# Patient Record
Sex: FEMALE | Race: WHITE | NOT HISPANIC OR LATINO
[De-identification: names, ages, dates, MRNs, and addresses within clinical notes are randomized per-mention and may not be internally consistent; named-entity substitution may affect disease eponyms.]

---

## 2019-07-12 ENCOUNTER — APPOINTMENT (OUTPATIENT)
Dept: HEPATOLOGY | Facility: CLINIC | Age: 64
End: 2019-07-12

## 2019-11-25 ENCOUNTER — TRANSCRIPTION ENCOUNTER (OUTPATIENT)
Age: 64
End: 2019-11-25

## 2019-11-25 ENCOUNTER — INPATIENT (INPATIENT)
Facility: HOSPITAL | Age: 64
LOS: 71 days | DRG: 329 | End: 2020-02-05
Attending: INTERNAL MEDICINE | Admitting: SURGERY
Payer: MEDICARE

## 2019-11-25 VITALS
DIASTOLIC BLOOD PRESSURE: 70 MMHG | TEMPERATURE: 99 F | SYSTOLIC BLOOD PRESSURE: 110 MMHG | HEART RATE: 62 BPM | RESPIRATION RATE: 16 BRPM | OXYGEN SATURATION: 100 %

## 2019-11-25 LAB
ALBUMIN SERPL ELPH-MCNC: 2.9 G/DL — LOW (ref 3.3–5)
ALP SERPL-CCNC: 108 U/L — SIGNIFICANT CHANGE UP (ref 40–120)
ALT FLD-CCNC: 33 U/L — SIGNIFICANT CHANGE UP (ref 10–45)
ANION GAP SERPL CALC-SCNC: 17 MMOL/L — SIGNIFICANT CHANGE UP (ref 5–17)
APTT BLD: 27.2 SEC — LOW (ref 27.5–36.3)
AST SERPL-CCNC: 36 U/L — SIGNIFICANT CHANGE UP (ref 10–40)
BASOPHILS # BLD AUTO: 0 K/UL — SIGNIFICANT CHANGE UP (ref 0–0.2)
BASOPHILS NFR BLD AUTO: 0 % — SIGNIFICANT CHANGE UP (ref 0–2)
BILIRUB SERPL-MCNC: 1.3 MG/DL — HIGH (ref 0.2–1.2)
BUN SERPL-MCNC: 20 MG/DL — SIGNIFICANT CHANGE UP (ref 7–23)
CALCIUM SERPL-MCNC: 10.1 MG/DL — SIGNIFICANT CHANGE UP (ref 8.4–10.5)
CHLORIDE SERPL-SCNC: 97 MMOL/L — SIGNIFICANT CHANGE UP (ref 96–108)
CO2 SERPL-SCNC: 17 MMOL/L — LOW (ref 22–31)
CREAT SERPL-MCNC: 0.78 MG/DL — SIGNIFICANT CHANGE UP (ref 0.5–1.3)
EOSINOPHIL # BLD AUTO: 0 K/UL — SIGNIFICANT CHANGE UP (ref 0–0.5)
EOSINOPHIL NFR BLD AUTO: 0 % — SIGNIFICANT CHANGE UP (ref 0–6)
GAS PNL BLDV: SIGNIFICANT CHANGE UP
GLUCOSE SERPL-MCNC: 151 MG/DL — HIGH (ref 70–99)
HCT VFR BLD CALC: 50.6 % — HIGH (ref 34.5–45)
HGB BLD-MCNC: 16.9 G/DL — HIGH (ref 11.5–15.5)
INR BLD: 1.31 RATIO — HIGH (ref 0.88–1.16)
LYMPHOCYTES # BLD AUTO: 0.2 K/UL — LOW (ref 1–3.3)
LYMPHOCYTES # BLD AUTO: 1.8 % — LOW (ref 13–44)
MAGNESIUM SERPL-MCNC: 2.2 MG/DL — SIGNIFICANT CHANGE UP (ref 1.6–2.6)
MCHC RBC-ENTMCNC: 29.7 PG — SIGNIFICANT CHANGE UP (ref 27–34)
MCHC RBC-ENTMCNC: 33.4 GM/DL — SIGNIFICANT CHANGE UP (ref 32–36)
MCV RBC AUTO: 88.9 FL — SIGNIFICANT CHANGE UP (ref 80–100)
MONOCYTES # BLD AUTO: 0.4 K/UL — SIGNIFICANT CHANGE UP (ref 0–0.9)
MONOCYTES NFR BLD AUTO: 3.6 % — SIGNIFICANT CHANGE UP (ref 2–14)
NEUTROPHILS # BLD AUTO: 10.49 K/UL — HIGH (ref 1.8–7.4)
NEUTROPHILS NFR BLD AUTO: 88.4 % — HIGH (ref 43–77)
PHOSPHATE SERPL-MCNC: 3.1 MG/DL — SIGNIFICANT CHANGE UP (ref 2.5–4.5)
PLATELET # BLD AUTO: 333 K/UL — SIGNIFICANT CHANGE UP (ref 150–400)
POTASSIUM SERPL-MCNC: 6.1 MMOL/L — HIGH (ref 3.5–5.3)
POTASSIUM SERPL-SCNC: 6.1 MMOL/L — HIGH (ref 3.5–5.3)
PROT SERPL-MCNC: 8 G/DL — SIGNIFICANT CHANGE UP (ref 6–8.3)
PROTHROM AB SERPL-ACNC: 15.2 SEC — HIGH (ref 10–12.9)
RBC # BLD: 5.69 M/UL — HIGH (ref 3.8–5.2)
RBC # FLD: 12.9 % — SIGNIFICANT CHANGE UP (ref 10.3–14.5)
SODIUM SERPL-SCNC: 131 MMOL/L — LOW (ref 135–145)
WBC # BLD: 11.2 K/UL — HIGH (ref 3.8–10.5)
WBC # FLD AUTO: 11.2 K/UL — HIGH (ref 3.8–10.5)

## 2019-11-25 PROCEDURE — 74177 CT ABD & PELVIS W/CONTRAST: CPT | Mod: 26

## 2019-11-25 PROCEDURE — 99285 EMERGENCY DEPT VISIT HI MDM: CPT

## 2019-11-25 RX ORDER — SODIUM CHLORIDE 9 MG/ML
1000 INJECTION INTRAMUSCULAR; INTRAVENOUS; SUBCUTANEOUS ONCE
Refills: 0 | Status: COMPLETED | OUTPATIENT
Start: 2019-11-25 | End: 2019-11-25

## 2019-11-25 RX ORDER — PIPERACILLIN AND TAZOBACTAM 4; .5 G/20ML; G/20ML
3.38 INJECTION, POWDER, LYOPHILIZED, FOR SOLUTION INTRAVENOUS ONCE
Refills: 0 | Status: COMPLETED | OUTPATIENT
Start: 2019-11-25 | End: 2019-11-25

## 2019-11-25 RX ADMIN — SODIUM CHLORIDE 1000 MILLILITER(S): 9 INJECTION INTRAMUSCULAR; INTRAVENOUS; SUBCUTANEOUS at 22:55

## 2019-11-25 NOTE — ED PROVIDER NOTE - ATTENDING CONTRIBUTION TO CARE
Pt seen with Dr Garrett and agree with is documentation above. Also agree with rapid assessment done by PA and documented by scribe.     64y F hx morbid obesity, HTN, COPD, lymphedema BL LE here with 3 weeks of vague abd discomfort and constipation, pain worsening in past 2-3 days. Has tried miralax, senna, used enema yesterday and had one small BM described as "lily." Pain was much worse after enema and now reporting worsening distension. Has had "low grade" fever to tmax 99.7 F as well. On exam dry mm, uncomfortable, +Bs, obese, distended, L sided mid and lower abd ttp, non peritoneal on exam. Concern for colitis, diverticulitis, obstruction. Labs, UA, CTAP, pain control, re-eval.

## 2019-11-25 NOTE — ED PROVIDER NOTE - CARE PLAN
Principal Discharge DX:	Diverticulitis Principal Discharge DX:	Diverticulitis  Goal:	with perforation

## 2019-11-25 NOTE — ED PROVIDER NOTE - OBJECTIVE STATEMENT
63 yo female with morbid obesity, constipation, presenting with abd pain for one month. states she has had constipation for one month, worsening for one week, unrelieved by miralax or senna, came to ED for further evaluation. Also had fevers previously, but not currently. Has passed stool but small round bowel movements.    Denies vomiting, diarrhea, sick contacts, travel recently.

## 2019-11-25 NOTE — ED PROVIDER NOTE - RAPID ASSESSMENT
**Pt seen in waiting room by Tatyana Frances (TIMI), documentation completed by Lucas Bergeron. Pt to be sent to main ED for further evaluation - all orders placed to be followed by MD in the main ED**    64 year old female with pmhx morbid obesity, post menopausal bleeding, vaginal polyp, acid reflux, HTN, COPD and pshx D&C presents to the ED c/o constipation x4 weeks. At that time, she also developed a subjective fever, which she took Augmentin for. The pt has not been able to have a BM without using an enema. Since then, she has been using an enema for her BMs every x3 days. Went to a doctor who recommended that the pt try Miralax but she began to experience pain to the left side of her abdomen. Also tried Senna and Shaw' Laxatives. Pt was able to pass stool yesterday after the enema but developed dizziness and a HA soon after. Noes that she has not been able to urinate all day today. **Pt seen in waiting room by Tatyana Frances (TIMI), documentation completed by Lucas Bergeron. Pt to be sent to main ED for further evaluation - all orders placed to be followed by MD in the main ED**    64 year old female with pmhx morbid obesity, post menopausal bleeding, vaginal polyp, acid reflux, HTN, COPD and pshx D&C presents to the ED c/o constipation x4 weeks. Reports that she has been taking enemas, miralax and senna and 'passing some small hard balls but no real stool in weeks.' Reports that she has not had a bowel movement unless she used an enema. States that taking all this medication has caused her to have significant abdominal distention and pain worst in the LLQ. Notes that she has not been able to urinate all day today because she feels dehydrated. Reports that she had 'low grade fever of 99.7 which she took left over augmentin for.' Denies any recorded fevers. No urinary symptoms.

## 2019-11-26 ENCOUNTER — RESULT REVIEW (OUTPATIENT)
Age: 64
End: 2019-11-26

## 2019-11-26 DIAGNOSIS — K57.92 DIVERTICULITIS OF INTESTINE, PART UNSPECIFIED, WITHOUT PERFORATION OR ABSCESS WITHOUT BLEEDING: ICD-10-CM

## 2019-11-26 DIAGNOSIS — K57.20 DIVERTICULITIS OF LARGE INTESTINE WITH PERFORATION AND ABSCESS W/OUT BLEEDING: ICD-10-CM

## 2019-11-26 LAB
ANION GAP SERPL CALC-SCNC: 11 MMOL/L — SIGNIFICANT CHANGE UP (ref 5–17)
APTT BLD: 27.1 SEC — LOW (ref 27.5–36.3)
BLD GP AB SCN SERPL QL: NEGATIVE — SIGNIFICANT CHANGE UP
BUN SERPL-MCNC: 20 MG/DL — SIGNIFICANT CHANGE UP (ref 7–23)
CALCIUM SERPL-MCNC: 8.2 MG/DL — LOW (ref 8.4–10.5)
CHLORIDE SERPL-SCNC: 102 MMOL/L — SIGNIFICANT CHANGE UP (ref 96–108)
CO2 SERPL-SCNC: 23 MMOL/L — SIGNIFICANT CHANGE UP (ref 22–31)
CREAT SERPL-MCNC: 0.81 MG/DL — SIGNIFICANT CHANGE UP (ref 0.5–1.3)
GAS PNL BLDA: SIGNIFICANT CHANGE UP
GAS PNL BLDV: SIGNIFICANT CHANGE UP
GLUCOSE SERPL-MCNC: 141 MG/DL — HIGH (ref 70–99)
HCT VFR BLD CALC: 36.5 % — SIGNIFICANT CHANGE UP (ref 34.5–45)
HCT VFR BLD CALC: 40.1 % — SIGNIFICANT CHANGE UP (ref 34.5–45)
HCT VFR BLD CALC: 40.7 % — SIGNIFICANT CHANGE UP (ref 34.5–45)
HGB BLD-MCNC: 12.2 G/DL — SIGNIFICANT CHANGE UP (ref 11.5–15.5)
HGB BLD-MCNC: 13.1 G/DL — SIGNIFICANT CHANGE UP (ref 11.5–15.5)
HGB BLD-MCNC: 13.5 G/DL — SIGNIFICANT CHANGE UP (ref 11.5–15.5)
INR BLD: 1.37 RATIO — HIGH (ref 0.88–1.16)
MAGNESIUM SERPL-MCNC: 2.1 MG/DL — SIGNIFICANT CHANGE UP (ref 1.6–2.6)
MCHC RBC-ENTMCNC: 29.2 PG — SIGNIFICANT CHANGE UP (ref 27–34)
MCHC RBC-ENTMCNC: 29.7 PG — SIGNIFICANT CHANGE UP (ref 27–34)
MCHC RBC-ENTMCNC: 29.8 PG — SIGNIFICANT CHANGE UP (ref 27–34)
MCHC RBC-ENTMCNC: 32.7 GM/DL — SIGNIFICANT CHANGE UP (ref 32–36)
MCHC RBC-ENTMCNC: 33.2 GM/DL — SIGNIFICANT CHANGE UP (ref 32–36)
MCHC RBC-ENTMCNC: 33.4 GM/DL — SIGNIFICANT CHANGE UP (ref 32–36)
MCV RBC AUTO: 89.2 FL — SIGNIFICANT CHANGE UP (ref 80–100)
MCV RBC AUTO: 89.3 FL — SIGNIFICANT CHANGE UP (ref 80–100)
MCV RBC AUTO: 89.5 FL — SIGNIFICANT CHANGE UP (ref 80–100)
NRBC # BLD: 0 /100 WBCS — SIGNIFICANT CHANGE UP (ref 0–0)
PHOSPHATE SERPL-MCNC: 3.5 MG/DL — SIGNIFICANT CHANGE UP (ref 2.5–4.5)
PLATELET # BLD AUTO: 302 K/UL — SIGNIFICANT CHANGE UP (ref 150–400)
PLATELET # BLD AUTO: 321 K/UL — SIGNIFICANT CHANGE UP (ref 150–400)
PLATELET # BLD AUTO: 326 K/UL — SIGNIFICANT CHANGE UP (ref 150–400)
POTASSIUM SERPL-MCNC: 4.4 MMOL/L — SIGNIFICANT CHANGE UP (ref 3.5–5.3)
POTASSIUM SERPL-SCNC: 4.4 MMOL/L — SIGNIFICANT CHANGE UP (ref 3.5–5.3)
PROTHROM AB SERPL-ACNC: 15.9 SEC — HIGH (ref 10–12.9)
RBC # BLD: 4.09 M/UL — SIGNIFICANT CHANGE UP (ref 3.8–5.2)
RBC # BLD: 4.49 M/UL — SIGNIFICANT CHANGE UP (ref 3.8–5.2)
RBC # BLD: 4.55 M/UL — SIGNIFICANT CHANGE UP (ref 3.8–5.2)
RBC # FLD: 12.9 % — SIGNIFICANT CHANGE UP (ref 10.3–14.5)
RBC # FLD: 13 % — SIGNIFICANT CHANGE UP (ref 10.3–14.5)
RBC # FLD: 13 % — SIGNIFICANT CHANGE UP (ref 10.3–14.5)
RH IG SCN BLD-IMP: POSITIVE — SIGNIFICANT CHANGE UP
RH IG SCN BLD-IMP: POSITIVE — SIGNIFICANT CHANGE UP
SODIUM SERPL-SCNC: 136 MMOL/L — SIGNIFICANT CHANGE UP (ref 135–145)
WBC # BLD: 11.08 K/UL — HIGH (ref 3.8–10.5)
WBC # BLD: 12.73 K/UL — HIGH (ref 3.8–10.5)
WBC # BLD: 7.63 K/UL — SIGNIFICANT CHANGE UP (ref 3.8–10.5)
WBC # FLD AUTO: 11.08 K/UL — HIGH (ref 3.8–10.5)
WBC # FLD AUTO: 12.73 K/UL — HIGH (ref 3.8–10.5)
WBC # FLD AUTO: 7.63 K/UL — SIGNIFICANT CHANGE UP (ref 3.8–10.5)

## 2019-11-26 PROCEDURE — 99291 CRITICAL CARE FIRST HOUR: CPT

## 2019-11-26 PROCEDURE — 88307 TISSUE EXAM BY PATHOLOGIST: CPT | Mod: 26

## 2019-11-26 PROCEDURE — 71045 X-RAY EXAM CHEST 1 VIEW: CPT | Mod: 26

## 2019-11-26 PROCEDURE — 36556 INSERT NON-TUNNEL CV CATH: CPT

## 2019-11-26 RX ORDER — SODIUM CHLORIDE 9 MG/ML
1000 INJECTION, SOLUTION INTRAVENOUS
Refills: 0 | Status: DISCONTINUED | OUTPATIENT
Start: 2019-11-26 | End: 2019-11-29

## 2019-11-26 RX ORDER — SODIUM CHLORIDE 9 MG/ML
1000 INJECTION, SOLUTION INTRAVENOUS ONCE
Refills: 0 | Status: COMPLETED | OUTPATIENT
Start: 2019-11-26 | End: 2019-11-26

## 2019-11-26 RX ORDER — CALCIUM GLUCONATE 100 MG/ML
2 VIAL (ML) INTRAVENOUS ONCE
Refills: 0 | Status: COMPLETED | OUTPATIENT
Start: 2019-11-26 | End: 2019-11-26

## 2019-11-26 RX ORDER — FENTANYL CITRATE 50 UG/ML
1 INJECTION INTRAVENOUS
Qty: 5000 | Refills: 0 | Status: DISCONTINUED | OUTPATIENT
Start: 2019-11-26 | End: 2019-11-26

## 2019-11-26 RX ORDER — SODIUM CHLORIDE 9 MG/ML
500 INJECTION, SOLUTION INTRAVENOUS ONCE
Refills: 0 | Status: DISCONTINUED | OUTPATIENT
Start: 2019-11-26 | End: 2019-11-26

## 2019-11-26 RX ORDER — SODIUM CHLORIDE 9 MG/ML
10 INJECTION INTRAMUSCULAR; INTRAVENOUS; SUBCUTANEOUS
Refills: 0 | Status: DISCONTINUED | OUTPATIENT
Start: 2019-11-26 | End: 2019-11-26

## 2019-11-26 RX ORDER — DEXMEDETOMIDINE HYDROCHLORIDE IN 0.9% SODIUM CHLORIDE 4 UG/ML
0.7 INJECTION INTRAVENOUS
Qty: 200 | Refills: 0 | Status: DISCONTINUED | OUTPATIENT
Start: 2019-11-26 | End: 2019-11-27

## 2019-11-26 RX ORDER — SODIUM CHLORIDE 9 MG/ML
500 INJECTION, SOLUTION INTRAVENOUS ONCE
Refills: 0 | Status: COMPLETED | OUTPATIENT
Start: 2019-11-26 | End: 2019-11-26

## 2019-11-26 RX ORDER — IPRATROPIUM/ALBUTEROL SULFATE 18-103MCG
3 AEROSOL WITH ADAPTER (GRAM) INHALATION EVERY 6 HOURS
Refills: 0 | Status: DISCONTINUED | OUTPATIENT
Start: 2019-11-26 | End: 2019-11-26

## 2019-11-26 RX ORDER — HYDROMORPHONE HYDROCHLORIDE 2 MG/ML
0.5 INJECTION INTRAMUSCULAR; INTRAVENOUS; SUBCUTANEOUS ONCE
Refills: 0 | Status: DISCONTINUED | OUTPATIENT
Start: 2019-11-26 | End: 2019-11-26

## 2019-11-26 RX ORDER — MORPHINE SULFATE 50 MG/1
4 CAPSULE, EXTENDED RELEASE ORAL ONCE
Refills: 0 | Status: DISCONTINUED | OUTPATIENT
Start: 2019-11-26 | End: 2019-11-26

## 2019-11-26 RX ORDER — ONDANSETRON 8 MG/1
4 TABLET, FILM COATED ORAL ONCE
Refills: 0 | Status: COMPLETED | OUTPATIENT
Start: 2019-11-26 | End: 2019-11-26

## 2019-11-26 RX ORDER — PROPOFOL 10 MG/ML
15 INJECTION, EMULSION INTRAVENOUS
Qty: 1000 | Refills: 0 | Status: DISCONTINUED | OUTPATIENT
Start: 2019-11-26 | End: 2019-11-26

## 2019-11-26 RX ORDER — DOXAZOSIN MESYLATE 4 MG
1 TABLET ORAL
Qty: 0 | Refills: 0 | DISCHARGE

## 2019-11-26 RX ORDER — PIPERACILLIN AND TAZOBACTAM 4; .5 G/20ML; G/20ML
3.38 INJECTION, POWDER, LYOPHILIZED, FOR SOLUTION INTRAVENOUS EVERY 8 HOURS
Refills: 0 | Status: DISCONTINUED | OUTPATIENT
Start: 2019-11-26 | End: 2019-11-26

## 2019-11-26 RX ORDER — IPRATROPIUM/ALBUTEROL SULFATE 18-103MCG
3 AEROSOL WITH ADAPTER (GRAM) INHALATION EVERY 6 HOURS
Refills: 0 | Status: DISCONTINUED | OUTPATIENT
Start: 2019-11-26 | End: 2019-11-27

## 2019-11-26 RX ORDER — ENOXAPARIN SODIUM 100 MG/ML
80 INJECTION SUBCUTANEOUS
Refills: 0 | Status: DISCONTINUED | OUTPATIENT
Start: 2019-11-26 | End: 2019-11-28

## 2019-11-26 RX ORDER — HEPARIN SODIUM 5000 [USP'U]/ML
5000 INJECTION INTRAVENOUS; SUBCUTANEOUS EVERY 8 HOURS
Refills: 0 | Status: DISCONTINUED | OUTPATIENT
Start: 2019-11-26 | End: 2019-11-26

## 2019-11-26 RX ORDER — ACETAMINOPHEN 500 MG
1000 TABLET ORAL ONCE
Refills: 0 | Status: COMPLETED | OUTPATIENT
Start: 2019-11-26 | End: 2019-11-26

## 2019-11-26 RX ORDER — SODIUM CHLORIDE 9 MG/ML
1000 INJECTION, SOLUTION INTRAVENOUS
Refills: 0 | Status: DISCONTINUED | OUTPATIENT
Start: 2019-11-26 | End: 2019-11-26

## 2019-11-26 RX ORDER — AMLODIPINE BESYLATE 2.5 MG/1
1 TABLET ORAL
Qty: 0 | Refills: 0 | DISCHARGE

## 2019-11-26 RX ORDER — HYDROMORPHONE HYDROCHLORIDE 2 MG/ML
0.5 INJECTION INTRAMUSCULAR; INTRAVENOUS; SUBCUTANEOUS EVERY 4 HOURS
Refills: 0 | Status: DISCONTINUED | OUTPATIENT
Start: 2019-11-26 | End: 2019-11-26

## 2019-11-26 RX ORDER — CHLORHEXIDINE GLUCONATE 213 G/1000ML
15 SOLUTION TOPICAL
Refills: 0 | Status: DISCONTINUED | OUTPATIENT
Start: 2019-11-26 | End: 2019-11-27

## 2019-11-26 RX ORDER — PANTOPRAZOLE SODIUM 20 MG/1
40 TABLET, DELAYED RELEASE ORAL EVERY 24 HOURS
Refills: 0 | Status: DISCONTINUED | OUTPATIENT
Start: 2019-11-26 | End: 2019-11-29

## 2019-11-26 RX ORDER — CHLORHEXIDINE GLUCONATE 213 G/1000ML
1 SOLUTION TOPICAL
Refills: 0 | Status: DISCONTINUED | OUTPATIENT
Start: 2019-11-26 | End: 2019-11-26

## 2019-11-26 RX ORDER — ENOXAPARIN SODIUM 100 MG/ML
40 INJECTION SUBCUTANEOUS
Refills: 0 | Status: DISCONTINUED | OUTPATIENT
Start: 2019-11-26 | End: 2019-11-26

## 2019-11-26 RX ORDER — PIPERACILLIN AND TAZOBACTAM 4; .5 G/20ML; G/20ML
3.38 INJECTION, POWDER, LYOPHILIZED, FOR SOLUTION INTRAVENOUS EVERY 8 HOURS
Refills: 0 | Status: DISCONTINUED | OUTPATIENT
Start: 2019-11-26 | End: 2019-11-29

## 2019-11-26 RX ORDER — HYDROMORPHONE HYDROCHLORIDE 2 MG/ML
0.5 INJECTION INTRAMUSCULAR; INTRAVENOUS; SUBCUTANEOUS EVERY 4 HOURS
Refills: 0 | Status: DISCONTINUED | OUTPATIENT
Start: 2019-11-26 | End: 2019-11-27

## 2019-11-26 RX ORDER — CHLORHEXIDINE GLUCONATE 213 G/1000ML
1 SOLUTION TOPICAL
Refills: 0 | Status: DISCONTINUED | OUTPATIENT
Start: 2019-11-26 | End: 2019-11-29

## 2019-11-26 RX ADMIN — SODIUM CHLORIDE 2000 MILLILITER(S): 9 INJECTION, SOLUTION INTRAVENOUS at 00:40

## 2019-11-26 RX ADMIN — CHLORHEXIDINE GLUCONATE 15 MILLILITER(S): 213 SOLUTION TOPICAL at 16:07

## 2019-11-26 RX ADMIN — ENOXAPARIN SODIUM 80 MILLIGRAM(S): 100 INJECTION SUBCUTANEOUS at 12:57

## 2019-11-26 RX ADMIN — MORPHINE SULFATE 4 MILLIGRAM(S): 50 CAPSULE, EXTENDED RELEASE ORAL at 01:08

## 2019-11-26 RX ADMIN — CHLORHEXIDINE GLUCONATE 15 MILLILITER(S): 213 SOLUTION TOPICAL at 22:24

## 2019-11-26 RX ADMIN — HYDROMORPHONE HYDROCHLORIDE 0.5 MILLIGRAM(S): 2 INJECTION INTRAMUSCULAR; INTRAVENOUS; SUBCUTANEOUS at 15:12

## 2019-11-26 RX ADMIN — HYDROMORPHONE HYDROCHLORIDE 0.5 MILLIGRAM(S): 2 INJECTION INTRAMUSCULAR; INTRAVENOUS; SUBCUTANEOUS at 15:30

## 2019-11-26 RX ADMIN — SODIUM CHLORIDE 1000 MILLILITER(S): 9 INJECTION, SOLUTION INTRAVENOUS at 12:30

## 2019-11-26 RX ADMIN — ONDANSETRON 4 MILLIGRAM(S): 8 TABLET, FILM COATED ORAL at 01:13

## 2019-11-26 RX ADMIN — FENTANYL CITRATE 8.41 MICROGRAM(S)/KG/HR: 50 INJECTION INTRAVENOUS at 08:09

## 2019-11-26 RX ADMIN — Medication 200 GRAM(S): at 12:57

## 2019-11-26 RX ADMIN — PIPERACILLIN AND TAZOBACTAM 25 GRAM(S): 4; .5 INJECTION, POWDER, LYOPHILIZED, FOR SOLUTION INTRAVENOUS at 23:18

## 2019-11-26 RX ADMIN — PIPERACILLIN AND TAZOBACTAM 25 GRAM(S): 4; .5 INJECTION, POWDER, LYOPHILIZED, FOR SOLUTION INTRAVENOUS at 08:19

## 2019-11-26 RX ADMIN — PANTOPRAZOLE SODIUM 40 MILLIGRAM(S): 20 TABLET, DELAYED RELEASE ORAL at 12:57

## 2019-11-26 RX ADMIN — PROPOFOL 14.67 MICROGRAM(S)/KG/MIN: 10 INJECTION, EMULSION INTRAVENOUS at 08:08

## 2019-11-26 RX ADMIN — PIPERACILLIN AND TAZOBACTAM 200 GRAM(S): 4; .5 INJECTION, POWDER, LYOPHILIZED, FOR SOLUTION INTRAVENOUS at 00:00

## 2019-11-26 RX ADMIN — Medication 400 MILLIGRAM(S): at 01:13

## 2019-11-26 RX ADMIN — PIPERACILLIN AND TAZOBACTAM 25 GRAM(S): 4; .5 INJECTION, POWDER, LYOPHILIZED, FOR SOLUTION INTRAVENOUS at 16:12

## 2019-11-26 NOTE — PATIENT PROFILE ADULT - NSPROGENSOURCEINFO_GEN_A_NUR
unable to assess. patient intubated and sedated significant other/unable to assess. patient intubated and sedated unable to assess. patient intubated and sedated/patient/significant other

## 2019-11-26 NOTE — H&P ADULT - ASSESSMENT
64F with pmhx morbid obesity, acid reflux, HTN, COPD and pshx D&C presents to the ED c/o abdominal pain and bloating and CT demonstrating perforated sigmoid diverticulitis with intraperitoneal free air.    - Admit to surgery, Dr. Greer  - IV abx: Zosyn  - NPO/IVF  - Trend labs and lactate  - Pain control  - Home meds  - DVT ppx: Lovenox  - Discussed with general surgery fellow    JESSICA Cardona, PGY2  [facility]  [id number] with any questions 64F with pmhx morbid obesity, acid reflux, HTN, COPD and pshx D&C presents to the ED c/o abdominal pain and bloating and CT demonstrating perforated sigmoid diverticulitis with intraperitoneal free air.    - Admit to surgery, Dr. Greer  - JANNA abx: Zosyn  - NPO/IVF  - Trend labs and lactate  - Pain control  - Home meds  - DVT ppx: Lovenox  - Discussed with general surgery fellow    JESSICA Cardona, PGY2  [facility]  [id number] with any questions       Addendum  Patient seen/examined by MIS/bariatric fellow. Agree with resident note.  Patient with 2 weeks constipation, then sudden onset abdominal pain and distention last night that did not improve throughout the day.  Has not urinated all day. Subjective fever/chills.  Pain worse in LLQ but spans lower abdomen. Severe pain with coughing or jarring of stretcher.  CT shows thickened sigmoid colon with free intraperitoneal air and fluid. Small umbilical hernia.  Discussed risks/benefits/alternatives of surgery with the patient, as well as the high likelihood of a colostomy bag. She is agreeable to surgery.  NPO, IV zosyn, IV fluid resuscitation.  OR for ex lap.  Vita Donovan MD

## 2019-11-26 NOTE — PATIENT PROFILE ADULT - PRIMARY SOURCE OF SUPPORT/COMFORT
unable to assess. patient intubated and sedated significant other/unable to assess. patient intubated and sedated

## 2019-11-26 NOTE — H&P ADULT - ATTENDING COMMENTS
Patient seen/examined.  Agree w above note and plan and have discussed plan w house staff.  Free air, CT c/w perforated diverticulitis, to OR    Lucas Greer MD

## 2019-11-26 NOTE — PATIENT PROFILE ADULT - NSPROMEDSPUMP_GEN_A_NUR
unable to assess. patient intubated and sedated medication pump(s) used/unable to assess. patient intubated and sedated

## 2019-11-26 NOTE — PATIENT PROFILE ADULT - LIVES WITH
unable to assess. patient intubated and sedated unable to assess. patient intubated and sedated/significant other

## 2019-11-26 NOTE — PATIENT PROFILE ADULT - VISION (WITH CORRECTIVE LENSES IF THE PATIENT USUALLY WEARS THEM):
unable to assess. patient intubated and sedated unable to assess. patient intubated and sedated/Normal vision: sees adequately in most situations; can see medication labels, newsprint

## 2019-11-26 NOTE — OCCUPATIONAL THERAPY INITIAL EVALUATION ADULT - PRECAUTIONS/LIMITATIONS, REHAB EVAL
Reports pain worst in the LLQ that started a few days ago and has been worsening in the last day, also has noted significant abdominal distention in the last day. Notes that she has not been able to urinate all day today because she feels dehydrated. Reports that she had 'low grade temperature of 99.7 which she took left over augmentin for.' Also reports nausea, denies any emesis, recorded fevers, urinary symptoms. Reports she has difficulty walking short distances because she becomes SOB. In ED, CT demonstrated Perforated sigmoid diverticulitis with intraperitoneal free air. surgical precautions/fall precautions/Reports pain worst in the LLQ that started a few days ago and has been worsening in the last day, also has noted significant abdominal distention in the last day. Notes that she has not been able to urinate all day today because she feels dehydrated. Reports that she had 'low grade temperature of 99.7 which she took left over augmentin for.' Also reports nausea, denies any emesis, recorded fevers, urinary symptoms. Reports she has difficulty walking short distances because she becomes SOB. In ED, CT demonstrated Perforated sigmoid diverticulitis with intraperitoneal free air.

## 2019-11-26 NOTE — PHYSICAL THERAPY INITIAL EVALUATION ADULT - PERTINENT HX OF CURRENT PROBLEM, REHAB EVAL
64F Hx morbid obesity, GERD, HTN, COPD and pshx D&C adm with abd pain with 2wk constipation. Hosp course: CTabd +Perf sigmoid diverticulitis with intraperitoneal free air; Taken to OR emergently 11/25 overnight and underwent an ex-lap with extended left hemicolectomy and was left in discontinuity. An Abthera VAC was placed. Brought to the ICU for hemodynamic and respiratory management.

## 2019-11-26 NOTE — H&P ADULT - NSICDXPASTMEDICALHX_GEN_ALL_CORE_FT
PAST MEDICAL HISTORY:  Acid Reflux     HTN (Hypertension)     Morbid Obesity     Polyp, Vagina     Post Menopausal Bleeding

## 2019-11-26 NOTE — CONSULT NOTE ADULT - ASSESSMENT
ASSESSMENT:  GREGORY VELASQUEZ is a 64y Female with history of     PLAN:    NEURO:    RESPIRATORY:     CARDIOVASCULAR:    GI/NUTRITION:    GENITOURINARY/RENAL:    HEMATOLOGIC:    INFECTIOUS DISEASE:    ENDOCRINE:    Sanam Tavares, PGY2 ASSESSMENT:    64F with pmhx morbid obesity, acid reflux, HTN, COPD and pshx D&C presents to the ED c/o abdominal pain and bloating. Patient reports having constipation for 2 weeks and has been taking enemas, miralax and senna and passing small hard balls for the last weeks. Reports that she has not had a bowel movement unless she used an enema. Reports pain worst in the LLQ that started a few days ago and has been worsening in the last day, also has noted significant abdominal distention in the last day. Notes that she has not been able to urinate all day today because she feels dehydrated. Reports that she had 'low grade temperature of 99.7 which she took left over augmentin for.' Also reports nausea, denies any emesis, recorded fevers, urinary symptoms. Reports she has difficulty walking short distances because she becomes SOB. In ED, CT demonstrated Perforated sigmoid diverticulitis with intraperitoneal free air.    She was taken to the operating room on 11/25 overnight and underwent an exploratory laparotomy with extended left hemicolectomy and was left in discontinuity. An Abthera VAC was placed. She received 3400 of crystalloid, EBL was 500, she required pushes of phenylephrine during the case, but not continuous. She was kept intubated post op and brought to the ICU for hemodynamic and respiratory management.     PLAN:    NEURO: In need of sedation while intubated and postoperative pain control  - Will switch to precedex  - Continue pain control with PRN dilaudid and will add IV tylenol    RESPIRATORY: Hx of COPD, mechanical ventilation  - Cont current vent settings 500/15/8/40  - Wean mechanical ventilation as tolerated, though patient will require intubated for RTOR later this week  - ABGs with AM labs  - AM CXR  - Hong    CARDIOVASCULAR: Hx of HTN on amlodipine at home, No hx of CAD, required pushes of phenylephrine during the operation but nothing continuous and currently not requiring vasopressor support  - Bedside ultrasound performed and show adequate contractility, somewhat small IVF  - Will restart IVF and administer 500cc bolus  - Monitor vital signs and ins and outs    GI/NUTRITION: S/p extended left hemicolectomy and was left in discontinuity with Abthera in place, Hx of obesity, Hx of GERD  - Cont NPO with OGT and IVF  - Monitor ABthera output  - Plan to RTOR on thursday    GENITOURINARY/RENAL:   - Monitoring fluid status closely  - Restarting IVF - LR @ 75 and 500cc bolus of LR    HEMATOLOGIC: Operative EBL of 500, CBC stable  - Trend daily CBC and coags  - Cont DVT ppx with lovenox dosed by patient weight    INFECTIOUS DISEASE: Admitted with perforated sigmoid possibly from stercolitis vs diverticulitis  - Blood cultures from ED pending  - Surgical cultures from OR pending  - Cont Zosyn  - trend WBC and fever curve    ENDOCRINE: No active issues     Sanam Tavares, PGY2

## 2019-11-26 NOTE — CHART NOTE - NSCHARTNOTEFT_GEN_A_CORE
STATUS POST:  ex lap extended L hemicolectomy    POST OPERATIVE DAY #: 0    SUBJECTIVE: Pt seen in ICU, intubated  500/15/8/40%  precedex 1.5 mcg  fentanyl 1 mcg  understands that she is in the ICU and needs a reoperation later this week      Vital Signs Last 24 Hrs  T(C): 36.2 (26 Nov 2019 07:00), Max: 37.1 (25 Nov 2019 17:53)  T(F): 97.2 (26 Nov 2019 07:00), Max: 98.8 (25 Nov 2019 17:53)  HR: 100 (26 Nov 2019 11:00) (62 - 103)  BP: 145/64 (26 Nov 2019 11:00) (93/63 - 189/128)  BP(mean): 91 (26 Nov 2019 11:00) (74 - 145)  RR: 21 (26 Nov 2019 11:00) (16 - 44)  SpO2: 93% (26 Nov 2019 11:00) (93% - 100%)  I&O's Summary    25 Nov 2019 07:01  -  26 Nov 2019 07:00  --------------------------------------------------------  IN: 23.5 mL / OUT: 50 mL / NET: -26.5 mL    26 Nov 2019 07:01  -  26 Nov 2019 12:17  --------------------------------------------------------  IN: 417.5 mL / OUT: 105 mL / NET: 312.5 mL      I&O's Detail    25 Nov 2019 07:01  -  26 Nov 2019 07:00  --------------------------------------------------------  IN:    fentaNYL  Infusion: 8.4 mL    propofol Infusion: 15.1 mL  Total IN: 23.5 mL    OUT:    Indwelling Catheter - Urethral: 50 mL  Total OUT: 50 mL    Total NET: -26.5 mL      26 Nov 2019 07:01  -  26 Nov 2019 12:17  --------------------------------------------------------  IN:    dexmedetomidine Infusion: 63.1 mL    fentaNYL  Infusion: 33.6 mL    IV PiggyBack: 100 mL    lactated ringers.: 125 mL    propofol Infusion: 95.8 mL  Total IN: 417.5 mL    OUT:    Indwelling Catheter - Urethral: 105 mL  Total OUT: 105 mL    Total NET: 312.5 mL          MEDICATIONS  (STANDING):  calcium gluconate IVPB 2 Gram(s) IV Intermittent once  chlorhexidine 0.12% Liquid 15 milliLiter(s) Oral Mucosa <User Schedule>  chlorhexidine 2% Cloths 1 Application(s) Topical <User Schedule>  dexMEDEtomidine Infusion 0.7 MICROgram(s)/kG/Hr (29.453 mL/Hr) IV Continuous <Continuous>  enoxaparin Injectable 80 milliGRAM(s) SubCutaneous <User Schedule>  fentaNYL   Infusion 1 MICROgram(s)/kG/Hr (8.415 mL/Hr) IV Continuous <Continuous>  lactated ringers Bolus 500 milliLiter(s) IV Bolus once  lactated ringers. 1000 milliLiter(s) (125 mL/Hr) IV Continuous <Continuous>  pantoprazole  Injectable 40 milliGRAM(s) IV Push every 24 hours  piperacillin/tazobactam IVPB.. 3.375 Gram(s) IV Intermittent every 8 hours  propofol Infusion 15 MICROgram(s)/kG/Min (14.67 mL/Hr) IV Continuous <Continuous>    MEDICATIONS  (PRN):  albuterol/ipratropium for Nebulization 3 milliLiter(s) Nebulizer every 6 hours PRN Shortness of Breath and/or Wheezing      LABS:                        13.5   12.73 )-----------( 326      ( 26 Nov 2019 12:07 )             40.7     11-26    136  |  102  |  20  ----------------------------<  141<H>  4.4   |  23  |  0.81    Ca    8.2<L>      26 Nov 2019 06:47  Phos  3.5     11-26  Mg     2.1     11-26    TPro  8.0  /  Alb  2.9<L>  /  TBili  1.3<H>  /  DBili  x   /  AST  36  /  ALT  33  /  AlkPhos  108  11-25    PT/INR - ( 26 Nov 2019 06:47 )   PT: 15.9 sec;   INR: 1.37 ratio         PTT - ( 26 Nov 2019 06:47 )  PTT:27.1 sec      RADIOLOGY & ADDITIONAL STUDIES:    PHYSICAL EXAM:  General: intubated  CV: NSR RRR no murmur detected on exam  Resp: CTAB PRVC 500/15/8/40%  GI: soft, abthera in place, nontender, nondistended, no rebound  : marquez dark yellow  Ext: b/l severe lymphedema, skin in tact, DP 2+ bilaterally    A/P: 64y Female s/p   - Diet:   - Activity:  - Labs:   - Pain medication  - DVT ppx STATUS POST:  ex lap extended L hemicolectomy    POST OPERATIVE DAY #: 0    SUBJECTIVE: Pt seen in ICU, intubated  500/15/8/40%  precedex 1.5 mcg  fentanyl 1 mcg  understands that she is in the ICU and needs a reoperation later this week      Vital Signs Last 24 Hrs  T(C): 36.2 (26 Nov 2019 07:00), Max: 37.1 (25 Nov 2019 17:53)  T(F): 97.2 (26 Nov 2019 07:00), Max: 98.8 (25 Nov 2019 17:53)  HR: 100 (26 Nov 2019 11:00) (62 - 103)  BP: 145/64 (26 Nov 2019 11:00) (93/63 - 189/128)  BP(mean): 91 (26 Nov 2019 11:00) (74 - 145)  RR: 21 (26 Nov 2019 11:00) (16 - 44)  SpO2: 93% (26 Nov 2019 11:00) (93% - 100%)  I&O's Summary    25 Nov 2019 07:01  -  26 Nov 2019 07:00  --------------------------------------------------------  IN: 23.5 mL / OUT: 50 mL / NET: -26.5 mL    26 Nov 2019 07:01  -  26 Nov 2019 12:17  --------------------------------------------------------  IN: 417.5 mL / OUT: 105 mL / NET: 312.5 mL      I&O's Detail    25 Nov 2019 07:01  -  26 Nov 2019 07:00  --------------------------------------------------------  IN:    fentaNYL  Infusion: 8.4 mL    propofol Infusion: 15.1 mL  Total IN: 23.5 mL    OUT:    Indwelling Catheter - Urethral: 50 mL  Total OUT: 50 mL    Total NET: -26.5 mL      26 Nov 2019 07:01  -  26 Nov 2019 12:17  --------------------------------------------------------  IN:    dexmedetomidine Infusion: 63.1 mL    fentaNYL  Infusion: 33.6 mL    IV PiggyBack: 100 mL    lactated ringers.: 125 mL    propofol Infusion: 95.8 mL  Total IN: 417.5 mL    OUT:    Indwelling Catheter - Urethral: 105 mL  Total OUT: 105 mL    Total NET: 312.5 mL          MEDICATIONS  (STANDING):  calcium gluconate IVPB 2 Gram(s) IV Intermittent once  chlorhexidine 0.12% Liquid 15 milliLiter(s) Oral Mucosa <User Schedule>  chlorhexidine 2% Cloths 1 Application(s) Topical <User Schedule>  dexMEDEtomidine Infusion 0.7 MICROgram(s)/kG/Hr (29.453 mL/Hr) IV Continuous <Continuous>  enoxaparin Injectable 80 milliGRAM(s) SubCutaneous <User Schedule>  fentaNYL   Infusion 1 MICROgram(s)/kG/Hr (8.415 mL/Hr) IV Continuous <Continuous>  lactated ringers Bolus 500 milliLiter(s) IV Bolus once  lactated ringers. 1000 milliLiter(s) (125 mL/Hr) IV Continuous <Continuous>  pantoprazole  Injectable 40 milliGRAM(s) IV Push every 24 hours  piperacillin/tazobactam IVPB.. 3.375 Gram(s) IV Intermittent every 8 hours  propofol Infusion 15 MICROgram(s)/kG/Min (14.67 mL/Hr) IV Continuous <Continuous>    MEDICATIONS  (PRN):  albuterol/ipratropium for Nebulization 3 milliLiter(s) Nebulizer every 6 hours PRN Shortness of Breath and/or Wheezing      LABS:                        13.5   12.73 )-----------( 326      ( 26 Nov 2019 12:07 )             40.7     11-26    136  |  102  |  20  ----------------------------<  141<H>  4.4   |  23  |  0.81    Ca    8.2<L>      26 Nov 2019 06:47  Phos  3.5     11-26  Mg     2.1     11-26    TPro  8.0  /  Alb  2.9<L>  /  TBili  1.3<H>  /  DBili  x   /  AST  36  /  ALT  33  /  AlkPhos  108  11-25    PT/INR - ( 26 Nov 2019 06:47 )   PT: 15.9 sec;   INR: 1.37 ratio         PTT - ( 26 Nov 2019 06:47 )  PTT:27.1 sec      RADIOLOGY & ADDITIONAL STUDIES:    PHYSICAL EXAM:  General: intubated  CV: NSR RRR no murmur detected on exam  Resp: CTAB PRVC 500/15/8/40%  GI: soft, abthera in place, nontender, nondistended, no rebound  : marquez dark yellow  Ext: b/l severe lymphedema, skin in tact, DP 2+ bilaterally    A/P: 64y Female s/p ex lap extended L hemicolectomy  - Diet: NPO  - Activity: as tolerated  - Labs: f/u labs  - Pain medication  - DVT ppx  - intubated  - abthera  - appreciate SICU care  - alma    [facility]  [id number]

## 2019-11-26 NOTE — PATIENT PROFILE ADULT - ARRIVAL FROM
unable to assess. patient intubated and sedated unable to assess. patient intubated and sedated/Home

## 2019-11-26 NOTE — PHYSICAL THERAPY INITIAL EVALUATION ADULT - GENERAL OBSERVATIONS, REHAB EVAL
Patient received semi reclined in bed, NAD, VSS, +ICU monitors, +BiPAP, +Left IJ TLC infusing, +abdominal wound vac c/d/i, +marquez. DAREK Damian cleared patient for PT.

## 2019-11-26 NOTE — CONSULT NOTE ADULT - ATTENDING COMMENTS
Patient seen and examined and agree with above.   64 year old female with PMH significant for COPD, hypertension and Gerd who presented with a perforated sigmoid diverticultis s/p ex lap, extended left hemicolectomy, left in discontinuuty with Abthera VAC placement. Patient seen and examined and agree with above.   64 year old female with PMH significant for COPD, hypertension and Gerd who presented with a perforated sigmoid diverticultis s/p ex lap, extended left hemicolectomy, left in discontinuuty with Abthera VAC placement.  I have reviewed PMH, PSH, labs, meds imaging.   Current management includes:   1) Acute respiratory insufficiency- on mechanical ventilation at this time due to septic shock from perforated sigmoid diverticulitis  -will continue current settings including PEEP 8 due to morbid obesity   -will wean to extubation when meets criteria  -CXR reviewed  -goal SpO2 > 92%    2) COPD- added duonebs  -does not appear to have an acute exacerbation at this time    3) Perforated diverticulitis - improved leukocytosis   -continue antibiotics  -pending cultures   -Currently with open abdomen with temporary abdominal closure; plan for take back in 48 hours    4) hypocalcemia- repleted and will recheck    CC time: 55 minutes

## 2019-11-26 NOTE — ED ADULT NURSE NOTE - OBJECTIVE STATEMENT
64F aaox4 ambulatory with h/o obesity, HTN and chronic constipation p/w c/o abd distention and constipation for 4 weeks now. As per pt, she can only have BM if she does an enema, last BM was yesterday after an enema. Patient was seen in triage by QPA, was sent to CT scan and labs sent and resulted. Repeat VBG and type and screen sent secondary to abd perforation. Reports last PO intake was yesterday, kept NPO.   Admitted to surgery, report handoff to OR RN. Propeprty given to Family. Informed of plan of care and pt verbalized understanding.   FC inserted aseptically, UA specimen sent.

## 2019-11-26 NOTE — BRIEF OPERATIVE NOTE - OPERATION/FINDINGS
Perforated and necrotic sigmod colon with pus and stool in the abdominal cavity; Colon appeared ischemic to splenic flexure; entire colon was full of hard rocks of stool.

## 2019-11-26 NOTE — CHART NOTE - NSCHARTNOTEFT_GEN_A_CORE
STATUS POST:  ex lap with extended left hemicolectomy     POST OPERATIVE DAY #: 0    SUBJECTIVE: Pt seen in SICU at bedside for post-op check. Pt intubated and sedated but responsive. Understands need to return to OR for abdominal closure.     Vital Signs Last 24 Hrs  T(C): 36.2 (26 Nov 2019 07:00), Max: 37.1 (25 Nov 2019 17:53)  T(F): 97.2 (26 Nov 2019 07:00), Max: 98.8 (25 Nov 2019 17:53)  HR: 100 (26 Nov 2019 11:00) (62 - 103)  BP: 145/64 (26 Nov 2019 11:00) (93/63 - 189/128)  BP(mean): 91 (26 Nov 2019 11:00) (74 - 145)  RR: 21 (26 Nov 2019 11:00) (16 - 44)  SpO2: 93% (26 Nov 2019 11:00) (93% - 100%)  I&O's Summary    25 Nov 2019 07:01  -  26 Nov 2019 07:00  --------------------------------------------------------  IN: 23.5 mL / OUT: 50 mL / NET: -26.5 mL    26 Nov 2019 07:01  -  26 Nov 2019 12:12  --------------------------------------------------------  IN: 417.5 mL / OUT: 105 mL / NET: 312.5 mL      I&O's Detail    25 Nov 2019 07:01  -  26 Nov 2019 07:00  --------------------------------------------------------  IN:    fentaNYL  Infusion: 8.4 mL    propofol Infusion: 15.1 mL  Total IN: 23.5 mL    OUT:    Indwelling Catheter - Urethral: 50 mL  Total OUT: 50 mL    Total NET: -26.5 mL      26 Nov 2019 07:01  -  26 Nov 2019 12:12  --------------------------------------------------------  IN:    dexmedetomidine Infusion: 63.1 mL    fentaNYL  Infusion: 33.6 mL    IV PiggyBack: 100 mL    lactated ringers.: 125 mL    propofol Infusion: 95.8 mL  Total IN: 417.5 mL    OUT:    Indwelling Catheter - Urethral: 105 mL  Total OUT: 105 mL    Total NET: 312.5 mL          MEDICATIONS  (STANDING):  calcium gluconate IVPB 2 Gram(s) IV Intermittent once  chlorhexidine 0.12% Liquid 15 milliLiter(s) Oral Mucosa <User Schedule>  chlorhexidine 2% Cloths 1 Application(s) Topical <User Schedule>  dexMEDEtomidine Infusion 0.7 MICROgram(s)/kG/Hr (29.453 mL/Hr) IV Continuous <Continuous>  enoxaparin Injectable 80 milliGRAM(s) SubCutaneous <User Schedule>  fentaNYL   Infusion 1 MICROgram(s)/kG/Hr (8.415 mL/Hr) IV Continuous <Continuous>  lactated ringers Bolus 500 milliLiter(s) IV Bolus once  lactated ringers. 1000 milliLiter(s) (125 mL/Hr) IV Continuous <Continuous>  pantoprazole  Injectable 40 milliGRAM(s) IV Push every 24 hours  piperacillin/tazobactam IVPB.. 3.375 Gram(s) IV Intermittent every 8 hours  propofol Infusion 15 MICROgram(s)/kG/Min (14.67 mL/Hr) IV Continuous <Continuous>    MEDICATIONS  (PRN):  albuterol/ipratropium for Nebulization 3 milliLiter(s) Nebulizer every 6 hours PRN Shortness of Breath and/or Wheezing      LABS:                        13.5   12.73 )-----------( 326      ( 26 Nov 2019 12:07 )             40.7     11-26    136  |  102  |  20  ----------------------------<  141<H>  4.4   |  23  |  0.81    Ca    8.2<L>      26 Nov 2019 06:47  Phos  3.5     11-26  Mg     2.1     11-26    TPro  8.0  /  Alb  2.9<L>  /  TBili  1.3<H>  /  DBili  x   /  AST  36  /  ALT  33  /  AlkPhos  108  11-25    PT/INR - ( 26 Nov 2019 06:47 )   PT: 15.9 sec;   INR: 1.37 ratio         PTT - ( 26 Nov 2019 06:47 )  PTT:27.1 sec      RADIOLOGY & ADDITIONAL STUDIES:    PHYSICAL EXAM:    Respiratory: pt intubated on ventilator (500/15/8/40). lung sounds clear bilaterally.   Cardiovascular: +S1, S2. RRR no murmurs, rubs, gallops  Gastrointestinal: distended, obese abdomen. AbThera vac intact    Genitourinary: marquez in place  Extremities: severe lymphedema noted on b/l lower extremities.  Vascular: 2+ radial, dorsalis pedis pulses      A/P: 64y Female POD#0 s/p ex lap with extended left hemicolectomy left in discontinuity for perforated sigmoid diverticulitis with intraperitoneal free air after presenting to ED with worsening constipation and abdominal pain with distention. AbThera vac placed and pt kept intubated post op and transferred to SICU for post-op management.     - NPO  - monitor U/O   - monitor AbThera output  - c/w pain control PRN dilaudid with IV tylenol  - DVT ppx with lovenox, sequential compression   - tentative return to OR Friday for abdominal closure

## 2019-11-26 NOTE — PATIENT PROFILE ADULT - NSPRESCRALCFREQ_GEN_A_NUR
unable to assess. patient intubated and sedated unable to assess. patient intubated and sedated/Never

## 2019-11-26 NOTE — PATIENT PROFILE ADULT - NSPROIMPLANTSMEDDEV_GEN_A_NUR
unable to assess. patient intubated and sedated None/unable to assess. patient intubated and sedated

## 2019-11-26 NOTE — PHYSICAL THERAPY INITIAL EVALUATION ADULT - ADDITIONAL COMMENTS
Patient lives in apartment with domestic partner, 3 steps to enter, 15 steps to apartment with handrail. Patient ambulates with cane.

## 2019-11-26 NOTE — CONSULT NOTE ADULT - SUBJECTIVE AND OBJECTIVE BOX
SICU CONSULT NOTE    HPI  GREGORY VELASQUEZ is a 64y Female     PAST MEDICAL HISTORY: Morbid Obesity  Post Menopausal Bleeding  Polyp, Vagina  Acute Bronchitis  Acid Reflux  HTN (Hypertension)      PAST SURGICAL HISTORY: S/P D&C  no surgical hx      FAMILY HISTORY:     SOCIAL HISTORY:    CODE STATUS:     HOME MEDICATIONS:    ALLERGIES: sulfa drugs (Unknown)      VITAL SIGNS:  ICU Vital Signs Last 24 Hrs  T(C): 36.2 (26 Nov 2019 07:00), Max: 37.1 (25 Nov 2019 17:53)  T(F): 97.2 (26 Nov 2019 07:00), Max: 98.8 (25 Nov 2019 17:53)  HR: 93 (26 Nov 2019 09:50) (62 - 103)  BP: 101/72 (26 Nov 2019 09:00) (93/63 - 189/128)  BP(mean): 82 (26 Nov 2019 09:00) (74 - 145)  ABP: --  ABP(mean): --  RR: 24 (26 Nov 2019 09:00) (16 - 44)  SpO2: 95% (26 Nov 2019 09:50) (94% - 100%)      NEURO  Exam:  fentaNYL   Infusion 1 MICROgram(s)/kG/Hr IV Continuous <Continuous>  propofol Infusion 15 MICROgram(s)/kG/Min IV Continuous <Continuous>      RESPIRATORY  Mechanical Ventilation: Mode: AC/ CMV (Assist Control/ Continuous Mandatory Ventilation), RR (machine): 15, RR (patient): 26, TV (machine): 500, FiO2: 40, PEEP: 8, ITime: 1, MAP: 16, PIP: 27  ABG - ( 26 Nov 2019 06:37 )  pH: 7.35  /  pCO2: 45    /  pO2: 213   / HCO3: 24    / Base Excess: -.9   /  SaO2: 99      Lactate: x                Exam:      CARDIOVASCULAR  VBG - ( 26 Nov 2019 00:31 )  pH: 7.38  /  pCO2: 52    /  pO2: <20   / HCO3: 30    / Base Excess: 3.9   /  SaO2: 23     Lactate: 2.9              Exam:  Cardiac Rhythm:      GI/NUTRITION  Exam:  Diet:  pantoprazole  Injectable 40 milliGRAM(s) IV Push every 24 hours      GENITOURINARY/RENAL  calcium gluconate IVPB 2 Gram(s) IV Intermittent once      11-25 @ 07:01  -  11-26 @ 07:00  --------------------------------------------------------  IN:    fentaNYL  Infusion: 8.4 mL    propofol Infusion: 15.1 mL  Total IN: 23.5 mL    OUT:    Indwelling Catheter - Urethral: 50 mL  Total OUT: 50 mL    Total NET: -26.5 mL      11-26 @ 07:01  -  11-26 @ 10:19  --------------------------------------------------------  IN:    fentaNYL  Infusion: 16.8 mL    IV PiggyBack: 50 mL    propofol Infusion: 45.4 mL  Total IN: 112.2 mL    OUT:    Indwelling Catheter - Urethral: 40 mL  Total OUT: 40 mL    Total NET: 72.2 mL        Weight (kg): 168.3 (11-26 @ 06:15)  11-26    136  |  102  |  20  ----------------------------<  141<H>  4.4   |  23  |  0.81    Ca    8.2<L>      26 Nov 2019 06:47  Phos  3.5     11-26  Mg     2.1     11-26    TPro  8.0  /  Alb  2.9<L>  /  TBili  1.3<H>  /  DBili  x   /  AST  36  /  ALT  33  /  AlkPhos  108  11-25    [ ] Ross catheter, indication: urine output monitoring in critically ill patient    HEMATOLOGIC  [ ] VTE Prophylaxis:  enoxaparin Injectable 80 milliGRAM(s) SubCutaneous <User Schedule>                          13.1   7.63  )-----------( 321      ( 26 Nov 2019 06:47 )             40.1     PT/INR - ( 26 Nov 2019 06:47 )   PT: 15.9 sec;   INR: 1.37 ratio         PTT - ( 26 Nov 2019 06:47 )  PTT:27.1 sec  Transfusion: [ ] PRBC	[ ] Platelets	[ ] FFP	[ ] Cryoprecipitate      INFECTIOUS DISEASES  piperacillin/tazobactam IVPB.. 3.375 Gram(s) IV Intermittent every 8 hours    RECENT CULTURES:      ENDOCRINE    CAPILLARY BLOOD GLUCOSE          PATIENT CARE ACCESS DEVICES:  [ ] Peripheral IV  [ ] Central Venous Line	[ ] R	[ ] L	[ ] IJ	[ ] Fem	[ ] SC	Placed:   [ ] Arterial Line		[ ] R	[ ] L	[ ] Fem	[ ] Rad	[ ] Ax	Placed:   [ ] PICC:					[ ] Mediport  [ ] Urinary Catheter, Date Placed:   [x] Necessity of urinary, arterial, and venous catheters discussed    OTHER MEDICATIONS: chlorhexidine 0.12% Liquid 15 milliLiter(s) Oral Mucosa <User Schedule>  chlorhexidine 2% Cloths 1 Application(s) Topical <User Schedule>      IMAGING STUDIES: SICU CONSULT NOTE    HPI    64F with pmhx morbid obesity, acid reflux, HTN, COPD and pshx D&C presents to the ED c/o abdominal pain and bloating. Patient reports having constipation for 2 weeks and has been taking enemas, miralax and senna and passing small hard balls for the last weeks. Reports that she has not had a bowel movement unless she used an enema. Reports pain worst in the LLQ that started a few days ago and has been worsening in the last day, also has noted significant abdominal distention in the last day. Notes that she has not been able to urinate all day today because she feels dehydrated. Reports that she had 'low grade temperature of 99.7 which she took left over augmentin for.' Also reports nausea, denies any emesis, recorded fevers, urinary symptoms. Reports she has difficulty walking short distances because she becomes SOB. In ED, CT demonstrated Perforated sigmoid diverticulitis with intraperitoneal free air.    She was taken to the operating room on 11/25 overnight and underwent an exploratory laparotomy with extended left hemicolectomy and was left in discontinuity. An Abthera VAC was placed. She received     PAST MEDICAL HISTORY: Morbid Obesity  Post Menopausal Bleeding  Polyp, Vagina  Acute Bronchitis  Acid Reflux  HTN (Hypertension)    PAST SURGICAL HISTORY: S/P D&C  no surgical hx    FAMILY HISTORY:     SOCIAL HISTORY:  - Active smoker 1pack day for many years  - Lives in private residence with domestic partner    CODE STATUS: Full code    HOME MEDICATIONS:    ALLERGIES: sulfa drugs (Unknown)      VITAL SIGNS:  ICU Vital Signs Last 24 Hrs  T(C): 36.2 (26 Nov 2019 07:00), Max: 37.1 (25 Nov 2019 17:53)  T(F): 97.2 (26 Nov 2019 07:00), Max: 98.8 (25 Nov 2019 17:53)  HR: 93 (26 Nov 2019 09:50) (62 - 103)  BP: 101/72 (26 Nov 2019 09:00) (93/63 - 189/128)  BP(mean): 82 (26 Nov 2019 09:00) (74 - 145)  ABP: --  ABP(mean): --  RR: 24 (26 Nov 2019 09:00) (16 - 44)  SpO2: 95% (26 Nov 2019 09:50) (94% - 100%)      NEURO  Exam:  fentaNYL   Infusion 1 MICROgram(s)/kG/Hr IV Continuous <Continuous>  propofol Infusion 15 MICROgram(s)/kG/Min IV Continuous <Continuous>      RESPIRATORY  Mechanical Ventilation: Mode: AC/ CMV (Assist Control/ Continuous Mandatory Ventilation), RR (machine): 15, RR (patient): 26, TV (machine): 500, FiO2: 40, PEEP: 8, ITime: 1, MAP: 16, PIP: 27  ABG - ( 26 Nov 2019 06:37 )  pH: 7.35  /  pCO2: 45    /  pO2: 213   / HCO3: 24    / Base Excess: -.9   /  SaO2: 99      Lactate: x      Exam:    CARDIOVASCULAR  VBG - ( 26 Nov 2019 00:31 )  pH: 7.38  /  pCO2: 52    /  pO2: <20   / HCO3: 30    / Base Excess: 3.9   /  SaO2: 23     Lactate: 2.9      Exam: Regular rate and rhythm    GI/NUTRITION  Exam: Soft, obese, Abthera vac in place with good seal  Diet: NPO OGT with minimal bilious output  pantoprazole  Injectable 40 milliGRAM(s) IV Push every 24 hours    GENITOURINARY/RENAL  calcium gluconate IVPB 2 Gram(s) IV Intermittent once      11-25 @ 07:01  -  11-26 @ 07:00  --------------------------------------------------------  IN:    fentaNYL  Infusion: 8.4 mL    propofol Infusion: 15.1 mL  Total IN: 23.5 mL    OUT:    Indwelling Catheter - Urethral: 50 mL  Total OUT: 50 mL    Total NET: -26.5 mL    11-26 @ 07:01  -  11-26 @ 10:19  --------------------------------------------------------  IN:    fentaNYL  Infusion: 16.8 mL    IV PiggyBack: 50 mL    propofol Infusion: 45.4 mL  Total IN: 112.2 mL    OUT:    Indwelling Catheter - Urethral: 40 mL  Total OUT: 40 mL    Total NET: 72.2 mL    Weight (kg): 168.3 (11-26 @ 06:15)  11-26    136  |  102  |  20  ----------------------------<  141<H>  4.4   |  23  |  0.81    Ca    8.2<L>      26 Nov 2019 06:47  Phos  3.5     11-26  Mg     2.1     11-26    TPro  8.0  /  Alb  2.9<L>  /  TBili  1.3<H>  /  DBili  x   /  AST  36  /  ALT  33  /  AlkPhos  108  11-25    [X] Ross catheter, indication: urine output monitoring in critically ill patient    HEMATOLOGIC  [X] VTE Prophylaxis:  enoxaparin Injectable 80 milliGRAM(s) SubCutaneous <User Schedule>                        13.1   7.63  )-----------( 321      ( 26 Nov 2019 06:47 )             40.1     PT/INR - ( 26 Nov 2019 06:47 )   PT: 15.9 sec;   INR: 1.37 ratio         PTT - ( 26 Nov 2019 06:47 )  PTT:27.1 sec  Transfusion: [ ] PRBC	[ ] Platelets	[ ] FFP	[ ] Cryoprecipitate      INFECTIOUS DISEASES  piperacillin/tazobactam IVPB.. 3.375 Gram(s) IV Intermittent every 8 hours    RECENT CULTURES:      ENDOCRINE    CAPILLARY BLOOD GLUCOSE    PATIENT CARE ACCESS DEVICES:  [X] Peripheral IV  [X] Central Venous Line	[ ] R	[ ] L	[ ] IJ	[ ] Fem	[X] Ax	Placed:   [ ] Arterial Line		[ ] R	[ ] L	[ ] Fem	[ ] Rad	[ ] Ax	Placed:   [ ] PICC:					[ ] Mediport  [X] Urinary Catheter, Date Placed:   [x] Necessity of urinary, arterial, and venous catheters discussed    OTHER MEDICATIONS: chlorhexidine 0.12% Liquid 15 milliLiter(s) Oral Mucosa <User Schedule>  chlorhexidine 2% Cloths 1 Application(s) Topical <User Schedule>      IMAGING STUDIES:  < from: CT Abdomen and Pelvis w/ IV Cont (11.25.19 @ 22:25) >  FINDINGS:    LOWER CHEST: Aortic valve and coronary artery calcifications. Calcified   granuloma in the right lower lobe.     LIVER: Small calcifications in the right lobe the liver.  BILE DUCTS: Normal caliber.  GALLBLADDER: Distended, up to 6.3 cm. No gallbladder wall thickening or   pericholecystic fluid.  SPLEEN: Within normal limits.  PANCREAS: Within normal limits.  ADRENALS: Within normal limits.  KIDNEYS/URETERS: 1.2 cm indeterminate right lower pole exophytic   hypodensity (5, 7). Additional subcentimeter hypodensities too small to   characterize within the right kidney. No hydronephrosis.    BLADDER: Underdistended, limiting evaluation.  REPRODUCTIVE ORGANS: The uterus is within normal limits. 2.4 cm left   adnexal cystic lesion (3:106).    BOWEL: The esophagus is slightly thickened. Colonic diverticulosis. Wall   thickening and inflammatory stranding centered around the sigmoid colon.   Small ascites and free air with suspected perforation from the proximal   sigmoid colon (3, 85). Areas of peripheral enhancement within the   peritoneal fluid without organized drainable collection identified at   this time. No bowel obstruction. Appendix is normal.  VESSELS: Atherosclerotic changes.  RETROPERITONEUM/LYMPH NODES: No lymphadenopathy.    ABDOMINAL WALL: Small umbilical hernia containing fat and free air.   Nonspecific lower anterior abdominal wall edema.  BONES: Degenerative changes. Grade 1 anterolisthesis at L4-L5 and grade 1   listhesis at L5-S1    IMPRESSION:     Perforated sigmoid diverticulitis with intraperitoneal free air and   peripherally enhancing free fluid. No discrete abscess identified at this   time.    Thickening of the esophagus. Correlation with endoscopy is recommended on   a nonemergent basis.    Indeterminate 1.2 cm right lower pole exophytic hypodensity. Further   evaluation with nonurgent ultrasound or MR is recommended.      < end of copied text > SICU CONSULT NOTE    HPI    64F with pmhx morbid obesity, acid reflux, HTN, COPD and pshx D&C presents to the ED c/o abdominal pain and bloating. Patient reports having constipation for 2 weeks and has been taking enemas, miralax and senna and passing small hard balls for the last weeks. Reports that she has not had a bowel movement unless she used an enema. Reports pain worst in the LLQ that started a few days ago and has been worsening in the last day, also has noted significant abdominal distention in the last day. Notes that she has not been able to urinate all day today because she feels dehydrated. Reports that she had 'low grade temperature of 99.7 which she took left over augmentin for.' Also reports nausea, denies any emesis, recorded fevers, urinary symptoms. Reports she has difficulty walking short distances because she becomes SOB. In ED, CT demonstrated Perforated sigmoid diverticulitis with intraperitoneal free air.    She was taken to the operating room on 11/25 overnight and underwent an exploratory laparotomy with extended left hemicolectomy and was left in discontinuity. An Abthera VAC was placed. She received 3400 of crystalloid, EBL was 500, she required pushes of phenylephrine during the case, but not continuous. She was kept intubated post op and brought to the ICU for hemodynamic and respiratory management.     PAST MEDICAL HISTORY: Morbid Obesity  Post Menopausal Bleeding  Polyp, Vagina  Acute Bronchitis  Acid Reflux  HTN (Hypertension)    PAST SURGICAL HISTORY: S/P D&C  no surgical hx    FAMILY HISTORY:     SOCIAL HISTORY:  - Active smoker 1pack day for many years  - Lives in private residence with domestic partner    CODE STATUS: Full code    HOME MEDICATIONS:    ALLERGIES: sulfa drugs (Unknown)    VITAL SIGNS:  ICU Vital Signs Last 24 Hrs  T(C): 36.2 (26 Nov 2019 07:00), Max: 37.1 (25 Nov 2019 17:53)  T(F): 97.2 (26 Nov 2019 07:00), Max: 98.8 (25 Nov 2019 17:53)  HR: 93 (26 Nov 2019 09:50) (62 - 103)  BP: 101/72 (26 Nov 2019 09:00) (93/63 - 189/128)  BP(mean): 82 (26 Nov 2019 09:00) (74 - 145)  RR: 24 (26 Nov 2019 09:00) (16 - 44)  SpO2: 95% (26 Nov 2019 09:50) (94% - 100%)    NEURO  Exam: On propofol and fentanyl, arousable, no acute distress, following commands  fentaNYL   Infusion 1 MICROgram(s)/kG/Hr IV Continuous <Continuous>  propofol Infusion 15 MICROgram(s)/kG/Min IV Continuous <Continuous>    RESPIRATORY  Mechanical Ventilation: Mode: AC/ CMV (Assist Control/ Continuous Mandatory Ventilation), RR (machine): 15, RR (patient): 26, TV (machine): 500, FiO2: 40, PEEP: 8, ITime: 1, MAP: 16, PIP: 27  ABG - ( 26 Nov 2019 06:37 )  pH: 7.35  /  pCO2: 45    /  pO2: 213   / HCO3: 24    / Base Excess: -.9   /  SaO2: 99      Lactate: x      Exam: Ventilated, clear to auscultation bilaterally     CARDIOVASCULAR  VBG - ( 26 Nov 2019 00:31 )  pH: 7.38  /  pCO2: 52    /  pO2: <20   / HCO3: 30    / Base Excess: 3.9   /  SaO2: 23     Lactate: 2.9      Exam: Regular rate and rhythm    GI/NUTRITION  Exam: Soft, obese, Abthera vac in place with good seal  Diet: NPO OGT with minimal bilious output  pantoprazole  Injectable 40 milliGRAM(s) IV Push every 24 hours    GENITOURINARY/RENAL  calcium gluconate IVPB 2 Gram(s) IV Intermittent once      11-25 @ 07:01 - 11-26 @ 07:00  --------------------------------------------------------  IN:    fentaNYL  Infusion: 8.4 mL    propofol Infusion: 15.1 mL  Total IN: 23.5 mL    OUT:    Indwelling Catheter - Urethral: 50 mL  Total OUT: 50 mL    Total NET: -26.5 mL    11-26 @ 07:01 - 11-26 @ 10:19  --------------------------------------------------------  IN:    fentaNYL  Infusion: 16.8 mL    IV PiggyBack: 50 mL    propofol Infusion: 45.4 mL  Total IN: 112.2 mL    OUT:    Indwelling Catheter - Urethral: 40 mL  Total OUT: 40 mL    Total NET: 72.2 mL    Weight (kg): 168.3 (11-26 @ 06:15)  11-26    136  |  102  |  20  ----------------------------<  141<H>  4.4   |  23  |  0.81    Ca    8.2<L>      26 Nov 2019 06:47  Phos  3.5     11-26  Mg     2.1     11-26    TPro  8.0  /  Alb  2.9<L>  /  TBili  1.3<H>  /  DBili  x   /  AST  36  /  ALT  33  /  AlkPhos  108  11-25    [X] Ross catheter, indication: urine output monitoring in critically ill patient    HEMATOLOGIC  [X] VTE Prophylaxis:  enoxaparin Injectable 80 milliGRAM(s) SubCutaneous <User Schedule>                        13.1   7.63  )-----------( 321      ( 26 Nov 2019 06:47 )             40.1     PT/INR - ( 26 Nov 2019 06:47 )   PT: 15.9 sec;   INR: 1.37 ratio         PTT - ( 26 Nov 2019 06:47 )  PTT:27.1 sec  Transfusion: [ ] PRBC	[ ] Platelets	[ ] FFP	[ ] Cryoprecipitate      INFECTIOUS DISEASES  piperacillin/tazobactam IVPB.. 3.375 Gram(s) IV Intermittent every 8 hours    RECENT CULTURES:  Blood Cultures sent from ED  Surgical cultures sent from OR    ENDOCRINE    CAPILLARY BLOOD GLUCOSE    PATIENT CARE ACCESS DEVICES:  [X] Peripheral IV  [X] Central Venous Line	[ ] R	[ ] L	[ ] IJ	[ ] Fem	[X] Ax	Placed:   [ ] Arterial Line		[ ] R	[ ] L	[ ] Fem	[ ] Rad	[ ] Ax	Placed:   [ ] PICC:					[ ] Mediport  [X] Urinary Catheter, Date Placed:   [x] Necessity of urinary, arterial, and venous catheters discussed    OTHER MEDICATIONS: chlorhexidine 0.12% Liquid 15 milliLiter(s) Oral Mucosa <User Schedule>  chlorhexidine 2% Cloths 1 Application(s) Topical <User Schedule>      IMAGING STUDIES:  < from: CT Abdomen and Pelvis w/ IV Cont (11.25.19 @ 22:25) >  FINDINGS:    LOWER CHEST: Aortic valve and coronary artery calcifications. Calcified   granuloma in the right lower lobe.     LIVER: Small calcifications in the right lobe the liver.  BILE DUCTS: Normal caliber.  GALLBLADDER: Distended, up to 6.3 cm. No gallbladder wall thickening or   pericholecystic fluid.  SPLEEN: Within normal limits.  PANCREAS: Within normal limits.  ADRENALS: Within normal limits.  KIDNEYS/URETERS: 1.2 cm indeterminate right lower pole exophytic   hypodensity (5, 7). Additional subcentimeter hypodensities too small to   characterize within the right kidney. No hydronephrosis.    BLADDER: Underdistended, limiting evaluation.  REPRODUCTIVE ORGANS: The uterus is within normal limits. 2.4 cm left   adnexal cystic lesion (3:106).    BOWEL: The esophagus is slightly thickened. Colonic diverticulosis. Wall   thickening and inflammatory stranding centered around the sigmoid colon.   Small ascites and free air with suspected perforation from the proximal   sigmoid colon (3, 85). Areas of peripheral enhancement within the   peritoneal fluid without organized drainable collection identified at   this time. No bowel obstruction. Appendix is normal.  VESSELS: Atherosclerotic changes.  RETROPERITONEUM/LYMPH NODES: No lymphadenopathy.    ABDOMINAL WALL: Small umbilical hernia containing fat and free air.   Nonspecific lower anterior abdominal wall edema.  BONES: Degenerative changes. Grade 1 anterolisthesis at L4-L5 and grade 1   listhesis at L5-S1    IMPRESSION:     Perforated sigmoid diverticulitis with intraperitoneal free air and   peripherally enhancing free fluid. No discrete abscess identified at this   time.    Thickening of the esophagus. Correlation with endoscopy is recommended on   a nonemergent basis.    Indeterminate 1.2 cm right lower pole exophytic hypodensity. Further   evaluation with nonurgent ultrasound or MR is recommended.      < end of copied text >

## 2019-11-26 NOTE — PATIENT PROFILE ADULT - NSPROMEDSPATCH_GEN_A_NUR
unable to assess. patient intubated and sedated medication patch(es) used/unable to assess. patient intubated and sedated

## 2019-11-26 NOTE — ED ADULT NURSE NOTE - PAIN: PRESENCE, MLM
Phase II care completed at this time.  Patient waiting for daughter to return and will then be transported up to physical therapy appointment at 0930.  
complains of pain/discomfort

## 2019-11-26 NOTE — PATIENT PROFILE ADULT - NSPROMEDSADMININFO_GEN_A_NUR
unable to assess. patient intubated and sedated unable to assess. patient intubated and sedated/no concerns

## 2019-11-26 NOTE — PATIENT PROFILE ADULT - NSPROGENDIFFINTUB_GEN_A_NUR
unable to assess. patient intubated and sedated unable to assess. patient intubated and sedated/never intubated

## 2019-11-26 NOTE — PATIENT PROFILE ADULT - ABILITY TO HEAR (WITH HEARING AID OR HEARING APPLIANCE IF NORMALLY USED):
unable to assess. patient intubated and sedated unable to assess. patient intubated and sedated/Adequate: hears normal conversation without difficulty

## 2019-11-26 NOTE — PATIENT PROFILE ADULT - OVER THE PAST TWO WEEKS HAVE YOU FELT DOWN, DEPRESSED OR HOPELESS?
unable to assess. patient intubated and sedated no/pt states no, previously had ET tube and did not understand was going on when comment was made about suicidal ideation the day before

## 2019-11-26 NOTE — PATIENT PROFILE ADULT - NSPROGENANESREACTION_GEN_A_NUR
unable to assess. patient intubated and sedated unable to assess. patient intubated and sedated/no previous reaction

## 2019-11-26 NOTE — OCCUPATIONAL THERAPY INITIAL EVALUATION ADULT - ADDITIONAL COMMENTS
Pt s/p Extended left hemicolectomy, Exploratory laparotomy 11/26 Pt s/p Extended left hemicolectomy, Exploratory laparotomy +Abthera VAC placement 11/26  CT Abdomen/Pelvis: Perforated sigmoid diverticulitis with intraperitoneal free air and peripherally enhancing free fluid. No discrete abscess identified at this time. Thickening of the esophagus. Correlation with endoscopy is recommended on a nonemergent basis. Indeterminate 1.2 cm right lower pole exophytic hypodensity. Further evaluation with nonurgent ultrasound or MR is recommended.

## 2019-11-26 NOTE — PROCEDURE NOTE - NSICDXPROCEDURE_GEN_ALL_CORE_FT
PROCEDURES:  Insertion of central venous catheter with ultrasound guidance 26-Nov-2019 15:58:41  Sanam Tavares

## 2019-11-26 NOTE — H&P ADULT - HISTORY OF PRESENT ILLNESS
[facility] General Surgery H&P    Patient is a 64y old Female who presents with a chief complaint of abdominal pain    HPI:    64F with pmhx morbid obesity, acid reflux, HTN, COPD and pshx D&C presents to the ED c/o abdominal pain and bloating. Patient reports having constipation for 2 weeks and has been taking enemas, miralax and senna and passing small hard balls for the last weeks. Reports that she has not had a bowel movement unless she used an enema. Reports pain worst in the LLQ that started a few days ago and has been worsening in the last day, also has noted significant abdominal distention in the last day. Notes that she has not been able to urinate all day today because she feels dehydrated. Reports that she had 'low grade temperature of 99.7 which she took left over augmentin for.' Also reports nausea, denies any emesis, recorded fevers, urinary symptoms. Reports she has difficulty walking short distances because she becomes SOB. In ED, CT demonstrated Perforated sigmoid diverticulitis with intraperitoneal free air.        PAST MEDICAL & SURGICAL HISTORY:  Morbid Obesity  Post Menopausal Bleeding  Polyp, Vagina  Acid Reflux  HTN (Hypertension)  S/P D&C: 2009, w vaginal polypectomy      FAMILY HISTORY:      SOCIAL HISTORY:  - Active smoker 1pack day for many years  - Lives in private residence with domestic partner    MEDICATIONS  (STANDING):  lactated ringers Bolus 1000 milliLiter(s) IV Bolus once  morphine  - Injectable 4 milliGRAM(s) IV Push once  ondansetron Injectable 4 milliGRAM(s) IV Push once  piperacillin/tazobactam IVPB. 3.375 Gram(s) IV Intermittent Once    MEDICATIONS  (PRN):    Allergies    sulfa drugs (Unknown)    Intolerances        Vital Signs Last 24 Hrs  T(C): 37.1 (25 Nov 2019 22:50), Max: 37.1 (25 Nov 2019 17:53)  T(F): 98.7 (25 Nov 2019 22:50), Max: 98.8 (25 Nov 2019 17:53)  HR: 98 (25 Nov 2019 22:50) (62 - 98)  BP: 107/73 (25 Nov 2019 22:50) (107/73 - 110/70)  BP(mean): --  RR: 16 (25 Nov 2019 22:50) (16 - 16)  SpO2: 94% (25 Nov 2019 22:50) (94% - 100%)  Daily     Daily     General: NAD, well-nourished  HEENT: Atraumatic, EOMI  Resp: Breathing comfortably on RA  CV: Normal sinus rhythm  Abd: soft, distended, mildly tender in LLQ, no RT/guarding  Ext: ROMIx4, motor strength intact x 4                          16.9   11.20 )-----------( 333      ( 25 Nov 2019 20:43 )             50.6     11-25    131<L>  |  97  |  20  ----------------------------<  151<H>  6.1<H>   |  17<L>  |  0.78    Ca    10.1      25 Nov 2019 20:43  Phos  3.1     11-25  Mg     2.2     11-25    TPro  8.0  /  Alb  2.9<L>  /  TBili  1.3<H>  /  DBili  x   /  AST  36  /  ALT  33  /  AlkPhos  108  11-25    PT/INR - ( 25 Nov 2019 20:43 )   PT: 15.2 sec;   INR: 1.31 ratio         PTT - ( 25 Nov 2019 20:43 )  PTT:27.2 sec      Radiographic Findings:             EXAM:  CT ABDOMEN AND PELVIS IC                            PROCEDURE DATE:  11/25/2019            INTERPRETATION:  CLINICAL INFORMATION: Constipation for weeks.    COMPARISON: None.    PROCEDURE:   CT of the Abdomen and Pelvis was performed with intravenous contrast.   Intravenous contrast: 125 ml Omnipaque 350. 25 ml discarded.  Oral contrast: None.  Sagittal and coronal reformats were performed.    FINDINGS:    LOWER CHEST: Aortic valve and coronary artery calcifications. Calcified   granuloma in the right lower lobe.     LIVER: Small calcifications in the right lobe the liver.  BILE DUCTS: Normal caliber.  GALLBLADDER: Distended, up to 6.3 cm. No gallbladder wall thickening or   pericholecystic fluid.  SPLEEN: Within normal limits.  PANCREAS: Within normal limits.  ADRENALS: Within normal limits.  KIDNEYS/URETERS: 1.2 cm indeterminate right lower pole exophytic   hypodensity (5, 7). Additional subcentimeter hypodensities too small to   characterize within the right kidney. No hydronephrosis.    BLADDER: Underdistended, limiting evaluation.  REPRODUCTIVE ORGANS: The uterus is within normal limits. 2.4 cm left   adnexal cystic lesion (3:106).    BOWEL: The esophagus is slightly thickened. Colonic diverticulosis. Wall   thickening and inflammatory stranding centered around the sigmoid colon.   Small ascites and free air with suspected perforation from the proximal   sigmoid colon (3, 85). Areas of peripheral enhancement within the   peritoneal fluid without organized drainable collection identified at   this time. No bowel obstruction. Appendix is normal.  VESSELS: Atherosclerotic changes.  RETROPERITONEUM/LYMPH NODES: No lymphadenopathy.    ABDOMINAL WALL: Small umbilical hernia containing fat and free air.   Nonspecific lower anterior abdominal wall edema.  BONES: Degenerative changes. Grade 1 anterolisthesis at L4-L5 and grade 1   listhesis at L5-S1    IMPRESSION:     Perforated sigmoid diverticulitis with intraperitoneal free air and   peripherally enhancing free fluid. No discrete abscess identified at this   time.    Thickening of the esophagus. Correlation with endoscopy is recommended on   a nonemergent basis.    Indeterminate 1.2 cm right lower pole exophytic hypodensity. Further   evaluation with nonurgent ultrasound or MR is recommended.    Urgent findings were discussed with Dr. Garrett at 11/25/2019 10:59 PM by    with read back confirmation.                    SASCHA CAMACHO M.D., RADIOLOGY RESIDENT  This document has been electronically signed.  SHYAM GALLO M.D., ATTENDING RADIOLOGIST  This document has been electronically signed. Nov 25 2019 11:26PM

## 2019-11-26 NOTE — PHYSICAL THERAPY INITIAL EVALUATION ADULT - MODALITIES TREATMENT COMMENTS
PAtient with full thickness midline abd surgical wound. No granulation tissue noted. Adipose appears mostly viable. No purulence, no erythema, no malodor. Primary fascial closure not visible as is covered with adipose. SICU team including Dr Escobedo present to examine.

## 2019-11-26 NOTE — PATIENT PROFILE ADULT - PRIMARY ROLES/RESPONSIBILITIES
unable to assess. patient intubated and sedated unable to assess. patient intubated and sedated/none

## 2019-11-26 NOTE — PHYSICAL THERAPY INITIAL EVALUATION ADULT - PLANNED THERAPY INTERVENTIONS, PT EVAL
Stair negotiation GOAL: Patient will negotiate up / down 1 flight of stairs with supervision in 2 weeks/gait training/balance training/bed mobility training/transfer training gait training/balance training/bed mobility training/Stair negotiation GOAL: Patient will negotiate up / down 1 flight of stairs with supervision in 2 weeks; Negative Pressure Wound Therapy/transfer training

## 2019-11-27 ENCOUNTER — OTHER (OUTPATIENT)
Age: 64
End: 2019-11-27

## 2019-11-27 LAB
ANION GAP SERPL CALC-SCNC: 7 MMOL/L — SIGNIFICANT CHANGE UP (ref 5–17)
APTT BLD: 25.9 SEC — LOW (ref 27.5–36.3)
BUN SERPL-MCNC: 25 MG/DL — HIGH (ref 7–23)
CALCIUM SERPL-MCNC: 8.5 MG/DL — SIGNIFICANT CHANGE UP (ref 8.4–10.5)
CHLORIDE SERPL-SCNC: 100 MMOL/L — SIGNIFICANT CHANGE UP (ref 96–108)
CO2 SERPL-SCNC: 26 MMOL/L — SIGNIFICANT CHANGE UP (ref 22–31)
CREAT SERPL-MCNC: 0.79 MG/DL — SIGNIFICANT CHANGE UP (ref 0.5–1.3)
GAS PNL BLDA: SIGNIFICANT CHANGE UP
GAS PNL BLDV: SIGNIFICANT CHANGE UP
GLUCOSE SERPL-MCNC: 157 MG/DL — HIGH (ref 70–99)
HCT VFR BLD CALC: 36.5 % — SIGNIFICANT CHANGE UP (ref 34.5–45)
HGB BLD-MCNC: 12 G/DL — SIGNIFICANT CHANGE UP (ref 11.5–15.5)
INR BLD: 1.2 RATIO — HIGH (ref 0.88–1.16)
LMWH PPP CHRO-ACNC: 0.11 IU/ML — LOW (ref 0.5–1.1)
MAGNESIUM SERPL-MCNC: 2.3 MG/DL — SIGNIFICANT CHANGE UP (ref 1.6–2.6)
MCHC RBC-ENTMCNC: 29.9 PG — SIGNIFICANT CHANGE UP (ref 27–34)
MCHC RBC-ENTMCNC: 32.9 GM/DL — SIGNIFICANT CHANGE UP (ref 32–36)
MCV RBC AUTO: 90.8 FL — SIGNIFICANT CHANGE UP (ref 80–100)
NRBC # BLD: 0 /100 WBCS — SIGNIFICANT CHANGE UP (ref 0–0)
PHOSPHATE SERPL-MCNC: 3.4 MG/DL — SIGNIFICANT CHANGE UP (ref 2.5–4.5)
PLATELET # BLD AUTO: 295 K/UL — SIGNIFICANT CHANGE UP (ref 150–400)
POTASSIUM SERPL-MCNC: 4.7 MMOL/L — SIGNIFICANT CHANGE UP (ref 3.5–5.3)
POTASSIUM SERPL-SCNC: 4.7 MMOL/L — SIGNIFICANT CHANGE UP (ref 3.5–5.3)
PROTHROM AB SERPL-ACNC: 13.9 SEC — HIGH (ref 10–12.9)
RBC # BLD: 4.02 M/UL — SIGNIFICANT CHANGE UP (ref 3.8–5.2)
RBC # FLD: 13.2 % — SIGNIFICANT CHANGE UP (ref 10.3–14.5)
SODIUM SERPL-SCNC: 133 MMOL/L — LOW (ref 135–145)
WBC # BLD: 10.76 K/UL — HIGH (ref 3.8–10.5)
WBC # FLD AUTO: 10.76 K/UL — HIGH (ref 3.8–10.5)

## 2019-11-27 PROCEDURE — 71045 X-RAY EXAM CHEST 1 VIEW: CPT | Mod: 26,76

## 2019-11-27 PROCEDURE — 99291 CRITICAL CARE FIRST HOUR: CPT

## 2019-11-27 RX ORDER — IPRATROPIUM/ALBUTEROL SULFATE 18-103MCG
3 AEROSOL WITH ADAPTER (GRAM) INHALATION EVERY 6 HOURS
Refills: 0 | Status: DISCONTINUED | OUTPATIENT
Start: 2019-11-27 | End: 2019-11-28

## 2019-11-27 RX ORDER — METOPROLOL TARTRATE 50 MG
1 TABLET ORAL
Qty: 0 | Refills: 0 | DISCHARGE

## 2019-11-27 RX ORDER — ESOMEPRAZOLE MAGNESIUM 40 MG/1
1 CAPSULE, DELAYED RELEASE ORAL
Qty: 0 | Refills: 0 | DISCHARGE

## 2019-11-27 RX ORDER — HYDROMORPHONE HYDROCHLORIDE 2 MG/ML
0.5 INJECTION INTRAMUSCULAR; INTRAVENOUS; SUBCUTANEOUS
Refills: 0 | Status: DISCONTINUED | OUTPATIENT
Start: 2019-11-27 | End: 2019-11-28

## 2019-11-27 RX ORDER — FLUTICASONE FUROATE AND VILANTEROL TRIFENATATE 100; 25 UG/1; UG/1
1 POWDER RESPIRATORY (INHALATION)
Qty: 0 | Refills: 0 | DISCHARGE

## 2019-11-27 RX ORDER — INFLUENZA VIRUS VACCINE 15; 15; 15; 15 UG/.5ML; UG/.5ML; UG/.5ML; UG/.5ML
0.5 SUSPENSION INTRAMUSCULAR ONCE
Refills: 0 | Status: COMPLETED | OUTPATIENT
Start: 2019-11-27 | End: 2019-11-27

## 2019-11-27 RX ADMIN — CHLORHEXIDINE GLUCONATE 1 APPLICATION(S): 213 SOLUTION TOPICAL at 05:06

## 2019-11-27 RX ADMIN — Medication 3 MILLILITER(S): at 17:57

## 2019-11-27 RX ADMIN — ENOXAPARIN SODIUM 80 MILLIGRAM(S): 100 INJECTION SUBCUTANEOUS at 12:07

## 2019-11-27 RX ADMIN — PIPERACILLIN AND TAZOBACTAM 25 GRAM(S): 4; .5 INJECTION, POWDER, LYOPHILIZED, FOR SOLUTION INTRAVENOUS at 07:40

## 2019-11-27 RX ADMIN — CHLORHEXIDINE GLUCONATE 15 MILLILITER(S): 213 SOLUTION TOPICAL at 05:06

## 2019-11-27 RX ADMIN — HYDROMORPHONE HYDROCHLORIDE 0.5 MILLIGRAM(S): 2 INJECTION INTRAMUSCULAR; INTRAVENOUS; SUBCUTANEOUS at 20:12

## 2019-11-27 RX ADMIN — Medication 3 MILLILITER(S): at 23:20

## 2019-11-27 RX ADMIN — Medication 3 MILLILITER(S): at 12:50

## 2019-11-27 RX ADMIN — HYDROMORPHONE HYDROCHLORIDE 0.5 MILLIGRAM(S): 2 INJECTION INTRAMUSCULAR; INTRAVENOUS; SUBCUTANEOUS at 20:30

## 2019-11-27 RX ADMIN — HYDROMORPHONE HYDROCHLORIDE 0.5 MILLIGRAM(S): 2 INJECTION INTRAMUSCULAR; INTRAVENOUS; SUBCUTANEOUS at 17:07

## 2019-11-27 RX ADMIN — CHLORHEXIDINE GLUCONATE 15 MILLILITER(S): 213 SOLUTION TOPICAL at 13:34

## 2019-11-27 RX ADMIN — DEXMEDETOMIDINE HYDROCHLORIDE IN 0.9% SODIUM CHLORIDE 29.45 MICROGRAM(S)/KG/HR: 4 INJECTION INTRAVENOUS at 07:41

## 2019-11-27 RX ADMIN — PANTOPRAZOLE SODIUM 40 MILLIGRAM(S): 20 TABLET, DELAYED RELEASE ORAL at 12:07

## 2019-11-27 RX ADMIN — SODIUM CHLORIDE 125 MILLILITER(S): 9 INJECTION, SOLUTION INTRAVENOUS at 07:40

## 2019-11-27 RX ADMIN — HYDROMORPHONE HYDROCHLORIDE 0.5 MILLIGRAM(S): 2 INJECTION INTRAMUSCULAR; INTRAVENOUS; SUBCUTANEOUS at 17:22

## 2019-11-27 RX ADMIN — PIPERACILLIN AND TAZOBACTAM 25 GRAM(S): 4; .5 INJECTION, POWDER, LYOPHILIZED, FOR SOLUTION INTRAVENOUS at 15:26

## 2019-11-27 NOTE — CHART NOTE - NSCHARTNOTEFT_GEN_A_CORE
Upon Nutritional Assessment by the Registered Dietitian your patient was determined to meet criteria / has evidence of the following diagnosis/diagnoses:          [ ]  Mild Protein Calorie Malnutrition        [ ]  Moderate Protein Calorie Malnutrition        [ ] Severe Protein Calorie Malnutrition        [ ] Unspecified Protein Calorie Malnutrition        [ ] Underweight / BMI <19        [X ] Morbid Obesity / BMI > 40      Findings as based on:  [X ] Comprehensive nutrition assessment   [ ] Nutrition Focused Physical Exam  [X ] Other: BMI 59.9 Kg/m2      Nutrition Plan/Recommendations:    1) Current medical condition precludes nutrition intervention at this time.   2) Pending return of GI function and tolerance to clear liquids, advance to Low Fiber diet.   3) See EN recommendations in chart, if warranted post-op.  4) RD will follow up with therapeutic diet education as appropriate.        PROVIDER Section:     By signing this assessment you are acknowledging and agree with the diagnosis/diagnoses assigned by the Registered Dietitian    Comments:

## 2019-11-27 NOTE — DIETITIAN INITIAL EVALUATION ADULT. - PERTINENT MEDS FT
Home Medications:  amLODIPine 5 mg oral tablet: 1 tab(s) orally once a day (26 Nov 2019 00:35)  doxazosin 2 mg oral tablet: 1 tab(s) orally once a day (26 Nov 2019 00:35)  NexIUM 20 mg oral delayed release capsule: 1 cap(s) orally once a day (26 Nov 2019 00:36)

## 2019-11-27 NOTE — PROGRESS NOTE ADULT - ASSESSMENT
A/P: 64y Female POD#1 s/p ex lap with extended left hemicolectomy left in discontinuity for perforated sigmoid diverticulitis with intraperitoneal free air after presenting to ED with worsening constipation and abdominal pain with distention. AbThera vac placed and pt kept intubated post op and transferred to SICU for post-op management.     Plan:    - NPO  - monitor U/O   - monitor AbThera output  - c/w pain control PRN dilaudid with IV tylenol  - DVT ppx with lovenox, sequential compression   - tentative return to OR Friday for abdominal closure.    p 0254

## 2019-11-27 NOTE — PROGRESS NOTE ADULT - ATTENDING COMMENTS
Patient seen/examined.  Agree w above note and plan and have discussed plan w house staff.  Comfortable, stable.  Plan for re-op Thursday/ Friday    Lucas Greer MD

## 2019-11-27 NOTE — PROGRESS NOTE ADULT - ASSESSMENT
ASSESSMENT:    64F with pmhx morbid obesity, acid reflux, HTN, COPD and pshx D&C presents to the ED c/o abdominal pain and bloating. Patient reports having constipation for 2 weeks and has been taking enemas, miralax and senna and passing small hard balls for the last weeks. Reports that she has not had a bowel movement unless she used an enema. Reports pain worst in the LLQ that started a few days ago and has been worsening in the last day, also has noted significant abdominal distention in the last day. Notes that she has not been able to urinate all day today because she feels dehydrated. Reports that she had 'low grade temperature of 99.7 which she took left over augmentin for.' Also reports nausea, denies any emesis, recorded fevers, urinary symptoms. Reports she has difficulty walking short distances because she becomes SOB. In ED, CT demonstrated Perforated sigmoid diverticulitis with intraperitoneal free air.    She was taken to the operating room on 11/25 overnight and underwent an exploratory laparotomy with extended left hemicolectomy and was left in discontinuity. An Abthera VAC was placed. She received 3400 of crystalloid, EBL was 500, she required pushes of phenylephrine during the case, but not continuous. She was kept intubated post op and brought to the ICU for hemodynamic and respiratory management.     PLAN:    NEURO: In need of sedation while intubated and postoperative pain control  - Will hold precedex in anticipation of SBT  - Continue pain control with PRN dilaudid and will add IV tylenol    RESPIRATORY: Hx of COPD, mechanical ventilation  - PEEP increased to 10 and Fio2 decreased to 30% overnight  - Anticipating SBT in the morning  - ABGs with AM labs  - AM CXR  - Duonebs    CARDIOVASCULAR: Hx of HTN on amlodipine at home, No hx of CAD, required pushes of phenylephrine during the operation but nothing continuous and currently not requiring vasopressor support  - Remains off vasopressor support  - Will restart IVF and administer 500cc bolus  - Monitor vital signs and ins and outs    GI/NUTRITION: S/p extended left hemicolectomy and was left in discontinuity with Abthera in place, Hx of obesity, Hx of GERD  - Cont NPO with OGT and IVF  - Monitor ABthera output  - Plan to RTOR on thursday    GENITOURINARY/RENAL:   - Monitoring fluid status closely  - Continue LR @ 125  - Strict Is and Os, urine output 30-35cc hr so far    HEMATOLOGIC: Operative EBL of 500, CBC stable  - Trend daily CBC and coags  - Cont DVT ppx with lovenox dosed by patient weight  - AntiXa level due tomorrow     INFECTIOUS DISEASE: Admitted with perforated sigmoid possibly from stercolitis vs diverticulitis  - Blood cultures from ED pending  - Surgical cultures from OR pending  - Cont Zosyn  - trend WBC and fever curve    ENDOCRINE: No active issues

## 2019-11-27 NOTE — DIETITIAN INITIAL EVALUATION ADULT. - PHYSICAL APPEARANCE
ht: 5 feet 6 inches, admit wt: 371 pound (currently 365 pounds), BMI: 59.9 Kg/m2, IBW: 130 pounds (+/- 10%), 285% IBW/other (specify)/obese Edema: 3+ generalized  Skin: no pressure injuries noted per nursing flowsheet  Nutrition Focused Physical Exam: Pt appears well developed, morbidly obese, with no signs of muscle or fat depletion.

## 2019-11-27 NOTE — DIETITIAN INITIAL EVALUATION ADULT. - OTHER INFO
INFORMATION PTA  Diet PTA: Unable to assess  Nutrition status PTA: Unable to assess. Nutrition Alert for Morbid Obesity placed in medical record.   Nutrition Supplements PTA: none noted  Food Allergies: NKFA  Weight History PTA: Unavailable  Other Information: Current smoker with COPD. Per chart, h/o heroin abuse, methadone use.    INFORMATION THIS ADMISSION  OGT Output x 24-hours: 220ml  Abdominal wound VAC output: 850ml  Last BM: none  Other  Information: Pt left in discontinuity.  Therapeutic Diet Education Provided: weight management education not applicable at this time INFORMATION PTA  Diet PTA: Unable to assess  Nutrition status PTA: Unable to assess. Nutrition Alert for Morbid Obesity placed in medical record.   Nutrition Supplements PTA: none noted  Food Allergies: NKFA  Weight History PTA: Unavailable  Other Information: Current smoker with COPD. Per chart, h/o heroin abuse, methadone use in 2013.    INFORMATION THIS ADMISSION  OGT Output x 24-hours: 220ml  Abdominal wound VAC output: 850ml  Last BM: none  Other  Information: Pt left in discontinuity.  Therapeutic Diet Education Provided: weight management education not applicable at this time

## 2019-11-27 NOTE — PROGRESS NOTE ADULT - ATTENDING COMMENTS
Patient seen and examined and agree with above.    s/p ex lap, extended left hemicolectomy, left in discontinuity with Abthera VAC placement.  I have reviewed PMH, PSH, labs, meds imaging.   Current management includes:   1) Acute respiratory insufficiency- on mechanical ventilation at this time due to septic shock from perforated sigmoid diverticulitis  -will wean off current settings including PEEP 10 to PEEP 8 in preparation for extubation. Increased PEEP necessitated due to morbid obesity  -will wean to extubation when meets criteria  -CXR reviewed  -goal SpO2 > 92%    2) COPD- continue duonebs  -does not appear to have an acute exacerbation at this time    3) Perforated diverticulitis -leukocytosis continues to improve   -continue antibiotics  -pending cultures   -Currently with open abdomen with temporary abdominal closure; plan for take back in 48 hours    4) Will continue NPO at this time     CC time: 38 minutes .

## 2019-11-27 NOTE — PROGRESS NOTE ADULT - SUBJECTIVE AND OBJECTIVE BOX
[facility] Progress Note     Subjective/24hour Events:     Patient seen and examined on am rounds. Remains intubated on PEEP of 10  FiO2 30%. Sedated on precedex and dilaudid. Was having suicidal ideations. Central line access was removed, LIJ placed for access. Given one fco liter bolus of LR Pain well controlled.. -flatus, -BM. Marquez making urine. Denies chills/fevers, chest pain, SOB.        Physical Exam:  Gen: NAD. sedated  Lungs: Intubated  Non labored breathing.   Genitourinary: marquez in place  Ab: Soft, nontender, nondistended. AbThera vac intact  Ext: Moves all 4 spontaneously. severe lymphedema noted on b/l lower extremities.    Vital Signs:  Vital Signs Last 24 Hrs  T(C): 36.4 (27 Nov 2019 03:00), Max: 36.6 (26 Nov 2019 11:00)  T(F): 97.5 (27 Nov 2019 03:00), Max: 97.8 (26 Nov 2019 11:00)  HR: 60 (27 Nov 2019 03:00) (60 - 103)  BP: 100/70 (27 Nov 2019 03:00) (93/63 - 189/128)  BP(mean): 79 (27 Nov 2019 03:00) (71 - 145)  RR: 17 (26 Nov 2019 20:00) (16 - 44)  SpO2: 96% (27 Nov 2019 03:00) (93% - 100%)    CAPILLARY BLOOD GLUCOSE          I&O's Detail    25 Nov 2019 07:01  -  26 Nov 2019 07:00  --------------------------------------------------------  IN:    fentaNYL  Infusion: 8.4 mL    propofol Infusion: 15.1 mL  Total IN: 23.5 mL    OUT:    Indwelling Catheter - Urethral: 50 mL  Total OUT: 50 mL    Total NET: -26.5 mL      26 Nov 2019 07:01  -  27 Nov 2019 04:11  --------------------------------------------------------  IN:    dexmedetomidine Infusion: 702.3 mL    fentaNYL  Infusion: 58.8 mL    IV PiggyBack: 400 mL    Lactated Ringers IV Bolus: 500 mL    lactated ringers.: 2250 mL    propofol Infusion: 95.8 mL  Total IN: 4006.9 mL    OUT:    Indwelling Catheter - Urethral: 625 mL    Nasoenteral Tube: 150 mL    VAC (Vacuum Assisted Closure) System: 650 mL  Total OUT: 1425 mL    Total NET: 2581.9 mL          MEDICATIONS  (STANDING):  chlorhexidine 0.12% Liquid 15 milliLiter(s) Oral Mucosa <User Schedule>  chlorhexidine 2% Cloths 1 Application(s) Topical <User Schedule>  dexMEDEtomidine Infusion 0.7 MICROgram(s)/kG/Hr (29.453 mL/Hr) IV Continuous <Continuous>  enoxaparin Injectable 80 milliGRAM(s) SubCutaneous <User Schedule>  lactated ringers. 1000 milliLiter(s) (125 mL/Hr) IV Continuous <Continuous>  pantoprazole  Injectable 40 milliGRAM(s) IV Push every 24 hours  piperacillin/tazobactam IVPB.. 3.375 Gram(s) IV Intermittent every 8 hours    MEDICATIONS  (PRN):  albuterol/ipratropium for Nebulization 3 milliLiter(s) Nebulizer every 6 hours PRN Shortness of Breath and/or Wheezing  HYDROmorphone  Injectable 0.5 milliGRAM(s) IV Push every 4 hours PRN Pain          Labs:    11-27    133<L>  |  100  |  25<H>  ----------------------------<  157<H>  4.7   |  26  |  0.79    Ca    8.5      27 Nov 2019 01:22  Phos  3.4     11-27  Mg     2.3     11-27    TPro  8.0  /  Alb  2.9<L>  /  TBili  1.3<H>  /  DBili  x   /  AST  36  /  ALT  33  /  AlkPhos  108  11-25    LIVER FUNCTIONS - ( 25 Nov 2019 20:43 )  Alb: 2.9 g/dL / Pro: 8.0 g/dL / ALK PHOS: 108 U/L / ALT: 33 U/L / AST: 36 U/L / GGT: x                                 12.0   10.76 )-----------( 295      ( 27 Nov 2019 01:22 )             36.5     PT/INR - ( 27 Nov 2019 01:22 )   PT: 13.9 sec;   INR: 1.20 ratio         PTT - ( 27 Nov 2019 01:22 )  PTT:25.9 sec    Imaging:

## 2019-11-27 NOTE — DIETITIAN INITIAL EVALUATION ADULT. - FACTORS AFF FOOD INTAKE
Pt NPO day 2, intubated, with OGT to suction. Pt reported 2 weeks of constipation PTA. Per OR note "Perforated and necrotic sigmod colon with pus and stool in the abdominal cavity; Colon appeared ischemic to splenic flexure; entire colon was full of hard rocks of stool."/persistent constipation

## 2019-11-27 NOTE — DIETITIAN INITIAL EVALUATION ADULT. - REASON INDICATOR FOR ASSESSMENT
Nutrition Assessment warranted for length of stay on 8ICU and BMI>40.  Information obtained from: medical record. Pt intubated, sedated.  Per chart: 64F with pmhx morbid obesity, acid reflux, HTN, COPD and pshx D&C presents to the ED c/o abdominal pain and bloating. Pt now S/P exploratory laparotomy with extended left hemicolectomy on 11/26, and was left in discontinuity. An Abthera VAC was placed. Awaiting RTOR for abdominal closure.

## 2019-11-27 NOTE — PROGRESS NOTE ADULT - SUBJECTIVE AND OBJECTIVE BOX
SICU DAILY PROGRESS NOTE    64F with pmhx morbid obesity, acid reflux, HTN, COPD and pshx D&C presents to the ED c/o abdominal pain and bloating. Patient reports having constipation for 2 weeks and has been taking enemas, miralax and senna and passing small hard balls for the last weeks. Reports that she has not had a bowel movement unless she used an enema. Reports pain worst in the LLQ that started a few days ago and has been worsening in the last day, also has noted significant abdominal distention in the last day. Notes that she has not been able to urinate all day today because she feels dehydrated. Reports that she had 'low grade temperature of 99.7 which she took left over augmentin for.' Also reports nausea, denies any emesis, recorded fevers, urinary symptoms. Reports she has difficulty walking short distances because she becomes SOB. In ED, CT demonstrated Perforated sigmoid diverticulitis with intraperitoneal free air.    She was taken to the operating room on 11/25 overnight and underwent an exploratory laparotomy with extended left hemicolectomy and was left in discontinuity. An Abthera VAC was placed. She received 3400 of crystalloid, EBL was 500, she required pushes of phenylephrine during the case, but not continuous. She was kept intubated post op and brought to the ICU for hemodynamic and respiratory management.     24 HOUR EVENTS:  - Switched to precedex and dilaudid from propofol and fentanyl   - Removed double lumen Axillary venous catheter  - LIJ CVC placed for access  - Given one 500cc bolus of LR  - Increased PEEP to 10 and FiO2 30%    SUBJECTIVE/ROS:  [ ] A ten-point review of systems was otherwise negative except as noted.  [X] Due to altered mental status/intubation, subjective information were not able to be obtained from the patient. History was obtained, to the extent possible, from review of the chart and collateral sources of information.      NEURO  RASS: 0  Exam: awake, alert, following commands on precedex  Meds: dexMEDEtomidine Infusion 0.7 MICROgram(s)/kG/Hr IV Continuous <Continuous>  HYDROmorphone  Injectable 0.5 milliGRAM(s) IV Push every 4 hours PRN Pain    [x] Adequacy of sedation and pain control has been assessed and adjusted    RESPIRATORY  RR: 17 (11-26-19 @ 20:00) (16 - 44)  SpO2: 96% (11-27-19 @ 00:00) (93% - 100%)  Exam: unlabored, clear to auscultation bilaterally  Mechanical Ventilation: Mode: AC/ CMV (Assist Control/ Continuous Mandatory Ventilation), RR (machine): 15, RR (patient): 23, TV (machine): 500, FiO2: 40, PEEP: 8, ITime: 1, MAP: 12, PIP: 23  ABG - ( 26 Nov 2019 06:37 )  pH: 7.35  /  pCO2: 45    /  pO2: 213   / HCO3: 24    / Base Excess: -.9   /  SaO2: 99      Lactate: x        [N/A] Extubation Readiness Assessed  Meds: albuterol/ipratropium for Nebulization 3 milliLiter(s) Nebulizer every 6 hours PRN Shortness of Breath and/or Wheezing    CARDIOVASCULAR  HR: 61 (11-27-19 @ 00:00) (61 - 103)  BP: 104/67 (11-27-19 @ 00:00) (93/63 - 189/128)  BP(mean): 81 (11-27-19 @ 00:00) (71 - 145)  VBG - ( 26 Nov 2019 00:31 )  pH: 7.38  /  pCO2: 52    /  pO2: <20   / HCO3: 30    / Base Excess: 3.9   /  SaO2: 23     Lactate: 2.9      Exam: regular rate and rhythm  Cardiac Rhythm: sinus  Perfusion     [x]Adequate   [ ]Inadequate  Mentation   [x]Normal       [ ]Reduced  Extremities  [x]Warm         [ ]Cool  Volume Status [ ]Hypervolemic [x]Euvolemic [ ]Hypovolemic    GI/NUTRITION  Exam: Soft, obese, Abthera vac in place with good seal  Diet: NPO OGT with minimal bilious output  Meds: pantoprazole  Injectable 40 milliGRAM(s) IV Push every 24 hours    GENITOURINARY  I&O's Detail    11-25 @ 07:01  -  11-26 @ 07:00  --------------------------------------------------------  IN:    fentaNYL  Infusion: 8.4 mL    propofol Infusion: 15.1 mL  Total IN: 23.5 mL    OUT:    Indwelling Catheter - Urethral: 50 mL  Total OUT: 50 mL    Total NET: -26.5 mL    11-26 @ 07:01  -  11-27 @ 00:38  --------------------------------------------------------  IN:    dexmedetomidine Infusion: 618.3 mL    fentaNYL  Infusion: 58.8 mL    IV PiggyBack: 350 mL    Lactated Ringers IV Bolus: 500 mL    lactated ringers.: 1750 mL    propofol Infusion: 95.8 mL  Total IN: 3372.9 mL    OUT:    Indwelling Catheter - Urethral: 495 mL    Nasoenteral Tube: 150 mL    VAC (Vacuum Assisted Closure) System: 650 mL  Total OUT: 1295 mL    Total NET: 2077.9 mL    Weight (kg): 168.3 (11-26 @ 06:15)  11-26    136  |  102  |  20  ----------------------------<  141<H>  4.4   |  23  |  0.81    Ca    8.2<L>      26 Nov 2019 06:47  Phos  3.5     11-26  Mg     2.1     11-26    TPro  8.0  /  Alb  2.9<L>  /  TBili  1.3<H>  /  DBili  x   /  AST  36  /  ALT  33  /  AlkPhos  108  11-25    [X] Ross catheter, indication: N/A  Meds: lactated ringers. 1000 milliLiter(s) IV Continuous <Continuous>    HEMATOLOGIC  Meds: enoxaparin Injectable 80 milliGRAM(s) SubCutaneous <User Schedule>    [x] VTE Prophylaxis                        12.2   11.08 )-----------( 302      ( 26 Nov 2019 18:32 )             36.5     PT/INR - ( 26 Nov 2019 06:47 )   PT: 15.9 sec;   INR: 1.37 ratio       PTT - ( 26 Nov 2019 06:47 )  PTT:27.1 sec    INFECTIOUS DISEASES  WBC Count: 11.08 K/uL (11-26 @ 18:32)  WBC Count: 12.73 K/uL (11-26 @ 12:07)  WBC Count: 7.63 K/uL (11-26 @ 06:47)    RECENT CULTURES:    Meds: piperacillin/tazobactam IVPB.. 3.375 Gram(s) IV Intermittent every 8 hours    ACCESS DEVICES:  [ ] Peripheral IV  [X] Central Venous Line	[ ] R	[X] L	[X] IJ	[ ] Fem	[ ] SC	Placed:   [ ] Arterial Line		[ ] R	[ ] L	[ ] Fem	[ ] Rad	[ ] Ax	Placed:   [ ] PICC:					[ ] Mediport  [X] Urinary Catheter, Date Placed:   [x] Necessity of urinary, arterial, and venous catheters discussed    OTHER MEDICATIONS:  chlorhexidine 0.12% Liquid 15 milliLiter(s) Oral Mucosa <User Schedule>  chlorhexidine 2% Cloths 1 Application(s) Topical <User Schedule>

## 2019-11-27 NOTE — DIETITIAN INITIAL EVALUATION ADULT. - PERTINENT LABORATORY DATA
11-27 @ 01:22: Sodium 133<L>, Potassium 4.7, Chloride 100, Calcium 8.5, Magnesium 2.3, Phosphorus 3.4, BUN 25<H>, Creatinine 0.79, <H>, Hemoglobin 12.0, Hematocrit 36.5

## 2019-11-27 NOTE — DIETITIAN INITIAL EVALUATION ADULT. - ENTERAL
If EN is warranted post-op, recommend Vital 1.2, initiate at 10ml/hr, increase as tolerated to 60ml/hr x 24 hours to provide 1440 ml formula, 1728cal/day, 108Gm protein/day, 1168ml free water; meets 29cal/Kg and 1.8Gm protein/Kg per IBW 59.1Kg with consideration for BMI 59.9 and intubation.

## 2019-11-27 NOTE — DIETITIAN INITIAL EVALUATION ADULT. - ENERGY NEEDS
The modified [employer] equation (MARTINEZ) 2010 equation was used to calculator resting energy expenditure: RMR 2255 calories (14cal/Kg per current wt 165.4Kg)

## 2019-11-27 NOTE — PROVIDER CONTACT NOTE (OTHER) - ACTION/TREATMENT ORDERED:
Chaitanya QUIÑONES visited with pt at bedside; B/L wrist restraints and Precedex gtt maintained; SICU team collaborating on assurance of pt's current safety and safety upon future extubation

## 2019-11-27 NOTE — DIETITIAN INITIAL EVALUATION ADULT. - ADD RECOMMEND
1) Current medical condition precludes nutrition intervention at this time. 2) Pending return of GI function and tolerance to clear liquids, advance to Low Fiber diet. 3) If EN is warranted post-op, recommend Vital 1.2, initiate at 10ml/hr, increase as tolerated to 60ml/hr x 24 hours to provide 1440 ml formula, 1728cal/day, 108Gm protein/day, 1168ml free water; meets 29cal/Kg and 1.8Gm protein/Kg per IBW 59.1Kg with consideration for BMI 59.9 and intubation. 1) Current medical condition precludes nutrition intervention at this time. 2) Pending return of GI function and tolerance to clear liquids, advance to Low Fiber diet. 3) If EN is warranted post-op, see EN recommendations above.

## 2019-11-28 ENCOUNTER — TRANSCRIPTION ENCOUNTER (OUTPATIENT)
Age: 64
End: 2019-11-28

## 2019-11-28 LAB
-  AMIKACIN: SIGNIFICANT CHANGE UP
-  AMOXICILLIN/CLAVULANIC ACID: SIGNIFICANT CHANGE UP
-  AMPICILLIN/SULBACTAM: SIGNIFICANT CHANGE UP
-  AMPICILLIN: SIGNIFICANT CHANGE UP
-  AZTREONAM: SIGNIFICANT CHANGE UP
-  CEFAZOLIN: SIGNIFICANT CHANGE UP
-  CEFEPIME: SIGNIFICANT CHANGE UP
-  CEFOXITIN: SIGNIFICANT CHANGE UP
-  CEFTRIAXONE: SIGNIFICANT CHANGE UP
-  CIPROFLOXACIN: SIGNIFICANT CHANGE UP
-  ERTAPENEM: SIGNIFICANT CHANGE UP
-  GENTAMICIN: SIGNIFICANT CHANGE UP
-  IMIPENEM: SIGNIFICANT CHANGE UP
-  LEVOFLOXACIN: SIGNIFICANT CHANGE UP
-  MEROPENEM: SIGNIFICANT CHANGE UP
-  PIPERACILLIN/TAZOBACTAM: SIGNIFICANT CHANGE UP
-  TOBRAMYCIN: SIGNIFICANT CHANGE UP
-  TRIMETHOPRIM/SULFAMETHOXAZOLE: SIGNIFICANT CHANGE UP
ANION GAP SERPL CALC-SCNC: 10 MMOL/L — SIGNIFICANT CHANGE UP (ref 5–17)
APTT BLD: 26.8 SEC — LOW (ref 27.5–36.3)
BUN SERPL-MCNC: 22 MG/DL — SIGNIFICANT CHANGE UP (ref 7–23)
CALCIUM SERPL-MCNC: 8.4 MG/DL — SIGNIFICANT CHANGE UP (ref 8.4–10.5)
CHLORIDE SERPL-SCNC: 102 MMOL/L — SIGNIFICANT CHANGE UP (ref 96–108)
CO2 SERPL-SCNC: 25 MMOL/L — SIGNIFICANT CHANGE UP (ref 22–31)
CREAT SERPL-MCNC: 0.72 MG/DL — SIGNIFICANT CHANGE UP (ref 0.5–1.3)
CULTURE RESULTS: SIGNIFICANT CHANGE UP
GAS PNL BLDV: SIGNIFICANT CHANGE UP
GLUCOSE SERPL-MCNC: 95 MG/DL — SIGNIFICANT CHANGE UP (ref 70–99)
HCT VFR BLD CALC: 31 % — LOW (ref 34.5–45)
HGB BLD-MCNC: 10.4 G/DL — LOW (ref 11.5–15.5)
INR BLD: 1.1 RATIO — SIGNIFICANT CHANGE UP (ref 0.88–1.16)
LMWH PPP CHRO-ACNC: 0.32 IU/ML — LOW (ref 0.5–1.1)
MAGNESIUM SERPL-MCNC: 2.1 MG/DL — SIGNIFICANT CHANGE UP (ref 1.6–2.6)
MCHC RBC-ENTMCNC: 30 PG — SIGNIFICANT CHANGE UP (ref 27–34)
MCHC RBC-ENTMCNC: 33.5 GM/DL — SIGNIFICANT CHANGE UP (ref 32–36)
MCV RBC AUTO: 89.3 FL — SIGNIFICANT CHANGE UP (ref 80–100)
METHOD TYPE: SIGNIFICANT CHANGE UP
NRBC # BLD: 0 /100 WBCS — SIGNIFICANT CHANGE UP (ref 0–0)
ORGANISM # SPEC MICROSCOPIC CNT: SIGNIFICANT CHANGE UP
ORGANISM # SPEC MICROSCOPIC CNT: SIGNIFICANT CHANGE UP
PHOSPHATE SERPL-MCNC: 2.8 MG/DL — SIGNIFICANT CHANGE UP (ref 2.5–4.5)
PLATELET # BLD AUTO: 282 K/UL — SIGNIFICANT CHANGE UP (ref 150–400)
POTASSIUM SERPL-MCNC: 4.3 MMOL/L — SIGNIFICANT CHANGE UP (ref 3.5–5.3)
POTASSIUM SERPL-SCNC: 4.3 MMOL/L — SIGNIFICANT CHANGE UP (ref 3.5–5.3)
PROTHROM AB SERPL-ACNC: 12.6 SEC — SIGNIFICANT CHANGE UP (ref 10–12.9)
RBC # BLD: 3.47 M/UL — LOW (ref 3.8–5.2)
RBC # FLD: 13.2 % — SIGNIFICANT CHANGE UP (ref 10.3–14.5)
SODIUM SERPL-SCNC: 137 MMOL/L — SIGNIFICANT CHANGE UP (ref 135–145)
SPECIMEN SOURCE: SIGNIFICANT CHANGE UP
WBC # BLD: 12.01 K/UL — HIGH (ref 3.8–10.5)
WBC # FLD AUTO: 12.01 K/UL — HIGH (ref 3.8–10.5)

## 2019-11-28 PROCEDURE — 71045 X-RAY EXAM CHEST 1 VIEW: CPT | Mod: 26

## 2019-11-28 PROCEDURE — 99233 SBSQ HOSP IP/OBS HIGH 50: CPT

## 2019-11-28 PROCEDURE — 93010 ELECTROCARDIOGRAM REPORT: CPT

## 2019-11-28 RX ORDER — ENOXAPARIN SODIUM 100 MG/ML
100 INJECTION SUBCUTANEOUS
Refills: 0 | Status: DISCONTINUED | OUTPATIENT
Start: 2019-11-28 | End: 2019-11-29

## 2019-11-28 RX ORDER — METHADONE HYDROCHLORIDE 40 MG/1
17 TABLET ORAL DAILY
Refills: 0 | Status: DISCONTINUED | OUTPATIENT
Start: 2019-11-28 | End: 2019-11-28

## 2019-11-28 RX ORDER — HYDROMORPHONE HYDROCHLORIDE 2 MG/ML
1 INJECTION INTRAMUSCULAR; INTRAVENOUS; SUBCUTANEOUS
Refills: 0 | Status: DISCONTINUED | OUTPATIENT
Start: 2019-11-28 | End: 2019-11-29

## 2019-11-28 RX ORDER — BUDESONIDE AND FORMOTEROL FUMARATE DIHYDRATE 160; 4.5 UG/1; UG/1
2 AEROSOL RESPIRATORY (INHALATION)
Refills: 0 | Status: DISCONTINUED | OUTPATIENT
Start: 2019-11-28 | End: 2019-11-29

## 2019-11-28 RX ORDER — FUROSEMIDE 40 MG
20 TABLET ORAL ONCE
Refills: 0 | Status: COMPLETED | OUTPATIENT
Start: 2019-11-28 | End: 2019-11-28

## 2019-11-28 RX ORDER — ACETAMINOPHEN 500 MG
1000 TABLET ORAL EVERY 6 HOURS
Refills: 0 | Status: COMPLETED | OUTPATIENT
Start: 2019-11-28 | End: 2019-11-29

## 2019-11-28 RX ORDER — IPRATROPIUM/ALBUTEROL SULFATE 18-103MCG
3 AEROSOL WITH ADAPTER (GRAM) INHALATION EVERY 6 HOURS
Refills: 0 | Status: DISCONTINUED | OUTPATIENT
Start: 2019-11-28 | End: 2019-11-29

## 2019-11-28 RX ORDER — METHADONE HYDROCHLORIDE 40 MG/1
8.5 TABLET ORAL ONCE
Refills: 0 | Status: DISCONTINUED | OUTPATIENT
Start: 2019-11-28 | End: 2019-11-28

## 2019-11-28 RX ADMIN — Medication 400 MILLIGRAM(S): at 11:50

## 2019-11-28 RX ADMIN — Medication 400 MILLIGRAM(S): at 17:00

## 2019-11-28 RX ADMIN — METHADONE HYDROCHLORIDE 8.5 MILLIGRAM(S): 40 TABLET ORAL at 06:25

## 2019-11-28 RX ADMIN — PANTOPRAZOLE SODIUM 40 MILLIGRAM(S): 20 TABLET, DELAYED RELEASE ORAL at 11:58

## 2019-11-28 RX ADMIN — PIPERACILLIN AND TAZOBACTAM 25 GRAM(S): 4; .5 INJECTION, POWDER, LYOPHILIZED, FOR SOLUTION INTRAVENOUS at 00:02

## 2019-11-28 RX ADMIN — Medication 3 MILLILITER(S): at 12:45

## 2019-11-28 RX ADMIN — Medication 3 MILLILITER(S): at 18:31

## 2019-11-28 RX ADMIN — PIPERACILLIN AND TAZOBACTAM 25 GRAM(S): 4; .5 INJECTION, POWDER, LYOPHILIZED, FOR SOLUTION INTRAVENOUS at 15:24

## 2019-11-28 RX ADMIN — HYDROMORPHONE HYDROCHLORIDE 0.5 MILLIGRAM(S): 2 INJECTION INTRAMUSCULAR; INTRAVENOUS; SUBCUTANEOUS at 07:16

## 2019-11-28 RX ADMIN — Medication 1000 MILLIGRAM(S): at 17:16

## 2019-11-28 RX ADMIN — Medication 1000 MILLIGRAM(S): at 12:05

## 2019-11-28 RX ADMIN — Medication 1000 MILLIGRAM(S): at 23:30

## 2019-11-28 RX ADMIN — PIPERACILLIN AND TAZOBACTAM 25 GRAM(S): 4; .5 INJECTION, POWDER, LYOPHILIZED, FOR SOLUTION INTRAVENOUS at 23:14

## 2019-11-28 RX ADMIN — HYDROMORPHONE HYDROCHLORIDE 0.5 MILLIGRAM(S): 2 INJECTION INTRAMUSCULAR; INTRAVENOUS; SUBCUTANEOUS at 03:15

## 2019-11-28 RX ADMIN — HYDROMORPHONE HYDROCHLORIDE 0.5 MILLIGRAM(S): 2 INJECTION INTRAMUSCULAR; INTRAVENOUS; SUBCUTANEOUS at 03:07

## 2019-11-28 RX ADMIN — PIPERACILLIN AND TAZOBACTAM 25 GRAM(S): 4; .5 INJECTION, POWDER, LYOPHILIZED, FOR SOLUTION INTRAVENOUS at 07:19

## 2019-11-28 RX ADMIN — Medication 400 MILLIGRAM(S): at 23:14

## 2019-11-28 RX ADMIN — HYDROMORPHONE HYDROCHLORIDE 1 MILLIGRAM(S): 2 INJECTION INTRAMUSCULAR; INTRAVENOUS; SUBCUTANEOUS at 17:01

## 2019-11-28 RX ADMIN — HYDROMORPHONE HYDROCHLORIDE 1 MILLIGRAM(S): 2 INJECTION INTRAMUSCULAR; INTRAVENOUS; SUBCUTANEOUS at 12:05

## 2019-11-28 RX ADMIN — HYDROMORPHONE HYDROCHLORIDE 0.5 MILLIGRAM(S): 2 INJECTION INTRAMUSCULAR; INTRAVENOUS; SUBCUTANEOUS at 07:01

## 2019-11-28 RX ADMIN — HYDROMORPHONE HYDROCHLORIDE 0.5 MILLIGRAM(S): 2 INJECTION INTRAMUSCULAR; INTRAVENOUS; SUBCUTANEOUS at 10:21

## 2019-11-28 RX ADMIN — HYDROMORPHONE HYDROCHLORIDE 1 MILLIGRAM(S): 2 INJECTION INTRAMUSCULAR; INTRAVENOUS; SUBCUTANEOUS at 21:02

## 2019-11-28 RX ADMIN — HYDROMORPHONE HYDROCHLORIDE 0.5 MILLIGRAM(S): 2 INJECTION INTRAMUSCULAR; INTRAVENOUS; SUBCUTANEOUS at 00:18

## 2019-11-28 RX ADMIN — HYDROMORPHONE HYDROCHLORIDE 0.5 MILLIGRAM(S): 2 INJECTION INTRAMUSCULAR; INTRAVENOUS; SUBCUTANEOUS at 10:06

## 2019-11-28 RX ADMIN — ENOXAPARIN SODIUM 100 MILLIGRAM(S): 100 INJECTION SUBCUTANEOUS at 11:50

## 2019-11-28 RX ADMIN — HYDROMORPHONE HYDROCHLORIDE 1 MILLIGRAM(S): 2 INJECTION INTRAMUSCULAR; INTRAVENOUS; SUBCUTANEOUS at 11:50

## 2019-11-28 RX ADMIN — CHLORHEXIDINE GLUCONATE 1 APPLICATION(S): 213 SOLUTION TOPICAL at 06:25

## 2019-11-28 RX ADMIN — SODIUM CHLORIDE 50 MILLILITER(S): 9 INJECTION, SOLUTION INTRAVENOUS at 23:14

## 2019-11-28 RX ADMIN — HYDROMORPHONE HYDROCHLORIDE 1 MILLIGRAM(S): 2 INJECTION INTRAMUSCULAR; INTRAVENOUS; SUBCUTANEOUS at 21:17

## 2019-11-28 RX ADMIN — SODIUM CHLORIDE 125 MILLILITER(S): 9 INJECTION, SOLUTION INTRAVENOUS at 07:19

## 2019-11-28 RX ADMIN — HYDROMORPHONE HYDROCHLORIDE 0.5 MILLIGRAM(S): 2 INJECTION INTRAMUSCULAR; INTRAVENOUS; SUBCUTANEOUS at 00:02

## 2019-11-28 RX ADMIN — HYDROMORPHONE HYDROCHLORIDE 1 MILLIGRAM(S): 2 INJECTION INTRAMUSCULAR; INTRAVENOUS; SUBCUTANEOUS at 17:16

## 2019-11-28 RX ADMIN — SODIUM CHLORIDE 50 MILLILITER(S): 9 INJECTION, SOLUTION INTRAVENOUS at 11:57

## 2019-11-28 RX ADMIN — Medication 20 MILLIGRAM(S): at 11:50

## 2019-11-28 RX ADMIN — BUDESONIDE AND FORMOTEROL FUMARATE DIHYDRATE 2 PUFF(S): 160; 4.5 AEROSOL RESPIRATORY (INHALATION) at 18:31

## 2019-11-28 NOTE — PROGRESS NOTE ADULT - SUBJECTIVE AND OBJECTIVE BOX
[facility] Progress Note     Subjective/24hour Events:     Patient seen and examined on am rounds. Extubated. -flatus, -BM. Marquez making urine. Denies chills/fevers, chest pain, SOB.        Physical Exam:  Gen: NAD. sedated  Lungs: Intubated  Non labored breathing.   Genitourinary: marquez in place  Ab: Soft, nontender, nondistended. AbThera vac intact  Ext: Moves all 4 spontaneously. severe lymphedema noted on b/l lower extremities.            Vital Signs:  Vital Signs Last 24 Hrs  T(C): 36.6 (28 Nov 2019 03:00), Max: 37.2 (27 Nov 2019 19:00)  T(F): 97.9 (28 Nov 2019 03:00), Max: 99 (27 Nov 2019 19:00)  HR: 77 (28 Nov 2019 04:49) (55 - 77)  BP: 113/56 (28 Nov 2019 04:00) (81/50 - 123/58)  BP(mean): 81 (28 Nov 2019 04:00) (60 - 90)  RR: 16 (28 Nov 2019 04:00) (0 - 31)  SpO2: 93% (28 Nov 2019 04:49) (90% - 100%)    CAPILLARY BLOOD GLUCOSE          I&O's Detail    26 Nov 2019 07:01  -  27 Nov 2019 07:00  --------------------------------------------------------  IN:    dexmedetomidine Infusion: 765.3 mL    fentaNYL  Infusion: 58.8 mL    IV PiggyBack: 400 mL    Lactated Ringers IV Bolus: 500 mL    lactated ringers.: 2625 mL    propofol Infusion: 95.8 mL  Total IN: 4444.9 mL    OUT:    Indwelling Catheter - Urethral: 720 mL    Nasoenteral Tube: 220 mL    VAC (Vacuum Assisted Closure) System: 850 mL  Total OUT: 1790 mL    Total NET: 2654.9 mL      27 Nov 2019 07:01  -  28 Nov 2019 05:48  --------------------------------------------------------  IN:    dexmedetomidine Infusion: 109.4 mL    IV PiggyBack: 300 mL    lactated ringers.: 2625 mL  Total IN: 3034.4 mL    OUT:    Indwelling Catheter - Urethral: 1060 mL    Nasoenteral Tube: 150 mL    VAC (Vacuum Assisted Closure) System: 400 mL  Total OUT: 1610 mL    Total NET: 1424.4 mL          MEDICATIONS  (STANDING):  chlorhexidine 2% Cloths 1 Application(s) Topical <User Schedule>  enoxaparin Injectable 80 milliGRAM(s) SubCutaneous <User Schedule>  influenza   Vaccine 0.5 milliLiter(s) IntraMuscular once  lactated ringers. 1000 milliLiter(s) (125 mL/Hr) IV Continuous <Continuous>  methadone   Solution 17 milliGRAM(s) Oral daily  pantoprazole  Injectable 40 milliGRAM(s) IV Push every 24 hours  piperacillin/tazobactam IVPB.. 3.375 Gram(s) IV Intermittent every 8 hours    MEDICATIONS  (PRN):  HYDROmorphone  Injectable 0.5 milliGRAM(s) IV Push every 3 hours PRN Pain          Labs:    11-28    137  |  102  |  22  ----------------------------<  95  4.3   |  25  |  0.72    Ca    8.4      28 Nov 2019 00:20  Phos  2.8     11-28  Mg     2.1     11-28                              10.4   12.01 )-----------( 282      ( 28 Nov 2019 00:20 )             31.0     PT/INR - ( 28 Nov 2019 00:20 )   PT: 12.6 sec;   INR: 1.10 ratio         PTT - ( 28 Nov 2019 00:20 )  PTT:26.8 sec    Imaging:

## 2019-11-28 NOTE — CHART NOTE - NSCHARTNOTEFT_GEN_A_CORE
Preop Dx: Perforated diverticulitis  Surgeon: Dr. Ng/ [facility]  Procedure: Abdominal washout, colostomy, possible abdominal closure, possible abthera VAC replacement    Vital Signs Last 24 Hrs  T(C): 36.7 (2019 15:00), Max: 37.2 (2019 19:00)  T(F): 98.1 (2019 15:00), Max: 99 (2019 19:00)  HR: 78 (2019 15:) (55 - 86)  BP: 119/57 (2019 15:00) (81/50 - 140/63)  BP(mean): 82 (2019 15:) (60 - 90)  RR: 21 (2019 15:) (10 - 26)  SpO2: 92% (2019 15:00) (90% - 97%)                        10.4   12.01 )-----------( 282      ( 2019 00:20 )             31.0         137  |  102  |  22  ----------------------------<  95  4.3   |  25  |  0.72    Ca    8.4      2019 00:20  Phos  2.8       Mg     2.1           PT/INR - ( 2019 00:20 )   PT: 12.6 sec;   INR: 1.10 ratio         PTT - ( 2019 00:20 )  PTT:26.8 sec  Daily     Daily Weight in k.5 (2019 05:00)    EKG: < from: 12 Lead ECG (09 @ 16:19) >    Ventricular Rate 83 BPM    Atrial Rate 83 BPM    P-R Interval 150 ms    QRS Duration 90 ms     ms    QTc 441 ms    P Axis 48 degrees    R Axis 36 degrees    T Axis 37 degrees    Diagnosis Line Normal sinus rhythm  Normal ECG          CXR: < from: Xray Chest 1 View- PORTABLE-Routine (19 @ 06:46) >    The heart is slightly enlarged. The lungs appear to be clear. An NG tube   is present however its tip is not seen on the current study. A central   line seen on the left and the tip is in the brachiocephalic vein.      Type and Screen: Blood Typing (ABO + Rho D) (19 @ 02:56)    Rh Interpretation: Positive    ABO Interpretation: O          A/P: 64y Female     - OR 19 for Abdominal washout, colostomy, possible abdominal closure with Dr. Ng  - NPO past midnight, except medications  - IVF while NPO  - Consent signed and in chart  - Cards clearance for OR

## 2019-11-28 NOTE — PROGRESS NOTE ADULT - ASSESSMENT
64F with pmhx morbid obesity, acid reflux, HTN, COPD and pshx D&C presents to the ED c/o abdominal pain and bloating. Patient reports having constipation for 2 weeks and has been taking enemas, miralax and senna and passing small hard balls for the last weeks. Reports that she has not had a bowel movement unless she used an enema. Reports pain worst in the LLQ that started a few days ago and has been worsening in the last day, also has noted significant abdominal distention in the last day. Notes that she has not been able to urinate all day today because she feels dehydrated. Reports that she had 'low grade temperature of 99.7 which she took left over augmentin for.' Also reports nausea, denies any emesis, recorded fevers, urinary symptoms. Reports she has difficulty walking short distances because she becomes SOB. In ED, CT demonstrated Perforated sigmoid diverticulitis with intraperitoneal free air.    She was taken to the operating room on 11/25 overnight and underwent an exploratory laparotomy with extended left hemicolectomy and was left in discontinuity. An Abthera VAC was placed. She received 3400 of crystalloid, EBL was 500, she required pushes of phenylephrine during the case, but not continuous. She was kept intubated post op and brought to the ICU for hemodynamic and respiratory management.    NEURO: In need of sedation while intubated and postoperative pain control  - extubated today, AOx3  - Continue pain control with PRN dilaudid  - f/u methadone    RESPIRATORY: Hx of COPD  - extubated, BIPAP overnight for morbid obesity  - ABGs with AM labs  - AM CXR  - Duonebs    CARDIOVASCULAR: Hx of HTN on amlodipine at home, No hx of CAD, required pushes of phenylephrine during the operation but nothing continuous and currently not requiring vasopressor support  - Remains off vasopressor support  - Monitor vital signs and ins and outs    GI/NUTRITION: S/p extended left hemicolectomy and was left in discontinuity with Abthera in place, Hx of obesity, Hx of GERD  - NPO w/ NGT  - Monitor Abthera output  - Plan to RTOR on Friday     GENITOURINARY/RENAL:   - Monitoring fluid status closely  - Continue LR @ 125  - Strict Is and Os, UOP 40cc/hr    HEMATOLOGIC: Operative EBL of 500, CBC stable  - Trend daily CBC and coags  - Cont DVT ppx with lovenox dosed by patient weight  - AntiXa level 0.11?    INFECTIOUS DISEASE: Admitted with perforated sigmoid possibly from stercolitis vs diverticulitis  - OR cx w/ rare e.coli  - Cont Zosyn  - trend WBC and fever curve    ENDOCRINE: No active issues 64F with pmhx morbid obesity, acid reflux, HTN, COPD and pshx D&C presents to the ED c/o abdominal pain and bloating. Patient reports having constipation for 2 weeks and has been taking enemas, miralax and senna and passing small hard balls for the last weeks. Reports that she has not had a bowel movement unless she used an enema. Reports pain worst in the LLQ that started a few days ago and has been worsening in the last day, also has noted significant abdominal distention in the last day. Notes that she has not been able to urinate all day today because she feels dehydrated. Reports that she had 'low grade temperature of 99.7 which she took left over augmentin for.' Also reports nausea, denies any emesis, recorded fevers, urinary symptoms. Reports she has difficulty walking short distances because she becomes SOB. In ED, CT demonstrated Perforated sigmoid diverticulitis with intraperitoneal free air.    She was taken to the operating room on 11/25 overnight and underwent an exploratory laparotomy with extended left hemicolectomy and was left in discontinuity. An Abthera VAC was placed. She received 3400 of crystalloid, EBL was 500, she required pushes of phenylephrine during the case, but not continuous. She was kept intubated post op and brought to the ICU for hemodynamic and respiratory management.    NEURO: In need of sedation while intubated and postoperative pain control  - extubated today, AOx3  - Continue pain control with PRN dilaudid  - started on 17mg methadone, home dose    RESPIRATORY: Hx of COPD  - extubated, BIPAP overnight for morbid obesity  - ABGs with AM labs  - AM CXR  - Duonebs    CARDIOVASCULAR: Hx of HTN on amlodipine at home, No hx of CAD, required pushes of phenylephrine during the operation but nothing continuous and currently not requiring vasopressor support  - Remains off vasopressor support  - Monitor vital signs and ins and outs    GI/NUTRITION: S/p extended left hemicolectomy and was left in discontinuity with Abthera in place, Hx of obesity, Hx of GERD  - NPO w/ NGT  - Monitor Abthera output  - Plan to RTOR on Friday     GENITOURINARY/RENAL:   - Monitoring fluid status closely  - Continue LR @ 125  - Strict Is and Os, UOP 40cc/hr    HEMATOLOGIC: Operative EBL of 500, CBC stable  - Trend daily CBC and coags  - Cont DVT ppx with lovenox dosed by patient weight  - AntiXa level 0.11?    INFECTIOUS DISEASE: Admitted with perforated sigmoid possibly from stercolitis vs diverticulitis  - OR cx w/ rare e.coli  - Cont Zosyn  - trend WBC and fever curve    ENDOCRINE: No active issues

## 2019-11-28 NOTE — CONSULT NOTE ADULT - ASSESSMENT
64F with pmhx morbid obesity, acid reflux, HTN, COPD and pshx D&C presents to the ED c/o abdominal pain and bloating. Patient reports having constipation for 2 weeks and has been taking enemas, miralax and senna and passing small hard balls for the last weeks. Reports that she has not had a bowel movement unless she used an enema. Reports pain worst in the LLQ that started a few days ago and has been worsening in the last day, also has noted significant abdominal distention in the last day. Notes that she has not been able to urinate all day today because she feels dehydrated. Reports that she had 'low grade temperature of 99.7 which she took left over augmentin for.' Also reports nausea, denies any emesis, recorded fevers, urinary symptoms. Reports she has difficulty walking short distances because she becomes SOB. In ED, CT demonstrated Perforated sigmoid diverticulitis with intraperitoneal free air.  pt with known hx of htn ,with no known hx of cad.  no hx of chest pain/ sob, nor arrythmia.  on chest x ray pt with evidence of cardiomegaly. no cardiac work up  ever done  pt with sig cardiac risk factor for cad with no cardiac symptoms/ cardiomegaly ? sec to hx of htn  ecg  echo pre op  dvt prophylaxis  pt will be clear for surgery  dvt prophylaxis

## 2019-11-28 NOTE — PROGRESS NOTE ADULT - SUBJECTIVE AND OBJECTIVE BOX
SICU DAILY PROGRESS NOTE    64F with pmhx morbid obesity, acid reflux, HTN, COPD and pshx D&C presents to the ED c/o abdominal pain and bloating. Patient reports having constipation for 2 weeks and has been taking enemas, miralax and senna and passing small hard balls for the last weeks. Reports that she has not had a bowel movement unless she used an enema. Reports pain worst in the LLQ that started a few days ago and has been worsening in the last day, also has noted significant abdominal distention in the last day. Notes that she has not been able to urinate all day today because she feels dehydrated. Reports that she had 'low grade temperature of 99.7 which she took left over augmentin for.' Also reports nausea, denies any emesis, recorded fevers, urinary symptoms. Reports she has difficulty walking short distances because she becomes SOB. In ED, CT demonstrated Perforated sigmoid diverticulitis with intraperitoneal free air.    She was taken to the operating room on 11/25 overnight and underwent an exploratory laparotomy with extended left hemicolectomy and was left in discontinuity. An Abthera VAC was placed. She received 3400 of crystalloid, EBL was 500, she required pushes of phenylephrine during the case, but not continuous. She was kept intubated post op and brought to the ICU for hemodynamic and respiratory management.       24 HOUR EVENTS:  - extubated on BIPAP  - pt requesting to be back on methadone (on 17, at [facility] methadone clinic)  - planned to RTOR on Friday    SUBJECTIVE/ROS:  [x] A ten-point review of systems was otherwise negative except as noted.  [ ] Due to altered mental status/intubation, subjective information were not able to be obtained from the patient. History was obtained, to the extent possible, from review of the chart and collateral sources of information.      NEURO  RASS:  0   GCS: 15       Exam: awake, alert, oriented  Meds: HYDROmorphone  Injectable 0.5 milliGRAM(s) IV Push every 3 hours PRN Pain    [x] Adequacy of sedation and pain control has been assessed and adjusted      RESPIRATORY  RR: 16 (11-28-19 @ 03:00) (0 - 31)  SpO2: 93% (11-28-19 @ 03:00) (90% - 100%)  Wt(kg): --  Exam: unlabored, clear to auscultation bilaterally  Mechanical Ventilation: Mode: CPAP with PS, RR (patient): 22, FiO2: 30, PEEP: 8, PS: 10, MAP: 10, PIP: 19  ABG - ( 27 Nov 2019 11:21 )  pH: 7.45  /  pCO2: 38    /  pO2: 67    / HCO3: 26    / Base Excess: 2.8   /  SaO2: 95      Lactate: x          [N/A] Extubation Readiness Assessed  Meds: albuterol/ipratropium for Nebulization 3 milliLiter(s) Nebulizer every 6 hour    CARDIOVASCULAR  HR: 73 (11-28-19 @ 03:00) (55 - 77)  BP: 118/58 (11-28-19 @ 03:00) (81/50 - 123/58)  BP(mean): 83 (11-28-19 @ 03:00) (60 - 90)  ABP: --  ABP(mean): --  Wt(kg): --  CVP(cm H2O): --  VBG - ( 28 Nov 2019 00:14 )  pH: 7.41  /  pCO2: 45    /  pO2: 34    / HCO3: 28    / Base Excess: 3.7   /  SaO2: 63     Lactate: 1.2        Exam: regular rate and rhythm  Cardiac Rhythm: sinus  Perfusion     [x]Adequate   [ ]Inadequate  Mentation   [x]Normal       [ ]Reduced  Extremities  [x]Warm         [ ]Cool  Volume Status [ ]Hypervolemic [x]Euvolemic [ ]Hypovolemic  Meds:       GI/NUTRITION  Exam: soft, nontender, nondistended, incision C/D/I  Diet:  Meds: pantoprazole  Injectable 40 milliGRAM(s) IV Push every 24 hours      GENITOURINARY  I&O's Detail    11-26 @ 07:01  -  11-27 @ 07:00  --------------------------------------------------------  IN:    dexmedetomidine Infusion: 765.3 mL    fentaNYL  Infusion: 58.8 mL    IV PiggyBack: 400 mL    Lactated Ringers IV Bolus: 500 mL    lactated ringers.: 2625 mL    propofol Infusion: 95.8 mL  Total IN: 4444.9 mL    OUT:    Indwelling Catheter - Urethral: 720 mL    Nasoenteral Tube: 220 mL    VAC (Vacuum Assisted Closure) System: 850 mL  Total OUT: 1790 mL    Total NET: 2654.9 mL      11-27 @ 07:01  -  11-28 @ 03:33  --------------------------------------------------------  IN:    dexmedetomidine Infusion: 109.4 mL    IV PiggyBack: 300 mL    lactated ringers.: 2500 mL  Total IN: 2909.4 mL    OUT:    Indwelling Catheter - Urethral: 1060 mL    Nasoenteral Tube: 150 mL    VAC (Vacuum Assisted Closure) System: 400 mL  Total OUT: 1610 mL    Total NET: 1299.4 mL      11-28    137  |  102  |  22  ----------------------------<  95  4.3   |  25  |  0.72    Ca    8.4      28 Nov 2019 00:20  Phos  2.8     11-28  Mg     2.1     11-28      [x] Ross catheter, indication: N/A  Meds: lactated ringers. 1000 milliLiter(s) IV Continuous <Continuous>      HEMATOLOGIC  Meds: enoxaparin Injectable 80 milliGRAM(s) SubCutaneous <User Schedule>    [x] VTE Prophylaxis                        10.4   12.01 )-----------( 282      ( 28 Nov 2019 00:20 )             31.0     PT/INR - ( 28 Nov 2019 00:20 )   PT: 12.6 sec;   INR: 1.10 ratio         PTT - ( 28 Nov 2019 00:20 )  PTT:26.8 sec  Transfusion     [ ] PRBC   [ ] Platelets   [ ] FFP   [ ] Cryoprecipitate      INFECTIOUS DISEASES  WBC Count: 12.01 K/uL (11-28 @ 00:20)    RECENT CULTURES:  Specimen Source: .Surgical Swab peritoneal fluid  Date/Time: 11-26 @ 17:30  Culture Results:   Rare Escherichia coli  Gram Stain: --  Organism: --    Meds: influenza   Vaccine 0.5 milliLiter(s) IntraMuscular once  piperacillin/tazobactam IVPB.. 3.375 Gram(s) IV Intermittent every 8 hours    ENDOCRINE  CAPILLARY BLOOD GLUCOSE    Meds:       ACCESS DEVICES:  [x] Peripheral IV  [x] Central Venous Line	[ ] R	[x] L	[x] IJ	[ ] Fem	[ ] SC	Placed:   [ ] Arterial Line		[ ] R	[ ] L	[ ] Fem	[ ] Rad	[ ] Ax	Placed:   [ ] PICC:					[ ] Mediport  [ ] Urinary Catheter, Date Placed:   [x] Necessity of urinary, arterial, and venous catheters discussed    OTHER MEDICATIONS:  chlorhexidine 2% Cloths 1 Application(s) Topical <User Schedule>      CODE STATUS: full

## 2019-11-28 NOTE — CONSULT NOTE ADULT - SUBJECTIVE AND OBJECTIVE BOX
CHIEF COMPLAINT:Patient is a 64y old  Female who presents with a chief complaint of abdominal pain.    HPI:    64F with pmhx morbid obesity, acid reflux, HTN, COPD and pshx D&C presents to the ED c/o abdominal pain and bloating. Patient reports having constipation for 2 weeks and has been taking enemas, miralax and senna and passing small hard balls for the last weeks. Reports that she has not had a bowel movement unless she used an enema. Reports pain worst in the LLQ that started a few days ago and has been worsening in the last day, also has noted significant abdominal distention in the last day. Notes that she has not been able to urinate all day today because she feels dehydrated. Reports that she had 'low grade temperature of 99.7 which she took left over augmentin for.' Also reports nausea, denies any emesis, recorded fevers, urinary symptoms. Reports she has difficulty walking short distances because she becomes SOB. In ED, CT demonstrated Perforated sigmoid diverticulitis with intraperitoneal free air.  pt with known hx of htn ,with no known hx of cad.  no hx of chest pain/ sob, nor arrythmia.  on chest x ray pt with evidence of cardiomegaly. no cardiac work up  ever done      PAST MEDICAL & SURGICAL HISTORY:  Morbid Obesity  Post Menopausal Bleeding  Polyp, Vagina  Acid Reflux  HTN (Hypertension)  S/P D&C: 2009, w vaginal polypectomy      FAMILY HISTORY:      SOCIAL HISTORY:  - Active smoker 1pack day for many years  - Lives in private residence with domestic partner    MEDICATIONS  (STANDING):  lactated ringers Bolus 1000 milliLiter(s) IV Bolus once  morphine  - Injectable 4 milliGRAM(s) IV Push once  ondansetron Injectable 4 milliGRAM(s) IV Push once  piperacillin/tazobactam IVPB. 3.375 Gram(s) IV Intermittent Once    MEDICATIONS  (PRN):    Allergies    sulfa drugs (Unknown)    Intolerances        Vital Signs Last 24 Hrs  T(C): 37.1 (25 Nov 2019 22:50), Max: 37.1 (25 Nov 2019 17:53)  T(F): 98.7 (25 Nov 2019 22:50), Max: 98.8 (25 Nov 2019 17:53)  HR: 98 (25 Nov 2019 22:50) (62 - 98)  BP: 107/73 (25 Nov 2019 22:50) (107/73 - 110/70)  BP(mean): --  RR: 16 (25 Nov 2019 22:50) (16 - 16)  SpO2: 94% (25 Nov 2019 22:50) (94% - 100%)  Daily     Daily     General: NAD, well-nourished  HEENT: Atraumatic, EOMI  Resp: Breathing comfortably on RA  CV: Normal sinus rhythm  Abd: soft, distended, mildly tender in LLQ, no RT/guarding  Ext: ROMIx4, motor strength intact x 4                          16.9   11.20 )-----------( 333      ( 25 Nov 2019 20:43 )             50.6     11-25    131<L>  |  97  |  20  ----------------------------<  151<H>  6.1<H>   |  17<L>  |  0.78    Ca    10.1      25 Nov 2019 20:43  Phos  3.1     11-25  Mg     2.2     11-25    TPro  8.0  /  Alb  2.9<L>  /  TBili  1.3<H>  /  DBili  x   /  AST  36  /  ALT  33  /  AlkPhos  108  11-25    PT/INR - ( 25 Nov 2019 20:43 )   PT: 15.2 sec;   INR: 1.31 ratio         PTT - ( 25 Nov 2019 20:43 )  PTT:27.2 sec      Radiographic Findings:             EXAM:  CT ABDOMEN AND PELVIS IC                            PROCEDURE DATE:  11/25/2019            INTERPRETATION:  CLINICAL INFORMATION: Constipation for weeks.    COMPARISON: None.    PROCEDURE:   CT of the Abdomen and Pelvis was performed with intravenous contrast.   Intravenous contrast: 125 ml Omnipaque 350. 25 ml discarded.  Oral contrast: None.  Sagittal and coronal reformats were performed.    FINDINGS:    LOWER CHEST: Aortic valve and coronary artery calcifications. Calcified   granuloma in the right lower lobe.     LIVER: Small calcifications in the right lobe the liver.  BILE DUCTS: Normal caliber.  GALLBLADDER: Distended, up to 6.3 cm. No gallbladder wall thickening or   pericholecystic fluid.  SPLEEN: Within normal limits.  PANCREAS: Within normal limits.  ADRENALS: Within normal limits.  KIDNEYS/URETERS: 1.2 cm indeterminate right lower pole exophytic   hypodensity (5, 7). Additional subcentimeter hypodensities too small to   characterize within the right kidney. No hydronephrosis.      PAST MEDICAL & SURGICAL HISTORY:  Morbid Obesity  Post Menopausal Bleeding  Polyp, Vagina  Acid Reflux  HTN (Hypertension)  S/P D&C: 2009, w vaginal polypectomy      MEDICATIONS  (STANDING):  chlorhexidine 2% Cloths 1 Application(s) Topical <User Schedule>  enoxaparin Injectable 100 milliGRAM(s) SubCutaneous <User Schedule>  influenza   Vaccine 0.5 milliLiter(s) IntraMuscular once  lactated ringers. 1000 milliLiter(s) (125 mL/Hr) IV Continuous <Continuous>  pantoprazole  Injectable 40 milliGRAM(s) IV Push every 24 hours  piperacillin/tazobactam IVPB.. 3.375 Gram(s) IV Intermittent every 8 hours    MEDICATIONS  (PRN):  HYDROmorphone  Injectable 0.5 milliGRAM(s) IV Push every 3 hours PRN Pain      FAMILY HISTORY:      SOCIAL HISTORY:    [ ] Non-smoker  [x ] Smoker  [ ] Alcohol    Allergies    sulfa drugs (Unknown)    Intolerances    	    REVIEW OF SYSTEMS:  CONSTITUTIONAL: No fever, weight loss, or fatigue  EYES: No eye pain, visual disturbances, or discharge  ENT:  No difficulty hearing, tinnitus, vertigo; No sinus or throat pain  NECK: No pain or stiffness  RESPIRATORY: No cough, wheezing, chills or hemoptysis; No Shortness of Breath  CARDIOVASCULAR: No chest pain, palpitations, passing out, dizziness, or leg swelling  GASTROINTESTINAL: +abdominal or epigastric pain. No nausea, vomiting, or hematemesis; No diarrhea or constipation. No melena or hematochezia.  GENITOURINARY: No dysuria, frequency, hematuria, or incontinence  NEUROLOGICAL: No headaches, memory loss, loss of strength, numbness, or tremors  SKIN: No itching, burning, rashes, or lesions   LYMPH Nodes: No enlarged glands  ENDOCRINE: No heat or cold intolerance; No hair loss  MUSCULOSKELETAL: No joint pain or swelling; No muscle, back, or extremity pain  PSYCHIATRIC: No depression, anxiety, mood swings, or difficulty sleeping  HEME/LYMPH: No easy bruising, or bleeding gums  ALLERGY AND IMMUNOLOGIC: No hives or eczema	    [ ] All others negative	  [ ] Unable to obtain    PHYSICAL EXAM:  T(C): 36.3 (11-28-19 @ 07:00), Max: 37.2 (11-27-19 @ 19:00)  HR: 82 (11-28-19 @ 10:00) (55 - 86)  BP: 134/61 (11-28-19 @ 10:00) (81/50 - 136/62)  RR: 17 (11-28-19 @ 10:00) (10 - 31)  SpO2: 92% (11-28-19 @ 10:00) (90% - 100%)  Wt(kg): --  I&O's Summary    27 Nov 2019 07:01  -  28 Nov 2019 07:00  --------------------------------------------------------  IN: 3409.4 mL / OUT: 2235 mL / NET: 1174.4 mL    28 Nov 2019 07:01  -  28 Nov 2019 10:51  --------------------------------------------------------  IN: 450 mL / OUT: 325 mL / NET: 125 mL        Appearance: Normal	  HEENT:   Normal oral mucosa, PERRL, EOMI	  Lymphatic: No lymphadenopathy  Cardiovascular: Normal S1 S2, No JVD, +murmurs, No edema  Respiratory: Lungs clear to auscultation	  Psychiatry: A & O x 3, Mood & affect appropriate  Gastrointestinal:  Soft, + ngt  Skin: No rashes, No ecchymoses, No cyanosis	  Neurologic: Non-focal  Extremities: Normal range of motion, No clubbing, cyanosis or edema  Vascular: Peripheral pulses palpable 2+ bilaterally    TELEMETRY: 	    ECG:  	  RADIOLOGY:  OTHER: 	  	  LABS:	 	    CARDIAC MARKERS:                              10.4   12.01 )-----------( 282      ( 28 Nov 2019 00:20 )             31.0     11-28    137  |  102  |  22  ----------------------------<  95  4.3   |  25  |  0.72    Ca    8.4      28 Nov 2019 00:20  Phos  2.8     11-28  Mg     2.1     11-28      proBNP:   Lipid Profile:   HgA1c:   TSH:   PT/INR - ( 28 Nov 2019 00:20 )   PT: 12.6 sec;   INR: 1.10 ratio         PTT - ( 28 Nov 2019 00:20 )  PTT:26.8 sec    PREVIOUS DIAGNOSTIC TESTING:    < from: 12 Lead ECG (12.23.09 @ 16:19) >  Diagnosis Line Normal sinus rhythm  Normal ECG    < from: Xray Chest 1 View- PORTABLE-Routine (11.28.19 @ 06:46) >  The heart is slightly enlarged. The lungs appear to be clear. An NG tube   is present however its tip is not seen on the current study. A central   line seen on the left and the tip is in the brachiocephalic vein.    < from: CT Abdomen and Pelvis w/ IV Cont (11.25.19 @ 22:25) >  Perforated sigmoid diverticulitis with intraperitoneal free air and   peripherally enhancing free fluid. No discrete abscess identified at this   time.    Thickening of the esophagus. Correlation with endoscopy is recommended on   a nonemergent basis.    Indeterminate 1.2 cm right lower pole exophytic hypodensity. Further   evaluation with nonurgent ultrasound or MR is recommended.

## 2019-11-28 NOTE — PROGRESS NOTE ADULT - ATTENDING COMMENTS
I have seen and evaluated the patient and discussed the relevant clinical findings and plan with the surgical housestaff and fellow.  Agree with the above documentation with addenda as noted.     Plan for OR tomorrow for second look, colostomy creation, possible abdominal closure.  Cardiology evaluated patient today, to obtain echo.    Ej Ng MD

## 2019-11-28 NOTE — PROGRESS NOTE ADULT - ASSESSMENT
A/P: 64y Female POD#1 s/p ex lap with extended left hemicolectomy left in discontinuity for perforated sigmoid diverticulitis with intraperitoneal free air after presenting to ED with worsening constipation and abdominal pain with distention. AbThera vac placed and pt kept intubated post op and transferred to SICU for post-op management.     Plan:    - NPO  - monitor U/O   - monitor AbThera output  - c/w pain control PRN dilaudid with IV tylenol, methadone started  - DVT ppx with lovenox, sequential compression   - return to OR Friday for abdominal closure.    p [phone]

## 2019-11-28 NOTE — PROGRESS NOTE ADULT - ATTENDING COMMENTS
Patient seen and examined and agree with above.    s/p ex lap, extended left hemicolectomy, left in discontinuity with Abthera VAC placement.  Will confirm methadone dose with the clinic.   I have reviewed PMH, PSH, labs, meds imaging.     Current management includes:   1) Acute respiratory insufficiency- extubated yesterday   - on 3 liters nasal cannula; will wean as tolerated   -bipap overnight   -CXR reviewed with mild acute pulmonary edema- will give lasix 20 mg x 1 dose   -goal SpO2 >88- 92%    2) COPD- continue duonebs; will start on home meds   -does not appear to have an acute exacerbation at this time    3) Perforated diverticulitis -leukocytosis increased today   -continue antibiotics  -cultures- rare E. coli    -Currently with open abdomen with temporary abdominal closure; plan for take back in 48 hours    4) Will continue NPO at this time     Appreciate cardiology consult.   Will obtain EKG

## 2019-11-29 ENCOUNTER — APPOINTMENT (OUTPATIENT)
Dept: SURGERY | Facility: HOSPITAL | Age: 64
End: 2019-11-29
Payer: MEDICARE

## 2019-11-29 LAB
ALBUMIN SERPL ELPH-MCNC: 2.3 G/DL — LOW (ref 3.3–5)
ALP SERPL-CCNC: 90 U/L — SIGNIFICANT CHANGE UP (ref 40–120)
ALT FLD-CCNC: 23 U/L — SIGNIFICANT CHANGE UP (ref 10–45)
ANION GAP SERPL CALC-SCNC: 12 MMOL/L — SIGNIFICANT CHANGE UP (ref 5–17)
ANION GAP SERPL CALC-SCNC: 16 MMOL/L — SIGNIFICANT CHANGE UP (ref 5–17)
APTT BLD: 23.4 SEC — LOW (ref 27.5–36.3)
APTT BLD: 24.7 SEC — LOW (ref 27.5–36.3)
AST SERPL-CCNC: 31 U/L — SIGNIFICANT CHANGE UP (ref 10–40)
BILIRUB SERPL-MCNC: 0.8 MG/DL — SIGNIFICANT CHANGE UP (ref 0.2–1.2)
BLD GP AB SCN SERPL QL: NEGATIVE — SIGNIFICANT CHANGE UP
BUN SERPL-MCNC: 11 MG/DL — SIGNIFICANT CHANGE UP (ref 7–23)
BUN SERPL-MCNC: 14 MG/DL — SIGNIFICANT CHANGE UP (ref 7–23)
CALCIUM SERPL-MCNC: 8.4 MG/DL — SIGNIFICANT CHANGE UP (ref 8.4–10.5)
CALCIUM SERPL-MCNC: 8.4 MG/DL — SIGNIFICANT CHANGE UP (ref 8.4–10.5)
CHLORIDE SERPL-SCNC: 96 MMOL/L — SIGNIFICANT CHANGE UP (ref 96–108)
CHLORIDE SERPL-SCNC: 99 MMOL/L — SIGNIFICANT CHANGE UP (ref 96–108)
CO2 SERPL-SCNC: 23 MMOL/L — SIGNIFICANT CHANGE UP (ref 22–31)
CO2 SERPL-SCNC: 26 MMOL/L — SIGNIFICANT CHANGE UP (ref 22–31)
CREAT SERPL-MCNC: 0.56 MG/DL — SIGNIFICANT CHANGE UP (ref 0.5–1.3)
CREAT SERPL-MCNC: 0.62 MG/DL — SIGNIFICANT CHANGE UP (ref 0.5–1.3)
GAS PNL BLDA: SIGNIFICANT CHANGE UP
GAS PNL BLDA: SIGNIFICANT CHANGE UP
GAS PNL BLDV: SIGNIFICANT CHANGE UP
GLUCOSE SERPL-MCNC: 103 MG/DL — HIGH (ref 70–99)
GLUCOSE SERPL-MCNC: 94 MG/DL — SIGNIFICANT CHANGE UP (ref 70–99)
HCT VFR BLD CALC: 32.1 % — LOW (ref 34.5–45)
HCT VFR BLD CALC: 37 % — SIGNIFICANT CHANGE UP (ref 34.5–45)
HGB BLD-MCNC: 10.2 G/DL — LOW (ref 11.5–15.5)
HGB BLD-MCNC: 12.1 G/DL — SIGNIFICANT CHANGE UP (ref 11.5–15.5)
INR BLD: 1.1 RATIO — SIGNIFICANT CHANGE UP (ref 0.88–1.16)
INR BLD: 1.12 RATIO — SIGNIFICANT CHANGE UP (ref 0.88–1.16)
MAGNESIUM SERPL-MCNC: 2 MG/DL — SIGNIFICANT CHANGE UP (ref 1.6–2.6)
MAGNESIUM SERPL-MCNC: 2 MG/DL — SIGNIFICANT CHANGE UP (ref 1.6–2.6)
MCHC RBC-ENTMCNC: 28.7 PG — SIGNIFICANT CHANGE UP (ref 27–34)
MCHC RBC-ENTMCNC: 29.6 PG — SIGNIFICANT CHANGE UP (ref 27–34)
MCHC RBC-ENTMCNC: 31.8 GM/DL — LOW (ref 32–36)
MCHC RBC-ENTMCNC: 32.7 GM/DL — SIGNIFICANT CHANGE UP (ref 32–36)
MCV RBC AUTO: 90.4 FL — SIGNIFICANT CHANGE UP (ref 80–100)
MCV RBC AUTO: 90.5 FL — SIGNIFICANT CHANGE UP (ref 80–100)
NRBC # BLD: 0 /100 WBCS — SIGNIFICANT CHANGE UP (ref 0–0)
NRBC # BLD: 0 /100 WBCS — SIGNIFICANT CHANGE UP (ref 0–0)
PHOSPHATE SERPL-MCNC: 2.7 MG/DL — SIGNIFICANT CHANGE UP (ref 2.5–4.5)
PHOSPHATE SERPL-MCNC: 3.1 MG/DL — SIGNIFICANT CHANGE UP (ref 2.5–4.5)
PLATELET # BLD AUTO: 329 K/UL — SIGNIFICANT CHANGE UP (ref 150–400)
PLATELET # BLD AUTO: 397 K/UL — SIGNIFICANT CHANGE UP (ref 150–400)
POTASSIUM SERPL-MCNC: 3.8 MMOL/L — SIGNIFICANT CHANGE UP (ref 3.5–5.3)
POTASSIUM SERPL-MCNC: 4.3 MMOL/L — SIGNIFICANT CHANGE UP (ref 3.5–5.3)
POTASSIUM SERPL-SCNC: 3.8 MMOL/L — SIGNIFICANT CHANGE UP (ref 3.5–5.3)
POTASSIUM SERPL-SCNC: 4.3 MMOL/L — SIGNIFICANT CHANGE UP (ref 3.5–5.3)
PROT SERPL-MCNC: 5.7 G/DL — LOW (ref 6–8.3)
PROTHROM AB SERPL-ACNC: 12.7 SEC — SIGNIFICANT CHANGE UP (ref 10–12.9)
PROTHROM AB SERPL-ACNC: 12.8 SEC — SIGNIFICANT CHANGE UP (ref 10–12.9)
RBC # BLD: 3.55 M/UL — LOW (ref 3.8–5.2)
RBC # BLD: 4.09 M/UL — SIGNIFICANT CHANGE UP (ref 3.8–5.2)
RBC # FLD: 13 % — SIGNIFICANT CHANGE UP (ref 10.3–14.5)
RBC # FLD: 13.5 % — SIGNIFICANT CHANGE UP (ref 10.3–14.5)
RH IG SCN BLD-IMP: POSITIVE — SIGNIFICANT CHANGE UP
SODIUM SERPL-SCNC: 135 MMOL/L — SIGNIFICANT CHANGE UP (ref 135–145)
SODIUM SERPL-SCNC: 137 MMOL/L — SIGNIFICANT CHANGE UP (ref 135–145)
WBC # BLD: 12.62 K/UL — HIGH (ref 3.8–10.5)
WBC # BLD: 16.4 K/UL — HIGH (ref 3.8–10.5)
WBC # FLD AUTO: 12.62 K/UL — HIGH (ref 3.8–10.5)
WBC # FLD AUTO: 16.4 K/UL — HIGH (ref 3.8–10.5)

## 2019-11-29 PROCEDURE — 97605 NEG PRS WND THER DME<=50SQCM: CPT | Mod: 58,59

## 2019-11-29 PROCEDURE — 71045 X-RAY EXAM CHEST 1 VIEW: CPT | Mod: 26

## 2019-11-29 PROCEDURE — 44320 COLOSTOMY: CPT | Mod: 58

## 2019-11-29 PROCEDURE — 93306 TTE W/DOPPLER COMPLETE: CPT | Mod: 26

## 2019-11-29 PROCEDURE — 13160 SEC CLSR SURG WND/DEHSN XTN: CPT | Mod: 58

## 2019-11-29 PROCEDURE — 71045 X-RAY EXAM CHEST 1 VIEW: CPT | Mod: 26,77

## 2019-11-29 RX ORDER — CHLORHEXIDINE GLUCONATE 213 G/1000ML
15 SOLUTION TOPICAL EVERY 12 HOURS
Refills: 0 | Status: DISCONTINUED | OUTPATIENT
Start: 2019-11-29 | End: 2019-11-30

## 2019-11-29 RX ORDER — CALCIUM GLUCONATE 100 MG/ML
1 VIAL (ML) INTRAVENOUS ONCE
Refills: 0 | Status: COMPLETED | OUTPATIENT
Start: 2019-11-29 | End: 2019-11-29

## 2019-11-29 RX ORDER — PANTOPRAZOLE SODIUM 20 MG/1
40 TABLET, DELAYED RELEASE ORAL EVERY 24 HOURS
Refills: 0 | Status: DISCONTINUED | OUTPATIENT
Start: 2019-11-29 | End: 2019-12-05

## 2019-11-29 RX ORDER — ACETAMINOPHEN 500 MG
1000 TABLET ORAL ONCE
Refills: 0 | Status: COMPLETED | OUTPATIENT
Start: 2019-11-29 | End: 2019-11-29

## 2019-11-29 RX ORDER — DEXMEDETOMIDINE HYDROCHLORIDE IN 0.9% SODIUM CHLORIDE 4 UG/ML
0.7 INJECTION INTRAVENOUS
Qty: 200 | Refills: 0 | Status: DISCONTINUED | OUTPATIENT
Start: 2019-11-29 | End: 2019-11-30

## 2019-11-29 RX ORDER — METHADONE HYDROCHLORIDE 40 MG/1
8.5 TABLET ORAL DAILY
Refills: 0 | Status: DISCONTINUED | OUTPATIENT
Start: 2019-11-29 | End: 2019-12-03

## 2019-11-29 RX ORDER — POTASSIUM CHLORIDE 20 MEQ
20 PACKET (EA) ORAL ONCE
Refills: 0 | Status: COMPLETED | OUTPATIENT
Start: 2019-11-29 | End: 2019-11-29

## 2019-11-29 RX ORDER — HYDROMORPHONE HYDROCHLORIDE 2 MG/ML
0.5 INJECTION INTRAMUSCULAR; INTRAVENOUS; SUBCUTANEOUS
Refills: 0 | Status: DISCONTINUED | OUTPATIENT
Start: 2019-11-29 | End: 2019-12-03

## 2019-11-29 RX ORDER — SODIUM CHLORIDE 9 MG/ML
1000 INJECTION, SOLUTION INTRAVENOUS
Refills: 0 | Status: DISCONTINUED | OUTPATIENT
Start: 2019-11-29 | End: 2019-11-29

## 2019-11-29 RX ORDER — ENOXAPARIN SODIUM 100 MG/ML
100 INJECTION SUBCUTANEOUS
Refills: 0 | Status: DISCONTINUED | OUTPATIENT
Start: 2019-11-29 | End: 2019-12-05

## 2019-11-29 RX ORDER — CHLORHEXIDINE GLUCONATE 213 G/1000ML
1 SOLUTION TOPICAL DAILY
Refills: 0 | Status: DISCONTINUED | OUTPATIENT
Start: 2019-11-29 | End: 2019-12-07

## 2019-11-29 RX ORDER — SODIUM CHLORIDE 9 MG/ML
1000 INJECTION, SOLUTION INTRAVENOUS
Refills: 0 | Status: DISCONTINUED | OUTPATIENT
Start: 2019-11-29 | End: 2019-12-04

## 2019-11-29 RX ORDER — PIPERACILLIN AND TAZOBACTAM 4; .5 G/20ML; G/20ML
3.38 INJECTION, POWDER, LYOPHILIZED, FOR SOLUTION INTRAVENOUS EVERY 8 HOURS
Refills: 0 | Status: DISCONTINUED | OUTPATIENT
Start: 2019-11-29 | End: 2019-12-03

## 2019-11-29 RX ORDER — HYDROMORPHONE HYDROCHLORIDE 2 MG/ML
1 INJECTION INTRAMUSCULAR; INTRAVENOUS; SUBCUTANEOUS ONCE
Refills: 0 | Status: DISCONTINUED | OUTPATIENT
Start: 2019-11-29 | End: 2019-11-29

## 2019-11-29 RX ADMIN — HYDROMORPHONE HYDROCHLORIDE 1 MILLIGRAM(S): 2 INJECTION INTRAMUSCULAR; INTRAVENOUS; SUBCUTANEOUS at 18:55

## 2019-11-29 RX ADMIN — SODIUM CHLORIDE 50 MILLILITER(S): 9 INJECTION, SOLUTION INTRAVENOUS at 07:50

## 2019-11-29 RX ADMIN — Medication 200 GRAM(S): at 15:46

## 2019-11-29 RX ADMIN — ENOXAPARIN SODIUM 100 MILLIGRAM(S): 100 INJECTION SUBCUTANEOUS at 15:46

## 2019-11-29 RX ADMIN — SODIUM CHLORIDE 50 MILLILITER(S): 9 INJECTION, SOLUTION INTRAVENOUS at 19:30

## 2019-11-29 RX ADMIN — HYDROMORPHONE HYDROCHLORIDE 1 MILLIGRAM(S): 2 INJECTION INTRAMUSCULAR; INTRAVENOUS; SUBCUTANEOUS at 06:27

## 2019-11-29 RX ADMIN — HYDROMORPHONE HYDROCHLORIDE 1 MILLIGRAM(S): 2 INJECTION INTRAMUSCULAR; INTRAVENOUS; SUBCUTANEOUS at 04:05

## 2019-11-29 RX ADMIN — HYDROMORPHONE HYDROCHLORIDE 1 MILLIGRAM(S): 2 INJECTION INTRAMUSCULAR; INTRAVENOUS; SUBCUTANEOUS at 06:45

## 2019-11-29 RX ADMIN — Medication 3 MILLILITER(S): at 05:49

## 2019-11-29 RX ADMIN — HYDROMORPHONE HYDROCHLORIDE 0.5 MILLIGRAM(S): 2 INJECTION INTRAMUSCULAR; INTRAVENOUS; SUBCUTANEOUS at 19:44

## 2019-11-29 RX ADMIN — Medication 400 MILLIGRAM(S): at 15:45

## 2019-11-29 RX ADMIN — HYDROMORPHONE HYDROCHLORIDE 1 MILLIGRAM(S): 2 INJECTION INTRAMUSCULAR; INTRAVENOUS; SUBCUTANEOUS at 09:45

## 2019-11-29 RX ADMIN — HYDROMORPHONE HYDROCHLORIDE 1 MILLIGRAM(S): 2 INJECTION INTRAMUSCULAR; INTRAVENOUS; SUBCUTANEOUS at 03:50

## 2019-11-29 RX ADMIN — CHLORHEXIDINE GLUCONATE 1 APPLICATION(S): 213 SOLUTION TOPICAL at 15:46

## 2019-11-29 RX ADMIN — HYDROMORPHONE HYDROCHLORIDE 1 MILLIGRAM(S): 2 INJECTION INTRAMUSCULAR; INTRAVENOUS; SUBCUTANEOUS at 09:30

## 2019-11-29 RX ADMIN — Medication 50 MILLIEQUIVALENT(S): at 17:24

## 2019-11-29 RX ADMIN — CHLORHEXIDINE GLUCONATE 1 APPLICATION(S): 213 SOLUTION TOPICAL at 06:27

## 2019-11-29 RX ADMIN — HYDROMORPHONE HYDROCHLORIDE 1 MILLIGRAM(S): 2 INJECTION INTRAMUSCULAR; INTRAVENOUS; SUBCUTANEOUS at 00:30

## 2019-11-29 RX ADMIN — CHLORHEXIDINE GLUCONATE 15 MILLILITER(S): 213 SOLUTION TOPICAL at 17:24

## 2019-11-29 RX ADMIN — HYDROMORPHONE HYDROCHLORIDE 1 MILLIGRAM(S): 2 INJECTION INTRAMUSCULAR; INTRAVENOUS; SUBCUTANEOUS at 18:40

## 2019-11-29 RX ADMIN — HYDROMORPHONE HYDROCHLORIDE 1 MILLIGRAM(S): 2 INJECTION INTRAMUSCULAR; INTRAVENOUS; SUBCUTANEOUS at 00:17

## 2019-11-29 RX ADMIN — PIPERACILLIN AND TAZOBACTAM 25 GRAM(S): 4; .5 INJECTION, POWDER, LYOPHILIZED, FOR SOLUTION INTRAVENOUS at 07:50

## 2019-11-29 RX ADMIN — PANTOPRAZOLE SODIUM 40 MILLIGRAM(S): 20 TABLET, DELAYED RELEASE ORAL at 15:46

## 2019-11-29 RX ADMIN — Medication 3 MILLILITER(S): at 00:39

## 2019-11-29 RX ADMIN — DEXMEDETOMIDINE HYDROCHLORIDE IN 0.9% SODIUM CHLORIDE 29.45 MICROGRAM(S)/KG/HR: 4 INJECTION INTRAVENOUS at 15:46

## 2019-11-29 RX ADMIN — BUDESONIDE AND FORMOTEROL FUMARATE DIHYDRATE 2 PUFF(S): 160; 4.5 AEROSOL RESPIRATORY (INHALATION) at 05:49

## 2019-11-29 RX ADMIN — PIPERACILLIN AND TAZOBACTAM 25 GRAM(S): 4; .5 INJECTION, POWDER, LYOPHILIZED, FOR SOLUTION INTRAVENOUS at 21:34

## 2019-11-29 RX ADMIN — HYDROMORPHONE HYDROCHLORIDE 0.5 MILLIGRAM(S): 2 INJECTION INTRAMUSCULAR; INTRAVENOUS; SUBCUTANEOUS at 19:29

## 2019-11-29 RX ADMIN — Medication 1000 MILLIGRAM(S): at 16:15

## 2019-11-29 RX ADMIN — Medication 400 MILLIGRAM(S): at 06:28

## 2019-11-29 RX ADMIN — SODIUM CHLORIDE 125 MILLILITER(S): 9 INJECTION, SOLUTION INTRAVENOUS at 15:45

## 2019-11-29 RX ADMIN — Medication 1000 MILLIGRAM(S): at 06:45

## 2019-11-29 NOTE — BRIEF OPERATIVE NOTE - NSICDXBRIEFPROCEDURE_GEN_ALL_CORE_FT
PROCEDURES:  Creation, end colostomy 29-Nov-2019 14:41:28  Griffin Bunch
PROCEDURES:  Extended left hemicolectomy 26-Nov-2019 06:32:26  [facility]  Exploratory laparotomy 26-Nov-2019 06:32:15  [facility]

## 2019-11-29 NOTE — CHART NOTE - NSCHARTNOTEFT_GEN_A_CORE
STATUS POST:  creation of transverse end colostomy, abdominal closure, vac placement    POST OPERATIVE DAY #: 0    SUBJECTIVE: Pt seen in ICU, intubated. Patient c/o throat pain, wants ETT out, is upset. Notes some abdominal pain around the vac.   -/- ostomy  +marquez  no amb oob  NPO  tolerated BAL x 1    resp therapy at bedside said patient likely would not tolerate BAL again    spoke to patient and patient's partner about surgery and answered their questions regarding the vac      Vital Signs Last 24 Hrs  T(C): 37.2 (29 Nov 2019 23:00), Max: 37.2 (29 Nov 2019 23:00)  T(F): 99 (29 Nov 2019 23:00), Max: 99 (29 Nov 2019 23:00)  HR: 90 (30 Nov 2019 01:00) (67 - 94)  BP: 93/63 (30 Nov 2019 01:00) (84/55 - 141/65)  BP(mean): 71 (30 Nov 2019 01:00) (65 - 93)  RR: 21 (30 Nov 2019 01:00) (17 - 25)  SpO2: 94% (30 Nov 2019 01:00) (90% - 100%)  I&O's Summary    28 Nov 2019 07:01  -  29 Nov 2019 07:00  --------------------------------------------------------  IN: 1925 mL / OUT: 3140 mL / NET: -1215 mL    29 Nov 2019 07:01  -  30 Nov 2019 03:01  --------------------------------------------------------  IN: 1504 mL / OUT: 1050 mL / NET: 454 mL      I&O's Detail    28 Nov 2019 07:01  -  29 Nov 2019 07:00  --------------------------------------------------------  IN:    lactated ringers.: 1450 mL    Solution: 200 mL    Solution: 275 mL  Total IN: 1925 mL    OUT:    Indwelling Catheter - Urethral: 2440 mL    Nasoenteral Tube: 100 mL    VAC (Vacuum Assisted Closure) System: 600 mL  Total OUT: 3140 mL    Total NET: -1215 mL      29 Nov 2019 07:01  -  30 Nov 2019 03:01  --------------------------------------------------------  IN:    dexmedetomidine Infusion: 29 mL    lactated ringers.: 200 mL    lactated ringers.: 400 mL    lactated ringers.: 500 mL    Solution: 100 mL    Solution: 275 mL  Total IN: 1504 mL    OUT:    Bulb: 70 mL    Indwelling Catheter - Urethral: 830 mL    VAC (Vacuum Assisted Closure) System: 150 mL  Total OUT: 1050 mL    Total NET: 454 mL          MEDICATIONS  (STANDING):  chlorhexidine 0.12% Liquid 15 milliLiter(s) Oral Mucosa every 12 hours  chlorhexidine 2% Cloths 1 Application(s) Topical daily  dexMEDEtomidine Infusion 0.7 MICROgram(s)/kG/Hr (29.453 mL/Hr) IV Continuous <Continuous>  enoxaparin Injectable 100 milliGRAM(s) SubCutaneous <User Schedule>  influenza   Vaccine 0.5 milliLiter(s) IntraMuscular once  lactated ringers. 1000 milliLiter(s) (75 mL/Hr) IV Continuous <Continuous>  methadone Injectable 8.5 milliGRAM(s) IV Push daily  pantoprazole  Injectable 40 milliGRAM(s) IV Push every 24 hours  piperacillin/tazobactam IVPB.. 3.375 Gram(s) IV Intermittent every 8 hours    MEDICATIONS  (PRN):  HYDROmorphone  Injectable 0.5 milliGRAM(s) IV Push every 3 hours PRN Pain  LORazepam   Injectable 1 milliGRAM(s) IV Push every 3 hours PRN Agitation      LABS:                        11.5   18.73 )-----------( 378      ( 30 Nov 2019 00:53 )             35.4     11-30    135  |  98  |  12  ----------------------------<  110<H>  4.5   |  23  |  0.59    Ca    8.1<L>      30 Nov 2019 00:53  Phos  2.5     11-30  Mg     2.0     11-30    TPro  5.7<L>  /  Alb  2.3<L>  /  TBili  0.8  /  DBili  x   /  AST  31  /  ALT  23  /  AlkPhos  90  11-29    PT/INR - ( 30 Nov 2019 00:53 )   PT: 13.3 sec;   INR: 1.16 ratio         PTT - ( 30 Nov 2019 00:53 )  PTT:23.6 sec      RADIOLOGY & ADDITIONAL STUDIES:    PHYSICAL EXAM:  General: mild distress, ETT in place  Resp: breathing comfortably  CV: RRR  Ab: soft, NT ND, black sponge midline vac in place sealed, RUQ stoma -/- pink and viable, LLQ OVI x 1 SS      A/P: 64y Female s/p creation of transverse end colostomy  - Diet: NPO  - Activity: as tolerated  - Labs: f/u AM labs  - Pain medication  - DVT ppx  - appreciate wonderful SICU care    [facility]  [id number]

## 2019-11-29 NOTE — PROGRESS NOTE ADULT - SUBJECTIVE AND OBJECTIVE BOX
GENERAL SURGERY PROGRESS NOTE    SUBJECTIVE  Patient seen and examined. No acute events overnight. On vent. NPO without nausea, vomiting, no flatus, no bowel movements, voiding without issues, have been ambulating and out of bed. Denies fever, chills, SOB, chest pain.     per cardiology 11/28, needs preop echo  consented, signed in chart    OBJECTIVE    PHYSICAL EXAM  General: Appears well, NAD  CHEST: breathing comfortably  CV: appears well perfused  Abdomen: soft, nontender, nondistended, no rebound or guarding  Extremities: Grossly symmetric    T(C): 36.5 (11-28-19 @ 23:00), Max: 36.7 (11-28-19 @ 15:00)  HR: 83 (11-29-19 @ 04:55) (73 - 86)  BP: 124/58 (11-29-19 @ 03:00) (116/55 - 140/63)  RR: 17 (11-29-19 @ 03:00) (17 - 26)  SpO2: 93% (11-29-19 @ 04:55) (91% - 98%)    11-27-19 @ 07:01  -  11-28-19 @ 07:00  --------------------------------------------------------  IN: 3409.4 mL / OUT: 2235 mL / NET: 1174.4 mL    11-28-19 @ 07:01  -  11-29-19 @ 05:26  --------------------------------------------------------  IN: 1725 mL / OUT: 2735 mL / NET: -1010 mL        MEDICATIONS  acetaminophen  IVPB .. 1000 milliGRAM(s) IV Intermittent every 6 hours  albuterol/ipratropium for Nebulization 3 milliLiter(s) Nebulizer every 6 hours  budesonide 160 MICROgram(s)/formoterol 4.5 MICROgram(s) Inhaler 2 Puff(s) Inhalation two times a day  chlorhexidine 2% Cloths 1 Application(s) Topical <User Schedule>  enoxaparin Injectable 100 milliGRAM(s) SubCutaneous <User Schedule>  HYDROmorphone  Injectable 1 milliGRAM(s) IV Push every 3 hours PRN  influenza   Vaccine 0.5 milliLiter(s) IntraMuscular once  lactated ringers. 1000 milliLiter(s) IV Continuous <Continuous>  pantoprazole  Injectable 40 milliGRAM(s) IV Push every 24 hours  piperacillin/tazobactam IVPB.. 3.375 Gram(s) IV Intermittent every 8 hours      LABS                        10.2   12.62 )-----------( 329      ( 29 Nov 2019 00:48 )             32.1     11-29    137  |  99  |  14  ----------------------------<  94  4.3   |  26  |  0.62    Ca    8.4      29 Nov 2019 00:48  Phos  3.1     11-29  Mg     2.0     11-29      PT/INR - ( 29 Nov 2019 00:48 )   PT: 12.7 sec;   INR: 1.10 ratio         PTT - ( 29 Nov 2019 00:48 )  PTT:24.7 sec      RADIOLOGY & ADDITIONAL STUDIES GENERAL SURGERY PROGRESS NOTE    SUBJECTIVE  Patient seen and examined. No acute events overnight. receiving breathing treatment. on home methadone dose . NPO without nausea, vomiting, no flatus, no bowel movements, voiding without issues, have been ambulating and out of bed. Denies fever, chills, SOB, chest pain.     per cardiology 11/28, needs preop echo  consented, signed in chart    OBJECTIVE    PHYSICAL EXAM  General: Appears well, NAD  CHEST: breathing comfortably  CV: appears well perfused  Abdomen: soft, nontender, nondistended, no rebound or guarding  Extremities: Grossly symmetric    T(C): 36.5 (11-28-19 @ 23:00), Max: 36.7 (11-28-19 @ 15:00)  HR: 83 (11-29-19 @ 04:55) (73 - 86)  BP: 124/58 (11-29-19 @ 03:00) (116/55 - 140/63)  RR: 17 (11-29-19 @ 03:00) (17 - 26)  SpO2: 93% (11-29-19 @ 04:55) (91% - 98%)    11-27-19 @ 07:01  -  11-28-19 @ 07:00  --------------------------------------------------------  IN: 3409.4 mL / OUT: 2235 mL / NET: 1174.4 mL    11-28-19 @ 07:01  -  11-29-19 @ 05:26  --------------------------------------------------------  IN: 1725 mL / OUT: 2735 mL / NET: -1010 mL        MEDICATIONS  acetaminophen  IVPB .. 1000 milliGRAM(s) IV Intermittent every 6 hours  albuterol/ipratropium for Nebulization 3 milliLiter(s) Nebulizer every 6 hours  budesonide 160 MICROgram(s)/formoterol 4.5 MICROgram(s) Inhaler 2 Puff(s) Inhalation two times a day  chlorhexidine 2% Cloths 1 Application(s) Topical <User Schedule>  enoxaparin Injectable 100 milliGRAM(s) SubCutaneous <User Schedule>  HYDROmorphone  Injectable 1 milliGRAM(s) IV Push every 3 hours PRN  influenza   Vaccine 0.5 milliLiter(s) IntraMuscular once  lactated ringers. 1000 milliLiter(s) IV Continuous <Continuous>  pantoprazole  Injectable 40 milliGRAM(s) IV Push every 24 hours  piperacillin/tazobactam IVPB.. 3.375 Gram(s) IV Intermittent every 8 hours      LABS                        10.2   12.62 )-----------( 329      ( 29 Nov 2019 00:48 )             32.1     11-29    137  |  99  |  14  ----------------------------<  94  4.3   |  26  |  0.62    Ca    8.4      29 Nov 2019 00:48  Phos  3.1     11-29  Mg     2.0     11-29      PT/INR - ( 29 Nov 2019 00:48 )   PT: 12.7 sec;   INR: 1.10 ratio         PTT - ( 29 Nov 2019 00:48 )  PTT:24.7 sec      RADIOLOGY & ADDITIONAL STUDIES GENERAL SURGERY PROGRESS NOTE    SUBJECTIVE  Patient seen and examined. No acute events overnight. receiving breathing treatment. on home methadone dose . NPO without nausea, vomiting, no flatus, no bowel movements, voiding without issues. Denies fever, chills, SOB, chest pain.     per cardiology 11/28, needs preop echo  consented, signed in chart    OBJECTIVE    PHYSICAL EXAM  General: Appears well, NAD  CHEST: breathing comfortably  CV: appears well perfused  Abdomen: soft, nontender, nondistended, no rebound or guarding, ab thera  Extremities: Grossly symmetric    T(C): 36.5 (11-28-19 @ 23:00), Max: 36.7 (11-28-19 @ 15:00)  HR: 83 (11-29-19 @ 04:55) (73 - 86)  BP: 124/58 (11-29-19 @ 03:00) (116/55 - 140/63)  RR: 17 (11-29-19 @ 03:00) (17 - 26)  SpO2: 93% (11-29-19 @ 04:55) (91% - 98%)    11-27-19 @ 07:01  -  11-28-19 @ 07:00  --------------------------------------------------------  IN: 3409.4 mL / OUT: 2235 mL / NET: 1174.4 mL    11-28-19 @ 07:01  -  11-29-19 @ 05:26  --------------------------------------------------------  IN: 1725 mL / OUT: 2735 mL / NET: -1010 mL        MEDICATIONS  acetaminophen  IVPB .. 1000 milliGRAM(s) IV Intermittent every 6 hours  albuterol/ipratropium for Nebulization 3 milliLiter(s) Nebulizer every 6 hours  budesonide 160 MICROgram(s)/formoterol 4.5 MICROgram(s) Inhaler 2 Puff(s) Inhalation two times a day  chlorhexidine 2% Cloths 1 Application(s) Topical <User Schedule>  enoxaparin Injectable 100 milliGRAM(s) SubCutaneous <User Schedule>  HYDROmorphone  Injectable 1 milliGRAM(s) IV Push every 3 hours PRN  influenza   Vaccine 0.5 milliLiter(s) IntraMuscular once  lactated ringers. 1000 milliLiter(s) IV Continuous <Continuous>  pantoprazole  Injectable 40 milliGRAM(s) IV Push every 24 hours  piperacillin/tazobactam IVPB.. 3.375 Gram(s) IV Intermittent every 8 hours      LABS                        10.2   12.62 )-----------( 329      ( 29 Nov 2019 00:48 )             32.1     11-29    137  |  99  |  14  ----------------------------<  94  4.3   |  26  |  0.62    Ca    8.4      29 Nov 2019 00:48  Phos  3.1     11-29  Mg     2.0     11-29      PT/INR - ( 29 Nov 2019 00:48 )   PT: 12.7 sec;   INR: 1.10 ratio         PTT - ( 29 Nov 2019 00:48 )  PTT:24.7 sec      RADIOLOGY & ADDITIONAL STUDIES

## 2019-11-29 NOTE — BRIEF OPERATIVE NOTE - NSICDXBRIEFPREOP_GEN_ALL_CORE_FT
PRE-OP DIAGNOSIS:  Open wound of abdomen 29-Nov-2019 14:45:51  Griffin Bunch
PRE-OP DIAGNOSIS:  Perforated sigmoid colon 26-Nov-2019 06:33:08  Vita Donovan

## 2019-11-29 NOTE — PROGRESS NOTE ADULT - SUBJECTIVE AND OBJECTIVE BOX
64F with pmhx morbid obesity, acid reflux, HTN, COPD and pshx D&C presents to the ED c/o abdominal pain and bloating. Patient reports having constipation for 2 weeks and has been taking enemas, miralax and senna and passing small hard balls for the last weeks. Reports that she has not had a bowel movement unless she used an enema. Reports pain worst in the LLQ that started a few days ago and has been worsening in the last day, also has noted significant abdominal distention in the last day. Notes that she has not been able to urinate all day today because she feels dehydrated. Reports that she had 'low grade temperature of 99.7 which she took left over augmentin for.' Also reports nausea, denies any emesis, recorded fevers, urinary symptoms. Reports she has difficulty walking short distances because she becomes SOB. In ED, CT demonstrated Perforated sigmoid diverticulitis with intraperitoneal free air.    She was taken to the operating room on 11/25 overnight and underwent an exploratory laparotomy with extended left hemicolectomy and was left in discontinuity. An Abthera VAC was placed. She received 3400 of crystalloid, EBL was 500, she required pushes of phenylephrine during the case, but not continuous. She was kept intubated post op and brought to the ICU for hemodynamic and respiratory management.       24 HOUR EVENTS: No Acute events.  - Heprin Assay, LMW, Anti-Xa sent.   - s/p Lasix 80mg.   - Methadone, ECHO, and OR scheduled for tomorrow AM.    SUBJECTIVE/ROS:  [ ] A ten-point review of systems was otherwise negative except as noted.  [ ] Due to altered mental status/intubation, subjective information were not able to be obtained from the patient. History was obtained, to the extent possible, from review of the chart and collateral sources of information.      NEURO  RASS:     GCS:     CAM ICU:  Exam: awake, alert, oriented  Meds: acetaminophen  IVPB .. 1000 milliGRAM(s) IV Intermittent every 6 hours  HYDROmorphone  Injectable 1 milliGRAM(s) IV Push every 3 hours PRN Pain    [x] Adequacy of sedation and pain control has been assessed and adjusted      RESPIRATORY  RR: 21 (11-29-19 @ 00:00) (16 - 26)  SpO2: 95% (11-29-19 @ 00:00) (91% - 98%)  Wt(kg): --  Exam: unlabored, clear to auscultation bilaterally  Mechanical Ventilation:   ABG - ( 27 Nov 2019 11:21 )  pH: 7.45  /  pCO2: 38    /  pO2: 67    / HCO3: 26    / Base Excess: 2.8   /  SaO2: 95      Lactate: x                [N/A] Extubation Readiness Assessed  Meds: albuterol/ipratropium for Nebulization 3 milliLiter(s) Nebulizer every 6 hours  budesonide 160 MICROgram(s)/formoterol 4.5 MICROgram(s) Inhaler 2 Puff(s) Inhalation two times a day        CARDIOVASCULAR  HR: 82 (11-29-19 @ 00:00) (68 - 86)  BP: 139/61 (11-29-19 @ 00:00) (104/51 - 140/63)  BP(mean): 88 (11-29-19 @ 00:00) (74 - 92)  ABP: --  ABP(mean): --  Wt(kg): --  CVP(cm H2O): --  VBG - ( 28 Nov 2019 00:14 )  pH: 7.41  /  pCO2: 45    /  pO2: 34    / HCO3: 28    / Base Excess: 3.7   /  SaO2: 63     Lactate: 1.2                Exam: regular rate and rhythm  Cardiac Rhythm: sinus  Perfusion     [x]Adequate   [ ]Inadequate  Mentation   [x]Normal       [ ]Reduced  Extremities  [x]Warm         [ ]Cool  Volume Status [ ]Hypervolemic [x]Euvolemic [ ]Hypovolemic  Meds:       GI/NUTRITION  Exam: soft, nontender, nondistended, incision C/D/I  Diet:  Meds: pantoprazole  Injectable 40 milliGRAM(s) IV Push every 24 hours      GENITOURINARY  I&O's Detail    11-27 @ 07:01  -  11-28 @ 07:00  --------------------------------------------------------  IN:    dexmedetomidine Infusion: 109.4 mL    IV PiggyBack: 300 mL    lactated ringers.: 3000 mL  Total IN: 3409.4 mL    OUT:    Indwelling Catheter - Urethral: 1435 mL    Nasoenteral Tube: 200 mL    VAC (Vacuum Assisted Closure) System: 600 mL  Total OUT: 2235 mL    Total NET: 1174.4 mL      11-28 @ 07:01  -  11-29 @ 00:22  --------------------------------------------------------  IN:    IV PiggyBack: 225 mL    lactated ringers.: 1100 mL    Solution: 200 mL  Total IN: 1525 mL    OUT:    Indwelling Catheter - Urethral: 2165 mL    Nasoenteral Tube: 50 mL    VAC (Vacuum Assisted Closure) System: 400 mL  Total OUT: 2615 mL    Total NET: -1090 mL          11-28    137  |  102  |  22  ----------------------------<  95  4.3   |  25  |  0.72    Ca    8.4      28 Nov 2019 00:20  Phos  2.8     11-28  Mg     2.1     11-28      [ ] Ross catheter, indication: N/A  Meds: lactated ringers. 1000 milliLiter(s) IV Continuous <Continuous>        HEMATOLOGIC  Meds: enoxaparin Injectable 100 milliGRAM(s) SubCutaneous <User Schedule>    [x] VTE Prophylaxis                        10.4   12.01 )-----------( 282      ( 28 Nov 2019 00:20 )             31.0     PT/INR - ( 28 Nov 2019 00:20 )   PT: 12.6 sec;   INR: 1.10 ratio         PTT - ( 28 Nov 2019 00:20 )  PTT:26.8 sec  Transfusion     [ ] PRBC   [ ] Platelets   [ ] FFP   [ ] Cryoprecipitate      INFECTIOUS DISEASES    RECENT CULTURES:  Specimen Source: .Surgical Swab peritoneal fluid  Date/Time: 11-26 @ 17:30  Culture Results:   Rare Escherichia coli  Rare Peptostreptococcus anaerobius "Susceptibilities not performed"  Gram Stain: --  Organism: Escherichia coli    Meds: influenza   Vaccine 0.5 milliLiter(s) IntraMuscular once  piperacillin/tazobactam IVPB.. 3.375 Gram(s) IV Intermittent every 8 hours        ENDOCRINE  CAPILLARY BLOOD GLUCOSE        Meds:       ACCESS DEVICES:  [ ] Peripheral IV  [ ] Central Venous Line	[ ] R	[ ] L	[ ] IJ	[ ] Fem	[ ] SC	Placed:   [ ] Arterial Line		[ ] R	[ ] L	[ ] Fem	[ ] Rad	[ ] Ax	Placed:   [ ] PICC:					[ ] Mediport  [ ] Urinary Catheter, Date Placed:   [x] Necessity of urinary, arterial, and venous catheters discussed    OTHER MEDICATIONS:  chlorhexidine 2% Cloths 1 Application(s) Topical <User Schedule>      CODE STATUS:      IMAGING:

## 2019-11-29 NOTE — BRIEF OPERATIVE NOTE - OPERATION/FINDINGS
Abdominal cavity washed out with 4 L warm saline. Rectal stump noted to be intact but with some tearing of peritoneal covering and surrounding tissue, so staple line imbricated with PDS interrupted stitches. RUQ end transverse colostomy created. LLQ pelvic drain placed. Fascia closed with running looped PDS, black sponge VAC placed over fascia. Abdominal cavity washed out with 4 L warm saline. Rectal stump noted to be intact, staple line imbricated with PDS interrupted stitches. RUQ end transverse colostomy created. LLQ pelvic drain placed. Fascia closed, black sponge VAC placed over fascia.

## 2019-11-29 NOTE — PROVIDER CONTACT NOTE (OTHER) - REASON
Pt left IJ triple lumen central line has difficult blood from white port and no blood return for blue and brown port.

## 2019-11-29 NOTE — PRE-ANESTHESIA EVALUATION ADULT - NSANTHPMHFT_GEN_ALL_CORE
64F with pmhx morbid obesity, acid reflux, HTN, COPD and pshx D&C presented to the ED c/o abdominal pain and bloating.  In ED, CT demonstrated Perforated sigmoid diverticulitis with intraperitoneal free air.  pt with known hx of htn ,with no known hx of cad. S/P ex-lap, currently open abdomen, extubated 2 days ago.

## 2019-11-29 NOTE — PROGRESS NOTE ADULT - SUBJECTIVE AND OBJECTIVE BOX
CARDIOLOGY     PROGRESS  NOTE   ________________________________________________    CHIEF COMPLAINT:Patient is a 64y old  Female who presents with a chief complaint of abdominal pain (28 Nov 2019 10:50)  c/o fatigue  	  REVIEW OF SYSTEMS:  CONSTITUTIONAL: No fever, weight loss, or fatigue  EYES: No eye pain, visual disturbances, or discharge  ENT:  No difficulty hearing, tinnitus, vertigo; No sinus or throat pain  NECK: No pain or stiffness  RESPIRATORY: No cough, wheezing, chills or hemoptysis; No Shortness of Breath  CARDIOVASCULAR: No chest pain, palpitations, passing out, dizziness, or leg swelling  GASTROINTESTINAL: No abdominal or epigastric pain. No nausea, vomiting, or hematemesis; No diarrhea or constipation. No melena or hematochezia.  GENITOURINARY: No dysuria, frequency, hematuria, or incontinence  NEUROLOGICAL: No headaches, memory loss, loss of strength, numbness, or tremors  SKIN: No itching, burning, rashes, or lesions   LYMPH Nodes: No enlarged glands  ENDOCRINE: No heat or cold intolerance; No hair loss  MUSCULOSKELETAL: No joint pain or swelling; No muscle, back, or extremity pain  PSYCHIATRIC: No depression, anxiety, mood swings, or difficulty sleeping  HEME/LYMPH: No easy bruising, or bleeding gums  ALLERGY AND IMMUNOLOGIC: No hives or eczema	    [ ] All others negative	  [ ] Unable to obtain    PHYSICAL EXAM:  T(C): 36.9 (11-29-19 @ 07:00), Max: 36.9 (11-29-19 @ 07:00)  HR: 83 (11-29-19 @ 09:52) (73 - 89)  BP: 122/55 (11-29-19 @ 09:00) (116/55 - 140/63)  RR: 21 (11-29-19 @ 09:00) (17 - 26)  SpO2: 91% (11-29-19 @ 09:52) (91% - 98%)  Wt(kg): --  I&O's Summary    28 Nov 2019 07:01  -  29 Nov 2019 07:00  --------------------------------------------------------  IN: 1925 mL / OUT: 3140 mL / NET: -1215 mL    29 Nov 2019 07:01  -  29 Nov 2019 10:00  --------------------------------------------------------  IN: 150 mL / OUT: 105 mL / NET: 45 mL        Appearance: + ngt  HEENT:   Normal oral mucosa, PERRL, EOMI	  Lymphatic: No lymphadenopathy  Cardiovascular: Normal S1 S2, No JVD, No murmurs, No edema  Respiratory: Lungs clear to auscultation	  Psychiatry: A & O x 3, Mood & affect appropriate  Gastrointestinal:  Soft,   Skin: No rashes, No ecchymoses, No cyanosis	  Neurologic: Non-focal  Extremities: Normal range of motion, No clubbing, cyanosis or edema  Vascular: Peripheral pulses palpable 2+ bilaterally    MEDICATIONS  (STANDING):  albuterol/ipratropium for Nebulization 3 milliLiter(s) Nebulizer every 6 hours  budesonide 160 MICROgram(s)/formoterol 4.5 MICROgram(s) Inhaler 2 Puff(s) Inhalation two times a day  chlorhexidine 2% Cloths 1 Application(s) Topical <User Schedule>  enoxaparin Injectable 100 milliGRAM(s) SubCutaneous <User Schedule>  influenza   Vaccine 0.5 milliLiter(s) IntraMuscular once  lactated ringers. 1000 milliLiter(s) (50 mL/Hr) IV Continuous <Continuous>  pantoprazole  Injectable 40 milliGRAM(s) IV Push every 24 hours  piperacillin/tazobactam IVPB.. 3.375 Gram(s) IV Intermittent every 8 hours      TELEMETRY: 	    ECG:  	  RADIOLOGY:  OTHER: 	  	  LABS:	 	    CARDIAC MARKERS:                                10.2   12.62 )-----------( 329      ( 29 Nov 2019 00:48 )             32.1     11-29    137  |  99  |  14  ----------------------------<  94  4.3   |  26  |  0.62    Ca    8.4      29 Nov 2019 00:48  Phos  3.1     11-29  Mg     2.0     11-29      proBNP:   Lipid Profile:   HgA1c:   TSH:   PT/INR - ( 29 Nov 2019 00:48 )   PT: 12.7 sec;   INR: 1.10 ratio         PTT - ( 29 Nov 2019 00:48 )  PTT:24.7 sec    < from: 12 Lead ECG (12.23.09 @ 16:19) >  Diagnosis Line Normal sinus rhythm  Normal ECG        Assessment and plan  ---------------------------  64F with pmhx morbid obesity, acid reflux, HTN, COPD and pshx D&C presents to the ED c/o abdominal pain and bloating. Patient reports having constipation for 2 weeks and has been taking enemas, miralax and senna and passing small hard balls for the last weeks. Reports that she has not had a bowel movement unless she used an enema. Reports pain worst in the LLQ that started a few days ago and has been worsening in the last day, also has noted significant abdominal distention in the last day. Notes that she has not been able to urinate all day today because she feels dehydrated. Reports that she had 'low grade temperature of 99.7 which she took left over augmentin for.' Also reports nausea, denies any emesis, recorded fevers, urinary symptoms. Reports she has difficulty walking short distances because she becomes SOB. In ED, CT demonstrated Perforated sigmoid diverticulitis with intraperitoneal free air.  pt with known hx of htn ,with no known hx of cad.  no hx of chest pain/ sob, nor arrythmia.  on chest x ray pt with evidence of cardiomegaly. no cardiac work up  ever done  pt with sig cardiac risk factor for cad with no cardiac symptoms/ cardiomegaly ? sec to hx of htn  ecg  echo reviewed , hyperdynamic LV no sig valvular heart disease.  dvt prophylaxis  pt is clear cardiac wise for surgery

## 2019-11-29 NOTE — PROGRESS NOTE ADULT - ASSESSMENT
Assessment	  64F with pmhx morbid obesity, acid reflux, HTN, COPD and pshx D&C presents to the ED c/o abdominal pain and bloating. Patient reports having constipation for 2 weeks and has been taking enemas, miralax and senna and passing small hard balls for the last weeks. Reports that she has not had a bowel movement unless she used an enema. Reports pain worst in the LLQ that started a few days ago and has been worsening in the last day, also has noted significant abdominal distention in the last day. Notes that she has not been able to urinate all day today because she feels dehydrated. Reports that she had 'low grade temperature of 99.7 which she took left over augmentin for.' Also reports nausea, denies any emesis, recorded fevers, urinary symptoms. Reports she has difficulty walking short distances because she becomes SOB. In ED, CT demonstrated Perforated sigmoid diverticulitis with intraperitoneal free air.    She was taken to the operating room on 11/25 overnight and underwent an exploratory laparotomy with extended left hemicolectomy and was left in discontinuity. An Abthera VAC was placed. She received 3400 of crystalloid, EBL was 500, she required pushes of phenylephrine during the case, but not continuous. She was kept intubated post op and brought to the ICU for hemodynamic and respiratory management.    NEURO: In need of sedation while intubated and postoperative pain control  - Alert and oriented.   - Continue pain control with PRN dilaudid  - started on 17mg methadone, home dose    RESPIRATORY: Hx of COPD  - extubated, BIPAP overnight for morbid obesity  - ABGs with AM labs  - AM CXR  - Duonebs    CARDIOVASCULAR: Hx of HTN on amlodipine at home, No hx of CAD, required pushes of phenylephrine during the operation but nothing continuous and currently not requiring vasopressor support  - Remains off vasopressor support  - Monitor vital signs and ins and outs    GI/NUTRITION: S/p extended left hemicolectomy and was left in discontinuity with Abthera in place, Hx of obesity, Hx of GERD  - NPO w/ NGT  - Monitor Abthera output  - Plan to RTOR on Friday     GENITOURINARY/RENAL:   - Monitoring fluid status closely  - LR @ 50  - Strict Is and Os, UOP 40cc/hr    HEMATOLOGIC: Operative EBL of 500, CBC stable  - Trend daily CBC and coags  - Cont DVT ppx with lovenox dosed by patient weight  - AntiXa level 0.32    INFECTIOUS DISEASE: Admitted with perforated sigmoid possibly from stercolitis vs diverticulitis  - OR cx w/ rare e.coli  - Cont Zosyn  - trend WBC and fever curve    ENDOCRINE: No active issues Assessment	  64F with pmhx morbid obesity, acid reflux, HTN, COPD and pshx D&C presents to the ED c/o abdominal pain and bloating. Patient reports having constipation for 2 weeks and has been taking enemas, miralax and senna and passing small hard balls for the last weeks. Reports that she has not had a bowel movement unless she used an enema. Reports pain worst in the LLQ that started a few days ago and has been worsening in the last day, also has noted significant abdominal distention in the last day. Notes that she has not been able to urinate all day today because she feels dehydrated. Reports that she had 'low grade temperature of 99.7 which she took left over augmentin for.' Also reports nausea, denies any emesis, recorded fevers, urinary symptoms. Reports she has difficulty walking short distances because she becomes SOB. In ED, CT demonstrated Perforated sigmoid diverticulitis with intraperitoneal free air.    She was taken to the operating room on 11/25 overnight and underwent an exploratory laparotomy with extended left hemicolectomy and was left in discontinuity. An Abthera VAC was placed. She received 3400 of crystalloid, EBL was 500, she required pushes of phenylephrine during the case, but not continuous. She was kept intubated post op and brought to the ICU for hemodynamic and respiratory management.    NEURO: In need of sedation while intubated and postoperative pain control  - Alert and oriented.   - Continue pain control with PRN dilaudid  - started on 17mg methadone, home dose    RESPIRATORY: Hx of COPD  - extubated, BIPAP overnight for morbid obesity  - ABGs with AM labs  - AM CXR  - Duonebs    CARDIOVASCULAR: Hx of HTN on amlodipine at home, No hx of CAD, required pushes of phenylephrine during the operation but nothing continuous and currently not requiring vasopressor support  - Remains off vasopressor support  - Monitor vital signs and ins and outs    GI/NUTRITION: S/p extended left hemicolectomy and was left in discontinuity with Abthera in place, Hx of obesity, Hx of GERD  - NPO w/ NGT  - Monitor Abthera output  - Plan to RTOR on Friday     GENITOURINARY/RENAL:   - Monitoring fluid status closely  - LR @ 50  - Strict Is and Os, UOP 40cc/hr    HEMATOLOGIC: Operative EBL of 500, CBC stable  - Trend daily CBC and coags  - Cont DVT ppx with lovenox dosed by patient weight  - AntiXa level 0.32    INFECTIOUS DISEASE: Admitted with perforated sigmoid possibly from stercolitis vs diverticulitis  - Culture sensitivity's is resulted.   - OR cx w/ rare e.coli  - Cont Zosyn  - trend WBC and fever curve    ENDOCRINE: No active issues

## 2019-11-29 NOTE — CHART NOTE - NSCHARTNOTEFT_GEN_A_CORE
Was informed by SICU team that the Patient's domestic partner had asked to be updated regarding Patient's surgery today. Attempted to reach both of the contacts listed in her electronic medical record and was taken to voicemail. Did not leave voicemail as recorded answering message was generic and had no way to ascertain identity.  I informed the SICU team of attempt.    [employer] PGY5

## 2019-11-29 NOTE — PROGRESS NOTE ADULT - ASSESSMENT
A/P: 64y Female POD#1 s/p ex lap with extended left hemicolectomy left in discontinuity for perforated sigmoid diverticulitis with intraperitoneal free air after presenting to ED with worsening constipation and abdominal pain with distention. AbThera vac placed and pt kept intubated post op and transferred to SICU for post-op management.     Plan:  - NPO  - monitor U/O   - monitor AbThera output  - c/w pain control PRN dilaudid with IV tylenol, methadone started  - preop echo 11/29  - DVT ppx with lovenox, sequential compression   - return to OR today for abdominal closure.    p [phone]

## 2019-11-29 NOTE — CHART NOTE - NSCHARTNOTEFT_GEN_A_CORE
@7PM    Attempted to post op check patient. Resp therapy in room advanced ETT from 21cm to 23cm. Patient in moderate to severe distress, crying, poorly tolerating ETT adjustment. Desatted, improved when patient calmed down.  Patient's partner at bedside very distressed and upset that her partner is intubated.    Told the family member and patient that I will return to examine patient when respiratory therapy is done making adjustments.    [facility]  [phone]

## 2019-11-29 NOTE — PROGRESS NOTE ADULT - ATTENDING COMMENTS
I have seen and evaluated the patient and discussed the relevant clinical findings and plan with the surgical housestaff and fellow.  Agree with the above documentation with addenda as noted.     For OR today  All questions answered    Ej Ng MD

## 2019-11-29 NOTE — BRIEF OPERATIVE NOTE - NSICDXBRIEFPOSTOP_GEN_ALL_CORE_FT
POST-OP DIAGNOSIS:  Open wound of abdomen 29-Nov-2019 14:46:20  Griffin Bunch
POST-OP DIAGNOSIS:  Perforated sigmoid colon 26-Nov-2019 06:33:22  Vita Donovan

## 2019-11-30 LAB
ANION GAP SERPL CALC-SCNC: 14 MMOL/L — SIGNIFICANT CHANGE UP (ref 5–17)
APTT BLD: 23.6 SEC — LOW (ref 27.5–36.3)
BUN SERPL-MCNC: 12 MG/DL — SIGNIFICANT CHANGE UP (ref 7–23)
CALCIUM SERPL-MCNC: 8.1 MG/DL — LOW (ref 8.4–10.5)
CHLORIDE SERPL-SCNC: 98 MMOL/L — SIGNIFICANT CHANGE UP (ref 96–108)
CK SERPL-CCNC: 91 U/L — SIGNIFICANT CHANGE UP (ref 25–170)
CO2 SERPL-SCNC: 23 MMOL/L — SIGNIFICANT CHANGE UP (ref 22–31)
CREAT SERPL-MCNC: 0.59 MG/DL — SIGNIFICANT CHANGE UP (ref 0.5–1.3)
GAS PNL BLDA: SIGNIFICANT CHANGE UP
GAS PNL BLDA: SIGNIFICANT CHANGE UP
GLUCOSE SERPL-MCNC: 110 MG/DL — HIGH (ref 70–99)
GRAM STN FLD: SIGNIFICANT CHANGE UP
HCT VFR BLD CALC: 35.4 % — SIGNIFICANT CHANGE UP (ref 34.5–45)
HGB BLD-MCNC: 11.5 G/DL — SIGNIFICANT CHANGE UP (ref 11.5–15.5)
INR BLD: 1.16 RATIO — SIGNIFICANT CHANGE UP (ref 0.88–1.16)
MAGNESIUM SERPL-MCNC: 2 MG/DL — SIGNIFICANT CHANGE UP (ref 1.6–2.6)
MCHC RBC-ENTMCNC: 29.5 PG — SIGNIFICANT CHANGE UP (ref 27–34)
MCHC RBC-ENTMCNC: 32.5 GM/DL — SIGNIFICANT CHANGE UP (ref 32–36)
MCV RBC AUTO: 90.8 FL — SIGNIFICANT CHANGE UP (ref 80–100)
NRBC # BLD: 0 /100 WBCS — SIGNIFICANT CHANGE UP (ref 0–0)
PHOSPHATE SERPL-MCNC: 2.5 MG/DL — SIGNIFICANT CHANGE UP (ref 2.5–4.5)
PLATELET # BLD AUTO: 378 K/UL — SIGNIFICANT CHANGE UP (ref 150–400)
POTASSIUM SERPL-MCNC: 4.5 MMOL/L — SIGNIFICANT CHANGE UP (ref 3.5–5.3)
POTASSIUM SERPL-SCNC: 4.5 MMOL/L — SIGNIFICANT CHANGE UP (ref 3.5–5.3)
PROTHROM AB SERPL-ACNC: 13.3 SEC — HIGH (ref 10–12.9)
RBC # BLD: 3.9 M/UL — SIGNIFICANT CHANGE UP (ref 3.8–5.2)
RBC # FLD: 13.4 % — SIGNIFICANT CHANGE UP (ref 10.3–14.5)
SODIUM SERPL-SCNC: 135 MMOL/L — SIGNIFICANT CHANGE UP (ref 135–145)
SPECIMEN SOURCE: SIGNIFICANT CHANGE UP
WBC # BLD: 18.73 K/UL — HIGH (ref 3.8–10.5)
WBC # FLD AUTO: 18.73 K/UL — HIGH (ref 3.8–10.5)

## 2019-11-30 PROCEDURE — 99233 SBSQ HOSP IP/OBS HIGH 50: CPT

## 2019-11-30 PROCEDURE — 71045 X-RAY EXAM CHEST 1 VIEW: CPT | Mod: 26

## 2019-11-30 RX ORDER — CALCIUM GLUCONATE 100 MG/ML
1 VIAL (ML) INTRAVENOUS ONCE
Refills: 0 | Status: COMPLETED | OUTPATIENT
Start: 2019-11-30 | End: 2019-11-30

## 2019-11-30 RX ORDER — MIDAZOLAM HYDROCHLORIDE 1 MG/ML
2 INJECTION, SOLUTION INTRAMUSCULAR; INTRAVENOUS ONCE
Refills: 0 | Status: DISCONTINUED | OUTPATIENT
Start: 2019-11-30 | End: 2019-11-30

## 2019-11-30 RX ORDER — BUDESONIDE, MICRONIZED 100 %
0.5 POWDER (GRAM) MISCELLANEOUS EVERY 12 HOURS
Refills: 0 | Status: DISCONTINUED | OUTPATIENT
Start: 2019-11-30 | End: 2019-12-24

## 2019-11-30 RX ORDER — CALCIUM GLUCONATE 100 MG/ML
2 VIAL (ML) INTRAVENOUS ONCE
Refills: 0 | Status: COMPLETED | OUTPATIENT
Start: 2019-11-30 | End: 2019-11-30

## 2019-11-30 RX ORDER — IPRATROPIUM/ALBUTEROL SULFATE 18-103MCG
3 AEROSOL WITH ADAPTER (GRAM) INHALATION EVERY 6 HOURS
Refills: 0 | Status: DISCONTINUED | OUTPATIENT
Start: 2019-11-30 | End: 2019-12-24

## 2019-11-30 RX ADMIN — Medication 1 MILLIGRAM(S): at 09:05

## 2019-11-30 RX ADMIN — HYDROMORPHONE HYDROCHLORIDE 0.5 MILLIGRAM(S): 2 INJECTION INTRAMUSCULAR; INTRAVENOUS; SUBCUTANEOUS at 00:13

## 2019-11-30 RX ADMIN — PIPERACILLIN AND TAZOBACTAM 25 GRAM(S): 4; .5 INJECTION, POWDER, LYOPHILIZED, FOR SOLUTION INTRAVENOUS at 21:54

## 2019-11-30 RX ADMIN — HYDROMORPHONE HYDROCHLORIDE 0.5 MILLIGRAM(S): 2 INJECTION INTRAMUSCULAR; INTRAVENOUS; SUBCUTANEOUS at 08:30

## 2019-11-30 RX ADMIN — HYDROMORPHONE HYDROCHLORIDE 0.5 MILLIGRAM(S): 2 INJECTION INTRAMUSCULAR; INTRAVENOUS; SUBCUTANEOUS at 11:24

## 2019-11-30 RX ADMIN — METHADONE HYDROCHLORIDE 8.5 MILLIGRAM(S): 40 TABLET ORAL at 12:07

## 2019-11-30 RX ADMIN — CHLORHEXIDINE GLUCONATE 15 MILLILITER(S): 213 SOLUTION TOPICAL at 05:25

## 2019-11-30 RX ADMIN — PIPERACILLIN AND TAZOBACTAM 25 GRAM(S): 4; .5 INJECTION, POWDER, LYOPHILIZED, FOR SOLUTION INTRAVENOUS at 05:24

## 2019-11-30 RX ADMIN — Medication 3 MILLILITER(S): at 11:56

## 2019-11-30 RX ADMIN — Medication 3 MILLILITER(S): at 06:09

## 2019-11-30 RX ADMIN — CHLORHEXIDINE GLUCONATE 1 APPLICATION(S): 213 SOLUTION TOPICAL at 12:07

## 2019-11-30 RX ADMIN — Medication 3 MILLILITER(S): at 18:02

## 2019-11-30 RX ADMIN — HYDROMORPHONE HYDROCHLORIDE 0.5 MILLIGRAM(S): 2 INJECTION INTRAMUSCULAR; INTRAVENOUS; SUBCUTANEOUS at 21:45

## 2019-11-30 RX ADMIN — Medication 1 MILLIGRAM(S): at 00:22

## 2019-11-30 RX ADMIN — ENOXAPARIN SODIUM 100 MILLIGRAM(S): 100 INJECTION SUBCUTANEOUS at 12:07

## 2019-11-30 RX ADMIN — Medication 200 GRAM(S): at 16:56

## 2019-11-30 RX ADMIN — HYDROMORPHONE HYDROCHLORIDE 0.5 MILLIGRAM(S): 2 INJECTION INTRAMUSCULAR; INTRAVENOUS; SUBCUTANEOUS at 19:00

## 2019-11-30 RX ADMIN — HYDROMORPHONE HYDROCHLORIDE 0.5 MILLIGRAM(S): 2 INJECTION INTRAMUSCULAR; INTRAVENOUS; SUBCUTANEOUS at 18:45

## 2019-11-30 RX ADMIN — HYDROMORPHONE HYDROCHLORIDE 0.5 MILLIGRAM(S): 2 INJECTION INTRAMUSCULAR; INTRAVENOUS; SUBCUTANEOUS at 15:20

## 2019-11-30 RX ADMIN — PIPERACILLIN AND TAZOBACTAM 25 GRAM(S): 4; .5 INJECTION, POWDER, LYOPHILIZED, FOR SOLUTION INTRAVENOUS at 14:35

## 2019-11-30 RX ADMIN — HYDROMORPHONE HYDROCHLORIDE 0.5 MILLIGRAM(S): 2 INJECTION INTRAMUSCULAR; INTRAVENOUS; SUBCUTANEOUS at 00:30

## 2019-11-30 RX ADMIN — HYDROMORPHONE HYDROCHLORIDE 0.5 MILLIGRAM(S): 2 INJECTION INTRAMUSCULAR; INTRAVENOUS; SUBCUTANEOUS at 08:15

## 2019-11-30 RX ADMIN — PANTOPRAZOLE SODIUM 40 MILLIGRAM(S): 20 TABLET, DELAYED RELEASE ORAL at 15:08

## 2019-11-30 RX ADMIN — HYDROMORPHONE HYDROCHLORIDE 0.5 MILLIGRAM(S): 2 INJECTION INTRAMUSCULAR; INTRAVENOUS; SUBCUTANEOUS at 04:32

## 2019-11-30 RX ADMIN — HYDROMORPHONE HYDROCHLORIDE 0.5 MILLIGRAM(S): 2 INJECTION INTRAMUSCULAR; INTRAVENOUS; SUBCUTANEOUS at 22:15

## 2019-11-30 RX ADMIN — Medication 0.5 MILLIGRAM(S): at 06:10

## 2019-11-30 RX ADMIN — MIDAZOLAM HYDROCHLORIDE 2 MILLIGRAM(S): 1 INJECTION, SOLUTION INTRAMUSCULAR; INTRAVENOUS at 02:58

## 2019-11-30 RX ADMIN — HYDROMORPHONE HYDROCHLORIDE 0.5 MILLIGRAM(S): 2 INJECTION INTRAMUSCULAR; INTRAVENOUS; SUBCUTANEOUS at 11:35

## 2019-11-30 RX ADMIN — Medication 62.5 MILLIMOLE(S): at 02:58

## 2019-11-30 RX ADMIN — Medication 200 GRAM(S): at 21:53

## 2019-11-30 RX ADMIN — SODIUM CHLORIDE 75 MILLILITER(S): 9 INJECTION, SOLUTION INTRAVENOUS at 12:09

## 2019-11-30 RX ADMIN — HYDROMORPHONE HYDROCHLORIDE 0.5 MILLIGRAM(S): 2 INJECTION INTRAMUSCULAR; INTRAVENOUS; SUBCUTANEOUS at 15:05

## 2019-11-30 RX ADMIN — Medication 0.5 MILLIGRAM(S): at 18:02

## 2019-11-30 RX ADMIN — HYDROMORPHONE HYDROCHLORIDE 0.5 MILLIGRAM(S): 2 INJECTION INTRAMUSCULAR; INTRAVENOUS; SUBCUTANEOUS at 04:45

## 2019-11-30 NOTE — PROGRESS NOTE ADULT - SUBJECTIVE AND OBJECTIVE BOX
GENERAL SURGERY PROGRESS NOTE    SUBJECTIVE  Patient seen and examined. Patient is upset about ETT.  NPO without nausea, vomiting, -/- ostomy, +marquez. Denies fever, chills, SOB, chest pain.         OBJECTIVE    PHYSICAL EXAM  General: Appears well, NAD  CHEST: breathing comfortably  CV: appears well perfused  Abdomen: soft, nontender, nondistended, no rebound or guarding, midline WV in place, LLQ OVI x 1 SS, RUQ colostomy -/- bowel sweat pink and viable  Extremities: Grossly symmetric    T(C): 37 (11-30-19 @ 03:00), Max: 37.2 (11-29-19 @ 23:00)  HR: 91 (11-30-19 @ 03:00) (67 - 94)  BP: 96/57 (11-30-19 @ 03:00) (84/55 - 141/65)  RR: 18 (11-30-19 @ 03:00) (17 - 25)  SpO2: 99% (11-30-19 @ 03:00) (90% - 100%)    11-28-19 @ 07:01  -  11-29-19 @ 07:00  --------------------------------------------------------  IN: 1925 mL / OUT: 3140 mL / NET: -1215 mL    11-29-19 @ 07:01  -  11-30-19 @ 05:19  --------------------------------------------------------  IN: 1716.5 mL / OUT: 1155 mL / NET: 561.5 mL        MEDICATIONS  chlorhexidine 0.12% Liquid 15 milliLiter(s) Oral Mucosa every 12 hours  chlorhexidine 2% Cloths 1 Application(s) Topical daily  dexMEDEtomidine Infusion 0.7 MICROgram(s)/kG/Hr IV Continuous <Continuous>  enoxaparin Injectable 100 milliGRAM(s) SubCutaneous <User Schedule>  HYDROmorphone  Injectable 0.5 milliGRAM(s) IV Push every 3 hours PRN  influenza   Vaccine 0.5 milliLiter(s) IntraMuscular once  lactated ringers. 1000 milliLiter(s) IV Continuous <Continuous>  LORazepam   Injectable 1 milliGRAM(s) IV Push every 3 hours PRN  methadone Injectable 8.5 milliGRAM(s) IV Push daily  pantoprazole  Injectable 40 milliGRAM(s) IV Push every 24 hours  piperacillin/tazobactam IVPB.. 3.375 Gram(s) IV Intermittent every 8 hours      LABS                        11.5   18.73 )-----------( 378      ( 30 Nov 2019 00:53 )             35.4     11-30    135  |  98  |  12  ----------------------------<  110<H>  4.5   |  23  |  0.59    Ca    8.1<L>      30 Nov 2019 00:53  Phos  2.5     11-30  Mg     2.0     11-30    TPro  5.7<L>  /  Alb  2.3<L>  /  TBili  0.8  /  DBili  x   /  AST  31  /  ALT  23  /  AlkPhos  90  11-29    PT/INR - ( 30 Nov 2019 00:53 )   PT: 13.3 sec;   INR: 1.16 ratio         PTT - ( 30 Nov 2019 00:53 )  PTT:23.6 sec      RADIOLOGY & ADDITIONAL STUDIES

## 2019-11-30 NOTE — AIRWAY REMOVAL NOTE  ADULT & PEDS - ARTIFICAL AIRWAY REMOVAL COMMENTS
written order for extubation verified. The patient was identified by full name and birth date compared to the identification band.  Present during the procedure was DAREK Gonzalez
Reviewed order for NIPPV, Pt placed on Bipap and became agitated, Pt. refused bipap and was plced on NRB 15 lpm, SpO2 went from 89 to 96% PA aware.
written order for extubation verified. The patient was identified by full name and birth date compared to the identification band.  Present during the procedure was DAREK Gonzalez

## 2019-11-30 NOTE — PROGRESS NOTE ADULT - SUBJECTIVE AND OBJECTIVE BOX
HISTORY: 64F with pmhx morbid obesity, acid reflux, HTN, COPD and pshx D&C presents to the ED c/o abdominal pain and bloating. Patient reports having constipation for 2 weeks and has been taking enemas, miralax and senna and passing small hard balls for the last weeks. Reports that she has not had a bowel movement unless she used an enema. Reports pain worst in the LLQ that started a few days ago and has been worsening in the last day, also has noted significant abdominal distention in the last day. Notes that she has not been able to urinate all day today because she feels dehydrated. Reports that she had 'low grade temperature of 99.7 which she took left over augmentin for.' Also reports nausea, denies any emesis, recorded fevers, urinary symptoms. Reports she has difficulty walking short distances because she becomes SOB. In ED, CT demonstrated Perforated sigmoid diverticulitis with intraperitoneal free air.    She was taken to the operating room on 11/25 overnight and underwent an exploratory laparotomy with extended left hemicolectomy and was left in discontinuity. An Abthera VAC was placed. She received 3400 of crystalloid, EBL was 500, she required pushes of phenylephrine during the case, but not continuous. She was kept intubated post op and brought to the ICU for hemodynamic and respiratory management.     24 HOUR EVENTS:     -20 mg Lasix at noon  - s/p RTOR today for abdominal closure  - Pt. intubated by anesthesia in OR.  - IV methadone started      SUBJECTIVE/ROS:  [ ] A ten-point review of systems was otherwise negative except as noted.  [ ] Due to altered mental status/intubation, subjective information were not able to be obtained from the patient. History was obtained, to the extent possible, from review of the chart and collateral sources of information.      NEURO  RASS:     GCS:     CAM ICU:  Exam: awake, alert, oriented  Meds: dexMEDEtomidine Infusion 0.7 MICROgram(s)/kG/Hr IV Continuous <Continuous>  HYDROmorphone  Injectable 0.5 milliGRAM(s) IV Push every 3 hours PRN Pain  LORazepam   Injectable 1 milliGRAM(s) IV Push every 3 hours PRN Agitation  methadone Injectable 8.5 milliGRAM(s) IV Push daily    [x] Adequacy of sedation and pain control has been assessed and adjusted      RESPIRATORY  RR: 19 (11-30-19 @ 00:00) (17 - 25)  SpO2: 97% (11-30-19 @ 00:20) (90% - 100%)  Wt(kg): --  Exam: unlabored, clear to auscultation bilaterally  Mechanical Ventilation: Mode: AC/ CMV (Assist Control/ Continuous Mandatory Ventilation), RR (machine): 14, RR (patient): 28, TV (machine): 500, FiO2: 40, PEEP: 8, ITime: 1, MAP: 7, PIP: 14  ABG - ( 30 Nov 2019 00:32 )  pH: 7.43  /  pCO2: 40    /  pO2: 59    / HCO3: 26    / Base Excess: 2.0   /  SaO2: 91      Lactate: x          [N/A] Extubation Readiness Assessed  Meds:     CARDIOVASCULAR  HR: 93 (11-30-19 @ 00:20) (67 - 94)  BP: 94/56 (11-30-19 @ 00:00) (84/55 - 141/65)  BP(mean): 70 (11-30-19 @ 00:00) (65 - 93)  ABP: --  ABP(mean): --  Wt(kg): --  CVP(cm H2O): --  VBG - ( 29 Nov 2019 00:43 )  pH: 7.40  /  pCO2: 47    /  pO2: 40    / HCO3: 28    / Base Excess: 3.0   /  SaO2: 72     Lactate: 1.0        Exam: regular rate and rhythm  Cardiac Rhythm: sinus  Perfusion     [x]Adequate   [ ]Inadequate  Mentation   [x]Normal       [ ]Reduced  Extremities  [x]Warm         [ ]Cool  Volume Status [ ]Hypervolemic [x]Euvolemic [ ]Hypovolemic  Meds:       GI/NUTRITION  Exam: soft, nontender, nondistended, incision C/D/I  Diet:  Meds: pantoprazole  Injectable 40 milliGRAM(s) IV Push every 24 hours      GENITOURINARY  I&O's Detail    11-28 @ 07:01  -  11-29 @ 07:00  --------------------------------------------------------  IN:    lactated ringers.: 1450 mL    Solution: 200 mL    Solution: 275 mL  Total IN: 1925 mL    OUT:    Indwelling Catheter - Urethral: 2440 mL    Nasoenteral Tube: 100 mL    VAC (Vacuum Assisted Closure) System: 600 mL  Total OUT: 3140 mL    Total NET: -1215 mL      11-29 @ 07:01  -  11-30 @ 01:04  --------------------------------------------------------  IN:    dexmedetomidine Infusion: 29 mL    lactated ringers.: 200 mL    lactated ringers.: 325 mL    lactated ringers.: 500 mL    Solution: 100 mL    Solution: 275 mL  Total IN: 1429 mL    OUT:    Bulb: 70 mL    Indwelling Catheter - Urethral: 830 mL    VAC (Vacuum Assisted Closure) System: 150 mL  Total OUT: 1050 mL    Total NET: 379 mL        Weight (kg): 168.3 (11-29 @ 12:53)  11-29    135  |  96  |  11  ----------------------------<  103<H>  3.8   |  23  |  0.56    Ca    8.4      29 Nov 2019 15:26  Phos  2.7     11-29  Mg     2.0     11-29    TPro  5.7<L>  /  Alb  2.3<L>  /  TBili  0.8  /  DBili  x   /  AST  31  /  ALT  23  /  AlkPhos  90  11-29    [ ] Ross catheter, indication: N/A  Meds: lactated ringers. 1000 milliLiter(s) IV Continuous <Continuous>        HEMATOLOGIC  Meds: enoxaparin Injectable 100 milliGRAM(s) SubCutaneous <User Schedule>    [x] VTE Prophylaxis                        12.1   16.40 )-----------( 397      ( 29 Nov 2019 15:26 )             37.0     PT/INR - ( 29 Nov 2019 15:26 )   PT: 12.8 sec;   INR: 1.12 ratio         PTT - ( 29 Nov 2019 15:26 )  PTT:23.4 sec  Transfusion     [ ] PRBC   [ ] Platelets   [ ] FFP   [ ] Cryoprecipitate      INFECTIOUS DISEASES  WBC Count: 16.40 K/uL (11-29 @ 15:26)    RECENT CULTURES:  Specimen Source: .Surgical Swab peritoneal fluid  Date/Time: 11-26 @ 17:30  Culture Results:   Rare Escherichia coli  Rare Peptostreptococcus anaerobius "Susceptibilities not performed"  Gram Stain: --  Organism: Escherichia coli    Meds: influenza   Vaccine 0.5 milliLiter(s) IntraMuscular once  piperacillin/tazobactam IVPB.. 3.375 Gram(s) IV Intermittent every 8 hours        ENDOCRINE  CAPILLARY BLOOD GLUCOSE        Meds:       ACCESS DEVICES:  [ ] Peripheral IV  [ ] Central Venous Line	[ ] R	[ ] L	[ ] IJ	[ ] Fem	[ ] SC	Placed:   [ ] Arterial Line		[ ] R	[ ] L	[ ] Fem	[ ] Rad	[ ] Ax	Placed:   [ ] PICC:					[ ] Mediport  [ ] Urinary Catheter, Date Placed:   [x] Necessity of urinary, arterial, and venous catheters discussed    OTHER MEDICATIONS:  chlorhexidine 0.12% Liquid 15 milliLiter(s) Oral Mucosa every 12 hours  chlorhexidine 2% Cloths 1 Application(s) Topical daily      CODE STATUS:      IMAGING: HISTORY: 64F with pmhx morbid obesity, acid reflux, HTN, COPD and PSH D&C presented to the ED on 11/26 c/o abdominal pain and bloating. Patient reports having constipation for 2 weeks and has been taking enemas, miralax and senna and passing small hard balls for the last weeks. Reported pain worst in the LLQ that started a few days ago and has been worsening in the last day, also has noted significant abdominal distention in the last day. Reported she had difficulty walking short distances because she becomes SOB. In ED, CT demonstrated perforated sigmoid diverticulitis with intraperitoneal free air. She was taken to the operating room on 11/25 overnight and underwent an exploratory laparotomy with extended left hemicolectomy and was left in discontinuity. An Abthera VAC was placed. She was kept intubated post op and brought to the ICU for hemodynamic and respiratory management. She went back to the OR on 11/29 for transverse end colostomy and fascial closure with wound vac.    24 HOUR EVENTS:   - s/p RTOR for transverse end colostomy and fascial closure with wound vac  - Pt remained intubated post-operatively for hypoxemic respiratory failure with RUL and LLL infiltrates; combicath sent overnight and PEEP increased to 10 (for pO2 59 on 40% FIO2)  - Precedex D/Nirmal 2/2 hypotension (SBP 80s), pt on Ativan PRN  - IV methadone started      SUBJECTIVE/ROS:  [ ] A ten-point review of systems was otherwise negative except as noted.  [x ] Due to altered mental status/intubation, subjective information were not able to be obtained from the patient. History was obtained, to the extent possible, from review of the chart and collateral sources of information.      NEURO  RASS:  0     Exam: arousable, follows commands  Meds: dexMEDEtomidine Infusion 0.7 MICROgram(s)/kG/Hr IV Continuous <Continuous>  HYDROmorphone  Injectable 0.5 milliGRAM(s) IV Push every 3 hours PRN Pain  LORazepam   Injectable 1 milliGRAM(s) IV Push every 3 hours PRN Agitation  methadone Injectable 8.5 milliGRAM(s) IV Push daily    [x] Adequacy of sedation and pain control has been assessed and adjusted      RESPIRATORY  RR: 19 (11-30-19 @ 00:00) (17 - 25)  SpO2: 97% (11-30-19 @ 00:20) (90% - 100%)  Exam: unlabored, clear to auscultation bilaterally  Mechanical Ventilation: Mode: AC/ CMV (Assist Control/ Continuous Mandatory Ventilation), RR (machine): 14, RR (patient): 28, TV (machine): 500, FiO2: 40, PEEP: 8, ITime: 1, MAP: 7, PIP: 14  ABG - ( 30 Nov 2019 00:32 )  pH: 7.43  /  pCO2: 40    /  pO2: 59    / HCO3: 26    / Base Excess: 2.0   /  SaO2: 91      Blood Gas Arterial, Lactate: 1.5 mmol/L (11.30.19 @ 00:32)    [x] Extubation Readiness Assessed  Meds: x    CARDIOVASCULAR  HR: 93 (11-30-19 @ 00:20) (67 - 94)  BP: 94/56 (11-30-19 @ 00:00) (84/55 - 141/65)  BP(mean): 70 (11-30-19 @ 00:00) (65 - 93)  VBG - ( 29 Nov 2019 00:43 )  pH: 7.40  /  pCO2: 47    /  pO2: 40    / HCO3: 28    / Base Excess: 3.0   /  SaO2: 72     Lactate: 1.0        Exam: regular rate and rhythm  Cardiac Rhythm: sinus  Perfusion     [x]Adequate   [ ]Inadequate  Mentation   [x]Normal       [ ]Reduced  Extremities  [x]Warm         [ ]Cool  Volume Status [ ]Hypervolemic [x]Euvolemic [ ]Hypovolemic  Meds: x      GI/NUTRITION  Exam: softly distended, obese, nontender, ostomy red, wound vac in place  Diet: NPO  Meds: pantoprazole  Injectable 40 milliGRAM(s) IV Push every 24 hours      GENITOURINARY  I&O's Detail    11-28 @ 07:01  -  11-29 @ 07:00  --------------------------------------------------------  IN:    lactated ringers.: 1450 mL    Solution: 200 mL    Solution: 275 mL  Total IN: 1925 mL    OUT:    Indwelling Catheter - Urethral: 2440 mL    Nasoenteral Tube: 100 mL    VAC (Vacuum Assisted Closure) System: 600 mL  Total OUT: 3140 mL    Total NET: -1215 mL      11-29 @ 07:01  -  11-30 @ 01:04  --------------------------------------------------------  IN:    dexmedetomidine Infusion: 29 mL    lactated ringers.: 200 mL    lactated ringers.: 325 mL    lactated ringers.: 500 mL    Solution: 100 mL    Solution: 275 mL  Total IN: 1429 mL    OUT:    Bulb: 70 mL    Indwelling Catheter - Urethral: 830 mL    VAC (Vacuum Assisted Closure) System: 150 mL  Total OUT: 1050 mL    Total NET: 379 mL        Weight (kg): 168.3 (11-29 @ 12:53)  11-29    135  |  96  |  11  ----------------------------<  103<H>  3.8   |  23  |  0.56    Ca    8.4      29 Nov 2019 15:26  Phos  2.7     11-29  Mg     2.0     11-29    TPro  5.7<L>  /  Alb  2.3<L>  /  TBili  0.8  /  DBili  x   /  AST  31  /  ALT  23  /  AlkPhos  90  11-29    [ x] Ross catheter, indication: critically ill  Meds: lactated ringers. 1000 milliLiter(s) IV Continuous <Continuous>        HEMATOLOGIC  Meds: enoxaparin Injectable 100 milliGRAM(s) SubCutaneous <User Schedule>    [x] VTE Prophylaxis                        12.1   16.40 )-----------( 397      ( 29 Nov 2019 15:26 )             37.0     PT/INR - ( 29 Nov 2019 15:26 )   PT: 12.8 sec;   INR: 1.12 ratio         PTT - ( 29 Nov 2019 15:26 )  PTT:23.4 sec  Transfusion     [ ] PRBC   [ ] Platelets   [ ] FFP   [ ] Cryoprecipitate      INFECTIOUS DISEASES  WBC Count: 16.40 K/uL (11-29 @ 15:26)    RECENT CULTURES:  Specimen Source: .Surgical Swab peritoneal fluid  Date/Time: 11-26 @ 17:30  Culture Results:   Rare Escherichia coli  Rare Peptostreptococcus anaerobius "Susceptibilities not performed"  Gram Stain: --  Organism: Escherichia coli    Meds: influenza   Vaccine 0.5 milliLiter(s) IntraMuscular once  piperacillin/tazobactam IVPB.. 3.375 Gram(s) IV Intermittent every 8 hours        ENDOCRINE  Meds: x      ACCESS DEVICES:  [ x] Peripheral IV  [ x] Central Venous Line	[ ] R	[x ] L	[ x] IJ	[ ] Fem	[ ] SC	Placed: 11/26  [ ] Arterial Line		[ ] R	[ ] L	[ ] Fem	[ ] Rad	[ ] Ax	Placed:   [ ] PICC:					[ ] Mediport  [x ] Urinary Catheter, Date Placed: 11/26  [x] Necessity of urinary, arterial, and venous catheters discussed    OTHER MEDICATIONS:  chlorhexidine 0.12% Liquid 15 milliLiter(s) Oral Mucosa every 12 hours  chlorhexidine 2% Cloths 1 Application(s) Topical daily      CODE STATUS: full code      IMAGING: x

## 2019-11-30 NOTE — AIRWAY REMOVAL NOTE  ADULT & PEDS - REMOVAL OF AN ARTIFICAL AIRWAY DATE AND TIME
[facility], [address]  [address]  [facility] [address]  [phone]  [phone]      3/8/2018      To Whom It May Concern:      For your information, Aster Newton, had an appointment in our office today with:    Kenia Mcpherson MD. Please excuse her from work 3/7/18 through 3/10/18.         Sincerely,        Kenia Mcpherson MD  
27-Nov-2019 12:00
30-Nov-2019 16:50
30-Nov-2019 16:50

## 2019-11-30 NOTE — PROGRESS NOTE ADULT - ASSESSMENT
Assessment	  64F with pmhx morbid obesity, acid reflux, HTN, COPD and pshx D&C presents to the ED c/o abdominal pain and bloating. Patient reports having constipation for 2 weeks and has been taking enemas, miralax and senna and passing small hard balls for the last weeks. Reports that she has not had a bowel movement unless she used an enema. Reports pain worst in the LLQ that started a few days ago and has been worsening in the last day, also has noted significant abdominal distention in the last day. Notes that she has not been able to urinate all day today because she feels dehydrated. Reports that she had 'low grade temperature of 99.7 which she took left over augmentin for.' Also reports nausea, denies any emesis, recorded fevers, urinary symptoms. Reports she has difficulty walking short distances because she becomes SOB. In ED, CT demonstrated Perforated sigmoid diverticulitis with intraperitoneal free air.    She was taken to the operating room on 11/25 overnight and underwent an exploratory laparotomy with extended left hemicolectomy and was left in discontinuity. An Abthera VAC was placed. She received 3400 of crystalloid, EBL was 500, she required pushes of phenylephrine during the case, but not continuous. She was kept intubated post op and brought to the ICU for hemodynamic and respiratory management.    NEURO: In need of sedation while intubated and postoperative pain control  - Alert and oriented.   - Continue pain control with PRN dilaudid  - on 8.5 mg methadone ivp    RESPIRATORY: Hx of COPD  - intubated, on vent PRVC.   - ABGs with AM labs  - AM CXR  - Duonebs  -s/p BAL    CARDIOVASCULAR: Hx of HTN on amlodipine at home, No hx of CAD, required pushes of phenylephrine during the operation but nothing continuous and currently not requiring vasopressor support  - Remains off vasopressor support  - Monitor vital signs and ins and outs    GI/NUTRITION: S/p extended left hemicolectomy and was left in discontinuity with Abthera in place. s/p RTOR. Hx of obesity, Hx of GERD  - NPO w/ NGT  - s/p RTOR.  RUQ end transverse colostomy created. LLQ pelvic drain placed. Fascia closed, black sponge VAC placed over fascia.    GENITOURINARY/RENAL:   - Monitoring fluid status closely  - LR changed to 75ml/hr.  - Strict Is and Os, UOP 40cc/hr    HEMATOLOGIC: Operative EBL of 500, CBC stable  - Trend daily CBC and coags  - Cont DVT ppx with lovenox dosed by patient weight      INFECTIOUS DISEASE: Admitted with perforated sigmoid possibly from stercolitis vs diverticulitis  - Culture sensitivity's is resulted.   - OR cx w/ rare e.coli  - Cont Zosyn  - trend WBC and fever curve    ENDOCRINE: No active issues Assessment	  64F with perforated sigmoid diverticulitis s/p exploratory laparotomy with extended left hemicolectomy, left in discontinuity w/ Abthera VAC (11/26), return to OR on 11/29 for transverse end colostomy and fascial closure with wound vac.    PLAN:  NEURO: sedation while intubated and postoperative pain control, chronic opioid use  - Continue pain control with PRN Dilaudid  - Methadone 8.5 mg IV qd  - Ativan 1mg q3 PRN    RESPIRATORY: Hx of COPD, acute hypoxemic respiratory failure, RUL & LLL infiltrates  - Continue mechanical ventilation, wean PEEP and FIO2 as tolerated   - ABGs with AM labs, CXR  - Duonebs and Pulmicort  - Follow up Combicath results    CARDIOVASCULAR: Hx of HTN on amlodipine at home, No hx of CAD  - Remains off vasopressor support  - Monitor vital signs and I&Os  - Hold home antihypertensives    GI/NUTRITION: S/p extended left hemicolectomy and was left in discontinuity with Abthera in place. s/p RTOR for end colostomy, fascial closure. Hx of obesity, Hx of GERD  - NPO w/ NGT  - Protonix for GERD    GENITOURINARY/RENAL: no acute issues  - Monitoring fluid status closely, Ross, I&Os  - LR changed to 75ml/hr.    HEMATOLOGIC: no acute issues  - Trend daily CBC and coags  - Continue DVT ppx with Lovenox dosed by patient weight    INFECTIOUS DISEASE: abdominal sepsis, OR culture with E. coli & Peptostreptococcus anaerobius  - Continue Zosyn  - Trend WBC and fever curve    ENDOCRINE: No active issues    DISPO: SICU

## 2019-11-30 NOTE — PROGRESS NOTE ADULT - SUBJECTIVE AND OBJECTIVE BOX
CARDIOLOGY     PROGRESS  NOTE   ________________________________________________    CHIEF COMPLAINT:Patient is a 64y old  Female who presents with a chief complaint of abdominal pain (28 Nov 2019 10:50)  no complain.  	  REVIEW OF SYSTEMS:  CONSTITUTIONAL: No fever, weight loss, or fatigue  EYES: No eye pain, visual disturbances, or discharge  ENT:  No difficulty hearing, tinnitus, vertigo; No sinus or throat pain  NECK: No pain or stiffness  RESPIRATORY: No cough, wheezing, chills or hemoptysis; No Shortness of Breath  CARDIOVASCULAR: No chest pain, palpitations, passing out, dizziness, or leg swelling  GASTROINTESTINAL: No abdominal or epigastric pain. No nausea, vomiting, or hematemesis; No diarrhea or constipation. No melena or hematochezia.  GENITOURINARY: No dysuria, frequency, hematuria, or incontinence  NEUROLOGICAL: No headaches, memory loss, loss of strength, numbness, or tremors  SKIN: No itching, burning, rashes, or lesions   LYMPH Nodes: No enlarged glands  ENDOCRINE: No heat or cold intolerance; No hair loss  MUSCULOSKELETAL: No joint pain or swelling; No muscle, back, or extremity pain  PSYCHIATRIC: No depression, anxiety, mood swings, or difficulty sleeping  HEME/LYMPH: No easy bruising, or bleeding gums  ALLERGY AND IMMUNOLOGIC: No hives or eczema	    [ ] All others negative	  [x ] Unable to obtain    PHYSICAL EXAM:  T(C): 36.7 (11-30-19 @ 08:00), Max: 37.2 (11-29-19 @ 23:00)  HR: 93 (11-30-19 @ 09:18) (67 - 100)  BP: 115/77 (11-30-19 @ 09:00) (84/55 - 141/64)  RR: 25 (11-30-19 @ 09:00) (17 - 29)  SpO2: 97% (11-30-19 @ 09:18) (90% - 100%)  Wt(kg): --  I&O's Summary    29 Nov 2019 07:01  -  30 Nov 2019 07:00  --------------------------------------------------------  IN: 2279 mL / OUT: 1405 mL / NET: 874 mL    30 Nov 2019 07:01  -  30 Nov 2019 10:28  --------------------------------------------------------  IN: 175 mL / OUT: 35 mL / NET: 140 mL        Appearance: Normal	  HEENT:   Normal oral mucosa, PERRL, EOMI	  Lymphatic: No lymphadenopathy  Cardiovascular: Normal S1 S2, No JVD, + murmurs, No edema  Respiratory:rhonchi  Psychiatry: A & O x 3, Mood & affect appropriate  Gastrointestinal:  Soft, Non-tender, + BS	  Skin: No rashes, No ecchymoses, No cyanosis	  Neurologic: Non-focal  Extremities: Normal range of motion, No clubbing, cyanosis or edema  Vascular: Peripheral pulses palpable 2+ bilaterally    MEDICATIONS  (STANDING):  albuterol/ipratropium for Nebulization 3 milliLiter(s) Nebulizer every 6 hours  buDESOnide    Inhalation Suspension 0.5 milliGRAM(s) Inhalation every 12 hours  chlorhexidine 0.12% Liquid 15 milliLiter(s) Oral Mucosa every 12 hours  chlorhexidine 2% Cloths 1 Application(s) Topical daily  enoxaparin Injectable 100 milliGRAM(s) SubCutaneous <User Schedule>  influenza   Vaccine 0.5 milliLiter(s) IntraMuscular once  lactated ringers. 1000 milliLiter(s) (75 mL/Hr) IV Continuous <Continuous>  methadone Injectable 8.5 milliGRAM(s) IV Push daily  pantoprazole  Injectable 40 milliGRAM(s) IV Push every 24 hours  piperacillin/tazobactam IVPB.. 3.375 Gram(s) IV Intermittent every 8 hours      TELEMETRY: 	    ECG:  	  RADIOLOGY:  OTHER: 	  	  LABS:	 	    CARDIAC MARKERS:  CARDIAC MARKERS ( 30 Nov 2019 00:53 )  x     / x     / 91 U/L / x     / x                                    11.5   18.73 )-----------( 378      ( 30 Nov 2019 00:53 )             35.4     11-30    135  |  98  |  12  ----------------------------<  110<H>  4.5   |  23  |  0.59    Ca    8.1<L>      30 Nov 2019 00:53  Phos  2.5     11-30  Mg     2.0     11-30    TPro  5.7<L>  /  Alb  2.3<L>  /  TBili  0.8  /  DBili  x   /  AST  31  /  ALT  23  /  AlkPhos  90  11-29    proBNP:   Lipid Profile:   HgA1c:   TSH:   PT/INR - ( 30 Nov 2019 00:53 )   PT: 13.3 sec;   INR: 1.16 ratio         PTT - ( 30 Nov 2019 00:53 )  PTT:23.6 sec  POD1 s/p colostomy and abdominal closure  Hypoxic overnight therefore unable to be weaned from vent  Stable this morning  Abdomen is soft, wound vac in place with serosanguinous output  Colostomy edematous, no air or stool in bag  Drain serosanguinous    Expect ileus post-op, keep NGT to low continuous suction  Will reassess nutrition status in next 1-2 days  Wean vent as tolerated  SICU level of care  < from: Transthoracic Echocardiogram (11.29.19 @ 08:36) >  1. Mitral valve not well visualized, probably normal.  Minimal mitral regurgitation.  2. Aortic valve not well visualized; probably normal. No  aortic valve regurgitation seen.  3. Endocardium not well visualized; grossly hyperdynamic  left ventricular systolic function.  4. Normal diastolic function  5. The right ventricle is not well visualized; grossly  normal right ventricular systolic function.  6. Normal pericardium with trace pericardial effusion.    < end of copied text >    Assessment and plan  ---------------------------  64F with pmhx morbid obesity, acid reflux, HTN, COPD and pshx D&C presents to the ED c/o abdominal pain and bloating. Patient reports having constipation for 2 weeks and has been taking enemas, miralax and senna and passing small hard balls for the last weeks. Reports that she has not had a bowel movement unless she used an enema. Reports pain worst in the LLQ that started a few days ago and has been worsening in the last day, also has noted significant abdominal distention in the last day. Notes that she has not been able to urinate all day today because she feels dehydrated. Reports that she had 'low grade temperature of 99.7 which she took left over augmentin for.' Also reports nausea, denies any emesis, recorded fevers, urinary symptoms. Reports she has difficulty walking short distances because she becomes SOB. In ED, CT demonstrated Perforated sigmoid diverticulitis with intraperitoneal free air.  pt with known hx of htn ,with no known hx of cad.  no hx of chest pain/ sob, nor arrythmia.  on chest x ray pt with evidence of cardiomegaly. no cardiac work up  ever done  pt with sig cardiac risk factor for cad with no cardiac symptoms/ cardiomegaly ? sec to hx of htn  ecg  echo reviewed , hyperdynamic LV no sig valvular heart disease.  dvt prophylaxis  doing well post op  continue current meds  plan as per SICU

## 2019-11-30 NOTE — AIRWAY REMOVAL NOTE  ADULT & PEDS - RESPIRATORY RHYTHM/PATTERN
depth regular/pattern regular/unlabored/rate regular
pattern regular/depth regular
unlabored/no shortness of breath/depth regular/pattern regular/rate regular

## 2019-11-30 NOTE — PROGRESS NOTE ADULT - ATTENDING COMMENTS
I have seen and evaluated the patient and discussed the relevant clinical findings and plan with the surgical housestaff and fellow.  Agree with the above documentation with addenda as noted.     POD1 s/p colostomy and abdominal closure  Hypoxic overnight therefore unable to be weaned from vent  Stable this morning  Abdomen is soft, wound vac in place with serosanguinous output  Colostomy edematous, no air or stool in bag  Drain serosanguinous    Expect ileus post-op, keep NGT to low continuous suction  Will reassess nutrition status in next 1-2 days  Wean vent as tolerated  SICU level of care    Ej Ng MD

## 2019-11-30 NOTE — PROGRESS NOTE ADULT - ASSESSMENT
A/P: 64y Female POD#1 s/p ex lap with extended left hemicolectomy left in discontinuity for perforated sigmoid diverticulitis with intraperitoneal free air after presenting to ED with worsening constipation and abdominal pain with distention. AbThera vac placed and pt kept intubated post op and transferred to SICU for post-op management.     Plan:  - NPO  - monitor U/O   - monitor AbThera output  - c/w pain control PRN dilaudid with IV tylenol, methadone started  - DVT ppx with lovenox, sequential compression   - appreciate sicu care    p 1982

## 2019-11-30 NOTE — AIRWAY REMOVAL NOTE  ADULT & PEDS - RESPIRATORY EXPANSION/ACCESSORY MUSCLES/RETRACTIONS
expansion symmetric/no retractions/no use of accessory muscles
no retractions/no use of accessory muscles
no use of accessory muscles

## 2019-11-30 NOTE — PROGRESS NOTE ADULT - ATTENDING COMMENTS
Pt seen and examined, agree with above.    1. Hypoxic respiratory failure:  - Postoperatively, needed increased PEEP to maintain oxygenation, CXR with RUL and LLL infiltrate, which on my review of the films appears much better today, possibly from mucus plugging?  - Decrease PEEP to 8 (if does well, will try SBT and extubate to BiPAP given morbid obesity)    2. Sigmoid diverticulitis s/p extended L hemicolectomy and abdominal closure with ileostomy yesterday:  - No ostomy function yet  - NGT put out 50cc  - Awaiting ostomy function  - If pt has prolonged ileus, may need TPN (NPO day 7 is Tuesday), d/w Dr. Hernandez Block for four days for peritonitis    3. COPD:  - On Breo Ellipta at home, here on duonebs and budesonide

## 2019-12-01 LAB
ANION GAP SERPL CALC-SCNC: 10 MMOL/L — SIGNIFICANT CHANGE UP (ref 5–17)
ANION GAP SERPL CALC-SCNC: 11 MMOL/L — SIGNIFICANT CHANGE UP (ref 5–17)
APTT BLD: 25.2 SEC — LOW (ref 27.5–36.3)
BUN SERPL-MCNC: 12 MG/DL — SIGNIFICANT CHANGE UP (ref 7–23)
BUN SERPL-MCNC: 15 MG/DL — SIGNIFICANT CHANGE UP (ref 7–23)
CALCIUM SERPL-MCNC: 8.2 MG/DL — LOW (ref 8.4–10.5)
CALCIUM SERPL-MCNC: 8.3 MG/DL — LOW (ref 8.4–10.5)
CHLORIDE SERPL-SCNC: 100 MMOL/L — SIGNIFICANT CHANGE UP (ref 96–108)
CHLORIDE SERPL-SCNC: 100 MMOL/L — SIGNIFICANT CHANGE UP (ref 96–108)
CO2 SERPL-SCNC: 24 MMOL/L — SIGNIFICANT CHANGE UP (ref 22–31)
CO2 SERPL-SCNC: 26 MMOL/L — SIGNIFICANT CHANGE UP (ref 22–31)
CREAT SERPL-MCNC: 0.53 MG/DL — SIGNIFICANT CHANGE UP (ref 0.5–1.3)
CREAT SERPL-MCNC: 0.55 MG/DL — SIGNIFICANT CHANGE UP (ref 0.5–1.3)
GAS PNL BLDA: SIGNIFICANT CHANGE UP
GLUCOSE SERPL-MCNC: 118 MG/DL — HIGH (ref 70–99)
GLUCOSE SERPL-MCNC: 134 MG/DL — HIGH (ref 70–99)
HCT VFR BLD CALC: 31 % — LOW (ref 34.5–45)
HGB BLD-MCNC: 10.3 G/DL — LOW (ref 11.5–15.5)
INR BLD: 1.18 RATIO — HIGH (ref 0.88–1.16)
MAGNESIUM SERPL-MCNC: 2 MG/DL — SIGNIFICANT CHANGE UP (ref 1.6–2.6)
MAGNESIUM SERPL-MCNC: 2.1 MG/DL — SIGNIFICANT CHANGE UP (ref 1.6–2.6)
MCHC RBC-ENTMCNC: 30.2 PG — SIGNIFICANT CHANGE UP (ref 27–34)
MCHC RBC-ENTMCNC: 33.2 GM/DL — SIGNIFICANT CHANGE UP (ref 32–36)
MCV RBC AUTO: 90.9 FL — SIGNIFICANT CHANGE UP (ref 80–100)
NRBC # BLD: 0 /100 WBCS — SIGNIFICANT CHANGE UP (ref 0–0)
PHOSPHATE SERPL-MCNC: 1.7 MG/DL — LOW (ref 2.5–4.5)
PHOSPHATE SERPL-MCNC: 2.5 MG/DL — SIGNIFICANT CHANGE UP (ref 2.5–4.5)
PLATELET # BLD AUTO: 340 K/UL — SIGNIFICANT CHANGE UP (ref 150–400)
POTASSIUM SERPL-MCNC: 3.7 MMOL/L — SIGNIFICANT CHANGE UP (ref 3.5–5.3)
POTASSIUM SERPL-MCNC: 4.2 MMOL/L — SIGNIFICANT CHANGE UP (ref 3.5–5.3)
POTASSIUM SERPL-SCNC: 3.7 MMOL/L — SIGNIFICANT CHANGE UP (ref 3.5–5.3)
POTASSIUM SERPL-SCNC: 4.2 MMOL/L — SIGNIFICANT CHANGE UP (ref 3.5–5.3)
PROTHROM AB SERPL-ACNC: 13.6 SEC — HIGH (ref 10–12.9)
RBC # BLD: 3.41 M/UL — LOW (ref 3.8–5.2)
RBC # FLD: 13.8 % — SIGNIFICANT CHANGE UP (ref 10.3–14.5)
SODIUM SERPL-SCNC: 135 MMOL/L — SIGNIFICANT CHANGE UP (ref 135–145)
SODIUM SERPL-SCNC: 136 MMOL/L — SIGNIFICANT CHANGE UP (ref 135–145)
WBC # BLD: 15.68 K/UL — HIGH (ref 3.8–10.5)
WBC # FLD AUTO: 15.68 K/UL — HIGH (ref 3.8–10.5)

## 2019-12-01 PROCEDURE — 99233 SBSQ HOSP IP/OBS HIGH 50: CPT

## 2019-12-01 PROCEDURE — 71045 X-RAY EXAM CHEST 1 VIEW: CPT | Mod: 26

## 2019-12-01 RX ORDER — POTASSIUM PHOSPHATE, MONOBASIC POTASSIUM PHOSPHATE, DIBASIC 236; 224 MG/ML; MG/ML
15 INJECTION, SOLUTION INTRAVENOUS ONCE
Refills: 0 | Status: COMPLETED | OUTPATIENT
Start: 2019-12-01 | End: 2019-12-01

## 2019-12-01 RX ORDER — ACETAMINOPHEN 500 MG
1000 TABLET ORAL ONCE
Refills: 0 | Status: COMPLETED | OUTPATIENT
Start: 2019-12-02 | End: 2019-12-02

## 2019-12-01 RX ORDER — ACETAMINOPHEN 500 MG
1000 TABLET ORAL ONCE
Refills: 0 | Status: COMPLETED | OUTPATIENT
Start: 2019-12-01 | End: 2019-12-01

## 2019-12-01 RX ORDER — KETOROLAC TROMETHAMINE 30 MG/ML
30 SYRINGE (ML) INJECTION EVERY 6 HOURS
Refills: 0 | Status: DISCONTINUED | OUTPATIENT
Start: 2019-12-01 | End: 2019-12-03

## 2019-12-01 RX ORDER — ALBUMIN HUMAN 25 %
500 VIAL (ML) INTRAVENOUS ONCE
Refills: 0 | Status: COMPLETED | OUTPATIENT
Start: 2019-12-01 | End: 2019-12-01

## 2019-12-01 RX ADMIN — Medication 250 MILLILITER(S): at 13:42

## 2019-12-01 RX ADMIN — Medication 250 MILLIMOLE(S): at 06:48

## 2019-12-01 RX ADMIN — PIPERACILLIN AND TAZOBACTAM 25 GRAM(S): 4; .5 INJECTION, POWDER, LYOPHILIZED, FOR SOLUTION INTRAVENOUS at 21:03

## 2019-12-01 RX ADMIN — CHLORHEXIDINE GLUCONATE 1 APPLICATION(S): 213 SOLUTION TOPICAL at 12:29

## 2019-12-01 RX ADMIN — HYDROMORPHONE HYDROCHLORIDE 0.5 MILLIGRAM(S): 2 INJECTION INTRAMUSCULAR; INTRAVENOUS; SUBCUTANEOUS at 10:07

## 2019-12-01 RX ADMIN — HYDROMORPHONE HYDROCHLORIDE 0.5 MILLIGRAM(S): 2 INJECTION INTRAMUSCULAR; INTRAVENOUS; SUBCUTANEOUS at 03:45

## 2019-12-01 RX ADMIN — Medication 400 MILLIGRAM(S): at 21:03

## 2019-12-01 RX ADMIN — PANTOPRAZOLE SODIUM 40 MILLIGRAM(S): 20 TABLET, DELAYED RELEASE ORAL at 15:00

## 2019-12-01 RX ADMIN — Medication 30 MILLIGRAM(S): at 18:29

## 2019-12-01 RX ADMIN — POTASSIUM PHOSPHATE, MONOBASIC POTASSIUM PHOSPHATE, DIBASIC 62.5 MILLIMOLE(S): 236; 224 INJECTION, SOLUTION INTRAVENOUS at 14:21

## 2019-12-01 RX ADMIN — HYDROMORPHONE HYDROCHLORIDE 0.5 MILLIGRAM(S): 2 INJECTION INTRAMUSCULAR; INTRAVENOUS; SUBCUTANEOUS at 16:25

## 2019-12-01 RX ADMIN — PIPERACILLIN AND TAZOBACTAM 25 GRAM(S): 4; .5 INJECTION, POWDER, LYOPHILIZED, FOR SOLUTION INTRAVENOUS at 14:19

## 2019-12-01 RX ADMIN — HYDROMORPHONE HYDROCHLORIDE 0.5 MILLIGRAM(S): 2 INJECTION INTRAMUSCULAR; INTRAVENOUS; SUBCUTANEOUS at 10:22

## 2019-12-01 RX ADMIN — Medication 1000 MILLIGRAM(S): at 21:30

## 2019-12-01 RX ADMIN — HYDROMORPHONE HYDROCHLORIDE 0.5 MILLIGRAM(S): 2 INJECTION INTRAMUSCULAR; INTRAVENOUS; SUBCUTANEOUS at 01:15

## 2019-12-01 RX ADMIN — HYDROMORPHONE HYDROCHLORIDE 0.5 MILLIGRAM(S): 2 INJECTION INTRAMUSCULAR; INTRAVENOUS; SUBCUTANEOUS at 04:15

## 2019-12-01 RX ADMIN — HYDROMORPHONE HYDROCHLORIDE 0.5 MILLIGRAM(S): 2 INJECTION INTRAMUSCULAR; INTRAVENOUS; SUBCUTANEOUS at 00:45

## 2019-12-01 RX ADMIN — Medication 3 MILLILITER(S): at 05:49

## 2019-12-01 RX ADMIN — HYDROMORPHONE HYDROCHLORIDE 0.5 MILLIGRAM(S): 2 INJECTION INTRAMUSCULAR; INTRAVENOUS; SUBCUTANEOUS at 20:10

## 2019-12-01 RX ADMIN — Medication 3 MILLILITER(S): at 17:57

## 2019-12-01 RX ADMIN — Medication 250 MILLIMOLE(S): at 08:03

## 2019-12-01 RX ADMIN — Medication 3 MILLILITER(S): at 00:30

## 2019-12-01 RX ADMIN — ENOXAPARIN SODIUM 100 MILLIGRAM(S): 100 INJECTION SUBCUTANEOUS at 12:29

## 2019-12-01 RX ADMIN — Medication 250 MILLIMOLE(S): at 09:24

## 2019-12-01 RX ADMIN — METHADONE HYDROCHLORIDE 8.5 MILLIGRAM(S): 40 TABLET ORAL at 12:25

## 2019-12-01 RX ADMIN — Medication 0.5 MILLIGRAM(S): at 05:49

## 2019-12-01 RX ADMIN — Medication 0.5 MILLIGRAM(S): at 17:57

## 2019-12-01 RX ADMIN — HYDROMORPHONE HYDROCHLORIDE 0.5 MILLIGRAM(S): 2 INJECTION INTRAMUSCULAR; INTRAVENOUS; SUBCUTANEOUS at 06:45

## 2019-12-01 RX ADMIN — HYDROMORPHONE HYDROCHLORIDE 0.5 MILLIGRAM(S): 2 INJECTION INTRAMUSCULAR; INTRAVENOUS; SUBCUTANEOUS at 07:00

## 2019-12-01 RX ADMIN — SODIUM CHLORIDE 75 MILLILITER(S): 9 INJECTION, SOLUTION INTRAVENOUS at 12:30

## 2019-12-01 RX ADMIN — PIPERACILLIN AND TAZOBACTAM 25 GRAM(S): 4; .5 INJECTION, POWDER, LYOPHILIZED, FOR SOLUTION INTRAVENOUS at 05:55

## 2019-12-01 RX ADMIN — Medication 400 MILLIGRAM(S): at 15:30

## 2019-12-01 RX ADMIN — Medication 3 MILLILITER(S): at 11:57

## 2019-12-01 RX ADMIN — Medication 1000 MILLIGRAM(S): at 16:25

## 2019-12-01 RX ADMIN — HYDROMORPHONE HYDROCHLORIDE 0.5 MILLIGRAM(S): 2 INJECTION INTRAMUSCULAR; INTRAVENOUS; SUBCUTANEOUS at 16:10

## 2019-12-01 RX ADMIN — HYDROMORPHONE HYDROCHLORIDE 0.5 MILLIGRAM(S): 2 INJECTION INTRAMUSCULAR; INTRAVENOUS; SUBCUTANEOUS at 20:30

## 2019-12-01 NOTE — PROGRESS NOTE ADULT - SUBJECTIVE AND OBJECTIVE BOX
CARDIOLOGY     PROGRESS  NOTE   ________________________________________________    CHIEF COMPLAINT:Patient is a 64y old  Female who presents with a chief complaint of abdominal pain (28 Nov 2019 10:50)  doing better.  	  REVIEW OF SYSTEMS:  CONSTITUTIONAL: No fever, weight loss, or fatigue  EYES: No eye pain, visual disturbances, or discharge  ENT:  No difficulty hearing, tinnitus, vertigo; No sinus or throat pain  NECK: No pain or stiffness  RESPIRATORY: No cough, wheezing, chills or hemoptysis; +Shortness of Breath  CARDIOVASCULAR: No chest pain, palpitations, passing out, dizziness, or leg swelling  GASTROINTESTINAL: No abdominal or epigastric pain. No nausea, vomiting, or hematemesis; No diarrhea or constipation. No melena or hematochezia.  GENITOURINARY: No dysuria, frequency, hematuria, or incontinence  NEUROLOGICAL: No headaches, memory loss, loss of strength, numbness, or tremors  SKIN: No itching, burning, rashes, or lesions   LYMPH Nodes: No enlarged glands  ENDOCRINE: No heat or cold intolerance; No hair loss  MUSCULOSKELETAL: No joint pain or swelling; No muscle, back, or extremity pain  PSYCHIATRIC: No depression, anxiety, mood swings, or difficulty sleeping  HEME/LYMPH: No easy bruising, or bleeding gums  ALLERGY AND IMMUNOLOGIC: No hives or eczema	    [ ] All others negative	  [ ] Unable to obtain    PHYSICAL EXAM:  T(C): 36.6 (12-01-19 @ 08:00), Max: 37 (11-30-19 @ 23:00)  HR: 81 (12-01-19 @ 10:00) (80 - 95)  BP: 97/63 (12-01-19 @ 10:00) (84/55 - 114/65)  RR: 22 (12-01-19 @ 10:00) (19 - 32)  SpO2: 95% (12-01-19 @ 10:00) (88% - 99%)  Wt(kg): --  I&O's Summary    30 Nov 2019 07:01  -  01 Dec 2019 07:00  --------------------------------------------------------  IN: 2250 mL / OUT: 840 mL / NET: 1410 mL    01 Dec 2019 07:01  -  01 Dec 2019 10:56  --------------------------------------------------------  IN: 800 mL / OUT: 120 mL / NET: 680 mL        Appearance: Normal	  HEENT:   Normal oral mucosa, PERRL, EOMI	  Lymphatic: No lymphadenopathy  Cardiovascular: Normal S1 S2, No JVD, No murmurs, No edema  Respiratory: Lungs clear to auscultation	  Psychiatry: A & O x 3, Mood & affect appropriate  Gastrointestinal:  Soft, Non-tender, + BS	  Skin: No rashes, No ecchymoses, No cyanosis	  Neurologic: Non-focal  Extremities: Normal range of motion, No clubbing, cyanosis or edema  Vascular: Peripheral pulses palpable 2+ bilaterally    MEDICATIONS  (STANDING):  albuterol/ipratropium for Nebulization 3 milliLiter(s) Nebulizer every 6 hours  buDESOnide    Inhalation Suspension 0.5 milliGRAM(s) Inhalation every 12 hours  chlorhexidine 2% Cloths 1 Application(s) Topical daily  enoxaparin Injectable 100 milliGRAM(s) SubCutaneous <User Schedule>  influenza   Vaccine 0.5 milliLiter(s) IntraMuscular once  lactated ringers. 1000 milliLiter(s) (75 mL/Hr) IV Continuous <Continuous>  methadone Injectable 8.5 milliGRAM(s) IV Push daily  pantoprazole  Injectable 40 milliGRAM(s) IV Push every 24 hours  piperacillin/tazobactam IVPB.. 3.375 Gram(s) IV Intermittent every 8 hours      TELEMETRY: 	    ECG:  	  RADIOLOGY:  OTHER: 	  	  LABS:	 	    CARDIAC MARKERS:  CARDIAC MARKERS ( 30 Nov 2019 00:53 )  x     / x     / 91 U/L / x     / x                                    10.3   15.68 )-----------( 340      ( 01 Dec 2019 04:02 )             31.0     12-01    135  |  100  |  15  ----------------------------<  118<H>  4.2   |  24  |  0.55    Ca    8.3<L>      01 Dec 2019 03:38  Phos  1.7     12-01  Mg     2.1     12-01    TPro  5.7<L>  /  Alb  2.3<L>  /  TBili  0.8  /  DBili  x   /  AST  31  /  ALT  23  /  AlkPhos  90  11-29    proBNP:   Lipid Profile:   HgA1c:   TSH:   PT/INR - ( 01 Dec 2019 04:16 )   PT: 13.6 sec;   INR: 1.18 ratio         PTT - ( 01 Dec 2019 04:16 )  PTT:25.2 sec      Assessment and plan  ---------------------------  doing well extubated  continue abx  hold all bp meds  continue pressors  dvt prophylaxis

## 2019-12-01 NOTE — PROGRESS NOTE ADULT - SUBJECTIVE AND OBJECTIVE BOX
GENERAL SURGERY PROGRESS NOTE    SUBJECTIVE  Patient seen and examined. Extubated, refusing BIPAP. NPO without nausea, vomiting, -/- ostomy, voiding without issues. Denies fever, chills, SOB, chest pain.         OBJECTIVE    PHYSICAL EXAM  General: Appears well, NAD  CHEST: breathing comfortably  CV: appears well perfused  Abdomen: soft, nontender, nondistended, no rebound or guarding, ostomy pink viable -/- , OVI x 1 SS  Extremities: Grossly symmetric, BLE lymphedema    T(C): 36.9 (12-01-19 @ 03:00), Max: 37 (11-30-19 @ 23:00)  HR: 80 (12-01-19 @ 06:00) (80 - 96)  BP: 93/60 (12-01-19 @ 06:00) (84/55 - 115/77)  RR: 23 (12-01-19 @ 06:00) (19 - 32)  SpO2: 98% (12-01-19 @ 06:00) (88% - 99%)    11-29-19 @ 07:01  -  11-30-19 @ 07:00  --------------------------------------------------------  IN: 2279 mL / OUT: 1405 mL / NET: 874 mL    11-30-19 @ 07:01  -  12-01-19 @ 06:27  --------------------------------------------------------  IN: 1825 mL / OUT: 780 mL / NET: 1045 mL        MEDICATIONS  albuterol/ipratropium for Nebulization 3 milliLiter(s) Nebulizer every 6 hours  buDESOnide    Inhalation Suspension 0.5 milliGRAM(s) Inhalation every 12 hours  chlorhexidine 2% Cloths 1 Application(s) Topical daily  enoxaparin Injectable 100 milliGRAM(s) SubCutaneous <User Schedule>  HYDROmorphone  Injectable 0.5 milliGRAM(s) IV Push every 3 hours PRN  influenza   Vaccine 0.5 milliLiter(s) IntraMuscular once  lactated ringers. 1000 milliLiter(s) IV Continuous <Continuous>  methadone Injectable 8.5 milliGRAM(s) IV Push daily  pantoprazole  Injectable 40 milliGRAM(s) IV Push every 24 hours  piperacillin/tazobactam IVPB.. 3.375 Gram(s) IV Intermittent every 8 hours      LABS                        10.3   15.68 )-----------( 340      ( 01 Dec 2019 04:02 )             31.0     12-01    135  |  100  |  15  ----------------------------<  118<H>  4.2   |  24  |  0.55    Ca    8.3<L>      01 Dec 2019 03:38  Phos  1.7     12-01  Mg     2.1     12-01    TPro  5.7<L>  /  Alb  2.3<L>  /  TBili  0.8  /  DBili  x   /  AST  31  /  ALT  23  /  AlkPhos  90  11-29    PT/INR - ( 01 Dec 2019 04:16 )   PT: 13.6 sec;   INR: 1.18 ratio         PTT - ( 01 Dec 2019 04:16 )  PTT:25.2 sec      RADIOLOGY & ADDITIONAL STUDIES GENERAL SURGERY PROGRESS NOTE    SUBJECTIVE  Patient seen and examined. Extubated, refusing BIPAP. NPO without nausea, vomiting, -/- ostomy, voiding without issues. Denies fever, chills, SOB, chest pain.         OBJECTIVE    PHYSICAL EXAM  General: Appears well, NAD  CHEST: breathing comfortably  CV: appears well perfused  Abdomen: soft, nontender, nondistended, no rebound or guarding, ostomy dusky appearing -/- , OVI x 1 SS  Extremities: Grossly symmetric, BLE lymphedema    T(C): 36.9 (12-01-19 @ 03:00), Max: 37 (11-30-19 @ 23:00)  HR: 80 (12-01-19 @ 06:00) (80 - 96)  BP: 93/60 (12-01-19 @ 06:00) (84/55 - 115/77)  RR: 23 (12-01-19 @ 06:00) (19 - 32)  SpO2: 98% (12-01-19 @ 06:00) (88% - 99%)    11-29-19 @ 07:01  -  11-30-19 @ 07:00  --------------------------------------------------------  IN: 2279 mL / OUT: 1405 mL / NET: 874 mL    11-30-19 @ 07:01  -  12-01-19 @ 06:27  --------------------------------------------------------  IN: 1825 mL / OUT: 780 mL / NET: 1045 mL        MEDICATIONS  albuterol/ipratropium for Nebulization 3 milliLiter(s) Nebulizer every 6 hours  buDESOnide    Inhalation Suspension 0.5 milliGRAM(s) Inhalation every 12 hours  chlorhexidine 2% Cloths 1 Application(s) Topical daily  enoxaparin Injectable 100 milliGRAM(s) SubCutaneous <User Schedule>  HYDROmorphone  Injectable 0.5 milliGRAM(s) IV Push every 3 hours PRN  influenza   Vaccine 0.5 milliLiter(s) IntraMuscular once  lactated ringers. 1000 milliLiter(s) IV Continuous <Continuous>  methadone Injectable 8.5 milliGRAM(s) IV Push daily  pantoprazole  Injectable 40 milliGRAM(s) IV Push every 24 hours  piperacillin/tazobactam IVPB.. 3.375 Gram(s) IV Intermittent every 8 hours      LABS                        10.3   15.68 )-----------( 340      ( 01 Dec 2019 04:02 )             31.0     12-01    135  |  100  |  15  ----------------------------<  118<H>  4.2   |  24  |  0.55    Ca    8.3<L>      01 Dec 2019 03:38  Phos  1.7     12-01  Mg     2.1     12-01    TPro  5.7<L>  /  Alb  2.3<L>  /  TBili  0.8  /  DBili  x   /  AST  31  /  ALT  23  /  AlkPhos  90  11-29    PT/INR - ( 01 Dec 2019 04:16 )   PT: 13.6 sec;   INR: 1.18 ratio         PTT - ( 01 Dec 2019 04:16 )  PTT:25.2 sec      RADIOLOGY & ADDITIONAL STUDIES

## 2019-12-01 NOTE — PROGRESS NOTE ADULT - ASSESSMENT
Assessment	  64F with perforated sigmoid diverticulitis s/p exploratory laparotomy with extended left hemicolectomy, left in discontinuity w/ Abthera VAC (11/26), return to OR on 11/29 for transverse end colostomy and fascial closure with wound vac.    PLAN:  NEURO: sedation while intubated and postoperative pain control, chronic opioid use  - Continue pain control with PRN Dilaudid  - Methadone 8.5 mg IV qd  - Ativan 1mg q3 PRN    RESPIRATORY: Hx of COPD, acute hypoxemic respiratory failure, RUL & LLL infiltrates  - Extubated. On NC 3 LPM.   - ABGs with AM labs, CXR  - Duonebs and Pulmicort  - Combicath resulted    CARDIOVASCULAR: Hx of HTN on amlodipine at home, No hx of CAD  - Remains off vasopressor support  - Monitor vital signs and I&Os  - Hold home antihypertensives    GI/NUTRITION: S/p extended left hemicolectomy and was left in discontinuity with Abthera in place. s/p RTOR for end colostomy, fascial closure. Hx of obesity, Hx of GERD  - NPO w/ NGT  - Protonix for GERD    GENITOURINARY/RENAL: no acute issues  - Monitoring fluid status closely, Ross, I&Os  - LR @  75ml/hr.    HEMATOLOGIC: no acute issues  - Trend daily CBC and coags  - Continue DVT ppx with Lovenox dosed by patient weight    INFECTIOUS DISEASE: abdominal sepsis, OR culture with E. coli & Peptostreptococcus anaerobius  - Continue Zosyn  - Trend WBC and fever curve    ENDOCRINE: No active issues    DISPO: SICU

## 2019-12-01 NOTE — PROGRESS NOTE ADULT - ATTENDING COMMENTS
I have seen and examined the patient.  I agree with the surgical resident's note.  The major issue at the present time his stoma which has a dusky appearance. Only time will tell whether this is just mucosal or full-thickness needs to be observed closely.    Clarke Mak contact information [phone] I have seen and examined the patient.  I agree with the surgical resident's note.  The major issue at the present time his stoma which has a dusky appearance. Only time will tell whether this is just mucosal or full-thickness needs to be observed closely.  Vac OK    Clarke Mak contact information [phone]

## 2019-12-01 NOTE — PROGRESS NOTE ADULT - ASSESSMENT
A/P: 64y Female POD#1 s/p ex lap with extended left hemicolectomy left in discontinuity for perforated sigmoid diverticulitis with intraperitoneal free air after presenting to ED with worsening constipation and abdominal pain with distention. AbThera vac placed and pt kept intubated post op and transferred to SICU for post-op management. Extubated 11/30, patient refusing BIPAP.    Plan:  - NPO  - monitor U/O   - monitor vac output  - c/w pain control PRN dilaudid with IV tylenol, c/w methadone   - DVT ppx with lovenox, sequential compression   - appreciate sicu care  - c/w zosyn    p [id number]

## 2019-12-01 NOTE — PROGRESS NOTE ADULT - SUBJECTIVE AND OBJECTIVE BOX
HISTORY: 64F with pmhx morbid obesity, acid reflux, HTN, COPD and PSH D&C presented to the ED on 11/26 c/o abdominal pain and bloating. Patient reports having constipation for 2 weeks and has been taking enemas, miralax and senna and passing small hard balls for the last weeks. Reported pain worst in the LLQ that started a few days ago and has been worsening in the last day, also has noted significant abdominal distention in the last day. Reported she had difficulty walking short distances because she becomes SOB. In ED, CT demonstrated perforated sigmoid diverticulitis with intraperitoneal free air. She was taken to the operating room on 11/25 overnight and underwent an exploratory laparotomy with extended left hemicolectomy and was left in discontinuity. An Abthera VAC was placed. She was kept intubated post op and brought to the ICU for hemodynamic and respiratory management. She went back to the OR on 11/29 for transverse end colostomy and fascial closure with wound vac.    24 HOUR EVENTS:   - s/p extubation  - pt refusing bipap       SUBJECTIVE/ROS:  [ ] A ten-point review of systems was otherwise negative except as noted.  [ ] Due to altered mental status/intubation, subjective information were not able to be obtained from the patient. History was obtained, to the extent possible, from review of the chart and collateral sources of information.      NEURO  RASS:     GCS:     CAM ICU:  Exam: awake, alert, oriented  Meds: HYDROmorphone  Injectable 0.5 milliGRAM(s) IV Push every 3 hours PRN Pain  methadone Injectable 8.5 milliGRAM(s) IV Push daily    [x] Adequacy of sedation and pain control has been assessed and adjusted      RESPIRATORY  RR: 23 (12-01-19 @ 00:00) (18 - 32)  SpO2: 99% (12-01-19 @ 00:35) (88% - 99%)  Wt(kg): --  Exam: unlabored, clear to auscultation bilaterally  Mechanical Ventilation: Mode: OFF, RR (patient): 21, FiO2: 40  ABG - ( 30 Nov 2019 16:17 )  pH: 7.46  /  pCO2: 39    /  pO2: 95    / HCO3: 27    / Base Excess: 3.4   /  SaO2: 98      Lactate: x                [N/A] Extubation Readiness Assessed  Meds: albuterol/ipratropium for Nebulization 3 milliLiter(s) Nebulizer every 6 hours  buDESOnide    Inhalation Suspension 0.5 milliGRAM(s) Inhalation every 12 hours        CARDIOVASCULAR  HR: 84 (12-01-19 @ 00:35) (83 - 100)  BP: 92/54 (12-01-19 @ 00:00) (84/55 - 115/77)  BP(mean): 69 (12-01-19 @ 00:00) (65 - 92)  ABP: --  ABP(mean): --  Wt(kg): --  CVP(cm H2O): --  VBG - ( 29 Nov 2019 00:43 )  pH: 7.40  /  pCO2: 47    /  pO2: 40    / HCO3: 28    / Base Excess: 3.0   /  SaO2: 72     Lactate: 1.0                Exam: regular rate and rhythm  Cardiac Rhythm: sinus  Perfusion     [x]Adequate   [ ]Inadequate  Mentation   [x]Normal       [ ]Reduced  Extremities  [x]Warm         [ ]Cool  Volume Status [ ]Hypervolemic [x]Euvolemic [ ]Hypovolemic  Meds:       GI/NUTRITION  Exam: soft, nontender, nondistended, incision C/D/I  Diet:  Meds: pantoprazole  Injectable 40 milliGRAM(s) IV Push every 24 hours      GENITOURINARY  I&O's Detail    11-29 @ 07:01  -  11-30 @ 07:00  --------------------------------------------------------  IN:    dexmedetomidine Infusion: 29 mL    lactated ringers.: 200 mL    lactated ringers.: 500 mL    lactated ringers.: 850 mL    Solution: 175 mL    Solution: 525 mL  Total IN: 2279 mL    OUT:    Bulb: 150 mL    Indwelling Catheter - Urethral: 1055 mL    Nasoenteral Tube: 50 mL    VAC (Vacuum Assisted Closure) System: 150 mL  Total OUT: 1405 mL    Total NET: 874 mL      11-30 @ 07:01  -  12-01 @ 00:37  --------------------------------------------------------  IN:    lactated ringers.: 825 mL    Solution: 50 mL    Solution: 100 mL  Total IN: 975 mL    OUT:    Bulb: 30 mL    Ileostomy: 10 mL    Indwelling Catheter - Urethral: 370 mL    Nasoenteral Tube: 100 mL  Total OUT: 510 mL    Total NET: 465 mL          11-30    135  |  98  |  12  ----------------------------<  110<H>  4.5   |  23  |  0.59    Ca    8.1<L>      30 Nov 2019 00:53  Phos  2.5     11-30  Mg     2.0     11-30    TPro  5.7<L>  /  Alb  2.3<L>  /  TBili  0.8  /  DBili  x   /  AST  31  /  ALT  23  /  AlkPhos  90  11-29    [ ] Ross catheter, indication: N/A  Meds: lactated ringers. 1000 milliLiter(s) IV Continuous <Continuous>        HEMATOLOGIC  Meds: enoxaparin Injectable 100 milliGRAM(s) SubCutaneous <User Schedule>    [x] VTE Prophylaxis                        11.5   18.73 )-----------( 378      ( 30 Nov 2019 00:53 )             35.4     PT/INR - ( 30 Nov 2019 00:53 )   PT: 13.3 sec;   INR: 1.16 ratio         PTT - ( 30 Nov 2019 00:53 )  PTT:23.6 sec  Transfusion     [ ] PRBC   [ ] Platelets   [ ] FFP   [ ] Cryoprecipitate      INFECTIOUS DISEASES  WBC Count: 18.73 K/uL (11-30 @ 00:53)    RECENT CULTURES:  Specimen Source: Bronch Wash Combicath  Date/Time: 11-30 @ 06:34  Culture Results: --  Gram Stain:   Moderate polymorphonuclear leukocytes per low power field  Rare Squamous epithelial cells per low power field  Few Gram Negative Rods per oil power field  Organism: --  Specimen Source: .Surgical Swab peritoneal fluid  Date/Time: 11-26 @ 17:30  Culture Results:   Rare Escherichia coli  Rare Peptostreptococcus anaerobius "Susceptibilities not performed"  Gram Stain: --  Organism: Escherichia coli    Meds: influenza   Vaccine 0.5 milliLiter(s) IntraMuscular once  piperacillin/tazobactam IVPB.. 3.375 Gram(s) IV Intermittent every 8 hours        ENDOCRINE  CAPILLARY BLOOD GLUCOSE        Meds:       ACCESS DEVICES:  [ ] Peripheral IV  [ ] Central Venous Line	[ ] R	[ ] L	[ ] IJ	[ ] Fem	[ ] SC	Placed:   [ ] Arterial Line		[ ] R	[ ] L	[ ] Fem	[ ] Rad	[ ] Ax	Placed:   [ ] PICC:					[ ] Mediport  [ ] Urinary Catheter, Date Placed:   [x] Necessity of urinary, arterial, and venous catheters discussed    OTHER MEDICATIONS:  chlorhexidine 2% Cloths 1 Application(s) Topical daily      CODE STATUS:      IMAGING: HISTORY: 64F with pmhx morbid obesity, acid reflux, HTN, COPD and PSH D&C presented to the ED on 11/26 c/o abdominal pain and bloating. Patient reports having constipation for 2 weeks and has been taking enemas, miralax and senna and passing small hard balls for the last weeks. Reported pain worst in the LLQ that started a few days ago and has been worsening in the last day, also has noted significant abdominal distention in the last day. Reported she had difficulty walking short distances because she becomes SOB. In ED, CT demonstrated perforated sigmoid diverticulitis with intraperitoneal free air. She was taken to the operating room on 11/25 overnight and underwent an exploratory laparotomy with extended left hemicolectomy and was left in discontinuity. An Abthera VAC was placed. She was kept intubated post op and brought to the ICU for hemodynamic and respiratory management. She went back to the OR on 11/29 for transverse end colostomy and fascial closure with wound vac.    24 HOUR EVENTS:   - s/p extubation  - pt refusing bipap       SUBJECTIVE/ROS:  [x ] A ten-point review of systems was otherwise negative except as noted.  [ ] Due to altered mental status/intubation, subjective information were not able to be obtained from the patient. History was obtained, to the extent possible, from review of the chart and collateral sources of information.      NEURO  Exam: awake, alert, oriented  Meds: HYDROmorphone  Injectable 0.5 milliGRAM(s) IV Push every 3 hours PRN Pain  methadone Injectable 8.5 milliGRAM(s) IV Push daily    [x] Adequacy of sedation and pain control has been assessed and adjusted      RESPIRATORY  RR: 23 (12-01-19 @ 00:00) (18 - 32)  SpO2: 99% (12-01-19 @ 00:35) (88% - 99%)  Exam: unlabored, clear to auscultation bilaterally  Mechanical Ventilation: Mode: OFF, RR (patient): 21, FiO2: 40  ABG - ( 30 Nov 2019 16:17 )  pH: 7.46  /  pCO2: 39    /  pO2: 95    / HCO3: 27    / Base Excess: 3.4   /  SaO2: 98      Blood Gas Arterial, Lactate: 1.3 mmol/L (11.30.19 @ 16:17)    [N/A] Extubation Readiness Assessed  Meds: albuterol/ipratropium for Nebulization 3 milliLiter(s) Nebulizer every 6 hours  buDESOnide    Inhalation Suspension 0.5 milliGRAM(s) Inhalation every 12 hours        CARDIOVASCULAR  HR: 84 (12-01-19 @ 00:35) (83 - 100)  BP: 92/54 (12-01-19 @ 00:00) (84/55 - 115/77)  BP(mean): 69 (12-01-19 @ 00:00) (65 - 92)  VBG - ( 29 Nov 2019 00:43 )  pH: 7.40  /  pCO2: 47    /  pO2: 40    / HCO3: 28    / Base Excess: 3.0   /  SaO2: 72     Lactate: 1.0      Exam: regular rate and rhythm  Cardiac Rhythm: sinus  Perfusion     [x]Adequate   [ ]Inadequate  Mentation   [x]Normal       [ ]Reduced  Extremities  [x]Warm         [ ]Cool  Volume Status [ ]Hypervolemic [x]Euvolemic [ ]Hypovolemic  Meds: x      GI/NUTRITION  Exam: soft, nontender, nondistended, obese, ostomy pink, vac in place  Diet: NPO  Meds: pantoprazole  Injectable 40 milliGRAM(s) IV Push every 24 hours      GENITOURINARY  I&O's Detail    11-29 @ 07:01  -  11-30 @ 07:00  --------------------------------------------------------  IN:    dexmedetomidine Infusion: 29 mL    lactated ringers.: 200 mL    lactated ringers.: 500 mL    lactated ringers.: 850 mL    Solution: 175 mL    Solution: 525 mL  Total IN: 2279 mL    OUT:    Bulb: 150 mL    Indwelling Catheter - Urethral: 1055 mL    Nasoenteral Tube: 50 mL    VAC (Vacuum Assisted Closure) System: 150 mL  Total OUT: 1405 mL    Total NET: 874 mL      11-30 @ 07:01  -  12-01 @ 00:37  --------------------------------------------------------  IN:    lactated ringers.: 825 mL    Solution: 50 mL    Solution: 100 mL  Total IN: 975 mL    OUT:    Bulb: 30 mL    Ileostomy: 10 mL    Indwelling Catheter - Urethral: 370 mL    Nasoenteral Tube: 100 mL  Total OUT: 510 mL    Total NET: 465 mL          11-30    135  |  98  |  12  ----------------------------<  110<H>  4.5   |  23  |  0.59    Ca    8.1<L>      30 Nov 2019 00:53  Phos  2.5     11-30  Mg     2.0     11-30    TPro  5.7<L>  /  Alb  2.3<L>  /  TBili  0.8  /  DBili  x   /  AST  31  /  ALT  23  /  AlkPhos  90  11-29    [x ] Ross catheter, indication: monitoring  Meds: lactated ringers. 1000 milliLiter(s) IV Continuous <Continuous>        HEMATOLOGIC  Meds: enoxaparin Injectable 100 milliGRAM(s) SubCutaneous <User Schedule>    [x] VTE Prophylaxis                        11.5   18.73 )-----------( 378      ( 30 Nov 2019 00:53 )             35.4     PT/INR - ( 30 Nov 2019 00:53 )   PT: 13.3 sec;   INR: 1.16 ratio         PTT - ( 30 Nov 2019 00:53 )  PTT:23.6 sec  Transfusion     [ ] PRBC   [ ] Platelets   [ ] FFP   [ ] Cryoprecipitate      INFECTIOUS DISEASES  WBC Count: 18.73 K/uL (11-30 @ 00:53)    RECENT CULTURES:  Specimen Source: Bronch Wash Combicath  Date/Time: 11-30 @ 06:34  Culture Results: --  Gram Stain:   Moderate polymorphonuclear leukocytes per low power field  Rare Squamous epithelial cells per low power field  Few Gram Negative Rods per oil power field  Organism: --  Specimen Source: .Surgical Swab peritoneal fluid  Date/Time: 11-26 @ 17:30  Culture Results:   Rare Escherichia coli  Rare Peptostreptococcus anaerobius "Susceptibilities not performed"  Gram Stain: --  Organism: Escherichia coli    Meds: influenza   Vaccine 0.5 milliLiter(s) IntraMuscular once  piperacillin/tazobactam IVPB.. 3.375 Gram(s) IV Intermittent every 8 hours        ENDOCRINE  Meds: x      ACCESS DEVICES:  [x ] Peripheral IV  [x ] Central Venous Line	[ ] R	[x ] L	[ x] IJ	[ ] Fem	[ ] SC	Placed: 11/26  [ ] Arterial Line		[ ] R	[ ] L	[ ] Fem	[ ] Rad	[ ] Ax	Placed:   [ ] PICC:					[ ] Mediport  [ x] Urinary Catheter, Date Placed: 11/26  [x] Necessity of urinary, arterial, and venous catheters discussed    OTHER MEDICATIONS:  chlorhexidine 2% Cloths 1 Application(s) Topical daily      CODE STATUS: full code      IMAGING: x

## 2019-12-02 LAB
-  AMIKACIN: SIGNIFICANT CHANGE UP
-  AMOXICILLIN/CLAVULANIC ACID: SIGNIFICANT CHANGE UP
-  AMPICILLIN/SULBACTAM: SIGNIFICANT CHANGE UP
-  AMPICILLIN: SIGNIFICANT CHANGE UP
-  AZTREONAM: SIGNIFICANT CHANGE UP
-  CEFAZOLIN: SIGNIFICANT CHANGE UP
-  CEFEPIME: SIGNIFICANT CHANGE UP
-  CEFOXITIN: SIGNIFICANT CHANGE UP
-  CEFTRIAXONE: SIGNIFICANT CHANGE UP
-  CIPROFLOXACIN: SIGNIFICANT CHANGE UP
-  ERTAPENEM: SIGNIFICANT CHANGE UP
-  GENTAMICIN: SIGNIFICANT CHANGE UP
-  IMIPENEM: SIGNIFICANT CHANGE UP
-  LEVOFLOXACIN: SIGNIFICANT CHANGE UP
-  MEROPENEM: SIGNIFICANT CHANGE UP
-  PIPERACILLIN/TAZOBACTAM: SIGNIFICANT CHANGE UP
-  TOBRAMYCIN: SIGNIFICANT CHANGE UP
-  TRIMETHOPRIM/SULFAMETHOXAZOLE: SIGNIFICANT CHANGE UP
ANION GAP SERPL CALC-SCNC: 11 MMOL/L — SIGNIFICANT CHANGE UP (ref 5–17)
APPEARANCE UR: CLEAR — SIGNIFICANT CHANGE UP
APTT BLD: 27.4 SEC — LOW (ref 27.5–36.3)
BILIRUB UR-MCNC: NEGATIVE — SIGNIFICANT CHANGE UP
BUN SERPL-MCNC: 12 MG/DL — SIGNIFICANT CHANGE UP (ref 7–23)
CALCIUM SERPL-MCNC: 8.1 MG/DL — LOW (ref 8.4–10.5)
CHLORIDE SERPL-SCNC: 101 MMOL/L — SIGNIFICANT CHANGE UP (ref 96–108)
CO2 SERPL-SCNC: 25 MMOL/L — SIGNIFICANT CHANGE UP (ref 22–31)
COLOR SPEC: YELLOW — SIGNIFICANT CHANGE UP
CREAT ?TM UR-MCNC: 152 MG/DL — SIGNIFICANT CHANGE UP
CREAT SERPL-MCNC: 0.55 MG/DL — SIGNIFICANT CHANGE UP (ref 0.5–1.3)
CULTURE RESULTS: SIGNIFICANT CHANGE UP
DIFF PNL FLD: NEGATIVE — SIGNIFICANT CHANGE UP
GLUCOSE SERPL-MCNC: 129 MG/DL — HIGH (ref 70–99)
GLUCOSE UR QL: NEGATIVE — SIGNIFICANT CHANGE UP
HCT VFR BLD CALC: 28.7 % — LOW (ref 34.5–45)
HGB BLD-MCNC: 9.5 G/DL — LOW (ref 11.5–15.5)
INR BLD: 1.08 RATIO — SIGNIFICANT CHANGE UP (ref 0.88–1.16)
KETONES UR-MCNC: SIGNIFICANT CHANGE UP
LEUKOCYTE ESTERASE UR-ACNC: ABNORMAL
MAGNESIUM SERPL-MCNC: 2 MG/DL — SIGNIFICANT CHANGE UP (ref 1.6–2.6)
MCHC RBC-ENTMCNC: 30.4 PG — SIGNIFICANT CHANGE UP (ref 27–34)
MCHC RBC-ENTMCNC: 33.1 GM/DL — SIGNIFICANT CHANGE UP (ref 32–36)
MCV RBC AUTO: 91.7 FL — SIGNIFICANT CHANGE UP (ref 80–100)
METHOD TYPE: SIGNIFICANT CHANGE UP
NITRITE UR-MCNC: NEGATIVE — SIGNIFICANT CHANGE UP
NRBC # BLD: 0 /100 WBCS — SIGNIFICANT CHANGE UP (ref 0–0)
ORGANISM # SPEC MICROSCOPIC CNT: SIGNIFICANT CHANGE UP
ORGANISM # SPEC MICROSCOPIC CNT: SIGNIFICANT CHANGE UP
OSMOLALITY UR: 703 MOS/KG — SIGNIFICANT CHANGE UP (ref 300–900)
PH UR: 6 — SIGNIFICANT CHANGE UP (ref 5–8)
PHOSPHATE SERPL-MCNC: 2.3 MG/DL — LOW (ref 2.5–4.5)
PLATELET # BLD AUTO: 284 K/UL — SIGNIFICANT CHANGE UP (ref 150–400)
POTASSIUM SERPL-MCNC: 3.6 MMOL/L — SIGNIFICANT CHANGE UP (ref 3.5–5.3)
POTASSIUM SERPL-SCNC: 3.6 MMOL/L — SIGNIFICANT CHANGE UP (ref 3.5–5.3)
PROT UR-MCNC: ABNORMAL
PROTHROM AB SERPL-ACNC: 12.3 SEC — SIGNIFICANT CHANGE UP (ref 10–12.9)
RBC # BLD: 3.13 M/UL — LOW (ref 3.8–5.2)
RBC # FLD: 13.9 % — SIGNIFICANT CHANGE UP (ref 10.3–14.5)
SODIUM SERPL-SCNC: 137 MMOL/L — SIGNIFICANT CHANGE UP (ref 135–145)
SODIUM UR-SCNC: <35 MMOL/L — SIGNIFICANT CHANGE UP
SP GR SPEC: 1.04 — HIGH (ref 1.01–1.02)
SPECIMEN SOURCE: SIGNIFICANT CHANGE UP
SURGICAL PATHOLOGY STUDY: SIGNIFICANT CHANGE UP
UROBILINOGEN FLD QL: NEGATIVE — SIGNIFICANT CHANGE UP
WBC # BLD: 13.59 K/UL — HIGH (ref 3.8–10.5)
WBC # FLD AUTO: 13.59 K/UL — HIGH (ref 3.8–10.5)

## 2019-12-02 PROCEDURE — 71045 X-RAY EXAM CHEST 1 VIEW: CPT | Mod: 26

## 2019-12-02 PROCEDURE — 99233 SBSQ HOSP IP/OBS HIGH 50: CPT

## 2019-12-02 RX ORDER — ALBUMIN HUMAN 25 %
500 VIAL (ML) INTRAVENOUS ONCE
Refills: 0 | Status: COMPLETED | OUTPATIENT
Start: 2019-12-02 | End: 2019-12-03

## 2019-12-02 RX ORDER — ALBUMIN HUMAN 25 %
500 VIAL (ML) INTRAVENOUS ONCE
Refills: 0 | Status: COMPLETED | OUTPATIENT
Start: 2019-12-02 | End: 2019-12-02

## 2019-12-02 RX ORDER — NYSTATIN CREAM 100000 [USP'U]/G
1 CREAM TOPICAL
Refills: 0 | Status: DISCONTINUED | OUTPATIENT
Start: 2019-12-02 | End: 2020-02-05

## 2019-12-02 RX ORDER — DOXAZOSIN MESYLATE 4 MG
2 TABLET ORAL ONCE
Refills: 0 | Status: COMPLETED | OUTPATIENT
Start: 2019-12-02 | End: 2019-12-02

## 2019-12-02 RX ORDER — DOXAZOSIN MESYLATE 4 MG
2 TABLET ORAL AT BEDTIME
Refills: 0 | Status: DISCONTINUED | OUTPATIENT
Start: 2019-12-02 | End: 2019-12-02

## 2019-12-02 RX ORDER — ESOMEPRAZOLE MAGNESIUM 40 MG/1
1 CAPSULE, DELAYED RELEASE ORAL
Qty: 0 | Refills: 0 | DISCHARGE

## 2019-12-02 RX ORDER — POTASSIUM CHLORIDE 20 MEQ
10 PACKET (EA) ORAL
Refills: 0 | Status: COMPLETED | OUTPATIENT
Start: 2019-12-02 | End: 2019-12-02

## 2019-12-02 RX ORDER — ACETAMINOPHEN 500 MG
1000 TABLET ORAL ONCE
Refills: 0 | Status: COMPLETED | OUTPATIENT
Start: 2019-12-02 | End: 2019-12-02

## 2019-12-02 RX ORDER — FLUCONAZOLE 150 MG/1
200 TABLET ORAL EVERY 24 HOURS
Refills: 0 | Status: DISCONTINUED | OUTPATIENT
Start: 2019-12-03 | End: 2019-12-04

## 2019-12-02 RX ORDER — FLUCONAZOLE 150 MG/1
TABLET ORAL
Refills: 0 | Status: DISCONTINUED | OUTPATIENT
Start: 2019-12-02 | End: 2019-12-04

## 2019-12-02 RX ORDER — FLUCONAZOLE 150 MG/1
400 TABLET ORAL ONCE
Refills: 0 | Status: COMPLETED | OUTPATIENT
Start: 2019-12-02 | End: 2019-12-02

## 2019-12-02 RX ORDER — POTASSIUM PHOSPHATE, MONOBASIC POTASSIUM PHOSPHATE, DIBASIC 236; 224 MG/ML; MG/ML
30 INJECTION, SOLUTION INTRAVENOUS ONCE
Refills: 0 | Status: COMPLETED | OUTPATIENT
Start: 2019-12-02 | End: 2019-12-02

## 2019-12-02 RX ADMIN — SODIUM CHLORIDE 75 MILLILITER(S): 9 INJECTION, SOLUTION INTRAVENOUS at 12:01

## 2019-12-02 RX ADMIN — Medication 400 MILLIGRAM(S): at 01:50

## 2019-12-02 RX ADMIN — ENOXAPARIN SODIUM 100 MILLIGRAM(S): 100 INJECTION SUBCUTANEOUS at 12:01

## 2019-12-02 RX ADMIN — HYDROMORPHONE HYDROCHLORIDE 0.5 MILLIGRAM(S): 2 INJECTION INTRAMUSCULAR; INTRAVENOUS; SUBCUTANEOUS at 01:30

## 2019-12-02 RX ADMIN — Medication 0.5 MILLIGRAM(S): at 17:20

## 2019-12-02 RX ADMIN — Medication 400 MILLIGRAM(S): at 14:15

## 2019-12-02 RX ADMIN — Medication 3 MILLILITER(S): at 12:15

## 2019-12-02 RX ADMIN — Medication 3 MILLILITER(S): at 17:20

## 2019-12-02 RX ADMIN — HYDROMORPHONE HYDROCHLORIDE 0.5 MILLIGRAM(S): 2 INJECTION INTRAMUSCULAR; INTRAVENOUS; SUBCUTANEOUS at 09:23

## 2019-12-02 RX ADMIN — Medication 3 MILLILITER(S): at 05:23

## 2019-12-02 RX ADMIN — HYDROMORPHONE HYDROCHLORIDE 0.5 MILLIGRAM(S): 2 INJECTION INTRAMUSCULAR; INTRAVENOUS; SUBCUTANEOUS at 01:07

## 2019-12-02 RX ADMIN — Medication 400 MILLIGRAM(S): at 06:14

## 2019-12-02 RX ADMIN — Medication 0.5 MILLIGRAM(S): at 05:23

## 2019-12-02 RX ADMIN — HYDROMORPHONE HYDROCHLORIDE 0.5 MILLIGRAM(S): 2 INJECTION INTRAMUSCULAR; INTRAVENOUS; SUBCUTANEOUS at 20:17

## 2019-12-02 RX ADMIN — CHLORHEXIDINE GLUCONATE 1 APPLICATION(S): 213 SOLUTION TOPICAL at 11:10

## 2019-12-02 RX ADMIN — PIPERACILLIN AND TAZOBACTAM 25 GRAM(S): 4; .5 INJECTION, POWDER, LYOPHILIZED, FOR SOLUTION INTRAVENOUS at 14:14

## 2019-12-02 RX ADMIN — HYDROMORPHONE HYDROCHLORIDE 0.5 MILLIGRAM(S): 2 INJECTION INTRAMUSCULAR; INTRAVENOUS; SUBCUTANEOUS at 20:27

## 2019-12-02 RX ADMIN — Medication 30 MILLIGRAM(S): at 10:25

## 2019-12-02 RX ADMIN — HYDROMORPHONE HYDROCHLORIDE 0.5 MILLIGRAM(S): 2 INJECTION INTRAMUSCULAR; INTRAVENOUS; SUBCUTANEOUS at 09:38

## 2019-12-02 RX ADMIN — Medication 250 MILLILITER(S): at 11:58

## 2019-12-02 RX ADMIN — PIPERACILLIN AND TAZOBACTAM 25 GRAM(S): 4; .5 INJECTION, POWDER, LYOPHILIZED, FOR SOLUTION INTRAVENOUS at 06:14

## 2019-12-02 RX ADMIN — Medication 100 MILLIEQUIVALENT(S): at 04:02

## 2019-12-02 RX ADMIN — POTASSIUM PHOSPHATE, MONOBASIC POTASSIUM PHOSPHATE, DIBASIC 83.33 MILLIMOLE(S): 236; 224 INJECTION, SOLUTION INTRAVENOUS at 02:51

## 2019-12-02 RX ADMIN — Medication 30 MILLIGRAM(S): at 06:45

## 2019-12-02 RX ADMIN — Medication 30 MILLIGRAM(S): at 10:40

## 2019-12-02 RX ADMIN — Medication 2 MILLIGRAM(S): at 12:00

## 2019-12-02 RX ADMIN — Medication 1000 MILLIGRAM(S): at 14:45

## 2019-12-02 RX ADMIN — Medication 30 MILLIGRAM(S): at 00:30

## 2019-12-02 RX ADMIN — Medication 3 MILLILITER(S): at 00:06

## 2019-12-02 RX ADMIN — Medication 30 MILLIGRAM(S): at 16:30

## 2019-12-02 RX ADMIN — Medication 1000 MILLIGRAM(S): at 02:10

## 2019-12-02 RX ADMIN — PIPERACILLIN AND TAZOBACTAM 25 GRAM(S): 4; .5 INJECTION, POWDER, LYOPHILIZED, FOR SOLUTION INTRAVENOUS at 22:15

## 2019-12-02 RX ADMIN — NYSTATIN CREAM 1 APPLICATION(S): 100000 CREAM TOPICAL at 16:38

## 2019-12-02 RX ADMIN — Medication 100 MILLIEQUIVALENT(S): at 02:51

## 2019-12-02 RX ADMIN — Medication 30 MILLIGRAM(S): at 16:45

## 2019-12-02 RX ADMIN — Medication 1000 MILLIGRAM(S): at 06:45

## 2019-12-02 RX ADMIN — METHADONE HYDROCHLORIDE 8.5 MILLIGRAM(S): 40 TABLET ORAL at 11:10

## 2019-12-02 RX ADMIN — Medication 100 MILLIEQUIVALENT(S): at 01:50

## 2019-12-02 RX ADMIN — Medication 30 MILLIGRAM(S): at 00:00

## 2019-12-02 RX ADMIN — Medication 30 MILLIGRAM(S): at 06:14

## 2019-12-02 NOTE — PROGRESS NOTE ADULT - SUBJECTIVE AND OBJECTIVE BOX
GENERAL SURGERY PROGRESS NOTE    SUBJECTIVE  Patient seen and examined. NPO without nausea, vomiting, -/- ostomy, marquez removed 12/1 still due to void, got straight cath x 1 overnight, without issues, worked with PT 12/1 and dangled feet off side of bed, legs were wrapped yesterday. Denies fever, chills, SOB, chest pain.         OBJECTIVE    PHYSICAL EXAM  General: Appears well, NAD  CHEST: breathing comfortably  CV: appears well perfused  Abdomen: soft, nontender, nondistended, no rebound or guarding, vac in place, L OVI x 1 serous, ostomy -/- dusky appearing  Extremities: Grossly symmetric    T(C): 36.5 (12-02-19 @ 03:00), Max: 36.9 (12-01-19 @ 23:00)  HR: 74 (12-02-19 @ 04:00) (66 - 95)  BP: 80/54 (12-02-19 @ 04:00) (80/48 - 103/58)  RR: 19 (12-02-19 @ 04:00) (15 - 27)  SpO2: 95% (12-02-19 @ 04:00) (91% - 99%)    11-30-19 @ 07:01  -  12-01-19 @ 07:00  --------------------------------------------------------  IN: 2250 mL / OUT: 840 mL / NET: 1410 mL    12-01-19 @ 07:01  -  12-02-19 @ 04:27  --------------------------------------------------------  IN: 3466.6 mL / OUT: 500 mL / NET: 2966.6 mL        MEDICATIONS  acetaminophen  IVPB .. 1000 milliGRAM(s) IV Intermittent once  albuterol/ipratropium for Nebulization 3 milliLiter(s) Nebulizer every 6 hours  buDESOnide    Inhalation Suspension 0.5 milliGRAM(s) Inhalation every 12 hours  chlorhexidine 2% Cloths 1 Application(s) Topical daily  enoxaparin Injectable 100 milliGRAM(s) SubCutaneous <User Schedule>  HYDROmorphone  Injectable 0.5 milliGRAM(s) IV Push every 3 hours PRN  influenza   Vaccine 0.5 milliLiter(s) IntraMuscular once  ketorolac   Injectable 30 milliGRAM(s) IV Push every 6 hours  lactated ringers. 1000 milliLiter(s) IV Continuous <Continuous>  methadone Injectable 8.5 milliGRAM(s) IV Push daily  pantoprazole  Injectable 40 milliGRAM(s) IV Push every 24 hours  piperacillin/tazobactam IVPB.. 3.375 Gram(s) IV Intermittent every 8 hours      LABS                        9.5    13.59 )-----------( 284      ( 02 Dec 2019 00:36 )             28.7     12-02    137  |  101  |  12  ----------------------------<  129<H>  3.6   |  25  |  0.55    Ca    8.1<L>      02 Dec 2019 00:36  Phos  2.3     12-02  Mg     2.0     12-02      PT/INR - ( 02 Dec 2019 00:36 )   PT: 12.3 sec;   INR: 1.08 ratio         PTT - ( 02 Dec 2019 00:36 )  PTT:27.4 sec      RADIOLOGY & ADDITIONAL STUDIES GENERAL SURGERY PROGRESS NOTE    SUBJECTIVE  Patient seen and examined. NPO without nausea, vomiting, -/- ostomy, marquez removed 12/1 still due to void, got straight cath x 1 overnight, without issues, worked with PT 12/1 and dangled feet off side of bed, legs were wrapped yesterday. Denies fever, chills, SOB, chest pain.   SBP 80-90s, received albumin 500cc x 1, BP and UOP remain unchanged      OBJECTIVE    PHYSICAL EXAM  General: Appears well, NAD  CHEST: breathing comfortably  CV: appears well perfused  Abdomen: soft, nontender, nondistended, no rebound or guarding, vac in place, L OVI x 1 serous, ostomy -/- dusky appearing  Extremities: Grossly symmetric    T(C): 36.5 (12-02-19 @ 03:00), Max: 36.9 (12-01-19 @ 23:00)  HR: 74 (12-02-19 @ 04:00) (66 - 95)  BP: 80/54 (12-02-19 @ 04:00) (80/48 - 103/58)  RR: 19 (12-02-19 @ 04:00) (15 - 27)  SpO2: 95% (12-02-19 @ 04:00) (91% - 99%)    11-30-19 @ 07:01  -  12-01-19 @ 07:00  --------------------------------------------------------  IN: 2250 mL / OUT: 840 mL / NET: 1410 mL    12-01-19 @ 07:01  -  12-02-19 @ 04:27  --------------------------------------------------------  IN: 3466.6 mL / OUT: 500 mL / NET: 2966.6 mL        MEDICATIONS  acetaminophen  IVPB .. 1000 milliGRAM(s) IV Intermittent once  albuterol/ipratropium for Nebulization 3 milliLiter(s) Nebulizer every 6 hours  buDESOnide    Inhalation Suspension 0.5 milliGRAM(s) Inhalation every 12 hours  chlorhexidine 2% Cloths 1 Application(s) Topical daily  enoxaparin Injectable 100 milliGRAM(s) SubCutaneous <User Schedule>  HYDROmorphone  Injectable 0.5 milliGRAM(s) IV Push every 3 hours PRN  influenza   Vaccine 0.5 milliLiter(s) IntraMuscular once  ketorolac   Injectable 30 milliGRAM(s) IV Push every 6 hours  lactated ringers. 1000 milliLiter(s) IV Continuous <Continuous>  methadone Injectable 8.5 milliGRAM(s) IV Push daily  pantoprazole  Injectable 40 milliGRAM(s) IV Push every 24 hours  piperacillin/tazobactam IVPB.. 3.375 Gram(s) IV Intermittent every 8 hours      LABS                        9.5    13.59 )-----------( 284      ( 02 Dec 2019 00:36 )             28.7     12-02    137  |  101  |  12  ----------------------------<  129<H>  3.6   |  25  |  0.55    Ca    8.1<L>      02 Dec 2019 00:36  Phos  2.3     12-02  Mg     2.0     12-02      PT/INR - ( 02 Dec 2019 00:36 )   PT: 12.3 sec;   INR: 1.08 ratio         PTT - ( 02 Dec 2019 00:36 )  PTT:27.4 sec      RADIOLOGY & ADDITIONAL STUDIES GENERAL SURGERY PROGRESS NOTE    SUBJECTIVE  Patient seen and examined. NPO without nausea, vomiting, -/- ostomy, marquez removed 12/1 still due to void, got straight cath x 1 overnight, without issues, worked with PT 12/1 and dangled feet off side of bed, legs were wrapped yesterday. Denies fever, chills, SOB, chest pain.   SBP 80-90s, received albumin 500cc x 1, BP and UOP remain unchanged      OBJECTIVE    PHYSICAL EXAM  General: Appears well, NAD, L IJ in place  CHEST: breathing comfortably on O2 NC  CV: appears well perfused  Abdomen: soft, nontender, nondistended, no rebound or guarding, vac in place, L OVI x 1 serous, ostomy -/- dusky appearing  Extremities: BLE lymphedema wrapped    T(C): 36.5 (12-02-19 @ 03:00), Max: 36.9 (12-01-19 @ 23:00)  HR: 74 (12-02-19 @ 04:00) (66 - 95)  BP: 80/54 (12-02-19 @ 04:00) (80/48 - 103/58)  RR: 19 (12-02-19 @ 04:00) (15 - 27)  SpO2: 95% (12-02-19 @ 04:00) (91% - 99%)    11-30-19 @ 07:01  -  12-01-19 @ 07:00  --------------------------------------------------------  IN: 2250 mL / OUT: 840 mL / NET: 1410 mL    12-01-19 @ 07:01  -  12-02-19 @ 04:27  --------------------------------------------------------  IN: 3466.6 mL / OUT: 500 mL / NET: 2966.6 mL        MEDICATIONS  acetaminophen  IVPB .. 1000 milliGRAM(s) IV Intermittent once  albuterol/ipratropium for Nebulization 3 milliLiter(s) Nebulizer every 6 hours  buDESOnide    Inhalation Suspension 0.5 milliGRAM(s) Inhalation every 12 hours  chlorhexidine 2% Cloths 1 Application(s) Topical daily  enoxaparin Injectable 100 milliGRAM(s) SubCutaneous <User Schedule>  HYDROmorphone  Injectable 0.5 milliGRAM(s) IV Push every 3 hours PRN  influenza   Vaccine 0.5 milliLiter(s) IntraMuscular once  ketorolac   Injectable 30 milliGRAM(s) IV Push every 6 hours  lactated ringers. 1000 milliLiter(s) IV Continuous <Continuous>  methadone Injectable 8.5 milliGRAM(s) IV Push daily  pantoprazole  Injectable 40 milliGRAM(s) IV Push every 24 hours  piperacillin/tazobactam IVPB.. 3.375 Gram(s) IV Intermittent every 8 hours      LABS                        9.5    13.59 )-----------( 284      ( 02 Dec 2019 00:36 )             28.7     12-02    137  |  101  |  12  ----------------------------<  129<H>  3.6   |  25  |  0.55    Ca    8.1<L>      02 Dec 2019 00:36  Phos  2.3     12-02  Mg     2.0     12-02      PT/INR - ( 02 Dec 2019 00:36 )   PT: 12.3 sec;   INR: 1.08 ratio         PTT - ( 02 Dec 2019 00:36 )  PTT:27.4 sec      RADIOLOGY & ADDITIONAL STUDIES GENERAL SURGERY PROGRESS NOTE    SUBJECTIVE  Patient seen and examined. NPO without nausea, vomiting, +/- ostomy, marquez removed 12/1 still due to void, got straight cath x 1 overnight, without issues, worked with PT 12/1 and dangled feet off side of bed, legs were wrapped yesterday. Denies fever, chills, SOB, chest pain.   SBP 80-90s, received albumin 500cc x 1, BP and UOP remain unchanged      OBJECTIVE    PHYSICAL EXAM  General: Appears well, NAD, L IJ in place  CHEST: breathing comfortably on O2 NC  CV: appears well perfused  Abdomen: soft, nontender, nondistended, no rebound or guarding, vac in place, L OVI x 1, ostomy +/- dusky appearing  Extremities: BLE lymphedema wrapped    T(C): 36.5 (12-02-19 @ 03:00), Max: 36.9 (12-01-19 @ 23:00)  HR: 74 (12-02-19 @ 04:00) (66 - 95)  BP: 80/54 (12-02-19 @ 04:00) (80/48 - 103/58)  RR: 19 (12-02-19 @ 04:00) (15 - 27)  SpO2: 95% (12-02-19 @ 04:00) (91% - 99%)    11-30-19 @ 07:01  -  12-01-19 @ 07:00  --------------------------------------------------------  IN: 2250 mL / OUT: 840 mL / NET: 1410 mL    12-01-19 @ 07:01  -  12-02-19 @ 04:27  --------------------------------------------------------  IN: 3466.6 mL / OUT: 500 mL / NET: 2966.6 mL        MEDICATIONS  acetaminophen  IVPB .. 1000 milliGRAM(s) IV Intermittent once  albuterol/ipratropium for Nebulization 3 milliLiter(s) Nebulizer every 6 hours  buDESOnide    Inhalation Suspension 0.5 milliGRAM(s) Inhalation every 12 hours  chlorhexidine 2% Cloths 1 Application(s) Topical daily  enoxaparin Injectable 100 milliGRAM(s) SubCutaneous <User Schedule>  HYDROmorphone  Injectable 0.5 milliGRAM(s) IV Push every 3 hours PRN  influenza   Vaccine 0.5 milliLiter(s) IntraMuscular once  ketorolac   Injectable 30 milliGRAM(s) IV Push every 6 hours  lactated ringers. 1000 milliLiter(s) IV Continuous <Continuous>  methadone Injectable 8.5 milliGRAM(s) IV Push daily  pantoprazole  Injectable 40 milliGRAM(s) IV Push every 24 hours  piperacillin/tazobactam IVPB.. 3.375 Gram(s) IV Intermittent every 8 hours      LABS                        9.5    13.59 )-----------( 284      ( 02 Dec 2019 00:36 )             28.7     12-02    137  |  101  |  12  ----------------------------<  129<H>  3.6   |  25  |  0.55    Ca    8.1<L>      02 Dec 2019 00:36  Phos  2.3     12-02  Mg     2.0     12-02      PT/INR - ( 02 Dec 2019 00:36 )   PT: 12.3 sec;   INR: 1.08 ratio         PTT - ( 02 Dec 2019 00:36 )  PTT:27.4 sec      RADIOLOGY & ADDITIONAL STUDIES GENERAL SURGERY PROGRESS NOTE    SUBJECTIVE  Patient seen and examined. NPO without nausea, vomiting, +/- ostomy, marquez removed 12/1 still due to void, got straight cath x 1 overnight, without issues, worked with PT 12/1 and dangled feet off side of bed, legs were wrapped yesterday. Denies fever, chills, SOB, chest pain.   SBP 80-90s, received albumin 500cc x 1, BP and UOP remain unchanged      OBJECTIVE    PHYSICAL EXAM  General: Appears well, NAD, L IJ in place  CHEST: breathing comfortably on O2 NC  CV: appears well perfused  Abdomen: soft, nontender, nondistended, no rebound or guarding, vac in place, L OVI x 1, ostomy +/- dusky appearing  : primafit  Extremities: BLE lymphedema wrapped    T(C): 36.5 (12-02-19 @ 03:00), Max: 36.9 (12-01-19 @ 23:00)  HR: 74 (12-02-19 @ 04:00) (66 - 95)  BP: 80/54 (12-02-19 @ 04:00) (80/48 - 103/58)  RR: 19 (12-02-19 @ 04:00) (15 - 27)  SpO2: 95% (12-02-19 @ 04:00) (91% - 99%)    11-30-19 @ 07:01  -  12-01-19 @ 07:00  --------------------------------------------------------  IN: 2250 mL / OUT: 840 mL / NET: 1410 mL    12-01-19 @ 07:01  -  12-02-19 @ 04:27  --------------------------------------------------------  IN: 3466.6 mL / OUT: 500 mL / NET: 2966.6 mL        MEDICATIONS  acetaminophen  IVPB .. 1000 milliGRAM(s) IV Intermittent once  albuterol/ipratropium for Nebulization 3 milliLiter(s) Nebulizer every 6 hours  buDESOnide    Inhalation Suspension 0.5 milliGRAM(s) Inhalation every 12 hours  chlorhexidine 2% Cloths 1 Application(s) Topical daily  enoxaparin Injectable 100 milliGRAM(s) SubCutaneous <User Schedule>  HYDROmorphone  Injectable 0.5 milliGRAM(s) IV Push every 3 hours PRN  influenza   Vaccine 0.5 milliLiter(s) IntraMuscular once  ketorolac   Injectable 30 milliGRAM(s) IV Push every 6 hours  lactated ringers. 1000 milliLiter(s) IV Continuous <Continuous>  methadone Injectable 8.5 milliGRAM(s) IV Push daily  pantoprazole  Injectable 40 milliGRAM(s) IV Push every 24 hours  piperacillin/tazobactam IVPB.. 3.375 Gram(s) IV Intermittent every 8 hours      LABS                        9.5    13.59 )-----------( 284      ( 02 Dec 2019 00:36 )             28.7     12-02    137  |  101  |  12  ----------------------------<  129<H>  3.6   |  25  |  0.55    Ca    8.1<L>      02 Dec 2019 00:36  Phos  2.3     12-02  Mg     2.0     12-02      PT/INR - ( 02 Dec 2019 00:36 )   PT: 12.3 sec;   INR: 1.08 ratio         PTT - ( 02 Dec 2019 00:36 )  PTT:27.4 sec      RADIOLOGY & ADDITIONAL STUDIES

## 2019-12-02 NOTE — PROGRESS NOTE ADULT - ATTENDING COMMENTS
Pt seen and examined, agree with above.    1. Sigmoid diverticulitis s/p extended L hemicolectomy and abdominal closure with ileostomy, POD2:  - Refused BiPAP overnight  - Multimodal pain control  - Continue home po methadone dose, will contact methadone clinic to verify dose   - OOBTC, PT  - Awaiting ostomy function  - If not able to start enteral nutrition by Tuesday, will start TPN    2. COPD: acute respiratory insufficiency - on 3 liters NC with goal SpO2 > 88%   - On Breo Ellipta at home, here on duonebs and budesonide  -bronchial culture with enterococcus but patient not symptomatic     3. improving neutrophilic leukocytosis -  Finish 4 day course of Zosyn for peritonitis (11/29-12/3)    4. Hypophosphatemia - repleted and will recheck     - IV access: patient had extremely difficult IV access, so has L IJ TLC, will leave in place for now. If needs TPN, will rewire new line.

## 2019-12-02 NOTE — PROGRESS NOTE ADULT - ASSESSMENT
A/P: 64y Female POD#1 s/p ex lap with extended left hemicolectomy left in discontinuity for perforated sigmoid diverticulitis with intraperitoneal free air after presenting to ED with worsening constipation and abdominal pain with distention. AbThera vac placed and pt kept intubated post op and transferred to SICU for post-op management. Extubated 11/30, patient refusing BIPAP. Ross out 12/1.    Plan:  - NPO  - monitor ostomy  - monitor U/O, TOV  - monitor vac output  - c/w pain control, c/w methadone   - DVT ppx with lovenox, sequential compression   - appreciate sicu care  - c/w zosyn     p [id number] A/P: 64y Female POD#1 s/p ex lap with extended left hemicolectomy left in discontinuity for perforated sigmoid diverticulitis with intraperitoneal free air after presenting to ED with worsening constipation and abdominal pain with distention. AbThera vac placed and pt kept intubated post op and transferred to SICU for post-op management. Extubated 11/30, patient refusing BIPAP. Ross out 12/1.    Plan:  - NPO  - monitor ostomy  - monitor U/O, TOV  - monitor vac output  - c/w pain control, c/w methadone   - DVT ppx with lovenox, sequential compression   - appreciate sicu care  - c/w zosyn     p 1094    Addendum  Patient seen/examined by MIS/bariatric fellow. Agree with resident note.  Patient received albumin overnight for hypotension.  RUQ colostomy remains dusky. No stool/gas output.  Midline wound vac - due for change today.  Continue NPO.  Patient refusing BiPAP.  Continue Zosyn.  Appreciate SICU recs.  Vita Donovan MD

## 2019-12-02 NOTE — PROGRESS NOTE ADULT - SUBJECTIVE AND OBJECTIVE BOX
HISTORY   64F with pmhx morbid obesity, acid reflux, HTN, COPD and PSH D&C presented to the ED on 11/26 c/o abdominal pain and bloating. Patient reports having constipation for 2 weeks and has been taking enemas, miralax and senna and passing small hard balls for the last weeks. Reported pain worst in the LLQ that started a few days ago and has been worsening in the last day, also has noted significant abdominal distention in the last day. Reported she had difficulty walking short distances because she becomes SOB. In ED, CT demonstrated perforated sigmoid diverticulitis with intraperitoneal free air. She was taken to the operating room on 11/25 overnight and underwent an exploratory laparotomy with extended left hemicolectomy and was left in discontinuity. An Abthera VAC was placed. She was kept intubated post op and brought to the ICU for hemodynamic and respiratory management. She went back to the OR on 11/29 for transverse end colostomy and fascial closure with wound vac.      24 HOUR EVENTS:  -hypotensive to 80s-90s systolic during day and night  received 500 cc bolus of albumin - systolic bp unchanged, uop unchanged  -marquez catheter dc'd in evening, straight cath overnight  -no bowel function, ostomy appears dusky  -written for home methadone, will need to confirm with [facility] methadone clinic in am  -was able to sit up and dangle legs w/ PT  -Legs wrapped      SUBJECTIVE/ROS:  [ ] A ten-point review of systems was otherwise negative except as noted.  [ ] Due to altered mental status/intubation, subjective information were not able to be obtained from the patient. History was obtained, to the extent possible, from review of the chart and collateral sources of information.      NEURO  Exam: awake, alert, oriented  Meds: acetaminophen  IVPB .. 1000 milliGRAM(s) IV Intermittent once  acetaminophen  IVPB .. 1000 milliGRAM(s) IV Intermittent once  HYDROmorphone  Injectable 0.5 milliGRAM(s) IV Push every 3 hours PRN Pain  ketorolac   Injectable 30 milliGRAM(s) IV Push every 6 hours  methadone Injectable 8.5 milliGRAM(s) IV Push daily    [x] Adequacy of sedation and pain control has been assessed and adjusted      RESPIRATORY  RR: 17 (12-01-19 @ 23:00) (17 - 30)  SpO2: 99% (12-02-19 @ 00:03) (91% - 99%)  Wt(kg): --  Exam: unlabored, clear to auscultation bilaterally  ABG - ( 01 Dec 2019 04:00 )  pH: 7.48  /  pCO2: 39    /  pO2: 59    / HCO3: 28    / Base Excess: 5.0   /  SaO2: 92      Lactate: x                [N/A] Extubation Readiness Assessed  Meds: albuterol/ipratropium for Nebulization 3 milliLiter(s) Nebulizer every 6 hours  buDESOnide    Inhalation Suspension 0.5 milliGRAM(s) Inhalation every 12 hours        CARDIOVASCULAR  HR: 70 (12-02-19 @ 00:03) (70 - 95)  BP: 83/50 (12-01-19 @ 23:00) (83/50 - 99/54)  BP(mean): 62 (12-01-19 @ 23:00) (62 - 75)  ABP: --  ABP(mean): --  Wt(kg): --  CVP(cm H2O): --      Exam: regular rate and rhythm  Cardiac Rhythm: sinus  Perfusion     [x]Adequate   [ ]Inadequate  Mentation   [x]Normal       [ ]Reduced  Extremities  [x]Warm         [ ]Cool  Volume Status [ ]Hypervolemic [x]Euvolemic [ ]Hypovolemic  Meds:       GI/NUTRITION  Exam: soft, nontender, nondistended, incision C/D/I  Diet: NPO  Meds: pantoprazole  Injectable 40 milliGRAM(s) IV Push every 24 hours      GENITOURINARY  I&O's Detail    11-30 @ 07:01  -  12-01 @ 07:00  --------------------------------------------------------  IN:    lactated ringers.: 1725 mL    Solution: 225 mL    Solution: 300 mL  Total IN: 2250 mL    OUT:    Bulb: 30 mL    Ileostomy: 10 mL    Indwelling Catheter - Urethral: 700 mL    Nasoenteral Tube: 100 mL  Total OUT: 840 mL    Total NET: 1410 mL      12-01 @ 07:01  -  12-02 @ 00:32  --------------------------------------------------------  IN:    lactated ringers.: 1200 mL    Solution: 175 mL    Solution: 250 mL    Solution: 1000 mL  Total IN: 2625 mL    OUT:    Bulb: 20 mL    Indwelling Catheter - Urethral: 255 mL    Nasoenteral Tube: 25 mL  Total OUT: 300 mL    Total NET: 2325 mL          12-01    136  |  100  |  12  ----------------------------<  134<H>  3.7   |  26  |  0.53    Ca    8.2<L>      01 Dec 2019 13:09  Phos  2.5     12-01  Mg     2.0     12-01      [ ] Marquez catheter, indication: N/A  Meds: lactated ringers. 1000 milliLiter(s) IV Continuous <Continuous>        HEMATOLOGIC  Meds: enoxaparin Injectable 100 milliGRAM(s) SubCutaneous <User Schedule>    [x] VTE Prophylaxis                        10.3   15.68 )-----------( 340      ( 01 Dec 2019 04:02 )             31.0     PT/INR - ( 01 Dec 2019 04:16 )   PT: 13.6 sec;   INR: 1.18 ratio         PTT - ( 01 Dec 2019 04:16 )  PTT:25.2 sec  Transfusion     [ ] PRBC   [ ] Platelets   [ ] FFP   [ ] Cryoprecipitate      INFECTIOUS DISEASES  WBC Count: 15.68 K/uL (12-01 @ 04:02)    RECENT CULTURES:  Specimen Source: Bronch Wash Combicath  Date/Time: 11-30 @ 06:34  Culture Results:   Moderate Enterobacter cloacae complex  Normal Respiratory Elaine present  Gram Stain:   Moderate polymorphonuclear leukocytes per low power field  Rare Squamous epithelial cells per low power field  Few Gram Negative Rods per oil power field  Organism: --    Meds: influenza   Vaccine 0.5 milliLiter(s) IntraMuscular once  piperacillin/tazobactam IVPB.. 3.375 Gram(s) IV Intermittent every 8 hours        ENDOCRINE  CAPILLARY BLOOD GLUCOSE        Meds:       ACCESS DEVICES:  [ ] Peripheral IV  [x] Central Venous Line	[ ] R	[ ] L	[ ] IJ	[ ] Fem	[ ] SC	Placed:   [ ] Arterial Line		[ ] R	[ ] L	[ ] Fem	[ ] Rad	[ ] Ax	Placed:   [ ] PICC:					[ ] Mediport  [ ] Urinary Catheter, Date Placed:   [x] Necessity of urinary, arterial, and venous catheters discussed    OTHER MEDICATIONS:  chlorhexidine 2% Cloths 1 Application(s) Topical daily      CODE STATUS: full code      IMAGING:

## 2019-12-02 NOTE — CHART NOTE - NSCHARTNOTEFT_GEN_A_CORE
I spoke with Dr. Griffith ([phone]), the pain specialist with whom the patient follows for methadone dosing. Per Dr. Griffith, the patient has been taken Methadone 17mg PO for several years, she visits his office every two weeks for dosing. He last saw her in the office on 11/21 and received "take away" dosing.    Will continue half dosing IV until patient is able to take PO. Once able to take PO, will then resume 17 mg PO dosing.

## 2019-12-02 NOTE — PROGRESS NOTE ADULT - ATTENDING COMMENTS
I saw and examined the pt and discussed the tx plan with the House Staff. I agree with the exam and plan as documented in the surgery resident's note from today with comments below.  There was concern for stoma ischemia.  Pain controlled. Pt very weak. Has not ambulated yet, was dizzy yesterday when she was being mobilized.  Abdomen large, appropriately tender.  Colostomy with an area of necrosis at the mucocutaneous junction; the rest of the mucosa however when examined with flashlight and test tube is pink.  Stoma has been producing flatus.  Pt with viable colostomy at this time; no need for any acute surgical intervention. D/w pt that we will need to continue to monitor the colostomy closely over the next few days to ensure there will not be any need for revision. Given the large body habitus I suspect any new attempts at revision would be challenging and I would not pursue unless necessary.  NGT removed.  Mobilize further today.  Would reinsert the Ross for now.   Will ask Stoma Team to see.  Liset Vargas MD

## 2019-12-02 NOTE — PROGRESS NOTE ADULT - ASSESSMENT
Assessment	  64F with perforated sigmoid diverticulitis s/p exploratory laparotomy with extended left hemicolectomy, left in discontinuity w/ Abthera VAC (11/26), return to OR on 11/29 for transverse end colostomy and fascial closure with wound vac.    PLAN:  NEURO: postoperative pain control, chronic opioid use  - Continue pain control with PRN Dilaudid  - tylenol and toradol  - Methadone 8.5 mg IV qd, will need to confirm dosing    RESPIRATORY: Hx of COPD, acute hypoxemic respiratory failure, RUL & LLL infiltrates  - Extubated. On NC 3 LPM.   - ABGs with AM labs, CXR  - Duonebs and Pulmicort  - Combicath resulted - enterobacter    CARDIOVASCULAR: Hx of HTN on amlodipine at home, No hx of CAD  - Remains off vasopressor support, has been persistently hypotensive to 80s/90s w/ low uop  - Monitor vital signs and I&Os  - Hold home antihypertensives    GI/NUTRITION: S/p extended left hemicolectomy and was left in discontinuity with Abthera in place. s/p RTOR for end colostomy, fascial closure. Hx of obesity, Hx of GERD  - NPO w/ NGT  - Protonix for GERD    GENITOURINARY/RENAL: no acute issues  - Monitoring fluid status closely  - marquez dc'd, straight cath overnight, DTV in am  - LR @  75ml/hr.    HEMATOLOGIC: no acute issues  - Trend daily CBC and coags  - Continue DVT ppx with Lovenox dosed by patient weight    INFECTIOUS DISEASE: abdominal sepsis, OR culture with E. coli & Peptostreptococcus anaerobius  - Continue Zosyn x4 days after OR  - Trend WBC and fever curve    ENDOCRINE: No active issues    DISPO: SICU    SICU, [id number]

## 2019-12-02 NOTE — PROGRESS NOTE ADULT - SUBJECTIVE AND OBJECTIVE BOX
CARDIOLOGY     PROGRESS  NOTE   ________________________________________________    CHIEF COMPLAINT:Patient is a 64y old  Female who presents with a chief complaint of abdominal pain (28 Nov 2019 10:50)  no complain.  	  REVIEW OF SYSTEMS:  CONSTITUTIONAL: No fever, weight loss, or fatigue  EYES: No eye pain, visual disturbances, or discharge  ENT:  No difficulty hearing, tinnitus, vertigo; No sinus or throat pain  NECK: No pain or stiffness  RESPIRATORY: No cough, wheezing, chills or hemoptysis; + Shortness of Breath  CARDIOVASCULAR: No chest pain, palpitations, passing out, dizziness, or leg swelling  GASTROINTESTINAL: No abdominal or epigastric pain. No nausea, vomiting, or hematemesis; No diarrhea or constipation. No melena or hematochezia.  GENITOURINARY: No dysuria, frequency, hematuria, or incontinence  NEUROLOGICAL: No headaches, memory loss, loss of strength, numbness, or tremors  SKIN: No itching, burning, rashes, or lesions   LYMPH Nodes: No enlarged glands  ENDOCRINE: No heat or cold intolerance; No hair loss  MUSCULOSKELETAL: No joint pain or swelling; No muscle, back, or extremity pain  PSYCHIATRIC: No depression, anxiety, mood swings, or difficulty sleeping  HEME/LYMPH: No easy bruising, or bleeding gums  ALLERGY AND IMMUNOLOGIC: No hives or eczema	    [ ] All others negative	  [ ] Unable to obtain    PHYSICAL EXAM:  T(C): 36.8 (12-02-19 @ 07:00), Max: 36.9 (12-01-19 @ 23:00)  HR: 76 (12-02-19 @ 08:43) (66 - 95)  BP: 90/57 (12-02-19 @ 08:00) (80/48 - 106/58)  RR: 20 (12-02-19 @ 08:00) (15 - 27)  SpO2: 97% (12-02-19 @ 08:43) (91% - 100%)  Wt(kg): --  I&O's Summary    01 Dec 2019 07:01  -  02 Dec 2019 07:00  --------------------------------------------------------  IN: 3783.2 mL / OUT: 595 mL / NET: 3188.2 mL        Appearance: Normal	  HEENT:   Normal oral mucosa, PERRL, EOMI	  Lymphatic: No lymphadenopathy  Cardiovascular: Normal S1 S2, No JVD, + murmurs, No edema  Respiratory: rhonchi  Psychiatry: A & O x 3, Mood & affect appropriate  Gastrointestinal:  Soft, + diffusely tender, + BS	  Skin: No rashes, No ecchymoses, No cyanosis	  Neurologic: Non-focal  Extremities: Normal range of motion, No clubbing, cyanosis or edema  Vascular: Peripheral pulses palpable 2+ bilaterally    MEDICATIONS  (STANDING):  albuterol/ipratropium for Nebulization 3 milliLiter(s) Nebulizer every 6 hours  buDESOnide    Inhalation Suspension 0.5 milliGRAM(s) Inhalation every 12 hours  chlorhexidine 2% Cloths 1 Application(s) Topical daily  enoxaparin Injectable 100 milliGRAM(s) SubCutaneous <User Schedule>  influenza   Vaccine 0.5 milliLiter(s) IntraMuscular once  ketorolac   Injectable 30 milliGRAM(s) IV Push every 6 hours  lactated ringers. 1000 milliLiter(s) (75 mL/Hr) IV Continuous <Continuous>  methadone Injectable 8.5 milliGRAM(s) IV Push daily  nystatin Powder 1 Application(s) Topical two times a day  pantoprazole  Injectable 40 milliGRAM(s) IV Push every 24 hours  piperacillin/tazobactam IVPB.. 3.375 Gram(s) IV Intermittent every 8 hours      TELEMETRY: 	    ECG:  	  RADIOLOGY:  OTHER: 	  	  LABS:	 	    CARDIAC MARKERS:                                9.5    13.59 )-----------( 284      ( 02 Dec 2019 00:36 )             28.7     12-02    137  |  101  |  12  ----------------------------<  129<H>  3.6   |  25  |  0.55    Ca    8.1<L>      02 Dec 2019 00:36  Phos  2.3     12-02  Mg     2.0     12-02      proBNP:   Lipid Profile:   HgA1c:   TSH:   PT/INR - ( 02 Dec 2019 00:36 )   PT: 12.3 sec;   INR: 1.08 ratio         PTT - ( 02 Dec 2019 00:36 )  PTT:27.4 sec      Assessment and plan  ---------------------------  doing better, no complain.  continue abx  hold all bp meds  off pressors now  dvt prophylaxis

## 2019-12-03 LAB
ANION GAP SERPL CALC-SCNC: 12 MMOL/L — SIGNIFICANT CHANGE UP (ref 5–17)
APTT BLD: 26.6 SEC — LOW (ref 27.5–36.3)
BUN SERPL-MCNC: 11 MG/DL — SIGNIFICANT CHANGE UP (ref 7–23)
CALCIUM SERPL-MCNC: 8.1 MG/DL — LOW (ref 8.4–10.5)
CHLORIDE SERPL-SCNC: 100 MMOL/L — SIGNIFICANT CHANGE UP (ref 96–108)
CO2 SERPL-SCNC: 23 MMOL/L — SIGNIFICANT CHANGE UP (ref 22–31)
CREAT SERPL-MCNC: 0.53 MG/DL — SIGNIFICANT CHANGE UP (ref 0.5–1.3)
GLUCOSE SERPL-MCNC: 87 MG/DL — SIGNIFICANT CHANGE UP (ref 70–99)
HCT VFR BLD CALC: 28.9 % — LOW (ref 34.5–45)
HGB BLD-MCNC: 9.5 G/DL — LOW (ref 11.5–15.5)
INR BLD: 1.27 RATIO — HIGH (ref 0.88–1.16)
MAGNESIUM SERPL-MCNC: 2 MG/DL — SIGNIFICANT CHANGE UP (ref 1.6–2.6)
MCHC RBC-ENTMCNC: 29.7 PG — SIGNIFICANT CHANGE UP (ref 27–34)
MCHC RBC-ENTMCNC: 32.9 GM/DL — SIGNIFICANT CHANGE UP (ref 32–36)
MCV RBC AUTO: 90.3 FL — SIGNIFICANT CHANGE UP (ref 80–100)
NRBC # BLD: 0 /100 WBCS — SIGNIFICANT CHANGE UP (ref 0–0)
PHOSPHATE SERPL-MCNC: 1.7 MG/DL — LOW (ref 2.5–4.5)
PLATELET # BLD AUTO: 282 K/UL — SIGNIFICANT CHANGE UP (ref 150–400)
POTASSIUM SERPL-MCNC: 3.4 MMOL/L — LOW (ref 3.5–5.3)
POTASSIUM SERPL-SCNC: 3.4 MMOL/L — LOW (ref 3.5–5.3)
PROCALCITONIN SERPL-MCNC: 1.1 NG/ML — HIGH (ref 0.02–0.1)
PROTHROM AB SERPL-ACNC: 14.7 SEC — HIGH (ref 10–12.9)
RBC # BLD: 3.2 M/UL — LOW (ref 3.8–5.2)
RBC # FLD: 13.9 % — SIGNIFICANT CHANGE UP (ref 10.3–14.5)
SODIUM SERPL-SCNC: 135 MMOL/L — SIGNIFICANT CHANGE UP (ref 135–145)
WBC # BLD: 19.09 K/UL — HIGH (ref 3.8–10.5)
WBC # FLD AUTO: 19.09 K/UL — HIGH (ref 3.8–10.5)

## 2019-12-03 PROCEDURE — 99233 SBSQ HOSP IP/OBS HIGH 50: CPT

## 2019-12-03 PROCEDURE — 71045 X-RAY EXAM CHEST 1 VIEW: CPT | Mod: 26

## 2019-12-03 RX ORDER — ACETYLCYSTEINE 200 MG/ML
4 VIAL (ML) MISCELLANEOUS ONCE
Refills: 0 | Status: COMPLETED | OUTPATIENT
Start: 2019-12-03 | End: 2019-12-03

## 2019-12-03 RX ORDER — MAGNESIUM SULFATE 500 MG/ML
1 VIAL (ML) INJECTION ONCE
Refills: 0 | Status: COMPLETED | OUTPATIENT
Start: 2019-12-03 | End: 2019-12-03

## 2019-12-03 RX ORDER — OXYCODONE HYDROCHLORIDE 5 MG/1
10 TABLET ORAL EVERY 6 HOURS
Refills: 0 | Status: DISCONTINUED | OUTPATIENT
Start: 2019-12-03 | End: 2019-12-09

## 2019-12-03 RX ORDER — ACETAMINOPHEN 500 MG
650 TABLET ORAL EVERY 6 HOURS
Refills: 0 | Status: DISCONTINUED | OUTPATIENT
Start: 2019-12-03 | End: 2019-12-03

## 2019-12-03 RX ORDER — METHADONE HYDROCHLORIDE 40 MG/1
8.5 TABLET ORAL DAILY
Refills: 0 | Status: DISCONTINUED | OUTPATIENT
Start: 2019-12-03 | End: 2019-12-03

## 2019-12-03 RX ORDER — METHADONE HYDROCHLORIDE 40 MG/1
17 TABLET ORAL DAILY
Refills: 0 | Status: DISCONTINUED | OUTPATIENT
Start: 2019-12-04 | End: 2019-12-06

## 2019-12-03 RX ORDER — ALBUMIN HUMAN 25 %
500 VIAL (ML) INTRAVENOUS ONCE
Refills: 0 | Status: COMPLETED | OUTPATIENT
Start: 2019-12-03 | End: 2019-12-03

## 2019-12-03 RX ORDER — ACETAMINOPHEN 500 MG
975 TABLET ORAL EVERY 6 HOURS
Refills: 0 | Status: DISCONTINUED | OUTPATIENT
Start: 2019-12-03 | End: 2019-12-03

## 2019-12-03 RX ORDER — MEROPENEM 1 G/30ML
1000 INJECTION INTRAVENOUS EVERY 8 HOURS
Refills: 0 | Status: COMPLETED | OUTPATIENT
Start: 2019-12-03 | End: 2019-12-10

## 2019-12-03 RX ORDER — OXYCODONE HYDROCHLORIDE 5 MG/1
5 TABLET ORAL EVERY 4 HOURS
Refills: 0 | Status: DISCONTINUED | OUTPATIENT
Start: 2019-12-03 | End: 2019-12-09

## 2019-12-03 RX ORDER — ACETAMINOPHEN 500 MG
975 TABLET ORAL EVERY 6 HOURS
Refills: 0 | Status: DISCONTINUED | OUTPATIENT
Start: 2019-12-03 | End: 2019-12-05

## 2019-12-03 RX ORDER — POTASSIUM CHLORIDE 20 MEQ
20 PACKET (EA) ORAL
Refills: 0 | Status: COMPLETED | OUTPATIENT
Start: 2019-12-03 | End: 2019-12-03

## 2019-12-03 RX ADMIN — SODIUM CHLORIDE 75 MILLILITER(S): 9 INJECTION, SOLUTION INTRAVENOUS at 07:32

## 2019-12-03 RX ADMIN — PANTOPRAZOLE SODIUM 40 MILLIGRAM(S): 20 TABLET, DELAYED RELEASE ORAL at 14:52

## 2019-12-03 RX ADMIN — Medication 250 MILLILITER(S): at 00:16

## 2019-12-03 RX ADMIN — Medication 250 MILLILITER(S): at 22:33

## 2019-12-03 RX ADMIN — Medication 3 MILLILITER(S): at 05:49

## 2019-12-03 RX ADMIN — Medication 30 MILLIGRAM(S): at 13:00

## 2019-12-03 RX ADMIN — Medication 62.5 MILLIMOLE(S): at 07:33

## 2019-12-03 RX ADMIN — OXYCODONE HYDROCHLORIDE 10 MILLIGRAM(S): 5 TABLET ORAL at 18:00

## 2019-12-03 RX ADMIN — ENOXAPARIN SODIUM 100 MILLIGRAM(S): 100 INJECTION SUBCUTANEOUS at 13:01

## 2019-12-03 RX ADMIN — Medication 3 MILLILITER(S): at 18:04

## 2019-12-03 RX ADMIN — HYDROMORPHONE HYDROCHLORIDE 0.5 MILLIGRAM(S): 2 INJECTION INTRAMUSCULAR; INTRAVENOUS; SUBCUTANEOUS at 07:45

## 2019-12-03 RX ADMIN — OXYCODONE HYDROCHLORIDE 10 MILLIGRAM(S): 5 TABLET ORAL at 18:30

## 2019-12-03 RX ADMIN — MEROPENEM 100 MILLIGRAM(S): 1 INJECTION INTRAVENOUS at 21:55

## 2019-12-03 RX ADMIN — Medication 0.5 MILLIGRAM(S): at 18:04

## 2019-12-03 RX ADMIN — Medication 100 GRAM(S): at 02:49

## 2019-12-03 RX ADMIN — Medication 50 MILLIEQUIVALENT(S): at 07:06

## 2019-12-03 RX ADMIN — Medication 3 MILLILITER(S): at 11:19

## 2019-12-03 RX ADMIN — Medication 62.5 MILLIMOLE(S): at 07:06

## 2019-12-03 RX ADMIN — OXYCODONE HYDROCHLORIDE 5 MILLIGRAM(S): 5 TABLET ORAL at 14:15

## 2019-12-03 RX ADMIN — NYSTATIN CREAM 1 APPLICATION(S): 100000 CREAM TOPICAL at 17:59

## 2019-12-03 RX ADMIN — Medication 30 MILLIGRAM(S): at 00:10

## 2019-12-03 RX ADMIN — Medication 4 MILLILITER(S): at 01:06

## 2019-12-03 RX ADMIN — Medication 250 MILLIMOLE(S): at 02:49

## 2019-12-03 RX ADMIN — Medication 30 MILLIGRAM(S): at 13:15

## 2019-12-03 RX ADMIN — PIPERACILLIN AND TAZOBACTAM 25 GRAM(S): 4; .5 INJECTION, POWDER, LYOPHILIZED, FOR SOLUTION INTRAVENOUS at 07:04

## 2019-12-03 RX ADMIN — Medication 50 MILLIEQUIVALENT(S): at 02:50

## 2019-12-03 RX ADMIN — MEROPENEM 100 MILLIGRAM(S): 1 INJECTION INTRAVENOUS at 14:52

## 2019-12-03 RX ADMIN — Medication 0.5 MILLIGRAM(S): at 05:49

## 2019-12-03 RX ADMIN — OXYCODONE HYDROCHLORIDE 5 MILLIGRAM(S): 5 TABLET ORAL at 13:45

## 2019-12-03 RX ADMIN — METHADONE HYDROCHLORIDE 8.5 MILLIGRAM(S): 40 TABLET ORAL at 14:14

## 2019-12-03 RX ADMIN — HYDROMORPHONE HYDROCHLORIDE 0.5 MILLIGRAM(S): 2 INJECTION INTRAMUSCULAR; INTRAVENOUS; SUBCUTANEOUS at 07:30

## 2019-12-03 RX ADMIN — Medication 3 MILLILITER(S): at 01:06

## 2019-12-03 RX ADMIN — Medication 30 MILLIGRAM(S): at 07:04

## 2019-12-03 RX ADMIN — CHLORHEXIDINE GLUCONATE 1 APPLICATION(S): 213 SOLUTION TOPICAL at 13:01

## 2019-12-03 RX ADMIN — FLUCONAZOLE 100 MILLIGRAM(S): 150 TABLET ORAL at 07:26

## 2019-12-03 RX ADMIN — Medication 50 MILLIEQUIVALENT(S): at 07:32

## 2019-12-03 RX ADMIN — NYSTATIN CREAM 1 APPLICATION(S): 100000 CREAM TOPICAL at 07:05

## 2019-12-03 NOTE — PROGRESS NOTE ADULT - SUBJECTIVE AND OBJECTIVE BOX
CARDIOLOGY     PROGRESS  NOTE   ________________________________________________    CHIEF COMPLAINT:Patient is a 64y old  Female who presents with a chief complaint of perforated sigmoid diverticulum s/p left hemicolectomy (03 Dec 2019 04:04)  doing better, no complain.  	  REVIEW OF SYSTEMS:  CONSTITUTIONAL: No fever, weight loss, or fatigue  EYES: No eye pain, visual disturbances, or discharge  ENT:  No difficulty hearing, tinnitus, vertigo; No sinus or throat pain  NECK: No pain or stiffness  RESPIRATORY: No cough, wheezing, chills or hemoptysis; No Shortness of Breath  CARDIOVASCULAR: No chest pain, palpitations, passing out, dizziness, or leg swelling  GASTROINTESTINAL: No abdominal or epigastric pain. No nausea, vomiting, or hematemesis; No diarrhea or constipation. No melena or hematochezia.  GENITOURINARY: No dysuria, frequency, hematuria, or incontinence  NEUROLOGICAL: No headaches, memory loss, loss of strength, numbness, or tremors  SKIN: No itching, burning, rashes, or lesions   LYMPH Nodes: No enlarged glands  ENDOCRINE: No heat or cold intolerance; No hair loss  MUSCULOSKELETAL: No joint pain or swelling; No muscle, back, or extremity pain  PSYCHIATRIC: No depression, anxiety, mood swings, or difficulty sleeping  HEME/LYMPH: No easy bruising, or bleeding gums  ALLERGY AND IMMUNOLOGIC: No hives or eczema	    [ ] All others negative	  [ ] Unable to obtain    PHYSICAL EXAM:  T(C): 37 (12-03-19 @ 07:00), Max: 37.7 (12-02-19 @ 19:00)  HR: 85 (12-03-19 @ 08:00) (70 - 89)  BP: 101/59 (12-03-19 @ 08:00) (70/54 - 106/65)  RR: 23 (12-03-19 @ 08:00) (18 - 33)  SpO2: 95% (12-03-19 @ 08:00) (89% - 99%)  Wt(kg): --  I&O's Summary    02 Dec 2019 07:01  -  03 Dec 2019 07:00  --------------------------------------------------------  IN: 3841.6 mL / OUT: 1395 mL / NET: 2446.6 mL    03 Dec 2019 07:01  -  03 Dec 2019 09:01  --------------------------------------------------------  IN: 475 mL / OUT: 30 mL / NET: 445 mL        Appearance: Normal	  HEENT:   Normal oral mucosa, PERRL, EOMI	  Lymphatic: No lymphadenopathy  Cardiovascular: Normal S1 S2, No JVD, + murmurs, No edema  Respiratory: rhonchi  Psychiatry: A & O x 3, Mood & affect appropriate  Gastrointestinal:  Soft, + open wound/ bandage  Skin: No rashes, No ecchymoses, No cyanosis	  Neurologic: Non-focal  Extremities: Normal range of motion, No clubbing, cyanosis or edema  Vascular: Peripheral pulses palpable 2+ bilaterally    MEDICATIONS  (STANDING):  albuterol/ipratropium for Nebulization 3 milliLiter(s) Nebulizer every 6 hours  buDESOnide    Inhalation Suspension 0.5 milliGRAM(s) Inhalation every 12 hours  chlorhexidine 2% Cloths 1 Application(s) Topical daily  enoxaparin Injectable 100 milliGRAM(s) SubCutaneous <User Schedule>  fluconAZOLE IVPB      fluconAZOLE IVPB 200 milliGRAM(s) IV Intermittent every 24 hours  influenza   Vaccine 0.5 milliLiter(s) IntraMuscular once  ketorolac   Injectable 30 milliGRAM(s) IV Push every 6 hours  lactated ringers. 1000 milliLiter(s) (75 mL/Hr) IV Continuous <Continuous>  methadone Injectable 8.5 milliGRAM(s) IV Push daily  nystatin Powder 1 Application(s) Topical two times a day  pantoprazole  Injectable 40 milliGRAM(s) IV Push every 24 hours  piperacillin/tazobactam IVPB.. 3.375 Gram(s) IV Intermittent every 8 hours      TELEMETRY: 	    ECG:  	  RADIOLOGY:  OTHER: 	  	  LABS:	 	    CARDIAC MARKERS:                                9.5    19.09 )-----------( 282      ( 03 Dec 2019 00:56 )             28.9     12-03    135  |  100  |  11  ----------------------------<  87  3.4<L>   |  23  |  0.53    Ca    8.1<L>      03 Dec 2019 00:56  Phos  1.7     12-03  Mg     2.0     12-03      proBNP:   Lipid Profile:   HgA1c:   TSH:   PT/INR - ( 03 Dec 2019 00:56 )   PT: 14.7 sec;   INR: 1.27 ratio         PTT - ( 03 Dec 2019 00:56 )  PTT:26.6 sec      Assessment and plan  ---------------------------  doing better, no complain.  continue abx  hold all bp meds, bp is well controlled off pressors  wound care  dvt prophylaxis  keep k>4 to avoid a.fib  gi prophylaxis

## 2019-12-03 NOTE — OCCUPATIONAL THERAPY INITIAL EVALUATION ADULT - IMPAIRMENTS CONTRIBUTING IMPAIRED BED MOBILITY, REHAB EVAL
impaired balance/decreased strength/impaired postural control/pain
decreased strength/impaired balance/pain/impaired postural control

## 2019-12-03 NOTE — OCCUPATIONAL THERAPY INITIAL EVALUATION ADULT - BED MOBILITY LIMITATIONS, REHAB EVAL
decreased ability to use legs for bridging/pushing/impaired ability to control trunk for mobility/decreased ability to use arms for pushing/pulling
decreased ability to use arms for pushing/pulling/impaired ability to control trunk for mobility/decreased ability to use legs for bridging/pushing

## 2019-12-03 NOTE — PROGRESS NOTE ADULT - SUBJECTIVE AND OBJECTIVE BOX
GENERAL SURGERY PROGRESS NOTE    SUBJECTIVE  Patient seen and examined. NPO without nausea, vomiting, +/- ostomy, marquez replaced yesterday, Denies fever, chills, SOB, chest pain.   Received albumin 500cc x 1 yesterday with SBP in the 90-100s. Fluconazole started yesterday.      OBJECTIVE    PHYSICAL EXAM  General: Appears well, NAD, L IJ in place  CHEST: breathing comfortably on O2 NC  CV: appears well perfused  Abdomen: soft, nontender, nondistended, no rebound or guarding, vac in place, L OVI x 1, ostomy +/- dusky appearing  : Marquez  Extremities: BLE lymphedema wrapped          Vital Signs:  Vital Signs Last 24 Hrs  T(C): 37.2 (02 Dec 2019 23:00), Max: 37.7 (02 Dec 2019 19:00)  T(F): 99 (02 Dec 2019 23:00), Max: 99.9 (02 Dec 2019 19:00)  HR: 88 (03 Dec 2019 02:00) (68 - 89)  BP: 96/50 (03 Dec 2019 02:00) (70/54 - 106/58)  BP(mean): 69 (03 Dec 2019 02:00) (59 - 82)  RR: 26 (03 Dec 2019 02:00) (18 - 33)  SpO2: 91% (03 Dec 2019 02:00) (89% - 100%)    CAPILLARY BLOOD GLUCOSE          I&O's Detail    01 Dec 2019 07:01  -  02 Dec 2019 07:00  --------------------------------------------------------  IN:    lactated ringers.: 1725 mL    Solution: 1300 mL    Solution: 200 mL    Solution: 583.2 mL  Total IN: 3808.2 mL    OUT:    Bulb: 40 mL    Indwelling Catheter - Urethral: 255 mL    Intermittent Catheterization - Urethral: 200 mL    Nasoenteral Tube: 100 mL  Total OUT: 595 mL    Total NET: 3213.2 mL      02 Dec 2019 07:01  -  03 Dec 2019 03:11  --------------------------------------------------------  IN:    Albumin 5%  - 500 mL: 1000 mL    lactated ringers.: 1500 mL    Oral Fluid: 50 mL    Solution: 166.6 mL    Solution: 100 mL    Solution: 275 mL  Total IN: 3091.6 mL    OUT:    Bulb: 15 mL    Indwelling Catheter - Urethral: 450 mL    Intermittent Catheterization - Urethral: 500 mL    VAC (Vacuum Assisted Closure) System: 100 mL  Total OUT: 1065 mL    Total NET: 2026.6 mL          MEDICATIONS  (STANDING):  albuterol/ipratropium for Nebulization 3 milliLiter(s) Nebulizer every 6 hours  buDESOnide    Inhalation Suspension 0.5 milliGRAM(s) Inhalation every 12 hours  chlorhexidine 2% Cloths 1 Application(s) Topical daily  enoxaparin Injectable 100 milliGRAM(s) SubCutaneous <User Schedule>  fluconAZOLE IVPB 400 milliGRAM(s) IV Intermittent once  fluconAZOLE IVPB      fluconAZOLE IVPB 200 milliGRAM(s) IV Intermittent every 24 hours  influenza   Vaccine 0.5 milliLiter(s) IntraMuscular once  ketorolac   Injectable 30 milliGRAM(s) IV Push every 6 hours  lactated ringers. 1000 milliLiter(s) (75 mL/Hr) IV Continuous <Continuous>  methadone Injectable 8.5 milliGRAM(s) IV Push daily  nystatin Powder 1 Application(s) Topical two times a day  pantoprazole  Injectable 40 milliGRAM(s) IV Push every 24 hours  piperacillin/tazobactam IVPB.. 3.375 Gram(s) IV Intermittent every 8 hours  potassium chloride  20 mEq/100 mL IVPB 20 milliEquivalent(s) IV Intermittent every 2 hours    MEDICATIONS  (PRN):  HYDROmorphone  Injectable 0.5 milliGRAM(s) IV Push every 3 hours PRN Pain          Labs:    12-03    135  |  100  |  11  ----------------------------<  87  3.4<L>   |  23  |  0.53    Ca    8.1<L>      03 Dec 2019 00:56  Phos  1.7     12-03  Mg     2.0     12-03                              9.5    19.09 )-----------( 282      ( 03 Dec 2019 00:56 )             28.9     PT/INR - ( 03 Dec 2019 00:56 )   PT: 14.7 sec;   INR: 1.27 ratio         PTT - ( 03 Dec 2019 00:56 )  PTT:26.6 sec    Imaging: GENERAL SURGERY PROGRESS NOTE    SUBJECTIVE  Patient seen and examined. NPO without nausea, vomiting, +/- ostomy, marquez replaced yesterday, Denies fever, chills, SOB, chest pain.   Received albumin 500cc x 1 yesterday for hypotension with SBP in the 80-100s. Fluconazole started yesterday for +yeast in UA.      OBJECTIVE    PHYSICAL EXAM  General: Appears well, NAD, L IJ in place  CHEST: breathing comfortably on O2 NC  CV: appears well perfused  Abdomen: soft, nontender, nondistended, no rebound or guarding, vac in place, L OVI x 1, ostomy +/- dusky appearing  : Marquez  Extremities: BLE lymphedema wrapped          Vital Signs:  Vital Signs Last 24 Hrs  T(C): 37.2 (02 Dec 2019 23:00), Max: 37.7 (02 Dec 2019 19:00)  T(F): 99 (02 Dec 2019 23:00), Max: 99.9 (02 Dec 2019 19:00)  HR: 88 (03 Dec 2019 02:00) (68 - 89)  BP: 96/50 (03 Dec 2019 02:00) (70/54 - 106/58)  BP(mean): 69 (03 Dec 2019 02:00) (59 - 82)  RR: 26 (03 Dec 2019 02:00) (18 - 33)  SpO2: 91% (03 Dec 2019 02:00) (89% - 100%)    CAPILLARY BLOOD GLUCOSE          I&O's Detail    01 Dec 2019 07:01  -  02 Dec 2019 07:00  --------------------------------------------------------  IN:    lactated ringers.: 1725 mL    Solution: 1300 mL    Solution: 200 mL    Solution: 583.2 mL  Total IN: 3808.2 mL    OUT:    Bulb: 40 mL    Indwelling Catheter - Urethral: 255 mL    Intermittent Catheterization - Urethral: 200 mL    Nasoenteral Tube: 100 mL  Total OUT: 595 mL    Total NET: 3213.2 mL      02 Dec 2019 07:01  -  03 Dec 2019 03:11  --------------------------------------------------------  IN:    Albumin 5%  - 500 mL: 1000 mL    lactated ringers.: 1500 mL    Oral Fluid: 50 mL    Solution: 166.6 mL    Solution: 100 mL    Solution: 275 mL  Total IN: 3091.6 mL    OUT:    Bulb: 15 mL    Indwelling Catheter - Urethral: 450 mL    Intermittent Catheterization - Urethral: 500 mL    VAC (Vacuum Assisted Closure) System: 100 mL  Total OUT: 1065 mL    Total NET: 2026.6 mL          MEDICATIONS  (STANDING):  albuterol/ipratropium for Nebulization 3 milliLiter(s) Nebulizer every 6 hours  buDESOnide    Inhalation Suspension 0.5 milliGRAM(s) Inhalation every 12 hours  chlorhexidine 2% Cloths 1 Application(s) Topical daily  enoxaparin Injectable 100 milliGRAM(s) SubCutaneous <User Schedule>  fluconAZOLE IVPB 400 milliGRAM(s) IV Intermittent once  fluconAZOLE IVPB      fluconAZOLE IVPB 200 milliGRAM(s) IV Intermittent every 24 hours  influenza   Vaccine 0.5 milliLiter(s) IntraMuscular once  ketorolac   Injectable 30 milliGRAM(s) IV Push every 6 hours  lactated ringers. 1000 milliLiter(s) (75 mL/Hr) IV Continuous <Continuous>  methadone Injectable 8.5 milliGRAM(s) IV Push daily  nystatin Powder 1 Application(s) Topical two times a day  pantoprazole  Injectable 40 milliGRAM(s) IV Push every 24 hours  piperacillin/tazobactam IVPB.. 3.375 Gram(s) IV Intermittent every 8 hours  potassium chloride  20 mEq/100 mL IVPB 20 milliEquivalent(s) IV Intermittent every 2 hours    MEDICATIONS  (PRN):  HYDROmorphone  Injectable 0.5 milliGRAM(s) IV Push every 3 hours PRN Pain          Labs:    12-03    135  |  100  |  11  ----------------------------<  87  3.4<L>   |  23  |  0.53    Ca    8.1<L>      03 Dec 2019 00:56  Phos  1.7     12-03  Mg     2.0     12-03                              9.5    19.09 )-----------( 282      ( 03 Dec 2019 00:56 )             28.9     PT/INR - ( 03 Dec 2019 00:56 )   PT: 14.7 sec;   INR: 1.27 ratio         PTT - ( 03 Dec 2019 00:56 )  PTT:26.6 sec    Imaging:

## 2019-12-03 NOTE — OCCUPATIONAL THERAPY INITIAL EVALUATION ADULT - PHYSICAL ASSIST/NONPHYSICAL ASSIST, REHAB EVAL
verbal cues/nonverbal cues (demo/gestures)/2 person assist
verbal cues/nonverbal cues (demo/gestures)/1 person assist

## 2019-12-03 NOTE — OCCUPATIONAL THERAPY INITIAL EVALUATION ADULT - STRENGTHENING, PT EVAL
GOAL: Pt will improve bilateral UE/LE strength to 4/5 to increase independence in ADLs within 4 weeks

## 2019-12-03 NOTE — OCCUPATIONAL THERAPY INITIAL EVALUATION ADULT - ADDITIONAL COMMENTS
Pt s/p Extended left hemicolectomy, Exploratory laparotomy +Abthera VAC placement 11/26, s/p Creation, end colostomy and ab thera removal 11/29  CT Abdomen/Pelvis: Perforated sigmoid diverticulitis with intraperitoneal free air and peripherally enhancing free fluid. No discrete abscess identified at this time. Thickening of the esophagus. Correlation with endoscopy is recommended on a nonemergent basis. Indeterminate 1.2 cm right lower pole exophytic hypodensity. Further evaluation with nonurgent ultrasound or MR is recommended.

## 2019-12-03 NOTE — OCCUPATIONAL THERAPY INITIAL EVALUATION ADULT - TRANSFER TRAINING, PT EVAL
GOAL: Pt will perform sit to stand, bed <-> chair, toilet transfers with I in 4 weeks
GOAL: Pt will perform sit to stand, bed <-> chair, toilet transfers with min assist in 4 weeks

## 2019-12-03 NOTE — OCCUPATIONAL THERAPY INITIAL EVALUATION ADULT - PRECAUTIONS/LIMITATIONS, REHAB EVAL
surgical precautions/Reports pain worst in the LLQ that started a few days ago and has been worsening in the last day, also has noted significant abdominal distention in the last day. Notes that she has not been able to urinate all day today because she feels dehydrated. Reports that she had 'low grade temperature of 99.7 which she took left over augmentin for.' Also reports nausea, denies any emesis, recorded fevers, urinary symptoms. Reports she has difficulty walking short distances because she becomes SOB. In ED, CT demonstrated Perforated sigmoid diverticulitis with intraperitoneal free air./fall precautions

## 2019-12-03 NOTE — OCCUPATIONAL THERAPY INITIAL EVALUATION ADULT - PHYSICAL ASSIST/NONPHYSICAL ASSIST: SUPINE/SIT, REHAB EVAL
verbal cues/nonverbal cues (demo/gestures)/2 person assist
2 person assist/verbal cues/nonverbal cues (demo/gestures)

## 2019-12-03 NOTE — OCCUPATIONAL THERAPY INITIAL EVALUATION ADULT - BALANCE TRAINING, PT EVAL
GOAL: Pt will demonstrate improved static/dynamic balance to fair +in order to increase safety and independence in ADLs within 4 weeks
GOAL: Pt will demonstrate improved static/dynamic balance to good  in order to increase safety and independence in ADLs within 4 weeks

## 2019-12-03 NOTE — PROGRESS NOTE ADULT - ASSESSMENT
64F with perforated sigmoid diverticulitis s/p exploratory laparotomy with extended left hemicolectomy, left in discontinuity w/ Abthera VAC (11/26), return to OR on 11/29 for transverse end colostomy and fascial closure with wound vac.    PLAN:  NEURO: postoperative pain control, chronic opioid use  - Continue pain control with PRN Dilaudid  - tylenol and toradol  - Methadone 8.5 mg IV qd    RESPIRATORY: Hx of COPD, acute hypoxemic respiratory failure, RUL & LLL infiltrates  - Extubated. On NC 3 LPM.   - ABGs with AM labs, CXR  - Duonebs and Pulmicort  - Combicath resulted - enterobacter    CARDIOVASCULAR: Hx of HTN on amlodipine at home, No hx of CAD  - Remains off vasopressor support, has been persistently hypotensive to 80s/90s w/ low uop  - Monitor vital signs and I&Os  - Hold home antihypertensives    GI/NUTRITION: S/p extended left hemicolectomy and was left in discontinuity with Abthera in place. s/p RTOR for end colostomy, fascial closure. Hx of obesity, Hx of GERD  - NPO w/ NGT  - Protonix for GERD  - will need TPN soon    GENITOURINARY/RENAL: no acute issues  - Monitoring fluid status closely  - LR @  75ml/hr.    HEMATOLOGIC: no acute issues  - Trend daily CBC and coags  - Continue DVT ppx with Lovenox dosed by patient weight    INFECTIOUS DISEASE: abdominal sepsis, OR culture with E. coli & Peptostreptococcus anaerobius, UA with yeast  - Continue Zosyn   - continue fluconazole  - Trend WBC and fever curve    ENDOCRINE: No active issues    DISPO: SICU

## 2019-12-03 NOTE — PROGRESS NOTE ADULT - ATTENDING COMMENTS
Pt seen and examined, agree with above.    1. Sigmoid diverticulitis s/p extended L hemicolectomy and abdominal closure with ileostomy, POD2:  - Multimodal pain control  - Continue home po methadone dose, will contact methadone clinic to verify dose   - OOBTC, PT  - ostomy functioning today   - will advance to clear liquid diet     2. COPD: on room air  with goal SpO2 > 88%   - On Breo Ellipta at home, here on duonebs and budesonide  -bronchial culture with enterococcus but patient not symptomatic     3. Increasing neutrophilic leukocytosis -  Finish 4 day course of Zosyn for peritonitis (11/29-12/3)  -started on fluconazole for budding yeast in the urine     4. Hypophosphatemia and hypokalemic - repleted and will recheck   -no arrhythmias at this time

## 2019-12-03 NOTE — OCCUPATIONAL THERAPY INITIAL EVALUATION ADULT - LIVES WITH, PROFILE
Pt lives in apartment with partner, 15 steps to enter, tub in bathroom. Pt I in ADLs and ambulates with SC prior to admission
Pt lives in apartment with partner, 15 steps to enter, tub in bathroom. Pt I in ADLs and ambulates with SC prior to admission

## 2019-12-03 NOTE — OCCUPATIONAL THERAPY INITIAL EVALUATION ADULT - GENERAL OBSERVATIONS, REHAB EVAL
Pt received semisupine in bed +ICU monitoring, +wound vac, +L IJ, +marquez
Pt received semisupine in bed, +BiPAP, +ICU monitoring, +abthera, +L IJ, +marquez

## 2019-12-03 NOTE — OCCUPATIONAL THERAPY INITIAL EVALUATION ADULT - PERTINENT HX OF CURRENT PROBLEM, REHAB EVAL
63 yo F with pmhx morbid obesity, acid reflux, HTN, COPD and pshx D&C presents to the ED c/o abdominal pain and bloating. Pt reports having constipation for 2 weeks and has been taking enemas, miralax and senna and passing small hard balls for the last weeks. Reports that she has not had a bowel movement unless she used an enema. See below
65 yo F with pmhx morbid obesity, acid reflux, HTN, COPD and pshx D&C presents to the ED c/o abdominal pain and bloating. Pt reports having constipation for 2 weeks and has been taking enemas, miralax and senna and passing small hard balls for the last weeks. Reports that she has not had a bowel movement unless she used an enema. See below

## 2019-12-03 NOTE — PROGRESS NOTE ADULT - ASSESSMENT
A/P: 64y Female POD#1 s/p ex lap with extended left hemicolectomy left in discontinuity for perforated sigmoid diverticulitis with intraperitoneal free air after presenting to ED with worsening constipation and abdominal pain with distention. AbThera vac placed and pt kept intubated post op and transferred to SICU for post-op management. Extubated 11/30, patient refusing BIPAP. Ross out 12/1. Ross replaced 12/2.    Plan:  - NPO w/ sips/ chips  - monitor ostomy  - monitor U/O, TOV  - monitor vac output  - c/w pain control, c/w methadone   - DVT ppx with lovenox, sequential compression   - appreciate sicu care  - c/w zosyn     p 0948

## 2019-12-03 NOTE — OCCUPATIONAL THERAPY INITIAL EVALUATION ADULT - PLANNED THERAPY INTERVENTIONS, OT EVAL
ADL retraining/bed mobility training/transfer training/balance training
bed mobility training/transfer training/strengthening/ADL retraining/balance training

## 2019-12-03 NOTE — OCCUPATIONAL THERAPY INITIAL EVALUATION ADULT - DIAGNOSIS, OT EVAL
Pt presents with post op pain, decreased strength, balance, endurance, ADLs and functional mobility
Pt presents with post op pain, decreased strength, balance, endurance, ADLs and functional mobility

## 2019-12-03 NOTE — OCCUPATIONAL THERAPY INITIAL EVALUATION ADULT - NS ASR FOLLOW COMMAND OT EVAL
100% of the time/unable to follow multi-step instructions
able to follow single-step instructions/100% of the time

## 2019-12-03 NOTE — OCCUPATIONAL THERAPY INITIAL EVALUATION ADULT - BED MOBILITY TRAINING, PT EVAL
GOAL: Pt will perform bed mobility with I in 4 weeks
GOAL: Pt will perform bed mobility with min assist in 4 weeks

## 2019-12-03 NOTE — PROGRESS NOTE ADULT - SUBJECTIVE AND OBJECTIVE BOX
HISTORY  64F with pmhx morbid obesity, acid reflux, HTN, COPD and PSH D&C presented to the ED on 11/26 c/o abdominal pain and bloating. Patient reports having constipation for 2 weeks and has been taking enemas, miralax and senna and passing small hard balls for the last weeks. Reported pain worst in the LLQ that started a few days ago and has been worsening in the last day, also has noted significant abdominal distention in the last day. Reported she had difficulty walking short distances because she becomes SOB. In ED, CT demonstrated perforated sigmoid diverticulitis with intraperitoneal free air. She was taken to the operating room on 11/25 overnight and underwent an exploratory laparotomy with extended left hemicolectomy and was left in discontinuity. An Abthera VAC was placed. She was kept intubated post op and brought to the ICU for hemodynamic and respiratory management. She went back to the OR on 11/29 for transverse end colostomy and fascial closure with wound vac.    24 HOUR EVENTS:  -albumin 500cc x2 given for hypotension  -ostomy still not functioning  -NGT taken out  -methadone dosing confirmed  -became hypotensive after initiating Cardura, which was then stopped  -marquez replaced after multiple straight caths  -yeast on UA - fluconazole started    SUBJECTIVE/ROS:  [x] A ten-point review of systems was otherwise negative except as noted.  [ ] Due to altered mental status/intubation, subjective information were not able to be obtained from the patient. History was obtained, to the extent possible, from review of the chart and collateral sources of information.    NEURO  Exam: awake, alert, oriented  Meds: HYDROmorphone  Injectable 0.5 milliGRAM(s) IV Push every 3 hours PRN Pain  ketorolac   Injectable 30 milliGRAM(s) IV Push every 6 hours  methadone Injectable 8.5 milliGRAM(s) IV Push daily  [x] Adequacy of sedation and pain control has been assessed and adjusted      RESPIRATORY  RR: 26 (12-03-19 @ 03:00) (18 - 33)  SpO2: 90% (12-03-19 @ 03:00) (89% - 100%)  Wt(kg): --  Exam: unlabored, clear to auscultation bilaterally  [N/A] Extubation Readiness Assessed  Meds: albuterol/ipratropium for Nebulization 3 milliLiter(s) Nebulizer every 6 hours  buDESOnide    Inhalation Suspension 0.5 milliGRAM(s) Inhalation every 12 hours    CARDIOVASCULAR  HR: 82 (12-03-19 @ 03:00) (68 - 89)  BP: 89/51 (12-03-19 @ 03:00) (70/54 - 106/58)  BP(mean): 65 (12-03-19 @ 03:00) (59 - 82)  Exam: regular rate and rhythm  Cardiac Rhythm: sinus  Perfusion     [x]Adequate   [ ]Inadequate  Mentation   [x]Normal       [ ]Reduced  Extremities  [x]Warm         [ ]Cool  Volume Status [ ]Hypervolemic [x]Euvolemic [ ]Hypovolemic  Meds:  None      GI/NUTRITION  Exam: soft, nontender, nondistended, incision C/D/I, ostomy grey not functioning yet  Diet: NPO  Meds: pantoprazole  Injectable 40 milliGRAM(s) IV Push every 24 hours    GENITOURINARY  I&O's Detail    12-01 @ 07:01  -  12-02 @ 07:00  --------------------------------------------------------  IN:    lactated ringers.: 1725 mL    Solution: 1300 mL    Solution: 200 mL    Solution: 583.2 mL  Total IN: 3808.2 mL    OUT:    Bulb: 40 mL    Indwelling Catheter - Urethral: 255 mL    Intermittent Catheterization - Urethral: 200 mL    Nasoenteral Tube: 100 mL  Total OUT: 595 mL    Total NET: 3213.2 mL      12-02 @ 07:01  -  12-03 @ 04:06  --------------------------------------------------------  IN:    Albumin 5%  - 500 mL: 1000 mL    lactated ringers.: 1575 mL    Oral Fluid: 50 mL    Solution: 275 mL    Solution: 229.1 mL    Solution: 300 mL  Total IN: 3429.1 mL    OUT:    Bulb: 15 mL    Indwelling Catheter - Urethral: 535 mL    Intermittent Catheterization - Urethral: 500 mL    VAC (Vacuum Assisted Closure) System: 100 mL  Total OUT: 1150 mL    Total NET: 2279.1 mL    12-03    135  |  100  |  11  ----------------------------<  87  3.4<L>   |  23  |  0.53    Ca    8.1<L>      03 Dec 2019 00:56  Phos  1.7     12-03  Mg     2.0     12-03      [x] Marquez catheter, indication: inability to void  Meds: lactated ringers. 1000 milliLiter(s) IV Continuous <Continuous>  potassium chloride  20 mEq/100 mL IVPB 20 milliEquivalent(s) IV Intermittent every 2 hours      HEMATOLOGIC  Meds: enoxaparin Injectable 100 milliGRAM(s) SubCutaneous <User Schedule>    [x] VTE Prophylaxis                        9.5    19.09 )-----------( 282      ( 03 Dec 2019 00:56 )             28.9     PT/INR - ( 03 Dec 2019 00:56 )   PT: 14.7 sec;   INR: 1.27 ratio         PTT - ( 03 Dec 2019 00:56 )  PTT:26.6 sec  Transfusion     [ ] PRBC   [ ] Platelets   [ ] FFP   [ ] Cryoprecipitate      INFECTIOUS DISEASES  WBC Count: 19.09 K/uL (12-03 @ 00:56)    RECENT CULTURES:  Specimen Source: Bronch Wash Combicath  Date/Time: 11-30 @ 06:34  Culture Results:   Moderate Enterobacter cloacae complex  Normal Respiratory Elaine present  Gram Stain:   Moderate polymorphonuclear leukocytes per low power field  Rare Squamous epithelial cells per low power field  Few Gram Negative Rods per oil power field  Organism: Enterobacter cloacae complex    Meds: fluconAZOLE IVPB 400 milliGRAM(s) IV Intermittent once  fluconAZOLE IVPB      fluconAZOLE IVPB 200 milliGRAM(s) IV Intermittent every 24 hours  influenza   Vaccine 0.5 milliLiter(s) IntraMuscular once  piperacillin/tazobactam IVPB.. 3.375 Gram(s) IV Intermittent every 8 hours    ENDOCRINE  CAPILLARY BLOOD GLUCOSE WNL  Meds: None      ACCESS DEVICES:  [ ] Peripheral IV  [x] Central Venous Line	[] R	[x] L	[x] IJ	[ ] Fem	[ ] SC	Placed:   [ ] Arterial Line		[ ] R	[ ] L	[ ] Fem	[ ] Rad	[ ] Ax	Placed:   [ ] PICC:					[ ] Mediport  [x] Urinary Catheter, Date Placed: 12/2/19  [x] Necessity of urinary, arterial, and venous catheters discussed    OTHER MEDICATIONS:  chlorhexidine 2% Cloths 1 Application(s) Topical daily  nystatin Powder 1 Application(s) Topical two times a day    CODE STATUS: FULL CODE    IMAGING: N/A

## 2019-12-03 NOTE — OCCUPATIONAL THERAPY INITIAL EVALUATION ADULT - ADL RETRAINING, OT EVAL
GOAL: Pt will perform toilet transfer & hygiene with I within 4 weeks
GOAL: Pt will perform toilet transfer & hygiene with min assist, within 4 weeks

## 2019-12-04 LAB
ANION GAP SERPL CALC-SCNC: 13 MMOL/L — SIGNIFICANT CHANGE UP (ref 5–17)
APTT BLD: 26.1 SEC — LOW (ref 27.5–36.3)
BUN SERPL-MCNC: 10 MG/DL — SIGNIFICANT CHANGE UP (ref 7–23)
CALCIUM SERPL-MCNC: 7.9 MG/DL — LOW (ref 8.4–10.5)
CHLORIDE SERPL-SCNC: 100 MMOL/L — SIGNIFICANT CHANGE UP (ref 96–108)
CO2 SERPL-SCNC: 24 MMOL/L — SIGNIFICANT CHANGE UP (ref 22–31)
CREAT SERPL-MCNC: 0.52 MG/DL — SIGNIFICANT CHANGE UP (ref 0.5–1.3)
GLUCOSE SERPL-MCNC: 130 MG/DL — HIGH (ref 70–99)
HCT VFR BLD CALC: 28.6 % — LOW (ref 34.5–45)
HGB BLD-MCNC: 9.4 G/DL — LOW (ref 11.5–15.5)
INR BLD: 1.28 RATIO — HIGH (ref 0.88–1.16)
MAGNESIUM SERPL-MCNC: 2 MG/DL — SIGNIFICANT CHANGE UP (ref 1.6–2.6)
MCHC RBC-ENTMCNC: 29.5 PG — SIGNIFICANT CHANGE UP (ref 27–34)
MCHC RBC-ENTMCNC: 32.9 GM/DL — SIGNIFICANT CHANGE UP (ref 32–36)
MCV RBC AUTO: 89.7 FL — SIGNIFICANT CHANGE UP (ref 80–100)
NRBC # BLD: 0 /100 WBCS — SIGNIFICANT CHANGE UP (ref 0–0)
PHOSPHATE SERPL-MCNC: 1.7 MG/DL — LOW (ref 2.5–4.5)
PLATELET # BLD AUTO: 284 K/UL — SIGNIFICANT CHANGE UP (ref 150–400)
POTASSIUM SERPL-MCNC: 3.7 MMOL/L — SIGNIFICANT CHANGE UP (ref 3.5–5.3)
POTASSIUM SERPL-SCNC: 3.7 MMOL/L — SIGNIFICANT CHANGE UP (ref 3.5–5.3)
PROTHROM AB SERPL-ACNC: 14.7 SEC — HIGH (ref 10–12.9)
RBC # BLD: 3.19 M/UL — LOW (ref 3.8–5.2)
RBC # FLD: 14.2 % — SIGNIFICANT CHANGE UP (ref 10.3–14.5)
SODIUM SERPL-SCNC: 137 MMOL/L — SIGNIFICANT CHANGE UP (ref 135–145)
WBC # BLD: 22.83 K/UL — HIGH (ref 3.8–10.5)
WBC # FLD AUTO: 22.83 K/UL — HIGH (ref 3.8–10.5)

## 2019-12-04 PROCEDURE — 71260 CT THORAX DX C+: CPT | Mod: 26

## 2019-12-04 PROCEDURE — 74177 CT ABD & PELVIS W/CONTRAST: CPT | Mod: 26

## 2019-12-04 RX ORDER — FLUCONAZOLE 150 MG/1
400 TABLET ORAL EVERY 24 HOURS
Refills: 0 | Status: DISCONTINUED | OUTPATIENT
Start: 2019-12-04 | End: 2019-12-09

## 2019-12-04 RX ORDER — FAMOTIDINE 10 MG/ML
20 INJECTION INTRAVENOUS DAILY
Refills: 0 | Status: DISCONTINUED | OUTPATIENT
Start: 2019-12-04 | End: 2019-12-07

## 2019-12-04 RX ORDER — POTASSIUM PHOSPHATE, MONOBASIC POTASSIUM PHOSPHATE, DIBASIC 236; 224 MG/ML; MG/ML
15 INJECTION, SOLUTION INTRAVENOUS EVERY 6 HOURS
Refills: 0 | Status: COMPLETED | OUTPATIENT
Start: 2019-12-04 | End: 2019-12-04

## 2019-12-04 RX ORDER — POTASSIUM CHLORIDE 20 MEQ
20 PACKET (EA) ORAL ONCE
Refills: 0 | Status: COMPLETED | OUTPATIENT
Start: 2019-12-04 | End: 2019-12-04

## 2019-12-04 RX ORDER — DEXTROSE MONOHYDRATE, SODIUM CHLORIDE, AND POTASSIUM CHLORIDE 50; .745; 4.5 G/1000ML; G/1000ML; G/1000ML
1000 INJECTION, SOLUTION INTRAVENOUS
Refills: 0 | Status: DISCONTINUED | OUTPATIENT
Start: 2019-12-04 | End: 2019-12-09

## 2019-12-04 RX ADMIN — FAMOTIDINE 20 MILLIGRAM(S): 10 INJECTION INTRAVENOUS at 12:06

## 2019-12-04 RX ADMIN — NYSTATIN CREAM 1 APPLICATION(S): 100000 CREAM TOPICAL at 05:33

## 2019-12-04 RX ADMIN — Medication 0.5 MILLIGRAM(S): at 05:09

## 2019-12-04 RX ADMIN — MEROPENEM 100 MILLIGRAM(S): 1 INJECTION INTRAVENOUS at 13:33

## 2019-12-04 RX ADMIN — POTASSIUM PHOSPHATE, MONOBASIC POTASSIUM PHOSPHATE, DIBASIC 62.5 MILLIMOLE(S): 236; 224 INJECTION, SOLUTION INTRAVENOUS at 12:07

## 2019-12-04 RX ADMIN — Medication 0.5 MILLIGRAM(S): at 19:05

## 2019-12-04 RX ADMIN — OXYCODONE HYDROCHLORIDE 5 MILLIGRAM(S): 5 TABLET ORAL at 06:45

## 2019-12-04 RX ADMIN — OXYCODONE HYDROCHLORIDE 10 MILLIGRAM(S): 5 TABLET ORAL at 10:29

## 2019-12-04 RX ADMIN — OXYCODONE HYDROCHLORIDE 5 MILLIGRAM(S): 5 TABLET ORAL at 01:15

## 2019-12-04 RX ADMIN — MEROPENEM 100 MILLIGRAM(S): 1 INJECTION INTRAVENOUS at 05:33

## 2019-12-04 RX ADMIN — OXYCODONE HYDROCHLORIDE 5 MILLIGRAM(S): 5 TABLET ORAL at 00:46

## 2019-12-04 RX ADMIN — FLUCONAZOLE 100 MILLIGRAM(S): 150 TABLET ORAL at 22:25

## 2019-12-04 RX ADMIN — OXYCODONE HYDROCHLORIDE 10 MILLIGRAM(S): 5 TABLET ORAL at 16:55

## 2019-12-04 RX ADMIN — POTASSIUM PHOSPHATE, MONOBASIC POTASSIUM PHOSPHATE, DIBASIC 62.5 MILLIMOLE(S): 236; 224 INJECTION, SOLUTION INTRAVENOUS at 03:23

## 2019-12-04 RX ADMIN — NYSTATIN CREAM 1 APPLICATION(S): 100000 CREAM TOPICAL at 19:06

## 2019-12-04 RX ADMIN — OXYCODONE HYDROCHLORIDE 5 MILLIGRAM(S): 5 TABLET ORAL at 07:00

## 2019-12-04 RX ADMIN — Medication 3 MILLILITER(S): at 11:44

## 2019-12-04 RX ADMIN — Medication 3 MILLILITER(S): at 19:05

## 2019-12-04 RX ADMIN — PANTOPRAZOLE SODIUM 40 MILLIGRAM(S): 20 TABLET, DELAYED RELEASE ORAL at 16:16

## 2019-12-04 RX ADMIN — ENOXAPARIN SODIUM 100 MILLIGRAM(S): 100 INJECTION SUBCUTANEOUS at 12:06

## 2019-12-04 RX ADMIN — Medication 3 MILLILITER(S): at 01:14

## 2019-12-04 RX ADMIN — OXYCODONE HYDROCHLORIDE 10 MILLIGRAM(S): 5 TABLET ORAL at 22:24

## 2019-12-04 RX ADMIN — OXYCODONE HYDROCHLORIDE 10 MILLIGRAM(S): 5 TABLET ORAL at 22:54

## 2019-12-04 RX ADMIN — METHADONE HYDROCHLORIDE 17 MILLIGRAM(S): 40 TABLET ORAL at 12:08

## 2019-12-04 RX ADMIN — OXYCODONE HYDROCHLORIDE 10 MILLIGRAM(S): 5 TABLET ORAL at 09:59

## 2019-12-04 RX ADMIN — DEXTROSE MONOHYDRATE, SODIUM CHLORIDE, AND POTASSIUM CHLORIDE 150 MILLILITER(S): 50; .745; 4.5 INJECTION, SOLUTION INTRAVENOUS at 16:14

## 2019-12-04 RX ADMIN — Medication 3 MILLILITER(S): at 05:09

## 2019-12-04 RX ADMIN — OXYCODONE HYDROCHLORIDE 10 MILLIGRAM(S): 5 TABLET ORAL at 16:25

## 2019-12-04 RX ADMIN — Medication 20 MILLIEQUIVALENT(S): at 06:46

## 2019-12-04 RX ADMIN — FLUCONAZOLE 50 MILLIGRAM(S): 150 TABLET ORAL at 08:00

## 2019-12-04 NOTE — PROGRESS NOTE ADULT - ATTENDING COMMENTS
I have seen and evaluated the patient and discussed the relevant clinical findings and plan with the surgical housestaff and fellow.  Agree with the above documentation with addenda as noted.     Events noted -- WBC increasing, UOP low  Abdomen soft, vac in place, ostomy with superficial sloughing, pink mucosa and gas in bag    May need repeat echo  Increase IVF if not taking significant PO  OOB/PT  Monitor WBC -- if febrile or continues to increase would obtain CT chest/abd/pelvis    Ej Ng MD

## 2019-12-04 NOTE — PROGRESS NOTE ADULT - SUBJECTIVE AND OBJECTIVE BOX
SICU DAILY PROGRESS NOTE    64F with pmhx morbid obesity, acid reflux, HTN, COPD and PSH D&C presented to the ED on 11/26 c/o abdominal pain and bloating. Patient reports having constipation for 2 weeks and has been taking enemas, miralax and senna and passing small hard balls for the last weeks. Reported pain worst in the LLQ that started a few days ago and has been worsening in the last day, also has noted significant abdominal distention in the last day. Reported she had difficulty walking short distances because she becomes SOB. In ED, CT demonstrated perforated sigmoid diverticulitis with intraperitoneal free air. She was taken to the operating room on 11/25 overnight and underwent an exploratory laparotomy with extended left hemicolectomy and was left in discontinuity. An Abthera VAC was placed. She was kept intubated post op and brought to the ICU for hemodynamic and respiratory management. She went back to the OR on 11/29 for transverse end colostomy and fascial closure with wound vac.      24 HOUR EVENTS:  - Advanced to CLD  - obtained procalcitonin for increasing WBC, returned 1.1  - Meropenem started for increased WBC, etiology unclear  - PIV x 2 obtained, LIJ central line discontinued  - UOP evening low 15cc/hr, 500cc albumin given, w/ minimal response    SUBJECTIVE/ROS:  [ ] A ten-point review of systems was otherwise negative except as noted.  [ ] Due to altered mental status/intubation, subjective information were not able to be obtained from the patient. History was obtained, to the extent possible, from review of the chart and collateral sources of information.      NEURO  AOx3  Exam: awake, alert, oriented  Meds: acetaminophen   Tablet .. 975 milliGRAM(s) Oral every 6 hours PRN Mild Pain (1 - 3)  methadone   Solution 17 milliGRAM(s) Oral daily  oxyCODONE    IR 5 milliGRAM(s) Oral every 4 hours PRN Moderate Pain (4 - 6)  oxyCODONE    IR 10 milliGRAM(s) Oral every 6 hours PRN Severe Pain (7 - 10)    [x] Adequacy of sedation and pain control has been assessed and adjusted      RESPIRATORY  RR: 21 (12-04-19 @ 00:00) (20 - 29)  SpO2: 97% (12-04-19 @ 01:15) (90% - 99%)  Wt(kg): --  Exam: unlabored, clear to auscultation bilaterally  Mechanical Ventilation:     [N/A] Extubation Readiness Assessed  Meds: albuterol/ipratropium for Nebulization 3 milliLiter(s) Nebulizer every 6 hours  buDESOnide    Inhalation Suspension 0.5 milliGRAM(s) Inhalation every 12 hours    CARDIOVASCULAR  HR: 77 (12-04-19 @ 01:15) (76 - 93)  BP: 135/63 (12-04-19 @ 00:00) (71/43 - 147/65)  BP(mean): 91 (12-04-19 @ 00:00) (52 - 93)  ABP: --  ABP(mean): --  Wt(kg): --  CVP(cm H2O): --    Exam: regular rate and rhythm  Cardiac Rhythm: sinus  Perfusion     [x]Adequate   [ ]Inadequate  Mentation   [x]Normal       [ ]Reduced  Extremities  [x]Warm         [ ]Cool  Volume Status [ ]Hypervolemic [x]Euvolemic [ ]Hypovolemic      GI/NUTRITION  Exam: soft, nontender, nondistended, incision C/D/I  Diet: CLD  Meds: pantoprazole  Injectable 40 milliGRAM(s) IV Push every 24 hours      GENITOURINARY  I&O's Detail    12-02 @ 07:01  -  12-03 @ 07:00  --------------------------------------------------------  IN:    Albumin 5%  - 500 mL: 1000 mL    lactated ringers.: 1800 mL    Oral Fluid: 50 mL    Solution: 275 mL    Solution: 300 mL    Solution: 416.6 mL  Total IN: 3841.6 mL    OUT:    Bulb: 30 mL    Indwelling Catheter - Urethral: 665 mL    Intermittent Catheterization - Urethral: 500 mL    VAC (Vacuum Assisted Closure) System: 200 mL  Total OUT: 1395 mL    Total NET: 2446.6 mL      12-03 @ 07:01  -  12-04 @ 01:34  --------------------------------------------------------  IN:    Albumin 5%  - 500 mL: 500 mL    lactated ringers.: 1125 mL    Oral Fluid: 400 mL    Solution: 300 mL    Solution: 200 mL    Solution: 100 mL    Solution: 312.5 mL  Total IN: 2937.5 mL    OUT:    Bulb: 20 mL    Ileostomy: 15 mL    Indwelling Catheter - Urethral: 455 mL    VAC (Vacuum Assisted Closure) System: 150 mL  Total OUT: 640 mL    Total NET: 2297.5 mL          12-03    135  |  100  |  11  ----------------------------<  87  3.4<L>   |  23  |  0.53    Ca    8.1<L>      03 Dec 2019 00:56  Phos  1.7     12-03  Mg     2.0     12-03      [ ] Ross catheter, indication: N/A  Meds: lactated ringers. 1000 milliLiter(s) IV Continuous <Continuous>        HEMATOLOGIC  Meds: enoxaparin Injectable 100 milliGRAM(s) SubCutaneous <User Schedule>    [x] VTE Prophylaxis                        9.5    19.09 )-----------( 282      ( 03 Dec 2019 00:56 )             28.9     PT/INR - ( 03 Dec 2019 00:56 )   PT: 14.7 sec;   INR: 1.27 ratio         PTT - ( 03 Dec 2019 00:56 )  PTT:26.6 sec  Transfusion     [ ] PRBC   [ ] Platelets   [ ] FFP   [ ] Cryoprecipitate      INFECTIOUS DISEASES    RECENT CULTURES:  Specimen Source: Bronch Wash Combicath  Date/Time: 11-30 @ 06:34  Culture Results:   Moderate Enterobacter cloacae complex  Normal Respiratory Elaine present  Gram Stain:   Moderate polymorphonuclear leukocytes per low power field  Rare Squamous epithelial cells per low power field  Few Gram Negative Rods per oil power field  Organism: Enterobacter cloacae complex    Meds: fluconAZOLE IVPB      fluconAZOLE IVPB 200 milliGRAM(s) IV Intermittent every 24 hours  influenza   Vaccine 0.5 milliLiter(s) IntraMuscular once  meropenem  IVPB 1000 milliGRAM(s) IV Intermittent every 8 hours        ENDOCRINE  CAPILLARY BLOOD GLUCOSE        Meds:       ACCESS DEVICES:  [x] Peripheral IV  [ ] Central Venous Line	[ ] R	[ ] L	[ ] IJ	[ ] Fem	[ ] SC	Placed:   [ ] Arterial Line		[ ] R	[ ] L	[ ] Fem	[ ] Rad	[ ] Ax	Placed:   [ ] PICC:					[ ] Mediport  [ ] Urinary Catheter, Date Placed:   [x] Necessity of urinary, arterial, and venous catheters discussed    OTHER MEDICATIONS:  chlorhexidine 2% Cloths 1 Application(s) Topical daily  nystatin Powder 1 Application(s) Topical two times a day      CODE STATUS: full SICU DAILY PROGRESS NOTE    64F with pmhx morbid obesity, acid reflux, HTN, COPD and PSH D&C presented to the ED on 11/26 c/o abdominal pain and bloating. Patient reports having constipation for 2 weeks and has been taking enemas, miralax and senna and passing small hard balls for the last weeks. Reported pain worst in the LLQ that started a few days ago and has been worsening in the last day, also has noted significant abdominal distention in the last day. Reported she had difficulty walking short distances because she becomes SOB. In ED, CT demonstrated perforated sigmoid diverticulitis with intraperitoneal free air. She was taken to the operating room on 11/25 overnight and underwent an exploratory laparotomy with extended left hemicolectomy and was left in discontinuity. An Abthera VAC was placed. She was kept intubated post op and brought to the ICU for hemodynamic and respiratory management. She went back to the OR on 11/29 for transverse end colostomy and fascial closure with wound vac.      24 HOUR EVENTS:  - Advanced to CLD  - obtained procalcitonin for increasing WBC, returned 1.1  - Meropenem started for increased WBC, etiology unclear, WBC continues to trend up to 22 o/n  - PIV x 2 obtained, LIJ central line discontinued  - UOP evening low 15cc/hr, 500cc albumin given, w/ minimal response    SUBJECTIVE/ROS:  [ ] A ten-point review of systems was otherwise negative except as noted.  [ ] Due to altered mental status/intubation, subjective information were not able to be obtained from the patient. History was obtained, to the extent possible, from review of the chart and collateral sources of information.      NEURO  AOx3  Exam: awake, alert, oriented  Meds: acetaminophen   Tablet .. 975 milliGRAM(s) Oral every 6 hours PRN Mild Pain (1 - 3)  methadone   Solution 17 milliGRAM(s) Oral daily  oxyCODONE    IR 5 milliGRAM(s) Oral every 4 hours PRN Moderate Pain (4 - 6)  oxyCODONE    IR 10 milliGRAM(s) Oral every 6 hours PRN Severe Pain (7 - 10)    [x] Adequacy of sedation and pain control has been assessed and adjusted      RESPIRATORY  RR: 21 (12-04-19 @ 00:00) (20 - 29)  SpO2: 97% (12-04-19 @ 01:15) (90% - 99%)  Wt(kg): --  Exam: unlabored, clear to auscultation bilaterally  Mechanical Ventilation:     [N/A] Extubation Readiness Assessed  Meds: albuterol/ipratropium for Nebulization 3 milliLiter(s) Nebulizer every 6 hours  buDESOnide    Inhalation Suspension 0.5 milliGRAM(s) Inhalation every 12 hours    CARDIOVASCULAR  HR: 77 (12-04-19 @ 01:15) (76 - 93)  BP: 135/63 (12-04-19 @ 00:00) (71/43 - 147/65)  BP(mean): 91 (12-04-19 @ 00:00) (52 - 93)  ABP: --  ABP(mean): --  Wt(kg): --  CVP(cm H2O): --    Exam: regular rate and rhythm  Cardiac Rhythm: sinus  Perfusion     [x]Adequate   [ ]Inadequate  Mentation   [x]Normal       [ ]Reduced  Extremities  [x]Warm         [ ]Cool  Volume Status [ ]Hypervolemic [x]Euvolemic [ ]Hypovolemic      GI/NUTRITION  Exam: soft, nontender, nondistended, incision C/D/I  Diet: CLD  Meds: pantoprazole  Injectable 40 milliGRAM(s) IV Push every 24 hours      GENITOURINARY  I&O's Detail    12-02 @ 07:01  -  12-03 @ 07:00  --------------------------------------------------------  IN:    Albumin 5%  - 500 mL: 1000 mL    lactated ringers.: 1800 mL    Oral Fluid: 50 mL    Solution: 275 mL    Solution: 300 mL    Solution: 416.6 mL  Total IN: 3841.6 mL    OUT:    Bulb: 30 mL    Indwelling Catheter - Urethral: 665 mL    Intermittent Catheterization - Urethral: 500 mL    VAC (Vacuum Assisted Closure) System: 200 mL  Total OUT: 1395 mL    Total NET: 2446.6 mL      12-03 @ 07:01  -  12-04 @ 01:34  --------------------------------------------------------  IN:    Albumin 5%  - 500 mL: 500 mL    lactated ringers.: 1125 mL    Oral Fluid: 400 mL    Solution: 300 mL    Solution: 200 mL    Solution: 100 mL    Solution: 312.5 mL  Total IN: 2937.5 mL    OUT:    Bulb: 20 mL    Ileostomy: 15 mL    Indwelling Catheter - Urethral: 455 mL    VAC (Vacuum Assisted Closure) System: 150 mL  Total OUT: 640 mL    Total NET: 2297.5 mL          12-03    135  |  100  |  11  ----------------------------<  87  3.4<L>   |  23  |  0.53    Ca    8.1<L>      03 Dec 2019 00:56  Phos  1.7     12-03  Mg     2.0     12-03      [ ] Ross catheter, indication: N/A  Meds: lactated ringers. 1000 milliLiter(s) IV Continuous <Continuous>        HEMATOLOGIC  Meds: enoxaparin Injectable 100 milliGRAM(s) SubCutaneous <User Schedule>    [x] VTE Prophylaxis                        9.5    19.09 )-----------( 282      ( 03 Dec 2019 00:56 )             28.9     PT/INR - ( 03 Dec 2019 00:56 )   PT: 14.7 sec;   INR: 1.27 ratio         PTT - ( 03 Dec 2019 00:56 )  PTT:26.6 sec  Transfusion     [ ] PRBC   [ ] Platelets   [ ] FFP   [ ] Cryoprecipitate      INFECTIOUS DISEASES    RECENT CULTURES:  Specimen Source: Bronch Wash Combicath  Date/Time: 11-30 @ 06:34  Culture Results:   Moderate Enterobacter cloacae complex  Normal Respiratory Elaine present  Gram Stain:   Moderate polymorphonuclear leukocytes per low power field  Rare Squamous epithelial cells per low power field  Few Gram Negative Rods per oil power field  Organism: Enterobacter cloacae complex    Meds: fluconAZOLE IVPB      fluconAZOLE IVPB 200 milliGRAM(s) IV Intermittent every 24 hours  influenza   Vaccine 0.5 milliLiter(s) IntraMuscular once  meropenem  IVPB 1000 milliGRAM(s) IV Intermittent every 8 hours        ENDOCRINE  CAPILLARY BLOOD GLUCOSE        Meds:       ACCESS DEVICES:  [x] Peripheral IV  [ ] Central Venous Line	[ ] R	[ ] L	[ ] IJ	[ ] Fem	[ ] SC	Placed:   [ ] Arterial Line		[ ] R	[ ] L	[ ] Fem	[ ] Rad	[ ] Ax	Placed:   [ ] PICC:					[ ] Mediport  [ ] Urinary Catheter, Date Placed:   [x] Necessity of urinary, arterial, and venous catheters discussed    OTHER MEDICATIONS:  chlorhexidine 2% Cloths 1 Application(s) Topical daily  nystatin Powder 1 Application(s) Topical two times a day      CODE STATUS: full

## 2019-12-04 NOTE — PROGRESS NOTE ADULT - ATTENDING COMMENTS
Pt seen and examined, agree with above.    1. Sigmoid diverticulitis s/p extended L hemicolectomy and abdominal closure with ileostomy, POD2:  - Multimodal pain control  - Continue home po methadone dose   - OOBTC, PT  - ostomy functioning today   - will advance diet    2. COPD: on room air  with goal SpO2 > 88%   - On Breo Ellipta at home, here on duonebs and budesonide  -bronchial culture with enterococcus but patient not symptomatic     3. Increasing neutrophilic leukocytosis -  Finish 4 day course of Zosyn for peritonitis (11/29-12/3)  -started on fluconazole for budding yeast in the urine     4. Hypophosphatemia  - repleted and will recheck   -no arrhythmias at this time

## 2019-12-04 NOTE — PROGRESS NOTE ADULT - SUBJECTIVE AND OBJECTIVE BOX
CARDIOLOGY     PROGRESS  NOTE   ________________________________________________    CHIEF COMPLAINT:Patient is a 64y old  Female who presents with a chief complaint of perforated sigmoid diverticulum s/p left hemicolectomy (03 Dec 2019 04:04)  doing better, decrease pain.  	  REVIEW OF SYSTEMS:  CONSTITUTIONAL: No fever, weight loss, or fatigue  EYES: No eye pain, visual disturbances, or discharge  ENT:  No difficulty hearing, tinnitus, vertigo; No sinus or throat pain  NECK: No pain or stiffness  RESPIRATORY: No cough, wheezing, chills or hemoptysis; No Shortness of Breath  CARDIOVASCULAR: No chest pain, palpitations, passing out, dizziness, or leg swelling  GASTROINTESTINAL: No abdominal or epigastric pain. No nausea, vomiting, or hematemesis; No diarrhea or constipation. No melena or hematochezia.+ incision pain.  GENITOURINARY: No dysuria, frequency, hematuria, or incontinence  NEUROLOGICAL: No headaches, memory loss, loss of strength, numbness, or tremors  SKIN: No itching, burning, rashes, or lesions   LYMPH Nodes: No enlarged glands  ENDOCRINE: No heat or cold intolerance; No hair loss  MUSCULOSKELETAL: No joint pain or swelling; No muscle, back, or extremity pain  PSYCHIATRIC: No depression, anxiety, mood swings, or difficulty sleeping  HEME/LYMPH: No easy bruising, or bleeding gums  ALLERGY AND IMMUNOLOGIC: No hives or eczema	    [ ] All others negative	  [ ] Unable to obtain    PHYSICAL EXAM:  T(C): 37 (12-04-19 @ 07:00), Max: 37.2 (12-03-19 @ 11:00)  HR: 85 (12-04-19 @ 07:00) (77 - 93)  BP: 142/65 (12-04-19 @ 07:00) (71/43 - 147/65)  RR: 23 (12-04-19 @ 07:00) (21 - 29)  SpO2: 93% (12-04-19 @ 07:00) (91% - 99%)  Wt(kg): --  I&O's Summary    03 Dec 2019 07:01  -  04 Dec 2019 07:00  --------------------------------------------------------  IN: 3350 mL / OUT: 820 mL / NET: 2530 mL    04 Dec 2019 07:01  -  04 Dec 2019 09:42  --------------------------------------------------------  IN: 100 mL / OUT: 15 mL / NET: 85 mL        Appearance: Normal	  HEENT:   Normal oral mucosa, PERRL, EOMI	  Lymphatic: No lymphadenopathy  Cardiovascular: Normal S1 S2, No JVD, + murmurs, + lymphedema  Respiratory: decrease bs  Psychiatry: A & O x 3, Mood & affect appropriate  Gastrointestinal:  Soft, Non-tender, + BS	  Skin: No rashes, No ecchymoses, No cyanosis	  Neurologic: Non-focal  Extremities: Normal range of motion, No clubbing, cyanosis + lymh edema  Vascular: Peripheral pulses palpable 2+ bilaterally    MEDICATIONS  (STANDING):  albuterol/ipratropium for Nebulization 3 milliLiter(s) Nebulizer every 6 hours  buDESOnide    Inhalation Suspension 0.5 milliGRAM(s) Inhalation every 12 hours  chlorhexidine 2% Cloths 1 Application(s) Topical daily  enoxaparin Injectable 100 milliGRAM(s) SubCutaneous <User Schedule>  famotidine    Tablet 20 milliGRAM(s) Oral daily  fluconAZOLE IVPB      fluconAZOLE IVPB 200 milliGRAM(s) IV Intermittent every 24 hours  influenza   Vaccine 0.5 milliLiter(s) IntraMuscular once  lactated ringers. 1000 milliLiter(s) (75 mL/Hr) IV Continuous <Continuous>  meropenem  IVPB 1000 milliGRAM(s) IV Intermittent every 8 hours  methadone   Solution 17 milliGRAM(s) Oral daily  nystatin Powder 1 Application(s) Topical two times a day  pantoprazole  Injectable 40 milliGRAM(s) IV Push every 24 hours  potassium phosphate IVPB 15 milliMole(s) IV Intermittent every 6 hours      TELEMETRY: 	    ECG:  	  RADIOLOGY:  OTHER: 	  	  LABS:	 	    CARDIAC MARKERS:                                9.4    22.83 )-----------( 284      ( 04 Dec 2019 01:26 )             28.6     12-04    137  |  100  |  10  ----------------------------<  130<H>  3.7   |  24  |  0.52    Ca    7.9<L>      04 Dec 2019 01:26  Phos  1.7     12-04  Mg     2.0     12-04      proBNP:   Lipid Profile:   HgA1c:   TSH:   PT/INR - ( 04 Dec 2019 01:26 )   PT: 14.7 sec;   INR: 1.28 ratio         PTT - ( 04 Dec 2019 01:26 )  PTT:26.1 sec      Assessment and plan  ---------------------------  doing better, improving  continue abx  may add norvasc if elevated bp  wound care  dvt prophylaxis  keep k>4 to avoid a.fib  gi prophylaxis

## 2019-12-04 NOTE — PROGRESS NOTE ADULT - ASSESSMENT
64F with perforated sigmoid diverticulitis s/p exploratory laparotomy with extended left hemicolectomy, left in discontinuity w/ Abthera VAC (11/26), return to OR on 11/29 for transverse end colostomy and fascial closure with wound vac.    PLAN:  NEURO: postoperative pain control, chronic opioid use  - PRN tylenol and oxycodone  - Methadone 8.5 mg IV qd    RESPIRATORY: Hx of COPD, acute hypoxemic respiratory failure, RUL & LLL infiltrates  - on 3L NC   - ABGs with AM labs, CXR  - Duonebs and Pulmicort  - Combicath - enterobacter    CARDIOVASCULAR: Hx of HTN on amlodipine at home, No hx of CAD  - Remains off vasopressor support, has been persistently hypotensive to 80s/90s w/ low uop  - 500cc albumin given overnight w/ minimal response  - Monitor vital signs and I&Os  - Hold home antihypertensives    GI/NUTRITION: S/p extended left hemicolectomy and was left in discontinuity with Abthera in place. s/p RTOR for end colostomy, fascial closure. Hx of obesity, Hx of GERD  - advanced to CLD, tolerating  - Protonix for GERD    GENITOURINARY/RENAL: no acute issues  - Monitoring fluid status closely  - 500cc albumin given o/n for low UOP, minimal output  - LR @  75ml/hr.    HEMATOLOGIC: no acute issues  - Trend daily CBC and coags  - elevated WBC, trend  - Continue DVT ppx with Lovenox dosed by patient weight    INFECTIOUS DISEASE: abdominal sepsis, OR culture with E. coli & Peptostreptococcus anaerobius, UA with yeast  - Continue Zosyn   - continue fluconazole  - started on meropenem for elevated WBC  - procalcitonin 1.1    ENDOCRINE: No active issues    DISPO: SICU 64F with perforated sigmoid diverticulitis s/p exploratory laparotomy with extended left hemicolectomy, left in discontinuity w/ Abthera VAC (11/26), return to OR on 11/29 for transverse end colostomy and fascial closure with wound vac.    PLAN:  NEURO: postoperative pain control, chronic opioid use  - PRN tylenol and oxycodone  - Methadone 8.5 mg IV qd    RESPIRATORY: Hx of COPD, acute hypoxemic respiratory failure, RUL & LLL infiltrates  - on 3L NC   - ABGs with AM labs, CXR  - Duonebs and Pulmicort  - Combicath - enterobacter    CARDIOVASCULAR: Hx of HTN on amlodipine at home, No hx of CAD  - Remains off vasopressor support, has been persistently hypotensive to 80s/90s w/ low uop  - 500cc albumin given overnight w/ minimal response  - Monitor vital signs and I&Os  - Hold home antihypertensives    GI/NUTRITION: S/p extended left hemicolectomy and was left in discontinuity with Abthera in place. s/p RTOR for end colostomy, fascial closure. Hx of obesity, Hx of GERD  - advanced to CLD, tolerating  - Protonix for GERD    GENITOURINARY/RENAL: no acute issues  - Monitoring fluid status closely  - 500cc albumin given o/n for low UOP, minimal output  - LR @  75ml/hr.    HEMATOLOGIC: no acute issues  - Trend daily CBC and coags  - elevated WBC, trend  - Continue DVT ppx with Lovenox dosed by patient weight    INFECTIOUS DISEASE: abdominal sepsis, OR culture with E. coli & Peptostreptococcus anaerobius, UA with yeast  - Continue Zosyn   - continue fluconazole  - started on meropenem for elevated WBC, 22 o/n  - procalcitonin 1.1    ENDOCRINE: No active issues    DISPO: SICU

## 2019-12-04 NOTE — CHART NOTE - NSCHARTNOTEFT_GEN_A_CORE
MD paged to bedside to assess swollen right arm. On exam pt has IV access in the proximal forearm. Just distal this site there is a puncture site from a previous IV that is weeping clear fluid. The arm appears swollen when compared to the left side. Distal pulses are intact and the patient denies loss of sensation and paresthesias. The patient was advised to elevate the arm and nursing staff was instructed to place heat packs over the area. RN was instructed to call if the swelling or weeping worsens with administration of IV antibiotics through the new PIV. mIFV rate will be decreased to 90cc/hr overnight.

## 2019-12-04 NOTE — PROGRESS NOTE ADULT - SUBJECTIVE AND OBJECTIVE BOX
GENERAL SURGERY PROGRESS NOTE    SUBJECTIVE  Patient seen and examined. No acute events overnight. Reports tolerating diet without nausea, vomiting, +/- ostomy, marquez, have been ambulating and out of bed. Denies fever, chills, SOB, chest pain.         OBJECTIVE    PHYSICAL EXAM  General: Appears well, NAD  CHEST: breathing comfortably  CV: appears well perfused  Abdomen: soft, nontender, nondistended, no rebound or guarding, +/- ostomy unchanged black area medial mucocutaneous junction, vac in place  Extremities: Grossly symmetric    T(C): 36.9 (19 @ 03:00), Max: 37.2 (19 @ 11:00)  HR: 82 (19 @ 05:10) (76 - 93)  BP: 142/65 (19 @ 04:00) (71/43 - 147/65)  RR: 25 (19 @ 04:00) (21 - 29)  SpO2: 99% (19 @ 05:10) (91% - 99%)    19 @ 07:01  -  19 @ 07:00  --------------------------------------------------------  IN: 3841.6 mL / OUT: 1395 mL / NET: 2446.6 mL    19 @ 07:01  -  19 @ 05:37  --------------------------------------------------------  IN: 3225 mL / OUT: 705 mL / NET: 2520 mL        MEDICATIONS  acetaminophen   Tablet .. 975 milliGRAM(s) Oral every 6 hours PRN  albuterol/ipratropium for Nebulization 3 milliLiter(s) Nebulizer every 6 hours  buDESOnide    Inhalation Suspension 0.5 milliGRAM(s) Inhalation every 12 hours  chlorhexidine 2% Cloths 1 Application(s) Topical daily  enoxaparin Injectable 100 milliGRAM(s) SubCutaneous <User Schedule>  fluconAZOLE IVPB      fluconAZOLE IVPB 200 milliGRAM(s) IV Intermittent every 24 hours  influenza   Vaccine 0.5 milliLiter(s) IntraMuscular once  lactated ringers. 1000 milliLiter(s) IV Continuous <Continuous>  meropenem  IVPB 1000 milliGRAM(s) IV Intermittent every 8 hours  methadone   Solution 17 milliGRAM(s) Oral daily  nystatin Powder 1 Application(s) Topical two times a day  oxyCODONE    IR 5 milliGRAM(s) Oral every 4 hours PRN  oxyCODONE    IR 10 milliGRAM(s) Oral every 6 hours PRN  pantoprazole  Injectable 40 milliGRAM(s) IV Push every 24 hours  potassium phosphate IVPB 15 milliMole(s) IV Intermittent every 6 hours      LABS                        9.4    22.83 )-----------( 284      ( 04 Dec 2019 01:26 )             28.6     12-04    137  |  100  |  10  ----------------------------<  130<H>  3.7   |  24  |  0.52    Ca    7.9<L>      04 Dec 2019 01:26  Phos  1.7     12-04  Mg     2.0     12-04      PT/INR - ( 04 Dec 2019 01:26 )   PT: 14.7 sec;   INR: 1.28 ratio         PTT - ( 04 Dec 2019 01:26 )  PTT:26.1 sec  Urinalysis Basic - ( 02 Dec 2019 11:01 )    Color: Yellow / Appearance: Clear / S.040 / pH: x  Gluc: x / Ketone: Trace  / Bili: Negative / Urobili: Negative   Blood: x / Protein: 30 mg/dL / Nitrite: Negative   Leuk Esterase: Small / RBC: 6 /hpf / WBC 30 /HPF   Sq Epi: x / Non Sq Epi: 5 /hpf / Bacteria: Negative        RADIOLOGY & ADDITIONAL STUDIES GENERAL SURGERY PROGRESS NOTE    SUBJECTIVE  Patient seen and examined. 10-15cc UOP/hr this AM. given 500cc albumin x 1 overnight. Reports tolerating CLD without nausea, vomiting, +/+ ostomy, marquez, have been ambulating and out of bed. Denies fever, chills, SOB, chest pain.         OBJECTIVE    PHYSICAL EXAM  General: Appears well, NAD  CHEST: breathing comfortably  CV: appears well perfused  Abdomen: soft, nontender, nondistended, no rebound or guarding, +/+ ostomy unchanged black area medial mucocutaneous junction, vac in place, OVI SS cloudy x 1  Extremities: Grossly symmetric    T(C): 36.9 (19 @ 03:00), Max: 37.2 (19 @ 11:00)  HR: 82 (19 @ 05:10) (76 - 93)  BP: 142/65 (19 @ 04:00) (71/43 - 147/65)  RR: 25 (19 @ 04:00) (21 - 29)  SpO2: 99% (19 @ 05:10) (91% - 99%)    19 @ 07:01  -  19 @ 07:00  --------------------------------------------------------  IN: 3841.6 mL / OUT: 1395 mL / NET: 2446.6 mL    19 @ 07:01  -  19 @ 05:37  --------------------------------------------------------  IN: 3225 mL / OUT: 705 mL / NET: 2520 mL        MEDICATIONS  acetaminophen   Tablet .. 975 milliGRAM(s) Oral every 6 hours PRN  albuterol/ipratropium for Nebulization 3 milliLiter(s) Nebulizer every 6 hours  buDESOnide    Inhalation Suspension 0.5 milliGRAM(s) Inhalation every 12 hours  chlorhexidine 2% Cloths 1 Application(s) Topical daily  enoxaparin Injectable 100 milliGRAM(s) SubCutaneous <User Schedule>  fluconAZOLE IVPB      fluconAZOLE IVPB 200 milliGRAM(s) IV Intermittent every 24 hours  influenza   Vaccine 0.5 milliLiter(s) IntraMuscular once  lactated ringers. 1000 milliLiter(s) IV Continuous <Continuous>  meropenem  IVPB 1000 milliGRAM(s) IV Intermittent every 8 hours  methadone   Solution 17 milliGRAM(s) Oral daily  nystatin Powder 1 Application(s) Topical two times a day  oxyCODONE    IR 5 milliGRAM(s) Oral every 4 hours PRN  oxyCODONE    IR 10 milliGRAM(s) Oral every 6 hours PRN  pantoprazole  Injectable 40 milliGRAM(s) IV Push every 24 hours  potassium phosphate IVPB 15 milliMole(s) IV Intermittent every 6 hours      LABS                        9.4    22.83 )-----------( 284      ( 04 Dec 2019 01:26 )             28.6     12-04    137  |  100  |  10  ----------------------------<  130<H>  3.7   |  24  |  0.52    Ca    7.9<L>      04 Dec 2019 01:26  Phos  1.7     12-04  Mg     2.0     12-04      PT/INR - ( 04 Dec 2019 01:26 )   PT: 14.7 sec;   INR: 1.28 ratio         PTT - ( 04 Dec 2019 01:26 )  PTT:26.1 sec  Urinalysis Basic - ( 02 Dec 2019 11:01 )    Color: Yellow / Appearance: Clear / S.040 / pH: x  Gluc: x / Ketone: Trace  / Bili: Negative / Urobili: Negative   Blood: x / Protein: 30 mg/dL / Nitrite: Negative   Leuk Esterase: Small / RBC: 6 /hpf / WBC 30 /HPF   Sq Epi: x / Non Sq Epi: 5 /hpf / Bacteria: Negative        RADIOLOGY & ADDITIONAL STUDIES GENERAL SURGERY PROGRESS NOTE    SUBJECTIVE  Patient seen and examined. 10-15cc UOP/hr this AM, improving ~@4AM. given 500cc albumin x 1 overnight. Reports tolerating CLD without nausea, vomiting, +/+ ostomy, marquez, have been ambulating and out of bed. Denies fever, chills, SOB, chest pain.         OBJECTIVE    PHYSICAL EXAM  General: Appears well, NAD  CHEST: breathing comfortably  CV: appears well perfused  Abdomen: soft, nontender, nondistended, no rebound or guarding, +/+ ostomy unchanged black area medial mucocutaneous junction, vac in place, OVI SS cloudy x 1  Extremities: Grossly symmetric    T(C): 36.9 (19 @ 03:00), Max: 37.2 (19 @ 11:00)  HR: 82 (19 @ 05:10) (76 - 93)  BP: 142/65 (19 @ 04:00) (71/43 - 147/65)  RR: 25 (19 @ 04:00) (21 - 29)  SpO2: 99% (19 @ 05:10) (91% - 99%)    19 @ 07:01  -  19 @ 07:00  --------------------------------------------------------  IN: 3841.6 mL / OUT: 1395 mL / NET: 2446.6 mL    19 @ 07:01  -  19 @ 05:37  --------------------------------------------------------  IN: 3225 mL / OUT: 705 mL / NET: 2520 mL        MEDICATIONS  acetaminophen   Tablet .. 975 milliGRAM(s) Oral every 6 hours PRN  albuterol/ipratropium for Nebulization 3 milliLiter(s) Nebulizer every 6 hours  buDESOnide    Inhalation Suspension 0.5 milliGRAM(s) Inhalation every 12 hours  chlorhexidine 2% Cloths 1 Application(s) Topical daily  enoxaparin Injectable 100 milliGRAM(s) SubCutaneous <User Schedule>  fluconAZOLE IVPB      fluconAZOLE IVPB 200 milliGRAM(s) IV Intermittent every 24 hours  influenza   Vaccine 0.5 milliLiter(s) IntraMuscular once  lactated ringers. 1000 milliLiter(s) IV Continuous <Continuous>  meropenem  IVPB 1000 milliGRAM(s) IV Intermittent every 8 hours  methadone   Solution 17 milliGRAM(s) Oral daily  nystatin Powder 1 Application(s) Topical two times a day  oxyCODONE    IR 5 milliGRAM(s) Oral every 4 hours PRN  oxyCODONE    IR 10 milliGRAM(s) Oral every 6 hours PRN  pantoprazole  Injectable 40 milliGRAM(s) IV Push every 24 hours  potassium phosphate IVPB 15 milliMole(s) IV Intermittent every 6 hours      LABS                        9.4    22.83 )-----------( 284      ( 04 Dec 2019 01:26 )             28.6     12-04    137  |  100  |  10  ----------------------------<  130<H>  3.7   |  24  |  0.52    Ca    7.9<L>      04 Dec 2019 01:26  Phos  1.7     12-  Mg     2.0     12-04      PT/INR - ( 04 Dec 2019 01:26 )   PT: 14.7 sec;   INR: 1.28 ratio         PTT - ( 04 Dec 2019 01:26 )  PTT:26.1 sec  Urinalysis Basic - ( 02 Dec 2019 11:01 )    Color: Yellow / Appearance: Clear / S.040 / pH: x  Gluc: x / Ketone: Trace  / Bili: Negative / Urobili: Negative   Blood: x / Protein: 30 mg/dL / Nitrite: Negative   Leuk Esterase: Small / RBC: 6 /hpf / WBC 30 /HPF   Sq Epi: x / Non Sq Epi: 5 /hpf / Bacteria: Negative        RADIOLOGY & ADDITIONAL STUDIES

## 2019-12-04 NOTE — PROGRESS NOTE ADULT - ASSESSMENT
A/P: 64y Female POD#1 s/p ex lap with extended left hemicolectomy left in discontinuity for perforated sigmoid diverticulitis with intraperitoneal free air after presenting to ED with worsening constipation and abdominal pain with distention. AbThera vac placed and pt kept intubated post op and transferred to SICU for post-op management. Extubated 11/30, patient refusing BIPAP. Marquez out 12/1. Marquez replaced 12/2. UA +yeast, started on fluconazole. For elevated WBC, started on meropenem 12/3.    Plan:  - CLD  - monitor ostomy  - monitor U/O, marquez  - monitor vac output  - c/w pain control, c/w methadone   - DVT ppx with lovenox, sequential compression   - appreciate sicu care  - c/w sosa, fluconazole  - appreciate SICU care    [facility]  p [phone]

## 2019-12-05 LAB
ANION GAP SERPL CALC-SCNC: 11 MMOL/L — SIGNIFICANT CHANGE UP (ref 5–17)
APTT BLD: 23.6 SEC — LOW (ref 27.5–36.3)
BUN SERPL-MCNC: 9 MG/DL — SIGNIFICANT CHANGE UP (ref 7–23)
CALCIUM SERPL-MCNC: 8.2 MG/DL — LOW (ref 8.4–10.5)
CHLORIDE SERPL-SCNC: 98 MMOL/L — SIGNIFICANT CHANGE UP (ref 96–108)
CO2 SERPL-SCNC: 26 MMOL/L — SIGNIFICANT CHANGE UP (ref 22–31)
CREAT SERPL-MCNC: 0.5 MG/DL — SIGNIFICANT CHANGE UP (ref 0.5–1.3)
GLUCOSE SERPL-MCNC: 111 MG/DL — HIGH (ref 70–99)
HCT VFR BLD CALC: 31 % — LOW (ref 34.5–45)
HGB BLD-MCNC: 9.6 G/DL — LOW (ref 11.5–15.5)
INR BLD: 1.17 RATIO — HIGH (ref 0.88–1.16)
MAGNESIUM SERPL-MCNC: 2.1 MG/DL — SIGNIFICANT CHANGE UP (ref 1.6–2.6)
MCHC RBC-ENTMCNC: 28.7 PG — SIGNIFICANT CHANGE UP (ref 27–34)
MCHC RBC-ENTMCNC: 31 GM/DL — LOW (ref 32–36)
MCV RBC AUTO: 92.8 FL — SIGNIFICANT CHANGE UP (ref 80–100)
PHOSPHATE SERPL-MCNC: 1.8 MG/DL — LOW (ref 2.5–4.5)
PLATELET # BLD AUTO: 345 K/UL — SIGNIFICANT CHANGE UP (ref 150–400)
POTASSIUM SERPL-MCNC: 4.1 MMOL/L — SIGNIFICANT CHANGE UP (ref 3.5–5.3)
POTASSIUM SERPL-SCNC: 4.1 MMOL/L — SIGNIFICANT CHANGE UP (ref 3.5–5.3)
PROTHROM AB SERPL-ACNC: 13.3 SEC — HIGH (ref 10–13.1)
RBC # BLD: 3.34 M/UL — LOW (ref 3.8–5.2)
RBC # FLD: 14.3 % — SIGNIFICANT CHANGE UP (ref 10.3–14.5)
SODIUM SERPL-SCNC: 135 MMOL/L — SIGNIFICANT CHANGE UP (ref 135–145)
WBC # BLD: 17.37 K/UL — HIGH (ref 3.8–10.5)
WBC # FLD AUTO: 17.37 K/UL — HIGH (ref 3.8–10.5)

## 2019-12-05 PROCEDURE — 71045 X-RAY EXAM CHEST 1 VIEW: CPT | Mod: 26

## 2019-12-05 PROCEDURE — 93010 ELECTROCARDIOGRAM REPORT: CPT

## 2019-12-05 PROCEDURE — 71045 X-RAY EXAM CHEST 1 VIEW: CPT | Mod: 26,77

## 2019-12-05 RX ORDER — PANTOPRAZOLE SODIUM 20 MG/1
40 TABLET, DELAYED RELEASE ORAL
Refills: 0 | Status: DISCONTINUED | OUTPATIENT
Start: 2019-12-05 | End: 2019-12-07

## 2019-12-05 RX ORDER — SODIUM CHLORIDE 9 MG/ML
10 INJECTION INTRAMUSCULAR; INTRAVENOUS; SUBCUTANEOUS
Refills: 0 | Status: DISCONTINUED | OUTPATIENT
Start: 2019-12-05 | End: 2020-01-07

## 2019-12-05 RX ORDER — CALCIUM CARBONATE 500(1250)
2 TABLET ORAL ONCE
Refills: 0 | Status: COMPLETED | OUTPATIENT
Start: 2019-12-05 | End: 2019-12-05

## 2019-12-05 RX ORDER — ENOXAPARIN SODIUM 100 MG/ML
100 INJECTION SUBCUTANEOUS
Refills: 0 | Status: DISCONTINUED | OUTPATIENT
Start: 2019-12-05 | End: 2020-01-06

## 2019-12-05 RX ORDER — ACETAMINOPHEN 500 MG
975 TABLET ORAL EVERY 6 HOURS
Refills: 0 | Status: DISCONTINUED | OUTPATIENT
Start: 2019-12-05 | End: 2019-12-09

## 2019-12-05 RX ORDER — CHLORHEXIDINE GLUCONATE 213 G/1000ML
1 SOLUTION TOPICAL
Refills: 0 | Status: DISCONTINUED | OUTPATIENT
Start: 2019-12-05 | End: 2020-01-07

## 2019-12-05 RX ORDER — DOXAZOSIN MESYLATE 4 MG
2 TABLET ORAL DAILY
Refills: 0 | Status: DISCONTINUED | OUTPATIENT
Start: 2019-12-05 | End: 2019-12-29

## 2019-12-05 RX ORDER — ONDANSETRON 8 MG/1
4 TABLET, FILM COATED ORAL ONCE
Refills: 0 | Status: COMPLETED | OUTPATIENT
Start: 2019-12-05 | End: 2019-12-05

## 2019-12-05 RX ADMIN — Medication 3 MILLILITER(S): at 05:40

## 2019-12-05 RX ADMIN — METHADONE HYDROCHLORIDE 17 MILLIGRAM(S): 40 TABLET ORAL at 12:50

## 2019-12-05 RX ADMIN — MEROPENEM 100 MILLIGRAM(S): 1 INJECTION INTRAVENOUS at 09:41

## 2019-12-05 RX ADMIN — Medication 3 MILLILITER(S): at 16:39

## 2019-12-05 RX ADMIN — Medication 3 MILLILITER(S): at 12:51

## 2019-12-05 RX ADMIN — DEXTROSE MONOHYDRATE, SODIUM CHLORIDE, AND POTASSIUM CHLORIDE 90 MILLILITER(S): 50; .745; 4.5 INJECTION, SOLUTION INTRAVENOUS at 21:25

## 2019-12-05 RX ADMIN — Medication 0.5 MILLIGRAM(S): at 05:41

## 2019-12-05 RX ADMIN — ONDANSETRON 4 MILLIGRAM(S): 8 TABLET, FILM COATED ORAL at 03:08

## 2019-12-05 RX ADMIN — NYSTATIN CREAM 1 APPLICATION(S): 100000 CREAM TOPICAL at 05:40

## 2019-12-05 RX ADMIN — FLUCONAZOLE 100 MILLIGRAM(S): 150 TABLET ORAL at 21:26

## 2019-12-05 RX ADMIN — Medication 3 MILLILITER(S): at 00:55

## 2019-12-05 RX ADMIN — OXYCODONE HYDROCHLORIDE 10 MILLIGRAM(S): 5 TABLET ORAL at 16:27

## 2019-12-05 RX ADMIN — OXYCODONE HYDROCHLORIDE 10 MILLIGRAM(S): 5 TABLET ORAL at 17:27

## 2019-12-05 RX ADMIN — Medication 2 MILLIGRAM(S): at 09:41

## 2019-12-05 RX ADMIN — MEROPENEM 100 MILLIGRAM(S): 1 INJECTION INTRAVENOUS at 00:55

## 2019-12-05 RX ADMIN — CHLORHEXIDINE GLUCONATE 1 APPLICATION(S): 213 SOLUTION TOPICAL at 12:53

## 2019-12-05 RX ADMIN — NYSTATIN CREAM 1 APPLICATION(S): 100000 CREAM TOPICAL at 16:38

## 2019-12-05 RX ADMIN — Medication 0.5 MILLIGRAM(S): at 16:38

## 2019-12-05 RX ADMIN — Medication 85 MILLIMOLE(S): at 09:41

## 2019-12-05 RX ADMIN — CHLORHEXIDINE GLUCONATE 1 APPLICATION(S): 213 SOLUTION TOPICAL at 16:36

## 2019-12-05 RX ADMIN — PANTOPRAZOLE SODIUM 40 MILLIGRAM(S): 20 TABLET, DELAYED RELEASE ORAL at 16:39

## 2019-12-05 RX ADMIN — FAMOTIDINE 20 MILLIGRAM(S): 10 INJECTION INTRAVENOUS at 12:51

## 2019-12-05 RX ADMIN — MEROPENEM 100 MILLIGRAM(S): 1 INJECTION INTRAVENOUS at 16:28

## 2019-12-05 RX ADMIN — ENOXAPARIN SODIUM 100 MILLIGRAM(S): 100 INJECTION SUBCUTANEOUS at 16:28

## 2019-12-05 NOTE — PROGRESS NOTE ADULT - SUBJECTIVE AND OBJECTIVE BOX
CARDIOLOGY     PROGRESS  NOTE   ________________________________________________    CHIEF COMPLAINT:Patient is a 64y old  Female who presents with a chief complaint of perforated sigmoid diverticulum s/p left hemicolectomy (03 Dec 2019 04:04)  c/o pain.  	  REVIEW OF SYSTEMS:  CONSTITUTIONAL: No fever, weight loss, or fatigue  EYES: No eye pain, visual disturbances, or discharge  ENT:  No difficulty hearing, tinnitus, vertigo; No sinus or throat pain  NECK: No pain or stiffness  RESPIRATORY: No cough, wheezing, chills or hemoptysis; No Shortness of Breath  CARDIOVASCULAR: No chest pain, palpitations, passing out, dizziness, or leg swelling  GASTROINTESTINAL: No abdominal or epigastric pain. No nausea, vomiting, or hematemesis; No diarrhea or constipation. No melena or hematochezia.  GENITOURINARY: No dysuria, frequency, hematuria, or incontinence  NEUROLOGICAL: No headaches, memory loss, loss of strength, numbness, or tremors  SKIN: No itching, burning, rashes, or lesions   LYMPH Nodes: No enlarged glands  ENDOCRINE: No heat or cold intolerance; No hair loss  MUSCULOSKELETAL: No joint pain or swelling; No muscle, back, or extremity pain  PSYCHIATRIC: No depression, anxiety, mood swings, or difficulty sleeping  HEME/LYMPH: No easy bruising, or bleeding gums  ALLERGY AND IMMUNOLOGIC: No hives or eczema	    [ ] All others negative	  [ ] Unable to obtain    PHYSICAL EXAM:  T(C): 36.6 (12-05-19 @ 06:29), Max: 37.6 (12-04-19 @ 17:10)  HR: 81 (12-05-19 @ 06:29) (78 - 94)  BP: 113/72 (12-05-19 @ 06:29) (113/72 - 144/83)  RR: 18 (12-05-19 @ 06:29) (18 - 39)  SpO2: 92% (12-05-19 @ 06:29) (90% - 97%)  Wt(kg): --  I&O's Summary    04 Dec 2019 07:01  -  05 Dec 2019 07:00  --------------------------------------------------------  IN: 2227.5 mL / OUT: 1475 mL / NET: 752.5 mL        Appearance: Normal	  HEENT:   Normal oral mucosa, PERRL, EOMI	  Lymphatic: No lymphadenopathy  Cardiovascular: Normal S1 S2, No JVD,+murmurs, + chronic lymph edema  Respiratory: Lungs clear to auscultation	  Psychiatry: A & O x 3, Mood & affect appropriate  Gastrointestinal:  Soft, +tender, + Bs, wound vac  Skin: No rashes, No ecchymoses, No cyanosis	  Neurologic: Non-focal  Extremities: Normal range of motion, No clubbing, cyanosis.  Vascular: Peripheral pulses palpable 2+ bilaterally    MEDICATIONS  (STANDING):  acetaminophen   Tablet .. 975 milliGRAM(s) Oral every 6 hours  albuterol/ipratropium for Nebulization 3 milliLiter(s) Nebulizer every 6 hours  buDESOnide    Inhalation Suspension 0.5 milliGRAM(s) Inhalation every 12 hours  chlorhexidine 2% Cloths 1 Application(s) Topical daily  dextrose 5% + sodium chloride 0.45% with potassium chloride 20 mEq/L 1000 milliLiter(s) (90 mL/Hr) IV Continuous <Continuous>  doxazosin 2 milliGRAM(s) Oral daily  enoxaparin Injectable 100 milliGRAM(s) SubCutaneous <User Schedule>  famotidine    Tablet 20 milliGRAM(s) Oral daily  fluconAZOLE IVPB 400 milliGRAM(s) IV Intermittent every 24 hours  influenza   Vaccine 0.5 milliLiter(s) IntraMuscular once  meropenem  IVPB 1000 milliGRAM(s) IV Intermittent every 8 hours  methadone   Solution 17 milliGRAM(s) Oral daily  nystatin Powder 1 Application(s) Topical two times a day  pantoprazole  Injectable 40 milliGRAM(s) IV Push every 24 hours      TELEMETRY: 	    ECG:  	  RADIOLOGY:  OTHER: 	  	  LABS:	 	    CARDIAC MARKERS:                                9.4    22.83 )-----------( 284      ( 04 Dec 2019 01:26 )             28.6     12-04    137  |  100  |  10  ----------------------------<  130<H>  3.7   |  24  |  0.52    Ca    7.9<L>      04 Dec 2019 01:26  Phos  1.7     12-04  Mg     2.0     12-04      proBNP:   Lipid Profile:   HgA1c:   TSH:   PT/INR - ( 04 Dec 2019 01:26 )   PT: 14.7 sec;   INR: 1.28 ratio         PTT - ( 04 Dec 2019 01:26 )  PTT:26.1 sec      Assessment and plan  ---------------------------  doing better, improving  continue abx  may add norvasc if elevated bp  wound care  dvt prophylaxis  keep k>4 to avoid a.fib  gi prophylaxis  physical therapy  fu wbc/wound care

## 2019-12-05 NOTE — CHART NOTE - NSCHARTNOTEFT_GEN_A_CORE
Nutrition Follow Up Note  Patient seen for: nutrition follow-up, diet education     Chart reviewed, events noted. This is a  64y Female POD#1 s/p ex lap with extended left hemicolectomy left in discontinuity for perforated sigmoid diverticulitis with intraperitoneal free air after presenting to ED with worsening constipation and abdominal pain with distention. Now s/p  POD#1 s/p ex lap with extended left hemicolectomy left in discontinuity for perforated sigmoid diverticulitis. AbThera vac placed and pt kept intubated post op and transferred to SICU for post-op management. Extubated 11/30,  UA +yeast, started on fluconazole. For elevated WBC, started on meropenem 12/3. Transferred from SICU to floor 12/4.    Source: patient, medical record     Diet :  Low Fiber diet + Ensure Enlive TID     Patient reports fair to poor PO intake this morning due to heartburn, reports she didn't have much of an appetite for dinner and breakfast this morning. Pt reports she was eating better previously. Pt reporting some nausea as well. Pt noted with minimal ostomy function so far, per surgery ostomy " superficially sloughing with air and stool in bag". Output x 24hrs: 35ml (12/3), 10ml (12/4). Pt amendable to brief diet education. Reviewed colostomy nutrition therapy/food list handout.  Discussed eating low-fiber foods, chewing foods well, small frequent meals high in protein & energy. Reviewed foods that may cause odors &/or gas, discussed foods that bulk stool and thin stool, and importance of adequate hydration.  Patient with no nutrition-related questions at this time. Made aware RD remains available as needed.      PO intake : <50% of breakfast tray this morning      Source for PO intake: pt report, RD observation      Enteral /Parenteral Nutrition: N/A    Weights:   370lbs (11/22)  377.4lbs (12/1)    Pertinent Medications: MEDICATIONS  (STANDING):  acetaminophen   Tablet .. 975 milliGRAM(s) Oral every 6 hours  albuterol/ipratropium for Nebulization 3 milliLiter(s) Nebulizer every 6 hours  buDESOnide    Inhalation Suspension 0.5 milliGRAM(s) Inhalation every 12 hours  chlorhexidine 2% Cloths 1 Application(s) Topical daily  dextrose 5% + sodium chloride 0.45% with potassium chloride 20 mEq/L 1000 milliLiter(s) (90 mL/Hr) IV Continuous <Continuous>  doxazosin 2 milliGRAM(s) Oral daily  famotidine    Tablet 20 milliGRAM(s) Oral daily  fluconAZOLE IVPB 400 milliGRAM(s) IV Intermittent every 24 hours  influenza   Vaccine 0.5 milliLiter(s) IntraMuscular once  meropenem  IVPB 1000 milliGRAM(s) IV Intermittent every 8 hours  methadone   Solution 17 milliGRAM(s) Oral daily  nystatin Powder 1 Application(s) Topical two times a day  pantoprazole  Injectable 40 milliGRAM(s) IV Push two times a day    MEDICATIONS  (PRN):  oxyCODONE    IR 5 milliGRAM(s) Oral every 4 hours PRN Moderate Pain (4 - 6)  oxyCODONE    IR 10 milliGRAM(s) Oral every 6 hours PRN Severe Pain (7 - 10)    Pertinent Labs: 12-05 @ 07:01: Na 135, BUN 9, Cr 0.50, <H>, K+ 4.1, Phos 1.8<L>, Mg 2.1, Alk Phos --, ALT/SGPT --, AST/SGOT --, HbA1c --      Skin per nursing documentation: free of pressure injuries per nursing flow sheets, bilateral leg lymphedema w/ blisters, midline abdomen surgical incision   Edema: +4 right arm, right wrist     Estimated Needs:   [ ] no change since previous assessment  [x ] recalculated: s/p extubation, based on IBW of 59.1 Kg   (30-35 Kcal/kg): 1773-2069Kcal  (1.2-1.4gm/kg): 71-83 gram/Kg    Previous Nutrition Diagnosis: Overweight/Obesity  Nutrition Diagnosis is: active     New Nutrition Diagnosis: Food and Nutrition Knowledge related deficit   Related to: lack of exposure to previous diet education    As evidenced by: pt with new colostomy, unfamiliar with low fiber diet, with diet related questions      Interventions:   Recommend  1) Continue current diet as tolerated.   2) Continue Ensure Enlive TID to supplement PO intake in setting of suboptimal PO intake and post-op  3) Diet education provided, provide reinforcement as needed     Monitoring and Evaluation:     Continue to monitor Nutritional intake, Tolerance to diet prescription, weights, labs, skin integrity    RD remains available upon request and will follow up per protocol  Flori Elder RD, CDN, Pager # [phone]

## 2019-12-05 NOTE — PROGRESS NOTE ADULT - SUBJECTIVE AND OBJECTIVE BOX
GENERAL SURGERY PROGRESS NOTE    SUBJECTIVE  Patient seen and examined. Swelling near previous PIV site R distal arm that improved overnight with elevation and heat packs, No other acute events overnight. Reports tolerating diet without nausea, vomiting, +/+ ostomy, marquez. Denies fever, chills, SOB, chest pain.         OBJECTIVE    PHYSICAL EXAM  General: Appears well, NAD  CHEST: breathing comfortably  CV: appears well perfused  Abdomen: soft, nontender, nondistended, no rebound or guarding, vac in place, OVI x 1 SS cloudy, ostomy +/+  Extremities: Grossly symmetric, lymphedema BLE    T(C): 37.2 (12-05-19 @ 01:40), Max: 37.6 (12-04-19 @ 17:10)  HR: 78 (12-05-19 @ 01:40) (78 - 94)  BP: 121/75 (12-05-19 @ 01:40) (116/74 - 144/83)  RR: 18 (12-05-19 @ 01:40) (18 - 39)  SpO2: 93% (12-05-19 @ 01:40) (90% - 99%)    12-03-19 @ 07:01  -  12-04-19 @ 07:00  --------------------------------------------------------  IN: 3350 mL / OUT: 820 mL / NET: 2530 mL    12-04-19 @ 07:01  -  12-05-19 @ 04:44  --------------------------------------------------------  IN: 2227.5 mL / OUT: 1310 mL / NET: 917.5 mL        MEDICATIONS  acetaminophen   Tablet .. 975 milliGRAM(s) Oral every 6 hours PRN  albuterol/ipratropium for Nebulization 3 milliLiter(s) Nebulizer every 6 hours  buDESOnide    Inhalation Suspension 0.5 milliGRAM(s) Inhalation every 12 hours  chlorhexidine 2% Cloths 1 Application(s) Topical daily  dextrose 5% + sodium chloride 0.45% with potassium chloride 20 mEq/L 1000 milliLiter(s) IV Continuous <Continuous>  enoxaparin Injectable 100 milliGRAM(s) SubCutaneous <User Schedule>  famotidine    Tablet 20 milliGRAM(s) Oral daily  fluconAZOLE IVPB 400 milliGRAM(s) IV Intermittent every 24 hours  influenza   Vaccine 0.5 milliLiter(s) IntraMuscular once  meropenem  IVPB 1000 milliGRAM(s) IV Intermittent every 8 hours  methadone   Solution 17 milliGRAM(s) Oral daily  nystatin Powder 1 Application(s) Topical two times a day  oxyCODONE    IR 5 milliGRAM(s) Oral every 4 hours PRN  oxyCODONE    IR 10 milliGRAM(s) Oral every 6 hours PRN  pantoprazole  Injectable 40 milliGRAM(s) IV Push every 24 hours      LABS                        9.4    22.83 )-----------( 284      ( 04 Dec 2019 01:26 )             28.6     12-04    137  |  100  |  10  ----------------------------<  130<H>  3.7   |  24  |  0.52    Ca    7.9<L>      04 Dec 2019 01:26  Phos  1.7     12-04  Mg     2.0     12-04      PT/INR - ( 04 Dec 2019 01:26 )   PT: 14.7 sec;   INR: 1.28 ratio         PTT - ( 04 Dec 2019 01:26 )  PTT:26.1 sec      RADIOLOGY & ADDITIONAL STUDIES GENERAL SURGERY PROGRESS NOTE    SUBJECTIVE  Patient seen and examined. Swelling near previous PIV site R distal arm that improved overnight with elevation and heat packs, No other acute events overnight. Has some nausea, - vomiting, +/+ ostomy, marquez. Denies fever, chills, SOB, chest pain.         OBJECTIVE    PHYSICAL EXAM  General: Appears well, NAD  CHEST: breathing comfortably  CV: appears well perfused  Abdomen: soft, nontender, nondistended, no rebound or guarding, vac in place, OVI x 1 SS cloudy, ostomy +/+  Extremities: Grossly symmetric, lymphedema BLE    T(C): 37.2 (12-05-19 @ 01:40), Max: 37.6 (12-04-19 @ 17:10)  HR: 78 (12-05-19 @ 01:40) (78 - 94)  BP: 121/75 (12-05-19 @ 01:40) (116/74 - 144/83)  RR: 18 (12-05-19 @ 01:40) (18 - 39)  SpO2: 93% (12-05-19 @ 01:40) (90% - 99%)    12-03-19 @ 07:01  -  12-04-19 @ 07:00  --------------------------------------------------------  IN: 3350 mL / OUT: 820 mL / NET: 2530 mL    12-04-19 @ 07:01  -  12-05-19 @ 04:44  --------------------------------------------------------  IN: 2227.5 mL / OUT: 1310 mL / NET: 917.5 mL        MEDICATIONS  acetaminophen   Tablet .. 975 milliGRAM(s) Oral every 6 hours PRN  albuterol/ipratropium for Nebulization 3 milliLiter(s) Nebulizer every 6 hours  buDESOnide    Inhalation Suspension 0.5 milliGRAM(s) Inhalation every 12 hours  chlorhexidine 2% Cloths 1 Application(s) Topical daily  dextrose 5% + sodium chloride 0.45% with potassium chloride 20 mEq/L 1000 milliLiter(s) IV Continuous <Continuous>  enoxaparin Injectable 100 milliGRAM(s) SubCutaneous <User Schedule>  famotidine    Tablet 20 milliGRAM(s) Oral daily  fluconAZOLE IVPB 400 milliGRAM(s) IV Intermittent every 24 hours  influenza   Vaccine 0.5 milliLiter(s) IntraMuscular once  meropenem  IVPB 1000 milliGRAM(s) IV Intermittent every 8 hours  methadone   Solution 17 milliGRAM(s) Oral daily  nystatin Powder 1 Application(s) Topical two times a day  oxyCODONE    IR 5 milliGRAM(s) Oral every 4 hours PRN  oxyCODONE    IR 10 milliGRAM(s) Oral every 6 hours PRN  pantoprazole  Injectable 40 milliGRAM(s) IV Push every 24 hours      LABS                        9.4    22.83 )-----------( 284      ( 04 Dec 2019 01:26 )             28.6     12-04    137  |  100  |  10  ----------------------------<  130<H>  3.7   |  24  |  0.52    Ca    7.9<L>      04 Dec 2019 01:26  Phos  1.7     12-04  Mg     2.0     12-04      PT/INR - ( 04 Dec 2019 01:26 )   PT: 14.7 sec;   INR: 1.28 ratio         PTT - ( 04 Dec 2019 01:26 )  PTT:26.1 sec      RADIOLOGY & ADDITIONAL STUDIES

## 2019-12-05 NOTE — PROGRESS NOTE ADULT - ATTENDING COMMENTS
I have seen and evaluated the patient and discussed the relevant clinical findings and plan with the surgical housestaff and fellow.  Agree with the above documentation with addenda as noted.     Clinically stable  Complaining of more heartburn/nausea today  Abdomen remains soft  Vac in place, no surrounding erythema at midline wound  Colostomy superficially sloughing with air and stool in bag    CT reviewed -- abdomen expected post-op changes; chest with pleural effusions    Encouraged OOB to chair  Needs chest PT and help ambulating  F/u labs  Cont abx    Ej Ng MD

## 2019-12-05 NOTE — PROGRESS NOTE ADULT - ASSESSMENT
A/P: 64y Female POD#1 s/p ex lap with extended left hemicolectomy left in discontinuity for perforated sigmoid diverticulitis with intraperitoneal free air after presenting to ED with worsening constipation and abdominal pain with distention. AbThera vac placed and pt kept intubated post op and transferred to SICU for post-op management. Extubated 11/30, patient refusing BIPAP. Marquez out 12/1. Marquez replaced 12/2. UA +yeast, started on fluconazole. For elevated WBC, started on meropenem 12/3.    Plan:  - CLD  - monitor ostomy  - monitor U/O, marquez  - monitor vac output  - c/w pain control, c/w methadone   - DVT ppx with lovenox, sequential compression   - appreciate sicu care  - c/w sosa, fluconazole  - appreciate SICU care    [facility]  p 1949 A/P: 64y Female POD#1 s/p ex lap with extended left hemicolectomy left in discontinuity for perforated sigmoid diverticulitis with intraperitoneal free air after presenting to ED with worsening constipation and abdominal pain with distention. AbThera vac placed and pt kept intubated post op and transferred to SICU for post-op management. Extubated 11/30, patient refusing BIPAP. Marquez out 12/1. Marquez replaced 12/2. UA +yeast, started on fluconazole. For elevated WBC, started on meropenem 12/3. Transferred from SICU to floor 12/4.    Plan:  - CLD  - Restart doxazosin   - monitor ostomy  - monitor U/O, marquez  - monitor vac output  - c/w pain control, c/w methadone   - DVT ppx with lovenox, sequential compression   - c/w sosa, fluconazole      [facility]  p [id number]

## 2019-12-06 LAB
ANION GAP SERPL CALC-SCNC: 9 MMOL/L — SIGNIFICANT CHANGE UP (ref 5–17)
BUN SERPL-MCNC: 8 MG/DL — SIGNIFICANT CHANGE UP (ref 7–23)
CALCIUM SERPL-MCNC: 7.8 MG/DL — LOW (ref 8.4–10.5)
CHLORIDE SERPL-SCNC: 100 MMOL/L — SIGNIFICANT CHANGE UP (ref 96–108)
CK MB CFR SERPL CALC: 1 NG/ML — SIGNIFICANT CHANGE UP (ref 0–3.8)
CK SERPL-CCNC: 12 U/L — LOW (ref 25–170)
CO2 SERPL-SCNC: 27 MMOL/L — SIGNIFICANT CHANGE UP (ref 22–31)
CREAT SERPL-MCNC: 0.48 MG/DL — LOW (ref 0.5–1.3)
GLUCOSE SERPL-MCNC: 116 MG/DL — HIGH (ref 70–99)
HCT VFR BLD CALC: 30.1 % — LOW (ref 34.5–45)
HGB BLD-MCNC: 9.2 G/DL — LOW (ref 11.5–15.5)
MAGNESIUM SERPL-MCNC: 2 MG/DL — SIGNIFICANT CHANGE UP (ref 1.6–2.6)
MCHC RBC-ENTMCNC: 28.7 PG — SIGNIFICANT CHANGE UP (ref 27–34)
MCHC RBC-ENTMCNC: 30.6 GM/DL — LOW (ref 32–36)
MCV RBC AUTO: 93.8 FL — SIGNIFICANT CHANGE UP (ref 80–100)
PHOSPHATE SERPL-MCNC: 1.9 MG/DL — LOW (ref 2.5–4.5)
PLATELET # BLD AUTO: 430 K/UL — HIGH (ref 150–400)
POTASSIUM SERPL-MCNC: 3.9 MMOL/L — SIGNIFICANT CHANGE UP (ref 3.5–5.3)
POTASSIUM SERPL-SCNC: 3.9 MMOL/L — SIGNIFICANT CHANGE UP (ref 3.5–5.3)
RBC # BLD: 3.21 M/UL — LOW (ref 3.8–5.2)
RBC # FLD: 14.6 % — HIGH (ref 10.3–14.5)
SODIUM SERPL-SCNC: 136 MMOL/L — SIGNIFICANT CHANGE UP (ref 135–145)
TROPONIN T, HIGH SENSITIVITY RESULT: 19 NG/L — SIGNIFICANT CHANGE UP (ref 0–51)
WBC # BLD: 13.94 K/UL — HIGH (ref 3.8–10.5)
WBC # FLD AUTO: 13.94 K/UL — HIGH (ref 3.8–10.5)

## 2019-12-06 PROCEDURE — 93971 EXTREMITY STUDY: CPT | Mod: 26

## 2019-12-06 RX ORDER — METHADONE HYDROCHLORIDE 40 MG/1
17.5 TABLET ORAL DAILY
Refills: 0 | Status: DISCONTINUED | OUTPATIENT
Start: 2019-12-06 | End: 2019-12-13

## 2019-12-06 RX ORDER — METHADONE HYDROCHLORIDE 40 MG/1
17 TABLET ORAL DAILY
Refills: 0 | Status: DISCONTINUED | OUTPATIENT
Start: 2019-12-06 | End: 2019-12-06

## 2019-12-06 RX ORDER — POTASSIUM PHOSPHATE, MONOBASIC POTASSIUM PHOSPHATE, DIBASIC 236; 224 MG/ML; MG/ML
30 INJECTION, SOLUTION INTRAVENOUS ONCE
Refills: 0 | Status: COMPLETED | OUTPATIENT
Start: 2019-12-06 | End: 2019-12-06

## 2019-12-06 RX ORDER — SODIUM CHLORIDE 9 MG/ML
1000 INJECTION, SOLUTION INTRAVENOUS ONCE
Refills: 0 | Status: COMPLETED | OUTPATIENT
Start: 2019-12-06 | End: 2019-12-06

## 2019-12-06 RX ADMIN — SODIUM CHLORIDE 1000 MILLILITER(S): 9 INJECTION, SOLUTION INTRAVENOUS at 15:26

## 2019-12-06 RX ADMIN — MEROPENEM 100 MILLIGRAM(S): 1 INJECTION INTRAVENOUS at 08:11

## 2019-12-06 RX ADMIN — Medication 0.5 MILLIGRAM(S): at 18:04

## 2019-12-06 RX ADMIN — MEROPENEM 100 MILLIGRAM(S): 1 INJECTION INTRAVENOUS at 18:03

## 2019-12-06 RX ADMIN — MEROPENEM 100 MILLIGRAM(S): 1 INJECTION INTRAVENOUS at 00:30

## 2019-12-06 RX ADMIN — Medication 3 MILLILITER(S): at 12:15

## 2019-12-06 RX ADMIN — ENOXAPARIN SODIUM 100 MILLIGRAM(S): 100 INJECTION SUBCUTANEOUS at 12:16

## 2019-12-06 RX ADMIN — Medication 3 MILLILITER(S): at 00:31

## 2019-12-06 RX ADMIN — FLUCONAZOLE 100 MILLIGRAM(S): 150 TABLET ORAL at 22:10

## 2019-12-06 RX ADMIN — Medication 0.5 MILLIGRAM(S): at 06:28

## 2019-12-06 RX ADMIN — CHLORHEXIDINE GLUCONATE 1 APPLICATION(S): 213 SOLUTION TOPICAL at 12:23

## 2019-12-06 RX ADMIN — NYSTATIN CREAM 1 APPLICATION(S): 100000 CREAM TOPICAL at 06:29

## 2019-12-06 RX ADMIN — NYSTATIN CREAM 1 APPLICATION(S): 100000 CREAM TOPICAL at 18:05

## 2019-12-06 RX ADMIN — Medication 3 MILLILITER(S): at 18:05

## 2019-12-06 RX ADMIN — PANTOPRAZOLE SODIUM 40 MILLIGRAM(S): 20 TABLET, DELAYED RELEASE ORAL at 18:03

## 2019-12-06 RX ADMIN — Medication 30 MILLILITER(S): at 08:22

## 2019-12-06 RX ADMIN — FAMOTIDINE 20 MILLIGRAM(S): 10 INJECTION INTRAVENOUS at 12:10

## 2019-12-06 RX ADMIN — Medication 2 MILLIGRAM(S): at 06:29

## 2019-12-06 RX ADMIN — CHLORHEXIDINE GLUCONATE 1 APPLICATION(S): 213 SOLUTION TOPICAL at 08:11

## 2019-12-06 RX ADMIN — METHADONE HYDROCHLORIDE 17 MILLIGRAM(S): 40 TABLET ORAL at 13:02

## 2019-12-06 RX ADMIN — PANTOPRAZOLE SODIUM 40 MILLIGRAM(S): 20 TABLET, DELAYED RELEASE ORAL at 06:30

## 2019-12-06 RX ADMIN — Medication 3 MILLILITER(S): at 06:28

## 2019-12-06 RX ADMIN — POTASSIUM PHOSPHATE, MONOBASIC POTASSIUM PHOSPHATE, DIBASIC 83.33 MILLIMOLE(S): 236; 224 INJECTION, SOLUTION INTRAVENOUS at 08:13

## 2019-12-06 NOTE — PROGRESS NOTE ADULT - ASSESSMENT
A/P: 64y Female POD#1 s/p ex lap with extended left hemicolectomy left in discontinuity for perforated sigmoid diverticulitis with intraperitoneal free air after presenting to ED with worsening constipation and abdominal pain with distention. AbThera vac placed and pt kept intubated post op and transferred to SICU for post-op management. Extubated 11/30, patient refusing BIPAP. Marquez out 12/1. Marquez replaced 12/2. UA +yeast, started on fluconazole. For elevated WBC, started on meropenem 12/3. Transferred from SICU to floor 12/4. CT C/A/P 12/4 shows no obvious intrabdominal collection.    Plan:  - reg diet  - Restart doxazosin   - monitor ostomy  - monitor U/O, marquez  - monitor vac output  - c/w pain control, c/w methadone   - DVT ppx with lovenox  - c/w sosa, fluconazole      [facility]  p [id number] A/P: 64y Female POD#1 s/p ex lap with extended left hemicolectomy left in discontinuity for perforated sigmoid diverticulitis with intraperitoneal free air after presenting to ED with worsening constipation and abdominal pain with distention. AbThera vac placed and pt kept intubated post op and transferred to SICU for post-op management. Extubated 11/30, patient refusing BIPAP. Marquez out 12/1. Marquez replaced 12/2. UA +yeast, started on fluconazole. For elevated WBC, started on meropenem 12/3. Transferred from SICU to floor 12/4. CT C/A/P 12/4 shows no obvious intrabdominal collection.    Plan:  - reg diet  - Restart doxazosin   - monitor ostomy  - monitor U/O, marquez  - monitor vac output  - c/w pain control, c/w methadone   - DVT ppx with lovenox  - c/w sosa, fluconazole  - can try maalox prn      [facility]  p [id number] A/P: 64y Female POD#1 s/p ex lap with extended left hemicolectomy left in discontinuity for perforated sigmoid diverticulitis with intraperitoneal free air after presenting to ED with worsening constipation and abdominal pain with distention. AbThera vac placed and pt kept intubated post op and transferred to SICU for post-op management. Extubated 11/30, patient refusing BIPAP. Marquez out 12/1. Marquez replaced 12/2. UA +yeast, started on fluconazole. For elevated WBC, started on meropenem 12/3. Transferred from SICU to floor 12/4. CT C/A/P 12/4 shows no obvious intrabdominal collection.    Plan:  - reg diet  - Restart doxazosin   - monitor ostomy  - monitor U/O, marquez  - monitor vac output  - c/w pain control, c/w methadone   - DVT ppx with lovenox  - c/w sosa, fluconazole        [facility]  p [id number]

## 2019-12-06 NOTE — PROGRESS NOTE ADULT - SUBJECTIVE AND OBJECTIVE BOX
CARDIOLOGY     PROGRESS  NOTE   ________________________________________________    CHIEF COMPLAINT:Patient is a 64y old  Female who presents with a chief complaint of perforated sigmoid diverticulum s/p left hemicolectomy (03 Dec 2019 04:04)  doing well, events noted yesterday, discussed with resident.  	  REVIEW OF SYSTEMS:  CONSTITUTIONAL: No fever, weight loss, or fatigue  EYES: No eye pain, visual disturbances, or discharge  ENT:  No difficulty hearing, tinnitus, vertigo; No sinus or throat pain  NECK: No pain or stiffness  RESPIRATORY: No cough, wheezing, chills or hemoptysis; No Shortness of Breath  CARDIOVASCULAR: No chest pain, palpitations, passing out, dizziness, or leg swelling  GASTROINTESTINAL: No abdominal or epigastric pain. + nausea, vomiting, or hematemesis; No diarrhea or constipation. No melena or hematochezia.  GENITOURINARY: No dysuria, frequency, hematuria, or incontinence  NEUROLOGICAL: No headaches, memory loss, loss of strength, numbness, or tremors  SKIN: No itching, burning, rashes, or lesions   LYMPH Nodes: No enlarged glands  ENDOCRINE: No heat or cold intolerance; No hair loss  MUSCULOSKELETAL: No joint pain or swelling; No muscle, back, or extremity pain  PSYCHIATRIC: No depression, anxiety, mood swings, or difficulty sleeping  HEME/LYMPH: No easy bruising, or bleeding gums  ALLERGY AND IMMUNOLOGIC: No hives or eczema	    [ ] All others negative	  [ ] Unable to obtain    PHYSICAL EXAM:  T(C): 36.7 (12-06-19 @ 05:01), Max: 37.4 (12-05-19 @ 13:33)  HR: 70 (12-06-19 @ 05:01) (70 - 78)  BP: 131/62 (12-06-19 @ 05:01) (95/51 - 131/62)  RR: 18 (12-06-19 @ 05:01) (18 - 18)  SpO2: 92% (12-06-19 @ 05:01) (92% - 97%)  Wt(kg): --  I&O's Summary    05 Dec 2019 07:01  -  06 Dec 2019 07:00  --------------------------------------------------------  IN: 3100 mL / OUT: 1104 mL / NET: 1996 mL        Appearance: Normal	  HEENT:   Normal oral mucosa, PERRL, EOMI	  Lymphatic: No lymphadenopathy  Cardiovascular: Normal S1 S2, No JVD, + murmurs, No edema  Respiratory: Lungs clear to auscultation	  Psychiatry: A & O x 3, Mood & affect appropriate  Gastrointestinal:  Soft, + tenderness diffuse  Skin: No rashes, No ecchymoses, No cyanosis	  Neurologic: Non-focal  Extremities: Normal range of motion, No clubbing, cyanosis or edema  Vascular: Peripheral pulses palpable 2+ bilaterally    MEDICATIONS  (STANDING):  acetaminophen   Tablet .. 975 milliGRAM(s) Oral every 6 hours  albuterol/ipratropium for Nebulization 3 milliLiter(s) Nebulizer every 6 hours  buDESOnide    Inhalation Suspension 0.5 milliGRAM(s) Inhalation every 12 hours  chlorhexidine 2% Cloths 1 Application(s) Topical daily  chlorhexidine 4% Liquid 1 Application(s) Topical <User Schedule>  dextrose 5% + sodium chloride 0.45% with potassium chloride 20 mEq/L 1000 milliLiter(s) (90 mL/Hr) IV Continuous <Continuous>  doxazosin 2 milliGRAM(s) Oral daily  enoxaparin Injectable 100 milliGRAM(s) SubCutaneous <User Schedule>  famotidine    Tablet 20 milliGRAM(s) Oral daily  fluconAZOLE IVPB 400 milliGRAM(s) IV Intermittent every 24 hours  influenza   Vaccine 0.5 milliLiter(s) IntraMuscular once  meropenem  IVPB 1000 milliGRAM(s) IV Intermittent every 8 hours  methadone   Solution 17 milliGRAM(s) Oral daily  nystatin Powder 1 Application(s) Topical two times a day  pantoprazole  Injectable 40 milliGRAM(s) IV Push two times a day      TELEMETRY: 	    ECG:  	  RADIOLOGY:  OTHER: 	  	  LABS:	 	    CARDIAC MARKERS:  CARDIAC MARKERS ( 06 Dec 2019 00:55 )  x     / x     / 12 U/L / x     / 1.0 ng/mL                                9.2    13.94 )-----------( 430      ( 06 Dec 2019 07:53 )             30.1     12-06    136  |  100  |  8   ----------------------------<  116<H>  3.9   |  27  |  0.48<L>    Ca    7.8<L>      06 Dec 2019 04:48  Phos  1.9     12-06  Mg     2.0     12-06      proBNP:   Lipid Profile:   HgA1c:   TSH:   PT/INR - ( 05 Dec 2019 08:35 )   PT: 13.3 sec;   INR: 1.17 ratio         PTT - ( 05 Dec 2019 08:35 )  PTT:23.6 sec  Troponin T, High Sensitivity (12.06.19 @ 00:55)    Troponin T, High Sensitivity Result: 19: Rapid upward or downward changes in high-sensitivity troponin levels  suggest acute myocardial injury. Renal impairment may cause sustained  troponin elevations.  Normal: <6 - 14 ng/L  Indeterminate: 15-51 ng/L  Elevated: > 51 ng/L  See http://labs/test/TROPTHS on the [email or web address] intranet for more  information ng/L    < from: 12 Lead ECG (11.28.19 @ 12:11) >  Diagnosis Line NORMAL SINUS RHYTHM  NORMAL ECG    < from: Transthoracic Echocardiogram (11.29.19 @ 08:36) >  1. Mitral valve not well visualized, probably normal.  Minimal mitral regurgitation.  2. Aortic valve not well visualized; probably normal. No  aortic valve regurgitation seen.  3. Endocardium not well visualized; grossly hyperdynamic  left ventricular systolic function.  4. Normal diastolic function  5. The right ventricle is not well visualized; grossly  normal right ventricular systolic function.  6. Normal pericardium with trace pericardial effusion.    < end of copied text >        Assessment and plan  ---------------------------    doing better, improving  continue abx  may add norvasc if elevated bp  wound care  dvt prophylaxis  keep k>4 to avoid a.fib  gi prophylaxis  physical therapy  fu wbc/wound care  events noted yesterday pt with vomiting no chest apin, no sig ecg changes, pt denies of any cardiac complain.

## 2019-12-06 NOTE — PROGRESS NOTE ADULT - ATTENDING COMMENTS
I have seen and evaluated the patient and discussed the relevant clinical findings and plan with the surgical housestaff and fellow.  Agree with the above documentation with addenda as noted.     Clinically stable  Vac in place, wound is clean at edges  Having difficulty mobilizing, needs assistance out of bed  Chest PT, IS  Cont abx  Monitor WBC (has been trending down)    Ej Ng MD

## 2019-12-06 NOTE — PROGRESS NOTE ADULT - SUBJECTIVE AND OBJECTIVE BOX
GENERAL SURGERY PROGRESS NOTE    SUBJECTIVE  Patient seen and examined. No acute events overnight. Reports tolerating diet without nausea, vomiting, +/+ ostomy, marquez. Denies fever, chills, SOB, chest pain.       OBJECTIVE    PHYSICAL EXAM  General: Appears well, NAD  CHEST: breathing comfortably  CV: appears well perfused  Abdomen: soft, nontender, nondistended, no rebound or guarding, ostomy unchanged +/+, vac in place  Extremities: Grossly symmetric,  lymphedema    T(C): 36.7 (12-06-19 @ 01:34), Max: 37.4 (12-05-19 @ 13:33)  HR: 74 (12-06-19 @ 01:34) (74 - 81)  BP: 95/51 (12-06-19 @ 01:34) (95/51 - 125/76)  RR: 18 (12-06-19 @ 01:34) (18 - 18)  SpO2: 92% (12-06-19 @ 01:34) (92% - 97%)    12-04-19 @ 07:01  -  12-05-19 @ 07:00  --------------------------------------------------------  IN: 2227.5 mL / OUT: 1475 mL / NET: 752.5 mL    12-05-19 @ 07:01  -  12-06-19 @ 04:36  --------------------------------------------------------  IN: 2020 mL / OUT: 654 mL / NET: 1366 mL        MEDICATIONS  acetaminophen   Tablet .. 975 milliGRAM(s) Oral every 6 hours  albuterol/ipratropium for Nebulization 3 milliLiter(s) Nebulizer every 6 hours  buDESOnide    Inhalation Suspension 0.5 milliGRAM(s) Inhalation every 12 hours  chlorhexidine 2% Cloths 1 Application(s) Topical daily  chlorhexidine 4% Liquid 1 Application(s) Topical <User Schedule>  dextrose 5% + sodium chloride 0.45% with potassium chloride 20 mEq/L 1000 milliLiter(s) IV Continuous <Continuous>  doxazosin 2 milliGRAM(s) Oral daily  enoxaparin Injectable 100 milliGRAM(s) SubCutaneous <User Schedule>  famotidine    Tablet 20 milliGRAM(s) Oral daily  fluconAZOLE IVPB 400 milliGRAM(s) IV Intermittent every 24 hours  influenza   Vaccine 0.5 milliLiter(s) IntraMuscular once  meropenem  IVPB 1000 milliGRAM(s) IV Intermittent every 8 hours  methadone   Solution 17 milliGRAM(s) Oral daily  nystatin Powder 1 Application(s) Topical two times a day  oxyCODONE    IR 5 milliGRAM(s) Oral every 4 hours PRN  oxyCODONE    IR 10 milliGRAM(s) Oral every 6 hours PRN  pantoprazole  Injectable 40 milliGRAM(s) IV Push two times a day  sodium chloride 0.9% lock flush 10 milliLiter(s) IV Push every 1 hour PRN      LABS                        9.6    17.37 )-----------( 345      ( 05 Dec 2019 08:41 )             31.0     12-05    135  |  98  |  9   ----------------------------<  111<H>  4.1   |  26  |  0.50    Ca    8.2<L>      05 Dec 2019 07:01  Phos  1.8     12-05  Mg     2.1     12-05      PT/INR - ( 05 Dec 2019 08:35 )   PT: 13.3 sec;   INR: 1.17 ratio         PTT - ( 05 Dec 2019 08:35 )  PTT:23.6 sec      RADIOLOGY & ADDITIONAL STUDIES

## 2019-12-07 LAB
ALBUMIN SERPL ELPH-MCNC: 2.2 G/DL — LOW (ref 3.3–5)
ALP SERPL-CCNC: 157 U/L — HIGH (ref 40–120)
ALT FLD-CCNC: 49 U/L — HIGH (ref 10–45)
ANION GAP SERPL CALC-SCNC: 7 MMOL/L — SIGNIFICANT CHANGE UP (ref 5–17)
AST SERPL-CCNC: 97 U/L — HIGH (ref 10–40)
BILIRUB DIRECT SERPL-MCNC: 0.3 MG/DL — HIGH (ref 0–0.2)
BILIRUB INDIRECT FLD-MCNC: 0.2 MG/DL — SIGNIFICANT CHANGE UP (ref 0.2–1)
BILIRUB SERPL-MCNC: 0.5 MG/DL — SIGNIFICANT CHANGE UP (ref 0.2–1.2)
BUN SERPL-MCNC: 6 MG/DL — LOW (ref 7–23)
CALCIUM SERPL-MCNC: 7.9 MG/DL — LOW (ref 8.4–10.5)
CHLORIDE SERPL-SCNC: 101 MMOL/L — SIGNIFICANT CHANGE UP (ref 96–108)
CO2 SERPL-SCNC: 27 MMOL/L — SIGNIFICANT CHANGE UP (ref 22–31)
CREAT SERPL-MCNC: 0.48 MG/DL — LOW (ref 0.5–1.3)
GLUCOSE SERPL-MCNC: 113 MG/DL — HIGH (ref 70–99)
HCT VFR BLD CALC: 28.2 % — LOW (ref 34.5–45)
HGB BLD-MCNC: 9 G/DL — LOW (ref 11.5–15.5)
MAGNESIUM SERPL-MCNC: 2 MG/DL — SIGNIFICANT CHANGE UP (ref 1.6–2.6)
MCHC RBC-ENTMCNC: 29 PG — SIGNIFICANT CHANGE UP (ref 27–34)
MCHC RBC-ENTMCNC: 31.9 GM/DL — LOW (ref 32–36)
MCV RBC AUTO: 91 FL — SIGNIFICANT CHANGE UP (ref 80–100)
NRBC # BLD: 0 /100 WBCS — SIGNIFICANT CHANGE UP (ref 0–0)
PHOSPHATE SERPL-MCNC: 2.1 MG/DL — LOW (ref 2.5–4.5)
PLATELET # BLD AUTO: 362 K/UL — SIGNIFICANT CHANGE UP (ref 150–400)
POTASSIUM SERPL-MCNC: 4.2 MMOL/L — SIGNIFICANT CHANGE UP (ref 3.5–5.3)
POTASSIUM SERPL-SCNC: 4.2 MMOL/L — SIGNIFICANT CHANGE UP (ref 3.5–5.3)
PROT SERPL-MCNC: 5.3 G/DL — LOW (ref 6–8.3)
RBC # BLD: 3.1 M/UL — LOW (ref 3.8–5.2)
RBC # FLD: 14.5 % — SIGNIFICANT CHANGE UP (ref 10.3–14.5)
SODIUM SERPL-SCNC: 135 MMOL/L — SIGNIFICANT CHANGE UP (ref 135–145)
WBC # BLD: 9.15 K/UL — SIGNIFICANT CHANGE UP (ref 3.8–10.5)
WBC # FLD AUTO: 9.15 K/UL — SIGNIFICANT CHANGE UP (ref 3.8–10.5)

## 2019-12-07 PROCEDURE — 93321 DOPPLER ECHO F-UP/LMTD STD: CPT | Mod: 26

## 2019-12-07 PROCEDURE — 93010 ELECTROCARDIOGRAM REPORT: CPT

## 2019-12-07 PROCEDURE — 93308 TTE F-UP OR LMTD: CPT | Mod: 26

## 2019-12-07 RX ORDER — ALBUMIN HUMAN 25 %
50 VIAL (ML) INTRAVENOUS ONCE
Refills: 0 | Status: COMPLETED | OUTPATIENT
Start: 2019-12-07 | End: 2019-12-07

## 2019-12-07 RX ORDER — PANTOPRAZOLE SODIUM 20 MG/1
40 TABLET, DELAYED RELEASE ORAL DAILY
Refills: 0 | Status: DISCONTINUED | OUTPATIENT
Start: 2019-12-07 | End: 2019-12-29

## 2019-12-07 RX ADMIN — MEROPENEM 100 MILLIGRAM(S): 1 INJECTION INTRAVENOUS at 18:54

## 2019-12-07 RX ADMIN — Medication 85 MILLIMOLE(S): at 08:01

## 2019-12-07 RX ADMIN — Medication 3 MILLILITER(S): at 11:29

## 2019-12-07 RX ADMIN — Medication 2 MILLIGRAM(S): at 08:01

## 2019-12-07 RX ADMIN — Medication 3 MILLILITER(S): at 20:08

## 2019-12-07 RX ADMIN — DEXTROSE MONOHYDRATE, SODIUM CHLORIDE, AND POTASSIUM CHLORIDE 90 MILLILITER(S): 50; .745; 4.5 INJECTION, SOLUTION INTRAVENOUS at 22:37

## 2019-12-07 RX ADMIN — MEROPENEM 100 MILLIGRAM(S): 1 INJECTION INTRAVENOUS at 10:31

## 2019-12-07 RX ADMIN — MEROPENEM 100 MILLIGRAM(S): 1 INJECTION INTRAVENOUS at 00:16

## 2019-12-07 RX ADMIN — FAMOTIDINE 20 MILLIGRAM(S): 10 INJECTION INTRAVENOUS at 11:29

## 2019-12-07 RX ADMIN — PANTOPRAZOLE SODIUM 40 MILLIGRAM(S): 20 TABLET, DELAYED RELEASE ORAL at 18:54

## 2019-12-07 RX ADMIN — ENOXAPARIN SODIUM 100 MILLIGRAM(S): 100 INJECTION SUBCUTANEOUS at 11:28

## 2019-12-07 RX ADMIN — Medication 50 MILLILITER(S): at 10:31

## 2019-12-07 RX ADMIN — NYSTATIN CREAM 1 APPLICATION(S): 100000 CREAM TOPICAL at 18:55

## 2019-12-07 RX ADMIN — Medication 0.5 MILLIGRAM(S): at 20:04

## 2019-12-07 RX ADMIN — PANTOPRAZOLE SODIUM 40 MILLIGRAM(S): 20 TABLET, DELAYED RELEASE ORAL at 05:02

## 2019-12-07 RX ADMIN — FLUCONAZOLE 100 MILLIGRAM(S): 150 TABLET ORAL at 22:37

## 2019-12-07 RX ADMIN — METHADONE HYDROCHLORIDE 17.5 MILLIGRAM(S): 40 TABLET ORAL at 11:27

## 2019-12-07 RX ADMIN — Medication 3 MILLILITER(S): at 00:17

## 2019-12-07 RX ADMIN — Medication 3 MILLILITER(S): at 05:02

## 2019-12-07 RX ADMIN — NYSTATIN CREAM 1 APPLICATION(S): 100000 CREAM TOPICAL at 05:02

## 2019-12-07 RX ADMIN — DEXTROSE MONOHYDRATE, SODIUM CHLORIDE, AND POTASSIUM CHLORIDE 90 MILLILITER(S): 50; .745; 4.5 INJECTION, SOLUTION INTRAVENOUS at 18:54

## 2019-12-07 RX ADMIN — Medication 0.5 MILLIGRAM(S): at 05:02

## 2019-12-07 NOTE — PROGRESS NOTE ADULT - SUBJECTIVE AND OBJECTIVE BOX
CARDIOLOGY     PROGRESS  NOTE   ________________________________________________    CHIEF COMPLAINT:Patient is a 64y old  Female who presents with a chief complaint of perforated sigmoid diverticulum s/p left hemicolectomy (03 Dec 2019 04:04)  no complain.  	  REVIEW OF SYSTEMS:  CONSTITUTIONAL: No fever, weight loss, or fatigue  EYES: No eye pain, visual disturbances, or discharge  ENT:  No difficulty hearing, tinnitus, vertigo; No sinus or throat pain  NECK: No pain or stiffness  RESPIRATORY: No cough, wheezing, chills or hemoptysis; No Shortness of Breath  CARDIOVASCULAR: No chest pain, palpitations, passing out, dizziness, or leg swelling  GASTROINTESTINAL: No abdominal or epigastric pain. No nausea, vomiting, or hematemesis; No diarrhea or constipation. No melena or hematochezia.  GENITOURINARY: No dysuria, frequency, hematuria, or incontinence  NEUROLOGICAL: No headaches, memory loss, loss of strength, numbness, or tremors  SKIN: No itching, burning, rashes, or lesions   LYMPH Nodes: No enlarged glands  ENDOCRINE: No heat or cold intolerance; No hair loss  MUSCULOSKELETAL: No joint pain or swelling; No muscle, back, or extremity pain  PSYCHIATRIC: No depression, anxiety, mood swings, or difficulty sleeping  HEME/LYMPH: No easy bruising, or bleeding gums  ALLERGY AND IMMUNOLOGIC: No hives or eczema	    [ ] All others negative	  [ ] Unable to obtain    PHYSICAL EXAM:  T(C): 36.9 (12-07-19 @ 04:46), Max: 36.9 (12-07-19 @ 04:46)  HR: 73 (12-07-19 @ 04:46) (71 - 80)  BP: 80/54 (12-07-19 @ 04:46) (80/54 - 121/85)  RR: 18 (12-07-19 @ 04:46) (18 - 20)  SpO2: 95% (12-07-19 @ 04:46) (93% - 95%)  Wt(kg): --  I&O's Summary    06 Dec 2019 07:01  -  07 Dec 2019 07:00  --------------------------------------------------------  IN: 4320 mL / OUT: 1695 mL / NET: 2625 mL        Appearance: Normal	  HEENT:   Normal oral mucosa, PERRL, EOMI	  Lymphatic: No lymphadenopathy  Cardiovascular: Normal S1 S2, No JVD, No murmurs, No edema  Respiratory: Lungs clear to auscultation	  Psychiatry: A & O x 3, Mood & affect appropriate  Gastrointestinal:  Soft, Non-tender, + BS	  Skin: No rashes, No ecchymoses, No cyanosis	  Neurologic: Non-focal  Extremities: Normal range of motion, No clubbing, cyanosis or edema  Vascular: Peripheral pulses palpable 2+ bilaterally    MEDICATIONS  (STANDING):  acetaminophen   Tablet .. 975 milliGRAM(s) Oral every 6 hours  albumin human 25% IVPB 50 milliLiter(s) IV Intermittent once  albuterol/ipratropium for Nebulization 3 milliLiter(s) Nebulizer every 6 hours  buDESOnide    Inhalation Suspension 0.5 milliGRAM(s) Inhalation every 12 hours  chlorhexidine 2% Cloths 1 Application(s) Topical daily  chlorhexidine 4% Liquid 1 Application(s) Topical <User Schedule>  dextrose 5% + sodium chloride 0.45% with potassium chloride 20 mEq/L 1000 milliLiter(s) (90 mL/Hr) IV Continuous <Continuous>  doxazosin 2 milliGRAM(s) Oral daily  enoxaparin Injectable 100 milliGRAM(s) SubCutaneous <User Schedule>  famotidine    Tablet 20 milliGRAM(s) Oral daily  fluconAZOLE IVPB 400 milliGRAM(s) IV Intermittent every 24 hours  influenza   Vaccine 0.5 milliLiter(s) IntraMuscular once  meropenem  IVPB 1000 milliGRAM(s) IV Intermittent every 8 hours  methadone    Tablet 17.5 milliGRAM(s) Oral daily  nystatin Powder 1 Application(s) Topical two times a day  pantoprazole  Injectable 40 milliGRAM(s) IV Push two times a day      TELEMETRY: 	    ECG:  	  RADIOLOGY:  OTHER: 	  	  LABS:	 	    CARDIAC MARKERS:  CARDIAC MARKERS ( 06 Dec 2019 00:55 )  x     / x     / 12 U/L / x     / 1.0 ng/mL                                9.0    9.15  )-----------( 362      ( 07 Dec 2019 05:22 )             28.2     12-07    135  |  101  |  6<L>  ----------------------------<  113<H>  4.2   |  27  |  0.48<L>    Ca    7.9<L>      07 Dec 2019 05:22  Phos  2.1     12-07  Mg     2.0     12-07    TPro  5.3<L>  /  Alb  2.2<L>  /  TBili  0.5  /  DBili  0.3<H>  /  AST  97<H>  /  ALT  49<H>  /  AlkPhos  157<H>  12-07    proBNP:   Lipid Profile:   HgA1c:   TSH:     < from: Xray Chest 1 View- PORTABLE-Urgent (12.05.19 @ 18:12) >  IMPRESSION:    Left PICC tip within SVC. Interstitial edema asymmetric to the right. No   pneumothorax.    < end of copied text >      Assessment and plan  ---------------------------  pt is sitting up comfortably in NAD , no complain  pt sbp documented in 80 discussed with RN not sure is accurate, but is concerning  PA and surgical resident called  ecg now   decrease fluid  repeat chest ct ?interstitial edema pt is very obese poor x ray may consider ct scan of chest for better evaluation  may consider sicu consult CARDIOLOGY     PROGRESS  NOTE   ________________________________________________    CHIEF COMPLAINT:Patient is a 64y old  Female who presents with a chief complaint of perforated sigmoid diverticulum s/p left hemicolectomy (03 Dec 2019 04:04)  no complain.  	  REVIEW OF SYSTEMS:  CONSTITUTIONAL: No fever, weight loss, or fatigue  EYES: No eye pain, visual disturbances, or discharge  ENT:  No difficulty hearing, tinnitus, vertigo; No sinus or throat pain  NECK: No pain or stiffness  RESPIRATORY: No cough, wheezing, chills or hemoptysis; No Shortness of Breath  CARDIOVASCULAR: No chest pain, palpitations, passing out, dizziness, or leg swelling  GASTROINTESTINAL: No abdominal or epigastric pain. No nausea, vomiting, or hematemesis; No diarrhea or constipation. No melena or hematochezia.  GENITOURINARY: No dysuria, frequency, hematuria, or incontinence  NEUROLOGICAL: No headaches, memory loss, loss of strength, numbness, or tremors  SKIN: No itching, burning, rashes, or lesions   LYMPH Nodes: No enlarged glands  ENDOCRINE: No heat or cold intolerance; No hair loss  MUSCULOSKELETAL: No joint pain or swelling; No muscle, back, or extremity pain  PSYCHIATRIC: No depression, anxiety, mood swings, or difficulty sleeping  HEME/LYMPH: No easy bruising, or bleeding gums  ALLERGY AND IMMUNOLOGIC: No hives or eczema	    [ ] All others negative	  [ ] Unable to obtain    PHYSICAL EXAM:  T(C): 36.9 (12-07-19 @ 04:46), Max: 36.9 (12-07-19 @ 04:46)  HR: 73 (12-07-19 @ 04:46) (71 - 80)  BP: 80/54 (12-07-19 @ 04:46) (80/54 - 121/85)  RR: 18 (12-07-19 @ 04:46) (18 - 20)  SpO2: 95% (12-07-19 @ 04:46) (93% - 95%)  Wt(kg): --  I&O's Summary    06 Dec 2019 07:01  -  07 Dec 2019 07:00  --------------------------------------------------------  IN: 4320 mL / OUT: 1695 mL / NET: 2625 mL        Appearance: Normal	  HEENT:   Normal oral mucosa, PERRL, EOMI	  Lymphatic: No lymphadenopathy  Cardiovascular: Normal S1 S2, No JVD, No murmurs, No edema  Respiratory: Lungs clear to auscultation	  Psychiatry: A & O x 3, Mood & affect appropriate  Gastrointestinal:  Soft, Non-tender, + BS	  Skin: No rashes, No ecchymoses, No cyanosis	  Neurologic: Non-focal  Extremities: Normal range of motion, No clubbing, cyanosis or edema  Vascular: Peripheral pulses palpable 2+ bilaterally    MEDICATIONS  (STANDING):  acetaminophen   Tablet .. 975 milliGRAM(s) Oral every 6 hours  albumin human 25% IVPB 50 milliLiter(s) IV Intermittent once  albuterol/ipratropium for Nebulization 3 milliLiter(s) Nebulizer every 6 hours  buDESOnide    Inhalation Suspension 0.5 milliGRAM(s) Inhalation every 12 hours  chlorhexidine 2% Cloths 1 Application(s) Topical daily  chlorhexidine 4% Liquid 1 Application(s) Topical <User Schedule>  dextrose 5% + sodium chloride 0.45% with potassium chloride 20 mEq/L 1000 milliLiter(s) (90 mL/Hr) IV Continuous <Continuous>  doxazosin 2 milliGRAM(s) Oral daily  enoxaparin Injectable 100 milliGRAM(s) SubCutaneous <User Schedule>  famotidine    Tablet 20 milliGRAM(s) Oral daily  fluconAZOLE IVPB 400 milliGRAM(s) IV Intermittent every 24 hours  influenza   Vaccine 0.5 milliLiter(s) IntraMuscular once  meropenem  IVPB 1000 milliGRAM(s) IV Intermittent every 8 hours  methadone    Tablet 17.5 milliGRAM(s) Oral daily  nystatin Powder 1 Application(s) Topical two times a day  pantoprazole  Injectable 40 milliGRAM(s) IV Push two times a day      TELEMETRY: 	    ECG:  	  RADIOLOGY:  OTHER: 	  	  LABS:	 	    CARDIAC MARKERS:  CARDIAC MARKERS ( 06 Dec 2019 00:55 )  x     / x     / 12 U/L / x     / 1.0 ng/mL                                9.0    9.15  )-----------( 362      ( 07 Dec 2019 05:22 )             28.2     12-07    135  |  101  |  6<L>  ----------------------------<  113<H>  4.2   |  27  |  0.48<L>    Ca    7.9<L>      07 Dec 2019 05:22  Phos  2.1     12-07  Mg     2.0     12-07    TPro  5.3<L>  /  Alb  2.2<L>  /  TBili  0.5  /  DBili  0.3<H>  /  AST  97<H>  /  ALT  49<H>  /  AlkPhos  157<H>  12-07    proBNP:   Lipid Profile:   HgA1c:   TSH:     < from: Xray Chest 1 View- PORTABLE-Urgent (12.05.19 @ 18:12) >  IMPRESSION:    Left PICC tip within SVC. Interstitial edema asymmetric to the right. No   pneumothorax.    < end of copied text >  < from: Transthoracic Echocardiogram (11.29.19 @ 08:36) >  1. Mitral valve not well visualized, probably normal.  Minimal mitral regurgitation.  2. Aortic valve not well visualized; probably normal. No  aortic valve regurgitation seen.  3. Endocardium not well visualized; grossly hyperdynamic  left ventricular systolic function.  4. Normal diastolic function  5. The right ventricle is not well visualized; grossly  normal right ventricular systolic function.  6. Normal pericardium with trace pericardial effusion.    < end of copied text >      Assessment and plan  ---------------------------  pt is sitting up comfortably in NAD , no complain  pt sbp documented in 80 discussed with RN not sure is accurate, but is concerning  PA and surgical resident called  ecg now   decrease fluid  repeat chest ct ?interstitial edema pt is very obese poor x ray may consider ct scan of chest for better evaluation  may consider sicu consult  will repeat echo

## 2019-12-07 NOTE — CHART NOTE - NSCHARTNOTEFT_GEN_A_CORE
Patient's BP difficult to assess d/t body habitus. On manual, 86/58. Normal heart rate. Patient is sleeping.    Patient seen and examined at bedside.  Patient is sleeping, breathing comfortably.  Radial pulse 2+ bilaterally.  Clear fluid weeping from R arm.    Likely BP difficult to assess 2/2 to body habitus and being asleep. Other vitals WNL.  During the day, patient BP seemed to improve after 500cc bolus.  Will reassess patient when she wakes up. Will continue to monitor vitals, will check in 1 hour.  Will consider need for albumin vs bolus.    [facility]  [id number]

## 2019-12-07 NOTE — PROVIDER CONTACT NOTE (OTHER) - ACTION/TREATMENT ORDERED:
MD made aware, pending MD to go assess pt. No further interventions at this time as per MD. Will continue to monitor pt.

## 2019-12-07 NOTE — PROGRESS NOTE ADULT - SUBJECTIVE AND OBJECTIVE BOX
GENERAL SURGERY PROGRESS NOTE    SUBJECTIVE  Patient seen and examined.   Overnight automatic BP 70s/50s. manual BP 80s/60s, ausculation not well appreciated. all other vitals WNL. UOP 60cc/h. Radial 2+ bilaterally. Patient sleeping comfortably, no complaints. Weeping fluid bilateral upper extremities. Patient appeared to have responded to a bolus during 12/6 day with UOP improving from 30cc. AM labs sent early, WBC improved and H/H stable. Low concern for hemorrhage or sepsis. Plan to hold doxazosin this AM, consider bolus.  Reports tolerating diet without nausea, vomiting, +/+ ostomy, marquez. Denies fever, chills, SOB, chest pain.   [place] options given to patient 12/6      OBJECTIVE    PHYSICAL EXAM  General: Appears well, NAD  CHEST: breathing comfortably  CV: appears well perfused  Abdomen: soft, nontender, nondistended, vac in place, ostomy unchanged +/+  Extremities: lymphedema    T(C): 36.9 (12-07-19 @ 04:46), Max: 36.9 (12-07-19 @ 04:46)  HR: 73 (12-07-19 @ 04:46) (71 - 80)  BP: 80/54 (12-07-19 @ 04:46) (80/54 - 121/85)  RR: 18 (12-07-19 @ 04:46) (18 - 20)  SpO2: 95% (12-07-19 @ 04:46) (93% - 95%)    12-05-19 @ 07:01  -  12-06-19 @ 07:00  --------------------------------------------------------  IN: 3100 mL / OUT: 1104 mL / NET: 1996 mL    12-06-19 @ 07:01  -  12-07-19 @ 05:48  --------------------------------------------------------  IN: 2890 mL / OUT: 1195 mL / NET: 1695 mL        MEDICATIONS  acetaminophen   Tablet .. 975 milliGRAM(s) Oral every 6 hours  albuterol/ipratropium for Nebulization 3 milliLiter(s) Nebulizer every 6 hours  aluminum hydroxide/magnesium hydroxide/simethicone Suspension 30 milliLiter(s) Oral every 4 hours PRN  buDESOnide    Inhalation Suspension 0.5 milliGRAM(s) Inhalation every 12 hours  chlorhexidine 2% Cloths 1 Application(s) Topical daily  chlorhexidine 4% Liquid 1 Application(s) Topical <User Schedule>  dextrose 5% + sodium chloride 0.45% with potassium chloride 20 mEq/L 1000 milliLiter(s) IV Continuous <Continuous>  doxazosin 2 milliGRAM(s) Oral daily  enoxaparin Injectable 100 milliGRAM(s) SubCutaneous <User Schedule>  famotidine    Tablet 20 milliGRAM(s) Oral daily  fluconAZOLE IVPB 400 milliGRAM(s) IV Intermittent every 24 hours  influenza   Vaccine 0.5 milliLiter(s) IntraMuscular once  meropenem  IVPB 1000 milliGRAM(s) IV Intermittent every 8 hours  methadone    Tablet 17.5 milliGRAM(s) Oral daily  nystatin Powder 1 Application(s) Topical two times a day  oxyCODONE    IR 5 milliGRAM(s) Oral every 4 hours PRN  oxyCODONE    IR 10 milliGRAM(s) Oral every 6 hours PRN  pantoprazole  Injectable 40 milliGRAM(s) IV Push two times a day  sodium chloride 0.9% lock flush 10 milliLiter(s) IV Push every 1 hour PRN      LABS                        9.0    9.15  )-----------( 362      ( 07 Dec 2019 05:22 )             28.2     12-06    136  |  100  |  8   ----------------------------<  116<H>  3.9   |  27  |  0.48<L>    Ca    7.8<L>      06 Dec 2019 04:48  Phos  1.9     12-06  Mg     2.0     12-06      PT/INR - ( 05 Dec 2019 08:35 )   PT: 13.3 sec;   INR: 1.17 ratio         PTT - ( 05 Dec 2019 08:35 )  PTT:23.6 sec      RADIOLOGY & ADDITIONAL STUDIES GENERAL SURGERY PROGRESS NOTE    SUBJECTIVE  Patient seen and examined.   Overnight automatic BP 70s/50s. manual BP 80s/60s, ausculation not well appreciated. all other vitals WNL. UOP 60cc/h. Radial 2+ bilaterally. Patient sleeping comfortably, no complaints. Weeping fluid bilateral upper extremities. Patient appeared to have responded to a bolus during 12/6 day with UOP improving from 30cc. AM labs sent early, WBC improved and H/H stable. Low concern for hemorrhage or sepsis. Plan to hold doxazosin this AM until BP improves, consider bolus. Likely not an accurate BP measurement.  Reports tolerating diet without nausea, vomiting, +/+ ostomy, marquez. Denies fever, chills, SOB, chest pain.   [place] options given to patient 12/6      OBJECTIVE    PHYSICAL EXAM  General: Appears well, NAD  CHEST: breathing comfortably  CV: appears well perfused  Abdomen: soft, nontender, nondistended, vac in place, ostomy unchanged +/+  Extremities: lymphedema    T(C): 36.9 (12-07-19 @ 04:46), Max: 36.9 (12-07-19 @ 04:46)  HR: 73 (12-07-19 @ 04:46) (71 - 80)  BP: 80/54 (12-07-19 @ 04:46) (80/54 - 121/85)  RR: 18 (12-07-19 @ 04:46) (18 - 20)  SpO2: 95% (12-07-19 @ 04:46) (93% - 95%)    12-05-19 @ 07:01  -  12-06-19 @ 07:00  --------------------------------------------------------  IN: 3100 mL / OUT: 1104 mL / NET: 1996 mL    12-06-19 @ 07:01  -  12-07-19 @ 05:48  --------------------------------------------------------  IN: 2890 mL / OUT: 1195 mL / NET: 1695 mL        MEDICATIONS  acetaminophen   Tablet .. 975 milliGRAM(s) Oral every 6 hours  albuterol/ipratropium for Nebulization 3 milliLiter(s) Nebulizer every 6 hours  aluminum hydroxide/magnesium hydroxide/simethicone Suspension 30 milliLiter(s) Oral every 4 hours PRN  buDESOnide    Inhalation Suspension 0.5 milliGRAM(s) Inhalation every 12 hours  chlorhexidine 2% Cloths 1 Application(s) Topical daily  chlorhexidine 4% Liquid 1 Application(s) Topical <User Schedule>  dextrose 5% + sodium chloride 0.45% with potassium chloride 20 mEq/L 1000 milliLiter(s) IV Continuous <Continuous>  doxazosin 2 milliGRAM(s) Oral daily  enoxaparin Injectable 100 milliGRAM(s) SubCutaneous <User Schedule>  famotidine    Tablet 20 milliGRAM(s) Oral daily  fluconAZOLE IVPB 400 milliGRAM(s) IV Intermittent every 24 hours  influenza   Vaccine 0.5 milliLiter(s) IntraMuscular once  meropenem  IVPB 1000 milliGRAM(s) IV Intermittent every 8 hours  methadone    Tablet 17.5 milliGRAM(s) Oral daily  nystatin Powder 1 Application(s) Topical two times a day  oxyCODONE    IR 5 milliGRAM(s) Oral every 4 hours PRN  oxyCODONE    IR 10 milliGRAM(s) Oral every 6 hours PRN  pantoprazole  Injectable 40 milliGRAM(s) IV Push two times a day  sodium chloride 0.9% lock flush 10 milliLiter(s) IV Push every 1 hour PRN      LABS                        9.0    9.15  )-----------( 362      ( 07 Dec 2019 05:22 )             28.2     12-06    136  |  100  |  8   ----------------------------<  116<H>  3.9   |  27  |  0.48<L>    Ca    7.8<L>      06 Dec 2019 04:48  Phos  1.9     12-06  Mg     2.0     12-06      PT/INR - ( 05 Dec 2019 08:35 )   PT: 13.3 sec;   INR: 1.17 ratio         PTT - ( 05 Dec 2019 08:35 )  PTT:23.6 sec      RADIOLOGY & ADDITIONAL STUDIES GENERAL SURGERY PROGRESS NOTE    SUBJECTIVE  Patient seen and examined.   Overnight automatic BP 70s/50s. manual BP 80s/60s, ausculation not well appreciated. all other vitals WNL. UOP 60cc/h. Radial 2+ bilaterally. Patient sleeping comfortably, no complaints. Weeping fluid bilateral upper extremities. Patient appeared to have responded to a bolus during 12/6 day with UOP improving from 30cc. AM labs sent early, WBC improved and H/H stable. Low concern for hemorrhage or sepsis. consider albumin bolus. Likely not an accurate BP measurement.  Reports tolerating diet without nausea, vomiting, +/+ ostomy, marquez. Denies fever, chills, SOB, chest pain.   [place] options given to patient 12/6      OBJECTIVE    PHYSICAL EXAM  General: Appears well, NAD  CHEST: breathing comfortably  CV: appears well perfused  Abdomen: soft, nontender, nondistended, vac in place, ostomy unchanged +/+  Extremities: lymphedema    T(C): 36.9 (12-07-19 @ 04:46), Max: 36.9 (12-07-19 @ 04:46)  HR: 73 (12-07-19 @ 04:46) (71 - 80)  BP: 80/54 (12-07-19 @ 04:46) (80/54 - 121/85)  RR: 18 (12-07-19 @ 04:46) (18 - 20)  SpO2: 95% (12-07-19 @ 04:46) (93% - 95%)    12-05-19 @ 07:01  -  12-06-19 @ 07:00  --------------------------------------------------------  IN: 3100 mL / OUT: 1104 mL / NET: 1996 mL    12-06-19 @ 07:01  -  12-07-19 @ 05:48  --------------------------------------------------------  IN: 2890 mL / OUT: 1195 mL / NET: 1695 mL        MEDICATIONS  acetaminophen   Tablet .. 975 milliGRAM(s) Oral every 6 hours  albuterol/ipratropium for Nebulization 3 milliLiter(s) Nebulizer every 6 hours  aluminum hydroxide/magnesium hydroxide/simethicone Suspension 30 milliLiter(s) Oral every 4 hours PRN  buDESOnide    Inhalation Suspension 0.5 milliGRAM(s) Inhalation every 12 hours  chlorhexidine 2% Cloths 1 Application(s) Topical daily  chlorhexidine 4% Liquid 1 Application(s) Topical <User Schedule>  dextrose 5% + sodium chloride 0.45% with potassium chloride 20 mEq/L 1000 milliLiter(s) IV Continuous <Continuous>  doxazosin 2 milliGRAM(s) Oral daily  enoxaparin Injectable 100 milliGRAM(s) SubCutaneous <User Schedule>  famotidine    Tablet 20 milliGRAM(s) Oral daily  fluconAZOLE IVPB 400 milliGRAM(s) IV Intermittent every 24 hours  influenza   Vaccine 0.5 milliLiter(s) IntraMuscular once  meropenem  IVPB 1000 milliGRAM(s) IV Intermittent every 8 hours  methadone    Tablet 17.5 milliGRAM(s) Oral daily  nystatin Powder 1 Application(s) Topical two times a day  oxyCODONE    IR 5 milliGRAM(s) Oral every 4 hours PRN  oxyCODONE    IR 10 milliGRAM(s) Oral every 6 hours PRN  pantoprazole  Injectable 40 milliGRAM(s) IV Push two times a day  sodium chloride 0.9% lock flush 10 milliLiter(s) IV Push every 1 hour PRN      LABS                        9.0    9.15  )-----------( 362      ( 07 Dec 2019 05:22 )             28.2     12-06    136  |  100  |  8   ----------------------------<  116<H>  3.9   |  27  |  0.48<L>    Ca    7.8<L>      06 Dec 2019 04:48  Phos  1.9     12-06  Mg     2.0     12-06      PT/INR - ( 05 Dec 2019 08:35 )   PT: 13.3 sec;   INR: 1.17 ratio         PTT - ( 05 Dec 2019 08:35 )  PTT:23.6 sec      RADIOLOGY & ADDITIONAL STUDIES

## 2019-12-07 NOTE — PROGRESS NOTE ADULT - ATTENDING COMMENTS
Patient seen/examined.  Agree w above note and plan and have discussed plan w house staff.  Albumin today, d/c alma Greer MD

## 2019-12-07 NOTE — PROGRESS NOTE ADULT - ASSESSMENT
A/P: 64y Female POD#1 s/p ex lap with extended left hemicolectomy left in discontinuity for perforated sigmoid diverticulitis with intraperitoneal free air after presenting to ED with worsening constipation and abdominal pain with distention. AbThera vac placed and pt kept intubated post op and transferred to SICU for post-op management. Extubated 11/30, patient refusing BIPAP. Marquez out 12/1. Marquez replaced 12/2. UA +yeast, started on fluconazole. For elevated WBC, started on meropenem 12/3. Transferred from SICU to floor 12/4. CT C/A/P 12/4 shows no obvious intrabdominal collection.     Plan:  - [place] options given to patient  - reg diet  - Restart doxazosin , holding for low BP  - monitor ostomy  - monitor U/O, marquez  - monitor vac output  - c/w pain control, c/w methadone   - DVT ppx with lovenox  - c/w sosa, fluconazole  - appreciate cardiology recs        [facility]  p [phone] A/P: 64y Female POD#1 s/p ex lap with extended left hemicolectomy left in discontinuity for perforated sigmoid diverticulitis with intraperitoneal free air after presenting to ED with worsening constipation and abdominal pain with distention. AbThera vac placed and pt kept intubated post op and transferred to SICU for post-op management. Extubated 11/30, patient refusing BIPAP. Marquez out 12/1. Marquez replaced 12/2. UA +yeast, started on fluconazole. For elevated WBC, started on meropenem 12/3. Transferred from SICU to floor 12/4. CT C/A/P 12/4 shows no obvious intrabdominal collection.     Plan:  - elevated LFTs this AM, hx of hepC, elevated compared to admission  - [place] options given to patient  - reg diet  - Restart doxazosin  - monitor ostomy  - monitor U/O, marquez  - monitor vac output  - c/w pain control, c/w methadone   - DVT ppx with lovenox  - c/w sosa, fluconazole  - appreciate cardiology recs        [facility]  p [phone]

## 2019-12-08 LAB
ALBUMIN SERPL ELPH-MCNC: 1.9 G/DL — LOW (ref 3.3–5)
ALP SERPL-CCNC: 157 U/L — HIGH (ref 40–120)
ALT FLD-CCNC: 50 U/L — HIGH (ref 10–45)
ANION GAP SERPL CALC-SCNC: 9 MMOL/L — SIGNIFICANT CHANGE UP (ref 5–17)
AST SERPL-CCNC: 80 U/L — HIGH (ref 10–40)
BILIRUB SERPL-MCNC: 0.4 MG/DL — SIGNIFICANT CHANGE UP (ref 0.2–1.2)
BUN SERPL-MCNC: 5 MG/DL — LOW (ref 7–23)
CALCIUM SERPL-MCNC: 7.9 MG/DL — LOW (ref 8.4–10.5)
CHLORIDE SERPL-SCNC: 99 MMOL/L — SIGNIFICANT CHANGE UP (ref 96–108)
CO2 SERPL-SCNC: 28 MMOL/L — SIGNIFICANT CHANGE UP (ref 22–31)
CREAT SERPL-MCNC: 0.49 MG/DL — LOW (ref 0.5–1.3)
GLUCOSE SERPL-MCNC: 110 MG/DL — HIGH (ref 70–99)
HCT VFR BLD CALC: 29.3 % — LOW (ref 34.5–45)
HGB BLD-MCNC: 9 G/DL — LOW (ref 11.5–15.5)
MAGNESIUM SERPL-MCNC: 1.9 MG/DL — SIGNIFICANT CHANGE UP (ref 1.6–2.6)
MCHC RBC-ENTMCNC: 28.9 PG — SIGNIFICANT CHANGE UP (ref 27–34)
MCHC RBC-ENTMCNC: 30.7 GM/DL — LOW (ref 32–36)
MCV RBC AUTO: 94.2 FL — SIGNIFICANT CHANGE UP (ref 80–100)
PHOSPHATE SERPL-MCNC: 2.1 MG/DL — LOW (ref 2.5–4.5)
PLATELET # BLD AUTO: 408 K/UL — HIGH (ref 150–400)
POTASSIUM SERPL-MCNC: 4.3 MMOL/L — SIGNIFICANT CHANGE UP (ref 3.5–5.3)
POTASSIUM SERPL-SCNC: 4.3 MMOL/L — SIGNIFICANT CHANGE UP (ref 3.5–5.3)
PROT SERPL-MCNC: 5.6 G/DL — LOW (ref 6–8.3)
RBC # BLD: 3.11 M/UL — LOW (ref 3.8–5.2)
RBC # FLD: 14.9 % — HIGH (ref 10.3–14.5)
SODIUM SERPL-SCNC: 136 MMOL/L — SIGNIFICANT CHANGE UP (ref 135–145)
WBC # BLD: 7.7 K/UL — SIGNIFICANT CHANGE UP (ref 3.8–10.5)
WBC # FLD AUTO: 7.7 K/UL — SIGNIFICANT CHANGE UP (ref 3.8–10.5)

## 2019-12-08 RX ORDER — SODIUM,POTASSIUM PHOSPHATES 278-250MG
1 POWDER IN PACKET (EA) ORAL ONCE
Refills: 0 | Status: COMPLETED | OUTPATIENT
Start: 2019-12-08 | End: 2019-12-08

## 2019-12-08 RX ADMIN — NYSTATIN CREAM 1 APPLICATION(S): 100000 CREAM TOPICAL at 05:52

## 2019-12-08 RX ADMIN — ENOXAPARIN SODIUM 100 MILLIGRAM(S): 100 INJECTION SUBCUTANEOUS at 11:47

## 2019-12-08 RX ADMIN — NYSTATIN CREAM 1 APPLICATION(S): 100000 CREAM TOPICAL at 18:36

## 2019-12-08 RX ADMIN — CHLORHEXIDINE GLUCONATE 1 APPLICATION(S): 213 SOLUTION TOPICAL at 05:57

## 2019-12-08 RX ADMIN — Medication 0.5 MILLIGRAM(S): at 05:51

## 2019-12-08 RX ADMIN — MEROPENEM 100 MILLIGRAM(S): 1 INJECTION INTRAVENOUS at 19:19

## 2019-12-08 RX ADMIN — Medication 3 MILLILITER(S): at 18:35

## 2019-12-08 RX ADMIN — Medication 3 MILLILITER(S): at 00:50

## 2019-12-08 RX ADMIN — Medication 3 MILLILITER(S): at 11:47

## 2019-12-08 RX ADMIN — FLUCONAZOLE 100 MILLIGRAM(S): 150 TABLET ORAL at 22:14

## 2019-12-08 RX ADMIN — Medication 2 MILLIGRAM(S): at 05:49

## 2019-12-08 RX ADMIN — DEXTROSE MONOHYDRATE, SODIUM CHLORIDE, AND POTASSIUM CHLORIDE 90 MILLILITER(S): 50; .745; 4.5 INJECTION, SOLUTION INTRAVENOUS at 18:36

## 2019-12-08 RX ADMIN — Medication 3 MILLILITER(S): at 05:49

## 2019-12-08 RX ADMIN — MEROPENEM 100 MILLIGRAM(S): 1 INJECTION INTRAVENOUS at 00:48

## 2019-12-08 RX ADMIN — MEROPENEM 100 MILLIGRAM(S): 1 INJECTION INTRAVENOUS at 09:47

## 2019-12-08 RX ADMIN — Medication 0.5 MILLIGRAM(S): at 18:36

## 2019-12-08 RX ADMIN — PANTOPRAZOLE SODIUM 40 MILLIGRAM(S): 20 TABLET, DELAYED RELEASE ORAL at 11:47

## 2019-12-08 RX ADMIN — METHADONE HYDROCHLORIDE 17.5 MILLIGRAM(S): 40 TABLET ORAL at 11:47

## 2019-12-08 RX ADMIN — Medication 1 PACKET(S): at 09:47

## 2019-12-08 NOTE — PROGRESS NOTE ADULT - ASSESSMENT
A/P: 64y Female POD#1 s/p ex lap with extended left hemicolectomy left in discontinuity for perforated sigmoid diverticulitis with intraperitoneal free air after presenting to ED with worsening constipation and abdominal pain with distention. AbThera vac placed and pt kept intubated post op and transferred to SICU for post-op management. Extubated 11/30, patient refusing BIPAP. Ross out 12/1. Ross replaced 12/2. UA +yeast, started on fluconazole. For elevated WBC, started on meropenem 12/3. Transferred from SICU to floor 12/4. CT C/A/P 12/4 shows no obvious intrabdominal collection.     Plan:    - [place] options given to patient  - reg diet  - c/w doxazosin  - monitor ostomy  - monitor U/O  - monitor vac output  - c/w pain control, c/w methadone   - DVT ppx with lovenox  - c/w sosa, fluconazole  - appreciate cardiology recs        [facility]  p [phone]

## 2019-12-08 NOTE — PROGRESS NOTE ADULT - SUBJECTIVE AND OBJECTIVE BOX
CARDIOLOGY     PROGRESS  NOTE   ________________________________________________    CHIEF COMPLAINT:Patient is a 64y old  Female who presents with a chief complaint of perforated sigmoid diverticulum s/p left hemicolectomy (03 Dec 2019 04:04)  doing better, yesterdy eventually were able to get bp at the l wrist.  	  REVIEW OF SYSTEMS:  CONSTITUTIONAL: No fever, weight loss, or fatigue  EYES: No eye pain, visual disturbances, or discharge  ENT:  No difficulty hearing, tinnitus, vertigo; No sinus or throat pain  NECK: No pain or stiffness  RESPIRATORY: No cough, wheezing, chills or hemoptysis; No Shortness of Breath  CARDIOVASCULAR: No chest pain, palpitations, passing out, dizziness, or leg swelling  GASTROINTESTINAL: + abdominal or epigastric pain. No nausea, vomiting, or hematemesis; No diarrhea or constipation. No melena or hematochezia.  GENITOURINARY: No dysuria, frequency, hematuria, or incontinence  NEUROLOGICAL: No headaches, memory loss, loss of strength, numbness, or tremors  SKIN: No itching, burning, rashes, or lesions   LYMPH Nodes: No enlarged glands  ENDOCRINE: No heat or cold intolerance; No hair loss  MUSCULOSKELETAL: No joint pain or swelling; No muscle, back, or extremity pain  PSYCHIATRIC: No depression, anxiety, mood swings, or difficulty sleeping  HEME/LYMPH: No easy bruising, or bleeding gums  ALLERGY AND IMMUNOLOGIC: No hives or eczema	    [ ] All others negative	  [ ] Unable to obtain    PHYSICAL EXAM:  T(C): 36.5 (12-08-19 @ 06:55), Max: 37.2 (12-07-19 @ 16:46)  HR: 76 (12-08-19 @ 06:55) (76 - 89)  BP: 114/59 (12-08-19 @ 06:55) (72/58 - 129/74)  RR: 18 (12-08-19 @ 06:55) (18 - 19)  SpO2: 94% (12-08-19 @ 06:55) (93% - 94%)  Wt(kg): --  I&O's Summary    07 Dec 2019 07:01  -  08 Dec 2019 07:00  --------------------------------------------------------  IN: 3350 mL / OUT: 1355 mL / NET: 1995 mL        Appearance: Normal	  HEENT:   Normal oral mucosa, PERRL, EOMI	  Lymphatic: No lymphadenopathy  Cardiovascular: Normal S1 S2, No JVD, +murmurs, + lymphedema  Respiratory:rhopnchi  Psychiatry: A & O x 3, Mood & affect appropriate  Gastrointestinal:  Soft, + tender/ vac, + BS	  Skin: No rashes, No ecchymoses, No cyanosis	  Neurologic: Non-focal  Extremities: Normal range of motion, No clubbing, cyanosis .  Vascular: Peripheral pulses palpable 2+ bilaterally    MEDICATIONS  (STANDING):  acetaminophen   Tablet .. 975 milliGRAM(s) Oral every 6 hours  albuterol/ipratropium for Nebulization 3 milliLiter(s) Nebulizer every 6 hours  buDESOnide    Inhalation Suspension 0.5 milliGRAM(s) Inhalation every 12 hours  chlorhexidine 4% Liquid 1 Application(s) Topical <User Schedule>  dextrose 5% + sodium chloride 0.45% with potassium chloride 20 mEq/L 1000 milliLiter(s) (90 mL/Hr) IV Continuous <Continuous>  doxazosin 2 milliGRAM(s) Oral daily  enoxaparin Injectable 100 milliGRAM(s) SubCutaneous <User Schedule>  fluconAZOLE IVPB 400 milliGRAM(s) IV Intermittent every 24 hours  influenza   Vaccine 0.5 milliLiter(s) IntraMuscular once  meropenem  IVPB 1000 milliGRAM(s) IV Intermittent every 8 hours  methadone    Tablet 17.5 milliGRAM(s) Oral daily  nystatin Powder 1 Application(s) Topical two times a day  pantoprazole    Tablet 40 milliGRAM(s) Oral daily  potassium phosphate / sodium phosphate powder 1 Packet(s) Oral once      TELEMETRY: 	    ECG:  	  RADIOLOGY:  OTHER: 	  	  LABS:	 	    CARDIAC MARKERS:    < from: Limited Transthoracic Echo (12.07.19 @ 12:39) >  ------------------------------------------------------------------------  PROCEDURE: Limited transthoracic echocardiogram with 2-D.  M-Mode and spectral and color flow Doppler.  INDICATION: Heart failure, unspecified (I50.9)  ------------------------------------------------------------------------  OBSERVATIONS:  Left Ventricle: Hyperdynamic left ventricular systolic  function.  ------------------------------------------------------------------------  CONCLUSIONS:  1. Hyperdynamic left ventricular systolic function.    < end of copied text >                              9.0    9.15  )-----------( 362      ( 07 Dec 2019 05:22 )             28.2     12-08    136  |  99  |  5<L>  ----------------------------<  110<H>  4.3   |  28  |  0.49<L>    Ca    7.9<L>      08 Dec 2019 07:20  Phos  2.1     12-08  Mg     1.9     12-08    TPro  5.6<L>  /  Alb  1.9<L>  /  TBili  0.4  /  DBili  x   /  AST  80<H>  /  ALT  50<H>  /  AlkPhos  157<H>  12-08    proBNP:   Lipid Profile:   HgA1c:   TSH:         Assessment and plan  ---------------------------  pt is sitting up comfortably in NAD , no complain.  bp noted  echo noted hyperdynamic lv sec to pain/anxiety, volume depletion  may increase fluid intake  continue dvt prophylaxis  abx  echo noted no  wall motion abnormality

## 2019-12-08 NOTE — PROGRESS NOTE ADULT - SUBJECTIVE AND OBJECTIVE BOX
GENERAL SURGERY PROGRESS NOTE    SUBJECTIVE  Patient seen and examined.   Yesterday  multiple automatic and manual  BPs 70-80s/50s g. BP were being taken on right arm, after taking BP on left forearm, on the side of the PICC, BP reading were accurate at 110-120s/60's all other vitals WNL. Patient sleeping comfortably, no complaints.   Reports tolerating diet without nausea, vomiting, +/+ ostomy, marquez removed yesterday and replaced with primafit. Denies fever, chills, SOB, chest pain.   [place] options given to patient 12/6      OBJECTIVE    PHYSICAL EXAM  General: Appears well, NAD  CHEST: breathing comfortably  CV: appears well perfused  Abdomen: soft, nontender, nondistended, vac in place, ostomy unchanged +/+  Extremities: lymphedema            Vital Signs:  Vital Signs Last 24 Hrs  T(C): 36.8 (08 Dec 2019 01:11), Max: 37.2 (07 Dec 2019 16:46)  T(F): 98.3 (08 Dec 2019 01:11), Max: 98.9 (07 Dec 2019 16:46)  HR: 78 (08 Dec 2019 01:11) (72 - 89)  BP: 110/72 (08 Dec 2019 01:11) (72/52 - 129/74)  BP(mean): --  RR: 18 (08 Dec 2019 01:11) (18 - 19)  SpO2: 93% (08 Dec 2019 01:11) (93% - 94%)    CAPILLARY BLOOD GLUCOSE          I&O's Detail    06 Dec 2019 07:01  -  07 Dec 2019 07:00  --------------------------------------------------------  IN:    dextrose 5% + sodium chloride 0.45% with potassium chloride 20 mEq/L: 2160 mL    Lactated Ringers IV Bolus: 1000 mL    Oral Fluid: 810 mL    Solution: 200 mL    Solution: 150 mL  Total IN: 4320 mL    OUT:    Bulb: 105 mL    Ileostomy: 15 mL    Indwelling Catheter - Urethral: 1075 mL    VAC (Vacuum Assisted Closure) System: 500 mL  Total OUT: 1695 mL    Total NET: 2625 mL      07 Dec 2019 07:01  -  08 Dec 2019 05:20  --------------------------------------------------------  IN:    dextrose 5% + sodium chloride 0.45% with potassium chloride 20 mEq/L: 1080 mL    Oral Fluid: 840 mL    Solution: 100 mL  Total IN: 2020 mL    OUT:    Bulb: 60 mL    Ileostomy: 75 mL    Indwelling Catheter - Urethral: 400 mL    Voided: 350 mL  Total OUT: 885 mL    Total NET: 1135 mL          MEDICATIONS  (STANDING):  acetaminophen   Tablet .. 975 milliGRAM(s) Oral every 6 hours  albuterol/ipratropium for Nebulization 3 milliLiter(s) Nebulizer every 6 hours  buDESOnide    Inhalation Suspension 0.5 milliGRAM(s) Inhalation every 12 hours  chlorhexidine 4% Liquid 1 Application(s) Topical <User Schedule>  dextrose 5% + sodium chloride 0.45% with potassium chloride 20 mEq/L 1000 milliLiter(s) (90 mL/Hr) IV Continuous <Continuous>  doxazosin 2 milliGRAM(s) Oral daily  enoxaparin Injectable 100 milliGRAM(s) SubCutaneous <User Schedule>  fluconAZOLE IVPB 400 milliGRAM(s) IV Intermittent every 24 hours  influenza   Vaccine 0.5 milliLiter(s) IntraMuscular once  meropenem  IVPB 1000 milliGRAM(s) IV Intermittent every 8 hours  methadone    Tablet 17.5 milliGRAM(s) Oral daily  nystatin Powder 1 Application(s) Topical two times a day  pantoprazole    Tablet 40 milliGRAM(s) Oral daily    MEDICATIONS  (PRN):  aluminum hydroxide/magnesium hydroxide/simethicone Suspension 30 milliLiter(s) Oral every 4 hours PRN Dyspepsia  oxyCODONE    IR 5 milliGRAM(s) Oral every 4 hours PRN Moderate Pain (4 - 6)  oxyCODONE    IR 10 milliGRAM(s) Oral every 6 hours PRN Severe Pain (7 - 10)  sodium chloride 0.9% lock flush 10 milliLiter(s) IV Push every 1 hour PRN Pre/post blood products, medications, blood draw, and to maintain line patency          Labs:    12-07    135  |  101  |  6<L>  ----------------------------<  113<H>  4.2   |  27  |  0.48<L>    Ca    7.9<L>      07 Dec 2019 05:22  Phos  2.1     12-07  Mg     2.0     12-07    TPro  5.3<L>  /  Alb  2.2<L>  /  TBili  0.5  /  DBili  0.3<H>  /  AST  97<H>  /  ALT  49<H>  /  AlkPhos  157<H>  12-07    LIVER FUNCTIONS - ( 07 Dec 2019 05:22 )  Alb: 2.2 g/dL / Pro: 5.3 g/dL / ALK PHOS: 157 U/L / ALT: 49 U/L / AST: 97 U/L / GGT: x                                 9.0    9.15  )-----------( 362      ( 07 Dec 2019 05:22 )             28.2         Imaging:

## 2019-12-08 NOTE — PROGRESS NOTE ADULT - ATTENDING COMMENTS
Patient seen/examined.  Agree w above note and plan and have discussed plan w house staff.  Fine, solid stool in bag.  LFTs mildly up.  Repeat in AM    Lucas Greer MD

## 2019-12-09 LAB
ALBUMIN SERPL ELPH-MCNC: 1.9 G/DL — LOW (ref 3.3–5)
ALP SERPL-CCNC: 149 U/L — HIGH (ref 40–120)
ALT FLD-CCNC: 44 U/L — SIGNIFICANT CHANGE UP (ref 10–45)
ANION GAP SERPL CALC-SCNC: 7 MMOL/L — SIGNIFICANT CHANGE UP (ref 5–17)
APTT BLD: 26.7 SEC — LOW (ref 27.5–36.3)
AST SERPL-CCNC: 56 U/L — HIGH (ref 10–40)
BILIRUB SERPL-MCNC: 0.4 MG/DL — SIGNIFICANT CHANGE UP (ref 0.2–1.2)
BUN SERPL-MCNC: 4 MG/DL — LOW (ref 7–23)
CALCIUM SERPL-MCNC: 8 MG/DL — LOW (ref 8.4–10.5)
CHLORIDE SERPL-SCNC: 101 MMOL/L — SIGNIFICANT CHANGE UP (ref 96–108)
CO2 SERPL-SCNC: 27 MMOL/L — SIGNIFICANT CHANGE UP (ref 22–31)
CREAT SERPL-MCNC: 0.5 MG/DL — SIGNIFICANT CHANGE UP (ref 0.5–1.3)
GLUCOSE SERPL-MCNC: 104 MG/DL — HIGH (ref 70–99)
INR BLD: 1.18 RATIO — HIGH (ref 0.88–1.16)
MAGNESIUM SERPL-MCNC: 2 MG/DL — SIGNIFICANT CHANGE UP (ref 1.6–2.6)
PHOSPHATE SERPL-MCNC: 1.9 MG/DL — LOW (ref 2.5–4.5)
POTASSIUM SERPL-MCNC: 4.3 MMOL/L — SIGNIFICANT CHANGE UP (ref 3.5–5.3)
POTASSIUM SERPL-SCNC: 4.3 MMOL/L — SIGNIFICANT CHANGE UP (ref 3.5–5.3)
PROT SERPL-MCNC: 5.7 G/DL — LOW (ref 6–8.3)
PROTHROM AB SERPL-ACNC: 13.5 SEC — HIGH (ref 10–13.1)
SODIUM SERPL-SCNC: 135 MMOL/L — SIGNIFICANT CHANGE UP (ref 135–145)

## 2019-12-09 PROCEDURE — 74220 X-RAY XM ESOPHAGUS 1CNTRST: CPT | Mod: 26

## 2019-12-09 PROCEDURE — 99222 1ST HOSP IP/OBS MODERATE 55: CPT | Mod: GC

## 2019-12-09 RX ORDER — HYDROMORPHONE HYDROCHLORIDE 2 MG/ML
0.5 INJECTION INTRAMUSCULAR; INTRAVENOUS; SUBCUTANEOUS ONCE
Refills: 0 | Status: DISCONTINUED | OUTPATIENT
Start: 2019-12-09 | End: 2019-12-09

## 2019-12-09 RX ORDER — SODIUM,POTASSIUM PHOSPHATES 278-250MG
2 POWDER IN PACKET (EA) ORAL ONCE
Refills: 0 | Status: COMPLETED | OUTPATIENT
Start: 2019-12-09 | End: 2019-12-09

## 2019-12-09 RX ORDER — ACETAMINOPHEN 500 MG
650 TABLET ORAL EVERY 6 HOURS
Refills: 0 | Status: DISCONTINUED | OUTPATIENT
Start: 2019-12-09 | End: 2019-12-09

## 2019-12-09 RX ORDER — OXYCODONE HYDROCHLORIDE 5 MG/1
10 TABLET ORAL EVERY 4 HOURS
Refills: 0 | Status: DISCONTINUED | OUTPATIENT
Start: 2019-12-09 | End: 2019-12-16

## 2019-12-09 RX ORDER — ACETAMINOPHEN 500 MG
975 TABLET ORAL EVERY 6 HOURS
Refills: 0 | Status: DISCONTINUED | OUTPATIENT
Start: 2019-12-09 | End: 2019-12-09

## 2019-12-09 RX ORDER — ACETAMINOPHEN 500 MG
1000 TABLET ORAL ONCE
Refills: 0 | Status: COMPLETED | OUTPATIENT
Start: 2019-12-09 | End: 2019-12-09

## 2019-12-09 RX ORDER — MORPHINE SULFATE 50 MG/1
2 CAPSULE, EXTENDED RELEASE ORAL EVERY 4 HOURS
Refills: 0 | Status: DISCONTINUED | OUTPATIENT
Start: 2019-12-09 | End: 2019-12-12

## 2019-12-09 RX ADMIN — Medication 3 MILLILITER(S): at 00:59

## 2019-12-09 RX ADMIN — HYDROMORPHONE HYDROCHLORIDE 0.5 MILLIGRAM(S): 2 INJECTION INTRAMUSCULAR; INTRAVENOUS; SUBCUTANEOUS at 09:44

## 2019-12-09 RX ADMIN — MEROPENEM 100 MILLIGRAM(S): 1 INJECTION INTRAVENOUS at 18:24

## 2019-12-09 RX ADMIN — OXYCODONE HYDROCHLORIDE 5 MILLIGRAM(S): 5 TABLET ORAL at 06:50

## 2019-12-09 RX ADMIN — CHLORHEXIDINE GLUCONATE 1 APPLICATION(S): 213 SOLUTION TOPICAL at 11:39

## 2019-12-09 RX ADMIN — Medication 3 MILLILITER(S): at 18:25

## 2019-12-09 RX ADMIN — PANTOPRAZOLE SODIUM 40 MILLIGRAM(S): 20 TABLET, DELAYED RELEASE ORAL at 09:57

## 2019-12-09 RX ADMIN — NYSTATIN CREAM 1 APPLICATION(S): 100000 CREAM TOPICAL at 05:20

## 2019-12-09 RX ADMIN — Medication 0.5 MILLIGRAM(S): at 05:19

## 2019-12-09 RX ADMIN — METHADONE HYDROCHLORIDE 17.5 MILLIGRAM(S): 40 TABLET ORAL at 09:57

## 2019-12-09 RX ADMIN — OXYCODONE HYDROCHLORIDE 10 MILLIGRAM(S): 5 TABLET ORAL at 16:04

## 2019-12-09 RX ADMIN — Medication 3 MILLILITER(S): at 05:20

## 2019-12-09 RX ADMIN — OXYCODONE HYDROCHLORIDE 5 MILLIGRAM(S): 5 TABLET ORAL at 06:22

## 2019-12-09 RX ADMIN — HYDROMORPHONE HYDROCHLORIDE 0.5 MILLIGRAM(S): 2 INJECTION INTRAMUSCULAR; INTRAVENOUS; SUBCUTANEOUS at 09:59

## 2019-12-09 RX ADMIN — Medication 2 PACKET(S): at 15:36

## 2019-12-09 RX ADMIN — MEROPENEM 100 MILLIGRAM(S): 1 INJECTION INTRAVENOUS at 10:45

## 2019-12-09 RX ADMIN — Medication 2 MILLIGRAM(S): at 05:19

## 2019-12-09 RX ADMIN — OXYCODONE HYDROCHLORIDE 10 MILLIGRAM(S): 5 TABLET ORAL at 15:34

## 2019-12-09 RX ADMIN — Medication 1000 MILLIGRAM(S): at 19:37

## 2019-12-09 RX ADMIN — NYSTATIN CREAM 1 APPLICATION(S): 100000 CREAM TOPICAL at 18:26

## 2019-12-09 RX ADMIN — Medication 3 MILLILITER(S): at 11:40

## 2019-12-09 RX ADMIN — ENOXAPARIN SODIUM 100 MILLIGRAM(S): 100 INJECTION SUBCUTANEOUS at 11:39

## 2019-12-09 RX ADMIN — Medication 0.5 MILLIGRAM(S): at 18:25

## 2019-12-09 RX ADMIN — MEROPENEM 100 MILLIGRAM(S): 1 INJECTION INTRAVENOUS at 03:31

## 2019-12-09 RX ADMIN — Medication 400 MILLIGRAM(S): at 19:07

## 2019-12-09 NOTE — PROGRESS NOTE ADULT - ATTENDING COMMENTS
I have seen and evaluated the patient and discussed the relevant clinical findings and plan with the surgical housestaff and fellow.  Agree with the above documentation with addenda as noted.     Ostomy functioning, abdomen soft, wound healing well  Tolerating PO  Appreciate cardiology evaluation/recommendations  Needs assistance for mobility  Rehab planning    Ej Ng MD

## 2019-12-09 NOTE — PROGRESS NOTE ADULT - SUBJECTIVE AND OBJECTIVE BOX
GENERAL SURGERY PROGRESS NOTE    SUBJECTIVE  Patient seen and examined. Reports tolerating diet without nausea, vomiting, +/+ ostomy with some more formed stool, voiding appropriately with primafit. Denies fever, chills, SOB, chest pain.   [place] options given to patient 12/6      OBJECTIVE    PHYSICAL EXAM  General: Appears well, NAD  CHEST: breathing comfortably  CV: appears well perfused  Abdomen: soft, nontender, nondistended, vac in place, ostomy +/+ with some more formed stool  Extremities: lymphedema          Vital Signs:  Vital Signs Last 24 Hrs  T(C): 36.8 (09 Dec 2019 02:03), Max: 37.2 (08 Dec 2019 09:50)  T(F): 98.3 (09 Dec 2019 02:03), Max: 98.9 (08 Dec 2019 09:50)  HR: 77 (09 Dec 2019 02:03) (76 - 90)  BP: 112/69 (09 Dec 2019 02:03) (111/69 - 127/77)  BP(mean): --  RR: 18 (09 Dec 2019 02:03) (18 - 18)  SpO2: 95% (09 Dec 2019 02:03) (94% - 95%)    CAPILLARY BLOOD GLUCOSE          I&O's Detail    07 Dec 2019 07:01  -  08 Dec 2019 07:00  --------------------------------------------------------  IN:    dextrose 5% + sodium chloride 0.45% with potassium chloride 20 mEq/L: 2160 mL    Oral Fluid: 840 mL    Solution: 200 mL    Solution: 150 mL  Total IN: 3350 mL    OUT:    Bulb: 80 mL    Ileostomy: 75 mL    Indwelling Catheter - Urethral: 400 mL    Voided: 800 mL  Total OUT: 1355 mL    Total NET: 1995 mL      08 Dec 2019 07:01  -  09 Dec 2019 04:08  --------------------------------------------------------  IN:    dextrose 5% + sodium chloride 0.45% with potassium chloride 20 mEq/L: 180 mL    Oral Fluid: 660 mL    Solution: 200 mL  Total IN: 1040 mL    OUT:    Bulb: 50 mL    Ileostomy: 25 mL    Voided: 950 mL  Total OUT: 1025 mL    Total NET: 15 mL          MEDICATIONS  (STANDING):  acetaminophen   Tablet .. 975 milliGRAM(s) Oral every 6 hours  albuterol/ipratropium for Nebulization 3 milliLiter(s) Nebulizer every 6 hours  buDESOnide    Inhalation Suspension 0.5 milliGRAM(s) Inhalation every 12 hours  chlorhexidine 4% Liquid 1 Application(s) Topical <User Schedule>  dextrose 5% + sodium chloride 0.45% with potassium chloride 20 mEq/L 1000 milliLiter(s) (90 mL/Hr) IV Continuous <Continuous>  doxazosin 2 milliGRAM(s) Oral daily  enoxaparin Injectable 100 milliGRAM(s) SubCutaneous <User Schedule>  fluconAZOLE IVPB 400 milliGRAM(s) IV Intermittent every 24 hours  influenza   Vaccine 0.5 milliLiter(s) IntraMuscular once  meropenem  IVPB 1000 milliGRAM(s) IV Intermittent every 8 hours  methadone    Tablet 17.5 milliGRAM(s) Oral daily  nystatin Powder 1 Application(s) Topical two times a day  pantoprazole    Tablet 40 milliGRAM(s) Oral daily    MEDICATIONS  (PRN):  aluminum hydroxide/magnesium hydroxide/simethicone Suspension 30 milliLiter(s) Oral every 4 hours PRN Dyspepsia  oxyCODONE    IR 5 milliGRAM(s) Oral every 4 hours PRN Moderate Pain (4 - 6)  oxyCODONE    IR 10 milliGRAM(s) Oral every 6 hours PRN Severe Pain (7 - 10)  sodium chloride 0.9% lock flush 10 milliLiter(s) IV Push every 1 hour PRN Pre/post blood products, medications, blood draw, and to maintain line patency          Labs:    12-08    136  |  99  |  5<L>  ----------------------------<  110<H>  4.3   |  28  |  0.49<L>    Ca    7.9<L>      08 Dec 2019 07:20  Phos  2.1     12-08  Mg     1.9     12-08    TPro  5.6<L>  /  Alb  1.9<L>  /  TBili  0.4  /  DBili  x   /  AST  80<H>  /  ALT  50<H>  /  AlkPhos  157<H>  12-08    LIVER FUNCTIONS - ( 08 Dec 2019 07:20 )  Alb: 1.9 g/dL / Pro: 5.6 g/dL / ALK PHOS: 157 U/L / ALT: 50 U/L / AST: 80 U/L / GGT: x                                 9.0    7.70  )-----------( 408      ( 08 Dec 2019 09:36 )             29.3         Imaging:

## 2019-12-09 NOTE — PROGRESS NOTE ADULT - ASSESSMENT
A/P: 64y Female POD#1 s/p ex lap with extended left hemicolectomy left in discontinuity for perforated sigmoid diverticulitis with intraperitoneal free air after presenting to ED with worsening constipation and abdominal pain with distention. AbThera vac placed and pt kept intubated post op and transferred to SICU for post-op management. Extubated 11/30, patient refusing BIPAP. Ross out 12/1. Ross replaced 12/2. UA +yeast, started on fluconazole. For elevated WBC, started on meropenem 12/3. Transferred from SICU to floor 12/4. CT C/A/P 12/4 shows no obvious intrabdominal collection.     Plan:    - [place] options given to patient  - reg diet  - c/w doxazosin  - monitor ostomy  - monitor U/O  - monitor vac output  - c/w pain control, c/w methadone   - DVT ppx with lovenox  - c/w sosa, fluconazole  - appreciate cardiology recs        [facility]  p 1127 A/P: 64y Female POD#1 s/p ex lap with extended left hemicolectomy left in discontinuity for perforated sigmoid diverticulitis with intraperitoneal free air after presenting to ED with worsening constipation and abdominal pain with distention. AbThera vac placed and pt kept intubated post op and transferred to SICU for post-op management. Extubated 11/30, patient refusing BIPAP. Ross out 12/1. Ross replaced 12/2. UA +yeast, started on fluconazole. For elevated WBC, started on meropenem 12/3. Transferred from SICU to floor 12/4. CT C/A/P 12/4 shows no obvious intrabdominal collection.     Plan:    - IVL  - [place] options given to patient  - reg diet  - c/w doxazosin  - monitor ostomy  - monitor U/O  - monitor vac output  - c/w pain control, c/w methadone   - DVT ppx with lovenox  - c/w sosa,  - d/c fluconazole  - appreciate cardiology recs        [facility]  p 1134

## 2019-12-09 NOTE — PROGRESS NOTE ADULT - SUBJECTIVE AND OBJECTIVE BOX
CARDIOLOGY     PROGRESS  NOTE   ________________________________________________    CHIEF COMPLAINT:Patient is a 64y old  Female who presents with a chief complaint of perforated bowel (08 Dec 2019 09:22)  doing better, no complain.  	  REVIEW OF SYSTEMS:  CONSTITUTIONAL: No fever, weight loss, or fatigue  EYES: No eye pain, visual disturbances, or discharge  ENT:  No difficulty hearing, tinnitus, vertigo; No sinus or throat pain  NECK: No pain or stiffness  RESPIRATORY: No cough, wheezing, chills or hemoptysis; No Shortness of Breath  CARDIOVASCULAR: No chest pain, palpitations, passing out, dizziness, or leg swelling  GASTROINTESTINAL: No abdominal or epigastric pain. No nausea, vomiting, or hematemesis; No diarrhea or constipation. No melena or hematochezia.  GENITOURINARY: No dysuria, frequency, hematuria, or incontinence  NEUROLOGICAL: No headaches, memory loss, loss of strength, numbness, or tremors  SKIN: No itching, burning, rashes, or lesions   LYMPH Nodes: No enlarged glands  ENDOCRINE: No heat or cold intolerance; No hair loss  MUSCULOSKELETAL: No joint pain or swelling; No muscle, back, or extremity pain  PSYCHIATRIC: No depression, anxiety, mood swings, or difficulty sleeping  HEME/LYMPH: No easy bruising, or bleeding gums  ALLERGY AND IMMUNOLOGIC: No hives or eczema	    [ ] All others negative	  [ ] Unable to obtain    PHYSICAL EXAM:  T(C): 37.6 (12-09-19 @ 06:26), Max: 37.6 (12-09-19 @ 06:26)  HR: 87 (12-09-19 @ 06:26) (76 - 90)  BP: 95/64 (12-09-19 @ 06:26) (95/64 - 127/77)  RR: 18 (12-09-19 @ 06:26) (18 - 18)  SpO2: 95% (12-09-19 @ 06:26) (94% - 95%)  Wt(kg): --  I&O's Summary    08 Dec 2019 07:01  -  09 Dec 2019 07:00  --------------------------------------------------------  IN: 1040 mL / OUT: 1050 mL / NET: -10 mL        Appearance: Normal	  HEENT:   Normal oral mucosa, PERRL, EOMI	  Lymphatic: No lymphadenopathy  Cardiovascular: Normal S1 S2, No JVD, No murmurs, + chronic lympedema  Respiratory: Lungs clear to auscultation	  Psychiatry: A & O x 3, Mood & affect appropriate  Gastrointestinal:  Soft, Non-tender, + BS	  Skin: No rashes, No ecchymoses, No cyanosis	  Neurologic: Non-focal  Extremities: Normal range of motion, No clubbing, cyanosis.  Vascular: Peripheral pulses palpable 2+ bilaterally    MEDICATIONS  (STANDING):  acetaminophen   Tablet .. 975 milliGRAM(s) Oral every 6 hours  albuterol/ipratropium for Nebulization 3 milliLiter(s) Nebulizer every 6 hours  buDESOnide    Inhalation Suspension 0.5 milliGRAM(s) Inhalation every 12 hours  chlorhexidine 4% Liquid 1 Application(s) Topical <User Schedule>  doxazosin 2 milliGRAM(s) Oral daily  enoxaparin Injectable 100 milliGRAM(s) SubCutaneous <User Schedule>  influenza   Vaccine 0.5 milliLiter(s) IntraMuscular once  meropenem  IVPB 1000 milliGRAM(s) IV Intermittent every 8 hours  methadone    Tablet 17.5 milliGRAM(s) Oral daily  nystatin Powder 1 Application(s) Topical two times a day  pantoprazole    Tablet 40 milliGRAM(s) Oral daily      TELEMETRY: 	    ECG:  	  RADIOLOGY:  OTHER: 	  	  LABS:	 	    CARDIAC MARKERS:                                9.0    7.70  )-----------( 408      ( 08 Dec 2019 09:36 )             29.3     12-08    136  |  99  |  5<L>  ----------------------------<  110<H>  4.3   |  28  |  0.49<L>    Ca    7.9<L>      08 Dec 2019 07:20  Phos  2.1     12-08  Mg     1.9     12-08    TPro  5.6<L>  /  Alb  1.9<L>  /  TBili  0.4  /  DBili  x   /  AST  80<H>  /  ALT  50<H>  /  AlkPhos  157<H>  12-08    proBNP:   Lipid Profile:   HgA1c:   TSH:         Assessment and plan  ---------------------------  pt is sitting up comfortably in NAD , no complain.  bp noted  echo noted hyperdynamic lv sec to pain/anxiety, volume depletion  may increase fluid intake  continue dvt prophylaxis  abx  echo noted no  wall motion abnormality  dvt prophylaxis on Lovenox  oob to chair as tolerated

## 2019-12-09 NOTE — CONSULT NOTE ADULT - ASSESSMENT
Impression:  # Dysphagia / abnormal esophagram : DDx: esophageal stricture, mass, esophagitis     Recommendations:  - NPO after midnight  - IV PPI 40 mg daily  - EGD tomorrow

## 2019-12-09 NOTE — CONSULT NOTE ADULT - SUBJECTIVE AND OBJECTIVE BOX
Chief Complaint:  dysphagia     HPI:  64F with pmhx morbid obesity, acid reflux, HTN, COPD and pshx D&C presents to the ED c/o abdominal pain and bloating. Patient reports having constipation for 2 weeks and has been taking enemas, miralax and senna and passing small hard balls for the last weeks. Reports that she has not had a bowel movement unless she used an enema. Reports pain worst in the LLQ that started a few days ago and has been worsening in the last day, also has noted significant abdominal distention in the last day. Notes that she has not been able to urinate all day today because she feels dehydrated. Reports that she had 'low grade temperature of 99.7 which she took left over augmentin for.' Also reports nausea, denies any emesis, recorded fevers, urinary symptoms. Reports she has difficulty walking short distances because she becomes SOB. In ED, CT demonstrated Perforated sigmoid diverticulitis with intraperitoneal free air.  She was taken to the operating room on 11/25 overnight and underwent an exploratory laparotomy with extended left hemicolectomy and was left in discontinuity. An Abthera VAC was placed. She received 3400 of crystalloid, EBL was 500, she required pushes of phenylephrine during the case, but not continuous. She was kept intubated post op and brought to the ICU for hemodynamic and respiratory management.     She went back to the OR on 11/29 for transverse end colostomy and fascial closure with wound vac.  Extubated 11/30, UA +yeast, started on fluconazole. For elevated WBC, started on meropenem 12/3. Transferred from SICU to floor 12/4.   CT C/A/P 12/4 shows no obvious intraabdominal collection.     Unable to tolerate PO so esophagram was done which showed: IMPRESSION: Stricture in the distal esophagus. Suggestion of an outpouching of the mid esophagus, however evaluation is limited on this one view.     Allergies:  sulfa drugs (Unknown)      Home Medications:   * Patient Currently Takes Medications as of 26-Nov-2019 00:35 documented in Structured Notes  · 	amLODIPine 5 mg oral tablet: 1 tab(s) orally once a day  · 	doxazosin 2 mg oral tablet: 1 tab(s) orally once a day  · 	NexIUM 20 mg oral delayed release capsule: 1 cap(s) orally once a day      Hospital Medications:  acetaminophen   Tablet .. 975 milliGRAM(s) Oral every 6 hours PRN  albuterol/ipratropium for Nebulization 3 milliLiter(s) Nebulizer every 6 hours  aluminum hydroxide/magnesium hydroxide/simethicone Suspension 30 milliLiter(s) Oral every 4 hours PRN  buDESOnide    Inhalation Suspension 0.5 milliGRAM(s) Inhalation every 12 hours  chlorhexidine 4% Liquid 1 Application(s) Topical <User Schedule>  doxazosin 2 milliGRAM(s) Oral daily  enoxaparin Injectable 100 milliGRAM(s) SubCutaneous <User Schedule>  influenza   Vaccine 0.5 milliLiter(s) IntraMuscular once  meropenem  IVPB 1000 milliGRAM(s) IV Intermittent every 8 hours  methadone    Tablet 17.5 milliGRAM(s) Oral daily  nystatin Powder 1 Application(s) Topical two times a day  oxyCODONE    IR 5 milliGRAM(s) Oral every 4 hours PRN  oxyCODONE    IR 10 milliGRAM(s) Oral every 6 hours PRN  pantoprazole    Tablet 40 milliGRAM(s) Oral daily  sodium chloride 0.9% lock flush 10 milliLiter(s) IV Push every 1 hour PRN      PMHX/PSHX:  Morbid Obesity  Post Menopausal Bleeding  Polyp, Vagina  Acute Bronchitis  Acid Reflux  HTN (Hypertension)  S/P D&C        Family history: no history of esophageal CA      Social History: no smoking    ROS:   General:  No fevers, chills or night sweats.  ENT:  No sore throat or dysphagia  CV:  No pain or palpitations  Resp:  No dyspnea, cough, wheezing  GI:  as above  Skin:  No rash or edema      PHYSICAL EXAM: morbidly obese BMI: 60  GENERAL:  NAD, Appears stated age  HEENT:  NC/AT,  conjunctivae clear and pink, sclera -anicteric  CHEST:  CTA B/L, Normal effort  HEART:  RRR S1/S2, No murmurs  Abdomen: soft, nontender, nondistended, vac in place, ostomy +/+ with some more formed stool  Extremities: lymphedema   SKIN:  Warm & Dry. No rash or erythema  NEURO:  Alert, oriented    Vital Signs:  Vital Signs Last 24 Hrs  T(C): 37.1 (09 Dec 2019 16:30), Max: 37.6 (09 Dec 2019 06:26)  T(F): 98.7 (09 Dec 2019 16:30), Max: 99.6 (09 Dec 2019 06:26)  HR: 79 (09 Dec 2019 16:30) (76 - 90)  BP: 120/73 (09 Dec 2019 16:30) (95/64 - 125/75)  BP(mean): --  RR: 18 (09 Dec 2019 16:30) (18 - 18)  SpO2: 96% (09 Dec 2019 16:30) (94% - 96%)  Daily     Daily     LABS:                        9.0    7.70  )-----------( 408      ( 08 Dec 2019 09:36 )             29.3       12-09    135  |  101  |  4<L>  ----------------------------<  104<H>    4.3   |  27  |  0.50    Ca    8.0<L>      09 Dec 2019 07:19  Phos  1.9     12-09  Mg     2.0     12-09    TPro  5.7<L>  /  Alb  1.9<L>  /  TBili  0.4  /  DBili  x   /  AST  56<H>  /  ALT  44  /  AlkPhos  149<H>  12-09    LIVER FUNCTIONS - ( 09 Dec 2019 07:19 )  Alb: 1.9 g/dL / Pro: 5.7 g/dL / ALK PHOS: 149 U/L / ALT: 44 U/L / AST: 56 U/L / GGT: x           PT/INR - ( 09 Dec 2019 08:21 )   PT: 13.5 sec;   INR: 1.18 ratio         PTT - ( 09 Dec 2019 08:21 )  PTT:26.7 sec                            9.0    7.70  )-----------( 408      ( 08 Dec 2019 09:36 )             29.3                         9.0    9.15  )-----------( 362      ( 07 Dec 2019 05:22 )             28.2     Imaging:    < from: CT Abdomen and Pelvis w/ Oral Cont and w/ IV Cont (12.04.19 @ 20:43) >  EXAM:  CT ABDOMEN AND PELVIS OC IC                          EXAM:  CT CHEST IC                            PROCEDURE DATE:  12/04/2019            INTERPRETATION:  CLINICAL INFORMATION: Leukocytosis. Perforated sigmoid   diverticulitis.    COMPARISON: 11/25/2019    PROCEDURE:   CT of the Chest, Abdomen and Pelvis was performed with intravenous   contrast.   Intravenous contrast: 90 ml Omnipaque 350. 10 ml discarded.  Oral contrast: positive contrast was administered.  Sagittal and coronal reformats were performed.    FINDINGS:    CHEST:     LUNGS AND LARGE AIRWAYS: Patent central airways. Ill-defined ground glass   opacities are noted with upper lobe predominance.   PLEURA: Small pleural effusions, left greater than right.  VESSELS: Within normal limits.  HEART: Heart size is normal.  No pericardial effusion.  MEDIASTINUM AND GOKUL: No lymphadenopathy.   CHEST WALL AND LOWER NECK: Within normal limits.     ABDOMEN AND PELVIS:    LIVER: Within normal limits.   BILE DUCTS: Normal caliber.  GALLBLADDER: Within normal limits.  SPLEEN: Within normal limits.   PANCREAS: Within normal limits.  ADRENALS: Within normal limits.   KIDNEYS/URETERS: Within normal limits.     BLADDER: Ross catheter in place.   REPRODUCTIVE ORGANS: Within normal limits.     BOWEL: No bowel obstruction. Status post left-sided colon resection with   Sesay's pouch and colostomy. Surgical drain terminates in the pelvis.   Expected postoperative changes without discrete fluid collection. A few   tiny foci of free air in the anterior abdomen.  PERITONEUM: Mild ascites.  VESSELS:  Within normal limits.  RETROPERITONEUM/LYMPH NODES: No lymphadenopathy.     ABDOMINAL WALL: Subcutaneous emphysema.  BONES: Within normal limits.     IMPRESSION:   Expected postoperative changes in the abdomen and pelvis.    Scattered pulmonary groundglass opacities of uncertain etiology.    < end of copied text >      < from: Xray Esophagram (12.09.19 @ 14:46) >  EXAM:  ESOPHAGUS                            PROCEDURE DATE:  12/09/2019            INTERPRETATION:  CLINICAL INFORMATION: Status post Sesay's procedure,   sensation of food becoming stuck in esophagus    TECHNIQUE: An Omnipaque contrast and barium tablet was performed under   fluoroscopic examination. Multiple cine loops were obtained.    COMPARISON: No similar examinations were available for comparison.    FLUORO TIME: 0.6 minutes    FINDINGS:  Limited study as the patient was unable to turnon her side   (postsurgical).    The preliminary  radiograph of the chest is unremarkable.    The patient swallowed contrast without difficulty.     Suggestion of an outpouching of the mid esophagus, however evaluation is   limited on this one view.    Contrast passes freely into the stomach. The gastroesophageal junction is   normal.    The tablet became stuck in the distal esophagus and did not pass after   several swallows of water.    IMPRESSION:   Limited study as only one view was ableto be obtained. Stricture in the   distal esophagus. Suggestion of an outpouching of the mid esophagus,   however evaluation is limited on this one view. If required, another exam   can be obtained once the patient is able to tolerate changing position.        < end of copied text >

## 2019-12-10 ENCOUNTER — TRANSCRIPTION ENCOUNTER (OUTPATIENT)
Age: 64
End: 2019-12-10

## 2019-12-10 PROCEDURE — 43235 EGD DIAGNOSTIC BRUSH WASH: CPT | Mod: GC

## 2019-12-10 RX ORDER — DEXTROSE MONOHYDRATE, SODIUM CHLORIDE, AND POTASSIUM CHLORIDE 50; .745; 4.5 G/1000ML; G/1000ML; G/1000ML
1000 INJECTION, SOLUTION INTRAVENOUS
Refills: 0 | Status: DISCONTINUED | OUTPATIENT
Start: 2019-12-10 | End: 2019-12-12

## 2019-12-10 RX ADMIN — Medication 2 MILLIGRAM(S): at 06:47

## 2019-12-10 RX ADMIN — NYSTATIN CREAM 1 APPLICATION(S): 100000 CREAM TOPICAL at 06:47

## 2019-12-10 RX ADMIN — Medication 3 MILLILITER(S): at 11:48

## 2019-12-10 RX ADMIN — CHLORHEXIDINE GLUCONATE 1 APPLICATION(S): 213 SOLUTION TOPICAL at 08:06

## 2019-12-10 RX ADMIN — NYSTATIN CREAM 1 APPLICATION(S): 100000 CREAM TOPICAL at 17:38

## 2019-12-10 RX ADMIN — Medication 3 MILLILITER(S): at 01:16

## 2019-12-10 RX ADMIN — MORPHINE SULFATE 2 MILLIGRAM(S): 50 CAPSULE, EXTENDED RELEASE ORAL at 22:10

## 2019-12-10 RX ADMIN — MORPHINE SULFATE 2 MILLIGRAM(S): 50 CAPSULE, EXTENDED RELEASE ORAL at 08:06

## 2019-12-10 RX ADMIN — MORPHINE SULFATE 2 MILLIGRAM(S): 50 CAPSULE, EXTENDED RELEASE ORAL at 01:17

## 2019-12-10 RX ADMIN — METHADONE HYDROCHLORIDE 17.5 MILLIGRAM(S): 40 TABLET ORAL at 08:32

## 2019-12-10 RX ADMIN — ENOXAPARIN SODIUM 100 MILLIGRAM(S): 100 INJECTION SUBCUTANEOUS at 11:48

## 2019-12-10 RX ADMIN — MORPHINE SULFATE 2 MILLIGRAM(S): 50 CAPSULE, EXTENDED RELEASE ORAL at 01:50

## 2019-12-10 RX ADMIN — MEROPENEM 100 MILLIGRAM(S): 1 INJECTION INTRAVENOUS at 11:48

## 2019-12-10 RX ADMIN — PANTOPRAZOLE SODIUM 40 MILLIGRAM(S): 20 TABLET, DELAYED RELEASE ORAL at 11:49

## 2019-12-10 RX ADMIN — Medication 3 MILLILITER(S): at 06:48

## 2019-12-10 RX ADMIN — MORPHINE SULFATE 2 MILLIGRAM(S): 50 CAPSULE, EXTENDED RELEASE ORAL at 08:02

## 2019-12-10 RX ADMIN — Medication 0.5 MILLIGRAM(S): at 06:48

## 2019-12-10 RX ADMIN — Medication 3 MILLILITER(S): at 17:37

## 2019-12-10 RX ADMIN — Medication 0.5 MILLIGRAM(S): at 17:37

## 2019-12-10 RX ADMIN — MORPHINE SULFATE 2 MILLIGRAM(S): 50 CAPSULE, EXTENDED RELEASE ORAL at 21:33

## 2019-12-10 RX ADMIN — MEROPENEM 100 MILLIGRAM(S): 1 INJECTION INTRAVENOUS at 01:16

## 2019-12-10 NOTE — DISCHARGE NOTE PROVIDER - NSDCCPTREATMENT_GEN_ALL_CORE_FT
PRINCIPAL PROCEDURE  Procedure: Creation, end colostomy  Findings and Treatment: recover from surgery

## 2019-12-10 NOTE — PROGRESS NOTE ADULT - ASSESSMENT
A/P: 64y Female POD#1 s/p ex lap with extended left hemicolectomy left in discontinuity for perforated sigmoid diverticulitis with intraperitoneal free air after presenting to ED with worsening constipation and abdominal pain with distention. AbThera vac placed and pt kept intubated post op and transferred to SICU for post-op management. Extubated 11/30, patient refusing BIPAP. Ross out 12/1. Ross replaced 12/2. UA +yeast, started on fluconazole. For elevated WBC, started on meropenem 12/3. Transferred from SICU to floor 12/4. CT C/A/P 12/4 shows no obvious intrabdominal collection.     Plan:    - EGD today  - CLINTON options given to patient  - c/w doxazosin  - monitor ostomy  - monitor U/O  - monitor vac output  - c/w pain control, c/w methadone   - DVT ppx with lovenox  - c/w sosa,  - appreciate cardiology recs        [facility]  p [phone]

## 2019-12-10 NOTE — DISCHARGE NOTE PROVIDER - NSDCFUADDINST_GEN_ALL_CORE_FT
WOUND CARE:  Please keep incisions clean and dry. Please do not Scrub or rub incisions. Do not use lotion or powder on incisions.   BATHING: Please do not submerge wound underwater. You may shower and/or sponge bathe.  ACTIVITY: No heavy lifting or straining. Otherwise, you may return to your usual level of physical activity. If you are taking narcotic pain medication (such as Percocet) DO NOT drive a car, operate machinery or make important decisions.  DIET: Return to your usual diet.  NOTIFY YOUR SURGEON IF: You have any bleeding that does not stop, any pus draining from your wound(s), any fever (over 100.4 F) or chills, persistent nausea/vomiting, persistent diarrhea, or if your pain is not controlled on your discharge pain medications.  FOLLOW-UP:  Please follow-up with your surgeon,  please call to schedule an appointment.

## 2019-12-10 NOTE — DISCHARGE NOTE PROVIDER - NSDCCPCAREPLAN_GEN_ALL_CORE_FT
PRINCIPAL DISCHARGE DIAGNOSIS  Diagnosis: Perforated sigmoid colon  Assessment and Plan of Treatment: s/p colostomy   WOUND CARE: Staples will be removed at follow up office visit.  Wound VAC changes MWF.  Ostomy care as instructed by nursing staff.    BATHING: Please do not submerge wound underwater. You may shower and/or sponge bathe.  ACTIVITY: No heavy lifting anything more than 10-15lbs or straining. Otherwise, you may return to your usual level of physical activity. If you are taking narcotic pain medication (such as oxycodone or Percocet), do NOT drive a car, operate machinery or make important decisions.  NOTIFY YOUR SURGEON IF: You have any bleeding that does not stop, any pus draining from your wound, any fever (over 100.4 F) or chills, persistent nausea/vomiting with inability to tolerate food or liquids, lack of ostomy output, or if your pain is not controlled on your discharge pain medications.  FOLLOW-UP:  1. Please call to make a follow-up appointment in 1-2 weeks upon discharge from the hospital with Dr. Ng  2. Please follow up with your primary care physician in one week regarding your hospitalization.      SECONDARY DISCHARGE DIAGNOSES  Diagnosis: Diverticulitis  Assessment and Plan of Treatment:

## 2019-12-10 NOTE — PROGRESS NOTE ADULT - ATTENDING COMMENTS
I have seen and evaluated the patient and discussed the relevant clinical findings and plan with the surgical housestaff and fellow.  Agree with the above documentation with addenda as noted.     Esophagram reviewed -- stricture vs hiatal hernia  Plan for EGD today with GI  Cont wound/ostomy care  PT -- mobility still an issue      Ej Ng MD

## 2019-12-10 NOTE — DISCHARGE NOTE PROVIDER - CARE PROVIDERS DIRECT ADDRESSES
[email or web address]

## 2019-12-10 NOTE — DISCHARGE NOTE PROVIDER - PROVIDER TOKENS
PROVIDER:[TOKEN:[[id number]]],PROVIDER:[TOKEN:[[id number]]],PROVIDER:[TOKEN:[[id number]]]

## 2019-12-10 NOTE — PROGRESS NOTE ADULT - SUBJECTIVE AND OBJECTIVE BOX
CARDIOLOGY     PROGRESS  NOTE   ________________________________________________    CHIEF COMPLAINT:Patient is a 64y old  Female who presents with a chief complaint of perforated bowel (08 Dec 2019 09:22)  doing better, no complain.  	  REVIEW OF SYSTEMS:  CONSTITUTIONAL: No fever, weight loss, or fatigue  EYES: No eye pain, visual disturbances, or discharge  ENT:  No difficulty hearing, tinnitus, vertigo; No sinus or throat pain  NECK: No pain or stiffness  RESPIRATORY: No cough, wheezing, chills or hemoptysis; No Shortness of Breath  CARDIOVASCULAR: No chest pain, palpitations, passing out, dizziness, or leg swelling  GASTROINTESTINAL: No abdominal or epigastric pain. No nausea, vomiting, or hematemesis; No diarrhea or constipation. No melena or hematochezia.  GENITOURINARY: No dysuria, frequency, hematuria, or incontinence  NEUROLOGICAL: No headaches, memory loss, loss of strength, numbness, or tremors  SKIN: No itching, burning, rashes, or lesions   LYMPH Nodes: No enlarged glands  ENDOCRINE: No heat or cold intolerance; No hair loss  MUSCULOSKELETAL: No joint pain or swelling; No muscle, back, or extremity pain  PSYCHIATRIC: No depression, anxiety, mood swings, or difficulty sleeping  HEME/LYMPH: No easy bruising, or bleeding gums  ALLERGY AND IMMUNOLOGIC: No hives or eczema	    [ ] All others negative	  [ ] Unable to obtain    PHYSICAL EXAM:  T(C): 36.8 (12-10-19 @ 06:02), Max: 37.1 (12-09-19 @ 16:30)  HR: 78 (12-10-19 @ 06:02) (77 - 85)  BP: 129/73 (12-10-19 @ 06:02) (108/71 - 133/72)  RR: 18 (12-10-19 @ 06:02) (18 - 18)  SpO2: 95% (12-10-19 @ 06:02) (94% - 96%)  Wt(kg): --  I&O's Summary    09 Dec 2019 07:01  -  10 Dec 2019 07:00  --------------------------------------------------------  IN: 870 mL / OUT: 1365 mL / NET: -495 mL        Appearance: Normal	  HEENT:   Normal oral mucosa, PERRL, EOMI	  Lymphatic: No lymphadenopathy  Cardiovascular: Normal S1 S2, No JVD, +murmurs, + lymh edema  Respiratory: Lungs clear to auscultation	  Psychiatry: A & O x 3, Mood & affect appropriate  Gastrointestinal:  Soft, Non-tender, + BS	  Skin: No rashes, No ecchymoses, No cyanosis	  Neurologic: Non-focal  Extremities: Normal range of motion, No clubbing, cyanosis .  Vascular: Peripheral pulses palpable 2+ bilaterally    MEDICATIONS  (STANDING):  albuterol/ipratropium for Nebulization 3 milliLiter(s) Nebulizer every 6 hours  buDESOnide    Inhalation Suspension 0.5 milliGRAM(s) Inhalation every 12 hours  chlorhexidine 4% Liquid 1 Application(s) Topical <User Schedule>  dextrose 5% + sodium chloride 0.45% with potassium chloride 20 mEq/L 1000 milliLiter(s) (125 mL/Hr) IV Continuous <Continuous>  doxazosin 2 milliGRAM(s) Oral daily  enoxaparin Injectable 100 milliGRAM(s) SubCutaneous <User Schedule>  influenza   Vaccine 0.5 milliLiter(s) IntraMuscular once  meropenem  IVPB 1000 milliGRAM(s) IV Intermittent every 8 hours  methadone    Tablet 17.5 milliGRAM(s) Oral daily  nystatin Powder 1 Application(s) Topical two times a day  pantoprazole    Tablet 40 milliGRAM(s) Oral daily      TELEMETRY: 	    ECG:  	  RADIOLOGY:  OTHER: 	  	  LABS:	 	    CARDIAC MARKERS:                                9.0    7.70  )-----------( 408      ( 08 Dec 2019 09:36 )             29.3     12-09    135  |  101  |  4<L>  ----------------------------<  104<H>  4.3   |  27  |  0.50    Ca    8.0<L>      09 Dec 2019 07:19  Phos  1.9     12-09  Mg     2.0     12-09    TPro  5.7<L>  /  Alb  1.9<L>  /  TBili  0.4  /  DBili  x   /  AST  56<H>  /  ALT  44  /  AlkPhos  149<H>  12-09    proBNP:   Lipid Profile:   HgA1c:   TSH:   PT/INR - ( 09 Dec 2019 08:21 )   PT: 13.5 sec;   INR: 1.18 ratio         PTT - ( 09 Dec 2019 08:21 )  PTT:26.7 sec      Assessment and plan  ---------------------------  pt is sitting up comfortably in NAD , no complain.  bp noted  echo noted hyperdynamic lv sec to pain/anxiety, volume depletion  may increase fluid intake  continue dvt prophylaxis  abx  echo noted no  wall motion abnormality  dvt prophylaxis on Lovenox  oob to chair as tolerated  doing much better, physical therapy  continue abx

## 2019-12-10 NOTE — PROGRESS NOTE ADULT - SUBJECTIVE AND OBJECTIVE BOX
Pre-Endoscopy Evaluation      Referring Physician: dr. anabel campos                                  Procedure:  upper gastrointestinal endoscopy     Indication for Procedure: dysphagia    Pertinent History: 64y old female with pmh of  morbid obesity, acid reflux, HTN, COPD and pshx D&C with dysphagia, abnormal esophagram    Sedation by Anesthesia [X]    PAST MEDICAL & SURGICAL HISTORY:  Morbid Obesity  Post Menopausal Bleeding  Polyp, Vagina  Acid Reflux  HTN (Hypertension)  S/P D&C: 2009, w vaginal polypectomy      PMH of Gastroparesis [ ]  Gastric Surgery [ ]  Gastric Outlet Obstruction [ ]    Allergies:    sulfa drugs (Unknown)    Intolerances:    Latex allergy: [ ] yes [X] no    Medications:MEDICATIONS  (STANDING):  albuterol/ipratropium for Nebulization 3 milliLiter(s) Nebulizer every 6 hours  buDESOnide    Inhalation Suspension 0.5 milliGRAM(s) Inhalation every 12 hours  chlorhexidine 4% Liquid 1 Application(s) Topical <User Schedule>  dextrose 5% + sodium chloride 0.45% with potassium chloride 20 mEq/L 1000 milliLiter(s) (125 mL/Hr) IV Continuous <Continuous>  doxazosin 2 milliGRAM(s) Oral daily  enoxaparin Injectable 100 milliGRAM(s) SubCutaneous <User Schedule>  influenza   Vaccine 0.5 milliLiter(s) IntraMuscular once  meropenem  IVPB 1000 milliGRAM(s) IV Intermittent every 8 hours  methadone    Tablet 17.5 milliGRAM(s) Oral daily  nystatin Powder 1 Application(s) Topical two times a day  pantoprazole    Tablet 40 milliGRAM(s) Oral daily    MEDICATIONS  (PRN):  aluminum hydroxide/magnesium hydroxide/simethicone Suspension 30 milliLiter(s) Oral every 4 hours PRN Dyspepsia  morphine  - Injectable 2 milliGRAM(s) IV Push every 4 hours PRN Breakthrough Pain  oxyCODONE    IR 10 milliGRAM(s) Oral every 4 hours PRN Severe Pain (7 - 10)  oxyCODONE    IR 5 milliGRAM(s) Oral every 4 hours PRN Moderate Pain (4 - 6)  sodium chloride 0.9% lock flush 10 milliLiter(s) IV Push every 1 hour PRN Pre/post blood products, medications, blood draw, and to maintain line patency      Smoking: [ ] yes  [X] no    AICD/PPM: [ ] yes   [X] no    Pertinent lab data:    12-09    135  |  101  |  4<L>  ----------------------------<  104<H>  4.3   |  27  |  0.50    Ca    8.0<L>      09 Dec 2019 07:19  Phos  1.9     12-09  Mg     2.0     12-09    TPro  5.7<L>  /  Alb  1.9<L>  /  TBili  0.4  /  DBili  x   /  AST  56<H>  /  ALT  44  /  AlkPhos  149<H>  12-09    PT/INR - ( 09 Dec 2019 08:21 )   PT: 13.5 sec;   INR: 1.18 ratio      PTT - ( 09 Dec 2019 08:21 )  PTT:26.7 sec        < from: Transthoracic Echocardiogram (11.29.19 @ 08:36) >    Fractional short: 38 %  EF (Visual Estimate): 75-80 %  Doppler Peak Velocity (m/sec): AoV=1.8  ------------------------------------------------------------------------  Observations:  Mitral Valve: Mitral valve not well visualized, probably  normal. Minimal mitral regurgitation.  Aortic Valve/Aorta: Aortic valve not well visualized;  probably normal. No aortic valve regurgitation seen. Peak  left ventricular outflow tract gradient equals 13 mm Hg.  Aortic Root: 3.1 cm.  Left Atrium: Normal left atrium.  LA volume index = 31  cc/m2.  Left Ventricle: Endocardium not well visualized; grossly  hyperdynamic left ventricular systolic function. Normal  diastolic function  Right Heart: Right atrium not well visualized. The right  ventricle is not well visualized; grossly normal right  ventricular systolic function. Normal tricuspid valve.  Minimal tricuspid regurgitation. Normal pulmonic valve.  Pericardium/Pleura: Normal pericardium with trace  pericardial effusion.  Hemodynamic: Estimated right ventricular systolic pressure  equals 13 mm Hg, assuming right atrial pressure equals 3 mm  Hg, consistent with normal pulmonary pressures.  ------------------------------------------------------------------------  Conclusions:  1. Mitral valve not well visualized, probably normal.  Minimal mitral regurgitation.  2. Aortic valve not well visualized; probably normal. No  aortic valve regurgitation seen.  3. Endocardium not well visualized; grossly hyperdynamic  left ventricular systolic function.  4. Normal diastolic function  5. The right ventricle is not well visualized; grossly  normal right ventricular systolic function.  6. Normal pericardium with trace pericardial effusion.  *** No previous Echo exam.  ------------------------------------------        Physical Examination:    Daily   Vital Signs Last 24 Hrs  T(C): 36.8 (10 Dec 2019 06:02), Max: 37.1 (09 Dec 2019 16:30)  T(F): 98.3 (10 Dec 2019 06:02), Max: 98.7 (09 Dec 2019 16:30)  HR: 78 (10 Dec 2019 06:02) (77 - 85)  BP: 129/73 (10 Dec 2019 06:02) (108/72 - 133/72)  BP(mean): --  RR: 18 (10 Dec 2019 06:02) (18 - 18)  SpO2: 95% (10 Dec 2019 06:02) (94% - 96%)    Drug Dosing Weight  Height (cm): 167.64 (29 Nov 2019 12:53)  Weight (kg): 168.3 (29 Nov 2019 12:53)  BMI (kg/m2): 59.9 (29 Nov 2019 12:53)  BSA (m2): 2.6 (29 Nov 2019 12:53)    Constitutional: NAD     Neck:  No JVD    Respiratory: CTAB/L    Cardiovascular: S1 and S2    Gastrointestinal: BS+, soft, NT/ND    Extremities: No peripheral edema    Neurological: A/O x 3    : No Ross    Skin: No rashes    Comments:    The patient is a suitable candidate for the planned procedure unless box checked [ ]  No, explain:

## 2019-12-10 NOTE — DISCHARGE NOTE PROVIDER - HOSPITAL COURSE
64-year-old woman with a history of hepatitis C from chronic drug abuse, COPD from smoking and morbid obesity presented to the [facility] emergency room with a abdominal pain.  A CAT scan revealed free intraperitoneal air and a probable perforation in the mid sigmoid colon.  Preparations were made for an emergency operation. She underwent an extended left colectomy, mobilization of splenic flexure, and ABThera vacuum assisted closure (VAC) application. She received 3400 of crystalloid, EBL was 500, she required pushes of phenylephrine during the case, but not continuous. She was kept intubated post op and brought to the ICU for hemodynamic and respiratory management. She went back to the OR and underwent an washout and colostomy with abdominal closure.   Pt remained intubated post-operatively for hypoxemic respiratory failure with RUL and LLL infiltrates; combicath sent overnight and PEEP increased to 10 (for pO2 59 on 40% FIO2)  IV methadone started.  She was extubated on 12/1 and refused Bipap.  She was then given albumin 500cc x2 given for hypotension. Her ostomy was still not functioning.  NGT taken out. She became hypotensive after initiating Cardura, which was then stopped    Ross replaced after multiple straight caths.  Yeast was seen and she was started on fluconazole. On 12/4 Meropenem started for increased WBC, etiology unclear, WBC continued to trend up.  Diet was advanced to clears. She was transferred to the floor and underwent a CT abdomen/pelvis which was negative for collection.  She began to have ostomy function and wound VAC changes were done MWF with extensive debridement of wound.  Doxazosin was restarted.  Patient complained of difficulty swallowing and underwent an esophogram which revealed a stricture. GI was consulted and she underwent an EGD which showed two esophogeal diverticulum and gastritis.  She was continued on a PPI and recommended to eat small frequent meals. 64-year-old woman with a history of hepatitis C from chronic drug abuse, COPD from smoking and morbid obesity presented to the [facility] emergency room with a abdominal pain.  A CAT scan revealed free intraperitoneal air and a probable perforation in the mid sigmoid colon.  Preparations were made for an emergency operation. She underwent an extended left colectomy, mobilization of splenic flexure, and ABThera vacuum assisted closure (VAC) application. She received 3400 of crystalloid, EBL was 500, she required pushes of phenylephrine during the case, but not continuous. She was kept intubated post op and brought to the ICU for hemodynamic and respiratory management. She went back to the OR and underwent an washout and colostomy with abdominal closure.   Pt remained intubated post-operatively for hypoxemic respiratory failure with RUL and LLL infiltrates; combicath sent overnight and PEEP increased to 10 (for pO2 59 on 40% FIO2)  IV methadone started.  She was extubated on 12/1 and refused Bipap.  She was then given albumin 500cc x2 given for hypotension. Her ostomy was still not functioning.  NGT taken out. She became hypotensive after initiating Cardura, which was then stopped    Marquez replaced after multiple straight caths.  Yeast was seen and she was started on fluconazole. On 12/4 Meropenem started for increased WBC, etiology unclear, WBC continued to trend up.  Diet was advanced to clears. She was transferred to the floor and underwent a CT abdomen/pelvis which was negative for collection.  She began to have ostomy function and wound VAC changes were done MWF with extensive debridement of wound.  Doxazosin was restarted.  Patient complained of difficulty swallowing and underwent an esophogram which revealed a stricture. GI was consulted and she underwent an EGD which showed two esophogeal diverticulum and gastritis.  She was continued on a PPI and recommended to eat small frequent meals.      Patient was transferred from SICU to the floor 12/4. At this point patient was tolerating a diet with ostomy function. A marquez remained in place. She was on IV fluconazole and meropenam for elevated WBC and positve UC. CT at the time showed no obvious intraabdominal collection.  On 12/9 fluconazole was removed, but meropenam continued. Marquez was removed and the patient was given [facility] options. Pt was complaining of dysphagia, EGD performed revealed mid and lower esophageal diverticulum and medium sized hiatal hernia 12/10.    Pt also found to have stenosis of the proximal subclavian artery as well as flow reversal in the ipsilateral vertebral.  However she is clinically asymptomatic, and vascular surgery recommended conservative therapy with ASA 81 and statin goal <100.        Patient has been stable and ready for discharge pending approval. PT continued to work with the patient. She is now in good condition for rehab and recommended for [facility] based on need for continued PT.

## 2019-12-10 NOTE — DISCHARGE NOTE PROVIDER - CARE PROVIDER_API CALL
Ej Ng)  Surgery  [address], [address]  [place], [place] [id number]  Phone: [phone]  Fax: [phone]  Follow Up Time:     Gisselle Jc)  Gastroenterology  [address], [address]  [place] [address]  Phone: [phone]  Fax: [phone]  Follow Up Time:     Toño Miner (DO)  Cardiovascular Disease  [address] [place] [address], [address]  [place] [address]  Phone: [phone]  Fax: [phone]  Follow Up Time:

## 2019-12-10 NOTE — PROGRESS NOTE ADULT - SUBJECTIVE AND OBJECTIVE BOX
GENERAL SURGERY PROGRESS NOTE    SUBJECTIVE  Patient seen and examined. Had abnormal esophagram but limited study, NPO for EGD today, no vomiting, +/+ ostomy with some more formed stool, voiding appropriately with primafit. Denies fever, chills, SOB, chest pain.         OBJECTIVE    PHYSICAL EXAM  General: Appears well, NAD  CHEST: breathing comfortably  CV: appears well perfused  Abdomen: soft, nontender, nondistended, vac in place, ostomy +/+ with some more formed stool  Extremities: lymphedema            Vital Signs:  Vital Signs Last 24 Hrs  T(C): 36.8 (10 Dec 2019 00:34), Max: 37.6 (09 Dec 2019 06:26)  T(F): 98.3 (10 Dec 2019 00:34), Max: 99.6 (09 Dec 2019 06:26)  HR: 81 (10 Dec 2019 00:34) (77 - 87)  BP: 124/70 (10 Dec 2019 00:34) (95/64 - 133/72)  BP(mean): --  RR: 18 (10 Dec 2019 00:34) (18 - 18)  SpO2: 96% (10 Dec 2019 00:34) (94% - 96%)    CAPILLARY BLOOD GLUCOSE          I&O's Detail    08 Dec 2019 07:01  -  09 Dec 2019 07:00  --------------------------------------------------------  IN:    dextrose 5% + sodium chloride 0.45% with potassium chloride 20 mEq/L: 180 mL    Oral Fluid: 660 mL    Solution: 200 mL  Total IN: 1040 mL    OUT:    Bulb: 75 mL    Ileostomy: 25 mL    Voided: 950 mL  Total OUT: 1050 mL    Total NET: -10 mL      09 Dec 2019 07:01  -  10 Dec 2019 04:22  --------------------------------------------------------  IN:    Oral Fluid: 720 mL    Solution: 100 mL  Total IN: 820 mL    OUT:    Bulb: 40 mL    Ileostomy: 50 mL    VAC (Vacuum Assisted Closure) System: 225 mL    Voided: 750 mL  Total OUT: 1065 mL    Total NET: -245 mL          MEDICATIONS  (STANDING):  albuterol/ipratropium for Nebulization 3 milliLiter(s) Nebulizer every 6 hours  buDESOnide    Inhalation Suspension 0.5 milliGRAM(s) Inhalation every 12 hours  chlorhexidine 4% Liquid 1 Application(s) Topical <User Schedule>  doxazosin 2 milliGRAM(s) Oral daily  enoxaparin Injectable 100 milliGRAM(s) SubCutaneous <User Schedule>  influenza   Vaccine 0.5 milliLiter(s) IntraMuscular once  meropenem  IVPB 1000 milliGRAM(s) IV Intermittent every 8 hours  methadone    Tablet 17.5 milliGRAM(s) Oral daily  nystatin Powder 1 Application(s) Topical two times a day  pantoprazole    Tablet 40 milliGRAM(s) Oral daily    MEDICATIONS  (PRN):  aluminum hydroxide/magnesium hydroxide/simethicone Suspension 30 milliLiter(s) Oral every 4 hours PRN Dyspepsia  morphine  - Injectable 2 milliGRAM(s) IV Push every 4 hours PRN Breakthrough Pain  oxyCODONE    IR 10 milliGRAM(s) Oral every 4 hours PRN Severe Pain (7 - 10)  oxyCODONE    IR 5 milliGRAM(s) Oral every 4 hours PRN Moderate Pain (4 - 6)  sodium chloride 0.9% lock flush 10 milliLiter(s) IV Push every 1 hour PRN Pre/post blood products, medications, blood draw, and to maintain line patency          Labs:    12-09    135  |  101  |  4<L>  ----------------------------<  104<H>  4.3   |  27  |  0.50    Ca    8.0<L>      09 Dec 2019 07:19  Phos  1.9     12-09  Mg     2.0     12-09    TPro  5.7<L>  /  Alb  1.9<L>  /  TBili  0.4  /  DBili  x   /  AST  56<H>  /  ALT  44  /  AlkPhos  149<H>  12-09    LIVER FUNCTIONS - ( 09 Dec 2019 07:19 )  Alb: 1.9 g/dL / Pro: 5.7 g/dL / ALK PHOS: 149 U/L / ALT: 44 U/L / AST: 56 U/L / GGT: x                                 9.0    7.70  )-----------( 408      ( 08 Dec 2019 09:36 )             29.3     PT/INR - ( 09 Dec 2019 08:21 )   PT: 13.5 sec;   INR: 1.18 ratio         PTT - ( 09 Dec 2019 08:21 )  PTT:26.7 sec    Imaging:

## 2019-12-10 NOTE — DISCHARGE NOTE PROVIDER - NSDCMRMEDTOKEN_GEN_ALL_CORE_FT
amLODIPine 5 mg oral tablet: 1 tab(s) orally once a day  Breo Ellipta 200 mcg-25 mcg/inh inhalation powder: 1 puff(s) inhaled once a day  doxazosin 2 mg oral tablet: 1 tab(s) orally once a day  metoprolol succinate 100 mg oral tablet, extended release: 1 tab(s) orally once a day  NexIUM 40 mg oral delayed release capsule: 1 cap(s) orally once a day, As Needed acetaminophen 325 mg oral tablet: 2 tab(s) orally every 6 hours, As needed, Mild Pain (1 - 3)  amLODIPine 5 mg oral tablet: 1 tab(s) orally once a day  aspirin 81 mg oral tablet, chewable: 1 tab(s) orally once a day  atorvastatin 40 mg oral tablet: 1 tab(s) orally once a day (at bedtime)  Breo Ellipta 200 mcg-25 mcg/inh inhalation powder: 1 puff(s) inhaled once a day  doxazosin 2 mg oral tablet: 1 tab(s) orally once a day  metoprolol succinate 100 mg oral tablet, extended release: 1 tab(s) orally once a day  NexIUM 40 mg oral delayed release capsule: 1 cap(s) orally once a day, As Needed

## 2019-12-11 PROCEDURE — 74018 RADEX ABDOMEN 1 VIEW: CPT | Mod: 26

## 2019-12-11 PROCEDURE — 99232 SBSQ HOSP IP/OBS MODERATE 35: CPT | Mod: GC

## 2019-12-11 PROCEDURE — 99223 1ST HOSP IP/OBS HIGH 75: CPT

## 2019-12-11 PROCEDURE — 93931 UPPER EXTREMITY STUDY: CPT | Mod: 26

## 2019-12-11 RX ORDER — ONDANSETRON 8 MG/1
4 TABLET, FILM COATED ORAL ONCE
Refills: 0 | Status: COMPLETED | OUTPATIENT
Start: 2019-12-11 | End: 2019-12-11

## 2019-12-11 RX ORDER — ERYTHROMYCIN ETHYLSUCCINATE 400 MG
250 TABLET ORAL
Refills: 0 | Status: DISCONTINUED | OUTPATIENT
Start: 2019-12-11 | End: 2019-12-11

## 2019-12-11 RX ORDER — ERYTHROMYCIN ETHYLSUCCINATE 400 MG
250 TABLET ORAL
Refills: 0 | Status: DISCONTINUED | OUTPATIENT
Start: 2019-12-11 | End: 2019-12-29

## 2019-12-11 RX ADMIN — MORPHINE SULFATE 2 MILLIGRAM(S): 50 CAPSULE, EXTENDED RELEASE ORAL at 05:32

## 2019-12-11 RX ADMIN — CHLORHEXIDINE GLUCONATE 1 APPLICATION(S): 213 SOLUTION TOPICAL at 11:19

## 2019-12-11 RX ADMIN — Medication 3 MILLILITER(S): at 05:31

## 2019-12-11 RX ADMIN — ONDANSETRON 4 MILLIGRAM(S): 8 TABLET, FILM COATED ORAL at 14:36

## 2019-12-11 RX ADMIN — Medication 0.5 MILLIGRAM(S): at 05:31

## 2019-12-11 RX ADMIN — DEXTROSE MONOHYDRATE, SODIUM CHLORIDE, AND POTASSIUM CHLORIDE 100 MILLILITER(S): 50; .745; 4.5 INJECTION, SOLUTION INTRAVENOUS at 18:32

## 2019-12-11 RX ADMIN — METHADONE HYDROCHLORIDE 17.5 MILLIGRAM(S): 40 TABLET ORAL at 11:16

## 2019-12-11 RX ADMIN — NYSTATIN CREAM 1 APPLICATION(S): 100000 CREAM TOPICAL at 05:31

## 2019-12-11 RX ADMIN — ENOXAPARIN SODIUM 100 MILLIGRAM(S): 100 INJECTION SUBCUTANEOUS at 11:17

## 2019-12-11 RX ADMIN — PANTOPRAZOLE SODIUM 40 MILLIGRAM(S): 20 TABLET, DELAYED RELEASE ORAL at 11:19

## 2019-12-11 RX ADMIN — Medication 2 MILLIGRAM(S): at 05:32

## 2019-12-11 RX ADMIN — MORPHINE SULFATE 2 MILLIGRAM(S): 50 CAPSULE, EXTENDED RELEASE ORAL at 06:10

## 2019-12-11 RX ADMIN — Medication 3 MILLILITER(S): at 01:11

## 2019-12-11 RX ADMIN — Medication 3 MILLILITER(S): at 11:17

## 2019-12-11 RX ADMIN — NYSTATIN CREAM 1 APPLICATION(S): 100000 CREAM TOPICAL at 17:31

## 2019-12-11 NOTE — PROGRESS NOTE ADULT - SUBJECTIVE AND OBJECTIVE BOX
CARDIOLOGY     PROGRESS  NOTE   ________________________________________________    CHIEF COMPLAINT:Patient is a 64y old  Female who presents with a chief complaint of perforated bowel (08 Dec 2019 09:22)  doing better.  	  REVIEW OF SYSTEMS:  CONSTITUTIONAL: No fever, weight loss, or fatigue  EYES: No eye pain, visual disturbances, or discharge  ENT:  No difficulty hearing, tinnitus, vertigo; No sinus or throat pain  NECK: No pain or stiffness  RESPIRATORY: No cough, wheezing, chills or hemoptysis; No Shortness of Breath  CARDIOVASCULAR: No chest pain, palpitations, passing out, dizziness, or leg swelling  GASTROINTESTINAL: No abdominal or epigastric pain. + nausea,no  vomiting, or hematemesis; No diarrhea or constipation. No melena or hematochezia.  GENITOURINARY: No dysuria, frequency, hematuria, or incontinence  NEUROLOGICAL: No headaches, memory loss, loss of strength, numbness, or tremors  SKIN: No itching, burning, rashes, or lesions   LYMPH Nodes: No enlarged glands  ENDOCRINE: No heat or cold intolerance; No hair loss  MUSCULOSKELETAL: No joint pain or swelling; No muscle, back, or extremity pain  PSYCHIATRIC: No depression, anxiety, mood swings, or difficulty sleeping  HEME/LYMPH: No easy bruising, or bleeding gums  ALLERGY AND IMMUNOLOGIC: No hives or eczema	    [ ] All others negative	  [ ] Unable to obtain    PHYSICAL EXAM:  T(C): 36.8 (12-11-19 @ 04:48), Max: 37.3 (12-10-19 @ 13:20)  HR: 75 (12-11-19 @ 04:48) (75 - 86)  BP: 124/60 (12-11-19 @ 04:48) (103/72 - 134/74)  RR: 18 (12-11-19 @ 04:48) (18 - 18)  SpO2: 93% (12-11-19 @ 04:48) (93% - 95%)  Wt(kg): --  I&O's Summary    10 Dec 2019 07:01  -  11 Dec 2019 07:00  --------------------------------------------------------  IN: 2890 mL / OUT: 1970 mL / NET: 920 mL        Appearance: Normal	  HEENT:   Normal oral mucosa, PERRL, EOMI	  Lymphatic: No lymphadenopathy  Cardiovascular: Normal S1 S2, No JVD, + murmurs, No edema  Respiratory: Lungs clear to auscultation	  Psychiatry: A & O x 3, Mood & affect appropriate  Gastrointestinal:  Soft, Non-tender, + BS	  Skin: No rashes, No ecchymoses, No cyanosis	  Neurologic: Non-focal  Extremities: Normal range of motion, No clubbing, cyanosis or edema  Vascular: Peripheral pulses palpable 2+ bilaterally    MEDICATIONS  (STANDING):  albuterol/ipratropium for Nebulization 3 milliLiter(s) Nebulizer every 6 hours  buDESOnide    Inhalation Suspension 0.5 milliGRAM(s) Inhalation every 12 hours  chlorhexidine 4% Liquid 1 Application(s) Topical <User Schedule>  dextrose 5% + sodium chloride 0.45% with potassium chloride 20 mEq/L 1000 milliLiter(s) (100 mL/Hr) IV Continuous <Continuous>  doxazosin 2 milliGRAM(s) Oral daily  enoxaparin Injectable 100 milliGRAM(s) SubCutaneous <User Schedule>  influenza   Vaccine 0.5 milliLiter(s) IntraMuscular once  methadone    Tablet 17.5 milliGRAM(s) Oral daily  nystatin Powder 1 Application(s) Topical two times a day  pantoprazole    Tablet 40 milliGRAM(s) Oral daily      TELEMETRY: 	    ECG:  	  RADIOLOGY:  OTHER: 	  	  LABS:	 	    CARDIAC MARKERS:    < from: Upper Endoscopy (12.10.19 @ 09:29) >     - Diverticulum in the middle third of the esophagus.                       - Diverticulum in the lower third of the esophagus.                       - LA Grade A reflux esophagitis.                       - Medium-sized hiatus hernia.                       - Gastritis.                       - A large amount of food (residue) in the stomach.                       - Normal examined duodenum.                       - No specimens collected.  Recommendation:      - Return patient to [facility] for [facility] care.                       - Advance diet as tolerated.                       - Use Prilosec (omeprazole) 40 mg PO daily.                       - Frequent small meals.                       - Avoid opioids if possible.                          < end of copied text >                proBNP:   Lipid Profile:   HgA1c:   TSH:   PT/INR - ( 09 Dec 2019 08:21 )   PT: 13.5 sec;   INR: 1.18 ratio         PTT - ( 09 Dec 2019 08:21 )  PTT:26.7 sec      Assessment and plan  ---------------------------  pt is sitting up comfortably in NAD , no complain.  bp noted  echo noted hyperdynamic lv sec to pain/anxiety, volume depletion  may increase fluid intake  continue dvt prophylaxis  abx dc d  echo noted no  wall motion abnormality  dvt prophylaxis on Lovenox  oob to chair as tolerated  doing much better, physical therapy  continue abx  s/p upper endoscopy result noted  continue protonix

## 2019-12-11 NOTE — PROGRESS NOTE ADULT - ATTENDING COMMENTS
I have seen and evaluated the patient and discussed the relevant clinical findings and plan with the surgical housestaff and fellow.  Agree with the above documentation with addenda as noted.     EGD results reviewed.  Will start PPI and encourage small meals.  Ostomy functional but with hard stool; will plan for ostomy irrigation  Wounds stable  Abdomen soft  Dispo planning    Ej Ng MD

## 2019-12-11 NOTE — CONSULT NOTE ADULT - ASSESSMENT
65 yo woman with a hx of morbid obesity, COPD, GERD, HTN, admitted 11/26 with perforated diverticulitis s/p ex-lap, extended left hemicolectomy and end transverse colostomy, with asymptomatic right subclavian steal syndrome.    full note pending attending discussion    Vascular surgery  [id number] 65 yo woman with a hx of morbid obesity, COPD, GERD, HTN, admitted 11/26 with perforated diverticulitis s/p ex-lap, extended left hemicolectomy and end transverse colostomy, with asymptomatic right subclavian steal syndrome.    - no vascular intervention indicated  - care per primary team  - please page surgery with questions    Patient discussed with Dr. Sumner    Vascular surgery  [id number]

## 2019-12-11 NOTE — CONSULT NOTE ADULT - SUBJECTIVE AND OBJECTIVE BOX
Consulting surgical team: Vascular [id number]  Consulting attending: Dr. Jimenez    HPI:  65 yo woman with a hx of morbid obesity, COPD, GERD, HTN, admitted 11/26 with perforated diverticulitis s/p ex-lap, extended left hemicolectomy and end transverse colostomy, noted to have significant BP discrepancy between upper extremities. She denied any right arm pain, fatigue with use, weakness, numbness.      PAST MEDICAL HISTORY:  Morbid Obesity  Post Menopausal Bleeding  Polyp, Vagina  Acute Bronchitis  Acid Reflux  HTN (Hypertension)      PAST SURGICAL HISTORY:  S/P D&C  no surgical hx      MEDICATIONS:  albuterol/ipratropium for Nebulization 3 milliLiter(s) Nebulizer every 6 hours  aluminum hydroxide/magnesium hydroxide/simethicone Suspension 30 milliLiter(s) Oral every 4 hours PRN  buDESOnide    Inhalation Suspension 0.5 milliGRAM(s) Inhalation every 12 hours  chlorhexidine 4% Liquid 1 Application(s) Topical <User Schedule>  dextrose 5% + sodium chloride 0.45% with potassium chloride 20 mEq/L 1000 milliLiter(s) IV Continuous <Continuous>  doxazosin 2 milliGRAM(s) Oral daily  enoxaparin Injectable 100 milliGRAM(s) SubCutaneous <User Schedule>  influenza   Vaccine 0.5 milliLiter(s) IntraMuscular once  methadone    Tablet 17.5 milliGRAM(s) Oral daily  morphine  - Injectable 2 milliGRAM(s) IV Push every 4 hours PRN  nystatin Powder 1 Application(s) Topical two times a day  ondansetron Injectable 4 milliGRAM(s) IV Push once  oxyCODONE    IR 10 milliGRAM(s) Oral every 4 hours PRN  pantoprazole    Tablet 40 milliGRAM(s) Oral daily  sodium chloride 0.9% lock flush 10 milliLiter(s) IV Push every 1 hour PRN      ALLERGIES:  sulfa drugs (Unknown)      VITALS & I/Os:  Vital Signs Last 24 Hrs  T(C): 37.1 (11 Dec 2019 14:15), Max: 37.2 (10 Dec 2019 21:27)  T(F): 98.7 (11 Dec 2019 14:15), Max: 98.9 (10 Dec 2019 21:27)  HR: 87 (11 Dec 2019 14:15) (75 - 87)  BP: 120/69 (11 Dec 2019 14:15) (100/66 - 134/74)  BP(mean): --  RR: 20 (11 Dec 2019 14:15) (18 - 20)  SpO2: 93% (11 Dec 2019 14:15) (93% - 95%)    I&O's Summary    10 Dec 2019 07:01  -  11 Dec 2019 07:00  --------------------------------------------------------  IN: 2890 mL / OUT: 1970 mL / NET: 920 mL    11 Dec 2019 07:01  -  11 Dec 2019 14:29  --------------------------------------------------------  IN: 240 mL / OUT: 1305 mL / NET: -1065 mL      PHYSICAL EXAM:  GEN: NAD, resting quietly  PULM: symmetric chest rise bilaterally, no increased WOB  CV: regular rate  ABD: soft, NTND  EXTR: LUE brachial pulse, no radial pulse; RUE no brachial or radial pulse; strength and sensation intact in all extremities; improving edema in all extremities    LABS:  no recent labs        IMAGING:  < from: VA Duplex Upper Extrem Arterial Limited, Right (12.11.19 @ 10:54) >  Impression: 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    < end of copied text >      < from: VA Duplex Upper Ext Vein Scan, Right (12.06.19 @ 10:01) >  IMPRESSION:     No evidence of right upper extremity deep venous thrombosis.    < end of copied text >

## 2019-12-11 NOTE — CHART NOTE - NSCHARTNOTEFT_GEN_A_CORE
Patient will not be receiving chemo or radiation therapy at rehab      [facility]   [phone]

## 2019-12-11 NOTE — PROGRESS NOTE ADULT - SUBJECTIVE AND OBJECTIVE BOX
Interval Events:   stable   No vomiting   Tolerating PO diet    Hospital Medications:  albuterol/ipratropium for Nebulization 3 milliLiter(s) Nebulizer every 6 hours  aluminum hydroxide/magnesium hydroxide/simethicone Suspension 30 milliLiter(s) Oral every 4 hours PRN  buDESOnide    Inhalation Suspension 0.5 milliGRAM(s) Inhalation every 12 hours  chlorhexidine 4% Liquid 1 Application(s) Topical <User Schedule>  dextrose 5% + sodium chloride 0.45% with potassium chloride 20 mEq/L 1000 milliLiter(s) IV Continuous <Continuous>  doxazosin 2 milliGRAM(s) Oral daily  enoxaparin Injectable 100 milliGRAM(s) SubCutaneous <User Schedule>  influenza   Vaccine 0.5 milliLiter(s) IntraMuscular once  methadone    Tablet 17.5 milliGRAM(s) Oral daily  morphine  - Injectable 2 milliGRAM(s) IV Push every 4 hours PRN  nystatin Powder 1 Application(s) Topical two times a day  oxyCODONE    IR 10 milliGRAM(s) Oral every 4 hours PRN  pantoprazole    Tablet 40 milliGRAM(s) Oral daily  sodium chloride 0.9% lock flush 10 milliLiter(s) IV Push every 1 hour PRN        ROS:   General:  No fevers, chills or night sweats  ENT:  No sore throat or dysphagia  CV:  No pain or palpitations  Resp:  No dyspnea, cough or  wheezing  GI:  as above  Skin:  No rash or edema  Neuro: no weakness   Hematologic: no bleeding  Musculoskeletal: no muscle pain or join pain  Psych: no agitation      PHYSICAL EXAM:   Vital Signs:  Vital Signs Last 24 Hrs  T(C): 36.8 (11 Dec 2019 04:48), Max: 37.3 (10 Dec 2019 13:20)  T(F): 98.3 (11 Dec 2019 04:48), Max: 99.2 (10 Dec 2019 13:20)  HR: 75 (11 Dec 2019 04:48) (75 - 86)  BP: 124/60 (11 Dec 2019 04:48) (103/72 - 134/74)  BP(mean): --  RR: 18 (11 Dec 2019 04:48) (18 - 18)  SpO2: 93% (11 Dec 2019 04:48) (93% - 95%)  Daily     Daily     PHYSICAL EXAM  General: Appears well, NAD  CHEST: breathing comfortably  CV: appears well perfused  Abdomen: soft, nontender, nondistended, vac in place, ostomy +/+ with some more formed stool  Extremities: lymphedema    LABS:              PT/INR - ( 09 Dec 2019 08:21 )   PT: 13.5 sec;   INR: 1.18 ratio         PTT - ( 09 Dec 2019 08:21 )  PTT:26.7 sec                            9.0    7.70  )-----------( 408      ( 08 Dec 2019 09:36 )             29.3       Imaging:

## 2019-12-11 NOTE — PROGRESS NOTE ADULT - ASSESSMENT
Impression:  # Esophogeal Diverticulum   # LA Grade A reflux esophagitis.  #  Medium-sized hiatus hernia.  # Gastritis.    Recommendation:     .  - Advance diet as tolerated.  - Use Prilosec (omeprazole) 40 mg PO daily.  - Frequent small meals.  - Avoid opioids if possible.  - Call us back with questions

## 2019-12-11 NOTE — PROGRESS NOTE ADULT - SUBJECTIVE AND OBJECTIVE BOX
GENERAL SURGERY PROGRESS NOTE    SUBJECTIVE  Patient seen and examined. EGD yesterday revealed diverticula and medium sized hiatal hernia.  No vomiting, +/+ ostomy with some more formed stool, voiding appropriately with primafit. Denies fever, chills, SOB, chest pain.         OBJECTIVE    PHYSICAL EXAM  General: Appears well, NAD  CHEST: breathing comfortably  CV: appears well perfused  Abdomen: soft, nontender, nondistended, vac in place, ostomy +/+ with some more formed stool  Extremities: lymphedema            Vital Signs:  Vital Signs Last 24 Hrs  T(C): 36.8 (10 Dec 2019 00:34), Max: 37.6 (09 Dec 2019 06:26)  T(F): 98.3 (10 Dec 2019 00:34), Max: 99.6 (09 Dec 2019 06:26)  HR: 81 (10 Dec 2019 00:34) (77 - 87)  BP: 124/70 (10 Dec 2019 00:34) (95/64 - 133/72)  BP(mean): --  RR: 18 (10 Dec 2019 00:34) (18 - 18)  SpO2: 96% (10 Dec 2019 00:34) (94% - 96%)    CAPILLARY BLOOD GLUCOSE          I&O's Detail    08 Dec 2019 07:01  -  09 Dec 2019 07:00  --------------------------------------------------------  IN:    dextrose 5% + sodium chloride 0.45% with potassium chloride 20 mEq/L: 180 mL    Oral Fluid: 660 mL    Solution: 200 mL  Total IN: 1040 mL    OUT:    Bulb: 75 mL    Ileostomy: 25 mL    Voided: 950 mL  Total OUT: 1050 mL    Total NET: -10 mL      09 Dec 2019 07:01  -  10 Dec 2019 04:22  --------------------------------------------------------  IN:    Oral Fluid: 720 mL    Solution: 100 mL  Total IN: 820 mL    OUT:    Bulb: 40 mL    Ileostomy: 50 mL    VAC (Vacuum Assisted Closure) System: 225 mL    Voided: 750 mL  Total OUT: 1065 mL    Total NET: -245 mL          MEDICATIONS  (STANDING):  albuterol/ipratropium for Nebulization 3 milliLiter(s) Nebulizer every 6 hours  buDESOnide    Inhalation Suspension 0.5 milliGRAM(s) Inhalation every 12 hours  chlorhexidine 4% Liquid 1 Application(s) Topical <User Schedule>  doxazosin 2 milliGRAM(s) Oral daily  enoxaparin Injectable 100 milliGRAM(s) SubCutaneous <User Schedule>  influenza   Vaccine 0.5 milliLiter(s) IntraMuscular once  meropenem  IVPB 1000 milliGRAM(s) IV Intermittent every 8 hours  methadone    Tablet 17.5 milliGRAM(s) Oral daily  nystatin Powder 1 Application(s) Topical two times a day  pantoprazole    Tablet 40 milliGRAM(s) Oral daily    MEDICATIONS  (PRN):  aluminum hydroxide/magnesium hydroxide/simethicone Suspension 30 milliLiter(s) Oral every 4 hours PRN Dyspepsia  morphine  - Injectable 2 milliGRAM(s) IV Push every 4 hours PRN Breakthrough Pain  oxyCODONE    IR 10 milliGRAM(s) Oral every 4 hours PRN Severe Pain (7 - 10)  oxyCODONE    IR 5 milliGRAM(s) Oral every 4 hours PRN Moderate Pain (4 - 6)  sodium chloride 0.9% lock flush 10 milliLiter(s) IV Push every 1 hour PRN Pre/post blood products, medications, blood draw, and to maintain line patency          Labs:    12-09    135  |  101  |  4<L>  ----------------------------<  104<H>  4.3   |  27  |  0.50    Ca    8.0<L>      09 Dec 2019 07:19  Phos  1.9     12-09  Mg     2.0     12-09    TPro  5.7<L>  /  Alb  1.9<L>  /  TBili  0.4  /  DBili  x   /  AST  56<H>  /  ALT  44  /  AlkPhos  149<H>  12-09    LIVER FUNCTIONS - ( 09 Dec 2019 07:19 )  Alb: 1.9 g/dL / Pro: 5.7 g/dL / ALK PHOS: 149 U/L / ALT: 44 U/L / AST: 56 U/L / GGT: x                                 9.0    7.70  )-----------( 408      ( 08 Dec 2019 09:36 )             29.3     PT/INR - ( 09 Dec 2019 08:21 )   PT: 13.5 sec;   INR: 1.18 ratio         PTT - ( 09 Dec 2019 08:21 )  PTT:26.7 sec    Imaging:

## 2019-12-11 NOTE — PROGRESS NOTE ADULT - ASSESSMENT
A/P: 64y Female POD#1 s/p ex lap with extended left hemicolectomy left in discontinuity for perforated sigmoid diverticulitis with intraperitoneal free air after presenting to ED with worsening constipation and abdominal pain with distention. AbThera vac placed and pt kept intubated post op and transferred to SICU for post-op management. Extubated 11/30, patient refusing BIPAP. Ross out 12/1. Ross replaced 12/2. UA +yeast, started on fluconazole. For elevated WBC, started on meropenem 12/3. Transferred from SICU to floor 12/4. CT C/A/P 12/4 shows no obvious intrabdominal collection. Pt was complaining of dysphagia, EGD performed revealed mid and lower esophageal diverticulum and medium sized hiatal hernia 12/10.    Plan:    - CLD  - CLINTON options given to patient  - c/w doxazosin  - monitor ostomy  - monitor U/O  - monitor vac output  - c/w pain control, c/w methadone   - DVT ppx with lovenox  - c/w sosa,  - appreciate cardiology recs        [facility]  p [phone]

## 2019-12-11 NOTE — CONSULT NOTE ADULT - ATTENDING COMMENTS
Pt with stenosis of the proximal subclavian artery as well as flow reversal in the ipsilateral vertebral.  However she is clinically asymptomatic, no dizziness with arm exertion.  No arm claudication, no signs of distal embolism.    Rec conservative management, asa 81, statin goal <100 assuming no contraindications.  Would not recommend revascularization in this setting, unless symptoms developed.

## 2019-12-12 LAB
ANION GAP SERPL CALC-SCNC: 8 MMOL/L — SIGNIFICANT CHANGE UP (ref 5–17)
BUN SERPL-MCNC: 6 MG/DL — LOW (ref 7–23)
CALCIUM SERPL-MCNC: 8.4 MG/DL — SIGNIFICANT CHANGE UP (ref 8.4–10.5)
CHLORIDE SERPL-SCNC: 95 MMOL/L — LOW (ref 96–108)
CO2 SERPL-SCNC: 28 MMOL/L — SIGNIFICANT CHANGE UP (ref 22–31)
CREAT SERPL-MCNC: 0.49 MG/DL — LOW (ref 0.5–1.3)
GLUCOSE SERPL-MCNC: 120 MG/DL — HIGH (ref 70–99)
HCT VFR BLD CALC: 29.8 % — LOW (ref 34.5–45)
HGB BLD-MCNC: 9.2 G/DL — LOW (ref 11.5–15.5)
MAGNESIUM SERPL-MCNC: 1.8 MG/DL — SIGNIFICANT CHANGE UP (ref 1.6–2.6)
MCHC RBC-ENTMCNC: 28.7 PG — SIGNIFICANT CHANGE UP (ref 27–34)
MCHC RBC-ENTMCNC: 30.9 GM/DL — LOW (ref 32–36)
MCV RBC AUTO: 92.8 FL — SIGNIFICANT CHANGE UP (ref 80–100)
PHOSPHATE SERPL-MCNC: 2.6 MG/DL — SIGNIFICANT CHANGE UP (ref 2.5–4.5)
PLATELET # BLD AUTO: 271 K/UL — SIGNIFICANT CHANGE UP (ref 150–400)
POTASSIUM SERPL-MCNC: 4.1 MMOL/L — SIGNIFICANT CHANGE UP (ref 3.5–5.3)
POTASSIUM SERPL-SCNC: 4.1 MMOL/L — SIGNIFICANT CHANGE UP (ref 3.5–5.3)
RBC # BLD: 3.21 M/UL — LOW (ref 3.8–5.2)
RBC # FLD: 14.7 % — HIGH (ref 10.3–14.5)
SODIUM SERPL-SCNC: 131 MMOL/L — LOW (ref 135–145)
WBC # BLD: 3.55 K/UL — LOW (ref 3.8–10.5)
WBC # FLD AUTO: 3.55 K/UL — LOW (ref 3.8–10.5)

## 2019-12-12 RX ORDER — DEXTROSE MONOHYDRATE, SODIUM CHLORIDE, AND POTASSIUM CHLORIDE 50; .745; 4.5 G/1000ML; G/1000ML; G/1000ML
1000 INJECTION, SOLUTION INTRAVENOUS
Refills: 0 | Status: DISCONTINUED | OUTPATIENT
Start: 2019-12-12 | End: 2019-12-13

## 2019-12-12 RX ORDER — MAGNESIUM SULFATE 500 MG/ML
2 VIAL (ML) INJECTION ONCE
Refills: 0 | Status: DISCONTINUED | OUTPATIENT
Start: 2019-12-12 | End: 2019-12-12

## 2019-12-12 RX ORDER — MAGNESIUM SULFATE 500 MG/ML
2 VIAL (ML) INJECTION ONCE
Refills: 0 | Status: COMPLETED | OUTPATIENT
Start: 2019-12-12 | End: 2019-12-12

## 2019-12-12 RX ADMIN — Medication 2 MILLIGRAM(S): at 05:18

## 2019-12-12 RX ADMIN — Medication 250 MILLIGRAM(S): at 17:36

## 2019-12-12 RX ADMIN — Medication 3 MILLILITER(S): at 17:36

## 2019-12-12 RX ADMIN — Medication 50 GRAM(S): at 06:53

## 2019-12-12 RX ADMIN — ENOXAPARIN SODIUM 100 MILLIGRAM(S): 100 INJECTION SUBCUTANEOUS at 11:24

## 2019-12-12 RX ADMIN — NYSTATIN CREAM 1 APPLICATION(S): 100000 CREAM TOPICAL at 17:33

## 2019-12-12 RX ADMIN — Medication 3 MILLILITER(S): at 11:24

## 2019-12-12 RX ADMIN — Medication 250 MILLIGRAM(S): at 05:18

## 2019-12-12 RX ADMIN — Medication 62.5 MILLIMOLE(S): at 08:59

## 2019-12-12 RX ADMIN — NYSTATIN CREAM 1 APPLICATION(S): 100000 CREAM TOPICAL at 05:18

## 2019-12-12 RX ADMIN — METHADONE HYDROCHLORIDE 17.5 MILLIGRAM(S): 40 TABLET ORAL at 08:58

## 2019-12-12 RX ADMIN — Medication 0.5 MILLIGRAM(S): at 17:37

## 2019-12-12 RX ADMIN — CHLORHEXIDINE GLUCONATE 1 APPLICATION(S): 213 SOLUTION TOPICAL at 08:58

## 2019-12-12 RX ADMIN — Medication 0.5 MILLIGRAM(S): at 05:19

## 2019-12-12 RX ADMIN — PANTOPRAZOLE SODIUM 40 MILLIGRAM(S): 20 TABLET, DELAYED RELEASE ORAL at 11:24

## 2019-12-12 RX ADMIN — Medication 3 MILLILITER(S): at 05:19

## 2019-12-12 NOTE — CHART NOTE - NSCHARTNOTEFT_GEN_A_CORE
Nutrition Follow Up Note  Patient seen for: nutrition follow-up     Chart reviewed, events noted. This is a  64y Female s/p ex lap with extended left hemicolectomy left in discontinuity for perforated sigmoid diverticulitis with intraperitoneal free air after presenting to ED with worsening constipation and abdominal pain with distention. Now s/p   ex lap with extended left hemicolectomy left in discontinuity for perforated sigmoid diverticulitis. AbThera vac placed and pt kept intubated post op and transferred to SICU for post-op management. Extubated 11/30,  UA +yeast, started on fluconazole. For elevated WBC, started on meropenem 12/3. Transferred from SICU to floor 12/4. Pt complaining of dysphagia on 12/9, GI consulted, s/p esophagram which showed stricture in the distal esophagus. Pt now s/p EGD yesterday revealed diverticula and medium sized hiatal hernia. Pt made NPO after once episode of vomiting after tap water enema, now advanced to clear liquid diet this morning. GI following     Source: patient, medical record     Diet : Clear Liquid diet     Patient reports poor appetite due to emesis overnight. Pt believes emesis was due to having enema. Pt reports having 1/2 of jello and 1/2 of Italian ice this morning with some water. Pt currently denying any nausea. Ostomy output x 24hrs: 200ml (12/11). pt reports dislike of nutrition supplements, currently declining offer.  Pt is amendable to trying high protein jello. Patient with no nutrition-related questions at this time. Diet education deferred at this time in setting of decreased PO intake. Pt previously education on low fiber diet during last RD visit.  Made aware RD remains available as needed.      PO intake : < 50% of meals, pt with decreased PO intake x ~ 4-5 days      Source for PO intake: pt report, per nursing flow sheets      Enteral /Parenteral Nutrition: N/A      Weights:  Weight fluctuations noted, will continue to monitor.   12/1: 377.4lbs (bed)  11/28: 362.6lbs (bed)  11/29: 370.2lbs (dosing)    Pertinent Medications: MEDICATIONS  (STANDING):  albuterol/ipratropium for Nebulization 3 milliLiter(s) Nebulizer every 6 hours  buDESOnide    Inhalation Suspension 0.5 milliGRAM(s) Inhalation every 12 hours  chlorhexidine 4% Liquid 1 Application(s) Topical <User Schedule>  dextrose 5% + sodium chloride 0.45% with potassium chloride 20 mEq/L 1000 milliLiter(s) (100 mL/Hr) IV Continuous <Continuous>  doxazosin 2 milliGRAM(s) Oral daily  enoxaparin Injectable 100 milliGRAM(s) SubCutaneous <User Schedule>  erythromycin     base Tablet 250 milliGRAM(s) Oral two times a day  influenza   Vaccine 0.5 milliLiter(s) IntraMuscular once  methadone    Tablet 17.5 milliGRAM(s) Oral daily  nystatin Powder 1 Application(s) Topical two times a day  pantoprazole    Tablet 40 milliGRAM(s) Oral daily    MEDICATIONS  (PRN):  aluminum hydroxide/magnesium hydroxide/simethicone Suspension 30 milliLiter(s) Oral every 4 hours PRN Dyspepsia  morphine  - Injectable 2 milliGRAM(s) IV Push every 4 hours PRN Breakthrough Pain  oxyCODONE    IR 10 milliGRAM(s) Oral every 4 hours PRN Severe Pain (7 - 10)  sodium chloride 0.9% lock flush 10 milliLiter(s) IV Push every 1 hour PRN Pre/post blood products, medications, blood draw, and to maintain line patency    Pertinent Labs: 12-12 @ 05:40: Na 131<L>, BUN 6<L>, Cr 0.49<L>, <H>, K+ 4.1, Phos 2.6, Mg 1.8, Alk Phos --, ALT/SGPT --, AST/SGOT --, HbA1c -      Skin per nursing documentation: free of pressure injuries per nursing flow sheets, noted with midline abdominal incision, IAD, leg lymphedema with blisters.   Edema: +3 dependent, +3 right arm, +3 left wrist, +3 left/right foot     Estimated Needs:   [x ] no change since previous assessment  [ ] recalculated:     Previous Nutrition Diagnosis: Overweight/Obesity + Food and Nutrition Knowledge related deficit  Nutrition Diagnosis is: on going, diet education reinforcement deferred at this time.     New Nutrition Diagnosis: Inadequate protein-energy intake   Related to: to decreased appetite, nausea    As evidenced by: pt decreased PO intake x 4-5 day, consuming <50% of meals     Interventions:     Recommend  1) Medical team to advance diet when medically feasible via tolerated route. Consider advancing to full liquid diet, followed by low fiber diet as tolerated.   2) RD to add High protein jello for pt to try. Pt declining additional supplements at this time.   3) Encourage PO intake of protein-rich foods as tolerated.   4) Provided diet education, reinforcement as needed.     Monitoring and Evaluation:     Continue to monitor Nutritional intake, Tolerance to diet prescription, weights, labs, skin integrity    RD remains available upon request and will follow up per protocol  Flori Elder RD, CDN, Pager # [phone]

## 2019-12-12 NOTE — PROGRESS NOTE ADULT - SUBJECTIVE AND OBJECTIVE BOX
CARDIOLOGY     PROGRESS  NOTE   ________________________________________________    CHIEF COMPLAINT:Patient is a 64y old  Female who presents with a chief complaint of perforated bowel (08 Dec 2019 09:22)  nausea.  	  REVIEW OF SYSTEMS:  CONSTITUTIONAL: No fever, weight loss, or fatigue  EYES: No eye pain, visual disturbances, or discharge  ENT:  No difficulty hearing, tinnitus, vertigo; No sinus or throat pain  NECK: No pain or stiffness  RESPIRATORY: No cough, wheezing, chills or hemoptysis; No Shortness of Breath  CARDIOVASCULAR: No chest pain, palpitations, passing out, dizziness, or leg swelling  GASTROINTESTINAL: No abdominal or epigastric pain.+ nausea, no vomiting, or hematemesis; No diarrhea or constipation. No melena or hematochezia.  GENITOURINARY: No dysuria, frequency, hematuria, or incontinence  NEUROLOGICAL: No headaches, memory loss, loss of strength, numbness, or tremors  SKIN: No itching, burning, rashes, or lesions   LYMPH Nodes: No enlarged glands  ENDOCRINE: No heat or cold intolerance; No hair loss  MUSCULOSKELETAL: No joint pain or swelling; No muscle, back, or extremity pain  PSYCHIATRIC: No depression, anxiety, mood swings, or difficulty sleeping  HEME/LYMPH: No easy bruising, or bleeding gums  ALLERGY AND IMMUNOLOGIC: No hives or eczema	    [ ] All others negative	  [ ] Unable to obtain    PHYSICAL EXAM:  T(C): 36.9 (12-12-19 @ 04:59), Max: 37.2 (12-11-19 @ 21:44)  HR: 70 (12-12-19 @ 04:59) (67 - 87)  BP: 117/71 (12-12-19 @ 04:59) (100/66 - 120/69)  RR: 18 (12-12-19 @ 04:59) (18 - 20)  SpO2: 93% (12-12-19 @ 04:59) (93% - 95%)  Wt(kg): --  I&O's Summary    11 Dec 2019 07:01  -  12 Dec 2019 07:00  --------------------------------------------------------  IN: 2640 mL / OUT: 2755 mL / NET: -115 mL        Appearance: Normal	  HEENT:   Normal oral mucosa, PERRL, EOMI	  Lymphatic: No lymphadenopathy  Cardiovascular: Normal S1 S2, No JVD, + murmurs, No edema  Respiratory: Lungs clear to auscultation	  Psychiatry: A & O x 3, Mood & affect appropriate  Gastrointestinal:  Soft, Non-tender, + BS	  Skin: No rashes, No ecchymoses, No cyanosis	  Neurologic: Non-focal  Extremities: Normal range of motion, No clubbing, cyanosis or edema  Vascular: Peripheral pulses palpable 2+ bilaterally    MEDICATIONS  (STANDING):  albuterol/ipratropium for Nebulization 3 milliLiter(s) Nebulizer every 6 hours  buDESOnide    Inhalation Suspension 0.5 milliGRAM(s) Inhalation every 12 hours  chlorhexidine 4% Liquid 1 Application(s) Topical <User Schedule>  dextrose 5% + sodium chloride 0.45% with potassium chloride 20 mEq/L 1000 milliLiter(s) (100 mL/Hr) IV Continuous <Continuous>  doxazosin 2 milliGRAM(s) Oral daily  enoxaparin Injectable 100 milliGRAM(s) SubCutaneous <User Schedule>  erythromycin     base Tablet 250 milliGRAM(s) Oral two times a day  influenza   Vaccine 0.5 milliLiter(s) IntraMuscular once  methadone    Tablet 17.5 milliGRAM(s) Oral daily  nystatin Powder 1 Application(s) Topical two times a day  pantoprazole    Tablet 40 milliGRAM(s) Oral daily  sodium phosphate IVPB 15 milliMole(s) IV Intermittent once      TELEMETRY: 	    ECG:  	  RADIOLOGY:  OTHER: 	  	  LABS:	 	    CARDIAC MARKERS:            12-12    131<L>  |  95<L>  |  6<L>  ----------------------------<  120<H>  4.1   |  28  |  0.49<L>    Ca    8.4      12 Dec 2019 05:40  Phos  2.6     12-12  Mg     1.8     12-12      proBNP:   Lipid Profile:   HgA1c:   TSH:   < from: VA Duplex Upper Extrem Arterial Limited, Right (12.11.19 @ 10:54) >  Impression: 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            Assessment and plan  ---------------------------  pt is sitting up comfortably in NAD , no complain.  bp noted  echo noted hyperdynamic lv sec to pain/anxiety, volume depletion  may increase fluid intake  continue dvt prophylaxis  abx dc d  echo noted no  wall motion abnormality  dvt prophylaxis on Lovenox  oob to chair as tolerated  doing much better, physical therapy  continue abx  s/p upper endoscopy result noted  continue protonix  vascular doppler with subclavian steal syndrome may consider vascular consult, will observe  will check bp from the L arm

## 2019-12-12 NOTE — INPATIENT CERTIFICATION FOR MEDICARE PATIENTS - RISKS OF ADVERSE EVENTS
Chief Complaint   Patient presents with    Medication Refill     Pt in office today for med refills    1. Have you been to the ER, urgent care clinic since your last visit? Hospitalized since your last visit? No    2. Have you seen or consulted any other health care providers outside of the [address] [place] [address] since your last visit? Include any pap smears or colon screening.  No      Pt has no other concerns
HISTORY OF PRESENT ILLNESS  Isaiah Burr is a 59 y.o. female. HPI  Patient is due for med refills today  Managed on meds for anxiety  Mood has been stable  Has noted that if she is stressed, her sugar goes up  DM is followed by endo  She is also requesting tramadol refill for chronic back pain and DDD    ROS  A comprehensive review of system was obtained and negative except findings in the HPI    Visit Vitals  /80 (BP 1 Location: Left arm, BP Patient Position: Sitting)   Pulse 96   Temp 98.7 °F (37.1 °C) (Oral)   Resp 12   Ht 5' 4\" (1.626 m)   Wt 154 lb (69.9 kg)   LMP  (LMP Unknown)   SpO2 96%   BMI 26.43 kg/m²     Physical Exam   Constitutional: She is oriented to person, place, and time. She appears well-developed and well-nourished. Neck: No JVD present. Cardiovascular: Normal rate, regular rhythm and intact distal pulses. Exam reveals no gallop and no friction rub. No murmur heard. Pulmonary/Chest: Effort normal and breath sounds normal. No respiratory distress. She has no wheezes. Musculoskeletal: She exhibits no edema. Neurological: She is alert and oriented to person, place, and time. Skin: Skin is warm. Nursing note and vitals reviewed. ASSESSMENT and PLAN  Encounter Diagnoses   Name Primary?  Anxiety Yes    DDD (degenerative disc disease), lumbar      Orders Placed This Encounter    DISCONTD: traMADol (ULTRAM) 50 mg tablet    LORazepam (ATIVAN) 0.5 mg tablet    topiramate (TOPAMAX) 25 mg tablet    traMADol (ULTRAM) 50 mg tablet     Refilled tramadol for pain and ativan for anxiety  Reviewed to use sparingly  Given topamax refills as well  I have discussed the diagnosis with the patient and the intended plan as seen in the above orders. The patient has received an after-visit summary and questions were answered concerning future plans. Patient conveyed understanding of the plan at the time of the visit.     Rosie Jhaveri, MSN, ANP  6/24/2019
Concern for worsening infectious process/Concern for delay in diagnosis and treatment/Concern for cardiopulmonary deterioration

## 2019-12-12 NOTE — PROGRESS NOTE ADULT - ASSESSMENT
A/P: 64y Female POD#1 s/p ex lap with extended left hemicolectomy left in discontinuity for perforated sigmoid diverticulitis with intraperitoneal free air after presenting to ED with worsening constipation and abdominal pain with distention. AbThera vac placed and pt kept intubated post op and transferred to SICU for post-op management. Extubated 11/30, patient refusing BIPAP. Ross out 12/1. Ross replaced 12/2. UA +yeast, started on fluconazole. For elevated WBC, started on meropenem 12/3. Transferred from SICU to floor 12/4. CT C/A/P 12/4 shows no obvious intrabdominal collection. Pt was complaining of dysphagia, EGD performed revealed mid and lower esophageal diverticulum and medium sized hiatal hernia 12/10.    Plan:  - adv diet to CLD  - CLINTON options given to patient  - c/w doxazosin  - monitor ostomy  - monitor U/O  - monitor vac output  - c/w pain control, c/w methadone   - DVT ppx with lovenox  - c/w with erythromycin  - appreciate cardiology recs      [facility]  p [phone]

## 2019-12-12 NOTE — PROGRESS NOTE ADULT - SUBJECTIVE AND OBJECTIVE BOX
GENERAL SURGERY PROGRESS NOTE    SUBJECTIVE  Patient seen and examined. No acute events overnight.   VA duplex 12/11 showed stenosis R brachiocephalic artery with subclavian steal syndrome (retrograde flow R vertebral artery), per vascular surgery no acute surgical intervention, asymptomatic.   Patient had episode of vomiting after tap water enema 12/11, made NPO, now without nausea, vomiting, +/+ ostomy. Started on erythromycin 12/11.  Vac changed 12/11.  Denies fever, chills, SOB, chest pain.     CM recommending SW d/t cluttered bedroom at home. CLINTON choices made.        OBJECTIVE    PHYSICAL EXAM  General: Appears well, NAD  CHEST: breathing comfortably  CV: appears well perfused  Abdomen: soft, nontender, nondistended, no rebound or guarding, ostomy +/+, vac in place  Extremities: Grossly symmetric    T(C): 37.1 (12-12-19 @ 01:10), Max: 37.2 (12-11-19 @ 21:44)  HR: 67 (12-12-19 @ 01:10) (67 - 87)  BP: 116/72 (12-12-19 @ 01:10) (100/66 - 124/60)  RR: 18 (12-12-19 @ 01:10) (18 - 20)  SpO2: 94% (12-12-19 @ 01:10) (93% - 95%)    12-10-19 @ 07:01  -  12-11-19 @ 07:00  --------------------------------------------------------  IN: 2890 mL / OUT: 1970 mL / NET: 920 mL    12-11-19 @ 07:01  -  12-12-19 @ 04:20  --------------------------------------------------------  IN: 1440 mL / OUT: 2455 mL / NET: -1015 mL        MEDICATIONS  albuterol/ipratropium for Nebulization 3 milliLiter(s) Nebulizer every 6 hours  aluminum hydroxide/magnesium hydroxide/simethicone Suspension 30 milliLiter(s) Oral every 4 hours PRN  buDESOnide    Inhalation Suspension 0.5 milliGRAM(s) Inhalation every 12 hours  chlorhexidine 4% Liquid 1 Application(s) Topical <User Schedule>  dextrose 5% + sodium chloride 0.45% with potassium chloride 20 mEq/L 1000 milliLiter(s) IV Continuous <Continuous>  doxazosin 2 milliGRAM(s) Oral daily  enoxaparin Injectable 100 milliGRAM(s) SubCutaneous <User Schedule>  erythromycin     base Tablet 250 milliGRAM(s) Oral two times a day  influenza   Vaccine 0.5 milliLiter(s) IntraMuscular once  methadone    Tablet 17.5 milliGRAM(s) Oral daily  morphine  - Injectable 2 milliGRAM(s) IV Push every 4 hours PRN  nystatin Powder 1 Application(s) Topical two times a day  oxyCODONE    IR 10 milliGRAM(s) Oral every 4 hours PRN  pantoprazole    Tablet 40 milliGRAM(s) Oral daily  sodium chloride 0.9% lock flush 10 milliLiter(s) IV Push every 1 hour PRN      LABS                RADIOLOGY & ADDITIONAL STUDIES

## 2019-12-12 NOTE — PROGRESS NOTE ADULT - ATTENDING COMMENTS
I have seen and evaluated the patient and discussed the relevant clinical findings and plan with the surgical housestaff and fellow.  Agree with the above documentation with addenda as noted.         Ej Ng MD

## 2019-12-13 LAB
HCT VFR BLD CALC: 26.7 % — LOW (ref 34.5–45)
HGB BLD-MCNC: 8.4 G/DL — LOW (ref 11.5–15.5)
MCHC RBC-ENTMCNC: 28.9 PG — SIGNIFICANT CHANGE UP (ref 27–34)
MCHC RBC-ENTMCNC: 31.5 GM/DL — LOW (ref 32–36)
MCV RBC AUTO: 91.8 FL — SIGNIFICANT CHANGE UP (ref 80–100)
NRBC # BLD: 0 /100 WBCS — SIGNIFICANT CHANGE UP (ref 0–0)
PLATELET # BLD AUTO: 182 K/UL — SIGNIFICANT CHANGE UP (ref 150–400)
PREALB SERPL-MCNC: 4 MG/DL — LOW (ref 20–40)
RBC # BLD: 2.91 M/UL — LOW (ref 3.8–5.2)
RBC # FLD: 14.3 % — SIGNIFICANT CHANGE UP (ref 10.3–14.5)
WBC # BLD: 3.49 K/UL — LOW (ref 3.8–10.5)
WBC # FLD AUTO: 3.49 K/UL — LOW (ref 3.8–10.5)

## 2019-12-13 RX ORDER — LIDOCAINE HCL 20 MG/ML
10 VIAL (ML) INJECTION ONCE
Refills: 0 | Status: COMPLETED | OUTPATIENT
Start: 2019-12-13 | End: 2019-12-13

## 2019-12-13 RX ORDER — METHADONE HYDROCHLORIDE 40 MG/1
17.5 TABLET ORAL DAILY
Refills: 0 | Status: DISCONTINUED | OUTPATIENT
Start: 2019-12-13 | End: 2019-12-20

## 2019-12-13 RX ADMIN — Medication 3 MILLILITER(S): at 11:15

## 2019-12-13 RX ADMIN — Medication 3 MILLILITER(S): at 17:08

## 2019-12-13 RX ADMIN — NYSTATIN CREAM 1 APPLICATION(S): 100000 CREAM TOPICAL at 17:08

## 2019-12-13 RX ADMIN — PANTOPRAZOLE SODIUM 40 MILLIGRAM(S): 20 TABLET, DELAYED RELEASE ORAL at 11:15

## 2019-12-13 RX ADMIN — Medication 10 MILLILITER(S): at 13:53

## 2019-12-13 RX ADMIN — Medication 3 MILLILITER(S): at 01:13

## 2019-12-13 RX ADMIN — NYSTATIN CREAM 1 APPLICATION(S): 100000 CREAM TOPICAL at 05:02

## 2019-12-13 RX ADMIN — Medication 2 MILLIGRAM(S): at 05:02

## 2019-12-13 RX ADMIN — Medication 3 MILLILITER(S): at 23:50

## 2019-12-13 RX ADMIN — Medication 250 MILLIGRAM(S): at 17:08

## 2019-12-13 RX ADMIN — Medication 3 MILLILITER(S): at 05:02

## 2019-12-13 RX ADMIN — Medication 0.5 MILLIGRAM(S): at 17:09

## 2019-12-13 RX ADMIN — Medication 250 MILLIGRAM(S): at 05:02

## 2019-12-13 RX ADMIN — DEXTROSE MONOHYDRATE, SODIUM CHLORIDE, AND POTASSIUM CHLORIDE 50 MILLILITER(S): 50; .745; 4.5 INJECTION, SOLUTION INTRAVENOUS at 11:15

## 2019-12-13 RX ADMIN — Medication 0.5 MILLIGRAM(S): at 05:02

## 2019-12-13 RX ADMIN — CHLORHEXIDINE GLUCONATE 1 APPLICATION(S): 213 SOLUTION TOPICAL at 09:13

## 2019-12-13 RX ADMIN — DEXTROSE MONOHYDRATE, SODIUM CHLORIDE, AND POTASSIUM CHLORIDE 50 MILLILITER(S): 50; .745; 4.5 INJECTION, SOLUTION INTRAVENOUS at 05:04

## 2019-12-13 RX ADMIN — ENOXAPARIN SODIUM 100 MILLIGRAM(S): 100 INJECTION SUBCUTANEOUS at 11:15

## 2019-12-13 NOTE — PROGRESS NOTE ADULT - SUBJECTIVE AND OBJECTIVE BOX
CARDIOLOGY     PROGRESS  NOTE   ________________________________________________    CHIEF COMPLAINT:Patient is a 64y old  Female who presents with a chief complaint of perforated bowel (08 Dec 2019 09:22)  doing better.  	  REVIEW OF SYSTEMS:  CONSTITUTIONAL: No fever, weight loss, or fatigue  EYES: No eye pain, visual disturbances, or discharge  ENT:  No difficulty hearing, tinnitus, vertigo; No sinus or throat pain  NECK: No pain or stiffness  RESPIRATORY: No cough, wheezing, chills or hemoptysis; No Shortness of Breath  CARDIOVASCULAR: No chest pain, palpitations, passing out, dizziness, or leg swelling  GASTROINTESTINAL: No abdominal or epigastric pain. No nausea, vomiting, or hematemesis; No diarrhea or constipation. No melena or hematochezia.  GENITOURINARY: No dysuria, frequency, hematuria, or incontinence  NEUROLOGICAL: No headaches, memory loss, loss of strength, numbness, or tremors  SKIN: No itching, burning, rashes, or lesions   LYMPH Nodes: No enlarged glands  ENDOCRINE: No heat or cold intolerance; No hair loss  MUSCULOSKELETAL: No joint pain or swelling; No muscle, back, or extremity pain  PSYCHIATRIC: No depression, anxiety, mood swings, or difficulty sleeping  HEME/LYMPH: No easy bruising, or bleeding gums  ALLERGY AND IMMUNOLOGIC: No hives or eczema	    [ ] All others negative	  [ ] Unable to obtain    PHYSICAL EXAM:  T(C): 36.7 (12-13-19 @ 04:55), Max: 37.3 (12-12-19 @ 22:00)  HR: 75 (12-13-19 @ 04:55) (75 - 84)  BP: 104/67 (12-13-19 @ 04:55) (97/59 - 109/65)  RR: 18 (12-13-19 @ 04:55) (18 - 18)  SpO2: 95% (12-13-19 @ 04:55) (92% - 95%)  Wt(kg): --  I&O's Summary    12 Dec 2019 07:01  -  13 Dec 2019 07:00  --------------------------------------------------------  IN: 3880 mL / OUT: 1855 mL / NET: 2025 mL        Appearance: Normal	  HEENT:   Normal oral mucosa, PERRL, EOMI	  Lymphatic: No lymphadenopathy  Cardiovascular: Normal S1 S2, No JVD, +murmurs,lymph edema  Respiratory: Lungs clear to auscultation	  Psychiatry: A & O x 3, Mood & affect appropriate  Gastrointestinal:  Soft, Non-tender, + BS	  Skin: No rashes, No ecchymoses, No cyanosis	  Neurologic: Non-focal  Extremities: Normal range of motion, No clubbing, cyanosis . +lymphedema  Vascular: Peripheral pulses palpable 2+ bilaterally    MEDICATIONS  (STANDING):  albuterol/ipratropium for Nebulization 3 milliLiter(s) Nebulizer every 6 hours  buDESOnide    Inhalation Suspension 0.5 milliGRAM(s) Inhalation every 12 hours  chlorhexidine 4% Liquid 1 Application(s) Topical <User Schedule>  dextrose 5% + sodium chloride 0.45% with potassium chloride 20 mEq/L 1000 milliLiter(s) (50 mL/Hr) IV Continuous <Continuous>  doxazosin 2 milliGRAM(s) Oral daily  enoxaparin Injectable 100 milliGRAM(s) SubCutaneous <User Schedule>  erythromycin     base Tablet 250 milliGRAM(s) Oral two times a day  influenza   Vaccine 0.5 milliLiter(s) IntraMuscular once  methadone    Tablet 17.5 milliGRAM(s) Oral daily  nystatin Powder 1 Application(s) Topical two times a day  pantoprazole    Tablet 40 milliGRAM(s) Oral daily      TELEMETRY: 	    ECG:  	  RADIOLOGY:  OTHER: 	  	  LABS:	 	    CARDIAC MARKERS:                                9.2    3.55  )-----------( 271      ( 12 Dec 2019 08:19 )             29.8     12-12    131<L>  |  95<L>  |  6<L>  ----------------------------<  120<H>  4.1   |  28  |  0.49<L>    Ca    8.4      12 Dec 2019 05:40  Phos  2.6     12-12  Mg     1.8     12-12      proBNP:   Lipid Profile:   HgA1c:   TSH:         Assessment and plan  ---------------------------  pt is sitting up comfortably in NAD , no complain.  bp noted  echo noted hyperdynamic lv sec to pain/anxiety, volume depletion  may increase fluid intake  continue dvt prophylaxis  abx dc d  echo noted no  wall motion abnormality  dvt prophylaxis on Lovenox  oob to chair as tolerated  doing much better, physical therapy  continue abx  s/p upper endoscopy result noted  continue protonix  vascular doppler with subclavian steal syndrome may consider vascular consult, will observe  will check bp from the L arm

## 2019-12-13 NOTE — PROGRESS NOTE ADULT - SUBJECTIVE AND OBJECTIVE BOX
GENERAL SURGERY PROGRESS NOTE    SUBJECTIVE  Patient seen and examined. No acute events overnight.   Pt was advanced from clears to fulls and is tolerating diet.  Denies fever, chills, SOB, chest pain.     CM recommending SW d/t cluttered bedroom at home. CLINT[place] choices made.        OBJECTIVE    PHYSICAL EXAM  General: Appears well, NAD  CHEST: breathing comfortably  CV: appears well perfused  Abdomen: soft, nontender, nondistended, no rebound or guarding, ostomy +/+, vac in place  Extremities: Grossly symmetric          Vital Signs:  Vital Signs Last 24 Hrs  T(C): 36.7 (13 Dec 2019 01:35), Max: 37.3 (12 Dec 2019 22:00)  T(F): 98.1 (13 Dec 2019 01:35), Max: 99.1 (12 Dec 2019 22:00)  HR: 75 (13 Dec 2019 01:35) (70 - 84)  BP: 109/65 (13 Dec 2019 01:35) (97/59 - 117/71)  BP(mean): --  RR: 18 (13 Dec 2019 01:35) (18 - 18)  SpO2: 94% (13 Dec 2019 01:35) (92% - 95%)    CAPILLARY BLOOD GLUCOSE          I&O's Detail    11 Dec 2019 07:01  -  12 Dec 2019 07:00  --------------------------------------------------------  IN:    dextrose 5% + sodium chloride 0.45% with potassium chloride 20 mEq/L: 2400 mL    Oral Fluid: 240 mL  Total IN: 2640 mL    OUT:    Bulb: 5 mL    Emesis: 250 mL    Ileostomy: 200 mL    Voided: 2300 mL  Total OUT: 2755 mL    Total NET: -115 mL      12 Dec 2019 07:01  -  13 Dec 2019 04:08  --------------------------------------------------------  IN:    dextrose 5% + sodium chloride 0.45% with potassium chloride 20 mEq/L: 1200 mL    Oral Fluid: 780 mL    Solution: 250 mL  Total IN: 2230 mL    OUT:    Colostomy: 100 mL    Voided: 900 mL  Total OUT: 1000 mL    Total NET: 1230 mL          MEDICATIONS  (STANDING):  albuterol/ipratropium for Nebulization 3 milliLiter(s) Nebulizer every 6 hours  buDESOnide    Inhalation Suspension 0.5 milliGRAM(s) Inhalation every 12 hours  chlorhexidine 4% Liquid 1 Application(s) Topical <User Schedule>  dextrose 5% + sodium chloride 0.45% with potassium chloride 20 mEq/L 1000 milliLiter(s) (50 mL/Hr) IV Continuous <Continuous>  doxazosin 2 milliGRAM(s) Oral daily  enoxaparin Injectable 100 milliGRAM(s) SubCutaneous <User Schedule>  erythromycin     base Tablet 250 milliGRAM(s) Oral two times a day  influenza   Vaccine 0.5 milliLiter(s) IntraMuscular once  methadone    Tablet 17.5 milliGRAM(s) Oral daily  nystatin Powder 1 Application(s) Topical two times a day  pantoprazole    Tablet 40 milliGRAM(s) Oral daily    MEDICATIONS  (PRN):  aluminum hydroxide/magnesium hydroxide/simethicone Suspension 30 milliLiter(s) Oral every 4 hours PRN Dyspepsia  oxyCODONE    IR 10 milliGRAM(s) Oral every 4 hours PRN Severe Pain (7 - 10)  sodium chloride 0.9% lock flush 10 milliLiter(s) IV Push every 1 hour PRN Pre/post blood products, medications, blood draw, and to maintain line patency          Labs:    12-12    131<L>  |  95<L>  |  6<L>  ----------------------------<  120<H>  4.1   |  28  |  0.49<L>    Ca    8.4      12 Dec 2019 05:40  Phos  2.6     12-12  Mg     1.8     12-12                              9.2    3.55  )-----------( 271      ( 12 Dec 2019 08:19 )             29.8         Imaging:

## 2019-12-13 NOTE — CHART NOTE - NSCHARTNOTEFT_GEN_A_CORE
HISTORY:  Altered mental status



Noncontrast head CT examination.



Comparison: None. 



Technique:

Multiple axial images of the brain were obtained from the skull base to the vertex without administr
ation of IV contrast.



Findings: There is moderate sulcal and cisternal prominence as well as atherosclerotic change in the
 proximal intracranial carotid and vertebral arteries, which is not out of proportion to the patient
's stated age. There is diffuse CT density alteration seen in the periventricular white matter of th
e high and mid-convexity, which is likely in the setting of small vessel disease and not out of prop
ortion to the patient's stated age. There is mild bilateral ex vacuo ventricular dilatation without 
evidence for hydrocephalus or herniation syndrome. No midline shift is evident. No acute intraparenc
hymal hemorrhage or mass can be identified.  No extra-axial fluid collections are seen.  No alterati
on in the attenuation of the brain parenchyma can be identified to suggest acute or subacute ischemi
c change.  However, if the patients symptoms are clinically \T\ neurologically concerning for an acu
te ischemic event, then MR imaging of the brain with DWI sequencing would likely be beneficial to ex
clude an acute CVA.  The paranasal sinuses and mastoids are clear. The remaining extracranial struct
ures are unremarkable/stable.



IMPRESSION:

 

1.  No acute intracranial process or change identified.



2. Age-appropriate intra-cranial changes of advancing age.









Reported By:Electronically Signed by NATALIIA WAGNER III, MD at 7/18/2017 8:18:35 PM LLQ drain site has been weeping serous fluid since removal of drain.    Patient was amenable to procedure.  Procedure: primary repair of drain site    Alcohol swab used to prep area. 5cc 1% lidocaine was injected locally. Two interrupted 0 silk sutures placed superficially through dermis at drain site. Sterile technique was used. Dressings placed.    Patient tolerated procedure well.    Plan:  -remove sutures at follow up appointment    [facility]  [phone]

## 2019-12-13 NOTE — CHART NOTE - NSCHARTNOTEFT_GEN_A_CORE
Nutrition Note: initiation of calorie count    Per discussion with [facility], plan to start calorie count today (12/13) to assess adequacy of PO intake in setting of wound healing. Calorie Count sheets hung in room, RN aware. Pt recommended for Raimundo BID to promote wound healing. RD to follow-up with results of calorie count.    RD to remain available and follow-up as medically appropriate.  Flori Elder RD, CDN, Pager # [phone]

## 2019-12-13 NOTE — PROGRESS NOTE ADULT - ASSESSMENT
A/P: 64y Female POD#1 s/p ex lap with extended left hemicolectomy left in discontinuity for perforated sigmoid diverticulitis with intraperitoneal free air after presenting to ED with worsening constipation and abdominal pain with distention. AbThera vac placed and pt kept intubated post op and transferred to SICU for post-op management. Extubated 11/30, patient refusing BIPAP. Ross out 12/1. Ross replaced 12/2. UA +yeast, started on fluconazole. For elevated WBC, started on meropenem 12/3. Transferred from SICU to floor 12/4. CT C/A/P 12/4 shows no obvious intrabdominal collection. Pt was complaining of dysphagia, EGD performed revealed mid and lower esophageal diverticulum and medium sized hiatal hernia 12/10.    Plan:  - LRD  - [place] options given to patient  - c/w doxazosin  - monitor ostomy  - monitor U/O  - monitor vac output  - c/w pain control, c/w methadone   - DVT ppx with lovenox  - c/w with erythromycin  - appreciate cardiology recs  - Dispo planning      [facility]  p [phone]

## 2019-12-14 LAB
ANION GAP SERPL CALC-SCNC: 11 MMOL/L — SIGNIFICANT CHANGE UP (ref 5–17)
BUN SERPL-MCNC: 7 MG/DL — SIGNIFICANT CHANGE UP (ref 7–23)
CALCIUM SERPL-MCNC: 8.1 MG/DL — LOW (ref 8.4–10.5)
CHLORIDE SERPL-SCNC: 94 MMOL/L — LOW (ref 96–108)
CO2 SERPL-SCNC: 27 MMOL/L — SIGNIFICANT CHANGE UP (ref 22–31)
CREAT SERPL-MCNC: 0.53 MG/DL — SIGNIFICANT CHANGE UP (ref 0.5–1.3)
GLUCOSE SERPL-MCNC: 94 MG/DL — SIGNIFICANT CHANGE UP (ref 70–99)
MAGNESIUM SERPL-MCNC: 1.8 MG/DL — SIGNIFICANT CHANGE UP (ref 1.6–2.6)
PHOSPHATE SERPL-MCNC: 2.7 MG/DL — SIGNIFICANT CHANGE UP (ref 2.5–4.5)
POTASSIUM SERPL-MCNC: 3.9 MMOL/L — SIGNIFICANT CHANGE UP (ref 3.5–5.3)
POTASSIUM SERPL-SCNC: 3.9 MMOL/L — SIGNIFICANT CHANGE UP (ref 3.5–5.3)
SODIUM SERPL-SCNC: 132 MMOL/L — LOW (ref 135–145)

## 2019-12-14 RX ORDER — MAGNESIUM SULFATE 500 MG/ML
2 VIAL (ML) INJECTION ONCE
Refills: 0 | Status: COMPLETED | OUTPATIENT
Start: 2019-12-14 | End: 2019-12-14

## 2019-12-14 RX ORDER — OXYCODONE HYDROCHLORIDE 5 MG/1
5 TABLET ORAL EVERY 4 HOURS
Refills: 0 | Status: DISCONTINUED | OUTPATIENT
Start: 2019-12-14 | End: 2019-12-21

## 2019-12-14 RX ORDER — SODIUM,POTASSIUM PHOSPHATES 278-250MG
1 POWDER IN PACKET (EA) ORAL
Refills: 0 | Status: COMPLETED | OUTPATIENT
Start: 2019-12-14 | End: 2019-12-15

## 2019-12-14 RX ORDER — ASPIRIN/CALCIUM CARB/MAGNESIUM 324 MG
81 TABLET ORAL DAILY
Refills: 0 | Status: DISCONTINUED | OUTPATIENT
Start: 2019-12-14 | End: 2019-12-29

## 2019-12-14 RX ORDER — ACETAMINOPHEN 500 MG
650 TABLET ORAL EVERY 6 HOURS
Refills: 0 | Status: DISCONTINUED | OUTPATIENT
Start: 2019-12-14 | End: 2020-01-07

## 2019-12-14 RX ORDER — ATORVASTATIN CALCIUM 80 MG/1
40 TABLET, FILM COATED ORAL AT BEDTIME
Refills: 0 | Status: DISCONTINUED | OUTPATIENT
Start: 2019-12-14 | End: 2019-12-29

## 2019-12-14 RX ADMIN — Medication 81 MILLIGRAM(S): at 12:38

## 2019-12-14 RX ADMIN — Medication 0.5 MILLIGRAM(S): at 05:25

## 2019-12-14 RX ADMIN — Medication 1 PACKET(S): at 17:35

## 2019-12-14 RX ADMIN — Medication 1 PACKET(S): at 12:38

## 2019-12-14 RX ADMIN — NYSTATIN CREAM 1 APPLICATION(S): 100000 CREAM TOPICAL at 05:25

## 2019-12-14 RX ADMIN — Medication 3 MILLILITER(S): at 12:28

## 2019-12-14 RX ADMIN — Medication 2 MILLIGRAM(S): at 05:24

## 2019-12-14 RX ADMIN — NYSTATIN CREAM 1 APPLICATION(S): 100000 CREAM TOPICAL at 17:35

## 2019-12-14 RX ADMIN — OXYCODONE HYDROCHLORIDE 10 MILLIGRAM(S): 5 TABLET ORAL at 12:26

## 2019-12-14 RX ADMIN — Medication 250 MILLIGRAM(S): at 17:36

## 2019-12-14 RX ADMIN — Medication 3 MILLILITER(S): at 05:25

## 2019-12-14 RX ADMIN — Medication 250 MILLIGRAM(S): at 05:24

## 2019-12-14 RX ADMIN — PANTOPRAZOLE SODIUM 40 MILLIGRAM(S): 20 TABLET, DELAYED RELEASE ORAL at 12:27

## 2019-12-14 RX ADMIN — Medication 0.5 MILLIGRAM(S): at 17:36

## 2019-12-14 RX ADMIN — Medication 3 MILLILITER(S): at 17:36

## 2019-12-14 RX ADMIN — ENOXAPARIN SODIUM 100 MILLIGRAM(S): 100 INJECTION SUBCUTANEOUS at 12:27

## 2019-12-14 RX ADMIN — CHLORHEXIDINE GLUCONATE 1 APPLICATION(S): 213 SOLUTION TOPICAL at 11:40

## 2019-12-14 RX ADMIN — Medication 50 GRAM(S): at 12:29

## 2019-12-14 RX ADMIN — OXYCODONE HYDROCHLORIDE 10 MILLIGRAM(S): 5 TABLET ORAL at 12:56

## 2019-12-14 RX ADMIN — METHADONE HYDROCHLORIDE 17.5 MILLIGRAM(S): 40 TABLET ORAL at 09:35

## 2019-12-14 NOTE — PROGRESS NOTE ADULT - SUBJECTIVE AND OBJECTIVE BOX
CARDIOLOGY     PROGRESS  NOTE   ________________________________________________    CHIEF COMPLAINT:Patient is a 64y old  Female who presents with a chief complaint of perforated bowel (08 Dec 2019 09:22)  doing better.  	  REVIEW OF SYSTEMS:  CONSTITUTIONAL: No fever, weight loss, or fatigue  EYES: No eye pain, visual disturbances, or discharge  ENT:  No difficulty hearing, tinnitus, vertigo; No sinus or throat pain  NECK: No pain or stiffness  RESPIRATORY: No cough, wheezing, chills or hemoptysis; No Shortness of Breath  CARDIOVASCULAR: No chest pain, palpitations, passing out, dizziness, or leg swelling  GASTROINTESTINAL: No abdominal or epigastric pain. No nausea, vomiting, or hematemesis; No diarrhea or constipation. No melena or hematochezia.  GENITOURINARY: No dysuria, frequency, hematuria, or incontinence  NEUROLOGICAL: No headaches, memory loss, loss of strength, numbness, or tremors  SKIN: No itching, burning, rashes, or lesions   LYMPH Nodes: No enlarged glands  ENDOCRINE: No heat or cold intolerance; No hair loss  MUSCULOSKELETAL: No joint pain or swelling; No muscle, back, or extremity pain  PSYCHIATRIC: No depression, anxiety, mood swings, or difficulty sleeping  HEME/LYMPH: No easy bruising, or bleeding gums  ALLERGY AND IMMUNOLOGIC: No hives or eczema	    [ ] All others negative	  [ ] Unable to obtain    PHYSICAL EXAM:  T(C): 37.5 (12-14-19 @ 05:16), Max: 37.6 (12-13-19 @ 13:08)  HR: 81 (12-14-19 @ 05:16) (80 - 90)  BP: 118/70 (12-14-19 @ 05:16) (100/63 - 118/70)  RR: 18 (12-14-19 @ 05:16) (18 - 18)  SpO2: 95% (12-14-19 @ 05:16) (92% - 95%)  Wt(kg): --  I&O's Summary    13 Dec 2019 07:01  -  14 Dec 2019 07:00  --------------------------------------------------------  IN: 1320 mL / OUT: 1300 mL / NET: 20 mL        Appearance: Normal	  HEENT:   Normal oral mucosa, PERRL, EOMI	  Lymphatic: No lymphadenopathy  Cardiovascular: Normal S1 S2, No JVD,+ murmurs, +lymph edema  Respiratory: Lungs clear to auscultation	  Psychiatry: A & O x 3, Mood & affect appropriate  Gastrointestinal:  Soft, Non-tender, + BS	  Skin: No rashes, No ecchymoses, No cyanosis	  Neurologic: Non-focal  Extremities: Normal range of motion, No clubbing, cyanosis .  Vascular: Peripheral pulses palpable 2+ bilaterally    MEDICATIONS  (STANDING):  albuterol/ipratropium for Nebulization 3 milliLiter(s) Nebulizer every 6 hours  buDESOnide    Inhalation Suspension 0.5 milliGRAM(s) Inhalation every 12 hours  chlorhexidine 4% Liquid 1 Application(s) Topical <User Schedule>  doxazosin 2 milliGRAM(s) Oral daily  enoxaparin Injectable 100 milliGRAM(s) SubCutaneous <User Schedule>  erythromycin     base Tablet 250 milliGRAM(s) Oral two times a day  influenza   Vaccine 0.5 milliLiter(s) IntraMuscular once  methadone    Tablet 17.5 milliGRAM(s) Oral daily  nystatin Powder 1 Application(s) Topical two times a day  pantoprazole    Tablet 40 milliGRAM(s) Oral daily      TELEMETRY: 	    ECG:  	  RADIOLOGY:  OTHER: 	  	  LABS:	 	    CARDIAC MARKERS:                                8.4    3.49  )-----------( 182      ( 13 Dec 2019 20:29 )             26.7           proBNP:   Lipid Profile:   HgA1c:   TSH:         Assessment and plan  ---------------------------  pt is sitting up comfortably in NAD , no complain.  bp noted  echo noted hyperdynamic lv sec to pain/anxiety, volume depletion  may increase fluid intake  continue dvt prophylaxis  abx dc d  echo noted no  wall motion abnormality  dvt prophylaxis on Lovenox  oob to chair as tolerated  doing much better, physical therapy  continue abx  s/p upper endoscopy result noted  continue protonix  vascular doppler with subclavian steal syndrome may consider vascular consult, will observe  will check bp from the L arm  check lytes

## 2019-12-14 NOTE — PROGRESS NOTE ADULT - ATTENDING COMMENTS
I saw and examined the pt and discussed the tx plan with the House Staff. I agree with the exam and plan as documented in the surgery resident's note from today.  OK to start baby ASA.   Liset Vargas MD

## 2019-12-14 NOTE — PROGRESS NOTE ADULT - SUBJECTIVE AND OBJECTIVE BOX
GENERAL SURGERY PROGRESS NOTE    SUBJECTIVE  Patient seen and examined. No acute events overnight. Reports tolerating diet without nausea, vomiting, +/+ ostomy, voiding without issues, sat in chair 12/13. Denies fever, chills, SOB, chest pain.     SW met with patient 12/13, discussed hiring cleaning service for home  vac change 12/13      OBJECTIVE    PHYSICAL EXAM  General: Appears well, NAD  CHEST: breathing comfortably  CV: appears well perfused  Abdomen: soft, nontender, nondistended, no rebound or guarding, vac in place, L OVI site dressing c/d/i, ostomy +/+  Extremities: Grossly symmetric, b/l lymphedema, L arm PICC    T(C): 37.2 (12-14-19 @ 00:16), Max: 37.6 (12-13-19 @ 13:08)  HR: 80 (12-14-19 @ 00:16) (80 - 90)  BP: 105/58 (12-14-19 @ 00:16) (98/62 - 105/58)  RR: 18 (12-14-19 @ 00:16) (18 - 18)  SpO2: 95% (12-14-19 @ 00:16) (92% - 95%)    12-12-19 @ 07:01  -  12-13-19 @ 07:00  --------------------------------------------------------  IN: 3880 mL / OUT: 1855 mL / NET: 2025 mL    12-13-19 @ 07:01  -  12-14-19 @ 05:05  --------------------------------------------------------  IN: 1320 mL / OUT: 950 mL / NET: 370 mL        MEDICATIONS  albuterol/ipratropium for Nebulization 3 milliLiter(s) Nebulizer every 6 hours  aluminum hydroxide/magnesium hydroxide/simethicone Suspension 30 milliLiter(s) Oral every 4 hours PRN  buDESOnide    Inhalation Suspension 0.5 milliGRAM(s) Inhalation every 12 hours  chlorhexidine 4% Liquid 1 Application(s) Topical <User Schedule>  doxazosin 2 milliGRAM(s) Oral daily  enoxaparin Injectable 100 milliGRAM(s) SubCutaneous <User Schedule>  erythromycin     base Tablet 250 milliGRAM(s) Oral two times a day  influenza   Vaccine 0.5 milliLiter(s) IntraMuscular once  methadone    Tablet 17.5 milliGRAM(s) Oral daily  nystatin Powder 1 Application(s) Topical two times a day  oxyCODONE    IR 10 milliGRAM(s) Oral every 4 hours PRN  pantoprazole    Tablet 40 milliGRAM(s) Oral daily  sodium chloride 0.9% lock flush 10 milliLiter(s) IV Push every 1 hour PRN      LABS                        8.4    3.49  )-----------( 182      ( 13 Dec 2019 20:29 )             26.7     12-12    131<L>  |  95<L>  |  6<L>  ----------------------------<  120<H>  4.1   |  28  |  0.49<L>    Ca    8.4      12 Dec 2019 05:40  Phos  2.6     12-12  Mg     1.8     12-12            RADIOLOGY & ADDITIONAL STUDIES

## 2019-12-14 NOTE — PROGRESS NOTE ADULT - ASSESSMENT
A/P: 64y Female POD#1 s/p ex lap with extended left hemicolectomy left in discontinuity for perforated sigmoid diverticulitis with intraperitoneal free air after presenting to ED with worsening constipation and abdominal pain with distention. AbThera vac placed and pt kept intubated post op and transferred to SICU for post-op management. Extubated 11/30, patient refusing BIPAP. Ross out 12/1. Ross replaced 12/2. UA +yeast, started on fluconazole. For elevated WBC, started on meropenem 12/3-12/10. Transferred from SICU to floor 12/4. CT C/A/P 12/4 shows no obvious intrabdominal collection. Pt was complaining of dysphagia, EGD performed revealed mid and lower esophageal diverticulum and medium sized hiatal hernia 12/10. Patient started on erythromycin.    Plan:  - LRD  - [place] options given to patient  - c/w doxazosin  - monitor ostomy  - monitor U/O  - monitor vac output  - c/w pain control, c/w methadone   - DVT ppx with lovenox  - c/w with erythromycin  - appreciate cardiology recs  - Dispo planning  - c/w PT    [facility]  p [phone]

## 2019-12-15 LAB
ALBUMIN SERPL ELPH-MCNC: 1.8 G/DL — LOW (ref 3.3–5)
ALP SERPL-CCNC: 98 U/L — SIGNIFICANT CHANGE UP (ref 40–120)
ALT FLD-CCNC: 15 U/L — SIGNIFICANT CHANGE UP (ref 10–45)
ANION GAP SERPL CALC-SCNC: 8 MMOL/L — SIGNIFICANT CHANGE UP (ref 5–17)
APTT BLD: 21.2 SEC — LOW (ref 27.5–36.3)
AST SERPL-CCNC: 31 U/L — SIGNIFICANT CHANGE UP (ref 10–40)
BILIRUB DIRECT SERPL-MCNC: 0.2 MG/DL — SIGNIFICANT CHANGE UP (ref 0–0.2)
BILIRUB INDIRECT FLD-MCNC: 0.2 MG/DL — SIGNIFICANT CHANGE UP (ref 0.2–1)
BILIRUB SERPL-MCNC: 0.4 MG/DL — SIGNIFICANT CHANGE UP (ref 0.2–1.2)
BLD GP AB SCN SERPL QL: NEGATIVE — SIGNIFICANT CHANGE UP
BUN SERPL-MCNC: 8 MG/DL — SIGNIFICANT CHANGE UP (ref 7–23)
CALCIUM SERPL-MCNC: 7.8 MG/DL — LOW (ref 8.4–10.5)
CHLORIDE SERPL-SCNC: 95 MMOL/L — LOW (ref 96–108)
CO2 SERPL-SCNC: 29 MMOL/L — SIGNIFICANT CHANGE UP (ref 22–31)
CREAT SERPL-MCNC: 0.57 MG/DL — SIGNIFICANT CHANGE UP (ref 0.5–1.3)
GLUCOSE SERPL-MCNC: 101 MG/DL — HIGH (ref 70–99)
HCT VFR BLD CALC: 21.4 % — LOW (ref 34.5–45)
HCT VFR BLD CALC: 26.3 % — LOW (ref 34.5–45)
HGB BLD-MCNC: 6.7 G/DL — CRITICAL LOW (ref 11.5–15.5)
HGB BLD-MCNC: 8.4 G/DL — LOW (ref 11.5–15.5)
INR BLD: 1.2 RATIO — HIGH (ref 0.88–1.16)
MAGNESIUM SERPL-MCNC: 1.9 MG/DL — SIGNIFICANT CHANGE UP (ref 1.6–2.6)
MCHC RBC-ENTMCNC: 28.9 PG — SIGNIFICANT CHANGE UP (ref 27–34)
MCHC RBC-ENTMCNC: 29 PG — SIGNIFICANT CHANGE UP (ref 27–34)
MCHC RBC-ENTMCNC: 31.3 GM/DL — LOW (ref 32–36)
MCHC RBC-ENTMCNC: 31.9 GM/DL — LOW (ref 32–36)
MCV RBC AUTO: 90.4 FL — SIGNIFICANT CHANGE UP (ref 80–100)
MCV RBC AUTO: 92.6 FL — SIGNIFICANT CHANGE UP (ref 80–100)
NRBC # BLD: 0 /100 WBCS — SIGNIFICANT CHANGE UP (ref 0–0)
PHOSPHATE SERPL-MCNC: 2.7 MG/DL — SIGNIFICANT CHANGE UP (ref 2.5–4.5)
PLATELET # BLD AUTO: 121 K/UL — LOW (ref 150–400)
PLATELET # BLD AUTO: 153 K/UL — SIGNIFICANT CHANGE UP (ref 150–400)
POTASSIUM SERPL-MCNC: 4 MMOL/L — SIGNIFICANT CHANGE UP (ref 3.5–5.3)
POTASSIUM SERPL-SCNC: 4 MMOL/L — SIGNIFICANT CHANGE UP (ref 3.5–5.3)
PROT SERPL-MCNC: 5.5 G/DL — LOW (ref 6–8.3)
PROTHROM AB SERPL-ACNC: 13.7 SEC — HIGH (ref 10–12.9)
RBC # BLD: 2.31 M/UL — LOW (ref 3.8–5.2)
RBC # BLD: 2.91 M/UL — LOW (ref 3.8–5.2)
RBC # FLD: 14.2 % — SIGNIFICANT CHANGE UP (ref 10.3–14.5)
RBC # FLD: 14.2 % — SIGNIFICANT CHANGE UP (ref 10.3–14.5)
RH IG SCN BLD-IMP: POSITIVE — SIGNIFICANT CHANGE UP
SODIUM SERPL-SCNC: 132 MMOL/L — LOW (ref 135–145)
WBC # BLD: 3.63 K/UL — LOW (ref 3.8–10.5)
WBC # BLD: 4.49 K/UL — SIGNIFICANT CHANGE UP (ref 3.8–10.5)
WBC # FLD AUTO: 3.63 K/UL — LOW (ref 3.8–10.5)
WBC # FLD AUTO: 4.49 K/UL — SIGNIFICANT CHANGE UP (ref 3.8–10.5)

## 2019-12-15 RX ORDER — MAGNESIUM SULFATE 500 MG/ML
1 VIAL (ML) INJECTION ONCE
Refills: 0 | Status: COMPLETED | OUTPATIENT
Start: 2019-12-15 | End: 2019-12-15

## 2019-12-15 RX ORDER — POLYETHYLENE GLYCOL 3350 17 G/17G
17 POWDER, FOR SOLUTION ORAL
Refills: 0 | Status: DISCONTINUED | OUTPATIENT
Start: 2019-12-15 | End: 2019-12-29

## 2019-12-15 RX ADMIN — CHLORHEXIDINE GLUCONATE 1 APPLICATION(S): 213 SOLUTION TOPICAL at 08:50

## 2019-12-15 RX ADMIN — Medication 0.5 MILLIGRAM(S): at 06:07

## 2019-12-15 RX ADMIN — NYSTATIN CREAM 1 APPLICATION(S): 100000 CREAM TOPICAL at 06:07

## 2019-12-15 RX ADMIN — Medication 3 MILLILITER(S): at 06:07

## 2019-12-15 RX ADMIN — Medication 250 MILLIGRAM(S): at 17:43

## 2019-12-15 RX ADMIN — Medication 3 MILLILITER(S): at 01:08

## 2019-12-15 RX ADMIN — PANTOPRAZOLE SODIUM 40 MILLIGRAM(S): 20 TABLET, DELAYED RELEASE ORAL at 11:37

## 2019-12-15 RX ADMIN — Medication 0.5 MILLIGRAM(S): at 17:42

## 2019-12-15 RX ADMIN — POLYETHYLENE GLYCOL 3350 17 GRAM(S): 17 POWDER, FOR SOLUTION ORAL at 11:31

## 2019-12-15 RX ADMIN — Medication 3 MILLILITER(S): at 23:50

## 2019-12-15 RX ADMIN — Medication 250 MILLIGRAM(S): at 06:07

## 2019-12-15 RX ADMIN — Medication 1 PACKET(S): at 08:49

## 2019-12-15 RX ADMIN — Medication 62.5 MILLIMOLE(S): at 11:22

## 2019-12-15 RX ADMIN — ENOXAPARIN SODIUM 100 MILLIGRAM(S): 100 INJECTION SUBCUTANEOUS at 11:31

## 2019-12-15 RX ADMIN — Medication 2 MILLIGRAM(S): at 06:07

## 2019-12-15 RX ADMIN — Medication 3 MILLILITER(S): at 11:31

## 2019-12-15 RX ADMIN — Medication 81 MILLIGRAM(S): at 11:31

## 2019-12-15 RX ADMIN — Medication 3 MILLILITER(S): at 17:42

## 2019-12-15 RX ADMIN — NYSTATIN CREAM 1 APPLICATION(S): 100000 CREAM TOPICAL at 17:42

## 2019-12-15 RX ADMIN — METHADONE HYDROCHLORIDE 17.5 MILLIGRAM(S): 40 TABLET ORAL at 08:48

## 2019-12-15 RX ADMIN — Medication 100 GRAM(S): at 08:49

## 2019-12-15 NOTE — PROVIDER CONTACT NOTE (OTHER) - BACKGROUND
pt states that she had her labs checked in the past and she was only borderline and did not need cholesterol medication. this is new order for pt.

## 2019-12-15 NOTE — PROGRESS NOTE ADULT - ASSESSMENT
A/P: 64y Female POD#1 s/p ex lap with extended left hemicolectomy left in discontinuity for perforated sigmoid diverticulitis with intraperitoneal free air after presenting to ED with worsening constipation and abdominal pain with distention. AbThera vac placed and pt kept intubated post op and transferred to SICU for post-op management. Extubated 11/30, patient refusing BIPAP. Ross out 12/1. Ross replaced 12/2. UA +yeast, started on fluconazole. For elevated WBC, started on meropenem 12/3-12/10. Transferred from SICU to floor 12/4. CT C/A/P 12/4 shows no obvious intrabdominal collection. Pt was complaining of dysphagia, EGD performed revealed mid and lower esophageal diverticulum and medium sized hiatal hernia 12/10. Patient started on erythromycin.    Plan:  - LRD  - [place] options given to patient  - c/w doxazosin  - monitor ostomy  - monitor U/O  - monitor vac output  - c/w pain control, c/w methadone   - DVT ppx with lovenox  - c/w with erythromycin  - appreciate cardiology recs  - Dispo planning  - c/w PT  - start miralax  - tap water enema x 1 today 12/15  - monitor drain site output with ostomy appliance    [facility]  p [phone]

## 2019-12-15 NOTE — PROGRESS NOTE ADULT - ATTENDING COMMENTS
I saw and examined the pt and discussed the tx plan with the House Staff. I agree with the exam and plan as documented in the surgery resident's note from today.  + constipation, start Miralax bid.  Will place bag on the LLQ old drain site and quantify amount, if high will obtain CT to evaluate for ascites and treat accordingly.   Liset Vargas MD

## 2019-12-15 NOTE — PROVIDER CONTACT NOTE (OTHER) - ACTION/TREATMENT ORDERED:
RN explained to pt that the resident said that the day team on rounds in am will be able to explain why she was ordered lipitor. Pt ok to refuse med for tonight. med marked as not done- pt refused.

## 2019-12-15 NOTE — PROGRESS NOTE ADULT - SUBJECTIVE AND OBJECTIVE BOX
CARDIOLOGY     PROGRESS  NOTE   ________________________________________________    CHIEF COMPLAINT:Patient is a 64y old  Female who presents with a chief complaint of perforated bowel (08 Dec 2019 09:22)  doing better tolerating diet better  	  REVIEW OF SYSTEMS:  CONSTITUTIONAL: No fever, weight loss, or fatigue  EYES: No eye pain, visual disturbances, or discharge  ENT:  No difficulty hearing, tinnitus, vertigo; No sinus or throat pain  NECK: No pain or stiffness  RESPIRATORY: No cough, wheezing, chills or hemoptysis; No Shortness of Breath  CARDIOVASCULAR: No chest pain, palpitations, passing out, dizziness, or leg swelling  GASTROINTESTINAL: No abdominal or epigastric pain. No nausea, vomiting, or hematemesis; No diarrhea or constipation. No melena or hematochezia.  GENITOURINARY: No dysuria, frequency, hematuria, or incontinence  NEUROLOGICAL: No headaches, memory loss, loss of strength, numbness, or tremors  SKIN: No itching, burning, rashes, or lesions   LYMPH Nodes: No enlarged glands  ENDOCRINE: No heat or cold intolerance; No hair loss  MUSCULOSKELETAL: No joint pain or swelling; No muscle, back, or extremity pain  PSYCHIATRIC: No depression, anxiety, mood swings, or difficulty sleeping  HEME/LYMPH: No easy bruising, or bleeding gums  ALLERGY AND IMMUNOLOGIC: No hives or eczema	    [ ] All others negative	  [ ] Unable to obtain    PHYSICAL EXAM:  T(C): 36.8 (12-15-19 @ 05:46), Max: 37.5 (12-14-19 @ 18:06)  HR: 79 (12-15-19 @ 05:46) (68 - 88)  BP: 119/72 (12-15-19 @ 05:46) (104/66 - 119/72)  RR: 18 (12-15-19 @ 05:46) (18 - 18)  SpO2: 95% (12-15-19 @ 05:46) (92% - 98%)  Wt(kg): --  I&O's Summary    14 Dec 2019 07:01  -  15 Dec 2019 07:00  --------------------------------------------------------  IN: 650 mL / OUT: 1750 mL / NET: -1100 mL    15 Dec 2019 07:01  -  15 Dec 2019 08:58  --------------------------------------------------------  IN: 100 mL / OUT: 0 mL / NET: 100 mL        Appearance: Normal	  HEENT:   Normal oral mucosa, PERRL, EOMI	  Lymphatic: No lymphadenopathy  Cardiovascular: Normal S1 S2, No JVD, + murmurs, + lymphedema  Respiratory: Lungs clear to auscultation	  Psychiatry: A & O x 3, Mood & affect appropriate  Gastrointestinal:  Soft, Non-tender, + BS	, +wound vac  Skin: No rashes, No ecchymoses, No cyanosis	  Neurologic: Non-focal  Extremities: Normal range of motion, No clubbing, cyanosis.  Vascular: Peripheral pulses palpable 2+ bilaterally    MEDICATIONS  (STANDING):  albuterol/ipratropium for Nebulization 3 milliLiter(s) Nebulizer every 6 hours  aspirin  chewable 81 milliGRAM(s) Oral daily  atorvastatin 40 milliGRAM(s) Oral at bedtime  buDESOnide    Inhalation Suspension 0.5 milliGRAM(s) Inhalation every 12 hours  chlorhexidine 4% Liquid 1 Application(s) Topical <User Schedule>  doxazosin 2 milliGRAM(s) Oral daily  enoxaparin Injectable 100 milliGRAM(s) SubCutaneous <User Schedule>  erythromycin     base Tablet 250 milliGRAM(s) Oral two times a day  influenza   Vaccine 0.5 milliLiter(s) IntraMuscular once  methadone    Tablet 17.5 milliGRAM(s) Oral daily  nystatin Powder 1 Application(s) Topical two times a day  pantoprazole    Tablet 40 milliGRAM(s) Oral daily  polyethylene glycol 3350 17 Gram(s) Oral two times a day  sodium phosphate IVPB 15 milliMole(s) IV Intermittent once      TELEMETRY: 	    ECG:  	  RADIOLOGY:  OTHER: 	  	  LABS:	 	    CARDIAC MARKERS:                                8.4    3.49  )-----------( 182      ( 13 Dec 2019 20:29 )             26.7     12-15    132<L>  |  95<L>  |  8   ----------------------------<  101<H>  4.0   |  29  |  0.57    Ca    7.8<L>      15 Dec 2019 07:13  Phos  2.7     12-15  Mg     1.9     12-15      proBNP:   Lipid Profile:   HgA1c:   TSH:         Assessment and plan  ---------------------------  pt is sitting up comfortably in NAD , no complain.  bp noted  echo noted hyperdynamic lv sec to pain/anxiety, volume depletion  may increase fluid intake  continue dvt prophylaxis  abx dc d  echo noted no  wall motion abnormality  dvt prophylaxis on Lovenox  oob to chair as tolerated  doing much better, physical therapy  continue abx  s/p upper endoscopy result noted  continue protonix  vascular doppler with subclavian steal syndrome may consider vascular consult, will observe  will check bp from the L arm  check lytes

## 2019-12-15 NOTE — PROGRESS NOTE ADULT - SUBJECTIVE AND OBJECTIVE BOX
GENERAL SURGERY PROGRESS NOTE    SUBJECTIVE  Patient seen and examined. No acute events overnight. Reports tolerating diet without nausea, vomiting, +/- ostomy, voiding without issues. Denies fever, chills, SOB, chest pain.         OBJECTIVE    PHYSICAL EXAM  General: Appears well, NAD  CHEST: breathing comfortably  CV: appears well perfused  Abdomen: soft, nontender, nondistended, no rebound or guarding, ostomy +/- digitized and stool present, L lateral drain site leaking serous fluid dressing, vac in place  Extremities: Grossly symmetric    T(C): 36.8 (12-15-19 @ 05:46), Max: 37.5 (12-14-19 @ 18:06)  HR: 79 (12-15-19 @ 05:46) (68 - 88)  BP: 119/72 (12-15-19 @ 05:46) (104/66 - 119/72)  RR: 18 (12-15-19 @ 05:46) (18 - 18)  SpO2: 95% (12-15-19 @ 05:46) (92% - 98%)    12-14-19 @ 07:01  -  12-15-19 @ 07:00  --------------------------------------------------------  IN: 650 mL / OUT: 1750 mL / NET: -1100 mL        MEDICATIONS  acetaminophen   Tablet .. 650 milliGRAM(s) Oral every 6 hours PRN  albuterol/ipratropium for Nebulization 3 milliLiter(s) Nebulizer every 6 hours  aluminum hydroxide/magnesium hydroxide/simethicone Suspension 30 milliLiter(s) Oral every 4 hours PRN  aspirin  chewable 81 milliGRAM(s) Oral daily  atorvastatin 40 milliGRAM(s) Oral at bedtime  buDESOnide    Inhalation Suspension 0.5 milliGRAM(s) Inhalation every 12 hours  chlorhexidine 4% Liquid 1 Application(s) Topical <User Schedule>  doxazosin 2 milliGRAM(s) Oral daily  enoxaparin Injectable 100 milliGRAM(s) SubCutaneous <User Schedule>  erythromycin     base Tablet 250 milliGRAM(s) Oral two times a day  influenza   Vaccine 0.5 milliLiter(s) IntraMuscular once  methadone    Tablet 17.5 milliGRAM(s) Oral daily  nystatin Powder 1 Application(s) Topical two times a day  oxyCODONE    IR 10 milliGRAM(s) Oral every 4 hours PRN  oxyCODONE    IR 5 milliGRAM(s) Oral every 4 hours PRN  pantoprazole    Tablet 40 milliGRAM(s) Oral daily  polyethylene glycol 3350 17 Gram(s) Oral two times a day  potassium phosphate / sodium phosphate powder 1 Packet(s) Oral three times a day with meals  sodium chloride 0.9% lock flush 10 milliLiter(s) IV Push every 1 hour PRN      LABS                        8.4    3.49  )-----------( 182      ( 13 Dec 2019 20:29 )             26.7     12-15    132<L>  |  95<L>  |  8   ----------------------------<  101<H>  4.0   |  29  |  0.57    Ca    7.8<L>      15 Dec 2019 07:13  Phos  2.7     12-15  Mg     1.9     12-15            RADIOLOGY & ADDITIONAL STUDIES

## 2019-12-16 RX ORDER — ATORVASTATIN CALCIUM 80 MG/1
1 TABLET, FILM COATED ORAL
Qty: 0 | Refills: 0 | DISCHARGE
Start: 2019-12-16

## 2019-12-16 RX ORDER — ACETAMINOPHEN 500 MG
2 TABLET ORAL
Qty: 0 | Refills: 0 | DISCHARGE
Start: 2019-12-16

## 2019-12-16 RX ORDER — ASPIRIN/CALCIUM CARB/MAGNESIUM 324 MG
1 TABLET ORAL
Qty: 0 | Refills: 0 | DISCHARGE
Start: 2019-12-16

## 2019-12-16 RX ADMIN — Medication 0.5 MILLIGRAM(S): at 05:21

## 2019-12-16 RX ADMIN — Medication 3 MILLILITER(S): at 11:49

## 2019-12-16 RX ADMIN — ENOXAPARIN SODIUM 100 MILLIGRAM(S): 100 INJECTION SUBCUTANEOUS at 11:49

## 2019-12-16 RX ADMIN — Medication 81 MILLIGRAM(S): at 11:49

## 2019-12-16 RX ADMIN — Medication 3 MILLILITER(S): at 17:12

## 2019-12-16 RX ADMIN — OXYCODONE HYDROCHLORIDE 10 MILLIGRAM(S): 5 TABLET ORAL at 09:06

## 2019-12-16 RX ADMIN — Medication 3 MILLILITER(S): at 05:21

## 2019-12-16 RX ADMIN — POLYETHYLENE GLYCOL 3350 17 GRAM(S): 17 POWDER, FOR SOLUTION ORAL at 17:13

## 2019-12-16 RX ADMIN — NYSTATIN CREAM 1 APPLICATION(S): 100000 CREAM TOPICAL at 05:25

## 2019-12-16 RX ADMIN — METHADONE HYDROCHLORIDE 17.5 MILLIGRAM(S): 40 TABLET ORAL at 09:05

## 2019-12-16 RX ADMIN — Medication 250 MILLIGRAM(S): at 05:20

## 2019-12-16 RX ADMIN — OXYCODONE HYDROCHLORIDE 10 MILLIGRAM(S): 5 TABLET ORAL at 09:36

## 2019-12-16 RX ADMIN — PANTOPRAZOLE SODIUM 40 MILLIGRAM(S): 20 TABLET, DELAYED RELEASE ORAL at 11:49

## 2019-12-16 RX ADMIN — NYSTATIN CREAM 1 APPLICATION(S): 100000 CREAM TOPICAL at 17:12

## 2019-12-16 RX ADMIN — Medication 0.5 MILLIGRAM(S): at 17:17

## 2019-12-16 RX ADMIN — CHLORHEXIDINE GLUCONATE 1 APPLICATION(S): 213 SOLUTION TOPICAL at 09:04

## 2019-12-16 RX ADMIN — Medication 2 MILLIGRAM(S): at 05:20

## 2019-12-16 RX ADMIN — POLYETHYLENE GLYCOL 3350 17 GRAM(S): 17 POWDER, FOR SOLUTION ORAL at 05:21

## 2019-12-16 RX ADMIN — Medication 250 MILLIGRAM(S): at 17:12

## 2019-12-16 NOTE — PROGRESS NOTE ADULT - SUBJECTIVE AND OBJECTIVE BOX
GENERAL SURGERY PROGRESS NOTE    SUBJECTIVE  Patient seen and examined. No acute events overnight. Reports tolerating diet without nausea, vomiting, +/+ ostomy, voiding without issues. Denies fever, chills, SOB, chest pain.   wants to sit at edge of bed 12/15, patient refuses to sit in the chair that is in the room because it is "uncomfortable", willing to work with PT      OBJECTIVE    PHYSICAL EXAM  General: Appears well, NAD  CHEST: breathing comfortably  CV: appears well perfused  Abdomen: soft, nontender, nondistended, no rebound or guarding, vac in place, ostomy +/+, L drain site with ostomy appliance serous fluid  Extremities: Grossly symmetric    T(C): 36.8 (12-16-19 @ 01:57), Max: 37.7 (12-15-19 @ 21:16)  HR: 75 (12-16-19 @ 01:57) (75 - 85)  BP: 120/70 (12-16-19 @ 01:57) (108/65 - 138/80)  RR: 18 (12-16-19 @ 01:57) (18 - 18)  SpO2: 91% (12-16-19 @ 01:57) (91% - 100%)    12-14-19 @ 07:01  -  12-15-19 @ 07:00  --------------------------------------------------------  IN: 650 mL / OUT: 1750 mL / NET: -1100 mL    12-15-19 @ 07:01  -  12-16-19 @ 04:32  --------------------------------------------------------  IN: 1150 mL / OUT: 880 mL / NET: 270 mL        MEDICATIONS  acetaminophen   Tablet .. 650 milliGRAM(s) Oral every 6 hours PRN  albuterol/ipratropium for Nebulization 3 milliLiter(s) Nebulizer every 6 hours  aluminum hydroxide/magnesium hydroxide/simethicone Suspension 30 milliLiter(s) Oral every 4 hours PRN  aspirin  chewable 81 milliGRAM(s) Oral daily  atorvastatin 40 milliGRAM(s) Oral at bedtime  buDESOnide    Inhalation Suspension 0.5 milliGRAM(s) Inhalation every 12 hours  chlorhexidine 4% Liquid 1 Application(s) Topical <User Schedule>  doxazosin 2 milliGRAM(s) Oral daily  enoxaparin Injectable 100 milliGRAM(s) SubCutaneous <User Schedule>  erythromycin     base Tablet 250 milliGRAM(s) Oral two times a day  influenza   Vaccine 0.5 milliLiter(s) IntraMuscular once  methadone    Tablet 17.5 milliGRAM(s) Oral daily  nystatin Powder 1 Application(s) Topical two times a day  oxyCODONE    IR 10 milliGRAM(s) Oral every 4 hours PRN  oxyCODONE    IR 5 milliGRAM(s) Oral every 4 hours PRN  pantoprazole    Tablet 40 milliGRAM(s) Oral daily  polyethylene glycol 3350 17 Gram(s) Oral two times a day  sodium chloride 0.9% lock flush 10 milliLiter(s) IV Push every 1 hour PRN      LABS                        8.4    4.49  )-----------( 153      ( 15 Dec 2019 10:44 )             26.3     12-15    132<L>  |  95<L>  |  8   ----------------------------<  101<H>  4.0   |  29  |  0.57    Ca    7.8<L>      15 Dec 2019 07:13  Phos  2.7     12-15  Mg     1.9     12-15    TPro  5.5<L>  /  Alb  1.8<L>  /  TBili  0.4  /  DBili  0.2  /  AST  31  /  ALT  15  /  AlkPhos  98  12-15    PT/INR - ( 15 Dec 2019 11:12 )   PT: 13.7 sec;   INR: 1.20 ratio         PTT - ( 15 Dec 2019 11:12 )  PTT:21.2 sec      RADIOLOGY & ADDITIONAL STUDIES GENERAL SURGERY PROGRESS NOTE    SUBJECTIVE  Patient seen and examined. No acute events overnight. Reports tolerating diet without nausea, vomiting, +/+ ostomy after miralax yesterday, voiding without issues. Denies fever, chills, SOB, chest pain.   wants to sit at edge of bed 12/15, patient refuses to sit in the chair that is in the room because it is "uncomfortable", willing to work with PT.      OBJECTIVE    PHYSICAL EXAM  General: Appears well, NAD  CHEST: breathing comfortably  CV: appears well perfused  Abdomen: soft, nontender, nondistended, no rebound or guarding, vac in place, ostomy +/+, L drain site with ostomy appliance minimal serous fluid  Extremities: Grossly symmetric    T(C): 36.8 (12-16-19 @ 01:57), Max: 37.7 (12-15-19 @ 21:16)  HR: 75 (12-16-19 @ 01:57) (75 - 85)  BP: 120/70 (12-16-19 @ 01:57) (108/65 - 138/80)  RR: 18 (12-16-19 @ 01:57) (18 - 18)  SpO2: 91% (12-16-19 @ 01:57) (91% - 100%)    12-14-19 @ 07:01  -  12-15-19 @ 07:00  --------------------------------------------------------  IN: 650 mL / OUT: 1750 mL / NET: -1100 mL    12-15-19 @ 07:01  -  12-16-19 @ 04:32  --------------------------------------------------------  IN: 1150 mL / OUT: 880 mL / NET: 270 mL        MEDICATIONS  acetaminophen   Tablet .. 650 milliGRAM(s) Oral every 6 hours PRN  albuterol/ipratropium for Nebulization 3 milliLiter(s) Nebulizer every 6 hours  aluminum hydroxide/magnesium hydroxide/simethicone Suspension 30 milliLiter(s) Oral every 4 hours PRN  aspirin  chewable 81 milliGRAM(s) Oral daily  atorvastatin 40 milliGRAM(s) Oral at bedtime  buDESOnide    Inhalation Suspension 0.5 milliGRAM(s) Inhalation every 12 hours  chlorhexidine 4% Liquid 1 Application(s) Topical <User Schedule>  doxazosin 2 milliGRAM(s) Oral daily  enoxaparin Injectable 100 milliGRAM(s) SubCutaneous <User Schedule>  erythromycin     base Tablet 250 milliGRAM(s) Oral two times a day  influenza   Vaccine 0.5 milliLiter(s) IntraMuscular once  methadone    Tablet 17.5 milliGRAM(s) Oral daily  nystatin Powder 1 Application(s) Topical two times a day  oxyCODONE    IR 10 milliGRAM(s) Oral every 4 hours PRN  oxyCODONE    IR 5 milliGRAM(s) Oral every 4 hours PRN  pantoprazole    Tablet 40 milliGRAM(s) Oral daily  polyethylene glycol 3350 17 Gram(s) Oral two times a day  sodium chloride 0.9% lock flush 10 milliLiter(s) IV Push every 1 hour PRN      LABS                        8.4    4.49  )-----------( 153      ( 15 Dec 2019 10:44 )             26.3     12-15    132<L>  |  95<L>  |  8   ----------------------------<  101<H>  4.0   |  29  |  0.57    Ca    7.8<L>      15 Dec 2019 07:13  Phos  2.7     12-15  Mg     1.9     12-15    TPro  5.5<L>  /  Alb  1.8<L>  /  TBili  0.4  /  DBili  0.2  /  AST  31  /  ALT  15  /  AlkPhos  98  12-15    PT/INR - ( 15 Dec 2019 11:12 )   PT: 13.7 sec;   INR: 1.20 ratio         PTT - ( 15 Dec 2019 11:12 )  PTT:21.2 sec      RADIOLOGY & ADDITIONAL STUDIES

## 2019-12-16 NOTE — CHART NOTE - NSCHARTNOTEFT_GEN_A_CORE
Upon Nutritional Assessment by the Registered Dietitian your patient was determined to meet criteria / has evidence of the following diagnosis/diagnoses:          [ ]  Mild Protein Calorie Malnutrition        [x ]  Moderate Protein Calorie Malnutrition (acute)        [ ] Severe Protein Calorie Malnutrition        [ ] Unspecified Protein Calorie Malnutrition        [ ] Underweight / BMI <19        [ ] Morbid Obesity / BMI > 40      Findings as based on:  [ ] Comprehensive nutrition assessment   [ ] Nutrition Focused Physical Exam  [ x] Other: pt meeting < 75% of estimated needs > 7 days, mild muscle wasting      Nutrition Plan/Recommendations:    1) Continue current diet as tolerated. Encourage PO intake of protein-rich foods.   2) Continue Raimundo BID to promote wound healing, will add Ensure pudding for pt to try     RD to remain available and follow-up as medically appropriate.    PROVIDER Section:     By signing this assessment you are acknowledging and agree with the diagnosis/diagnoses assigned by the Registered Dietitian    Comments:

## 2019-12-16 NOTE — CHART NOTE - NSCHARTNOTEFT_GEN_A_CORE
Nutrition Follow Up Note  Patient seen for: calorie count results     Chart reviewed, events noted. This is a  64y Female POD#1 s/p ex lap with extended left hemicolectomy left in discontinuity for perforated sigmoid diverticulitis with intraperitoneal free air after presenting to ED with worsening constipation and abdominal pain with distention. Now s/p ex lap with extended left hemicolectomy left in discontinuity for perforated sigmoid diverticulitis. Transferred from SICU to floor 12/4. Patient started on erythromycin and Miralax.     Source: patient, medical record, calorie count sheets    Diet : Low Fiber + Raimundo BID (since 12/13)    Patient reports poor to fair PO intake, has only been consuming small amounts or "bites" of food. Pt reports some abdominal discomfort which pt attributes to constipation, receiving Miralax. Ostomy output x24hrs: 12/14: 0ml, 12/15: 50ml. Pt denying any nausea or emesis. RD observed breakfast tray with <50% of meal consumed. Pt reports dislikes of high protein jello, hasn't been consuming it and will no longer like to receive. Pt ordered for Raimundo to optimize wound healing, reports she hasn't tried it due to fear of not liking it, discussed benefits of taking it, pt will try it later. Discussed importance of adequate protein intake for wound healing and overall recovery, pt encouraged to prioritize protein rich menu items on tray first. Patient with no nutrition-related questions at this time. Made aware RD remains available as needed.     Calorie Count Results (12/13-12/15): please note below are estimates  Day 1 : 253 kcal and 16gm protein (dinner not recorded)  Day 2: 436Kcal and 15gm protein   Day 3: 352 kcal and 28gm protein     Over the course of 3 day calorie count pt consuming on average ~347 Kcal and 20gm protein, meeting < 25% of estimated protein and calorie needs. Of note, per calorie count sheet pt consuming "bites" of food items, difficult to estimate nutritional intake.      PO intake : < 25%      Source for PO intake: pt report, calorie count sheet, RD observation      Enteral /Parenteral Nutrition: N/A      Weights: no new weights to assess   12/1: 377.44lbs     Limited Nutrition focused physical exam conducted, pt noted visual sign of muscle depletion of temples    Pertinent Medications: MEDICATIONS  (STANDING):  albuterol/ipratropium for Nebulization 3 milliLiter(s) Nebulizer every 6 hours  aspirin  chewable 81 milliGRAM(s) Oral daily  atorvastatin 40 milliGRAM(s) Oral at bedtime  buDESOnide    Inhalation Suspension 0.5 milliGRAM(s) Inhalation every 12 hours  chlorhexidine 4% Liquid 1 Application(s) Topical <User Schedule>  doxazosin 2 milliGRAM(s) Oral daily  enoxaparin Injectable 100 milliGRAM(s) SubCutaneous <User Schedule>  erythromycin     base Tablet 250 milliGRAM(s) Oral two times a day  influenza   Vaccine 0.5 milliLiter(s) IntraMuscular once  methadone    Tablet 17.5 milliGRAM(s) Oral daily  nystatin Powder 1 Application(s) Topical two times a day  pantoprazole    Tablet 40 milliGRAM(s) Oral daily  polyethylene glycol 3350 17 Gram(s) Oral two times a day    MEDICATIONS  (PRN):  acetaminophen   Tablet .. 650 milliGRAM(s) Oral every 6 hours PRN Mild Pain (1 - 3)  aluminum hydroxide/magnesium hydroxide/simethicone Suspension 30 milliLiter(s) Oral every 4 hours PRN Dyspepsia  oxyCODONE    IR 10 milliGRAM(s) Oral every 4 hours PRN Severe Pain (7 - 10)  oxyCODONE    IR 5 milliGRAM(s) Oral every 4 hours PRN Moderate Pain (4 - 6)  sodium chloride 0.9% lock flush 10 milliLiter(s) IV Push every 1 hour PRN Pre/post blood products, medications, blood draw, and to maintain line patency    Pertinent Labs: reviewed     Skin per nursing documentation: free of pressure injuries, pt noted with lymphedema with blisters, midline abdomen incision with wound vac   Edema: +2 left hand, +4 generalized     Estimated Needs:   [ ] no change since previous assessment  [ x] recalculated: based on IBW of 59.1 Kg, protein increased for wound healing   (30-35 Kcal/kg): 1773-2069Kcal  (1.4-1.6gm/kg): 83-95 gram/Kg      Previous Nutrition Diagnosis: Overweight/Obesity + Food and Nutrition Knowledge related deficit, on going deferred in setting of poor PO intake    Previous Nutrition Diagnosis: Inadequate protein-energy intake   Nutrition Diagnosis is: now progressed with malnutrition     New Nutrition Diagnosis: Malnutrition (moderate, acute)  Related to: inadequate protein-energy intake in setting of decreased appetite    As evidenced by: pt meeting < 75% of estimated needs > 7 days, mild muscle wasting     Interventions:   Recommend  1) Continue current diet as tolerated. Encourage PO intake of protein-rich foods.   2) Continue bowel regimen, anticipate improvement in PO intake once constipation resolved.   2) Continue Raimundo BID to promote wound healing   3) Malnutrition alert placed in EMR, discussed with provider     Monitoring and Evaluation:     Continue to monitor Nutritional intake, Tolerance to diet prescription, weights, labs, skin integrity    RD remains available upon request and will follow up per protocol  Flori Elder RD, CDN, Pager # [phone] Nutrition Follow Up Note  Patient seen for: calorie count results     Chart reviewed, events noted. This is a  64y Female POD#1 s/p ex lap with extended left hemicolectomy left in discontinuity for perforated sigmoid diverticulitis with intraperitoneal free air after presenting to ED with worsening constipation and abdominal pain with distention. Now s/p ex lap with extended left hemicolectomy left in discontinuity for perforated sigmoid diverticulitis. Transferred from SICU to floor 12/4. Patient started on erythromycin and Miralax.     Source: patient, medical record, calorie count sheets    Diet : Low Fiber + Raimundo BID (since 12/13)    Patient reports poor to fair PO intake, has only been consuming small amounts or "bites" of food. Pt reports some abdominal discomfort which pt attributes to constipation, receiving Miralax. Ostomy output x24hrs: 12/14: 0ml, 12/15: 50ml. Pt denying any nausea or emesis. RD observed breakfast tray with <50% of meal consumed. Pt reports dislikes of high protein jello, hasn't been consuming it and will no longer like to receive. Pt ordered for Raimundo to optimize wound healing, reports she hasn't tried it due to fear of not liking it, discussed benefits of taking it, pt will try it later. Discussed importance of adequate protein intake for wound healing and overall recovery, pt encouraged to prioritize protein rich menu items on tray first. Patient with no nutrition-related questions at this time. Made aware RD remains available as needed.     Calorie Count Results (12/13-12/15): please note below are estimates  Day 1 : 253 kcal and 16gm protein (dinner not recorded)  Day 2: 436Kcal and 15gm protein   Day 3: 352 kcal and 28gm protein     Over the course of 3 day calorie count pt consuming on average ~347 Kcal and 20gm protein, meeting < 25% of estimated protein and calorie needs. Of note, per calorie count sheet pt consuming "bites" of food items, difficult to estimate nutritional intake.      PO intake : < 25%      Source for PO intake: pt report, calorie count sheet, RD observation      Enteral /Parenteral Nutrition: N/A      Weights: no new weights to assess   12/1: 377.44lbs     Limited Nutrition focused physical exam conducted, pt noted visual sign of muscle depletion of temples    Pertinent Medications: MEDICATIONS  (STANDING):  albuterol/ipratropium for Nebulization 3 milliLiter(s) Nebulizer every 6 hours  aspirin  chewable 81 milliGRAM(s) Oral daily  atorvastatin 40 milliGRAM(s) Oral at bedtime  buDESOnide    Inhalation Suspension 0.5 milliGRAM(s) Inhalation every 12 hours  chlorhexidine 4% Liquid 1 Application(s) Topical <User Schedule>  doxazosin 2 milliGRAM(s) Oral daily  enoxaparin Injectable 100 milliGRAM(s) SubCutaneous <User Schedule>  erythromycin     base Tablet 250 milliGRAM(s) Oral two times a day  influenza   Vaccine 0.5 milliLiter(s) IntraMuscular once  methadone    Tablet 17.5 milliGRAM(s) Oral daily  nystatin Powder 1 Application(s) Topical two times a day  pantoprazole    Tablet 40 milliGRAM(s) Oral daily  polyethylene glycol 3350 17 Gram(s) Oral two times a day    MEDICATIONS  (PRN):  acetaminophen   Tablet .. 650 milliGRAM(s) Oral every 6 hours PRN Mild Pain (1 - 3)  aluminum hydroxide/magnesium hydroxide/simethicone Suspension 30 milliLiter(s) Oral every 4 hours PRN Dyspepsia  oxyCODONE    IR 10 milliGRAM(s) Oral every 4 hours PRN Severe Pain (7 - 10)  oxyCODONE    IR 5 milliGRAM(s) Oral every 4 hours PRN Moderate Pain (4 - 6)  sodium chloride 0.9% lock flush 10 milliLiter(s) IV Push every 1 hour PRN Pre/post blood products, medications, blood draw, and to maintain line patency    Pertinent Labs: reviewed     Skin per nursing documentation: free of pressure injuries, pt noted with lymphedema with blisters, midline abdomen incision with wound vac   Edema: +2 left hand, +4 generalized     Estimated Needs:   [ ] no change since previous assessment  [ x] recalculated: based on IBW of 59.1 Kg, protein increased for wound healing   (30-35 Kcal/kg): 1773-2069Kcal  (1.4-1.6gm/kg): 83-95 gram/Kg      Previous Nutrition Diagnosis: Overweight/Obesity + Food and Nutrition Knowledge related deficit, on going deferred in setting of poor PO intake    Previous Nutrition Diagnosis: Inadequate protein-energy intake   Nutrition Diagnosis is: now progressed with malnutrition     New Nutrition Diagnosis: Malnutrition (moderate, acute)  Related to: inadequate protein-energy intake in setting of decreased appetite    As evidenced by: pt meeting < 75% of estimated needs > 7 days, mild muscle wasting     Interventions:   Recommend  1) Continue current diet as tolerated. Encourage PO intake of protein-rich foods.   2) Continue bowel regimen, anticipate improvement in PO intake once constipation resolved.   2) Continue Raimundo BID to promote wound healing, will add Ensure pudding for pt to try   3) Malnutrition alert placed in EMR, discussed with provider     Monitoring and Evaluation:     Continue to monitor Nutritional intake, Tolerance to diet prescription, weights, labs, skin integrity    RD remains available upon request and will follow up per protocol  Flori Elder RD, CDN, Pager # [phone]

## 2019-12-16 NOTE — PROGRESS NOTE ADULT - SUBJECTIVE AND OBJECTIVE BOX
CARDIOLOGY     PROGRESS  NOTE   ________________________________________________    CHIEF COMPLAINT:Patient is a 64y old  Female who presents with a chief complaint of perforated bowel (08 Dec 2019 09:22)  doing well.  	  REVIEW OF SYSTEMS:  CONSTITUTIONAL: No fever, weight loss, or fatigue  EYES: No eye pain, visual disturbances, or discharge  ENT:  No difficulty hearing, tinnitus, vertigo; No sinus or throat pain  NECK: No pain or stiffness  RESPIRATORY: No cough, wheezing, chills or hemoptysis; No Shortness of Breath  CARDIOVASCULAR: No chest pain, palpitations, passing out, dizziness, or leg swelling  GASTROINTESTINAL: No abdominal or epigastric pain. No nausea, vomiting, or hematemesis; No diarrhea or constipation. No melena or hematochezia.  GENITOURINARY: No dysuria, frequency, hematuria, or incontinence  NEUROLOGICAL: No headaches, memory loss, loss of strength, numbness, or tremors  SKIN: No itching, burning, rashes, or lesions   LYMPH Nodes: No enlarged glands  ENDOCRINE: No heat or cold intolerance; No hair loss  MUSCULOSKELETAL: No joint pain or swelling; No muscle, back, or extremity pain  PSYCHIATRIC: No depression, anxiety, mood swings, or difficulty sleeping  HEME/LYMPH: No easy bruising, or bleeding gums  ALLERGY AND IMMUNOLOGIC: No hives or eczema	    [ ] All others negative	  [ ] Unable to obtain    PHYSICAL EXAM:  T(C): 37.3 (12-16-19 @ 04:49), Max: 37.7 (12-15-19 @ 21:16)  HR: 75 (12-16-19 @ 04:49) (75 - 85)  BP: 110/68 (12-16-19 @ 04:49) (108/65 - 138/80)  RR: 18 (12-16-19 @ 04:49) (18 - 18)  SpO2: 93% (12-16-19 @ 04:49) (91% - 100%)  Wt(kg): --  I&O's Summary    15 Dec 2019 07:01  -  16 Dec 2019 07:00  --------------------------------------------------------  IN: 1150 mL / OUT: 900 mL / NET: 250 mL        Appearance: Normal	  HEENT:   Normal oral mucosa, PERRL, EOMI	  Lymphatic: No lymphadenopathy  Cardiovascular: Normal S1 S2, No JVD, No murmurs, + lymph edema  Respiratory: Lungs clear to auscultation	  Psychiatry: A & O x 3, Mood & affect appropriate  Gastrointestinal:  Soft, + mild tender, + BS	  Skin: No rashes, No ecchymoses, No cyanosis	  Neurologic: Non-focal  Extremities: Normal range of motion, No clubbing, cyanosis .  Vascular: Peripheral pulses palpable 2+ bilaterally    MEDICATIONS  (STANDING):  albuterol/ipratropium for Nebulization 3 milliLiter(s) Nebulizer every 6 hours  aspirin  chewable 81 milliGRAM(s) Oral daily  atorvastatin 40 milliGRAM(s) Oral at bedtime  buDESOnide    Inhalation Suspension 0.5 milliGRAM(s) Inhalation every 12 hours  chlorhexidine 4% Liquid 1 Application(s) Topical <User Schedule>  doxazosin 2 milliGRAM(s) Oral daily  enoxaparin Injectable 100 milliGRAM(s) SubCutaneous <User Schedule>  erythromycin     base Tablet 250 milliGRAM(s) Oral two times a day  influenza   Vaccine 0.5 milliLiter(s) IntraMuscular once  methadone    Tablet 17.5 milliGRAM(s) Oral daily  nystatin Powder 1 Application(s) Topical two times a day  pantoprazole    Tablet 40 milliGRAM(s) Oral daily  polyethylene glycol 3350 17 Gram(s) Oral two times a day      TELEMETRY: 	    ECG:  	  RADIOLOGY:  OTHER: 	  	  LABS:	 	    CARDIAC MARKERS:                                8.4    4.49  )-----------( 153      ( 15 Dec 2019 10:44 )             26.3     12-15    132<L>  |  95<L>  |  8   ----------------------------<  101<H>  4.0   |  29  |  0.57    Ca    7.8<L>      15 Dec 2019 07:13  Phos  2.7     12-15  Mg     1.9     12-15    TPro  5.5<L>  /  Alb  1.8<L>  /  TBili  0.4  /  DBili  0.2  /  AST  31  /  ALT  15  /  AlkPhos  98  12-15    proBNP:   Lipid Profile:   HgA1c:   TSH:   PT/INR - ( 15 Dec 2019 11:12 )   PT: 13.7 sec;   INR: 1.20 ratio         PTT - ( 15 Dec 2019 11:12 )  PTT:21.2 sec      Assessment and plan  ---------------------------  pt is sitting up comfortably in NAD , no complain.  bp noted  echo noted hyperdynamic lv sec to pain/anxiety, volume depletion  may increase fluid intake  continue dvt prophylaxis  abx dc d  echo noted no  wall motion abnormality  dvt prophylaxis on Lovenox  oob to chair as tolerated  doing much better, physical therapy  continue abx  s/p upper endoscopy result noted  continue protonix  vascular doppler with subclavian steal syndrome may consider vascular consult, will observe  will check bp from the L arm  check lytes wnl  dc planning  dvt propjylaxis

## 2019-12-16 NOTE — PROGRESS NOTE ADULT - ASSESSMENT
A/P: 64y Female POD#1 s/p ex lap with extended left hemicolectomy left in discontinuity for perforated sigmoid diverticulitis with intraperitoneal free air after presenting to ED with worsening constipation and abdominal pain with distention. AbThera vac placed and pt kept intubated post op and transferred to SICU for post-op management. Extubated 11/30, patient refusing BIPAP. Ross out 12/1. Ross replaced 12/2. UA +yeast, started on fluconazole. For elevated WBC, started on meropenem 12/3-12/10. Transferred from SICU to floor 12/4. CT C/A/P 12/4 shows no obvious intrabdominal collection. Pt was complaining of dysphagia, EGD performed revealed mid and lower esophageal diverticulum and medium sized hiatal hernia 12/10. Patient started on erythromycin.    Plan:  - LRD  - [place] options given to patient  - c/w doxazosin  - monitor ostomy  - monitor U/O  - monitor vac output  - c/w pain control, c/w methadone   - DVT ppx with lovenox  - c/w with erythromycin  - appreciate cardiology recs  - Dispo planning  - c/w PT  - start miralax  - monitor drain site output with ostomy appliance    [facility] SURGERY  p 1107 A/P: 64y Female POD#1 s/p ex lap with extended left hemicolectomy left in discontinuity for perforated sigmoid diverticulitis with intraperitoneal free air after presenting to ED with worsening constipation and abdominal pain with distention. AbThera vac placed and pt kept intubated post op and transferred to SICU for post-op management. Extubated 11/30, patient refusing BIPAP. Ross out 12/1. Ross replaced 12/2. UA +yeast, started on fluconazole. For elevated WBC, started on meropenem 12/3-12/10. Transferred from SICU to floor 12/4. CT C/A/P 12/4 shows no obvious intrabdominal collection. Pt was complaining of dysphagia, EGD performed revealed mid and lower esophageal diverticulum and medium sized hiatal hernia 12/10. Patient started on erythromycin and miralax.    Plan:  - LRD  - [place] options given to patient  - c/w doxazosin  - monitor ostomy  - monitor U/O  - monitor vac output  - c/w pain control, c/w methadone   - DVT ppx with lovenox  - c/w with erythromycin  - appreciate cardiology recs  - Dispo planning  - c/w PT  - start miralax  - monitor drain site output with ostomy appliance    GRE[facility] SURGERY  p [id number] A/P: 64y Female POD#1 s/p ex lap with extended left hemicolectomy left in discontinuity for perforated sigmoid diverticulitis with intraperitoneal free air after presenting to ED with worsening constipation and abdominal pain with distention. AbThera vac placed and pt kept intubated post op and transferred to SICU for post-op management. Extubated 11/30, patient refusing BIPAP. Ross out 12/1. Ross replaced 12/2. UA +yeast, started on fluconazole. For elevated WBC, started on meropenem 12/3-12/10. Transferred from SICU to floor 12/4. CT C/A/P 12/4 shows no obvious intrabdominal collection. Pt was complaining of dysphagia, EGD performed revealed mid and lower esophageal diverticulum and medium sized hiatal hernia 12/10. 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 Patient started on erythromycin and miralax.     Plan:  - LRD  - [place] options given to patient  - c/w doxazosin  - monitor ostomy  - monitor U/O  - monitor vac output  - c/w pain control, c/w methadone   - DVT ppx with lovenox  - c/w with erythromycin  - appreciate cardiology recs  - Dispo planning  - c/w PT  - start miralax  - monitor drain site output with ostomy appliance    GREEN SURGERY  p [id number] A/P: 64y Female POD#1 s/p ex lap with extended left hemicolectomy left in discontinuity for perforated sigmoid diverticulitis with intraperitoneal free air after presenting to ED with worsening constipation and abdominal pain with distention. AbThera vac placed and pt kept intubated post op and transferred to SICU for post-op management. Extubated 11/30, patient refusing BIPAP. Ross out 12/1. Ross replaced 12/2. UA +yeast, started on fluconazole. For elevated WBC, started on meropenem 12/3-12/10. Transferred from SICU to floor 12/4. CT C/A/P 12/4 shows no obvious intrabdominal collection. Pt was complaining of dysphagia, EGD performed revealed mid and lower esophageal diverticulum and medium sized hiatal hernia 12/10. 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 Patient started on erythromycin and miralax.     Plan:  - LRD  - [place] options given to patient  - c/w doxazosin  - monitor ostomy  - monitor U/O  - monitor vac output  - c/w pain control, c/w methadone   - DVT ppx with lovenox  - c/w with erythromycin  - appreciate cardiology recs  - c/w PT  - start miralax  - monitor drain site output with ostomy appliance  - Dispo: rehab, CM/SW/PT today    GREEN SURGERY  p [id number] A/P: 64y Female POD#1 s/p ex lap with extended left hemicolectomy left in discontinuity for perforated sigmoid diverticulitis with intraperitoneal free air after presenting to ED with worsening constipation and abdominal pain with distention. AbThera vac placed and pt kept intubated post op and transferred to SICU for post-op management. Extubated 11/30, patient refusing BIPAP. Ross out 12/1. Ross replaced 12/2. UA +yeast, started on fluconazole. For elevated WBC, started on meropenem 12/3-12/10. Transferred from SICU to floor 12/4. CT C/A/P 12/4 shows no obvious intrabdominal collection. Pt was complaining of dysphagia, EGD performed revealed mid and lower esophageal diverticulum and medium sized hiatal hernia 12/10. 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 Patient started on erythromycin and miralax.     Plan:  - LRD  - c/w doxazosin  - monitor ostomy, U/O, vac output  - c/w miralax  - c/w pain control, c/w methadone   - DVT ppx with lovenox  - c/w with erythromycin  - appreciate cardiology recs  - monitor drain site output with ostomy appliance  - Dispo: rehab, options given to patient, CM/SW/PT for planning today    [facility]  p [id number]

## 2019-12-17 RX ADMIN — Medication 0.5 MILLIGRAM(S): at 18:15

## 2019-12-17 RX ADMIN — Medication 3 MILLILITER(S): at 05:31

## 2019-12-17 RX ADMIN — Medication 0.5 MILLIGRAM(S): at 05:31

## 2019-12-17 RX ADMIN — POLYETHYLENE GLYCOL 3350 17 GRAM(S): 17 POWDER, FOR SOLUTION ORAL at 18:14

## 2019-12-17 RX ADMIN — Medication 250 MILLIGRAM(S): at 18:14

## 2019-12-17 RX ADMIN — Medication 3 MILLILITER(S): at 01:05

## 2019-12-17 RX ADMIN — Medication 250 MILLIGRAM(S): at 05:31

## 2019-12-17 RX ADMIN — POLYETHYLENE GLYCOL 3350 17 GRAM(S): 17 POWDER, FOR SOLUTION ORAL at 05:31

## 2019-12-17 RX ADMIN — METHADONE HYDROCHLORIDE 17.5 MILLIGRAM(S): 40 TABLET ORAL at 10:03

## 2019-12-17 RX ADMIN — Medication 81 MILLIGRAM(S): at 11:03

## 2019-12-17 RX ADMIN — NYSTATIN CREAM 1 APPLICATION(S): 100000 CREAM TOPICAL at 05:31

## 2019-12-17 RX ADMIN — ENOXAPARIN SODIUM 100 MILLIGRAM(S): 100 INJECTION SUBCUTANEOUS at 11:03

## 2019-12-17 RX ADMIN — CHLORHEXIDINE GLUCONATE 1 APPLICATION(S): 213 SOLUTION TOPICAL at 10:01

## 2019-12-17 RX ADMIN — Medication 3 MILLILITER(S): at 18:15

## 2019-12-17 RX ADMIN — NYSTATIN CREAM 1 APPLICATION(S): 100000 CREAM TOPICAL at 18:14

## 2019-12-17 RX ADMIN — Medication 2 MILLIGRAM(S): at 05:31

## 2019-12-17 RX ADMIN — PANTOPRAZOLE SODIUM 40 MILLIGRAM(S): 20 TABLET, DELAYED RELEASE ORAL at 11:03

## 2019-12-17 RX ADMIN — Medication 3 MILLILITER(S): at 12:21

## 2019-12-17 NOTE — PROGRESS NOTE ADULT - SUBJECTIVE AND OBJECTIVE BOX
GENERAL SURGERY PROGRESS NOTE    SUBJECTIVE  Patient seen and examined. No acute events overnight. Reports tolerating diet without nausea, vomiting, +/+ ostomy (continues on miralax), voiding without issues. Denies fever, chills, SOB, chest pain. Patient states she is willing to work with PT, but per notes she has been refusing. Family is requesting a PT eval, it was explained to the family that PT has been consulted and comes daily.       OBJECTIVE    PHYSICAL EXAM  General: Morbidly obese, appears well, NAD  CHEST: breathing comfortably  CV: appears well perfused  Abdomen: soft, nontender, nondistended, no rebound or guarding, vac in place, ostomy +/+, L drain site with ostomy appliance draining minimal serous fluid  Extremities: Grossly symmetric    T(C): 36.9 (12-17-19 @ 01:22), Max: 37.4 (12-16-19 @ 22:38)  HR: 80 (12-17-19 @ 01:22) (75 - 89)  BP: 114/71 (12-17-19 @ 01:22) (97/67 - 114/71)  RR: 18 (12-17-19 @ 01:22) (18 - 18)  SpO2: 95% (12-17-19 @ 01:22) (92% - 95%)  Wt(kg): --    LABS:                        8.4    4.49  )-----------( 153      ( 15 Dec 2019 10:44 )             26.3     12-15    132<L>  |  95<L>  |  8   ----------------------------<  101<H>  4.0   |  29  |  0.57    Ca    7.8<L>      15 Dec 2019 07:13  Phos  2.7     12-15  Mg     1.9     12-15    TPro  5.5<L>  /  Alb  1.8<L>  /  TBili  0.4  /  DBili  0.2  /  AST  31  /  ALT  15  /  AlkPhos  98  12-15    PT/INR - ( 15 Dec 2019 11:12 )   PT: 13.7 sec;   INR: 1.20 ratio         PTT - ( 15 Dec 2019 11:12 )  PTT:21.2 sec        CAPILLARY BLOOD GLUCOSE          Is&O's    12-15-19 @ 07:01  -  12-16-19 @ 07:00  --------------------------------------------------------  IN: 1150 mL / OUT: 900 mL / NET: 250 mL    12-16-19 @ 07:01  -  12-17-19 @ 04:29  --------------------------------------------------------  IN: 600 mL / OUT: 945 mL / NET: -345 mL

## 2019-12-17 NOTE — PROGRESS NOTE ADULT - ASSESSMENT
A/P: 64y Female POD#1 s/p ex lap with extended left hemicolectomy left in discontinuity for perforated sigmoid diverticulitis with intraperitoneal free air after presenting to ED with worsening constipation and abdominal pain with distention. AbThera vac placed and pt kept intubated post op and transferred to SICU for post-op management. Extubated 11/30, patient refusing BIPAP. Ross out 12/1. Ross replaced 12/2. UA +yeast, started on fluconazole. For elevated WBC, started on meropenem 12/3-12/10. Transferred from SICU to floor 12/4. CT C/A/P 12/4 shows no obvious intrabdominal collection. Pt was complaining of dysphagia, EGD performed revealed mid and lower esophageal diverticulum and medium sized hiatal hernia 12/10. 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 Patient started on erythromycin and miralax.     Plan:  - LRD  - c/w doxazosin  - monitor ostomy, U/O, vac output  - c/w miralax, c/w erythromycin   - c/w pain control, c/w methadone (home medication)   - DVT ppx with lovenox  - appreciate cardiology recs  - measure drain site output  - PT recs   - Dispo: rehab, CM/SW/PT for planning today; if patient continues to refuse to to work with PT consider [facility]  p [phone]

## 2019-12-17 NOTE — PROVIDER CONTACT NOTE (OTHER) - ACTION/TREATMENT ORDERED:
MD aware of pt's refusal. pt made aware that day team on am rounds will explain why she is ordered this medication.

## 2019-12-17 NOTE — PROGRESS NOTE ADULT - SUBJECTIVE AND OBJECTIVE BOX
CARDIOLOGY     PROGRESS  NOTE   ________________________________________________    CHIEF COMPLAINT:Patient is a 64y old  Female who presents with a chief complaint of perforated bowel (08 Dec 2019 09:22)  doing well.  	  REVIEW OF SYSTEMS:  CONSTITUTIONAL: No fever, weight loss, or fatigue  EYES: No eye pain, visual disturbances, or discharge  ENT:  No difficulty hearing, tinnitus, vertigo; No sinus or throat pain  NECK: No pain or stiffness  RESPIRATORY: No cough, wheezing, chills or hemoptysis; No Shortness of Breath  CARDIOVASCULAR: No chest pain, palpitations, passing out, dizziness, or leg swelling  GASTROINTESTINAL: No abdominal or epigastric pain. No nausea, vomiting, or hematemesis; No diarrhea or constipation. No melena or hematochezia.  GENITOURINARY: No dysuria, frequency, hematuria, or incontinence  NEUROLOGICAL: No headaches, memory loss, loss of strength, numbness, or tremors  SKIN: No itching, burning, rashes, or lesions   LYMPH Nodes: No enlarged glands  ENDOCRINE: No heat or cold intolerance; No hair loss  MUSCULOSKELETAL: No joint pain or swelling; No muscle, back, or extremity pain  PSYCHIATRIC: No depression, anxiety, mood swings, or difficulty sleeping  HEME/LYMPH: No easy bruising, or bleeding gums  ALLERGY AND IMMUNOLOGIC: No hives or eczema	    [ ] All others negative	  [ ] Unable to obtain    PHYSICAL EXAM:  T(C): 36.6 (12-17-19 @ 05:13), Max: 37.4 (12-16-19 @ 22:38)  HR: 76 (12-17-19 @ 05:13) (76 - 89)  BP: 99/61 (12-17-19 @ 05:13) (97/67 - 114/71)  RR: 18 (12-17-19 @ 05:13) (18 - 18)  SpO2: 93% (12-17-19 @ 05:13) (92% - 95%)  Wt(kg): --  I&O's Summary    16 Dec 2019 07:01  -  17 Dec 2019 07:00  --------------------------------------------------------  IN: 600 mL / OUT: 945 mL / NET: -345 mL        Appearance: Normal	  HEENT:   Normal oral mucosa, PERRL, EOMI	  Lymphatic: No lymphadenopathy  Cardiovascular: Normal S1 S2, No JVD, + murmurs, + lymph edema  Respiratory: Lungs clear to auscultation	  Psychiatry: A & O x 3, Mood & affect appropriate  Gastrointestinal:  Soft, Non-tender, + BS	  Skin: No rashes, No ecchymoses, No cyanosis	  Neurologic: Non-focal  Extremities: Normal range of motion, No clubbing, cyanosis.  Vascular: Peripheral pulses palpable 2+ bilaterally    MEDICATIONS  (STANDING):  albuterol/ipratropium for Nebulization 3 milliLiter(s) Nebulizer every 6 hours  aspirin  chewable 81 milliGRAM(s) Oral daily  atorvastatin 40 milliGRAM(s) Oral at bedtime  buDESOnide    Inhalation Suspension 0.5 milliGRAM(s) Inhalation every 12 hours  chlorhexidine 4% Liquid 1 Application(s) Topical <User Schedule>  doxazosin 2 milliGRAM(s) Oral daily  enoxaparin Injectable 100 milliGRAM(s) SubCutaneous <User Schedule>  erythromycin     base Tablet 250 milliGRAM(s) Oral two times a day  influenza   Vaccine 0.5 milliLiter(s) IntraMuscular once  methadone    Tablet 17.5 milliGRAM(s) Oral daily  nystatin Powder 1 Application(s) Topical two times a day  pantoprazole    Tablet 40 milliGRAM(s) Oral daily  polyethylene glycol 3350 17 Gram(s) Oral two times a day      TELEMETRY: 	    ECG:  	  RADIOLOGY:  OTHER: 	  	  LABS:	 	    CARDIAC MARKERS:                                8.4    4.49  )-----------( 153      ( 15 Dec 2019 10:44 )             26.3           proBNP:   Lipid Profile:   HgA1c:   TSH:   PT/INR - ( 15 Dec 2019 11:12 )   PT: 13.7 sec;   INR: 1.20 ratio         PTT - ( 15 Dec 2019 11:12 )  PTT:21.2 sec      Assessment and plan  ---------------------------  pt is sitting up comfortably in NAD , no complain.  bp noted  echo noted hyperdynamic lv sec to pain/anxiety, volume depletion  may increase fluid intake  continue dvt prophylaxis  abx dc d  echo noted no  wall motion abnormality  dvt prophylaxis on Lovenox  oob to chair as tolerated  doing much better, physical therapy  continue abx  s/p upper endoscopy result noted  continue protonix  vascular doppler with subclavian steal syndrome may consider vascular consult, will observe  will check bp from the L arm  check lytes wnl  dc planning  dvt propjylaxis

## 2019-12-17 NOTE — PROGRESS NOTE ADULT - ATTENDING COMMENTS
I saw and examined the pt and discussed the tx plan with the House Staff. I agree with the exam and plan as documented in the surgery resident's note from today.  LLQ wound output 50/24 hrs.  Dispo planning.  Liset Vargas MD

## 2019-12-18 LAB
BLD GP AB SCN SERPL QL: NEGATIVE — SIGNIFICANT CHANGE UP
RH IG SCN BLD-IMP: POSITIVE — SIGNIFICANT CHANGE UP

## 2019-12-18 RX ADMIN — ENOXAPARIN SODIUM 100 MILLIGRAM(S): 100 INJECTION SUBCUTANEOUS at 12:32

## 2019-12-18 RX ADMIN — Medication 250 MILLIGRAM(S): at 05:10

## 2019-12-18 RX ADMIN — Medication 650 MILLIGRAM(S): at 12:33

## 2019-12-18 RX ADMIN — Medication 81 MILLIGRAM(S): at 12:32

## 2019-12-18 RX ADMIN — Medication 3 MILLILITER(S): at 05:08

## 2019-12-18 RX ADMIN — Medication 250 MILLIGRAM(S): at 17:41

## 2019-12-18 RX ADMIN — CHLORHEXIDINE GLUCONATE 1 APPLICATION(S): 213 SOLUTION TOPICAL at 09:29

## 2019-12-18 RX ADMIN — NYSTATIN CREAM 1 APPLICATION(S): 100000 CREAM TOPICAL at 17:43

## 2019-12-18 RX ADMIN — POLYETHYLENE GLYCOL 3350 17 GRAM(S): 17 POWDER, FOR SOLUTION ORAL at 17:41

## 2019-12-18 RX ADMIN — Medication 3 MILLILITER(S): at 17:41

## 2019-12-18 RX ADMIN — NYSTATIN CREAM 1 APPLICATION(S): 100000 CREAM TOPICAL at 05:11

## 2019-12-18 RX ADMIN — Medication 0.5 MILLIGRAM(S): at 17:41

## 2019-12-18 RX ADMIN — OXYCODONE HYDROCHLORIDE 5 MILLIGRAM(S): 5 TABLET ORAL at 11:17

## 2019-12-18 RX ADMIN — Medication 650 MILLIGRAM(S): at 13:03

## 2019-12-18 RX ADMIN — Medication 0.5 MILLIGRAM(S): at 05:08

## 2019-12-18 RX ADMIN — PANTOPRAZOLE SODIUM 40 MILLIGRAM(S): 20 TABLET, DELAYED RELEASE ORAL at 12:32

## 2019-12-18 RX ADMIN — METHADONE HYDROCHLORIDE 17.5 MILLIGRAM(S): 40 TABLET ORAL at 09:26

## 2019-12-18 RX ADMIN — POLYETHYLENE GLYCOL 3350 17 GRAM(S): 17 POWDER, FOR SOLUTION ORAL at 05:11

## 2019-12-18 RX ADMIN — OXYCODONE HYDROCHLORIDE 5 MILLIGRAM(S): 5 TABLET ORAL at 11:45

## 2019-12-18 RX ADMIN — Medication 3 MILLILITER(S): at 12:32

## 2019-12-18 RX ADMIN — Medication 3 MILLILITER(S): at 00:47

## 2019-12-18 RX ADMIN — Medication 2 MILLIGRAM(S): at 05:10

## 2019-12-18 NOTE — PROGRESS NOTE ADULT - ASSESSMENT
A/P: 64y Female POD#1 s/p ex lap with extended left hemicolectomy left in discontinuity for perforated sigmoid diverticulitis with intraperitoneal free air after presenting to ED with worsening constipation and abdominal pain with distention. AbThera vac placed and pt kept intubated post op and transferred to SICU for post-op management. Extubated 11/30, patient refusing BIPAP. Ross out 12/1. Ross replaced 12/2. UA +yeast, started on fluconazole. For elevated WBC, started on meropenem 12/3-12/10. Transferred from SICU to floor 12/4. CT C/A/P 12/4 shows no obvious intrabdominal collection. Pt was complaining of dysphagia, EGD performed revealed mid and lower esophageal diverticulum and medium sized hiatal hernia 12/10. 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 Patient started on erythromycin and miralax.     Plan:  - LRD  - c/w doxazosin  - monitor ostomy, U/O, vac output  - c/w miralax, c/w erythromycin   - c/w pain control, c/w methadone (home medication)   - DVT ppx with lovenox  - appreciate cardiology recs  - PT recs   - continue to work on dispo planning: [facility]  - Dispo: rehab (patient continues to refuse options give)    [facility]  p [phone]

## 2019-12-18 NOTE — PROGRESS NOTE ADULT - SUBJECTIVE AND OBJECTIVE BOX
CARDIOLOGY     PROGRESS  NOTE   ________________________________________________    CHIEF COMPLAINT:Patient is a 64y old  Female who presents with a chief complaint of perforated bowel (08 Dec 2019 09:22)  doing well, no complain.  	  REVIEW OF SYSTEMS:  CONSTITUTIONAL: No fever, weight loss, or fatigue  EYES: No eye pain, visual disturbances, or discharge  ENT:  No difficulty hearing, tinnitus, vertigo; No sinus or throat pain  NECK: No pain or stiffness  RESPIRATORY: No cough, wheezing, chills or hemoptysis; No Shortness of Breath  CARDIOVASCULAR: No chest pain, palpitations, passing out, dizziness, or leg swelling  GASTROINTESTINAL: No abdominal or epigastric pain. No nausea, vomiting, or hematemesis; No diarrhea or constipation. No melena or hematochezia.  GENITOURINARY: No dysuria, frequency, hematuria, or incontinence  NEUROLOGICAL: No headaches, memory loss, loss of strength, numbness, or tremors  SKIN: No itching, burning, rashes, or lesions   LYMPH Nodes: No enlarged glands  ENDOCRINE: No heat or cold intolerance; No hair loss  MUSCULOSKELETAL: No joint pain or swelling; No muscle, back, or extremity pain  PSYCHIATRIC: No depression, anxiety, mood swings, or difficulty sleeping  HEME/LYMPH: No easy bruising, or bleeding gums  ALLERGY AND IMMUNOLOGIC: No hives or eczema	    [ ] All others negative	  [ ] Unable to obtain    PHYSICAL EXAM:  T(C): 37 (12-18-19 @ 04:55), Max: 37 (12-18-19 @ 04:55)  HR: 83 (12-18-19 @ 04:55) (73 - 85)  BP: 96/58 (12-18-19 @ 04:55) (96/58 - 153/78)  RR: 18 (12-18-19 @ 04:55) (18 - 18)  SpO2: 91% (12-18-19 @ 04:55) (91% - 98%)  Wt(kg): --  I&O's Summary    17 Dec 2019 07:01  -  18 Dec 2019 07:00  --------------------------------------------------------  IN: 570 mL / OUT: 1386 mL / NET: -816 mL        Appearance: Normal	  HEENT:   Normal oral mucosa, PERRL, EOMI	  Lymphatic: No lymphadenopathy  Cardiovascular: Normal S1 S2, No JVD, + murmurs,+lymph edema  Respiratory: Lungs clear to auscultation	  Psychiatry: A & O x 3, Mood & affect appropriate  Gastrointestinal:  Soft, Non-tender, + BS	  Skin: No rashes, No ecchymoses, No cyanosis	  Neurologic: Non-focal  Extremities: Normal range of motion, No clubbing, cyanosis or edema  Vascular: Peripheral pulses palpable 2+ bilaterally    MEDICATIONS  (STANDING):  albuterol/ipratropium for Nebulization 3 milliLiter(s) Nebulizer every 6 hours  aspirin  chewable 81 milliGRAM(s) Oral daily  atorvastatin 40 milliGRAM(s) Oral at bedtime  buDESOnide    Inhalation Suspension 0.5 milliGRAM(s) Inhalation every 12 hours  chlorhexidine 4% Liquid 1 Application(s) Topical <User Schedule>  doxazosin 2 milliGRAM(s) Oral daily  enoxaparin Injectable 100 milliGRAM(s) SubCutaneous <User Schedule>  erythromycin     base Tablet 250 milliGRAM(s) Oral two times a day  influenza   Vaccine 0.5 milliLiter(s) IntraMuscular once  methadone    Tablet 17.5 milliGRAM(s) Oral daily  nystatin Powder 1 Application(s) Topical two times a day  pantoprazole    Tablet 40 milliGRAM(s) Oral daily  polyethylene glycol 3350 17 Gram(s) Oral two times a day      TELEMETRY: 	    ECG:  	  RADIOLOGY:  OTHER: 	  	  LABS:	 	    CARDIAC MARKERS:                  proBNP:   Lipid Profile:   HgA1c:   TSH:         Assessment and plan  ---------------------------  pt is sitting up comfortably in NAD , no complain.  bp noted  echo noted hyperdynamic lv sec to pain/anxiety, volume depletion  may increase fluid intake  continue dvt prophylaxis  abx dc d  echo noted no  wall motion abnormality  dvt prophylaxis on Lovenox  oob to chair as tolerated  doing much better, physical therapy  continue abx  s/p upper endoscopy result noted  continue protonix  vascular doppler with subclavian steal syndrome may consider vascular consult, will observe  will check bp from the L arm  check lytes wnl, prn  dc planning

## 2019-12-18 NOTE — PROGRESS NOTE ADULT - SUBJECTIVE AND OBJECTIVE BOX
GENERAL SURGERY PROGRESS NOTE    SUBJECTIVE  Patient seen and examined. No acute events overnight. Reports tolerating diet without nausea, vomiting, +/+ ostomy (continues on miralax), voiding without issues. Denies fever, chills, SOB, chest pain. Patient has been willing to work with PT but is unable due to body habitus and residual weakness. Is refusing [facility] options who will accept her. awaiting further options from CM.    OBJECTIVE    PHYSICAL EXAM  General: Morbidly obese, appears well, NAD  CHEST: breathing comfortably  CV: appears well perfused  Abdomen: soft, nontender, nondistended, no rebound or guarding, vac in place, ostomy +/+, L drain site with ostomy appliance draining minimal serous fluid  Extremities: Grossly symmetric  T(C): 36.9 (12-18-19 @ 01:34), Max: 36.9 (12-17-19 @ 09:22)  HR: 73 (12-18-19 @ 01:34) (73 - 85)  BP: 117/70 (12-18-19 @ 01:34) (99/61 - 153/78)  RR: 18 (12-18-19 @ 01:34) (18 - 18)  SpO2: 96% (12-18-19 @ 01:34) (92% - 98%)  Wt(kg): --        Is&O's    12-16-19 @ 07:01  -  12-17-19 @ 07:00  --------------------------------------------------------  IN: 600 mL / OUT: 945 mL / NET: -345 mL    12-17-19 @ 07:01  -  12-18-19 @ 04:35  --------------------------------------------------------  IN: 420 mL / OUT: 605 mL / NET: -185 mL

## 2019-12-19 LAB
ANION GAP SERPL CALC-SCNC: 10 MMOL/L — SIGNIFICANT CHANGE UP (ref 5–17)
APTT BLD: 26.6 SEC — LOW (ref 27.5–36.3)
BUN SERPL-MCNC: 8 MG/DL — SIGNIFICANT CHANGE UP (ref 7–23)
CALCIUM SERPL-MCNC: 7.9 MG/DL — LOW (ref 8.4–10.5)
CHLORIDE SERPL-SCNC: 96 MMOL/L — SIGNIFICANT CHANGE UP (ref 96–108)
CO2 SERPL-SCNC: 29 MMOL/L — SIGNIFICANT CHANGE UP (ref 22–31)
CREAT SERPL-MCNC: 0.57 MG/DL — SIGNIFICANT CHANGE UP (ref 0.5–1.3)
GLUCOSE SERPL-MCNC: 107 MG/DL — HIGH (ref 70–99)
HCT VFR BLD CALC: 26.3 % — LOW (ref 34.5–45)
HGB BLD-MCNC: 8.2 G/DL — LOW (ref 11.5–15.5)
INR BLD: 1.16 RATIO — SIGNIFICANT CHANGE UP (ref 0.88–1.16)
MAGNESIUM SERPL-MCNC: 1.8 MG/DL — SIGNIFICANT CHANGE UP (ref 1.6–2.6)
MCHC RBC-ENTMCNC: 28.3 PG — SIGNIFICANT CHANGE UP (ref 27–34)
MCHC RBC-ENTMCNC: 31.2 GM/DL — LOW (ref 32–36)
MCV RBC AUTO: 90.7 FL — SIGNIFICANT CHANGE UP (ref 80–100)
PHOSPHATE SERPL-MCNC: 3.2 MG/DL — SIGNIFICANT CHANGE UP (ref 2.5–4.5)
PLATELET # BLD AUTO: 203 K/UL — SIGNIFICANT CHANGE UP (ref 150–400)
POTASSIUM SERPL-MCNC: 3.7 MMOL/L — SIGNIFICANT CHANGE UP (ref 3.5–5.3)
POTASSIUM SERPL-SCNC: 3.7 MMOL/L — SIGNIFICANT CHANGE UP (ref 3.5–5.3)
PROTHROM AB SERPL-ACNC: 13.3 SEC — HIGH (ref 10–13.1)
RBC # BLD: 2.9 M/UL — LOW (ref 3.8–5.2)
RBC # FLD: 14.6 % — HIGH (ref 10.3–14.5)
SODIUM SERPL-SCNC: 135 MMOL/L — SIGNIFICANT CHANGE UP (ref 135–145)
WBC # BLD: 7.78 K/UL — SIGNIFICANT CHANGE UP (ref 3.8–10.5)
WBC # FLD AUTO: 7.78 K/UL — SIGNIFICANT CHANGE UP (ref 3.8–10.5)

## 2019-12-19 RX ORDER — LIDOCAINE HYDROCHLORIDE AND EPINEPHRINE 10; 10 MG/ML; UG/ML
20 INJECTION, SOLUTION INFILTRATION; PERINEURAL ONCE
Refills: 0 | Status: DISCONTINUED | OUTPATIENT
Start: 2019-12-19 | End: 2019-12-19

## 2019-12-19 RX ORDER — MAGNESIUM SULFATE 500 MG/ML
2 VIAL (ML) INJECTION ONCE
Refills: 0 | Status: COMPLETED | OUTPATIENT
Start: 2019-12-19 | End: 2019-12-19

## 2019-12-19 RX ORDER — FERRIC SUBSULFATE
1 POWDER (GRAM) MISCELLANEOUS ONCE
Refills: 0 | Status: DISCONTINUED | OUTPATIENT
Start: 2019-12-19 | End: 2019-12-19

## 2019-12-19 RX ADMIN — NYSTATIN CREAM 1 APPLICATION(S): 100000 CREAM TOPICAL at 05:58

## 2019-12-19 RX ADMIN — Medication 3 MILLILITER(S): at 05:59

## 2019-12-19 RX ADMIN — Medication 81 MILLIGRAM(S): at 13:31

## 2019-12-19 RX ADMIN — Medication 250 MILLIGRAM(S): at 18:20

## 2019-12-19 RX ADMIN — ENOXAPARIN SODIUM 100 MILLIGRAM(S): 100 INJECTION SUBCUTANEOUS at 13:30

## 2019-12-19 RX ADMIN — METHADONE HYDROCHLORIDE 17.5 MILLIGRAM(S): 40 TABLET ORAL at 10:17

## 2019-12-19 RX ADMIN — Medication 250 MILLIGRAM(S): at 05:58

## 2019-12-19 RX ADMIN — Medication 0.5 MILLIGRAM(S): at 05:59

## 2019-12-19 RX ADMIN — Medication 50 GRAM(S): at 18:28

## 2019-12-19 RX ADMIN — OXYCODONE HYDROCHLORIDE 5 MILLIGRAM(S): 5 TABLET ORAL at 22:08

## 2019-12-19 RX ADMIN — POLYETHYLENE GLYCOL 3350 17 GRAM(S): 17 POWDER, FOR SOLUTION ORAL at 18:21

## 2019-12-19 RX ADMIN — OXYCODONE HYDROCHLORIDE 5 MILLIGRAM(S): 5 TABLET ORAL at 22:38

## 2019-12-19 RX ADMIN — OXYCODONE HYDROCHLORIDE 5 MILLIGRAM(S): 5 TABLET ORAL at 11:50

## 2019-12-19 RX ADMIN — Medication 0.5 MILLIGRAM(S): at 18:21

## 2019-12-19 RX ADMIN — OXYCODONE HYDROCHLORIDE 5 MILLIGRAM(S): 5 TABLET ORAL at 11:20

## 2019-12-19 RX ADMIN — Medication 3 MILLILITER(S): at 13:34

## 2019-12-19 RX ADMIN — Medication 2 MILLIGRAM(S): at 05:58

## 2019-12-19 RX ADMIN — Medication 3 MILLILITER(S): at 18:20

## 2019-12-19 RX ADMIN — PANTOPRAZOLE SODIUM 40 MILLIGRAM(S): 20 TABLET, DELAYED RELEASE ORAL at 13:30

## 2019-12-19 RX ADMIN — NYSTATIN CREAM 1 APPLICATION(S): 100000 CREAM TOPICAL at 18:20

## 2019-12-19 RX ADMIN — CHLORHEXIDINE GLUCONATE 1 APPLICATION(S): 213 SOLUTION TOPICAL at 18:24

## 2019-12-19 RX ADMIN — Medication 3 MILLILITER(S): at 00:12

## 2019-12-19 NOTE — PROGRESS NOTE ADULT - SUBJECTIVE AND OBJECTIVE BOX
CARDIOLOGY     PROGRESS  NOTE   ________________________________________________    CHIEF COMPLAINT:Patient is a 64y old  Female who presents with a chief complaint of perforated bowel (08 Dec 2019 09:22)  doing better.  	  REVIEW OF SYSTEMS:  CONSTITUTIONAL: No fever, weight loss, or fatigue  EYES: No eye pain, visual disturbances, or discharge  ENT:  No difficulty hearing, tinnitus, vertigo; No sinus or throat pain  NECK: No pain or stiffness  RESPIRATORY: No cough, wheezing, chills or hemoptysis; No Shortness of Breath  CARDIOVASCULAR: No chest pain, palpitations, passing out, dizziness, or leg swelling  GASTROINTESTINAL: No abdominal or epigastric pain. No nausea, vomiting, or hematemesis; No diarrhea or constipation. No melena or hematochezia.  GENITOURINARY: No dysuria, frequency, hematuria, or incontinence  NEUROLOGICAL: No headaches, memory loss, loss of strength, numbness, or tremors  SKIN: No itching, burning, rashes, or lesions   LYMPH Nodes: No enlarged glands  ENDOCRINE: No heat or cold intolerance; No hair loss  MUSCULOSKELETAL: No joint pain or swelling; No muscle, back, or extremity pain  PSYCHIATRIC: No depression, anxiety, mood swings, or difficulty sleeping  HEME/LYMPH: No easy bruising, or bleeding gums  ALLERGY AND IMMUNOLOGIC: No hives or eczema	    [ ] All others negative	  [ ] Unable to obtain    PHYSICAL EXAM:  T(C): 36.7 (12-19-19 @ 04:37), Max: 36.9 (12-18-19 @ 09:45)  HR: 74 (12-19-19 @ 04:37) (68 - 85)  BP: 105/68 (12-19-19 @ 04:37) (105/68 - 147/76)  RR: 18 (12-19-19 @ 04:37) (18 - 18)  SpO2: 94% (12-19-19 @ 04:37) (91% - 97%)  Wt(kg): --  I&O's Summary    18 Dec 2019 07:01  -  19 Dec 2019 07:00  --------------------------------------------------------  IN: 360 mL / OUT: 1201 mL / NET: -841 mL        Appearance: Normal	  HEENT:   Normal oral mucosa, PERRL, EOMI	  Lymphatic: No lymphadenopathy  Cardiovascular: Normal S1 S2, No JVD,+ murmurs, +lymhedema  Respiratory: Lungs clear to auscultation	  Psychiatry: A & O x 3, Mood & affect appropriate  Gastrointestinal:  Soft, Non-tender, + BS	  Skin: No rashes, No ecchymoses, No cyanosis	  Neurologic: Non-focal  Extremities: Normal range of motion, No clubbing, cyanosis or edema  Vascular: Peripheral pulses palpable 2+ bilaterally    MEDICATIONS  (STANDING):  albuterol/ipratropium for Nebulization 3 milliLiter(s) Nebulizer every 6 hours  aspirin  chewable 81 milliGRAM(s) Oral daily  atorvastatin 40 milliGRAM(s) Oral at bedtime  buDESOnide    Inhalation Suspension 0.5 milliGRAM(s) Inhalation every 12 hours  chlorhexidine 4% Liquid 1 Application(s) Topical <User Schedule>  doxazosin 2 milliGRAM(s) Oral daily  enoxaparin Injectable 100 milliGRAM(s) SubCutaneous <User Schedule>  erythromycin     base Tablet 250 milliGRAM(s) Oral two times a day  influenza   Vaccine 0.5 milliLiter(s) IntraMuscular once  methadone    Tablet 17.5 milliGRAM(s) Oral daily  nystatin Powder 1 Application(s) Topical two times a day  pantoprazole    Tablet 40 milliGRAM(s) Oral daily  polyethylene glycol 3350 17 Gram(s) Oral two times a day      TELEMETRY: 	    ECG:  	  RADIOLOGY:  OTHER: 	  	  LABS:	 	    CARDIAC MARKERS:            12-19    135  |  96  |  8   ----------------------------<  107<H>  3.7   |  29  |  0.57    Ca    7.9<L>      19 Dec 2019 07:13  Phos  3.2     12-19  Mg     1.8     12-19      proBNP:   Lipid Profile:   HgA1c:   TSH:         Assessment and plan  ---------------------------  pt is sitting up comfortably in NAD , no complain.  bp noted  echo noted hyperdynamic lv sec to pain/anxiety, volume depletion  may increase fluid intake  continue dvt prophylaxis  abx dc d  echo noted no  wall motion abnormality  dvt prophylaxis on Lovenox  oob to chair as tolerated  doing much better, physical therapy  continue abx  s/p upper endoscopy result noted  continue protonix  vascular doppler with subclavian steal syndrome may consider vascular consult, will observe  will check bp from the L arm, overall well controled  check lytes wnl, prn  dc planning to rehab

## 2019-12-19 NOTE — PROGRESS NOTE ADULT - ASSESSMENT
A/P: 64y Female POD#1 s/p ex lap with extended left hemicolectomy left in discontinuity for perforated sigmoid diverticulitis with intraperitoneal free air after presenting to ED with worsening constipation and abdominal pain with distention. AbThera vac placed and pt kept intubated post op and transferred to SICU for post-op management. Extubated 11/30, patient refusing BIPAP. Ross out 12/1. Ross replaced 12/2. UA +yeast, started on fluconazole. For elevated WBC, started on meropenem 12/3-12/10. Transferred from SICU to floor 12/4. CT C/A/P 12/4 shows no obvious intrabdominal collection. Pt was complaining of dysphagia, EGD performed revealed mid and lower esophageal diverticulum and medium sized hiatal hernia 12/10. 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 Patient started on erythromycin and miralax.     Plan:  - LRD  - c/w doxazosin  - monitor ostomy, U/O, vac output  - Possible bedside ostomy debridement today  - c/w miralax, c/w erythromycin   - c/w pain control, c/w methadone (home medication)   - DVT ppx with lovenox  - appreciate cardiology recs  - PT recs   - continue to work on dispo planning: [facility]  - Dispo: rehab (patient continues to refuse options give)    [facility]  p [phone]

## 2019-12-19 NOTE — PROVIDER CONTACT NOTE (OTHER) - REASON
pt is refusing her 10pm lipitor and refusing for her abd midline wound dressing to be changed as ordered.

## 2019-12-19 NOTE — CHART NOTE - NSCHARTNOTEFT_GEN_A_CORE
Nutrition Follow Up Note  Patient seen for: initial malnutrition follow-up    Chart reviewed, events noted. This is a  64y Female POD#1 s/p ex lap with extended left hemicolectomy left in discontinuity for perforated sigmoid diverticulitis with intraperitoneal free air after presenting to ED with worsening constipation and abdominal pain with distention. Now s/p ex lap with extended left hemicolectomy left in discontinuity for perforated sigmoid diverticulitis. Transferred from SICU to floor 12/4. Patient started on erythromycin and Miralax, reports some improvement wit constipation. Pt refusing to work with PT, discharge planning ongoing.     Source: patient, family friend, medical record     Diet : Low Fiber diet + Ensure pudding TID+ Raimundo BID    Patient reports fair PO intake, consumed 50% of grilled cheese with cooked vegetables for lunch, was NPO this morning for procedure (debridement). Denies any nausea, emesis, + ostomy output. Output x 24hrs: 201 ml (12/18). Pt reports she is afraid to eat a larger amount of food due to fear of needing ostomy bag to be changed more frequency. Pt concerned about proximity of ostomy bag to wound, reports ostomy RN assisted with bag change. Pt has yet to trial Raimundo supplements but has been prioritizing protein with meals. Pt encouraged to consume supplements to help optimize PO intake. Patient with no nutrition-related questions at this time. Made aware RD remains available as needed.      PO intake : ~ 50% of meals      Source for PO intake: pt report      Enteral /Parenteral Nutrition: N/A       No new weight to assess     Pertinent Medications: MEDICATIONS  (STANDING):  albuterol/ipratropium for Nebulization 3 milliLiter(s) Nebulizer every 6 hours  aspirin  chewable 81 milliGRAM(s) Oral daily  atorvastatin 40 milliGRAM(s) Oral at bedtime  buDESOnide    Inhalation Suspension 0.5 milliGRAM(s) Inhalation every 12 hours  chlorhexidine 4% Liquid 1 Application(s) Topical <User Schedule>  doxazosin 2 milliGRAM(s) Oral daily  enoxaparin Injectable 100 milliGRAM(s) SubCutaneous <User Schedule>  erythromycin     base Tablet 250 milliGRAM(s) Oral two times a day  influenza   Vaccine 0.5 milliLiter(s) IntraMuscular once  magnesium sulfate  IVPB 2 Gram(s) IV Intermittent once  methadone    Tablet 17.5 milliGRAM(s) Oral daily  nystatin Powder 1 Application(s) Topical two times a day  pantoprazole    Tablet 40 milliGRAM(s) Oral daily  polyethylene glycol 3350 17 Gram(s) Oral two times a day    MEDICATIONS  (PRN):  acetaminophen   Tablet .. 650 milliGRAM(s) Oral every 6 hours PRN Mild Pain (1 - 3)  aluminum hydroxide/magnesium hydroxide/simethicone Suspension 30 milliLiter(s) Oral every 4 hours PRN Dyspepsia  oxyCODONE    IR 5 milliGRAM(s) Oral every 4 hours PRN Moderate Pain (4 - 6)  sodium chloride 0.9% lock flush 10 milliLiter(s) IV Push every 1 hour PRN Pre/post blood products, medications, blood draw, and to maintain line patency    Pertinent Labs: 12-19 @ 07:13: Na 135, BUN 8, Cr 0.57, <H>, K+ 3.7, Phos 3.2, Mg 1.8, Alk Phos --, ALT/SGPT --, AST/SGOT --, HbA1c --    Skin per nursing documentation: free of pressure injuries, with bilateral lymphedema with blisters, midline abdomen with wound vac  Edema: +2 right/left hand, +4 generalized     Estimated Needs:   [x ] no change since previous assessment  [ ] recalculated:     Previous Nutrition Diagnosis: Overweight/Obesity + Food and Nutrition Knowledge related deficit, on going deferred in setting of poor PO intake    Previous Nutrition Diagnosis: Malnutrition (moderate, acute) is ongoing     New Nutrition Diagnosis: N/A    Nutrition Care Plan in Progress       Interventions:     Recommend  1) Continue current diet as tolerated. Encourage PO intake of protein-rich foods.   2) Continue Ensure pudding TID + Raimundo BID  3) Provide diet education reinforcement as needed.     Monitoring and Evaluation:     Continue to monitor Nutritional intake, Tolerance to diet prescription, weights, labs, skin integrity    RD remains available upon request and will follow up per protocol  Flori Elder RD, CDN, Pager # [phone]

## 2019-12-19 NOTE — PROVIDER CONTACT NOTE (OTHER) - ACTION/TREATMENT ORDERED:
Pt taught on importance of dressing change but pt still refused. pt taught on why lipitor is ordered for pts generally but pt still refused. MD aware. will continue to monitor.

## 2019-12-19 NOTE — PROCEDURE NOTE - NSEQUIPUSED_SKIN_A_CORE
cotton-tipped applications/gloves/forceps/antiseptic cleaning solution/gauze pads/normal saline solution

## 2019-12-19 NOTE — PROCEDURE NOTE - NSFINDINGS_GEN_A_CORE
necrotic tissue at medial aspect of colostomy with underlying stool; debrided to viable tissue/necrosis

## 2019-12-20 RX ORDER — SPIRONOLACTONE 25 MG/1
25 TABLET, FILM COATED ORAL DAILY
Refills: 0 | Status: DISCONTINUED | OUTPATIENT
Start: 2019-12-20 | End: 2019-12-23

## 2019-12-20 RX ORDER — FUROSEMIDE 40 MG
20 TABLET ORAL DAILY
Refills: 0 | Status: DISCONTINUED | OUTPATIENT
Start: 2019-12-20 | End: 2019-12-29

## 2019-12-20 RX ORDER — METHADONE HYDROCHLORIDE 40 MG/1
17 TABLET ORAL DAILY
Refills: 0 | Status: DISCONTINUED | OUTPATIENT
Start: 2019-12-20 | End: 2019-12-20

## 2019-12-20 RX ORDER — METHADONE HYDROCHLORIDE 40 MG/1
17.5 TABLET ORAL DAILY
Refills: 0 | Status: DISCONTINUED | OUTPATIENT
Start: 2019-12-20 | End: 2019-12-27

## 2019-12-20 RX ADMIN — NYSTATIN CREAM 1 APPLICATION(S): 100000 CREAM TOPICAL at 18:41

## 2019-12-20 RX ADMIN — OXYCODONE HYDROCHLORIDE 5 MILLIGRAM(S): 5 TABLET ORAL at 14:40

## 2019-12-20 RX ADMIN — METHADONE HYDROCHLORIDE 17.5 MILLIGRAM(S): 40 TABLET ORAL at 08:38

## 2019-12-20 RX ADMIN — Medication 3 MILLILITER(S): at 18:39

## 2019-12-20 RX ADMIN — Medication 3 MILLILITER(S): at 23:07

## 2019-12-20 RX ADMIN — Medication 81 MILLIGRAM(S): at 13:24

## 2019-12-20 RX ADMIN — ENOXAPARIN SODIUM 100 MILLIGRAM(S): 100 INJECTION SUBCUTANEOUS at 13:24

## 2019-12-20 RX ADMIN — POLYETHYLENE GLYCOL 3350 17 GRAM(S): 17 POWDER, FOR SOLUTION ORAL at 06:02

## 2019-12-20 RX ADMIN — Medication 0.5 MILLIGRAM(S): at 06:02

## 2019-12-20 RX ADMIN — Medication 0.5 MILLIGRAM(S): at 18:38

## 2019-12-20 RX ADMIN — CHLORHEXIDINE GLUCONATE 1 APPLICATION(S): 213 SOLUTION TOPICAL at 13:23

## 2019-12-20 RX ADMIN — SPIRONOLACTONE 25 MILLIGRAM(S): 25 TABLET, FILM COATED ORAL at 13:28

## 2019-12-20 RX ADMIN — Medication 250 MILLIGRAM(S): at 18:38

## 2019-12-20 RX ADMIN — Medication 3 MILLILITER(S): at 06:02

## 2019-12-20 RX ADMIN — Medication 250 MILLIGRAM(S): at 06:02

## 2019-12-20 RX ADMIN — Medication 3 MILLILITER(S): at 13:24

## 2019-12-20 RX ADMIN — POLYETHYLENE GLYCOL 3350 17 GRAM(S): 17 POWDER, FOR SOLUTION ORAL at 18:38

## 2019-12-20 RX ADMIN — PANTOPRAZOLE SODIUM 40 MILLIGRAM(S): 20 TABLET, DELAYED RELEASE ORAL at 13:24

## 2019-12-20 RX ADMIN — NYSTATIN CREAM 1 APPLICATION(S): 100000 CREAM TOPICAL at 06:02

## 2019-12-20 RX ADMIN — Medication 20 MILLIGRAM(S): at 13:28

## 2019-12-20 RX ADMIN — Medication 650 MILLIGRAM(S): at 14:40

## 2019-12-20 RX ADMIN — Medication 650 MILLIGRAM(S): at 15:10

## 2019-12-20 RX ADMIN — Medication 2 MILLIGRAM(S): at 06:02

## 2019-12-20 RX ADMIN — Medication 3 MILLILITER(S): at 01:11

## 2019-12-20 NOTE — PROGRESS NOTE ADULT - SUBJECTIVE AND OBJECTIVE BOX
CARDIOLOGY     PROGRESS  NOTE   ________________________________________________    CHIEF COMPLAINT:Patient is a 64y old  Female who presents with a chief complaint of perforated bowel (08 Dec 2019 09:22)  no complain.  	  REVIEW OF SYSTEMS:  CONSTITUTIONAL: No fever, weight loss, or fatigue  EYES: No eye pain, visual disturbances, or discharge  ENT:  No difficulty hearing, tinnitus, vertigo; No sinus or throat pain  NECK: No pain or stiffness  RESPIRATORY: No cough, wheezing, chills or hemoptysis; No Shortness of Breath  CARDIOVASCULAR: No chest pain, palpitations, passing out, dizziness, or leg swelling  GASTROINTESTINAL: No abdominal or epigastric pain. No nausea, vomiting, or hematemesis; No diarrhea or constipation. No melena or hematochezia.  GENITOURINARY: No dysuria, frequency, hematuria, or incontinence  NEUROLOGICAL: No headaches, memory loss, loss of strength, numbness, or tremors  SKIN: No itching, burning, rashes, or lesions   LYMPH Nodes: No enlarged glands  ENDOCRINE: No heat or cold intolerance; No hair loss  MUSCULOSKELETAL: No joint pain or swelling; No muscle, back, or extremity pain  PSYCHIATRIC: No depression, anxiety, mood swings, or difficulty sleeping  HEME/LYMPH: No easy bruising, or bleeding gums  ALLERGY AND IMMUNOLOGIC: No hives or eczema	    [ ] All others negative	  [ ] Unable to obtain    PHYSICAL EXAM:  T(C): 37.1 (12-20-19 @ 01:29), Max: 37.1 (12-20-19 @ 01:29)  HR: 78 (12-20-19 @ 01:29) (71 - 83)  BP: 125/70 (12-20-19 @ 01:29) (115/53 - 137/67)  RR: 18 (12-20-19 @ 01:29) (18 - 18)  SpO2: 97% (12-20-19 @ 01:29) (94% - 97%)  Wt(kg): --  I&O's Summary    19 Dec 2019 07:01  -  20 Dec 2019 07:00  --------------------------------------------------------  IN: 390 mL / OUT: 940 mL / NET: -550 mL        Appearance: Normal	  HEENT:   Normal oral mucosa, PERRL, EOMI	  Lymphatic: No lymphadenopathy  Cardiovascular: Normal S1 S2, No JVD, + murmurs, + lymhedema edema  Respiratory: Lungs clear to auscultation	  Psychiatry: A & O x 3, Mood & affect appropriate  Gastrointestinal:  Soft, Non-tender, + BS	  Skin: No rashes, No ecchymoses, No cyanosis	  Neurologic: Non-focal  Extremities: Normal range of motion, No clubbing, cyanosis or edema  Vascular: Peripheral pulses palpable 2+ bilaterally    MEDICATIONS  (STANDING):  albuterol/ipratropium for Nebulization 3 milliLiter(s) Nebulizer every 6 hours  aspirin  chewable 81 milliGRAM(s) Oral daily  atorvastatin 40 milliGRAM(s) Oral at bedtime  buDESOnide    Inhalation Suspension 0.5 milliGRAM(s) Inhalation every 12 hours  chlorhexidine 4% Liquid 1 Application(s) Topical <User Schedule>  doxazosin 2 milliGRAM(s) Oral daily  enoxaparin Injectable 100 milliGRAM(s) SubCutaneous <User Schedule>  erythromycin     base Tablet 250 milliGRAM(s) Oral two times a day  influenza   Vaccine 0.5 milliLiter(s) IntraMuscular once  methadone    Tablet 17.5 milliGRAM(s) Oral daily  nystatin Powder 1 Application(s) Topical two times a day  pantoprazole    Tablet 40 milliGRAM(s) Oral daily  polyethylene glycol 3350 17 Gram(s) Oral two times a day      TELEMETRY: 	    ECG:  	  RADIOLOGY:  OTHER: 	  	  LABS:	 	    CARDIAC MARKERS:                                8.2    7.78  )-----------( 203      ( 19 Dec 2019 09:18 )             26.3     12-19    135  |  96  |  8   ----------------------------<  107<H>  3.7   |  29  |  0.57    Ca    7.9<L>      19 Dec 2019 07:13  Phos  3.2     12-19  Mg     1.8     12-19      proBNP:   Lipid Profile:   HgA1c:   TSH:   PT/INR - ( 19 Dec 2019 09:36 )   PT: 13.3 sec;   INR: 1.16 ratio         PTT - ( 19 Dec 2019 09:36 )  PTT:26.6 sec      Assessment and plan  ---------------------------  pt is sitting up comfortably in NAD , no complain.  bp noted  echo noted hyperdynamic lv sec to pain/anxiety, volume depletion  may increase fluid intake  continue dvt prophylaxis  abx dc d  echo noted no  wall motion abnormality  dvt prophylaxis on Lovenox  oob to chair as tolerated  doing much better, physical therapy  continue abx  s/p upper endoscopy result noted  continue protonix  vascular doppler with subclavian steal syndrome may consider vascular consult, will observe  will check bp from the L arm, overall well controled  check lytes wnl, prn  dc planning to rehab as per surgery

## 2019-12-20 NOTE — PROGRESS NOTE ADULT - ATTENDING COMMENTS
I saw and examined the pt and discussed the tx plan with the House Staff. I agree with the exam and plan as documented in the surgery resident's note from today which I edited as indicated.  The necrotic skin around the colostomy was debrided yesterday; the stoma is skin but it had  from all surrounding tissues. All necrotic tissue was excised. She has a floating stoma. Stoma Team is working with this complex wound.   The midline wound also has fat necrosis of her very deep subcutaneous tissues and is being debrided by PT. The fascia has retracted and the midline has granulation tissue being protected by Adaptic.   There has been higher output from the LLQ wound draining ascites.   Continue complex wound care.   Will start Lasix-aldactone combo given wound draining ascites.  Liset Vargas MD

## 2019-12-20 NOTE — PROGRESS NOTE ADULT - ASSESSMENT
A/P: 64y Female POD#1 s/p ex lap with extended left hemicolectomy left in discontinuity for perforated sigmoid diverticulitis with intraperitoneal free air after presenting to ED with worsening constipation and abdominal pain with distention. AbThera vac placed and pt kept intubated post op and transferred to SICU for post-op management. Extubated 11/30, patient refusing BIPAP. Ross out 12/1. Ross replaced 12/2. UA +yeast, started on fluconazole. For elevated WBC, started on meropenem 12/3-12/10. Transferred from SICU to floor 12/4. CT C/A/P 12/4 shows no obvious intrabdominal collection. Pt was complaining of dysphagia, EGD performed revealed mid and lower esophageal diverticulum and medium sized hiatal hernia 12/10. 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 Patient started on erythromycin and miralax.     Plan:  - LRD  - c/w doxazosin  - monitor ostomy, U/O  - Wet to dry dressings, possible VAC replacement Monday  - c/w miralax, c/w erythromycin   - c/w pain control: methadone (home medication), Tylenol, Oxy PRN  - DVT ppx with lovenox, ASA 81  - appreciate cardiology recs  - c/w PT recs  - continue to work on dispo planning: [place] vs [facility]  - Dispo: rehab (patient continues to refuse options give)    GREEN SURGERY  p [id number] A/P: 64y Female  s/p ex lap with extended left hemicolectomy left in discontinuity for perforated sigmoid diverticulitis with intraperitoneal free air. AbThera vac placed and pt kept intubated post op and transferred to SICU for post-op management. Extubated 11/30. For elevated WBC, started on meropenem 12/3-12/10. Transferred from SICU to floor 12/4. CT C/A/P 12/4 shows no obvious intrabdominal collection. Pt was complaining of dysphagia, EGD performed revealed mid and lower esophageal diverticulum and medium sized hiatal hernia 12/10. 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 Patient started on erythromycin and miralax to assist with BF 12/11. On 12/19 patient found to have induration, vac removed, we to dry dressings places. Work with PT has been difficult due to patients body habitus and deconditioning; patient unable to be placed in [facility] at this time.    Plan:  - LRD  - c/w doxazosin  - monitor ostomy, U/O  - Wet to dry dressings, possible VAC replacement Monday  - c/w miralax, c/w erythromycin   - c/w pain control: methadone (home medication), Tylenol, Oxy PRN  - DVT ppx with lovenox, ASA 81  - appreciate cardiology recs  - c/w PT recs  - continue to work on dispo planning: [place] vs [facility]  - Dispo: rehab (patient continues to refuse options give)    GREEN SURGERY  p [id number] A/P: 64y Female  s/p ex lap with extended left hemicolectomy left in discontinuity for perforated sigmoid diverticulitis with intraperitoneal free air. AbThera vac placed and pt kept intubated post op and transferred to SICU for post-op management. Extubated 11/30. For elevated WBC, started on meropenem 12/3-12/10. Transferred from SICU to floor 12/4. CT C/A/P 12/4 shows no obvious intrabdominal collection. Pt was complaining of dysphagia, EGD performed revealed mid and lower esophageal diverticulum and medium sized hiatal hernia 12/10. 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 Patient started on erythromycin and miralax to assist with BF 12/11. On 12/19 patient found to have induration, vac removed, we to dry dressings places. Work with PT has been difficult due to patients body habitus and deconditioning; patient unable to be placed in [facility] at this time.    Plan:  - LRD  - c/w doxazosin  - monitor ostomy, U/O  - BID wet to dry dressings, possible VAC replacement Monday  - c/w miralax, c/w erythromycin   - c/w pain control: methadone (home medication), Tylenol, Oxy PRN  - DVT ppx with lovenox, ASA 81  - appreciate cardiology recs  - c/w PT recs  - continue to work on dispo planning: [place] vs [facility]  - Dispo: rehab (patient continues to refuse options give)    GREEN SURGERY  p [phone]

## 2019-12-20 NOTE — PROGRESS NOTE ADULT - SUBJECTIVE AND OBJECTIVE BOX
GENERAL SURGERY PROGRESS NOTE    SUBJECTIVE  Patient seen and examined.   No acute events overnight.   Tolerating diet without nausea, vomiting.   +/+ ostomy (continues on miralax), voiding without issues.   Denies fever, chills, SOB, chest pain.   Skin surrounding midline with dark discoloration, s/p bedside debridement yesterday. VAC removed, wet to dry dressing placed.   Continues to not quality for [place] and is refusing options (eg: [place]) given to her, awaiting further options from .    OBJECTIVE  PHYSICAL EXAM  General: Morbidly obese, appears well, NAD  CHEST: breathing comfortably  CV: appears well perfused  Abdomen: soft, nontender, nondistended, no rebound or guarding, vac in place, ostomy +/+, L drain site with ostomy appliance draining minimal serous fluid  Extremities: Grossly symmetric    T(C): 37.1 (12-20-19 @ 01:29), Max: 37.1 (12-20-19 @ 01:29)  HR: 78 (12-20-19 @ 01:29) (71 - 83)  BP: 125/70 (12-20-19 @ 01:29) (115/53 - 137/67)  RR: 18 (12-20-19 @ 01:29) (18 - 18)  SpO2: 97% (12-20-19 @ 01:29) (94% - 97%)  Wt(kg): --    LABS:                        8.2    7.78  )-----------( 203      ( 19 Dec 2019 09:18 )             26.3     12-19    135  |  96  |  8   ----------------------------<  107<H>  3.7   |  29  |  0.57    Ca    7.9<L>      19 Dec 2019 07:13  Phos  3.2     12-19  Mg     1.8     12-19      PT/INR - ( 19 Dec 2019 09:36 )   PT: 13.3 sec;   INR: 1.16 ratio    PTT - ( 19 Dec 2019 09:36 )  PTT:26.6 sec      Is&O's  12-18-19 @ 07:01  -  12-19-19 @ 07:00  --------------------------------------------------------  IN: 360 mL / OUT: 1201 mL / NET: -841 mL    12-19-19 @ 07:01  -  12-20-19 @ 04:47  --------------------------------------------------------  IN: 390 mL / OUT: 940 mL / NET: -550 mL GENERAL SURGERY PROGRESS NOTE    SUBJECTIVE  Patient seen and examined.   No acute events overnight.   Tolerating diet without nausea, vomiting.   +/+ ostomy (continues on miralax), adequate urine OP.   Denies fever, chills, SOB, chest pain.   Skin surrounding midline with dark discoloration, s/p bedside debridement yesterday. VAC removed, adaptic and wet to dry dressing placed.   Continues to not quality for [place] and is refusing options (eg: [place]) given to her, awaiting further options from CM.    OBJECTIVE  PHYSICAL EXAM  General: Morbidly obese, appears well, NAD  CHEST: breathing comfortably  CV: appears well perfused  Abdomen: soft, nontender, nondistended, no rebound or guarding, vac in place, ostomy +/+, L drain site with ostomy appliance draining minimal serous fluid  Extremities: Grossly symmetric    T(C): 37.1 (12-20-19 @ 01:29), Max: 37.1 (12-20-19 @ 01:29)  HR: 78 (12-20-19 @ 01:29) (71 - 83)  BP: 125/70 (12-20-19 @ 01:29) (115/53 - 137/67)  RR: 18 (12-20-19 @ 01:29) (18 - 18)  SpO2: 97% (12-20-19 @ 01:29) (94% - 97%)  Wt(kg): --    LABS:                        8.2    7.78  )-----------( 203      ( 19 Dec 2019 09:18 )             26.3     12-19    135  |  96  |  8   ----------------------------<  107<H>  3.7   |  29  |  0.57    Ca    7.9<L>      19 Dec 2019 07:13  Phos  3.2     12-19  Mg     1.8     12-19      PT/INR - ( 19 Dec 2019 09:36 )   PT: 13.3 sec;   INR: 1.16 ratio    PTT - ( 19 Dec 2019 09:36 )  PTT:26.6 sec      Is&O's  12-18-19 @ 07:01  -  12-19-19 @ 07:00  --------------------------------------------------------  IN: 360 mL / OUT: 1201 mL / NET: -841 mL    12-19-19 @ 07:01  -  12-20-19 @ 04:47  --------------------------------------------------------  IN: 390 mL / OUT: 940 mL / NET: -550 mL GENERAL SURGERY PROGRESS NOTE    SUBJECTIVE  Patient seen and examined.   No acute events overnight.   Tolerating diet without nausea, vomiting.   +/+ ostomy (continues on miralax), adequate urine OP.   Denies fever, chills, SOB, chest pain.   Skin surrounding ostomy w/dark discoloration, s/p bedside debridement yesterday. VAC removed from midline, adaptic and wet to dry dressing placed.   Continues to not quality for [place] and is refusing options (eg: [place]) given to her, awaiting further options from .    OBJECTIVE  PHYSICAL EXAM  General: Morbidly obese, appears well, NAD  CHEST: breathing comfortably  CV: appears well perfused  Abdomen: soft, nontender, nondistended, no rebound or guarding, vac in place, ostomy +/+, L drain site with ostomy appliance draining minimal serous fluid  Extremities: Grossly symmetric    T(C): 37.1 (12-20-19 @ 01:29), Max: 37.1 (12-20-19 @ 01:29)  HR: 78 (12-20-19 @ 01:29) (71 - 83)  BP: 125/70 (12-20-19 @ 01:29) (115/53 - 137/67)  RR: 18 (12-20-19 @ 01:29) (18 - 18)  SpO2: 97% (12-20-19 @ 01:29) (94% - 97%)  Wt(kg): --    LABS:                        8.2    7.78  )-----------( 203      ( 19 Dec 2019 09:18 )             26.3     12-19    135  |  96  |  8   ----------------------------<  107<H>  3.7   |  29  |  0.57    Ca    7.9<L>      19 Dec 2019 07:13  Phos  3.2     12-19  Mg     1.8     12-19      PT/INR - ( 19 Dec 2019 09:36 )   PT: 13.3 sec;   INR: 1.16 ratio    PTT - ( 19 Dec 2019 09:36 )  PTT:26.6 sec      Is&O's  12-18-19 @ 07:01  -  12-19-19 @ 07:00  --------------------------------------------------------  IN: 360 mL / OUT: 1201 mL / NET: -841 mL    12-19-19 @ 07:01  -  12-20-19 @ 04:47  --------------------------------------------------------  IN: 390 mL / OUT: 940 mL / NET: -550 mL GENERAL SURGERY PROGRESS NOTE    SUBJECTIVE  Patient seen and examined.   No acute events overnight.   Tolerating diet without nausea, vomiting.   +/+ ostomy (continues on miralax), adequate urine OP.   Denies fever, chills, SOB, chest pain.   S/p bedside debridement of the necrotic skin around the colostomy yesterday. VAC removed from midline, adaptic and wet to dry dressing placed.     OBJECTIVE  PHYSICAL EXAM  General: Morbidly obese, appears well, NAD  CHEST: breathing comfortably  CV: appears well perfused  Abdomen: soft, nontender, nondistended, no rebound or guarding, vac in place, ostomy +/+, L drain site with ostomy appliance draining serous fluid  Extremities: Grossly symmetric    T(C): 37.1 (12-20-19 @ 01:29), Max: 37.1 (12-20-19 @ 01:29)  HR: 78 (12-20-19 @ 01:29) (71 - 83)  BP: 125/70 (12-20-19 @ 01:29) (115/53 - 137/67)  RR: 18 (12-20-19 @ 01:29) (18 - 18)  SpO2: 97% (12-20-19 @ 01:29) (94% - 97%)  Wt(kg): --    LABS:                        8.2    7.78  )-----------( 203      ( 19 Dec 2019 09:18 )             26.3     12-19    135  |  96  |  8   ----------------------------<  107<H>  3.7   |  29  |  0.57    Ca    7.9<L>      19 Dec 2019 07:13  Phos  3.2     12-19  Mg     1.8     12-19      PT/INR - ( 19 Dec 2019 09:36 )   PT: 13.3 sec;   INR: 1.16 ratio    PTT - ( 19 Dec 2019 09:36 )  PTT:26.6 sec      Is&O's  12-18-19 @ 07:01  -  12-19-19 @ 07:00  --------------------------------------------------------  IN: 360 mL / OUT: 1201 mL / NET: -841 mL    12-19-19 @ 07:01  -  12-20-19 @ 04:47  --------------------------------------------------------  IN: 390 mL / OUT: 940 mL / NET: -550 mL

## 2019-12-21 LAB
ALBUMIN SERPL ELPH-MCNC: 1.8 G/DL — LOW (ref 3.3–5)
ALP SERPL-CCNC: 99 U/L — SIGNIFICANT CHANGE UP (ref 40–120)
ALT FLD-CCNC: 14 U/L — SIGNIFICANT CHANGE UP (ref 10–45)
ANION GAP SERPL CALC-SCNC: 11 MMOL/L — SIGNIFICANT CHANGE UP (ref 5–17)
APTT BLD: 27.2 SEC — LOW (ref 27.5–36.3)
AST SERPL-CCNC: 26 U/L — SIGNIFICANT CHANGE UP (ref 10–40)
BILIRUB DIRECT SERPL-MCNC: <0.1 MG/DL — SIGNIFICANT CHANGE UP (ref 0–0.2)
BILIRUB INDIRECT FLD-MCNC: >0.2 MG/DL — SIGNIFICANT CHANGE UP (ref 0.2–1)
BILIRUB SERPL-MCNC: 0.3 MG/DL — SIGNIFICANT CHANGE UP (ref 0.2–1.2)
BUN SERPL-MCNC: 7 MG/DL — SIGNIFICANT CHANGE UP (ref 7–23)
CALCIUM SERPL-MCNC: 8 MG/DL — LOW (ref 8.4–10.5)
CHLORIDE SERPL-SCNC: 94 MMOL/L — LOW (ref 96–108)
CO2 SERPL-SCNC: 28 MMOL/L — SIGNIFICANT CHANGE UP (ref 22–31)
CREAT SERPL-MCNC: 0.59 MG/DL — SIGNIFICANT CHANGE UP (ref 0.5–1.3)
GLUCOSE SERPL-MCNC: 116 MG/DL — HIGH (ref 70–99)
INR BLD: 1.09 RATIO — SIGNIFICANT CHANGE UP (ref 0.88–1.16)
MAGNESIUM SERPL-MCNC: 1.9 MG/DL — SIGNIFICANT CHANGE UP (ref 1.6–2.6)
PHOSPHATE SERPL-MCNC: 2.9 MG/DL — SIGNIFICANT CHANGE UP (ref 2.5–4.5)
POTASSIUM SERPL-MCNC: 3.5 MMOL/L — SIGNIFICANT CHANGE UP (ref 3.5–5.3)
POTASSIUM SERPL-SCNC: 3.5 MMOL/L — SIGNIFICANT CHANGE UP (ref 3.5–5.3)
PROT SERPL-MCNC: 5.6 G/DL — LOW (ref 6–8.3)
PROTHROM AB SERPL-ACNC: 12.5 SEC — SIGNIFICANT CHANGE UP (ref 10–13.1)
SODIUM SERPL-SCNC: 133 MMOL/L — LOW (ref 135–145)

## 2019-12-21 RX ORDER — POTASSIUM CHLORIDE 20 MEQ
40 PACKET (EA) ORAL ONCE
Refills: 0 | Status: COMPLETED | OUTPATIENT
Start: 2019-12-21 | End: 2019-12-21

## 2019-12-21 RX ORDER — MAGNESIUM SULFATE 500 MG/ML
1 VIAL (ML) INJECTION ONCE
Refills: 0 | Status: COMPLETED | OUTPATIENT
Start: 2019-12-21 | End: 2019-12-21

## 2019-12-21 RX ADMIN — Medication 20 MILLIGRAM(S): at 05:24

## 2019-12-21 RX ADMIN — Medication 3 MILLILITER(S): at 11:28

## 2019-12-21 RX ADMIN — POLYETHYLENE GLYCOL 3350 17 GRAM(S): 17 POWDER, FOR SOLUTION ORAL at 17:14

## 2019-12-21 RX ADMIN — ENOXAPARIN SODIUM 100 MILLIGRAM(S): 100 INJECTION SUBCUTANEOUS at 11:28

## 2019-12-21 RX ADMIN — Medication 0.5 MILLIGRAM(S): at 05:24

## 2019-12-21 RX ADMIN — Medication 250 MILLIGRAM(S): at 17:13

## 2019-12-21 RX ADMIN — Medication 100 GRAM(S): at 10:33

## 2019-12-21 RX ADMIN — Medication 2 MILLIGRAM(S): at 05:24

## 2019-12-21 RX ADMIN — Medication 0.5 MILLIGRAM(S): at 17:13

## 2019-12-21 RX ADMIN — Medication 650 MILLIGRAM(S): at 15:45

## 2019-12-21 RX ADMIN — CHLORHEXIDINE GLUCONATE 1 APPLICATION(S): 213 SOLUTION TOPICAL at 10:40

## 2019-12-21 RX ADMIN — Medication 3 MILLILITER(S): at 17:13

## 2019-12-21 RX ADMIN — POLYETHYLENE GLYCOL 3350 17 GRAM(S): 17 POWDER, FOR SOLUTION ORAL at 05:24

## 2019-12-21 RX ADMIN — SPIRONOLACTONE 25 MILLIGRAM(S): 25 TABLET, FILM COATED ORAL at 05:24

## 2019-12-21 RX ADMIN — Medication 250 MILLIGRAM(S): at 05:24

## 2019-12-21 RX ADMIN — NYSTATIN CREAM 1 APPLICATION(S): 100000 CREAM TOPICAL at 05:24

## 2019-12-21 RX ADMIN — Medication 3 MILLILITER(S): at 05:24

## 2019-12-21 RX ADMIN — NYSTATIN CREAM 1 APPLICATION(S): 100000 CREAM TOPICAL at 17:16

## 2019-12-21 RX ADMIN — OXYCODONE HYDROCHLORIDE 5 MILLIGRAM(S): 5 TABLET ORAL at 15:47

## 2019-12-21 RX ADMIN — Medication 650 MILLIGRAM(S): at 15:47

## 2019-12-21 RX ADMIN — Medication 40 MILLIEQUIVALENT(S): at 10:32

## 2019-12-21 RX ADMIN — METHADONE HYDROCHLORIDE 17.5 MILLIGRAM(S): 40 TABLET ORAL at 10:32

## 2019-12-21 RX ADMIN — PANTOPRAZOLE SODIUM 40 MILLIGRAM(S): 20 TABLET, DELAYED RELEASE ORAL at 11:28

## 2019-12-21 RX ADMIN — Medication 3 MILLILITER(S): at 23:40

## 2019-12-21 RX ADMIN — Medication 81 MILLIGRAM(S): at 11:28

## 2019-12-21 RX ADMIN — OXYCODONE HYDROCHLORIDE 5 MILLIGRAM(S): 5 TABLET ORAL at 15:46

## 2019-12-21 NOTE — PROGRESS NOTE ADULT - SUBJECTIVE AND OBJECTIVE BOX
GENERAL SURGERY PROGRESS NOTE    SUBJECTIVE  Patient seen and examined.   No acute events overnight.   Tolerating diet without nausea, vomiting.   +/+ ostomy (continues on miralax), adequate urine OP.   Denies fever, chills, SOB, chest pain.     OBJECTIVE  PHYSICAL EXAM  General: Morbidly obese, appears well, NAD  CHEST: breathing comfortably  CV: appears well perfused  Abdomen: soft, nontender, nondistended, no rebound or guarding, vac in place, ostomy +/+, L drain site with ostomy appliance draining serous fluid  Extremities: Grossly symmetric    V/S:  Vital Signs Last 24 Hrs  T(C): 36.8 (21 Dec 2019 04:56), Max: 36.9 (20 Dec 2019 10:30)  T(F): 98.3 (21 Dec 2019 04:56), Max: 98.5 (20 Dec 2019 10:30)  HR: 78 (21 Dec 2019 04:56) (78 - 115)  BP: 111/60 (21 Dec 2019 04:56) (111/60 - 140/61)  BP(mean): --  RR: 18 (21 Dec 2019 04:56) (18 - 19)  SpO2: 96% (21 Dec 2019 04:56) (94% - 96%)    --------------------------------------------------------------------------------------------------  I/Os:    19 Dec 2019 07:01  -  20 Dec 2019 07:00  --------------------------------------------------------  IN:    Oral Fluid: 340 mL    Solution: 50 mL  Total IN: 390 mL    OUT:    Drain: 90 mL    Voided: 850 mL  Total OUT: 940 mL    Total NET: -550 mL      20 Dec 2019 07:01  -  21 Dec 2019 05:51  --------------------------------------------------------  IN:    Oral Fluid: 800 mL  Total IN: 800 mL    OUT:    Colostomy: 301 mL    Drain: 165 mL    Voided: 250 mL  Total OUT: 716 mL    Total NET: 84 mL        --------------------------------------------------------------------------------------------------  LABS:                        8.2    7.78  )-----------( 203      ( 19 Dec 2019 09:18 )             26.3     19 Dec 2019 07:13    135    |  96     |  8      ----------------------------<  107    3.7     |  29     |  0.57     Ca    7.9        19 Dec 2019 07:13  Phos  3.2       19 Dec 2019 07:13  Mg     1.8       19 Dec 2019 07:13      PT/INR - ( 19 Dec 2019 09:36 )   PT: 13.3 sec;   INR: 1.16 ratio         PTT - ( 19 Dec 2019 09:36 )  PTT:26.6 sec  CAPILLARY BLOOD GLUCOSE                  --------------------------------------------------------------------------------------------------  MEDICATIONS  (STANDING):  albuterol/ipratropium for Nebulization 3 milliLiter(s) Nebulizer every 6 hours  aspirin  chewable 81 milliGRAM(s) Oral daily  atorvastatin 40 milliGRAM(s) Oral at bedtime  buDESOnide    Inhalation Suspension 0.5 milliGRAM(s) Inhalation every 12 hours  chlorhexidine 4% Liquid 1 Application(s) Topical <User Schedule>  doxazosin 2 milliGRAM(s) Oral daily  enoxaparin Injectable 100 milliGRAM(s) SubCutaneous <User Schedule>  erythromycin     base Tablet 250 milliGRAM(s) Oral two times a day  furosemide    Tablet 20 milliGRAM(s) Oral daily  influenza   Vaccine 0.5 milliLiter(s) IntraMuscular once  methadone    Tablet 17.5 milliGRAM(s) Oral daily  nystatin Powder 1 Application(s) Topical two times a day  pantoprazole    Tablet 40 milliGRAM(s) Oral daily  polyethylene glycol 3350 17 Gram(s) Oral two times a day  spironolactone 25 milliGRAM(s) Oral daily    MEDICATIONS  (PRN):  acetaminophen   Tablet .. 650 milliGRAM(s) Oral every 6 hours PRN Mild Pain (1 - 3)  aluminum hydroxide/magnesium hydroxide/simethicone Suspension 30 milliLiter(s) Oral every 4 hours PRN Dyspepsia  oxyCODONE    IR 5 milliGRAM(s) Oral every 4 hours PRN Moderate Pain (4 - 6)  sodium chloride 0.9% lock flush 10 milliLiter(s) IV Push every 1 hour PRN Pre/post blood products, medications, blood draw, and to maintain line patency

## 2019-12-21 NOTE — PROGRESS NOTE ADULT - SUBJECTIVE AND OBJECTIVE BOX
CARDIOLOGY     PROGRESS  NOTE   ________________________________________________    CHIEF COMPLAINT:Patient is a 64y old  Female who presents with a chief complaint of perforated bowel (08 Dec 2019 09:22)  no complain.  	  REVIEW OF SYSTEMS:  CONSTITUTIONAL: No fever, weight loss, or fatigue  EYES: No eye pain, visual disturbances, or discharge  ENT:  No difficulty hearing, tinnitus, vertigo; No sinus or throat pain  NECK: No pain or stiffness  RESPIRATORY: No cough, wheezing, chills or hemoptysis; No Shortness of Breath  CARDIOVASCULAR: No chest pain, palpitations, passing out, dizziness, or leg swelling  GASTROINTESTINAL: No abdominal or epigastric pain. No nausea, vomiting, or hematemesis; No diarrhea or constipation. No melena or hematochezia.  GENITOURINARY: No dysuria, frequency, hematuria, or incontinence  NEUROLOGICAL: No headaches, memory loss, loss of strength, numbness, or tremors  SKIN: No itching, burning, rashes, or lesions   LYMPH Nodes: No enlarged glands  ENDOCRINE: No heat or cold intolerance; No hair loss  MUSCULOSKELETAL: No joint pain or swelling; No muscle, back, or extremity pain  PSYCHIATRIC: No depression, anxiety, mood swings, or difficulty sleeping  HEME/LYMPH: No easy bruising, or bleeding gums  ALLERGY AND IMMUNOLOGIC: No hives or eczema	    [ ] All others negative	  [ ] Unable to obtain    PHYSICAL EXAM:  T(C): 36.8 (12-21-19 @ 04:56), Max: 36.9 (12-20-19 @ 10:30)  HR: 78 (12-21-19 @ 04:56) (78 - 115)  BP: 111/60 (12-21-19 @ 04:56) (111/60 - 140/61)  RR: 18 (12-21-19 @ 04:56) (18 - 19)  SpO2: 96% (12-21-19 @ 04:56) (94% - 96%)  Wt(kg): --  I&O's Summary    20 Dec 2019 07:01  -  21 Dec 2019 07:00  --------------------------------------------------------  IN: 800 mL / OUT: 716 mL / NET: 84 mL        Appearance: Normal	  HEENT:   Normal oral mucosa, PERRL, EOMI	  Lymphatic: No lymphadenopathy  Cardiovascular: Normal S1 S2, No JVD, + murmurs, +lymphedema  Respiratory: Lungs clear to auscultation	  Psychiatry: A & O x 3, Mood & affect appropriate  Gastrointestinal:  Soft, Non-tender, + BS	  Skin: No rashes, No ecchymoses, No cyanosis	  Neurologic: Non-focal  Extremities: Normal range of motion, No clubbing, cyanosis or edema  Vascular: Peripheral pulses palpable 2+ bilaterally    MEDICATIONS  (STANDING):  albuterol/ipratropium for Nebulization 3 milliLiter(s) Nebulizer every 6 hours  aspirin  chewable 81 milliGRAM(s) Oral daily  atorvastatin 40 milliGRAM(s) Oral at bedtime  buDESOnide    Inhalation Suspension 0.5 milliGRAM(s) Inhalation every 12 hours  chlorhexidine 4% Liquid 1 Application(s) Topical <User Schedule>  doxazosin 2 milliGRAM(s) Oral daily  enoxaparin Injectable 100 milliGRAM(s) SubCutaneous <User Schedule>  erythromycin     base Tablet 250 milliGRAM(s) Oral two times a day  furosemide    Tablet 20 milliGRAM(s) Oral daily  influenza   Vaccine 0.5 milliLiter(s) IntraMuscular once  methadone    Tablet 17.5 milliGRAM(s) Oral daily  nystatin Powder 1 Application(s) Topical two times a day  pantoprazole    Tablet 40 milliGRAM(s) Oral daily  polyethylene glycol 3350 17 Gram(s) Oral two times a day  spironolactone 25 milliGRAM(s) Oral daily      TELEMETRY: 	    ECG:  	  RADIOLOGY:  OTHER: 	  	  LABS:	 	    CARDIAC MARKERS:            12-21    133<L>  |  94<L>  |  7   ----------------------------<  116<H>  3.5   |  28  |  0.59    Ca    8.0<L>      21 Dec 2019 08:14  Phos  2.9     12-21  Mg     1.9     12-21    TPro  5.6<L>  /  Alb  1.8<L>  /  TBili  0.3  /  DBili  <0.1  /  AST  26  /  ALT  14  /  AlkPhos  99  12-21    proBNP:   Lipid Profile:   HgA1c:   TSH:         Assessment and plan  ---------------------------  pt is sitting up comfortably in NAD , no complain.  bp noted  echo noted hyperdynamic lv sec to pain/anxiety, volume depletion  continue dvt prophylaxis  started on Lasix and aldactone ?  continue current meds  s/p debridement ?VAC  keep k>4

## 2019-12-21 NOTE — PROGRESS NOTE ADULT - ASSESSMENT
A/P: 64y Female  s/p ex lap with extended left hemicolectomy left in discontinuity for perforated sigmoid diverticulitis with intraperitoneal free air. AbThera vac placed and pt kept intubated post op and transferred to SICU for post-op management. Extubated 11/30. For elevated WBC, started on meropenem 12/3-12/10. Transferred from SICU to floor 12/4. CT C/A/P 12/4 shows no obvious intrabdominal collection. Pt was complaining of dysphagia, EGD performed revealed mid and lower esophageal diverticulum and medium sized hiatal hernia 12/10. 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 Patient started on erythromycin and miralax to assist with BF 12/11. On 12/19 patient found to have induration, vac removed, we to dry dressings places. Work with PT has been difficult due to patients body habitus and deconditioning; patient unable to be [facility] in [facility] at this time.    Plan:  - LRD  - c/w doxazosin  - monitor ostomy, U/O  - BID wet to dry dressings, possible VAC replacement Monday  - c/w miralax, c/w erythromycin   - c/w pain control: methadone (home medication), Tylenol, Oxy PRN  - DVT ppx with lovenox, ASA 81  - appreciate cardiology recs  - c/w PT recs  - continue to work on dispo planning: [place] vs [facility]  - Dispo: rehab (patient continues to refuse options give)    GREEN SURGERY  p [phone] A/P: 64y Female  s/p ex lap with extended left hemicolectomy left in discontinuity for perforated sigmoid diverticulitis with intraperitoneal free air. AbThera vac placed and pt kept intubated post op and transferred to SICU for post-op management. Extubated 11/30. For elevated WBC, started on meropenem 12/3-12/10. Transferred from SICU to floor 12/4. CT C/A/P 12/4 shows no obvious intrabdominal collection. Pt was complaining of dysphagia, EGD performed revealed mid and lower esophageal diverticulum and medium sized hiatal hernia 12/10. 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 Patient started on erythromycin and miralax to assist with BF 12/11. On 12/19 patient found to have induration, vac removed, we to dry dressings places. Work with PT has been difficult due to patients body habitus and deconditioning; patient unable to be placed in [facility] at this time.    Plan:  - LRD  - c/w doxazosin  - monitor ostomy, U/O  - BID wet to dry dressings, possible VAC replacement Monday  - c/w miralax, c/w erythromycin   - c/w pain control: methadone (home medication), Tylenol, Oxy PRN  - DVT ppx with lovenox, ASA 81  - appreciate cardiology recs  - [facility]  - Dispo: rehab     GREEN SURGERY  p [phone]

## 2019-12-22 LAB
ANION GAP SERPL CALC-SCNC: 10 MMOL/L — SIGNIFICANT CHANGE UP (ref 5–17)
BUN SERPL-MCNC: 8 MG/DL — SIGNIFICANT CHANGE UP (ref 7–23)
CALCIUM SERPL-MCNC: 8.1 MG/DL — LOW (ref 8.4–10.5)
CHLORIDE SERPL-SCNC: 95 MMOL/L — LOW (ref 96–108)
CO2 SERPL-SCNC: 29 MMOL/L — SIGNIFICANT CHANGE UP (ref 22–31)
CREAT SERPL-MCNC: 0.55 MG/DL — SIGNIFICANT CHANGE UP (ref 0.5–1.3)
GLUCOSE SERPL-MCNC: 95 MG/DL — SIGNIFICANT CHANGE UP (ref 70–99)
MAGNESIUM SERPL-MCNC: 2 MG/DL — SIGNIFICANT CHANGE UP (ref 1.6–2.6)
PHOSPHATE SERPL-MCNC: 3.1 MG/DL — SIGNIFICANT CHANGE UP (ref 2.5–4.5)
POTASSIUM SERPL-MCNC: 4 MMOL/L — SIGNIFICANT CHANGE UP (ref 3.5–5.3)
POTASSIUM SERPL-SCNC: 4 MMOL/L — SIGNIFICANT CHANGE UP (ref 3.5–5.3)
SODIUM SERPL-SCNC: 134 MMOL/L — LOW (ref 135–145)

## 2019-12-22 RX ORDER — OXYCODONE HYDROCHLORIDE 5 MG/1
5 TABLET ORAL EVERY 4 HOURS
Refills: 0 | Status: DISCONTINUED | OUTPATIENT
Start: 2019-12-22 | End: 2019-12-25

## 2019-12-22 RX ORDER — ONDANSETRON 8 MG/1
4 TABLET, FILM COATED ORAL ONCE
Refills: 0 | Status: COMPLETED | OUTPATIENT
Start: 2019-12-22 | End: 2019-12-31

## 2019-12-22 RX ADMIN — Medication 3 MILLILITER(S): at 11:52

## 2019-12-22 RX ADMIN — OXYCODONE HYDROCHLORIDE 5 MILLIGRAM(S): 5 TABLET ORAL at 09:00

## 2019-12-22 RX ADMIN — ENOXAPARIN SODIUM 100 MILLIGRAM(S): 100 INJECTION SUBCUTANEOUS at 11:52

## 2019-12-22 RX ADMIN — Medication 3 MILLILITER(S): at 17:15

## 2019-12-22 RX ADMIN — OXYCODONE HYDROCHLORIDE 5 MILLIGRAM(S): 5 TABLET ORAL at 17:13

## 2019-12-22 RX ADMIN — METHADONE HYDROCHLORIDE 17.5 MILLIGRAM(S): 40 TABLET ORAL at 09:05

## 2019-12-22 RX ADMIN — CHLORHEXIDINE GLUCONATE 1 APPLICATION(S): 213 SOLUTION TOPICAL at 08:32

## 2019-12-22 RX ADMIN — Medication 650 MILLIGRAM(S): at 07:53

## 2019-12-22 RX ADMIN — Medication 0.5 MILLIGRAM(S): at 17:15

## 2019-12-22 RX ADMIN — POLYETHYLENE GLYCOL 3350 17 GRAM(S): 17 POWDER, FOR SOLUTION ORAL at 06:03

## 2019-12-22 RX ADMIN — OXYCODONE HYDROCHLORIDE 5 MILLIGRAM(S): 5 TABLET ORAL at 08:30

## 2019-12-22 RX ADMIN — PANTOPRAZOLE SODIUM 40 MILLIGRAM(S): 20 TABLET, DELAYED RELEASE ORAL at 11:52

## 2019-12-22 RX ADMIN — Medication 2 MILLIGRAM(S): at 06:02

## 2019-12-22 RX ADMIN — Medication 250 MILLIGRAM(S): at 06:02

## 2019-12-22 RX ADMIN — Medication 0.5 MILLIGRAM(S): at 06:03

## 2019-12-22 RX ADMIN — Medication 81 MILLIGRAM(S): at 11:52

## 2019-12-22 RX ADMIN — POLYETHYLENE GLYCOL 3350 17 GRAM(S): 17 POWDER, FOR SOLUTION ORAL at 17:15

## 2019-12-22 RX ADMIN — Medication 250 MILLIGRAM(S): at 17:15

## 2019-12-22 RX ADMIN — NYSTATIN CREAM 1 APPLICATION(S): 100000 CREAM TOPICAL at 17:18

## 2019-12-22 RX ADMIN — Medication 3 MILLILITER(S): at 06:02

## 2019-12-22 RX ADMIN — OXYCODONE HYDROCHLORIDE 5 MILLIGRAM(S): 5 TABLET ORAL at 17:43

## 2019-12-22 RX ADMIN — Medication 20 MILLIGRAM(S): at 06:03

## 2019-12-22 RX ADMIN — Medication 650 MILLIGRAM(S): at 08:23

## 2019-12-22 RX ADMIN — NYSTATIN CREAM 1 APPLICATION(S): 100000 CREAM TOPICAL at 06:10

## 2019-12-22 NOTE — PROGRESS NOTE ADULT - SUBJECTIVE AND OBJECTIVE BOX
GENERAL SURGERY PROGRESS NOTE    SUBJECTIVE  Patient seen and examined.   No acute events overnight.   Tolerating diet without nausea, vomiting.   +/+ ostomy (continues on miralax).   Voiding spontaneously  Denies fever, chills, SOB, chest pain.     OBJECTIVE  PHYSICAL EXAM  General: Morbidly obese, appears well, NAD  CHEST: breathing comfortably  CV: appears well perfused  Abdomen: soft, nontender, nondistended, no rebound or guarding, vac in place, ostomy +/+, L drain site with ostomy appliance in place, SS output (30mL /24hr)   Extremities: Grossly symmetric  Caprini:8     T(C): 36.8 (12-21-19 @ 18:38), Max: 36.8 (12-21-19 @ 10:38)  HR: 80 (12-21-19 @ 18:38) (80 - 80)  BP: 103/67 (12-21-19 @ 18:38) (103/57 - 103/67)  RR: 17 (12-21-19 @ 18:38) (17 - 18)  SpO2: 96% (12-21-19 @ 18:38) (94% - 96%)  Wt(kg): --    LABS:    12-21    133<L>  |  94<L>  |  7   ----------------------------<  116<H>  3.5   |  28  |  0.59    Ca    8.0<L>      21 Dec 2019 08:14  Phos  2.9     12-21  Mg     1.9     12-21    TPro  5.6<L>  /  Alb  1.8<L>  /  TBili  0.3  /  DBili  <0.1  /  AST  26  /  ALT  14  /  AlkPhos  99  12-21    PT/INR - ( 21 Dec 2019 12:11 )   PT: 12.5 sec;   INR: 1.09 ratio         PTT - ( 21 Dec 2019 12:11 )  PTT:27.2 sec        CAPILLARY BLOOD GLUCOSE          Is&O's    12-20-19 @ 07:01  -  12-21-19 @ 07:00  --------------------------------------------------------  IN: 800 mL / OUT: 716 mL / NET: 84 mL    12-21-19 @ 07:01  -  12-22-19 @ 04:58  --------------------------------------------------------  IN: 820 mL / OUT: 1031 mL / NET: -211 mL GENERAL SURGERY PROGRESS NOTE    SUBJECTIVE  Patient seen and examined.   No acute events overnight.   Complaining that timing of painmedication is not controlling her pain adequately (on methadone, oxy, tylenol)  Tolerating diet without nausea, vomiting.   +/+ ostomy (continues on miralax).   Voiding spontaneously  Denies fever, chills, SOB, chest pain.     OBJECTIVE  PHYSICAL EXAM  General: Morbidly obese, appears well, NAD  CHEST: breathing comfortably  CV: appears well perfused  Abdomen: soft, nontender, nondistended, no rebound or guarding, vac in place, ostomy +/+(3 changes recorded/24hr), L drain site with ostomy appliance in place (70mL/24hr), SS  Extremities: Grossly symmetric  Caprini: 8     T(C): 36.8 (12-21-19 @ 18:38), Max: 36.8 (12-21-19 @ 10:38)  HR: 80 (12-21-19 @ 18:38) (80 - 80)  BP: 103/67 (12-21-19 @ 18:38) (103/57 - 103/67)  RR: 17 (12-21-19 @ 18:38) (17 - 18)  SpO2: 96% (12-21-19 @ 18:38) (94% - 96%)  Wt(kg): --    LABS:    12-21    133<L>  |  94<L>  |  7   ----------------------------<  116<H>  3.5   |  28  |  0.59    Ca    8.0<L>      21 Dec 2019 08:14  Phos  2.9     12-21  Mg     1.9     12-21    TPro  5.6<L>  /  Alb  1.8<L>  /  TBili  0.3  /  DBili  <0.1  /  AST  26  /  ALT  14  /  AlkPhos  99  12-21    PT/INR - ( 21 Dec 2019 12:11 )   PT: 12.5 sec;   INR: 1.09 ratio         PTT - ( 21 Dec 2019 12:11 )  PTT:27.2 sec        CAPILLARY BLOOD GLUCOSE          Is&O's    12-20-19 @ 07:01  -  12-21-19 @ 07:00  --------------------------------------------------------  IN: 800 mL / OUT: 716 mL / NET: 84 mL    12-21-19 @ 07:01  -  12-22-19 @ 04:58  --------------------------------------------------------  IN: 820 mL / OUT: 1031 mL / NET: -211 mL GENERAL SURGERY PROGRESS NOTE    SUBJECTIVE  Patient seen and examined.   No acute events overnight.   Complaining that timing of painmedication is not controlling her pain adequately (on methadone, oxy, tylenol)  Tolerating diet without nausea, vomiting.   +/+ ostomy (continues on miralax).   Voiding spontaneously  Denies fever, chills, SOB, chest pain.     OBJECTIVE  PHYSICAL EXAM  General: Morbidly obese, appears well, NAD  CHEST: breathing comfortably  CV: appears well perfused  Abdomen: soft, nontender, nondistended, no rebound or guarding, ostomy pink/viable +/+(3 BM recorded/24hr)-- skin surrounding ostomy erythematous with areas of necrosis, L drain site with ostomy appliance in place (70mL/24hr), SS. Midline wound w/ granulated bowel, packed with adaptic and wet to dry.   Extremities: Grossly symmetric  Caprini: 8     T(C): 36.8 (12-21-19 @ 18:38), Max: 36.8 (12-21-19 @ 10:38)  HR: 80 (12-21-19 @ 18:38) (80 - 80)  BP: 103/67 (12-21-19 @ 18:38) (103/57 - 103/67)  RR: 17 (12-21-19 @ 18:38) (17 - 18)  SpO2: 96% (12-21-19 @ 18:38) (94% - 96%)  Wt(kg): --    LABS:    12-21    133<L>  |  94<L>  |  7   ----------------------------<  116<H>  3.5   |  28  |  0.59    Ca    8.0<L>      21 Dec 2019 08:14  Phos  2.9     12-21  Mg     1.9     12-21    TPro  5.6<L>  /  Alb  1.8<L>  /  TBili  0.3  /  DBili  <0.1  /  AST  26  /  ALT  14  /  AlkPhos  99  12-21    PT/INR - ( 21 Dec 2019 12:11 )   PT: 12.5 sec;   INR: 1.09 ratio         PTT - ( 21 Dec 2019 12:11 )  PTT:27.2 sec        CAPILLARY BLOOD GLUCOSE          Is&O's    12-20-19 @ 07:01 - 12-21-19 @ 07:00  --------------------------------------------------------  IN: 800 mL / OUT: 716 mL / NET: 84 mL    12-21-19 @ 07:01 - 12-22-19 @ 04:58  --------------------------------------------------------  IN: 820 mL / OUT: 1031 mL / NET: -211 mL

## 2019-12-22 NOTE — PROGRESS NOTE ADULT - SUBJECTIVE AND OBJECTIVE BOX
CARDIOLOGY     PROGRESS  NOTE   ________________________________________________    CHIEF COMPLAINT:Patient is a 64y old  Female who presents with a chief complaint of perforated bowel (08 Dec 2019 09:22)  no copmplain.  	  REVIEW OF SYSTEMS:  CONSTITUTIONAL: No fever, weight loss, or fatigue  EYES: No eye pain, visual disturbances, or discharge  ENT:  No difficulty hearing, tinnitus, vertigo; No sinus or throat pain  NECK: No pain or stiffness  RESPIRATORY: No cough, wheezing, chills or hemoptysis; No Shortness of Breath  CARDIOVASCULAR: No chest pain, palpitations, passing out, dizziness, or leg swelling  GASTROINTESTINAL: No abdominal or epigastric pain. No nausea, vomiting, or hematemesis; No diarrhea or constipation. No melena or hematochezia.  GENITOURINARY: No dysuria, frequency, hematuria, or incontinence  NEUROLOGICAL: No headaches, memory loss, loss of strength, numbness, or tremors  SKIN: No itching, burning, rashes, or lesions   LYMPH Nodes: No enlarged glands  ENDOCRINE: No heat or cold intolerance; No hair loss  MUSCULOSKELETAL: No joint pain or swelling; No muscle, back, or extremity pain  PSYCHIATRIC: No depression, anxiety, mood swings, or difficulty sleeping  HEME/LYMPH: No easy bruising, or bleeding gums  ALLERGY AND IMMUNOLOGIC: No hives or eczema	    [ ] All others negative	  [ ] Unable to obtain    PHYSICAL EXAM:  T(C): 36.7 (12-22-19 @ 06:00), Max: 36.8 (12-21-19 @ 10:38)  HR: 79 (12-22-19 @ 06:00) (79 - 80)  BP: 106/63 (12-22-19 @ 06:50) (102/69 - 106/63)  RR: 19 (12-22-19 @ 06:00) (17 - 19)  SpO2: 97% (12-22-19 @ 06:00) (94% - 97%)  Wt(kg): --  I&O's Summary    21 Dec 2019 07:01  -  22 Dec 2019 07:00  --------------------------------------------------------  IN: 820 mL / OUT: 1523 mL / NET: -703 mL        Appearance: Normal	  HEENT:   Normal oral mucosa, PERRL, EOMI	  Lymphatic: No lymphadenopathy  Cardiovascular: Normal S1 S2, No JVD, No murmurs, +lymph edema  Respiratory: Lungs clear to auscultation	  Psychiatry: A & O x 3, Mood & affect appropriate  Gastrointestinal:  Soft, Non-tender, + BS	  Skin: No rashes, No ecchymoses, No cyanosis	  Neurologic: Non-focal  Extremities: Normal range of motion, No clubbing, cyanosis.  Vascular: Peripheral pulses palpable 2+ bilaterally    MEDICATIONS  (STANDING):  albuterol/ipratropium for Nebulization 3 milliLiter(s) Nebulizer every 6 hours  aspirin  chewable 81 milliGRAM(s) Oral daily  atorvastatin 40 milliGRAM(s) Oral at bedtime  buDESOnide    Inhalation Suspension 0.5 milliGRAM(s) Inhalation every 12 hours  chlorhexidine 4% Liquid 1 Application(s) Topical <User Schedule>  doxazosin 2 milliGRAM(s) Oral daily  enoxaparin Injectable 100 milliGRAM(s) SubCutaneous <User Schedule>  erythromycin     base Tablet 250 milliGRAM(s) Oral two times a day  furosemide    Tablet 20 milliGRAM(s) Oral daily  influenza   Vaccine 0.5 milliLiter(s) IntraMuscular once  methadone    Tablet 17.5 milliGRAM(s) Oral daily  nystatin Powder 1 Application(s) Topical two times a day  pantoprazole    Tablet 40 milliGRAM(s) Oral daily  polyethylene glycol 3350 17 Gram(s) Oral two times a day  spironolactone 25 milliGRAM(s) Oral daily      TELEMETRY: 	    ECG:  	  RADIOLOGY:  OTHER: 	  	  LABS:	 	    CARDIAC MARKERS:            12-22    134<L>  |  95<L>  |  8   ----------------------------<  95  4.0   |  29  |  0.55    Ca    8.1<L>      22 Dec 2019 08:12  Phos  3.1     12-22  Mg     2.0     12-22    TPro  5.6<L>  /  Alb  1.8<L>  /  TBili  0.3  /  DBili  <0.1  /  AST  26  /  ALT  14  /  AlkPhos  99  12-21    proBNP:   Lipid Profile:   HgA1c:   TSH:   PT/INR - ( 21 Dec 2019 12:11 )   PT: 12.5 sec;   INR: 1.09 ratio         PTT - ( 21 Dec 2019 12:11 )  PTT:27.2 sec      Assessment and plan  ---------------------------  pt is sitting up comfortably in NAD , no complain.  bp noted  echo noted hyperdynamic lv sec to pain/anxiety, volume depletion  continue dvt prophylaxis  started on Lasix and aldactone ?  continue current meds  s/p debridement ?VAC  keep k>4  awaiting placement

## 2019-12-22 NOTE — PROGRESS NOTE ADULT - ASSESSMENT
A/P: 64y Female  s/p ex lap with extended left hemicolectomy left in discontinuity for perforated sigmoid diverticulitis with intraperitoneal free air. AbThera vac placed and pt kept intubated post op and transferred to SICU for post-op management. Extubated 11/30. For elevated WBC, started on meropenem 12/3-12/10. Transferred from SICU to floor 12/4. CT C/A/P 12/4 shows no obvious intrabdominal collection. Pt was complaining of dysphagia, EGD performed revealed mid and lower esophageal diverticulum and medium sized hiatal hernia 12/10. 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 Patient started on erythromycin and miralax to assist with BF 12/11. On 12/19 patient found to have induration, vac removed, we to dry dressings places. Work with PT has been difficult due to patients body habitus and deconditioning; patient unable to be placed in [facility] at this time.    Plan:  - LRD  - f/u am labs, f/u LFT   - c/w doxazosin  - monitor ostomy, U/O  - BID wet to dry dressings in midline wound, possible VAC replacement Monday  - c/w miralax, c/w erythromycin   - c/w pain control: methadone (home medication), Tylenol, Oxy PRN  - DVT ppx with lovenox, ASA 81  - appreciate cardiology recs  - c/w diuresis   - c/w PT daily  - Dispo: rehab     GREEN SURGERY  p [id number] A/P: 64y Female  s/p ex lap with extended left hemicolectomy left in discontinuity for perforated sigmoid diverticulitis with intraperitoneal free air. AbThera vac placed and pt kept intubated post op and transferred to SICU for post-op management. Extubated 11/30. For elevated WBC, started on meropenem 12/3-12/10. Transferred from SICU to floor 12/4. CT C/A/P 12/4 shows no obvious intrabdominal collection. Pt was complaining of dysphagia, EGD performed revealed mid and lower esophageal diverticulum and medium sized hiatal hernia 12/10. 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 Patient started on erythromycin and miralax to assist with BF 12/11. On 12/19 patient found to have induration, vac removed, we to dry dressings places. Work with PT has been difficult due to patients body habitus and deconditioning; patient unable to be placed in [facility] at this time.    Plan:  - LRD  - f/u am labs, f/u LFT   - c/w doxazosin  - monitor ostomy, U/O  - BID wet to dry dressings in midline wound, possible VAC replacement Monday  - c/w miralax, c/w erythromycin   - c/w pain control: methadone (home medication), Tylenol, Oxy PRN  - DVT ppx with lovenox, ASA 81  - appreciate cardiology recs  - c/w diuresis   - c/w PT daily  - Ostomy nurses and wound care to see tomorrow, follow up recommendations   - Dispo: rehab     GREEN SURGERY  p [phone]

## 2019-12-23 LAB
BASOPHILS # BLD AUTO: 0.04 K/UL — SIGNIFICANT CHANGE UP (ref 0–0.2)
BASOPHILS NFR BLD AUTO: 0.5 % — SIGNIFICANT CHANGE UP (ref 0–2)
EOSINOPHIL # BLD AUTO: 0.45 K/UL — SIGNIFICANT CHANGE UP (ref 0–0.5)
EOSINOPHIL NFR BLD AUTO: 5.4 % — SIGNIFICANT CHANGE UP (ref 0–6)
HCT VFR BLD CALC: 27.2 % — LOW (ref 34.5–45)
HGB BLD-MCNC: 8.4 G/DL — LOW (ref 11.5–15.5)
IMM GRANULOCYTES NFR BLD AUTO: 1.9 % — HIGH (ref 0–1.5)
LYMPHOCYTES # BLD AUTO: 1.06 K/UL — SIGNIFICANT CHANGE UP (ref 1–3.3)
LYMPHOCYTES # BLD AUTO: 12.7 % — LOW (ref 13–44)
MCHC RBC-ENTMCNC: 28.5 PG — SIGNIFICANT CHANGE UP (ref 27–34)
MCHC RBC-ENTMCNC: 30.9 GM/DL — LOW (ref 32–36)
MCV RBC AUTO: 92.2 FL — SIGNIFICANT CHANGE UP (ref 80–100)
MONOCYTES # BLD AUTO: 0.9 K/UL — SIGNIFICANT CHANGE UP (ref 0–0.9)
MONOCYTES NFR BLD AUTO: 10.8 % — SIGNIFICANT CHANGE UP (ref 2–14)
NEUTROPHILS # BLD AUTO: 5.76 K/UL — SIGNIFICANT CHANGE UP (ref 1.8–7.4)
NEUTROPHILS NFR BLD AUTO: 68.7 % — SIGNIFICANT CHANGE UP (ref 43–77)
PLATELET # BLD AUTO: 247 K/UL — SIGNIFICANT CHANGE UP (ref 150–400)
RBC # BLD: 2.95 M/UL — LOW (ref 3.8–5.2)
RBC # FLD: 15.5 % — HIGH (ref 10.3–14.5)
WBC # BLD: 8.37 K/UL — SIGNIFICANT CHANGE UP (ref 3.8–10.5)
WBC # FLD AUTO: 8.37 K/UL — SIGNIFICANT CHANGE UP (ref 3.8–10.5)

## 2019-12-23 RX ORDER — SPIRONOLACTONE 25 MG/1
50 TABLET, FILM COATED ORAL DAILY
Refills: 0 | Status: DISCONTINUED | OUTPATIENT
Start: 2019-12-23 | End: 2019-12-29

## 2019-12-23 RX ADMIN — POLYETHYLENE GLYCOL 3350 17 GRAM(S): 17 POWDER, FOR SOLUTION ORAL at 06:23

## 2019-12-23 RX ADMIN — ENOXAPARIN SODIUM 100 MILLIGRAM(S): 100 INJECTION SUBCUTANEOUS at 12:40

## 2019-12-23 RX ADMIN — NYSTATIN CREAM 1 APPLICATION(S): 100000 CREAM TOPICAL at 06:22

## 2019-12-23 RX ADMIN — Medication 3 MILLILITER(S): at 18:35

## 2019-12-23 RX ADMIN — Medication 81 MILLIGRAM(S): at 12:40

## 2019-12-23 RX ADMIN — Medication 3 MILLILITER(S): at 06:23

## 2019-12-23 RX ADMIN — Medication 3 MILLILITER(S): at 12:41

## 2019-12-23 RX ADMIN — Medication 3 MILLILITER(S): at 23:29

## 2019-12-23 RX ADMIN — SPIRONOLACTONE 25 MILLIGRAM(S): 25 TABLET, FILM COATED ORAL at 07:01

## 2019-12-23 RX ADMIN — OXYCODONE HYDROCHLORIDE 5 MILLIGRAM(S): 5 TABLET ORAL at 12:39

## 2019-12-23 RX ADMIN — Medication 0.5 MILLIGRAM(S): at 06:23

## 2019-12-23 RX ADMIN — POLYETHYLENE GLYCOL 3350 17 GRAM(S): 17 POWDER, FOR SOLUTION ORAL at 18:35

## 2019-12-23 RX ADMIN — OXYCODONE HYDROCHLORIDE 5 MILLIGRAM(S): 5 TABLET ORAL at 13:09

## 2019-12-23 RX ADMIN — NYSTATIN CREAM 1 APPLICATION(S): 100000 CREAM TOPICAL at 18:36

## 2019-12-23 RX ADMIN — Medication 250 MILLIGRAM(S): at 18:35

## 2019-12-23 RX ADMIN — PANTOPRAZOLE SODIUM 40 MILLIGRAM(S): 20 TABLET, DELAYED RELEASE ORAL at 12:40

## 2019-12-23 RX ADMIN — CHLORHEXIDINE GLUCONATE 1 APPLICATION(S): 213 SOLUTION TOPICAL at 09:35

## 2019-12-23 RX ADMIN — Medication 20 MILLIGRAM(S): at 06:22

## 2019-12-23 RX ADMIN — Medication 2 MILLIGRAM(S): at 06:22

## 2019-12-23 RX ADMIN — METHADONE HYDROCHLORIDE 17.5 MILLIGRAM(S): 40 TABLET ORAL at 09:33

## 2019-12-23 RX ADMIN — Medication 0.5 MILLIGRAM(S): at 18:35

## 2019-12-23 RX ADMIN — Medication 250 MILLIGRAM(S): at 06:22

## 2019-12-23 NOTE — PROGRESS NOTE ADULT - ASSESSMENT
A/P: 64y Female  s/p ex lap with extended left hemicolectomy left in discontinuity for perforated sigmoid diverticulitis with intraperitoneal free air. AbThera vac placed and pt kept intubated post op and transferred to SICU for post-op management. Extubated 11/30. For elevated WBC, started on meropenem 12/3-12/10. Transferred from SICU to floor 12/4. CT C/A/P 12/4 shows no obvious intrabdominal collection. Pt was complaining of dysphagia, EGD performed revealed mid and lower esophageal diverticulum and medium sized hiatal hernia 12/10. 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 Patient started on erythromycin and miralax to assist with BF 12/11. On 12/19 patient found to have induration, vac removed, we to dry dressings places. Work with PT has been difficult due to patients body habitus and deconditioning; patient unable to be placed in [facility] at this time.    Plan:  - LRD  - f/u am labs  - c/w doxazosin  - monitor ostomy, U/O  - BID wet to dry dressings in midline wound  - c/w miralax, c/w erythromycin   - c/w pain control: methadone (home medication), Tylenol, Oxy PRN  - DVT ppx with lovenox, ASA 81  - appreciate cardiology recs  - c/w diuresis   - c/w PT daily  - Ostomy nurses and wound care to see today, follow up recommendations   - Dispo: rehab     GREEN SURGERY  p [phone]

## 2019-12-23 NOTE — PROGRESS NOTE ADULT - SUBJECTIVE AND OBJECTIVE BOX
GENERAL SURGERY PROGRESS NOTE    SUBJECTIVE  Patient seen and examined.   No acute events overnight.   Pain is better controlled today (on methadone, oxy, tylenol)  Tolerating diet without nausea, vomiting.   +/+ ostomy (continues on miralax).   Midline does not bother her pain wise, though she notes that it is malodorous   Voiding spontaneously  Denies fever, chills, SOB, chest pain.     OBJECTIVE  PHYSICAL EXAM  General: Morbidly obese, appears well, NAD  CHEST: breathing comfortably  CV: appears well perfused  Abdomen: soft, nontender, nondistended, no rebound or guarding, ostomy pink/viable +/+(5 BM recorded/24hr)-- skin surrounding ostomy erythematous with areas of necrosis, L drain site with ostomy appliance in place (90mL/24hr), SS. Midline wound w/ granulated bowel, packed with adaptic and wet to dry.   Extremities: Grossly symmetric  Caprini: 8     T(C): 36.9 (12-22-19 @ 19:11), Max: 36.9 (12-22-19 @ 19:11)  HR: 86 (12-22-19 @ 19:11) (79 - 86)  BP: 101/51 (12-22-19 @ 19:11) (101/51 - 107/67)  RR: 17 (12-22-19 @ 19:11) (17 - 19)  SpO2: 93% (12-22-19 @ 19:11) (93% - 97%)  Wt(kg): --    LABS:    12-22    134<L>  |  95<L>  |  8   ----------------------------<  95  4.0   |  29  |  0.55    Ca    8.1<L>      22 Dec 2019 08:12  Phos  3.1     12-22  Mg     2.0     12-22    TPro  5.6<L>  /  Alb  1.8<L>  /  TBili  0.3  /  DBili  <0.1  /  AST  26  /  ALT  14  /  AlkPhos  99  12-21    PT/INR - ( 21 Dec 2019 12:11 )   PT: 12.5 sec;   INR: 1.09 ratio         PTT - ( 21 Dec 2019 12:11 )  PTT:27.2 sec        CAPILLARY BLOOD GLUCOSE          Is&O's    12-21-19 @ 07:01  -  12-22-19 @ 07:00  --------------------------------------------------------  IN: 820 mL / OUT: 1523 mL / NET: -703 mL    12-22-19 @ 07:01  -  12-23-19 @ 00:33  --------------------------------------------------------  IN: 1160 mL / OUT: 1195 mL / NET: -35 mL

## 2019-12-23 NOTE — PROGRESS NOTE ADULT - ATTENDING COMMENTS
Patient seen/examined.  Agree w above note and plan and have discussed plan w house staff.  Looking into irrigating wound VAC    Lucas Greer MD

## 2019-12-23 NOTE — PROVIDER CONTACT NOTE (OTHER) - BACKGROUND
pt admitted perf sigmoid colon xlap, hemicolectomy, colostomy SICU 12/4 2m 12/5 PICC 12/11 RUE doppler 12/19 wound debridement

## 2019-12-23 NOTE — PROGRESS NOTE ADULT - SUBJECTIVE AND OBJECTIVE BOX
CARDIOLOGY     PROGRESS  NOTE   ________________________________________________    CHIEF COMPLAINT:Patient is a 64y old  Female who presents with a chief complaint of perforated bowel (08 Dec 2019 09:22)  doing much better.  	  REVIEW OF SYSTEMS:  CONSTITUTIONAL: No fever, weight loss, or fatigue  EYES: No eye pain, visual disturbances, or discharge  ENT:  No difficulty hearing, tinnitus, vertigo; No sinus or throat pain  NECK: No pain or stiffness  RESPIRATORY: No cough, wheezing, chills or hemoptysis; No Shortness of Breath  CARDIOVASCULAR: No chest pain, palpitations, passing out, dizziness, or leg swelling  GASTROINTESTINAL: No abdominal or epigastric pain. No nausea, vomiting, or hematemesis; No diarrhea or constipation. No melena or hematochezia.  GENITOURINARY: No dysuria, frequency, hematuria, or incontinence  NEUROLOGICAL: No headaches, memory loss, loss of strength, numbness, or tremors  SKIN: No itching, burning, rashes, or lesions   LYMPH Nodes: No enlarged glands  ENDOCRINE: No heat or cold intolerance; No hair loss  MUSCULOSKELETAL: No joint pain or swelling; No muscle, back, or extremity pain  PSYCHIATRIC: No depression, anxiety, mood swings, or difficulty sleeping  HEME/LYMPH: No easy bruising, or bleeding gums  ALLERGY AND IMMUNOLOGIC: No hives or eczema	    [ ] All others negative	  [ ] Unable to obtain    PHYSICAL EXAM:  T(C): 37.2 (12-23-19 @ 07:00), Max: 37.2 (12-23-19 @ 01:46)  HR: 81 (12-23-19 @ 07:00) (78 - 86)  BP: 119/74 (12-23-19 @ 07:00) (101/51 - 120/54)  RR: 18 (12-23-19 @ 07:00) (17 - 18)  SpO2: 94% (12-23-19 @ 07:00) (93% - 95%)  Wt(kg): --  I&O's Summary    22 Dec 2019 07:01  -  23 Dec 2019 07:00  --------------------------------------------------------  IN: 1160 mL / OUT: 1195 mL / NET: -35 mL    23 Dec 2019 07:01  -  23 Dec 2019 07:30  --------------------------------------------------------  IN: 0 mL / OUT: 250 mL / NET: -250 mL        Appearance: Normal	  HEENT:   Normal oral mucosa, PERRL, EOMI	  Lymphatic: No lymphadenopathy  Cardiovascular: Normal S1 S2, No JVD, + murmurs,+ lymh edema  Respiratory: Lungs clear to auscultation	  Psychiatry: A & O x 3, Mood & affect appropriate  Gastrointestinal:  Soft, Non-tender, + BS	  Skin: No rashes, No ecchymoses, No cyanosis	  Neurologic: Non-focal  Extremities: Normal range of motion, No clubbing, cyanosis .  Vascular: Peripheral pulses palpable 2+ bilaterally    MEDICATIONS  (STANDING):  albuterol/ipratropium for Nebulization 3 milliLiter(s) Nebulizer every 6 hours  aspirin  chewable 81 milliGRAM(s) Oral daily  atorvastatin 40 milliGRAM(s) Oral at bedtime  buDESOnide    Inhalation Suspension 0.5 milliGRAM(s) Inhalation every 12 hours  chlorhexidine 4% Liquid 1 Application(s) Topical <User Schedule>  doxazosin 2 milliGRAM(s) Oral daily  enoxaparin Injectable 100 milliGRAM(s) SubCutaneous <User Schedule>  erythromycin     base Tablet 250 milliGRAM(s) Oral two times a day  furosemide    Tablet 20 milliGRAM(s) Oral daily  influenza   Vaccine 0.5 milliLiter(s) IntraMuscular once  methadone    Tablet 17.5 milliGRAM(s) Oral daily  nystatin Powder 1 Application(s) Topical two times a day  pantoprazole    Tablet 40 milliGRAM(s) Oral daily  polyethylene glycol 3350 17 Gram(s) Oral two times a day  spironolactone 25 milliGRAM(s) Oral daily      TELEMETRY: 	    ECG:  	  RADIOLOGY:  OTHER: 	  	  LABS:	 	    CARDIAC MARKERS:            12-22    134<L>  |  95<L>  |  8   ----------------------------<  95  4.0   |  29  |  0.55    Ca    8.1<L>      22 Dec 2019 08:12  Phos  3.1     12-22  Mg     2.0     12-22    TPro  5.6<L>  /  Alb  1.8<L>  /  TBili  0.3  /  DBili  <0.1  /  AST  26  /  ALT  14  /  AlkPhos  99  12-21    proBNP:   Lipid Profile:   HgA1c:   TSH:   PT/INR - ( 21 Dec 2019 12:11 )   PT: 12.5 sec;   INR: 1.09 ratio         PTT - ( 21 Dec 2019 12:11 )  PTT:27.2 sec      Assessment and plan  ---------------------------  pt is sitting up comfortably in NAD , no complain.  bp noted  echo noted hyperdynamic lv sec to pain/anxiety, volume depletion  continue dvt prophylaxis  started on Lasix and aldactone ?  continue current meds  s/p debridement ?VAC  keep k>4  awaiting placement

## 2019-12-24 LAB
ANION GAP SERPL CALC-SCNC: 6 MMOL/L — SIGNIFICANT CHANGE UP (ref 5–17)
BUN SERPL-MCNC: 8 MG/DL — SIGNIFICANT CHANGE UP (ref 7–23)
CALCIUM SERPL-MCNC: 8.2 MG/DL — LOW (ref 8.4–10.5)
CHLORIDE SERPL-SCNC: 93 MMOL/L — LOW (ref 96–108)
CO2 SERPL-SCNC: 30 MMOL/L — SIGNIFICANT CHANGE UP (ref 22–31)
CREAT SERPL-MCNC: 0.56 MG/DL — SIGNIFICANT CHANGE UP (ref 0.5–1.3)
GLUCOSE SERPL-MCNC: 102 MG/DL — HIGH (ref 70–99)
MAGNESIUM SERPL-MCNC: 1.8 MG/DL — SIGNIFICANT CHANGE UP (ref 1.6–2.6)
PHOSPHATE SERPL-MCNC: 3.4 MG/DL — SIGNIFICANT CHANGE UP (ref 2.5–4.5)
POTASSIUM SERPL-MCNC: 3.6 MMOL/L — SIGNIFICANT CHANGE UP (ref 3.5–5.3)
POTASSIUM SERPL-SCNC: 3.6 MMOL/L — SIGNIFICANT CHANGE UP (ref 3.5–5.3)
SODIUM SERPL-SCNC: 129 MMOL/L — LOW (ref 135–145)

## 2019-12-24 RX ORDER — OXYCODONE HYDROCHLORIDE 5 MG/1
5 TABLET ORAL ONCE
Refills: 0 | Status: DISCONTINUED | OUTPATIENT
Start: 2019-12-24 | End: 2019-12-24

## 2019-12-24 RX ORDER — POTASSIUM CHLORIDE 20 MEQ
40 PACKET (EA) ORAL ONCE
Refills: 0 | Status: COMPLETED | OUTPATIENT
Start: 2019-12-24 | End: 2019-12-24

## 2019-12-24 RX ORDER — BUDESONIDE AND FORMOTEROL FUMARATE DIHYDRATE 160; 4.5 UG/1; UG/1
2 AEROSOL RESPIRATORY (INHALATION)
Refills: 0 | Status: DISCONTINUED | OUTPATIENT
Start: 2019-12-24 | End: 2020-01-11

## 2019-12-24 RX ORDER — ALBUTEROL 90 UG/1
2 AEROSOL, METERED ORAL EVERY 6 HOURS
Refills: 0 | Status: DISCONTINUED | OUTPATIENT
Start: 2019-12-24 | End: 2020-01-11

## 2019-12-24 RX ADMIN — NYSTATIN CREAM 1 APPLICATION(S): 100000 CREAM TOPICAL at 05:13

## 2019-12-24 RX ADMIN — SPIRONOLACTONE 50 MILLIGRAM(S): 25 TABLET, FILM COATED ORAL at 05:12

## 2019-12-24 RX ADMIN — POLYETHYLENE GLYCOL 3350 17 GRAM(S): 17 POWDER, FOR SOLUTION ORAL at 05:13

## 2019-12-24 RX ADMIN — Medication 3 MILLILITER(S): at 05:12

## 2019-12-24 RX ADMIN — Medication 81 MILLIGRAM(S): at 12:54

## 2019-12-24 RX ADMIN — CHLORHEXIDINE GLUCONATE 1 APPLICATION(S): 213 SOLUTION TOPICAL at 18:47

## 2019-12-24 RX ADMIN — Medication 250 MILLIGRAM(S): at 05:12

## 2019-12-24 RX ADMIN — Medication 40 MILLIEQUIVALENT(S): at 12:56

## 2019-12-24 RX ADMIN — BUDESONIDE AND FORMOTEROL FUMARATE DIHYDRATE 2 PUFF(S): 160; 4.5 AEROSOL RESPIRATORY (INHALATION) at 18:53

## 2019-12-24 RX ADMIN — POLYETHYLENE GLYCOL 3350 17 GRAM(S): 17 POWDER, FOR SOLUTION ORAL at 18:47

## 2019-12-24 RX ADMIN — OXYCODONE HYDROCHLORIDE 5 MILLIGRAM(S): 5 TABLET ORAL at 09:56

## 2019-12-24 RX ADMIN — Medication 250 MILLIGRAM(S): at 18:47

## 2019-12-24 RX ADMIN — ENOXAPARIN SODIUM 100 MILLIGRAM(S): 100 INJECTION SUBCUTANEOUS at 11:32

## 2019-12-24 RX ADMIN — PANTOPRAZOLE SODIUM 40 MILLIGRAM(S): 20 TABLET, DELAYED RELEASE ORAL at 12:54

## 2019-12-24 RX ADMIN — OXYCODONE HYDROCHLORIDE 5 MILLIGRAM(S): 5 TABLET ORAL at 09:55

## 2019-12-24 RX ADMIN — Medication 0.5 MILLIGRAM(S): at 05:12

## 2019-12-24 RX ADMIN — Medication 2 MILLIGRAM(S): at 05:13

## 2019-12-24 RX ADMIN — NYSTATIN CREAM 1 APPLICATION(S): 100000 CREAM TOPICAL at 18:50

## 2019-12-24 RX ADMIN — Medication 20 MILLIGRAM(S): at 05:13

## 2019-12-24 RX ADMIN — METHADONE HYDROCHLORIDE 17.5 MILLIGRAM(S): 40 TABLET ORAL at 11:31

## 2019-12-24 NOTE — PROGRESS NOTE ADULT - ATTENDING COMMENTS
Patient seen/examined.  Agree w above note and plan and have discussed plan w house staff.  Irrigating VAC in place    Lucas Greer MD

## 2019-12-24 NOTE — PROGRESS NOTE ADULT - ASSESSMENT
A/P: 64y Female  s/p ex lap with extended left hemicolectomy left in discontinuity for perforated sigmoid diverticulitis with intraperitoneal free air. AbThera vac placed and pt kept intubated post op and transferred to SICU for post-op management. Extubated 11/30. For elevated WBC, started on meropenem 12/3-12/10. Transferred from SICU to floor 12/4. CT C/A/P 12/4 shows no obvious intrabdominal collection. Pt was complaining of dysphagia, EGD performed revealed mid and lower esophageal diverticulum and medium sized hiatal hernia 12/10. 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 Patient started on erythromycin and miralax to assist with BF 12/11. On 12/19 patient found to have induration, vac removed, we to dry dressings places. Work with PT has been difficult due to patients body habitus and deconditioning; patient unable to be placed in [facility] at this time.    Plan:  - LRD  - f/u am labs  - c/w doxazosin  - monitor ostomy, U/O  - Irrigation vac change M/W/F  - c/w miralax, c/w erythromycin   - c/w pain control: methadone (home medication), Tylenol, Oxy PRN  - DVT ppx with lovenox, ASA 81  - appreciate cardiology recs  - c/w diuresis   - c/w PT daily  - Ostomy nurses and wound care to see today, follow up recommendations   - Dispo: rehab     [facility]  p [phone]

## 2019-12-24 NOTE — PROGRESS NOTE ADULT - SUBJECTIVE AND OBJECTIVE BOX
GENERAL SURGERY PROGRESS NOTE    SUBJECTIVE  Patient seen and examined.   No acute events overnight.   Vac replaced on midline wound, irrigation vac.   Pain is better controlled today (on methadone, oxy, tylenol)  Tolerating diet without nausea, vomiting.   +/+ ostomy (continues on miralax).   No pain near incision site/ostomy.    Voiding spontaneously  Denies fever, chills, SOB, chest pain.     OBJECTIVE  PHYSICAL EXAM  General: Morbidly obese, appears well, NAD  CHEST: breathing comfortably  CV: appears well perfused  Abdomen: soft, nontender, nondistended, no rebound or guarding, ostomy pink/viable +/+(350mL/24hr)-- skin surrounding ostomy erythematous with areas of necrosis, L drain site with ostomy appliance in place (80mL/24hr), SS. Midline wound w/ granulated bowel, packed with adaptic and wet to dry.   Extremities: Grossly symmetric  Caprini: 8     T(C): 37 (12-24-19 @ 05:19), Max: 37.2 (12-23-19 @ 07:00)  HR: 78 (12-24-19 @ 05:19) (78 - 138)  BP: 124/74 (12-24-19 @ 05:19) (107/74 - 124/74)  RR: 18 (12-24-19 @ 05:19) (18 - 18)  SpO2: 96% (12-24-19 @ 05:19) (94% - 96%)  Wt(kg): --    LABS:                        8.4    8.37  )-----------( 247      ( 23 Dec 2019 08:37 )             27.2     12-22    134<L>  |  95<L>  |  8   ----------------------------<  95  4.0   |  29  |  0.55    Ca    8.1<L>      22 Dec 2019 08:12  Phos  3.1     12-22  Mg     2.0     12-22          Is&O's    12-22-19 @ 07:01  -  12-23-19 @ 07:00  --------------------------------------------------------  IN: 1160 mL / OUT: 1195 mL / NET: -35 mL    12-23-19 @ 07:01  -  12-24-19 @ 05:23  --------------------------------------------------------  IN: 600 mL / OUT: 1631 mL / NET: -1031 mL

## 2019-12-24 NOTE — PROGRESS NOTE ADULT - SUBJECTIVE AND OBJECTIVE BOX
CARDIOLOGY     PROGRESS  NOTE   ________________________________________________    CHIEF COMPLAINT:Patient is a 64y old  Female who presents with a chief complaint of perforated bowel (08 Dec 2019 09:22)  doing better.  	  REVIEW OF SYSTEMS:  CONSTITUTIONAL: No fever, weight loss, or fatigue  EYES: No eye pain, visual disturbances, or discharge  ENT:  No difficulty hearing, tinnitus, vertigo; No sinus or throat pain  NECK: No pain or stiffness  RESPIRATORY: No cough, wheezing, chills or hemoptysis; No Shortness of Breath  CARDIOVASCULAR: No chest pain, palpitations, passing out, dizziness, or leg swelling  GASTROINTESTINAL: No abdominal or epigastric pain. No nausea, vomiting, or hematemesis; No diarrhea or constipation. No melena or hematochezia.  GENITOURINARY: No dysuria, frequency, hematuria, or incontinence  NEUROLOGICAL: No headaches, memory loss, loss of strength, numbness, or tremors  SKIN: No itching, burning, rashes, or lesions   LYMPH Nodes: No enlarged glands  ENDOCRINE: No heat or cold intolerance; No hair loss  MUSCULOSKELETAL: No joint pain or swelling; No muscle, back, or extremity pain  PSYCHIATRIC: No depression, anxiety, mood swings, or difficulty sleeping  HEME/LYMPH: No easy bruising, or bleeding gums  ALLERGY AND IMMUNOLOGIC: No hives or eczema	    [ ] All others negative	  [ ] Unable to obtain    PHYSICAL EXAM:  T(C): 37 (12-24-19 @ 05:19), Max: 37 (12-24-19 @ 05:19)  HR: 78 (12-24-19 @ 05:19) (78 - 138)  BP: 124/74 (12-24-19 @ 05:19) (107/74 - 124/74)  RR: 18 (12-24-19 @ 05:19) (18 - 18)  SpO2: 96% (12-24-19 @ 05:19) (94% - 96%)  Wt(kg): --  I&O's Summary    23 Dec 2019 07:01  -  24 Dec 2019 07:00  --------------------------------------------------------  IN: 600 mL / OUT: 2906 mL / NET: -2306 mL        Appearance: Normal	  HEENT:   Normal oral mucosa, PERRL, EOMI	  Lymphatic: No lymphadenopathy  Cardiovascular: Normal S1 S2, No JVD, + murmurs, No edema  Respiratory: Lungs clear to auscultation	  Psychiatry: A & O x 3, Mood & affect appropriate  Gastrointestinal:  Soft, Non-tender, + BS	  Skin: No rashes, No ecchymoses, No cyanosis	  Neurologic: Non-focal  Extremities: Normal range of motion, No clubbing, cyanosis or edema  Vascular: Peripheral pulses palpable 2+ bilaterally    MEDICATIONS  (STANDING):  aspirin  chewable 81 milliGRAM(s) Oral daily  atorvastatin 40 milliGRAM(s) Oral at bedtime  budesonide 160 MICROgram(s)/formoterol 4.5 MICROgram(s) Inhaler 2 Puff(s) Inhalation two times a day  chlorhexidine 4% Liquid 1 Application(s) Topical <User Schedule>  doxazosin 2 milliGRAM(s) Oral daily  enoxaparin Injectable 100 milliGRAM(s) SubCutaneous <User Schedule>  erythromycin     base Tablet 250 milliGRAM(s) Oral two times a day  furosemide    Tablet 20 milliGRAM(s) Oral daily  influenza   Vaccine 0.5 milliLiter(s) IntraMuscular once  methadone    Tablet 17.5 milliGRAM(s) Oral daily  nystatin Powder 1 Application(s) Topical two times a day  pantoprazole    Tablet 40 milliGRAM(s) Oral daily  polyethylene glycol 3350 17 Gram(s) Oral two times a day  spironolactone 50 milliGRAM(s) Oral daily      TELEMETRY: 	    ECG:  	  RADIOLOGY:  OTHER: 	  	  LABS:	 	    CARDIAC MARKERS:                                8.4    8.37  )-----------( 247      ( 23 Dec 2019 08:37 )             27.2     12-24    129<L>  |  93<L>  |  8   ----------------------------<  102<H>  3.6   |  30  |  0.56    Ca    8.2<L>      24 Dec 2019 07:24  Phos  3.4     12-24  Mg     1.8     12-24      proBNP:   Lipid Profile:   HgA1c:   TSH:     - LRD  - f/u am labs  - c/w doxazosin  - monitor ostomy, U/O  - Irrigation vac change M/W/F  - c/w miralax, c/w erythromycin   - c/w pain control: methadone (home medication), Tylenol, Oxy PRN  - DVT ppx with lovenox, ASA 81  - appreciate cardiology recs  - c/w diuresis   - c/w PT daily  - Ostomy nurses and wound care to see today, follow up recommendations     Assessment and plan  ---------------------------  pt is sitting up comfortably in NAD , no complain.  bp noted  echo noted hyperdynamic lv sec to pain/anxiety, volume depletion  continue dvt prophylaxis  started on Lasix and aldactone ?  continue current meds  s/p debridement ?VAC  keep k>4  awaiting placement  plan as per surgery

## 2019-12-25 LAB
ANION GAP SERPL CALC-SCNC: 8 MMOL/L — SIGNIFICANT CHANGE UP (ref 5–17)
BUN SERPL-MCNC: 7 MG/DL — SIGNIFICANT CHANGE UP (ref 7–23)
CALCIUM SERPL-MCNC: 8.1 MG/DL — LOW (ref 8.4–10.5)
CHLORIDE SERPL-SCNC: 97 MMOL/L — SIGNIFICANT CHANGE UP (ref 96–108)
CO2 SERPL-SCNC: 30 MMOL/L — SIGNIFICANT CHANGE UP (ref 22–31)
CREAT SERPL-MCNC: 0.55 MG/DL — SIGNIFICANT CHANGE UP (ref 0.5–1.3)
GLUCOSE SERPL-MCNC: 103 MG/DL — HIGH (ref 70–99)
HCT VFR BLD CALC: 25.9 % — LOW (ref 34.5–45)
HGB BLD-MCNC: 8.2 G/DL — LOW (ref 11.5–15.5)
MAGNESIUM SERPL-MCNC: 1.8 MG/DL — SIGNIFICANT CHANGE UP (ref 1.6–2.6)
MCHC RBC-ENTMCNC: 28.8 PG — SIGNIFICANT CHANGE UP (ref 27–34)
MCHC RBC-ENTMCNC: 31.7 GM/DL — LOW (ref 32–36)
MCV RBC AUTO: 90.9 FL — SIGNIFICANT CHANGE UP (ref 80–100)
PHOSPHATE SERPL-MCNC: 3.4 MG/DL — SIGNIFICANT CHANGE UP (ref 2.5–4.5)
PLATELET # BLD AUTO: 245 K/UL — SIGNIFICANT CHANGE UP (ref 150–400)
POTASSIUM SERPL-MCNC: 3.7 MMOL/L — SIGNIFICANT CHANGE UP (ref 3.5–5.3)
POTASSIUM SERPL-SCNC: 3.7 MMOL/L — SIGNIFICANT CHANGE UP (ref 3.5–5.3)
RBC # BLD: 2.85 M/UL — LOW (ref 3.8–5.2)
RBC # FLD: 15.2 % — HIGH (ref 10.3–14.5)
SODIUM SERPL-SCNC: 135 MMOL/L — SIGNIFICANT CHANGE UP (ref 135–145)
WBC # BLD: 8.32 K/UL — SIGNIFICANT CHANGE UP (ref 3.8–10.5)
WBC # FLD AUTO: 8.32 K/UL — SIGNIFICANT CHANGE UP (ref 3.8–10.5)

## 2019-12-25 RX ORDER — MAGNESIUM SULFATE 500 MG/ML
2 VIAL (ML) INJECTION ONCE
Refills: 0 | Status: COMPLETED | OUTPATIENT
Start: 2019-12-25 | End: 2019-12-25

## 2019-12-25 RX ORDER — POTASSIUM CHLORIDE 20 MEQ
40 PACKET (EA) ORAL ONCE
Refills: 0 | Status: COMPLETED | OUTPATIENT
Start: 2019-12-25 | End: 2019-12-25

## 2019-12-25 RX ADMIN — CHLORHEXIDINE GLUCONATE 1 APPLICATION(S): 213 SOLUTION TOPICAL at 07:38

## 2019-12-25 RX ADMIN — Medication 250 MILLIGRAM(S): at 17:02

## 2019-12-25 RX ADMIN — OXYCODONE HYDROCHLORIDE 5 MILLIGRAM(S): 5 TABLET ORAL at 14:14

## 2019-12-25 RX ADMIN — METHADONE HYDROCHLORIDE 17.5 MILLIGRAM(S): 40 TABLET ORAL at 09:03

## 2019-12-25 RX ADMIN — NYSTATIN CREAM 1 APPLICATION(S): 100000 CREAM TOPICAL at 17:03

## 2019-12-25 RX ADMIN — Medication 81 MILLIGRAM(S): at 11:42

## 2019-12-25 RX ADMIN — SPIRONOLACTONE 50 MILLIGRAM(S): 25 TABLET, FILM COATED ORAL at 05:19

## 2019-12-25 RX ADMIN — BUDESONIDE AND FORMOTEROL FUMARATE DIHYDRATE 2 PUFF(S): 160; 4.5 AEROSOL RESPIRATORY (INHALATION) at 05:20

## 2019-12-25 RX ADMIN — POLYETHYLENE GLYCOL 3350 17 GRAM(S): 17 POWDER, FOR SOLUTION ORAL at 17:02

## 2019-12-25 RX ADMIN — Medication 40 MILLIEQUIVALENT(S): at 10:18

## 2019-12-25 RX ADMIN — ENOXAPARIN SODIUM 100 MILLIGRAM(S): 100 INJECTION SUBCUTANEOUS at 11:42

## 2019-12-25 RX ADMIN — Medication 250 MILLIGRAM(S): at 05:19

## 2019-12-25 RX ADMIN — PANTOPRAZOLE SODIUM 40 MILLIGRAM(S): 20 TABLET, DELAYED RELEASE ORAL at 11:42

## 2019-12-25 RX ADMIN — OXYCODONE HYDROCHLORIDE 5 MILLIGRAM(S): 5 TABLET ORAL at 13:44

## 2019-12-25 RX ADMIN — Medication 20 MILLIGRAM(S): at 05:19

## 2019-12-25 RX ADMIN — NYSTATIN CREAM 1 APPLICATION(S): 100000 CREAM TOPICAL at 05:19

## 2019-12-25 RX ADMIN — Medication 50 GRAM(S): at 10:18

## 2019-12-25 RX ADMIN — POLYETHYLENE GLYCOL 3350 17 GRAM(S): 17 POWDER, FOR SOLUTION ORAL at 05:20

## 2019-12-25 RX ADMIN — Medication 2 MILLIGRAM(S): at 05:19

## 2019-12-25 RX ADMIN — BUDESONIDE AND FORMOTEROL FUMARATE DIHYDRATE 2 PUFF(S): 160; 4.5 AEROSOL RESPIRATORY (INHALATION) at 17:03

## 2019-12-25 NOTE — PROGRESS NOTE ADULT - SUBJECTIVE AND OBJECTIVE BOX
CARDIOLOGY     PROGRESS  NOTE   ________________________________________________    CHIEF COMPLAINT:Patient is a 64y old  Female who presents with a chief complaint of perforated bowel (08 Dec 2019 09:22)  doing well, c/o nauesa  	  REVIEW OF SYSTEMS:  CONSTITUTIONAL: No fever, weight loss, or fatigue  EYES: No eye pain, visual disturbances, or discharge  ENT:  No difficulty hearing, tinnitus, vertigo; No sinus or throat pain  NECK: No pain or stiffness  RESPIRATORY: No cough, wheezing, chills or hemoptysis; No Shortness of Breath  CARDIOVASCULAR: No chest pain, palpitations, passing out, dizziness, or leg swelling  GASTROINTESTINAL: No abdominal or epigastric pain. No nausea, vomiting, or hematemesis; No diarrhea or constipation. No melena or hematochezia.  GENITOURINARY: No dysuria, frequency, hematuria, or incontinence  NEUROLOGICAL: No headaches, memory loss, loss of strength, numbness, or tremors  SKIN: No itching, burning, rashes, or lesions   LYMPH Nodes: No enlarged glands  ENDOCRINE: No heat or cold intolerance; No hair loss  MUSCULOSKELETAL: No joint pain or swelling; No muscle, back, or extremity pain  PSYCHIATRIC: No depression, anxiety, mood swings, or difficulty sleeping  HEME/LYMPH: No easy bruising, or bleeding gums  ALLERGY AND IMMUNOLOGIC: No hives or eczema	    [ ] All others negative	  [ ] Unable to obtain    PHYSICAL EXAM:  T(C): 37.1 (12-25-19 @ 00:49), Max: 37.1 (12-24-19 @ 21:36)  HR: 79 (12-25-19 @ 00:49) (79 - 85)  BP: 134/72 (12-25-19 @ 00:49) (111/68 - 158/69)  RR: 18 (12-25-19 @ 00:49) (18 - 18)  SpO2: 95% (12-25-19 @ 00:49) (94% - 95%)  Wt(kg): --  I&O's Summary    24 Dec 2019 07:01  -  25 Dec 2019 07:00  --------------------------------------------------------  IN: 820 mL / OUT: 2477 mL / NET: -1657 mL    25 Dec 2019 07:01  -  25 Dec 2019 08:29  --------------------------------------------------------  IN: 0 mL / OUT: 841 mL / NET: -841 mL        Appearance: Normal	  HEENT:   Normal oral mucosa, PERRL, EOMI	  Lymphatic: No lymphadenopathy  Cardiovascular: Normal S1 S2, No JVD, + murmurs, No edema  Respiratory: Lungs clear to auscultation	  Psychiatry: A & O x 3, Mood & affect appropriate  Gastrointestinal:  Soft, Non-tender, + BS	  Skin: No rashes, No ecchymoses, No cyanosis	  Neurologic: Non-focal  Extremities: Normal range of motion, No clubbing, cyanosis or edema  Vascular: Peripheral pulses palpable 2+ bilaterally    MEDICATIONS  (STANDING):  aspirin  chewable 81 milliGRAM(s) Oral daily  atorvastatin 40 milliGRAM(s) Oral at bedtime  budesonide 160 MICROgram(s)/formoterol 4.5 MICROgram(s) Inhaler 2 Puff(s) Inhalation two times a day  chlorhexidine 4% Liquid 1 Application(s) Topical <User Schedule>  doxazosin 2 milliGRAM(s) Oral daily  enoxaparin Injectable 100 milliGRAM(s) SubCutaneous <User Schedule>  erythromycin     base Tablet 250 milliGRAM(s) Oral two times a day  furosemide    Tablet 20 milliGRAM(s) Oral daily  influenza   Vaccine 0.5 milliLiter(s) IntraMuscular once  methadone    Tablet 17.5 milliGRAM(s) Oral daily  nystatin Powder 1 Application(s) Topical two times a day  pantoprazole    Tablet 40 milliGRAM(s) Oral daily  polyethylene glycol 3350 17 Gram(s) Oral two times a day  spironolactone 50 milliGRAM(s) Oral daily      TELEMETRY: 	    ECG:  	  RADIOLOGY:  OTHER: 	  	  LABS:	 	    CARDIAC MARKERS:                                8.4    8.37  )-----------( 247      ( 23 Dec 2019 08:37 )             27.2     12-25    135  |  97  |  7   ----------------------------<  103<H>  3.7   |  30  |  0.55    Ca    8.1<L>      25 Dec 2019 07:34  Phos  3.4     12-25  Mg     1.8     12-25      proBNP:   Lipid Profile:   HgA1c:   TSH:         Assessment and plan  ---------------------------  pt is sitting up comfortably in NAD , no complain.  bp noted  echo noted hyperdynamic lv sec to pain/anxiety, volume depletion  continue dvt prophylaxis  started on Lasix and aldactone ?  continue current meds  s/p debridement ?VAC  keep k>4  dvt prophylaxis  nausea ?gastroparesis ?add Reglan  continue Protonix  awaiting rehab  awaiting placement  plan as per surgery

## 2019-12-25 NOTE — PROGRESS NOTE ADULT - SUBJECTIVE AND OBJECTIVE BOX
GENERAL SURGERY PROGRESS NOTE    SUBJECTIVE  Patient seen and examined.   No acute events overnight.   Vac on midline wound, irrigation vac.   Pain is better controlled today (on methadone, oxy, tylenol)  Tolerating diet without nausea, vomiting.   +/+ ostomy (continues on miralax).   No pain near incision site/ostomy.    Voiding spontaneously  Denies fever, chills, SOB, chest pain.     OBJECTIVE  PHYSICAL EXAM  General: Morbidly obese, appears well, NAD  CHEST: breathing comfortably  CV: appears well perfused  Abdomen: soft, nontender, nondistended, no rebound or guarding, ostomy pink/viable +/+(2 stool count)-- skin surrounding ostomy erythematous with areas of necrosis, L drain site with ostomy appliance in place (25mL/24hr), SS. Midline wound w/ granulated bowel, packed with adaptic and wet to dry.   VAC: (1,200ml/24hr)  : u/o 1,250  Extremities: Grossly symmetric  Caprini: 8     V/S:  Vital Signs Last 24 Hrs  T(C): 37.1 (25 Dec 2019 00:49), Max: 37.1 (24 Dec 2019 21:36)  T(F): 98.7 (25 Dec 2019 00:49), Max: 98.8 (24 Dec 2019 21:36)  HR: 79 (25 Dec 2019 00:49) (79 - 85)  BP: 134/72 (25 Dec 2019 00:49) (111/68 - 158/69)  BP(mean): --  RR: 18 (25 Dec 2019 00:49) (18 - 18)  SpO2: 95% (25 Dec 2019 00:49) (94% - 95%)    --------------------------------------------------------------------------------------------------  I/Os:    23 Dec 2019 07:01  -  24 Dec 2019 07:00  --------------------------------------------------------  IN:    Oral Fluid: 600 mL  Total IN: 600 mL    OUT:    Colostomy: 351 mL    Drain: 155 mL    Intermittent Catheterization - Urethral: 200 mL    VAC (Vacuum Assisted Closure) System: 750 mL    Voided: 1450 mL  Total OUT: 2906 mL    Total NET: -2306 mL      24 Dec 2019 07:01  -  25 Dec 2019 06:07  --------------------------------------------------------  IN:    Oral Fluid: 820 mL  Total IN: 820 mL    OUT:    Colostomy: 2 mL    Drain: 25 mL    VAC (Vacuum Assisted Closure) System: 1200 mL    Voided: 1250 mL  Total OUT: 2477 mL    Total NET: -1657 mL        --------------------------------------------------------------------------------------------------  LABS:                        8.4    8.37  )-----------( 247      ( 23 Dec 2019 08:37 )             27.2     24 Dec 2019 07:24    129    |  93     |  8      ----------------------------<  102    3.6     |  30     |  0.56     Ca    8.2        24 Dec 2019 07:24  Phos  3.4       24 Dec 2019 07:24  Mg     1.8       24 Dec 2019 07:24        CAPILLARY BLOOD GLUCOSE                  --------------------------------------------------------------------------------------------------  MEDICATIONS  (STANDING):  aspirin  chewable 81 milliGRAM(s) Oral daily  atorvastatin 40 milliGRAM(s) Oral at bedtime  budesonide 160 MICROgram(s)/formoterol 4.5 MICROgram(s) Inhaler 2 Puff(s) Inhalation two times a day  chlorhexidine 4% Liquid 1 Application(s) Topical <User Schedule>  doxazosin 2 milliGRAM(s) Oral daily  enoxaparin Injectable 100 milliGRAM(s) SubCutaneous <User Schedule>  erythromycin     base Tablet 250 milliGRAM(s) Oral two times a day  furosemide    Tablet 20 milliGRAM(s) Oral daily  influenza   Vaccine 0.5 milliLiter(s) IntraMuscular once  methadone    Tablet 17.5 milliGRAM(s) Oral daily  nystatin Powder 1 Application(s) Topical two times a day  pantoprazole    Tablet 40 milliGRAM(s) Oral daily  polyethylene glycol 3350 17 Gram(s) Oral two times a day  spironolactone 50 milliGRAM(s) Oral daily    MEDICATIONS  (PRN):  acetaminophen   Tablet .. 650 milliGRAM(s) Oral every 6 hours PRN Mild Pain (1 - 3)  ALBUTerol    90 MICROgram(s) HFA Inhaler 2 Puff(s) Inhalation every 6 hours PRN Shortness of Breath and/or Wheezing  aluminum hydroxide/magnesium hydroxide/simethicone Suspension 30 milliLiter(s) Oral every 4 hours PRN Dyspepsia  ondansetron    Tablet 4 milliGRAM(s) Oral once PRN Nausea and/or Vomiting  oxyCODONE    IR 5 milliGRAM(s) Oral every 4 hours PRN breakthrough pain  sodium chloride 0.9% lock flush 10 milliLiter(s) IV Push every 1 hour PRN Pre/post blood products, medications, blood draw, and to maintain line patency GENERAL SURGERY PROGRESS NOTE    SUBJECTIVE  Patient seen and examined.   No acute events overnight.   Vac on midline wound, irrigation vac.   Pain is better controlled today (on methadone, oxy, tylenol)  Tolerating diet without nausea, vomiting.   +/+ ostomy (continues on miralax).   No pain near incision site/ostomy.    Voiding spontaneously  Denies fever, chills, SOB, chest pain.     OBJECTIVE  PHYSICAL EXAM  General: Morbidly obese, appears well, NAD  CHEST: breathing comfortably  CV: appears well perfused  Abdomen: soft, nontender, nondistended, no rebound or guarding, ostomy pink/viable +/+(2 stool count), L drain site with ostomy appliance in place (25mL/24hr), clear yellow fluid.   VAC: (1,200ml/24hr)  : u/o 1,250  Extremities: Grossly symmetric  Caprini: 8     V/S:  Vital Signs Last 24 Hrs  T(C): 37.1 (25 Dec 2019 00:49), Max: 37.1 (24 Dec 2019 21:36)  T(F): 98.7 (25 Dec 2019 00:49), Max: 98.8 (24 Dec 2019 21:36)  HR: 79 (25 Dec 2019 00:49) (79 - 85)  BP: 134/72 (25 Dec 2019 00:49) (111/68 - 158/69)  BP(mean): --  RR: 18 (25 Dec 2019 00:49) (18 - 18)  SpO2: 95% (25 Dec 2019 00:49) (94% - 95%)    --------------------------------------------------------------------------------------------------  I/Os:    23 Dec 2019 07:01  -  24 Dec 2019 07:00  --------------------------------------------------------  IN:    Oral Fluid: 600 mL  Total IN: 600 mL    OUT:    Colostomy: 351 mL    Drain: 155 mL    Intermittent Catheterization - Urethral: 200 mL    VAC (Vacuum Assisted Closure) System: 750 mL    Voided: 1450 mL  Total OUT: 2906 mL    Total NET: -2306 mL      24 Dec 2019 07:01  -  25 Dec 2019 06:07  --------------------------------------------------------  IN:    Oral Fluid: 820 mL  Total IN: 820 mL    OUT:    Colostomy: 2 mL    Drain: 25 mL    VAC (Vacuum Assisted Closure) System: 1200 mL    Voided: 1250 mL  Total OUT: 2477 mL    Total NET: -1657 mL        --------------------------------------------------------------------------------------------------  LABS:                        8.4    8.37  )-----------( 247      ( 23 Dec 2019 08:37 )             27.2     24 Dec 2019 07:24    129    |  93     |  8      ----------------------------<  102    3.6     |  30     |  0.56     Ca    8.2        24 Dec 2019 07:24  Phos  3.4       24 Dec 2019 07:24  Mg     1.8       24 Dec 2019 07:24        CAPILLARY BLOOD GLUCOSE                  --------------------------------------------------------------------------------------------------  MEDICATIONS  (STANDING):  aspirin  chewable 81 milliGRAM(s) Oral daily  atorvastatin 40 milliGRAM(s) Oral at bedtime  budesonide 160 MICROgram(s)/formoterol 4.5 MICROgram(s) Inhaler 2 Puff(s) Inhalation two times a day  chlorhexidine 4% Liquid 1 Application(s) Topical <User Schedule>  doxazosin 2 milliGRAM(s) Oral daily  enoxaparin Injectable 100 milliGRAM(s) SubCutaneous <User Schedule>  erythromycin     base Tablet 250 milliGRAM(s) Oral two times a day  furosemide    Tablet 20 milliGRAM(s) Oral daily  influenza   Vaccine 0.5 milliLiter(s) IntraMuscular once  methadone    Tablet 17.5 milliGRAM(s) Oral daily  nystatin Powder 1 Application(s) Topical two times a day  pantoprazole    Tablet 40 milliGRAM(s) Oral daily  polyethylene glycol 3350 17 Gram(s) Oral two times a day  spironolactone 50 milliGRAM(s) Oral daily    MEDICATIONS  (PRN):  acetaminophen   Tablet .. 650 milliGRAM(s) Oral every 6 hours PRN Mild Pain (1 - 3)  ALBUTerol    90 MICROgram(s) HFA Inhaler 2 Puff(s) Inhalation every 6 hours PRN Shortness of Breath and/or Wheezing  aluminum hydroxide/magnesium hydroxide/simethicone Suspension 30 milliLiter(s) Oral every 4 hours PRN Dyspepsia  ondansetron    Tablet 4 milliGRAM(s) Oral once PRN Nausea and/or Vomiting  oxyCODONE    IR 5 milliGRAM(s) Oral every 4 hours PRN breakthrough pain  sodium chloride 0.9% lock flush 10 milliLiter(s) IV Push every 1 hour PRN Pre/post blood products, medications, blood draw, and to maintain line patency

## 2019-12-25 NOTE — PROGRESS NOTE ADULT - ASSESSMENT
A/P: 64y Female  s/p ex lap with extended left hemicolectomy left in discontinuity for perforated sigmoid diverticulitis with intraperitoneal free air. AbThera vac placed and pt kept intubated post op and transferred to SICU for post-op management. Extubated 11/30. For elevated WBC, started on meropenem 12/3-12/10. Transferred from SICU to floor 12/4. CT C/A/P 12/4 shows no obvious intrabdominal collection. Pt was complaining of dysphagia, EGD performed revealed mid and lower esophageal diverticulum and medium sized hiatal hernia 12/10. 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 Patient started on erythromycin and miralax to assist with BF 12/11. On 12/19 patient found to have induration, vac removed, we to dry dressings places. Work with PT has been difficult due to patients body habitus and deconditioning; patient unable to be placed in [facility] at this time.    Plan:  - LRD  - f/u am labs  - c/w doxazosin  - monitor ostomy, U/O  - Irrigation vac change M/W/F  - c/w miralax, c/w erythromycin   - c/w pain control: methadone (home medication), Tylenol, Oxy PRN  - DVT ppx with lovenox, ASA 81  - appreciate cardiology recs  - c/w diuresis   - c/w PT daily  - Ostomy nurses and wound care to see today, follow up recommendations   - Dispo: rehab     [facility]  p [id number] A/P: 64y Female  s/p ex lap with extended left hemicolectomy left in discontinuity for perforated sigmoid diverticulitis with intraperitoneal free air. AbThera vac placed and pt kept intubated post op and transferred to SICU for post-op management. Extubated 11/30. For elevated WBC, started on meropenem 12/3-12/10. Transferred from SICU to floor 12/4. CT C/A/P 12/4 shows no obvious intrabdominal collection. Pt was complaining of dysphagia, EGD performed revealed mid and lower esophageal diverticulum and medium sized hiatal hernia 12/10. 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 Patient started on erythromycin and miralax to assist with BF 12/11. On 12/19 patient found to have induration, vac removed, we to dry dressings places. Work with PT has been difficult due to patients body habitus and deconditioning; patient unable to be placed in [facility] at this time.    Plan:  - LRD  - f/u am labs  - c/w doxazosin  - monitor ostomy, U/O  - Irrigation vac change M/W/F  - c/w miralax, c/w erythromycin   - c/w pain control: methadone (home medication), Tylenol, Oxy PRN  - DVT ppx with lovenox, ASA 81  - appreciate cardiology recs  - c/w diuresis   - c/w PT daily  - Dispo: rehab     [facility]  p [id number]

## 2019-12-26 RX ORDER — HYDROMORPHONE HYDROCHLORIDE 2 MG/ML
1 INJECTION INTRAMUSCULAR; INTRAVENOUS; SUBCUTANEOUS ONCE
Refills: 0 | Status: DISCONTINUED | OUTPATIENT
Start: 2019-12-26 | End: 2019-12-26

## 2019-12-26 RX ADMIN — Medication 20 MILLIGRAM(S): at 05:38

## 2019-12-26 RX ADMIN — HYDROMORPHONE HYDROCHLORIDE 1 MILLIGRAM(S): 2 INJECTION INTRAMUSCULAR; INTRAVENOUS; SUBCUTANEOUS at 14:19

## 2019-12-26 RX ADMIN — Medication 250 MILLIGRAM(S): at 17:30

## 2019-12-26 RX ADMIN — CHLORHEXIDINE GLUCONATE 1 APPLICATION(S): 213 SOLUTION TOPICAL at 09:03

## 2019-12-26 RX ADMIN — PANTOPRAZOLE SODIUM 40 MILLIGRAM(S): 20 TABLET, DELAYED RELEASE ORAL at 12:20

## 2019-12-26 RX ADMIN — POLYETHYLENE GLYCOL 3350 17 GRAM(S): 17 POWDER, FOR SOLUTION ORAL at 05:37

## 2019-12-26 RX ADMIN — METHADONE HYDROCHLORIDE 17.5 MILLIGRAM(S): 40 TABLET ORAL at 09:03

## 2019-12-26 RX ADMIN — HYDROMORPHONE HYDROCHLORIDE 1 MILLIGRAM(S): 2 INJECTION INTRAMUSCULAR; INTRAVENOUS; SUBCUTANEOUS at 14:04

## 2019-12-26 RX ADMIN — ENOXAPARIN SODIUM 100 MILLIGRAM(S): 100 INJECTION SUBCUTANEOUS at 12:20

## 2019-12-26 RX ADMIN — POLYETHYLENE GLYCOL 3350 17 GRAM(S): 17 POWDER, FOR SOLUTION ORAL at 17:33

## 2019-12-26 RX ADMIN — Medication 250 MILLIGRAM(S): at 05:38

## 2019-12-26 RX ADMIN — NYSTATIN CREAM 1 APPLICATION(S): 100000 CREAM TOPICAL at 17:30

## 2019-12-26 RX ADMIN — BUDESONIDE AND FORMOTEROL FUMARATE DIHYDRATE 2 PUFF(S): 160; 4.5 AEROSOL RESPIRATORY (INHALATION) at 17:31

## 2019-12-26 RX ADMIN — Medication 2 MILLIGRAM(S): at 05:37

## 2019-12-26 RX ADMIN — Medication 81 MILLIGRAM(S): at 12:20

## 2019-12-26 RX ADMIN — SPIRONOLACTONE 50 MILLIGRAM(S): 25 TABLET, FILM COATED ORAL at 05:38

## 2019-12-26 RX ADMIN — BUDESONIDE AND FORMOTEROL FUMARATE DIHYDRATE 2 PUFF(S): 160; 4.5 AEROSOL RESPIRATORY (INHALATION) at 05:38

## 2019-12-26 NOTE — PROGRESS NOTE ADULT - ATTENDING COMMENTS
Patient seen/examined.  Agree w above note and plan and have discussed plan w house staff.  Doing well, for VAC change    Lucas Greer MD

## 2019-12-26 NOTE — PROGRESS NOTE ADULT - SUBJECTIVE AND OBJECTIVE BOX
CARDIOLOGY     PROGRESS  NOTE   ________________________________________________    CHIEF COMPLAINT:Patient is a 64y old  Female who presents with a chief complaint of perforated bowel (08 Dec 2019 09:22)  no complain.  	  REVIEW OF SYSTEMS:  CONSTITUTIONAL: No fever, weight loss, or fatigue  EYES: No eye pain, visual disturbances, or discharge  ENT:  No difficulty hearing, tinnitus, vertigo; No sinus or throat pain  NECK: No pain or stiffness  RESPIRATORY: No cough, wheezing, chills or hemoptysis; No Shortness of Breath  CARDIOVASCULAR: No chest pain, palpitations, passing out, dizziness, or leg swelling  GASTROINTESTINAL: No abdominal or epigastric pain. No nausea, vomiting, or hematemesis; No diarrhea or constipation. No melena or hematochezia.  GENITOURINARY: No dysuria, frequency, hematuria, or incontinence  NEUROLOGICAL: No headaches, memory loss, loss of strength, numbness, or tremors  SKIN: No itching, burning, rashes, or lesions   LYMPH Nodes: No enlarged glands  ENDOCRINE: No heat or cold intolerance; No hair loss  MUSCULOSKELETAL: No joint pain or swelling; No muscle, back, or extremity pain  PSYCHIATRIC: No depression, anxiety, mood swings, or difficulty sleeping  HEME/LYMPH: No easy bruising, or bleeding gums  ALLERGY AND IMMUNOLOGIC: No hives or eczema	    [ ] All others negative	  [ ] Unable to obtain    PHYSICAL EXAM:  T(C): 36.9 (12-26-19 @ 00:29), Max: 37.4 (12-25-19 @ 10:27)  HR: 81 (12-26-19 @ 00:29) (81 - 84)  BP: 101/54 (12-26-19 @ 00:29) (99/58 - 101/65)  RR: 18 (12-26-19 @ 00:29) (18 - 18)  SpO2: 93% (12-26-19 @ 00:29) (93% - 94%)  Wt(kg): --  I&O's Summary    25 Dec 2019 07:01  -  26 Dec 2019 07:00  --------------------------------------------------------  IN: 2590 mL / OUT: 6157 mL / NET: -3567 mL        Appearance: Normal	  HEENT:   Normal oral mucosa, PERRL, EOMI	  Lymphatic: No lymphadenopathy  Cardiovascular: Normal S1 S2, No JVD, + murmurs, No edema  Respiratory: Lungs clear to auscultation	  Psychiatry: A & O x 3, Mood & affect appropriate  Gastrointestinal:  Soft, Non-tender, + BS	  Skin: No rashes, No ecchymoses, No cyanosis	  Neurologic: Non-focal  Extremities: Normal range of motion, No clubbing, cyanosis or edema  Vascular: Peripheral pulses palpable 2+ bilaterally    MEDICATIONS  (STANDING):  aspirin  chewable 81 milliGRAM(s) Oral daily  atorvastatin 40 milliGRAM(s) Oral at bedtime  budesonide 160 MICROgram(s)/formoterol 4.5 MICROgram(s) Inhaler 2 Puff(s) Inhalation two times a day  chlorhexidine 4% Liquid 1 Application(s) Topical <User Schedule>  doxazosin 2 milliGRAM(s) Oral daily  enoxaparin Injectable 100 milliGRAM(s) SubCutaneous <User Schedule>  erythromycin     base Tablet 250 milliGRAM(s) Oral two times a day  furosemide    Tablet 20 milliGRAM(s) Oral daily  influenza   Vaccine 0.5 milliLiter(s) IntraMuscular once  methadone    Tablet 17.5 milliGRAM(s) Oral daily  nystatin Powder 1 Application(s) Topical two times a day  pantoprazole    Tablet 40 milliGRAM(s) Oral daily  polyethylene glycol 3350 17 Gram(s) Oral two times a day  spironolactone 50 milliGRAM(s) Oral daily      TELEMETRY: 	    ECG:  	  RADIOLOGY:  OTHER: 	  	  LABS:	 	    CARDIAC MARKERS:                                8.2    8.32  )-----------( 245      ( 25 Dec 2019 08:40 )             25.9     12-25    135  |  97  |  7   ----------------------------<  103<H>  3.7   |  30  |  0.55    Ca    8.1<L>      25 Dec 2019 07:34  Phos  3.4     12-25  Mg     1.8     12-25      proBNP:   Lipid Profile:   HgA1c:   TSH:         Assessment and plan  ---------------------------             CARDIOLOGY     PROGRESS  NOTE   ________________________________________________    CHIEF COMPLAINT:Patient is a 64y old  Female who presents with a chief complaint of perforated bowel (08 Dec 2019 09:22)    	  REVIEW OF SYSTEMS:  CONSTITUTIONAL: No fever, weight loss, or fatigue  EYES: No eye pain, visual disturbances, or discharge  ENT:  No difficulty hearing, tinnitus, vertigo; No sinus or throat pain  NECK: No pain or stiffness  RESPIRATORY: No cough, wheezing, chills or hemoptysis; No Shortness of Breath  CARDIOVASCULAR: No chest pain, palpitations, passing out, dizziness, or leg swelling  GASTROINTESTINAL: No abdominal or epigastric pain. No nausea, vomiting, or hematemesis; No diarrhea or constipation. No melena or hematochezia.  GENITOURINARY: No dysuria, frequency, hematuria, or incontinence  NEUROLOGICAL: No headaches, memory loss, loss of strength, numbness, or tremors  SKIN: No itching, burning, rashes, or lesions   LYMPH Nodes: No enlarged glands  ENDOCRINE: No heat or cold intolerance; No hair loss  MUSCULOSKELETAL: No joint pain or swelling; No muscle, back, or extremity pain  PSYCHIATRIC: No depression, anxiety, mood swings, or difficulty sleeping  HEME/LYMPH: No easy bruising, or bleeding gums  ALLERGY AND IMMUNOLOGIC: No hives or eczema	    [ ] All others negative	  [ ] Unable to obtain    PHYSICAL EXAM:  T(C): 36.9 (12-26-19 @ 00:29), Max: 37.4 (12-25-19 @ 10:27)  HR: 81 (12-26-19 @ 00:29) (81 - 84)  BP: 101/54 (12-26-19 @ 00:29) (99/58 - 101/65)  RR: 18 (12-26-19 @ 00:29) (18 - 18)  SpO2: 93% (12-26-19 @ 00:29) (93% - 94%)  Wt(kg): --  I&O's Summary    25 Dec 2019 07:01  -  26 Dec 2019 07:00  --------------------------------------------------------  IN: 2590 mL / OUT: 6157 mL / NET: -3567 mL        Appearance: Normal	  HEENT:   Normal oral mucosa, PERRL, EOMI	  Lymphatic: No lymphadenopathy  Cardiovascular: Normal S1 S2, No JVD, No murmurs, No edema  Respiratory: Lungs clear to auscultation	  Psychiatry: A & O x 3, Mood & affect appropriate  Gastrointestinal:  Soft, Non-tender, + BS	  Skin: No rashes, No ecchymoses, No cyanosis	  Neurologic: Non-focal  Extremities: Normal range of motion, No clubbing, cyanosis or edema  Vascular: Peripheral pulses palpable 2+ bilaterally    MEDICATIONS  (STANDING):  aspirin  chewable 81 milliGRAM(s) Oral daily  atorvastatin 40 milliGRAM(s) Oral at bedtime  budesonide 160 MICROgram(s)/formoterol 4.5 MICROgram(s) Inhaler 2 Puff(s) Inhalation two times a day  chlorhexidine 4% Liquid 1 Application(s) Topical <User Schedule>  doxazosin 2 milliGRAM(s) Oral daily  enoxaparin Injectable 100 milliGRAM(s) SubCutaneous <User Schedule>  erythromycin     base Tablet 250 milliGRAM(s) Oral two times a day  furosemide    Tablet 20 milliGRAM(s) Oral daily  influenza   Vaccine 0.5 milliLiter(s) IntraMuscular once  methadone    Tablet 17.5 milliGRAM(s) Oral daily  nystatin Powder 1 Application(s) Topical two times a day  pantoprazole    Tablet 40 milliGRAM(s) Oral daily  polyethylene glycol 3350 17 Gram(s) Oral two times a day  spironolactone 50 milliGRAM(s) Oral daily      TELEMETRY: 	    ECG:  	  RADIOLOGY:  OTHER: 	  	  LABS:	 	    CARDIAC MARKERS:                                8.2    8.32  )-----------( 245      ( 25 Dec 2019 08:40 )             25.9     12-25    135  |  97  |  7   ----------------------------<  103<H>  3.7   |  30  |  0.55    Ca    8.1<L>      25 Dec 2019 07:34  Phos  3.4     12-25  Mg     1.8     12-25      proBNP:   Lipid Profile:   HgA1c:   TSH:         Assessment and plan  ---------------------------  pt is sitting up comfortably in NAD , no complain.  bp noted  echo noted hyperdynamic lv sec to pain/anxiety, volume depletion  continue dvt prophylaxis  started on Lasix and aldactone ?  continue current meds  s/p debridement ?VAC  keep k>4  dvt prophylaxis  nausea ?gastroparesis ?add Reglan  continue Protonix  awaiting rehab  awaiting placement  plan as per surgery

## 2019-12-26 NOTE — PROGRESS NOTE ADULT - SUBJECTIVE AND OBJECTIVE BOX
GENERAL SURGERY PROGRESS NOTE    SUBJECTIVE  Patient seen and examined.   No acute events overnight.   Vac on midline wound, irrigation vac.   Pain is better controlled (methadone, oxy, tylenol)  Tolerating diet without nausea, vomiting.   +/+ ostomy (continues on miralax).   Voiding spontaneously  Denies fever, chills, SOB, chest pain.     OBJECTIVE  PHYSICAL EXAM  General: Morbidly obese, appears well, NAD  CHEST: breathing comfortably  CV: appears well perfused  Abdomen: soft, nontender, nondistended, no rebound or guarding, ostomy pink/viable +/+(2 stool count), L drain site with ostomy appliance in place (___mL/24hr), clear yellow fluid. Midline wound with irrigation VAC in place.   VAC: ____ml/24hr  : u/o ___  Extremities: Grossly symmetric  Caprini: 8     V/S:  Vital Signs Last 24 Hrs  T(C): 37.1 (25 Dec 2019 00:49), Max: 37.1 (24 Dec 2019 21:36)  T(F): 98.7 (25 Dec 2019 00:49), Max: 98.8 (24 Dec 2019 21:36)  HR: 79 (25 Dec 2019 00:49) (79 - 85)  BP: 134/72 (25 Dec 2019 00:49) (111/68 - 158/69)  BP(mean): --  RR: 18 (25 Dec 2019 00:49) (18 - 18)  SpO2: 95% (25 Dec 2019 00:49) (94% - 95%)    --------------------------------------------------------------------------------------------------  I/Os:    23 Dec 2019 07:01  -  24 Dec 2019 07:00  --------------------------------------------------------  IN:    Oral Fluid: 600 mL  Total IN: 600 mL    OUT:    Colostomy: 351 mL    Drain: 155 mL    Intermittent Catheterization - Urethral: 200 mL    VAC (Vacuum Assisted Closure) System: 750 mL    Voided: 1450 mL  Total OUT: 2906 mL    Total NET: -2306 mL      24 Dec 2019 07:01  -  25 Dec 2019 06:07  --------------------------------------------------------  IN:    Oral Fluid: 820 mL  Total IN: 820 mL    OUT:    Colostomy: 2 mL    Drain: 25 mL    VAC (Vacuum Assisted Closure) System: 1200 mL    Voided: 1250 mL  Total OUT: 2477 mL    Total NET: -1657 mL        --------------------------------------------------------------------------------------------------  LABS:                        8.4    8.37  )-----------( 247      ( 23 Dec 2019 08:37 )             27.2     24 Dec 2019 07:24    129    |  93     |  8      ----------------------------<  102    3.6     |  30     |  0.56     Ca    8.2        24 Dec 2019 07:24  Phos  3.4       24 Dec 2019 07:24  Mg     1.8       24 Dec 2019 07:24        CAPILLARY BLOOD GLUCOSE                  --------------------------------------------------------------------------------------------------  MEDICATIONS  (STANDING):  aspirin  chewable 81 milliGRAM(s) Oral daily  atorvastatin 40 milliGRAM(s) Oral at bedtime  budesonide 160 MICROgram(s)/formoterol 4.5 MICROgram(s) Inhaler 2 Puff(s) Inhalation two times a day  chlorhexidine 4% Liquid 1 Application(s) Topical <User Schedule>  doxazosin 2 milliGRAM(s) Oral daily  enoxaparin Injectable 100 milliGRAM(s) SubCutaneous <User Schedule>  erythromycin     base Tablet 250 milliGRAM(s) Oral two times a day  furosemide    Tablet 20 milliGRAM(s) Oral daily  influenza   Vaccine 0.5 milliLiter(s) IntraMuscular once  methadone    Tablet 17.5 milliGRAM(s) Oral daily  nystatin Powder 1 Application(s) Topical two times a day  pantoprazole    Tablet 40 milliGRAM(s) Oral daily  polyethylene glycol 3350 17 Gram(s) Oral two times a day  spironolactone 50 milliGRAM(s) Oral daily    MEDICATIONS  (PRN):  acetaminophen   Tablet .. 650 milliGRAM(s) Oral every 6 hours PRN Mild Pain (1 - 3)  ALBUTerol    90 MICROgram(s) HFA Inhaler 2 Puff(s) Inhalation every 6 hours PRN Shortness of Breath and/or Wheezing  aluminum hydroxide/magnesium hydroxide/simethicone Suspension 30 milliLiter(s) Oral every 4 hours PRN Dyspepsia  ondansetron    Tablet 4 milliGRAM(s) Oral once PRN Nausea and/or Vomiting  oxyCODONE    IR 5 milliGRAM(s) Oral every 4 hours PRN breakthrough pain  sodium chloride 0.9% lock flush 10 milliLiter(s) IV Push every 1 hour PRN Pre/post blood products, medications, blood draw, and to maintain line patency GENERAL SURGERY PROGRESS NOTE    SUBJECTIVE  Patient seen and examined.   No acute events overnight.   Vac on midline wound, irrigation vac.   Pain is better controlled (methadone, oxy, tylenol)  Tolerating diet without nausea, vomiting.   +/+ ostomy (continues on miralax).   Voiding spontaneously  Denies fever, chills, SOB, chest pain.     OBJECTIVE  PHYSICAL EXAM  General: Morbidly obese, appears well, NAD  CHEST: breathing comfortably  CV: appears well perfused  Abdomen: soft, nontender, nondistended, no rebound or guarding, ostomy pink/viable +/+(1 stool count), L drain site with ostomy appliance in place (80mL/24hr), clear yellow fluid. Midline wound with irrigation VAC in place.   VAC: 1800ml/24hr  : u/o (primafit) 2350mL/24hr  Extremities: Grossly symmetric  Caprini: 8     V/S:  Vital Signs Last 24 Hrs  T(C): 37.1 (25 Dec 2019 00:49), Max: 37.1 (24 Dec 2019 21:36)  T(F): 98.7 (25 Dec 2019 00:49), Max: 98.8 (24 Dec 2019 21:36)  HR: 79 (25 Dec 2019 00:49) (79 - 85)  BP: 134/72 (25 Dec 2019 00:49) (111/68 - 158/69)  BP(mean): --  RR: 18 (25 Dec 2019 00:49) (18 - 18)  SpO2: 95% (25 Dec 2019 00:49) (94% - 95%)    --------------------------------------------------------------------------------------------------  I/Os:    23 Dec 2019 07:01  -  24 Dec 2019 07:00  --------------------------------------------------------  IN:    Oral Fluid: 600 mL  Total IN: 600 mL    OUT:    Colostomy: 351 mL    Drain: 155 mL    Intermittent Catheterization - Urethral: 200 mL    VAC (Vacuum Assisted Closure) System: 750 mL    Voided: 1450 mL  Total OUT: 2906 mL    Total NET: -2306 mL      24 Dec 2019 07:01  -  25 Dec 2019 06:07  --------------------------------------------------------  IN:    Oral Fluid: 820 mL  Total IN: 820 mL    OUT:    Colostomy: 2 mL    Drain: 25 mL    VAC (Vacuum Assisted Closure) System: 1200 mL    Voided: 1250 mL  Total OUT: 2477 mL    Total NET: -1657 mL        --------------------------------------------------------------------------------------------------  LABS:                        8.4    8.37  )-----------( 247      ( 23 Dec 2019 08:37 )             27.2     24 Dec 2019 07:24    129    |  93     |  8      ----------------------------<  102    3.6     |  30     |  0.56     Ca    8.2        24 Dec 2019 07:24  Phos  3.4       24 Dec 2019 07:24  Mg     1.8       24 Dec 2019 07:24        CAPILLARY BLOOD GLUCOSE                  --------------------------------------------------------------------------------------------------  MEDICATIONS  (STANDING):  aspirin  chewable 81 milliGRAM(s) Oral daily  atorvastatin 40 milliGRAM(s) Oral at bedtime  budesonide 160 MICROgram(s)/formoterol 4.5 MICROgram(s) Inhaler 2 Puff(s) Inhalation two times a day  chlorhexidine 4% Liquid 1 Application(s) Topical <User Schedule>  doxazosin 2 milliGRAM(s) Oral daily  enoxaparin Injectable 100 milliGRAM(s) SubCutaneous <User Schedule>  erythromycin     base Tablet 250 milliGRAM(s) Oral two times a day  furosemide    Tablet 20 milliGRAM(s) Oral daily  influenza   Vaccine 0.5 milliLiter(s) IntraMuscular once  methadone    Tablet 17.5 milliGRAM(s) Oral daily  nystatin Powder 1 Application(s) Topical two times a day  pantoprazole    Tablet 40 milliGRAM(s) Oral daily  polyethylene glycol 3350 17 Gram(s) Oral two times a day  spironolactone 50 milliGRAM(s) Oral daily    MEDICATIONS  (PRN):  acetaminophen   Tablet .. 650 milliGRAM(s) Oral every 6 hours PRN Mild Pain (1 - 3)  ALBUTerol    90 MICROgram(s) HFA Inhaler 2 Puff(s) Inhalation every 6 hours PRN Shortness of Breath and/or Wheezing  aluminum hydroxide/magnesium hydroxide/simethicone Suspension 30 milliLiter(s) Oral every 4 hours PRN Dyspepsia  ondansetron    Tablet 4 milliGRAM(s) Oral once PRN Nausea and/or Vomiting  oxyCODONE    IR 5 milliGRAM(s) Oral every 4 hours PRN breakthrough pain  sodium chloride 0.9% lock flush 10 milliLiter(s) IV Push every 1 hour PRN Pre/post blood products, medications, blood draw, and to maintain line patency GENERAL SURGERY PROGRESS NOTE    SUBJECTIVE  Patient seen and examined.   No acute events overnight.   Has been refusing Lipitor, but will now take after explanation of indication.   Vac on midline wound, irrigation vac.   Pain is better controlled (methadone, oxy, tylenol)  Tolerating diet without nausea, vomiting.   +/+ ostomy (continues on miralax).   Voiding spontaneously  Denies fever, chills, SOB, chest pain.     OBJECTIVE  PHYSICAL EXAM  General: Morbidly obese, appears well, NAD  CHEST: breathing comfortably  CV: appears well perfused  Abdomen: soft, nontender, nondistended, no rebound or guarding, ostomy pink/viable +/+(1 stool count), L drain site with ostomy appliance in place (80mL/24hr), clear yellow fluid. Midline wound with irrigation VAC in place.   VAC: 2700ml/24hr (input not known/recorded)  : u/o (primafit) 2350mL/24hr  Extremities: Grossly symmetric  Caprini: 8     V/S:  Vital Signs Last 24 Hrs  T(C): 37.1 (25 Dec 2019 00:49), Max: 37.1 (24 Dec 2019 21:36)  T(F): 98.7 (25 Dec 2019 00:49), Max: 98.8 (24 Dec 2019 21:36)  HR: 79 (25 Dec 2019 00:49) (79 - 85)  BP: 134/72 (25 Dec 2019 00:49) (111/68 - 158/69)  BP(mean): --  RR: 18 (25 Dec 2019 00:49) (18 - 18)  SpO2: 95% (25 Dec 2019 00:49) (94% - 95%)    --------------------------------------------------------------------------------------------------  I/Os:    23 Dec 2019 07:01  -  24 Dec 2019 07:00  --------------------------------------------------------  IN:    Oral Fluid: 600 mL  Total IN: 600 mL    OUT:    Colostomy: 351 mL    Drain: 155 mL    Intermittent Catheterization - Urethral: 200 mL    VAC (Vacuum Assisted Closure) System: 750 mL    Voided: 1450 mL  Total OUT: 2906 mL    Total NET: -2306 mL      24 Dec 2019 07:01  -  25 Dec 2019 06:07  --------------------------------------------------------  IN:    Oral Fluid: 820 mL  Total IN: 820 mL    OUT:    Colostomy: 2 mL    Drain: 25 mL    VAC (Vacuum Assisted Closure) System: 1200 mL    Voided: 1250 mL  Total OUT: 2477 mL    Total NET: -1657 mL        --------------------------------------------------------------------------------------------------  LABS:                        8.4    8.37  )-----------( 247      ( 23 Dec 2019 08:37 )             27.2     24 Dec 2019 07:24    129    |  93     |  8      ----------------------------<  102    3.6     |  30     |  0.56     Ca    8.2        24 Dec 2019 07:24  Phos  3.4       24 Dec 2019 07:24  Mg     1.8       24 Dec 2019 07:24        CAPILLARY BLOOD GLUCOSE                  --------------------------------------------------------------------------------------------------  MEDICATIONS  (STANDING):  aspirin  chewable 81 milliGRAM(s) Oral daily  atorvastatin 40 milliGRAM(s) Oral at bedtime  budesonide 160 MICROgram(s)/formoterol 4.5 MICROgram(s) Inhaler 2 Puff(s) Inhalation two times a day  chlorhexidine 4% Liquid 1 Application(s) Topical <User Schedule>  doxazosin 2 milliGRAM(s) Oral daily  enoxaparin Injectable 100 milliGRAM(s) SubCutaneous <User Schedule>  erythromycin     base Tablet 250 milliGRAM(s) Oral two times a day  furosemide    Tablet 20 milliGRAM(s) Oral daily  influenza   Vaccine 0.5 milliLiter(s) IntraMuscular once  methadone    Tablet 17.5 milliGRAM(s) Oral daily  nystatin Powder 1 Application(s) Topical two times a day  pantoprazole    Tablet 40 milliGRAM(s) Oral daily  polyethylene glycol 3350 17 Gram(s) Oral two times a day  spironolactone 50 milliGRAM(s) Oral daily    MEDICATIONS  (PRN):  acetaminophen   Tablet .. 650 milliGRAM(s) Oral every 6 hours PRN Mild Pain (1 - 3)  ALBUTerol    90 MICROgram(s) HFA Inhaler 2 Puff(s) Inhalation every 6 hours PRN Shortness of Breath and/or Wheezing  aluminum hydroxide/magnesium hydroxide/simethicone Suspension 30 milliLiter(s) Oral every 4 hours PRN Dyspepsia  ondansetron    Tablet 4 milliGRAM(s) Oral once PRN Nausea and/or Vomiting  oxyCODONE    IR 5 milliGRAM(s) Oral every 4 hours PRN breakthrough pain  sodium chloride 0.9% lock flush 10 milliLiter(s) IV Push every 1 hour PRN Pre/post blood products, medications, blood draw, and to maintain line patency

## 2019-12-26 NOTE — PROGRESS NOTE ADULT - ASSESSMENT
A/P: 64y Female  s/p ex lap with extended left hemicolectomy left in discontinuity for perforated sigmoid diverticulitis with intraperitoneal free air. AbThera vac placed and pt kept intubated post op and transferred to SICU for post-op management. Extubated 11/30. For elevated WBC, started on meropenem 12/3-12/10. Transferred from SICU to floor 12/4. CT C/A/P 12/4 shows no obvious intrabdominal collection. Pt was complaining of dysphagia, EGD performed revealed mid and lower esophageal diverticulum and medium sized hiatal hernia 12/10. 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 Patient started on erythromycin and miralax to assist with BF 12/11. On 12/19 patient found to have induration, vac removed, we to dry dressings places. Work with PT has been difficult due to patients body habitus and deconditioning; patient unable to be placed in [facility] at this time.    Plan:  - LRD  - c/w doxazosin  - monitor ostomy, U/O  - Irrigation vac change   - c/w miralax, c/w erythromycin   - c/w pain control: methadone (home medication), Tylenol, Oxy PRN  - DVT ppx with lovenox, ASA 81  - appreciate cardiology recs  - c/w diuresis   - c/w PT daily  - Dispo: rehab     GREEN SURGERY  p 3710 A/P: 64y Female  s/p ex lap with extended left hemicolectomy left in discontinuity for perforated sigmoid diverticulitis with intraperitoneal free air. AbThera vac placed and pt kept intubated post op and transferred to SICU for post-op management. Extubated 11/30. For elevated WBC, started on meropenem 12/3-12/10. Transferred from SICU to floor 12/4. CT C/A/P 12/4 shows no obvious intrabdominal collection. Pt was complaining of dysphagia, EGD performed revealed mid and lower esophageal diverticulum and medium sized hiatal hernia 12/10. 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 Patient started on erythromycin and miralax to assist with BF 12/11. On 12/19 patient found to have induration, vac removed, we to dry dressings places. Work with PT has been difficult due to patients body habitus and deconditioning; patient unable to be placed in [facility] at this time.    Plan:  - LRD  - c/w doxazosin  - monitor ostomy, U/O  - Irrigation vac, change today  - c/w miralax, c/w erythromycin   - c/w pain control: methadone (home medication), Tylenol, Oxy PRN  - DVT ppx with lovenox, ASA 81  - appreciate cardiology recs  - c/w diuresis   - c/w PT daily  - Dispo: rehab    GREEN SURGERY  p 2418 A/P: 63 yo F with PMHx morbid obesity, GERD, HTN, COPD, and pshx D&C now s/p ex laparotomy with extended left [id number] w/ perforated and necrotic sigmod colon with pus and stool in the abdominal cavity. RTOR 11/29 RUQ end transverse colostomy created, abdomen closed. Recovering appropriately on floor. On 12/19 patient found to have induration of wound and colostomy necrosis, s/p bedside debridement w/ irrigation vac in place on midline wound. Recovering appropriately with daily wound care.     Plan:  - LRD  - c/w doxazosin  - monitor ostomy, U/O  - Irrigation vac, change today  - c/w miralax, c/w erythromycin   - c/w pain control: methadone (home medication), Tylenol, Oxy PRN  - DVT ppx with lovenox, ASA 81  - appreciate cardiology recs  - c/w diuresis   - c/w PT daily  - Dispo: rehab    GREEN SURGERY  p [id number]

## 2019-12-27 RX ADMIN — BUDESONIDE AND FORMOTEROL FUMARATE DIHYDRATE 2 PUFF(S): 160; 4.5 AEROSOL RESPIRATORY (INHALATION) at 17:36

## 2019-12-27 RX ADMIN — Medication 2 MILLIGRAM(S): at 05:27

## 2019-12-27 RX ADMIN — NYSTATIN CREAM 1 APPLICATION(S): 100000 CREAM TOPICAL at 05:28

## 2019-12-27 RX ADMIN — Medication 250 MILLIGRAM(S): at 17:36

## 2019-12-27 RX ADMIN — Medication 20 MILLIGRAM(S): at 05:34

## 2019-12-27 RX ADMIN — PANTOPRAZOLE SODIUM 40 MILLIGRAM(S): 20 TABLET, DELAYED RELEASE ORAL at 11:25

## 2019-12-27 RX ADMIN — Medication 81 MILLIGRAM(S): at 11:25

## 2019-12-27 RX ADMIN — POLYETHYLENE GLYCOL 3350 17 GRAM(S): 17 POWDER, FOR SOLUTION ORAL at 05:34

## 2019-12-27 RX ADMIN — BUDESONIDE AND FORMOTEROL FUMARATE DIHYDRATE 2 PUFF(S): 160; 4.5 AEROSOL RESPIRATORY (INHALATION) at 05:36

## 2019-12-27 RX ADMIN — NYSTATIN CREAM 1 APPLICATION(S): 100000 CREAM TOPICAL at 17:37

## 2019-12-27 RX ADMIN — SPIRONOLACTONE 50 MILLIGRAM(S): 25 TABLET, FILM COATED ORAL at 05:28

## 2019-12-27 RX ADMIN — Medication 250 MILLIGRAM(S): at 05:27

## 2019-12-27 RX ADMIN — POLYETHYLENE GLYCOL 3350 17 GRAM(S): 17 POWDER, FOR SOLUTION ORAL at 17:37

## 2019-12-27 RX ADMIN — ENOXAPARIN SODIUM 100 MILLIGRAM(S): 100 INJECTION SUBCUTANEOUS at 11:25

## 2019-12-27 RX ADMIN — CHLORHEXIDINE GLUCONATE 1 APPLICATION(S): 213 SOLUTION TOPICAL at 11:25

## 2019-12-27 RX ADMIN — METHADONE HYDROCHLORIDE 17.5 MILLIGRAM(S): 40 TABLET ORAL at 09:14

## 2019-12-27 NOTE — PROGRESS NOTE ADULT - ASSESSMENT
A/P: 63 yo F with PMHx morbid obesity, GERD, HTN, COPD, and pshx D&C now s/p ex laparotomy with extended left [id number] w/ perforated and necrotic sigmod colon with pus and stool in the abdominal cavity. RTOR 11/29 RUQ end transverse colostomy created, abdomen closed. Recovering appropriately on floor. On 12/19 patient found to have induration of wound and colostomy necrosis, s/p bedside debridement w/ irrigation vac in place on midline wound. Recovering appropriately with daily wound care.     Plan:  - LRD  - c/w doxazosin  - monitor ostomy, U/O  - Irrigation vac, change today  - c/w miralax, c/w erythromycin   - c/w pain control: methadone (home medication), Tylenol, Oxy PRN  - DVT ppx with lovenox, ASA 81  - appreciate cardiology recs  - c/w diuresis   - c/w PT daily  - Dispo: rehab    GREEN SURGERY  p 0999

## 2019-12-27 NOTE — PROGRESS NOTE ADULT - SUBJECTIVE AND OBJECTIVE BOX
CARDIOLOGY     PROGRESS  NOTE   ________________________________________________    CHIEF COMPLAINT:Patient is a 64y old  Female who presents with a chief complaint of perforated bowel (08 Dec 2019 09:22)  noo complain.  	  REVIEW OF SYSTEMS:  CONSTITUTIONAL: No fever, weight loss, or fatigue  EYES: No eye pain, visual disturbances, or discharge  ENT:  No difficulty hearing, tinnitus, vertigo; No sinus or throat pain  NECK: No pain or stiffness  RESPIRATORY: No cough, wheezing, chills or hemoptysis; No Shortness of Breath  CARDIOVASCULAR: No chest pain, palpitations, passing out, dizziness, or leg swelling  GASTROINTESTINAL: No abdominal or epigastric pain. No nausea, vomiting, or hematemesis; No diarrhea or constipation. No melena or hematochezia.  GENITOURINARY: No dysuria, frequency, hematuria, or incontinence  NEUROLOGICAL: No headaches, memory loss, loss of strength, numbness, or tremors  SKIN: No itching, burning, rashes, or lesions   LYMPH Nodes: No enlarged glands  ENDOCRINE: No heat or cold intolerance; No hair loss  MUSCULOSKELETAL: No joint pain or swelling; No muscle, back, or extremity pain  PSYCHIATRIC: No depression, anxiety, mood swings, or difficulty sleeping  HEME/LYMPH: No easy bruising, or bleeding gums  ALLERGY AND IMMUNOLOGIC: No hives or eczema	    [ ] All others negative	  [ ] Unable to obtain    PHYSICAL EXAM:  T(C): 37.2 (12-27-19 @ 05:29), Max: 37.2 (12-27-19 @ 05:29)  HR: 78 (12-27-19 @ 05:29) (75 - 84)  BP: 111/67 (12-27-19 @ 05:29) (107/50 - 111/67)  RR: 18 (12-27-19 @ 05:29) (18 - 18)  SpO2: 95% (12-27-19 @ 05:29) (95% - 95%)  Wt(kg): --  I&O's Summary    26 Dec 2019 07:01  -  27 Dec 2019 07:00  --------------------------------------------------------  IN: 1920 mL / OUT: 5830 mL / NET: -3910 mL    27 Dec 2019 07:01  -  27 Dec 2019 09:30  --------------------------------------------------------  IN: 0 mL / OUT: 800 mL / NET: -800 mL        Appearance: Normal	  HEENT:   Normal oral mucosa, PERRL, EOMI	  Lymphatic: No lymphadenopathy  Cardiovascular: Normal S1 S2, No JVD,+ murmurs, +lymph edema  Respiratory: Lungs clear to auscultation	  Psychiatry: A & O x 3, Mood & affect appropriate  Gastrointestinal:  Soft, Non-tender, + BS	  Skin: No rashes, No ecchymoses, No cyanosis	  Neurologic: Non-focal  Extremities: Normal range of motion, No clubbing, cyanosis or edema  Vascular: Peripheral pulses palpable 2+ bilaterally    MEDICATIONS  (STANDING):  aspirin  chewable 81 milliGRAM(s) Oral daily  atorvastatin 40 milliGRAM(s) Oral at bedtime  budesonide 160 MICROgram(s)/formoterol 4.5 MICROgram(s) Inhaler 2 Puff(s) Inhalation two times a day  chlorhexidine 4% Liquid 1 Application(s) Topical <User Schedule>  doxazosin 2 milliGRAM(s) Oral daily  enoxaparin Injectable 100 milliGRAM(s) SubCutaneous <User Schedule>  erythromycin     base Tablet 250 milliGRAM(s) Oral two times a day  furosemide    Tablet 20 milliGRAM(s) Oral daily  influenza   Vaccine 0.5 milliLiter(s) IntraMuscular once  nystatin Powder 1 Application(s) Topical two times a day  pantoprazole    Tablet 40 milliGRAM(s) Oral daily  polyethylene glycol 3350 17 Gram(s) Oral two times a day  silver nitrate Applicator 1 Application(s) Topical once  spironolactone 50 milliGRAM(s) Oral daily      TELEMETRY: 	    ECG:  	  RADIOLOGY:  OTHER: 	  	  LABS:	 	    CARDIAC MARKERS:  - LRD  - c/w doxazosin  - monitor ostomy, U/O  - Irrigation vac, change today  - c/w miralax, c/w erythromycin   - c/w pain control: methadone (home medication), Tylenol, Oxy PRN  - DVT ppx with lovenox, ASA 81  - appreciate cardiology recs  - c/w diuresis   - c/w PT daily  - Dispo: rehab                proBNP:   Lipid Profile:   HgA1c:   TSH:         Assessment and plan  ---------------------------  pt is sitting up comfortably in NAD , no complain.  bp noted  echo noted hyperdynamic lv sec to pain/anxiety, volume depletion  continue dvt prophylaxis  started on Lasix and aldactone ?  continue current meds  s/p debridement ?VAC  keep k>4  dvt prophylaxis  nausea ?gastroparesis ?add Reglan  continue Protonix  awaiting rehab  awaiting placement  plan as per surgery for dc to rehab

## 2019-12-27 NOTE — CHART NOTE - NSCHARTNOTEFT_GEN_A_CORE
Nutrition Follow Up Note  Patient seen for: malnutrition follow-up     Chart reviewed, events noted. This is a 63 yo F with PMHx morbid obesity, GERD, HTN, COPD, and pshx D&C now s/p ex laparotomy with extended left mgdjoxjkvqwtm15/26 with perforated and necrotic sigmoid colon with pus and stool in the abdominal cavity. RTOR 11/29 RUQ end transverse colostomy created, abdomen closed. Recovering appropriately on floor. On 12/19 patient found to have induration of wound and colostomy necrosis, s/p bedside debridement w/ irrigation vac in place on midline wound.     Source: patient, friend at bedside, medical record     Diet :  Low Fiber Diet + Ensure pudding TID + Raimundo BID     Patient reports appetite and PO intake remain fair, not eating much during meals and not consuming supplements. Doesn't like ensure pudding, noted with stockpile at bedside, wishes for it to be discontinued. Pt hasn't yet tried Raimundo but continues to report she will try it. Pt amendable to retrying Ensure Clear, had tried it in the past but didn't care for it but willing to see if more palatable. Discussed importance of continuing to prioritize protein intake for wound healing. Pt denying any acute GI distress, ostomy output x 24hrs: 100ml (12/26)     PO intake : 50% of meals      Source for PO intake: pt report      Enteral /Parenteral Nutrition: N/A      Daily   Last weight on 12/1: 377.4 lbs     Pertinent Medications: MEDICATIONS  (STANDING):  aspirin  chewable 81 milliGRAM(s) Oral daily  atorvastatin 40 milliGRAM(s) Oral at bedtime  budesonide 160 MICROgram(s)/formoterol 4.5 MICROgram(s) Inhaler 2 Puff(s) Inhalation two times a day  chlorhexidine 4% Liquid 1 Application(s) Topical <User Schedule>  doxazosin 2 milliGRAM(s) Oral daily  enoxaparin Injectable 100 milliGRAM(s) SubCutaneous <User Schedule>  erythromycin     base Tablet 250 milliGRAM(s) Oral two times a day  furosemide    Tablet 20 milliGRAM(s) Oral daily  influenza   Vaccine 0.5 milliLiter(s) IntraMuscular once  nystatin Powder 1 Application(s) Topical two times a day  pantoprazole    Tablet 40 milliGRAM(s) Oral daily  polyethylene glycol 3350 17 Gram(s) Oral two times a day  silver nitrate Applicator 1 Application(s) Topical once  spironolactone 50 milliGRAM(s) Oral daily    MEDICATIONS  (PRN):  acetaminophen   Tablet .. 650 milliGRAM(s) Oral every 6 hours PRN Mild Pain (1 - 3)  ALBUTerol    90 MICROgram(s) HFA Inhaler 2 Puff(s) Inhalation every 6 hours PRN Shortness of Breath and/or Wheezing  aluminum hydroxide/magnesium hydroxide/simethicone Suspension 30 milliLiter(s) Oral every 4 hours PRN Dyspepsia  ondansetron    Tablet 4 milliGRAM(s) Oral once PRN Nausea and/or Vomiting  oxyCODONE    IR 5 milliGRAM(s) Oral every 4 hours PRN breakthrough pain  sodium chloride 0.9% lock flush 10 milliLiter(s) IV Push every 1 hour PRN Pre/post blood products, medications, blood draw, and to maintain line patency    Pertinent Labs: glucose 103mg/dL     Skin per nursing documentation: free of pressure injuries, noted with midline abdominal incision with wound vac, left thigh wound, lymphedema with blisters   Edema: +3 left/right legs, ankles, feet     Estimated Needs:   [ x] no change since previous assessment  [ ] recalculated:     Previous Nutrition Diagnosis: Malnutrition (moderate)  Nutrition Diagnosis is: on going, being addressed with food preferences, nutrition supplements     New Nutrition Diagnosis: N/A       Interventions:   Recommend  1) Continue current diet as tolerated. Encourage PO intake of protein-rich foods.   2) Recommend continue Raimundo BID to promote wound healing.   3) Recommend discontinue Ensure puddings per pt preference, pt willing to try Ensure Clear 1x per day.     Monitoring and Evaluation:     Continue to monitor Nutritional intake, Tolerance to diet prescription, weights, labs, skin integrity    RD remains available upon request and will follow up per protocol  Flori Elder RD, CDN, Pager # [phone] Nutrition Follow Up Note  Patient seen for: malnutrition follow-up     Chart reviewed, events noted. This is a 63 yo F with PMHx morbid obesity, GERD, HTN, COPD, and pshx D&C now s/p ex laparotomy with extended left [id number]/26 with perforated and necrotic sigmoid colon with pus and stool in the abdominal cavity. RTOR 11/29 RUQ end transverse colostomy created, abdomen closed. Recovering appropriately on floor. On 12/19 patient found to have induration of wound and colostomy necrosis, s/p bedside debridement w/ irrigation vac in place on midline wound.     Source: patient, friend at bedside, medical record     Diet :  Low Fiber Diet + Ensure pudding TID + Raimundo BID     Patient reports appetite and PO intake remain fair, not eating much during meals and not consuming supplements. Doesn't like ensure pudding, noted with stockpile at bedside, wishes for it to be discontinued. Pt hasn't yet tried Raimundo but continues to report she will try it. Pt amendable to retrying Ensure Clear, had tried it in the past but didn't care for it but willing to see if more palatable. Discussed importance of continuing to prioritize protein intake for wound healing. Pt denying any acute GI distress, ostomy output x 24hrs: 100ml (12/26)     PO intake : 50% of meals      Source for PO intake: pt report      Enteral /Parenteral Nutrition: N/A      Daily   Last weight on 12/1: 377.4 lbs     Pertinent Medications: MEDICATIONS  (STANDING):  aspirin  chewable 81 milliGRAM(s) Oral daily  atorvastatin 40 milliGRAM(s) Oral at bedtime  budesonide 160 MICROgram(s)/formoterol 4.5 MICROgram(s) Inhaler 2 Puff(s) Inhalation two times a day  chlorhexidine 4% Liquid 1 Application(s) Topical <User Schedule>  doxazosin 2 milliGRAM(s) Oral daily  enoxaparin Injectable 100 milliGRAM(s) SubCutaneous <User Schedule>  erythromycin     base Tablet 250 milliGRAM(s) Oral two times a day  furosemide    Tablet 20 milliGRAM(s) Oral daily  influenza   Vaccine 0.5 milliLiter(s) IntraMuscular once  nystatin Powder 1 Application(s) Topical two times a day  pantoprazole    Tablet 40 milliGRAM(s) Oral daily  polyethylene glycol 3350 17 Gram(s) Oral two times a day  silver nitrate Applicator 1 Application(s) Topical once  spironolactone 50 milliGRAM(s) Oral daily    MEDICATIONS  (PRN):  acetaminophen   Tablet .. 650 milliGRAM(s) Oral every 6 hours PRN Mild Pain (1 - 3)  ALBUTerol    90 MICROgram(s) HFA Inhaler 2 Puff(s) Inhalation every 6 hours PRN Shortness of Breath and/or Wheezing  aluminum hydroxide/magnesium hydroxide/simethicone Suspension 30 milliLiter(s) Oral every 4 hours PRN Dyspepsia  ondansetron    Tablet 4 milliGRAM(s) Oral once PRN Nausea and/or Vomiting  oxyCODONE    IR 5 milliGRAM(s) Oral every 4 hours PRN breakthrough pain  sodium chloride 0.9% lock flush 10 milliLiter(s) IV Push every 1 hour PRN Pre/post blood products, medications, blood draw, and to maintain line patency    Pertinent Labs: glucose 103mg/dL     Skin per nursing documentation: free of pressure injuries, noted with midline abdominal incision with wound vac, left thigh wound, lymphedema with blisters   Edema: +3 left/right legs, ankles, feet     Estimated Needs:   [ x] no change since previous assessment  [ ] recalculated:     Previous Nutrition Diagnosis: Malnutrition (moderate)  Nutrition Diagnosis is: on going, being addressed with food preferences, nutrition supplements     New Nutrition Diagnosis: N/A       Interventions:   Recommend  1) Continue current diet as tolerated. Encourage PO intake of protein-rich foods.   2) Recommend continue Raimundo BID to promote wound healing.   3) Recommend discontinue Ensure puddings per pt preference, pt willing to try Ensure Clear 1x per day.   4) Please obtain new standing wt or zeroed bedscale weight as able.     Monitoring and Evaluation:     Continue to monitor Nutritional intake, Tolerance to diet prescription, weights, labs, skin integrity    RD remains available upon request and will follow up per protocol  Flori Elder RD, CDN, Pager # [phone] Nutrition Follow Up Note  Patient seen for: malnutrition follow-up     Chart reviewed, events noted. This is a 65 yo F with PMHx morbid obesity, GERD, HTN, COPD, and pshx D&C now s/p ex laparotomy with extended left [id number]/26 with perforated and necrotic sigmoid colon with pus and stool in the abdominal cavity. RTOR 11/29 RUQ end transverse colostomy created, abdomen closed. Recovering appropriately on floor. On 12/19 patient found to have induration of wound and colostomy necrosis, s/p bedside debridement w/ irrigation vac in place on midline wound.     Source: patient, friend at bedside, medical record     Diet :  Low Fiber Diet + Ensure pudding TID + Raimundo BID     Patient reports appetite and PO intake remain fair, not eating much during meals and not consuming supplements. Doesn't like ensure pudding, noted with stockpile at bedside, wishes for it to be discontinued. Pt hasn't yet tried Raimundo but continues to report she will try it. Pt amendable to retrying Ensure Clear, had tried it in the past but didn't care for it but willing to see if more palatable. Discussed importance of continuing to prioritize protein intake for wound healing. Pt denying any acute GI distress, ostomy output x 24hrs: 100ml (12/26)     PO intake : 50% of meals      Source for PO intake: pt report      Enteral /Parenteral Nutrition: N/A      Daily   Last weight on 12/1: 377.4 lbs     Pertinent Medications: MEDICATIONS  (STANDING):  aspirin  chewable 81 milliGRAM(s) Oral daily  atorvastatin 40 milliGRAM(s) Oral at bedtime  budesonide 160 MICROgram(s)/formoterol 4.5 MICROgram(s) Inhaler 2 Puff(s) Inhalation two times a day  chlorhexidine 4% Liquid 1 Application(s) Topical <User Schedule>  doxazosin 2 milliGRAM(s) Oral daily  enoxaparin Injectable 100 milliGRAM(s) SubCutaneous <User Schedule>  erythromycin     base Tablet 250 milliGRAM(s) Oral two times a day  furosemide    Tablet 20 milliGRAM(s) Oral daily  influenza   Vaccine 0.5 milliLiter(s) IntraMuscular once  nystatin Powder 1 Application(s) Topical two times a day  pantoprazole    Tablet 40 milliGRAM(s) Oral daily  polyethylene glycol 3350 17 Gram(s) Oral two times a day  silver nitrate Applicator 1 Application(s) Topical once  spironolactone 50 milliGRAM(s) Oral daily    MEDICATIONS  (PRN):  acetaminophen   Tablet .. 650 milliGRAM(s) Oral every 6 hours PRN Mild Pain (1 - 3)  ALBUTerol    90 MICROgram(s) HFA Inhaler 2 Puff(s) Inhalation every 6 hours PRN Shortness of Breath and/or Wheezing  aluminum hydroxide/magnesium hydroxide/simethicone Suspension 30 milliLiter(s) Oral every 4 hours PRN Dyspepsia  ondansetron    Tablet 4 milliGRAM(s) Oral once PRN Nausea and/or Vomiting  oxyCODONE    IR 5 milliGRAM(s) Oral every 4 hours PRN breakthrough pain  sodium chloride 0.9% lock flush 10 milliLiter(s) IV Push every 1 hour PRN Pre/post blood products, medications, blood draw, and to maintain line patency    Pertinent Labs: glucose 103mg/dL     Skin per nursing documentation: free of pressure injuries, noted with midline abdominal incision with wound vac, left thigh wound, lymphedema with blisters   Edema: +3 left/right legs, ankles, feet     Estimated Needs:   [ x] no change since previous assessment  [ ] recalculated:     Previous Nutrition Diagnosis: Malnutrition (moderate)  Nutrition Diagnosis is: on going, being addressed with food preferences, nutrition supplements     New Nutrition Diagnosis: N/A       Interventions:   Recommend  1) Continue current diet as tolerated. Encourage PO intake of protein-rich foods.   2) Recommend continue Raimundo BID to promote wound healing.   3) Recommend discontinue Ensure puddings per pt preference, pt willing to try Ensure Clear 1x per day. Discussed with Green Team.   4) Please obtain new standing wt or zeroed bedscale weight as able.       Monitoring and Evaluation:     Continue to monitor Nutritional intake, Tolerance to diet prescription, weights, labs, skin integrity    RD remains available upon request and will follow up per protocol  Flori Elder RD, CDN, Pager # [phone]

## 2019-12-27 NOTE — PROVIDER CONTACT NOTE (MEDICATION) - RECOMMENDATIONS
Pt educated on importance of Lipitor. Pt verbalized understanding stated she would "pass" at this moment

## 2019-12-27 NOTE — PROGRESS NOTE ADULT - SUBJECTIVE AND OBJECTIVE BOX
GENERAL SURGERY PROGRESS NOTE    SUBJECTIVE  Patient seen and examined.   No acute events overnight.   Is now taking all medications, attempting to have nurses stagger. Complains of repletion pills.   Pain is well controlled (methadone, oxy, tylenol)  Tolerating diet without nausea, vomiting. States she is not taking in much food, but is trying to improve daily as she understands nutrition is important for wound healing.   +/+ ostomy (continues on miralax).   Voiding with primafit (2000mL/24hr)  Denies fever, chills, SOB, chest pain.     OBJECTIVE  PHYSICAL EXAM  General: Morbidly obese, appears well, NAD  CHEST: breathing comfortably  CV: appears well perfused  Abdomen: soft, nontender, nondistended, no rebound or guarding, ostomy pink/viable +/+(thick, emptied x3), L drain site with ostomy appliance in place (60mL/24hr), clear yellow fluid. Midline wound with irrigation VAC in place.   VAC: 1550ml/24hr (input: 1000mL)  : u/o (primafit) 2350mL/24hr  Extremities: Grossly symmetric  Caprini: 8     V/S:  Vital Signs Last 24 Hrs  T(C): 37.1 (25 Dec 2019 00:49), Max: 37.1 (24 Dec 2019 21:36)  T(F): 98.7 (25 Dec 2019 00:49), Max: 98.8 (24 Dec 2019 21:36)  HR: 79 (25 Dec 2019 00:49) (79 - 85)  BP: 134/72 (25 Dec 2019 00:49) (111/68 - 158/69)  BP(mean): --  RR: 18 (25 Dec 2019 00:49) (18 - 18)  SpO2: 95% (25 Dec 2019 00:49) (94% - 95%)    --------------------------------------------------------------------------------------------------  I/Os:    23 Dec 2019 07:01  -  24 Dec 2019 07:00  --------------------------------------------------------  IN:    Oral Fluid: 600 mL  Total IN: 600 mL    OUT:    Colostomy: 351 mL    Drain: 155 mL    Intermittent Catheterization - Urethral: 200 mL    VAC (Vacuum Assisted Closure) System: 750 mL    Voided: 1450 mL  Total OUT: 2906 mL    Total NET: -2306 mL      24 Dec 2019 07:01  -  25 Dec 2019 06:07  --------------------------------------------------------  IN:    Oral Fluid: 820 mL  Total IN: 820 mL    OUT:    Colostomy: 2 mL    Drain: 25 mL    VAC (Vacuum Assisted Closure) System: 1200 mL    Voided: 1250 mL  Total OUT: 2477 mL    Total NET: -1657 mL        --------------------------------------------------------------------------------------------------  LABS:                        8.4    8.37  )-----------( 247      ( 23 Dec 2019 08:37 )             27.2     24 Dec 2019 07:24    129    |  93     |  8      ----------------------------<  102    3.6     |  30     |  0.56     Ca    8.2        24 Dec 2019 07:24  Phos  3.4       24 Dec 2019 07:24  Mg     1.8       24 Dec 2019 07:24        CAPILLARY BLOOD GLUCOSE                  --------------------------------------------------------------------------------------------------  MEDICATIONS  (STANDING):  aspirin  chewable 81 milliGRAM(s) Oral daily  atorvastatin 40 milliGRAM(s) Oral at bedtime  budesonide 160 MICROgram(s)/formoterol 4.5 MICROgram(s) Inhaler 2 Puff(s) Inhalation two times a day  chlorhexidine 4% Liquid 1 Application(s) Topical <User Schedule>  doxazosin 2 milliGRAM(s) Oral daily  enoxaparin Injectable 100 milliGRAM(s) SubCutaneous <User Schedule>  erythromycin     base Tablet 250 milliGRAM(s) Oral two times a day  furosemide    Tablet 20 milliGRAM(s) Oral daily  influenza   Vaccine 0.5 milliLiter(s) IntraMuscular once  methadone    Tablet 17.5 milliGRAM(s) Oral daily  nystatin Powder 1 Application(s) Topical two times a day  pantoprazole    Tablet 40 milliGRAM(s) Oral daily  polyethylene glycol 3350 17 Gram(s) Oral two times a day  spironolactone 50 milliGRAM(s) Oral daily    MEDICATIONS  (PRN):  acetaminophen   Tablet .. 650 milliGRAM(s) Oral every 6 hours PRN Mild Pain (1 - 3)  ALBUTerol    90 MICROgram(s) HFA Inhaler 2 Puff(s) Inhalation every 6 hours PRN Shortness of Breath and/or Wheezing  aluminum hydroxide/magnesium hydroxide/simethicone Suspension 30 milliLiter(s) Oral every 4 hours PRN Dyspepsia  ondansetron    Tablet 4 milliGRAM(s) Oral once PRN Nausea and/or Vomiting  oxyCODONE    IR 5 milliGRAM(s) Oral every 4 hours PRN breakthrough pain  sodium chloride 0.9% lock flush 10 milliLiter(s) IV Push every 1 hour PRN Pre/post blood products, medications, blood draw, and to maintain line patency GENERAL SURGERY PROGRESS NOTE    SUBJECTIVE  Patient seen and examined.   No acute events overnight.   Is now taking all medications, attempting to have nurses stagger. Complains of repletion pills.   Pain is well controlled (methadone, oxy, tylenol)  Tolerating diet without nausea, vomiting. States she is not taking in much food, but is trying to improve daily as she understands nutrition is important for wound healing.   +/+ ostomy (continues on miralax).   Voiding with primafit (2000mL/24hr)  Denies fever, chills, SOB, chest pain.     OBJECTIVE  PHYSICAL EXAM  General: Morbidly obese, appears well, NAD  CHEST: breathing comfortably  CV: appears well perfused  Abdomen: soft, nontender, nondistended, no rebound or guarding, ostomy viable w/ fat necrosis in medial aspect +/+(thick, emptied x3), L drain site with ostomy appliance in place (60mL/24hr), clear yellow fluid. Midline wound with irrigation VAC in place (1550ml/24hr; input: 1000mL)  : u/o (primafit) 2350mL/24hr  Extremities: Grossly symmetric  Caprini: 8     V/S:  Vital Signs Last 24 Hrs  T(C): 37.1 (25 Dec 2019 00:49), Max: 37.1 (24 Dec 2019 21:36)  T(F): 98.7 (25 Dec 2019 00:49), Max: 98.8 (24 Dec 2019 21:36)  HR: 79 (25 Dec 2019 00:49) (79 - 85)  BP: 134/72 (25 Dec 2019 00:49) (111/68 - 158/69)  BP(mean): --  RR: 18 (25 Dec 2019 00:49) (18 - 18)  SpO2: 95% (25 Dec 2019 00:49) (94% - 95%)    --------------------------------------------------------------------------------------------------  I/Os:    23 Dec 2019 07:01  -  24 Dec 2019 07:00  --------------------------------------------------------  IN:    Oral Fluid: 600 mL  Total IN: 600 mL    OUT:    Colostomy: 351 mL    Drain: 155 mL    Intermittent Catheterization - Urethral: 200 mL    VAC (Vacuum Assisted Closure) System: 750 mL    Voided: 1450 mL  Total OUT: 2906 mL    Total NET: -2306 mL      24 Dec 2019 07:01  -  25 Dec 2019 06:07  --------------------------------------------------------  IN:    Oral Fluid: 820 mL  Total IN: 820 mL    OUT:    Colostomy: 2 mL    Drain: 25 mL    VAC (Vacuum Assisted Closure) System: 1200 mL    Voided: 1250 mL  Total OUT: 2477 mL    Total NET: -1657 mL        --------------------------------------------------------------------------------------------------  LABS:                        8.4    8.37  )-----------( 247      ( 23 Dec 2019 08:37 )             27.2     24 Dec 2019 07:24    129    |  93     |  8      ----------------------------<  102    3.6     |  30     |  0.56     Ca    8.2        24 Dec 2019 07:24  Phos  3.4       24 Dec 2019 07:24  Mg     1.8       24 Dec 2019 07:24        CAPILLARY BLOOD GLUCOSE                  --------------------------------------------------------------------------------------------------  MEDICATIONS  (STANDING):  aspirin  chewable 81 milliGRAM(s) Oral daily  atorvastatin 40 milliGRAM(s) Oral at bedtime  budesonide 160 MICROgram(s)/formoterol 4.5 MICROgram(s) Inhaler 2 Puff(s) Inhalation two times a day  chlorhexidine 4% Liquid 1 Application(s) Topical <User Schedule>  doxazosin 2 milliGRAM(s) Oral daily  enoxaparin Injectable 100 milliGRAM(s) SubCutaneous <User Schedule>  erythromycin     base Tablet 250 milliGRAM(s) Oral two times a day  furosemide    Tablet 20 milliGRAM(s) Oral daily  influenza   Vaccine 0.5 milliLiter(s) IntraMuscular once  methadone    Tablet 17.5 milliGRAM(s) Oral daily  nystatin Powder 1 Application(s) Topical two times a day  pantoprazole    Tablet 40 milliGRAM(s) Oral daily  polyethylene glycol 3350 17 Gram(s) Oral two times a day  spironolactone 50 milliGRAM(s) Oral daily    MEDICATIONS  (PRN):  acetaminophen   Tablet .. 650 milliGRAM(s) Oral every 6 hours PRN Mild Pain (1 - 3)  ALBUTerol    90 MICROgram(s) HFA Inhaler 2 Puff(s) Inhalation every 6 hours PRN Shortness of Breath and/or Wheezing  aluminum hydroxide/magnesium hydroxide/simethicone Suspension 30 milliLiter(s) Oral every 4 hours PRN Dyspepsia  ondansetron    Tablet 4 milliGRAM(s) Oral once PRN Nausea and/or Vomiting  oxyCODONE    IR 5 milliGRAM(s) Oral every 4 hours PRN breakthrough pain  sodium chloride 0.9% lock flush 10 milliLiter(s) IV Push every 1 hour PRN Pre/post blood products, medications, blood draw, and to maintain line patency

## 2019-12-27 NOTE — PROGRESS NOTE ADULT - ATTENDING COMMENTS
I saw and examined the pt and discussed the tx plan with the House Staff. I agree with the exam and plan as documented in the surgery resident's note from today.  Colostomy wound with necrotic fatty subq tissue, excised sharply at the bedside, hemostasis obtained with silver nitrate and adequate at the end. Continue LWC.  Liset Vargas MD

## 2019-12-28 RX ORDER — METHADONE HYDROCHLORIDE 40 MG/1
17.5 TABLET ORAL DAILY
Refills: 0 | Status: DISCONTINUED | OUTPATIENT
Start: 2019-12-28 | End: 2019-12-28

## 2019-12-28 RX ORDER — HYDROMORPHONE HYDROCHLORIDE 2 MG/ML
1 INJECTION INTRAMUSCULAR; INTRAVENOUS; SUBCUTANEOUS ONCE
Refills: 0 | Status: DISCONTINUED | OUTPATIENT
Start: 2019-12-28 | End: 2019-12-28

## 2019-12-28 RX ORDER — ACETAMINOPHEN 500 MG
1000 TABLET ORAL ONCE
Refills: 0 | Status: COMPLETED | OUTPATIENT
Start: 2019-12-28 | End: 2019-12-28

## 2019-12-28 RX ORDER — METHADONE HYDROCHLORIDE 40 MG/1
17.5 TABLET ORAL DAILY
Refills: 0 | Status: DISCONTINUED | OUTPATIENT
Start: 2019-12-28 | End: 2019-12-29

## 2019-12-28 RX ADMIN — Medication 1000 MILLIGRAM(S): at 09:34

## 2019-12-28 RX ADMIN — NYSTATIN CREAM 1 APPLICATION(S): 100000 CREAM TOPICAL at 06:19

## 2019-12-28 RX ADMIN — ENOXAPARIN SODIUM 100 MILLIGRAM(S): 100 INJECTION SUBCUTANEOUS at 11:40

## 2019-12-28 RX ADMIN — PANTOPRAZOLE SODIUM 40 MILLIGRAM(S): 20 TABLET, DELAYED RELEASE ORAL at 11:40

## 2019-12-28 RX ADMIN — METHADONE HYDROCHLORIDE 17.5 MILLIGRAM(S): 40 TABLET ORAL at 10:02

## 2019-12-28 RX ADMIN — ALBUTEROL 2 PUFF(S): 90 AEROSOL, METERED ORAL at 17:58

## 2019-12-28 RX ADMIN — Medication 250 MILLIGRAM(S): at 18:02

## 2019-12-28 RX ADMIN — HYDROMORPHONE HYDROCHLORIDE 1 MILLIGRAM(S): 2 INJECTION INTRAMUSCULAR; INTRAVENOUS; SUBCUTANEOUS at 09:34

## 2019-12-28 RX ADMIN — Medication 2 MILLIGRAM(S): at 06:20

## 2019-12-28 RX ADMIN — NYSTATIN CREAM 1 APPLICATION(S): 100000 CREAM TOPICAL at 17:59

## 2019-12-28 RX ADMIN — Medication 400 MILLIGRAM(S): at 09:04

## 2019-12-28 RX ADMIN — HYDROMORPHONE HYDROCHLORIDE 1 MILLIGRAM(S): 2 INJECTION INTRAMUSCULAR; INTRAVENOUS; SUBCUTANEOUS at 09:04

## 2019-12-28 RX ADMIN — CHLORHEXIDINE GLUCONATE 1 APPLICATION(S): 213 SOLUTION TOPICAL at 09:08

## 2019-12-28 RX ADMIN — POLYETHYLENE GLYCOL 3350 17 GRAM(S): 17 POWDER, FOR SOLUTION ORAL at 06:20

## 2019-12-28 RX ADMIN — Medication 250 MILLIGRAM(S): at 06:20

## 2019-12-28 RX ADMIN — POLYETHYLENE GLYCOL 3350 17 GRAM(S): 17 POWDER, FOR SOLUTION ORAL at 18:02

## 2019-12-28 RX ADMIN — Medication 81 MILLIGRAM(S): at 11:40

## 2019-12-28 RX ADMIN — Medication 20 MILLIGRAM(S): at 06:20

## 2019-12-28 RX ADMIN — SPIRONOLACTONE 50 MILLIGRAM(S): 25 TABLET, FILM COATED ORAL at 06:20

## 2019-12-28 RX ADMIN — BUDESONIDE AND FORMOTEROL FUMARATE DIHYDRATE 2 PUFF(S): 160; 4.5 AEROSOL RESPIRATORY (INHALATION) at 06:19

## 2019-12-28 NOTE — CONSULT NOTE ADULT - ASSESSMENT
64 year old woman with midline abdominal wall wound, with fascial dehiscence following exlap and end colostomy. Ostomy appears to be retracting below level of skin/subcutaneous tissues, but above fascia. Contents appear contained and separate from wound, but communication threatened at bridge of devitalized tissue between ostomy and midline wound.    - Patient seen and examined with Dr. Harrison, attending plastic surgeon.  - Bedside debridement of loose suture material and devitalized fat, skin, and fascia performed.   - Continue VAC instill therapy for midline wound  - Continue ostomy management. Await declaration of skin bridge.  - Will follow and reassess for possible operative debridement.

## 2019-12-28 NOTE — PROGRESS NOTE ADULT - ATTENDING COMMENTS
Surgery Attending    Pt seen and examined; agree with above assessment and plan    Being seen by Plastics for further wound care

## 2019-12-28 NOTE — PROGRESS NOTE ADULT - SUBJECTIVE AND OBJECTIVE BOX
GENERAL SURGERY PROGRESS NOTE    SUBJECTIVE  Patient seen and examined.   No acute events overnight.   Intermittently refusing Lipator  Pain is well controlled (methadone, oxy, tylenol)  Tolerating diet without nausea, vomiting. Decreased appetite  +/+ ostomy (continues on miralax).   Voiding with primafit (2400mL/24hr)  Denies fever, chills, SOB, chest pain.     OBJECTIVE  PHYSICAL EXAM  General: Morbidly obese, appears well, NAD  CHEST: breathing comfortably  CV: appears well perfused  Abdomen: soft, nontender, nondistended, no rebound or guarding, ostomy viable w/ fat necrosis in medial aspect +/+(thick), L drain site with ostomy appliance in place (00mL/24hr), clear yellow fluid. Midline wound with irrigation VAC in place (500ml/24hr; input: 400mL)  : u/o (primafit) 2400mL/24hr  Extremities: Grossly symmetric  Caprini: 8     T(C): 36.7 (12-27-19 @ 21:26), Max: 37.2 (12-27-19 @ 05:29)  HR: 84 (12-27-19 @ 21:26) (78 - 84)  BP: 109/79 (12-27-19 @ 21:26) (100/61 - 111/67)  RR: 18 (12-27-19 @ 21:26) (18 - 18)  SpO2: 96% (12-27-19 @ 21:26) (95% - 96%)  Wt(kg): --    LABS:                  CAPILLARY BLOOD GLUCOSE          Is&O's    12-26-19 @ 07:01  -  12-27-19 @ 07:00  --------------------------------------------------------  IN: 1920 mL / OUT: 5830 mL / NET: -3910 mL    12-27-19 @ 07:01  -  12-28-19 @ 04:42  --------------------------------------------------------  IN: 920 mL / OUT: 2900 mL / NET: -1980 mL GENERAL SURGERY PROGRESS NOTE    SUBJECTIVE  Patient seen and examined.   No acute events overnight.   Intermittently refusing Lipator  Pain is well controlled (methadone, oxy, tylenol)  Tolerating diet without nausea, vomiting. Decreased appetite  -/+ ostomy (continues on miralax).   Voiding with primafit (2400mL/24hr)  Denies fever, chills, SOB, chest pain.     OBJECTIVE  PHYSICAL EXAM  General: Morbidly obese, appears well, NAD  CHEST: breathing comfortably  CV: appears well perfused  Abdomen: soft, nontender, nondistended, no rebound or guarding, ostomy viable w/ fat necrosis in medial aspect +/+(thick, no gas in bag in am), L drain site with ostomy appliance in place (00mL/24hr), clear yellow fluid. Midline wound with irrigation VAC in place (500ml/24hr; input: 400mL)  : u/o (primafit) 2400mL/24hr  Extremities: Grossly symmetric  Caprini: 8     T(C): 36.7 (12-27-19 @ 21:26), Max: 37.2 (12-27-19 @ 05:29)  HR: 84 (12-27-19 @ 21:26) (78 - 84)  BP: 109/79 (12-27-19 @ 21:26) (100/61 - 111/67)  RR: 18 (12-27-19 @ 21:26) (18 - 18)  SpO2: 96% (12-27-19 @ 21:26) (95% - 96%)  Wt(kg): --    LABS:                  CAPILLARY BLOOD GLUCOSE          Is&O's    12-26-19 @ 07:01  -  12-27-19 @ 07:00  --------------------------------------------------------  IN: 1920 mL / OUT: 5830 mL / NET: -3910 mL    12-27-19 @ 07:01  -  12-28-19 @ 04:42  --------------------------------------------------------  IN: 920 mL / OUT: 2900 mL / NET: -1980 mL

## 2019-12-28 NOTE — PROGRESS NOTE ADULT - SUBJECTIVE AND OBJECTIVE BOX
CARDIOLOGY     PROGRESS  NOTE   ________________________________________________    CHIEF COMPLAINT:Patient is a 64y old  Female who presents with a chief complaint of perforated bowel (08 Dec 2019 09:22)  doing well, no complain.  	  REVIEW OF SYSTEMS:  CONSTITUTIONAL: No fever, weight loss, or fatigue  EYES: No eye pain, visual disturbances, or discharge  ENT:  No difficulty hearing, tinnitus, vertigo; No sinus or throat pain  NECK: No pain or stiffness  RESPIRATORY: No cough, wheezing, chills or hemoptysis; No Shortness of Breath  CARDIOVASCULAR: No chest pain, palpitations, passing out, dizziness, or leg swelling  GASTROINTESTINAL: No abdominal or epigastric pain. No nausea, vomiting, or hematemesis; No diarrhea or constipation. No melena or hematochezia.  GENITOURINARY: No dysuria, frequency, hematuria, or incontinence  NEUROLOGICAL: No headaches, memory loss, loss of strength, numbness, or tremors  SKIN: No itching, burning, rashes, or lesions   LYMPH Nodes: No enlarged glands  ENDOCRINE: No heat or cold intolerance; No hair loss  MUSCULOSKELETAL: No joint pain or swelling; No muscle, back, or extremity pain  PSYCHIATRIC: No depression, anxiety, mood swings, or difficulty sleeping  HEME/LYMPH: No easy bruising, or bleeding gums  ALLERGY AND IMMUNOLOGIC: No hives or eczema	    [ ] All others negative	  [ ] Unable to obtain    PHYSICAL EXAM:  T(C): 36.7 (12-28-19 @ 06:19), Max: 37.1 (12-27-19 @ 14:33)  HR: 75 (12-28-19 @ 06:19) (75 - 84)  BP: 104/53 (12-28-19 @ 06:19) (100/61 - 109/79)  RR: 18 (12-28-19 @ 06:19) (18 - 18)  SpO2: 97% (12-28-19 @ 06:19) (96% - 97%)  Wt(kg): --  I&O's Summary    27 Dec 2019 07:01  -  28 Dec 2019 07:00  --------------------------------------------------------  IN: 1320 mL / OUT: 4450 mL / NET: -3130 mL        Appearance: Normal	  HEENT:   Normal oral mucosa, PERRL, EOMI	  Lymphatic: No lymphadenopathy  Cardiovascular: Normal S1 S2, No JVD,+ murmurs, No edema  Respiratory: Lungs clear to auscultation	  Psychiatry: A & O x 3, Mood & affect appropriate  Gastrointestinal:  Soft, Non-tender, + BS	  Skin: No rashes, No ecchymoses, No cyanosis	  Neurologic: Non-focal  Extremities: Normal range of motion, No clubbing, cyanosis or edema  Vascular: Peripheral pulses palpable 2+ bilaterally    MEDICATIONS  (STANDING):  aspirin  chewable 81 milliGRAM(s) Oral daily  atorvastatin 40 milliGRAM(s) Oral at bedtime  budesonide 160 MICROgram(s)/formoterol 4.5 MICROgram(s) Inhaler 2 Puff(s) Inhalation two times a day  chlorhexidine 4% Liquid 1 Application(s) Topical <User Schedule>  doxazosin 2 milliGRAM(s) Oral daily  enoxaparin Injectable 100 milliGRAM(s) SubCutaneous <User Schedule>  erythromycin     base Tablet 250 milliGRAM(s) Oral two times a day  furosemide    Tablet 20 milliGRAM(s) Oral daily  influenza   Vaccine 0.5 milliLiter(s) IntraMuscular once  methadone    Tablet 17 milliGRAM(s) Oral daily  nystatin Powder 1 Application(s) Topical two times a day  pantoprazole    Tablet 40 milliGRAM(s) Oral daily  polyethylene glycol 3350 17 Gram(s) Oral two times a day  silver nitrate Applicator 1 Application(s) Topical once  spironolactone 50 milliGRAM(s) Oral daily      TELEMETRY: 	    ECG:  	  RADIOLOGY:  OTHER: 	  	  LABS:	 	    CARDIAC MARKERS:                  proBNP:   Lipid Profile:   HgA1c:   TSH:         Assessment and plan  ---------------------------  pt is sitting up comfortably in NAD , no complain.  bp noted  echo noted hyperdynamic lv sec to pain/anxiety, volume depletion  continue dvt prophylaxis  started on Lasix and aldactone ?  continue current meds  s/p debridement ?VAC  keep k>4  dvt prophylaxis  nausea ?gastroparesis ?add Reglan  continue Protonix  awaiting rehab  awaiting placement  plan as per surgery for dc to rehab

## 2019-12-28 NOTE — PROGRESS NOTE ADULT - ASSESSMENT
A/P: 63 yo F with PMHx morbid obesity, GERD, HTN, COPD, and pshx D&C now s/p ex laparotomy with extended left [id number]/26 w/ perforated and necrotic sigmod colon with pus and stool in the abdominal cavity. RTOR 11/29 RUQ end transverse colostomy created, abdomen closed. Recovering appropriately on floor. On 12/19 patient found to have induration of wound and colostomy necrosis, s/p bedside debridement w/ irrigation vac in place on midline wound. Recovering appropriately with daily wound care.     Plan:  - LRD  - Irrigation vac--> change today with Dr. Mayer to assess   - c/w doxazosin  - c/w miralax, c/w erythromycin   - c/w pain control: methadone (home medication), Tylenol, Oxy PRN  - DVT ppx with lovenox, ASA 81  - appreciate cardiology recs  - c/w diuresis   - c/w PT daily  - f/u plastics recommendations  - Dispo: rehab    [facility]  p 2830 A/P: 65 yo F with PMHx morbid obesity, GERD, HTN, COPD, and pshx D&C now s/p ex laparotomy with extended left [id number]/26 w/ perforated and necrotic sigmod colon with pus and stool in the abdominal cavity. RTOR 11/29 RUQ end transverse colostomy created, abdomen closed. Recovering appropriately on floor. On 12/19 patient found to have induration of wound and colostomy necrosis, s/p bedside debridement w/ irrigation vac in place on midline wound. Recovering appropriately with daily wound care.     Plan:  - LRD  - Irrigation vac--> change today with Dr. Mayer to assess   - c/w doxazosin  - c/w miralax, c/w erythromycin   - f/u ostomy OP  - c/w pain control: methadone (home medication), Tylenol, Oxy PRN  - DVT ppx with lovenox, ASA 81  - appreciate cardiology recs  - c/w diuresis   - c/w PT daily  - f/u plastics recommendations  - Dispo: rehab    [facility]  p [phone]

## 2019-12-28 NOTE — CONSULT NOTE ADULT - SUBJECTIVE AND OBJECTIVE BOX
64 year old woman with history of morbid obesity who initially presented with perforated diverticulitis following episode of severe constipation, underwent exlap, hemicolectomy, and end colostomy, now with breakdown of ventral abdominal incision, including dehiscence of fascial closure, peristomal skin necrosis/stomal retraction. Stoma is functioning and bowel contents are contained to separate pouch. Has been undergoing several weeks of VAC instill therapy to midline wound. Otherwise stable, not acutely septic. Consult for abdominal wall reconstruction.     PAST MEDICAL & SURGICAL HISTORY:  Morbid Obesity  Post Menopausal Bleeding  Polyp, Vagina  Acid Reflux  HTN (Hypertension)  S/P D&C: 2009, w vaginal polypectomy    Review of systems - noncontributory except as otherwise noted in HPI.    Vital Signs Last 24 Hrs  T(C): 36.7 (28 Dec 2019 06:19), Max: 37.1 (27 Dec 2019 14:33)  T(F): 98.1 (28 Dec 2019 06:19), Max: 98.7 (27 Dec 2019 14:33)  HR: 75 (28 Dec 2019 06:19) (75 - 84)  BP: 104/53 (28 Dec 2019 06:19) (100/61 - 109/79)  BP(mean): --  RR: 18 (28 Dec 2019 06:19) (18 - 18)  SpO2: 97% (28 Dec 2019 06:19) (96% - 97%)    Midline abdominal wound with areas of devitalized fat; fascial dehiscence with exposed, granulated bowel at base, and some loose suture material.     Stoma retracted below skin level, but above fascia. Skin/subcutaneous tissue bride between midline wound and stoma with small communication; almost entirely devitalized fat/skin.

## 2019-12-29 RX ORDER — FUROSEMIDE 40 MG
20 TABLET ORAL
Refills: 0 | Status: DISCONTINUED | OUTPATIENT
Start: 2019-12-29 | End: 2019-12-30

## 2019-12-29 RX ORDER — POLYETHYLENE GLYCOL 3350 17 G/17G
17 POWDER, FOR SOLUTION ORAL
Refills: 0 | Status: DISCONTINUED | OUTPATIENT
Start: 2019-12-29 | End: 2019-12-31

## 2019-12-29 RX ORDER — DOXAZOSIN MESYLATE 4 MG
2 TABLET ORAL
Refills: 0 | Status: DISCONTINUED | OUTPATIENT
Start: 2019-12-29 | End: 2020-01-24

## 2019-12-29 RX ORDER — PANTOPRAZOLE SODIUM 20 MG/1
40 TABLET, DELAYED RELEASE ORAL
Refills: 0 | Status: DISCONTINUED | OUTPATIENT
Start: 2019-12-29 | End: 2020-02-03

## 2019-12-29 RX ORDER — SPIRONOLACTONE 25 MG/1
50 TABLET, FILM COATED ORAL
Refills: 0 | Status: DISCONTINUED | OUTPATIENT
Start: 2019-12-29 | End: 2019-12-30

## 2019-12-29 RX ORDER — METHADONE HYDROCHLORIDE 40 MG/1
17.5 TABLET ORAL
Refills: 0 | Status: DISCONTINUED | OUTPATIENT
Start: 2019-12-29 | End: 2020-01-05

## 2019-12-29 RX ORDER — ERYTHROMYCIN ETHYLSUCCINATE 400 MG
250 TABLET ORAL
Refills: 0 | Status: DISCONTINUED | OUTPATIENT
Start: 2019-12-29 | End: 2020-02-05

## 2019-12-29 RX ORDER — ERYTHROMYCIN ETHYLSUCCINATE 400 MG
250 TABLET ORAL
Refills: 0 | Status: DISCONTINUED | OUTPATIENT
Start: 2019-12-29 | End: 2019-12-29

## 2019-12-29 RX ORDER — ATORVASTATIN CALCIUM 80 MG/1
40 TABLET, FILM COATED ORAL
Refills: 0 | Status: DISCONTINUED | OUTPATIENT
Start: 2019-12-29 | End: 2020-02-05

## 2019-12-29 RX ORDER — ASPIRIN/CALCIUM CARB/MAGNESIUM 324 MG
81 TABLET ORAL
Refills: 0 | Status: DISCONTINUED | OUTPATIENT
Start: 2019-12-29 | End: 2020-02-05

## 2019-12-29 RX ADMIN — CHLORHEXIDINE GLUCONATE 1 APPLICATION(S): 213 SOLUTION TOPICAL at 09:36

## 2019-12-29 RX ADMIN — NYSTATIN CREAM 1 APPLICATION(S): 100000 CREAM TOPICAL at 18:37

## 2019-12-29 RX ADMIN — PANTOPRAZOLE SODIUM 40 MILLIGRAM(S): 20 TABLET, DELAYED RELEASE ORAL at 12:51

## 2019-12-29 RX ADMIN — ENOXAPARIN SODIUM 100 MILLIGRAM(S): 100 INJECTION SUBCUTANEOUS at 12:51

## 2019-12-29 RX ADMIN — NYSTATIN CREAM 1 APPLICATION(S): 100000 CREAM TOPICAL at 05:51

## 2019-12-29 RX ADMIN — Medication 250 MILLIGRAM(S): at 05:51

## 2019-12-29 RX ADMIN — METHADONE HYDROCHLORIDE 17.5 MILLIGRAM(S): 40 TABLET ORAL at 09:35

## 2019-12-29 RX ADMIN — Medication 2 MILLIGRAM(S): at 05:51

## 2019-12-29 RX ADMIN — Medication 20 MILLIGRAM(S): at 05:51

## 2019-12-29 RX ADMIN — SPIRONOLACTONE 50 MILLIGRAM(S): 25 TABLET, FILM COATED ORAL at 05:51

## 2019-12-29 RX ADMIN — BUDESONIDE AND FORMOTEROL FUMARATE DIHYDRATE 2 PUFF(S): 160; 4.5 AEROSOL RESPIRATORY (INHALATION) at 18:39

## 2019-12-29 RX ADMIN — Medication 81 MILLIGRAM(S): at 12:51

## 2019-12-29 RX ADMIN — POLYETHYLENE GLYCOL 3350 17 GRAM(S): 17 POWDER, FOR SOLUTION ORAL at 05:51

## 2019-12-29 RX ADMIN — POLYETHYLENE GLYCOL 3350 17 GRAM(S): 17 POWDER, FOR SOLUTION ORAL at 18:39

## 2019-12-29 NOTE — PROGRESS NOTE ADULT - ASSESSMENT
A/P: 65 yo F with PMHx morbid obesity, GERD, HTN, COPD, and pshx D&C now s/p ex laparotomy with extended left [id number]/26 w/ perforated and necrotic sigmod colon with pus and stool in the abdominal cavity. RTOR 11/29 RUQ end transverse colostomy created, abdomen closed. Recovering appropriately on floor. On 12/19 patient found to have induration of wound and colostomy necrosis, s/p bedside debridement w/ irrigation vac in place on midline wound. Recovering appropriately with daily wound care.     Plan:  - LRD  - Irrigation vac (TIW, changed 12/28)  - c/w doxazosin  - c/w miralax, c/w erythromycin   - f/u ostomy OP  - c/w pain control: methadone (home medication), Tylenol, Oxy PRN  - DVT ppx with lovenox, ASA 81  - appreciate cardiology recs  - c/w diuresis   - c/w PT daily  - f/u plastics recommendations regarding future wound care  - Dispo: rehab    [facility]  p [phone]

## 2019-12-29 NOTE — PROGRESS NOTE ADULT - SUBJECTIVE AND OBJECTIVE BOX
GREGORY VELASQUEZ  [id number]    Subjective:    Patient seen and examined, no complaints.   Wound vac holding suction, no leaks.        Objective:  T(C): 37 (12-29-19 @ 06:40), Max: 37 (12-29-19 @ 00:52)  HR: 78 (12-29-19 @ 06:40) (77 - 86)  BP: 109/69 (12-29-19 @ 06:40) (102/54 - 109/69)  RR: 18 (12-29-19 @ 06:40) (18 - 18)  SpO2: 94% (12-29-19 @ 06:40) (94% - 95%)  Wt(kg): --           12-28 @ 07:01  -  12-29 @ 07:00  --------------------------------------------------------  IN: 720 mL / OUT: 1100 mL / NET: -380 mL      PHYSICAL EXAM:    General: NAD   Abdomen: Obese abdomen with ostomy bag with brown thick stook. Wound vac in place to left of ostomy vac, holding suction             MEDICATIONS  (STANDING):  aspirin  chewable 81 milliGRAM(s) Oral daily  atorvastatin 40 milliGRAM(s) Oral at bedtime  budesonide 160 MICROgram(s)/formoterol 4.5 MICROgram(s) Inhaler 2 Puff(s) Inhalation two times a day  chlorhexidine 4% Liquid 1 Application(s) Topical <User Schedule>  doxazosin 2 milliGRAM(s) Oral daily  enoxaparin Injectable 100 milliGRAM(s) SubCutaneous <User Schedule>  erythromycin     base Tablet 250 milliGRAM(s) Oral two times a day  furosemide    Tablet 20 milliGRAM(s) Oral daily  influenza   Vaccine 0.5 milliLiter(s) IntraMuscular once  methadone    Tablet 17.5 milliGRAM(s) Oral daily  nystatin Powder 1 Application(s) Topical two times a day  pantoprazole    Tablet 40 milliGRAM(s) Oral daily  polyethylene glycol 3350 17 Gram(s) Oral two times a day  silver nitrate Applicator 1 Application(s) Topical once  spironolactone 50 milliGRAM(s) Oral daily    MEDICATIONS  (PRN):  acetaminophen   Tablet .. 650 milliGRAM(s) Oral every 6 hours PRN Mild Pain (1 - 3)  ALBUTerol    90 MICROgram(s) HFA Inhaler 2 Puff(s) Inhalation every 6 hours PRN Shortness of Breath and/or Wheezing  aluminum hydroxide/magnesium hydroxide/simethicone Suspension 30 milliLiter(s) Oral every 4 hours PRN Dyspepsia  ondansetron    Tablet 4 milliGRAM(s) Oral once PRN Nausea and/or Vomiting  oxyCODONE    IR 5 milliGRAM(s) Oral every 4 hours PRN breakthrough pain  sodium chloride 0.9% lock flush 10 milliLiter(s) IV Push every 1 hour PRN Pre/post blood products, medications, blood draw, and to maintain line patency

## 2019-12-29 NOTE — PROGRESS NOTE ADULT - ASSESSMENT
A/P:    64 year old woman with midline abdominal wall wound, with fascial dehiscence following exlap and end colostomy. Ostomy appears to be retracting below level of skin/subcutaneous tissues, but above fascia. Contents appear contained and separate from wound, but communication threatened at bridge of devitalized tissue between ostomy and midline wound.      - Bedside debridement of loose suture material and devitalized fat, skin, and fascia performed yesterday  - Continue VAC instill therapy for midline wound  - Continue ostomy management. Await declaration of skin bridge.  - Will coordinate for vac change tomorrow       Plastic Surgery

## 2019-12-29 NOTE — PROGRESS NOTE ADULT - SUBJECTIVE AND OBJECTIVE BOX
CARDIOLOGY     PROGRESS  NOTE   ________________________________________________    CHIEF COMPLAINT:Patient is a 64y old  Female who presents with a chief complaint of perforated bowel (08 Dec 2019 09:22)  doing better, no complain.  	  REVIEW OF SYSTEMS:  CONSTITUTIONAL: No fever, weight loss, or fatigue  EYES: No eye pain, visual disturbances, or discharge  ENT:  No difficulty hearing, tinnitus, vertigo; No sinus or throat pain  NECK: No pain or stiffness  RESPIRATORY: No cough, wheezing, chills or hemoptysis; No Shortness of Breath  CARDIOVASCULAR: No chest pain, palpitations, passing out, dizziness, or leg swelling  GASTROINTESTINAL: No abdominal or epigastric pain. No nausea, vomiting, or hematemesis; No diarrhea or constipation. No melena or hematochezia.  GENITOURINARY: No dysuria, frequency, hematuria, or incontinence  NEUROLOGICAL: No headaches, memory loss, loss of strength, numbness, or tremors  SKIN: No itching, burning, rashes, or lesions   LYMPH Nodes: No enlarged glands  ENDOCRINE: No heat or cold intolerance; No hair loss  MUSCULOSKELETAL: No joint pain or swelling; No muscle, back, or extremity pain  PSYCHIATRIC: No depression, anxiety, mood swings, or difficulty sleeping  HEME/LYMPH: No easy bruising, or bleeding gums  ALLERGY AND IMMUNOLOGIC: No hives or eczema	    [ ] All others negative	  [ ] Unable to obtain    PHYSICAL EXAM:  T(C): 37 (12-29-19 @ 06:40), Max: 37 (12-29-19 @ 00:52)  HR: 78 (12-29-19 @ 06:40) (77 - 86)  BP: 109/69 (12-29-19 @ 06:40) (102/54 - 109/69)  RR: 18 (12-29-19 @ 06:40) (18 - 18)  SpO2: 94% (12-29-19 @ 06:40) (94% - 95%)  Wt(kg): --  I&O's Summary    28 Dec 2019 07:01  -  29 Dec 2019 07:00  --------------------------------------------------------  IN: 720 mL / OUT: 1100 mL / NET: -380 mL        Appearance: Normal	  HEENT:   Normal oral mucosa, PERRL, EOMI	  Lymphatic: No lymphadenopathy  Cardiovascular: Normal S1 S2, No JVD, + murmurs, No edema  Respiratory: Lungs clear to auscultation	  Psychiatry: A & O x 3, Mood & affect appropriate  Gastrointestinal:  Soft, Non-tender, + BS	  Skin: No rashes, No ecchymoses, No cyanosis	  Neurologic: Non-focal  Extremities: Normal range of motion, No clubbing, cyanosis or edema  Vascular: Peripheral pulses palpable 2+ bilaterally    MEDICATIONS  (STANDING):  aspirin  chewable 81 milliGRAM(s) Oral daily  atorvastatin 40 milliGRAM(s) Oral at bedtime  budesonide 160 MICROgram(s)/formoterol 4.5 MICROgram(s) Inhaler 2 Puff(s) Inhalation two times a day  chlorhexidine 4% Liquid 1 Application(s) Topical <User Schedule>  doxazosin 2 milliGRAM(s) Oral daily  enoxaparin Injectable 100 milliGRAM(s) SubCutaneous <User Schedule>  erythromycin     base Tablet 250 milliGRAM(s) Oral two times a day  furosemide    Tablet 20 milliGRAM(s) Oral daily  influenza   Vaccine 0.5 milliLiter(s) IntraMuscular once  methadone    Tablet 17.5 milliGRAM(s) Oral daily  nystatin Powder 1 Application(s) Topical two times a day  pantoprazole    Tablet 40 milliGRAM(s) Oral daily  polyethylene glycol 3350 17 Gram(s) Oral two times a day  silver nitrate Applicator 1 Application(s) Topical once  spironolactone 50 milliGRAM(s) Oral daily      TELEMETRY: 	    ECG:  	  RADIOLOGY:  OTHER: 	  	  LABS:	 	    CARDIAC MARKERS:      - Patient seen and examined with Dr. Harrison, attending plastic surgeon.  - Bedside debridement of loose suture material and devitalized fat, skin, and fascia performed.   - Continue VAC instill therapy for midline wound  - Continue ostomy management. Await declaration of skin bridge.  - Will follow and reassess for possible operative debridement.            proBNP:   Lipid Profile:   HgA1c:   TSH:         Assessment and plan  ---------------------------    pt is sitting up comfortably in NAD , no complain.  bp noted  echo noted hyperdynamic lv sec to pain/anxiety, volume depletion  continue dvt prophylaxis  started on Lasix and aldactone ?  continue current meds  s/p debridement ?VAC  keep k>4  dvt prophylaxis  nausea ?gastroparesis ?add Reglan  continue Protonix  awaiting rehab  awaiting placement  plan as per surgery ?more debridement ?plastic surgery	for surgical debridement

## 2019-12-29 NOTE — PROGRESS NOTE ADULT - SUBJECTIVE AND OBJECTIVE BOX
GENERAL SURGERY PROGRESS NOTE    SUBJECTIVE  Patient seen and examined.   No acute events overnight.   s/p bedside debridement of loose suture material and devitalized fat, skin, and fascia performed by plastic surgery (Dr. Harrison)   Intermittently refusing Lipator, has been discussed with patient on many occasions.   Pain is well controlled (methadone, oxy, Tylenol  Tolerating diet without nausea, vomiting. Decreased appetite  +/+ ostomy    Voiding with primafit  Denies fever, chills, SOB, chest pain.     OBJECTIVE  PHYSICAL EXAM  General: Morbidly obese, appears well, NAD  CHEST: breathing comfortably  CV: appears well perfused  Abdomen: soft, nontender, nondistended, no rebound or guarding, ostomy viable w/ fat necrosis in medial aspect +/+ (xx), L drain site with ostomy appliance in place (xxmL/24hr), clear yellow fluid. Midline wound with irrigation VAC in place (xxml/24hr; input: 400mL)  : Primafit (xxmL/24hr)  Extremities: Grossly symmetric  Caprini: 8     T(C): 36.8 (12-28-19 @ 17:05), Max: 36.8 (12-28-19 @ 17:05)  HR: 86 (12-28-19 @ 17:05) (75 - 86)  BP: 107/74 (12-28-19 @ 17:05) (104/53 - 107/74)  RR: 18 (12-28-19 @ 17:05) (18 - 18)  SpO2: 95% (12-28-19 @ 17:05) (95% - 97%)  Wt(kg): --    LABS:                  CAPILLARY BLOOD GLUCOSE          Is&O's    12-27-19 @ 07:01  -  12-28-19 @ 07:00  --------------------------------------------------------  IN: 1320 mL / OUT: 4450 mL / NET: -3130 mL    12-28-19 @ 07:01  -  12-29-19 @ 00:06  --------------------------------------------------------  IN: 720 mL / OUT: 700 mL / NET: 20 mL GENERAL SURGERY PROGRESS NOTE    SUBJECTIVE  Patient seen and examined.   No acute events overnight.   s/p bedside debridement of loose suture material and devitalized fat, skin, and fascia performed by plastic surgery (Dr. Harrison)   Intermittently refusing Lipator, has been discussed with patient on many occasions.   Pain is well controlled (methadone, oxy, Tylenol  Tolerating diet without nausea, vomiting. Decreased appetite  +/+ ostomy    Voiding with primafit  Denies fever, chills, SOB, chest pain.     OBJECTIVE  PHYSICAL EXAM  General: Morbidly obese, appears well, NAD  CHEST: breathing comfortably  CV: appears well perfused  Abdomen: soft, nontender, nondistended, no rebound or guarding, ostomy viable w/ fat necrosis in medial aspect +/+ (0ml/24), L drain site with ostomy appliance in place (0mL/24hr), clear yellow fluid. Midline wound with irrigation VAC in place (xxml/24hr; input: 400mL)  : Primafit (700mL/24hr)  Extremities: Grossly symmetric  Caprini: 8     T(C): 36.8 (12-28-19 @ 17:05), Max: 36.8 (12-28-19 @ 17:05)  HR: 86 (12-28-19 @ 17:05) (75 - 86)  BP: 107/74 (12-28-19 @ 17:05) (104/53 - 107/74)  RR: 18 (12-28-19 @ 17:05) (18 - 18)  SpO2: 95% (12-28-19 @ 17:05) (95% - 97%)  Wt(kg): --    LABS:                  CAPILLARY BLOOD GLUCOSE          Is&O's    12-27-19 @ 07:01  -  12-28-19 @ 07:00  --------------------------------------------------------  IN: 1320 mL / OUT: 4450 mL / NET: -3130 mL    12-28-19 @ 07:01  -  12-29-19 @ 00:06  --------------------------------------------------------  IN: 720 mL / OUT: 700 mL / NET: 20 mL GENERAL SURGERY PROGRESS NOTE    SUBJECTIVE  Patient seen and examined.   No acute events overnight.   s/p bedside debridement of loose suture material and devitalized fat, skin, and fascia performed by plastic surgery (Dr. Harrison)   Intermittently refusing Lipator, has been discussed with patient on many occasions.   Pain is well controlled (methadone, oxy, Tylenol  Tolerating diet without nausea, vomiting. Decreased appetite  +/+ ostomy    Voiding with primafit  Denies fever, chills, SOB, chest pain.     OBJECTIVE  PHYSICAL EXAM  General: Morbidly obese, appears well, NAD  CHEST: breathing comfortably  CV: appears well perfused  Abdomen: soft, nontender, nondistended, no rebound or guarding, ostomy viable w/ fat necrosis in medial aspect +/+ (none recorded,), L drain site with ostomy appliance in place (0mL/24hr), clear yellow fluid. Midline wound with irrigation VAC in place (xxml/24hr; input: 400mL)  : Primafit (700mL/24hr)  Extremities: Grossly symmetric  Caprini: 8     T(C): 36.8 (12-28-19 @ 17:05), Max: 36.8 (12-28-19 @ 17:05)  HR: 86 (12-28-19 @ 17:05) (75 - 86)  BP: 107/74 (12-28-19 @ 17:05) (104/53 - 107/74)  RR: 18 (12-28-19 @ 17:05) (18 - 18)  SpO2: 95% (12-28-19 @ 17:05) (95% - 97%)  Wt(kg): --    LABS:                  CAPILLARY BLOOD GLUCOSE          Is&O's    12-27-19 @ 07:01  -  12-28-19 @ 07:00  --------------------------------------------------------  IN: 1320 mL / OUT: 4450 mL / NET: -3130 mL    12-28-19 @ 07:01  -  12-29-19 @ 00:06  --------------------------------------------------------  IN: 720 mL / OUT: 700 mL / NET: 20 mL GENERAL SURGERY PROGRESS NOTE    SUBJECTIVE  Patient seen and examined.   No acute events overnight.   s/p bedside debridement of loose suture material and devitalized fat, skin, and fascia performed by plastic surgery (Dr. Harrison)   Intermittently refusing Lipator, has been discussed with patient on many occasions.   Pain is well controlled (methadone, oxy, Tylenol  Tolerating diet without nausea, vomiting. Decreased appetite  +/+ with formed stool ostomy    Voiding with primafit  Denies fever, chills, SOB, chest pain.     OBJECTIVE  PHYSICAL EXAM  General: Morbidly obese, appears well, NAD  CHEST: breathing comfortably  CV: appears well perfused  Abdomen: soft, nontender, nondistended, no rebound or guarding, ostomy viable w/ fat necrosis in medial aspect +/+ (none recorded,), L drain site with ostomy appliance in place (0mL/24hr), clear yellow fluid. Midline wound with irrigation VAC in place (xxml/24hr; input: 400mL)  : Primafit (700mL/24hr)  Extremities: Grossly symmetric  Caprini: 8     T(C): 36.8 (12-28-19 @ 17:05), Max: 36.8 (12-28-19 @ 17:05)  HR: 86 (12-28-19 @ 17:05) (75 - 86)  BP: 107/74 (12-28-19 @ 17:05) (104/53 - 107/74)  RR: 18 (12-28-19 @ 17:05) (18 - 18)  SpO2: 95% (12-28-19 @ 17:05) (95% - 97%)  Wt(kg): --    LABS:                  CAPILLARY BLOOD GLUCOSE          Is&O's    12-27-19 @ 07:01  -  12-28-19 @ 07:00  --------------------------------------------------------  IN: 1320 mL / OUT: 4450 mL / NET: -3130 mL    12-28-19 @ 07:01  -  12-29-19 @ 00:06  --------------------------------------------------------  IN: 720 mL / OUT: 700 mL / NET: 20 mL GENERAL SURGERY PROGRESS NOTE    SUBJECTIVE  Patient seen and examined.   No acute events overnight.   s/p bedside debridement of loose suture material and devitalized fat, skin, and fascia performed by plastic surgery (Dr. Harrison)   Intermittently refusing Lipator, has been discussed with patient on many occasions.   Pain is well controlled (methadone, oxy, Tylenol  Tolerating diet without nausea, vomiting. Decreased appetite  +/+ with formed stool ostomy    Voiding with primafit  Denies fever, chills, SOB, chest pain.     OBJECTIVE  PHYSICAL EXAM  General: Morbidly obese, appears well, NAD  CHEST: breathing comfortably  CV: appears well perfused  Abdomen: soft, nontender, nondistended, no rebound or guarding, ostomy viable w/ fat necrosis in medial aspect +/+ (2 changes), L drain site with ostomy appliance in place (0mL/24hr), clear yellow fluid. Midline wound with irrigation VAC in place (xxml/24hr; input: xxmL)  : Primafit (900mL/24hr)  Extremities: Grossly symmetric  Caprini: 8     T(C): 36.8 (12-28-19 @ 17:05), Max: 36.8 (12-28-19 @ 17:05)  HR: 86 (12-28-19 @ 17:05) (75 - 86)  BP: 107/74 (12-28-19 @ 17:05) (104/53 - 107/74)  RR: 18 (12-28-19 @ 17:05) (18 - 18)  SpO2: 95% (12-28-19 @ 17:05) (95% - 97%)  Wt(kg): --    LABS:                  CAPILLARY BLOOD GLUCOSE          Is&O's    12-27-19 @ 07:01  -  12-28-19 @ 07:00  --------------------------------------------------------  IN: 1320 mL / OUT: 4450 mL / NET: -3130 mL    12-28-19 @ 07:01  -  12-29-19 @ 00:06  --------------------------------------------------------  IN: 720 mL / OUT: 700 mL / NET: 20 mL

## 2019-12-29 NOTE — PROGRESS NOTE ADULT - ATTENDING COMMENTS
I have seen and examined the patient.  I agree with the surgical resident's note.  There is a subcutaneous connection between the ostomy site(not bowel)and the midline incision.    Clarke Mak contact information [phone]

## 2019-12-30 LAB
ALBUMIN SERPL ELPH-MCNC: 2 G/DL — LOW (ref 3.3–5)
ALP SERPL-CCNC: 94 U/L — SIGNIFICANT CHANGE UP (ref 40–120)
ALT FLD-CCNC: 16 U/L — SIGNIFICANT CHANGE UP (ref 10–45)
ANION GAP SERPL CALC-SCNC: 9 MMOL/L — SIGNIFICANT CHANGE UP (ref 5–17)
APTT BLD: 26.8 SEC — LOW (ref 27.5–36.3)
AST SERPL-CCNC: 22 U/L — SIGNIFICANT CHANGE UP (ref 10–40)
BASOPHILS # BLD AUTO: 0.04 K/UL — SIGNIFICANT CHANGE UP (ref 0–0.2)
BASOPHILS NFR BLD AUTO: 0.5 % — SIGNIFICANT CHANGE UP (ref 0–2)
BILIRUB SERPL-MCNC: 0.4 MG/DL — SIGNIFICANT CHANGE UP (ref 0.2–1.2)
BLD GP AB SCN SERPL QL: NEGATIVE — SIGNIFICANT CHANGE UP
BUN SERPL-MCNC: 9 MG/DL — SIGNIFICANT CHANGE UP (ref 7–23)
CALCIUM SERPL-MCNC: 8.4 MG/DL — SIGNIFICANT CHANGE UP (ref 8.4–10.5)
CHLORIDE SERPL-SCNC: 93 MMOL/L — LOW (ref 96–108)
CHOLEST SERPL-MCNC: 98 MG/DL — SIGNIFICANT CHANGE UP (ref 10–199)
CO2 SERPL-SCNC: 30 MMOL/L — SIGNIFICANT CHANGE UP (ref 22–31)
CREAT SERPL-MCNC: 0.63 MG/DL — SIGNIFICANT CHANGE UP (ref 0.5–1.3)
EOSINOPHIL # BLD AUTO: 0.28 K/UL — SIGNIFICANT CHANGE UP (ref 0–0.5)
EOSINOPHIL NFR BLD AUTO: 3.4 % — SIGNIFICANT CHANGE UP (ref 0–6)
GLUCOSE SERPL-MCNC: 100 MG/DL — HIGH (ref 70–99)
HCT VFR BLD CALC: 26.4 % — LOW (ref 34.5–45)
HDLC SERPL-MCNC: 28 MG/DL — LOW
HGB BLD-MCNC: 8.2 G/DL — LOW (ref 11.5–15.5)
IMM GRANULOCYTES NFR BLD AUTO: 0.6 % — SIGNIFICANT CHANGE UP (ref 0–1.5)
INR BLD: 1.14 RATIO — SIGNIFICANT CHANGE UP (ref 0.88–1.16)
LIPID PNL WITH DIRECT LDL SERPL: 52 MG/DL — SIGNIFICANT CHANGE UP
LYMPHOCYTES # BLD AUTO: 1.14 K/UL — SIGNIFICANT CHANGE UP (ref 1–3.3)
LYMPHOCYTES # BLD AUTO: 14 % — SIGNIFICANT CHANGE UP (ref 13–44)
MAGNESIUM SERPL-MCNC: 1.8 MG/DL — SIGNIFICANT CHANGE UP (ref 1.6–2.6)
MCHC RBC-ENTMCNC: 28.8 PG — SIGNIFICANT CHANGE UP (ref 27–34)
MCHC RBC-ENTMCNC: 31.1 GM/DL — LOW (ref 32–36)
MCV RBC AUTO: 92.6 FL — SIGNIFICANT CHANGE UP (ref 80–100)
MONOCYTES # BLD AUTO: 1.13 K/UL — HIGH (ref 0–0.9)
MONOCYTES NFR BLD AUTO: 13.9 % — SIGNIFICANT CHANGE UP (ref 2–14)
NEUTROPHILS # BLD AUTO: 5.49 K/UL — SIGNIFICANT CHANGE UP (ref 1.8–7.4)
NEUTROPHILS NFR BLD AUTO: 67.6 % — SIGNIFICANT CHANGE UP (ref 43–77)
PHOSPHATE SERPL-MCNC: 3.3 MG/DL — SIGNIFICANT CHANGE UP (ref 2.5–4.5)
PLATELET # BLD AUTO: 274 K/UL — SIGNIFICANT CHANGE UP (ref 150–400)
POTASSIUM SERPL-MCNC: 3.8 MMOL/L — SIGNIFICANT CHANGE UP (ref 3.5–5.3)
POTASSIUM SERPL-SCNC: 3.8 MMOL/L — SIGNIFICANT CHANGE UP (ref 3.5–5.3)
PROT SERPL-MCNC: 6 G/DL — SIGNIFICANT CHANGE UP (ref 6–8.3)
PROTHROM AB SERPL-ACNC: 13 SEC — SIGNIFICANT CHANGE UP (ref 10–13.1)
RBC # BLD: 2.85 M/UL — LOW (ref 3.8–5.2)
RBC # FLD: 15.9 % — HIGH (ref 10.3–14.5)
RH IG SCN BLD-IMP: POSITIVE — SIGNIFICANT CHANGE UP
SODIUM SERPL-SCNC: 132 MMOL/L — LOW (ref 135–145)
TOTAL CHOLESTEROL/HDL RATIO MEASUREMENT: 3.5 RATIO — SIGNIFICANT CHANGE UP (ref 3.3–7.1)
TRIGL SERPL-MCNC: 91 MG/DL — SIGNIFICANT CHANGE UP (ref 10–149)
WBC # BLD: 8.13 K/UL — SIGNIFICANT CHANGE UP (ref 3.8–10.5)
WBC # FLD AUTO: 8.13 K/UL — SIGNIFICANT CHANGE UP (ref 3.8–10.5)

## 2019-12-30 RX ORDER — FUROSEMIDE 40 MG
40 TABLET ORAL
Refills: 0 | Status: DISCONTINUED | OUTPATIENT
Start: 2019-12-30 | End: 2020-01-07

## 2019-12-30 RX ORDER — MAGNESIUM OXIDE 400 MG ORAL TABLET 241.3 MG
400 TABLET ORAL ONCE
Refills: 0 | Status: COMPLETED | OUTPATIENT
Start: 2019-12-30 | End: 2019-12-30

## 2019-12-30 RX ORDER — HYDROMORPHONE HYDROCHLORIDE 2 MG/ML
1 INJECTION INTRAMUSCULAR; INTRAVENOUS; SUBCUTANEOUS ONCE
Refills: 0 | Status: DISCONTINUED | OUTPATIENT
Start: 2019-12-30 | End: 2019-12-30

## 2019-12-30 RX ORDER — SPIRONOLACTONE 25 MG/1
75 TABLET, FILM COATED ORAL
Refills: 0 | Status: DISCONTINUED | OUTPATIENT
Start: 2019-12-30 | End: 2020-01-07

## 2019-12-30 RX ADMIN — PANTOPRAZOLE SODIUM 40 MILLIGRAM(S): 20 TABLET, DELAYED RELEASE ORAL at 06:17

## 2019-12-30 RX ADMIN — POLYETHYLENE GLYCOL 3350 17 GRAM(S): 17 POWDER, FOR SOLUTION ORAL at 17:31

## 2019-12-30 RX ADMIN — Medication 250 MILLIGRAM(S): at 17:28

## 2019-12-30 RX ADMIN — Medication 250 MILLIGRAM(S): at 10:26

## 2019-12-30 RX ADMIN — BUDESONIDE AND FORMOTEROL FUMARATE DIHYDRATE 2 PUFF(S): 160; 4.5 AEROSOL RESPIRATORY (INHALATION) at 17:30

## 2019-12-30 RX ADMIN — CHLORHEXIDINE GLUCONATE 1 APPLICATION(S): 213 SOLUTION TOPICAL at 10:08

## 2019-12-30 RX ADMIN — METHADONE HYDROCHLORIDE 17.5 MILLIGRAM(S): 40 TABLET ORAL at 08:59

## 2019-12-30 RX ADMIN — Medication 2 MILLIGRAM(S): at 06:17

## 2019-12-30 RX ADMIN — NYSTATIN CREAM 1 APPLICATION(S): 100000 CREAM TOPICAL at 17:29

## 2019-12-30 RX ADMIN — Medication 40 MILLIGRAM(S): at 13:05

## 2019-12-30 RX ADMIN — SPIRONOLACTONE 75 MILLIGRAM(S): 25 TABLET, FILM COATED ORAL at 13:11

## 2019-12-30 RX ADMIN — Medication 81 MILLIGRAM(S): at 22:22

## 2019-12-30 RX ADMIN — ENOXAPARIN SODIUM 100 MILLIGRAM(S): 100 INJECTION SUBCUTANEOUS at 13:06

## 2019-12-30 RX ADMIN — POLYETHYLENE GLYCOL 3350 17 GRAM(S): 17 POWDER, FOR SOLUTION ORAL at 10:27

## 2019-12-30 RX ADMIN — HYDROMORPHONE HYDROCHLORIDE 1 MILLIGRAM(S): 2 INJECTION INTRAMUSCULAR; INTRAVENOUS; SUBCUTANEOUS at 08:50

## 2019-12-30 RX ADMIN — BUDESONIDE AND FORMOTEROL FUMARATE DIHYDRATE 2 PUFF(S): 160; 4.5 AEROSOL RESPIRATORY (INHALATION) at 06:17

## 2019-12-30 RX ADMIN — MAGNESIUM OXIDE 400 MG ORAL TABLET 400 MILLIGRAM(S): 241.3 TABLET ORAL at 22:25

## 2019-12-30 RX ADMIN — NYSTATIN CREAM 1 APPLICATION(S): 100000 CREAM TOPICAL at 06:18

## 2019-12-30 NOTE — PROGRESS NOTE ADULT - ASSESSMENT
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

## 2019-12-30 NOTE — PROGRESS NOTE ADULT - SUBJECTIVE AND OBJECTIVE BOX
GENERAL SURGERY PROGRESS NOTE    SUBJECTIVE  Patient seen and examined.   No acute events overnight.   Refused Lipitor overnight  Pain is well controlled (methadone, oxy, Tylenol)  Tolerating diet without nausea, vomiting. Decreased appetite  +/+ with formed stool ostomy    Voiding with primafit  Denies fever, chills, SOB, chest pain.     OBJECTIVE  PHYSICAL EXAM  General: Morbidly obese, appears well, NAD  CHEST: breathing comfortably  CV: appears well perfused  Abdomen: soft, nontender, nondistended, no rebound or guarding, ostomy viable w/ fat necrosis in medial aspect +/+, L drain site with ostomy appliance in place (xmL/24hr), clear yellow fluid. Midline wound with irrigation VAC in place (xxml/24hr; input: xxmL)  : Primafit (xxmL/24hr)  Extremities: Grossly symmetric  Caprini: 8     V/S:  Vital Signs Last 24 Hrs  T(C): 37.2 (30 Dec 2019 00:41), Max: 37.2 (30 Dec 2019 00:41)  T(F): 99 (30 Dec 2019 00:41), Max: 99 (30 Dec 2019 00:41)  HR: 83 (30 Dec 2019 00:41) (78 - 92)  BP: 107/67 (30 Dec 2019 00:41) (101/56 - 109/69)  BP(mean): --  RR: 18 (30 Dec 2019 00:41) (18 - 18)  SpO2: 94% (30 Dec 2019 00:41) (94% - 94%)    --------------------------------------------------------------------------------------------------  I/Os:    28 Dec 2019 07:01  -  29 Dec 2019 07:00  --------------------------------------------------------  IN:    Oral Fluid: 620 mL    Solution: 100 mL  Total IN: 720 mL    OUT:    Voided: 1100 mL  Total OUT: 1100 mL    Total NET: -380 mL      29 Dec 2019 07:01  -  30 Dec 2019 02:38  --------------------------------------------------------  IN:    Oral Fluid: 780 mL    Other: 1500 mL  Total IN: 2280 mL    OUT:    Colostomy: 2 mL    Drain: 25 mL    VAC (Vacuum Assisted Closure) System: 1000 mL    Voided: 1350 mL  Total OUT: 2377 mL    Total NET: -97 mL    --------------------------------------------------------------------------------------------------  MEDICATIONS  (STANDING):  aspirin  chewable 81 milliGRAM(s) Oral <User Schedule>  atorvastatin 40 milliGRAM(s) Oral <User Schedule>  budesonide 160 MICROgram(s)/formoterol 4.5 MICROgram(s) Inhaler 2 Puff(s) Inhalation two times a day  chlorhexidine 4% Liquid 1 Application(s) Topical <User Schedule>  doxazosin 2 milliGRAM(s) Oral <User Schedule>  enoxaparin Injectable 100 milliGRAM(s) SubCutaneous <User Schedule>  erythromycin     base Tablet 250 milliGRAM(s) Oral <User Schedule>  furosemide    Tablet 20 milliGRAM(s) Oral <User Schedule>  influenza   Vaccine 0.5 milliLiter(s) IntraMuscular once  methadone    Tablet 17.5 milliGRAM(s) Oral <User Schedule>  nystatin Powder 1 Application(s) Topical two times a day  pantoprazole    Tablet 40 milliGRAM(s) Oral <User Schedule>  polyethylene glycol 3350 17 Gram(s) Oral <User Schedule>  silver nitrate Applicator 1 Application(s) Topical once  spironolactone 50 milliGRAM(s) Oral <User Schedule>    MEDICATIONS  (PRN):  acetaminophen   Tablet .. 650 milliGRAM(s) Oral every 6 hours PRN Mild Pain (1 - 3)  ALBUTerol    90 MICROgram(s) HFA Inhaler 2 Puff(s) Inhalation every 6 hours PRN Shortness of Breath and/or Wheezing  aluminum hydroxide/magnesium hydroxide/simethicone Suspension 30 milliLiter(s) Oral every 4 hours PRN Dyspepsia  ondansetron    Tablet 4 milliGRAM(s) Oral once PRN Nausea and/or Vomiting  sodium chloride 0.9% lock flush 10 milliLiter(s) IV Push every 1 hour PRN Pre/post blood products, medications, blood draw, and to maintain line patency GENERAL SURGERY PROGRESS NOTE    SUBJECTIVE  Patient seen and examined.   No acute events overnight.   Refused Lipitor overnight  Pain is well controlled (methadone, oxy, Tylenol)  Tolerating diet without nausea, vomiting. Decreased appetite  +/+ with formed stool ostomy    Voiding with primafit  Denies fever, chills, SOB, chest pain.     OBJECTIVE  PHYSICAL EXAM  General: Morbidly obese, appears well, NAD  CHEST: breathing comfortably  CV: appears well perfused  Abdomen: soft, nontender, nondistended, no rebound or guarding, ostomy viable w/ fat necrosis in medial aspect +/+, L drain site with ostomy appliance in place (25mL/24hr), clear yellow fluid. Midline wound with irrigation VAC in place (1,400ml/24hr; input: 2,050mL)  : Primafit (1,750mL + 1 incontinent episode/24hr)  Extremities: Grossly symmetric  Caprini: 8     V/S:  Vital Signs Last 24 Hrs  T(C): 37.2 (30 Dec 2019 00:41), Max: 37.2 (30 Dec 2019 00:41)  T(F): 99 (30 Dec 2019 00:41), Max: 99 (30 Dec 2019 00:41)  HR: 83 (30 Dec 2019 00:41) (78 - 92)  BP: 107/67 (30 Dec 2019 00:41) (101/56 - 109/69)  BP(mean): --  RR: 18 (30 Dec 2019 00:41) (18 - 18)  SpO2: 94% (30 Dec 2019 00:41) (94% - 94%)    --------------------------------------------------------------------------------------------------  I/Os:    28 Dec 2019 07:01  -  29 Dec 2019 07:00  --------------------------------------------------------  IN:    Oral Fluid: 620 mL    Solution: 100 mL  Total IN: 720 mL    OUT:    Voided: 1100 mL  Total OUT: 1100 mL    Total NET: -380 mL      29 Dec 2019 07:01  -  30 Dec 2019 02:38  --------------------------------------------------------  IN:    Oral Fluid: 780 mL    Other: 1500 mL  Total IN: 2280 mL    OUT:    Colostomy: 2 mL    Drain: 25 mL    VAC (Vacuum Assisted Closure) System: 1000 mL    Voided: 1350 mL  Total OUT: 2377 mL    Total NET: -97 mL    --------------------------------------------------------------------------------------------------  MEDICATIONS  (STANDING):  aspirin  chewable 81 milliGRAM(s) Oral <User Schedule>  atorvastatin 40 milliGRAM(s) Oral <User Schedule>  budesonide 160 MICROgram(s)/formoterol 4.5 MICROgram(s) Inhaler 2 Puff(s) Inhalation two times a day  chlorhexidine 4% Liquid 1 Application(s) Topical <User Schedule>  doxazosin 2 milliGRAM(s) Oral <User Schedule>  enoxaparin Injectable 100 milliGRAM(s) SubCutaneous <User Schedule>  erythromycin     base Tablet 250 milliGRAM(s) Oral <User Schedule>  furosemide    Tablet 20 milliGRAM(s) Oral <User Schedule>  influenza   Vaccine 0.5 milliLiter(s) IntraMuscular once  methadone    Tablet 17.5 milliGRAM(s) Oral <User Schedule>  nystatin Powder 1 Application(s) Topical two times a day  pantoprazole    Tablet 40 milliGRAM(s) Oral <User Schedule>  polyethylene glycol 3350 17 Gram(s) Oral <User Schedule>  silver nitrate Applicator 1 Application(s) Topical once  spironolactone 50 milliGRAM(s) Oral <User Schedule>    MEDICATIONS  (PRN):  acetaminophen   Tablet .. 650 milliGRAM(s) Oral every 6 hours PRN Mild Pain (1 - 3)  ALBUTerol    90 MICROgram(s) HFA Inhaler 2 Puff(s) Inhalation every 6 hours PRN Shortness of Breath and/or Wheezing  aluminum hydroxide/magnesium hydroxide/simethicone Suspension 30 milliLiter(s) Oral every 4 hours PRN Dyspepsia  ondansetron    Tablet 4 milliGRAM(s) Oral once PRN Nausea and/or Vomiting  sodium chloride 0.9% lock flush 10 milliLiter(s) IV Push every 1 hour PRN Pre/post blood products, medications, blood draw, and to maintain line patency

## 2019-12-30 NOTE — PROGRESS NOTE ADULT - ATTENDING COMMENTS
I saw and examined the pt and discussed the tx plan with the House Staff. I agree with the exam and plan as documented in the surgery resident's note from today.  Drsg change today with PRS present.  Titrate Lasix-aldactone up to dry up the ascites.  Continue supportive care.  Liset Vargas MD

## 2019-12-30 NOTE — PROGRESS NOTE ADULT - SUBJECTIVE AND OBJECTIVE BOX
CARDIOLOGY     PROGRESS  NOTE   ________________________________________________    CHIEF COMPLAINT:Patient is a 64y old  Female who presents with a chief complaint of perforated bowel (08 Dec 2019 09:22)  doing well, no complain.  	  REVIEW OF SYSTEMS:  CONSTITUTIONAL: No fever, weight loss, or fatigue  EYES: No eye pain, visual disturbances, or discharge  ENT:  No difficulty hearing, tinnitus, vertigo; No sinus or throat pain  NECK: No pain or stiffness  RESPIRATORY: No cough, wheezing, chills or hemoptysis; No Shortness of Breath  CARDIOVASCULAR: No chest pain, palpitations, passing out, dizziness, or leg swelling  GASTROINTESTINAL: No abdominal or epigastric pain. No nausea, vomiting, or hematemesis; No diarrhea or constipation. No melena or hematochezia.  GENITOURINARY: No dysuria, frequency, hematuria, or incontinence  NEUROLOGICAL: No headaches, memory loss, loss of strength, numbness, or tremors  SKIN: No itching, burning, rashes, or lesions   LYMPH Nodes: No enlarged glands  ENDOCRINE: No heat or cold intolerance; No hair loss  MUSCULOSKELETAL: No joint pain or swelling; No muscle, back, or extremity pain  PSYCHIATRIC: No depression, anxiety, mood swings, or difficulty sleeping  HEME/LYMPH: No easy bruising, or bleeding gums  ALLERGY AND IMMUNOLOGIC: No hives or eczema	    [ ] All others negative	  [ ] Unable to obtain    PHYSICAL EXAM:  T(C): 37.2 (12-30-19 @ 00:41), Max: 37.2 (12-30-19 @ 00:41)  HR: 83 (12-30-19 @ 00:41) (78 - 92)  BP: 107/67 (12-30-19 @ 00:41) (101/56 - 107/67)  RR: 18 (12-30-19 @ 00:41) (18 - 18)  SpO2: 94% (12-30-19 @ 00:41) (94% - 94%)  Wt(kg): --  I&O's Summary    29 Dec 2019 07:01  -  30 Dec 2019 07:00  --------------------------------------------------------  IN: 2830 mL / OUT: 3177 mL / NET: -347 mL        Appearance: Normal	  HEENT:   Normal oral mucosa, PERRL, EOMI	  Lymphatic: No lymphadenopathy  Cardiovascular: Normal S1 S2, No JVD, No murmurs, No edema  Respiratory: Lungs clear to auscultation	  Psychiatry: A & O x 3, Mood & affect appropriate  Gastrointestinal:  Soft, Non-tender, + BS, +coloopstomy	  Skin: No rashes, No ecchymoses, No cyanosis	  Neurologic: Non-focal  Extremities: Normal range of motion, No clubbing, cyanosis or edema  Vascular: Peripheral pulses palpable 2+ bilaterally    MEDICATIONS  (STANDING):  aspirin  chewable 81 milliGRAM(s) Oral <User Schedule>  atorvastatin 40 milliGRAM(s) Oral <User Schedule>  budesonide 160 MICROgram(s)/formoterol 4.5 MICROgram(s) Inhaler 2 Puff(s) Inhalation two times a day  chlorhexidine 4% Liquid 1 Application(s) Topical <User Schedule>  doxazosin 2 milliGRAM(s) Oral <User Schedule>  enoxaparin Injectable 100 milliGRAM(s) SubCutaneous <User Schedule>  erythromycin     base Tablet 250 milliGRAM(s) Oral <User Schedule>  furosemide    Tablet 20 milliGRAM(s) Oral <User Schedule>  influenza   Vaccine 0.5 milliLiter(s) IntraMuscular once  methadone    Tablet 17.5 milliGRAM(s) Oral <User Schedule>  nystatin Powder 1 Application(s) Topical two times a day  pantoprazole    Tablet 40 milliGRAM(s) Oral <User Schedule>  polyethylene glycol 3350 17 Gram(s) Oral <User Schedule>  silver nitrate Applicator 1 Application(s) Topical once  spironolactone 50 milliGRAM(s) Oral <User Schedule>      TELEMETRY: 	    ECG:  	  RADIOLOGY:  OTHER: 	  	  LABS:	 	    CARDIAC MARKERS:            12-30    132<L>  |  93<L>  |  9   ----------------------------<  100<H>  3.8   |  30  |  0.63    Ca    8.4      30 Dec 2019 07:08  Phos  3.3     12-30  Mg     1.8     12-30    TPro  6.0  /  Alb  2.0<L>  /  TBili  0.4  /  DBili  x   /  AST  22  /  ALT  16  /  AlkPhos  94  12-30    proBNP:   Lipid Profile:   HgA1c:   TSH:   - LRD  - Irrigation vac (TIW, changed 12/28), to be changed today with plastics  - c/w doxazosin  - c/w miralax, c/w erythromycin   - f/u ostomy OP  - c/w pain control: methadone (home medication), Tylenol, Oxy PRN  - DVT ppx with lovenox, ASA 81  - appreciate cardiology recs  - c/w diuresis   - c/w PT daily  - Dispo: rehab      Assessment and plan  ---------------------------  pt is sitting up comfortably in NAD , no complain.  bp noted  echo noted hyperdynamic lv sec to pain/anxiety, volume depletion  continue dvt prophylaxis  started on Lasix and aldactone ?  continue current meds  s/p debridement ?VAC  keep k>4  dvt prophylaxis  nausea ?gastroparesis ?add Reglan  continue Protonix  awaiting rehab  awaiting placement  plan as per surgery ?more debridement ?plastic surgery	for surgical debridement

## 2019-12-30 NOTE — PROVIDER CONTACT NOTE (OTHER) - REASON
pt refused lipitor and pt's aspirin ordered for 2000 not done bc pt already got aspirin at noon today

## 2019-12-30 NOTE — PROGRESS NOTE ADULT - SUBJECTIVE AND OBJECTIVE BOX
Plastic Surgery    SUBJECTIVE:   Resting in bed comfortably, no acute complaints    VITALS  T(C): 37.2 (12-30-19 @ 00:41), Max: 37.2 (12-30-19 @ 00:41)  HR: 83 (12-30-19 @ 00:41) (78 - 92)  BP: 107/67 (12-30-19 @ 00:41) (101/56 - 107/67)  RR: 18 (12-30-19 @ 00:41) (18 - 18)  SpO2: 94% (12-30-19 @ 00:41) (94% - 94%)  CAPILLARY BLOOD GLUCOSE          Is/Os    12-29 @ 07:01  -  12-30 @ 07:00  --------------------------------------------------------  IN:    Oral Fluid: 780 mL    Other: 2050 mL  Total IN: 2830 mL    OUT:    Colostomy: 2 mL    Drain: 25 mL    VAC (Vacuum Assisted Closure) System: 1400 mL    Voided: 1750 mL  Total OUT: 3177 mL    Total NET: -347 mL          PHYSICAL EXAM:   General: NAD, Lying in bed comfortably  Abd: Instillation VAC in place holding suction surrounding skin with some induration without significant erythema    MEDICATIONS (STANDING): aspirin  chewable 81 milliGRAM(s) Oral <User Schedule>  atorvastatin 40 milliGRAM(s) Oral <User Schedule>  budesonide 160 MICROgram(s)/formoterol 4.5 MICROgram(s) Inhaler 2 Puff(s) Inhalation two times a day  doxazosin 2 milliGRAM(s) Oral <User Schedule>  enoxaparin Injectable 100 milliGRAM(s) SubCutaneous <User Schedule>  erythromycin     base Tablet 250 milliGRAM(s) Oral <User Schedule>  furosemide    Tablet 20 milliGRAM(s) Oral <User Schedule>  influenza   Vaccine 0.5 milliLiter(s) IntraMuscular once  methadone    Tablet 17.5 milliGRAM(s) Oral <User Schedule>  pantoprazole    Tablet 40 milliGRAM(s) Oral <User Schedule>  polyethylene glycol 3350 17 Gram(s) Oral <User Schedule>  spironolactone 50 milliGRAM(s) Oral <User Schedule>    MEDICATIONS (PRN):acetaminophen   Tablet .. 650 milliGRAM(s) Oral every 6 hours PRN Mild Pain (1 - 3)  ALBUTerol    90 MICROgram(s) HFA Inhaler 2 Puff(s) Inhalation every 6 hours PRN Shortness of Breath and/or Wheezing  aluminum hydroxide/magnesium hydroxide/simethicone Suspension 30 milliLiter(s) Oral every 4 hours PRN Dyspepsia  ondansetron    Tablet 4 milliGRAM(s) Oral once PRN Nausea and/or Vomiting  sodium chloride 0.9% lock flush 10 milliLiter(s) IV Push every 1 hour PRN Pre/post blood products, medications, blood draw, and to maintain line patency      LABS    BMP (12-30 @ 07:08)             132<L>  |  93<L>   |  9     		Ca++ --      Ca 8.4                ---------------------------------( 100<H>		Mg 1.8                3.8     |  30      |  0.63  			Ph 3.3       LFTs (12-30 @ 07:08)      TPro 6.0 / Alb 2.0<L> / TBili 0.4 / DBili -- / AST 22 / ALT 16 / AlkPhos 94

## 2019-12-30 NOTE — PROVIDER CONTACT NOTE (OTHER) - ACTION/TREATMENT ORDERED:
thus aspirin marked as not done, hold dose for parameter. next dose will be given at 2000 12/30. will continue to monitor pt.

## 2019-12-30 NOTE — PROGRESS NOTE ADULT - ASSESSMENT
A/P: 65 yo F with PMHx morbid obesity, GERD, HTN, COPD, and pshx D&C now s/p ex laparotomy with extended left [id number]/26 w/ perforated and necrotic sigmod colon with pus and stool in the abdominal cavity. RTOR 11/29 RUQ end transverse colostomy created, abdomen closed. Recovering appropriately on floor. On 12/19 patient found to have induration of wound and colostomy necrosis, s/p bedside debridement w/ irrigation vac in place on midline wound. Recovering appropriately with daily wound care.     Plan:  - LRD  - Irrigation vac (TIW, changed 12/28), to be changed today with plastics  - c/w doxazosin  - c/w miralax, c/w erythromycin   - f/u ostomy OP  - c/w pain control: methadone (home medication), Tylenol, Oxy PRN  - DVT ppx with lovenox, ASA 81  - appreciate cardiology recs  - c/w diuresis   - c/w PT daily  - Dispo: rehab    [facility]  p [phone]

## 2019-12-31 RX ORDER — HYDROMORPHONE HYDROCHLORIDE 2 MG/ML
1 INJECTION INTRAMUSCULAR; INTRAVENOUS; SUBCUTANEOUS ONCE
Refills: 0 | Status: DISCONTINUED | OUTPATIENT
Start: 2019-12-31 | End: 2019-12-31

## 2019-12-31 RX ORDER — ACETAMINOPHEN 500 MG
1000 TABLET ORAL ONCE
Refills: 0 | Status: COMPLETED | OUTPATIENT
Start: 2019-12-31 | End: 2019-12-31

## 2019-12-31 RX ORDER — HYDROMORPHONE HYDROCHLORIDE 2 MG/ML
1.5 INJECTION INTRAMUSCULAR; INTRAVENOUS; SUBCUTANEOUS ONCE
Refills: 0 | Status: DISCONTINUED | OUTPATIENT
Start: 2019-12-31 | End: 2019-12-31

## 2019-12-31 RX ORDER — POLYETHYLENE GLYCOL 3350 17 G/17G
17 POWDER, FOR SOLUTION ORAL
Refills: 0 | Status: DISCONTINUED | OUTPATIENT
Start: 2019-12-31 | End: 2020-02-05

## 2019-12-31 RX ADMIN — ENOXAPARIN SODIUM 100 MILLIGRAM(S): 100 INJECTION SUBCUTANEOUS at 12:15

## 2019-12-31 RX ADMIN — Medication 40 MILLIGRAM(S): at 12:15

## 2019-12-31 RX ADMIN — Medication 250 MILLIGRAM(S): at 10:24

## 2019-12-31 RX ADMIN — HYDROMORPHONE HYDROCHLORIDE 1 MILLIGRAM(S): 2 INJECTION INTRAMUSCULAR; INTRAVENOUS; SUBCUTANEOUS at 13:45

## 2019-12-31 RX ADMIN — BUDESONIDE AND FORMOTEROL FUMARATE DIHYDRATE 2 PUFF(S): 160; 4.5 AEROSOL RESPIRATORY (INHALATION) at 04:55

## 2019-12-31 RX ADMIN — Medication 400 MILLIGRAM(S): at 09:10

## 2019-12-31 RX ADMIN — HYDROMORPHONE HYDROCHLORIDE 1.5 MILLIGRAM(S): 2 INJECTION INTRAMUSCULAR; INTRAVENOUS; SUBCUTANEOUS at 08:30

## 2019-12-31 RX ADMIN — POLYETHYLENE GLYCOL 3350 17 GRAM(S): 17 POWDER, FOR SOLUTION ORAL at 10:31

## 2019-12-31 RX ADMIN — POLYETHYLENE GLYCOL 3350 17 GRAM(S): 17 POWDER, FOR SOLUTION ORAL at 17:32

## 2019-12-31 RX ADMIN — HYDROMORPHONE HYDROCHLORIDE 1.5 MILLIGRAM(S): 2 INJECTION INTRAMUSCULAR; INTRAVENOUS; SUBCUTANEOUS at 08:45

## 2019-12-31 RX ADMIN — NYSTATIN CREAM 1 APPLICATION(S): 100000 CREAM TOPICAL at 04:54

## 2019-12-31 RX ADMIN — HYDROMORPHONE HYDROCHLORIDE 1 MILLIGRAM(S): 2 INJECTION INTRAMUSCULAR; INTRAVENOUS; SUBCUTANEOUS at 14:00

## 2019-12-31 RX ADMIN — SPIRONOLACTONE 75 MILLIGRAM(S): 25 TABLET, FILM COATED ORAL at 12:15

## 2019-12-31 RX ADMIN — Medication 81 MILLIGRAM(S): at 20:32

## 2019-12-31 RX ADMIN — Medication 250 MILLIGRAM(S): at 17:36

## 2019-12-31 RX ADMIN — CHLORHEXIDINE GLUCONATE 1 APPLICATION(S): 213 SOLUTION TOPICAL at 08:27

## 2019-12-31 RX ADMIN — PANTOPRAZOLE SODIUM 40 MILLIGRAM(S): 20 TABLET, DELAYED RELEASE ORAL at 04:54

## 2019-12-31 RX ADMIN — METHADONE HYDROCHLORIDE 17.5 MILLIGRAM(S): 40 TABLET ORAL at 10:31

## 2019-12-31 RX ADMIN — NYSTATIN CREAM 1 APPLICATION(S): 100000 CREAM TOPICAL at 17:35

## 2019-12-31 RX ADMIN — ONDANSETRON 4 MILLIGRAM(S): 8 TABLET, FILM COATED ORAL at 14:31

## 2019-12-31 RX ADMIN — Medication 2 MILLIGRAM(S): at 04:54

## 2019-12-31 RX ADMIN — Medication 1000 MILLIGRAM(S): at 09:30

## 2019-12-31 RX ADMIN — BUDESONIDE AND FORMOTEROL FUMARATE DIHYDRATE 2 PUFF(S): 160; 4.5 AEROSOL RESPIRATORY (INHALATION) at 17:32

## 2019-12-31 NOTE — PROGRESS NOTE ADULT - SUBJECTIVE AND OBJECTIVE BOX
CARDIOLOGY     PROGRESS  NOTE   ________________________________________________    CHIEF COMPLAINT:Patient is a 64y old  Female who presents with a chief complaint of perforated bowel (08 Dec 2019 09:22)  doing better over all.  	  REVIEW OF SYSTEMS:  CONSTITUTIONAL: No fever, weight loss, or fatigue  EYES: No eye pain, visual disturbances, or discharge  ENT:  No difficulty hearing, tinnitus, vertigo; No sinus or throat pain  NECK: No pain or stiffness  RESPIRATORY: No cough, wheezing, chills or hemoptysis; No Shortness of Breath  CARDIOVASCULAR: No chest pain, palpitations, passing out, dizziness, o+leg swelling  GASTROINTESTINAL: No abdominal or epigastric pain. No nausea, vomiting, or hematemesis; No diarrhea or constipation. No melena or hematochezia.  GENITOURINARY: No dysuria, frequency, hematuria, or incontinence  NEUROLOGICAL: No headaches, memory loss, loss of strength, numbness, or tremors  SKIN: No itching, burning, rashes, or lesions   LYMPH Nodes: No enlarged glands  ENDOCRINE: No heat or cold intolerance; No hair loss  MUSCULOSKELETAL: No joint pain or swelling; No muscle, back, or extremity pain  PSYCHIATRIC: No depression, anxiety, mood swings, or difficulty sleeping  HEME/LYMPH: No easy bruising, or bleeding gums  ALLERGY AND IMMUNOLOGIC: No hives or eczema	    [ ] All others negative	  [ ] Unable to obtain    PHYSICAL EXAM:  T(C): 37.1 (12-31-19 @ 05:28), Max: 37.1 (12-31-19 @ 05:28)  HR: 77 (12-31-19 @ 05:28) (77 - 88)  BP: 102/66 (12-31-19 @ 05:28) (100/64 - 128/78)  RR: 18 (12-31-19 @ 05:28) (18 - 18)  SpO2: 93% (12-31-19 @ 05:28) (93% - 96%)  Wt(kg): --  I&O's Summary    30 Dec 2019 07:01  -  31 Dec 2019 07:00  --------------------------------------------------------  IN: 1100 mL / OUT: 1051 mL / NET: 49 mL        Appearance: Normal	  HEENT:   Normal oral mucosa, PERRL, EOMI	  Lymphatic: No lymphadenopathy  Cardiovascular: Normal S1 S2, No JVD, No murmurs,+ chronic lymphedema  Respiratory: Lungs clear to auscultation	  Psychiatry: A & O x 3, Mood & affect appropriate  Gastrointestinal:  Soft, Non-tender, + BS	  Skin: No rashes, No ecchymoses, No cyanosis	  Neurologic: Non-focal  Extremities: Normal range of motion, No clubbing, cyanosis or edema  Vascular: Peripheral pulses palpable 2+ bilaterally    MEDICATIONS  (STANDING):  aspirin  chewable 81 milliGRAM(s) Oral <User Schedule>  atorvastatin 40 milliGRAM(s) Oral <User Schedule>  budesonide 160 MICROgram(s)/formoterol 4.5 MICROgram(s) Inhaler 2 Puff(s) Inhalation two times a day  chlorhexidine 4% Liquid 1 Application(s) Topical <User Schedule>  doxazosin 2 milliGRAM(s) Oral <User Schedule>  enoxaparin Injectable 100 milliGRAM(s) SubCutaneous <User Schedule>  erythromycin     base Tablet 250 milliGRAM(s) Oral <User Schedule>  furosemide    Tablet 40 milliGRAM(s) Oral <User Schedule>  influenza   Vaccine 0.5 milliLiter(s) IntraMuscular once  methadone    Tablet 17.5 milliGRAM(s) Oral <User Schedule>  nystatin Powder 1 Application(s) Topical two times a day  pantoprazole    Tablet 40 milliGRAM(s) Oral <User Schedule>  polyethylene glycol 3350 17 Gram(s) Oral <User Schedule>  silver nitrate Applicator 1 Application(s) Topical once  spironolactone 75 milliGRAM(s) Oral <User Schedule>      TELEMETRY: 	    ECG:  	  RADIOLOGY:  OTHER: 	  	  LABS:	 	    CARDIAC MARKERS:                                8.2    8.13  )-----------( 274      ( 30 Dec 2019 09:19 )             26.4     12-30    132<L>  |  93<L>  |  9   ----------------------------<  100<H>  3.8   |  30  |  0.63    Ca    8.4      30 Dec 2019 07:08  Phos  3.3     12-30  Mg     1.8     12-30    TPro  6.0  /  Alb  2.0<L>  /  TBili  0.4  /  DBili  x   /  AST  22  /  ALT  16  /  AlkPhos  94  12-30    proBNP:   Lipid Profile: Cholesterol 98  LDL 52  HDL 28  TG 91    HgA1c:   TSH:   PT/INR - ( 30 Dec 2019 08:37 )   PT: 13.0 sec;   INR: 1.14 ratio         PTT - ( 30 Dec 2019 08:37 )  PTT:26.8 sec      Assessment and plan  ---------------------------  pt is sitting up comfortably in NAD , no complain.  bp noted  echo noted hyperdynamic lv sec to pain/anxiety, volume depletion  continue dvt prophylaxis  started on Lasix and aldactone ?/ lasix dose has increased ? pt with hx of lymphedema and low albumin, avoid hypotension  continue current meds  s/p debridement ?VAC  keep k>4  dvt prophylaxis  nausea ?gastroparesis ?add Reglan  continue Protonix  awaiting rehab  awaiting placement  plan as per surgery ?more debridement ?plastic surgery	for surgical debridement

## 2019-12-31 NOTE — PROGRESS NOTE ADULT - ASSESSMENT
A/P: 65 yo F with PMHx morbid obesity, GERD, HTN, COPD, and pshx D&C now s/p ex laparotomy with extended left [id number]/26 w/ perforated and necrotic sigmod colon with pus and stool in the abdominal cavity. RTOR 11/29 RUQ end transverse colostomy created, abdomen closed. Recovering appropriately on floor. On 12/19 patient found to have induration of wound and colostomy necrosis, s/p bedside debridement w/ irrigation vac in place on midline wound. Recovering appropriately with daily wound care.     Plan:  - LRD  - Irrigation vac (TIW, changed 12/30)   - c/w doxazosin  - c/w miralax, c/w erythromycin   - f/u ostomy OP  - c/w pain control: methadone (home medication), Tylenol, Oxy PRN  - DVT ppx with lovenox, ASA 81  - appreciate cardiology recs  - c/w diuresis   - c/w PT daily  - Dispo: rehab    [facility]  p [phone]

## 2019-12-31 NOTE — PROGRESS NOTE ADULT - SUBJECTIVE AND OBJECTIVE BOX
SURGERY DAILY PROGRESS NOTE:     SUBJECTIVE/24hr Events:     Patient seen and examined on am rounds. No acute events overnight.   Pain is well controlled (methadone, oxy, Tylenol)  Tolerating diet without nausea, vomiting. (+/+)    OBJECTIVE:    MEDICATIONS  (STANDING):  aspirin  chewable 81 milliGRAM(s) Oral <User Schedule>  atorvastatin 40 milliGRAM(s) Oral <User Schedule>  budesonide 160 MICROgram(s)/formoterol 4.5 MICROgram(s) Inhaler 2 Puff(s) Inhalation two times a day  chlorhexidine 4% Liquid 1 Application(s) Topical <User Schedule>  doxazosin 2 milliGRAM(s) Oral <User Schedule>  enoxaparin Injectable 100 milliGRAM(s) SubCutaneous <User Schedule>  erythromycin     base Tablet 250 milliGRAM(s) Oral <User Schedule>  furosemide    Tablet 40 milliGRAM(s) Oral <User Schedule>  influenza   Vaccine 0.5 milliLiter(s) IntraMuscular once  methadone    Tablet 17.5 milliGRAM(s) Oral <User Schedule>  nystatin Powder 1 Application(s) Topical two times a day  pantoprazole    Tablet 40 milliGRAM(s) Oral <User Schedule>  polyethylene glycol 3350 17 Gram(s) Oral <User Schedule>  silver nitrate Applicator 1 Application(s) Topical once  spironolactone 75 milliGRAM(s) Oral <User Schedule>    MEDICATIONS  (PRN):  acetaminophen   Tablet .. 650 milliGRAM(s) Oral every 6 hours PRN Mild Pain (1 - 3)  ALBUTerol    90 MICROgram(s) HFA Inhaler 2 Puff(s) Inhalation every 6 hours PRN Shortness of Breath and/or Wheezing  aluminum hydroxide/magnesium hydroxide/simethicone Suspension 30 milliLiter(s) Oral every 4 hours PRN Dyspepsia  ondansetron    Tablet 4 milliGRAM(s) Oral once PRN Nausea and/or Vomiting  sodium chloride 0.9% lock flush 10 milliLiter(s) IV Push every 1 hour PRN Pre/post blood products, medications, blood draw, and to maintain line patency      Vital Signs Last 24 Hrs  T(C): 37.1 (31 Dec 2019 05:28), Max: 37.1 (31 Dec 2019 05:28)  T(F): 98.8 (31 Dec 2019 05:28), Max: 98.8 (31 Dec 2019 05:28)  HR: 77 (31 Dec 2019 05:28) (77 - 88)  BP: 102/66 (31 Dec 2019 05:28) (100/64 - 128/78)  BP(mean): --  RR: 18 (31 Dec 2019 05:28) (18 - 18)  SpO2: 93% (31 Dec 2019 05:28) (93% - 96%)      I&O's Detail    29 Dec 2019 07:01  -  30 Dec 2019 07:00  --------------------------------------------------------  IN:    Oral Fluid: 780 mL    Other: 2050 mL  Total IN: 2830 mL    OUT:    Colostomy: 2 mL    Drain: 25 mL    VAC (Vacuum Assisted Closure) System: 1400 mL    Voided: 1750 mL  Total OUT: 3177 mL    Total NET: -347 mL      30 Dec 2019 07:01  -  31 Dec 2019 05:42  --------------------------------------------------------  IN:    Oral Fluid: 500 mL    Other: 600 mL  Total IN: 1100 mL    OUT:    Colostomy: 1 mL    Drain: 50 mL    VAC (Vacuum Assisted Closure) System: 600 mL    Voided: 400 mL  Total OUT: 1051 mL    Total NET: 49 mL        LABS:                        8.2    8.13  )-----------( 274      ( 30 Dec 2019 09:19 )             26.4     12-30    132<L>  |  93<L>  |  9   ----------------------------<  100<H>  3.8   |  30  |  0.63    Ca    8.4      30 Dec 2019 07:08  Phos  3.3     12-30  Mg     1.8     12-30    TPro  6.0  /  Alb  2.0<L>  /  TBili  0.4  /  DBili  x   /  AST  22  /  ALT  16  /  AlkPhos  94  12-30    PT/INR - ( 30 Dec 2019 08:37 )   PT: 13.0 sec;   INR: 1.14 ratio         PTT - ( 30 Dec 2019 08:37 )  PTT:26.8 sec        PHYSICAL EXAM  General: Morbidly obese, appears well, NAD  CHEST: breathing comfortably  CV: appears well perfused  Abdomen: soft, nontender, nondistended, no rebound or guarding, ostomy viable w/ fat necrosis in medial aspect +/+, L drain site with ostomy appliance in place (25mL/24hr), clear yellow fluid. Midline wound with irrigation VAC in place (1,400ml/24hr; input: 2,050mL)  : Primafit (1,750mL + 1 incontinent episode/24hr)  Extremities: Grossly symmetric  Caprini: 8

## 2019-12-31 NOTE — PROGRESS NOTE ADULT - ATTENDING COMMENTS
I saw and examined the pt and discussed the tx plan with the House Staff. I agree with the exam and plan as documented in the surgery resident's note from today.  Wounds evaluated during VAC change, nipple system applied to the stoma by PT/Stoma Team as the colostomy wound now has a wide tunnel medially connecting it to the midline wound. VAC placed once the colostomy was isolated.   Extremely complex wound, care by PT and Stoma Team greatly appreciated.   Liset Vargas MD

## 2020-01-01 RX ADMIN — Medication 81 MILLIGRAM(S): at 21:03

## 2020-01-01 RX ADMIN — BUDESONIDE AND FORMOTEROL FUMARATE DIHYDRATE 2 PUFF(S): 160; 4.5 AEROSOL RESPIRATORY (INHALATION) at 17:42

## 2020-01-01 RX ADMIN — PANTOPRAZOLE SODIUM 40 MILLIGRAM(S): 20 TABLET, DELAYED RELEASE ORAL at 04:42

## 2020-01-01 RX ADMIN — Medication 250 MILLIGRAM(S): at 17:43

## 2020-01-01 RX ADMIN — Medication 40 MILLIGRAM(S): at 11:23

## 2020-01-01 RX ADMIN — METHADONE HYDROCHLORIDE 17.5 MILLIGRAM(S): 40 TABLET ORAL at 09:26

## 2020-01-01 RX ADMIN — CHLORHEXIDINE GLUCONATE 1 APPLICATION(S): 213 SOLUTION TOPICAL at 09:18

## 2020-01-01 RX ADMIN — NYSTATIN CREAM 1 APPLICATION(S): 100000 CREAM TOPICAL at 17:43

## 2020-01-01 RX ADMIN — Medication 2 MILLIGRAM(S): at 04:42

## 2020-01-01 RX ADMIN — NYSTATIN CREAM 1 APPLICATION(S): 100000 CREAM TOPICAL at 04:43

## 2020-01-01 RX ADMIN — POLYETHYLENE GLYCOL 3350 17 GRAM(S): 17 POWDER, FOR SOLUTION ORAL at 17:44

## 2020-01-01 RX ADMIN — BUDESONIDE AND FORMOTEROL FUMARATE DIHYDRATE 2 PUFF(S): 160; 4.5 AEROSOL RESPIRATORY (INHALATION) at 04:42

## 2020-01-01 RX ADMIN — POLYETHYLENE GLYCOL 3350 17 GRAM(S): 17 POWDER, FOR SOLUTION ORAL at 04:43

## 2020-01-01 RX ADMIN — ENOXAPARIN SODIUM 100 MILLIGRAM(S): 100 INJECTION SUBCUTANEOUS at 11:28

## 2020-01-01 RX ADMIN — POLYETHYLENE GLYCOL 3350 17 GRAM(S): 17 POWDER, FOR SOLUTION ORAL at 11:29

## 2020-01-01 RX ADMIN — Medication 250 MILLIGRAM(S): at 10:36

## 2020-01-01 NOTE — PROGRESS NOTE ADULT - SUBJECTIVE AND OBJECTIVE BOX
CARDIOLOGY     PROGRESS  NOTE   ________________________________________________    CHIEF COMPLAINT:Patient is a 64y old  Female who presents with a chief complaint of perforated bowel (08 Dec 2019 09:22)  doing well, no complain  	  REVIEW OF SYSTEMS:  CONSTITUTIONAL: No fever, weight loss, or fatigue  EYES: No eye pain, visual disturbances, or discharge  ENT:  No difficulty hearing, tinnitus, vertigo; No sinus or throat pain  NECK: No pain or stiffness  RESPIRATORY: No cough, wheezing, chills or hemoptysis; No Shortness of Breath  CARDIOVASCULAR: No chest pain, palpitations, passing out, dizziness, or leg swelling  GASTROINTESTINAL: No abdominal or epigastric pain. No nausea, vomiting, or hematemesis; No diarrhea or constipation. No melena or hematochezia.  GENITOURINARY: No dysuria, frequency, hematuria, or incontinence  NEUROLOGICAL: No headaches, memory loss, loss of strength, numbness, or tremors  SKIN: No itching, burning, rashes, or lesions   LYMPH Nodes: No enlarged glands  ENDOCRINE: No heat or cold intolerance; No hair loss  MUSCULOSKELETAL: No joint pain or swelling; No muscle, back, or extremity pain  PSYCHIATRIC: No depression, anxiety, mood swings, or difficulty sleeping  HEME/LYMPH: No easy bruising, or bleeding gums  ALLERGY AND IMMUNOLOGIC: No hives or eczema	    [ ] All others negative	  [ ] Unable to obtain    PHYSICAL EXAM:  T(C): 37.3 (01-01-20 @ 06:45), Max: 37.3 (01-01-20 @ 06:45)  HR: 76 (01-01-20 @ 06:45) (75 - 87)  BP: 119/80 (01-01-20 @ 06:45) (109/65 - 119/80)  RR: 18 (01-01-20 @ 06:45) (18 - 18)  SpO2: 92% (01-01-20 @ 06:45) (92% - 95%)  Wt(kg): --  I&O's Summary    31 Dec 2019 07:01  -  01 Jan 2020 07:00  --------------------------------------------------------  IN: 1025 mL / OUT: 1801 mL / NET: -776 mL        Appearance: Normal	  HEENT:   Normal oral mucosa, PERRL, EOMI	  Lymphatic: No lymphadenopathy  Cardiovascular: Normal S1 S2, No JVD, + murmurs, + lymph edema  Respiratory: Lungs clear to auscultation	  Psychiatry: A & O x 3, Mood & affect appropriate  Gastrointestinal:  Soft, Non-tender, + BS	  Skin: No rashes, No ecchymoses, No cyanosis	  Neurologic: Non-focal  Extremities: Normal range of motion, No clubbing, cyanosis or edema  Vascular: Peripheral pulses palpable 2+ bilaterally    MEDICATIONS  (STANDING):  aspirin  chewable 81 milliGRAM(s) Oral <User Schedule>  atorvastatin 40 milliGRAM(s) Oral <User Schedule>  budesonide 160 MICROgram(s)/formoterol 4.5 MICROgram(s) Inhaler 2 Puff(s) Inhalation two times a day  chlorhexidine 4% Liquid 1 Application(s) Topical <User Schedule>  doxazosin 2 milliGRAM(s) Oral <User Schedule>  enoxaparin Injectable 100 milliGRAM(s) SubCutaneous <User Schedule>  erythromycin     base Tablet 250 milliGRAM(s) Oral <User Schedule>  furosemide    Tablet 40 milliGRAM(s) Oral <User Schedule>  methadone    Tablet 17.5 milliGRAM(s) Oral <User Schedule>  nystatin Powder 1 Application(s) Topical two times a day  pantoprazole    Tablet 40 milliGRAM(s) Oral <User Schedule>  polyethylene glycol 3350 17 Gram(s) Oral <User Schedule>  silver nitrate Applicator 1 Application(s) Topical once  spironolactone 75 milliGRAM(s) Oral <User Schedule>      TELEMETRY: 	    ECG:  	  RADIOLOGY:  OTHER: 	  	  LABS:	 	    CARDIAC MARKERS:      - LRD  - Irrigation vac (TIW, changed 12/31)   - c/w doxazosin  - c/w miralax (TID), c/w erythromycin   - f/u ostomy OP  - c/w pain control: methadone (home medication), Tylenol, Oxy PRN  - DVT ppx with lovenox, ASA 81  - appreciate cardiology recs  - c/w diuresis   - c/w PT daily  -      Assessment and plan  ---------------------------  pt is sitting up comfortably in NAD , no complain.  bp noted  echo noted hyperdynamic lv sec to pain/anxiety, volume depletion  continue dvt prophylaxis  started on Lasix and aldactone ?/ lasix dose has increased ? pt with hx of lymphedema and low albumin, avoid hypotension  continue current meds  s/p debridement ?VAC  keep k>4  dvt prophylaxis  nausea ?gastroparesis ?add Reglan  continue Protonix  awaiting rehab  awaiting placement  plan as per surgery ?more debridement ?plastic surgery	for surgical debridement  increasing diuretics ?pt with hyperdynamic LV ?diuresis

## 2020-01-01 NOTE — PROGRESS NOTE ADULT - ASSESSMENT
A/P: 65 yo F with PMHx morbid obesity, GERD, HTN, COPD, and pshx D&C now s/p ex laparotomy with extended left [id number]/26 w/ perforated and necrotic sigmod colon with pus and stool in the abdominal cavity. RTOR 11/29 RUQ end transverse colostomy created, abdomen closed. Recovering appropriately on floor. On 12/19 patient found to have induration of wound and colostomy necrosis, s/p bedside debridement w/ irrigation vac in place on midline wound. Recovering appropriately with daily wound care.     Plan:  - LRD  - Irrigation vac (TIW, changed 12/31)   - c/w doxazosin  - c/w miralax (TID), c/w erythromycin   - f/u ostomy OP  - c/w pain control: methadone (home medication), Tylenol, Oxy PRN  - DVT ppx with lovenox, ASA 81  - appreciate cardiology recs  - c/w diuresis   - c/w PT daily  - Dispo: rehab    [facility]  p [phone]

## 2020-01-01 NOTE — PROGRESS NOTE ADULT - SUBJECTIVE AND OBJECTIVE BOX
SURGERY DAILY PROGRESS NOTE:     SUBJECTIVE/24hr Events:     Patient seen and examined on am rounds.   No acute events overnight.   Pain is well controlled (methadone, oxy, Tylenol)  Tolerating diet without nausea, vomiting. (+/+)    OBJECTIVE:    PHYSICAL EXAM  General: Morbidly obese, appears well, NAD  CHEST: breathing comfortably  CV: appears well perfused  Abdomen: soft, nontender, nondistended, no rebound or guarding, ostomy viable w/ fat necrosis in medial aspect +/+, L drain site with ostomy appliance in place (xx mL/24hr), clear yellow fluid. Midline wound with irrigation VAC in place (xx/24hr; input: xx mL)  : Primafit (900mL/24hr)  Extremities: Grossly symmetric  Caprini: 8    T(C): 36.8 (01-01-20 @ 02:00), Max: 36.8 (01-01-20 @ 02:00)  HR: 75 (01-01-20 @ 02:00) (75 - 87)  BP: 112/76 (01-01-20 @ 02:00) (109/65 - 112/76)  RR: 18 (01-01-20 @ 02:00) (18 - 18)  SpO2: 93% (01-01-20 @ 02:00) (93% - 95%)  Wt(kg): --    LABS:                        8.2    8.13  )-----------( 274      ( 30 Dec 2019 09:19 )             26.4     12-30    132<L>  |  93<L>  |  9   ----------------------------<  100<H>  3.8   |  30  |  0.63    Ca    8.4      30 Dec 2019 07:08  Phos  3.3     12-30  Mg     1.8     12-30    TPro  6.0  /  Alb  2.0<L>  /  TBili  0.4  /  DBili  x   /  AST  22  /  ALT  16  /  AlkPhos  94  12-30    PT/INR - ( 30 Dec 2019 08:37 )   PT: 13.0 sec;   INR: 1.14 ratio         PTT - ( 30 Dec 2019 08:37 )  PTT:26.8 sec        CAPILLARY BLOOD GLUCOSE          Is&O's    12-30-19 @ 07:01 - 12-31-19 @ 07:00  --------------------------------------------------------  IN: 1100 mL / OUT: 1051 mL / NET: 49 mL    12-31-19 @ 07:01  - 01-01-20 @ 06:10  --------------------------------------------------------  IN: 1025 mL / OUT: 901 mL / NET: 124 mL SURGERY DAILY PROGRESS NOTE:     SUBJECTIVE/24hr Events:     Patient seen and examined on am rounds.   No acute events overnight.   Pain is well controlled (methadone, oxy, Tylenol)  Tolerating diet without nausea, vomiting. (+/+)    OBJECTIVE:    PHYSICAL EXAM  General: Morbidly obese, appears well, NAD  CHEST: breathing comfortably  CV: appears well perfused  Abdomen: soft, nontender, nondistended, no rebound or guarding, ostomy viable w/ fat necrosis in medial aspect +/+, L drain site with ostomy appliance in place (30 mL/24hr), clear yellow fluid. Midline wound with VAC in place   : Primafit (1800mL/24hr)  Extremities: Grossly symmetric  Caprini: 8    T(C): 36.8 (01-01-20 @ 02:00), Max: 36.8 (01-01-20 @ 02:00)  HR: 75 (01-01-20 @ 02:00) (75 - 87)  BP: 112/76 (01-01-20 @ 02:00) (109/65 - 112/76)  RR: 18 (01-01-20 @ 02:00) (18 - 18)  SpO2: 93% (01-01-20 @ 02:00) (93% - 95%)  Wt(kg): --    LABS:                        8.2    8.13  )-----------( 274      ( 30 Dec 2019 09:19 )             26.4     12-30    132<L>  |  93<L>  |  9   ----------------------------<  100<H>  3.8   |  30  |  0.63    Ca    8.4      30 Dec 2019 07:08  Phos  3.3     12-30  Mg     1.8     12-30    TPro  6.0  /  Alb  2.0<L>  /  TBili  0.4  /  DBili  x   /  AST  22  /  ALT  16  /  AlkPhos  94  12-30    PT/INR - ( 30 Dec 2019 08:37 )   PT: 13.0 sec;   INR: 1.14 ratio         PTT - ( 30 Dec 2019 08:37 )  PTT:26.8 sec        CAPILLARY BLOOD GLUCOSE          Is&O's    12-30-19 @ 07:01  -  12-31-19 @ 07:00  --------------------------------------------------------  IN: 1100 mL / OUT: 1051 mL / NET: 49 mL    12-31-19 @ 07:01  -  01-01-20 @ 06:10  --------------------------------------------------------  IN: 1025 mL / OUT: 901 mL / NET: 124 mL

## 2020-01-02 RX ORDER — HYDROMORPHONE HYDROCHLORIDE 2 MG/ML
1 INJECTION INTRAMUSCULAR; INTRAVENOUS; SUBCUTANEOUS ONCE
Refills: 0 | Status: DISCONTINUED | OUTPATIENT
Start: 2020-01-02 | End: 2020-01-02

## 2020-01-02 RX ORDER — OXYCODONE HYDROCHLORIDE 5 MG/1
5 TABLET ORAL ONCE
Refills: 0 | Status: DISCONTINUED | OUTPATIENT
Start: 2020-01-02 | End: 2020-01-02

## 2020-01-02 RX ORDER — ACETAMINOPHEN 500 MG
1000 TABLET ORAL ONCE
Refills: 0 | Status: COMPLETED | OUTPATIENT
Start: 2020-01-02 | End: 2020-01-02

## 2020-01-02 RX ADMIN — HYDROMORPHONE HYDROCHLORIDE 1 MILLIGRAM(S): 2 INJECTION INTRAMUSCULAR; INTRAVENOUS; SUBCUTANEOUS at 10:00

## 2020-01-02 RX ADMIN — POLYETHYLENE GLYCOL 3350 17 GRAM(S): 17 POWDER, FOR SOLUTION ORAL at 11:37

## 2020-01-02 RX ADMIN — Medication 2 MILLIGRAM(S): at 04:58

## 2020-01-02 RX ADMIN — Medication 1000 MILLIGRAM(S): at 10:00

## 2020-01-02 RX ADMIN — OXYCODONE HYDROCHLORIDE 5 MILLIGRAM(S): 5 TABLET ORAL at 15:59

## 2020-01-02 RX ADMIN — ENOXAPARIN SODIUM 100 MILLIGRAM(S): 100 INJECTION SUBCUTANEOUS at 11:24

## 2020-01-02 RX ADMIN — Medication 250 MILLIGRAM(S): at 17:06

## 2020-01-02 RX ADMIN — METHADONE HYDROCHLORIDE 17.5 MILLIGRAM(S): 40 TABLET ORAL at 09:50

## 2020-01-02 RX ADMIN — SPIRONOLACTONE 75 MILLIGRAM(S): 25 TABLET, FILM COATED ORAL at 11:25

## 2020-01-02 RX ADMIN — HYDROMORPHONE HYDROCHLORIDE 1 MILLIGRAM(S): 2 INJECTION INTRAMUSCULAR; INTRAVENOUS; SUBCUTANEOUS at 09:50

## 2020-01-02 RX ADMIN — ATORVASTATIN CALCIUM 40 MILLIGRAM(S): 80 TABLET, FILM COATED ORAL at 21:41

## 2020-01-02 RX ADMIN — POLYETHYLENE GLYCOL 3350 17 GRAM(S): 17 POWDER, FOR SOLUTION ORAL at 04:58

## 2020-01-02 RX ADMIN — OXYCODONE HYDROCHLORIDE 5 MILLIGRAM(S): 5 TABLET ORAL at 16:20

## 2020-01-02 RX ADMIN — NYSTATIN CREAM 1 APPLICATION(S): 100000 CREAM TOPICAL at 04:58

## 2020-01-02 RX ADMIN — PANTOPRAZOLE SODIUM 40 MILLIGRAM(S): 20 TABLET, DELAYED RELEASE ORAL at 04:58

## 2020-01-02 RX ADMIN — BUDESONIDE AND FORMOTEROL FUMARATE DIHYDRATE 2 PUFF(S): 160; 4.5 AEROSOL RESPIRATORY (INHALATION) at 04:58

## 2020-01-02 RX ADMIN — Medication 250 MILLIGRAM(S): at 09:49

## 2020-01-02 RX ADMIN — POLYETHYLENE GLYCOL 3350 17 GRAM(S): 17 POWDER, FOR SOLUTION ORAL at 17:07

## 2020-01-02 RX ADMIN — Medication 400 MILLIGRAM(S): at 09:50

## 2020-01-02 RX ADMIN — NYSTATIN CREAM 1 APPLICATION(S): 100000 CREAM TOPICAL at 17:06

## 2020-01-02 RX ADMIN — CHLORHEXIDINE GLUCONATE 1 APPLICATION(S): 213 SOLUTION TOPICAL at 09:49

## 2020-01-02 RX ADMIN — BUDESONIDE AND FORMOTEROL FUMARATE DIHYDRATE 2 PUFF(S): 160; 4.5 AEROSOL RESPIRATORY (INHALATION) at 17:07

## 2020-01-02 RX ADMIN — Medication 81 MILLIGRAM(S): at 21:41

## 2020-01-02 RX ADMIN — Medication 40 MILLIGRAM(S): at 11:25

## 2020-01-02 NOTE — PROGRESS NOTE ADULT - ATTENDING COMMENTS
I saw and examined the pt and discussed the tx plan with the House Staff. I agree with the exam and plan as documented in the surgery resident's note from today.  Monitor output of LLQ old drain site, appears to be decreasing, she is getting short-term diuresis for this, expect we'll be able to d/c the diuretics soon.  Continue LWC.  Liset Vargas MD

## 2020-01-02 NOTE — PROGRESS NOTE ADULT - SUBJECTIVE AND OBJECTIVE BOX
SURGERY DAILY PROGRESS NOTE:     SUBJECTIVE/24hr Events:   Patient seen and examined on am rounds.   No acute events overnight.   Pain is well controlled (methadone, oxy, Tylenol)  Tolerating diet without nausea, vomiting. (+/+)    OBJECTIVE:  PHYSICAL EXAM  General: Morbidly obese, appears well, NAD  CHEST: breathing comfortably  CV: appears well perfused  Abdomen: soft, nontender, nondistended, no rebound or guarding. Midline wound with VAC in place   : Primafit (900mL/24hr)  Drain: L drain site with ostomy appliance in place (0mL/24hr), clear yellow fluid  Extremities: Grossly symmetric  Ostomy: viable w/ fat necrosis in medial aspect +/+  Caprini: 8    T(C): 36.8 (01-01-20 @ 02:00), Max: 36.8 (01-01-20 @ 02:00)  HR: 75 (01-01-20 @ 02:00) (75 - 87)  BP: 112/76 (01-01-20 @ 02:00) (109/65 - 112/76)  RR: 18 (01-01-20 @ 02:00) (18 - 18)  SpO2: 93% (01-01-20 @ 02:00) (93% - 95%)  Wt(kg): --    LABS:                        8.2    8.13  )-----------( 274      ( 30 Dec 2019 09:19 )             26.4     12-30    132<L>  |  93<L>  |  9   ----------------------------<  100<H>  3.8   |  30  |  0.63    Ca    8.4      30 Dec 2019 07:08  Phos  3.3     12-30  Mg     1.8     12-30    TPro  6.0  /  Alb  2.0<L>  /  TBili  0.4  /  DBili  x   /  AST  22  /  ALT  16  /  AlkPhos  94  12-30    PT/INR - ( 30 Dec 2019 08:37 )   PT: 13.0 sec;   INR: 1.14 ratio         PTT - ( 30 Dec 2019 08:37 )  PTT:26.8 sec        CAPILLARY BLOOD GLUCOSE          Is&O's    12-30-19 @ 07:01  -  12-31-19 @ 07:00  --------------------------------------------------------  IN: 1100 mL / OUT: 1051 mL / NET: 49 mL    12-31-19 @ 07:01  -  01-01-20 @ 06:10  --------------------------------------------------------  IN: 1025 mL / OUT: 901 mL / NET: 124 mL SURGERY DAILY PROGRESS NOTE:     SUBJECTIVE/24hr Events:   Patient seen and examined on am rounds.   No acute events overnight.   Pain is well controlled (methadone, oxy, Tylenol)  Tolerating diet without nausea, vomiting. (+/+)    OBJECTIVE:  PHYSICAL EXAM  General: Morbidly obese, appears well, NAD  CHEST: breathing comfortably  CV: appears well perfused  Abdomen: soft, nontender, nondistended, no rebound or guarding. Midline wound with VAC in place   : Primafit (900mL/24hr)  Drain: L drain site with ostomy appliance in place (0mL/24hr), clear yellow fluid  Extremities: Grossly symmetric  Ostomy: viable w/ fat necrosis in medial aspect +/+  Caprini: 8    V/S:  Vital Signs Last 24 Hrs  T(C): 37 (02 Jan 2020 01:45), Max: 37.3 (01 Jan 2020 06:45)  T(F): 98.6 (02 Jan 2020 01:45), Max: 99.2 (01 Jan 2020 06:45)  HR: 76 (02 Jan 2020 01:45) (76 - 82)  BP: 101/58 (02 Jan 2020 01:45) (101/58 - 119/80)  BP(mean): --  RR: 18 (02 Jan 2020 01:45) (18 - 18)  SpO2: 93% (02 Jan 2020 01:45) (92% - 94%)    --------------------------------------------------------------------------------------------------  I/Os:    31 Dec 2019 07:01  -  01 Jan 2020 07:00  --------------------------------------------------------  IN:    Oral Fluid: 1025 mL  Total IN: 1025 mL    OUT:    Colostomy: 1 mL    Voided: 1800 mL  Total OUT: 1801 mL    Total NET: -776 mL      01 Jan 2020 07:01  -  02 Jan 2020 04:43  --------------------------------------------------------  IN:    Oral Fluid: 1020 mL  Total IN: 1020 mL    OUT:    Colostomy: 302 mL    Voided: 900 mL  Total OUT: 1202 mL    Total NET: -182 mL      --------------------------------------------------------------------------------------------------  MEDICATIONS  (STANDING):  aspirin  chewable 81 milliGRAM(s) Oral <User Schedule>  atorvastatin 40 milliGRAM(s) Oral <User Schedule>  budesonide 160 MICROgram(s)/formoterol 4.5 MICROgram(s) Inhaler 2 Puff(s) Inhalation two times a day  chlorhexidine 4% Liquid 1 Application(s) Topical <User Schedule>  doxazosin 2 milliGRAM(s) Oral <User Schedule>  enoxaparin Injectable 100 milliGRAM(s) SubCutaneous <User Schedule>  erythromycin     base Tablet 250 milliGRAM(s) Oral <User Schedule>  furosemide    Tablet 40 milliGRAM(s) Oral <User Schedule>  methadone    Tablet 17.5 milliGRAM(s) Oral <User Schedule>  nystatin Powder 1 Application(s) Topical two times a day  pantoprazole    Tablet 40 milliGRAM(s) Oral <User Schedule>  polyethylene glycol 3350 17 Gram(s) Oral <User Schedule>  silver nitrate Applicator 1 Application(s) Topical once  spironolactone 75 milliGRAM(s) Oral <User Schedule>    MEDICATIONS  (PRN):  acetaminophen   Tablet .. 650 milliGRAM(s) Oral every 6 hours PRN Mild Pain (1 - 3)  ALBUTerol    90 MICROgram(s) HFA Inhaler 2 Puff(s) Inhalation every 6 hours PRN Shortness of Breath and/or Wheezing  aluminum hydroxide/magnesium hydroxide/simethicone Suspension 30 milliLiter(s) Oral every 4 hours PRN Dyspepsia  sodium chloride 0.9% lock flush 10 milliLiter(s) IV Push every 1 hour PRN Pre/post blood products, medications, blood draw, and to maintain line patency

## 2020-01-02 NOTE — PROGRESS NOTE ADULT - ASSESSMENT
A/P: 63 yo F with PMHx morbid obesity, GERD, HTN, COPD, and pshx D&C now s/p ex laparotomy with extended left [id number]/26 w/ perforated and necrotic sigmod colon with pus and stool in the abdominal cavity. RTOR 11/29 RUQ end transverse colostomy created, abdomen closed. Recovering appropriately on floor. On 12/19 patient found to have induration of wound and colostomy necrosis, s/p bedside debridement w/ irrigation vac in place on midline wound. Recovering appropriately with daily wound care.     Plan:  - LRD  - Irrigation vac (TIW, changed 12/31)   - c/w doxazosin  - c/w miralax (TID), c/w erythromycin   - f/u ostomy OP  - c/w pain control: methadone (home medication), Tylenol, Oxy PRN  - DVT ppx with lovenox, ASA 81  - appreciate cardiology recs  - c/w diuresis   - c/w PT daily  - Dispo: rehab    [facility]  p [phone]

## 2020-01-02 NOTE — PROGRESS NOTE ADULT - ASSESSMENT
A/P: 64F w/ complex medical and surgical history now with large abdominal wound currently being VAC'd    - Recommend c/w bedside debridements and VAC changes as tolerated by the patient  - C/w abx  - C/w care per primary team  - Will assess wound weekly    Seen and examined with and plan d/w Dr. Jose Kelley  PGY-5  Plastic Surgery  [phone]

## 2020-01-02 NOTE — PROGRESS NOTE ADULT - SUBJECTIVE AND OBJECTIVE BOX
CARDIOLOGY     PROGRESS  NOTE   ________________________________________________    CHIEF COMPLAINT:Patient is a 64y old  Female who presents with a chief complaint of perforated bowel (08 Dec 2019 09:22)  no complain.  	  REVIEW OF SYSTEMS:  CONSTITUTIONAL: No fever, weight loss, or fatigue  EYES: No eye pain, visual disturbances, or discharge  ENT:  No difficulty hearing, tinnitus, vertigo; No sinus or throat pain  NECK: No pain or stiffness  RESPIRATORY: No cough, wheezing, chills or hemoptysis; No Shortness of Breath  CARDIOVASCULAR: No chest pain, palpitations, passing out, dizziness, or leg swelling  GASTROINTESTINAL: No abdominal or epigastric pain. No nausea, vomiting, or hematemesis; No diarrhea or constipation. No melena or hematochezia.  GENITOURINARY: No dysuria, frequency, hematuria, or incontinence  NEUROLOGICAL: No headaches, memory loss, loss of strength, numbness, or tremors  SKIN: No itching, burning, rashes, or lesions   LYMPH Nodes: No enlarged glands  ENDOCRINE: No heat or cold intolerance; No hair loss  MUSCULOSKELETAL: No joint pain or swelling; No muscle, back, or extremity pain  PSYCHIATRIC: No depression, anxiety, mood swings, or difficulty sleeping  HEME/LYMPH: No easy bruising, or bleeding gums  ALLERGY AND IMMUNOLOGIC: No hives or eczema	    [ ] All others negative	  [ ] Unable to obtain    PHYSICAL EXAM:  T(C): 37 (01-02-20 @ 01:45), Max: 37 (01-02-20 @ 01:45)  HR: 76 (01-02-20 @ 01:45) (76 - 82)  BP: 101/58 (01-02-20 @ 01:45) (101/58 - 105/68)  RR: 18 (01-02-20 @ 01:45) (18 - 18)  SpO2: 93% (01-02-20 @ 01:45) (93% - 94%)  Wt(kg): --  I&O's Summary    01 Jan 2020 07:01  -  02 Jan 2020 07:00  --------------------------------------------------------  IN: 1020 mL / OUT: 1902 mL / NET: -882 mL        Appearance: Normal	  HEENT:   Normal oral mucosa, PERRL, EOMI	  Lymphatic: No lymphadenopathy  Cardiovascular: Normal S1 S2, No JVD, + murmurs, No edema  Respiratory: Lungs clear to auscultation	  Psychiatry: A & O x 3, Mood & affect appropriate  Gastrointestinal:  Soft, Non-tender, + BS	  Skin: No rashes, No ecchymoses, No cyanosis	  Neurologic: Non-focal  Extremities: Normal range of motion, No clubbing, cyanosis or edema  Vascular: Peripheral pulses palpable 2+ bilaterally    MEDICATIONS  (STANDING):  aspirin  chewable 81 milliGRAM(s) Oral <User Schedule>  atorvastatin 40 milliGRAM(s) Oral <User Schedule>  budesonide 160 MICROgram(s)/formoterol 4.5 MICROgram(s) Inhaler 2 Puff(s) Inhalation two times a day  chlorhexidine 4% Liquid 1 Application(s) Topical <User Schedule>  doxazosin 2 milliGRAM(s) Oral <User Schedule>  enoxaparin Injectable 100 milliGRAM(s) SubCutaneous <User Schedule>  erythromycin     base Tablet 250 milliGRAM(s) Oral <User Schedule>  furosemide    Tablet 40 milliGRAM(s) Oral <User Schedule>  methadone    Tablet 17.5 milliGRAM(s) Oral <User Schedule>  nystatin Powder 1 Application(s) Topical two times a day  pantoprazole    Tablet 40 milliGRAM(s) Oral <User Schedule>  polyethylene glycol 3350 17 Gram(s) Oral <User Schedule>  silver nitrate Applicator 1 Application(s) Topical once  spironolactone 75 milliGRAM(s) Oral <User Schedule>      TELEMETRY: 	    ECG:  	  RADIOLOGY:  OTHER: 	  	  LABS:	 	    CARDIAC MARKERS:                  proBNP:   Lipid Profile: Cholesterol 98  LDL 52  HDL 28  TG 91    HgA1c:   TSH:         Assessment and plan  ---------------------------  pt is sitting up comfortably in NAD , no complain.  bp noted  echo noted hyperdynamic lv sec to pain/anxiety, volume depletion  continue dvt prophylaxis  started on Lasix and aldactone ?/ lasix dose has increased ? pt with hx of lymphedema and low albumin, avoid hypotension  continue current meds  s/p debridement ?VAC  keep k>4  dvt prophylaxis  nausea ?gastroparesis ?add Reglan  continue Protonix  awaiting rehab  awaiting placement  plan as per surgery ?more debridement ?plastic surgery	for surgical debridement  increasing diuretics ?pt with hyperdynamic LV ?diuresis  may consider to decrease diuretics

## 2020-01-02 NOTE — PROGRESS NOTE ADULT - SUBJECTIVE AND OBJECTIVE BOX
Plastic Surgery Progress Note (pg [facility]: [id number], [facility]: [phone], [facility]: [phone])    SUBJECTIVE:  Doing well. No overnight events. Pain controlled.     OBJECTIVE:     ** VITAL SIGNS / I&O's **    Vital Signs Last 24 Hrs  T(C): 37 (02 Jan 2020 01:45), Max: 37 (02 Jan 2020 01:45)  T(F): 98.6 (02 Jan 2020 01:45), Max: 98.6 (02 Jan 2020 01:45)  HR: 76 (02 Jan 2020 01:45) (76 - 82)  BP: 101/58 (02 Jan 2020 01:45) (101/58 - 105/68)  BP(mean): --  RR: 18 (02 Jan 2020 01:45) (18 - 18)  SpO2: 93% (02 Jan 2020 01:45) (93% - 94%)      01 Jan 2020 07:01  -  02 Jan 2020 07:00  --------------------------------------------------------  IN:    Oral Fluid: 1020 mL  Total IN: 1020 mL    OUT:    Colostomy: 302 mL    VAC (Vacuum Assisted Closure) System: 400 mL    Voided: 1200 mL  Total OUT: 1902 mL    Total NET: -882 mL      02 Jan 2020 07:01  -  02 Jan 2020 11:04  --------------------------------------------------------  IN:  Total IN: 0 mL    OUT:    Voided: 500 mL  Total OUT: 500 mL    Total NET: -500 mL          ** PHYSICAL EXAM **    -- CONSTITUTIONAL: AOx3. NAD.   -- ABDOMEN: Large abdominal wound, foul-smelling; there is a mild superficial layer of necrotic tissue that is otherwise healthy looking, no evidence of gross contamination/infection; healthy granulation tissue present throughout; sharp debridement performed at bedside, which pt tolerated, of the edges of necrosis, VAC replaced      **MEDS**  acetaminophen   Tablet .. 650 milliGRAM(s) Oral every 6 hours PRN  acetaminophen  IVPB .. 1000 milliGRAM(s) IV Intermittent once  ALBUTerol    90 MICROgram(s) HFA Inhaler 2 Puff(s) Inhalation every 6 hours PRN  aluminum hydroxide/magnesium hydroxide/simethicone Suspension 30 milliLiter(s) Oral every 4 hours PRN  aspirin  chewable 81 milliGRAM(s) Oral <User Schedule>  atorvastatin 40 milliGRAM(s) Oral <User Schedule>  budesonide 160 MICROgram(s)/formoterol 4.5 MICROgram(s) Inhaler 2 Puff(s) Inhalation two times a day  chlorhexidine 4% Liquid 1 Application(s) Topical <User Schedule>  doxazosin 2 milliGRAM(s) Oral <User Schedule>  enoxaparin Injectable 100 milliGRAM(s) SubCutaneous <User Schedule>  erythromycin     base Tablet 250 milliGRAM(s) Oral <User Schedule>  furosemide    Tablet 40 milliGRAM(s) Oral <User Schedule>  HYDROmorphone  Injectable 1 milliGRAM(s) IV Push once  methadone    Tablet 17.5 milliGRAM(s) Oral <User Schedule>  nystatin Powder 1 Application(s) Topical two times a day  pantoprazole    Tablet 40 milliGRAM(s) Oral <User Schedule>  polyethylene glycol 3350 17 Gram(s) Oral <User Schedule>  silver nitrate Applicator 1 Application(s) Topical once  sodium chloride 0.9% lock flush 10 milliLiter(s) IV Push every 1 hour PRN  spironolactone 75 milliGRAM(s) Oral <User Schedule>

## 2020-01-03 RX ADMIN — METHADONE HYDROCHLORIDE 17.5 MILLIGRAM(S): 40 TABLET ORAL at 11:05

## 2020-01-03 RX ADMIN — ATORVASTATIN CALCIUM 40 MILLIGRAM(S): 80 TABLET, FILM COATED ORAL at 21:54

## 2020-01-03 RX ADMIN — POLYETHYLENE GLYCOL 3350 17 GRAM(S): 17 POWDER, FOR SOLUTION ORAL at 17:28

## 2020-01-03 RX ADMIN — NYSTATIN CREAM 1 APPLICATION(S): 100000 CREAM TOPICAL at 05:26

## 2020-01-03 RX ADMIN — NYSTATIN CREAM 1 APPLICATION(S): 100000 CREAM TOPICAL at 17:27

## 2020-01-03 RX ADMIN — SPIRONOLACTONE 75 MILLIGRAM(S): 25 TABLET, FILM COATED ORAL at 11:30

## 2020-01-03 RX ADMIN — ENOXAPARIN SODIUM 100 MILLIGRAM(S): 100 INJECTION SUBCUTANEOUS at 11:29

## 2020-01-03 RX ADMIN — Medication 250 MILLIGRAM(S): at 11:07

## 2020-01-03 RX ADMIN — BUDESONIDE AND FORMOTEROL FUMARATE DIHYDRATE 2 PUFF(S): 160; 4.5 AEROSOL RESPIRATORY (INHALATION) at 17:26

## 2020-01-03 RX ADMIN — Medication 40 MILLIGRAM(S): at 11:30

## 2020-01-03 RX ADMIN — POLYETHYLENE GLYCOL 3350 17 GRAM(S): 17 POWDER, FOR SOLUTION ORAL at 05:27

## 2020-01-03 RX ADMIN — Medication 2 MILLIGRAM(S): at 05:26

## 2020-01-03 RX ADMIN — Medication 250 MILLIGRAM(S): at 17:27

## 2020-01-03 RX ADMIN — BUDESONIDE AND FORMOTEROL FUMARATE DIHYDRATE 2 PUFF(S): 160; 4.5 AEROSOL RESPIRATORY (INHALATION) at 05:27

## 2020-01-03 RX ADMIN — CHLORHEXIDINE GLUCONATE 1 APPLICATION(S): 213 SOLUTION TOPICAL at 11:30

## 2020-01-03 RX ADMIN — POLYETHYLENE GLYCOL 3350 17 GRAM(S): 17 POWDER, FOR SOLUTION ORAL at 11:30

## 2020-01-03 RX ADMIN — Medication 81 MILLIGRAM(S): at 21:54

## 2020-01-03 RX ADMIN — PANTOPRAZOLE SODIUM 40 MILLIGRAM(S): 20 TABLET, DELAYED RELEASE ORAL at 05:26

## 2020-01-03 NOTE — PROGRESS NOTE ADULT - ATTENDING COMMENTS
I saw and examined the pt and discussed the tx plan with the House Staff. I agree with the exam and plan as documented in the surgery resident's note from today.  Monitor output of LLQ old drain site, appears to be decreasing, she is getting short-term diuresis for this, expect we'll be able to d/c the diuretics soon.  Continue LWC.  Liset Vargas MD I saw and examined the pt and discussed the tx plan with the House Staff. I agree with the exam and plan as documented in the surgery resident's note from today.  Continue LWC.  Old drain site output 120's last 24 hrs. Continue present diuretic schedule, if output remains same will obtain CT next week for ascites imaging.   Liset Vargas MD

## 2020-01-03 NOTE — PROGRESS NOTE ADULT - ASSESSMENT
A/P: 65 yo F with PMHx morbid obesity, GERD, HTN, COPD, and pshx D&C now s/p ex laparotomy with extended left fwohqcsppbekq01/26 w/ perforated and necrotic sigmod colon with pus and stool in the abdominal cavity. RTOR 11/29 RUQ end transverse colostomy created, abdomen closed. Recovering appropriately on floor. On 12/19 patient found to have induration of wound and colostomy necrosis, s/p bedside debridement w/ irrigation vac in place on midline wound. Recovering appropriately with daily wound care.     Plan:  - LRD  - Irrigation vac (TIW, changed 1/2)   - c/w doxazosin  - c/w miralax (TID), c/w erythromycin for dismotility  - f/u ostomy OP  - c/w pain control: methadone (home medication), Tylenol, Oxy PRN  - DVT ppx with lovenox, ASA 81  - appreciate cardiology recs  - c/w diuresis   - c/w PT daily  - Dispo: rehab    [facility] [phone]    [facility]  p [phone] A/P: 65 yo F with PMHx morbid obesity, GERD, HTN, COPD, and pshx D&C now s/p ex laparotomy with extended left [id number]/26 w/ perforated and necrotic sigmod colon with pus and stool in the abdominal cavity. RTOR 11/29 RUQ end transverse colostomy created, abdomen closed. Recovering appropriately on floor. On 12/19 patient found to have induration of wound and colostomy necrosis, s/p bedside debridement w/ irrigation vac in place on midline wound. Recovering appropriately with daily wound care.     Plan:  - LRD  - vac (TIW, changed 1/2)   - c/w doxazosin  - c/w miralax (TID), c/w erythromycin for dismotility  - f/u ostomy OP  - c/w pain control: methadone (home medication), Tylenol, Oxy PRN  - DVT ppx with lovenox, ASA 81  - appreciate cardiology recs  - c/w diuresis   - c/w PT daily  - Dispo: rehab    [facility] Surgery [phone]    [facility]  p [phone]

## 2020-01-03 NOTE — PROGRESS NOTE ADULT - SUBJECTIVE AND OBJECTIVE BOX
SURGERY DAILY PROGRESS NOTE:     SUBJECTIVE/24hr Events:   Patient seen and examined on am rounds.   No acute events overnight.   Pain is well controlled (methadone, oxy, Tylenol), through endorses pain with vac changed   Tolerating diet without nausea, vomiting. (+/+)    OBJECTIVE:  PHYSICAL EXAM  General: Morbidly obese, appears well, NAD  CHEST: breathing comfortably  CV: appears well perfused  Abdomen: soft, nontender, nondistended, no rebound or guarding. Midline wound with VAC in place   : Primafit (XX mL/24hr)  Drain: L drain site with ostomy appliance in place (XX mL/24hr), clear yellow fluid  Extremities: Grossly symmetric  Ostomy: viable w/ fat necrosis in medial aspect +/+  Caprini: 8 SURGERY DAILY PROGRESS NOTE:     SUBJECTIVE/24hr Events:   Patient seen and examined on am rounds.   No acute events overnight.   Pain is well controlled (methadone, oxy, Tylenol), through endorses pain with vac changed   Tolerating diet without nausea, vomiting. (+/+)    OBJECTIVE:  PHYSICAL EXAM  General: Morbidly obese, appears well, NAD  CHEST: breathing comfortably  CV: appears well perfused  Abdomen: soft, nontender, nondistended, no rebound or guarding. Midline wound with VAC in place   : Primafit (XX mL/24hr)  Drain: L drain site with ostomy appliance in place (125 mL/24hr), clear yellow fluid  Extremities: Grossly symmetric  Ostomy: viable w/ fat necrosis in medial aspect +/+  Caprini: 8    V/S:  Vital Signs Last 24 Hrs  T(C): 36.8 (02 Jan 2020 21:55), Max: 36.9 (02 Jan 2020 12:50)  T(F): 98.3 (02 Jan 2020 21:55), Max: 98.4 (02 Jan 2020 12:50)  HR: 86 (02 Jan 2020 21:55) (80 - 86)  BP: 103/52 (02 Jan 2020 21:55) (103/52 - 104/66)  BP(mean): --  RR: 18 (02 Jan 2020 21:55) (18 - 18)  SpO2: 98% (02 Jan 2020 21:55) (97% - 98%)    --------------------------------------------------------------------------------------------------  I/Os:    01 Jan 2020 07:01  -  02 Jan 2020 07:00  --------------------------------------------------------  IN:    Oral Fluid: 1020 mL  Total IN: 1020 mL    OUT:    Colostomy: 302 mL    VAC (Vacuum Assisted Closure) System: 400 mL    Voided: 1200 mL  Total OUT: 1902 mL    Total NET: -882 mL      02 Jan 2020 07:01  -  03 Jan 2020 04:54  --------------------------------------------------------  IN:    Oral Fluid: 700 mL  Total IN: 700 mL    OUT:    Colostomy: 1 mL    Drain: 125 mL    Voided: 1250 mL  Total OUT: 1376 mL    Total NET: -676 mL      --------------------------------------------------------------------------------------------------  MEDICATIONS  (STANDING):  aspirin  chewable 81 milliGRAM(s) Oral <User Schedule>  atorvastatin 40 milliGRAM(s) Oral <User Schedule>  budesonide 160 MICROgram(s)/formoterol 4.5 MICROgram(s) Inhaler 2 Puff(s) Inhalation two times a day  chlorhexidine 4% Liquid 1 Application(s) Topical <User Schedule>  doxazosin 2 milliGRAM(s) Oral <User Schedule>  enoxaparin Injectable 100 milliGRAM(s) SubCutaneous <User Schedule>  erythromycin     base Tablet 250 milliGRAM(s) Oral <User Schedule>  furosemide    Tablet 40 milliGRAM(s) Oral <User Schedule>  methadone    Tablet 17.5 milliGRAM(s) Oral <User Schedule>  nystatin Powder 1 Application(s) Topical two times a day  pantoprazole    Tablet 40 milliGRAM(s) Oral <User Schedule>  polyethylene glycol 3350 17 Gram(s) Oral <User Schedule>  silver nitrate Applicator 1 Application(s) Topical once  spironolactone 75 milliGRAM(s) Oral <User Schedule>    MEDICATIONS  (PRN):  acetaminophen   Tablet .. 650 milliGRAM(s) Oral every 6 hours PRN Mild Pain (1 - 3)  ALBUTerol    90 MICROgram(s) HFA Inhaler 2 Puff(s) Inhalation every 6 hours PRN Shortness of Breath and/or Wheezing  aluminum hydroxide/magnesium hydroxide/simethicone Suspension 30 milliLiter(s) Oral every 4 hours PRN Dyspepsia  sodium chloride 0.9% lock flush 10 milliLiter(s) IV Push every 1 hour PRN Pre/post blood products, medications, blood draw, and to maintain line patency

## 2020-01-03 NOTE — PROGRESS NOTE ADULT - SUBJECTIVE AND OBJECTIVE BOX
CARDIOLOGY     PROGRESS  NOTE   ________________________________________________    CHIEF COMPLAINT:Patient is a 64y old  Female who presents with a chief complaint of perforated bowel (08 Dec 2019 09:22)  cno complain.  	  REVIEW OF SYSTEMS:  CONSTITUTIONAL: No fever, weight loss, or fatigue  EYES: No eye pain, visual disturbances, or discharge  ENT:  No difficulty hearing, tinnitus, vertigo; No sinus or throat pain  NECK: No pain or stiffness  RESPIRATORY: No cough, wheezing, chills or hemoptysis; No Shortness of Breath  CARDIOVASCULAR: No chest pain, palpitations, passing out, dizziness, or leg swelling  GASTROINTESTINAL: No abdominal or epigastric pain. No nausea, vomiting, or hematemesis; No diarrhea or constipation. No melena or hematochezia.  GENITOURINARY: No dysuria, frequency, hematuria, or incontinence  NEUROLOGICAL: No headaches, memory loss, loss of strength, numbness, or tremors  SKIN: No itching, burning, rashes, or lesions   LYMPH Nodes: No enlarged glands  ENDOCRINE: No heat or cold intolerance; No hair loss  MUSCULOSKELETAL: No joint pain or swelling; No muscle, back, or extremity pain  PSYCHIATRIC: No depression, anxiety, mood swings, or difficulty sleeping  HEME/LYMPH: No easy bruising, or bleeding gums  ALLERGY AND IMMUNOLOGIC: No hives or eczema	    [ ] All others negative	  [ ] Unable to obtain    PHYSICAL EXAM:  T(C): 37.2 (01-03-20 @ 06:52), Max: 37.2 (01-03-20 @ 06:52)  HR: 80 (01-03-20 @ 06:52) (80 - 86)  BP: 94/56 (01-03-20 @ 06:52) (94/56 - 104/66)  RR: 18 (01-03-20 @ 06:52) (18 - 18)  SpO2: 93% (01-03-20 @ 06:52) (93% - 98%)  Wt(kg): --  I&O's Summary    02 Jan 2020 07:01  -  03 Jan 2020 07:00  --------------------------------------------------------  IN: 700 mL / OUT: 2476 mL / NET: -1776 mL        Appearance: Normal	  HEENT:   Normal oral mucosa, PERRL, EOMI	  Lymphatic: No lymphadenopathy  Cardiovascular: Normal S1 S2, No JVD, + murmurs, + lymph edema  Respiratory: Lungs clear to auscultation	  Psychiatry: A & O x 3, Mood & affect appropriate  Gastrointestinal:  Soft, Non-tender, + BS	  Skin: No rashes, No ecchymoses, No cyanosis	  Neurologic: Non-focal  Extremities: Normal range of motion, No clubbing, cyanosis or edema  Vascular: Peripheral pulses palpable 2+ bilaterally    MEDICATIONS  (STANDING):  aspirin  chewable 81 milliGRAM(s) Oral <User Schedule>  atorvastatin 40 milliGRAM(s) Oral <User Schedule>  budesonide 160 MICROgram(s)/formoterol 4.5 MICROgram(s) Inhaler 2 Puff(s) Inhalation two times a day  chlorhexidine 4% Liquid 1 Application(s) Topical <User Schedule>  doxazosin 2 milliGRAM(s) Oral <User Schedule>  enoxaparin Injectable 100 milliGRAM(s) SubCutaneous <User Schedule>  erythromycin     base Tablet 250 milliGRAM(s) Oral <User Schedule>  furosemide    Tablet 40 milliGRAM(s) Oral <User Schedule>  methadone    Tablet 17.5 milliGRAM(s) Oral <User Schedule>  nystatin Powder 1 Application(s) Topical two times a day  pantoprazole    Tablet 40 milliGRAM(s) Oral <User Schedule>  polyethylene glycol 3350 17 Gram(s) Oral <User Schedule>  silver nitrate Applicator 1 Application(s) Topical once  spironolactone 75 milliGRAM(s) Oral <User Schedule>      TELEMETRY: 	    ECG:  	  RADIOLOGY:  OTHER: 	  	  LABS:	 	    CARDIAC MARKERS:                  proBNP:   Lipid Profile: Cholesterol 98  LDL 52  HDL 28  TG 91    HgA1c:   TSH:         Assessment and plan  ---------------------------  pt is sitting up comfortably in NAD , no complain.  bp noted  echo noted hyperdynamic lv sec to pain/anxiety, volume depletion  continue dvt prophylaxis  started on Lasix and aldactone ?/ lasix dose has increased ? pt with hx of lymphedema and low albumin, avoid hypotension  continue current meds  s/p debridement ?VAC  keep k>4  dvt prophylaxis  nausea ?gastroparesis ?add Reglan  continue Protonix  awaiting rehab  awaiting placement  plan as per surgery ?more debridement ?plastic surgery	for surgical debridement  increasing diuretics ?pt with hyperdynamic LV ?diuresis  may consider to decrease diuretics

## 2020-01-04 RX ADMIN — CHLORHEXIDINE GLUCONATE 1 APPLICATION(S): 213 SOLUTION TOPICAL at 12:09

## 2020-01-04 RX ADMIN — ATORVASTATIN CALCIUM 40 MILLIGRAM(S): 80 TABLET, FILM COATED ORAL at 21:28

## 2020-01-04 RX ADMIN — Medication 250 MILLIGRAM(S): at 11:05

## 2020-01-04 RX ADMIN — POLYETHYLENE GLYCOL 3350 17 GRAM(S): 17 POWDER, FOR SOLUTION ORAL at 12:08

## 2020-01-04 RX ADMIN — ENOXAPARIN SODIUM 100 MILLIGRAM(S): 100 INJECTION SUBCUTANEOUS at 11:05

## 2020-01-04 RX ADMIN — Medication 81 MILLIGRAM(S): at 21:28

## 2020-01-04 RX ADMIN — METHADONE HYDROCHLORIDE 17.5 MILLIGRAM(S): 40 TABLET ORAL at 11:05

## 2020-01-04 RX ADMIN — BUDESONIDE AND FORMOTEROL FUMARATE DIHYDRATE 2 PUFF(S): 160; 4.5 AEROSOL RESPIRATORY (INHALATION) at 06:27

## 2020-01-04 RX ADMIN — NYSTATIN CREAM 1 APPLICATION(S): 100000 CREAM TOPICAL at 18:12

## 2020-01-04 RX ADMIN — POLYETHYLENE GLYCOL 3350 17 GRAM(S): 17 POWDER, FOR SOLUTION ORAL at 18:12

## 2020-01-04 RX ADMIN — Medication 40 MILLIGRAM(S): at 11:05

## 2020-01-04 RX ADMIN — SPIRONOLACTONE 75 MILLIGRAM(S): 25 TABLET, FILM COATED ORAL at 11:06

## 2020-01-04 RX ADMIN — POLYETHYLENE GLYCOL 3350 17 GRAM(S): 17 POWDER, FOR SOLUTION ORAL at 06:27

## 2020-01-04 RX ADMIN — Medication 2 MILLIGRAM(S): at 06:26

## 2020-01-04 RX ADMIN — Medication 250 MILLIGRAM(S): at 18:12

## 2020-01-04 RX ADMIN — PANTOPRAZOLE SODIUM 40 MILLIGRAM(S): 20 TABLET, DELAYED RELEASE ORAL at 06:26

## 2020-01-04 RX ADMIN — BUDESONIDE AND FORMOTEROL FUMARATE DIHYDRATE 2 PUFF(S): 160; 4.5 AEROSOL RESPIRATORY (INHALATION) at 18:13

## 2020-01-04 RX ADMIN — NYSTATIN CREAM 1 APPLICATION(S): 100000 CREAM TOPICAL at 06:27

## 2020-01-04 NOTE — PROGRESS NOTE ADULT - SUBJECTIVE AND OBJECTIVE BOX
CARDIOLOGY     PROGRESS  NOTE   ________________________________________________    CHIEF COMPLAINT:Patient is a 64y old  Female who presents with a chief complaint of perforated bowel (08 Dec 2019 09:22)  no complain.  	  REVIEW OF SYSTEMS:  CONSTITUTIONAL: No fever, weight loss, or fatigue  EYES: No eye pain, visual disturbances, or discharge  ENT:  No difficulty hearing, tinnitus, vertigo; No sinus or throat pain  NECK: No pain or stiffness  RESPIRATORY: No cough, wheezing, chills or hemoptysis; No Shortness of Breath  CARDIOVASCULAR: No chest pain, palpitations, passing out, dizziness, or leg swelling  GASTROINTESTINAL: No abdominal or epigastric pain. No nausea, vomiting, or hematemesis; No diarrhea or constipation. No melena or hematochezia.  GENITOURINARY: No dysuria, frequency, hematuria, or incontinence  NEUROLOGICAL: No headaches, memory loss, loss of strength, numbness, or tremors  SKIN: No itching, burning, rashes, or lesions   LYMPH Nodes: No enlarged glands  ENDOCRINE: No heat or cold intolerance; No hair loss  MUSCULOSKELETAL: No joint pain or swelling; No muscle, back, or extremity pain  PSYCHIATRIC: No depression, anxiety, mood swings, or difficulty sleeping  HEME/LYMPH: No easy bruising, or bleeding gums  ALLERGY AND IMMUNOLOGIC: No hives or eczema	    [ ] All others negative	  [ ] Unable to obtain    PHYSICAL EXAM:  T(C): 37 (01-04-20 @ 06:25), Max: 37.1 (01-03-20 @ 13:52)  HR: 74 (01-04-20 @ 06:25) (74 - 88)  BP: 125/73 (01-04-20 @ 06:25) (101/64 - 125/73)  RR: 18 (01-04-20 @ 06:25) (18 - 18)  SpO2: 93% (01-04-20 @ 06:25) (93% - 95%)  Wt(kg): --  I&O's Summary    03 Jan 2020 07:01  -  04 Jan 2020 07:00  --------------------------------------------------------  IN: 900 mL / OUT: 3131 mL / NET: -2231 mL        Appearance: Normal	  HEENT:   Normal oral mucosa, PERRL, EOMI	  Lymphatic: No lymphadenopathy  Cardiovascular: Normal S1 S2, No JVD, No murmurs, + lymph edema chronic  Respiratory: Lungs clear to auscultation	  Psychiatry: A & O x 3, Mood & affect appropriate  Gastrointestinal:  Soft, Non-tender, + BS	  Skin: No rashes, No ecchymoses, No cyanosis	  Neurologic: Non-focal  Extremities: Normal range of motion, No clubbing, cyanosis , +chronic lymphededma  Vascular: Peripheral pulses palpable 2+ bilaterally    MEDICATIONS  (STANDING):  aspirin  chewable 81 milliGRAM(s) Oral <User Schedule>  atorvastatin 40 milliGRAM(s) Oral <User Schedule>  budesonide 160 MICROgram(s)/formoterol 4.5 MICROgram(s) Inhaler 2 Puff(s) Inhalation two times a day  chlorhexidine 4% Liquid 1 Application(s) Topical <User Schedule>  doxazosin 2 milliGRAM(s) Oral <User Schedule>  enoxaparin Injectable 100 milliGRAM(s) SubCutaneous <User Schedule>  erythromycin     base Tablet 250 milliGRAM(s) Oral <User Schedule>  furosemide    Tablet 40 milliGRAM(s) Oral <User Schedule>  methadone    Tablet 17.5 milliGRAM(s) Oral <User Schedule>  nystatin Powder 1 Application(s) Topical two times a day  pantoprazole    Tablet 40 milliGRAM(s) Oral <User Schedule>  polyethylene glycol 3350 17 Gram(s) Oral <User Schedule>  silver nitrate Applicator 1 Application(s) Topical once  spironolactone 75 milliGRAM(s) Oral <User Schedule>      TELEMETRY: 	    ECG:  	  RADIOLOGY:  OTHER: 	  	  LABS:	 	    CARDIAC MARKERS:                  proBNP:   Lipid Profile: Cholesterol 98  LDL 52  HDL 28  TG 91    HgA1c:   TSH:         Assessment and plan  ---------------------------  pt is sitting up comfortably in NAD , no complain.  bp noted  echo noted hyperdynamic lv sec to pain/anxiety, volume depletion  continue dvt prophylaxis  started on Lasix and aldactone ?/ lasix dose has increased ? pt with hx of lymphedema and low albumin, avoid hypotension  continue current meds  s/p debridement ?VAC  keep k>4  dvt prophylaxis  nausea ?gastroparesis ?add Reglan  continue Protonix  awaiting rehab  awaiting placement  plan as per surgery ?more debridement ?plastic surgery	for surgical debridement  increasing diuretics ?pt with hyperdynamic LV ?diuresis  may consider to decrease diuretics

## 2020-01-04 NOTE — PROGRESS NOTE ADULT - ATTENDING COMMENTS
Patient seen/examined.  Agree w above note and plan and have discussed plan w house staff.  VAC change today    Lucas Greer MD

## 2020-01-04 NOTE — PROGRESS NOTE ADULT - ASSESSMENT
A/P: 63 yo F with PMHx morbid obesity, GERD, HTN, COPD, and pshx D&C now s/p ex laparotomy with extended left [id number]/26 w/ perforated and necrotic sigmod colon with pus and stool in the abdominal cavity. RTOR 11/29 RUQ end transverse colostomy created, abdomen closed. Recovering appropriately on floor. On 12/19 patient found to have induration of wound and colostomy necrosis, s/p bedside debridement w/ irrigation vac in place on midline wound. Recovering appropriately with daily wound care.     Plan:  - LRD  - vac (TIW, changed 1/2)   - c/w doxazosin  - c/w miralax (TID), c/w erythromycin for dismotility  - f/u ostomy OP  - c/w pain control: methadone (home medication), Tylenol, Oxy PRN  - DVT ppx with lovenox, ASA 81  - appreciate cardiology recs  - c/w diuresis, will obtain scan 1/6 if drain output increases.   - c/w PT daily  - Dispo: rehab    [facility] [phone] A/P: 63 yo F with PMHx morbid obesity, GERD, HTN, COPD, and pshx D&C now s/p ex laparotomy with extended left [id number]/26 w/ perforated and necrotic sigmod colon with pus and stool in the abdominal cavity. RTOR 11/29 RUQ end transverse colostomy created, abdomen closed. Recovering appropriately on floor. On 12/19 patient found to have induration of wound and colostomy necrosis, s/p bedside debridement w/ irrigation vac in place on midline wound. Recovering appropriately with daily wound care.     Plan:  - Will follow VAC change today  - LRD  - vac (TIW, changed 1/2)   - c/w doxazosin  - c/w miralax (TID), c/w erythromycin for dismotility  - f/u ostomy OP  - c/w pain control: methadone (home medication), Tylenol, Oxy PRN  - DVT ppx with lovenox, ASA 81  - appreciate cardiology recs  - c/w diuresis, will obtain scan 1/6 if drain output increases.   - c/w PT daily  - Dispo: rehab    [facility] [phone]

## 2020-01-04 NOTE — PROGRESS NOTE ADULT - SUBJECTIVE AND OBJECTIVE BOX
SURGERY DAILY PROGRESS NOTE:     SUBJECTIVE/24hr Events:   Patient seen and examined on am rounds.   No acute events overnight.   Patient states she has been getting out of bed daily with PT and is beginning to feel stronger  Pain is well controlled (methadone, oxy, Tylenol), through endorses pain with vac changed   Tolerating diet without nausea, vomiting.  GI fxn +/+ in bag    OBJECTIVE:  PHYSICAL EXAM  General: Morbidly obese, appears well, NAD  CHEST: breathing comfortably  CV: appears well perfused  Abdomen: soft, nontender, nondistended, no rebound or guarding. Midline wound with VAC in place   : Primafit (XX mL/24hr)  Drain: L drain site with ostomy appliance in place (xx mL/24hr), clear yellow fluid  Extremities: Grossly symmetric  Ostomy: viable w/ fat necrosis in medial aspect. Gas and stool in bag.   Caprini: 8    V/S:  Vital Signs Last 24 Hrs  T(C): 36.8 (02 Jan 2020 21:55), Max: 36.9 (02 Jan 2020 12:50)  T(F): 98.3 (02 Jan 2020 21:55), Max: 98.4 (02 Jan 2020 12:50)  HR: 86 (02 Jan 2020 21:55) (80 - 86)  BP: 103/52 (02 Jan 2020 21:55) (103/52 - 104/66)  BP(mean): --  RR: 18 (02 Jan 2020 21:55) (18 - 18)  SpO2: 98% (02 Jan 2020 21:55) (97% - 98%)    --------------------------------------------------------------------------------------------------  I/Os:    01 Jan 2020 07:01  -  02 Jan 2020 07:00  --------------------------------------------------------  IN:    Oral Fluid: 1020 mL  Total IN: 1020 mL    OUT:    Colostomy: 302 mL    VAC (Vacuum Assisted Closure) System: 400 mL    Voided: 1200 mL  Total OUT: 1902 mL    Total NET: -882 mL      02 Jan 2020 07:01  -  03 Jan 2020 04:54  --------------------------------------------------------  IN:    Oral Fluid: 700 mL  Total IN: 700 mL    OUT:    Colostomy: 1 mL    Drain: 125 mL    Voided: 1250 mL  Total OUT: 1376 mL    Total NET: -676 mL      --------------------------------------------------------------------------------------------------  MEDICATIONS  (STANDING):  aspirin  chewable 81 milliGRAM(s) Oral <User Schedule>  atorvastatin 40 milliGRAM(s) Oral <User Schedule>  budesonide 160 MICROgram(s)/formoterol 4.5 MICROgram(s) Inhaler 2 Puff(s) Inhalation two times a day  chlorhexidine 4% Liquid 1 Application(s) Topical <User Schedule>  doxazosin 2 milliGRAM(s) Oral <User Schedule>  enoxaparin Injectable 100 milliGRAM(s) SubCutaneous <User Schedule>  erythromycin     base Tablet 250 milliGRAM(s) Oral <User Schedule>  furosemide    Tablet 40 milliGRAM(s) Oral <User Schedule>  methadone    Tablet 17.5 milliGRAM(s) Oral <User Schedule>  nystatin Powder 1 Application(s) Topical two times a day  pantoprazole    Tablet 40 milliGRAM(s) Oral <User Schedule>  polyethylene glycol 3350 17 Gram(s) Oral <User Schedule>  silver nitrate Applicator 1 Application(s) Topical once  spironolactone 75 milliGRAM(s) Oral <User Schedule>    MEDICATIONS  (PRN):  acetaminophen   Tablet .. 650 milliGRAM(s) Oral every 6 hours PRN Mild Pain (1 - 3)  ALBUTerol    90 MICROgram(s) HFA Inhaler 2 Puff(s) Inhalation every 6 hours PRN Shortness of Breath and/or Wheezing  aluminum hydroxide/magnesium hydroxide/simethicone Suspension 30 milliLiter(s) Oral every 4 hours PRN Dyspepsia  sodium chloride 0.9% lock flush 10 milliLiter(s) IV Push every 1 hour PRN Pre/post blood products, medications, blood draw, and to maintain line patency SURGERY DAILY PROGRESS NOTE:     SUBJECTIVE/24hr Events:   Patient seen and examined on am rounds.   No acute events overnight.   Patient states she has been getting out of bed daily with PT and is beginning to feel stronger  Pain is well controlled (methadone, oxy, Tylenol), through endorses pain with vac changed   Tolerating diet without nausea, vomiting.  GI fxn +/+ in bag    OBJECTIVE:  PHYSICAL EXAM  General: Morbidly obese, appears well, NAD  CHEST: breathing comfortably  CV: appears well perfused  Abdomen: soft, nontender, nondistended, no rebound or guarding. Midline wound with VAC in place   : Primafit (2,400 mL/24hr)  Drain: L drain site with ostomy appliance in place (0mL/24hr), clear yellow fluid  Extremities: Grossly symmetric  Ostomy: viable w/ fat necrosis in medial aspect. Gas and stool in bag.   Caprini: 8    V/S:  Vital Signs Last 24 Hrs  T(C): 36.8 (02 Jan 2020 21:55), Max: 36.9 (02 Jan 2020 12:50)  T(F): 98.3 (02 Jan 2020 21:55), Max: 98.4 (02 Jan 2020 12:50)  HR: 86 (02 Jan 2020 21:55) (80 - 86)  BP: 103/52 (02 Jan 2020 21:55) (103/52 - 104/66)  BP(mean): --  RR: 18 (02 Jan 2020 21:55) (18 - 18)  SpO2: 98% (02 Jan 2020 21:55) (97% - 98%)    --------------------------------------------------------------------------------------------------  I/Os:    01 Jan 2020 07:01  -  02 Jan 2020 07:00  --------------------------------------------------------  IN:    Oral Fluid: 1020 mL  Total IN: 1020 mL    OUT:    Colostomy: 302 mL    VAC (Vacuum Assisted Closure) System: 400 mL    Voided: 1200 mL  Total OUT: 1902 mL    Total NET: -882 mL      02 Jan 2020 07:01  -  03 Jan 2020 04:54  --------------------------------------------------------  IN:    Oral Fluid: 700 mL  Total IN: 700 mL    OUT:    Colostomy: 1 mL    Drain: 125 mL    Voided: 1250 mL  Total OUT: 1376 mL    Total NET: -676 mL      --------------------------------------------------------------------------------------------------  MEDICATIONS  (STANDING):  aspirin  chewable 81 milliGRAM(s) Oral <User Schedule>  atorvastatin 40 milliGRAM(s) Oral <User Schedule>  budesonide 160 MICROgram(s)/formoterol 4.5 MICROgram(s) Inhaler 2 Puff(s) Inhalation two times a day  chlorhexidine 4% Liquid 1 Application(s) Topical <User Schedule>  doxazosin 2 milliGRAM(s) Oral <User Schedule>  enoxaparin Injectable 100 milliGRAM(s) SubCutaneous <User Schedule>  erythromycin     base Tablet 250 milliGRAM(s) Oral <User Schedule>  furosemide    Tablet 40 milliGRAM(s) Oral <User Schedule>  methadone    Tablet 17.5 milliGRAM(s) Oral <User Schedule>  nystatin Powder 1 Application(s) Topical two times a day  pantoprazole    Tablet 40 milliGRAM(s) Oral <User Schedule>  polyethylene glycol 3350 17 Gram(s) Oral <User Schedule>  silver nitrate Applicator 1 Application(s) Topical once  spironolactone 75 milliGRAM(s) Oral <User Schedule>    MEDICATIONS  (PRN):  acetaminophen   Tablet .. 650 milliGRAM(s) Oral every 6 hours PRN Mild Pain (1 - 3)  ALBUTerol    90 MICROgram(s) HFA Inhaler 2 Puff(s) Inhalation every 6 hours PRN Shortness of Breath and/or Wheezing  aluminum hydroxide/magnesium hydroxide/simethicone Suspension 30 milliLiter(s) Oral every 4 hours PRN Dyspepsia  sodium chloride 0.9% lock flush 10 milliLiter(s) IV Push every 1 hour PRN Pre/post blood products, medications, blood draw, and to maintain line patency SURGERY DAILY PROGRESS NOTE:     SUBJECTIVE/24hr Events:   Patient seen and examined on am rounds.   No acute events overnight.   Patient states she has been getting out of bed daily with PT and is beginning to feel stronger  Pain is well controlled (methadone, oxy, Tylenol), through endorses pain with vac changed   Tolerating diet without nausea, vomiting.  GI fxn +/+ in bag    OBJECTIVE:  PHYSICAL EXAM  General: Morbidly obese, appears well, NAD  CHEST: breathing comfortably  CV: appears well perfused  Abdomen: soft, nontender, nondistended, no rebound or guarding. Midline wound with VAC in place   : Primafit (2,750 mL/24hr)  Drain: L drain site with ostomy appliance in place (30mL/24hr), clear yellow fluid  Extremities: Grossly symmetric  Ostomy: viable w/ fat necrosis in medial aspect. Gas and stool in bag.   Caprini: 8    V/S:  Vital Signs Last 24 Hrs  T(C): 36.8 (02 Jan 2020 21:55), Max: 36.9 (02 Jan 2020 12:50)  T(F): 98.3 (02 Jan 2020 21:55), Max: 98.4 (02 Jan 2020 12:50)  HR: 86 (02 Jan 2020 21:55) (80 - 86)  BP: 103/52 (02 Jan 2020 21:55) (103/52 - 104/66)  BP(mean): --  RR: 18 (02 Jan 2020 21:55) (18 - 18)  SpO2: 98% (02 Jan 2020 21:55) (97% - 98%)    --------------------------------------------------------------------------------------------------  I/Os:    01 Jan 2020 07:01  -  02 Jan 2020 07:00  --------------------------------------------------------  IN:    Oral Fluid: 1020 mL  Total IN: 1020 mL    OUT:    Colostomy: 302 mL    VAC (Vacuum Assisted Closure) System: 400 mL    Voided: 1200 mL  Total OUT: 1902 mL    Total NET: -882 mL      02 Jan 2020 07:01  -  03 Jan 2020 04:54  --------------------------------------------------------  IN:    Oral Fluid: 700 mL  Total IN: 700 mL    OUT:    Colostomy: 1 mL    Drain: 125 mL    Voided: 1250 mL  Total OUT: 1376 mL    Total NET: -676 mL      --------------------------------------------------------------------------------------------------  MEDICATIONS  (STANDING):  aspirin  chewable 81 milliGRAM(s) Oral <User Schedule>  atorvastatin 40 milliGRAM(s) Oral <User Schedule>  budesonide 160 MICROgram(s)/formoterol 4.5 MICROgram(s) Inhaler 2 Puff(s) Inhalation two times a day  chlorhexidine 4% Liquid 1 Application(s) Topical <User Schedule>  doxazosin 2 milliGRAM(s) Oral <User Schedule>  enoxaparin Injectable 100 milliGRAM(s) SubCutaneous <User Schedule>  erythromycin     base Tablet 250 milliGRAM(s) Oral <User Schedule>  furosemide    Tablet 40 milliGRAM(s) Oral <User Schedule>  methadone    Tablet 17.5 milliGRAM(s) Oral <User Schedule>  nystatin Powder 1 Application(s) Topical two times a day  pantoprazole    Tablet 40 milliGRAM(s) Oral <User Schedule>  polyethylene glycol 3350 17 Gram(s) Oral <User Schedule>  silver nitrate Applicator 1 Application(s) Topical once  spironolactone 75 milliGRAM(s) Oral <User Schedule>    MEDICATIONS  (PRN):  acetaminophen   Tablet .. 650 milliGRAM(s) Oral every 6 hours PRN Mild Pain (1 - 3)  ALBUTerol    90 MICROgram(s) HFA Inhaler 2 Puff(s) Inhalation every 6 hours PRN Shortness of Breath and/or Wheezing  aluminum hydroxide/magnesium hydroxide/simethicone Suspension 30 milliLiter(s) Oral every 4 hours PRN Dyspepsia  sodium chloride 0.9% lock flush 10 milliLiter(s) IV Push every 1 hour PRN Pre/post blood products, medications, blood draw, and to maintain line patency

## 2020-01-05 LAB
APTT BLD: 32.5 SEC — SIGNIFICANT CHANGE UP (ref 27.5–36.3)
INR BLD: 1.3 RATIO — HIGH (ref 0.88–1.16)
PROTHROM AB SERPL-ACNC: 14.9 SEC — HIGH (ref 10–12.9)

## 2020-01-05 PROCEDURE — 93010 ELECTROCARDIOGRAM REPORT: CPT

## 2020-01-05 RX ORDER — METHADONE HYDROCHLORIDE 40 MG/1
17.5 TABLET ORAL ONCE
Refills: 0 | Status: DISCONTINUED | OUTPATIENT
Start: 2020-01-05 | End: 2020-01-05

## 2020-01-05 RX ORDER — METHADONE HYDROCHLORIDE 40 MG/1
17.5 TABLET ORAL DAILY
Refills: 0 | Status: DISCONTINUED | OUTPATIENT
Start: 2020-01-05 | End: 2020-01-12

## 2020-01-05 RX ORDER — HYDROMORPHONE HYDROCHLORIDE 2 MG/ML
0.5 INJECTION INTRAMUSCULAR; INTRAVENOUS; SUBCUTANEOUS ONCE
Refills: 0 | Status: DISCONTINUED | OUTPATIENT
Start: 2020-01-05 | End: 2020-01-05

## 2020-01-05 RX ORDER — ACETAMINOPHEN 500 MG
1000 TABLET ORAL ONCE
Refills: 0 | Status: COMPLETED | OUTPATIENT
Start: 2020-01-05 | End: 2020-01-05

## 2020-01-05 RX ORDER — METHADONE HYDROCHLORIDE 40 MG/1
17.5 TABLET ORAL
Refills: 0 | Status: DISCONTINUED | OUTPATIENT
Start: 2020-01-05 | End: 2020-01-05

## 2020-01-05 RX ADMIN — ENOXAPARIN SODIUM 100 MILLIGRAM(S): 100 INJECTION SUBCUTANEOUS at 12:34

## 2020-01-05 RX ADMIN — HYDROMORPHONE HYDROCHLORIDE 0.5 MILLIGRAM(S): 2 INJECTION INTRAMUSCULAR; INTRAVENOUS; SUBCUTANEOUS at 10:47

## 2020-01-05 RX ADMIN — METHADONE HYDROCHLORIDE 17.5 MILLIGRAM(S): 40 TABLET ORAL at 12:34

## 2020-01-05 RX ADMIN — BUDESONIDE AND FORMOTEROL FUMARATE DIHYDRATE 2 PUFF(S): 160; 4.5 AEROSOL RESPIRATORY (INHALATION) at 17:59

## 2020-01-05 RX ADMIN — CHLORHEXIDINE GLUCONATE 1 APPLICATION(S): 213 SOLUTION TOPICAL at 14:35

## 2020-01-05 RX ADMIN — Medication 1000 MILLIGRAM(S): at 11:20

## 2020-01-05 RX ADMIN — POLYETHYLENE GLYCOL 3350 17 GRAM(S): 17 POWDER, FOR SOLUTION ORAL at 14:05

## 2020-01-05 RX ADMIN — BUDESONIDE AND FORMOTEROL FUMARATE DIHYDRATE 2 PUFF(S): 160; 4.5 AEROSOL RESPIRATORY (INHALATION) at 06:06

## 2020-01-05 RX ADMIN — SPIRONOLACTONE 75 MILLIGRAM(S): 25 TABLET, FILM COATED ORAL at 14:06

## 2020-01-05 RX ADMIN — NYSTATIN CREAM 1 APPLICATION(S): 100000 CREAM TOPICAL at 06:06

## 2020-01-05 RX ADMIN — POLYETHYLENE GLYCOL 3350 17 GRAM(S): 17 POWDER, FOR SOLUTION ORAL at 06:06

## 2020-01-05 RX ADMIN — PANTOPRAZOLE SODIUM 40 MILLIGRAM(S): 20 TABLET, DELAYED RELEASE ORAL at 06:06

## 2020-01-05 RX ADMIN — Medication 81 MILLIGRAM(S): at 20:53

## 2020-01-05 RX ADMIN — Medication 400 MILLIGRAM(S): at 10:46

## 2020-01-05 RX ADMIN — ATORVASTATIN CALCIUM 40 MILLIGRAM(S): 80 TABLET, FILM COATED ORAL at 20:53

## 2020-01-05 RX ADMIN — Medication 40 MILLIGRAM(S): at 14:05

## 2020-01-05 RX ADMIN — POLYETHYLENE GLYCOL 3350 17 GRAM(S): 17 POWDER, FOR SOLUTION ORAL at 18:01

## 2020-01-05 RX ADMIN — NYSTATIN CREAM 1 APPLICATION(S): 100000 CREAM TOPICAL at 17:59

## 2020-01-05 RX ADMIN — Medication 2 MILLIGRAM(S): at 06:06

## 2020-01-05 RX ADMIN — HYDROMORPHONE HYDROCHLORIDE 0.5 MILLIGRAM(S): 2 INJECTION INTRAMUSCULAR; INTRAVENOUS; SUBCUTANEOUS at 11:20

## 2020-01-05 RX ADMIN — Medication 250 MILLIGRAM(S): at 17:59

## 2020-01-05 RX ADMIN — Medication 250 MILLIGRAM(S): at 12:33

## 2020-01-05 NOTE — PROGRESS NOTE ADULT - SUBJECTIVE AND OBJECTIVE BOX
CARDIOLOGY     PROGRESS  NOTE   ________________________________________________    CHIEF COMPLAINT:Patient is a 64y old  Female who presents with a chief complaint of perforated bowel (08 Dec 2019 09:22)  doing better, no complain.  	  REVIEW OF SYSTEMS:  CONSTITUTIONAL: No fever, weight loss, or fatigue  EYES: No eye pain, visual disturbances, or discharge  ENT:  No difficulty hearing, tinnitus, vertigo; No sinus or throat pain  NECK: No pain or stiffness  RESPIRATORY: No cough, wheezing, chills or hemoptysis; No Shortness of Breath  CARDIOVASCULAR: No chest pain, palpitations, passing out, dizziness, or leg swelling  GASTROINTESTINAL: No abdominal or epigastric pain. No nausea, vomiting, or hematemesis; No diarrhea or constipation. No melena or hematochezia.  GENITOURINARY: No dysuria, frequency, hematuria, or incontinence  NEUROLOGICAL: No headaches, memory loss, loss of strength, numbness, or tremors  SKIN: No itching, burning, rashes, or lesions   LYMPH Nodes: No enlarged glands  ENDOCRINE: No heat or cold intolerance; No hair loss  MUSCULOSKELETAL: No joint pain or swelling; No muscle, back, or extremity pain  PSYCHIATRIC: No depression, anxiety, mood swings, or difficulty sleeping  HEME/LYMPH: No easy bruising, or bleeding gums  ALLERGY AND IMMUNOLOGIC: No hives or eczema	    [ ] All others negative	  [ ] Unable to obtain    PHYSICAL EXAM:  T(C): 36.9 (01-05-20 @ 06:00), Max: 37.1 (01-04-20 @ 17:37)  HR: 81 (01-05-20 @ 06:00) (75 - 86)  BP: 116/75 (01-05-20 @ 06:00) (102/63 - 116/75)  RR: 16 (01-05-20 @ 06:00) (16 - 18)  SpO2: 94% (01-05-20 @ 06:00) (94% - 99%)  Wt(kg): --  I&O's Summary    04 Jan 2020 07:01  -  05 Jan 2020 07:00  --------------------------------------------------------  IN: 1380 mL / OUT: 2790 mL / NET: -1410 mL        Appearance: Normal	  HEENT:   Normal oral mucosa, PERRL, EOMI	  Lymphatic: No lymphadenopathy  Cardiovascular: Normal S1 S2, No JVD, + murmurs,+ lymph edema  Respiratory: Lungs clear to auscultation	  Psychiatry: A & O x 3, Mood & affect appropriate  Gastrointestinal:  Soft, Non-tender, + BS	  Skin: No rashes, No ecchymoses, No cyanosis	  Neurologic: Non-focal  Extremities: Normal range of motion, No clubbing, cyanosis or edema  Vascular: Peripheral pulses palpable 2+ bilaterally    MEDICATIONS  (STANDING):  aspirin  chewable 81 milliGRAM(s) Oral <User Schedule>  atorvastatin 40 milliGRAM(s) Oral <User Schedule>  budesonide 160 MICROgram(s)/formoterol 4.5 MICROgram(s) Inhaler 2 Puff(s) Inhalation two times a day  chlorhexidine 4% Liquid 1 Application(s) Topical <User Schedule>  doxazosin 2 milliGRAM(s) Oral <User Schedule>  enoxaparin Injectable 100 milliGRAM(s) SubCutaneous <User Schedule>  erythromycin     base Tablet 250 milliGRAM(s) Oral <User Schedule>  furosemide    Tablet 40 milliGRAM(s) Oral <User Schedule>  methadone    Tablet 17.5 milliGRAM(s) Oral <User Schedule>  nystatin Powder 1 Application(s) Topical two times a day  pantoprazole    Tablet 40 milliGRAM(s) Oral <User Schedule>  polyethylene glycol 3350 17 Gram(s) Oral <User Schedule>  silver nitrate Applicator 1 Application(s) Topical once  spironolactone 75 milliGRAM(s) Oral <User Schedule>      TELEMETRY: 	    ECG:  	  RADIOLOGY:  OTHER: 	  	  LABS:	 	    CARDIAC MARKERS:                  proBNP:   Lipid Profile: Cholesterol 98  LDL 52  HDL 28  TG 91    HgA1c:   TSH:         Assessment and plan  ---------------------------  pt is sitting up comfortably in NAD , no complain.  bp noted  echo noted hyperdynamic lv sec to pain/anxiety, volume depletion  continue dvt prophylaxis  started on Lasix and aldactone ?/ lasix dose has increased ? pt with hx of lymphedema and low albumin, avoid hypotension  continue current meds  s/p debridement ?VAC  keep k>4  dvt prophylaxis  nausea ?gastroparesis ?add Reglan  continue Protonix  plan as per surgery ?more debridement ?plastic surgery	for surgical debridement, now is on VAC  increasing diuretics ?pt with hyperdynamic LV ?diuresis  may consider to decrease diuretics

## 2020-01-05 NOTE — PROGRESS NOTE ADULT - ASSESSMENT
A/P: 65 yo F with PMHx morbid obesity, GERD, HTN, COPD, and pshx D&C now s/p ex laparotomy with extended left [id number]/26 w/ perforated and necrotic sigmod colon with pus and stool in the abdominal cavity. RTOR 11/29 RUQ end transverse colostomy created, abdomen closed. Recovering appropriately on floor. On 12/19 patient found to have induration of wound and colostomy necrosis, s/p bedside debridement w/ irrigation vac in place on midline wound. Recovering appropriately with daily wound care.     Plan:  - Will follow PT for VAC change   - LRD  - vac (TIW, changed 1/2)   - c/w doxazosin  - c/w miralax (TID), c/w erythromycin for dismotility  - f/u ostomy OP  - c/w pain control: methadone (home medication), Tylenol, Oxy PRN  - DVT ppx with lovenox, ASA 81  - appreciate cardiology recs  - c/w diuresis, will obtain scan 1/6 if drain output increases.   - c/w PT daily  - Dispo: rehab    Green Team Surgery   [id number] A/P: 63 yo F with PMHx morbid obesity, GERD, HTN, COPD, and pshx D&C now s/p ex laparotomy with extended left [id number]/26 w/ perforated and necrotic sigmod colon with pus and stool in the abdominal cavity. RTOR 11/29 RUQ end transverse colostomy created, abdomen closed. Recovering appropriately on floor. On 12/19 patient found to have induration of wound and colostomy necrosis, s/p bedside debridement w/ irrigation vac in place on midline wound. Recovering appropriately with daily wound care.     Plan:  - Will follow PT/Plastic Sx for VAC change   - Will change LRD to Regular diet  - vac (TIW, changed 1/2)   - c/w doxazosin  - c/w miralax (TID), c/w erythromycin for dismotility  - f/u ostomy OP  - c/w pain control: methadone (home medication), Tylenol, Oxy PRN  - DVT ppx with lovenox, ASA 81  - appreciate cardiology recs  - c/w diuresis, will obtain scan 1/6 if drain output increases.   - c/w PT daily  - Dispo: rehab    Green Team Surgery   [id number]

## 2020-01-05 NOTE — PROGRESS NOTE ADULT - ATTENDING COMMENTS
Patient seen/examined.  Agree w above note and plan and have discussed plan w house staff.  CT in AM    Lucas Greer MD

## 2020-01-05 NOTE — PROGRESS NOTE ADULT - SUBJECTIVE AND OBJECTIVE BOX
SURGERY DAILY PROGRESS NOTE:     SUBJECTIVE/24hr Events:   Patient seen and examined on am rounds.   No acute events overnight.   Pain is well controlled (methadone, oxy, Tylenol), states feeling pressure in abdomen  Tolerating diet without nausea, vomiting.  GI fxn +/+ in bag    OBJECTIVE:  PHYSICAL EXAM  General: Morbidly obese, appears well, NAD  CHEST: breathing comfortably  CV: appears well perfused  Abdomen: soft, nontender, nondistended, no rebound or guarding. Midline wound with VAC in place   : Primafit (1,900 mL/24hr)  Drain: L drain site with ostomy appliance in place (30mL/24hr), clear yellow fluid  Extremities: Grossly symmetric  Ostomy: viable w/ fat necrosis in medial aspect. Gas and stool in bag.   Caprini: 8    V/S:  Vital Signs Last 24 Hrs  T(C): 37.1 (04 Jan 2020 17:37), Max: 37.1 (04 Jan 2020 17:37)  T(F): 98.8 (04 Jan 2020 17:37), Max: 98.8 (04 Jan 2020 17:37)  HR: 86 (04 Jan 2020 17:37) (74 - 86)  BP: 108/67 (04 Jan 2020 17:37) (102/63 - 125/73)  BP(mean): --  RR: 18 (04 Jan 2020 17:37) (18 - 18)  SpO2: 94% (04 Jan 2020 17:37) (93% - 99%)    --------------------------------------------------------------------------------------------------  I/Os:    03 Jan 2020 07:01  -  04 Jan 2020 07:00  --------------------------------------------------------  IN:    Oral Fluid: 900 mL  Total IN: 900 mL    OUT:    Colostomy: 201 mL    Drain: 30 mL    VAC (Vacuum Assisted Closure) System: 150 mL    Voided: 2750 mL  Total OUT: 3131 mL    Total NET: -2231 mL      04 Jan 2020 07:01  -  05 Jan 2020 05:53  --------------------------------------------------------  IN:    Oral Fluid: 1380 mL  Total IN: 1380 mL    OUT:    Colostomy: 200 mL    Voided: 1900 mL  Total OUT: 2100 mL    Total NET: -720 mL      --------------------------------------------------------------------------------------------------  MEDICATIONS  (STANDING):  aspirin  chewable 81 milliGRAM(s) Oral <User Schedule>  atorvastatin 40 milliGRAM(s) Oral <User Schedule>  budesonide 160 MICROgram(s)/formoterol 4.5 MICROgram(s) Inhaler 2 Puff(s) Inhalation two times a day  chlorhexidine 4% Liquid 1 Application(s) Topical <User Schedule>  doxazosin 2 milliGRAM(s) Oral <User Schedule>  enoxaparin Injectable 100 milliGRAM(s) SubCutaneous <User Schedule>  erythromycin     base Tablet 250 milliGRAM(s) Oral <User Schedule>  furosemide    Tablet 40 milliGRAM(s) Oral <User Schedule>  methadone    Tablet 17.5 milliGRAM(s) Oral <User Schedule>  nystatin Powder 1 Application(s) Topical two times a day  pantoprazole    Tablet 40 milliGRAM(s) Oral <User Schedule>  polyethylene glycol 3350 17 Gram(s) Oral <User Schedule>  silver nitrate Applicator 1 Application(s) Topical once  spironolactone 75 milliGRAM(s) Oral <User Schedule>    MEDICATIONS  (PRN):  acetaminophen   Tablet .. 650 milliGRAM(s) Oral every 6 hours PRN Mild Pain (1 - 3)  ALBUTerol    90 MICROgram(s) HFA Inhaler 2 Puff(s) Inhalation every 6 hours PRN Shortness of Breath and/or Wheezing  aluminum hydroxide/magnesium hydroxide/simethicone Suspension 30 milliLiter(s) Oral every 4 hours PRN Dyspepsia  sodium chloride 0.9% lock flush 10 milliLiter(s) IV Push every 1 hour PRN Pre/post blood products, medications, blood draw, and to maintain line patency SURGERY DAILY PROGRESS NOTE:     SUBJECTIVE/24hr Events:   Patient seen and examined on am rounds.   No acute events overnight.   Pain is well controlled (methadone, oxy, Tylenol), states feeling pressure in abdomen  Tolerating diet without nausea, vomiting.  GI fxn +/+ in bag    OBJECTIVE:  PHYSICAL EXAM  General: Morbidly obese, appears well, NAD  CHEST: breathing comfortably  CV: appears well perfused  Abdomen: soft, nontender, nondistended, no rebound or guarding. Midline wound with VAC in place   : Primafit (1,900 mL/24hr)  Drain: L drain site with ostomy appliance in place (0mL/24hr), clear yellow fluid  Extremities: Grossly symmetric  Ostomy: viable w/ fat necrosis in medial aspect. Gas and stool in bag.   Caprini: 8    V/S:  Vital Signs Last 24 Hrs  T(C): 37.1 (04 Jan 2020 17:37), Max: 37.1 (04 Jan 2020 17:37)  T(F): 98.8 (04 Jan 2020 17:37), Max: 98.8 (04 Jan 2020 17:37)  HR: 86 (04 Jan 2020 17:37) (74 - 86)  BP: 108/67 (04 Jan 2020 17:37) (102/63 - 125/73)  BP(mean): --  RR: 18 (04 Jan 2020 17:37) (18 - 18)  SpO2: 94% (04 Jan 2020 17:37) (93% - 99%)    --------------------------------------------------------------------------------------------------  I/Os:    03 Jan 2020 07:01  -  04 Jan 2020 07:00  --------------------------------------------------------  IN:    Oral Fluid: 900 mL  Total IN: 900 mL    OUT:    Colostomy: 201 mL    Drain: 30 mL    VAC (Vacuum Assisted Closure) System: 150 mL    Voided: 2750 mL  Total OUT: 3131 mL    Total NET: -2231 mL      04 Jan 2020 07:01  -  05 Jan 2020 05:53  --------------------------------------------------------  IN:    Oral Fluid: 1380 mL  Total IN: 1380 mL    OUT:    Colostomy: 200 mL    Voided: 1900 mL  Total OUT: 2100 mL    Total NET: -720 mL      --------------------------------------------------------------------------------------------------  MEDICATIONS  (STANDING):  aspirin  chewable 81 milliGRAM(s) Oral <User Schedule>  atorvastatin 40 milliGRAM(s) Oral <User Schedule>  budesonide 160 MICROgram(s)/formoterol 4.5 MICROgram(s) Inhaler 2 Puff(s) Inhalation two times a day  chlorhexidine 4% Liquid 1 Application(s) Topical <User Schedule>  doxazosin 2 milliGRAM(s) Oral <User Schedule>  enoxaparin Injectable 100 milliGRAM(s) SubCutaneous <User Schedule>  erythromycin     base Tablet 250 milliGRAM(s) Oral <User Schedule>  furosemide    Tablet 40 milliGRAM(s) Oral <User Schedule>  methadone    Tablet 17.5 milliGRAM(s) Oral <User Schedule>  nystatin Powder 1 Application(s) Topical two times a day  pantoprazole    Tablet 40 milliGRAM(s) Oral <User Schedule>  polyethylene glycol 3350 17 Gram(s) Oral <User Schedule>  silver nitrate Applicator 1 Application(s) Topical once  spironolactone 75 milliGRAM(s) Oral <User Schedule>    MEDICATIONS  (PRN):  acetaminophen   Tablet .. 650 milliGRAM(s) Oral every 6 hours PRN Mild Pain (1 - 3)  ALBUTerol    90 MICROgram(s) HFA Inhaler 2 Puff(s) Inhalation every 6 hours PRN Shortness of Breath and/or Wheezing  aluminum hydroxide/magnesium hydroxide/simethicone Suspension 30 milliLiter(s) Oral every 4 hours PRN Dyspepsia  sodium chloride 0.9% lock flush 10 milliLiter(s) IV Push every 1 hour PRN Pre/post blood products, medications, blood draw, and to maintain line patency SURGERY DAILY PROGRESS NOTE:     SUBJECTIVE/24hr Events:   Patient seen and examined on am rounds.   No acute events overnight.   Pain is well controlled (methadone, oxy, Tylenol), states feeling pressure in abdomen  Tolerating diet without nausea, vomiting.  GI fxn +/+ in bag    OBJECTIVE:  PHYSICAL EXAM  General: Morbidly obese, appears well, NAD  CHEST: breathing comfortably  CV: appears well perfused  Abdomen: soft, nontender, nondistended, no rebound or guarding. Midline wound with VAC in place   : Primafit (1,900 mL/24hr)  Drain: L drain site with ostomy appliance in place (90mL/24hr), clear yellow fluid  Extremities: Grossly symmetric  Ostomy: viable w/ fat necrosis in medial aspect. Gas and stool in bag.   Caprini: 8    V/S:  Vital Signs Last 24 Hrs  T(C): 37.1 (04 Jan 2020 17:37), Max: 37.1 (04 Jan 2020 17:37)  T(F): 98.8 (04 Jan 2020 17:37), Max: 98.8 (04 Jan 2020 17:37)  HR: 86 (04 Jan 2020 17:37) (74 - 86)  BP: 108/67 (04 Jan 2020 17:37) (102/63 - 125/73)  BP(mean): --  RR: 18 (04 Jan 2020 17:37) (18 - 18)  SpO2: 94% (04 Jan 2020 17:37) (93% - 99%)    --------------------------------------------------------------------------------------------------  I/Os:    03 Jan 2020 07:01  -  04 Jan 2020 07:00  --------------------------------------------------------  IN:    Oral Fluid: 900 mL  Total IN: 900 mL    OUT:    Colostomy: 201 mL    Drain: 30 mL    VAC (Vacuum Assisted Closure) System: 150 mL    Voided: 2750 mL  Total OUT: 3131 mL    Total NET: -2231 mL      04 Jan 2020 07:01  -  05 Jan 2020 05:53  --------------------------------------------------------  IN:    Oral Fluid: 1380 mL  Total IN: 1380 mL    OUT:    Colostomy: 200 mL    Voided: 1900 mL  Total OUT: 2100 mL    Total NET: -720 mL      --------------------------------------------------------------------------------------------------  MEDICATIONS  (STANDING):  aspirin  chewable 81 milliGRAM(s) Oral <User Schedule>  atorvastatin 40 milliGRAM(s) Oral <User Schedule>  budesonide 160 MICROgram(s)/formoterol 4.5 MICROgram(s) Inhaler 2 Puff(s) Inhalation two times a day  chlorhexidine 4% Liquid 1 Application(s) Topical <User Schedule>  doxazosin 2 milliGRAM(s) Oral <User Schedule>  enoxaparin Injectable 100 milliGRAM(s) SubCutaneous <User Schedule>  erythromycin     base Tablet 250 milliGRAM(s) Oral <User Schedule>  furosemide    Tablet 40 milliGRAM(s) Oral <User Schedule>  methadone    Tablet 17.5 milliGRAM(s) Oral <User Schedule>  nystatin Powder 1 Application(s) Topical two times a day  pantoprazole    Tablet 40 milliGRAM(s) Oral <User Schedule>  polyethylene glycol 3350 17 Gram(s) Oral <User Schedule>  silver nitrate Applicator 1 Application(s) Topical once  spironolactone 75 milliGRAM(s) Oral <User Schedule>    MEDICATIONS  (PRN):  acetaminophen   Tablet .. 650 milliGRAM(s) Oral every 6 hours PRN Mild Pain (1 - 3)  ALBUTerol    90 MICROgram(s) HFA Inhaler 2 Puff(s) Inhalation every 6 hours PRN Shortness of Breath and/or Wheezing  aluminum hydroxide/magnesium hydroxide/simethicone Suspension 30 milliLiter(s) Oral every 4 hours PRN Dyspepsia  sodium chloride 0.9% lock flush 10 milliLiter(s) IV Push every 1 hour PRN Pre/post blood products, medications, blood draw, and to maintain line patency SURGERY DAILY PROGRESS NOTE:     SUBJECTIVE/24hr Events:   Patient seen and examined on am rounds.   No acute events overnight.   Pain is well controlled (methadone, oxy, Tylenol), states feeling pressure in abdomen  Tolerating diet without nausea, vomiting.  GI fxn +/+ in bag    OBJECTIVE:  PHYSICAL EXAM  General: Morbidly obese, appears well, NAD  CHEST: breathing comfortably  CV: appears well perfused  Abdomen: soft, nontender, nondistended, no rebound or guarding. Midline wound with VAC in place   : Primafit (2,250mL/24hr)  Drain: L drain site with ostomy appliance in place (90mL/24hr), clear yellow fluid  Extremities: Grossly symmetric  Ostomy: viable w/ fat necrosis in medial aspect. Gas and stool in bag.   Caprini: 8    V/S:  Vital Signs Last 24 Hrs  T(C): 37.1 (04 Jan 2020 17:37), Max: 37.1 (04 Jan 2020 17:37)  T(F): 98.8 (04 Jan 2020 17:37), Max: 98.8 (04 Jan 2020 17:37)  HR: 86 (04 Jan 2020 17:37) (74 - 86)  BP: 108/67 (04 Jan 2020 17:37) (102/63 - 125/73)  BP(mean): --  RR: 18 (04 Jan 2020 17:37) (18 - 18)  SpO2: 94% (04 Jan 2020 17:37) (93% - 99%)    --------------------------------------------------------------------------------------------------  I/Os:    03 Jan 2020 07:01  -  04 Jan 2020 07:00  --------------------------------------------------------  IN:    Oral Fluid: 900 mL  Total IN: 900 mL    OUT:    Colostomy: 201 mL    Drain: 30 mL    VAC (Vacuum Assisted Closure) System: 150 mL    Voided: 2750 mL  Total OUT: 3131 mL    Total NET: -2231 mL      04 Jan 2020 07:01  -  05 Jan 2020 05:53  --------------------------------------------------------  IN:    Oral Fluid: 1380 mL  Total IN: 1380 mL    OUT:    Colostomy: 200 mL    Voided: 1900 mL  Total OUT: 2100 mL    Total NET: -720 mL      --------------------------------------------------------------------------------------------------  MEDICATIONS  (STANDING):  aspirin  chewable 81 milliGRAM(s) Oral <User Schedule>  atorvastatin 40 milliGRAM(s) Oral <User Schedule>  budesonide 160 MICROgram(s)/formoterol 4.5 MICROgram(s) Inhaler 2 Puff(s) Inhalation two times a day  chlorhexidine 4% Liquid 1 Application(s) Topical <User Schedule>  doxazosin 2 milliGRAM(s) Oral <User Schedule>  enoxaparin Injectable 100 milliGRAM(s) SubCutaneous <User Schedule>  erythromycin     base Tablet 250 milliGRAM(s) Oral <User Schedule>  furosemide    Tablet 40 milliGRAM(s) Oral <User Schedule>  methadone    Tablet 17.5 milliGRAM(s) Oral <User Schedule>  nystatin Powder 1 Application(s) Topical two times a day  pantoprazole    Tablet 40 milliGRAM(s) Oral <User Schedule>  polyethylene glycol 3350 17 Gram(s) Oral <User Schedule>  silver nitrate Applicator 1 Application(s) Topical once  spironolactone 75 milliGRAM(s) Oral <User Schedule>    MEDICATIONS  (PRN):  acetaminophen   Tablet .. 650 milliGRAM(s) Oral every 6 hours PRN Mild Pain (1 - 3)  ALBUTerol    90 MICROgram(s) HFA Inhaler 2 Puff(s) Inhalation every 6 hours PRN Shortness of Breath and/or Wheezing  aluminum hydroxide/magnesium hydroxide/simethicone Suspension 30 milliLiter(s) Oral every 4 hours PRN Dyspepsia  sodium chloride 0.9% lock flush 10 milliLiter(s) IV Push every 1 hour PRN Pre/post blood products, medications, blood draw, and to maintain line patency SURGERY DAILY PROGRESS NOTE:     SUBJECTIVE/24hr Events:   Patient seen and examined on am rounds.   No acute events overnight.   Pain is well controlled (methadone, oxy, Tylenol), states feeling pressure in abdomen.  Tolerating diet without nausea, vomiting.  GI fxn +/+ in bag    OBJECTIVE:  PHYSICAL EXAM  General: Morbidly obese, appears well, NAD  CHEST: breathing comfortably  CV: appears well perfused  Abdomen: soft, nontender, nondistended, no rebound or guarding. Midline wound with VAC in place   : Primafit (2,250mL/24hr)  Drain: L drain site with ostomy appliance in place (90mL/24hr), clear yellow fluid  Extremities: Grossly symmetric  Ostomy: viable w/ fat necrosis in medial aspect. Gas and stool in bag.   Caprini: 8    V/S:  Vital Signs Last 24 Hrs  T(C): 37.1 (04 Jan 2020 17:37), Max: 37.1 (04 Jan 2020 17:37)  T(F): 98.8 (04 Jan 2020 17:37), Max: 98.8 (04 Jan 2020 17:37)  HR: 86 (04 Jan 2020 17:37) (74 - 86)  BP: 108/67 (04 Jan 2020 17:37) (102/63 - 125/73)  BP(mean): --  RR: 18 (04 Jan 2020 17:37) (18 - 18)  SpO2: 94% (04 Jan 2020 17:37) (93% - 99%)    --------------------------------------------------------------------------------------------------  I/Os:    03 Jan 2020 07:01  -  04 Jan 2020 07:00  --------------------------------------------------------  IN:    Oral Fluid: 900 mL  Total IN: 900 mL    OUT:    Colostomy: 201 mL    Drain: 30 mL    VAC (Vacuum Assisted Closure) System: 150 mL    Voided: 2750 mL  Total OUT: 3131 mL    Total NET: -2231 mL      04 Jan 2020 07:01  -  05 Jan 2020 05:53  --------------------------------------------------------  IN:    Oral Fluid: 1380 mL  Total IN: 1380 mL    OUT:    Colostomy: 200 mL    Voided: 1900 mL  Total OUT: 2100 mL    Total NET: -720 mL      --------------------------------------------------------------------------------------------------  MEDICATIONS  (STANDING):  aspirin  chewable 81 milliGRAM(s) Oral <User Schedule>  atorvastatin 40 milliGRAM(s) Oral <User Schedule>  budesonide 160 MICROgram(s)/formoterol 4.5 MICROgram(s) Inhaler 2 Puff(s) Inhalation two times a day  chlorhexidine 4% Liquid 1 Application(s) Topical <User Schedule>  doxazosin 2 milliGRAM(s) Oral <User Schedule>  enoxaparin Injectable 100 milliGRAM(s) SubCutaneous <User Schedule>  erythromycin     base Tablet 250 milliGRAM(s) Oral <User Schedule>  furosemide    Tablet 40 milliGRAM(s) Oral <User Schedule>  methadone    Tablet 17.5 milliGRAM(s) Oral <User Schedule>  nystatin Powder 1 Application(s) Topical two times a day  pantoprazole    Tablet 40 milliGRAM(s) Oral <User Schedule>  polyethylene glycol 3350 17 Gram(s) Oral <User Schedule>  silver nitrate Applicator 1 Application(s) Topical once  spironolactone 75 milliGRAM(s) Oral <User Schedule>    MEDICATIONS  (PRN):  acetaminophen   Tablet .. 650 milliGRAM(s) Oral every 6 hours PRN Mild Pain (1 - 3)  ALBUTerol    90 MICROgram(s) HFA Inhaler 2 Puff(s) Inhalation every 6 hours PRN Shortness of Breath and/or Wheezing  aluminum hydroxide/magnesium hydroxide/simethicone Suspension 30 milliLiter(s) Oral every 4 hours PRN Dyspepsia  sodium chloride 0.9% lock flush 10 milliLiter(s) IV Push every 1 hour PRN Pre/post blood products, medications, blood draw, and to maintain line patency

## 2020-01-06 LAB
ALBUMIN SERPL ELPH-MCNC: 2 G/DL — LOW (ref 3.3–5)
ALP SERPL-CCNC: 106 U/L — SIGNIFICANT CHANGE UP (ref 40–120)
ALT FLD-CCNC: 20 U/L — SIGNIFICANT CHANGE UP (ref 10–45)
ANION GAP SERPL CALC-SCNC: 15 MMOL/L — SIGNIFICANT CHANGE UP (ref 5–17)
ANION GAP SERPL CALC-SCNC: 8 MMOL/L — SIGNIFICANT CHANGE UP (ref 5–17)
AST SERPL-CCNC: 27 U/L — SIGNIFICANT CHANGE UP (ref 10–40)
BILIRUB DIRECT SERPL-MCNC: 0.1 MG/DL — SIGNIFICANT CHANGE UP (ref 0–0.2)
BILIRUB INDIRECT FLD-MCNC: 0.2 MG/DL — SIGNIFICANT CHANGE UP (ref 0.2–1)
BILIRUB SERPL-MCNC: 0.3 MG/DL — SIGNIFICANT CHANGE UP (ref 0.2–1.2)
BLD GP AB SCN SERPL QL: NEGATIVE — SIGNIFICANT CHANGE UP
BUN SERPL-MCNC: 11 MG/DL — SIGNIFICANT CHANGE UP (ref 7–23)
BUN SERPL-MCNC: 9 MG/DL — SIGNIFICANT CHANGE UP (ref 7–23)
CALCIUM SERPL-MCNC: 8.5 MG/DL — SIGNIFICANT CHANGE UP (ref 8.4–10.5)
CALCIUM SERPL-MCNC: 8.6 MG/DL — SIGNIFICANT CHANGE UP (ref 8.4–10.5)
CHLORIDE SERPL-SCNC: 93 MMOL/L — LOW (ref 96–108)
CHLORIDE SERPL-SCNC: 93 MMOL/L — LOW (ref 96–108)
CO2 SERPL-SCNC: 24 MMOL/L — SIGNIFICANT CHANGE UP (ref 22–31)
CO2 SERPL-SCNC: 30 MMOL/L — SIGNIFICANT CHANGE UP (ref 22–31)
CREAT SERPL-MCNC: 0.66 MG/DL — SIGNIFICANT CHANGE UP (ref 0.5–1.3)
CREAT SERPL-MCNC: 0.78 MG/DL — SIGNIFICANT CHANGE UP (ref 0.5–1.3)
GLUCOSE BLDC GLUCOMTR-MCNC: 94 MG/DL — SIGNIFICANT CHANGE UP (ref 70–99)
GLUCOSE SERPL-MCNC: 102 MG/DL — HIGH (ref 70–99)
GLUCOSE SERPL-MCNC: 104 MG/DL — HIGH (ref 70–99)
HCT VFR BLD CALC: 27 % — LOW (ref 34.5–45)
HCT VFR BLD CALC: 36.9 % — SIGNIFICANT CHANGE UP (ref 34.5–45)
HGB BLD-MCNC: 11.3 G/DL — LOW (ref 11.5–15.5)
HGB BLD-MCNC: 8.3 G/DL — LOW (ref 11.5–15.5)
LACTATE SERPL-SCNC: 2.8 MMOL/L — HIGH (ref 0.7–2)
MAGNESIUM SERPL-MCNC: 1.9 MG/DL — SIGNIFICANT CHANGE UP (ref 1.6–2.6)
MCHC RBC-ENTMCNC: 27.6 PG — SIGNIFICANT CHANGE UP (ref 27–34)
MCHC RBC-ENTMCNC: 27.9 PG — SIGNIFICANT CHANGE UP (ref 27–34)
MCHC RBC-ENTMCNC: 30.6 GM/DL — LOW (ref 32–36)
MCHC RBC-ENTMCNC: 30.7 GM/DL — LOW (ref 32–36)
MCV RBC AUTO: 90 FL — SIGNIFICANT CHANGE UP (ref 80–100)
MCV RBC AUTO: 90.6 FL — SIGNIFICANT CHANGE UP (ref 80–100)
NRBC # BLD: 0 /100 WBCS — SIGNIFICANT CHANGE UP (ref 0–0)
PHOSPHATE SERPL-MCNC: 4 MG/DL — SIGNIFICANT CHANGE UP (ref 2.5–4.5)
PLATELET # BLD AUTO: 318 K/UL — SIGNIFICANT CHANGE UP (ref 150–400)
PLATELET # BLD AUTO: 377 K/UL — SIGNIFICANT CHANGE UP (ref 150–400)
POTASSIUM SERPL-MCNC: 3.7 MMOL/L — SIGNIFICANT CHANGE UP (ref 3.5–5.3)
POTASSIUM SERPL-MCNC: 4.9 MMOL/L — SIGNIFICANT CHANGE UP (ref 3.5–5.3)
POTASSIUM SERPL-SCNC: 3.7 MMOL/L — SIGNIFICANT CHANGE UP (ref 3.5–5.3)
POTASSIUM SERPL-SCNC: 4.9 MMOL/L — SIGNIFICANT CHANGE UP (ref 3.5–5.3)
PROT SERPL-MCNC: 6.1 G/DL — SIGNIFICANT CHANGE UP (ref 6–8.3)
RBC # BLD: 2.98 M/UL — LOW (ref 3.8–5.2)
RBC # BLD: 4.1 M/UL — SIGNIFICANT CHANGE UP (ref 3.8–5.2)
RBC # FLD: 15.8 % — HIGH (ref 10.3–14.5)
RBC # FLD: 15.9 % — HIGH (ref 10.3–14.5)
RH IG SCN BLD-IMP: POSITIVE — SIGNIFICANT CHANGE UP
SODIUM SERPL-SCNC: 131 MMOL/L — LOW (ref 135–145)
SODIUM SERPL-SCNC: 132 MMOL/L — LOW (ref 135–145)
TROPONIN T, HIGH SENSITIVITY RESULT: 41 NG/L — SIGNIFICANT CHANGE UP (ref 0–51)
WBC # BLD: 16.8 K/UL — HIGH (ref 3.8–10.5)
WBC # BLD: 8.27 K/UL — SIGNIFICANT CHANGE UP (ref 3.8–10.5)
WBC # FLD AUTO: 16.8 K/UL — HIGH (ref 3.8–10.5)
WBC # FLD AUTO: 8.27 K/UL — SIGNIFICANT CHANGE UP (ref 3.8–10.5)

## 2020-01-06 PROCEDURE — 74177 CT ABD & PELVIS W/CONTRAST: CPT | Mod: 26

## 2020-01-06 PROCEDURE — 71045 X-RAY EXAM CHEST 1 VIEW: CPT | Mod: 26

## 2020-01-06 PROCEDURE — 93010 ELECTROCARDIOGRAM REPORT: CPT

## 2020-01-06 RX ORDER — DIPHENHYDRAMINE HCL 50 MG
25 CAPSULE ORAL ONCE
Refills: 0 | Status: COMPLETED | OUTPATIENT
Start: 2020-01-06 | End: 2020-01-06

## 2020-01-06 RX ORDER — DIPHENHYDRAMINE HCL 50 MG
25 CAPSULE ORAL EVERY 4 HOURS
Refills: 0 | Status: DISCONTINUED | OUTPATIENT
Start: 2020-01-06 | End: 2020-01-07

## 2020-01-06 RX ORDER — ONDANSETRON 8 MG/1
4 TABLET, FILM COATED ORAL ONCE
Refills: 0 | Status: COMPLETED | OUTPATIENT
Start: 2020-01-06 | End: 2020-01-06

## 2020-01-06 RX ORDER — ACETAMINOPHEN 500 MG
1000 TABLET ORAL ONCE
Refills: 0 | Status: COMPLETED | OUTPATIENT
Start: 2020-01-06 | End: 2020-01-06

## 2020-01-06 RX ORDER — ONDANSETRON 8 MG/1
4 TABLET, FILM COATED ORAL ONCE
Refills: 0 | Status: DISCONTINUED | OUTPATIENT
Start: 2020-01-06 | End: 2020-01-06

## 2020-01-06 RX ORDER — POTASSIUM CHLORIDE 20 MEQ
20 PACKET (EA) ORAL ONCE
Refills: 0 | Status: COMPLETED | OUTPATIENT
Start: 2020-01-06 | End: 2020-01-06

## 2020-01-06 RX ADMIN — Medication 250 MILLIGRAM(S): at 13:18

## 2020-01-06 RX ADMIN — CHLORHEXIDINE GLUCONATE 1 APPLICATION(S): 213 SOLUTION TOPICAL at 12:42

## 2020-01-06 RX ADMIN — SPIRONOLACTONE 75 MILLIGRAM(S): 25 TABLET, FILM COATED ORAL at 13:18

## 2020-01-06 RX ADMIN — ENOXAPARIN SODIUM 100 MILLIGRAM(S): 100 INJECTION SUBCUTANEOUS at 13:18

## 2020-01-06 RX ADMIN — PANTOPRAZOLE SODIUM 40 MILLIGRAM(S): 20 TABLET, DELAYED RELEASE ORAL at 05:47

## 2020-01-06 RX ADMIN — BUDESONIDE AND FORMOTEROL FUMARATE DIHYDRATE 2 PUFF(S): 160; 4.5 AEROSOL RESPIRATORY (INHALATION) at 05:48

## 2020-01-06 RX ADMIN — POLYETHYLENE GLYCOL 3350 17 GRAM(S): 17 POWDER, FOR SOLUTION ORAL at 13:18

## 2020-01-06 RX ADMIN — NYSTATIN CREAM 1 APPLICATION(S): 100000 CREAM TOPICAL at 05:56

## 2020-01-06 RX ADMIN — BUDESONIDE AND FORMOTEROL FUMARATE DIHYDRATE 2 PUFF(S): 160; 4.5 AEROSOL RESPIRATORY (INHALATION) at 18:35

## 2020-01-06 RX ADMIN — Medication 400 MILLIGRAM(S): at 15:36

## 2020-01-06 RX ADMIN — Medication 250 MILLIGRAM(S): at 18:35

## 2020-01-06 RX ADMIN — Medication 40 MILLIGRAM(S): at 13:18

## 2020-01-06 RX ADMIN — Medication 1000 MILLIGRAM(S): at 16:05

## 2020-01-06 RX ADMIN — ONDANSETRON 4 MILLIGRAM(S): 8 TABLET, FILM COATED ORAL at 20:28

## 2020-01-06 RX ADMIN — METHADONE HYDROCHLORIDE 17.5 MILLIGRAM(S): 40 TABLET ORAL at 09:06

## 2020-01-06 RX ADMIN — Medication 20 MILLIEQUIVALENT(S): at 09:06

## 2020-01-06 RX ADMIN — Medication 2 MILLIGRAM(S): at 05:47

## 2020-01-06 RX ADMIN — POLYETHYLENE GLYCOL 3350 17 GRAM(S): 17 POWDER, FOR SOLUTION ORAL at 05:47

## 2020-01-06 RX ADMIN — Medication 25 MILLIGRAM(S): at 20:01

## 2020-01-06 RX ADMIN — Medication 25 MILLIGRAM(S): at 18:35

## 2020-01-06 RX ADMIN — Medication 25 MILLIGRAM(S): at 15:36

## 2020-01-06 RX ADMIN — NYSTATIN CREAM 1 APPLICATION(S): 100000 CREAM TOPICAL at 18:36

## 2020-01-06 NOTE — PROGRESS NOTE ADULT - SUBJECTIVE AND OBJECTIVE BOX
SURGERY DAILY PROGRESS NOTE:     SUBJECTIVE/24hr Events:   VAC changed yesterday 1/5  Patient seen and examined on am rounds.   No acute events overnight.   Pain is well controlled (methadone, oxy, Tylenol)  Tolerating diet without nausea, vomiting.  GI fxn +/+ in bag    OBJECTIVE:  PHYSICAL EXAM  General: Morbidly obese, appears well, NAD  CHEST: breathing comfortably  CV: appears well perfused  Abdomen: soft, nontender, nondistended, no rebound or guarding. Midline wound with VAC in place   : Primafit XXmL/24hr)  Drain: L drain site with ostomy appliance in place XXmL/24hr), clear yellow fluid  Extremities: Grossly symmetric  Ostomy: viable w/ fat necrosis in medial aspect. Gas and stool in bag.   Caprini: 8    V/S:  Vital Signs Last 24 Hrs  T(C): 36.5 (05 Jan 2020 22:02), Max: 36.9 (05 Jan 2020 06:00)  T(F): 97.7 (05 Jan 2020 22:02), Max: 98.5 (05 Jan 2020 06:00)  HR: 80 (05 Jan 2020 22:02) (80 - 86)  BP: 129/74 (05 Jan 2020 22:02) (112/70 - 129/74)  BP(mean): --  RR: 18 (05 Jan 2020 22:02) (16 - 18)  SpO2: 95% (05 Jan 2020 22:02) (93% - 95%)    --------------------------------------------------------------------------------------------------  I/Os:    04 Jan 2020 07:01  -  05 Jan 2020 07:00  --------------------------------------------------------  IN:    Oral Fluid: 1380 mL  Total IN: 1380 mL    OUT:    Colostomy: 200 mL    Drain: 90 mL    VAC (Vacuum Assisted Closure) System: 250 mL    Voided: 2250 mL  Total OUT: 2790 mL    Total NET: -1410 mL      05 Jan 2020 07:01  -  06 Jan 2020 04:43  --------------------------------------------------------  IN:    Oral Fluid: 970 mL    Solution: 100 mL  Total IN: 1070 mL    OUT:    Drain: 30 mL    Voided: 1300 mL  Total OUT: 1330 mL    Total NET: -260 mL        --------------------------------------------------------------------------------------------------  LABS:          PT/INR - ( 05 Jan 2020 15:51 )   PT: 14.9 sec;   INR: 1.30 ratio         PTT - ( 05 Jan 2020 15:51 )  PTT:32.5 sec    --------------------------------------------------------------------------------------------------  MEDICATIONS  (STANDING):  aspirin  chewable 81 milliGRAM(s) Oral <User Schedule>  atorvastatin 40 milliGRAM(s) Oral <User Schedule>  budesonide 160 MICROgram(s)/formoterol 4.5 MICROgram(s) Inhaler 2 Puff(s) Inhalation two times a day  chlorhexidine 4% Liquid 1 Application(s) Topical <User Schedule>  doxazosin 2 milliGRAM(s) Oral <User Schedule>  enoxaparin Injectable 100 milliGRAM(s) SubCutaneous <User Schedule>  erythromycin     base Tablet 250 milliGRAM(s) Oral <User Schedule>  furosemide    Tablet 40 milliGRAM(s) Oral <User Schedule>  methadone    Tablet 17.5 milliGRAM(s) Oral daily  nystatin Powder 1 Application(s) Topical two times a day  pantoprazole    Tablet 40 milliGRAM(s) Oral <User Schedule>  polyethylene glycol 3350 17 Gram(s) Oral <User Schedule>  silver nitrate Applicator 1 Application(s) Topical once  spironolactone 75 milliGRAM(s) Oral <User Schedule>    MEDICATIONS  (PRN):  acetaminophen   Tablet .. 650 milliGRAM(s) Oral every 6 hours PRN Mild Pain (1 - 3)  ALBUTerol    90 MICROgram(s) HFA Inhaler 2 Puff(s) Inhalation every 6 hours PRN Shortness of Breath and/or Wheezing  aluminum hydroxide/magnesium hydroxide/simethicone Suspension 30 milliLiter(s) Oral every 4 hours PRN Dyspepsia  sodium chloride 0.9% lock flush 10 milliLiter(s) IV Push every 1 hour PRN Pre/post blood products, medications, blood draw, and to maintain line patency SURGERY DAILY PROGRESS NOTE:     SUBJECTIVE/24hr Events:   VAC changed yesterday 1/5  Patient seen and examined on am rounds.   No acute events overnight.   Pain is well controlled (methadone, oxy, Tylenol)  Tolerating diet without nausea, vomiting.  GI fxn +/+ in bag    OBJECTIVE:  PHYSICAL EXAM  General: Morbidly obese, appears well, NAD  CHEST: breathing comfortably  CV: appears well perfused  Abdomen: soft, nontender, nondistended, no rebound or guarding. Midline wound with VAC in place   : Primafit 2,300mL/24hr)  Drain: L drain site with ostomy appliance in place 30mL/24hr), clear yellow fluid  Extremities: Grossly symmetric  Ostomy: viable w/ fat necrosis in medial aspect. Gas and stool in bag.   Caprini: 8    V/S:  Vital Signs Last 24 Hrs  T(C): 36.5 (05 Jan 2020 22:02), Max: 36.9 (05 Jan 2020 06:00)  T(F): 97.7 (05 Jan 2020 22:02), Max: 98.5 (05 Jan 2020 06:00)  HR: 80 (05 Jan 2020 22:02) (80 - 86)  BP: 129/74 (05 Jan 2020 22:02) (112/70 - 129/74)  BP(mean): --  RR: 18 (05 Jan 2020 22:02) (16 - 18)  SpO2: 95% (05 Jan 2020 22:02) (93% - 95%)    --------------------------------------------------------------------------------------------------  I/Os:    04 Jan 2020 07:01  -  05 Jan 2020 07:00  --------------------------------------------------------  IN:    Oral Fluid: 1380 mL  Total IN: 1380 mL    OUT:    Colostomy: 200 mL    Drain: 90 mL    VAC (Vacuum Assisted Closure) System: 250 mL    Voided: 2250 mL  Total OUT: 2790 mL    Total NET: -1410 mL      05 Jan 2020 07:01  -  06 Jan 2020 04:43  --------------------------------------------------------  IN:    Oral Fluid: 970 mL    Solution: 100 mL  Total IN: 1070 mL    OUT:    Drain: 30 mL    Voided: 1300 mL  Total OUT: 1330 mL    Total NET: -260 mL        --------------------------------------------------------------------------------------------------  LABS:          PT/INR - ( 05 Jan 2020 15:51 )   PT: 14.9 sec;   INR: 1.30 ratio         PTT - ( 05 Jan 2020 15:51 )  PTT:32.5 sec    --------------------------------------------------------------------------------------------------  MEDICATIONS  (STANDING):  aspirin  chewable 81 milliGRAM(s) Oral <User Schedule>  atorvastatin 40 milliGRAM(s) Oral <User Schedule>  budesonide 160 MICROgram(s)/formoterol 4.5 MICROgram(s) Inhaler 2 Puff(s) Inhalation two times a day  chlorhexidine 4% Liquid 1 Application(s) Topical <User Schedule>  doxazosin 2 milliGRAM(s) Oral <User Schedule>  enoxaparin Injectable 100 milliGRAM(s) SubCutaneous <User Schedule>  erythromycin     base Tablet 250 milliGRAM(s) Oral <User Schedule>  furosemide    Tablet 40 milliGRAM(s) Oral <User Schedule>  methadone    Tablet 17.5 milliGRAM(s) Oral daily  nystatin Powder 1 Application(s) Topical two times a day  pantoprazole    Tablet 40 milliGRAM(s) Oral <User Schedule>  polyethylene glycol 3350 17 Gram(s) Oral <User Schedule>  silver nitrate Applicator 1 Application(s) Topical once  spironolactone 75 milliGRAM(s) Oral <User Schedule>    MEDICATIONS  (PRN):  acetaminophen   Tablet .. 650 milliGRAM(s) Oral every 6 hours PRN Mild Pain (1 - 3)  ALBUTerol    90 MICROgram(s) HFA Inhaler 2 Puff(s) Inhalation every 6 hours PRN Shortness of Breath and/or Wheezing  aluminum hydroxide/magnesium hydroxide/simethicone Suspension 30 milliLiter(s) Oral every 4 hours PRN Dyspepsia  sodium chloride 0.9% lock flush 10 milliLiter(s) IV Push every 1 hour PRN Pre/post blood products, medications, blood draw, and to maintain line patency

## 2020-01-06 NOTE — PROVIDER CONTACT NOTE (OTHER) - ACTION/TREATMENT ORDERED:
benadryl given and as well as IV tylenol. Pt states she feels a little better. Will continue to monitor.

## 2020-01-06 NOTE — PROGRESS NOTE ADULT - ASSESSMENT
Please call refill of Carbidopa-Levodopa to [facility] [place]   A/P: 63 yo F with PMHx morbid obesity, GERD, HTN, COPD, and pshx D&C now s/p ex laparotomy with extended left [id number] w/ perforated and necrotic sigmod colon with pus and stool in the abdominal cavity. RTOR 11/29 RUQ end transverse colostomy created, abdomen closed. Recovering appropriately on floor. On 12/19 patient found to have induration of wound and colostomy necrosis, s/p bedside debridement w/ irrigation vac in place on midline wound. Recovering appropriately with daily wound care.     Plan:  - f/u CT A/P in am  - f/u am labs  - c/w Regular diet  - vac (TIW, changed 1/5)   - c/w doxazosin  - c/w miralax (TID), c/w erythromycin for dismotility  - f/u ostomy OP  - c/w pain control: methadone (home medication), Tylenol, Oxy PRN  - DVT ppx with lovenox, ASA 81  - appreciate cardiology recs  - c/w diuresis  - c/w PT daily  - Dispo: rehab    [facility] Surgery   [id number]

## 2020-01-06 NOTE — PROVIDER CONTACT NOTE (OTHER) - ACTION/TREATMENT ORDERED:
Md came to bedside to assess. Bp w/HR of 108 and bp of 112/74, FS WDL. Will continue to monitor. EKG completed, CBC, BMP, LACTATE, TROPONIN drawn and sent. CXR pend.

## 2020-01-06 NOTE — PROGRESS NOTE ADULT - ATTENDING COMMENTS
I saw and examined the pt and discussed the tx plan with the House Staff. I agree with the exam and plan as documented in the surgery resident's note from today.  Continue LWC.  Obtain CT to image ascites as the LLQ old drain site continues to produce ascitic fluid.  Liset Vargas MD

## 2020-01-06 NOTE — PROGRESS NOTE ADULT - SUBJECTIVE AND OBJECTIVE BOX
CARDIOLOGY     PROGRESS  NOTE   ________________________________________________    CHIEF COMPLAINT:Patient is a 64y old  Female who presents with a chief complaint of perforated bowel (08 Dec 2019 09:22)  no complain.  	  REVIEW OF SYSTEMS:  CONSTITUTIONAL: No fever, weight loss, or fatigue  EYES: No eye pain, visual disturbances, or discharge  ENT:  No difficulty hearing, tinnitus, vertigo; No sinus or throat pain  NECK: No pain or stiffness  RESPIRATORY: No cough, wheezing, chills or hemoptysis; No Shortness of Breath  CARDIOVASCULAR: No chest pain, palpitations, passing out, dizziness, or leg swelling  GASTROINTESTINAL: No abdominal or epigastric pain. No nausea, vomiting, or hematemesis; No diarrhea or constipation. No melena or hematochezia.  GENITOURINARY: No dysuria, frequency, hematuria, or incontinence  NEUROLOGICAL: No headaches, memory loss, loss of strength, numbness, or tremors  SKIN: No itching, burning, rashes, or lesions   LYMPH Nodes: No enlarged glands  ENDOCRINE: No heat or cold intolerance; No hair loss  MUSCULOSKELETAL: No joint pain or swelling; No muscle, back, or extremity pain  PSYCHIATRIC: No depression, anxiety, mood swings, or difficulty sleeping  HEME/LYMPH: No easy bruising, or bleeding gums  ALLERGY AND IMMUNOLOGIC: No hives or eczema	    [ ] All others negative	  [ ] Unable to obtain    PHYSICAL EXAM:  T(C): 36.9 (01-06-20 @ 05:06), Max: 36.9 (01-06-20 @ 05:06)  HR: 74 (01-06-20 @ 05:06) (74 - 86)  BP: 99/54 (01-06-20 @ 05:06) (99/54 - 129/74)  RR: 18 (01-06-20 @ 05:06) (18 - 18)  SpO2: 93% (01-06-20 @ 05:06) (93% - 95%)  Wt(kg): --  I&O's Summary    05 Jan 2020 07:01  -  06 Jan 2020 07:00  --------------------------------------------------------  IN: 1070 mL / OUT: 2330 mL / NET: -1260 mL        Appearance: Normal	  HEENT:   Normal oral mucosa, PERRL, EOMI	  Lymphatic: No lymphadenopathy  Cardiovascular: Normal S1 S2, No JVD,+ murmurs, No edema  Respiratory: Lungs clear to auscultation	  Psychiatry: A & O x 3, Mood & affect appropriate  Gastrointestinal:  Soft, Non-tender, + BS	, +wound vac  Skin: No rashes, No ecchymoses, No cyanosis	  Neurologic: Non-focal  Extremities: Normal range of motion, No clubbing, cyanosis or edema  Vascular: Peripheral pulses palpable 2+ bilaterally    MEDICATIONS  (STANDING):  aspirin  chewable 81 milliGRAM(s) Oral <User Schedule>  atorvastatin 40 milliGRAM(s) Oral <User Schedule>  budesonide 160 MICROgram(s)/formoterol 4.5 MICROgram(s) Inhaler 2 Puff(s) Inhalation two times a day  chlorhexidine 4% Liquid 1 Application(s) Topical <User Schedule>  doxazosin 2 milliGRAM(s) Oral <User Schedule>  enoxaparin Injectable 100 milliGRAM(s) SubCutaneous <User Schedule>  erythromycin     base Tablet 250 milliGRAM(s) Oral <User Schedule>  furosemide    Tablet 40 milliGRAM(s) Oral <User Schedule>  methadone    Tablet 17.5 milliGRAM(s) Oral daily  nystatin Powder 1 Application(s) Topical two times a day  pantoprazole    Tablet 40 milliGRAM(s) Oral <User Schedule>  polyethylene glycol 3350 17 Gram(s) Oral <User Schedule>  potassium chloride    Tablet ER 20 milliEquivalent(s) Oral once  silver nitrate Applicator 1 Application(s) Topical once  spironolactone 75 milliGRAM(s) Oral <User Schedule>      TELEMETRY: 	    ECG:  	  RADIOLOGY:  OTHER: 	  	  LABS:	 	    CARDIAC MARKERS:            01-06    131<L>  |  93<L>  |  9   ----------------------------<  104<H>  3.7   |  30  |  0.66    Ca    8.6      06 Jan 2020 06:07  Phos  4.0     01-06  Mg     1.9     01-06    TPro  6.1  /  Alb  2.0<L>  /  TBili  0.3  /  DBili  0.1  /  AST  27  /  ALT  20  /  AlkPhos  106  01-06    proBNP:   Lipid Profile: Cholesterol 98  LDL 52  HDL 28  TG 91    HgA1c:   TSH:   PT/INR - ( 05 Jan 2020 15:51 )   PT: 14.9 sec;   INR: 1.30 ratio         PTT - ( 05 Jan 2020 15:51 )  PTT:32.5 sec    < from: CT Abdomen and Pelvis w/ Oral Cont and w/ IV Cont (12.04.19 @ 20:43) >  Expected postoperative changes in the abdomen and pelvis.    Scattered pulmonary groundglass opacities of uncertain etiology.    < end of copied text >    Assessment and plan  ---------------------------  pt is sitting up comfortably in NAD , no complain.  bp noted  echo noted hyperdynamic lv sec to pain/anxiety, volume depletion  continue dvt prophylaxis  started on Lasix and aldactone ?/ lasix dose has increased ? pt with hx of lymphedema and low albumin, avoid hypotension  continue current meds  s/p debridement ?VAC  keep k>4  dvt prophylaxis  nausea ?gastroparesis ?add Reglan  continue Protonix  plan as per surgery ?more debridement ?plastic surgery	for surgical debridement, now is on VAC  increasing diuretics ?pt with hyperdynamic LV ?diuresis  may consider to decrease diuretics, discussed with surgery attending

## 2020-01-07 LAB
ALBUMIN SERPL ELPH-MCNC: 1.7 G/DL — LOW (ref 3.3–5)
ALP SERPL-CCNC: 110 U/L — SIGNIFICANT CHANGE UP (ref 40–120)
ALT FLD-CCNC: 17 U/L — SIGNIFICANT CHANGE UP (ref 10–45)
ANION GAP SERPL CALC-SCNC: 10 MMOL/L — SIGNIFICANT CHANGE UP (ref 5–17)
ANION GAP SERPL CALC-SCNC: 12 MMOL/L — SIGNIFICANT CHANGE UP (ref 5–17)
ANION GAP SERPL CALC-SCNC: 14 MMOL/L — SIGNIFICANT CHANGE UP (ref 5–17)
ANION GAP SERPL CALC-SCNC: 15 MMOL/L — SIGNIFICANT CHANGE UP (ref 5–17)
APPEARANCE UR: ABNORMAL
AST SERPL-CCNC: 21 U/L — SIGNIFICANT CHANGE UP (ref 10–40)
BACTERIA # UR AUTO: ABNORMAL
BILIRUB SERPL-MCNC: 0.4 MG/DL — SIGNIFICANT CHANGE UP (ref 0.2–1.2)
BILIRUB UR-MCNC: NEGATIVE — SIGNIFICANT CHANGE UP
BUN SERPL-MCNC: 15 MG/DL — SIGNIFICANT CHANGE UP (ref 7–23)
BUN SERPL-MCNC: 16 MG/DL — SIGNIFICANT CHANGE UP (ref 7–23)
BUN SERPL-MCNC: 17 MG/DL — SIGNIFICANT CHANGE UP (ref 7–23)
BUN SERPL-MCNC: 17 MG/DL — SIGNIFICANT CHANGE UP (ref 7–23)
CALCIUM SERPL-MCNC: 7.9 MG/DL — LOW (ref 8.4–10.5)
CALCIUM SERPL-MCNC: 8 MG/DL — LOW (ref 8.4–10.5)
CALCIUM SERPL-MCNC: 8.6 MG/DL — SIGNIFICANT CHANGE UP (ref 8.4–10.5)
CALCIUM SERPL-MCNC: 8.7 MG/DL — SIGNIFICANT CHANGE UP (ref 8.4–10.5)
CHLORIDE SERPL-SCNC: 91 MMOL/L — LOW (ref 96–108)
CHLORIDE SERPL-SCNC: 92 MMOL/L — LOW (ref 96–108)
CHLORIDE SERPL-SCNC: 94 MMOL/L — LOW (ref 96–108)
CHLORIDE SERPL-SCNC: 95 MMOL/L — LOW (ref 96–108)
CO2 SERPL-SCNC: 20 MMOL/L — LOW (ref 22–31)
CO2 SERPL-SCNC: 21 MMOL/L — LOW (ref 22–31)
CO2 SERPL-SCNC: 21 MMOL/L — LOW (ref 22–31)
CO2 SERPL-SCNC: 25 MMOL/L — SIGNIFICANT CHANGE UP (ref 22–31)
COLOR SPEC: ABNORMAL
CREAT ?TM UR-MCNC: 71 MG/DL — SIGNIFICANT CHANGE UP
CREAT SERPL-MCNC: 1.1 MG/DL — SIGNIFICANT CHANGE UP (ref 0.5–1.3)
CREAT SERPL-MCNC: 1.34 MG/DL — HIGH (ref 0.5–1.3)
CREAT SERPL-MCNC: 1.46 MG/DL — HIGH (ref 0.5–1.3)
CREAT SERPL-MCNC: 1.57 MG/DL — HIGH (ref 0.5–1.3)
DIFF PNL FLD: NEGATIVE — SIGNIFICANT CHANGE UP
EPI CELLS # UR: 0 /HPF — SIGNIFICANT CHANGE UP
GAS PNL BLDV: SIGNIFICANT CHANGE UP
GAS PNL BLDV: SIGNIFICANT CHANGE UP
GLUCOSE SERPL-MCNC: 104 MG/DL — HIGH (ref 70–99)
GLUCOSE SERPL-MCNC: 95 MG/DL — SIGNIFICANT CHANGE UP (ref 70–99)
GLUCOSE SERPL-MCNC: 96 MG/DL — SIGNIFICANT CHANGE UP (ref 70–99)
GLUCOSE SERPL-MCNC: 99 MG/DL — SIGNIFICANT CHANGE UP (ref 70–99)
GLUCOSE UR QL: NEGATIVE — SIGNIFICANT CHANGE UP
HCT VFR BLD CALC: 35.2 % — SIGNIFICANT CHANGE UP (ref 34.5–45)
HCT VFR BLD CALC: 38.6 % — SIGNIFICANT CHANGE UP (ref 34.5–45)
HCT VFR BLD CALC: 40.8 % — SIGNIFICANT CHANGE UP (ref 34.5–45)
HGB BLD-MCNC: 11.1 G/DL — LOW (ref 11.5–15.5)
HGB BLD-MCNC: 11.7 G/DL — SIGNIFICANT CHANGE UP (ref 11.5–15.5)
HGB BLD-MCNC: 12.2 G/DL — SIGNIFICANT CHANGE UP (ref 11.5–15.5)
HYALINE CASTS # UR AUTO: 1 /LPF — SIGNIFICANT CHANGE UP (ref 0–2)
KETONES UR-MCNC: NEGATIVE — SIGNIFICANT CHANGE UP
LACTATE SERPL-SCNC: 3.5 MMOL/L — HIGH (ref 0.7–2)
LACTATE SERPL-SCNC: 5 MMOL/L — CRITICAL HIGH (ref 0.7–2)
LEUKOCYTE ESTERASE UR-ACNC: ABNORMAL
MAGNESIUM SERPL-MCNC: 1.6 MG/DL — SIGNIFICANT CHANGE UP (ref 1.6–2.6)
MAGNESIUM SERPL-MCNC: 1.6 MG/DL — SIGNIFICANT CHANGE UP (ref 1.6–2.6)
MAGNESIUM SERPL-MCNC: 1.8 MG/DL — SIGNIFICANT CHANGE UP (ref 1.6–2.6)
MCHC RBC-ENTMCNC: 27.6 PG — SIGNIFICANT CHANGE UP (ref 27–34)
MCHC RBC-ENTMCNC: 27.7 PG — SIGNIFICANT CHANGE UP (ref 27–34)
MCHC RBC-ENTMCNC: 28.4 PG — SIGNIFICANT CHANGE UP (ref 27–34)
MCHC RBC-ENTMCNC: 29.9 GM/DL — LOW (ref 32–36)
MCHC RBC-ENTMCNC: 30.3 GM/DL — LOW (ref 32–36)
MCHC RBC-ENTMCNC: 31.5 GM/DL — LOW (ref 32–36)
MCV RBC AUTO: 90 FL — SIGNIFICANT CHANGE UP (ref 80–100)
MCV RBC AUTO: 91 FL — SIGNIFICANT CHANGE UP (ref 80–100)
MCV RBC AUTO: 92.7 FL — SIGNIFICANT CHANGE UP (ref 80–100)
NITRITE UR-MCNC: NEGATIVE — SIGNIFICANT CHANGE UP
NRBC # BLD: 0 /100 WBCS — SIGNIFICANT CHANGE UP (ref 0–0)
NRBC # BLD: 0 /100 WBCS — SIGNIFICANT CHANGE UP (ref 0–0)
OSMOLALITY UR: 297 MOS/KG — LOW (ref 300–900)
PH UR: 6.5 — SIGNIFICANT CHANGE UP (ref 5–8)
PHOSPHATE SERPL-MCNC: 4.6 MG/DL — HIGH (ref 2.5–4.5)
PHOSPHATE SERPL-MCNC: 5.1 MG/DL — HIGH (ref 2.5–4.5)
PHOSPHATE SERPL-MCNC: 5.2 MG/DL — HIGH (ref 2.5–4.5)
PLATELET # BLD AUTO: 335 K/UL — SIGNIFICANT CHANGE UP (ref 150–400)
PLATELET # BLD AUTO: 356 K/UL — SIGNIFICANT CHANGE UP (ref 150–400)
PLATELET # BLD AUTO: 433 K/UL — HIGH (ref 150–400)
POTASSIUM SERPL-MCNC: 5.1 MMOL/L — SIGNIFICANT CHANGE UP (ref 3.5–5.3)
POTASSIUM SERPL-MCNC: 5.2 MMOL/L — SIGNIFICANT CHANGE UP (ref 3.5–5.3)
POTASSIUM SERPL-MCNC: 5.2 MMOL/L — SIGNIFICANT CHANGE UP (ref 3.5–5.3)
POTASSIUM SERPL-MCNC: 5.4 MMOL/L — HIGH (ref 3.5–5.3)
POTASSIUM SERPL-SCNC: 5.1 MMOL/L — SIGNIFICANT CHANGE UP (ref 3.5–5.3)
POTASSIUM SERPL-SCNC: 5.2 MMOL/L — SIGNIFICANT CHANGE UP (ref 3.5–5.3)
POTASSIUM SERPL-SCNC: 5.2 MMOL/L — SIGNIFICANT CHANGE UP (ref 3.5–5.3)
POTASSIUM SERPL-SCNC: 5.4 MMOL/L — HIGH (ref 3.5–5.3)
PROT SERPL-MCNC: 6.1 G/DL — SIGNIFICANT CHANGE UP (ref 6–8.3)
PROT UR-MCNC: SIGNIFICANT CHANGE UP
RBC # BLD: 3.91 M/UL — SIGNIFICANT CHANGE UP (ref 3.8–5.2)
RBC # BLD: 4.24 M/UL — SIGNIFICANT CHANGE UP (ref 3.8–5.2)
RBC # BLD: 4.4 M/UL — SIGNIFICANT CHANGE UP (ref 3.8–5.2)
RBC # FLD: 15.9 % — HIGH (ref 10.3–14.5)
RBC # FLD: 15.9 % — HIGH (ref 10.3–14.5)
RBC # FLD: 16.1 % — HIGH (ref 10.3–14.5)
RBC CASTS # UR COMP ASSIST: 4 /HPF — SIGNIFICANT CHANGE UP (ref 0–4)
SODIUM SERPL-SCNC: 124 MMOL/L — LOW (ref 135–145)
SODIUM SERPL-SCNC: 127 MMOL/L — LOW (ref 135–145)
SODIUM SERPL-SCNC: 129 MMOL/L — LOW (ref 135–145)
SODIUM SERPL-SCNC: 130 MMOL/L — LOW (ref 135–145)
SODIUM UR-SCNC: 54 MMOL/L — SIGNIFICANT CHANGE UP
SP GR SPEC: 1.04 — HIGH (ref 1.01–1.02)
UROBILINOGEN FLD QL: NEGATIVE — SIGNIFICANT CHANGE UP
WBC # BLD: 19.27 K/UL — HIGH (ref 3.8–10.5)
WBC # BLD: 21.07 K/UL — HIGH (ref 3.8–10.5)
WBC # BLD: 25.92 K/UL — HIGH (ref 3.8–10.5)
WBC # FLD AUTO: 19.27 K/UL — HIGH (ref 3.8–10.5)
WBC # FLD AUTO: 21.07 K/UL — HIGH (ref 3.8–10.5)
WBC # FLD AUTO: 25.92 K/UL — HIGH (ref 3.8–10.5)
WBC UR QL: 109 /HPF — HIGH (ref 0–5)

## 2020-01-07 PROCEDURE — 71045 X-RAY EXAM CHEST 1 VIEW: CPT | Mod: 26

## 2020-01-07 PROCEDURE — 99233 SBSQ HOSP IP/OBS HIGH 50: CPT

## 2020-01-07 PROCEDURE — 99223 1ST HOSP IP/OBS HIGH 75: CPT

## 2020-01-07 RX ORDER — ACETAMINOPHEN 500 MG
975 TABLET ORAL EVERY 6 HOURS
Refills: 0 | Status: DISCONTINUED | OUTPATIENT
Start: 2020-01-07 | End: 2020-01-19

## 2020-01-07 RX ORDER — VANCOMYCIN HCL 1 G
VIAL (EA) INTRAVENOUS
Refills: 0 | Status: DISCONTINUED | OUTPATIENT
Start: 2020-01-07 | End: 2020-01-07

## 2020-01-07 RX ORDER — SODIUM CHLORIDE 9 MG/ML
500 INJECTION, SOLUTION INTRAVENOUS ONCE
Refills: 0 | Status: COMPLETED | OUTPATIENT
Start: 2020-01-07 | End: 2020-01-07

## 2020-01-07 RX ORDER — PIPERACILLIN AND TAZOBACTAM 4; .5 G/20ML; G/20ML
3.38 INJECTION, POWDER, LYOPHILIZED, FOR SOLUTION INTRAVENOUS ONCE
Refills: 0 | Status: DISCONTINUED | OUTPATIENT
Start: 2020-01-07 | End: 2020-01-07

## 2020-01-07 RX ORDER — ONDANSETRON 8 MG/1
4 TABLET, FILM COATED ORAL ONCE
Refills: 0 | Status: COMPLETED | OUTPATIENT
Start: 2020-01-07 | End: 2020-01-07

## 2020-01-07 RX ORDER — SODIUM CHLORIDE 9 MG/ML
1000 INJECTION INTRAMUSCULAR; INTRAVENOUS; SUBCUTANEOUS
Refills: 0 | Status: DISCONTINUED | OUTPATIENT
Start: 2020-01-07 | End: 2020-01-08

## 2020-01-07 RX ORDER — ENOXAPARIN SODIUM 100 MG/ML
100 INJECTION SUBCUTANEOUS
Refills: 0 | Status: DISCONTINUED | OUTPATIENT
Start: 2020-01-07 | End: 2020-02-05

## 2020-01-07 RX ORDER — SODIUM CHLORIDE 9 MG/ML
500 INJECTION INTRAMUSCULAR; INTRAVENOUS; SUBCUTANEOUS ONCE
Refills: 0 | Status: COMPLETED | OUTPATIENT
Start: 2020-01-07 | End: 2020-01-07

## 2020-01-07 RX ORDER — VANCOMYCIN HCL 1 G
1000 VIAL (EA) INTRAVENOUS ONCE
Refills: 0 | Status: DISCONTINUED | OUTPATIENT
Start: 2020-01-07 | End: 2020-01-07

## 2020-01-07 RX ORDER — CHLORHEXIDINE GLUCONATE 213 G/1000ML
1 SOLUTION TOPICAL
Refills: 0 | Status: DISCONTINUED | OUTPATIENT
Start: 2020-01-08 | End: 2020-02-05

## 2020-01-07 RX ORDER — SODIUM CHLORIDE 9 MG/ML
1000 INJECTION, SOLUTION INTRAVENOUS ONCE
Refills: 0 | Status: COMPLETED | OUTPATIENT
Start: 2020-01-07 | End: 2020-01-07

## 2020-01-07 RX ORDER — SODIUM CHLORIDE 9 MG/ML
1000 INJECTION, SOLUTION INTRAVENOUS
Refills: 0 | Status: DISCONTINUED | OUTPATIENT
Start: 2020-01-07 | End: 2020-01-07

## 2020-01-07 RX ORDER — CEFTRIAXONE 500 MG/1
1000 INJECTION, POWDER, FOR SOLUTION INTRAMUSCULAR; INTRAVENOUS EVERY 24 HOURS
Refills: 0 | Status: DISCONTINUED | OUTPATIENT
Start: 2020-01-07 | End: 2020-01-08

## 2020-01-07 RX ORDER — HYDROMORPHONE HYDROCHLORIDE 2 MG/ML
1 INJECTION INTRAMUSCULAR; INTRAVENOUS; SUBCUTANEOUS ONCE
Refills: 0 | Status: DISCONTINUED | OUTPATIENT
Start: 2020-01-07 | End: 2020-01-07

## 2020-01-07 RX ORDER — PIPERACILLIN AND TAZOBACTAM 4; .5 G/20ML; G/20ML
3.38 INJECTION, POWDER, LYOPHILIZED, FOR SOLUTION INTRAVENOUS EVERY 8 HOURS
Refills: 0 | Status: DISCONTINUED | OUTPATIENT
Start: 2020-01-07 | End: 2020-01-07

## 2020-01-07 RX ORDER — CALCIUM GLUCONATE 100 MG/ML
2 VIAL (ML) INTRAVENOUS ONCE
Refills: 0 | Status: COMPLETED | OUTPATIENT
Start: 2020-01-07 | End: 2020-01-08

## 2020-01-07 RX ORDER — SODIUM CHLORIDE 9 MG/ML
1000 INJECTION INTRAMUSCULAR; INTRAVENOUS; SUBCUTANEOUS ONCE
Refills: 0 | Status: COMPLETED | OUTPATIENT
Start: 2020-01-07 | End: 2020-01-07

## 2020-01-07 RX ORDER — ACETAMINOPHEN 500 MG
1000 TABLET ORAL ONCE
Refills: 0 | Status: COMPLETED | OUTPATIENT
Start: 2020-01-07 | End: 2020-01-07

## 2020-01-07 RX ADMIN — SODIUM CHLORIDE 500 MILLILITER(S): 9 INJECTION, SOLUTION INTRAVENOUS at 03:48

## 2020-01-07 RX ADMIN — SODIUM CHLORIDE 1000 MILLILITER(S): 9 INJECTION INTRAMUSCULAR; INTRAVENOUS; SUBCUTANEOUS at 14:45

## 2020-01-07 RX ADMIN — CEFTRIAXONE 100 MILLIGRAM(S): 500 INJECTION, POWDER, FOR SOLUTION INTRAMUSCULAR; INTRAVENOUS at 16:05

## 2020-01-07 RX ADMIN — BUDESONIDE AND FORMOTEROL FUMARATE DIHYDRATE 2 PUFF(S): 160; 4.5 AEROSOL RESPIRATORY (INHALATION) at 17:54

## 2020-01-07 RX ADMIN — ATORVASTATIN CALCIUM 40 MILLIGRAM(S): 80 TABLET, FILM COATED ORAL at 20:32

## 2020-01-07 RX ADMIN — HYDROMORPHONE HYDROCHLORIDE 1 MILLIGRAM(S): 2 INJECTION INTRAMUSCULAR; INTRAVENOUS; SUBCUTANEOUS at 09:30

## 2020-01-07 RX ADMIN — SODIUM CHLORIDE 500 MILLILITER(S): 9 INJECTION, SOLUTION INTRAVENOUS at 00:46

## 2020-01-07 RX ADMIN — Medication 250 MILLIGRAM(S): at 10:44

## 2020-01-07 RX ADMIN — ONDANSETRON 4 MILLIGRAM(S): 8 TABLET, FILM COATED ORAL at 19:42

## 2020-01-07 RX ADMIN — CHLORHEXIDINE GLUCONATE 1 APPLICATION(S): 213 SOLUTION TOPICAL at 10:39

## 2020-01-07 RX ADMIN — SODIUM CHLORIDE 125 MILLILITER(S): 9 INJECTION INTRAMUSCULAR; INTRAVENOUS; SUBCUTANEOUS at 10:33

## 2020-01-07 RX ADMIN — SODIUM CHLORIDE 75 MILLILITER(S): 9 INJECTION, SOLUTION INTRAVENOUS at 01:12

## 2020-01-07 RX ADMIN — SODIUM CHLORIDE 1000 MILLILITER(S): 9 INJECTION, SOLUTION INTRAVENOUS at 06:49

## 2020-01-07 RX ADMIN — Medication 400 MILLIGRAM(S): at 10:31

## 2020-01-07 RX ADMIN — POLYETHYLENE GLYCOL 3350 17 GRAM(S): 17 POWDER, FOR SOLUTION ORAL at 18:47

## 2020-01-07 RX ADMIN — SODIUM CHLORIDE 500 MILLILITER(S): 9 INJECTION INTRAMUSCULAR; INTRAVENOUS; SUBCUTANEOUS at 19:43

## 2020-01-07 RX ADMIN — Medication 25 MILLIGRAM(S): at 10:43

## 2020-01-07 RX ADMIN — SODIUM CHLORIDE 125 MILLILITER(S): 9 INJECTION INTRAMUSCULAR; INTRAVENOUS; SUBCUTANEOUS at 19:42

## 2020-01-07 RX ADMIN — SODIUM CHLORIDE 4000 MILLILITER(S): 9 INJECTION INTRAMUSCULAR; INTRAVENOUS; SUBCUTANEOUS at 23:20

## 2020-01-07 RX ADMIN — Medication 1000 MILLIGRAM(S): at 11:01

## 2020-01-07 RX ADMIN — HYDROMORPHONE HYDROCHLORIDE 1 MILLIGRAM(S): 2 INJECTION INTRAMUSCULAR; INTRAVENOUS; SUBCUTANEOUS at 09:00

## 2020-01-07 RX ADMIN — Medication 81 MILLIGRAM(S): at 20:32

## 2020-01-07 RX ADMIN — METHADONE HYDROCHLORIDE 17.5 MILLIGRAM(S): 40 TABLET ORAL at 10:32

## 2020-01-07 NOTE — PROGRESS NOTE ADULT - ATTENDING COMMENTS
I saw and examined the pt and discussed the tx plan with the House Staff. I agree with the exam and plan as documented in the surgery resident's note from today with changes below.  Had CT yesterday to image possible ascites. CT with left sided thin collection.   Developed dry hiving, rash, pruritus after the CT. Says she had similar reaction but less severe after her last CT. Labs c/w dehydration. Hypotensive, tachycardic, VS improved with IVFs. Feeling very weak.   No abd pain.  I was present for the VAC change. The wound overall is better. There is a bridge of skin tissue hanging between the colostomy wound and the midline wound that only interferes with the VAC placement at this time and keeps getting thinner. Will excise this in the next VAC change under local anesthesia.   It appears her hypotension and clinical state today is most likely related to reaction to IV contrast and dehydration.   I believe the left sided collection is loculated ascites draining via the LLQ old drain site. No need for IR intervention.    Close monitoring, IVFs, will list IV contrast as an allergy, d/c diuretics, no need for ABX at this time.    Liset Vargas MD

## 2020-01-07 NOTE — PROGRESS NOTE ADULT - SUBJECTIVE AND OBJECTIVE BOX
Surgery Post-Operative Note    SUBJECTIVE:  - Overnight patient had an episode of tachycardia and hypotension that was treated with 500 bolus of NS x2. ECG revealed sinus tachycardia, labs remarkable for lactates of 2.8.  - Patient seen and examined at bedside   - Patient states feeling a bit better  - Denies chest pain, dizziness, HA, N/V  --------------------------------------------------------------------------------------------------  OBJECTIVE:   Physical Exam:  General: AAOx3, NAD, lying comfortably in bed  HEENT: NC/AT  Respiratory: nonlabored breathing  Cardiovascular: RRR, normal S1 and S2, no murmurs or gallops  Abdomen: obese, soft, non-tender, midline wound VAC working,   Drain: clear yellow fluid in bag ( 10mL/24hr )  Ostomy: pink and viable, gas and stool in bag  --------------------------------------------------------------------------------------------------  V/S:  Vital Signs Last 24 Hrs  T(C): 36.7 (07 Jan 2020 00:02), Max: 37.1 (06 Jan 2020 15:35)  T(F): 98 (07 Jan 2020 00:02), Max: 98.8 (06 Jan 2020 15:35)  HR: 107 (07 Jan 2020 00:02) (74 - 112)  BP: 96/64 (06 Jan 2020 22:51) (73/59 - 114/59)  BP(mean): --  RR: 18 (07 Jan 2020 00:02) (18 - 18)  SpO2: 95% (07 Jan 2020 00:02) (92% - 95%)    --------------------------------------------------------------------------------------------------  I/Os:    05 Jan 2020 07:01  -  06 Jan 2020 07:00  --------------------------------------------------------  IN:    Oral Fluid: 970 mL    Solution: 100 mL  Total IN: 1070 mL    OUT:    Drain: 30 mL    Voided: 2300 mL  Total OUT: 2330 mL    Total NET: -1260 mL      06 Jan 2020 07:01  -  07 Jan 2020 04:41  --------------------------------------------------------  IN:    Lactated Ringers IV Bolus: 500 mL    Oral Fluid: 760 mL    Solution: 100 mL  Total IN: 1360 mL    OUT:    Colostomy: 200 mL    Drain: 10 mL    VAC (Vacuum Assisted Closure) System: 200 mL    Voided: 550 mL  Total OUT: 960 mL    Total NET: 400 mL        --------------------------------------------------------------------------------------------------  LABS:                        11.1   19.27 )-----------( 356      ( 07 Jan 2020 01:24 )             35.2     06 Jan 2020 23:02    132    |  93     |  11     ----------------------------<  102    4.9     |  24     |  0.78     Ca    8.5        06 Jan 2020 23:02  Phos  4.0       06 Jan 2020 06:07  Mg     1.9       06 Jan 2020 06:07    TPro  6.1    /  Alb  2.0    /  TBili  0.3    /  DBili  0.1    /  AST  27     /  ALT  20     /  AlkPhos  106    06 Jan 2020 06:07    PT/INR - ( 05 Jan 2020 15:51 )   PT: 14.9 sec;   INR: 1.30 ratio         PTT - ( 05 Jan 2020 15:51 )  PTT:32.5 sec  CAPILLARY BLOOD GLUCOSE      POCT Blood Glucose.: 94 mg/dL (06 Jan 2020 22:18)        LIVER FUNCTIONS - ( 06 Jan 2020 06:07 )  Alb: 2.0 g/dL / Pro: 6.1 g/dL / ALK PHOS: 106 U/L / ALT: 20 U/L / AST: 27 U/L / GGT: x               --------------------------------------------------------------------------------------------------  MEDICATIONS  (STANDING):  aspirin  chewable 81 milliGRAM(s) Oral <User Schedule>  atorvastatin 40 milliGRAM(s) Oral <User Schedule>  budesonide 160 MICROgram(s)/formoterol 4.5 MICROgram(s) Inhaler 2 Puff(s) Inhalation two times a day  chlorhexidine 4% Liquid 1 Application(s) Topical <User Schedule>  doxazosin 2 milliGRAM(s) Oral <User Schedule>  erythromycin     base Tablet 250 milliGRAM(s) Oral <User Schedule>  lactated ringers. 1000 milliLiter(s) (75 mL/Hr) IV Continuous <Continuous>  methadone    Tablet 17.5 milliGRAM(s) Oral daily  nystatin Powder 1 Application(s) Topical two times a day  pantoprazole    Tablet 40 milliGRAM(s) Oral <User Schedule>  polyethylene glycol 3350 17 Gram(s) Oral <User Schedule>  silver nitrate Applicator 1 Application(s) Topical once    MEDICATIONS  (PRN):  acetaminophen   Tablet .. 650 milliGRAM(s) Oral every 6 hours PRN Mild Pain (1 - 3)  ALBUTerol    90 MICROgram(s) HFA Inhaler 2 Puff(s) Inhalation every 6 hours PRN Shortness of Breath and/or Wheezing  aluminum hydroxide/magnesium hydroxide/simethicone Suspension 30 milliLiter(s) Oral every 4 hours PRN Dyspepsia  diphenhydrAMINE 25 milliGRAM(s) Oral every 4 hours PRN Rash and/or Itching  sodium chloride 0.9% lock flush 10 milliLiter(s) IV Push every 1 hour PRN Pre/post blood products, medications, blood draw, and to maintain line patency    --------------------------------------------------------------------------------------------------

## 2020-01-07 NOTE — PROGRESS NOTE ADULT - SUBJECTIVE AND OBJECTIVE BOX
CARDIOLOGY     PROGRESS  NOTE   ________________________________________________    CHIEF COMPLAINT:Patient is a 64y old  Female who presents with a chief complaint of perforated bowel (08 Dec 2019 09:22)  no complain  	  REVIEW OF SYSTEMS:  CONSTITUTIONAL: No fever, weight loss, or fatigue  EYES: No eye pain, visual disturbances, or discharge  ENT:  No difficulty hearing, tinnitus, vertigo; No sinus or throat pain  NECK: No pain or stiffness  RESPIRATORY: No cough, wheezing, chills or hemoptysis; No Shortness of Breath  CARDIOVASCULAR: No chest pain, palpitations, passing out, dizziness, or leg swelling  GASTROINTESTINAL: No abdominal or epigastric pain. No nausea, vomiting, or hematemesis; No diarrhea or constipation. No melena or hematochezia.  GENITOURINARY: No dysuria, frequency, hematuria, or incontinence  NEUROLOGICAL: No headaches, memory loss, loss of strength, numbness, or tremors  SKIN: No itching, burning, rashes, or lesions   LYMPH Nodes: No enlarged glands  ENDOCRINE: No heat or cold intolerance; No hair loss  MUSCULOSKELETAL: No joint pain or swelling; No muscle, back, or extremity pain  PSYCHIATRIC: No depression, anxiety, mood swings, or difficulty sleeping  HEME/LYMPH: No easy bruising, or bleeding gums  ALLERGY AND IMMUNOLOGIC: No hives or eczema	    [ ] All others negative	  [ ] Unable to obtain    PHYSICAL EXAM:  T(C): 36.7 (01-07-20 @ 06:05), Max: 37.1 (01-06-20 @ 15:35)  HR: 90 (01-07-20 @ 06:05) (79 - 112)  BP: 90/51 (01-07-20 @ 06:05) (73/59 - 114/59)  RR: 18 (01-07-20 @ 06:05) (18 - 18)  SpO2: 95% (01-07-20 @ 06:05) (92% - 95%)  Wt(kg): --  I&O's Summary    06 Jan 2020 07:01  -  07 Jan 2020 07:00  --------------------------------------------------------  IN: 2235 mL / OUT: 1435 mL / NET: 800 mL        Appearance: Normal	  HEENT:   Normal oral mucosa, PERRL, EOMI	  Lymphatic: No lymphadenopathy  Cardiovascular: Normal S1 S2, No JVD, +murmurs, No edema  Respiratory: Lungs clear to auscultation	  Psychiatry: A & O x 3, Mood & affect appropriate  Gastrointestinal:  Soft, Non-tender, + BS	  Skin: No rashes, No ecchymoses, No cyanosis	  Neurologic: Non-focal  Extremities: Normal range of motion, No clubbing, cyanosis or edema  Vascular: Peripheral pulses palpable 2+ bilaterally    MEDICATIONS  (STANDING):  aspirin  chewable 81 milliGRAM(s) Oral <User Schedule>  atorvastatin 40 milliGRAM(s) Oral <User Schedule>  budesonide 160 MICROgram(s)/formoterol 4.5 MICROgram(s) Inhaler 2 Puff(s) Inhalation two times a day  chlorhexidine 4% Liquid 1 Application(s) Topical <User Schedule>  doxazosin 2 milliGRAM(s) Oral <User Schedule>  erythromycin     base Tablet 250 milliGRAM(s) Oral <User Schedule>  lactated ringers. 1000 milliLiter(s) (75 mL/Hr) IV Continuous <Continuous>  methadone    Tablet 17.5 milliGRAM(s) Oral daily  nystatin Powder 1 Application(s) Topical two times a day  pantoprazole    Tablet 40 milliGRAM(s) Oral <User Schedule>  piperacillin/tazobactam IVPB. 3.375 Gram(s) IV Intermittent once  piperacillin/tazobactam IVPB.. 3.375 Gram(s) IV Intermittent every 8 hours  polyethylene glycol 3350 17 Gram(s) Oral <User Schedule>  silver nitrate Applicator 1 Application(s) Topical once  vancomycin  IVPB      vancomycin  IVPB 1000 milliGRAM(s) IV Intermittent once      TELEMETRY: 	    ECG:  	  RADIOLOGY:  OTHER: 	  	  LABS:	 	    CARDIAC MARKERS:                                11.1   19.27 )-----------( 356      ( 07 Jan 2020 01:24 )             35.2     01-07    127<L>  |  92<L>  |  15  ----------------------------<  99  5.1   |  25  |  1.10    Ca    8.7      07 Jan 2020 06:28  Phos  4.6     01-07  Mg     1.8     01-07    TPro  6.1  /  Alb  1.7<L>  /  TBili  0.4  /  DBili  x   /  AST  21  /  ALT  17  /  AlkPhos  110  01-07    proBNP:   Lipid Profile: Cholesterol 98  LDL 52  HDL 28  TG 91    HgA1c:   TSH:   PT/INR - ( 05 Jan 2020 15:51 )   PT: 14.9 sec;   INR: 1.30 ratio         PTT - ( 05 Jan 2020 15:51 )  PTT:32.5 sec      Assessment and plan  ---------------------------  pt is sitting up comfortably in NAD , no complain.  bp noted  echo noted hyperdynamic lv sec to pain/anxiety, volume depletion  continue dvt prophylaxis  started on Lasix and aldactone ?/ lasix dose has increased ? pt with hx of lymphedema and low albumin, avoid hypotension  continue current meds  s/p debridement ?VAC  keep k>4  dvt prophylaxis  nausea ?gastroparesis ?add Reglan  continue Protonix  plan as per surgery ?more debridement ?plastic surgery	for surgical debridement, now is on VAC  increasing diuretics ?pt with hyperdynamic LV ?diuresis  agree to dc diuretics

## 2020-01-07 NOTE — PROGRESS NOTE ADULT - SUBJECTIVE AND OBJECTIVE BOX
HISTORY:  64F with pmhx morbid obesity, acid reflux, HTN, COPD and PSH D&C presented to the ED on 11/26 c/o abdominal pain and bloating. Patient reports having constipation for 2 weeks and has been taking enemas, miralax and senna and passing small hard balls for the last weeks. Reported pain worst in the LLQ that started a few days ago and has been worsening in the last day, also has noted significant abdominal distention in the last day. Reported she had difficulty walking short distances because she becomes SOB. In ED, CT demonstrated perforated sigmoid diverticulitis with intraperitoneal free air. She was taken to the operating room on 11/25 overnight and underwent an exploratory laparotomy with extended left hemicolectomy and was left in discontinuity. An Abthera VAC was placed. She was kept intubated post op and brought to the ICU for hemodynamic and respiratory management. She went back to the OR on 11/29 for transverse end colostomy and fascial closure with wound vac.  Pt transferred out of SICU and recovering on floor with post-op course including induration of wound and colostomy necrosis requiring bedside debridement and wound vac placement.  Pt had CT on 1/6 for leukocytosis and to evaluate wound, which showed loculated fluid collections in the left anterior abdomen, very near loops of bowel.  After CT, pt reported feeling "worse" and was hypotensive overnight to SBP 70s requiring 1L fluid bolus.  Pt was bolused again this am for hypotension and blood cultures were sent for worsening leukocytosis, and BULL.  SICU consulted for closer monitoring.  UA positive and patient was started on Ceftriaxone for presumed UTI.       SUBJECTIVE/ROS:  [x ] A ten-point review of systems was otherwise negative except as noted.  [ ] Due to altered mental status/intubation, subjective information were not able to be obtained from the patient. History was obtained, to the extent possible, from review of the chart and collateral sources of information.      NEURO  RASS:     GCS:     CAM ICU:  Exam: Awake, alert, following commands.  A&O x 3.  In visible discomfort.   Meds: acetaminophen   Tablet .. 650 milliGRAM(s) Oral every 6 hours PRN Mild Pain (1 - 3)  methadone    Tablet 17.5 milliGRAM(s) Oral daily    [x] Adequacy of sedation and pain control has been assessed and adjusted      RESPIRATORY  RR: 18 (01-07-20 @ 18:30) (18 - 29)  SpO2: 95% (01-07-20 @ 18:30) (91% - 98%)  Exam: Clear with decreased breath sounds at B/L bases.   Mechanical Ventilation: N/A  ABG - ( 07 Jan 2020 12:09 )  pH: x     /  pCO2: x     /  pO2: x     / HCO3: x     / Base Excess: x     /  SaO2: x       Lactate: 5.0    [ ] Extubation Readiness Assessed  Meds: ALBUTerol    90 MICROgram(s) HFA Inhaler 2 Puff(s) Inhalation every 6 hours PRN Shortness of Breath and/or Wheezing  budesonide 160 MICROgram(s)/formoterol 4.5 MICROgram(s) Inhaler 2 Puff(s) Inhalation two times a day        CARDIOVASCULAR  HR: 93 (01-07-20 @ 18:30) (84 - 112)  BP: 85/52 (01-07-20 @ 18:30) (73/59 - 112/74)  BP(mean): 65 (01-07-20 @ 18:30) (56 - 76)  VBG - ( 07 Jan 2020 15:58 )  pH: 7.38  /  pCO2: 42    /  pO2: 43    / HCO3: 24    / Base Excess: -0.6  /  SaO2: 74     Lactate: 3.8    Lactate, Blood: 5.0 mmol/L (01-07 @ 12:09)    Exam: RRR. +S1, +S2.   Cardiac Rhythm: Sinus  Perfusion     [ ]Adequate   [x ]Inadequate  Mentation   [x ]Normal       [ ]Reduced  Extremities  [ x]Warm         [ ]Cool  Volume Status [ ]Hypervolemic [ ]Euvolemic [ x]Hypovolemic  Meds: doxazosin 2 milliGRAM(s) Oral <User Schedule>      GI/NUTRITION  Exam: +wound vac draining serosanguinous fluid. Ostomy retracted, hyperemic.   Diet: Regular   Meds: aluminum hydroxide/magnesium hydroxide/simethicone Suspension 30 milliLiter(s) Oral every 4 hours PRN Dyspepsia  pantoprazole    Tablet 40 milliGRAM(s) Oral <User Schedule>  polyethylene glycol 3350 17 Gram(s) Oral <User Schedule>      GENITOURINARY  I&O's Detail    01-06 @ 07:01  -  01-07 @ 07:00  --------------------------------------------------------  IN:    Lactated Ringers IV Bolus: 1000 mL    lactated ringers.: 375 mL    Oral Fluid: 760 mL    Solution: 100 mL  Total IN: 2235 mL    OUT:    Colostomy: 250 mL    Drain: 35 mL    VAC (Vacuum Assisted Closure) System: 300 mL    Voided: 850 mL  Total OUT: 1435 mL    Total NET: 800 mL      01-07 @ 07:01 - 01-07 @ 18:45  --------------------------------------------------------  IN:    Oral Fluid: 180 mL    sodium chloride 0.9%.: 750 mL    Solution: 50 mL  Total IN: 980 mL    OUT:    Drain: 10 mL    Indwelling Catheter - Urethral: 170 mL  Total OUT: 180 mL    Total NET: 800 mL      01-07    130<L>  |  94<L>  |  16  ----------------------------<  95  5.2   |  21<L>  |  1.46<H>    Ca    8.0<L>      07 Jan 2020 16:12  Phos  5.1     01-07  Mg     1.6     01-07    TPro  6.1  /  Alb  1.7<L>  /  TBili  0.4  /  DBili  x   /  AST  21  /  ALT  17  /  AlkPhos  110  01-07    [x ] Ross catheter, indication: strict I's and O's   Meds: sodium chloride 0.9%. 1000 milliLiter(s) IV Continuous <Continuous>        HEMATOLOGIC  Meds: aspirin  chewable 81 milliGRAM(s) Oral <User Schedule>  enoxaparin Injectable 100 milliGRAM(s) SubCutaneous <User Schedule>    [x] VTE Prophylaxis                        12.2   25.92 )-----------( 335      ( 07 Jan 2020 12:09 )             40.8       Transfusion     [ ] PRBC   [ ] Platelets   [ ] FFP   [ ] Cryoprecipitate      INFECTIOUS DISEASES  T(C): 36.8 (01-07-20 @ 15:00), Max: 37.1 (01-07-20 @ 11:35)  WBC Count: 25.92 K/uL (01-07 @ 12:09)  WBC Count: 21.07 K/uL (01-07 @ 09:43)  WBC Count: 19.27 K/uL (01-07 @ 01:24)  WBC Count: 16.80 K/uL (01-06 @ 23:02)    Recent Cultures:    Meds: cefTRIAXone   IVPB 1000 milliGRAM(s) IV Intermittent every 24 hours  erythromycin     base Tablet 250 milliGRAM(s) Oral <User Schedule>      ENDOCRINE  Capillary Blood Glucose    Meds: atorvastatin 40 milliGRAM(s) Oral <User Schedule>      ACCESS DEVICES:  [ ] Peripheral IV  [ ] Central Venous Line	[ ] R	[ ] L	[ ] IJ	[ ] Fem	[ ] SC	Placed:   [ ] Arterial Line		[ ] R	[ ] L	[ ] Fem	[ ] Rad	[ ] Ax	Placed:   [x ] PICC: LUE				[ ] Mediport  [x ] Urinary Catheter, Date Placed: 1/7/20  [x ] Necessity of urinary, arterial, and venous catheters discussed    OTHER MEDICATIONS:  chlorhexidine 4% Liquid 1 Application(s) Topical <User Schedule>  nystatin Powder 1 Application(s) Topical two times a day  silver nitrate Applicator 1 Application(s) Topical once      CODE STATUS: Full code    IMAGING:   < from: CT Abdomen and Pelvis w/ Oral Cont and w/ IV Cont (01.06.20 @ 10:47) >  IMPRESSION:   Open anterior abdominal wall wound.    Loculated fluid collections in the left anterior abdomen, inseparable from small bowel loops, as well as a small abdominal wall collection.     Small volume loculated mesenteric fluid.      < end of copied text >

## 2020-01-07 NOTE — PROVIDER CONTACT NOTE (OTHER) - ACTION/TREATMENT ORDERED:
JAK Odell and Dr. Turner at bedside, repeat CBC, place marquez. SICU consult. Will complete orders as placed, will continue to monitor.

## 2020-01-07 NOTE — CONSULT NOTE ADULT - SUBJECTIVE AND OBJECTIVE BOX
[facility] - Division of Pulmonary, Critical Care and Sleep Medicine   Please call [phone] between 8am-pm weekdays, [phone] after hours and weekends      CHIEF COMPLAINT:    HPI: 65 yo F smoker up until admission (-1 ppd x 30 years), COPD, a/w perforated and necrotic sigmoid colon, s/p ex laparotomy with extended left hemicolectomy on 19. OR  with creation of RUQ end transverse colostomy abdomen closed. On  patient found to have induration of wound and colostomy necrosis, s/p bedside debridement w/ irrigation vac in place on midline wound. S/p CT A/P with contrast yesterday.  Became hypotensive not feeling well, rashes of lt UE with itch.   Consulted for COPD management - patient with cough throughout hospital stay, states Symbicort and albuterol mdi not helping. Denies chest pain or shortness of breath. +wheeze. cough is nonproductive. 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  CXR from  clear. CT A/P  - lower lungs with trace left pleural effusion and adjacent atelectasis    PAST MEDICAL & SURGICAL HISTORY:  Morbid Obesity  Post Menopausal Bleeding  Polyp, Vagina  Acid Reflux  HTN (Hypertension)  S/P D&C: , w vaginal polypectomy      FAMILY HISTORY:      SOCIAL HISTORY:  Smoking: [ ] Never Smoked [ x] Former Smoker (_0.5-1_ packs x _30__ years) [ ] Current Smoker  (__ packs x ___ years)  Substance Use: [x ] Never Used [ ] Used ____  EtOH Use: denies  Occupation: retired  Recent Travel: denies    Allergies  IV Contrast (Rash; Pruritus; Hypotension)  sulfa drugs (Unknown)    Intolerances        HOME MEDICATIONS: reviewed - home pulm regimen - Breo, Albuterol mdi    REVIEW OF SYSTEMS:  Constitutional: [ ] fevers [ ] chills [ ] weight loss [ ] weight gain  HEENT: [ ] dry eyes [ ] eye irritation [ ] postnasal drip [ ] nasal congestion  CV: [ ] chest pain [ ] orthopnea [ ] palpitations [ ] murmur  Resp: [x ] cough [ ] shortness of breath [ ] wheezing [ ] sputum [ ] hemoptysis  GI: [x ] nausea [ ] vomiting [ ] diarrhea [ ] constipation [ ] abd pain [ ] dysphagia   : [ ] dysuria [ ] nocturia [ ] hematuria [ ] increased urinary frequency  Musculoskeletal: [ ] myalgias [x ] arthralgias   Skin: [x ] rash [ x] itch  Neurological: [ ] headache [ ] dizziness [ ] syncope [ ] weakness [ ] numbness  Psychiatric: [ ] anxiety [ ] depression  Endocrine: [ ] diabetes [ ] thyroid problem  Hematologic/Lymphatic: [ ] anemia [ ] bleeding problem  Allergic/Immunologic: [ ] itchy eyes [ ] nasal discharge [ ] hives [ ] angioedema  [x ] All other systems negative  [ ] Unable to assess ROS because ________    OBJECTIVE:  ICU Vital Signs Last 24 Hrs  T(C): 36.8 (2020 15:00), Max: 37.1 (2020 11:35)  T(F): 98.2 (2020 15:00), Max: 98.7 (2020 11:35)  HR: 84 (2020 15:30) (84 - 112)  BP: 96/55 (2020 15:30) (73/59 - 112/74)  BP(mean): 73 (2020 15:30) (56 - 76)  ABP: --  ABP(mean): --  RR: 18 (2020 15:30) (18 - 29)  SpO2: 96% (2020 15:30) (91% - 98%)         @ : @ 07:00  --------------------------------------------------------  IN: 2235 mL / OUT: 1435 mL / NET: 800 mL     @ : @ 16:41  --------------------------------------------------------  IN: 730 mL / OUT: 160 mL / NET: 570 mL      CAPILLARY BLOOD GLUCOSE      POCT Blood Glucose.: 94 mg/dL (2020 22:18)      PHYSICAL EXAM: 98% on 2 LNC, 94% on RA  General:  NAD  HEENT:  NC/AT  Neck: Supple  Respiratory:  prolonged expiratory phase, scattered wheezes  Cardiovascular:  RRR, no m/r/g  Abdomen: soft, NT/ND, +BS, midline wound vac  Extremities: b/l LE lymphedema, chronic skin changes  Skin: no rash  Neurological: AAOx3, no focal deficits  Psychiatry: not anxious, normal affect    HOSPITAL MEDICATIONS:  MEDICATIONS  (STANDING):  aspirin  chewable 81 milliGRAM(s) Oral <User Schedule>  atorvastatin 40 milliGRAM(s) Oral <User Schedule>  budesonide 160 MICROgram(s)/formoterol 4.5 MICROgram(s) Inhaler 2 Puff(s) Inhalation two times a day  cefTRIAXone   IVPB 1000 milliGRAM(s) IV Intermittent every 24 hours  chlorhexidine 4% Liquid 1 Application(s) Topical <User Schedule>  doxazosin 2 milliGRAM(s) Oral <User Schedule>  enoxaparin Injectable 100 milliGRAM(s) SubCutaneous <User Schedule>  erythromycin     base Tablet 250 milliGRAM(s) Oral <User Schedule>  methadone    Tablet 17.5 milliGRAM(s) Oral daily  nystatin Powder 1 Application(s) Topical two times a day  pantoprazole    Tablet 40 milliGRAM(s) Oral <User Schedule>  polyethylene glycol 3350 17 Gram(s) Oral <User Schedule>  silver nitrate Applicator 1 Application(s) Topical once  sodium chloride 0.9%. 1000 milliLiter(s) (125 mL/Hr) IV Continuous <Continuous>    MEDICATIONS  (PRN):  acetaminophen   Tablet .. 650 milliGRAM(s) Oral every 6 hours PRN Mild Pain (1 - 3)  ALBUTerol    90 MICROgram(s) HFA Inhaler 2 Puff(s) Inhalation every 6 hours PRN Shortness of Breath and/or Wheezing  aluminum hydroxide/magnesium hydroxide/simethicone Suspension 30 milliLiter(s) Oral every 4 hours PRN Dyspepsia  diphenhydrAMINE 25 milliGRAM(s) Oral every 4 hours PRN Rash and/or Itching      LABS:                        12.2   25.92 )-----------( 335      ( 2020 12:09 )             40.8     Hgb Trend: 12.2<--, 11.7<--, 11.1<--, 11.3<--, 8.3<--      124<L>  |  91<L>  |  17  ----------------------------<  104<H>  5.4<H>   |  21<L>  |  1.34<H>    Ca    8.6      2020 12:09  Phos  4.6       Mg     1.8         TPro  6.1  /  Alb  1.7<L>  /  TBili  0.4  /  DBili  x   /  AST  21  /  ALT  17  /  AlkPhos  110      Creatinine Trend: 1.34<--, 1.10<--, 0.78<--, 0.66<--, 0.63<--, 0.55<--    Urinalysis Basic - ( 2020 06:28 )    Color: Light Orange / Appearance: Slightly Turbid / S.041 / pH: x  Gluc: x / Ketone: Negative  / Bili: Negative / Urobili: Negative   Blood: x / Protein: Trace / Nitrite: Negative   Leuk Esterase: Large / RBC: 4 /hpf /  /HPF   Sq Epi: x / Non Sq Epi: 0 /hpf / Bacteria: Many        Venous Blood Gas:   @ 15:58  7.38/42/43/24/74  VBG Lactate: 3.8      MICROBIOLOGY:     RADIOLOGY:  [x ] Reviewed and interpreted by me  < from: Xray Chest 1 View- PORTABLE-Urgent (20 @ 11:13) >  EXAM:  XR CHEST PORTABLE URGENT 1V                            PROCEDURE DATE:  2020            INTERPRETATION:  XR CHEST URGENT. AP view.    INDICATION: r/o pneumonia, cough    COMPARISON: 2020    FINDINGS/  IMPRESSION:    Left PICC tip within the brachiocephalic vein.    Clear lungs. No pleural effusion or pneumothorax.    XOCHITL CHANDRA M.D., ATTENDING RADIOLOGIST  This document has been electronically signed. 2020  2:18PM    < end of copied text >    PULMONARY FUNCTION TESTS: none available

## 2020-01-07 NOTE — PROGRESS NOTE ADULT - ASSESSMENT
64F with pmhx morbid obesity, acid reflux, HTN, COPD and PSH D&C presented to the ED on 11/26 c/o abdominal pain and bloating. Reported pain worst in the LLQ that started a few days ago and has been worsening in the last day, also has noted significant abdominal distention in the last day.  In ED, CT demonstrated perforated sigmoid diverticulitis with intraperitoneal free air. She was taken to the operating room on 11/25 overnight and underwent an exploratory laparotomy with extended left hemicolectomy and was left in discontinuity. An Abthera VAC was placed. She was kept intubated post op and brought to the ICU for hemodynamic and respiratory management. She went back to the OR on 11/29 for transverse end colostomy and fascial closure with wound vac.  Pt transferred out of SICU and recovering on floor with post-op course including induration of wound and colostomy necrosis requiring bedside debridement and wound vac placement.  Pt had CT on 1/6 for leukocytosis and to evaluate wound, which showed loculated fluid collections in the left anterior abdomen, very near loops of bowel.  After CT, pt reported feeling "worse" and was hypotensive overnight to SBP 70s requiring 1L fluid bolus.  Pt was bolused again this am for hypotension and blood cultures were sent for worsening leukocytosis, and BULL.  SICU consulted for closer monitoring.  UA positive and patient was started on Ceftriaxone for presumed UTI.     Neuro: pain control, h/o chronic pain requiring methadone   - Tylenol prn  - Continue home dose Methadone     Resp: h/o COPD  - Albuterol prn  - Continue Symbicort    CV: Hypotension likely secondary to hypovolemia s/p additional 1L NS bolus in SICU; h/o HTN  - Hemodynamic monitoring  - Trend lactate  - Hold home anti-hypertensives  - Continue statin     GI: Perforated sigmoid diverticulitis s/p extended left hemicolectomy (11/25)  - Continue Regular diet  - Monitor wound vac output   - Protonix     /Renal: Hypovolemic hyponatremia, BULL  - Hyponatremia improving  - Trend BUN/Cr   - Replete electrolytes as needed  - Monitor UOP     ID: UTI, leukocytosis   - Ceftriaxone  - f/u UCx and blood cultures     Heme: VTE prophylaxis  - Continue lovenox  - ASA    Endo:   - Monitor serum glucose     Lines:  - LUE PICC     Dispo: SICU care.  Full code.  Case discussed with Dr. David Iglesias, PA-C #[id number]

## 2020-01-07 NOTE — CONSULT NOTE ADULT - ASSESSMENT
65 yo F smoker up until admission (1/2-1 ppd x 30 years), COPD, a/w perforated and necrotic sigmoid colon, s/p ex laparotomy with extended left hemicolectomy on 11/26/19. OR 11/29 with creation of RUQ end transverse colostomy abdomen closed. On 12/19 patient found to have induration of wound and colostomy necrosis, s/p bedside debridement w/ irrigation vac in place on midline wound. S/p CT A/P with contrast yesterday.  Became hypotensive not feeling well, rashes of lt UE with itch.   Consulted for COPD management - patient with cough throughout hospital stay, states Symbicort and albuterol mdi not helping. Denies chest pain or shortness of breath. +wheeze. cough is nonproductive. 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  CXR from 1/7 clear. CT A/P 1/6 - lower lungs with trace left pleural effusion and adjacent atelectasis    A/P:   1. COPD with chronic bronchitis, likely nocturnal hypoventilation/sleep disordered breathing  Would switch Symbicort and albuterol MDI to nebs - Budesonide BID, Duonebs Q6 hours  Incentive spirometer, acapella device after neb treatments  Supplemental O2, goal sats low 92-96%  Counseled on smoking cessation - does not have an urge to smoke, does not want NRT  Will require full PFTs and sleep study as an outpatient, no urgent indication for dedicated CT chest unless respiratory status changes    2. Hypotension, rash - possible IV contrast allergic reaction  c/w benadryl, H2 blocker, IV fluids, supportive  care as per SICU team    3. DVT ppx    Thank you for the consult  Will follow up

## 2020-01-07 NOTE — PROGRESS NOTE ADULT - ASSESSMENT
65 yo F with PMHx morbid obesity, GERD, HTN, COPD, and pshx D&C now s/p ex laparotomy with extended left [id number]/26 w/ perforated and necrotic sigmod colon with pus and stool in the abdominal cavity. RTOR 11/29 RUQ end transverse colostomy created, abdomen closed. Recovering appropriately on floor. On 12/19 patient found to have induration of wound and colostomy necrosis, s/p bedside debridement w/ wound VAC in place on midline wound. Recovering appropriately with daily wound care.     Plan:  - Trend lactate levels  - NPO for tentative intervention in light of yesterday's CT findings  - vac change today (TIW, changed 1/5)   - f/u ostomy OP  - c/w pain control: methadone (home medication), Tylenol, Oxy PRN  - Dispo: rehab    [facility] Surgery   [id number] 65 yo F with PMHx morbid obesity, GERD, HTN, COPD, and pshx D&C now s/p ex laparotomy with extended left [id number]/26 w/ perforated and necrotic sigmod colon with pus and stool in the abdominal cavity. RTOR 11/29 RUQ end transverse colostomy created, abdomen closed. Recovering appropriately on floor. On 12/19 patient found to have induration of wound and colostomy necrosis, s/p bedside debridement w/ wound VAC in place on midline wound. Recovering appropriately with daily wound care.     Plan:  - 1L bolus  - IV abx  - Trend lactate levels  - NPO for tentative intervention in light of yesterday's CT findings  - vac change today (TIW, changed 1/5)   - f/u ostomy OP  - c/w pain control: methadone (home medication), Tylenol, Oxy PRN  - Dispo: rehab    [facility] Surgery   [id number]

## 2020-01-07 NOTE — CHART NOTE - NSCHARTNOTEFT_GEN_A_CORE
called to evaluate patient for hypotension 82/51- patient feels "tired and thirsty"-  given 1L of LR overnight and 1L bolus of LR this morning-  NS @125cc/hr- lactate 3.5 from 2.8 and patient oliguric- UA revealed leukocyte esterase and WBCs- will place marquez to monitor urine output and urine culture sent- started on Rocephin and SICU consulted for closer hemodynamic monitoring for possible urosepsis    Vital Signs Last 24 Hrs  T(C): 36.2 (07 Jan 2020 13:40), Max: 37.1 (06 Jan 2020 15:35)  T(F): 97.2 (07 Jan 2020 13:40), Max: 98.8 (06 Jan 2020 15:35)  HR: 103 (07 Jan 2020 13:40) (90 - 112)  BP: 91/61 (07 Jan 2020 13:40) (73/59 - 112/74)  BP(mean): 68 (07 Jan 2020 13:40) (68 - 76)  RR: 29 (07 Jan 2020 13:40) (18 - 29)  SpO2: 91% (07 Jan 2020 13:40) (91% - 95%)    above discussed with Dr. Yue Odell PA-C [id number]

## 2020-01-07 NOTE — PROGRESS NOTE ADULT - ATTENDING COMMENTS
Evaluated on floor and in ICU  Please see resident note for full detail.  65 yo F s/p ex laparotomy with extended left [id number]/26 w/ perforated and necrotic sigmoid colon with pus and stool in the abdominal cavity. RTOR 11/29 with creation of RUQ end transverse colostomy abdomen closed. On 12/19 patient found to have induration of wound and colostomy necrosis, s/p bedside debridement w/ irrigation vac in place on midline wound.   S/p CT A/P with contrast yesterday.  Became hypotensive not feeling well, rashes of lt UE with itch. Denies sob cp fever  UA +  IVF NS bolus and drip, work up for hyponatremia  Monitor I/O renal function  Start CTX for UTI  PO  Wound care    Discussed with Dr Vargas

## 2020-01-07 NOTE — CHART NOTE - NSCHARTNOTEFT_GEN_A_CORE
Event: Paged to patient's bedside for Pt with low bp of 73/59 w/HR of 112. Of note, patient underwent CT A/P wit oral and IV contrast earlier in the evening that had caused the patient to feel "unwell and a sense of itchiness." This was initially treated with two doses of 25mg of Benadryl. At the bedside patient continues to report "not feeling well," but does not report any HA, CP, dizziness/light-headedness, abdominal pain, but does report a vague sense of nausea that has not resulted in emesis. Patient started to feel better after having a drink of water. initial ECG revealed sinus tachycardia with a new inferior infarct of unknown age.     Vital Signs Last 24 Hrs  T(C): 36.7 (07 Jan 2020 00:02), Max: 37.1 (06 Jan 2020 15:35)  T(F): 98 (07 Jan 2020 00:02), Max: 98.8 (06 Jan 2020 15:35)  HR: 107 (07 Jan 2020 00:02) (74 - 112)  BP: 96/64 (06 Jan 2020 22:51) (73/59 - 114/59)  BP(mean): --  RR: 18 (07 Jan 2020 00:02) (18 - 18)  SpO2: 95% (07 Jan 2020 00:02) (92% - 95%)    Plan:   - Bolus with 1L (500x x2) of LR; maintainenance of LR@75cc/hr due to being NPO  - Trend Lactate (initial was 2.8) and troponins  - F/u CBC/CMP/ U/A     Green surgery,  [id number]

## 2020-01-08 LAB
-  COAGULASE NEGATIVE STAPHYLOCOCCUS: SIGNIFICANT CHANGE UP
ANION GAP SERPL CALC-SCNC: 11 MMOL/L — SIGNIFICANT CHANGE UP (ref 5–17)
ANION GAP SERPL CALC-SCNC: 11 MMOL/L — SIGNIFICANT CHANGE UP (ref 5–17)
ANION GAP SERPL CALC-SCNC: 13 MMOL/L — SIGNIFICANT CHANGE UP (ref 5–17)
ANION GAP SERPL CALC-SCNC: 15 MMOL/L — SIGNIFICANT CHANGE UP (ref 5–17)
BUN SERPL-MCNC: 18 MG/DL — SIGNIFICANT CHANGE UP (ref 7–23)
BUN SERPL-MCNC: 19 MG/DL — SIGNIFICANT CHANGE UP (ref 7–23)
BUN SERPL-MCNC: 21 MG/DL — SIGNIFICANT CHANGE UP (ref 7–23)
BUN SERPL-MCNC: 21 MG/DL — SIGNIFICANT CHANGE UP (ref 7–23)
CALCIUM SERPL-MCNC: 7.4 MG/DL — LOW (ref 8.4–10.5)
CALCIUM SERPL-MCNC: 8 MG/DL — LOW (ref 8.4–10.5)
CALCIUM SERPL-MCNC: 8.1 MG/DL — LOW (ref 8.4–10.5)
CALCIUM SERPL-MCNC: 8.1 MG/DL — LOW (ref 8.4–10.5)
CHLORIDE SERPL-SCNC: 95 MMOL/L — LOW (ref 96–108)
CHLORIDE SERPL-SCNC: 96 MMOL/L — SIGNIFICANT CHANGE UP (ref 96–108)
CHLORIDE SERPL-SCNC: 96 MMOL/L — SIGNIFICANT CHANGE UP (ref 96–108)
CHLORIDE SERPL-SCNC: 98 MMOL/L — SIGNIFICANT CHANGE UP (ref 96–108)
CO2 SERPL-SCNC: 19 MMOL/L — LOW (ref 22–31)
CO2 SERPL-SCNC: 19 MMOL/L — LOW (ref 22–31)
CO2 SERPL-SCNC: 21 MMOL/L — LOW (ref 22–31)
CO2 SERPL-SCNC: 21 MMOL/L — LOW (ref 22–31)
CREAT SERPL-MCNC: 1.44 MG/DL — HIGH (ref 0.5–1.3)
CREAT SERPL-MCNC: 1.51 MG/DL — HIGH (ref 0.5–1.3)
CREAT SERPL-MCNC: 1.56 MG/DL — HIGH (ref 0.5–1.3)
CREAT SERPL-MCNC: 1.56 MG/DL — HIGH (ref 0.5–1.3)
GAS PNL BLDV: SIGNIFICANT CHANGE UP
GLUCOSE SERPL-MCNC: 104 MG/DL — HIGH (ref 70–99)
GLUCOSE SERPL-MCNC: 87 MG/DL — SIGNIFICANT CHANGE UP (ref 70–99)
GLUCOSE SERPL-MCNC: 92 MG/DL — SIGNIFICANT CHANGE UP (ref 70–99)
GLUCOSE SERPL-MCNC: 97 MG/DL — SIGNIFICANT CHANGE UP (ref 70–99)
GRAM STN FLD: SIGNIFICANT CHANGE UP
GRAM STN FLD: SIGNIFICANT CHANGE UP
HCT VFR BLD CALC: 29.6 % — LOW (ref 34.5–45)
HCT VFR BLD CALC: 31.6 % — LOW (ref 34.5–45)
HCT VFR BLD CALC: 34.4 % — LOW (ref 34.5–45)
HGB BLD-MCNC: 10 G/DL — LOW (ref 11.5–15.5)
HGB BLD-MCNC: 10.7 G/DL — LOW (ref 11.5–15.5)
HGB BLD-MCNC: 8.8 G/DL — LOW (ref 11.5–15.5)
MAGNESIUM SERPL-MCNC: 1.6 MG/DL — SIGNIFICANT CHANGE UP (ref 1.6–2.6)
MAGNESIUM SERPL-MCNC: 1.9 MG/DL — SIGNIFICANT CHANGE UP (ref 1.6–2.6)
MAGNESIUM SERPL-MCNC: 2 MG/DL — SIGNIFICANT CHANGE UP (ref 1.6–2.6)
MAGNESIUM SERPL-MCNC: 2.1 MG/DL — SIGNIFICANT CHANGE UP (ref 1.6–2.6)
MCHC RBC-ENTMCNC: 27.4 PG — SIGNIFICANT CHANGE UP (ref 27–34)
MCHC RBC-ENTMCNC: 28.1 PG — SIGNIFICANT CHANGE UP (ref 27–34)
MCHC RBC-ENTMCNC: 28.5 PG — SIGNIFICANT CHANGE UP (ref 27–34)
MCHC RBC-ENTMCNC: 29.7 GM/DL — LOW (ref 32–36)
MCHC RBC-ENTMCNC: 31.1 GM/DL — LOW (ref 32–36)
MCHC RBC-ENTMCNC: 31.6 GM/DL — LOW (ref 32–36)
MCV RBC AUTO: 90 FL — SIGNIFICANT CHANGE UP (ref 80–100)
MCV RBC AUTO: 90.3 FL — SIGNIFICANT CHANGE UP (ref 80–100)
MCV RBC AUTO: 92.2 FL — SIGNIFICANT CHANGE UP (ref 80–100)
METHOD TYPE: SIGNIFICANT CHANGE UP
NRBC # BLD: 0 /100 WBCS — SIGNIFICANT CHANGE UP (ref 0–0)
PHOSPHATE SERPL-MCNC: 4.3 MG/DL — SIGNIFICANT CHANGE UP (ref 2.5–4.5)
PHOSPHATE SERPL-MCNC: 4.4 MG/DL — SIGNIFICANT CHANGE UP (ref 2.5–4.5)
PHOSPHATE SERPL-MCNC: 5 MG/DL — HIGH (ref 2.5–4.5)
PHOSPHATE SERPL-MCNC: 5.1 MG/DL — HIGH (ref 2.5–4.5)
PLATELET # BLD AUTO: 333 K/UL — SIGNIFICANT CHANGE UP (ref 150–400)
PLATELET # BLD AUTO: 345 K/UL — SIGNIFICANT CHANGE UP (ref 150–400)
PLATELET # BLD AUTO: 405 K/UL — HIGH (ref 150–400)
POTASSIUM SERPL-MCNC: 4.5 MMOL/L — SIGNIFICANT CHANGE UP (ref 3.5–5.3)
POTASSIUM SERPL-MCNC: 4.6 MMOL/L — SIGNIFICANT CHANGE UP (ref 3.5–5.3)
POTASSIUM SERPL-MCNC: 4.9 MMOL/L — SIGNIFICANT CHANGE UP (ref 3.5–5.3)
POTASSIUM SERPL-MCNC: 5.2 MMOL/L — SIGNIFICANT CHANGE UP (ref 3.5–5.3)
POTASSIUM SERPL-SCNC: 4.5 MMOL/L — SIGNIFICANT CHANGE UP (ref 3.5–5.3)
POTASSIUM SERPL-SCNC: 4.6 MMOL/L — SIGNIFICANT CHANGE UP (ref 3.5–5.3)
POTASSIUM SERPL-SCNC: 4.9 MMOL/L — SIGNIFICANT CHANGE UP (ref 3.5–5.3)
POTASSIUM SERPL-SCNC: 5.2 MMOL/L — SIGNIFICANT CHANGE UP (ref 3.5–5.3)
RBC # BLD: 3.21 M/UL — LOW (ref 3.8–5.2)
RBC # BLD: 3.51 M/UL — LOW (ref 3.8–5.2)
RBC # BLD: 3.81 M/UL — SIGNIFICANT CHANGE UP (ref 3.8–5.2)
RBC # FLD: 15.8 % — HIGH (ref 10.3–14.5)
RBC # FLD: 15.9 % — HIGH (ref 10.3–14.5)
RBC # FLD: 16 % — HIGH (ref 10.3–14.5)
SODIUM SERPL-SCNC: 126 MMOL/L — LOW (ref 135–145)
SODIUM SERPL-SCNC: 129 MMOL/L — LOW (ref 135–145)
SODIUM SERPL-SCNC: 130 MMOL/L — LOW (ref 135–145)
SODIUM SERPL-SCNC: 130 MMOL/L — LOW (ref 135–145)
SPECIMEN SOURCE: SIGNIFICANT CHANGE UP
WBC # BLD: 19.15 K/UL — HIGH (ref 3.8–10.5)
WBC # BLD: 22.05 K/UL — HIGH (ref 3.8–10.5)
WBC # BLD: 25.51 K/UL — HIGH (ref 3.8–10.5)
WBC # FLD AUTO: 19.15 K/UL — HIGH (ref 3.8–10.5)
WBC # FLD AUTO: 22.05 K/UL — HIGH (ref 3.8–10.5)
WBC # FLD AUTO: 25.51 K/UL — HIGH (ref 3.8–10.5)

## 2020-01-08 PROCEDURE — 99233 SBSQ HOSP IP/OBS HIGH 50: CPT

## 2020-01-08 PROCEDURE — 71045 X-RAY EXAM CHEST 1 VIEW: CPT | Mod: 26

## 2020-01-08 RX ORDER — MAGNESIUM SULFATE 500 MG/ML
2 VIAL (ML) INJECTION ONCE
Refills: 0 | Status: COMPLETED | OUTPATIENT
Start: 2020-01-08 | End: 2020-01-08

## 2020-01-08 RX ORDER — SODIUM CHLORIDE 9 MG/ML
1000 INJECTION INTRAMUSCULAR; INTRAVENOUS; SUBCUTANEOUS ONCE
Refills: 0 | Status: COMPLETED | OUTPATIENT
Start: 2020-01-08 | End: 2020-01-08

## 2020-01-08 RX ORDER — ALBUMIN HUMAN 25 %
500 VIAL (ML) INTRAVENOUS ONCE
Refills: 0 | Status: COMPLETED | OUTPATIENT
Start: 2020-01-08 | End: 2020-01-08

## 2020-01-08 RX ORDER — MEROPENEM 1 G/30ML
1000 INJECTION INTRAVENOUS EVERY 8 HOURS
Refills: 0 | Status: COMPLETED | OUTPATIENT
Start: 2020-01-08 | End: 2020-01-14

## 2020-01-08 RX ORDER — PIPERACILLIN AND TAZOBACTAM 4; .5 G/20ML; G/20ML
3.38 INJECTION, POWDER, LYOPHILIZED, FOR SOLUTION INTRAVENOUS EVERY 8 HOURS
Refills: 0 | Status: DISCONTINUED | OUTPATIENT
Start: 2020-01-08 | End: 2020-01-08

## 2020-01-08 RX ORDER — DIPHENHYDRAMINE HCL 50 MG
25 CAPSULE ORAL ONCE
Refills: 0 | Status: COMPLETED | OUTPATIENT
Start: 2020-01-08 | End: 2020-01-08

## 2020-01-08 RX ORDER — SODIUM CHLORIDE 9 MG/ML
1000 INJECTION INTRAMUSCULAR; INTRAVENOUS; SUBCUTANEOUS
Refills: 0 | Status: DISCONTINUED | OUTPATIENT
Start: 2020-01-08 | End: 2020-01-12

## 2020-01-08 RX ORDER — DIPHENHYDRAMINE HCL 50 MG
50 CAPSULE ORAL ONCE
Refills: 0 | Status: COMPLETED | OUTPATIENT
Start: 2020-01-08 | End: 2020-01-09

## 2020-01-08 RX ORDER — ONDANSETRON 8 MG/1
4 TABLET, FILM COATED ORAL ONCE
Refills: 0 | Status: COMPLETED | OUTPATIENT
Start: 2020-01-08 | End: 2020-01-08

## 2020-01-08 RX ORDER — VANCOMYCIN HCL 1 G
1250 VIAL (EA) INTRAVENOUS EVERY 12 HOURS
Refills: 0 | Status: DISCONTINUED | OUTPATIENT
Start: 2020-01-08 | End: 2020-01-13

## 2020-01-08 RX ORDER — PIPERACILLIN AND TAZOBACTAM 4; .5 G/20ML; G/20ML
3.38 INJECTION, POWDER, LYOPHILIZED, FOR SOLUTION INTRAVENOUS ONCE
Refills: 0 | Status: COMPLETED | OUTPATIENT
Start: 2020-01-08 | End: 2020-01-08

## 2020-01-08 RX ORDER — OXYCODONE HYDROCHLORIDE 5 MG/1
5 TABLET ORAL ONCE
Refills: 0 | Status: DISCONTINUED | OUTPATIENT
Start: 2020-01-08 | End: 2020-01-09

## 2020-01-08 RX ADMIN — Medication 250 MILLIGRAM(S): at 10:06

## 2020-01-08 RX ADMIN — ONDANSETRON 4 MILLIGRAM(S): 8 TABLET, FILM COATED ORAL at 08:31

## 2020-01-08 RX ADMIN — SODIUM CHLORIDE 4000 MILLILITER(S): 9 INJECTION INTRAMUSCULAR; INTRAVENOUS; SUBCUTANEOUS at 00:28

## 2020-01-08 RX ADMIN — POLYETHYLENE GLYCOL 3350 17 GRAM(S): 17 POWDER, FOR SOLUTION ORAL at 11:57

## 2020-01-08 RX ADMIN — PIPERACILLIN AND TAZOBACTAM 200 GRAM(S): 4; .5 INJECTION, POWDER, LYOPHILIZED, FOR SOLUTION INTRAVENOUS at 03:48

## 2020-01-08 RX ADMIN — Medication 50 GRAM(S): at 01:56

## 2020-01-08 RX ADMIN — BUDESONIDE AND FORMOTEROL FUMARATE DIHYDRATE 2 PUFF(S): 160; 4.5 AEROSOL RESPIRATORY (INHALATION) at 05:43

## 2020-01-08 RX ADMIN — Medication 250 MILLIGRAM(S): at 08:31

## 2020-01-08 RX ADMIN — Medication 250 MILLILITER(S): at 04:08

## 2020-01-08 RX ADMIN — METHADONE HYDROCHLORIDE 17.5 MILLIGRAM(S): 40 TABLET ORAL at 10:06

## 2020-01-08 RX ADMIN — PIPERACILLIN AND TAZOBACTAM 25 GRAM(S): 4; .5 INJECTION, POWDER, LYOPHILIZED, FOR SOLUTION INTRAVENOUS at 19:30

## 2020-01-08 RX ADMIN — ATORVASTATIN CALCIUM 40 MILLIGRAM(S): 80 TABLET, FILM COATED ORAL at 19:30

## 2020-01-08 RX ADMIN — Medication 250 MILLIGRAM(S): at 18:18

## 2020-01-08 RX ADMIN — POLYETHYLENE GLYCOL 3350 17 GRAM(S): 17 POWDER, FOR SOLUTION ORAL at 06:34

## 2020-01-08 RX ADMIN — CHLORHEXIDINE GLUCONATE 1 APPLICATION(S): 213 SOLUTION TOPICAL at 05:12

## 2020-01-08 RX ADMIN — Medication 200 GRAM(S): at 00:28

## 2020-01-08 RX ADMIN — NYSTATIN CREAM 1 APPLICATION(S): 100000 CREAM TOPICAL at 05:09

## 2020-01-08 RX ADMIN — PANTOPRAZOLE SODIUM 40 MILLIGRAM(S): 20 TABLET, DELAYED RELEASE ORAL at 05:12

## 2020-01-08 RX ADMIN — BUDESONIDE AND FORMOTEROL FUMARATE DIHYDRATE 2 PUFF(S): 160; 4.5 AEROSOL RESPIRATORY (INHALATION) at 17:20

## 2020-01-08 RX ADMIN — ENOXAPARIN SODIUM 100 MILLIGRAM(S): 100 INJECTION SUBCUTANEOUS at 11:57

## 2020-01-08 RX ADMIN — Medication 25 MILLIGRAM(S): at 08:31

## 2020-01-08 RX ADMIN — Medication 81 MILLIGRAM(S): at 19:30

## 2020-01-08 RX ADMIN — NYSTATIN CREAM 1 APPLICATION(S): 100000 CREAM TOPICAL at 17:01

## 2020-01-08 RX ADMIN — Medication 250 MILLIGRAM(S): at 18:19

## 2020-01-08 RX ADMIN — POLYETHYLENE GLYCOL 3350 17 GRAM(S): 17 POWDER, FOR SOLUTION ORAL at 16:58

## 2020-01-08 RX ADMIN — PIPERACILLIN AND TAZOBACTAM 25 GRAM(S): 4; .5 INJECTION, POWDER, LYOPHILIZED, FOR SOLUTION INTRAVENOUS at 11:57

## 2020-01-08 RX ADMIN — Medication 2 MILLIGRAM(S): at 05:12

## 2020-01-08 NOTE — PROGRESS NOTE ADULT - SUBJECTIVE AND OBJECTIVE BOX
SICU DAILY PROGRESS NOTE    64F with pmhx morbid obesity, acid reflux, HTN, COPD and PSH D&C presented to the ED on 11/26 c/o abdominal pain and bloating. Patient reports having constipation for 2 weeks and has been taking enemas, miralax and senna and passing small hard balls for the last weeks. Reported pain worst in the LLQ that started a few days ago and has been worsening in the last day, also has noted significant abdominal distention in the last day. Reported she had difficulty walking short distances because she becomes SOB. In ED, CT demonstrated perforated sigmoid diverticulitis with intraperitoneal free air. She was taken to the operating room on 11/25 overnight and underwent an exploratory laparotomy with extended left hemicolectomy and was left in discontinuity. An Abthera VAC was placed. She was kept intubated post op and brought to the ICU for hemodynamic and respiratory management. She went back to the OR on 11/29 for transverse end colostomy and fascial closure with wound vac.  Pt transferred out of SICU and recovering on floor with post-op course including induration of wound and colostomy necrosis requiring bedside debridement and wound vac placement.  Pt had CT on 1/6 for leukocytosis and to evaluate wound, which showed loculated fluid collections in the left anterior abdomen, very near loops of bowel.  After CT, pt reported feeling "worse" and was hypotensive overnight to SBP 70s requiring 1L fluid bolus.  Pt was bolused again this am for hypotension and blood cultures were sent for worsening leukocytosis, and BULL.  SICU consulted for closer monitoring.  UA positive and patient was started on Ceftriaxone for presumed UTI.       24 HOUR EVENTS:  - UA positive started on ceftriaxone   - urine culture pending   - UA lytes demonstrating FeNA 0.9%, pre-renal   - given 4.5L boluses since arrival to SICU   - slight downtrending H/H, lactate cleared   - hyponatremia improving     SUBJECTIVE/ROS:  [x ] A ten-point review of systems was otherwise negative except as noted.  [ ] Due to altered mental status/intubation, subjective information were not able to be obtained from the patient. History was obtained, to the extent possible, from review of the chart and collateral sources of information.      NEURO  Exam: awake, alert, oriented x3   Meds: acetaminophen   Tablet .. 975 milliGRAM(s) Oral every 6 hours PRN Mild Pain (1 - 3)  methadone    Tablet 17.5 milliGRAM(s) Oral daily    [x] Adequacy of sedation and pain control has been assessed and adjusted      RESPIRATORY  RR: 27 (01-08-20 @ 02:00) (18 - 29)  SpO2: 90% (01-08-20 @ 02:00) (90% - 98%)  Wt(kg): --  Exam: unlabored, clear to auscultation bilaterally  Mechanical Ventilation:   ABG - ( 07 Jan 2020 12:09 )  pH: x     /  pCO2: x     /  pO2: x     / HCO3: x     / Base Excess: x     /  SaO2: x       Lactate: 5.0      [N/A] Extubation Readiness Assessed  Meds: ALBUTerol    90 MICROgram(s) HFA Inhaler 2 Puff(s) Inhalation every 6 hours PRN Shortness of Breath and/or Wheezing  budesonide 160 MICROgram(s)/formoterol 4.5 MICROgram(s) Inhaler 2 Puff(s) Inhalation two times a day        CARDIOVASCULAR  HR: 102 (01-08-20 @ 02:00) (84 - 109)  BP: 111/57 (01-08-20 @ 02:00) (73/43 - 111/57)  BP(mean): 77 (01-08-20 @ 02:00) (52 - 77)  ABP: --  ABP(mean): --  Wt(kg): --  CVP(cm H2O): --  VBG - ( 08 Jan 2020 00:34 )  pH: 7.34  /  pCO2: 44    /  pO2: 49    / HCO3: 24    / Base Excess: -1.7  /  SaO2: 73     Lactate: 2.8          Lactate, Blood: 5.0 mmol/L (01-07 @ 12:09)    Exam: regular rate and rhythm  Cardiac Rhythm: sinus  Perfusion     [x]Adequate   [ ]Inadequate  Mentation   [x]Normal       [ ]Reduced  Extremities  [x]Warm         [ ]Cool  Volume Status [ ]Hypervolemic [x]Euvolemic [ ]Hypovolemic  Meds: doxazosin 2 milliGRAM(s) Oral <User Schedule>        GI/NUTRITION  Exam: soft, nontender, nondistended, wound vac in place, ostomy retracted, LLQ wound pouched with minimal output   Diet: regular diet   Meds: aluminum hydroxide/magnesium hydroxide/simethicone Suspension 30 milliLiter(s) Oral every 4 hours PRN Dyspepsia  pantoprazole    Tablet 40 milliGRAM(s) Oral <User Schedule>  polyethylene glycol 3350 17 Gram(s) Oral <User Schedule>      GENITOURINARY  I&O's Detail    01-06 @ 07:01 - 01-07 @ 07:00  --------------------------------------------------------  IN:    Lactated Ringers IV Bolus: 1000 mL    lactated ringers.: 375 mL    Oral Fluid: 760 mL    Solution: 100 mL  Total IN: 2235 mL    OUT:    Colostomy: 250 mL    Drain: 35 mL    VAC (Vacuum Assisted Closure) System: 300 mL    Voided: 850 mL  Total OUT: 1435 mL    Total NET: 800 mL      01-07 @ 07:01 - 01-08 @ 02:52  --------------------------------------------------------  IN:    Oral Fluid: 180 mL    Sodium Chloride 0.9% IV Bolus: 2500 mL    sodium chloride 0.9%.: 1750 mL    Solution: 150 mL  Total IN: 4580 mL    OUT:    Drain: 10 mL    Indwelling Catheter - Urethral: 255 mL    VAC (Vacuum Assisted Closure) System: 50 mL  Total OUT: 315 mL    Total NET: 4265 mL          01-08    130<L>  |  98  |  18  ----------------------------<  92  4.9   |  21<L>  |  1.56<H>    Ca    7.4<L>      08 Jan 2020 00:33  Phos  5.0     01-08  Mg     1.6     01-08    TPro  6.1  /  Alb  1.7<L>  /  TBili  0.4  /  DBili  x   /  AST  21  /  ALT  17  /  AlkPhos  110  01-07    [  Ross catheter, indication: N/A  Meds: sodium chloride 0.9%. 1000 milliLiter(s) IV Continuous <Continuous>        HEMATOLOGIC  Meds: aspirin  chewable 81 milliGRAM(s) Oral <User Schedule>  enoxaparin Injectable 100 milliGRAM(s) SubCutaneous <User Schedule>    [x] VTE Prophylaxis                        10.0   22.05 )-----------( 345      ( 08 Jan 2020 00:33 )             31.6       Transfusion     [ ] PRBC   [ ] Platelets   [ ] FFP   [ ] Cryoprecipitate      INFECTIOUS DISEASES  WBC Count: 22.05 K/uL (01-08 @ 00:33)  WBC Count: 25.92 K/uL (01-07 @ 12:09)  WBC Count: 21.07 K/uL (01-07 @ 09:43)    RECENT CULTURES:    Meds: cefTRIAXone   IVPB 1000 milliGRAM(s) IV Intermittent every 24 hours  erythromycin     base Tablet 250 milliGRAM(s) Oral <User Schedule>        ENDOCRINE  CAPILLARY BLOOD GLUCOSE        Meds: atorvastatin 40 milliGRAM(s) Oral <User Schedule>        ACCESS DEVICES:  [ ] Peripheral IV  [ ] Central Venous Line	[ ] R	[ ] L	[ ] IJ	[ ] Fem	[ ] SC	Placed:   [ ] Arterial Line		[ ] R	[ ] L	[ ] Fem	[ ] Rad	[ ] Ax	Placed:   [ ] PICC:					[ ] Mediport  [ ] Urinary Catheter, Date Placed:   [x] Necessity of urinary, arterial, and venous catheters discussed    OTHER MEDICATIONS:  chlorhexidine 2% Cloths 1 Application(s) Topical <User Schedule>  nystatin Powder 1 Application(s) Topical two times a day      CODE STATUS:  full code

## 2020-01-08 NOTE — PROVIDER CONTACT NOTE (OTHER) - ACTION/TREATMENT ORDERED:
Pt assessed by MD Escobedo. Benadryl ordered and given to pt. Continue to monitor & notify provider for further changes.

## 2020-01-08 NOTE — PROGRESS NOTE ADULT - SUBJECTIVE AND OBJECTIVE BOX
Surgery Post-Operative Note    SUBJECTIVE/24HR events:  - UA positive started on ceftriaxone   - urine culture pending   - UA lytes demonstrating FeNA 0.9%, pre-renal   - given 4.5L boluses since arrival to SICU   - slight downtrending H/H, lactate cleared   - hyponatremia improving     - Patient transferred to the SICU for hemodynamic monitoring and management in setting of possible sepsis.  - Patient seen and examined at bedside   - Patient states not feeling well, but improved from yesterday  - Denies chest pain, dizziness, HA, N/V  --------------------------------------------------------------------------------------------------  OBJECTIVE:   Physical Exam:  General: AAOx3, NAD, lying comfortably in bed  HEENT: NC/AT  Respiratory: nonlabored breathing  Cardiovascular: RRR, normal S1 and S2, no murmurs or gallops  Abdomen: obese, soft, non-tender, midline wound VAC working,   Drain: clear yellow fluid in bag ( 30mL/24hr )  Ostomy: pink and viable, gas and stool in bag  --------------------------------------------------------------------------------------------------  V/S:  Vital Signs Last 24 Hrs  T(C): 36.7 (07 Jan 2020 00:02), Max: 37.1 (06 Jan 2020 15:35)  T(F): 98 (07 Jan 2020 00:02), Max: 98.8 (06 Jan 2020 15:35)  HR: 107 (07 Jan 2020 00:02) (74 - 112)  BP: 96/64 (06 Jan 2020 22:51) (73/59 - 114/59)  BP(mean): --  RR: 18 (07 Jan 2020 00:02) (18 - 18)  SpO2: 95% (07 Jan 2020 00:02) (92% - 95%)    --------------------------------------------------------------------------------------------------  I/Os:    05 Jan 2020 07:01  -  06 Jan 2020 07:00  --------------------------------------------------------  IN:    Oral Fluid: 970 mL    Solution: 100 mL  Total IN: 1070 mL    OUT:    Drain: 30 mL    Voided: 2300 mL  Total OUT: 2330 mL    Total NET: -1260 mL      06 Jan 2020 07:01  -  07 Jan 2020 04:41  --------------------------------------------------------  IN:    Lactated Ringers IV Bolus: 500 mL    Oral Fluid: 760 mL    Solution: 100 mL  Total IN: 1360 mL    OUT:    Colostomy: 200 mL    Drain: 10 mL    VAC (Vacuum Assisted Closure) System: 200 mL    Voided: 550 mL  Total OUT: 960 mL    Total NET: 400 mL        --------------------------------------------------------------------------------------------------  LABS:                        11.1   19.27 )-----------( 356      ( 07 Jan 2020 01:24 )             35.2     06 Jan 2020 23:02    132    |  93     |  11     ----------------------------<  102    4.9     |  24     |  0.78     Ca    8.5        06 Jan 2020 23:02  Phos  4.0       06 Jan 2020 06:07  Mg     1.9       06 Jan 2020 06:07    TPro  6.1    /  Alb  2.0    /  TBili  0.3    /  DBili  0.1    /  AST  27     /  ALT  20     /  AlkPhos  106    06 Jan 2020 06:07    PT/INR - ( 05 Jan 2020 15:51 )   PT: 14.9 sec;   INR: 1.30 ratio         PTT - ( 05 Jan 2020 15:51 )  PTT:32.5 sec  CAPILLARY BLOOD GLUCOSE      POCT Blood Glucose.: 94 mg/dL (06 Jan 2020 22:18)        LIVER FUNCTIONS - ( 06 Jan 2020 06:07 )  Alb: 2.0 g/dL / Pro: 6.1 g/dL / ALK PHOS: 106 U/L / ALT: 20 U/L / AST: 27 U/L / GGT: x               --------------------------------------------------------------------------------------------------  MEDICATIONS  (STANDING):  aspirin  chewable 81 milliGRAM(s) Oral <User Schedule>  atorvastatin 40 milliGRAM(s) Oral <User Schedule>  budesonide 160 MICROgram(s)/formoterol 4.5 MICROgram(s) Inhaler 2 Puff(s) Inhalation two times a day  chlorhexidine 4% Liquid 1 Application(s) Topical <User Schedule>  doxazosin 2 milliGRAM(s) Oral <User Schedule>  erythromycin     base Tablet 250 milliGRAM(s) Oral <User Schedule>  lactated ringers. 1000 milliLiter(s) (75 mL/Hr) IV Continuous <Continuous>  methadone    Tablet 17.5 milliGRAM(s) Oral daily  nystatin Powder 1 Application(s) Topical two times a day  pantoprazole    Tablet 40 milliGRAM(s) Oral <User Schedule>  polyethylene glycol 3350 17 Gram(s) Oral <User Schedule>  silver nitrate Applicator 1 Application(s) Topical once    MEDICATIONS  (PRN):  acetaminophen   Tablet .. 650 milliGRAM(s) Oral every 6 hours PRN Mild Pain (1 - 3)  ALBUTerol    90 MICROgram(s) HFA Inhaler 2 Puff(s) Inhalation every 6 hours PRN Shortness of Breath and/or Wheezing  aluminum hydroxide/magnesium hydroxide/simethicone Suspension 30 milliLiter(s) Oral every 4 hours PRN Dyspepsia  diphenhydrAMINE 25 milliGRAM(s) Oral every 4 hours PRN Rash and/or Itching  sodium chloride 0.9% lock flush 10 milliLiter(s) IV Push every 1 hour PRN Pre/post blood products, medications, blood draw, and to maintain line patency    --------------------------------------------------------------------------------------------------

## 2020-01-08 NOTE — PROGRESS NOTE ADULT - SUBJECTIVE AND OBJECTIVE BOX
CARDIOLOGY     PROGRESS  NOTE   ________________________________________________    CHIEF COMPLAINT:Patient is a 64y old  Female who presents with a chief complaint of perforated bowel (08 Dec 2019 09:22)  events has noted.  	  REVIEW OF SYSTEMS:  CONSTITUTIONAL: No fever, weight loss, or fatigue  EYES: No eye pain, visual disturbances, or discharge  ENT:  No difficulty hearing, tinnitus, vertigo; No sinus or throat pain  NECK: No pain or stiffness  RESPIRATORY: No cough, wheezing, chills or hemoptysis; No Shortness of Breath  CARDIOVASCULAR: No chest pain, palpitations, passing out, dizziness, or leg swelling  GASTROINTESTINAL: No abdominal or epigastric pain. No nausea, vomiting, or hematemesis; No diarrhea or constipation. No melena or hematochezia.  GENITOURINARY: No dysuria, frequency, hematuria, or incontinence  NEUROLOGICAL: No headaches, memory loss, loss of strength, numbness, or tremors  SKIN: No itching, burning, rashes, or lesions   LYMPH Nodes: No enlarged glands  ENDOCRINE: No heat or cold intolerance; No hair loss  MUSCULOSKELETAL: No joint pain or swelling; No muscle, back, or extremity pain  PSYCHIATRIC: No depression, anxiety, mood swings, or difficulty sleeping  HEME/LYMPH: No easy bruising, or bleeding gums  ALLERGY AND IMMUNOLOGIC: No hives or eczema	    [ ] All others negative	  [ ] Unable to obtain    PHYSICAL EXAM:  T(C): 36.8 (01-08-20 @ 07:00), Max: 37.3 (01-07-20 @ 23:00)  HR: 90 (01-08-20 @ 08:00) (84 - 109)  BP: 99/50 (01-08-20 @ 08:00) (73/43 - 111/57)  RR: 19 (01-08-20 @ 08:00) (17 - 31)  SpO2: 93% (01-08-20 @ 08:00) (90% - 98%)  Wt(kg): --  I&O's Summary    07 Jan 2020 07:01  -  08 Jan 2020 07:00  --------------------------------------------------------  IN: 5705 mL / OUT: 605 mL / NET: 5100 mL    08 Jan 2020 07:01  -  08 Jan 2020 09:18  --------------------------------------------------------  IN: 375 mL / OUT: 30 mL / NET: 345 mL        Appearance: Normal	  HEENT:   Normal oral mucosa, PERRL, EOMI	  Lymphatic: No lymphadenopathy  Cardiovascular: Normal S1 S2, No JVD, + murmurs, No edema  Respiratory: rhonchi  Psychiatry: A & O x 3, Mood & affect appropriate  Gastrointestinal:  Soft, Non-tender, + BS	  Skin: No rashes, No ecchymoses, No cyanosis	  Neurologic: Non-focal  Extremities: Normal range of motion, No clubbing, cyanosis or edema  Vascular: Peripheral pulses palpable 2+ bilaterally    MEDICATIONS  (STANDING):  aspirin  chewable 81 milliGRAM(s) Oral <User Schedule>  atorvastatin 40 milliGRAM(s) Oral <User Schedule>  budesonide 160 MICROgram(s)/formoterol 4.5 MICROgram(s) Inhaler 2 Puff(s) Inhalation two times a day  chlorhexidine 2% Cloths 1 Application(s) Topical <User Schedule>  doxazosin 2 milliGRAM(s) Oral <User Schedule>  enoxaparin Injectable 100 milliGRAM(s) SubCutaneous <User Schedule>  erythromycin     base Tablet 250 milliGRAM(s) Oral <User Schedule>  methadone    Tablet 17.5 milliGRAM(s) Oral daily  nystatin Powder 1 Application(s) Topical two times a day  pantoprazole    Tablet 40 milliGRAM(s) Oral <User Schedule>  piperacillin/tazobactam IVPB.. 3.375 Gram(s) IV Intermittent every 8 hours  polyethylene glycol 3350 17 Gram(s) Oral <User Schedule>  sodium chloride 0.9%. 1000 milliLiter(s) (125 mL/Hr) IV Continuous <Continuous>  vancomycin  IVPB 1250 milliGRAM(s) IV Intermittent every 12 hours      TELEMETRY: 	    ECG:  	  RADIOLOGY:  OTHER: 	  	  LABS:	 	    CARDIAC MARKERS:                                10.7   25.51 )-----------( 405      ( 08 Jan 2020 04:57 )             34.4     01-08    129<L>  |  95<L>  |  19  ----------------------------<  97  5.2   |  19<L>  |  1.56<H>    Ca    8.1<L>      08 Jan 2020 04:57  Phos  5.1     01-08  Mg     2.1     01-08    TPro  6.1  /  Alb  1.7<L>  /  TBili  0.4  /  DBili  x   /  AST  21  /  ALT  17  /  AlkPhos  110  01-07    proBNP:   Lipid Profile: Cholesterol 98  LDL 52  HDL 28  TG 91    HgA1c:   TSH:         Assessment and plan  ---------------------------  pt transferred to  icu sec to hypotension  agree with ivf , pt most probably is volume depletion  sec to Lasix and aldactone and hyperdynamic lv.  continue ivf  agree to r/o sepsis  vac  dvt prophylaxis  fu uo

## 2020-01-08 NOTE — PROGRESS NOTE ADULT - ASSESSMENT
63 yo F with PMHx morbid obesity, GERD, HTN, COPD, and pshx D&C now s/p ex laparotomy with extended left [id number] w/ perforated and necrotic sigmod colon with pus and stool in the abdominal cavity. RTOR 11/29 RUQ end transverse colostomy created, abdomen closed. Recovering appropriately on floor. On 12/19 patient found to have induration of wound and colostomy necrosis, s/p bedside debridement w/ wound VAC in place on midline wound. Recovering appropriately with daily wound care.     Plan:  - f/u urine and blood cultures  - IV abx  - Trend lactate levels  - f/u ostomy OP  - Appreciate SICU care    Green Team Surgery   [id number]

## 2020-01-08 NOTE — PROGRESS NOTE ADULT - ASSESSMENT
64F with pmhx morbid obesity, acid reflux, HTN, COPD and PSH D&C presented to the ED on 11/26 c/o abdominal pain and bloating. Reported pain worst in the LLQ that started a few days ago and has been worsening in the last day, also has noted significant abdominal distention in the last day.  In ED, CT demonstrated perforated sigmoid diverticulitis with intraperitoneal free air. She was taken to the operating room on 11/25 overnight and underwent an exploratory laparotomy with extended left hemicolectomy and was left in discontinuity. An Abthera VAC was placed. She was kept intubated post op and brought to the ICU for hemodynamic and respiratory management. She went back to the OR on 11/29 for transverse end colostomy and fascial closure with wound vac.  Pt transferred out of SICU and recovering on floor with post-op course including induration of wound and colostomy necrosis requiring bedside debridement and wound vac placement.  Pt had CT on 1/6 for leukocytosis and to evaluate wound, which showed loculated fluid collections in the left anterior abdomen, very near loops of bowel.  After CT, pt reported feeling "worse" and was hypotensive overnight to SBP 70s requiring 1L fluid bolus.  Pt was bolused again this am for hypotension and blood cultures were sent for worsening leukocytosis, and BULL.  SICU consulted for closer monitoring.  UA positive and patient was started on Ceftriaxone for presumed UTI.     Neuro: pain control, h/o chronic pain requiring methadone   - Tylenol prn  - Continue home dose Methadone     Resp: h/o COPD  - Albuterol prn  - Continue Symbicort    CV: Hypotension likely secondary to hypovolemia s/p additional 1L NS bolus in SICU; h/o HTN  - Hemodynamic monitoring  - Trend lactate  - Hold home anti-hypertensives  - Continue statin     GI: Perforated sigmoid diverticulitis s/p extended left hemicolectomy (11/25)  - Continue Regular diet  - Monitor wound vac output   - Protonix     /Renal: Hypovolemic hyponatremia, BULL  - Hyponatremia improving  - Trend BUN/Cr   - Replete electrolytes as needed  - Monitor UOP     ID: UTI, leukocytosis   - Ceftriaxone  - f/u UCx and blood cultures     Heme: VTE prophylaxis  - Continue lovenox  - ASA    Endo:   - Monitor serum glucose     Lines:  - LUE PICC     Dispo: SICU care.  Full code.

## 2020-01-08 NOTE — PROGRESS NOTE ADULT - ATTENDING COMMENTS
Pt hypotensive responded to fluid boluses, not on pressures  BC Coag neg staph, PICC line removed, on vanco and Zosyn, off CTX, repeat surveillance cultures   IVF NS rate increased as per body weight, monitor persistent hyponatremia, resolving BULL  On DVT prophylaxis with high dose Lovenox  PO, stoma functioning  COPD meds  Mobilization

## 2020-01-08 NOTE — PROGRESS NOTE ADULT - ATTENDING COMMENTS
I saw and examined the pt and discussed the tx plan with the House Staff. I agree with the exam and plan as documented in the surgery resident's note from today with changes below.  Had CT yesterday to image possible ascites. CT with left sided thin collection.   Developed dry hiving, rash, pruritus after the CT. Says she had similar reaction but less severe after her last CT. Labs c/w dehydration. Hypotensive, tachycardic, VS improved with IVFs. Feeling very weak.   No abd pain.  I was present for the VAC change. The wound overall is better. There is a bridge of skin tissue hanging between the colostomy wound and the midline wound that only interferes with the VAC placement at this time and keeps getting thinner. Will excise this in the next VAC change under local anesthesia.   It appears her hypotension and clinical state today is most likely related to reaction to IV contrast and dehydration.   I believe the left sided collection is loculated ascites draining via the LLQ old drain site. No need for IR intervention.    Close monitoring, IVFs, will list IV contrast as an allergy, d/c diuretics, no need for ABX at this time.    Liset Vargas MD I saw and examined the pt and discussed the tx plan with the House Staff. I agree with the exam and plan as documented in the surgery resident's note from today with changes below.  Was transferred to the ICU yesterday for persistent hypotension, over the course of the day she developed septic picture. Concern for UTI, + blood Cx. On ABX. Denies pain.   Abdomen soft, NT, obese.  VAC and nipple system around the colostomy.  Continue supportive care, await Cx results.   Liset Vargas MD

## 2020-01-09 LAB
-  AMIKACIN: SIGNIFICANT CHANGE UP
-  AMIKACIN: SIGNIFICANT CHANGE UP
-  AMPICILLIN/SULBACTAM: SIGNIFICANT CHANGE UP
-  AMPICILLIN/SULBACTAM: SIGNIFICANT CHANGE UP
-  AMPICILLIN: SIGNIFICANT CHANGE UP
-  AMPICILLIN: SIGNIFICANT CHANGE UP
-  AZTREONAM: SIGNIFICANT CHANGE UP
-  AZTREONAM: SIGNIFICANT CHANGE UP
-  CEFAZOLIN: SIGNIFICANT CHANGE UP
-  CEFAZOLIN: SIGNIFICANT CHANGE UP
-  CEFEPIME: SIGNIFICANT CHANGE UP
-  CEFEPIME: SIGNIFICANT CHANGE UP
-  CEFOXITIN: SIGNIFICANT CHANGE UP
-  CEFOXITIN: SIGNIFICANT CHANGE UP
-  CEFTRIAXONE: SIGNIFICANT CHANGE UP
-  CEFTRIAXONE: SIGNIFICANT CHANGE UP
-  CIPROFLOXACIN: SIGNIFICANT CHANGE UP
-  CIPROFLOXACIN: SIGNIFICANT CHANGE UP
-  GENTAMICIN: SIGNIFICANT CHANGE UP
-  GENTAMICIN: SIGNIFICANT CHANGE UP
-  IMIPENEM: SIGNIFICANT CHANGE UP
-  IMIPENEM: SIGNIFICANT CHANGE UP
-  LEVOFLOXACIN: SIGNIFICANT CHANGE UP
-  LEVOFLOXACIN: SIGNIFICANT CHANGE UP
-  MEROPENEM: SIGNIFICANT CHANGE UP
-  MEROPENEM: SIGNIFICANT CHANGE UP
-  NITROFURANTOIN: SIGNIFICANT CHANGE UP
-  NITROFURANTOIN: SIGNIFICANT CHANGE UP
-  PIPERACILLIN/TAZOBACTAM: SIGNIFICANT CHANGE UP
-  PIPERACILLIN/TAZOBACTAM: SIGNIFICANT CHANGE UP
-  TIGECYCLINE: SIGNIFICANT CHANGE UP
-  TIGECYCLINE: SIGNIFICANT CHANGE UP
-  TOBRAMYCIN: SIGNIFICANT CHANGE UP
-  TOBRAMYCIN: SIGNIFICANT CHANGE UP
-  TRIMETHOPRIM/SULFAMETHOXAZOLE: SIGNIFICANT CHANGE UP
-  TRIMETHOPRIM/SULFAMETHOXAZOLE: SIGNIFICANT CHANGE UP
ANION GAP SERPL CALC-SCNC: 12 MMOL/L — SIGNIFICANT CHANGE UP (ref 5–17)
ANION GAP SERPL CALC-SCNC: 13 MMOL/L — SIGNIFICANT CHANGE UP (ref 5–17)
ANION GAP SERPL CALC-SCNC: 13 MMOL/L — SIGNIFICANT CHANGE UP (ref 5–17)
BASE EXCESS BLDV CALC-SCNC: -2.9 MMOL/L — LOW (ref -2–2)
BUN SERPL-MCNC: 15 MG/DL — SIGNIFICANT CHANGE UP (ref 7–23)
BUN SERPL-MCNC: 16 MG/DL — SIGNIFICANT CHANGE UP (ref 7–23)
BUN SERPL-MCNC: 17 MG/DL — SIGNIFICANT CHANGE UP (ref 7–23)
CA-I SERPL-SCNC: 1.19 MMOL/L — SIGNIFICANT CHANGE UP (ref 1.12–1.3)
CALCIUM SERPL-MCNC: 7.6 MG/DL — LOW (ref 8.4–10.5)
CALCIUM SERPL-MCNC: 7.7 MG/DL — LOW (ref 8.4–10.5)
CALCIUM SERPL-MCNC: 7.7 MG/DL — LOW (ref 8.4–10.5)
CHLORIDE BLDV-SCNC: 98 MMOL/L — SIGNIFICANT CHANGE UP (ref 96–108)
CHLORIDE SERPL-SCNC: 97 MMOL/L — SIGNIFICANT CHANGE UP (ref 96–108)
CHLORIDE SERPL-SCNC: 97 MMOL/L — SIGNIFICANT CHANGE UP (ref 96–108)
CHLORIDE SERPL-SCNC: 98 MMOL/L — SIGNIFICANT CHANGE UP (ref 96–108)
CO2 BLDV-SCNC: 24 MMOL/L — SIGNIFICANT CHANGE UP (ref 22–30)
CO2 SERPL-SCNC: 17 MMOL/L — LOW (ref 22–31)
CO2 SERPL-SCNC: 18 MMOL/L — LOW (ref 22–31)
CO2 SERPL-SCNC: 18 MMOL/L — LOW (ref 22–31)
CREAT SERPL-MCNC: 0.92 MG/DL — SIGNIFICANT CHANGE UP (ref 0.5–1.3)
CREAT SERPL-MCNC: 1.12 MG/DL — SIGNIFICANT CHANGE UP (ref 0.5–1.3)
CREAT SERPL-MCNC: 1.22 MG/DL — SIGNIFICANT CHANGE UP (ref 0.5–1.3)
CULTURE RESULTS: SIGNIFICANT CHANGE UP
CULTURE RESULTS: SIGNIFICANT CHANGE UP
GAS PNL BLDV: 129 MMOL/L — LOW (ref 135–145)
GAS PNL BLDV: SIGNIFICANT CHANGE UP
GLUCOSE BLDV-MCNC: 93 MG/DL — SIGNIFICANT CHANGE UP (ref 70–99)
GLUCOSE SERPL-MCNC: 101 MG/DL — HIGH (ref 70–99)
GLUCOSE SERPL-MCNC: 86 MG/DL — SIGNIFICANT CHANGE UP (ref 70–99)
GLUCOSE SERPL-MCNC: 93 MG/DL — SIGNIFICANT CHANGE UP (ref 70–99)
GRAM STN FLD: SIGNIFICANT CHANGE UP
HCO3 BLDV-SCNC: 22 MMOL/L — SIGNIFICANT CHANGE UP (ref 21–29)
HCT VFR BLD CALC: 28.9 % — LOW (ref 34.5–45)
HCT VFR BLDA CALC: 28 % — LOW (ref 39–50)
HGB BLD CALC-MCNC: 9 G/DL — LOW (ref 11.5–15.5)
HGB BLD-MCNC: 8.6 G/DL — LOW (ref 11.5–15.5)
HOROWITZ INDEX BLDV+IHG-RTO: 21 — SIGNIFICANT CHANGE UP
LACTATE BLDV-MCNC: 2.6 MMOL/L — HIGH (ref 0.7–2)
MAGNESIUM SERPL-MCNC: 1.9 MG/DL — SIGNIFICANT CHANGE UP (ref 1.6–2.6)
MAGNESIUM SERPL-MCNC: 2 MG/DL — SIGNIFICANT CHANGE UP (ref 1.6–2.6)
MAGNESIUM SERPL-MCNC: 2 MG/DL — SIGNIFICANT CHANGE UP (ref 1.6–2.6)
MCHC RBC-ENTMCNC: 27.5 PG — SIGNIFICANT CHANGE UP (ref 27–34)
MCHC RBC-ENTMCNC: 29.8 GM/DL — LOW (ref 32–36)
MCV RBC AUTO: 92.3 FL — SIGNIFICANT CHANGE UP (ref 80–100)
METHOD TYPE: SIGNIFICANT CHANGE UP
METHOD TYPE: SIGNIFICANT CHANGE UP
NRBC # BLD: 0 /100 WBCS — SIGNIFICANT CHANGE UP (ref 0–0)
ORGANISM # SPEC MICROSCOPIC CNT: SIGNIFICANT CHANGE UP
OTHER CELLS CSF MANUAL: 9 ML/DL — LOW (ref 18–22)
PCO2 BLDV: 44 MMHG — SIGNIFICANT CHANGE UP (ref 35–50)
PH BLDV: 7.33 — LOW (ref 7.35–7.45)
PHOSPHATE SERPL-MCNC: 2.5 MG/DL — SIGNIFICANT CHANGE UP (ref 2.5–4.5)
PHOSPHATE SERPL-MCNC: 3.1 MG/DL — SIGNIFICANT CHANGE UP (ref 2.5–4.5)
PHOSPHATE SERPL-MCNC: 3.4 MG/DL — SIGNIFICANT CHANGE UP (ref 2.5–4.5)
PLATELET # BLD AUTO: 261 K/UL — SIGNIFICANT CHANGE UP (ref 150–400)
PO2 BLDV: 45 MMHG — SIGNIFICANT CHANGE UP (ref 25–45)
POTASSIUM BLDV-SCNC: 4.1 MMOL/L — SIGNIFICANT CHANGE UP (ref 3.5–5.3)
POTASSIUM SERPL-MCNC: 4.1 MMOL/L — SIGNIFICANT CHANGE UP (ref 3.5–5.3)
POTASSIUM SERPL-MCNC: 4.2 MMOL/L — SIGNIFICANT CHANGE UP (ref 3.5–5.3)
POTASSIUM SERPL-MCNC: 4.4 MMOL/L — SIGNIFICANT CHANGE UP (ref 3.5–5.3)
POTASSIUM SERPL-SCNC: 4.1 MMOL/L — SIGNIFICANT CHANGE UP (ref 3.5–5.3)
POTASSIUM SERPL-SCNC: 4.2 MMOL/L — SIGNIFICANT CHANGE UP (ref 3.5–5.3)
POTASSIUM SERPL-SCNC: 4.4 MMOL/L — SIGNIFICANT CHANGE UP (ref 3.5–5.3)
RBC # BLD: 3.13 M/UL — LOW (ref 3.8–5.2)
RBC # FLD: 15.9 % — HIGH (ref 10.3–14.5)
SAO2 % BLDV: 69 % — SIGNIFICANT CHANGE UP (ref 67–88)
SODIUM SERPL-SCNC: 127 MMOL/L — LOW (ref 135–145)
SODIUM SERPL-SCNC: 128 MMOL/L — LOW (ref 135–145)
SODIUM SERPL-SCNC: 128 MMOL/L — LOW (ref 135–145)
SPECIMEN SOURCE: SIGNIFICANT CHANGE UP
VANCOMYCIN TROUGH SERPL-MCNC: 16.4 UG/ML — SIGNIFICANT CHANGE UP (ref 10–20)
WBC # BLD: 15.58 K/UL — HIGH (ref 3.8–10.5)
WBC # FLD AUTO: 15.58 K/UL — HIGH (ref 3.8–10.5)

## 2020-01-09 PROCEDURE — 71045 X-RAY EXAM CHEST 1 VIEW: CPT | Mod: 26

## 2020-01-09 PROCEDURE — 99233 SBSQ HOSP IP/OBS HIGH 50: CPT

## 2020-01-09 PROCEDURE — 99223 1ST HOSP IP/OBS HIGH 75: CPT | Mod: GC

## 2020-01-09 RX ORDER — PHENYLEPHRINE HYDROCHLORIDE 10 MG/ML
0.3 INJECTION INTRAVENOUS
Qty: 40 | Refills: 0 | Status: DISCONTINUED | OUTPATIENT
Start: 2020-01-09 | End: 2020-01-10

## 2020-01-09 RX ORDER — ACETAMINOPHEN 500 MG
1000 TABLET ORAL ONCE
Refills: 0 | Status: COMPLETED | OUTPATIENT
Start: 2020-01-09 | End: 2020-01-09

## 2020-01-09 RX ADMIN — Medication 50 MILLIGRAM(S): at 00:08

## 2020-01-09 RX ADMIN — CHLORHEXIDINE GLUCONATE 1 APPLICATION(S): 213 SOLUTION TOPICAL at 05:16

## 2020-01-09 RX ADMIN — POLYETHYLENE GLYCOL 3350 17 GRAM(S): 17 POWDER, FOR SOLUTION ORAL at 05:17

## 2020-01-09 RX ADMIN — PHENYLEPHRINE HYDROCHLORIDE 18.93 MICROGRAM(S)/KG/MIN: 10 INJECTION INTRAVENOUS at 09:13

## 2020-01-09 RX ADMIN — POLYETHYLENE GLYCOL 3350 17 GRAM(S): 17 POWDER, FOR SOLUTION ORAL at 17:52

## 2020-01-09 RX ADMIN — PHENYLEPHRINE HYDROCHLORIDE 18.93 MICROGRAM(S)/KG/MIN: 10 INJECTION INTRAVENOUS at 23:01

## 2020-01-09 RX ADMIN — MEROPENEM 100 MILLIGRAM(S): 1 INJECTION INTRAVENOUS at 13:14

## 2020-01-09 RX ADMIN — PHENYLEPHRINE HYDROCHLORIDE 18.93 MICROGRAM(S)/KG/MIN: 10 INJECTION INTRAVENOUS at 02:00

## 2020-01-09 RX ADMIN — Medication 400 MILLIGRAM(S): at 23:38

## 2020-01-09 RX ADMIN — SODIUM CHLORIDE 150 MILLILITER(S): 9 INJECTION INTRAMUSCULAR; INTRAVENOUS; SUBCUTANEOUS at 09:13

## 2020-01-09 RX ADMIN — ATORVASTATIN CALCIUM 40 MILLIGRAM(S): 80 TABLET, FILM COATED ORAL at 20:33

## 2020-01-09 RX ADMIN — PANTOPRAZOLE SODIUM 40 MILLIGRAM(S): 20 TABLET, DELAYED RELEASE ORAL at 05:16

## 2020-01-09 RX ADMIN — Medication 250 MILLIGRAM(S): at 05:15

## 2020-01-09 RX ADMIN — SODIUM CHLORIDE 150 MILLILITER(S): 9 INJECTION INTRAMUSCULAR; INTRAVENOUS; SUBCUTANEOUS at 23:00

## 2020-01-09 RX ADMIN — NYSTATIN CREAM 1 APPLICATION(S): 100000 CREAM TOPICAL at 17:52

## 2020-01-09 RX ADMIN — Medication 81 MILLIGRAM(S): at 20:33

## 2020-01-09 RX ADMIN — Medication 250 MILLIGRAM(S): at 09:13

## 2020-01-09 RX ADMIN — MEROPENEM 100 MILLIGRAM(S): 1 INJECTION INTRAVENOUS at 22:59

## 2020-01-09 RX ADMIN — BUDESONIDE AND FORMOTEROL FUMARATE DIHYDRATE 2 PUFF(S): 160; 4.5 AEROSOL RESPIRATORY (INHALATION) at 06:16

## 2020-01-09 RX ADMIN — Medication 2 MILLIGRAM(S): at 05:15

## 2020-01-09 RX ADMIN — METHADONE HYDROCHLORIDE 17.5 MILLIGRAM(S): 40 TABLET ORAL at 09:13

## 2020-01-09 RX ADMIN — POLYETHYLENE GLYCOL 3350 17 GRAM(S): 17 POWDER, FOR SOLUTION ORAL at 11:57

## 2020-01-09 RX ADMIN — MEROPENEM 100 MILLIGRAM(S): 1 INJECTION INTRAVENOUS at 05:15

## 2020-01-09 RX ADMIN — SODIUM CHLORIDE 150 MILLILITER(S): 9 INJECTION INTRAMUSCULAR; INTRAVENOUS; SUBCUTANEOUS at 22:59

## 2020-01-09 RX ADMIN — Medication 250 MILLIGRAM(S): at 17:52

## 2020-01-09 RX ADMIN — ENOXAPARIN SODIUM 100 MILLIGRAM(S): 100 INJECTION SUBCUTANEOUS at 11:09

## 2020-01-09 RX ADMIN — NYSTATIN CREAM 1 APPLICATION(S): 100000 CREAM TOPICAL at 05:15

## 2020-01-09 NOTE — PROGRESS NOTE ADULT - ASSESSMENT
64F with pmhx morbid obesity, acid reflux, HTN, COPD and PSH D&C presented to the ED on 11/26 c/o abdominal pain and bloating. Reported pain worst in the LLQ that started a few days ago and has been worsening in the last day, also has noted significant abdominal distention in the last day.  In ED, CT demonstrated perforated sigmoid diverticulitis with intraperitoneal free air. She was taken to the operating room on 11/25 overnight and underwent an exploratory laparotomy with extended left hemicolectomy and was left in discontinuity. An Abthera VAC was placed. She was kept intubated post op and brought to the ICU for hemodynamic and respiratory management. She went back to the OR on 11/29 for transverse end colostomy and fascial closure with wound vac.  Pt transferred out of SICU and recovering on floor with post-op course including induration of wound and colostomy necrosis requiring bedside debridement and wound vac placement.  Pt had CT on 1/6 for leukocytosis and to evaluate wound, which showed loculated fluid collections in the left anterior abdomen, very near loops of bowel.  After CT, pt reported feeling "worse" and was hypotensive overnight to SBP 70s requiring 1L fluid bolus.  Pt was bolused again this am for hypotension and blood cultures were sent for worsening leukocytosis, and BULL.  SICU consulted for closer monitoring.  UA positive and patient was started on Ceftriaxone for presumed UTI.     Neuro: pain control, h/o chronic pain requiring methadone   - Tylenol prn  - Continue home dose Methadone     Resp: h/o COPD  - Albuterol prn  - Continue Symbicort    CV: Hypotension likely secondary to hypovolemia s/p additional 1L NS bolus in SICU; h/o HTN  - Hemodynamic monitoring  - Wean phenylephrine as tolerated  - Trend lactate  - Hold home anti-hypertensives  - Continue atorvastatin 40, aspirin 81    GI: Perforated sigmoid diverticulitis s/p extended left hemicolectomy (11/25)  - Continue Regular diet  - Monitor wound vac output   - Protonix, Maalox, Miralax    /Renal: Hypovolemic hyponatremia, BULL  - NS@150cc/hr  - Trend BUN/Cr   - Replete electrolytes as needed  - Monitor UOP   - Ross in place    ID: UTI, leukocytosis   - Meropenem / Vancomycin  - f/u UCx and blood cultures   - Trend WBC, fever curve    Heme: VTE prophylaxis  - Continue Lovenox 100 mg daily  - Trend H/H    Endo:   - Monitor serum glucose

## 2020-01-09 NOTE — PROGRESS NOTE ADULT - ASSESSMENT
65 yo F smoker up until admission (1/2-1 ppd x 30 years), COPD, a/w perforated and necrotic sigmoid colon, s/p ex laparotomy with extended left hemicolectomy on 11/26/19. OR 11/29 with creation of RUQ end transverse colostomy abdomen closed. On 12/19 patient found to have induration of wound and colostomy necrosis, s/p bedside debridement w/ irrigation vac in place on midline wound. S/p CT A/P with contrast yesterday.  Became hypotensive not feeling well, rashes of lt UE with itch.   Consulted for COPD management - patient with cough throughout hospital stay, states Symbicort and albuterol mdi not helping. Denies chest pain or shortness of breath. +wheeze. cough is nonproductive. 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  CXR from 1/7 clear. CT A/P 1/6 - lower lungs with trace left pleural effusion and adjacent atelectasis    A/P:   1. COPD with chronic bronchitis, likely nocturnal hypoventilation/sleep disordered breathing  Would switch Symbicort and albuterol MDI to nebs - Budesonide BID, Duonebs Q6 hours  Incentive spirometer, acapella device after neb treatments  Supplemental O2, goal sats low 92-96%  Counseled on smoking cessation - does not have an urge to smoke, does not want NRT  Will require full PFTs and sleep study as an outpatient, no urgent indication for dedicated CT chest unless respiratory status changes    2. Hypotension, rash - possible IV contrast allergic reaction vs drug reaction, now with septic shock 2/2 UTI and GPC bacteremia  care as per SICU team- pressors, antibiotics, ID following    3. DVT ppx

## 2020-01-09 NOTE — PROGRESS NOTE ADULT - SUBJECTIVE AND OBJECTIVE BOX
[facility] - Division of Pulmonary, Critical Care and Sleep Medicine   Please call [phone] between 8am-pm weekdays, [phone] after hours and weekends    Interval Events: Overnight events noted - rash, hypotension now on Phenylephrine drip, gram positive bacteremia, PICC removed 1/8, UCx with >100K GNRs, on Silvia and vanco. Afebrile  COPD sx - continues to cough, no sputum production, +chest tightness - requesting her inhaler regimen be changed    REVIEW OF SYSTEMS:  CV: [- ] chest pain   Resp: [+ ] cough [ -] shortness of breath   [ ] All other systems negative-as per HPI  [ ] Unable to assess ROS because ________    OBJECTIVE:  ICU Vital Signs Last 24 Hrs  T(C): 36.4 (09 Jan 2020 15:00), Max: 37.4 (08 Jan 2020 23:00)  T(F): 97.5 (09 Jan 2020 15:00), Max: 99.3 (08 Jan 2020 23:00)  HR: 80 (09 Jan 2020 16:15) (78 - 97)  BP: 109/53 (09 Jan 2020 16:15) (74/47 - 119/59)  BP(mean): 77 (09 Jan 2020 16:15) (55 - 84)  ABP: --  ABP(mean): --  RR: 17 (09 Jan 2020 16:15) (14 - 30)  SpO2: 94% (09 Jan 2020 16:15) (90% - 98%)        01-08 @ 07:01 - 01-09 @ 07:00  --------------------------------------------------------  IN: 5838.2 mL / OUT: 1885 mL / NET: 3953.2 mL    01-09 @ 07:01  -  01-09 @ 16:37  --------------------------------------------------------  IN: 2308 mL / OUT: 200 mL / NET: 2108 mL      CAPILLARY BLOOD GLUCOSE          PHYSICAL EXAM:  General: NAD  HEENT: NC/AT  Neck: supple  Respiratory:  CTA b/l, no wheezes, crackles or rhonchi  Cardiovascular:  RRR, no m/r/g  Abdomen: midline wound vac  Extremities: no clubbing, cyanosis or edema, warm  Skin: +errythema - anterior chest well and extremeties  Neurological: AAOx3, non focal exam  Psychiatry: not anxious appearing, normal affect and mood    HOSPITAL MEDICATIONS:  MEDICATIONS  (STANDING):  aspirin  chewable 81 milliGRAM(s) Oral <User Schedule>  atorvastatin 40 milliGRAM(s) Oral <User Schedule>  budesonide 160 MICROgram(s)/formoterol 4.5 MICROgram(s) Inhaler 2 Puff(s) Inhalation two times a day  chlorhexidine 2% Cloths 1 Application(s) Topical <User Schedule>  doxazosin 2 milliGRAM(s) Oral <User Schedule>  enoxaparin Injectable 100 milliGRAM(s) SubCutaneous <User Schedule>  erythromycin     base Tablet 250 milliGRAM(s) Oral <User Schedule>  meropenem  IVPB 1000 milliGRAM(s) IV Intermittent every 8 hours  methadone    Tablet 17.5 milliGRAM(s) Oral daily  nystatin Powder 1 Application(s) Topical two times a day  pantoprazole    Tablet 40 milliGRAM(s) Oral <User Schedule>  phenylephrine    Infusion 0.3 MICROgram(s)/kG/Min (18.934 mL/Hr) IV Continuous <Continuous>  polyethylene glycol 3350 17 Gram(s) Oral <User Schedule>  sodium chloride 0.9%. 1000 milliLiter(s) (150 mL/Hr) IV Continuous <Continuous>  vancomycin  IVPB 1250 milliGRAM(s) IV Intermittent every 12 hours    MEDICATIONS  (PRN):  acetaminophen   Tablet .. 975 milliGRAM(s) Oral every 6 hours PRN Mild Pain (1 - 3)  ALBUTerol    90 MICROgram(s) HFA Inhaler 2 Puff(s) Inhalation every 6 hours PRN Shortness of Breath and/or Wheezing  aluminum hydroxide/magnesium hydroxide/simethicone Suspension 30 milliLiter(s) Oral every 4 hours PRN Dyspepsia      LABS:                        8.6    15.58 )-----------( 261      ( 09 Jan 2020 01:22 )             28.9     Hgb Trend: 8.6<--, 8.8<--, 10.7<--, 10.0<--, 12.2<--  01-09    128<L>  |  98  |  16  ----------------------------<  86  4.2   |  18<L>  |  1.12    Ca    7.6<L>      09 Jan 2020 05:19  Phos  3.1     01-09  Mg     2.0     01-09      Creatinine Trend: 1.12<--, 1.22<--, 1.44<--, 1.51<--, 1.56<--, 1.56<--        Venous Blood Gas:  01-09 @ 05:07  7.37/40/58/23/87  VBG Lactate: 2.1  Venous Blood Gas:  01-09 @ 01:17  7.33/44/45/22/69  VBG Lactate: 2.6  Venous Blood Gas:  01-08 @ 09:47  7.34/42/40/22/67  VBG Lactate: 2.8  Venous Blood Gas:  01-08 @ 05:05  7.34/43/41/23/67  VBG Lactate: 3.5  Venous Blood Gas:  01-08 @ 00:34  7.34/44/49/24/73  VBG Lactate: 2.8  Venous Blood Gas:  01-07 @ 20:41  7.36/44/42/24/70  VBG Lactate: 3.5      MICROBIOLOGY:   Culture - Blood (01.08.20 @ 12:35)    Gram Stain:   Growth in aerobic bottle: Gram Positive Cocci in Clusters    Specimen Source: .Blood Blood    Culture Results:   Growth in aerobic bottle: Gram Positive Cocci in Clusters    Culture - Urine (01.07.20 @ 15:18)    -  Trimethoprim/Sulfamethoxazole: S <=2/38    -  Trimethoprim/Sulfamethoxazole: S <=2/38    -  Tobramycin: S <=4    -  Tobramycin: S <=4    -  Piperacillin/Tazobactam: S <=16    -  Piperacillin/Tazobactam: S <=16    -  Tigecycline: S <=2    -  Tigecycline: S <=2    -  Meropenem: S <=1    -  Meropenem: S <=1    -  Nitrofurantoin: I 64 Should not be used to treat pyelonephritis    -  Nitrofurantoin: S <=32 Should not be used to treat pyelonephritis    -  Imipenem: S <=1    -  Imipenem: S <=1    -  Levofloxacin: S <=2    -  Levofloxacin: S <=2    -  Gentamicin: S <=4    -  Gentamicin: S <=4    -  Ciprofloxacin: S <=1    -  Ciprofloxacin: S <=1    -  Ceftriaxone: S <=1 Enterobacter, Citrobacter, and Serratia may develop resistance during prolonged therapy    -  Ceftriaxone: S <=1 Enterobacter, Citrobacter, and Serratia may develop resistance during prolonged therapy    -  Cefoxitin: S <=8    -  Cefoxitin: S <=8    -  Cefepime: S <=4    -  Cefepime: S <=4    -  Cefazolin: S <=8 (MIC_CL_COM_ENTERIC_CEFAZU) For uncomplicated UTI with K. pneumoniae, E. coli, or P. mirablis: ETHAN <=16 is sensitive and ETHAN >=32 is resistant. This also predicts results for oral agents cefaclor, cefdinir, cefpodoxime, cefprozil, cefuroxime axetil, cephalexin and locarbef for uncomplicated UTI. Note that some isolates may be susceptible to these agents while testing resistant to cefazolin.    -  Cefazolin: S <=8 (MIC_CL_COM_ENTERIC_CEFAZU) For uncomplicated UTI with K. pneumoniae, E. coli, or P. mirablis: ETHAN <=16 is sensitive and ETHAN >=32 is resistant. This also predicts results for oral agents cefaclor, cefdinir, cefpodoxime, cefprozil, cefuroxime axetil, cephalexin and locarbef for uncomplicated UTI. Note that some isolates may be susceptible to these agents while testing resistant to cefazolin.    -  Aztreonam: S <=4    -  Aztreonam: S <=4    -  Ampicillin: R >16 These ampicillin results predict results for amoxicillin    -  Ampicillin: S <=8 These ampicillin results predict results for amoxicillin    -  Ampicillin/Sulbactam: S <=8/4 Enterobacter, Citrobacter, and Serratia may develop resistance during prolonged therapy (3-4 days)    -  Ampicillin/Sulbactam: S <=8/4 Enterobacter, Citrobacter, and Serratia may develop resistance during prolonged therapy (3-4 days)    -  Amikacin: S <=16    -  Amikacin: S <=16    Specimen Source: .Urine Clean Catch (Midstream)    Culture Results:   >100,000 CFU/ml Klebsiella pneumoniae  >100,000 CFU/ml Escherichia coli    Organism Identification: Klebsiella pneumoniae  Escherichia coli    Organism: Klebsiella pneumoniae    Organism: Escherichia coli    Method Type: ETHAN    Method Type: ETHAN    Culture - Blood (01.07.20 @ 08:36)    -  Coagulase negative Staphylococcus: Detec    Gram Stain:   Growth in aerobic bottle: Gram Positive Cocci in Clusters  Growth in anaerobic bottle: Gram Positive Cocci in Clusters    Specimen Source: .Blood Blood-Venous    Organism: Blood Culture PCR    Culture Results:   Growth in aerobic and anaerobic bottles: Coag Negative Staphylococcus  Coag Negative Staphylococcus  Single set isolate, possible contaminant. Contact  Microbiology if susceptibility testing clinically  indicated.  ***Blood Panel PCR results on this specimen are available  approximately 3 hours after the Gram stain result.***  Gram stain, PCR, and/or culture results may not always  correspond due to difference in methodologies.  ************************************************************  ThisPCR assay was performed using [employer].  The following targets are tested for: Enterococcus,  vancomycin resistant enterococci, Listeria monocytogenes,  coagulase negative staphylococci, S. aureus,  methicillin resistant S. aureus, Streptococcus agalactiae  (Group B), S. pneumoniae, S. pyogenes (Group A),  Acinetobacter baumannii, Enterobacter cloacae, E. coli,  Klebsiella oxytoca, K. pneumoniae, Proteus sp.,  Serratia marcescens, Haemophilus influenzae,  Neisseria meningitidis, Pseudomonas aeruginosa, Candida  albicans, C. glabrata, C krusei, C parapsilosis,  C. tropicalis and the KPC resistance gene.    Organism Identification: Blood Culture PCR    Method Type: PCR        RADIOLOGY:  [x ] Reviewed and interpreted by me  CXR 1/9- clear

## 2020-01-09 NOTE — PROGRESS NOTE ADULT - ASSESSMENT
ASSESSMENT:  64 yr old female with PMHx morbid obesity, GERD, HTN, COPD, and pshx D&C now s/p ex laparotomy with extended left hemicolectomy 11/26 for perforated and necrotic sigmoid colon with gross abdominal contamination. RTOR 11/29 for abd closure, RUQ end transverse colostomy creation. On 12/19 patient found to have induration of wound and keri-stomal fat necrosis, s/p bedside debridement w/ wound VAC in place on midline wound. Began showing signs of sepsis and was transferred to SICU 1/7 for further management.      PLAN:  - f/u final urine and blood cultures  - C/w IV abx  - Wean pressors as tolerated  - Appreciate excellent SICU care      Jesus King, PGY 2  [facility] [phone]

## 2020-01-09 NOTE — PROGRESS NOTE ADULT - ATTENDING COMMENTS
Evaluated in round  Hypotensive in septic shock Evaluated in round  Hypotensive in septic shock, titrate Neosynephrine  Continue IVF with NS, stable hyponatremia  Repeat BC GPC in clusture on Vanco amd Meropenem, repeat cultures for clearance, ID consult, Vanco trough  PO  Mobilization

## 2020-01-09 NOTE — PROGRESS NOTE ADULT - SUBJECTIVE AND OBJECTIVE BOX
CARDIOLOGY     PROGRESS  NOTE   ________________________________________________    CHIEF COMPLAINT:Patient is a 64y old  Female who presents with a chief complaint of perforated bowel (08 Dec 2019 09:22)    	  REVIEW OF SYSTEMS:  CONSTITUTIONAL: No fever, weight loss, or fatigue  EYES: No eye pain, visual disturbances, or discharge  ENT:  No difficulty hearing, tinnitus, vertigo; No sinus or throat pain  NECK: No pain or stiffness  RESPIRATORY: No cough, wheezing, chills or hemoptysis; No Shortness of Breath  CARDIOVASCULAR: No chest pain, palpitations, passing out, dizziness, or leg swelling  GASTROINTESTINAL: No abdominal or epigastric pain. No nausea, vomiting, or hematemesis; No diarrhea or constipation. No melena or hematochezia.  GENITOURINARY: No dysuria, frequency, hematuria, or incontinence  NEUROLOGICAL: No headaches, memory loss, loss of strength, numbness, or tremors  SKIN: No itching, burning, rashes, or lesions   LYMPH Nodes: No enlarged glands  ENDOCRINE: No heat or cold intolerance; No hair loss  MUSCULOSKELETAL: No joint pain or swelling; No muscle, back, or extremity pain  PSYCHIATRIC: No depression, anxiety, mood swings, or difficulty sleeping  HEME/LYMPH: No easy bruising, or bleeding gums  ALLERGY AND IMMUNOLOGIC: No hives or eczema	    [ ] All others negative	  [ ] Unable to obtain    PHYSICAL EXAM:  T(C): 37 (01-09-20 @ 07:00), Max: 37.4 (01-08-20 @ 23:00)  HR: 82 (01-09-20 @ 08:45) (78 - 97)  BP: 90/47 (01-09-20 @ 08:45) (74/47 - 109/55)  RR: 16 (01-09-20 @ 08:45) (14 - 25)  SpO2: 94% (01-09-20 @ 08:45) (90% - 98%)  Wt(kg): --  I&O's Summary    08 Jan 2020 07:01  -  09 Jan 2020 07:00  --------------------------------------------------------  IN: 5838.2 mL / OUT: 1885 mL / NET: 3953.2 mL    09 Jan 2020 07:01  -  09 Jan 2020 09:07  --------------------------------------------------------  IN: 162.6 mL / OUT: 0 mL / NET: 162.6 mL        Appearance: Normal	  HEENT:   Normal oral mucosa, PERRL, EOMI	  Lymphatic: No lymphadenopathy  Cardiovascular: Normal S1 S2, No JVD, +murmurs, No edema  Respiratory:rhonchi  Psychiatry: A & O x 3, Mood & affect appropriate  Gastrointestinal:  Soft, Non-tender, + BS	  Skin: No rashes, No ecchymoses, No cyanosis	  Neurologic: Non-focal  Extremities: Normal range of motion, No clubbing, cyanosis or edema  Vascular: Peripheral pulses palpable 2+ bilaterally    MEDICATIONS  (STANDING):  aspirin  chewable 81 milliGRAM(s) Oral <User Schedule>  atorvastatin 40 milliGRAM(s) Oral <User Schedule>  budesonide 160 MICROgram(s)/formoterol 4.5 MICROgram(s) Inhaler 2 Puff(s) Inhalation two times a day  chlorhexidine 2% Cloths 1 Application(s) Topical <User Schedule>  doxazosin 2 milliGRAM(s) Oral <User Schedule>  enoxaparin Injectable 100 milliGRAM(s) SubCutaneous <User Schedule>  erythromycin     base Tablet 250 milliGRAM(s) Oral <User Schedule>  meropenem  IVPB 1000 milliGRAM(s) IV Intermittent every 8 hours  methadone    Tablet 17.5 milliGRAM(s) Oral daily  nystatin Powder 1 Application(s) Topical two times a day  pantoprazole    Tablet 40 milliGRAM(s) Oral <User Schedule>  phenylephrine    Infusion 0.3 MICROgram(s)/kG/Min (18.934 mL/Hr) IV Continuous <Continuous>  polyethylene glycol 3350 17 Gram(s) Oral <User Schedule>  sodium chloride 0.9%. 1000 milliLiter(s) (150 mL/Hr) IV Continuous <Continuous>  vancomycin  IVPB 1250 milliGRAM(s) IV Intermittent every 12 hours      TELEMETRY: 	    ECG:  	  RADIOLOGY:  OTHER: 	  	  LABS:	 	    CARDIAC MARKERS:                                8.6    15.58 )-----------( 261      ( 09 Jan 2020 01:22 )             28.9     01-09    128<L>  |  98  |  16  ----------------------------<  86  4.2   |  18<L>  |  1.12    Ca    7.6<L>      09 Jan 2020 05:19  Phos  3.1     01-09  Mg     2.0     01-09      proBNP:   Lipid Profile: Cholesterol 98  LDL 52  HDL 28  TG 91    HgA1c:   TSH:   Culture - Blood (01.08.20 @ 12:35)    Gram Stain:   Growth in aerobic bottle: Gram Positive Cocci in Clusters    Specimen Source: .Blood Blood    Culture Results:   Growth in aerobic bottle: Gram Positive Cocci in Clusters    Culture - Blood (01.07.20 @ 16:31)    Specimen Source: .Blood Blood-Peripheral    Culture Results:   No growth to date.    < from: Xray Chest 1 View- PORTABLE-Routine (01.08.20 @ 06:43) >  The heart is normal in size. Small left pleural effusion cannot be ruled out. Otherwise the lungs appear to be clear. No acute pathology seen in the visualized bones.    < from: CT Abdomen and Pelvis w/ Oral Cont and w/ IV Cont (01.06.20 @ 10:47) >  Open anterior abdominal wall wound.    Loculated fluid collections in the left anterior abdomen, inseparable from small bowel loops, as well as a small abdominal wall collection.     Small volume loculated mesenteric fluid.    < end of copied text >          Assessment and plan  ---------------------------  pt transferred to  icu sec to hypotension  agree with ivf , pt most probably is volume depletion  sec to Lasix and aldactone and hyperdynamic lv, r/o sepsis  continue ivf  agree to r/o sepsis continue iv abx one blood culture noted  vac  dvt prophylaxis  fu uo  ct abdomen noted on 1/06  with loculated collection/ plan as per surgery

## 2020-01-09 NOTE — CONSULT NOTE ADULT - SUBJECTIVE AND OBJECTIVE BOX
INFECTIOUS DISEASE SERVICE INITIAL CONSULTATION NOTE    HPI:  64F PMHx of HTN, COPD, and morbid obesity who was admitted on 11/26 for perforated sigmoid diverticulitis. She was taken to the OR that night and was found to have feculent peritonitis, underwent L hemicolectomy. On 11/25 she was taken back to the OR for ostomy creation. Post-op she was covered with meropenem and Zosyn. Her hospital course has been complicated by abdominal wound vac, phlebitis from a PIV, subclavian steal syndrome, and EGD showing esophageal diverticuli and gastritis. On 1/6, the patient developed hypotension after going for CT scan. The CT scan showed left sided loculated collections in the abdomen, and smaller in the abdominal wall. She was eventually started on ceftriaxone for UTI, but continued to worsen, and was transferred to the SICU for vasopressors, then was broadened to vancomycin and meropenem. Blood cultures sent on 1/6 grew coag negative staph. ID consulted for bacteremia.     Currently, the patient is not feeling great, but does not have any particular area of pain. She reports that she had a PICC line removed yesterday, and that her arms are swollen. Denies any dysuria, abdominal pain, chest pain, or sputum with her cough.     PAST MEDICAL & SURGICAL HISTORY:  Morbid Obesity  Post Menopausal Bleeding  Polyp, Vagina  Acid Reflux  HTN (Hypertension)  S/P D&C: 2009, w vaginal polypectomy      REVIEW OF SYSTEMS:  Constitutional: +weakness, no fevers, no chills  Dermatologic: no rash  Respiratory: no SOB, no cough  Cardiovascular: no chest pain, no palpitations  Gastrointestinal: no nausea, no vomiting, no diarrhea  Genitourinary: no dysuria, no urinary frequency, no hematuria, no urinary retention  Musculoskeletal:	no weakness, no joint swelling/pain  Neurological: no focal weakness or numbness  Endocrine: no polyuria, no polydipsia    ACTIVE ANTIMICROBIAL/ANTIBIOTIC MEDICATIONS:  erythromycin     base Tablet 250 milliGRAM(s) Oral <User Schedule>  meropenem  IVPB 1000 milliGRAM(s) IV Intermittent every 8 hours  vancomycin  IVPB 1250 milliGRAM(s) IV Intermittent every 12 hours      OTHER MEDICATIONS:  acetaminophen   Tablet .. 975 milliGRAM(s) Oral every 6 hours PRN  ALBUTerol    90 MICROgram(s) HFA Inhaler 2 Puff(s) Inhalation every 6 hours PRN  aluminum hydroxide/magnesium hydroxide/simethicone Suspension 30 milliLiter(s) Oral every 4 hours PRN  aspirin  chewable 81 milliGRAM(s) Oral <User Schedule>  atorvastatin 40 milliGRAM(s) Oral <User Schedule>  budesonide 160 MICROgram(s)/formoterol 4.5 MICROgram(s) Inhaler 2 Puff(s) Inhalation two times a day  chlorhexidine 2% Cloths 1 Application(s) Topical <User Schedule>  doxazosin 2 milliGRAM(s) Oral <User Schedule>  enoxaparin Injectable 100 milliGRAM(s) SubCutaneous <User Schedule>  methadone    Tablet 17.5 milliGRAM(s) Oral daily  nystatin Powder 1 Application(s) Topical two times a day  pantoprazole    Tablet 40 milliGRAM(s) Oral <User Schedule>  phenylephrine    Infusion 0.3 MICROgram(s)/kG/Min IV Continuous <Continuous>  polyethylene glycol 3350 17 Gram(s) Oral <User Schedule>  sodium chloride 0.9%. 1000 milliLiter(s) IV Continuous <Continuous>      ALLERGIES:  Allergies    IV Contrast (Rash; Pruritus; Hypotension)  sulfa drugs (Unknown)    Intolerances        SOCIAL HISTORY: Smoker, no alcohol, no drugs, same partner for last 34 years. Lives in [place].     FAMILY HISTORY: No family history of cancer      VITAL SIGNS:  ICU Vital Signs Last 24 Hrs  T(C): 36.4 (09 Jan 2020 15:00), Max: 37.4 (08 Jan 2020 23:00)  T(F): 97.5 (09 Jan 2020 15:00), Max: 99.3 (08 Jan 2020 23:00)  HR: 86 (09 Jan 2020 15:00) (78 - 97)  BP: 104/51 (09 Jan 2020 15:00) (74/47 - 119/59)  BP(mean): 74 (09 Jan 2020 15:00) (55 - 83)  ABP: --  ABP(mean): --  RR: 20 (09 Jan 2020 15:00) (14 - 25)  SpO2: 94% (09 Jan 2020 15:00) (90% - 98%)      PHYSICAL EXAM:  Constitutional: Obese woman resting comfortably in bed  Head: NC/AT  Eyes: anicteric sclera  ENMT: no rhinorrhea; no sinus tenderness on palpation; no oropharyngeal lesions, erythema, or exudates	  Neck: supple; no JVD or LAD  Respiratory: CTA B/L  Cardiovascular: +S1/S2, RRR; no appreciable murmurs  Gastrointestinal: +BS, wound vac and ostomy over surgical wound just left of midline, fistula in LLQ with ascitic fluid draining. Nontender, No suprapubic tenderness  Extremities: WWP; lymphedema with slight erythema of the LEs bilaterally, not warm to touch  Vascular: R AC fossa small palpable cord, nontender, no erythema  Neurologic: AAOx3; Does not move legs. moves arms equally     LABS:                        8.6    15.58 )-----------( 261      ( 09 Jan 2020 01:22 )             28.9     01-09    128<L>  |  98  |  16  ----------------------------<  86  4.2   |  18<L>  |  1.12    Ca    7.6<L>      09 Jan 2020 05:19  Phos  3.1     01-09  Mg     2.0     01-09            MICROBIOLOGY:    Culture - Blood (collected 01-08-20 @ 12:35)  Source: .Blood Blood  Gram Stain (01-09-20 @ 10:05):    Growth in aerobic bottle: Gram Positive Cocci in Clusters  Preliminary Report (01-09-20 @ 10:05):    Growth in aerobic bottle: Gram Positive Cocci in Clusters    Culture - Blood (collected 01-08-20 @ 12:35)  Source: .Blood Blood  Gram Stain (01-09-20 @ 09:08):    Growth in aerobic bottle: Gram Positive Cocci in Clusters  Preliminary Report (01-09-20 @ 09:08):    Growth in aerobic bottle: Gram Positive Cocci in Clusters    Culture - Blood (collected 01-07-20 @ 16:31)  Source: .Blood Blood-Peripheral  Preliminary Report (01-08-20 @ 17:02):    No growth to date.    Culture - Urine (collected 01-07-20 @ 15:18)  Source: .Urine Clean Catch (Midstream)  Preliminary Report (01-08-20 @ 17:17):    >100,000 CFU/ml Gram Negative Rods    Culture - Blood (collected 01-07-20 @ 08:36)  Source: .Blood Blood-Venous  Gram Stain (01-08-20 @ 11:20):    Growth in aerobic bottle: Gram Positive Cocci in Clusters    Growth in anaerobic bottle: Gram Positive Cocci in Clusters  Final Report (01-09-20 @ 11:31):    Growth in aerobic and anaerobic bottles: Coag Negative Staphylococcus    Coag Negative Staphylococcus    Single set isolate, possible contaminant. Contact    Microbiology if susceptibility testing clinically    indicated.    ***Blood Panel PCR results on this specimen are available    approximately 3 hours after the Gram stain result.***    Gram stain, PCR, and/or culture results may not always    correspond due to difference in methodologies.    ************************************************************    ThisPCR assay was performed using [employer].    The following targets are tested for: Enterococcus,    vancomycin resistant enterococci, Listeria monocytogenes,    coagulase negative staphylococci, S. aureus,    methicillin resistant S. aureus, Streptococcus agalactiae    (Group B), S. pneumoniae, S. pyogenes (Group A),    Acinetobacter baumannii, Enterobacter cloacae, E. coli,    Klebsiella oxytoca, K. pneumoniae, Proteus sp.,    Serratia marcescens, Haemophilus influenzae,    Neisseria meningitidis, Pseudomonas aeruginosa, Candida    albicans, C. glabrata, C krusei, C parapsilosis,    C. tropicalis and the KPC resistance gene.  Organism: Blood Culture PCR (01-09-20 @ 11:31)  Organism: Blood Culture PCR (01-09-20 @ 11:31)      -  Coagulase negative Staphylococcus: Detec      Method Type: PCR        RADIOLOGY & ADDITIONAL STUDIES:    < from: CT Abdomen and Pelvis w/ Oral Cont and w/ IV Cont (01.06.20 @ 10:47) >  EXAM:  CT ABDOMEN AND PELVIS OC IC                            PROCEDURE DATE:  01/06/2020            INTERPRETATION:  CLINICAL INFORMATION: Status post left hemicolectomy with open abdominal wound drainage and induration.     COMPARISON: CT chest, abdomen and pelvis dated 12/4/2019.    PROCEDURE:   CT of the Abdomen and Pelvis was performed with intravenous contrast.   Intravenous contrast: 90 ml Omnipaque 350. 10 ml discarded.  Oral contrast: positive contrast was administered.  Sagittal and coronal reformats were performed.    FINDINGS:    LOWER CHEST: Subsegmental atelectasis of the left lower lobe. Trace left pleural effusion.    LIVER: Calcified granuloma in the right liver. Probable hepatic steatosis.  BILE DUCTS: Normal caliber.  GALLBLADDER: Within normal limits.   SPLEEN: Within normal limits.  PANCREAS: Within normal limits.  ADRENALS: Within normal limits.  KIDNEYS/URETERS: Subcentimeter hypodense foci in the right lower pole are too small to characterize.    BLADDER: Within normal limits.  REPRODUCTIVE ORGANS: Uterus and adnexa within normal limits.    BOWEL: Left hemicolectomy with Sesay's pouch and right lower quadrant colostomy. Interval removal of a left lower quadrant abdominal drain. No bowel obstruction.   PERITONEUM: Small loculated interloop fluid between small bowel loops in the mesentery.   VESSELS: Atherosclerotic changes.  RETROPERITONEUM/LYMPH NODES: No lymphadenopathy.    ABDOMINAL WALL: Postsurgical changes with open midline anterior abdominal wall.A 7.6 x 3.3 x 5.7 cm (AP x TR x CC) ill-defined multiloculated fluid collection in the left anterior abdomen (3, 74), at least partially intraperitoneal, as it is inseparable from small bowel loops. An additional small 2.7 x 2.3 x 3.1 cm (AP x TR x CC) fluid collection in the midline anterior abdominal wall. Anasarca.  BONES: Degenerative changes.    IMPRESSION:   Open anterior abdominal wall wound.    Loculated fluid collections in the left anterior abdomen, inseparable from small bowel loops, as well as a small abdominal wall collection.     Small volume loculated mesenteric fluid.                NAVI FONG M.D., RADIOLOGY RESIDENT  This document has been electronically signed.  CARLOS RENTERIA M.D., ATTENDING RADIOLOGIST  This document has been electronically signed. Jan 6 2020 12:07PM    < end of copied text >    < from: Xray Chest 1 View- PORTABLE-Routine (01.09.20 @ 06:46) >  EXAM:  XR CHEST PORTABLE ROUTINE 1V                            PROCEDURE DATE:  01/09/2020            INTERPRETATION:  CLINICAL INFORMATION: Concern for atelectasis.    EXAM: Frontal chest radiograph dated 1/9/2020.    COMPARISON: Chest radiograph from 1/8/2020.    FINDINGS:  Clear lungs.  The cardiomediastinal silhouette not well evaluated on this study.    IMPRESSION:   Clear lungs.                RAJINDER HALEY M.D., RADIOLOGY RESIDENT  This document has been electronically signed.  KAYLA RENTERIA M.D., ATTENDING RADIOLOGIST  This document has been electronically signed. Jan 9 2020 12:52PM        < end of copied text > INFECTIOUS DISEASE SERVICE INITIAL CONSULTATION NOTE    HPI: 64F PMHx of HTN, COPD, and morbid obesity who was admitted on 11/26 for perforated sigmoid diverticulitis. She was taken to the OR that night and was found to have feculent peritonitis, underwent L hemicolectomy. On 11/25 she was taken back to the OR for ostomy creation. Post-op she was covered with meropenem and Zosyn. Her hospital course has been complicated by abdominal wound vac, phlebitis from a PIV, subclavian steal syndrome, and EGD showing esophageal diverticuli and gastritis. On 1/6, the patient developed hypotension after going for CT scan. The CT scan showed left sided loculated collections in the abdomen, and smaller in the abdominal wall. She was eventually started on ceftriaxone for UTI, but continued to worsen, and was transferred to the SICU for vasopressors, then was broadened to vancomycin and meropenem. Blood cultures sent on 1/6 grew coag negative staph. ID consulted for bacteremia.     Currently, the patient is not feeling great, but does not have any particular area of pain. She reports that she had a PICC line removed yesterday, and that her arms are swollen. Denies any dysuria, abdominal pain, chest pain, or sputum with her cough.     PAST MEDICAL & SURGICAL HISTORY:  Morbid Obesity  Post Menopausal Bleeding  Polyp, Vagina  Acid Reflux  HTN (Hypertension)  S/P D&C: 2009, w vaginal polypectomy    REVIEW OF SYSTEMS:  Constitutional: +weakness, no fevers, no chills  Dermatologic: no rash  Respiratory: no SOB, no cough  Cardiovascular: no chest pain, no palpitations  Gastrointestinal: no nausea, no vomiting, no diarrhea  Genitourinary: no dysuria, no urinary frequency, no hematuria, no urinary retention  Musculoskeletal:	no weakness, no joint swelling/pain  Neurological: no focal weakness or numbness  Endocrine: no polyuria, no polydipsia    ACTIVE ANTIMICROBIAL/ANTIBIOTIC MEDICATIONS:  erythromycin     base Tablet 250 milliGRAM(s) Oral <User Schedule>  meropenem  IVPB 1000 milliGRAM(s) IV Intermittent every 8 hours  vancomycin  IVPB 1250 milliGRAM(s) IV Intermittent every 12 hours    OTHER MEDICATIONS:  acetaminophen   Tablet .. 975 milliGRAM(s) Oral every 6 hours PRN  ALBUTerol    90 MICROgram(s) HFA Inhaler 2 Puff(s) Inhalation every 6 hours PRN  aluminum hydroxide/magnesium hydroxide/simethicone Suspension 30 milliLiter(s) Oral every 4 hours PRN  aspirin  chewable 81 milliGRAM(s) Oral <User Schedule>  atorvastatin 40 milliGRAM(s) Oral <User Schedule>  budesonide 160 MICROgram(s)/formoterol 4.5 MICROgram(s) Inhaler 2 Puff(s) Inhalation two times a day  chlorhexidine 2% Cloths 1 Application(s) Topical <User Schedule>  doxazosin 2 milliGRAM(s) Oral <User Schedule>  enoxaparin Injectable 100 milliGRAM(s) SubCutaneous <User Schedule>  methadone    Tablet 17.5 milliGRAM(s) Oral daily  nystatin Powder 1 Application(s) Topical two times a day  pantoprazole    Tablet 40 milliGRAM(s) Oral <User Schedule>  phenylephrine    Infusion 0.3 MICROgram(s)/kG/Min IV Continuous <Continuous>  polyethylene glycol 3350 17 Gram(s) Oral <User Schedule>  sodium chloride 0.9%. 1000 milliLiter(s) IV Continuous <Continuous>    ALLERGIES:  Allergies    IV Contrast (Rash; Pruritus; Hypotension)  sulfa drugs (Unknown)    Intolerances    SOCIAL HISTORY: Smoker, no alcohol, no drugs, same partner for last 34 years. Lives in [place].     FAMILY HISTORY: No family history of cancer    VITAL SIGNS:  ICU Vital Signs Last 24 Hrs  T(C): 36.4 (09 Jan 2020 15:00), Max: 37.4 (08 Jan 2020 23:00)  T(F): 97.5 (09 Jan 2020 15:00), Max: 99.3 (08 Jan 2020 23:00)  HR: 86 (09 Jan 2020 15:00) (78 - 97)  BP: 104/51 (09 Jan 2020 15:00) (74/47 - 119/59)  BP(mean): 74 (09 Jan 2020 15:00) (55 - 83)  RR: 20 (09 Jan 2020 15:00) (14 - 25)  SpO2: 94% (09 Jan 2020 15:00) (90% - 98%)    PHYSICAL EXAM:  Constitutional: Obese woman resting comfortably in bed  Head: NC/AT  Eyes: anicteric sclera  ENMT: no rhinorrhea; no sinus tenderness on palpation; no oropharyngeal lesions, erythema, or exudates	  Neck: supple; no JVD or LAD  Respiratory: CTA B/L  Cardiovascular: +S1/S2, RRR; no appreciable murmurs  Gastrointestinal: +BS, wound vac and ostomy over surgical wound just left of midline, fistula in LLQ with ascitic fluid draining. Nontender, No suprapubic tenderness  Extremities: WWP; lymphedema with slight erythema of the LEs bilaterally, not warm to touch  Vascular: R AC fossa small palpable cord, nontender, no erythema  Neurologic: AAOx3; Does not move legs. moves arms equally     LABS:                        8.6    15.58 )-----------( 261      ( 09 Jan 2020 01:22 )             28.9     01-09    128<L>  |  98  |  16  ----------------------------<  86  4.2   |  18<L>  |  1.12    Ca    7.6<L>      09 Jan 2020 05:19  Phos  3.1     01-09  Mg     2.0     01-09    MICROBIOLOGY:    Culture - Blood (collected 01-08-20 @ 12:35)  Source: .Blood Blood  Gram Stain (01-09-20 @ 10:05):    Growth in aerobic bottle: Gram Positive Cocci in Clusters  Preliminary Report (01-09-20 @ 10:05):    Growth in aerobic bottle: Gram Positive Cocci in Clusters    Culture - Blood (collected 01-08-20 @ 12:35)  Source: .Blood Blood  Gram Stain (01-09-20 @ 09:08):    Growth in aerobic bottle: Gram Positive Cocci in Clusters  Preliminary Report (01-09-20 @ 09:08):    Growth in aerobic bottle: Gram Positive Cocci in Clusters    Culture - Blood (collected 01-07-20 @ 16:31)  Source: .Blood Blood-Peripheral  Preliminary Report (01-08-20 @ 17:02):    No growth to date.    Culture - Urine (collected 01-07-20 @ 15:18)  Source: .Urine Clean Catch (Midstream)  Preliminary Report (01-08-20 @ 17:17):    >100,000 CFU/ml Gram Negative Rods    Culture - Blood (collected 01-07-20 @ 08:36)  Source: .Blood Blood-Venous  Gram Stain (01-08-20 @ 11:20):    Growth in aerobic bottle: Gram Positive Cocci in Clusters    Growth in anaerobic bottle: Gram Positive Cocci in Clusters  Final Report (01-09-20 @ 11:31):    Growth in aerobic and anaerobic bottles: Coag Negative Staphylococcus    Coag Negative Staphylococcus    Single set isolate, possible contaminant. Contact    Microbiology if susceptibility testing clinically    indicated.    ***Blood Panel PCR results on this specimen are available    approximately 3 hours after the Gram stain result.***    Gram stain, PCR, and/or culture results may not always    correspond due to difference in methodologies.    ************************************************************    ThisPCR assay was performed using [employer].    The following targets are tested for: Enterococcus,    vancomycin resistant enterococci, Listeria monocytogenes,    coagulase negative staphylococci, S. aureus,    methicillin resistant S. aureus, Streptococcus agalactiae    (Group B), S. pneumoniae, S. pyogenes (Group A),    Acinetobacter baumannii, Enterobacter cloacae, E. coli,    Klebsiella oxytoca, K. pneumoniae, Proteus sp.,    Serratia marcescens, Haemophilus influenzae,    Neisseria meningitidis, Pseudomonas aeruginosa, Candida    albicans, C. glabrata, C krusei, C parapsilosis,    C. tropicalis and the KPC resistance gene.  Organism: Blood Culture PCR (01-09-20 @ 11:31)  Organism: Blood Culture PCR (01-09-20 @ 11:31)      -  Coagulase negative Staphylococcus: Detec      Method Type: PCR    RADIOLOGY & ADDITIONAL STUDIES:    < from: CT Abdomen and Pelvis w/ Oral Cont and w/ IV Cont (01.06.20 @ 10:47) >  EXAM:  CT ABDOMEN AND PELVIS OC IC                          PROCEDURE DATE:  01/06/2020      INTERPRETATION:  CLINICAL INFORMATION: Status post left hemicolectomy with open abdominal wound drainage and induration.     COMPARISON: CT chest, abdomen and pelvis dated 12/4/2019.    PROCEDURE:   CT of the Abdomen and Pelvis was performed with intravenous contrast.   Intravenous contrast: 90 ml Omnipaque 350. 10 ml discarded.  Oral contrast: positive contrast was administered.  Sagittal and coronal reformats were performed.    FINDINGS:    LOWER CHEST: Subsegmental atelectasis of the left lower lobe. Trace left pleural effusion.    LIVER: Calcified granuloma in the right liver. Probable hepatic steatosis.  BILE DUCTS: Normal caliber.  GALLBLADDER: Within normal limits.   SPLEEN: Within normal limits.  PANCREAS: Within normal limits.  ADRENALS: Within normal limits.  KIDNEYS/URETERS: Subcentimeter hypodense foci in the right lower pole are too small to characterize.    BLADDER: Within normal limits.  REPRODUCTIVE ORGANS: Uterus and adnexa within normal limits.    BOWEL: Left hemicolectomy with Sesay's pouch and right lower quadrant colostomy. Interval removal of a left lower quadrant abdominal drain. No bowel obstruction.   PERITONEUM: Small loculated interloop fluid between small bowel loops in the mesentery.   VESSELS: Atherosclerotic changes.  RETROPERITONEUM/LYMPH NODES: No lymphadenopathy.    ABDOMINAL WALL: Postsurgical changes with open midline anterior abdominal wall.A 7.6 x 3.3 x 5.7 cm (AP x TR x CC) ill-defined multiloculated fluid collection in the left anterior abdomen (3, 74), at least partially intraperitoneal, as it is inseparable from small bowel loops. An additional small 2.7 x 2.3 x 3.1 cm (AP x TR x CC) fluid collection in the midline anterior abdominal wall. Anasarca.  BONES: Degenerative changes.    IMPRESSION:   Open anterior abdominal wall wound.    Loculated fluid collections in the left anterior abdomen, inseparable from small bowel loops, as well as a small abdominal wall collection.     Small volume loculated mesenteric fluid.      < from: Xray Chest 1 View- PORTABLE-Routine (01.09.20 @ 06:46) >  EXAM:  XR CHEST PORTABLE ROUTINE 1V                      PROCEDURE DATE:  01/09/2020      INTERPRETATION:  CLINICAL INFORMATION: Concern for atelectasis.    EXAM: Frontal chest radiograph dated 1/9/2020.    COMPARISON: Chest radiograph from 1/8/2020.    FINDINGS:  Clear lungs.  The cardiomediastinal silhouette not well evaluated on this study.    IMPRESSION:   Clear lungs.

## 2020-01-09 NOTE — CONSULT NOTE ADULT - ASSESSMENT
Ms Patel is a 64 year old woman with medical history of HTN, COPD, and morbid obesity who was admitted on 11/26 for perforated sigmoid diverticulitis, hospital course complicated by wound healing issues, subclavian steal syndrome, and difficulties with placement in [place]. She has developed septic shock with gram positive bacteremia and is now in the SICU. Bacteremia most likely secondary to a PICC line that was in place for about a month, removed yesterday. She also a left sided abdominal collection, most likely abscesses, which will need drainage once she is stabilized.     Gram positive bacteremia   - Continue with vancomycin  - Check vancomycin trough prior to 4th dose  - Follow up speciation of gram positive cocci  - Repeat blood cultures q 48 hours until clear  - Follow up cultures  - Monitor for fevers  - Trend WBCs    Left sided loculated abdominal collections  - Continue with meropenem   - Will need drainage once stabilized    [facility], PGY-4  Infectious Disease Fellow   Pager: [phone]  After 5pm/weekends: [phone]

## 2020-01-09 NOTE — CONSULT NOTE ADULT - ATTENDING COMMENTS
64 F PMHx of HTN, COPD, and morbid obesity who was admitted on 11/26 for perforated sigmoid diverticulitis.  No fever, has leukocytosis  Prolonged hospital stay  Perforated diverticulum, culture with E coli, s/p OR into washout and ostomy  Had PICC line placed during hospital stay  BCX now positive CoNS, on repeat still present  PICC has been DCed  CT with multiloculated collections  I reviewed CXR personally, clear, no signs pna  Overall,  1) CoNS bacteremia--? CLABSI  - Vancomycin 1g q 12 (monitor levels)  - Repeat BCXs to clearance  - PICC has been DCed  2) Loculated fluid collections  - Infected collections?  - Meropenem 1g q 8  - Consideration for IR aspiration  3) Leukocytosis  - Trend to normal, monitor for further signs infection    Sen Johnson MD  Pager [phone]  After 5pm and on weekends call [phone]    I was physically present for the key portions of the evaluation and management service provided. I saw and examined the patient. I agree with the above history, physical, and plan except for any discrepancies which I have documented in “Attending Attestation.” Please refer to “Attending Attestation” for final plan.

## 2020-01-09 NOTE — PROGRESS NOTE ADULT - SUBJECTIVE AND OBJECTIVE BOX
[facility] PROGRESS NOTE    HPI:  64 yr old female with PMHx morbid obesity, GERD, HTN, COPD, and pshx D&C now s/p ex laparotomy with extended left hemicolectomy  for perforated and necrotic sigmoid colon with gross abdominal contamination. RTOR  for abd closure, RUQ end transverse colostomy creation. On  patient found to have induration of wound and keri-stomal fat necrosis, s/p bedside debridement w/ wound VAC in place on midline wound. Began showing signs of sepsis and was transferred to SICU  for further management.      SUBJECTIVE:   Pt seen and examined, continues to appear ill and endorse malaise. Abx (zozyn/vanco) changed to meropenem/vanco overnight, also started on phenylephrine for worsening hypotension. Pt with +UCx and 1 +BCx growing staph.     Vital Signs Last 24 Hrs  T(C): 37.4 (2020 23:00), Max: 37.4 (2020 23:00)  T(F): 99.3 (2020 23:00), Max: 99.3 (2020 23:00)  HR: 80 (2020 04:00) (78 - 97)  BP: 97/47 (2020 04:00) (74/47 - 109/55)  BP(mean): 68 (2020 04:00) (55 - 76)  RR: 16 (2020 04:00) (14 - 31)  SpO2: 94% (2020 04:00) (91% - 98%)    PHYSICAL EXAM  General: Obese elderly female, weak appearing  Neuro: Awake, alert  CHEST: breathing comfortably  CV: On vasopressor support for hypotension, RRR  Abdomen: abdomen obese, soft, non-tender, midline wound VAC in place, functioning  Drain: clear yellow fluid in bag. Ostomy pink and viable, gas and stool noted in bag  Extremities: Grossly symmetric, edematous    I&O's Summary    2020 07:01  -  2020 07:00  --------------------------------------------------------  IN: 5705 mL / OUT: 605 mL / NET: 5100 mL    2020 07:  -  2020 04:41  --------------------------------------------------------  IN: 5200.4 mL / OUT: 1450 mL / NET: 3750.4 mL      I&O's Detail    2020 07:  -  2020 07:00  --------------------------------------------------------  IN:    Albumin 5%  - 500 mL: 500 mL    Oral Fluid: 180 mL    sodium chloride 0.9%: 2375 mL    Sodium Chloride 0.9% IV Bolus: 2500 mL    Solution: 150 mL  Total IN: 5705 mL    OUT:    Colostomy: 150 mL    Drain: 30 mL    Indwelling Catheter - Urethral: 325 mL    VAC (Vacuum Assisted Closure) System: 100 mL  Total OUT: 605 mL    Total NET: 5100 mL      2020 07:  -  2020 04:41  --------------------------------------------------------  IN:    Oral Fluid: 1200 mL    phenylephrine   Infusion: 50.4 mL    sodium chloride 0.9%: 250 mL    sodium chloride 0.9%.: 3000 mL    Solution: 100 mL    Solution: 600 mL  Total IN: 5200.4 mL    OUT:    Colostomy: 100 mL    Drain: 100 mL    Indwelling Catheter - Urethral: 1240 mL    VAC (Vacuum Assisted Closure) System: 10 mL  Total OUT: 1450 mL    Total NET: 3750.4 mL          MEDICATIONS  (STANDING):  aspirin  chewable 81 milliGRAM(s) Oral <User Schedule>  atorvastatin 40 milliGRAM(s) Oral <User Schedule>  budesonide 160 MICROgram(s)/formoterol 4.5 MICROgram(s) Inhaler 2 Puff(s) Inhalation two times a day  chlorhexidine 2% Cloths 1 Application(s) Topical <User Schedule>  doxazosin 2 milliGRAM(s) Oral <User Schedule>  enoxaparin Injectable 100 milliGRAM(s) SubCutaneous <User Schedule>  erythromycin     base Tablet 250 milliGRAM(s) Oral <User Schedule>  meropenem  IVPB 1000 milliGRAM(s) IV Intermittent every 8 hours  methadone    Tablet 17.5 milliGRAM(s) Oral daily  nystatin Powder 1 Application(s) Topical two times a day  pantoprazole    Tablet 40 milliGRAM(s) Oral <User Schedule>  phenylephrine    Infusion 0.3 MICROgram(s)/kG/Min (18.934 mL/Hr) IV Continuous <Continuous>  polyethylene glycol 3350 17 Gram(s) Oral <User Schedule>  sodium chloride 0.9%. 1000 milliLiter(s) (150 mL/Hr) IV Continuous <Continuous>  vancomycin  IVPB 1250 milliGRAM(s) IV Intermittent every 12 hours    MEDICATIONS  (PRN):  acetaminophen   Tablet .. 975 milliGRAM(s) Oral every 6 hours PRN Mild Pain (1 - 3)  ALBUTerol    90 MICROgram(s) HFA Inhaler 2 Puff(s) Inhalation every 6 hours PRN Shortness of Breath and/or Wheezing  aluminum hydroxide/magnesium hydroxide/simethicone Suspension 30 milliLiter(s) Oral every 4 hours PRN Dyspepsia      LABS:                        8.6    15.58 )-----------( 261      ( 2020 01:22 )             28.9     01-09    128<L>  |  97  |  17  ----------------------------<  101<H>  4.4   |  18<L>  |  1.22    Ca    7.7<L>      2020 01:22  Phos  3.4     01-09  Mg     2.0     -09    TPro  6.1  /  Alb  1.7<L>  /  TBili  0.4  /  DBili  x   /  AST  21  /  ALT  17  /  AlkPhos  110  01-07      Urinalysis Basic - ( 2020 06:28 )    Color: Light Orange / Appearance: Slightly Turbid / S.041 / pH: x  Gluc: x / Ketone: Negative  / Bili: Negative / Urobili: Negative   Blood: x / Protein: Trace / Nitrite: Negative   Leuk Esterase: Large / RBC: 4 /hpf /  /HPF   Sq Epi: x / Non Sq Epi: 0 /hpf / Bacteria: Many        RADIOLOGY & ADDITIONAL STUDIES:

## 2020-01-09 NOTE — PROGRESS NOTE ADULT - SUBJECTIVE AND OBJECTIVE BOX
SICU DAILY PROGRESS NOTE    64F with pmhx morbid obesity, acid reflux, HTN, COPD and PSH D&C presented to the ED on 11/26 c/o abdominal pain and bloating. Patient reports having constipation for 2 weeks and has been taking enemas, miralax and senna and passing small hard balls for the last weeks. Reported pain worst in the LLQ that started a few days ago and has been worsening in the last day, also has noted significant abdominal distention in the last day. Reported she had difficulty walking short distances because she becomes SOB. In ED, CT demonstrated perforated sigmoid diverticulitis with intraperitoneal free air. She was taken to the operating room on 11/25 overnight and underwent an exploratory laparotomy with extended left hemicolectomy and was left in discontinuity. An Abthera VAC was placed. She was kept intubated post op and brought to the ICU for hemodynamic and respiratory management. She went back to the OR on 11/29 for transverse end colostomy and fascial closure with wound vac.  Pt transferred out of SICU and recovering on floor with post-op course including induration of wound and colostomy necrosis requiring bedside debridement and wound vac placement.  Pt had CT on 1/6 for leukocytosis and to evaluate wound, which showed loculated fluid collections in the left anterior abdomen, very near loops of bowel.  After CT, pt reported feeling "worse" and was hypotensive overnight to SBP 70s requiring 1L fluid bolus.  Pt was bolused again this am for hypotension and blood cultures were sent for worsening leukocytosis, and BULL.  SICU consulted for closer monitoring.  UA positive and patient was started on Ceftriaxone for presumed UTI.     24 HOUR EVENTS:  - Increased NS to 150cc/hr  - PIV placed, PICC removed  - Started on Vanc/Zosyn for +UCx and +BCx  - UCx > 100,000 CFU GNR  - BCx Staph coag neg  - Zosyn switched to meropenem overnight due to rash  - Started on phenylephrine overnight due to hypotension      SUBJECTIVE/ROS:  [ ] A ten-point review of systems was otherwise negative except as noted.  [ ] Due to altered mental status/intubation, subjective information were not able to be obtained from the patient. History was obtained, to the extent possible, from review of the chart and collateral sources of information.      NEURO  RASS:     GCS:     CAM ICU:  Exam: awake, alert, oriented  Meds: acetaminophen   Tablet .. 975 milliGRAM(s) Oral every 6 hours PRN Mild Pain (1 - 3)  methadone    Tablet 17.5 milliGRAM(s) Oral daily  oxyCODONE    IR 5 milliGRAM(s) Oral once    [x] Adequacy of sedation and pain control has been assessed and adjusted      RESPIRATORY  RR: 17 (01-09-20 @ 02:30) (14 - 31)  SpO2: 94% (01-09-20 @ 02:30) (91% - 98%)  Wt(kg): --  Exam: unlabored, equal chest rise  Mechanical Ventilation:   ABG - ( 07 Jan 2020 12:09 )  pH: x     /  pCO2: x     /  pO2: x     / HCO3: x     / Base Excess: x     /  SaO2: x       Lactate: 5.0      [N/A] Extubation Readiness Assessed  Meds: ALBUTerol    90 MICROgram(s) HFA Inhaler 2 Puff(s) Inhalation every 6 hours PRN Shortness of Breath and/or Wheezing  budesonide 160 MICROgram(s)/formoterol 4.5 MICROgram(s) Inhaler 2 Puff(s) Inhalation two times a day        CARDIOVASCULAR  HR: 79 (01-09-20 @ 02:30) (79 - 97)  BP: 104/52 (01-09-20 @ 02:30) (74/47 - 109/55)  BP(mean): 75 (01-09-20 @ 02:30) (55 - 76)  ABP: --  ABP(mean): --  Wt(kg): --  CVP(cm H2O): --  VBG - ( 09 Jan 2020 01:17 )  pH: 7.33  /  pCO2: 44    /  pO2: 45    / HCO3: 22    / Base Excess: -2.9  /  SaO2: 69     Lactate: 2.6        Exam: regular rate and rhythm  Cardiac Rhythm: sinus  Perfusion     [x]Adequate   [ ]Inadequate  Mentation   [x]Normal       [ ]Reduced  Extremities  [x]Warm         [ ]Cool  Volume Status [ ]Hypervolemic [x]Euvolemic [ ]Hypovolemic  Meds: doxazosin 2 milliGRAM(s) Oral <User Schedule>  phenylephrine    Infusion 0.3 MICROgram(s)/kG/Min IV Continuous <Continuous>      GI/NUTRITION  Exam: soft, nontender, nondistended, ostomy viable w/ stool in bag  Diet: regular diet  Meds: aluminum hydroxide/magnesium hydroxide/simethicone Suspension 30 milliLiter(s) Oral every 4 hours PRN Dyspepsia  pantoprazole    Tablet 40 milliGRAM(s) Oral <User Schedule>  polyethylene glycol 3350 17 Gram(s) Oral <User Schedule>      GENITOURINARY  I&O's Detail    01-07 @ 07:01  -  01-08 @ 07:00  --------------------------------------------------------  IN:    Albumin 5%  - 500 mL: 500 mL    Oral Fluid: 180 mL    sodium chloride 0.9%: 2375 mL    Sodium Chloride 0.9% IV Bolus: 2500 mL    Solution: 150 mL  Total IN: 5705 mL    OUT:    Colostomy: 150 mL    Drain: 30 mL    Indwelling Catheter - Urethral: 325 mL    VAC (Vacuum Assisted Closure) System: 100 mL  Total OUT: 605 mL    Total NET: 5100 mL      01-08 @ 07:01 - 01-09 @ 04:12  --------------------------------------------------------  IN:    Oral Fluid: 1200 mL    phenylephrine   Infusion: 25.2 mL    sodium chloride 0.9%: 250 mL    sodium chloride 0.9%.: 2550 mL    Solution: 100 mL    Solution: 600 mL  Total IN: 4725.2 mL    OUT:    Colostomy: 100 mL    Drain: 100 mL    Indwelling Catheter - Urethral: 1090 mL    VAC (Vacuum Assisted Closure) System: 10 mL  Total OUT: 1300 mL    Total NET: 3425.2 mL          01-09    128<L>  |  97  |  17  ----------------------------<  101<H>  4.4   |  18<L>  |  1.22    Ca    7.7<L>      09 Jan 2020 01:22  Phos  3.4     01-09  Mg     2.0     01-09    TPro  6.1  /  Alb  1.7<L>  /  TBili  0.4  /  DBili  x   /  AST  21  /  ALT  17  /  AlkPhos  110  01-07    [ ] Ross catheter, indication: N/A  Meds: sodium chloride 0.9%. 1000 milliLiter(s) IV Continuous <Continuous>        HEMATOLOGIC  Meds: aspirin  chewable 81 milliGRAM(s) Oral <User Schedule>  enoxaparin Injectable 100 milliGRAM(s) SubCutaneous <User Schedule>    [x] VTE Prophylaxis                        8.6    15.58 )-----------( 261      ( 09 Jan 2020 01:22 )             28.9       Transfusion     [ ] PRBC   [ ] Platelets   [ ] FFP   [ ] Cryoprecipitate      INFECTIOUS DISEASES  WBC Count: 15.58 K/uL (01-09 @ 01:22)  WBC Count: 19.15 K/uL (01-08 @ 09:50)  WBC Count: 25.51 K/uL (01-08 @ 04:57)    RECENT CULTURES:  Specimen Source: .Blood Blood-Peripheral  Date/Time: 01-07 @ 16:31  Culture Results:   No growth to date.  Gram Stain: --  Organism: --  Specimen Source: .Urine Clean Catch (Midstream)  Date/Time: 01-07 @ 15:18  Culture Results:   >100,000 CFU/ml Gram Negative Rods  Gram Stain: --  Organism: --  Specimen Source: .Blood Blood-Venous  Date/Time: 01-07 @ 08:36  Culture Results:   Growth in aerobic bottle: Gram Positive Cocci in Clusters  Growth in anaerobic bottle: Gram Positive Cocci in Clusters  ***Blood Panel PCR results on this specimen are available  approximately 3 hours after the Gram stain result.***  Gram stain, PCR, and/or culture results may not always  correspond due to difference in methodologies.  ************************************************************  This PCR assay was performed using [employer].  The following targets are tested for: Enterococcus,  vancomycin resistant enterococci, Listeria monocytogenes,  coagulase negative staphylococci, S. aureus,  methicillin resistant S. aureus, Streptococcus agalactiae  (Group B), S. pneumoniae, S. pyogenes (Group A),  Acinetobacter baumannii, Enterobacter cloacae, E. coli,  Klebsiella oxytoca, K. pneumoniae, Proteus sp.,  Serratia marcescens, Haemophilus influenzae,  Neisseria meningitidis, Pseudomonas aeruginosa, Candida  albicans, C. glabrata, C krusei, C parapsilosis,  C. tropicalis and the KPC resistance gene.  Gram Stain:   Growth in aerobic bottle: Gram Positive Cocci in Clusters  Growth in anaerobic bottle: Gram Positive Cocci in Clusters  Organism: Blood Culture PCR    Meds: erythromycin     base Tablet 250 milliGRAM(s) Oral <User Schedule>  meropenem  IVPB 1000 milliGRAM(s) IV Intermittent every 8 hours  vancomycin  IVPB 1250 milliGRAM(s) IV Intermittent every 12 hours        ENDOCRINE  CAPILLARY BLOOD GLUCOSE        Meds: atorvastatin 40 milliGRAM(s) Oral <User Schedule>        ACCESS DEVICES:  [ ] Peripheral IV  [ ] Central Venous Line	[ ] R	[ ] L	[ ] IJ	[ ] Fem	[ ] SC	Placed:   [ ] Arterial Line		[ ] R	[ ] L	[ ] Fem	[ ] Rad	[ ] Ax	Placed:   [ ] PICC:					[ ] Mediport  [ ] Urinary Catheter, Date Placed:   [x] Necessity of urinary, arterial, and venous catheters discussed    OTHER MEDICATIONS:  chlorhexidine 2% Cloths 1 Application(s) Topical <User Schedule>  nystatin Powder 1 Application(s) Topical two times a day      CODE STATUS:      IMAGING:

## 2020-01-10 LAB
ANION GAP SERPL CALC-SCNC: 10 MMOL/L — SIGNIFICANT CHANGE UP (ref 5–17)
BASE EXCESS BLDV CALC-SCNC: -3.5 MMOL/L — LOW (ref -2–2)
BUN SERPL-MCNC: 12 MG/DL — SIGNIFICANT CHANGE UP (ref 7–23)
CA-I SERPL-SCNC: 1.16 MMOL/L — SIGNIFICANT CHANGE UP (ref 1.12–1.3)
CALCIUM SERPL-MCNC: 8 MG/DL — LOW (ref 8.4–10.5)
CHLORIDE BLDV-SCNC: 105 MMOL/L — SIGNIFICANT CHANGE UP (ref 96–108)
CHLORIDE SERPL-SCNC: 103 MMOL/L — SIGNIFICANT CHANGE UP (ref 96–108)
CO2 BLDV-SCNC: 23 MMOL/L — SIGNIFICANT CHANGE UP (ref 22–30)
CO2 SERPL-SCNC: 19 MMOL/L — LOW (ref 22–31)
CREAT SERPL-MCNC: 0.88 MG/DL — SIGNIFICANT CHANGE UP (ref 0.5–1.3)
CULTURE RESULTS: SIGNIFICANT CHANGE UP
GAS PNL BLDV: 127 MMOL/L — LOW (ref 135–145)
GAS PNL BLDV: SIGNIFICANT CHANGE UP
GAS PNL BLDV: SIGNIFICANT CHANGE UP
GLUCOSE BLDV-MCNC: 96 MG/DL — SIGNIFICANT CHANGE UP (ref 70–99)
GLUCOSE SERPL-MCNC: 94 MG/DL — SIGNIFICANT CHANGE UP (ref 70–99)
GRAM STN FLD: SIGNIFICANT CHANGE UP
GRAM STN FLD: SIGNIFICANT CHANGE UP
HCO3 BLDV-SCNC: 22 MMOL/L — SIGNIFICANT CHANGE UP (ref 21–29)
HCT VFR BLD CALC: 29.1 % — LOW (ref 34.5–45)
HCT VFR BLDA CALC: 29 % — LOW (ref 39–50)
HGB BLD CALC-MCNC: 9.2 G/DL — LOW (ref 11.5–15.5)
HGB BLD-MCNC: 8.7 G/DL — LOW (ref 11.5–15.5)
HOROWITZ INDEX BLDV+IHG-RTO: 21 — SIGNIFICANT CHANGE UP
LACTATE BLDV-MCNC: 2.4 MMOL/L — HIGH (ref 0.7–2)
MAGNESIUM SERPL-MCNC: 1.8 MG/DL — SIGNIFICANT CHANGE UP (ref 1.6–2.6)
MCHC RBC-ENTMCNC: 27.8 PG — SIGNIFICANT CHANGE UP (ref 27–34)
MCHC RBC-ENTMCNC: 29.9 GM/DL — LOW (ref 32–36)
MCV RBC AUTO: 93 FL — SIGNIFICANT CHANGE UP (ref 80–100)
NRBC # BLD: 0 /100 WBCS — SIGNIFICANT CHANGE UP (ref 0–0)
OTHER CELLS CSF MANUAL: 8 ML/DL — LOW (ref 18–22)
PCO2 BLDV: 43 MMHG — SIGNIFICANT CHANGE UP (ref 35–50)
PH BLDV: 7.33 — LOW (ref 7.35–7.45)
PHOSPHATE SERPL-MCNC: 2.1 MG/DL — LOW (ref 2.5–4.5)
PLATELET # BLD AUTO: 350 K/UL — SIGNIFICANT CHANGE UP (ref 150–400)
PO2 BLDV: 38 MMHG — SIGNIFICANT CHANGE UP (ref 25–45)
POTASSIUM BLDV-SCNC: 3.4 MMOL/L — LOW (ref 3.5–5.3)
POTASSIUM SERPL-MCNC: 4 MMOL/L — SIGNIFICANT CHANGE UP (ref 3.5–5.3)
POTASSIUM SERPL-SCNC: 4 MMOL/L — SIGNIFICANT CHANGE UP (ref 3.5–5.3)
RBC # BLD: 3.13 M/UL — LOW (ref 3.8–5.2)
RBC # FLD: 16.5 % — HIGH (ref 10.3–14.5)
SAO2 % BLDV: 66 % — LOW (ref 67–88)
SODIUM SERPL-SCNC: 132 MMOL/L — LOW (ref 135–145)
SPECIMEN SOURCE: SIGNIFICANT CHANGE UP
WBC # BLD: 17.45 K/UL — HIGH (ref 3.8–10.5)
WBC # FLD AUTO: 17.45 K/UL — HIGH (ref 3.8–10.5)

## 2020-01-10 PROCEDURE — 93010 ELECTROCARDIOGRAM REPORT: CPT

## 2020-01-10 PROCEDURE — 99291 CRITICAL CARE FIRST HOUR: CPT | Mod: 25

## 2020-01-10 PROCEDURE — 36556 INSERT NON-TUNNEL CV CATH: CPT

## 2020-01-10 PROCEDURE — 76937 US GUIDE VASCULAR ACCESS: CPT | Mod: 26

## 2020-01-10 PROCEDURE — 71045 X-RAY EXAM CHEST 1 VIEW: CPT | Mod: 26

## 2020-01-10 PROCEDURE — 99232 SBSQ HOSP IP/OBS MODERATE 35: CPT

## 2020-01-10 RX ORDER — AMIODARONE HYDROCHLORIDE 400 MG/1
150 TABLET ORAL ONCE
Refills: 0 | Status: COMPLETED | OUTPATIENT
Start: 2020-01-10 | End: 2020-01-10

## 2020-01-10 RX ORDER — MAGNESIUM SULFATE 500 MG/ML
2 VIAL (ML) INJECTION ONCE
Refills: 0 | Status: COMPLETED | OUTPATIENT
Start: 2020-01-10 | End: 2020-01-10

## 2020-01-10 RX ORDER — ALBUMIN HUMAN 25 %
250 VIAL (ML) INTRAVENOUS ONCE
Refills: 0 | Status: COMPLETED | OUTPATIENT
Start: 2020-01-10 | End: 2020-01-11

## 2020-01-10 RX ORDER — NOREPINEPHRINE BITARTRATE/D5W 8 MG/250ML
0.05 PLASTIC BAG, INJECTION (ML) INTRAVENOUS
Qty: 16 | Refills: 0 | Status: DISCONTINUED | OUTPATIENT
Start: 2020-01-10 | End: 2020-01-18

## 2020-01-10 RX ORDER — MIDODRINE HYDROCHLORIDE 2.5 MG/1
10 TABLET ORAL
Refills: 0 | Status: DISCONTINUED | OUTPATIENT
Start: 2020-01-10 | End: 2020-01-14

## 2020-01-10 RX ORDER — ACETAMINOPHEN 500 MG
1000 TABLET ORAL ONCE
Refills: 0 | Status: COMPLETED | OUTPATIENT
Start: 2020-01-10 | End: 2020-01-10

## 2020-01-10 RX ORDER — ALBUMIN HUMAN 25 %
500 VIAL (ML) INTRAVENOUS ONCE
Refills: 0 | Status: COMPLETED | OUTPATIENT
Start: 2020-01-10 | End: 2020-01-10

## 2020-01-10 RX ORDER — AMIODARONE HYDROCHLORIDE 400 MG/1
0.5 TABLET ORAL
Qty: 900 | Refills: 0 | Status: DISCONTINUED | OUTPATIENT
Start: 2020-01-10 | End: 2020-01-12

## 2020-01-10 RX ORDER — AMIODARONE HYDROCHLORIDE 400 MG/1
1 TABLET ORAL
Qty: 900 | Refills: 0 | Status: COMPLETED | OUTPATIENT
Start: 2020-01-10 | End: 2020-01-11

## 2020-01-10 RX ORDER — HYDROMORPHONE HYDROCHLORIDE 2 MG/ML
1 INJECTION INTRAMUSCULAR; INTRAVENOUS; SUBCUTANEOUS ONCE
Refills: 0 | Status: DISCONTINUED | OUTPATIENT
Start: 2020-01-10 | End: 2020-01-10

## 2020-01-10 RX ADMIN — METHADONE HYDROCHLORIDE 17.5 MILLIGRAM(S): 40 TABLET ORAL at 10:36

## 2020-01-10 RX ADMIN — ATORVASTATIN CALCIUM 40 MILLIGRAM(S): 80 TABLET, FILM COATED ORAL at 19:50

## 2020-01-10 RX ADMIN — AMIODARONE HYDROCHLORIDE 600 MILLIGRAM(S): 400 TABLET ORAL at 23:00

## 2020-01-10 RX ADMIN — SODIUM CHLORIDE 150 MILLILITER(S): 9 INJECTION INTRAMUSCULAR; INTRAVENOUS; SUBCUTANEOUS at 14:42

## 2020-01-10 RX ADMIN — CHLORHEXIDINE GLUCONATE 1 APPLICATION(S): 213 SOLUTION TOPICAL at 05:39

## 2020-01-10 RX ADMIN — Medication 1000 MILLIGRAM(S): at 00:30

## 2020-01-10 RX ADMIN — PANTOPRAZOLE SODIUM 40 MILLIGRAM(S): 20 TABLET, DELAYED RELEASE ORAL at 05:43

## 2020-01-10 RX ADMIN — Medication 81 MILLIGRAM(S): at 19:50

## 2020-01-10 RX ADMIN — ENOXAPARIN SODIUM 100 MILLIGRAM(S): 100 INJECTION SUBCUTANEOUS at 11:38

## 2020-01-10 RX ADMIN — NYSTATIN CREAM 1 APPLICATION(S): 100000 CREAM TOPICAL at 05:43

## 2020-01-10 RX ADMIN — MEROPENEM 100 MILLIGRAM(S): 1 INJECTION INTRAVENOUS at 13:03

## 2020-01-10 RX ADMIN — HYDROMORPHONE HYDROCHLORIDE 1 MILLIGRAM(S): 2 INJECTION INTRAMUSCULAR; INTRAVENOUS; SUBCUTANEOUS at 10:45

## 2020-01-10 RX ADMIN — Medication 1000 MILLIGRAM(S): at 10:45

## 2020-01-10 RX ADMIN — NYSTATIN CREAM 1 APPLICATION(S): 100000 CREAM TOPICAL at 17:49

## 2020-01-10 RX ADMIN — Medication 7.89 MICROGRAM(S)/KG/MIN: at 17:38

## 2020-01-10 RX ADMIN — MIDODRINE HYDROCHLORIDE 10 MILLIGRAM(S): 2.5 TABLET ORAL at 10:39

## 2020-01-10 RX ADMIN — Medication 250 MILLILITER(S): at 16:42

## 2020-01-10 RX ADMIN — Medication 50 GRAM(S): at 23:04

## 2020-01-10 RX ADMIN — MIDODRINE HYDROCHLORIDE 10 MILLIGRAM(S): 2.5 TABLET ORAL at 19:50

## 2020-01-10 RX ADMIN — POLYETHYLENE GLYCOL 3350 17 GRAM(S): 17 POWDER, FOR SOLUTION ORAL at 17:48

## 2020-01-10 RX ADMIN — Medication 250 MILLIGRAM(S): at 17:47

## 2020-01-10 RX ADMIN — Medication 50 GRAM(S): at 14:57

## 2020-01-10 RX ADMIN — POLYETHYLENE GLYCOL 3350 17 GRAM(S): 17 POWDER, FOR SOLUTION ORAL at 05:43

## 2020-01-10 RX ADMIN — HYDROMORPHONE HYDROCHLORIDE 1 MILLIGRAM(S): 2 INJECTION INTRAMUSCULAR; INTRAVENOUS; SUBCUTANEOUS at 10:30

## 2020-01-10 RX ADMIN — MEROPENEM 100 MILLIGRAM(S): 1 INJECTION INTRAVENOUS at 21:22

## 2020-01-10 RX ADMIN — Medication 250 MILLIGRAM(S): at 17:43

## 2020-01-10 RX ADMIN — Medication 250 MILLIGRAM(S): at 10:34

## 2020-01-10 RX ADMIN — POLYETHYLENE GLYCOL 3350 17 GRAM(S): 17 POWDER, FOR SOLUTION ORAL at 11:37

## 2020-01-10 RX ADMIN — Medication 62.5 MILLIMOLE(S): at 15:00

## 2020-01-10 RX ADMIN — MIDODRINE HYDROCHLORIDE 10 MILLIGRAM(S): 2.5 TABLET ORAL at 13:05

## 2020-01-10 RX ADMIN — AMIODARONE HYDROCHLORIDE 33.33 MG/MIN: 400 TABLET ORAL at 23:30

## 2020-01-10 RX ADMIN — Medication 400 MILLIGRAM(S): at 10:30

## 2020-01-10 NOTE — PROGRESS NOTE ADULT - ASSESSMENT
64 F PMHx of HTN, COPD, and morbid obesity who was admitted on 11/26 for perforated sigmoid diverticulitis.  No fever, has leukocytosis  Prolonged hospital stay  Perforated diverticulum, culture with E coli, s/p OR into washout and ostomy  Had PICC line placed during hospital stay  BCX now positive CoNS, on repeat still present  PICC has been DCed  CT with multiloculated collections  CXR clear  Overall,  1) CoNS bacteremia--? CLABSI  - Vancomycin 1g q 12 (monitor levels)  - Repeat BCXs to clearance  - PICC has been DCed  2) Loculated fluid collections  - Infected collections?  - Meropenem 1g q 8  - Consideration for IR aspiration  3) Leukocytosis  - Trend to normal, monitor for further signs infection    Sen Johnson MD  Pager [phone]  After 5pm and on weekends call [phone]

## 2020-01-10 NOTE — PROGRESS NOTE ADULT - SUBJECTIVE AND OBJECTIVE BOX
Surgery Post-Operative Note    SUBJECTIVE:  - patient feels better  --------------------------------------------------------------------------------------------------  OBJECTIVE:   Physical Exam:  General: AAOx3, NAD, lying comfortably in bed  HEENT: NC/AT  Respiratory: nonlabored breathing  Cardiovascular: RRR, normal S1 and S2, no murmurs or gallops  Abdomen: obese, soft, non-tender, VAC in place (100mL/24hr)  Drain: 50mL/24hr  Ostomy: pink and viable, gas and stool in place  --------------------------------------------------------------------------------------------------  V/S:  Vital Signs Last 24 Hrs  T(C): 36.6 (10 Gary 2020 03:00), Max: 37.1 (09 Jan 2020 11:00)  T(F): 97.9 (10 Gary 2020 03:00), Max: 98.8 (09 Jan 2020 11:00)  HR: 80 (10 Gary 2020 04:45) (76 - 102)  BP: 96/55 (10 Gary 2020 04:45) (80/41 - 121/56)  BP(mean): 73 (10 Gary 2020 04:45) (56 - 84)  RR: 16 (10 Gary 2020 04:45) (15 - 31)  SpO2: 95% (10 Gary 2020 04:45) (87% - 99%)    --------------------------------------------------------------------------------------------------  I/Os:    08 Jan 2020 07:01  -  09 Jan 2020 07:00  --------------------------------------------------------  IN:    Oral Fluid: 1200 mL    phenylephrine   Infusion: 88.2 mL    sodium chloride 0.9%: 250 mL    sodium chloride 0.9%.: 3300 mL    Solution: 850 mL    Solution: 100 mL    Solution: 50 mL  Total IN: 5838.2 mL    OUT:    Colostomy: 110 mL    Drain: 200 mL    Indwelling Catheter - Urethral: 1515 mL    VAC (Vacuum Assisted Closure) System: 60 mL  Total OUT: 1885 mL    Total NET: 3953.2 mL      09 Jan 2020 07:01  -  10 Gary 2020 05:32  --------------------------------------------------------  IN:    Oral Fluid: 720 mL    phenylephrine   Infusion: 464.7 mL    sodium chloride 0.9%.: 3300 mL    Solution: 100 mL    Solution: 350 mL  Total IN: 4934.7 mL    OUT:    Colostomy: 25 mL    Drain: 50 mL    Indwelling Catheter - Urethral: 200 mL    VAC (Vacuum Assisted Closure) System: 100 mL    Voided: 250 mL  Total OUT: 625 mL    Total NET: 4309.7 mL        --------------------------------------------------------------------------------------------------  LABS:                        8.6    15.58 )-----------( 261      ( 09 Jan 2020 01:22 )             28.9     09 Jan 2020 18:16    127    |  97     |  15     ----------------------------<  93     4.1     |  17     |  0.92     Ca    7.7        09 Jan 2020 18:16  Phos  2.5       09 Jan 2020 18:16  Mg     1.9       09 Jan 2020 18:16        CAPILLARY BLOOD GLUCOSE                Culture - Blood (collected 08 Jan 2020 12:35)  Source: .Blood Blood  Gram Stain (09 Jan 2020 10:05):    Growth in aerobic bottle: Gram Positive Cocci in Clusters  Preliminary Report (09 Jan 2020 10:05):    Growth in aerobic bottle: Gram Positive Cocci in Clusters    Culture - Blood (collected 08 Jan 2020 12:35)  Source: .Blood Blood  Gram Stain (09 Jan 2020 21:57):    Growth in aerobic bottle: Gram Positive Cocci in Clusters    Growth in anaerobic bottle: Gram Positive Cocci in Clusters  Preliminary Report (09 Jan 2020 21:57):    Growth in aerobic bottle: Gram Positive Cocci in Clusters    Growth in anaerobic bottle: Gram Positive Cocci in Clusters    Culture - Blood (collected 07 Jan 2020 16:31)  Source: .Blood Blood-Peripheral  Preliminary Report (08 Jan 2020 17:02):    No growth to date.    Culture - Urine (collected 07 Jan 2020 15:18)  Source: .Urine Clean Catch (Midstream)  Final Report (09 Jan 2020 16:41):    >100,000 CFU/ml Klebsiella pneumoniae    >100,000 CFU/ml Escherichia coli  Organism: Klebsiella pneumoniae  Escherichia coli (09 Jan 2020 16:41)  Organism: Escherichia coli (09 Jan 2020 16:41)  Organism: Klebsiella pneumoniae (09 Jan 2020 16:41)    Culture - Blood (collected 07 Jan 2020 08:36)  Source: .Blood Blood-Venous  Gram Stain (08 Jan 2020 11:20):    Growth in aerobic bottle: Gram Positive Cocci in Clusters    Growth in anaerobic bottle: Gram Positive Cocci in Clusters  Final Report (09 Jan 2020 11:31):    Growth in aerobic and anaerobic bottles: Coag Negative Staphylococcus    Coag Negative Staphylococcus    Single set isolate, possible contaminant. Contact    Microbiology if susceptibility testing clinically    indicated.    ***Blood Panel PCR results on this specimen are available    approximately 3 hours after the Gram stain result.***    Gram stain, PCR, and/or culture results may not always    correspond due to difference in methodologies.    ************************************************************    ThisPCR assay was performed using [employer].    The following targets are tested for: Enterococcus,    vancomycin resistant enterococci, Listeria monocytogenes,    coagulase negative staphylococci, S. aureus,    methicillin resistant S. aureus, Streptococcus agalactiae    (Group B), S. pneumoniae, S. pyogenes (Group A),    Acinetobacter baumannii, Enterobacter cloacae, E. coli,    Klebsiella oxytoca, K. pneumoniae, Proteus sp.,    Serratia marcescens, Haemophilus influenzae,    Neisseria meningitidis, Pseudomonas aeruginosa, Candida    albicans, C. glabrata, C krusei, C parapsilosis,    C. tropicalis and the KPC resistance gene.  Organism: Blood Culture PCR (09 Jan 2020 11:31)  Organism: Blood Culture PCR (09 Jan 2020 11:31)        --------------------------------------------------------------------------------------------------  MEDICATIONS  (STANDING):  aspirin  chewable 81 milliGRAM(s) Oral <User Schedule>  atorvastatin 40 milliGRAM(s) Oral <User Schedule>  budesonide 160 MICROgram(s)/formoterol 4.5 MICROgram(s) Inhaler 2 Puff(s) Inhalation two times a day  chlorhexidine 2% Cloths 1 Application(s) Topical <User Schedule>  doxazosin 2 milliGRAM(s) Oral <User Schedule>  enoxaparin Injectable 100 milliGRAM(s) SubCutaneous <User Schedule>  erythromycin     base Tablet 250 milliGRAM(s) Oral <User Schedule>  meropenem  IVPB 1000 milliGRAM(s) IV Intermittent every 8 hours  methadone    Tablet 17.5 milliGRAM(s) Oral daily  nystatin Powder 1 Application(s) Topical two times a day  pantoprazole    Tablet 40 milliGRAM(s) Oral <User Schedule>  phenylephrine    Infusion 0.3 MICROgram(s)/kG/Min (18.934 mL/Hr) IV Continuous <Continuous>  polyethylene glycol 3350 17 Gram(s) Oral <User Schedule>  sodium chloride 0.9%. 1000 milliLiter(s) (150 mL/Hr) IV Continuous <Continuous>  vancomycin  IVPB 1250 milliGRAM(s) IV Intermittent every 12 hours    MEDICATIONS  (PRN):  acetaminophen   Tablet .. 975 milliGRAM(s) Oral every 6 hours PRN Mild Pain (1 - 3)  ALBUTerol    90 MICROgram(s) HFA Inhaler 2 Puff(s) Inhalation every 6 hours PRN Shortness of Breath and/or Wheezing  aluminum hydroxide/magnesium hydroxide/simethicone Suspension 30 milliLiter(s) Oral every 4 hours PRN Dyspepsia    -------------------------------------------------------------------------------------------------- Surgery Post-Operative Note    SUBJECTIVE:  - Patient remains on low dose phenylephrine for BP support. Lactate downtrending   - BCx growing coag-negative staph; patient on meropenem and vancomcycin as per ID.   - patient feels better  --------------------------------------------------------------------------------------------------  OBJECTIVE:   Physical Exam:  General: AAOx3, NAD, lying comfortably in bed  HEENT: NC/AT  Respiratory: nonlabored breathing  Cardiovascular: RRR, normal S1 and S2, no murmurs or gallops  Abdomen: obese, soft, non-tender, VAC in place (100mL/24hr)  Drain: 50mL/24hr  Ostomy: pink and viable, gas and stool in place  --------------------------------------------------------------------------------------------------  V/S:  Vital Signs Last 24 Hrs  T(C): 36.6 (10 Gary 2020 03:00), Max: 37.1 (09 Jan 2020 11:00)  T(F): 97.9 (10 Gary 2020 03:00), Max: 98.8 (09 Jan 2020 11:00)  HR: 80 (10 Gary 2020 04:45) (76 - 102)  BP: 96/55 (10 Gary 2020 04:45) (80/41 - 121/56)  BP(mean): 73 (10 Gary 2020 04:45) (56 - 84)  RR: 16 (10 Gary 2020 04:45) (15 - 31)  SpO2: 95% (10 Gary 2020 04:45) (87% - 99%)    --------------------------------------------------------------------------------------------------  I/Os:    08 Jan 2020 07:01  -  09 Jan 2020 07:00  --------------------------------------------------------  IN:    Oral Fluid: 1200 mL    phenylephrine   Infusion: 88.2 mL    sodium chloride 0.9%: 250 mL    sodium chloride 0.9%.: 3300 mL    Solution: 850 mL    Solution: 100 mL    Solution: 50 mL  Total IN: 5838.2 mL    OUT:    Colostomy: 110 mL    Drain: 200 mL    Indwelling Catheter - Urethral: 1515 mL    VAC (Vacuum Assisted Closure) System: 60 mL  Total OUT: 1885 mL    Total NET: 3953.2 mL      09 Jan 2020 07:01  -  10 Gary 2020 05:32  --------------------------------------------------------  IN:    Oral Fluid: 720 mL    phenylephrine   Infusion: 464.7 mL    sodium chloride 0.9%.: 3300 mL    Solution: 100 mL    Solution: 350 mL  Total IN: 4934.7 mL    OUT:    Colostomy: 25 mL    Drain: 50 mL    Indwelling Catheter - Urethral: 200 mL    VAC (Vacuum Assisted Closure) System: 100 mL    Voided: 250 mL  Total OUT: 625 mL    Total NET: 4309.7 mL        --------------------------------------------------------------------------------------------------  LABS:                        8.6    15.58 )-----------( 261      ( 09 Jan 2020 01:22 )             28.9     09 Jan 2020 18:16    127    |  97     |  15     ----------------------------<  93     4.1     |  17     |  0.92     Ca    7.7        09 Jan 2020 18:16  Phos  2.5       09 Jan 2020 18:16  Mg     1.9       09 Jan 2020 18:16        CAPILLARY BLOOD GLUCOSE                Culture - Blood (collected 08 Jan 2020 12:35)  Source: .Blood Blood  Gram Stain (09 Jan 2020 10:05):    Growth in aerobic bottle: Gram Positive Cocci in Clusters  Preliminary Report (09 Jan 2020 10:05):    Growth in aerobic bottle: Gram Positive Cocci in Clusters    Culture - Blood (collected 08 Jan 2020 12:35)  Source: .Blood Blood  Gram Stain (09 Jan 2020 21:57):    Growth in aerobic bottle: Gram Positive Cocci in Clusters    Growth in anaerobic bottle: Gram Positive Cocci in Clusters  Preliminary Report (09 Jan 2020 21:57):    Growth in aerobic bottle: Gram Positive Cocci in Clusters    Growth in anaerobic bottle: Gram Positive Cocci in Clusters    Culture - Blood (collected 07 Jan 2020 16:31)  Source: .Blood Blood-Peripheral  Preliminary Report (08 Jan 2020 17:02):    No growth to date.    Culture - Urine (collected 07 Jan 2020 15:18)  Source: .Urine Clean Catch (Midstream)  Final Report (09 Jan 2020 16:41):    >100,000 CFU/ml Klebsiella pneumoniae    >100,000 CFU/ml Escherichia coli  Organism: Klebsiella pneumoniae  Escherichia coli (09 Jan 2020 16:41)  Organism: Escherichia coli (09 Jan 2020 16:41)  Organism: Klebsiella pneumoniae (09 Jan 2020 16:41)    Culture - Blood (collected 07 Jan 2020 08:36)  Source: .Blood Blood-Venous  Gram Stain (08 Jan 2020 11:20):    Growth in aerobic bottle: Gram Positive Cocci in Clusters    Growth in anaerobic bottle: Gram Positive Cocci in Clusters  Final Report (09 Jan 2020 11:31):    Growth in aerobic and anaerobic bottles: Coag Negative Staphylococcus    Coag Negative Staphylococcus    Single set isolate, possible contaminant. Contact    Microbiology if susceptibility testing clinically    indicated.    ***Blood Panel PCR results on this specimen are available    approximately 3 hours after the Gram stain result.***    Gram stain, PCR, and/or culture results may not always    correspond due to difference in methodologies.    ************************************************************    ThisPCR assay was performed using [employer].    The following targets are tested for: Enterococcus,    vancomycin resistant enterococci, Listeria monocytogenes,    coagulase negative staphylococci, S. aureus,    methicillin resistant S. aureus, Streptococcus agalactiae    (Group B), S. pneumoniae, S. pyogenes (Group A),    Acinetobacter baumannii, Enterobacter cloacae, E. coli,    Klebsiella oxytoca, K. pneumoniae, Proteus sp.,    Serratia marcescens, Haemophilus influenzae,    Neisseria meningitidis, Pseudomonas aeruginosa, Candida    albicans, C. glabrata, C krusei, C parapsilosis,    C. tropicalis and the KPC resistance gene.  Organism: Blood Culture PCR (09 Jan 2020 11:31)  Organism: Blood Culture PCR (09 Jan 2020 11:31)        --------------------------------------------------------------------------------------------------  MEDICATIONS  (STANDING):  aspirin  chewable 81 milliGRAM(s) Oral <User Schedule>  atorvastatin 40 milliGRAM(s) Oral <User Schedule>  budesonide 160 MICROgram(s)/formoterol 4.5 MICROgram(s) Inhaler 2 Puff(s) Inhalation two times a day  chlorhexidine 2% Cloths 1 Application(s) Topical <User Schedule>  doxazosin 2 milliGRAM(s) Oral <User Schedule>  enoxaparin Injectable 100 milliGRAM(s) SubCutaneous <User Schedule>  erythromycin     base Tablet 250 milliGRAM(s) Oral <User Schedule>  meropenem  IVPB 1000 milliGRAM(s) IV Intermittent every 8 hours  methadone    Tablet 17.5 milliGRAM(s) Oral daily  nystatin Powder 1 Application(s) Topical two times a day  pantoprazole    Tablet 40 milliGRAM(s) Oral <User Schedule>  phenylephrine    Infusion 0.3 MICROgram(s)/kG/Min (18.934 mL/Hr) IV Continuous <Continuous>  polyethylene glycol 3350 17 Gram(s) Oral <User Schedule>  sodium chloride 0.9%. 1000 milliLiter(s) (150 mL/Hr) IV Continuous <Continuous>  vancomycin  IVPB 1250 milliGRAM(s) IV Intermittent every 12 hours    MEDICATIONS  (PRN):  acetaminophen   Tablet .. 975 milliGRAM(s) Oral every 6 hours PRN Mild Pain (1 - 3)  ALBUTerol    90 MICROgram(s) HFA Inhaler 2 Puff(s) Inhalation every 6 hours PRN Shortness of Breath and/or Wheezing  aluminum hydroxide/magnesium hydroxide/simethicone Suspension 30 milliLiter(s) Oral every 4 hours PRN Dyspepsia    -------------------------------------------------------------------------------------------------- Surgery Post-Operative Note    SUBJECTIVE:  - Patient remains on low dose phenylephrine for BP support. Lactate downtrending   - BCx growing coag-negative staph and urine Cx with GNR; patient on meropenem and vancomcycin as per ID.   - patient feels better  --------------------------------------------------------------------------------------------------  OBJECTIVE:   Physical Exam:  General: AAOx3, NAD, lying comfortably in bed  HEENT: NC/AT  Respiratory: nonlabored breathing  Cardiovascular: RRR, normal S1 and S2, no murmurs or gallops  Abdomen: obese, soft, non-tender, VAC in place (100mL/24hr)  Drain: 50mL/24hr  Ostomy: pink and viable, gas and stool in place  --------------------------------------------------------------------------------------------------  V/S:  Vital Signs Last 24 Hrs  T(C): 36.6 (10 Gary 2020 03:00), Max: 37.1 (09 Jan 2020 11:00)  T(F): 97.9 (10 Gary 2020 03:00), Max: 98.8 (09 Jan 2020 11:00)  HR: 80 (10 Gary 2020 04:45) (76 - 102)  BP: 96/55 (10 Gary 2020 04:45) (80/41 - 121/56)  BP(mean): 73 (10 Gary 2020 04:45) (56 - 84)  RR: 16 (10 Gary 2020 04:45) (15 - 31)  SpO2: 95% (10 Gary 2020 04:45) (87% - 99%)    --------------------------------------------------------------------------------------------------  I/Os:    08 Jan 2020 07:01  -  09 Jan 2020 07:00  --------------------------------------------------------  IN:    Oral Fluid: 1200 mL    phenylephrine   Infusion: 88.2 mL    sodium chloride 0.9%: 250 mL    sodium chloride 0.9%.: 3300 mL    Solution: 850 mL    Solution: 100 mL    Solution: 50 mL  Total IN: 5838.2 mL    OUT:    Colostomy: 110 mL    Drain: 200 mL    Indwelling Catheter - Urethral: 1515 mL    VAC (Vacuum Assisted Closure) System: 60 mL  Total OUT: 1885 mL    Total NET: 3953.2 mL      09 Jan 2020 07:01  -  10 Gary 2020 05:32  --------------------------------------------------------  IN:    Oral Fluid: 720 mL    phenylephrine   Infusion: 464.7 mL    sodium chloride 0.9%.: 3300 mL    Solution: 100 mL    Solution: 350 mL  Total IN: 4934.7 mL    OUT:    Colostomy: 25 mL    Drain: 50 mL    Indwelling Catheter - Urethral: 200 mL    VAC (Vacuum Assisted Closure) System: 100 mL    Voided: 250 mL  Total OUT: 625 mL    Total NET: 4309.7 mL        --------------------------------------------------------------------------------------------------  LABS:                        8.6    15.58 )-----------( 261      ( 09 Jan 2020 01:22 )             28.9     09 Jan 2020 18:16    127    |  97     |  15     ----------------------------<  93     4.1     |  17     |  0.92     Ca    7.7        09 Jan 2020 18:16  Phos  2.5       09 Jan 2020 18:16  Mg     1.9       09 Jan 2020 18:16        CAPILLARY BLOOD GLUCOSE                Culture - Blood (collected 08 Jan 2020 12:35)  Source: .Blood Blood  Gram Stain (09 Jan 2020 10:05):    Growth in aerobic bottle: Gram Positive Cocci in Clusters  Preliminary Report (09 Jan 2020 10:05):    Growth in aerobic bottle: Gram Positive Cocci in Clusters    Culture - Blood (collected 08 Jan 2020 12:35)  Source: .Blood Blood  Gram Stain (09 Jan 2020 21:57):    Growth in aerobic bottle: Gram Positive Cocci in Clusters    Growth in anaerobic bottle: Gram Positive Cocci in Clusters  Preliminary Report (09 Jan 2020 21:57):    Growth in aerobic bottle: Gram Positive Cocci in Clusters    Growth in anaerobic bottle: Gram Positive Cocci in Clusters    Culture - Blood (collected 07 Jan 2020 16:31)  Source: .Blood Blood-Peripheral  Preliminary Report (08 Jan 2020 17:02):    No growth to date.    Culture - Urine (collected 07 Jan 2020 15:18)  Source: .Urine Clean Catch (Midstream)  Final Report (09 Jan 2020 16:41):    >100,000 CFU/ml Klebsiella pneumoniae    >100,000 CFU/ml Escherichia coli  Organism: Klebsiella pneumoniae  Escherichia coli (09 Jan 2020 16:41)  Organism: Escherichia coli (09 Jan 2020 16:41)  Organism: Klebsiella pneumoniae (09 Jan 2020 16:41)    Culture - Blood (collected 07 Jan 2020 08:36)  Source: .Blood Blood-Venous  Gram Stain (08 Jan 2020 11:20):    Growth in aerobic bottle: Gram Positive Cocci in Clusters    Growth in anaerobic bottle: Gram Positive Cocci in Clusters  Final Report (09 Jan 2020 11:31):    Growth in aerobic and anaerobic bottles: Coag Negative Staphylococcus    Coag Negative Staphylococcus    Single set isolate, possible contaminant. Contact    Microbiology if susceptibility testing clinically    indicated.    ***Blood Panel PCR results on this specimen are available    approximately 3 hours after the Gram stain result.***    Gram stain, PCR, and/or culture results may not always    correspond due to difference in methodologies.    ************************************************************    ThisPCR assay was performed using [employer].    The following targets are tested for: Enterococcus,    vancomycin resistant enterococci, Listeria monocytogenes,    coagulase negative staphylococci, S. aureus,    methicillin resistant S. aureus, Streptococcus agalactiae    (Group B), S. pneumoniae, S. pyogenes (Group A),    Acinetobacter baumannii, Enterobacter cloacae, E. coli,    Klebsiella oxytoca, K. pneumoniae, Proteus sp.,    Serratia marcescens, Haemophilus influenzae,    Neisseria meningitidis, Pseudomonas aeruginosa, Candida    albicans, C. glabrata, C krusei, C parapsilosis,    C. tropicalis and the KPC resistance gene.  Organism: Blood Culture PCR (09 Jan 2020 11:31)  Organism: Blood Culture PCR (09 Jan 2020 11:31)        --------------------------------------------------------------------------------------------------  MEDICATIONS  (STANDING):  aspirin  chewable 81 milliGRAM(s) Oral <User Schedule>  atorvastatin 40 milliGRAM(s) Oral <User Schedule>  budesonide 160 MICROgram(s)/formoterol 4.5 MICROgram(s) Inhaler 2 Puff(s) Inhalation two times a day  chlorhexidine 2% Cloths 1 Application(s) Topical <User Schedule>  doxazosin 2 milliGRAM(s) Oral <User Schedule>  enoxaparin Injectable 100 milliGRAM(s) SubCutaneous <User Schedule>  erythromycin     base Tablet 250 milliGRAM(s) Oral <User Schedule>  meropenem  IVPB 1000 milliGRAM(s) IV Intermittent every 8 hours  methadone    Tablet 17.5 milliGRAM(s) Oral daily  nystatin Powder 1 Application(s) Topical two times a day  pantoprazole    Tablet 40 milliGRAM(s) Oral <User Schedule>  phenylephrine    Infusion 0.3 MICROgram(s)/kG/Min (18.934 mL/Hr) IV Continuous <Continuous>  polyethylene glycol 3350 17 Gram(s) Oral <User Schedule>  sodium chloride 0.9%. 1000 milliLiter(s) (150 mL/Hr) IV Continuous <Continuous>  vancomycin  IVPB 1250 milliGRAM(s) IV Intermittent every 12 hours    MEDICATIONS  (PRN):  acetaminophen   Tablet .. 975 milliGRAM(s) Oral every 6 hours PRN Mild Pain (1 - 3)  ALBUTerol    90 MICROgram(s) HFA Inhaler 2 Puff(s) Inhalation every 6 hours PRN Shortness of Breath and/or Wheezing  aluminum hydroxide/magnesium hydroxide/simethicone Suspension 30 milliLiter(s) Oral every 4 hours PRN Dyspepsia    --------------------------------------------------------------------------------------------------

## 2020-01-10 NOTE — PROGRESS NOTE ADULT - SUBJECTIVE AND OBJECTIVE BOX
HISTORY  64y Female pmhx morbid obesity, acid reflux, HTN, COPD and PSH D&C presented to the ED on 11/26 c/o abdominal pain and bloating. Patient reports having constipation for 2 weeks and has been taking enemas, miralax and senna and passing small hard balls for the last weeks. Reported pain worst in the LLQ that started a few days ago and has been worsening in the last day, also has noted significant abdominal distention in the last day. Reported she had difficulty walking short distances because she becomes SOB. In ED, CT demonstrated perforated sigmoid diverticulitis with intraperitoneal free air. She was taken to the operating room on 11/25 overnight and underwent an exploratory laparotomy with extended left hemicolectomy and was left in discontinuity. An Abthera VAC was placed. She was kept intubated post op and brought to the ICU for hemodynamic and respiratory management. She went back to the OR on 11/29 for transverse end colostomy and fascial closure with wound vac.  Pt transferred out of SICU and recovering on floor with post-op course including induration of wound and colostomy necrosis requiring bedside debridement and wound vac placement.  Pt had CT on 1/6 for leukocytosis and to evaluate wound, which showed loculated fluid collections in the left anterior abdomen, very near loops of bowel.  After CT, pt reported feeling "worse" and was hypotensive overnight to SBP 70s requiring 1L fluid bolus.  Pt was bolused again this am for hypotension and blood cultures were sent for worsening leukocytosis, and BULL.  SICU consulted for closer monitoring.  UA positive and patient was started on Ceftriaxone for presumed UTI.       24 HOUR EVENTS:  - Patient remains on low dose phenylephrine for BP support. Lactate downtrending   - BCx growing coag-negative staph; patient on meropenem and vancomcycin as per ID.     SUBJECTIVE/ROS:  [ ] A ten-point review of systems was otherwise negative except as noted.  [ ] Due to altered mental status/intubation, subjective information were not able to be obtained from the patient. History was obtained, to the extent possible, from review of the chart and collateral sources of information.      NEURO  Exam: awake, alert, oriented  Meds: acetaminophen   Tablet .. 975 milliGRAM(s) Oral every 6 hours PRN Mild Pain (1 - 3)  methadone    Tablet 17.5 milliGRAM(s) Oral daily    [x] Adequacy of sedation and pain control has been assessed and adjusted      RESPIRATORY  RR: 22 (01-10-20 @ 00:30) (15 - 30)  SpO2: 95% (01-10-20 @ 00:30) (87% - 99%)  Exam: unlabored, clear to auscultation bilaterally  Mechanical Ventilation: none  [N/A] Extubation Readiness Assessed  Meds: ALBUTerol    90 MICROgram(s) HFA Inhaler 2 Puff(s) Inhalation every 6 hours PRN Shortness of Breath and/or Wheezing  budesonide 160 MICROgram(s)/formoterol 4.5 MICROgram(s) Inhaler 2 Puff(s) Inhalation two times a day        CARDIOVASCULAR  HR: 82 (01-10-20 @ 00:30) (78 - 102)  BP: 95/46 (01-10-20 @ 00:30) (74/47 - 121/56)  BP(mean): 67 (01-10-20 @ 00:30) (56 - 84)  VBG - ( 09 Jan 2020 17:50 )  pH: 7.41  /  pCO2: 33    /  pO2: 82    / HCO3: 20    / Base Excess: -3.3  /  SaO2: 97     Lactate: 1.9    Exam: regular rate and rhythm  Cardiac Rhythm: sinus  Perfusion     [x]Adequate   [ ]Inadequate  Mentation   [x]Normal       [ ]Reduced  Extremities  [x]Warm         [ ]Cool  Volume Status [ ]Hypervolemic [x]Euvolemic [ ]Hypovolemic  Meds: doxazosin 2 milliGRAM(s) Oral <User Schedule>  phenylephrine    Infusion 0.3 MICROgram(s)/kG/Min IV Continuous <Continuous>        GI/NUTRITION  Exam: soft, nontender, nondistended  Diet: Regular diet   Meds: aluminum hydroxide/magnesium hydroxide/simethicone Suspension 30 milliLiter(s) Oral every 4 hours PRN Dyspepsia  pantoprazole    Tablet 40 milliGRAM(s) Oral <User Schedule>  polyethylene glycol 3350 17 Gram(s) Oral <User Schedule>      GENITOURINARY  I&O's Detail    01-08 @ 07:01  -  01-09 @ 07:00  --------------------------------------------------------  IN:    Oral Fluid: 1200 mL    phenylephrine   Infusion: 88.2 mL    sodium chloride 0.9%: 250 mL    sodium chloride 0.9%.: 3300 mL    Solution: 850 mL    Solution: 100 mL    Solution: 50 mL  Total IN: 5838.2 mL    OUT:    Colostomy: 110 mL    Drain: 200 mL    Indwelling Catheter - Urethral: 1515 mL    VAC (Vacuum Assisted Closure) System: 60 mL  Total OUT: 1885 mL    Total NET: 3953.2 mL      01-09 @ 07:01  -  01-10 @ 01:04  --------------------------------------------------------  IN:    Oral Fluid: 720 mL    phenylephrine   Infusion: 376.3 mL    sodium chloride 0.9%.: 2700 mL    Solution: 350 mL    Solution: 100 mL  Total IN: 4246.3 mL    OUT:    Colostomy: 25 mL    Drain: 50 mL    Indwelling Catheter - Urethral: 200 mL    VAC (Vacuum Assisted Closure) System: 100 mL  Total OUT: 375 mL    Total NET: 3871.3 mL          01-09    127<L>  |  97  |  15  ----------------------------<  93  4.1   |  17<L>  |  0.92    Ca    7.7<L>      09 Jan 2020 18:16  Phos  2.5     01-09  Mg     1.9     01-09      [ ] Ross catheter, indication: N/A  Meds: sodium chloride 0.9%. 1000 milliLiter(s) IV Continuous <Continuous>        HEMATOLOGIC  Meds: aspirin  chewable 81 milliGRAM(s) Oral <User Schedule>  enoxaparin Injectable 100 milliGRAM(s) SubCutaneous <User Schedule>    [x] VTE Prophylaxis                        8.6    15.58 )-----------( 261      ( 09 Jan 2020 01:22 )             28.9       Transfusion     [ ] PRBC   [ ] Platelets   [ ] FFP   [ ] Cryoprecipitate      INFECTIOUS DISEASES  WBC Count: 15.58 K/uL (01-09 @ 01:22)    RECENT CULTURES:  Specimen Source: .Blood Blood  Date/Time: 01-08 @ 12:35  Culture Results:   Growth in aerobic bottle: Gram Positive Cocci in Clusters  Growth in anaerobic bottle: Gram Positive Cocci in Clusters  Gram Stain:   Growth in aerobic bottle: Gram Positive Cocci in Clusters  Growth in anaerobic bottle: Gram Positive Cocci in Clusters  Organism: --  Specimen Source: .Blood Blood-Peripheral  Date/Time: 01-07 @ 16:31  Culture Results:   No growth to date.  Gram Stain: --  Organism: --  Specimen Source: .Urine Clean Catch (Midstream)  Date/Time: 01-07 @ 15:18  Culture Results:   >100,000 CFU/ml Klebsiella pneumoniae  >100,000 CFU/ml Escherichia coli  Gram Stain: --  Organism: Klebsiella pneumoniae  Escherichia coli  Specimen Source: .Blood Blood-Venous  Date/Time: 01-07 @ 08:36  Culture Results:   Growth in aerobic and anaerobic bottles: Coag Negative Staphylococcus  Coag Negative Staphylococcus  Single set isolate, possible contaminant. Contact  Microbiology if susceptibility testing clinically  indicated.  ***Blood Panel PCR results on this specimen are available  approximately 3 hours after the Gram stain result.***  Gram stain, PCR, and/or culture results may not always  correspond due to difference in methodologies.  ************************************************************  ThisPCR assay was performed using [employer].  The following targets are tested for: Enterococcus,  vancomycin resistant enterococci, Listeria monocytogenes,  coagulase negative staphylococci, S. aureus,  methicillin resistant S. aureus, Streptococcus agalactiae  (Group B), S. pneumoniae, S. pyogenes (Group A),  Acinetobacter baumannii, Enterobacter cloacae, E. coli,  Klebsiella oxytoca, K. pneumoniae, Proteus sp.,  Serratia marcescens, Haemophilus influenzae,  Neisseria meningitidis, Pseudomonas aeruginosa, Candida  albicans, C. glabrata, C krusei, C parapsilosis,  C. tropicalis and the KPC resistance gene.  Gram Stain:   Growth in aerobic bottle: Gram Positive Cocci in Clusters  Growth in anaerobic bottle: Gram Positive Cocci in Clusters  Organism: Blood Culture PCR    Meds: erythromycin     base Tablet 250 milliGRAM(s) Oral <User Schedule>  meropenem  IVPB 1000 milliGRAM(s) IV Intermittent every 8 hours  vancomycin  IVPB 1250 milliGRAM(s) IV Intermittent every 12 hours        ENDOCRINE  CAPILLARY BLOOD GLUCOSE        Meds: atorvastatin 40 milliGRAM(s) Oral <User Schedule>        ACCESS DEVICES:  [x] Peripheral IV  [ ] Central Venous Line	[ ] R	[ ] L	[ ] IJ	[ ] Fem	[ ] SC	Placed:   [ ] Arterial Line		[ ] R	[ ] L	[ ] Fem	[ ] Rad	[ ] Ax	Placed:   [ ] PICC:					[ ] Mediport  [ ] Urinary Catheter, Date Placed:   [x] Necessity of urinary, arterial, and venous catheters discussed    OTHER MEDICATIONS:  chlorhexidine 2% Cloths 1 Application(s) Topical <User Schedule>  nystatin Powder 1 Application(s) Topical two times a day      CODE STATUS: full code       IMAGING: < from: CT Abdomen and Pelvis w/ Oral Cont and w/ IV Cont (01.06.20 @ 10:47) >  IMPRESSION:   Open anterior abdominal wall wound.    Loculated fluid collections in the left anterior abdomen, inseparable from small bowel loops, as well as a small abdominal wall collection.     Small volume loculated mesenteric fluid.                  < end of copied text >

## 2020-01-10 NOTE — PROGRESS NOTE ADULT - ATTENDING COMMENTS
Worsening of hypotension on higher doses of Neosynephrine, remained in septic shock  Central line placed, CVP 2, component of hypovolumic shock, will optimize the volume stastus with colloid and crystalloid resuscitation  No good window on bedside point of care US but has significant respiratory variability in her SVC noticed while putting central line.  Change Neosynephrine to Levophed and possible addition of Vasopressin, on Midodrine  IV abx Vanco Silvia, vanco trough therapeutic  Replace Mag and phos, continue to monitor  Persistent hyponatremia on IVF normal saline, continue to monitor, work up for hyponatremia, check cortisol level  Discussed with Dr Vargas Worsening of hypotension on higher doses of Neosynephrine, remained in septic shock  Central line placed, CVP 2, component of hypovolumic shock, will optimize the volume status with colloid and crystalloid resuscitation  No good window on bedside point of care US but has significant respiratory variability in her SVC noticed while putting central line.  Change Neosynephrine to Levophed and possible addition of Vasopressin, on Midodrine  IV abx Vanco Silvia, vanco trough therapeutic  Replace Mag and phos, continue to monitor  Persistent hyponatremia on IVF normal saline, continue to monitor, work up for hyponatremia, check cortisol level  Discussed with Dr Vargas

## 2020-01-10 NOTE — PROGRESS NOTE ADULT - ASSESSMENT
64 yr old female with PMHx morbid obesity, GERD, HTN, COPD, and pshx D&C now s/p ex laparotomy with extended left hemicolectomy 11/26 for perforated and necrotic sigmoid colon with gross abdominal contamination. RTOR 11/29 for abd closure, RUQ end transverse colostomy creation. On 12/19 patient found to have induration of wound and keir-stomal fat necrosis, s/p bedside debridement w/ wound VAC in place on midline wound. Began showing signs of sepsis and was transferred to SICU 1/7 for further management.    PLAN:  - C/w IV abx  - Wean pressors as tolerated  - Appreciate excellent SICU care 64 yr old female with PMHx morbid obesity, GERD, HTN, COPD, and pshx D&C now s/p ex laparotomy with extended left hemicolectomy 11/26 for perforated and necrotic sigmoid colon with gross abdominal contamination. RTOR 11/29 for abd closure, RUQ end transverse colostomy creation. On 12/19 patient found to have induration of wound and keri-stomal fat necrosis, s/p bedside debridement w/ wound VAC in place on midline wound. Began showing signs of sepsis and was transferred to SICU 1/7 for further management.    PLAN:  - VAC change today  - C/w IV abx  - Wean pressors as tolerated  - Appreciate excellent SICU care    [facility]  [id number]

## 2020-01-10 NOTE — PROGRESS NOTE ADULT - SUBJECTIVE AND OBJECTIVE BOX
CARDIOLOGY     PROGRESS  NOTE   ________________________________________________    CHIEF COMPLAINT:Patient is a 64y old  Female who presents with a chief complaint of perforated bowel (08 Dec 2019 09:22)  no complain.  	  REVIEW OF SYSTEMS:  CONSTITUTIONAL: No fever, weight loss, or fatigue  EYES: No eye pain, visual disturbances, or discharge  ENT:  No difficulty hearing, tinnitus, vertigo; No sinus or throat pain  NECK: No pain or stiffness  RESPIRATORY: No cough, wheezing, chills or hemoptysis;+ Shortness of Breath  CARDIOVASCULAR: No chest pain, palpitations, passing out, dizziness, or leg swelling  GASTROINTESTINAL: No abdominal or epigastric pain. No nausea, vomiting, or hematemesis; No diarrhea or constipation. No melena or hematochezia.  GENITOURINARY: No dysuria, frequency, hematuria, or incontinence  NEUROLOGICAL: No headaches, memory loss, loss of strength, numbness, or tremors  SKIN: No itching, burning, rashes, or lesions   LYMPH Nodes: No enlarged glands  ENDOCRINE: No heat or cold intolerance; No hair loss  MUSCULOSKELETAL: No joint pain or swelling; No muscle, back, or extremity pain  PSYCHIATRIC: No depression, anxiety, mood swings, or difficulty sleeping  HEME/LYMPH: No easy bruising, or bleeding gums  ALLERGY AND IMMUNOLOGIC: No hives or eczema	    [ ] All others negative	  [ ] Unable to obtain    PHYSICAL EXAM:  T(C): 36.6 (01-10-20 @ 03:00), Max: 37.1 (01-09-20 @ 11:00)  HR: 78 (01-10-20 @ 08:45) (76 - 102)  BP: 95/47 (01-10-20 @ 08:45) (80/41 - 121/56)  RR: 16 (01-10-20 @ 08:45) (15 - 31)  SpO2: 96% (01-10-20 @ 08:45) (87% - 99%)  Wt(kg): --  I&O's Summary    09 Jan 2020 07:01  -  10 Gary 2020 07:00  --------------------------------------------------------  IN: 5278.9 mL / OUT: 1275 mL / NET: 4003.9 mL    10 Gary 2020 07:01  -  10 Gary 2020 10:26  --------------------------------------------------------  IN: 172.1 mL / OUT: 0 mL / NET: 172.1 mL        Appearance: Normal	  HEENT:   Normal oral mucosa, PERRL, EOMI	  Lymphatic: No lymphadenopathy  Cardiovascular: Normal S1 S2, No JVD,+ murmurs,+ chronic lymph edema  Respiratory: Lungs clear to auscultation	  Psychiatry: A & O x 3, Mood & affect appropriate  Gastrointestinal:  Soft, Non-tender, + BS	  Skin: No rashes, No ecchymoses, No cyanosis	  Neurologic: Non-focal  Extremities: Normal range of motion, No clubbing, cyanosis .  Vascular: Peripheral pulses palpable 2+ bilaterally    MEDICATIONS  (STANDING):  acetaminophen  IVPB .. 1000 milliGRAM(s) IV Intermittent once  aspirin  chewable 81 milliGRAM(s) Oral <User Schedule>  atorvastatin 40 milliGRAM(s) Oral <User Schedule>  budesonide 160 MICROgram(s)/formoterol 4.5 MICROgram(s) Inhaler 2 Puff(s) Inhalation two times a day  chlorhexidine 2% Cloths 1 Application(s) Topical <User Schedule>  doxazosin 2 milliGRAM(s) Oral <User Schedule>  enoxaparin Injectable 100 milliGRAM(s) SubCutaneous <User Schedule>  erythromycin     base Tablet 250 milliGRAM(s) Oral <User Schedule>  HYDROmorphone  Injectable 1 milliGRAM(s) IV Push once  meropenem  IVPB 1000 milliGRAM(s) IV Intermittent every 8 hours  methadone    Tablet 17.5 milliGRAM(s) Oral daily  midodrine 10 milliGRAM(s) Oral <User Schedule>  nystatin Powder 1 Application(s) Topical two times a day  pantoprazole    Tablet 40 milliGRAM(s) Oral <User Schedule>  phenylephrine    Infusion 0.3 MICROgram(s)/kG/Min (18.934 mL/Hr) IV Continuous <Continuous>  polyethylene glycol 3350 17 Gram(s) Oral <User Schedule>  sodium chloride 0.9%. 1000 milliLiter(s) (100 mL/Hr) IV Continuous <Continuous>  vancomycin  IVPB 1250 milliGRAM(s) IV Intermittent every 12 hours      TELEMETRY: 	    ECG:  	  RADIOLOGY:  OTHER: 	  	  LABS:	 	    CARDIAC MARKERS:                                8.6    15.58 )-----------( 261      ( 09 Jan 2020 01:22 )             28.9     01-09    127<L>  |  97  |  15  ----------------------------<  93  4.1   |  17<L>  |  0.92    Ca    7.7<L>      09 Jan 2020 18:16  Phos  2.5     01-09  Mg     1.9     01-09      proBNP:   Lipid Profile: Cholesterol 98  LDL 52  HDL 28  TG 91    HgA1c:   TSH:   Culture - Blood (01.08.20 @ 12:35)    Gram Stain:   Growth in aerobic bottle: Gram Positive Cocci in Clusters  Growth in anaerobic bottle:  Gram Positive Cocci in Clusters    Specimen Source: .Blood Blood    Culture Results:   Growth in aerobic bottle: Gram Positive Cocci in Clusters  Growth in anaerobic bottle:  Gram Positive Cocci in Clusters  Culture - Blood (01.08.20 @ 12:35)    Gram Stain:   Growth in aerobic bottle: Gram Positive Cocci in Clusters  Growth in anaerobic bottle: Gram Positive Cocci in Clusters    Specimen Source: .Blood Blood    Culture Results:   Growth in aerobic bottle: Gram Positive Cocci in Clusters  Growth in anaerobic bottle: Gram Positive Cocci in Clusters            Assessment and plan  ---------------------------  pt transferred to  icu sec to hypotension  agree with ivf , pt most probably is volume depletion  sec to Lasix and aldactone and hyperdynamic lv, r/o sepsis  continue ivf  agree to r/o sepsis continue iv abx, blood cultures noted  ?repeat echo  vac  dvt prophylaxis  fu uo  ct abdomen noted on 1/06  with loculated collection/ plan as per surgery

## 2020-01-10 NOTE — PROGRESS NOTE ADULT - SUBJECTIVE AND OBJECTIVE BOX
CC: F/U for Bacteremia    Saw/spoke to patient. No fevers, no chills. No new complaints. Unchanged,    Allergies  IV Contrast (Rash; Pruritus; Hypotension)  sulfa drugs (Unknown)    ANTIMICROBIALS:  erythromycin     base Tablet 250 <User Schedule>  meropenem  IVPB 1000 every 8 hours  vancomycin  IVPB 1250 every 12 hours    PE:    Vital Signs Last 24 Hrs  T(C): 36.6 (10 Gary 2020 03:00), Max: 36.7 (09 Jan 2020 23:00)  T(F): 97.9 (10 Gary 2020 03:00), Max: 98.1 (09 Jan 2020 23:00)  HR: 90 (10 Gary 2020 11:00) (76 - 102)  BP: 99/42 (10 Gary 2020 11:00) (80/41 - 121/56)  BP(mean): 61 (10 Gary 2020 11:00) (56 - 84)  RR: 24 (10 Gary 2020 11:00) (15 - 31)  SpO2: 95% (10 Gary 2020 11:00) (87% - 99%)    Gen: AOx3, NAD, anxious  CV: S1+S2 normal, nontachycardic  Resp: Clear bilat, no resp distress, no crackles/wheezes  Abd: Soft, nontender, +BS, ostomy, wound vac  Ext: No LE edema, no wounds    LABS:                        8.6    15.58 )-----------( 261      ( 09 Jan 2020 01:22 )             28.9     01-09    127<L>  |  97  |  15  ----------------------------<  93  4.1   |  17<L>  |  0.92    Ca    7.7<L>      09 Jan 2020 18:16  Phos  2.5     01-09  Mg     1.9     01-09    MICROBIOLOGY:  Vancomycin Level, Trough: 16.4 ug/mL (01-09-20 @ 18:16)    .Blood Blood  01-08-20   Growth in aerobic bottle: Gram Positive Cocci in Clusters  Growth in anaerobic bottle: Gram Positive Cocci in Clusters  --    Growth in aerobic bottle: Gram Positive Cocci in Clusters  Growth in anaerobic bottle: Gram Positive Cocci in Clusters    .Blood Blood-Peripheral  01-07-20   Growth in aerobic bottle:  Gram Positive Cocci in Clusters  --    Growth in aerobic bottle:  Gram Positive Cocci in Clusters    .Urine Clean Catch (Midstream)  01-07-20   >100,000 CFU/ml Klebsiella pneumoniae  >100,000 CFU/ml Escherichia coli  --  Klebsiella pneumoniae  Escherichia coli    .Blood Blood-Venous  01-07-20   Growth in aerobic and anaerobic bottles: Coag Negative Staphylococcus  Coag Negative Staphylococcus    (otherwise reviewed)    RADIOLOGY:    1/9 CXR:    FINDINGS:  Clear lungs.  The cardiomediastinal silhouette not well evaluated on this study.    IMPRESSION:   Clear lungs.

## 2020-01-10 NOTE — PROGRESS NOTE ADULT - ATTENDING COMMENTS
I saw and examined the pt and discussed the tx plan with the House Staff. I agree with the exam and plan as documented in the surgery resident's note from today which I edited as indicated.  Continue ICU care.  Liset Vargas MD

## 2020-01-10 NOTE — PROGRESS NOTE ADULT - ASSESSMENT
64F with pmhx morbid obesity, acid reflux, HTN, COPD and PSH D&C presented to the ED on 11/26 c/o abdominal pain and bloating. Reported pain worst in the LLQ that started a few days ago and has been worsening in the last day, also has noted significant abdominal distention in the last day.  In ED, CT demonstrated perforated sigmoid diverticulitis with intraperitoneal free air. She was taken to the operating room on 11/25 overnight and underwent an exploratory laparotomy with extended left hemicolectomy and was left in discontinuity. An Abthera VAC was placed. She was kept intubated post op and brought to the ICU for hemodynamic and respiratory management. She went back to the OR on 11/29 for transverse end colostomy and fascial closure with wound vac.  Pt transferred out of SICU and recovering on floor with post-op course including induration of wound and colostomy necrosis requiring bedside debridement and wound vac placement.  Pt had CT on 1/6 for leukocytosis and to evaluate wound, which showed loculated fluid collections in the left anterior abdomen, very near loops of bowel.  After CT, pt reported feeling "worse" and was hypotensive overnight to SBP 70s requiring 1L fluid bolus.  Pt was bolused again this for hypotension and blood cultures were sent for worsening leukocytosis, and BULL.  SICU consulted for closer monitoring.  UA positive and patient was started on Ceftriaxone for presumed UTI.     Neuro: pain control, h/o chronic pain requiring methadone   - Tylenol prn  - Continue home dose Methadone     Resp: h/o COPD  - Albuterol prn  - Continue Symbicort    CV: Hypotension likely secondary to hypovolemia s/p additional 1L NS bolus in SICU; h/o HTN  - Hemodynamic monitoring  - Trend lactate  - Hold home anti-hypertensives  - Continue statin   - Continue phenylephrine for BP support; wean as tolerated       GI: Perforated sigmoid diverticulitis s/p extended left hemicolectomy (11/25)  - Continue Regular diet  - Monitor wound vac output   - Protonix     /Renal: Hypovolemic hyponatremia, BULL  - Hyponatremia improving  - Trend BUN/Cr   - Replete electrolytes as needed  - Monitor UOP     ID: UTI, leukocytosis   - Continue meropenem and vancomycin   - f/u UCx and blood cultures     Heme: VTE prophylaxis  - Continue lovenox  - ASA    Endo:   - Monitor serum glucose     Dispo: SICU care.  Full code.      - Jey Garcia PA-C

## 2020-01-11 LAB
-  AMPICILLIN/SULBACTAM: SIGNIFICANT CHANGE UP
-  CEFAZOLIN: SIGNIFICANT CHANGE UP
-  CLINDAMYCIN: SIGNIFICANT CHANGE UP
-  ERYTHROMYCIN: SIGNIFICANT CHANGE UP
-  GENTAMICIN: SIGNIFICANT CHANGE UP
-  OXACILLIN: SIGNIFICANT CHANGE UP
-  PENICILLIN: SIGNIFICANT CHANGE UP
-  RIFAMPIN: SIGNIFICANT CHANGE UP
-  TETRACYCLINE: SIGNIFICANT CHANGE UP
-  TRIMETHOPRIM/SULFAMETHOXAZOLE: SIGNIFICANT CHANGE UP
-  VANCOMYCIN: SIGNIFICANT CHANGE UP
ANION GAP SERPL CALC-SCNC: 13 MMOL/L — SIGNIFICANT CHANGE UP (ref 5–17)
ANION GAP SERPL CALC-SCNC: 13 MMOL/L — SIGNIFICANT CHANGE UP (ref 5–17)
ANION GAP SERPL CALC-SCNC: 15 MMOL/L — SIGNIFICANT CHANGE UP (ref 5–17)
ANION GAP SERPL CALC-SCNC: 17 MMOL/L — SIGNIFICANT CHANGE UP (ref 5–17)
ANION GAP SERPL CALC-SCNC: 8 MMOL/L — SIGNIFICANT CHANGE UP (ref 5–17)
BUN SERPL-MCNC: 10 MG/DL — SIGNIFICANT CHANGE UP (ref 7–23)
BUN SERPL-MCNC: 11 MG/DL — SIGNIFICANT CHANGE UP (ref 7–23)
CALCIUM SERPL-MCNC: 7.9 MG/DL — LOW (ref 8.4–10.5)
CALCIUM SERPL-MCNC: 8 MG/DL — LOW (ref 8.4–10.5)
CALCIUM SERPL-MCNC: 8.1 MG/DL — LOW (ref 8.4–10.5)
CALCIUM SERPL-MCNC: 8.2 MG/DL — LOW (ref 8.4–10.5)
CALCIUM SERPL-MCNC: 8.4 MG/DL — SIGNIFICANT CHANGE UP (ref 8.4–10.5)
CHLORIDE SERPL-SCNC: 98 MMOL/L — SIGNIFICANT CHANGE UP (ref 96–108)
CHLORIDE SERPL-SCNC: 99 MMOL/L — SIGNIFICANT CHANGE UP (ref 96–108)
CHLORIDE SERPL-SCNC: 99 MMOL/L — SIGNIFICANT CHANGE UP (ref 96–108)
CK MB BLD-MCNC: 28.5 % — HIGH (ref 0–3.5)
CK MB BLD-MCNC: 30.5 % — HIGH (ref 0–3.5)
CK MB BLD-MCNC: 30.8 % — HIGH (ref 0–3.5)
CK MB CFR SERPL CALC: 1.8 NG/ML — SIGNIFICANT CHANGE UP (ref 0–3.8)
CK MB CFR SERPL CALC: 33.2 NG/ML — HIGH (ref 0–3.8)
CK MB CFR SERPL CALC: 33.3 NG/ML — HIGH (ref 0–3.8)
CK MB CFR SERPL CALC: 35.9 NG/ML — HIGH (ref 0–3.8)
CK SERPL-CCNC: 108 U/L — SIGNIFICANT CHANGE UP (ref 25–170)
CK SERPL-CCNC: 109 U/L — SIGNIFICANT CHANGE UP (ref 25–170)
CK SERPL-CCNC: 126 U/L — SIGNIFICANT CHANGE UP (ref 25–170)
CK SERPL-CCNC: 17 U/L — LOW (ref 25–170)
CO2 SERPL-SCNC: 14 MMOL/L — LOW (ref 22–31)
CO2 SERPL-SCNC: 15 MMOL/L — LOW (ref 22–31)
CO2 SERPL-SCNC: 16 MMOL/L — LOW (ref 22–31)
CO2 SERPL-SCNC: 16 MMOL/L — LOW (ref 22–31)
CO2 SERPL-SCNC: 20 MMOL/L — LOW (ref 22–31)
CORTIS AM PEAK SERPL-MCNC: 19.6 UG/DL — HIGH (ref 6–18.4)
CREAT SERPL-MCNC: 0.73 MG/DL — SIGNIFICANT CHANGE UP (ref 0.5–1.3)
CREAT SERPL-MCNC: 0.75 MG/DL — SIGNIFICANT CHANGE UP (ref 0.5–1.3)
CREAT SERPL-MCNC: 0.76 MG/DL — SIGNIFICANT CHANGE UP (ref 0.5–1.3)
CREAT SERPL-MCNC: 0.79 MG/DL — SIGNIFICANT CHANGE UP (ref 0.5–1.3)
CREAT SERPL-MCNC: 0.84 MG/DL — SIGNIFICANT CHANGE UP (ref 0.5–1.3)
CULTURE RESULTS: SIGNIFICANT CHANGE UP
GAS PNL BLDV: SIGNIFICANT CHANGE UP
GLUCOSE SERPL-MCNC: 110 MG/DL — HIGH (ref 70–99)
GLUCOSE SERPL-MCNC: 158 MG/DL — HIGH (ref 70–99)
GLUCOSE SERPL-MCNC: 174 MG/DL — HIGH (ref 70–99)
GLUCOSE SERPL-MCNC: 176 MG/DL — HIGH (ref 70–99)
GLUCOSE SERPL-MCNC: 180 MG/DL — HIGH (ref 70–99)
GRAM STN FLD: SIGNIFICANT CHANGE UP
GRAM STN FLD: SIGNIFICANT CHANGE UP
HCT VFR BLD CALC: 29.4 % — LOW (ref 34.5–45)
HCT VFR BLD CALC: 29.7 % — LOW (ref 34.5–45)
HCT VFR BLD CALC: 29.8 % — LOW (ref 34.5–45)
HCT VFR BLD CALC: 30.9 % — LOW (ref 34.5–45)
HCT VFR BLD CALC: 31.5 % — LOW (ref 34.5–45)
HGB BLD-MCNC: 9 G/DL — LOW (ref 11.5–15.5)
HGB BLD-MCNC: 9 G/DL — LOW (ref 11.5–15.5)
HGB BLD-MCNC: 9.1 G/DL — LOW (ref 11.5–15.5)
HGB BLD-MCNC: 9.3 G/DL — LOW (ref 11.5–15.5)
HGB BLD-MCNC: 9.7 G/DL — LOW (ref 11.5–15.5)
MAGNESIUM SERPL-MCNC: 2.2 MG/DL — SIGNIFICANT CHANGE UP (ref 1.6–2.6)
MAGNESIUM SERPL-MCNC: 2.3 MG/DL — SIGNIFICANT CHANGE UP (ref 1.6–2.6)
MAGNESIUM SERPL-MCNC: 2.4 MG/DL — SIGNIFICANT CHANGE UP (ref 1.6–2.6)
MAGNESIUM SERPL-MCNC: 2.4 MG/DL — SIGNIFICANT CHANGE UP (ref 1.6–2.6)
MCHC RBC-ENTMCNC: 27.9 PG — SIGNIFICANT CHANGE UP (ref 27–34)
MCHC RBC-ENTMCNC: 28.1 PG — SIGNIFICANT CHANGE UP (ref 27–34)
MCHC RBC-ENTMCNC: 28.3 PG — SIGNIFICANT CHANGE UP (ref 27–34)
MCHC RBC-ENTMCNC: 28.4 PG — SIGNIFICANT CHANGE UP (ref 27–34)
MCHC RBC-ENTMCNC: 28.5 PG — SIGNIFICANT CHANGE UP (ref 27–34)
MCHC RBC-ENTMCNC: 30.1 GM/DL — LOW (ref 32–36)
MCHC RBC-ENTMCNC: 30.2 GM/DL — LOW (ref 32–36)
MCHC RBC-ENTMCNC: 30.6 GM/DL — LOW (ref 32–36)
MCHC RBC-ENTMCNC: 30.6 GM/DL — LOW (ref 32–36)
MCHC RBC-ENTMCNC: 30.8 GM/DL — LOW (ref 32–36)
MCV RBC AUTO: 92.2 FL — SIGNIFICANT CHANGE UP (ref 80–100)
MCV RBC AUTO: 92.3 FL — SIGNIFICANT CHANGE UP (ref 80–100)
MCV RBC AUTO: 92.6 FL — SIGNIFICANT CHANGE UP (ref 80–100)
MCV RBC AUTO: 92.7 FL — SIGNIFICANT CHANGE UP (ref 80–100)
MCV RBC AUTO: 93.4 FL — SIGNIFICANT CHANGE UP (ref 80–100)
METHOD TYPE: SIGNIFICANT CHANGE UP
NRBC # BLD: 0 /100 WBCS — SIGNIFICANT CHANGE UP (ref 0–0)
ORGANISM # SPEC MICROSCOPIC CNT: SIGNIFICANT CHANGE UP
ORGANISM # SPEC MICROSCOPIC CNT: SIGNIFICANT CHANGE UP
PHOSPHATE SERPL-MCNC: 2.3 MG/DL — LOW (ref 2.5–4.5)
PHOSPHATE SERPL-MCNC: 2.4 MG/DL — LOW (ref 2.5–4.5)
PHOSPHATE SERPL-MCNC: 2.5 MG/DL — SIGNIFICANT CHANGE UP (ref 2.5–4.5)
PHOSPHATE SERPL-MCNC: 2.7 MG/DL — SIGNIFICANT CHANGE UP (ref 2.5–4.5)
PHOSPHATE SERPL-MCNC: 2.8 MG/DL — SIGNIFICANT CHANGE UP (ref 2.5–4.5)
PLATELET # BLD AUTO: 375 K/UL — SIGNIFICANT CHANGE UP (ref 150–400)
PLATELET # BLD AUTO: 392 K/UL — SIGNIFICANT CHANGE UP (ref 150–400)
PLATELET # BLD AUTO: 448 K/UL — HIGH (ref 150–400)
PLATELET # BLD AUTO: 455 K/UL — HIGH (ref 150–400)
PLATELET # BLD AUTO: 465 K/UL — HIGH (ref 150–400)
POTASSIUM SERPL-MCNC: 3.5 MMOL/L — SIGNIFICANT CHANGE UP (ref 3.5–5.3)
POTASSIUM SERPL-MCNC: 3.5 MMOL/L — SIGNIFICANT CHANGE UP (ref 3.5–5.3)
POTASSIUM SERPL-MCNC: 3.7 MMOL/L — SIGNIFICANT CHANGE UP (ref 3.5–5.3)
POTASSIUM SERPL-MCNC: 3.8 MMOL/L — SIGNIFICANT CHANGE UP (ref 3.5–5.3)
POTASSIUM SERPL-MCNC: 3.9 MMOL/L — SIGNIFICANT CHANGE UP (ref 3.5–5.3)
POTASSIUM SERPL-SCNC: 3.5 MMOL/L — SIGNIFICANT CHANGE UP (ref 3.5–5.3)
POTASSIUM SERPL-SCNC: 3.5 MMOL/L — SIGNIFICANT CHANGE UP (ref 3.5–5.3)
POTASSIUM SERPL-SCNC: 3.7 MMOL/L — SIGNIFICANT CHANGE UP (ref 3.5–5.3)
POTASSIUM SERPL-SCNC: 3.8 MMOL/L — SIGNIFICANT CHANGE UP (ref 3.5–5.3)
POTASSIUM SERPL-SCNC: 3.9 MMOL/L — SIGNIFICANT CHANGE UP (ref 3.5–5.3)
RBC # BLD: 3.17 M/UL — LOW (ref 3.8–5.2)
RBC # BLD: 3.22 M/UL — LOW (ref 3.8–5.2)
RBC # BLD: 3.23 M/UL — LOW (ref 3.8–5.2)
RBC # BLD: 3.31 M/UL — LOW (ref 3.8–5.2)
RBC # BLD: 3.4 M/UL — LOW (ref 3.8–5.2)
RBC # FLD: 16.6 % — HIGH (ref 10.3–14.5)
RBC # FLD: 16.7 % — HIGH (ref 10.3–14.5)
RBC # FLD: 16.8 % — HIGH (ref 10.3–14.5)
SODIUM SERPL-SCNC: 126 MMOL/L — LOW (ref 135–145)
SODIUM SERPL-SCNC: 126 MMOL/L — LOW (ref 135–145)
SODIUM SERPL-SCNC: 128 MMOL/L — LOW (ref 135–145)
SODIUM SERPL-SCNC: 129 MMOL/L — LOW (ref 135–145)
SODIUM SERPL-SCNC: 130 MMOL/L — LOW (ref 135–145)
SPECIMEN SOURCE: SIGNIFICANT CHANGE UP
TROPONIN T, HIGH SENSITIVITY RESULT: 50 NG/L — SIGNIFICANT CHANGE UP (ref 0–51)
TROPONIN T, HIGH SENSITIVITY RESULT: 677 NG/L — HIGH (ref 0–51)
TROPONIN T, HIGH SENSITIVITY RESULT: 688 NG/L — HIGH (ref 0–51)
TROPONIN T, HIGH SENSITIVITY RESULT: 694 NG/L — HIGH (ref 0–51)
VANCOMYCIN TROUGH SERPL-MCNC: 19.2 UG/ML — SIGNIFICANT CHANGE UP (ref 10–20)
WBC # BLD: 22.02 K/UL — HIGH (ref 3.8–10.5)
WBC # BLD: 24.35 K/UL — HIGH (ref 3.8–10.5)
WBC # BLD: 27.32 K/UL — HIGH (ref 3.8–10.5)
WBC # BLD: 30.13 K/UL — HIGH (ref 3.8–10.5)
WBC # BLD: 33.45 K/UL — HIGH (ref 3.8–10.5)
WBC # FLD AUTO: 22.02 K/UL — HIGH (ref 3.8–10.5)
WBC # FLD AUTO: 24.35 K/UL — HIGH (ref 3.8–10.5)
WBC # FLD AUTO: 27.32 K/UL — HIGH (ref 3.8–10.5)
WBC # FLD AUTO: 30.13 K/UL — HIGH (ref 3.8–10.5)
WBC # FLD AUTO: 33.45 K/UL — HIGH (ref 3.8–10.5)

## 2020-01-11 PROCEDURE — 71045 X-RAY EXAM CHEST 1 VIEW: CPT | Mod: 26

## 2020-01-11 PROCEDURE — 93010 ELECTROCARDIOGRAM REPORT: CPT | Mod: 76,77

## 2020-01-11 PROCEDURE — 99291 CRITICAL CARE FIRST HOUR: CPT

## 2020-01-11 PROCEDURE — 93971 EXTREMITY STUDY: CPT | Mod: 26,59

## 2020-01-11 PROCEDURE — 93306 TTE W/DOPPLER COMPLETE: CPT | Mod: 26

## 2020-01-11 PROCEDURE — 93010 ELECTROCARDIOGRAM REPORT: CPT

## 2020-01-11 PROCEDURE — 93970 EXTREMITY STUDY: CPT | Mod: 26

## 2020-01-11 PROCEDURE — 99292 CRITICAL CARE ADDL 30 MIN: CPT

## 2020-01-11 RX ORDER — NYSTATIN 500MM UNIT
500000 POWDER (EA) MISCELLANEOUS
Refills: 0 | Status: DISCONTINUED | OUTPATIENT
Start: 2020-01-11 | End: 2020-01-14

## 2020-01-11 RX ORDER — HYDROMORPHONE HYDROCHLORIDE 2 MG/ML
1 INJECTION INTRAMUSCULAR; INTRAVENOUS; SUBCUTANEOUS ONCE
Refills: 0 | Status: DISCONTINUED | OUTPATIENT
Start: 2020-01-11 | End: 2020-01-11

## 2020-01-11 RX ORDER — POTASSIUM CHLORIDE 20 MEQ
20 PACKET (EA) ORAL
Refills: 0 | Status: COMPLETED | OUTPATIENT
Start: 2020-01-11 | End: 2020-01-11

## 2020-01-11 RX ORDER — ALBUMIN HUMAN 25 %
250 VIAL (ML) INTRAVENOUS ONCE
Refills: 0 | Status: COMPLETED | OUTPATIENT
Start: 2020-01-11 | End: 2020-01-12

## 2020-01-11 RX ORDER — IPRATROPIUM/ALBUTEROL SULFATE 18-103MCG
3 AEROSOL WITH ADAPTER (GRAM) INHALATION EVERY 4 HOURS
Refills: 0 | Status: DISCONTINUED | OUTPATIENT
Start: 2020-01-11 | End: 2020-01-16

## 2020-01-11 RX ORDER — POTASSIUM CHLORIDE 20 MEQ
10 PACKET (EA) ORAL ONCE
Refills: 0 | Status: COMPLETED | OUTPATIENT
Start: 2020-01-11 | End: 2020-01-11

## 2020-01-11 RX ORDER — MORPHINE SULFATE 50 MG/1
2 CAPSULE, EXTENDED RELEASE ORAL ONCE
Refills: 0 | Status: DISCONTINUED | OUTPATIENT
Start: 2020-01-11 | End: 2020-01-11

## 2020-01-11 RX ORDER — BUDESONIDE, MICRONIZED 100 %
0.5 POWDER (GRAM) MISCELLANEOUS EVERY 12 HOURS
Refills: 0 | Status: DISCONTINUED | OUTPATIENT
Start: 2020-01-11 | End: 2020-02-05

## 2020-01-11 RX ORDER — VASOPRESSIN 20 [USP'U]/ML
0.03 INJECTION INTRAVENOUS
Qty: 50 | Refills: 0 | Status: DISCONTINUED | OUTPATIENT
Start: 2020-01-11 | End: 2020-01-15

## 2020-01-11 RX ADMIN — Medication 250 MILLIGRAM(S): at 10:28

## 2020-01-11 RX ADMIN — AMIODARONE HYDROCHLORIDE 16.67 MG/MIN: 400 TABLET ORAL at 05:33

## 2020-01-11 RX ADMIN — HYDROMORPHONE HYDROCHLORIDE 1 MILLIGRAM(S): 2 INJECTION INTRAMUSCULAR; INTRAVENOUS; SUBCUTANEOUS at 17:05

## 2020-01-11 RX ADMIN — MIDODRINE HYDROCHLORIDE 10 MILLIGRAM(S): 2.5 TABLET ORAL at 04:12

## 2020-01-11 RX ADMIN — MORPHINE SULFATE 2 MILLIGRAM(S): 50 CAPSULE, EXTENDED RELEASE ORAL at 00:45

## 2020-01-11 RX ADMIN — Medication 62.5 MILLIMOLE(S): at 01:29

## 2020-01-11 RX ADMIN — MIDODRINE HYDROCHLORIDE 10 MILLIGRAM(S): 2.5 TABLET ORAL at 10:28

## 2020-01-11 RX ADMIN — Medication 1 DROP(S): at 17:25

## 2020-01-11 RX ADMIN — Medication 100 MILLIEQUIVALENT(S): at 21:51

## 2020-01-11 RX ADMIN — MEROPENEM 100 MILLIGRAM(S): 1 INJECTION INTRAVENOUS at 05:30

## 2020-01-11 RX ADMIN — HYDROMORPHONE HYDROCHLORIDE 1 MILLIGRAM(S): 2 INJECTION INTRAMUSCULAR; INTRAVENOUS; SUBCUTANEOUS at 16:55

## 2020-01-11 RX ADMIN — Medication 3 MILLILITER(S): at 18:57

## 2020-01-11 RX ADMIN — ENOXAPARIN SODIUM 100 MILLIGRAM(S): 100 INJECTION SUBCUTANEOUS at 10:27

## 2020-01-11 RX ADMIN — MIDODRINE HYDROCHLORIDE 10 MILLIGRAM(S): 2.5 TABLET ORAL at 20:30

## 2020-01-11 RX ADMIN — BUDESONIDE AND FORMOTEROL FUMARATE DIHYDRATE 2 PUFF(S): 160; 4.5 AEROSOL RESPIRATORY (INHALATION) at 06:25

## 2020-01-11 RX ADMIN — METHADONE HYDROCHLORIDE 17.5 MILLIGRAM(S): 40 TABLET ORAL at 10:27

## 2020-01-11 RX ADMIN — Medication 0.5 MILLIGRAM(S): at 18:58

## 2020-01-11 RX ADMIN — MORPHINE SULFATE 2 MILLIGRAM(S): 50 CAPSULE, EXTENDED RELEASE ORAL at 01:00

## 2020-01-11 RX ADMIN — POLYETHYLENE GLYCOL 3350 17 GRAM(S): 17 POWDER, FOR SOLUTION ORAL at 05:44

## 2020-01-11 RX ADMIN — POLYETHYLENE GLYCOL 3350 17 GRAM(S): 17 POWDER, FOR SOLUTION ORAL at 10:28

## 2020-01-11 RX ADMIN — NYSTATIN CREAM 1 APPLICATION(S): 100000 CREAM TOPICAL at 05:33

## 2020-01-11 RX ADMIN — Medication 250 MILLIGRAM(S): at 05:32

## 2020-01-11 RX ADMIN — ATORVASTATIN CALCIUM 40 MILLIGRAM(S): 80 TABLET, FILM COATED ORAL at 20:29

## 2020-01-11 RX ADMIN — Medication 125 MILLILITER(S): at 04:00

## 2020-01-11 RX ADMIN — Medication 250 MILLIGRAM(S): at 17:24

## 2020-01-11 RX ADMIN — MEROPENEM 100 MILLIGRAM(S): 1 INJECTION INTRAVENOUS at 16:05

## 2020-01-11 RX ADMIN — PANTOPRAZOLE SODIUM 40 MILLIGRAM(S): 20 TABLET, DELAYED RELEASE ORAL at 05:32

## 2020-01-11 RX ADMIN — Medication 2 MILLIGRAM(S): at 05:32

## 2020-01-11 RX ADMIN — MIDODRINE HYDROCHLORIDE 10 MILLIGRAM(S): 2.5 TABLET ORAL at 16:03

## 2020-01-11 RX ADMIN — Medication 3 MILLILITER(S): at 14:17

## 2020-01-11 RX ADMIN — Medication 81 MILLIGRAM(S): at 20:29

## 2020-01-11 RX ADMIN — NYSTATIN CREAM 1 APPLICATION(S): 100000 CREAM TOPICAL at 17:24

## 2020-01-11 RX ADMIN — CHLORHEXIDINE GLUCONATE 1 APPLICATION(S): 213 SOLUTION TOPICAL at 05:47

## 2020-01-11 RX ADMIN — POLYETHYLENE GLYCOL 3350 17 GRAM(S): 17 POWDER, FOR SOLUTION ORAL at 17:27

## 2020-01-11 RX ADMIN — Medication 3 MILLILITER(S): at 22:53

## 2020-01-11 RX ADMIN — Medication 50 MILLIEQUIVALENT(S): at 18:36

## 2020-01-11 RX ADMIN — Medication 50 MILLIEQUIVALENT(S): at 17:30

## 2020-01-11 RX ADMIN — Medication 125 MILLILITER(S): at 01:22

## 2020-01-11 RX ADMIN — Medication 500000 UNIT(S): at 17:30

## 2020-01-11 RX ADMIN — MEROPENEM 100 MILLIGRAM(S): 1 INJECTION INTRAVENOUS at 22:00

## 2020-01-11 NOTE — PROGRESS NOTE ADULT - ASSESSMENT
64 yr old female with PMHx morbid obesity, GERD, HTN, COPD, and pshx D&C now s/p ex laparotomy with extended left hemicolectomy 11/26 for perforated and necrotic sigmoid colon with gross abdominal contamination. RTOR 11/29 for abd closure, RUQ end transverse colostomy creation. On 12/19 patient found to have induration of wound and keri-stomal fat necrosis, s/p bedside debridement w/ wound VAC in place on midline wound. Began showing signs of sepsis and was transferred to SICU 1/7 for further management.    PLAN:  - f/u pressor requirements  - C/w IV abx  - trend lactate/WBC/ SBP  - Appreciate excellent SICU care    [facility]  [id number] 64 yr old female with PMHx morbid obesity, GERD, HTN, COPD, and pshx D&C now s/p ex laparotomy with extended left hemicolectomy 11/26 for perforated and necrotic sigmoid colon with gross abdominal contamination. RTOR 11/29 for abd closure, RUQ end transverse colostomy creation. On 12/19 patient found to have induration of wound and keri-stomal fat necrosis, s/p bedside debridement w/ wound VAC in place on midline wound. Began showing signs of sepsis and was transferred to SICU 1/7 for further management.    PLAN:  - f/u pressor requirements  - C/w IV abx  - trend lactate/WBC/ SBP  - Appreciate excellent SICU care    [facility]  [id number]    Addendum  Patient was seen/examined by the MIS/bariatric fellow. Agree with resident note.  The patient had tachyarrhythmia and hypotension overnight. Currently on levophed and amiodarone.  WBC up to 33. Blood cx remain positive for G+ cocci.  Continue IV Abx.  Consider DONNA to r/o endocarditis.  Check ostomy output for c. diff.  Continue supportive care per ICU team.  Vita Donovan MD

## 2020-01-11 NOTE — PROGRESS NOTE ADULT - SUBJECTIVE AND OBJECTIVE BOX
HISTORY  64y Female pmhx morbid obesity, acid reflux, HTN, COPD and PSH D&C presented to the ED on 11/26 c/o abdominal pain and bloating. Patient reports having constipation for 2 weeks and has been taking enemas, miralax and senna and passing small hard balls for the last weeks. Reported pain worst in the LLQ that started a few days ago and has been worsening in the last day, also has noted significant abdominal distention in the last day. Reported she had difficulty walking short distances because she becomes SOB. In ED, CT demonstrated perforated sigmoid diverticulitis with intraperitoneal free air. She was taken to the operating room on 11/25 overnight and underwent an exploratory laparotomy with extended left hemicolectomy and was left in discontinuity. An Abthera VAC was placed. She was kept intubated post op and brought to the ICU for hemodynamic and respiratory management. She went back to the OR on 11/29 for transverse end colostomy and fascial closure with wound vac.  Pt transferred out of SICU and recovering on floor with post-op course including induration of wound and colostomy necrosis requiring bedside debridement and wound vac placement.  Pt had CT on 1/6 for leukocytosis and to evaluate wound, which showed loculated fluid collections in the left anterior abdomen, very near loops of bowel.  After CT, pt reported feeling "worse" and was hypotensive overnight to SBP 70s requiring 1L fluid bolus.  Pt was bolused again this am for hypotension and blood cultures were sent for worsening leukocytosis, and BULL.  SICU consulted for closer monitoring.  UA positive and patient was started on Ceftriaxone for presumed UTI.       24 HOUR EVENTS:  -     SUBJECTIVE/ROS:  [ ] A ten-point review of systems was otherwise negative except as noted.  [ ] Due to altered mental status/intubation, subjective information were not able to be obtained from the patient. History was obtained, to the extent possible, from review of the chart and collateral sources of information.      NEURO  Exam: awake, alert, oriented  Meds: acetaminophen   Tablet .. 975 milliGRAM(s) Oral every 6 hours PRN Mild Pain (1 - 3)  methadone    Tablet 17.5 milliGRAM(s) Oral daily    [x] Adequacy of sedation and pain control has been assessed and adjusted      RESPIRATORY  RR: 18 (01-11-20 @ 03:15) (15 - 44)  SpO2: 95% (01-11-20 @ 03:15) (91% - 99%)  Exam: unlabored, clear to auscultation bilaterally  Mechanical Ventilation: none  [N/A] Extubation Readiness Assessed  Meds: ALBUTerol    90 MICROgram(s) HFA Inhaler 2 Puff(s) Inhalation every 6 hours PRN Shortness of Breath and/or Wheezing  budesonide 160 MICROgram(s)/formoterol 4.5 MICROgram(s) Inhaler 2 Puff(s) Inhalation two times a day        CARDIOVASCULAR  HR: 95 (01-11-20 @ 03:15) (67 - 131)  BP: 90/46 (01-11-20 @ 03:15) (67/37 - 155/74)  BP(mean): 65 (01-11-20 @ 03:15) (47 - 106)  VBG - ( 11 Jan 2020 01:40 )  pH: 7.29  /  pCO2: 39    /  pO2: 42    / HCO3: 18    / Base Excess: -7.4  /  SaO2: 70     Lactate: 3.0    Exam: regular rate and rhythm  Cardiac Rhythm: sinus  Perfusion     [x]Adequate   [ ]Inadequate  Mentation   [x]Normal       [ ]Reduced  Extremities  [x]Warm         [ ]Cool  Volume Status [ ]Hypervolemic [x]Euvolemic [ ]Hypovolemic  Meds: aMIOdarone Infusion 0.5 mG/Min IV Continuous <Continuous>  doxazosin 2 milliGRAM(s) Oral <User Schedule>  midodrine 10 milliGRAM(s) Oral <User Schedule>  norepinephrine Infusion 0.05 MICROgram(s)/kG/Min IV Continuous <Continuous>        GI/NUTRITION  Exam: soft, nontender, nondistended  Diet: regular diet   Meds: aluminum hydroxide/magnesium hydroxide/simethicone Suspension 30 milliLiter(s) Oral every 4 hours PRN Dyspepsia  pantoprazole    Tablet 40 milliGRAM(s) Oral <User Schedule>  polyethylene glycol 3350 17 Gram(s) Oral <User Schedule>      GENITOURINARY  I&O's Detail    01-09 @ 07:01  -  01-10 @ 07:00  --------------------------------------------------------  IN:    Oral Fluid: 720 mL    phenylephrine   Infusion: 508.9 mL    sodium chloride 0.9%.: 3600 mL    Solution: 100 mL    Solution: 350 mL  Total IN: 5278.9 mL    OUT:    Colostomy: 25 mL    Drain: 50 mL    Indwelling Catheter - Urethral: 200 mL    VAC (Vacuum Assisted Closure) System: 200 mL    Voided: 800 mL  Total OUT: 1275 mL    Total NET: 4003.9 mL      01-10 @ 07:01  -  01-11 @ 04:02  --------------------------------------------------------  IN:    amiodarone Infusion: 166.5 mL    norepinephrine Infusion: 263.9 mL    phenylephrine   Infusion: 221 mL    sodium chloride 0.9%.: 2750 mL    Solution: 412.5 mL    Solution: 100 mL    Solution: 609 mL  Total IN: 4522.9 mL    OUT:    VAC (Vacuum Assisted Closure) System: 125 mL    Voided: 1400 mL  Total OUT: 1525 mL    Total NET: 2997.9 mL          01-10    126<L>  |  98  |  11  ----------------------------<  110<H>  3.5   |  20<L>  |  0.84    Ca    8.4      10 Gary 2020 23:38  Phos  2.4     01-11  Mg     2.4     01-11      [ ] Ross catheter, indication: N/A  Meds: albumin human  5% IVPB 250 milliLiter(s) IV Intermittent once  sodium chloride 0.9%. 1000 milliLiter(s) IV Continuous <Continuous>        HEMATOLOGIC  Meds: aspirin  chewable 81 milliGRAM(s) Oral <User Schedule>  enoxaparin Injectable 100 milliGRAM(s) SubCutaneous <User Schedule>    [x] VTE Prophylaxis                        9.7    30.13 )-----------( 455      ( 11 Jan 2020 01:48 )             31.5       Transfusion     [ ] PRBC   [ ] Platelets   [ ] FFP   [ ] Cryoprecipitate      INFECTIOUS DISEASES  WBC Count: 30.13 K/uL (01-11 @ 01:48)  WBC Count: 17.45 K/uL (01-10 @ 13:10)    RECENT CULTURES:  Specimen Source: .Blood Blood  Date/Time: 01-09 @ 15:16  Culture Results:   Growth in anaerobic bottle: Gram Positive Cocci in Clusters  Gram Stain:   Growth in anaerobic bottle: Gram Positive Cocci in Clusters  Organism: --  Specimen Source: .Blood Blood  Date/Time: 01-08 @ 12:35  Culture Results:   Growth in aerobic and anaerobic bottles: Staphylococcus epidermidis  Gram Stain:   Growth in aerobic bottle: Gram Positive Cocci in Clusters  Growth in anaerobic bottle: Gram Positive Cocci in Clusters  Organism: --  Specimen Source: .Blood Blood-Peripheral  Date/Time: 01-07 @ 16:31  Culture Results:   Growth in aerobic bottle:  Gram Positive Cocci in Clusters  Gram Stain:   Growth in aerobic bottle:  Gram Positive Cocci in Clusters  Organism: --  Specimen Source: .Urine Clean Catch (Midstream)  Date/Time: 01-07 @ 15:18  Culture Results:   >100,000 CFU/ml Klebsiella pneumoniae  >100,000 CFU/ml Escherichia coli  Gram Stain: --  Organism: Klebsiella pneumoniae  Escherichia coli  Specimen Source: .Blood Blood-Venous  Date/Time: 01-07 @ 08:36  Culture Results:   Growth in aerobic and anaerobic bottles: Coag Negative Staphylococcus  Coag Negative Staphylococcus  Single set isolate, possible contaminant. Contact  Microbiology if susceptibility testing clinically  indicated.  ***Blood Panel PCR results on this specimen are available  approximately 3 hours after the Gram stain result.***  Gram stain, PCR, and/or culture results may not always  correspond due to difference in methodologies.  ************************************************************  ThisPCR assay was performed using [employer].  The following targets are tested for: Enterococcus,  vancomycin resistant enterococci, Listeria monocytogenes,  coagulase negative staphylococci, S. aureus,  methicillin resistant S. aureus, Streptococcus agalactiae  (Group B), S. pneumoniae, S. pyogenes (Group A),  Acinetobacter baumannii, Enterobacter cloacae, E. coli,  Klebsiella oxytoca, K. pneumoniae, Proteus sp.,  Serratia marcescens, Haemophilus influenzae,  Neisseria meningitidis, Pseudomonas aeruginosa, Candida  albicans, C. glabrata, C krusei, C parapsilosis,  C. tropicalis and the KPC resistance gene.  Gram Stain:   Growth in aerobic bottle: Gram Positive Cocci in Clusters  Growth in anaerobic bottle: Gram Positive Cocci in Clusters  Organism: Blood Culture PCR    Meds: erythromycin     base Tablet 250 milliGRAM(s) Oral <User Schedule>  meropenem  IVPB 1000 milliGRAM(s) IV Intermittent every 8 hours  vancomycin  IVPB 1250 milliGRAM(s) IV Intermittent every 12 hours        ENDOCRINE  CAPILLARY BLOOD GLUCOSE        Meds: atorvastatin 40 milliGRAM(s) Oral <User Schedule>        ACCESS DEVICES:  [x] Peripheral IV  [ ] Central Venous Line	[ ] R	[ ] L	[ ] IJ	[ ] Fem	[ ] SC	Placed:   [ ] Arterial Line		[ ] R	[ ] L	[ ] Fem	[ ] Rad	[ ] Ax	Placed:   [ ] PICC:					[ ] Mediport  [ ] Urinary Catheter, Date Placed:   [x] Necessity of urinary, arterial, and venous catheters discussed    OTHER MEDICATIONS:  chlorhexidine 2% Cloths 1 Application(s) Topical <User Schedule>  nystatin Powder 1 Application(s) Topical two times a day      CODE STATUS: full code       IMAGING: < from: CT Abdomen and Pelvis w/ Oral Cont and w/ IV Cont (01.06.20 @ 10:47) >  FINDINGS:    LOWER CHEST: Subsegmental atelectasis of the left lower lobe. Trace left pleural effusion.    LIVER: Calcified granuloma in the right liver. Probable hepatic steatosis.  BILE DUCTS: Normal caliber.  GALLBLADDER: Within normal limits.   SPLEEN: Within normal limits.  PANCREAS: Within normal limits.  ADRENALS: Within normal limits.  KIDNEYS/URETERS: Subcentimeter hypodense foci in the right lower pole are too small to characterize.    BLADDER: Within normal limits.  REPRODUCTIVE ORGANS: Uterus and adnexa within normal limits.    BOWEL: Left hemicolectomy with Sesay's pouch and right lower quadrant colostomy. Interval removal of a left lower quadrant abdominal drain. No bowel obstruction.   PERITONEUM: Small loculated interloop fluid between small bowel loops in the mesentery.   VESSELS: Atherosclerotic changes.  RETROPERITONEUM/LYMPH NODES: No lymphadenopathy.    ABDOMINAL WALL: Postsurgical changes with open midline anterior abdominal wall.A 7.6 x 3.3 x 5.7 cm (AP x TR x CC) ill-defined multiloculated fluid collection in the left anterior abdomen (3, 74), at least partially intraperitoneal, as it is inseparable from small bowel loops. An additional small 2.7 x 2.3 x 3.1 cm (AP x TR x CC) fluid collection in the midline anterior abdominal wall. Anasarca.  BONES: Degenerative changes.    IMPRESSION:   Open anterior abdominal wall wound.    Loculated fluid collections in the left anterior abdomen, inseparable from small bowel loops, as well as a small abdominal wall collection.     < end of copied text > HISTORY  64y Female pmhx morbid obesity, acid reflux, HTN, COPD and PSH D&C presented to the ED on 11/26 c/o abdominal pain and bloating. Patient reports having constipation for 2 weeks and has been taking enemas, miralax and senna and passing small hard balls for the last weeks. Reported pain worst in the LLQ that started a few days ago and has been worsening in the last day, also has noted significant abdominal distention in the last day. Reported she had difficulty walking short distances because she becomes SOB. In ED, CT demonstrated perforated sigmoid diverticulitis with intraperitoneal free air. She was taken to the operating room on 11/25 overnight and underwent an exploratory laparotomy with extended left hemicolectomy and was left in discontinuity. An Abthera VAC was placed. She was kept intubated post op and brought to the ICU for hemodynamic and respiratory management. She went back to the OR on 11/29 for transverse end colostomy and fascial closure with wound vac.  Pt transferred out of SICU and recovering on floor with post-op course including induration of wound and colostomy necrosis requiring bedside debridement and wound vac placement.  Pt had CT on 1/6 for leukocytosis and to evaluate wound, which showed loculated fluid collections in the left anterior abdomen, very near loops of bowel.  After CT, pt reported feeling "worse" and was hypotensive overnight to SBP 70s requiring 1L fluid bolus.  Pt was bolused again this am for hypotension and blood cultures were sent for worsening leukocytosis, and BULL.  SICU consulted for closer monitoring.  UA positive and patient was started on Ceftriaxone for presumed UTI.       24 HOUR EVENTS:  - Right IJ central venous line placed for emergency venous access once patient became hypotensive to 60 SBP.   - Levophed started through; patient remained asymptomatic through hypotensive episode   - 500ml of 5% albumin administered in the day and then again overnight.   - Patient acutely went into tachyarryhthmia overnight. EKG showing undetermined rhythm with frequent ectopic ventricular beats. Amiodarone infusion started to good effect.     SUBJECTIVE/ROS:  [ ] A ten-point review of systems was otherwise negative except as noted.  [ ] Due to altered mental status/intubation, subjective information were not able to be obtained from the patient. History was obtained, to the extent possible, from review of the chart and collateral sources of information.      NEURO  Exam: awake, alert, oriented  Meds: acetaminophen   Tablet .. 975 milliGRAM(s) Oral every 6 hours PRN Mild Pain (1 - 3)  methadone    Tablet 17.5 milliGRAM(s) Oral daily    [x] Adequacy of sedation and pain control has been assessed and adjusted      RESPIRATORY  RR: 18 (01-11-20 @ 03:15) (15 - 44)  SpO2: 95% (01-11-20 @ 03:15) (91% - 99%)  Exam: unlabored, clear to auscultation bilaterally  Mechanical Ventilation: none  [N/A] Extubation Readiness Assessed  Meds: ALBUTerol    90 MICROgram(s) HFA Inhaler 2 Puff(s) Inhalation every 6 hours PRN Shortness of Breath and/or Wheezing  budesonide 160 MICROgram(s)/formoterol 4.5 MICROgram(s) Inhaler 2 Puff(s) Inhalation two times a day        CARDIOVASCULAR  HR: 95 (01-11-20 @ 03:15) (67 - 131)  BP: 90/46 (01-11-20 @ 03:15) (67/37 - 155/74)  BP(mean): 65 (01-11-20 @ 03:15) (47 - 106)  VBG - ( 11 Jan 2020 01:40 )  pH: 7.29  /  pCO2: 39    /  pO2: 42    / HCO3: 18    / Base Excess: -7.4  /  SaO2: 70     Lactate: 3.0    Exam: regular rate and rhythm  Cardiac Rhythm: sinus  Perfusion     [x]Adequate   [ ]Inadequate  Mentation   [x]Normal       [ ]Reduced  Extremities  [x]Warm         [ ]Cool  Volume Status [ ]Hypervolemic [x]Euvolemic [ ]Hypovolemic  Meds: aMIOdarone Infusion 0.5 mG/Min IV Continuous <Continuous>  doxazosin 2 milliGRAM(s) Oral <User Schedule>  midodrine 10 milliGRAM(s) Oral <User Schedule>  norepinephrine Infusion 0.05 MICROgram(s)/kG/Min IV Continuous <Continuous>        GI/NUTRITION  Exam: soft, nontender, nondistended  Diet: regular diet   Meds: aluminum hydroxide/magnesium hydroxide/simethicone Suspension 30 milliLiter(s) Oral every 4 hours PRN Dyspepsia  pantoprazole    Tablet 40 milliGRAM(s) Oral <User Schedule>  polyethylene glycol 3350 17 Gram(s) Oral <User Schedule>      GENITOURINARY  I&O's Detail    01-09 @ 07:01  -  01-10 @ 07:00  --------------------------------------------------------  IN:    Oral Fluid: 720 mL    phenylephrine   Infusion: 508.9 mL    sodium chloride 0.9%.: 3600 mL    Solution: 100 mL    Solution: 350 mL  Total IN: 5278.9 mL    OUT:    Colostomy: 25 mL    Drain: 50 mL    Indwelling Catheter - Urethral: 200 mL    VAC (Vacuum Assisted Closure) System: 200 mL    Voided: 800 mL  Total OUT: 1275 mL    Total NET: 4003.9 mL      01-10 @ 07:01 - 01-11 @ 04:02  --------------------------------------------------------  IN:    amiodarone Infusion: 166.5 mL    norepinephrine Infusion: 263.9 mL    phenylephrine   Infusion: 221 mL    sodium chloride 0.9%.: 2750 mL    Solution: 412.5 mL    Solution: 100 mL    Solution: 609 mL  Total IN: 4522.9 mL    OUT:    VAC (Vacuum Assisted Closure) System: 125 mL    Voided: 1400 mL  Total OUT: 1525 mL    Total NET: 2997.9 mL          01-10    126<L>  |  98  |  11  ----------------------------<  110<H>  3.5   |  20<L>  |  0.84    Ca    8.4      10 Gary 2020 23:38  Phos  2.4     01-11  Mg     2.4     01-11      [ ] Ross catheter, indication: N/A  Meds: albumin human  5% IVPB 250 milliLiter(s) IV Intermittent once  sodium chloride 0.9%. 1000 milliLiter(s) IV Continuous <Continuous>        HEMATOLOGIC  Meds: aspirin  chewable 81 milliGRAM(s) Oral <User Schedule>  enoxaparin Injectable 100 milliGRAM(s) SubCutaneous <User Schedule>    [x] VTE Prophylaxis                        9.7    30.13 )-----------( 455      ( 11 Jan 2020 01:48 )             31.5       Transfusion     [ ] PRBC   [ ] Platelets   [ ] FFP   [ ] Cryoprecipitate      INFECTIOUS DISEASES  WBC Count: 30.13 K/uL (01-11 @ 01:48)  WBC Count: 17.45 K/uL (01-10 @ 13:10)    RECENT CULTURES:  Specimen Source: .Blood Blood  Date/Time: 01-09 @ 15:16  Culture Results:   Growth in anaerobic bottle: Gram Positive Cocci in Clusters  Gram Stain:   Growth in anaerobic bottle: Gram Positive Cocci in Clusters  Organism: --  Specimen Source: .Blood Blood  Date/Time: 01-08 @ 12:35  Culture Results:   Growth in aerobic and anaerobic bottles: Staphylococcus epidermidis  Gram Stain:   Growth in aerobic bottle: Gram Positive Cocci in Clusters  Growth in anaerobic bottle: Gram Positive Cocci in Clusters  Organism: --  Specimen Source: .Blood Blood-Peripheral  Date/Time: 01-07 @ 16:31  Culture Results:   Growth in aerobic bottle:  Gram Positive Cocci in Clusters  Gram Stain:   Growth in aerobic bottle:  Gram Positive Cocci in Clusters  Organism: --  Specimen Source: .Urine Clean Catch (Midstream)  Date/Time: 01-07 @ 15:18  Culture Results:   >100,000 CFU/ml Klebsiella pneumoniae  >100,000 CFU/ml Escherichia coli  Gram Stain: --  Organism: Klebsiella pneumoniae  Escherichia coli  Specimen Source: .Blood Blood-Venous  Date/Time: 01-07 @ 08:36  Culture Results:   Growth in aerobic and anaerobic bottles: Coag Negative Staphylococcus  Coag Negative Staphylococcus  Single set isolate, possible contaminant. Contact  Microbiology if susceptibility testing clinically  indicated.  ***Blood Panel PCR results on this specimen are available  approximately 3 hours after the Gram stain result.***  Gram stain, PCR, and/or culture results may not always  correspond due to difference in methodologies.  ************************************************************  ThisPCR assay was performed using [employer].  The following targets are tested for: Enterococcus,  vancomycin resistant enterococci, Listeria monocytogenes,  coagulase negative staphylococci, S. aureus,  methicillin resistant S. aureus, Streptococcus agalactiae  (Group B), S. pneumoniae, S. pyogenes (Group A),  Acinetobacter baumannii, Enterobacter cloacae, E. coli,  Klebsiella oxytoca, K. pneumoniae, Proteus sp.,  Serratia marcescens, Haemophilus influenzae,  Neisseria meningitidis, Pseudomonas aeruginosa, Candida  albicans, C. glabrata, C krusei, C parapsilosis,  C. tropicalis and the KPC resistance gene.  Gram Stain:   Growth in aerobic bottle: Gram Positive Cocci in Clusters  Growth in anaerobic bottle: Gram Positive Cocci in Clusters  Organism: Blood Culture PCR    Meds: erythromycin     base Tablet 250 milliGRAM(s) Oral <User Schedule>  meropenem  IVPB 1000 milliGRAM(s) IV Intermittent every 8 hours  vancomycin  IVPB 1250 milliGRAM(s) IV Intermittent every 12 hours        ENDOCRINE  CAPILLARY BLOOD GLUCOSE        Meds: atorvastatin 40 milliGRAM(s) Oral <User Schedule>        ACCESS DEVICES:  [x] Peripheral IV  [ ] Central Venous Line	[ ] R	[ ] L	[ ] IJ	[ ] Fem	[ ] SC	Placed:   [ ] Arterial Line		[ ] R	[ ] L	[ ] Fem	[ ] Rad	[ ] Ax	Placed:   [ ] PICC:					[ ] Mediport  [ ] Urinary Catheter, Date Placed:   [x] Necessity of urinary, arterial, and venous catheters discussed    OTHER MEDICATIONS:  chlorhexidine 2% Cloths 1 Application(s) Topical <User Schedule>  nystatin Powder 1 Application(s) Topical two times a day      CODE STATUS: full code       IMAGING: < from: CT Abdomen and Pelvis w/ Oral Cont and w/ IV Cont (01.06.20 @ 10:47) >  FINDINGS:    LOWER CHEST: Subsegmental atelectasis of the left lower lobe. Trace left pleural effusion.    LIVER: Calcified granuloma in the right liver. Probable hepatic steatosis.  BILE DUCTS: Normal caliber.  GALLBLADDER: Within normal limits.   SPLEEN: Within normal limits.  PANCREAS: Within normal limits.  ADRENALS: Within normal limits.  KIDNEYS/URETERS: Subcentimeter hypodense foci in the right lower pole are too small to characterize.    BLADDER: Within normal limits.  REPRODUCTIVE ORGANS: Uterus and adnexa within normal limits.    BOWEL: Left hemicolectomy with Sesay's pouch and right lower quadrant colostomy. Interval removal of a left lower quadrant abdominal drain. No bowel obstruction.   PERITONEUM: Small loculated interloop fluid between small bowel loops in the mesentery.   VESSELS: Atherosclerotic changes.  RETROPERITONEUM/LYMPH NODES: No lymphadenopathy.    ABDOMINAL WALL: Postsurgical changes with open midline anterior abdominal wall.A 7.6 x 3.3 x 5.7 cm (AP x TR x CC) ill-defined multiloculated fluid collection in the left anterior abdomen (3, 74), at least partially intraperitoneal, as it is inseparable from small bowel loops. An additional small 2.7 x 2.3 x 3.1 cm (AP x TR x CC) fluid collection in the midline anterior abdominal wall. Anasarca.  BONES: Degenerative changes.    IMPRESSION:   Open anterior abdominal wall wound.    Loculated fluid collections in the left anterior abdomen, inseparable from small bowel loops, as well as a small abdominal wall collection.     < end of copied text > HISTORY  64y Female pmhx morbid obesity, acid reflux, HTN, COPD and PSH D&C presented to the ED on 11/26 c/o abdominal pain and bloating. Patient reports having constipation for 2 weeks and has been taking enemas, miralax and senna and passing small hard balls for the last weeks. Reported pain worst in the LLQ that started a few days ago and has been worsening in the last day, also has noted significant abdominal distention in the last day. Reported she had difficulty walking short distances because she becomes SOB. In ED, CT demonstrated perforated sigmoid diverticulitis with intraperitoneal free air. She was taken to the operating room on 11/25 overnight and underwent an exploratory laparotomy with extended left hemicolectomy and was left in discontinuity. An Abthera VAC was placed. She was kept intubated post op and brought to the ICU for hemodynamic and respiratory management. She went back to the OR on 11/29 for transverse end colostomy and fascial closure with wound vac.  Pt transferred out of SICU and recovering on floor with post-op course including induration of wound and colostomy necrosis requiring bedside debridement and wound vac placement.  Pt had CT on 1/6 for leukocytosis and to evaluate wound, which showed loculated fluid collections in the left anterior abdomen, very near loops of bowel.  After CT, pt reported feeling "worse" and was hypotensive overnight to SBP 70s requiring 1L fluid bolus.  Pt was bolused again this am for hypotension and blood cultures were sent for worsening leukocytosis, and BULL.  SICU consulted for closer monitoring.  UA positive and patient was started on Ceftriaxone for presumed UTI.       24 HOUR EVENTS:  - Right IJ central venous line placed for emergency venous access once patient became hypotensive to 60 SBP.   - Levophed started through; patient remained asymptomatic through hypotensive episode   - 500ml of 5% albumin administered in the day and then again overnight.   - Patient acutely went into tachyarryhthmia overnight. EKG showing undetermined rhythm with frequent ectopic ventricular beats. Amiodarone infusion started to good effect.  - Patient's WBC count elevated to 30 in am, lactate elevated to 3 with acute rise in Levophed requirements to as high as 1mcg.kg/min. Due to patient's body habitus, difficult to obtain accurate BP measurements. A left radial arterial line was attempted to be placed but guidewire could not be threaded and patient refused further attempts at cannulation.     SUBJECTIVE/ROS:  [ ] A ten-point review of systems was otherwise negative except as noted.  [ ] Due to altered mental status/intubation, subjective information were not able to be obtained from the patient. History was obtained, to the extent possible, from review of the chart and collateral sources of information.      NEURO  Exam: awake, alert, oriented  Meds: acetaminophen   Tablet .. 975 milliGRAM(s) Oral every 6 hours PRN Mild Pain (1 - 3)  methadone    Tablet 17.5 milliGRAM(s) Oral daily    [x] Adequacy of sedation and pain control has been assessed and adjusted      RESPIRATORY  RR: 18 (01-11-20 @ 03:15) (15 - 44)  SpO2: 95% (01-11-20 @ 03:15) (91% - 99%)  Exam: unlabored, clear to auscultation bilaterally  Mechanical Ventilation: none  [N/A] Extubation Readiness Assessed  Meds: ALBUTerol    90 MICROgram(s) HFA Inhaler 2 Puff(s) Inhalation every 6 hours PRN Shortness of Breath and/or Wheezing  budesonide 160 MICROgram(s)/formoterol 4.5 MICROgram(s) Inhaler 2 Puff(s) Inhalation two times a day        CARDIOVASCULAR  HR: 95 (01-11-20 @ 03:15) (67 - 131)  BP: 90/46 (01-11-20 @ 03:15) (67/37 - 155/74)  BP(mean): 65 (01-11-20 @ 03:15) (47 - 106)  VBG - ( 11 Jan 2020 01:40 )  pH: 7.29  /  pCO2: 39    /  pO2: 42    / HCO3: 18    / Base Excess: -7.4  /  SaO2: 70     Lactate: 3.0    Exam: regular rate and rhythm  Cardiac Rhythm: sinus  Perfusion     [x]Adequate   [ ]Inadequate  Mentation   [x]Normal       [ ]Reduced  Extremities  [x]Warm         [ ]Cool  Volume Status [ ]Hypervolemic [x]Euvolemic [ ]Hypovolemic  Meds: aMIOdarone Infusion 0.5 mG/Min IV Continuous <Continuous>  doxazosin 2 milliGRAM(s) Oral <User Schedule>  midodrine 10 milliGRAM(s) Oral <User Schedule>  norepinephrine Infusion 0.05 MICROgram(s)/kG/Min IV Continuous <Continuous>        GI/NUTRITION  Exam: soft, nontender, nondistended  Diet: regular diet   Meds: aluminum hydroxide/magnesium hydroxide/simethicone Suspension 30 milliLiter(s) Oral every 4 hours PRN Dyspepsia  pantoprazole    Tablet 40 milliGRAM(s) Oral <User Schedule>  polyethylene glycol 3350 17 Gram(s) Oral <User Schedule>      GENITOURINARY  I&O's Detail    01-09 @ 07:01  -  01-10 @ 07:00  --------------------------------------------------------  IN:    Oral Fluid: 720 mL    phenylephrine   Infusion: 508.9 mL    sodium chloride 0.9%.: 3600 mL    Solution: 100 mL    Solution: 350 mL  Total IN: 5278.9 mL    OUT:    Colostomy: 25 mL    Drain: 50 mL    Indwelling Catheter - Urethral: 200 mL    VAC (Vacuum Assisted Closure) System: 200 mL    Voided: 800 mL  Total OUT: 1275 mL    Total NET: 4003.9 mL      01-10 @ 07:01 - 01-11 @ 04:02  --------------------------------------------------------  IN:    amiodarone Infusion: 166.5 mL    norepinephrine Infusion: 263.9 mL    phenylephrine   Infusion: 221 mL    sodium chloride 0.9%.: 2750 mL    Solution: 412.5 mL    Solution: 100 mL    Solution: 609 mL  Total IN: 4522.9 mL    OUT:    VAC (Vacuum Assisted Closure) System: 125 mL    Voided: 1400 mL  Total OUT: 1525 mL    Total NET: 2997.9 mL          01-10    126<L>  |  98  |  11  ----------------------------<  110<H>  3.5   |  20<L>  |  0.84    Ca    8.4      10 Gary 2020 23:38  Phos  2.4     01-11  Mg     2.4     01-11      [ ] Ross catheter, indication: N/A  Meds: albumin human  5% IVPB 250 milliLiter(s) IV Intermittent once  sodium chloride 0.9%. 1000 milliLiter(s) IV Continuous <Continuous>        HEMATOLOGIC  Meds: aspirin  chewable 81 milliGRAM(s) Oral <User Schedule>  enoxaparin Injectable 100 milliGRAM(s) SubCutaneous <User Schedule>    [x] VTE Prophylaxis                        9.7    30.13 )-----------( 455      ( 11 Jan 2020 01:48 )             31.5       Transfusion     [ ] PRBC   [ ] Platelets   [ ] FFP   [ ] Cryoprecipitate      INFECTIOUS DISEASES  WBC Count: 30.13 K/uL (01-11 @ 01:48)  WBC Count: 17.45 K/uL (01-10 @ 13:10)    RECENT CULTURES:  Specimen Source: .Blood Blood  Date/Time: 01-09 @ 15:16  Culture Results:   Growth in anaerobic bottle: Gram Positive Cocci in Clusters  Gram Stain:   Growth in anaerobic bottle: Gram Positive Cocci in Clusters  Organism: --  Specimen Source: .Blood Blood  Date/Time: 01-08 @ 12:35  Culture Results:   Growth in aerobic and anaerobic bottles: Staphylococcus epidermidis  Gram Stain:   Growth in aerobic bottle: Gram Positive Cocci in Clusters  Growth in anaerobic bottle: Gram Positive Cocci in Clusters  Organism: --  Specimen Source: .Blood Blood-Peripheral  Date/Time: 01-07 @ 16:31  Culture Results:   Growth in aerobic bottle:  Gram Positive Cocci in Clusters  Gram Stain:   Growth in aerobic bottle:  Gram Positive Cocci in Clusters  Organism: --  Specimen Source: .Urine Clean Catch (Midstream)  Date/Time: 01-07 @ 15:18  Culture Results:   >100,000 CFU/ml Klebsiella pneumoniae  >100,000 CFU/ml Escherichia coli  Gram Stain: --  Organism: Klebsiella pneumoniae  Escherichia coli  Specimen Source: .Blood Blood-Venous  Date/Time: 01-07 @ 08:36  Culture Results:   Growth in aerobic and anaerobic bottles: Coag Negative Staphylococcus  Coag Negative Staphylococcus  Single set isolate, possible contaminant. Contact  Microbiology if susceptibility testing clinically  indicated.  ***Blood Panel PCR results on this specimen are available  approximately 3 hours after the Gram stain result.***  Gram stain, PCR, and/or culture results may not always  correspond due to difference in methodologies.  ************************************************************  ThisPCR assay was performed using [employer].  The following targets are tested for: Enterococcus,  vancomycin resistant enterococci, Listeria monocytogenes,  coagulase negative staphylococci, S. aureus,  methicillin resistant S. aureus, Streptococcus agalactiae  (Group B), S. pneumoniae, S. pyogenes (Group A),  Acinetobacter baumannii, Enterobacter cloacae, E. coli,  Klebsiella oxytoca, K. pneumoniae, Proteus sp.,  Serratia marcescens, Haemophilus influenzae,  Neisseria meningitidis, Pseudomonas aeruginosa, Candida  albicans, C. glabrata, C krusei, C parapsilosis,  C. tropicalis and the KPC resistance gene.  Gram Stain:   Growth in aerobic bottle: Gram Positive Cocci in Clusters  Growth in anaerobic bottle: Gram Positive Cocci in Clusters  Organism: Blood Culture PCR    Meds: erythromycin     base Tablet 250 milliGRAM(s) Oral <User Schedule>  meropenem  IVPB 1000 milliGRAM(s) IV Intermittent every 8 hours  vancomycin  IVPB 1250 milliGRAM(s) IV Intermittent every 12 hours        ENDOCRINE  CAPILLARY BLOOD GLUCOSE        Meds: atorvastatin 40 milliGRAM(s) Oral <User Schedule>        ACCESS DEVICES:  [x] Peripheral IV  [ ] Central Venous Line	[ ] R	[ ] L	[ ] IJ	[ ] Fem	[ ] SC	Placed:   [ ] Arterial Line		[ ] R	[ ] L	[ ] Fem	[ ] Rad	[ ] Ax	Placed:   [ ] PICC:					[ ] Mediport  [ ] Urinary Catheter, Date Placed:   [x] Necessity of urinary, arterial, and venous catheters discussed    OTHER MEDICATIONS:  chlorhexidine 2% Cloths 1 Application(s) Topical <User Schedule>  nystatin Powder 1 Application(s) Topical two times a day      CODE STATUS: full code       IMAGING: < from: CT Abdomen and Pelvis w/ Oral Cont and w/ IV Cont (01.06.20 @ 10:47) >  FINDINGS:    LOWER CHEST: Subsegmental atelectasis of the left lower lobe. Trace left pleural effusion.    LIVER: Calcified granuloma in the right liver. Probable hepatic steatosis.  BILE DUCTS: Normal caliber.  GALLBLADDER: Within normal limits.   SPLEEN: Within normal limits.  PANCREAS: Within normal limits.  ADRENALS: Within normal limits.  KIDNEYS/URETERS: Subcentimeter hypodense foci in the right lower pole are too small to characterize.    BLADDER: Within normal limits.  REPRODUCTIVE ORGANS: Uterus and adnexa within normal limits.    BOWEL: Left hemicolectomy with Sesay's pouch and right lower quadrant colostomy. Interval removal of a left lower quadrant abdominal drain. No bowel obstruction.   PERITONEUM: Small loculated interloop fluid between small bowel loops in the mesentery.   VESSELS: Atherosclerotic changes.  RETROPERITONEUM/LYMPH NODES: No lymphadenopathy.    ABDOMINAL WALL: Postsurgical changes with open midline anterior abdominal wall.A 7.6 x 3.3 x 5.7 cm (AP x TR x CC) ill-defined multiloculated fluid collection in the left anterior abdomen (3, 74), at least partially intraperitoneal, as it is inseparable from small bowel loops. An additional small 2.7 x 2.3 x 3.1 cm (AP x TR x CC) fluid collection in the midline anterior abdominal wall. Anasarca.  BONES: Degenerative changes.    IMPRESSION:   Open anterior abdominal wall wound.    Loculated fluid collections in the left anterior abdomen, inseparable from small bowel loops, as well as a small abdominal wall collection.     < end of copied text >

## 2020-01-11 NOTE — CHART NOTE - NSCHARTNOTEFT_GEN_A_CORE
worsening septic shock requiring increasing doses of norepinephrine infusion  -attempted left radial arterial line with U/S guidance, but unable to thread wire. 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 patient is refusing femoral arterial line.  -will not start vasopressin infusion since she is already hyponatremic    UCx (E coli, Klebsiella)  -not ESBL, but will continue meropenem given worsening septic shock    S epidermidis bacteremia from PICC line sepsis  -vancomycin  -f/u cultures to see if methicillin sensitive    hyponatremia  -likely SIADH since BULL is improving    hypophosphatemia  -IV repletion    c/o chest tightness suspicious for angina  -EKG with ST depression in V3-V6 which was not present on prior EKG  -can't give nitroglycerine since she is already hypotensive  -will treat with morphine  -high-sensitivity troponin = 50 -> will repeat  -discussed with Dr Miner by phone and he recommends sending CK/CK-MB    frequent PVC's  -started amiodarone      I have personally provided 50 minutes of critical care time excluding time spent on separate procedures.

## 2020-01-11 NOTE — PROGRESS NOTE ADULT - ATTENDING COMMENTS
Evaluated in round, continue to be re evaluated  Worsening of septic shock, on max dose of Levophed, will add vasopressin to decrease Levophed requirement  Developed SVT/PVC likely from high dose Levophed, suppressed with Amiodarone drip, will try to wean off amiodarone,   Persistent bacteremia with staph epi, TTE followed by DONNA to r/o endocarditis, vanco trough therapeutic, mild decrease in leucocytosis. Recent CT abd/pelvis neg for collection  Monitor increased Lactate  IVF NS, monitor hyponatremia, CXR still clear intravascular depleted although overall hypervolumic  Random cortisol 19.6, borderline low, may need stress dose steroid  Discussed with multidisciplinary team Evaluated in round, continue to be re evaluated  Worsening of septic shock, on max dose of Levophed, will add vasopressin to decrease Levophed requirement  Developed SVT/PVC likely from high dose Levophed, suppressed with Amiodarone drip, will try to wean off amiodarone,   Persistent bacteremia with staph epi, TTE followed by DONNA to r/o endocarditis, vanco trough therapeutic, mild decrease in leucocytosis. Recent CT abd/pelvis neg for collection  Monitor increased Lactate  IVF NS, monitor hyponatremia, CXR still clear intravascular depleted although overall hypervolumic  Random cortisol 19.6, borderline low, may need stress dose steroid  Discussed with multidisciplinary team    Addendum  Increase in Ti, will continue to trend, on ASA, cardiology called, ECHO ASAP

## 2020-01-11 NOTE — PROGRESS NOTE ADULT - ASSESSMENT
64F with pmhx morbid obesity, acid reflux, HTN, COPD and PSH D&C presented to the ED on 11/26 c/o abdominal pain and bloating. Reported pain worst in the LLQ that started a few days ago and has been worsening in the last day, also has noted significant abdominal distention in the last day.  In ED, CT demonstrated perforated sigmoid diverticulitis with intraperitoneal free air. She was taken to the operating room on 11/25 overnight and underwent an exploratory laparotomy with extended left hemicolectomy and was left in discontinuity. An Abthera VAC was placed. She was kept intubated post op and brought to the ICU for hemodynamic and respiratory management. She went back to the OR on 11/29 for transverse end colostomy and fascial closure with wound vac.  Pt transferred out of SICU and recovering on floor with post-op course including induration of wound and colostomy necrosis requiring bedside debridement and wound vac placement.  Pt had CT on 1/6 for leukocytosis and to evaluate wound, which showed loculated fluid collections in the left anterior abdomen, very near loops of bowel.  After CT, pt reported feeling "worse" and was hypotensive overnight to SBP 70s requiring 1L fluid bolus.  Pt was bolused again this for hypotension and blood cultures were sent for worsening leukocytosis, and BULL.  SICU consulted for closer monitoring.  UA positive and patient was started on Ceftriaxone for presumed UTI.     Neuro: pain control, h/o chronic pain requiring methadone   - Tylenol prn  - Continue home dose Methadone     Resp: h/o COPD  - Albuterol prn  - Continue Symbicort    CV: Hypotension likely secondary to hypovolemia; septic shock ; h/o HTN  - Hemodynamic monitoring  - Trend lactate  - Hold home anti-hypertensives  - Continue statin   - Continue Levophed       GI: Perforated sigmoid diverticulitis s/p extended left hemicolectomy (11/25)  - Continue Regular diet  - Monitor wound vac output   - Protonix     /Renal: Hypovolemic hyponatremia, BULL  - Hyponatremia improving  - Trend BUN/Cr   - Replete electrolytes as needed  - Monitor UOP     ID: UTI, leukocytosis   - Continue meropenem and vancomycin   - f/u UCx and blood cultures     Heme: VTE prophylaxis  - Continue lovenox  - ASA    Endo:   - Monitor serum glucose     Dispo: SICU care.  Full code.      - Jey Garcia PA-C

## 2020-01-11 NOTE — PROGRESS NOTE ADULT - SUBJECTIVE AND OBJECTIVE BOX
[facility] PROGRESS NOTE    24 HOUR EVENTS:  - Right IJ central venous line placed for emergency venous access once patient became hypotensive to 60 SBP.   - Levophed started through; patient remained asymptomatic through hypotensive episode   - 500ml of 5% albumin administered in the day and then again overnight.   - Patient acutely went into tachyarryhthmia overnight. EKG showing undetermined rhythm with frequent ectopic ventricular beats. Amiodarone infusion started to good effect.  - Patient's WBC count elevated to 30 in am, lactate elevated to 3 with acute rise in Levophed requirements to as high as 1mcg.kg/min. Due to patient's body habitus, difficult to obtain accurate BP measurements. A left radial arterial line was attempted to be placed but guidewire could not be threaded and patient refused further attempts at cannulation.     SUBJECTIVE:       OBJECTIVE  Physical Exam:   Gen: NAD. Alert and cooperative.   Resp: No addition work of breathing. Continued cough with phlegm.   Card: Tachycardic with PVTs, improving on amio drip.   Abd: Soft, ND, NT. No rebound or guarding   Ext: WWP. No significant deformity.     T(C): 37.7 (01-11-20 @ 03:00), Max: 37.8 (01-10-20 @ 19:00)  HR: 96 (01-11-20 @ 07:00) (67 - 131)  BP: 102/59 (01-11-20 @ 07:00) (67/37 - 155/74)  RR: 22 (01-11-20 @ 07:00) (14 - 44)  SpO2: 96% (01-11-20 @ 07:00) (91% - 99%)  Wt(kg): --    LABS:                        9.3    33.45 )-----------( 465      ( 11 Jan 2020 05:12 )             30.9     01-11    130<L>  |  98  |  10  ----------------------------<  180<H>  3.8   |  15<L>  |  0.79    Ca    7.9<L>      11 Jan 2020 05:12  Phos  2.4     01-11  Mg     2.4     01-11        01-10-20 @ 07:01  -  01-11-20 @ 07:00  --------------------------------------------------------  IN: 6318.7 mL / OUT: 1555 mL / NET: 4763.7 mL [facility] PROGRESS NOTE    24 HOUR EVENTS:  - Right IJ central venous line placed for emergency venous access once patient became hypotensive to 60 SBP.   - Levophed started through; patient remained asymptomatic through hypotensive episode   - 500ml of 5% albumin administered in the day and then again overnight.   - Patient acutely went into tachyarryhthmia overnight. EKG showing undetermined rhythm with frequent ectopic ventricular beats. Amiodarone infusion started to good effect.  - Patient's WBC count elevated to 30 in am, lactate elevated to 3 with acute rise in Levophed requirements to as high as 1mcg.kg/min. Due to patient's body habitus, difficult to obtain accurate BP measurements. A left radial arterial line was attempted to be placed but guidewire could not be threaded and patient refused further attempts at cannulation.     SUBJECTIVE:   Patient states she is not feeling well this morning. Feels tired/weak.   Skin feels "warm and red."  Denies pain  Denies SOB    OBJECTIVE  Physical Exam:   Gen: NAD. Alert and cooperative.   Resp: No addition work of breathing. Continued cough with phlegm.   Card: Tachycardic with PVTs, improving on amio drip.   Abd: Soft, ND, NT. No rebound or guarding   Ext: WWP. No significant deformity.     T(C): 37.7 (01-11-20 @ 03:00), Max: 37.8 (01-10-20 @ 19:00)  HR: 96 (01-11-20 @ 07:00) (67 - 131)  BP: 102/59 (01-11-20 @ 07:00) (67/37 - 155/74)  RR: 22 (01-11-20 @ 07:00) (14 - 44)  SpO2: 96% (01-11-20 @ 07:00) (91% - 99%)  Wt(kg): --    LABS:                        9.3    33.45 )-----------( 465      ( 11 Jan 2020 05:12 )             30.9     01-11    130<L>  |  98  |  10  ----------------------------<  180<H>  3.8   |  15<L>  |  0.79    Ca    7.9<L>      11 Jan 2020 05:12  Phos  2.4     01-11  Mg     2.4     01-11        01-10-20 @ 07:01  -  01-11-20 @ 07:00  --------------------------------------------------------  IN: 6318.7 mL / OUT: 1555 mL / NET: 4763.7 mL

## 2020-01-11 NOTE — PROGRESS NOTE ADULT - SUBJECTIVE AND OBJECTIVE BOX
CARDIOLOGY     PROGRESS  NOTE   ________________________________________________    CHIEF COMPLAINT:Patient is a 64y old  Female who presents with a chief complaint of perforated bowel (08 Dec 2019 09:22)  still in icu on pressors.  	  REVIEW OF SYSTEMS:  CONSTITUTIONAL: No fever, weight loss, or fatigue  EYES: No eye pain, visual disturbances, or discharge  ENT:  No difficulty hearing, tinnitus, vertigo; No sinus or throat pain  NECK: No pain or stiffness  RESPIRATORY: No cough, wheezing, chills or hemoptysis; No Shortness of Breath  CARDIOVASCULAR: No chest pain, palpitations, passing out, dizziness, or leg swelling  GASTROINTESTINAL: No abdominal or epigastric pain. No nausea, vomiting, or hematemesis; No diarrhea or constipation. No melena or hematochezia.  GENITOURINARY: No dysuria, frequency, hematuria, or incontinence  NEUROLOGICAL: No headaches, memory loss, loss of strength, numbness, or tremors  SKIN: No itching, burning, rashes, or lesions   LYMPH Nodes: No enlarged glands  ENDOCRINE: No heat or cold intolerance; No hair loss  MUSCULOSKELETAL: No joint pain or swelling; No muscle, back, or extremity pain  PSYCHIATRIC: No depression, anxiety, mood swings, or difficulty sleeping  HEME/LYMPH: No easy bruising, or bleeding gums  ALLERGY AND IMMUNOLOGIC: No hives or eczema	    [ ] All others negative	  [ ] Unable to obtain    PHYSICAL EXAM:  T(C): 37.7 (01-11-20 @ 03:00), Max: 37.8 (01-10-20 @ 19:00)  HR: 96 (01-11-20 @ 07:00) (67 - 131)  BP: 102/59 (01-11-20 @ 07:00) (67/37 - 155/74)  RR: 22 (01-11-20 @ 07:00) (14 - 44)  SpO2: 96% (01-11-20 @ 07:00) (91% - 99%)  Wt(kg): --  I&O's Summary    10 Gary 2020 07:01  -  11 Jan 2020 07:00  --------------------------------------------------------  IN: 6318.7 mL / OUT: 1555 mL / NET: 4763.7 mL        Appearance: Normal	  HEENT:   Normal oral mucosa, PERRL, EOMI	  Lymphatic: No lymphadenopathy  Cardiovascular: Normal S1 S2, No JVD, + murmurs, No edema  Respiratory: Lungs clear to auscultation	  Psychiatry: A & O x 3, Mood & affect appropriate  Gastrointestinal:  Soft, Non-tender, + BS	  Skin: No rashes, No ecchymoses, No cyanosis	  Neurologic: Non-focal  Extremities: Normal range of motion, No clubbing, cyanosis or edema  Vascular: Peripheral pulses palpable 2+ bilaterally    MEDICATIONS  (STANDING):  aMIOdarone Infusion 0.5 mG/Min (16.667 mL/Hr) IV Continuous <Continuous>  aspirin  chewable 81 milliGRAM(s) Oral <User Schedule>  atorvastatin 40 milliGRAM(s) Oral <User Schedule>  buDESOnide    Inhalation Suspension 0.5 milliGRAM(s) Inhalation every 12 hours  chlorhexidine 2% Cloths 1 Application(s) Topical <User Schedule>  doxazosin 2 milliGRAM(s) Oral <User Schedule>  enoxaparin Injectable 100 milliGRAM(s) SubCutaneous <User Schedule>  erythromycin     base Tablet 250 milliGRAM(s) Oral <User Schedule>  meropenem  IVPB 1000 milliGRAM(s) IV Intermittent every 8 hours  methadone    Tablet 17.5 milliGRAM(s) Oral daily  midodrine 10 milliGRAM(s) Oral <User Schedule>  norepinephrine Infusion 0.05 MICROgram(s)/kG/Min (7.889 mL/Hr) IV Continuous <Continuous>  nystatin Powder 1 Application(s) Topical two times a day  pantoprazole    Tablet 40 milliGRAM(s) Oral <User Schedule>  polyethylene glycol 3350 17 Gram(s) Oral <User Schedule>  sodium chloride 0.9%. 1000 milliLiter(s) (150 mL/Hr) IV Continuous <Continuous>  vancomycin  IVPB 1250 milliGRAM(s) IV Intermittent every 12 hours  vasopressin Infusion 0.03 Unit(s)/Min (1.8 mL/Hr) IV Continuous <Continuous>      TELEMETRY: 	    ECG:  	  RADIOLOGY:  OTHER: 	  	  LABS:	 	    CARDIAC MARKERS:  CARDIAC MARKERS ( 10 Gary 2020 23:38 )  x     / x     / 17 U/L / x     / 1.8 ng/mL                                9.3    33.45 )-----------( 465      ( 11 Jan 2020 05:12 )             30.9     01-11    130<L>  |  98  |  10  ----------------------------<  180<H>  3.8   |  15<L>  |  0.79    Ca    7.9<L>      11 Jan 2020 05:12  Phos  2.4     01-11  Mg     2.4     01-11      proBNP:   Lipid Profile: Cholesterol 98  LDL 52  HDL 28  TG 91    HgA1c:   TSH:   Culture - Blood (01.09.20 @ 15:16)    Specimen Source: .Blood Blood-Peripheral    Culture Results:   No growth to date.    Culture - Blood (01.09.20 @ 15:16)    Gram Stain:   Growth in anaerobic bottle: Gram Positive Cocci in Clusters    Specimen Source: .Blood Blood    Culture Results:   Growth in anaerobic bottle: Gram Positive Cocci in Clusters    Troponin T, High Sensitivity (01.10.20 @ 23:38)    Troponin T, High Sensitivity Result: 50: Rapid upward or downward changes in high-sensitivity troponin levels  suggest acute myocardial injury. Renal impairment may cause sustained  troponin elevations.  Normal: <6 - 14 ng/L  Indeterminate: 15-51 ng/L  Elevated: > 51 ng/L  See http://labs/test/TROPTHS on the [employer] intranet for more  information ng/L          Assessment and plan  ---------------------------  events noted  discussed with surgery team last night  started on amio?  trop not significant  repeat echo today  worried about collection in the abdomen ,repeat ct abdomen and pelvis ?source of sepsis  abx  discussed with ICU team CARDIOLOGY     PROGRESS  NOTE   ________________________________________________    CHIEF COMPLAINT:Patient is a 64y old  Female who presents with a chief complaint of perforated bowel (08 Dec 2019 09:22)  still in icu on pressors.  	  REVIEW OF SYSTEMS:  CONSTITUTIONAL: No fever, weight loss, or fatigue  EYES: No eye pain, visual disturbances, or discharge  ENT:  No difficulty hearing, tinnitus, vertigo; No sinus or throat pain  NECK: No pain or stiffness  RESPIRATORY: No cough, wheezing, chills or hemoptysis; No Shortness of Breath  CARDIOVASCULAR: No chest pain, palpitations, passing out, dizziness, or leg swelling  GASTROINTESTINAL: No abdominal or epigastric pain. No nausea, vomiting, or hematemesis; No diarrhea or constipation. No melena or hematochezia.  GENITOURINARY: No dysuria, frequency, hematuria, or incontinence  NEUROLOGICAL: No headaches, memory loss, loss of strength, numbness, or tremors  SKIN: No itching, burning, rashes, or lesions   LYMPH Nodes: No enlarged glands  ENDOCRINE: No heat or cold intolerance; No hair loss  MUSCULOSKELETAL: No joint pain or swelling; No muscle, back, or extremity pain  PSYCHIATRIC: No depression, anxiety, mood swings, or difficulty sleeping  HEME/LYMPH: No easy bruising, or bleeding gums  ALLERGY AND IMMUNOLOGIC: No hives or eczema	    [ ] All others negative	  [ ] Unable to obtain    PHYSICAL EXAM:  T(C): 37.7 (01-11-20 @ 03:00), Max: 37.8 (01-10-20 @ 19:00)  HR: 96 (01-11-20 @ 07:00) (67 - 131)  BP: 102/59 (01-11-20 @ 07:00) (67/37 - 155/74)  RR: 22 (01-11-20 @ 07:00) (14 - 44)  SpO2: 96% (01-11-20 @ 07:00) (91% - 99%)  Wt(kg): --  I&O's Summary    10 Gary 2020 07:01  -  11 Jan 2020 07:00  --------------------------------------------------------  IN: 6318.7 mL / OUT: 1555 mL / NET: 4763.7 mL        Appearance: Normal	  HEENT:   Normal oral mucosa, PERRL, EOMI	  Lymphatic: No lymphadenopathy  Cardiovascular: Normal S1 S2, No JVD, + murmurs, No edema  Respiratory: Lungs clear to auscultation	  Psychiatry: A & O x 3, Mood & affect appropriate  Gastrointestinal:  Soft, Non-tender, + BS	  Skin: No rashes, No ecchymoses, No cyanosis	  Neurologic: Non-focal  Extremities: Normal range of motion, No clubbing, cyanosis or edema  Vascular: Peripheral pulses palpable 2+ bilaterally    MEDICATIONS  (STANDING):  aMIOdarone Infusion 0.5 mG/Min (16.667 mL/Hr) IV Continuous <Continuous>  aspirin  chewable 81 milliGRAM(s) Oral <User Schedule>  atorvastatin 40 milliGRAM(s) Oral <User Schedule>  buDESOnide    Inhalation Suspension 0.5 milliGRAM(s) Inhalation every 12 hours  chlorhexidine 2% Cloths 1 Application(s) Topical <User Schedule>  doxazosin 2 milliGRAM(s) Oral <User Schedule>  enoxaparin Injectable 100 milliGRAM(s) SubCutaneous <User Schedule>  erythromycin     base Tablet 250 milliGRAM(s) Oral <User Schedule>  meropenem  IVPB 1000 milliGRAM(s) IV Intermittent every 8 hours  methadone    Tablet 17.5 milliGRAM(s) Oral daily  midodrine 10 milliGRAM(s) Oral <User Schedule>  norepinephrine Infusion 0.05 MICROgram(s)/kG/Min (7.889 mL/Hr) IV Continuous <Continuous>  nystatin Powder 1 Application(s) Topical two times a day  pantoprazole    Tablet 40 milliGRAM(s) Oral <User Schedule>  polyethylene glycol 3350 17 Gram(s) Oral <User Schedule>  sodium chloride 0.9%. 1000 milliLiter(s) (150 mL/Hr) IV Continuous <Continuous>  vancomycin  IVPB 1250 milliGRAM(s) IV Intermittent every 12 hours  vasopressin Infusion 0.03 Unit(s)/Min (1.8 mL/Hr) IV Continuous <Continuous>      TELEMETRY: 	    ECG:  	  RADIOLOGY:  OTHER: 	  	  LABS:	 	    CARDIAC MARKERS:  CARDIAC MARKERS ( 10 Gary 2020 23:38 )  x     / x     / 17 U/L / x     / 1.8 ng/mL                                9.3    33.45 )-----------( 465      ( 11 Jan 2020 05:12 )             30.9     01-11    130<L>  |  98  |  10  ----------------------------<  180<H>  3.8   |  15<L>  |  0.79    Ca    7.9<L>      11 Jan 2020 05:12  Phos  2.4     01-11  Mg     2.4     01-11      proBNP:   Lipid Profile: Cholesterol 98  LDL 52  HDL 28  TG 91    HgA1c:   TSH:   Culture - Blood (01.09.20 @ 15:16)    Specimen Source: .Blood Blood-Peripheral    Culture Results:   No growth to date.    Culture - Blood (01.09.20 @ 15:16)    Gram Stain:   Growth in anaerobic bottle: Gram Positive Cocci in Clusters    Specimen Source: .Blood Blood    Culture Results:   Growth in anaerobic bottle: Gram Positive Cocci in Clusters    Troponin T, High Sensitivity (01.10.20 @ 23:38)    Troponin T, High Sensitivity Result: 50: Rapid upward or downward changes in high-sensitivity troponin levels  suggest acute myocardial injury. Renal impairment may cause sustained  troponin elevations.  Normal: <6 - 14 ng/L  Indeterminate: 15-51 ng/L  Elevated: > 51 ng/L  See http://labs/test/TROPTHS on the [employer] intranet for more  information ng/L          Assessment and plan  ---------------------------  events noted  discussed with surgery team last night  started on amio?  trop not significant  repeat echo today  worried about collection in the abdomen ,repeat ct abdomen and pelvis ?source of sepsis, even though the blood culture most probably is sec to the line  abx  discussed with ICU team  	  addendum  pt with increase troponin but low cpk  doubt MI  repeat ecg  ?PE but pt is on ac, may consider to r/o dvt  awaiting echo to check for WMA

## 2020-01-12 LAB
ANION GAP SERPL CALC-SCNC: 12 MMOL/L — SIGNIFICANT CHANGE UP (ref 5–17)
ANION GAP SERPL CALC-SCNC: 13 MMOL/L — SIGNIFICANT CHANGE UP (ref 5–17)
ANION GAP SERPL CALC-SCNC: 13 MMOL/L — SIGNIFICANT CHANGE UP (ref 5–17)
BASE EXCESS BLDV CALC-SCNC: -7.2 MMOL/L — LOW (ref -2–2)
BUN SERPL-MCNC: 10 MG/DL — SIGNIFICANT CHANGE UP (ref 7–23)
BUN SERPL-MCNC: 10 MG/DL — SIGNIFICANT CHANGE UP (ref 7–23)
BUN SERPL-MCNC: 9 MG/DL — SIGNIFICANT CHANGE UP (ref 7–23)
CA-I SERPL-SCNC: 1.27 MMOL/L — SIGNIFICANT CHANGE UP (ref 1.12–1.3)
CALCIUM SERPL-MCNC: 8.1 MG/DL — LOW (ref 8.4–10.5)
CHLORIDE BLDV-SCNC: 101 MMOL/L — SIGNIFICANT CHANGE UP (ref 96–108)
CHLORIDE SERPL-SCNC: 100 MMOL/L — SIGNIFICANT CHANGE UP (ref 96–108)
CHLORIDE SERPL-SCNC: 101 MMOL/L — SIGNIFICANT CHANGE UP (ref 96–108)
CHLORIDE SERPL-SCNC: 99 MMOL/L — SIGNIFICANT CHANGE UP (ref 96–108)
CK MB BLD-MCNC: 33 % — HIGH (ref 0–3.5)
CK MB CFR SERPL CALC: 30 NG/ML — HIGH (ref 0–3.8)
CK SERPL-CCNC: 91 U/L — SIGNIFICANT CHANGE UP (ref 25–170)
CO2 BLDV-SCNC: 20 MMOL/L — LOW (ref 22–30)
CO2 SERPL-SCNC: 15 MMOL/L — LOW (ref 22–31)
CO2 SERPL-SCNC: 16 MMOL/L — LOW (ref 22–31)
CO2 SERPL-SCNC: 16 MMOL/L — LOW (ref 22–31)
CREAT SERPL-MCNC: 0.67 MG/DL — SIGNIFICANT CHANGE UP (ref 0.5–1.3)
CREAT SERPL-MCNC: 0.69 MG/DL — SIGNIFICANT CHANGE UP (ref 0.5–1.3)
CREAT SERPL-MCNC: 0.73 MG/DL — SIGNIFICANT CHANGE UP (ref 0.5–1.3)
GAS PNL BLDV: 130 MMOL/L — LOW (ref 135–145)
GAS PNL BLDV: SIGNIFICANT CHANGE UP
GLUCOSE BLDV-MCNC: 130 MG/DL — HIGH (ref 70–99)
GLUCOSE SERPL-MCNC: 133 MG/DL — HIGH (ref 70–99)
GLUCOSE SERPL-MCNC: 144 MG/DL — HIGH (ref 70–99)
GLUCOSE SERPL-MCNC: 146 MG/DL — HIGH (ref 70–99)
HCO3 BLDV-SCNC: 19 MMOL/L — LOW (ref 21–29)
HCT VFR BLD CALC: 26.3 % — LOW (ref 34.5–45)
HCT VFR BLD CALC: 27 % — LOW (ref 34.5–45)
HCT VFR BLD CALC: 29.1 % — LOW (ref 34.5–45)
HCT VFR BLDA CALC: 27 % — LOW (ref 39–50)
HGB BLD CALC-MCNC: 8.7 G/DL — LOW (ref 11.5–15.5)
HGB BLD-MCNC: 8.1 G/DL — LOW (ref 11.5–15.5)
HGB BLD-MCNC: 8.3 G/DL — LOW (ref 11.5–15.5)
HGB BLD-MCNC: 8.7 G/DL — LOW (ref 11.5–15.5)
HOROWITZ INDEX BLDV+IHG-RTO: 21 — SIGNIFICANT CHANGE UP
LACTATE BLDV-MCNC: 2.4 MMOL/L — HIGH (ref 0.7–2)
MAGNESIUM SERPL-MCNC: 2 MG/DL — SIGNIFICANT CHANGE UP (ref 1.6–2.6)
MAGNESIUM SERPL-MCNC: 2 MG/DL — SIGNIFICANT CHANGE UP (ref 1.6–2.6)
MAGNESIUM SERPL-MCNC: 2.1 MG/DL — SIGNIFICANT CHANGE UP (ref 1.6–2.6)
MCHC RBC-ENTMCNC: 27.8 PG — SIGNIFICANT CHANGE UP (ref 27–34)
MCHC RBC-ENTMCNC: 28 PG — SIGNIFICANT CHANGE UP (ref 27–34)
MCHC RBC-ENTMCNC: 28.3 PG — SIGNIFICANT CHANGE UP (ref 27–34)
MCHC RBC-ENTMCNC: 29.9 GM/DL — LOW (ref 32–36)
MCHC RBC-ENTMCNC: 30.7 GM/DL — LOW (ref 32–36)
MCHC RBC-ENTMCNC: 30.8 GM/DL — LOW (ref 32–36)
MCV RBC AUTO: 91.2 FL — SIGNIFICANT CHANGE UP (ref 80–100)
MCV RBC AUTO: 92 FL — SIGNIFICANT CHANGE UP (ref 80–100)
MCV RBC AUTO: 93 FL — SIGNIFICANT CHANGE UP (ref 80–100)
NRBC # BLD: 0 /100 WBCS — SIGNIFICANT CHANGE UP (ref 0–0)
OTHER CELLS CSF MANUAL: 8 ML/DL — LOW (ref 18–22)
PCO2 BLDV: 42 MMHG — SIGNIFICANT CHANGE UP (ref 35–50)
PH BLDV: 7.27 — LOW (ref 7.35–7.45)
PHOSPHATE SERPL-MCNC: 2.4 MG/DL — LOW (ref 2.5–4.5)
PHOSPHATE SERPL-MCNC: 2.5 MG/DL — SIGNIFICANT CHANGE UP (ref 2.5–4.5)
PHOSPHATE SERPL-MCNC: 3.1 MG/DL — SIGNIFICANT CHANGE UP (ref 2.5–4.5)
PLATELET # BLD AUTO: 288 K/UL — SIGNIFICANT CHANGE UP (ref 150–400)
PLATELET # BLD AUTO: 290 K/UL — SIGNIFICANT CHANGE UP (ref 150–400)
PLATELET # BLD AUTO: 336 K/UL — SIGNIFICANT CHANGE UP (ref 150–400)
PO2 BLDV: 39 MMHG — SIGNIFICANT CHANGE UP (ref 25–45)
POTASSIUM BLDV-SCNC: 3.6 MMOL/L — SIGNIFICANT CHANGE UP (ref 3.5–5.3)
POTASSIUM SERPL-MCNC: 3.7 MMOL/L — SIGNIFICANT CHANGE UP (ref 3.5–5.3)
POTASSIUM SERPL-MCNC: 4 MMOL/L — SIGNIFICANT CHANGE UP (ref 3.5–5.3)
POTASSIUM SERPL-MCNC: 4 MMOL/L — SIGNIFICANT CHANGE UP (ref 3.5–5.3)
POTASSIUM SERPL-SCNC: 3.7 MMOL/L — SIGNIFICANT CHANGE UP (ref 3.5–5.3)
POTASSIUM SERPL-SCNC: 4 MMOL/L — SIGNIFICANT CHANGE UP (ref 3.5–5.3)
POTASSIUM SERPL-SCNC: 4 MMOL/L — SIGNIFICANT CHANGE UP (ref 3.5–5.3)
RBC # BLD: 2.86 M/UL — LOW (ref 3.8–5.2)
RBC # BLD: 2.96 M/UL — LOW (ref 3.8–5.2)
RBC # BLD: 3.13 M/UL — LOW (ref 3.8–5.2)
RBC # FLD: 16.7 % — HIGH (ref 10.3–14.5)
RBC # FLD: 16.9 % — HIGH (ref 10.3–14.5)
RBC # FLD: 16.9 % — HIGH (ref 10.3–14.5)
SAO2 % BLDV: 64 % — LOW (ref 67–88)
SODIUM SERPL-SCNC: 127 MMOL/L — LOW (ref 135–145)
SODIUM SERPL-SCNC: 129 MMOL/L — LOW (ref 135–145)
SODIUM SERPL-SCNC: 129 MMOL/L — LOW (ref 135–145)
TROPONIN T, HIGH SENSITIVITY RESULT: 656 NG/L — HIGH (ref 0–51)
WBC # BLD: 15.77 K/UL — HIGH (ref 3.8–10.5)
WBC # BLD: 16.53 K/UL — HIGH (ref 3.8–10.5)
WBC # BLD: 18.39 K/UL — HIGH (ref 3.8–10.5)
WBC # FLD AUTO: 15.77 K/UL — HIGH (ref 3.8–10.5)
WBC # FLD AUTO: 16.53 K/UL — HIGH (ref 3.8–10.5)
WBC # FLD AUTO: 18.39 K/UL — HIGH (ref 3.8–10.5)

## 2020-01-12 PROCEDURE — 71045 X-RAY EXAM CHEST 1 VIEW: CPT | Mod: 26

## 2020-01-12 PROCEDURE — 99233 SBSQ HOSP IP/OBS HIGH 50: CPT

## 2020-01-12 RX ORDER — HYDROMORPHONE HYDROCHLORIDE 2 MG/ML
0.5 INJECTION INTRAMUSCULAR; INTRAVENOUS; SUBCUTANEOUS ONCE
Refills: 0 | Status: DISCONTINUED | OUTPATIENT
Start: 2020-01-12 | End: 2020-01-12

## 2020-01-12 RX ORDER — POTASSIUM CHLORIDE 20 MEQ
10 PACKET (EA) ORAL
Refills: 0 | Status: DISCONTINUED | OUTPATIENT
Start: 2020-01-12 | End: 2020-01-12

## 2020-01-12 RX ORDER — SODIUM CHLORIDE 9 MG/ML
1000 INJECTION INTRAMUSCULAR; INTRAVENOUS; SUBCUTANEOUS
Refills: 0 | Status: DISCONTINUED | OUTPATIENT
Start: 2020-01-12 | End: 2020-01-16

## 2020-01-12 RX ORDER — DIPHENHYDRAMINE HCL 50 MG
25 CAPSULE ORAL ONCE
Refills: 0 | Status: COMPLETED | OUTPATIENT
Start: 2020-01-12 | End: 2020-01-12

## 2020-01-12 RX ORDER — METHADONE HYDROCHLORIDE 40 MG/1
17.5 TABLET ORAL DAILY
Refills: 0 | Status: DISCONTINUED | OUTPATIENT
Start: 2020-01-12 | End: 2020-01-15

## 2020-01-12 RX ORDER — POTASSIUM CHLORIDE 20 MEQ
20 PACKET (EA) ORAL ONCE
Refills: 0 | Status: COMPLETED | OUTPATIENT
Start: 2020-01-12 | End: 2020-01-12

## 2020-01-12 RX ORDER — HYDROMORPHONE HYDROCHLORIDE 2 MG/ML
1 INJECTION INTRAMUSCULAR; INTRAVENOUS; SUBCUTANEOUS ONCE
Refills: 0 | Status: DISCONTINUED | OUTPATIENT
Start: 2020-01-12 | End: 2020-01-12

## 2020-01-12 RX ADMIN — SODIUM CHLORIDE 125 MILLILITER(S): 9 INJECTION INTRAMUSCULAR; INTRAVENOUS; SUBCUTANEOUS at 12:23

## 2020-01-12 RX ADMIN — Medication 100 MILLIEQUIVALENT(S): at 12:22

## 2020-01-12 RX ADMIN — NYSTATIN CREAM 1 APPLICATION(S): 100000 CREAM TOPICAL at 18:00

## 2020-01-12 RX ADMIN — MIDODRINE HYDROCHLORIDE 10 MILLIGRAM(S): 2.5 TABLET ORAL at 05:06

## 2020-01-12 RX ADMIN — Medication 975 MILLIGRAM(S): at 16:00

## 2020-01-12 RX ADMIN — Medication 1 DROP(S): at 18:24

## 2020-01-12 RX ADMIN — HYDROMORPHONE HYDROCHLORIDE 0.5 MILLIGRAM(S): 2 INJECTION INTRAMUSCULAR; INTRAVENOUS; SUBCUTANEOUS at 16:45

## 2020-01-12 RX ADMIN — PANTOPRAZOLE SODIUM 40 MILLIGRAM(S): 20 TABLET, DELAYED RELEASE ORAL at 05:06

## 2020-01-12 RX ADMIN — Medication 975 MILLIGRAM(S): at 16:30

## 2020-01-12 RX ADMIN — Medication 250 MILLIMOLE(S): at 18:27

## 2020-01-12 RX ADMIN — POLYETHYLENE GLYCOL 3350 17 GRAM(S): 17 POWDER, FOR SOLUTION ORAL at 12:21

## 2020-01-12 RX ADMIN — Medication 1000 MILLILITER(S): at 00:23

## 2020-01-12 RX ADMIN — Medication 83.33 MILLIMOLE(S): at 00:14

## 2020-01-12 RX ADMIN — METHADONE HYDROCHLORIDE 17.5 MILLIGRAM(S): 40 TABLET ORAL at 08:20

## 2020-01-12 RX ADMIN — Medication 0.5 MILLIGRAM(S): at 17:53

## 2020-01-12 RX ADMIN — ATORVASTATIN CALCIUM 40 MILLIGRAM(S): 80 TABLET, FILM COATED ORAL at 20:34

## 2020-01-12 RX ADMIN — Medication 250 MILLIGRAM(S): at 05:06

## 2020-01-12 RX ADMIN — MEROPENEM 100 MILLIGRAM(S): 1 INJECTION INTRAVENOUS at 15:37

## 2020-01-12 RX ADMIN — MIDODRINE HYDROCHLORIDE 10 MILLIGRAM(S): 2.5 TABLET ORAL at 15:37

## 2020-01-12 RX ADMIN — Medication 500000 UNIT(S): at 18:24

## 2020-01-12 RX ADMIN — SODIUM CHLORIDE 150 MILLILITER(S): 9 INJECTION INTRAMUSCULAR; INTRAVENOUS; SUBCUTANEOUS at 22:05

## 2020-01-12 RX ADMIN — NYSTATIN CREAM 1 APPLICATION(S): 100000 CREAM TOPICAL at 05:06

## 2020-01-12 RX ADMIN — Medication 975 MILLIGRAM(S): at 01:05

## 2020-01-12 RX ADMIN — Medication 25 MILLIGRAM(S): at 09:24

## 2020-01-12 RX ADMIN — VASOPRESSIN 1.8 UNIT(S)/MIN: 20 INJECTION INTRAVENOUS at 08:14

## 2020-01-12 RX ADMIN — CHLORHEXIDINE GLUCONATE 1 APPLICATION(S): 213 SOLUTION TOPICAL at 05:12

## 2020-01-12 RX ADMIN — MIDODRINE HYDROCHLORIDE 10 MILLIGRAM(S): 2.5 TABLET ORAL at 09:24

## 2020-01-12 RX ADMIN — HYDROMORPHONE HYDROCHLORIDE 0.5 MILLIGRAM(S): 2 INJECTION INTRAMUSCULAR; INTRAVENOUS; SUBCUTANEOUS at 16:15

## 2020-01-12 RX ADMIN — SODIUM CHLORIDE 150 MILLILITER(S): 9 INJECTION INTRAMUSCULAR; INTRAVENOUS; SUBCUTANEOUS at 08:14

## 2020-01-12 RX ADMIN — Medication 500000 UNIT(S): at 05:06

## 2020-01-12 RX ADMIN — Medication 975 MILLIGRAM(S): at 00:41

## 2020-01-12 RX ADMIN — Medication 0.5 MILLIGRAM(S): at 06:12

## 2020-01-12 RX ADMIN — HYDROMORPHONE HYDROCHLORIDE 0.5 MILLIGRAM(S): 2 INJECTION INTRAMUSCULAR; INTRAVENOUS; SUBCUTANEOUS at 16:30

## 2020-01-12 RX ADMIN — HYDROMORPHONE HYDROCHLORIDE 1 MILLIGRAM(S): 2 INJECTION INTRAMUSCULAR; INTRAVENOUS; SUBCUTANEOUS at 01:18

## 2020-01-12 RX ADMIN — POLYETHYLENE GLYCOL 3350 17 GRAM(S): 17 POWDER, FOR SOLUTION ORAL at 05:42

## 2020-01-12 RX ADMIN — HYDROMORPHONE HYDROCHLORIDE 1 MILLIGRAM(S): 2 INJECTION INTRAMUSCULAR; INTRAVENOUS; SUBCUTANEOUS at 00:48

## 2020-01-12 RX ADMIN — Medication 250 MILLIGRAM(S): at 09:24

## 2020-01-12 RX ADMIN — Medication 1 DROP(S): at 06:02

## 2020-01-12 RX ADMIN — MEROPENEM 100 MILLIGRAM(S): 1 INJECTION INTRAVENOUS at 22:04

## 2020-01-12 RX ADMIN — ENOXAPARIN SODIUM 100 MILLIGRAM(S): 100 INJECTION SUBCUTANEOUS at 12:21

## 2020-01-12 RX ADMIN — MEROPENEM 100 MILLIGRAM(S): 1 INJECTION INTRAVENOUS at 05:03

## 2020-01-12 RX ADMIN — Medication 250 MILLIGRAM(S): at 18:23

## 2020-01-12 RX ADMIN — Medication 250 MILLIGRAM(S): at 18:25

## 2020-01-12 RX ADMIN — Medication 1000 MILLILITER(S): at 01:20

## 2020-01-12 RX ADMIN — Medication 100 MILLIEQUIVALENT(S): at 06:50

## 2020-01-12 RX ADMIN — Medication 3 MILLILITER(S): at 17:53

## 2020-01-12 RX ADMIN — MIDODRINE HYDROCHLORIDE 10 MILLIGRAM(S): 2.5 TABLET ORAL at 20:34

## 2020-01-12 RX ADMIN — Medication 3 MILLILITER(S): at 09:47

## 2020-01-12 RX ADMIN — Medication 2 MILLIGRAM(S): at 05:06

## 2020-01-12 RX ADMIN — Medication 81 MILLIGRAM(S): at 20:34

## 2020-01-12 RX ADMIN — Medication 7.89 MICROGRAM(S)/KG/MIN: at 08:14

## 2020-01-12 RX ADMIN — Medication 3 MILLILITER(S): at 06:12

## 2020-01-12 RX ADMIN — Medication 3 MILLILITER(S): at 01:01

## 2020-01-12 RX ADMIN — SODIUM CHLORIDE 125 MILLILITER(S): 9 INJECTION INTRAMUSCULAR; INTRAVENOUS; SUBCUTANEOUS at 09:23

## 2020-01-12 NOTE — PROGRESS NOTE ADULT - SUBJECTIVE AND OBJECTIVE BOX
HISTORY  64y Female pmhx morbid obesity, acid reflux, HTN, COPD and PSH D&C presented to the ED on 11/26 c/o abdominal pain and bloating. Patient reports having constipation for 2 weeks and has been taking enemas, miralax and senna and passing small hard balls for the last weeks. Reported pain worst in the LLQ that started a few days ago and has been worsening in the last day, also has noted significant abdominal distention in the last day. Reported she had difficulty walking short distances because she becomes SOB. In ED, CT demonstrated perforated sigmoid diverticulitis with intraperitoneal free air. She was taken to the operating room on 11/25 overnight and underwent an exploratory laparotomy with extended left hemicolectomy and was left in discontinuity. An Abthera VAC was placed. She was kept intubated post op and brought to the ICU for hemodynamic and respiratory management. She went back to the OR on 11/29 for transverse end colostomy and fascial closure with wound vac.  Pt transferred out of SICU and recovering on floor with post-op course including induration of wound and colostomy necrosis requiring bedside debridement and wound vac placement.  Pt had CT on 1/6 for leukocytosis and to evaluate wound, which showed loculated fluid collections in the left anterior abdomen, very near loops of bowel.  After CT, pt reported feeling "worse" and was hypotensive overnight to SBP 70s requiring 1L fluid bolus.  Pt was bolused again this am for hypotension and blood cultures were sent for worsening leukocytosis, and BULL. SICU consulted for closer monitoring.  UA positive and patient was started on Ceftriaxone for presumed UTI.         24 HOUR EVENTS:  - Vasopressin restarted   - Levophed requirements downtrending   - Troponins positive to 600s. Echo performed showing new wall motion abnormalities. Cardiologist Dr. Lee aware; low concern for MI at this time.   - 500ml of 5% albumin administered during the evening     SUBJECTIVE/ROS:  [ ] A ten-point review of systems was otherwise negative except as noted.  [ ] Due to altered mental status/intubation, subjective information were not able to be obtained from the patient. History was obtained, to the extent possible, from review of the chart and collateral sources of information.      NEURO  Exam: awake, alert, oriented  Meds: acetaminophen   Tablet .. 975 milliGRAM(s) Oral every 6 hours PRN Mild Pain (1 - 3)  methadone    Tablet 17.5 milliGRAM(s) Oral daily    [x] Adequacy of sedation and pain control has been assessed and adjusted      RESPIRATORY  RR: 17 (01-12-20 @ 02:00) (12 - 32)  SpO2: 94% (01-12-20 @ 02:00) (93% - 100%)  Exam: unlabored, clear to auscultation bilaterally  Mechanical Ventilation: none  [N/A] Extubation Readiness Assessed  Meds: albuterol/ipratropium for Nebulization 3 milliLiter(s) Nebulizer every 4 hours  buDESOnide    Inhalation Suspension 0.5 milliGRAM(s) Inhalation every 12 hours        CARDIOVASCULAR  HR: 78 (01-12-20 @ 02:00) (70 - 107)  BP: 102/49 (01-12-20 @ 02:00) (79/49 - 154/60)  BP(mean): 70 (01-12-20 @ 02:00) (59 - 95)  VBG - ( 12 Jan 2020 00:23 )  pH: 7.28  /  pCO2: 39    /  pO2: 36    / HCO3: 18    / Base Excess: -8.0  /  SaO2: 61     Lactate: 3.2    Exam: regular rate and rhythm  Cardiac Rhythm: sinus  Perfusion     [x]Adequate   [ ]Inadequate  Mentation   [x]Normal       [ ]Reduced  Extremities  [x]Warm         [ ]Cool  Volume Status [ ]Hypervolemic [x]Euvolemic [ ]Hypovolemic  Meds: aMIOdarone Infusion 0.5 mG/Min IV Continuous <Continuous>  doxazosin 2 milliGRAM(s) Oral <User Schedule>  midodrine 10 milliGRAM(s) Oral <User Schedule>  norepinephrine Infusion 0.05 MICROgram(s)/kG/Min IV Continuous <Continuous>        GI/NUTRITION  Exam: soft, nontender, nondistended  Diet: Regular diet   Meds: aluminum hydroxide/magnesium hydroxide/simethicone Suspension 30 milliLiter(s) Oral every 4 hours PRN Dyspepsia  pantoprazole    Tablet 40 milliGRAM(s) Oral <User Schedule>  polyethylene glycol 3350 17 Gram(s) Oral <User Schedule>      GENITOURINARY  I&O's Detail    01-10 @ 07:01  -  01-11 @ 07:00  --------------------------------------------------------  IN:    amiodarone Infusion: 266.3 mL    norepinephrine Infusion: 547.4 mL    phenylephrine   Infusion: 221 mL    sodium chloride 0.9%.: 3350 mL    Solution: 1184 mL    Solution: 150 mL    Solution: 600 mL  Total IN: 6318.7 mL    OUT:    Drain: 30 mL    VAC (Vacuum Assisted Closure) System: 125 mL    Voided: 1400 mL  Total OUT: 1555 mL    Total NET: 4763.7 mL      01-11 @ 07:01  -  01-12 @ 03:00  --------------------------------------------------------  IN:    Albumin 5%  - 500 mL: 500 mL    amiodarone Infusion: 265.6 mL    norepinephrine Infusion: 763.1 mL    Oral Fluid: 1150 mL    sodium chloride 0.9%.: 2700 mL    Solution: 50 mL    Solution: 850 mL    vasopressin Infusion: 30.6 mL  Total IN: 6309.3 mL    OUT:    Drain: 40 mL    Voided: 400 mL  Total OUT: 440 mL    Total NET: 5869.3 mL          01-12    127<L>  |  99  |  10  ----------------------------<  146<H>  4.0   |  16<L>  |  0.73    Ca    8.1<L>      12 Jan 2020 00:26  Phos  2.4     01-12  Mg     2.1     01-12      [ ] Ross catheter, indication: N/A  Meds: sodium chloride 0.9%. 1000 milliLiter(s) IV Continuous <Continuous>  sodium phosphate IVPB 15 milliMole(s) IV Intermittent every 1 hour        HEMATOLOGIC  Meds: aspirin  chewable 81 milliGRAM(s) Oral <User Schedule>  enoxaparin Injectable 100 milliGRAM(s) SubCutaneous <User Schedule>    [x] VTE Prophylaxis                        8.7    18.39 )-----------( 336      ( 12 Jan 2020 00:26 )             29.1       Transfusion     [ ] PRBC   [ ] Platelets   [ ] FFP   [ ] Cryoprecipitate      INFECTIOUS DISEASES  WBC Count: 18.39 K/uL (01-12 @ 00:26)  WBC Count: 22.02 K/uL (01-11 @ 20:41)  WBC Count: 24.35 K/uL (01-11 @ 16:11)  WBC Count: 27.32 K/uL (01-11 @ 11:10)  WBC Count: 33.45 K/uL (01-11 @ 05:12)    RECENT CULTURES:  Specimen Source: .Sputum Sputum  Date/Time: 01-11 @ 16:36  Culture Results: --  Gram Stain:   Few polymorphonuclear leukocytes per low power field  Few Squamous epithelial cells per low power field  Moderate Gram Negative Rods per oil power field  Organism: --  Specimen Source: .Blood Blood  Date/Time: 01-10 @ 16:40  Culture Results:   No growth to date.  Gram Stain: --  Organism: --  Specimen Source: .Blood Blood  Date/Time: 01-09 @ 15:16  Culture Results:   Growth in anaerobic bottle: Coag Negative Staphylococcus  Single set isolate, possible contaminant. Contact  Microbiology if susceptibility testing clinically  indicated.  Gram Stain:   Growth in anaerobic bottle: Gram Positive Cocci in Clusters  Organism: --  Specimen Source: .Blood Blood  Date/Time: 01-08 @ 12:35  Culture Results:   Growth in aerobic and anaerobic bottles: Staphylococcus epidermidis  Gram Stain:   Growth in aerobic bottle: Gram Positive Cocci in Clusters  Growth in anaerobic bottle: Gram Positive Cocci in Clusters  Organism: Staphylococcus epidermidis  Specimen Source: .Blood Blood-Peripheral  Date/Time: 01-07 @ 16:31  Culture Results:   Growth in aerobic bottle: Staphylococcus epidermidis  Gram Stain:   Growth in aerobic bottle:  Gram Positive Cocci in Clusters  Organism: --  Specimen Source: .Urine Clean Catch (Midstream)  Date/Time: 01-07 @ 15:18  Culture Results:   >100,000 CFU/ml Klebsiella pneumoniae  >100,000 CFU/ml Escherichia coli  Gram Stain: --  Organism: Klebsiella pneumoniae  Escherichia coli  Specimen Source: .Blood Blood-Venous  Date/Time: 01-07 @ 08:36  Culture Results:   Growth in aerobic and anaerobic bottles: Coag Negative Staphylococcus  Coag Negative Staphylococcus  Single set isolate, possible contaminant. Contact  Microbiology if susceptibility testing clinically  indicated.  ***Blood Panel PCR results on this specimen are available  approximately 3 hours after the Gram stain result.***  Gram stain, PCR, and/or culture results may not always  correspond due to difference in methodologies.  ************************************************************  ThisPCR assay was performed using [employer].  The following targets are tested for: Enterococcus,  vancomycin resistant enterococci, Listeria monocytogenes,  coagulase negative staphylococci, S. aureus,  methicillin resistant S. aureus, Streptococcus agalactiae  (Group B), S. pneumoniae, S. pyogenes (Group A),  Acinetobacter baumannii, Enterobacter cloacae, E. coli,  Klebsiella oxytoca, K. pneumoniae, Proteus sp.,  Serratia marcescens, Haemophilus influenzae,  Neisseria meningitidis, Pseudomonas aeruginosa, Candida  albicans, C. glabrata, C krusei, C parapsilosis,  C. tropicalis and the KPC resistance gene.  Gram Stain:   Growth in aerobic bottle: Gram Positive Cocci in Clusters  Growth in anaerobic bottle: Gram Positive Cocci in Clusters  Organism: Blood Culture PCR    Meds: erythromycin     base Tablet 250 milliGRAM(s) Oral <User Schedule>  meropenem  IVPB 1000 milliGRAM(s) IV Intermittent every 8 hours  nystatin    Suspension 951433 Unit(s) Oral two times a day  vancomycin  IVPB 1250 milliGRAM(s) IV Intermittent every 12 hours        ENDOCRINE  CAPILLARY BLOOD GLUCOSE        Meds: atorvastatin 40 milliGRAM(s) Oral <User Schedule>  vasopressin Infusion 0.03 Unit(s)/Min IV Continuous <Continuous>        ACCESS DEVICES:  [x] Peripheral IV  [ ] Central Venous Line	[ ] R	[ ] L	[ ] IJ	[ ] Fem	[ ] SC	Placed:   [ ] Arterial Line		[ ] R	[ ] L	[ ] Fem	[ ] Rad	[ ] Ax	Placed:   [ ] PICC:					[ ] Mediport  [ ] Urinary Catheter, Date Placed:   [x] Necessity of urinary, arterial, and venous catheters discussed    OTHER MEDICATIONS:  artificial  tears Solution 1 Drop(s) Both EYES two times a day  chlorhexidine 2% Cloths 1 Application(s) Topical <User Schedule>  nystatin Powder 1 Application(s) Topical two times a day      CODE STATUS: full code       IMAGING: < from: CT Abdomen and Pelvis w/ Oral Cont and w/ IV Cont (01.06.20 @ 10:47) >  FINDINGS:    LOWER CHEST: Subsegmental atelectasis of the left lower lobe. Trace left pleural effusion.    LIVER: Calcified granuloma in the right liver. Probable hepatic steatosis.  BILE DUCTS: Normal caliber.  GALLBLADDER: Within normal limits.   SPLEEN: Within normal limits.  PANCREAS: Within normal limits.  ADRENALS: Within normal limits.  KIDNEYS/URETERS: Subcentimeter hypodense foci in the right lower pole are too small to characterize.    BLADDER: Within normal limits.  REPRODUCTIVE ORGANS: Uterus and adnexa within normal limits.    BOWEL: Left hemicolectomy with Sesay's pouch and right lower quadrant colostomy. Interval removal of a left lower quadrant abdominal drain. No bowel obstruction.   PERITONEUM: Small loculated interloop fluid between small bowel loops in the mesentery.   VESSELS: Atherosclerotic changes.  RETROPERITONEUM/LYMPH NODES: No lymphadenopathy.    ABDOMINAL WALL: Postsurgical changes with open midline anterior abdominal wall.A 7.6 x 3.3 x 5.7 cm (AP x TR x CC) ill-defined multiloculated fluid collection in the left anterior abdomen (3, 74), at least partially intraperitoneal, as it is inseparable from small bowel loops. An additional small 2.7 x 2.3 x 3.1 cm (AP x TR x CC) fluid collection in the midline anterior abdominal wall. Anasarca.  BONES: Degenerative changes.    IMPRESSION:   Open anterior abdominal wall wound.    Loculated fluid collections in the left anterior abdomen, inseparable from small bowel loops, as well as a small abdominal wall collection.       < end of copied text >

## 2020-01-12 NOTE — PROGRESS NOTE ADULT - SUBJECTIVE AND OBJECTIVE BOX
[facility] PROGRESS NOTE    24 HOUR EVENTS:  - Vasopressin restarted   - Levophed requirements downtrending   - Troponins positive to 600s. Echo performed showing new wall motion abnormalities. Cardiologist Dr. Lee aware; low concern for MI at this time.   - 500ml of 5% albumin administered during the evening      SUBJECTIVE:   WBC downtrending (16.5<--18.39<----- 33.45 (11/11 5am))  Patient states she is not feeling well this morning. Feels tired/weak.   Denies pain  Denies SOB  Denies N/V  but has little appetite.     OBJECTIVE  Physical Exam:   Gen: NAD. Alert and cooperative.   Resp: No addition work of breathing. Continued cough with phlegm.   Card: No longer tachycardic,    Abd: Soft, ND, NT. No rebound or guarding. Vac in place with suction.   Ext: WWP. Significant lymphedema of lower extremities.     T(C): 36 (01-12-20 @ 03:00), Max: 37.5 (01-11-20 @ 07:00)  HR: 74 (01-12-20 @ 06:30) (70 - 107)  BP: 99/53 (01-12-20 @ 06:30) (85/50 - 154/60)  RR: 18 (01-12-20 @ 06:30) (12 - 32)  SpO2: 100% (01-12-20 @ 06:30) (93% - 100%)  Wt(kg): --    LABS:                        8.1    16.53 )-----------( 290      ( 12 Jan 2020 05:54 )             26.3     01-12    129<L>  |  100  |  10  ----------------------------<  144<H>  3.7   |  16<L>  |  0.69    Ca    8.1<L>      12 Jan 2020 05:54  Phos  3.1     01-12  Mg     2.0     01-12              CAPILLARY BLOOD GLUCOSE          Is&O's    01-10-20 @ 07:01  -  01-11-20 @ 07:00  --------------------------------------------------------  IN: 6318.7 mL / OUT: 1555 mL / NET: 4763.7 mL    01-11-20 @ 07:01  -  01-12-20 @ 06:46  --------------------------------------------------------  IN: 7464.3 mL / OUT: 670 mL / NET: 6794.3 mL

## 2020-01-12 NOTE — PROGRESS NOTE ADULT - ASSESSMENT
64F with pmhx morbid obesity, acid reflux, HTN, COPD and PSH D&C presented to the ED on 11/26 c/o abdominal pain and bloating. Reported pain worst in the LLQ that started a few days ago and has been worsening in the last day, also has noted significant abdominal distention in the last day.  In ED, CT demonstrated perforated sigmoid diverticulitis with intraperitoneal free air. She was taken to the operating room on 11/25 overnight and underwent an exploratory laparotomy with extended left hemicolectomy and was left in discontinuity. An Abthera VAC was placed. She was kept intubated post op and brought to the ICU for hemodynamic and respiratory management. She went back to the OR on 11/29 for transverse end colostomy and fascial closure with wound vac.  Pt transferred out of SICU and recovering on floor with post-op course including induration of wound and colostomy necrosis requiring bedside debridement and wound vac placement.  Pt had CT on 1/6 for leukocytosis and to evaluate wound, which showed loculated fluid collections in the left anterior abdomen, very near loops of bowel.  After CT, pt reported feeling "worse" and was hypotensive overnight to SBP 70s requiring 1L fluid bolus.  Pt was bolused again this for hypotension and blood cultures were sent for worsening leukocytosis, and BULL.  SICU consulted for closer monitoring.  UA positive and patient was started on Ceftriaxone for presumed UTI.     Neuro: pain control, h/o chronic pain requiring methadone   - Tylenol prn  - Continue home dose Methadone     Resp: h/o COPD  - Albuterol prn  - Continue Symbicort    CV: Hypotension likely secondary to hypovolemia; septic shock ; h/o HTN; New wall motion abnormalities on echocardiogram   - Appreciate cardiology recommendations; troponins have plateau.   - Hemodynamic monitoring  - Trend lactate  - Hold home anti-hypertensives  - Continue statin   - Continue Levophed   - Continue vasopressin       GI: Perforated sigmoid diverticulitis s/p extended left hemicolectomy (11/25)  - Continue Regular diet  - Monitor wound vac output   - Protonix     /Renal: Hypovolemic hyponatremia, BULL  - Hyponatremia improving  - Trend BUN/Cr   - Replete electrolytes as needed  - Monitor UOP     ID: UTI, leukocytosis   - Continue meropenem and vancomycin   - f/u UCx and blood cultures     Heme: VTE prophylaxis  - Continue lovenox  - ASA    Endo:   - Monitor serum glucose     Dispo: SICU care. Full code.      - Jey Garcia PA-C

## 2020-01-12 NOTE — PROGRESS NOTE ADULT - SUBJECTIVE AND OBJECTIVE BOX
CARDIOLOGY     PROGRESS  NOTE   ________________________________________________    CHIEF COMPLAINT:Patient is a 64y old  Female who presents with a chief complaint of perforated bowel (08 Dec 2019 09:22)  no complain.  	  REVIEW OF SYSTEMS:  CONSTITUTIONAL: No fever, weight loss, or fatigue  EYES: No eye pain, visual disturbances, or discharge  ENT:  No difficulty hearing, tinnitus, vertigo; No sinus or throat pain  NECK: No pain or stiffness  RESPIRATORY: No cough, wheezing, chills or hemoptysis; No Shortness of Breath  CARDIOVASCULAR: No chest pain, palpitations, passing out, dizziness, or leg swelling  GASTROINTESTINAL: No abdominal or epigastric pain. No nausea, vomiting, or hematemesis; No diarrhea or constipation. No melena or hematochezia.  GENITOURINARY: No dysuria, frequency, hematuria, or incontinence  NEUROLOGICAL: No headaches, memory loss, loss of strength, numbness, or tremors  SKIN: No itching, burning, rashes, or lesions   LYMPH Nodes: No enlarged glands  ENDOCRINE: No heat or cold intolerance; No hair loss  MUSCULOSKELETAL: No joint pain or swelling; No muscle, back, or extremity pain  PSYCHIATRIC: No depression, anxiety, mood swings, or difficulty sleeping  HEME/LYMPH: No easy bruising, or bleeding gums  ALLERGY AND IMMUNOLOGIC: No hives or eczema	    [ ] All others negative	  [ ] Unable to obtain    PHYSICAL EXAM:  T(C): 36.6 (01-12-20 @ 07:00), Max: 36.7 (01-11-20 @ 15:00)  HR: 80 (01-12-20 @ 09:47) (70 - 107)  BP: 101/59 (01-12-20 @ 09:15) (85/50 - 154/60)  RR: 31 (01-12-20 @ 09:15) (14 - 32)  SpO2: 96% (01-12-20 @ 09:47) (93% - 100%)  Wt(kg): --  I&O's Summary    11 Jan 2020 07:01  -  12 Jan 2020 07:00  --------------------------------------------------------  IN: 7738.2 mL / OUT: 670 mL / NET: 7068.2 mL        Appearance: Normal	  HEENT:   Normal oral mucosa, PERRL, EOMI	  Lymphatic: No lymphadenopathy  Cardiovascular: Normal S1 S2, No JVD, + murmurs, + lymh edema  Respiratory: Lungs clear to auscultation	  Psychiatry: A & O x 3, Mood & affect appropriate  Gastrointestinal:  Soft, Non-tender, + BS	  Skin: No rashes, No ecchymoses, No cyanosis	  Neurologic: Non-focal  Extremities: Normal range of motion, No clubbing, cyanosis .  Vascular: Peripheral pulses palpable 2+ bilaterally    MEDICATIONS  (STANDING):  albuterol/ipratropium for Nebulization 3 milliLiter(s) Nebulizer every 4 hours  artificial  tears Solution 1 Drop(s) Both EYES two times a day  aspirin  chewable 81 milliGRAM(s) Oral <User Schedule>  atorvastatin 40 milliGRAM(s) Oral <User Schedule>  buDESOnide    Inhalation Suspension 0.5 milliGRAM(s) Inhalation every 12 hours  chlorhexidine 2% Cloths 1 Application(s) Topical <User Schedule>  doxazosin 2 milliGRAM(s) Oral <User Schedule>  enoxaparin Injectable 100 milliGRAM(s) SubCutaneous <User Schedule>  erythromycin     base Tablet 250 milliGRAM(s) Oral <User Schedule>  meropenem  IVPB 1000 milliGRAM(s) IV Intermittent every 8 hours  midodrine 10 milliGRAM(s) Oral <User Schedule>  norepinephrine Infusion 0.05 MICROgram(s)/kG/Min (7.889 mL/Hr) IV Continuous <Continuous>  nystatin    Suspension 907317 Unit(s) Oral two times a day  nystatin Powder 1 Application(s) Topical two times a day  pantoprazole    Tablet 40 milliGRAM(s) Oral <User Schedule>  polyethylene glycol 3350 17 Gram(s) Oral <User Schedule>  potassium chloride  20 mEq/100 mL IVPB 20 milliEquivalent(s) IV Intermittent once  sodium chloride 0.9%. 1000 milliLiter(s) (125 mL/Hr) IV Continuous <Continuous>  vancomycin  IVPB 1250 milliGRAM(s) IV Intermittent every 12 hours  vasopressin Infusion 0.03 Unit(s)/Min (1.8 mL/Hr) IV Continuous <Continuous>      TELEMETRY: 	    ECG:  	  RADIOLOGY:  OTHER: 	  	  LABS:	 	    CARDIAC MARKERS:  CARDIAC MARKERS ( 12 Jan 2020 00:26 )  x     / x     / 91 U/L / x     / 30.0 ng/mL  CARDIAC MARKERS ( 11 Jan 2020 20:41 )  x     / x     / 108 U/L / x     / 33.3 ng/mL  CARDIAC MARKERS ( 11 Jan 2020 16:11 )  x     / x     / 109 U/L / x     / 33.2 ng/mL  CARDIAC MARKERS ( 11 Jan 2020 11:10 )  x     / x     / 126 U/L / x     / 35.9 ng/mL  CARDIAC MARKERS ( 10 Gary 2020 23:38 )  x     / x     / 17 U/L / x     / 1.8 ng/mL                                8.1    16.53 )-----------( 290      ( 12 Jan 2020 05:54 )             26.3     01-12    129<L>  |  100  |  10  ----------------------------<  144<H>  3.7   |  16<L>  |  0.69    Ca    8.1<L>      12 Jan 2020 05:54  Phos  3.1     01-12  Mg     2.0     01-12      proBNP:   Lipid Profile: Cholesterol 98  LDL 52  HDL 28  TG 91    HgA1c:   TSH:   < from: TTE with Doppler (w/Cont) (01.11.20 @ 14:44) >  Technically very difficult study.  1. Mitral annular calcification, otherwise normal mitral  valve. Minimal mitral regurgitation.  2. Endocardium not well visualized; grossly mild to  moderate segmental left ventricular systolic dysfunction.  The inferior and inferolateral walls grossly appear  hypokinetic.  Endocardial visualization enhanced with  intravenous injection of Ultrasonic Enhancing Agent  (Definity).  3. Unable to accurately evaluate right ventricular size or  systolic function.    < end of copied text >      Culture - Blood (01.10.20 @ 16:40)    Specimen Source: .Blood Blood-Venous    Culture Results:   No growth to date.      Culture - Blood (01.08.20 @ 12:35)    Gram Stain:   Growth in aerobic bottle: Gram Positive Cocci in Clusters  Growth in anaerobic bottle:  Gram Positive Cocci in Clusters    -  Ampicillin/Sulbactam: R <=8/4    -  Cefazolin: R >16    -  Clindamycin: R >4    -  Erythromycin: R >4    -  Gentamicin: S <=1 Should not be used as monotherapy    -  Oxacillin: R >2    -  Penicillin: R >8    -  RIF- Rifampin: S <=1 Should not be used as monotherapy    -  Tetra/Doxy: S <=1    -  Trimethoprim/Sulfamethoxazole: R >2/38    -  Vancomycin: S 2    Specimen Source: .Blood Blood    Organism: Staphylococcus epidermidis    Culture Results:   Growth in aerobic and anaerobic bottles: Staphylococcus epidermidis    Organism Identification: Staphylococcus epidermidis    Method Type: ETHAN  64 F PMHx of HTN, COPD, and morbid obesity who was admitted on 11/26 for perforated sigmoid diverticulitis.  No fever, has leukocytosis  Prolonged hospital stay  Perforated diverticulum, culture with E coli, s/p OR into washout and ostomy  Had PICC line placed during hospital stay  BCX now positive CoNS, on repeat still present  PICC has been DCed  CT with multiloculated collections  CXR clear  Overall,  1) CoNS bacteremia--? CLABSI  - Vancomycin 1g q 12 (monitor levels)  - Repeat BCXs to clearance  - PICC has been DCed  2) Loculated fluid collections  - Infected collections?  - Meropenem 1g q 8  - Consideration for IR aspiration  3) Leukocytosis  - Trend to normal, monitor for further signs infection    Assessment and plan  ---------------------------  increase trop and wall motion abnormality most probably is sec to sepsis.  continue iv abx  agree with infectious disease may consider other sources  agree to dc amio  try to decreae vaso as tolerated  dvt prophylaxis  will consider DONNA if pt bc remains POS

## 2020-01-12 NOTE — PROGRESS NOTE ADULT - ATTENDING COMMENTS
Awake and alert comfortable  Remains in septic shock although with decreasing Levophed requirement, on Vasopressin, resolving leucocytosis, afebrile, will do more surveillance culture  Ti decreasing, no cp, repeat ECHO new inferior and inferolateral wall hypokinesis, no cardiology intervention as per cardiology  Stable hyponatremia on IVF with NS  New cultures neg, multiple cultures + for staph epi, vanco trough therapeutic  Tolerating PO, no abd pain, stoma is functioning  COPD meds  Her partner kept updated

## 2020-01-12 NOTE — PROGRESS NOTE ADULT - ASSESSMENT
64 yr old female with PMHx morbid obesity, GERD, HTN, COPD, and pshx D&C now s/p ex laparotomy with extended left hemicolectomy 11/26 for perforated and necrotic sigmoid colon with gross abdominal contamination. RTOR 11/29 for abd closure, RUQ end transverse colostomy creation. On 12/19 patient found to have induration of wound and keri-stomal fat necrosis, s/p bedside debridement w/ wound VAC in placement on midline wound. Began showing signs of sepsis and was transferred to SICU 1/7 for further management. Now w/ decreasing pressor support and downtrending leukocytosis.     PLAN:  - f/u pressor requirements, pt remains on levophed and vasopressin  - No episodes of tachycardia, pt remains on amio infusion (0,5mg/min)  - C/w IV abx: vanc, Miropenam    - f/u fungal cu  - trend lactate/WBC/ SBP  - Appreciate excellent SICU care    [facility]  [id number] 64 yr old female with PMHx morbid obesity, GERD, HTN, COPD, and pshx D&C now s/p ex laparotomy with extended left hemicolectomy 11/26 for perforated and necrotic sigmoid colon with gross abdominal contamination. RTOR 11/29 for abd closure, RUQ end transverse colostomy creation. On 12/19 patient found to have induration of wound and keri-stomal fat necrosis, s/p bedside debridement w/ wound VAC in placement on midline wound. Began showing signs of sepsis and was transferred to SICU 1/7 for further management. Now w/ decreasing pressor support and downtrending leukocytosis.     PLAN:  - f/u pressor requirements, pt remains on levophed and vasopressin  - No episodes of tachycardia, pt remains on amio infusion (0,5mg/min)  - C/w IV abx: vanc, Miropenam    - f/u fungal cu  - trend lactate/WBC/ SBP  - Appreciate excellent SICU care    [facility]  [phone]    Addendum  Agree with resident note.  Patient remains on pressors but improving leukocytosis and lower levophed requirement.  Remains on amiodarone drip.  Continue Antibiotics.  Follow up cultures.  Care per ICU team.  Vita Donovan MD

## 2020-01-13 LAB
-  CEFTAZIDIME: SIGNIFICANT CHANGE UP
-  LEVOFLOXACIN: SIGNIFICANT CHANGE UP
-  TRIMETHOPRIM/SULFAMETHOXAZOLE: SIGNIFICANT CHANGE UP
ALBUMIN SERPL ELPH-MCNC: 2.3 G/DL — LOW (ref 3.3–5)
ALP SERPL-CCNC: 144 U/L — HIGH (ref 40–120)
ALT FLD-CCNC: 19 U/L — SIGNIFICANT CHANGE UP (ref 10–45)
ANION GAP SERPL CALC-SCNC: 11 MMOL/L — SIGNIFICANT CHANGE UP (ref 5–17)
ANION GAP SERPL CALC-SCNC: 9 MMOL/L — SIGNIFICANT CHANGE UP (ref 5–17)
AST SERPL-CCNC: 43 U/L — HIGH (ref 10–40)
BASE EXCESS BLDV CALC-SCNC: -5 MMOL/L — LOW (ref -2–2)
BASE EXCESS BLDV CALC-SCNC: -5.5 MMOL/L — LOW (ref -2–2)
BILIRUB DIRECT SERPL-MCNC: 0.3 MG/DL — HIGH (ref 0–0.2)
BILIRUB INDIRECT FLD-MCNC: 0.3 MG/DL — SIGNIFICANT CHANGE UP (ref 0.2–1)
BILIRUB SERPL-MCNC: 0.6 MG/DL — SIGNIFICANT CHANGE UP (ref 0.2–1.2)
BUN SERPL-MCNC: 8 MG/DL — SIGNIFICANT CHANGE UP (ref 7–23)
BUN SERPL-MCNC: 8 MG/DL — SIGNIFICANT CHANGE UP (ref 7–23)
CA-I SERPL-SCNC: 1.18 MMOL/L — SIGNIFICANT CHANGE UP (ref 1.12–1.3)
CA-I SERPL-SCNC: 1.21 MMOL/L — SIGNIFICANT CHANGE UP (ref 1.12–1.3)
CALCIUM SERPL-MCNC: 8 MG/DL — LOW (ref 8.4–10.5)
CALCIUM SERPL-MCNC: 8.2 MG/DL — LOW (ref 8.4–10.5)
CHLORIDE BLDV-SCNC: 107 MMOL/L — SIGNIFICANT CHANGE UP (ref 96–108)
CHLORIDE BLDV-SCNC: 108 MMOL/L — SIGNIFICANT CHANGE UP (ref 96–108)
CHLORIDE SERPL-SCNC: 103 MMOL/L — SIGNIFICANT CHANGE UP (ref 96–108)
CHLORIDE SERPL-SCNC: 103 MMOL/L — SIGNIFICANT CHANGE UP (ref 96–108)
CK MB CFR SERPL CALC: 15.4 NG/ML — HIGH (ref 0–3.8)
CK SERPL-CCNC: 45 U/L — SIGNIFICANT CHANGE UP (ref 25–170)
CO2 BLDV-SCNC: 21 MMOL/L — LOW (ref 22–30)
CO2 BLDV-SCNC: 22 MMOL/L — SIGNIFICANT CHANGE UP (ref 22–30)
CO2 SERPL-SCNC: 17 MMOL/L — LOW (ref 22–31)
CO2 SERPL-SCNC: 19 MMOL/L — LOW (ref 22–31)
CREAT SERPL-MCNC: 0.62 MG/DL — SIGNIFICANT CHANGE UP (ref 0.5–1.3)
CREAT SERPL-MCNC: 0.62 MG/DL — SIGNIFICANT CHANGE UP (ref 0.5–1.3)
CULTURE RESULTS: SIGNIFICANT CHANGE UP
FUNGITELL: 153 PG/ML — HIGH
GAS PNL BLDV: 126 MMOL/L — LOW (ref 135–145)
GAS PNL BLDV: 129 MMOL/L — LOW (ref 135–145)
GAS PNL BLDV: SIGNIFICANT CHANGE UP
GLUCOSE BLDV-MCNC: 102 MG/DL — HIGH (ref 70–99)
GLUCOSE BLDV-MCNC: 108 MG/DL — HIGH (ref 70–99)
GLUCOSE SERPL-MCNC: 100 MG/DL — HIGH (ref 70–99)
GLUCOSE SERPL-MCNC: 112 MG/DL — HIGH (ref 70–99)
HCO3 BLDV-SCNC: 20 MMOL/L — LOW (ref 21–29)
HCO3 BLDV-SCNC: 20 MMOL/L — LOW (ref 21–29)
HCT VFR BLD CALC: 25 % — LOW (ref 34.5–45)
HCT VFR BLD CALC: 27 % — LOW (ref 34.5–45)
HCT VFR BLDA CALC: 27 % — LOW (ref 39–50)
HCT VFR BLDA CALC: 32 % — LOW (ref 39–50)
HGB BLD CALC-MCNC: 10.2 G/DL — LOW (ref 11.5–15.5)
HGB BLD CALC-MCNC: 8.7 G/DL — LOW (ref 11.5–15.5)
HGB BLD-MCNC: 7.4 G/DL — LOW (ref 11.5–15.5)
HGB BLD-MCNC: 8.2 G/DL — LOW (ref 11.5–15.5)
HOROWITZ INDEX BLDV+IHG-RTO: 21 — SIGNIFICANT CHANGE UP
HOROWITZ INDEX BLDV+IHG-RTO: 21 — SIGNIFICANT CHANGE UP
LACTATE BLDV-MCNC: 2.5 MMOL/L — HIGH (ref 0.7–2)
LACTATE BLDV-MCNC: 2.5 MMOL/L — HIGH (ref 0.7–2)
MAGNESIUM SERPL-MCNC: 1.8 MG/DL — SIGNIFICANT CHANGE UP (ref 1.6–2.6)
MAGNESIUM SERPL-MCNC: 1.9 MG/DL — SIGNIFICANT CHANGE UP (ref 1.6–2.6)
MCHC RBC-ENTMCNC: 27.5 PG — SIGNIFICANT CHANGE UP (ref 27–34)
MCHC RBC-ENTMCNC: 27.8 PG — SIGNIFICANT CHANGE UP (ref 27–34)
MCHC RBC-ENTMCNC: 29.6 GM/DL — LOW (ref 32–36)
MCHC RBC-ENTMCNC: 30.4 GM/DL — LOW (ref 32–36)
MCV RBC AUTO: 91.5 FL — SIGNIFICANT CHANGE UP (ref 80–100)
MCV RBC AUTO: 92.9 FL — SIGNIFICANT CHANGE UP (ref 80–100)
METHOD TYPE: SIGNIFICANT CHANGE UP
NRBC # BLD: 0 /100 WBCS — SIGNIFICANT CHANGE UP (ref 0–0)
NRBC # BLD: 0 /100 WBCS — SIGNIFICANT CHANGE UP (ref 0–0)
ORGANISM # SPEC MICROSCOPIC CNT: SIGNIFICANT CHANGE UP
ORGANISM # SPEC MICROSCOPIC CNT: SIGNIFICANT CHANGE UP
OTHER CELLS CSF MANUAL: 8 ML/DL — LOW (ref 18–22)
OTHER CELLS CSF MANUAL: 9 ML/DL — LOW (ref 18–22)
PCO2 BLDV: 42 MMHG — SIGNIFICANT CHANGE UP (ref 35–50)
PCO2 BLDV: 42 MMHG — SIGNIFICANT CHANGE UP (ref 35–50)
PH BLDV: 7.3 — LOW (ref 7.35–7.45)
PH BLDV: 7.3 — LOW (ref 7.35–7.45)
PHOSPHATE SERPL-MCNC: 1.8 MG/DL — LOW (ref 2.5–4.5)
PHOSPHATE SERPL-MCNC: 2.1 MG/DL — LOW (ref 2.5–4.5)
PLATELET # BLD AUTO: 214 K/UL — SIGNIFICANT CHANGE UP (ref 150–400)
PLATELET # BLD AUTO: 255 K/UL — SIGNIFICANT CHANGE UP (ref 150–400)
PO2 BLDV: 36 MMHG — SIGNIFICANT CHANGE UP (ref 25–45)
PO2 BLDV: 37 MMHG — SIGNIFICANT CHANGE UP (ref 25–45)
POTASSIUM BLDV-SCNC: 3.3 MMOL/L — LOW (ref 3.5–5.3)
POTASSIUM BLDV-SCNC: 3.5 MMOL/L — SIGNIFICANT CHANGE UP (ref 3.5–5.3)
POTASSIUM SERPL-MCNC: 3.6 MMOL/L — SIGNIFICANT CHANGE UP (ref 3.5–5.3)
POTASSIUM SERPL-MCNC: 4 MMOL/L — SIGNIFICANT CHANGE UP (ref 3.5–5.3)
POTASSIUM SERPL-SCNC: 3.6 MMOL/L — SIGNIFICANT CHANGE UP (ref 3.5–5.3)
POTASSIUM SERPL-SCNC: 4 MMOL/L — SIGNIFICANT CHANGE UP (ref 3.5–5.3)
PROT SERPL-MCNC: 5.2 G/DL — LOW (ref 6–8.3)
RBC # BLD: 2.69 M/UL — LOW (ref 3.8–5.2)
RBC # BLD: 2.95 M/UL — LOW (ref 3.8–5.2)
RBC # FLD: 17.1 % — HIGH (ref 10.3–14.5)
RBC # FLD: 17.2 % — HIGH (ref 10.3–14.5)
SAO2 % BLDV: 61 % — LOW (ref 67–88)
SAO2 % BLDV: 64 % — LOW (ref 67–88)
SODIUM SERPL-SCNC: 131 MMOL/L — LOW (ref 135–145)
SODIUM SERPL-SCNC: 131 MMOL/L — LOW (ref 135–145)
SPECIMEN SOURCE: SIGNIFICANT CHANGE UP
TROPONIN T, HIGH SENSITIVITY RESULT: 500 NG/L — HIGH (ref 0–51)
VANCOMYCIN TROUGH SERPL-MCNC: 24.8 UG/ML — HIGH (ref 10–20)
WBC # BLD: 11.69 K/UL — HIGH (ref 3.8–10.5)
WBC # BLD: 15.31 K/UL — HIGH (ref 3.8–10.5)
WBC # FLD AUTO: 11.69 K/UL — HIGH (ref 3.8–10.5)
WBC # FLD AUTO: 15.31 K/UL — HIGH (ref 3.8–10.5)

## 2020-01-13 PROCEDURE — 99232 SBSQ HOSP IP/OBS MODERATE 35: CPT

## 2020-01-13 PROCEDURE — 99291 CRITICAL CARE FIRST HOUR: CPT

## 2020-01-13 PROCEDURE — 99223 1ST HOSP IP/OBS HIGH 75: CPT

## 2020-01-13 PROCEDURE — 71045 X-RAY EXAM CHEST 1 VIEW: CPT | Mod: 26

## 2020-01-13 RX ORDER — DIPHENHYDRAMINE HCL 50 MG
25 CAPSULE ORAL ONCE
Refills: 0 | Status: COMPLETED | OUTPATIENT
Start: 2020-01-13 | End: 2020-01-13

## 2020-01-13 RX ORDER — HYDROMORPHONE HYDROCHLORIDE 2 MG/ML
0.5 INJECTION INTRAMUSCULAR; INTRAVENOUS; SUBCUTANEOUS ONCE
Refills: 0 | Status: DISCONTINUED | OUTPATIENT
Start: 2020-01-13 | End: 2020-01-13

## 2020-01-13 RX ORDER — HYDROMORPHONE HYDROCHLORIDE 2 MG/ML
1 INJECTION INTRAMUSCULAR; INTRAVENOUS; SUBCUTANEOUS ONCE
Refills: 0 | Status: DISCONTINUED | OUTPATIENT
Start: 2020-01-13 | End: 2020-01-13

## 2020-01-13 RX ORDER — MAGNESIUM SULFATE 500 MG/ML
2 VIAL (ML) INJECTION ONCE
Refills: 0 | Status: COMPLETED | OUTPATIENT
Start: 2020-01-13 | End: 2020-01-13

## 2020-01-13 RX ORDER — ALBUMIN HUMAN 25 %
250 VIAL (ML) INTRAVENOUS ONCE
Refills: 0 | Status: COMPLETED | OUTPATIENT
Start: 2020-01-13 | End: 2020-01-13

## 2020-01-13 RX ORDER — NYSTATIN 500MM UNIT
500000 POWDER (EA) MISCELLANEOUS ONCE
Refills: 0 | Status: COMPLETED | OUTPATIENT
Start: 2020-01-13 | End: 2020-01-13

## 2020-01-13 RX ORDER — POTASSIUM PHOSPHATE, MONOBASIC POTASSIUM PHOSPHATE, DIBASIC 236; 224 MG/ML; MG/ML
15 INJECTION, SOLUTION INTRAVENOUS ONCE
Refills: 0 | Status: COMPLETED | OUTPATIENT
Start: 2020-01-13 | End: 2020-01-13

## 2020-01-13 RX ORDER — HYDROMORPHONE HYDROCHLORIDE 2 MG/ML
0.5 INJECTION INTRAMUSCULAR; INTRAVENOUS; SUBCUTANEOUS ONCE
Refills: 0 | Status: DISCONTINUED | OUTPATIENT
Start: 2020-01-13 | End: 2020-01-14

## 2020-01-13 RX ORDER — ALBUMIN HUMAN 25 %
500 VIAL (ML) INTRAVENOUS ONCE
Refills: 0 | Status: COMPLETED | OUTPATIENT
Start: 2020-01-13 | End: 2020-01-13

## 2020-01-13 RX ADMIN — Medication 3 MILLILITER(S): at 05:37

## 2020-01-13 RX ADMIN — Medication 250 MILLIGRAM(S): at 18:35

## 2020-01-13 RX ADMIN — Medication 1 DROP(S): at 18:35

## 2020-01-13 RX ADMIN — Medication 25 MILLIGRAM(S): at 03:39

## 2020-01-13 RX ADMIN — POLYETHYLENE GLYCOL 3350 17 GRAM(S): 17 POWDER, FOR SOLUTION ORAL at 13:07

## 2020-01-13 RX ADMIN — Medication 975 MILLIGRAM(S): at 05:25

## 2020-01-13 RX ADMIN — MEROPENEM 100 MILLIGRAM(S): 1 INJECTION INTRAVENOUS at 05:31

## 2020-01-13 RX ADMIN — VASOPRESSIN 1.8 UNIT(S)/MIN: 20 INJECTION INTRAVENOUS at 10:05

## 2020-01-13 RX ADMIN — Medication 81 MILLIGRAM(S): at 19:08

## 2020-01-13 RX ADMIN — Medication 125 MILLILITER(S): at 11:09

## 2020-01-13 RX ADMIN — POTASSIUM PHOSPHATE, MONOBASIC POTASSIUM PHOSPHATE, DIBASIC 62.5 MILLIMOLE(S): 236; 224 INJECTION, SOLUTION INTRAVENOUS at 20:50

## 2020-01-13 RX ADMIN — NYSTATIN CREAM 1 APPLICATION(S): 100000 CREAM TOPICAL at 18:34

## 2020-01-13 RX ADMIN — Medication 3 MILLILITER(S): at 02:06

## 2020-01-13 RX ADMIN — CHLORHEXIDINE GLUCONATE 1 APPLICATION(S): 213 SOLUTION TOPICAL at 05:28

## 2020-01-13 RX ADMIN — HYDROMORPHONE HYDROCHLORIDE 0.5 MILLIGRAM(S): 2 INJECTION INTRAMUSCULAR; INTRAVENOUS; SUBCUTANEOUS at 14:15

## 2020-01-13 RX ADMIN — MEROPENEM 100 MILLIGRAM(S): 1 INJECTION INTRAVENOUS at 13:44

## 2020-01-13 RX ADMIN — MIDODRINE HYDROCHLORIDE 10 MILLIGRAM(S): 2.5 TABLET ORAL at 10:04

## 2020-01-13 RX ADMIN — Medication 0.5 MILLIGRAM(S): at 17:30

## 2020-01-13 RX ADMIN — Medication 7.89 MICROGRAM(S)/KG/MIN: at 11:37

## 2020-01-13 RX ADMIN — HYDROMORPHONE HYDROCHLORIDE 0.5 MILLIGRAM(S): 2 INJECTION INTRAMUSCULAR; INTRAVENOUS; SUBCUTANEOUS at 09:15

## 2020-01-13 RX ADMIN — Medication 0.5 MILLIGRAM(S): at 05:37

## 2020-01-13 RX ADMIN — NYSTATIN CREAM 1 APPLICATION(S): 100000 CREAM TOPICAL at 05:30

## 2020-01-13 RX ADMIN — Medication 250 MILLIGRAM(S): at 05:25

## 2020-01-13 RX ADMIN — Medication 500000 UNIT(S): at 18:35

## 2020-01-13 RX ADMIN — Medication 250 MILLILITER(S): at 04:18

## 2020-01-13 RX ADMIN — Medication 1 DROP(S): at 05:30

## 2020-01-13 RX ADMIN — METHADONE HYDROCHLORIDE 17.5 MILLIGRAM(S): 40 TABLET ORAL at 10:07

## 2020-01-13 RX ADMIN — SODIUM CHLORIDE 150 MILLILITER(S): 9 INJECTION INTRAMUSCULAR; INTRAVENOUS; SUBCUTANEOUS at 10:05

## 2020-01-13 RX ADMIN — Medication 975 MILLIGRAM(S): at 13:40

## 2020-01-13 RX ADMIN — Medication 250 MILLIGRAM(S): at 19:10

## 2020-01-13 RX ADMIN — HYDROMORPHONE HYDROCHLORIDE 0.5 MILLIGRAM(S): 2 INJECTION INTRAMUSCULAR; INTRAVENOUS; SUBCUTANEOUS at 14:00

## 2020-01-13 RX ADMIN — ATORVASTATIN CALCIUM 40 MILLIGRAM(S): 80 TABLET, FILM COATED ORAL at 19:08

## 2020-01-13 RX ADMIN — Medication 250 MILLIGRAM(S): at 10:05

## 2020-01-13 RX ADMIN — Medication 3 MILLILITER(S): at 17:29

## 2020-01-13 RX ADMIN — Medication 500000 UNIT(S): at 03:00

## 2020-01-13 RX ADMIN — Medication 500000 UNIT(S): at 05:26

## 2020-01-13 RX ADMIN — POLYETHYLENE GLYCOL 3350 17 GRAM(S): 17 POWDER, FOR SOLUTION ORAL at 18:43

## 2020-01-13 RX ADMIN — MIDODRINE HYDROCHLORIDE 10 MILLIGRAM(S): 2.5 TABLET ORAL at 19:08

## 2020-01-13 RX ADMIN — POTASSIUM PHOSPHATE, MONOBASIC POTASSIUM PHOSPHATE, DIBASIC 62.5 MILLIMOLE(S): 236; 224 INJECTION, SOLUTION INTRAVENOUS at 05:00

## 2020-01-13 RX ADMIN — HYDROMORPHONE HYDROCHLORIDE 0.5 MILLIGRAM(S): 2 INJECTION INTRAMUSCULAR; INTRAVENOUS; SUBCUTANEOUS at 09:00

## 2020-01-13 RX ADMIN — Medication 975 MILLIGRAM(S): at 14:10

## 2020-01-13 RX ADMIN — ENOXAPARIN SODIUM 100 MILLIGRAM(S): 100 INJECTION SUBCUTANEOUS at 11:11

## 2020-01-13 RX ADMIN — Medication 3 MILLILITER(S): at 10:50

## 2020-01-13 RX ADMIN — MIDODRINE HYDROCHLORIDE 10 MILLIGRAM(S): 2.5 TABLET ORAL at 03:39

## 2020-01-13 RX ADMIN — Medication 250 MILLIMOLE(S): at 18:32

## 2020-01-13 RX ADMIN — Medication 2 MILLIGRAM(S): at 05:26

## 2020-01-13 RX ADMIN — HYDROMORPHONE HYDROCHLORIDE 0.5 MILLIGRAM(S): 2 INJECTION INTRAMUSCULAR; INTRAVENOUS; SUBCUTANEOUS at 09:30

## 2020-01-13 RX ADMIN — Medication 250 MILLIMOLE(S): at 19:34

## 2020-01-13 RX ADMIN — MEROPENEM 100 MILLIGRAM(S): 1 INJECTION INTRAVENOUS at 22:01

## 2020-01-13 RX ADMIN — HYDROMORPHONE HYDROCHLORIDE 1 MILLIGRAM(S): 2 INJECTION INTRAMUSCULAR; INTRAVENOUS; SUBCUTANEOUS at 05:00

## 2020-01-13 RX ADMIN — Medication 50 GRAM(S): at 04:59

## 2020-01-13 RX ADMIN — MIDODRINE HYDROCHLORIDE 10 MILLIGRAM(S): 2.5 TABLET ORAL at 13:44

## 2020-01-13 RX ADMIN — PANTOPRAZOLE SODIUM 40 MILLIGRAM(S): 20 TABLET, DELAYED RELEASE ORAL at 05:33

## 2020-01-13 RX ADMIN — HYDROMORPHONE HYDROCHLORIDE 0.5 MILLIGRAM(S): 2 INJECTION INTRAMUSCULAR; INTRAVENOUS; SUBCUTANEOUS at 09:45

## 2020-01-13 RX ADMIN — Medication 3 MILLILITER(S): at 14:34

## 2020-01-13 NOTE — PROGRESS NOTE ADULT - SUBJECTIVE AND OBJECTIVE BOX
[facility] PROGRESS NOTE    24 HOUR EVENTS:  - IVF increased to 150ml/hr.   - Repeat Bcx sent   - Lower extremities ACE wrapped   - Amiodarone gtt discontinued   - Levofloxacin started for Stenotrophomonas in the sputum     SUBJECTIVE:   WBC downtrending (15.31<--16.5<--18.39<-----33.45 (1/13 2am))  Patient states she is not feeling well. Feels tired/weak.     OBJECTIVE  Physical Exam:   Gen: NAD. Alert and cooperative.   Resp: No addition work of breathing. Continued cough with phlegm.   Card: No longer tachycardic,    Abd: Soft, ND, NT. No rebound or guarding. Vac in place holding suction.   Ext: WWP. Significant lymphedema of lower extremities.     V/S:  Vital Signs Last 24 Hrs  T(C): 36.3 (13 Jan 2020 03:00), Max: 37 (12 Jan 2020 15:00)  T(F): 97.3 (13 Jan 2020 03:00), Max: 98.6 (12 Jan 2020 15:00)  HR: 81 (13 Jan 2020 04:15) (74 - 132)  BP: 91/51 (13 Jan 2020 04:15) (30/55 - 144/58)  BP(mean): 69 (13 Jan 2020 04:15) (56 - 117)  RR: 22 (13 Jan 2020 04:15) (11 - 42)  SpO2: 99% (13 Jan 2020 04:15) (72% - 100%)    --------------------------------------------------------------------------------------------------  I/Os:    11 Jan 2020 07:01  -  12 Jan 2020 07:00  --------------------------------------------------------  IN:    Albumin 5%  - 500 mL: 500 mL    amiodarone Infusion: 265.6 mL    norepinephrine Infusion: 883 mL    Oral Fluid: 1150 mL    sodium chloride 0.9%: 3600 mL    Solution: 1200 mL    Solution: 100 mL    vasopressin Infusion: 39.6 mL  Total IN: 7738.2 mL    OUT:    Colostomy: 10 mL    Drain: 60 mL    VAC (Vacuum Assisted Closure) System: 100 mL    Voided: 500 mL  Total OUT: 670 mL    Total NET: 7068.2 mL      12 Jan 2020 07:01  -  13 Jan 2020 05:15  --------------------------------------------------------  IN:    norepinephrine Infusion: 398.5 mL    Oral Fluid: 750 mL    sodium chloride 0.9%.: 2550 mL    Solution: 150 mL    Solution: 100 mL    Solution: 350 mL    vasopressin Infusion: 39.6 mL  Total IN: 4338.1 mL    OUT:    Colostomy: 50 mL    VAC (Vacuum Assisted Closure) System: 75 mL    Voided: 320 mL  Total OUT: 445 mL    Total NET: 3893.1 mL        --------------------------------------------------------------------------------------------------  LABS:                        8.2    15.31 )-----------( 255      ( 13 Jan 2020 02:03 )             27.0     13 Jan 2020 02:03    131    |  103    |  8      ----------------------------<  112    4.0     |  17     |  0.62     Ca    8.0        13 Jan 2020 02:03  Phos  2.1       13 Jan 2020 02:03  Mg     1.8       13 Jan 2020 02:03        CAPILLARY BLOOD GLUCOSE        CARDIAC MARKERS ( 13 Jan 2020 02:03 )  x     / x     / 45 U/L / x     / 15.4 ng/mL  CARDIAC MARKERS ( 12 Jan 2020 00:26 )  x     / x     / 91 U/L / x     / 30.0 ng/mL  CARDIAC MARKERS ( 11 Jan 2020 20:41 )  x     / x     / 108 U/L / x     / 33.3 ng/mL  CARDIAC MARKERS ( 11 Jan 2020 16:11 )  x     / x     / 109 U/L / x     / 33.2 ng/mL  CARDIAC MARKERS ( 11 Jan 2020 11:10 )  x     / x     / 126 U/L / x     / 35.9 ng/mL          Culture - Sputum (collected 11 Jan 2020 16:36)  Source: .Sputum Sputum  Gram Stain (11 Jan 2020 23:47):    Few polymorphonuclear leukocytes per low power field    Few Squamous epithelial cells per low power field    Moderate Gram Negative Rods per oil power field  Preliminary Report (12 Jan 2020 20:28):    Moderate Stenotrophomonas maltophilia    Culture - Blood (collected 10 Gary 2020 16:40)  Source: .Blood Blood-Venous  Preliminary Report (11 Jan 2020 17:02):    No growth to date.    Culture - Blood (collected 10 Gary 2020 16:40)  Source: .Blood Blood  Preliminary Report (11 Jan 2020 17:02):    No growth to date.        --------------------------------------------------------------------------------------------------  MEDICATIONS  (STANDING):  albuterol/ipratropium for Nebulization 3 milliLiter(s) Nebulizer every 4 hours  artificial  tears Solution 1 Drop(s) Both EYES two times a day  aspirin  chewable 81 milliGRAM(s) Oral <User Schedule>  atorvastatin 40 milliGRAM(s) Oral <User Schedule>  buDESOnide    Inhalation Suspension 0.5 milliGRAM(s) Inhalation every 12 hours  chlorhexidine 2% Cloths 1 Application(s) Topical <User Schedule>  doxazosin 2 milliGRAM(s) Oral <User Schedule>  enoxaparin Injectable 100 milliGRAM(s) SubCutaneous <User Schedule>  erythromycin     base Tablet 250 milliGRAM(s) Oral <User Schedule>  levoFLOXacin IVPB 500 milliGRAM(s) IV Intermittent every 24 hours  meropenem  IVPB 1000 milliGRAM(s) IV Intermittent every 8 hours  methadone    Tablet 17.5 milliGRAM(s) Oral daily  midodrine 10 milliGRAM(s) Oral <User Schedule>  norepinephrine Infusion 0.05 MICROgram(s)/kG/Min (7.889 mL/Hr) IV Continuous <Continuous>  nystatin    Suspension 411345 Unit(s) Oral two times a day  nystatin Powder 1 Application(s) Topical two times a day  pantoprazole    Tablet 40 milliGRAM(s) Oral <User Schedule>  polyethylene glycol 3350 17 Gram(s) Oral <User Schedule>  sodium chloride 0.9%. 1000 milliLiter(s) (150 mL/Hr) IV Continuous <Continuous>  vancomycin  IVPB 1250 milliGRAM(s) IV Intermittent every 12 hours  vasopressin Infusion 0.03 Unit(s)/Min (1.8 mL/Hr) IV Continuous <Continuous>    MEDICATIONS  (PRN):  acetaminophen   Tablet .. 975 milliGRAM(s) Oral every 6 hours PRN Mild Pain (1 - 3)  aluminum hydroxide/magnesium hydroxide/simethicone Suspension 30 milliLiter(s) Oral every 4 hours PRN Dyspepsia

## 2020-01-13 NOTE — PROGRESS NOTE ADULT - SUBJECTIVE AND OBJECTIVE BOX
CC: F/U for Positive culture finding    Saw/spoke to patient. No fevers, no chills. No new complaints. Unchanged.    Allergies  IV Contrast (Rash; Pruritus; Hypotension)  sulfa drugs (Unknown)    ANTIMICROBIALS:  erythromycin     base Tablet 250 <User Schedule>  levoFLOXacin IVPB 500 every 24 hours  meropenem  IVPB 1000 every 8 hours  nystatin    Suspension 231567 two times a day  vancomycin  IVPB 1250 every 12 hours    PE:    Vital Signs Last 24 Hrs  T(C): 36 (13 Jan 2020 07:00), Max: 37 (12 Jan 2020 15:00)  T(F): 96.8 (13 Jan 2020 07:00), Max: 98.6 (12 Jan 2020 15:00)  HR: 100 (13 Jan 2020 13:00) (74 - 132)  BP: 111/51 (13 Jan 2020 13:00) (30/55 - 149/97)  BP(mean): 63 (13 Jan 2020 13:00) (48 - 117)  RR: 24 (13 Jan 2020 13:00) (11 - 42)  SpO2: 95% (13 Jan 2020 13:00) (72% - 100%)    Gen: AOx1-2, NAD,anxious  CV: S1+S2 normal, nontachycardic  Resp: Clear bilat, no resp distress, no crackles/wheezes  Abd: Soft, nontender, +BS, ostomy, wound vac  Ext: No LE edema, no wounds    LABS:                        8.2    15.31 )-----------( 255      ( 13 Jan 2020 02:03 )             27.0     01-13    131<L>  |  103  |  8   ----------------------------<  112<H>  4.0   |  17<L>  |  0.62    Ca    8.0<L>      13 Jan 2020 02:03  Phos  2.1     01-13  Mg     1.8     01-13    MICROBIOLOGY:    .Sputum Sputum  01-11-20   Moderate Stenotrophomonas maltophilia  --    Few polymorphonuclear leukocytes per low power field  Few Squamous epithelial cells per low power field  Moderate Gram Negative Rods per oil power field    .Blood Blood  01-09-20   Growth in anaerobic bottle: Coag Negative Staphylococcus  Single set isolate, possible contaminant. Contact  Microbiology if susceptibility testing clinically  indicated.  --    Growth in anaerobic bottle: Gram Positive Cocci in Clusters    .Blood Blood  01-08-20   Growth in aerobic and anaerobic bottles: Staphylococcus epidermidis  --  Staphylococcus epidermidis    (otherwise reviewed)    RADIOLOGY:    1/13 CXR:    IMPRESSION:     Questionable left atelectasis and/or pleural effusion unchanged from prior.

## 2020-01-13 NOTE — PROGRESS NOTE ADULT - ATTENDING COMMENTS
I saw and examined the pt and discussed the tx plan with the House Staff. I agree with the exam and plan as documented in the surgery resident's note from today.  Off pressors.  No abdominal complaints. + mouth pain, c/w thrush. On Nystatin. May need to add Diflucan.  Sepsis d/t line infection, Cx with Staph.  Had recent CT with only findings of small thin left-sided collection - likely draining via the old LLQ drain site. Would expect very low yield for another CT, picture not c/w an intraabdominal process.    Appreciate ICU care.  Liset Vargas MD

## 2020-01-13 NOTE — PROGRESS NOTE ADULT - ASSESSMENT
64 yr old female with PMHx morbid obesity, GERD, HTN, COPD, and pshx D&C now s/p ex laparotomy with extended left hemicolectomy 11/26 for perforated and necrotic sigmoid colon with gross abdominal contamination. RTOR 11/29 for abd closure, RUQ end transverse colostomy creation. On 12/19 patient found to have induration of wound and keri-stomal fat necrosis, s/p bedside debridement w/ wound VAC in placement on midline wound. Began showing signs of sepsis and was transferred to SICU 1/7 for further management. Now w/ decreasing pressor support and downtrending leukocytosis.     PLAN:  - f/u pressor requirements  - C/w IV abx: vanc, meropenem, levaquin  - trend lactate/WBC/ SBP  - Appreciate excellent SICU care    [facility]  [id number]

## 2020-01-13 NOTE — PROGRESS NOTE ADULT - ASSESSMENT
64F with pmhx morbid obesity, acid reflux, HTN, COPD and PSH D&C presented to the ED on 11/26 c/o abdominal pain and bloating. Reported pain worst in the LLQ that started a few days ago and has been worsening in the last day, also has noted significant abdominal distention in the last day.  In ED, CT demonstrated perforated sigmoid diverticulitis with intraperitoneal free air. She was taken to the operating room on 11/25 overnight and underwent an exploratory laparotomy with extended left hemicolectomy and was left in discontinuity. An Abthera VAC was placed. She was kept intubated post op and brought to the ICU for hemodynamic and respiratory management. She went back to the OR on 11/29 for transverse end colostomy and fascial closure with wound vac.  Pt transferred out of SICU and recovering on floor with post-op course including induration of wound and colostomy necrosis requiring bedside debridement and wound vac placement.  Pt had CT on 1/6 for leukocytosis and to evaluate wound, which showed loculated fluid collections in the left anterior abdomen, very near loops of bowel.  After CT, pt reported feeling "worse" and was hypotensive overnight to SBP 70s requiring 1L fluid bolus.  Pt was bolused again this for hypotension and blood cultures were sent for worsening leukocytosis, and BULL.  SICU consulted for closer monitoring.  UA positive and patient was started on Ceftriaxone for presumed UTI.     Neuro: pain control, h/o chronic pain requiring methadone   - Tylenol prn  - Continue home dose Methadone     Resp: h/o COPD  - Albuterol prn  - Continue Symbicort    CV: Hypotension likely secondary to hypovolemia; septic shock ; h/o HTN; New wall motion abnormalities on echocardiogram   - Appreciate cardiology recommendations; troponins have plateau.   - Hemodynamic monitoring  - Trend lactate  - Hold home anti-hypertensives  - Continue statin   - Conitnue levopehd and vasopressin; wean as tolerated     GI: Perforated sigmoid diverticulitis s/p extended left hemicolectomy (11/25)  - Continue Regular diet  - Monitor wound vac output   - Protonix     /Renal: Hypovolemic hyponatremia, BULL  - Hyponatremia improving  - Trend BUN/Cr   - Replete electrolytes as needed  - Monitor UOP     ID: UTI, leukocytosis   - Continue meropenem and vancomycin and Levopfloxacin   - f/u UCx and blood cultures     Heme: VTE prophylaxis  - Continue lovenox  - ASA    Endo:   - Monitor serum glucose     Dispo: SICU care. Full code.      - Jey Garcia PA-C

## 2020-01-13 NOTE — PROGRESS NOTE ADULT - ATTENDING COMMENTS
Patient seen and examined.   Awake and alert comfortable  Hypotension continues likely due ot combination of sepsis as well as hypovolemia - will continue levophed and vasopressin and wean as tolerated to MAP goal > 65 mmHg  Mentation intact  Oliguric this moring and given albumin with response of increase in UO  CXR reviewed with likely increased infiltrate on RLL  Stenotrophomonas on sputum cultures -continue levaquin at this time  Tolerating PO, no abd pain, stoma is functioning  COPD - continue current medications with goal SpO 88-92%  Dermatology also contacted for persistent rash and new desquamation  - likely due to medication allergy    Patient remains critically ill   CC time: 40 minutes

## 2020-01-13 NOTE — PROGRESS NOTE ADULT - SUBJECTIVE AND OBJECTIVE BOX
CARDIOLOGY     PROGRESS  NOTE   ________________________________________________    CHIEF COMPLAINT:Patient is a 64y old  Female who presents with a chief complaint of perforated bowel (08 Dec 2019 09:22)  no change.  	  REVIEW OF SYSTEMS:  CONSTITUTIONAL: No fever, weight loss, or fatigue  EYES: No eye pain, visual disturbances, or discharge  ENT:  No difficulty hearing, tinnitus, vertigo; No sinus or throat pain  NECK: No pain or stiffness  RESPIRATORY: No cough, wheezing, chills or hemoptysis; No Shortness of Breath  CARDIOVASCULAR: No chest pain, palpitations, passing out, dizziness, or leg swelling  GASTROINTESTINAL: No abdominal or epigastric pain. No nausea, vomiting, or hematemesis; No diarrhea or constipation. No melena or hematochezia.  GENITOURINARY: No dysuria, frequency, hematuria, or incontinence  NEUROLOGICAL: No headaches, memory loss, loss of strength, numbness, or tremors  SKIN: No itching, burning, rashes, or lesions   LYMPH Nodes: No enlarged glands  ENDOCRINE: No heat or cold intolerance; No hair loss  MUSCULOSKELETAL: No joint pain or swelling; No muscle, back, or extremity pain  PSYCHIATRIC: No depression, anxiety, mood swings, or difficulty sleeping  HEME/LYMPH: No easy bruising, or bleeding gums  ALLERGY AND IMMUNOLOGIC: No hives or eczema	    [ ] All others negative	  [x ] Unable to obtain    PHYSICAL EXAM:  T(C): 36 (01-13-20 @ 07:00), Max: 37 (01-12-20 @ 15:00)  HR: 99 (01-13-20 @ 08:30) (74 - 132)  BP: 103/55 (01-13-20 @ 08:17) (30/55 - 144/58)  RR: 19 (01-13-20 @ 08:30) (11 - 42)  SpO2: 100% (01-13-20 @ 08:30) (72% - 100%)  Wt(kg): --  I&O's Summary    12 Jan 2020 07:01  -  13 Jan 2020 07:00  --------------------------------------------------------  IN: 5458.7 mL / OUT: 555 mL / NET: 4903.7 mL        Appearance: Normal	  HEENT:   Normal oral mucosa, PERRL, EOMI	  Lymphatic: No lymphadenopathy  Cardiovascular: Normal S1 S2, No JVD, + murmurs, No edema  Respiratory: Lungs clear to auscultation	  Psychiatry: A & O x 3, Mood & affect appropriate  Gastrointestinal:  Soft, Non-tender, + BS	  Skin: No rashes, No ecchymoses, No cyanosis	  Neurologic: Non-focal  Extremities: Normal range of motion, No clubbing, cyanosis or edema  Vascular: Peripheral pulses palpable 2+ bilaterally    MEDICATIONS  (STANDING):  albuterol/ipratropium for Nebulization 3 milliLiter(s) Nebulizer every 4 hours  artificial  tears Solution 1 Drop(s) Both EYES two times a day  aspirin  chewable 81 milliGRAM(s) Oral <User Schedule>  atorvastatin 40 milliGRAM(s) Oral <User Schedule>  buDESOnide    Inhalation Suspension 0.5 milliGRAM(s) Inhalation every 12 hours  chlorhexidine 2% Cloths 1 Application(s) Topical <User Schedule>  doxazosin 2 milliGRAM(s) Oral <User Schedule>  enoxaparin Injectable 100 milliGRAM(s) SubCutaneous <User Schedule>  erythromycin     base Tablet 250 milliGRAM(s) Oral <User Schedule>  HYDROmorphone  Injectable 0.5 milliGRAM(s) IV Push once  HYDROmorphone  Injectable 0.5 milliGRAM(s) IV Push once  levoFLOXacin IVPB 500 milliGRAM(s) IV Intermittent every 24 hours  meropenem  IVPB 1000 milliGRAM(s) IV Intermittent every 8 hours  methadone    Tablet 17.5 milliGRAM(s) Oral daily  midodrine 10 milliGRAM(s) Oral <User Schedule>  norepinephrine Infusion 0.05 MICROgram(s)/kG/Min (7.889 mL/Hr) IV Continuous <Continuous>  nystatin    Suspension 977038 Unit(s) Oral two times a day  nystatin Powder 1 Application(s) Topical two times a day  pantoprazole    Tablet 40 milliGRAM(s) Oral <User Schedule>  polyethylene glycol 3350 17 Gram(s) Oral <User Schedule>  sodium chloride 0.9%. 1000 milliLiter(s) (150 mL/Hr) IV Continuous <Continuous>  vancomycin  IVPB 1250 milliGRAM(s) IV Intermittent every 12 hours  vasopressin Infusion 0.03 Unit(s)/Min (1.8 mL/Hr) IV Continuous <Continuous>      TELEMETRY: 	    ECG:  	  RADIOLOGY:  OTHER: 	  	  LABS:	 	    CARDIAC MARKERS:  CARDIAC MARKERS ( 13 Jan 2020 02:03 )  x     / x     / 45 U/L / x     / 15.4 ng/mL  CARDIAC MARKERS ( 12 Jan 2020 00:26 )  x     / x     / 91 U/L / x     / 30.0 ng/mL  CARDIAC MARKERS ( 11 Jan 2020 20:41 )  x     / x     / 108 U/L / x     / 33.3 ng/mL  CARDIAC MARKERS ( 11 Jan 2020 16:11 )  x     / x     / 109 U/L / x     / 33.2 ng/mL  CARDIAC MARKERS ( 11 Jan 2020 11:10 )  x     / x     / 126 U/L / x     / 35.9 ng/mL                                8.2    15.31 )-----------( 255      ( 13 Jan 2020 02:03 )             27.0     01-13    131<L>  |  103  |  8   ----------------------------<  112<H>  4.0   |  17<L>  |  0.62    Ca    8.0<L>      13 Jan 2020 02:03  Phos  2.1     01-13  Mg     1.8     01-13      proBNP:   Lipid Profile: Cholesterol 98  LDL 52  HDL 28  TG 91    HgA1c:   TSH:     1) CoNS bacteremia--? CLABSI  - Vancomycin 1g q 12 (monitor levels)  - Repeat BCXs to clearance  - PICC has been DCed  2) Loculated fluid collections  - Infected collections?  - Meropenem 1g q 8  - Consideration for IR aspiration  3) Leukocytosis  - Trend to normal, monitor for further signs infection    Assessment and plan  ---------------------------  increase trop and wall motion abnormality most probably is sec to sepsis.  continue iv abx  agree with infectious disease may consider other sources ie ir aspiration  agree to dc amio  try to decreae vaso as tolerated  dvt prophylaxis  will consider DONNA if pt bc remains POS  continue supportive measures/ prerssors CARDIOLOGY     PROGRESS  NOTE   ________________________________________________    CHIEF COMPLAINT:Patient is a 64y old  Female who presents with a chief complaint of perforated bowel (08 Dec 2019 09:22)  no change.  	  REVIEW OF SYSTEMS:  CONSTITUTIONAL: No fever, weight loss, or fatigue  EYES: No eye pain, visual disturbances, or discharge  ENT:  No difficulty hearing, tinnitus, vertigo; No sinus or throat pain  NECK: No pain or stiffness  RESPIRATORY: No cough, wheezing, chills or hemoptysis; No Shortness of Breath  CARDIOVASCULAR: No chest pain, palpitations, passing out, dizziness, or leg swelling  GASTROINTESTINAL: No abdominal or epigastric pain. No nausea, vomiting, or hematemesis; No diarrhea or constipation. No melena or hematochezia.  GENITOURINARY: No dysuria, frequency, hematuria, or incontinence  NEUROLOGICAL: No headaches, memory loss, loss of strength, numbness, or tremors  SKIN: No itching, burning, rashes, or lesions   LYMPH Nodes: No enlarged glands  ENDOCRINE: No heat or cold intolerance; No hair loss  MUSCULOSKELETAL: No joint pain or swelling; No muscle, back, or extremity pain  PSYCHIATRIC: No depression, anxiety, mood swings, or difficulty sleeping  HEME/LYMPH: No easy bruising, or bleeding gums  ALLERGY AND IMMUNOLOGIC: No hives or eczema	    [ ] All others negative	  [x ] Unable to obtain    PHYSICAL EXAM:  T(C): 36 (01-13-20 @ 07:00), Max: 37 (01-12-20 @ 15:00)  HR: 99 (01-13-20 @ 08:30) (74 - 132)  BP: 103/55 (01-13-20 @ 08:17) (30/55 - 144/58)  RR: 19 (01-13-20 @ 08:30) (11 - 42)  SpO2: 100% (01-13-20 @ 08:30) (72% - 100%)  Wt(kg): --  I&O's Summary    12 Jan 2020 07:01  -  13 Jan 2020 07:00  --------------------------------------------------------  IN: 5458.7 mL / OUT: 555 mL / NET: 4903.7 mL        Appearance: Normal	  HEENT:   Normal oral mucosa, PERRL, EOMI	  Lymphatic: No lymphadenopathy  Cardiovascular: Normal S1 S2, No JVD, + murmurs, No edema  Respiratory: Lungs clear to auscultation	  Psychiatry: A & O x 3, Mood & affect appropriate  Gastrointestinal:  Soft, Non-tender, + BS	  Skin: No rashes, No ecchymoses, No cyanosis	  Neurologic: Non-focal  Extremities: Normal range of motion, No clubbing, cyanosis or edema  Vascular: Peripheral pulses palpable 2+ bilaterally    MEDICATIONS  (STANDING):  albuterol/ipratropium for Nebulization 3 milliLiter(s) Nebulizer every 4 hours  artificial  tears Solution 1 Drop(s) Both EYES two times a day  aspirin  chewable 81 milliGRAM(s) Oral <User Schedule>  atorvastatin 40 milliGRAM(s) Oral <User Schedule>  buDESOnide    Inhalation Suspension 0.5 milliGRAM(s) Inhalation every 12 hours  chlorhexidine 2% Cloths 1 Application(s) Topical <User Schedule>  doxazosin 2 milliGRAM(s) Oral <User Schedule>  enoxaparin Injectable 100 milliGRAM(s) SubCutaneous <User Schedule>  erythromycin     base Tablet 250 milliGRAM(s) Oral <User Schedule>  HYDROmorphone  Injectable 0.5 milliGRAM(s) IV Push once  HYDROmorphone  Injectable 0.5 milliGRAM(s) IV Push once  levoFLOXacin IVPB 500 milliGRAM(s) IV Intermittent every 24 hours  meropenem  IVPB 1000 milliGRAM(s) IV Intermittent every 8 hours  methadone    Tablet 17.5 milliGRAM(s) Oral daily  midodrine 10 milliGRAM(s) Oral <User Schedule>  norepinephrine Infusion 0.05 MICROgram(s)/kG/Min (7.889 mL/Hr) IV Continuous <Continuous>  nystatin    Suspension 205680 Unit(s) Oral two times a day  nystatin Powder 1 Application(s) Topical two times a day  pantoprazole    Tablet 40 milliGRAM(s) Oral <User Schedule>  polyethylene glycol 3350 17 Gram(s) Oral <User Schedule>  sodium chloride 0.9%. 1000 milliLiter(s) (150 mL/Hr) IV Continuous <Continuous>  vancomycin  IVPB 1250 milliGRAM(s) IV Intermittent every 12 hours  vasopressin Infusion 0.03 Unit(s)/Min (1.8 mL/Hr) IV Continuous <Continuous>      TELEMETRY: 	    ECG:  	  RADIOLOGY:  OTHER: 	  	  LABS:	 	    CARDIAC MARKERS:  CARDIAC MARKERS ( 13 Jan 2020 02:03 )  x     / x     / 45 U/L / x     / 15.4 ng/mL  CARDIAC MARKERS ( 12 Jan 2020 00:26 )  x     / x     / 91 U/L / x     / 30.0 ng/mL  CARDIAC MARKERS ( 11 Jan 2020 20:41 )  x     / x     / 108 U/L / x     / 33.3 ng/mL  CARDIAC MARKERS ( 11 Jan 2020 16:11 )  x     / x     / 109 U/L / x     / 33.2 ng/mL  CARDIAC MARKERS ( 11 Jan 2020 11:10 )  x     / x     / 126 U/L / x     / 35.9 ng/mL                                8.2    15.31 )-----------( 255      ( 13 Jan 2020 02:03 )             27.0     01-13    131<L>  |  103  |  8   ----------------------------<  112<H>  4.0   |  17<L>  |  0.62    Ca    8.0<L>      13 Jan 2020 02:03  Phos  2.1     01-13  Mg     1.8     01-13      proBNP:   Lipid Profile: Cholesterol 98  LDL 52  HDL 28  TG 91    HgA1c:   TSH:     1) CoNS bacteremia--? CLABSI  - Vancomycin 1g q 12 (monitor levels)  - Repeat BCXs to clearance  - PICC has been DCed  2) Loculated fluid collections  - Infected collections?  - Meropenem 1g q 8  - Consideration for IR aspiration  3) Leukocytosis  - Trend to normal, monitor for further signs infection    Assessment and plan  ---------------------------  increase trop and wall motion abnormality most probably is sec to sepsis.  continue iv abx  agree with infectious disease may consider other sources ie ir aspiration  agree to dc amio  try to decreae vaso as tolerated  dvt prophylaxis  will consider DONNA if pt bc remains POS  continue supportive measures/ prerssors  pt  with decrease uo / albumin ?alma

## 2020-01-13 NOTE — CONSULT NOTE ADULT - ASSESSMENT
#Erythema with superficial desquamation  Erythema appears to be resolving as evidenced by superficial desquamation. May have been secondary to drug exanthem but given resolving nature it is unlikely that patient is still on offending agent. It is difficult to determine likely trigger as patient has been on multiple agents during a similar time course.   - Monitor for any worsening  - No treatment necessary at this time    #Erosions on left lower back  Likely secondary to dependent edema with inflammation. Less likely secondary to viral infection but swab performed as below to r/o HSV/VZV.  - Apply thick coat of Vaseline to affected area twice daily  - Erosions swabbed and sent for viral culture #Erythema with superficial desquamation  this likely represents a resolving drug reaction  As such, offending agent is likely to have been removed.   It is difficult to determine likely trigger as patient has been on multiple agents during a similar time course.     - Monitor for any worsening  - No treatment necessary at this time    #Skin fragility with erosions on left>rt lower back  Likely secondary to dependent edema with inflammation.   Less likely secondary to viral infection but swab performed as below to r/o HSV/VZV.  - Apply thick coat of Vaseline to affected area twice daily  - Erosions swabbed and sent for viral culture

## 2020-01-13 NOTE — PROGRESS NOTE ADULT - ASSESSMENT
64 F PMHx of HTN, COPD, and morbid obesity who was admitted on 11/26 for perforated sigmoid diverticulitis.  No fever, has leukocytosis  Prolonged hospital stay  Perforated diverticulum, culture with E coli, s/p OR into washout and ostomy  Had PICC line placed during hospital stay  BCX now positive CoNS, repeat now clear  PICC has been DCed  CT with multiloculated collections in abd  CXR clear  Overall,  1) CoNS bacteremia--? CLABSI  - Vancomycin 1g q 12 (repeat trough before next dose--last level was borderline)  - F/U pending BCXs  2) Loculated fluid collections  - Infected collections?  - Meropenem 1g q 8  - Consideration for IR aspiration  3) Leukocytosis  - Trend to normal, monitor for further signs infection  4) Positive culture finding  - Steno from sputum, unclear significance  - Continue Levaquin for now  - Follow up culture for S pattern    Sen Johnson MD  Pager [phone]  After 5pm and on weekends call [phone]

## 2020-01-13 NOTE — CONSULT NOTE ADULT - SUBJECTIVE AND OBJECTIVE BOX
HPI:  Ms. Jabier Patel is a 64 year old woman PH morbid obesity, HTN, COPD who was admitted on 11/26 with perforated sigmoid diverticulitis s/p hemicolectomy. Post op course has been complicated by wound infection and UTI. Dermatology is consulted for rash on trunk and extremities x 1 week. Itchy. Using emollients. Patient also notes painful erosions on L lower back that started on Friday.     Medications  Ceftriaxone 1/6-1/7  Meropenem 12/3-12/10, 1/8-current  Zosyn 12/25, 1/7-1/9  Levofloxacin 1/12-current  Vancomycin 1/7-current    PAST MEDICAL & SURGICAL HISTORY:  Morbid Obesity  Post Menopausal Bleeding  Polyp, Vagina  Acid Reflux  HTN (Hypertension)  S/P D&C: 2009, w vaginal polypectomy      FAMILY HISTORY:      SOCIAL HISTORY:  - Active smoker 1pack day for many years  - Lives in private residence with domestic partner    MEDICATIONS  (STANDING):  lactated ringers Bolus 1000 milliLiter(s) IV Bolus once  morphine  - Injectable 4 milliGRAM(s) IV Push once  ondansetron Injectable 4 milliGRAM(s) IV Push once  piperacillin/tazobactam IVPB. 3.375 Gram(s) IV Intermittent Once    MEDICATIONS  (PRN):    Allergies    sulfa drugs (Unknown)    Intolerances        Vital Signs Last 24 Hrs  T(C): 37.1 (25 Nov 2019 22:50), Max: 37.1 (25 Nov 2019 17:53)  T(F): 98.7 (25 Nov 2019 22:50), Max: 98.8 (25 Nov 2019 17:53)  HR: 98 (25 Nov 2019 22:50) (62 - 98)  BP: 107/73 (25 Nov 2019 22:50) (107/73 - 110/70)  BP(mean): --  RR: 16 (25 Nov 2019 22:50) (16 - 16)  SpO2: 94% (25 Nov 2019 22:50) (94% - 100%)  Daily     Daily     General: NAD, well-nourished  HEENT: Atraumatic, EOMI  Resp: Breathing comfortably on RA  CV: Normal sinus rhythm  Abd: soft, distended, mildly tender in LLQ, no RT/guarding  Ext: ROMIx4, motor strength intact x 4                          16.9   11.20 )-----------( 333      ( 25 Nov 2019 20:43 )             50.6     11-25    131<L>  |  97  |  20  ----------------------------<  151<H>  6.1<H>   |  17<L>  |  0.78    Ca    10.1      25 Nov 2019 20:43  Phos  3.1     11-25  Mg     2.2     11-25    TPro  8.0  /  Alb  2.9<L>  /  TBili  1.3<H>  /  DBili  x   /  AST  36  /  ALT  33  /  AlkPhos  108  11-25    PT/INR - ( 25 Nov 2019 20:43 )   PT: 15.2 sec;   INR: 1.31 ratio         PTT - ( 25 Nov 2019 20:43 )  PTT:27.2 sec      Radiographic Findings:             EXAM:  CT ABDOMEN AND PELVIS IC                            PROCEDURE DATE:  11/25/2019            INTERPRETATION:  CLINICAL INFORMATION: Constipation for weeks.    COMPARISON: None.    PROCEDURE:   CT of the Abdomen and Pelvis was performed with intravenous contrast.   Intravenous contrast: 125 ml Omnipaque 350. 25 ml discarded.  Oral contrast: None.  Sagittal and coronal reformats were performed.    FINDINGS:    LOWER CHEST: Aortic valve and coronary artery calcifications. Calcified   granuloma in the right lower lobe.     LIVER: Small calcifications in the right lobe the liver.  BILE DUCTS: Normal caliber.  GALLBLADDER: Distended, up to 6.3 cm. No gallbladder wall thickening or   pericholecystic fluid.  SPLEEN: Within normal limits.  PANCREAS: Within normal limits.  ADRENALS: Within normal limits.  KIDNEYS/URETERS: 1.2 cm indeterminate right lower pole exophytic   hypodensity (5, 7). Additional subcentimeter hypodensities too small to   characterize within the right kidney. No hydronephrosis.    BLADDER: Underdistended, limiting evaluation.  REPRODUCTIVE ORGANS: The uterus is within normal limits. 2.4 cm left   adnexal cystic lesion (3:106).    BOWEL: The esophagus is slightly thickened. Colonic diverticulosis. Wall   thickening and inflammatory stranding centered around the sigmoid colon.   Small ascites and free air with suspected perforation from the proximal   sigmoid colon (3, 85). Areas of peripheral enhancement within the   peritoneal fluid without organized drainable collection identified at   this time. No bowel obstruction. Appendix is normal.  VESSELS: Atherosclerotic changes.  RETROPERITONEUM/LYMPH NODES: No lymphadenopathy.    ABDOMINAL WALL: Small umbilical hernia containing fat and free air.   Nonspecific lower anterior abdominal wall edema.  BONES: Degenerative changes. Grade 1 anterolisthesis at L4-L5 and grade 1   listhesis at L5-S1    IMPRESSION:     Perforated sigmoid diverticulitis with intraperitoneal free air and   peripherally enhancing free fluid. No discrete abscess identified at this   time.    Thickening of the esophagus. Correlation with endoscopy is recommended on   a nonemergent basis.    Indeterminate 1.2 cm right lower pole exophytic hypodensity. Further   evaluation with nonurgent ultrasound or MR is recommended.    Urgent findings were discussed with Dr. Garrett at 11/25/2019 10:59 PM by    with read back confirmation.                    SASCHA CAMACHO M.D., RADIOLOGY RESIDENT  This document has been electronically signed.  SHYAM GALLO M.D., ATTENDING RADIOLOGIST  This document has been electronically signed. Nov 25 2019 11:26PM (26 Nov 2019 00:21)      PAST MEDICAL & SURGICAL HISTORY:  Morbid Obesity  Post Menopausal Bleeding  Polyp, Vagina  Acid Reflux  HTN (Hypertension)  S/P D&C: 2009, w vaginal polypectomy      Review of Systems:  Constitutional: denies fevers, chills  HEENT: odynophagia or dysphagia  Cardiovascular: denies palpitations  Respiratory: denies SOB, wheezing  Gastrointestinal: denies N/V/D, abdominal pain  : denies dysuria  MSK: denies weakness, joint pain  Skin: denies new rashes or masses unless otherwise specified in hpi    MEDICATIONS  (STANDING):  albuterol/ipratropium for Nebulization 3 milliLiter(s) Nebulizer every 4 hours  artificial  tears Solution 1 Drop(s) Both EYES two times a day  aspirin  chewable 81 milliGRAM(s) Oral <User Schedule>  atorvastatin 40 milliGRAM(s) Oral <User Schedule>  buDESOnide    Inhalation Suspension 0.5 milliGRAM(s) Inhalation every 12 hours  chlorhexidine 2% Cloths 1 Application(s) Topical <User Schedule>  doxazosin 2 milliGRAM(s) Oral <User Schedule>  enoxaparin Injectable 100 milliGRAM(s) SubCutaneous <User Schedule>  erythromycin     base Tablet 250 milliGRAM(s) Oral <User Schedule>  HYDROmorphone  Injectable 0.5 milliGRAM(s) IV Push once  HYDROmorphone  Injectable 0.5 milliGRAM(s) IV Push once  levoFLOXacin IVPB 500 milliGRAM(s) IV Intermittent every 24 hours  meropenem  IVPB 1000 milliGRAM(s) IV Intermittent every 8 hours  methadone    Tablet 17.5 milliGRAM(s) Oral daily  midodrine 10 milliGRAM(s) Oral <User Schedule>  norepinephrine Infusion 0.05 MICROgram(s)/kG/Min (7.889 mL/Hr) IV Continuous <Continuous>  nystatin    Suspension 923439 Unit(s) Oral two times a day  nystatin Powder 1 Application(s) Topical two times a day  pantoprazole    Tablet 40 milliGRAM(s) Oral <User Schedule>  polyethylene glycol 3350 17 Gram(s) Oral <User Schedule>  sodium chloride 0.9%. 1000 milliLiter(s) (150 mL/Hr) IV Continuous <Continuous>  vancomycin  IVPB 1250 milliGRAM(s) IV Intermittent every 12 hours  vasopressin Infusion 0.03 Unit(s)/Min (1.8 mL/Hr) IV Continuous <Continuous>    MEDICATIONS  (PRN):  acetaminophen   Tablet .. 975 milliGRAM(s) Oral every 6 hours PRN Mild Pain (1 - 3)  aluminum hydroxide/magnesium hydroxide/simethicone Suspension 30 milliLiter(s) Oral every 4 hours PRN Dyspepsia      Allergies    IV Contrast (Rash; Pruritus; Hypotension)  sulfa drugs (Unknown)    Intolerances        SOCIAL HISTORY: denies drug use    FAMILY HISTORY: non-contributory      Vital Signs Last 24 Hrs  T(C): 36 (13 Jan 2020 07:00), Max: 37 (12 Jan 2020 15:00)  T(F): 96.8 (13 Jan 2020 07:00), Max: 98.6 (12 Jan 2020 15:00)  HR: 91 (13 Jan 2020 14:34) (76 - 132)  BP: 111/51 (13 Jan 2020 13:00) (30/55 - 149/97)  BP(mean): 63 (13 Jan 2020 13:00) (48 - 117)  RR: 24 (13 Jan 2020 13:00) (11 - 42)  SpO2: 99% (13 Jan 2020 14:34) (72% - 100%)    Physical Exam:  The patient was alert and oriented X 3, well nourished, and in no apparent distress. Oropharynx showed no ulcerations. There was no visible lymphadenopathy. Conjunctiva were non-injected. There was no clubbing or edema of extremities.    The scalp, hair, face, eyebrows, lips, oropharynx , neck, chest, back, buttocks, extremities X 4, hands, feet, nails were examined. There was no hyperhidrosis or bromhidrosis.     The following lesions are noted:   scattered erythematous patches on trunk and extremities, superficial desquamation on face, trunk, extremities  multiple erosions with surrounding erythema on left lower back    LABS:                        8.2    15.31 )-----------( 255      ( 13 Jan 2020 02:03 )             27.0     01-13    131<L>  |  103  |  8   ----------------------------<  112<H>  4.0   |  17<L>  |  0.62    Ca    8.0<L>      13 Jan 2020 02:03  Phos  2.1     01-13  Mg     1.8     01-13            RADIOLOGY & ADDITIONAL STUDIES: no relevant studies

## 2020-01-13 NOTE — PROGRESS NOTE ADULT - SUBJECTIVE AND OBJECTIVE BOX
HISTORY  64y Female  pmhx morbid obesity, acid reflux, HTN, COPD and PSH D&C presented to the ED on 11/26 c/o abdominal pain and bloating. Patient reports having constipation for 2 weeks and has been taking enemas, miralax and senna and passing small hard balls for the last weeks. Reported pain worst in the LLQ that started a few days ago and has been worsening in the last day, also has noted significant abdominal distention in the last day. Reported she had difficulty walking short distances because she becomes SOB. In ED, CT demonstrated perforated sigmoid diverticulitis with intraperitoneal free air. She was taken to the operating room on 11/25 overnight and underwent an exploratory laparotomy with extended left hemicolectomy and was left in discontinuity. An Abthera VAC was placed. She was kept intubated post op and brought to the ICU for hemodynamic and respiratory management. She went back to the OR on 11/29 for transverse end colostomy and fascial closure with wound vac.  Pt transferred out of SICU and recovering on floor with post-op course including induration of wound and colostomy necrosis requiring bedside debridement and wound vac placement.  Pt had CT on 1/6 for leukocytosis and to evaluate wound, which showed loculated fluid collections in the left anterior abdomen, very near loops of bowel.  After CT, pt reported feeling "worse" and was hypotensive overnight to SBP 70s requiring 1L fluid bolus.  Pt was bolused again this am for hypotension and blood cultures were sent for worsening leukocytosis, and BULL. SICU consulted for closer monitoring.  UA positive and patient was started on Ceftriaxone for presumed UTI.         24 HOUR EVENTS:   - IVF increased to 150ml/hr.   - Repeat Bcx sent   - Lower extremities ACE wrapped   - Amiodarone gtt discontinued   - Levofloxacin started for Stenotrophomonas in the sputum       SUBJECTIVE/ROS:  [ ] A ten-point review of systems was otherwise negative except as noted.  [ ] Due to altered mental status/intubation, subjective information were not able to be obtained from the patient. History was obtained, to the extent possible, from review of the chart and collateral sources of information.      NEURO  Exam: awake, alert, oriented  Meds: acetaminophen   Tablet .. 975 milliGRAM(s) Oral every 6 hours PRN Mild Pain (1 - 3)  methadone    Tablet 17.5 milliGRAM(s) Oral daily    [x] Adequacy of sedation and pain control has been assessed and adjusted      RESPIRATORY  RR: 19 (01-13-20 @ 00:00) (11 - 38)  SpO2: 100% (01-13-20 @ 00:00) (72% - 100%)  Exam: unlabored, clear to auscultation bilaterally  Mechanical Ventilation: none  [N/A] Extubation Readiness Assessed  Meds: albuterol/ipratropium for Nebulization 3 milliLiter(s) Nebulizer every 4 hours  buDESOnide    Inhalation Suspension 0.5 milliGRAM(s) Inhalation every 12 hours        CARDIOVASCULAR  HR: 82 (01-13-20 @ 00:00) (74 - 132)  BP: 104/58 (01-13-20 @ 00:00) (85/52 - 154/60)  BP(mean): 75 (01-13-20 @ 00:00) (56 - 117)  VBG - ( 12 Jan 2020 13:09 )  pH: 7.27  /  pCO2: 42    /  pO2: 39    / HCO3: 19    / Base Excess: -7.2  /  SaO2: 64     Lactate: 2.4    Exam: regular rate and rhythm  Cardiac Rhythm: sinus  Perfusion     [x]Adequate   [ ]Inadequate  Mentation   [x]Normal       [ ]Reduced  Extremities  [x]Warm         [ ]Cool  Volume Status [ ]Hypervolemic [x]Euvolemic [ ]Hypovolemic  Meds: doxazosin 2 milliGRAM(s) Oral <User Schedule>  midodrine 10 milliGRAM(s) Oral <User Schedule>  norepinephrine Infusion 0.05 MICROgram(s)/kG/Min IV Continuous <Continuous>        GI/NUTRITION  Exam: soft, nontender, nondistended  Diet: regular diet   Meds: aluminum hydroxide/magnesium hydroxide/simethicone Suspension 30 milliLiter(s) Oral every 4 hours PRN Dyspepsia  pantoprazole    Tablet 40 milliGRAM(s) Oral <User Schedule>  polyethylene glycol 3350 17 Gram(s) Oral <User Schedule>      GENITOURINARY  I&O's Detail    01-11 @ 07:01  -  01-12 @ 07:00  --------------------------------------------------------  IN:    Albumin 5%  - 500 mL: 500 mL    amiodarone Infusion: 265.6 mL    norepinephrine Infusion: 883 mL    Oral Fluid: 1150 mL    sodium chloride 0.9%: 3600 mL    Solution: 1200 mL    Solution: 100 mL    vasopressin Infusion: 39.6 mL  Total IN: 7738.2 mL    OUT:    Colostomy: 10 mL    Drain: 60 mL    VAC (Vacuum Assisted Closure) System: 100 mL    Voided: 500 mL  Total OUT: 670 mL    Total NET: 7068.2 mL      01-12 @ 07:01  -  01-13 @ 00:53  --------------------------------------------------------  IN:    norepinephrine Infusion: 370.2 mL    Oral Fluid: 750 mL    sodium chloride 0.9%.: 1950 mL    Solution: 150 mL    Solution: 350 mL    Solution: 100 mL    vasopressin Infusion: 32.4 mL  Total IN: 3702.6 mL    OUT:    Colostomy: 50 mL    VAC (Vacuum Assisted Closure) System: 50 mL    Voided: 320 mL  Total OUT: 420 mL    Total NET: 3282.6 mL          01-12    129<L>  |  101  |  9   ----------------------------<  133<H>  4.0   |  15<L>  |  0.67    Ca    8.1<L>      12 Jan 2020 13:15  Phos  2.5     01-12  Mg     2.0     01-12      [ ] Ross catheter, indication: N/A  Meds: sodium chloride 0.9%. 1000 milliLiter(s) IV Continuous <Continuous>        HEMATOLOGIC  Meds: aspirin  chewable 81 milliGRAM(s) Oral <User Schedule>  enoxaparin Injectable 100 milliGRAM(s) SubCutaneous <User Schedule>    [x] VTE Prophylaxis                        8.3    15.77 )-----------( 288      ( 12 Jan 2020 13:15 )             27.0       Transfusion     [ ] PRBC   [ ] Platelets   [ ] FFP   [ ] Cryoprecipitate      INFECTIOUS DISEASES  WBC Count: 15.77 K/uL (01-12 @ 13:15)  WBC Count: 16.53 K/uL (01-12 @ 05:54)    RECENT CULTURES:  Specimen Source: .Sputum Sputum  Date/Time: 01-11 @ 16:36  Culture Results:   Moderate Stenotrophomonas maltophilia  Gram Stain:   Few polymorphonuclear leukocytes per low power field  Few Squamous epithelial cells per low power field  Moderate Gram Negative Rods per oil power field  Organism: --  Specimen Source: .Blood Blood  Date/Time: 01-10 @ 16:40  Culture Results:   No growth to date.  Gram Stain: --  Organism: --  Specimen Source: .Blood Blood  Date/Time: 01-09 @ 15:16  Culture Results:   Growth in anaerobic bottle: Coag Negative Staphylococcus  Single set isolate, possible contaminant. Contact  Microbiology if susceptibility testing clinically  indicated.  Gram Stain:   Growth in anaerobic bottle: Gram Positive Cocci in Clusters  Organism: --  Specimen Source: .Blood Blood  Date/Time: 01-08 @ 12:35  Culture Results:   Growth in aerobic and anaerobic bottles: Staphylococcus epidermidis  Gram Stain:   Growth in aerobic bottle: Gram Positive Cocci in Clusters  Growth in anaerobic bottle: Gram Positive Cocci in Clusters  Organism: Staphylococcus epidermidis    Meds: erythromycin     base Tablet 250 milliGRAM(s) Oral <User Schedule>  levoFLOXacin IVPB 500 milliGRAM(s) IV Intermittent every 24 hours  meropenem  IVPB 1000 milliGRAM(s) IV Intermittent every 8 hours  nystatin    Suspension 830625 Unit(s) Oral two times a day  vancomycin  IVPB 1250 milliGRAM(s) IV Intermittent every 12 hours        ENDOCRINE  CAPILLARY BLOOD GLUCOSE        Meds: atorvastatin 40 milliGRAM(s) Oral <User Schedule>  vasopressin Infusion 0.03 Unit(s)/Min IV Continuous <Continuous>        ACCESS DEVICES:  [x] Peripheral IV  [ ] Central Venous Line	[ ] R	[ ] L	[ ] IJ	[ ] Fem	[ ] SC	Placed:   [ ] Arterial Line		[ ] R	[ ] L	[ ] Fem	[ ] Rad	[ ] Ax	Placed:   [ ] PICC:					[ ] Mediport  [ ] Urinary Catheter, Date Placed:   [x] Necessity of urinary, arterial, and venous catheters discussed    OTHER MEDICATIONS:  artificial  tears Solution 1 Drop(s) Both EYES two times a day  chlorhexidine 2% Cloths 1 Application(s) Topical <User Schedule>  nystatin Powder 1 Application(s) Topical two times a day      CODE STATUS: full code       IMAGING: < from: CT Abdomen and Pelvis w/ Oral Cont and w/ IV Cont (01.06.20 @ 10:47) >  FINDINGS:    LOWER CHEST: Subsegmental atelectasis of the left lower lobe. Trace left pleural effusion.    LIVER: Calcified granuloma in the right liver. Probable hepatic steatosis.  BILE DUCTS: Normal caliber.  GALLBLADDER: Within normal limits.   SPLEEN: Within normal limits.  PANCREAS: Within normal limits.  ADRENALS: Within normal limits.  KIDNEYS/URETERS: Subcentimeter hypodense foci in the right lower pole are too small to characterize.    BLADDER: Within normal limits.  REPRODUCTIVE ORGANS: Uterus and adnexa within normal limits.    BOWEL: Left hemicolectomy with Sesay's pouch and right lower quadrant colostomy. Interval removal of a left lower quadrant abdominal drain. No bowel obstruction.   PERITONEUM: Small loculated interloop fluid between small bowel loops in the mesentery.   VESSELS: Atherosclerotic changes.  RETROPERITONEUM/LYMPH NODES: No lymphadenopathy.    ABDOMINAL WALL: Postsurgical changes with open midline anterior abdominal wall.A 7.6 x 3.3 x 5.7 cm (AP x TR x CC) ill-defined multiloculated fluid collection in the left anterior abdomen (3, 74), at least partially intraperitoneal, as it is inseparable from small bowel loops. An additional small 2.7 x 2.3 x 3.1 cm (AP x TR x CC) fluid collection in the midline anterior abdominal wall. Anasarca.  BONES: Degenerative changes.    IMPRESSION:   Open anterior abdominal wall wound.    Loculated fluid collections in the left anterior abdomen, inseparable from small bowel loops, as well as a small abdominal wall collection.       < end of copied text >

## 2020-01-14 LAB
ANION GAP SERPL CALC-SCNC: 13 MMOL/L — SIGNIFICANT CHANGE UP (ref 5–17)
BUN SERPL-MCNC: 8 MG/DL — SIGNIFICANT CHANGE UP (ref 7–23)
CALCIUM SERPL-MCNC: 7.8 MG/DL — LOW (ref 8.4–10.5)
CHLORIDE SERPL-SCNC: 102 MMOL/L — SIGNIFICANT CHANGE UP (ref 96–108)
CO2 SERPL-SCNC: 17 MMOL/L — LOW (ref 22–31)
CREAT SERPL-MCNC: 0.65 MG/DL — SIGNIFICANT CHANGE UP (ref 0.5–1.3)
CULTURE RESULTS: SIGNIFICANT CHANGE UP
GAS PNL BLDV: SIGNIFICANT CHANGE UP
GLUCOSE SERPL-MCNC: 118 MG/DL — HIGH (ref 70–99)
HCT VFR BLD CALC: 25.7 % — LOW (ref 34.5–45)
HGB BLD-MCNC: 7.8 G/DL — LOW (ref 11.5–15.5)
HSV+VZV DNA SPEC QL NAA+PROBE: SIGNIFICANT CHANGE UP
MAGNESIUM SERPL-MCNC: 1.8 MG/DL — SIGNIFICANT CHANGE UP (ref 1.6–2.6)
MCHC RBC-ENTMCNC: 28.3 PG — SIGNIFICANT CHANGE UP (ref 27–34)
MCHC RBC-ENTMCNC: 30.4 GM/DL — LOW (ref 32–36)
MCV RBC AUTO: 93.1 FL — SIGNIFICANT CHANGE UP (ref 80–100)
NRBC # BLD: 0 /100 WBCS — SIGNIFICANT CHANGE UP (ref 0–0)
PHOSPHATE SERPL-MCNC: 3.2 MG/DL — SIGNIFICANT CHANGE UP (ref 2.5–4.5)
PLATELET # BLD AUTO: 218 K/UL — SIGNIFICANT CHANGE UP (ref 150–400)
POTASSIUM SERPL-MCNC: 3.7 MMOL/L — SIGNIFICANT CHANGE UP (ref 3.5–5.3)
POTASSIUM SERPL-SCNC: 3.7 MMOL/L — SIGNIFICANT CHANGE UP (ref 3.5–5.3)
RBC # BLD: 2.76 M/UL — LOW (ref 3.8–5.2)
RBC # FLD: 17.3 % — HIGH (ref 10.3–14.5)
SODIUM SERPL-SCNC: 132 MMOL/L — LOW (ref 135–145)
SPECIMEN SOURCE: SIGNIFICANT CHANGE UP
SPECIMEN SOURCE: SIGNIFICANT CHANGE UP
VANCOMYCIN TROUGH SERPL-MCNC: 28.5 UG/ML — CRITICAL HIGH (ref 10–20)
WBC # BLD: 11.95 K/UL — HIGH (ref 3.8–10.5)
WBC # FLD AUTO: 11.95 K/UL — HIGH (ref 3.8–10.5)

## 2020-01-14 PROCEDURE — 71045 X-RAY EXAM CHEST 1 VIEW: CPT | Mod: 26

## 2020-01-14 PROCEDURE — 99232 SBSQ HOSP IP/OBS MODERATE 35: CPT

## 2020-01-14 PROCEDURE — 93971 EXTREMITY STUDY: CPT | Mod: 26

## 2020-01-14 PROCEDURE — 99291 CRITICAL CARE FIRST HOUR: CPT

## 2020-01-14 RX ORDER — ACETAMINOPHEN 500 MG
1000 TABLET ORAL ONCE
Refills: 0 | Status: COMPLETED | OUTPATIENT
Start: 2020-01-14 | End: 2020-01-14

## 2020-01-14 RX ORDER — HYDROMORPHONE HYDROCHLORIDE 2 MG/ML
1 INJECTION INTRAMUSCULAR; INTRAVENOUS; SUBCUTANEOUS ONCE
Refills: 0 | Status: DISCONTINUED | OUTPATIENT
Start: 2020-01-14 | End: 2020-01-14

## 2020-01-14 RX ORDER — MAGNESIUM SULFATE 500 MG/ML
2 VIAL (ML) INJECTION ONCE
Refills: 0 | Status: COMPLETED | OUTPATIENT
Start: 2020-01-14 | End: 2020-01-14

## 2020-01-14 RX ORDER — MIDODRINE HYDROCHLORIDE 2.5 MG/1
20 TABLET ORAL EVERY 8 HOURS
Refills: 0 | Status: DISCONTINUED | OUTPATIENT
Start: 2020-01-14 | End: 2020-01-16

## 2020-01-14 RX ORDER — POTASSIUM CHLORIDE 20 MEQ
20 PACKET (EA) ORAL
Refills: 0 | Status: COMPLETED | OUTPATIENT
Start: 2020-01-14 | End: 2020-01-14

## 2020-01-14 RX ORDER — ALBUMIN HUMAN 25 %
250 VIAL (ML) INTRAVENOUS ONCE
Refills: 0 | Status: COMPLETED | OUTPATIENT
Start: 2020-01-14 | End: 2020-01-14

## 2020-01-14 RX ORDER — FLUCONAZOLE 150 MG/1
200 TABLET ORAL DAILY
Refills: 0 | Status: DISCONTINUED | OUTPATIENT
Start: 2020-01-14 | End: 2020-01-14

## 2020-01-14 RX ADMIN — Medication 0.5 MILLIGRAM(S): at 18:19

## 2020-01-14 RX ADMIN — NYSTATIN CREAM 1 APPLICATION(S): 100000 CREAM TOPICAL at 05:12

## 2020-01-14 RX ADMIN — Medication 0.5 MILLIGRAM(S): at 05:38

## 2020-01-14 RX ADMIN — MEROPENEM 100 MILLIGRAM(S): 1 INJECTION INTRAVENOUS at 05:10

## 2020-01-14 RX ADMIN — FLUCONAZOLE 200 MILLIGRAM(S): 150 TABLET ORAL at 13:28

## 2020-01-14 RX ADMIN — MEROPENEM 100 MILLIGRAM(S): 1 INJECTION INTRAVENOUS at 13:28

## 2020-01-14 RX ADMIN — MIDODRINE HYDROCHLORIDE 10 MILLIGRAM(S): 2.5 TABLET ORAL at 10:07

## 2020-01-14 RX ADMIN — ENOXAPARIN SODIUM 100 MILLIGRAM(S): 100 INJECTION SUBCUTANEOUS at 11:04

## 2020-01-14 RX ADMIN — Medication 400 MILLIGRAM(S): at 07:00

## 2020-01-14 RX ADMIN — Medication 3 MILLILITER(S): at 05:38

## 2020-01-14 RX ADMIN — Medication 1000 MILLILITER(S): at 05:10

## 2020-01-14 RX ADMIN — ATORVASTATIN CALCIUM 40 MILLIGRAM(S): 80 TABLET, FILM COATED ORAL at 19:44

## 2020-01-14 RX ADMIN — POLYETHYLENE GLYCOL 3350 17 GRAM(S): 17 POWDER, FOR SOLUTION ORAL at 18:28

## 2020-01-14 RX ADMIN — Medication 7.89 MICROGRAM(S)/KG/MIN: at 19:45

## 2020-01-14 RX ADMIN — MEROPENEM 100 MILLIGRAM(S): 1 INJECTION INTRAVENOUS at 21:46

## 2020-01-14 RX ADMIN — Medication 250 MILLIGRAM(S): at 17:16

## 2020-01-14 RX ADMIN — CHLORHEXIDINE GLUCONATE 1 APPLICATION(S): 213 SOLUTION TOPICAL at 05:13

## 2020-01-14 RX ADMIN — Medication 20 MILLIEQUIVALENT(S): at 03:09

## 2020-01-14 RX ADMIN — POLYETHYLENE GLYCOL 3350 17 GRAM(S): 17 POWDER, FOR SOLUTION ORAL at 07:28

## 2020-01-14 RX ADMIN — Medication 1000 MILLIGRAM(S): at 07:15

## 2020-01-14 RX ADMIN — MIDODRINE HYDROCHLORIDE 10 MILLIGRAM(S): 2.5 TABLET ORAL at 03:10

## 2020-01-14 RX ADMIN — HYDROMORPHONE HYDROCHLORIDE 0.5 MILLIGRAM(S): 2 INJECTION INTRAMUSCULAR; INTRAVENOUS; SUBCUTANEOUS at 05:13

## 2020-01-14 RX ADMIN — POLYETHYLENE GLYCOL 3350 17 GRAM(S): 17 POWDER, FOR SOLUTION ORAL at 12:10

## 2020-01-14 RX ADMIN — SODIUM CHLORIDE 150 MILLILITER(S): 9 INJECTION INTRAMUSCULAR; INTRAVENOUS; SUBCUTANEOUS at 19:45

## 2020-01-14 RX ADMIN — Medication 81 MILLIGRAM(S): at 19:44

## 2020-01-14 RX ADMIN — HYDROMORPHONE HYDROCHLORIDE 1 MILLIGRAM(S): 2 INJECTION INTRAMUSCULAR; INTRAVENOUS; SUBCUTANEOUS at 07:00

## 2020-01-14 RX ADMIN — MIDODRINE HYDROCHLORIDE 20 MILLIGRAM(S): 2.5 TABLET ORAL at 21:46

## 2020-01-14 RX ADMIN — Medication 7.89 MICROGRAM(S)/KG/MIN: at 08:47

## 2020-01-14 RX ADMIN — Medication 3 MILLILITER(S): at 11:24

## 2020-01-14 RX ADMIN — Medication 3 MILLILITER(S): at 18:20

## 2020-01-14 RX ADMIN — Medication 2 MILLIGRAM(S): at 05:12

## 2020-01-14 RX ADMIN — Medication 20 MILLIEQUIVALENT(S): at 05:11

## 2020-01-14 RX ADMIN — METHADONE HYDROCHLORIDE 17.5 MILLIGRAM(S): 40 TABLET ORAL at 10:07

## 2020-01-14 RX ADMIN — VASOPRESSIN 1.8 UNIT(S)/MIN: 20 INJECTION INTRAVENOUS at 19:45

## 2020-01-14 RX ADMIN — Medication 1 DROP(S): at 05:13

## 2020-01-14 RX ADMIN — PANTOPRAZOLE SODIUM 40 MILLIGRAM(S): 20 TABLET, DELAYED RELEASE ORAL at 05:11

## 2020-01-14 RX ADMIN — Medication 500000 UNIT(S): at 07:28

## 2020-01-14 RX ADMIN — Medication 250 MILLIGRAM(S): at 10:07

## 2020-01-14 RX ADMIN — Medication 50 GRAM(S): at 03:10

## 2020-01-14 NOTE — PROGRESS NOTE ADULT - ATTENDING COMMENTS
I saw and examined the pt and discussed the tx plan with the House Staff. I agree with the exam and plan as documented in the surgery resident's note from today.  Remains on pressors. Has multiple sources of infection. Denies abdominal pain.   Stoma functioning.  Continue ICU care.  Will consider re-CT (po contrast only) tomorrow depending on progress.  Liset Vargas MD

## 2020-01-14 NOTE — PROGRESS NOTE ADULT - SUBJECTIVE AND OBJECTIVE BOX
SICU DAILY PROGRESS NOTE    64y Female  pmhx morbid obesity, acid reflux, HTN, COPD and PSH D&C presented to the ED on 11/26 c/o abdominal pain and bloating. Patient reports having constipation for 2 weeks and has been taking enemas, miralax and senna and passing small hard balls for the last weeks. Reported pain worst in the LLQ that started a few days ago and has been worsening in the last day, also has noted significant abdominal distention in the last day. Reported she had difficulty walking short distances because she becomes SOB. In ED, CT demonstrated perforated sigmoid diverticulitis with intraperitoneal free air. She was taken to the operating room on 11/25 overnight and underwent an exploratory laparotomy with extended left hemicolectomy and was left in discontinuity. An Abthera VAC was placed. She was kept intubated post op and brought to the ICU for hemodynamic and respiratory management. She went back to the OR on 11/29 for transverse end colostomy and fascial closure with wound vac.  Pt transferred out of SICU and recovering on floor with post-op course including induration of wound and colostomy necrosis requiring bedside debridement and wound vac placement.  Pt had CT on 1/6 for leukocytosis and to evaluate wound, which showed loculated fluid collections in the left anterior abdomen, very near loops of bowel.  After CT, pt reported feeling "worse" and was hypotensive overnight to SBP 70s requiring 1L fluid bolus.  Pt was bolused again this am for hypotension and blood cultures were sent for worsening leukocytosis, and BULL. SICU consulted for closer monitoring.  UA positive and patient was started on Ceftriaxone for presumed UTI.       24 HOUR EVENTS:  - restarted on Levo gtt, remained on it through the night  - 250 albumin x 2 given   - UOP remains low ~300cc in 24hrs w/ few incontinent episodes  - Derm consulted for superficial desquamation  - LUE duplex ordered to r/o dvt    SUBJECTIVE/ROS:  [x] A ten-point review of systems was otherwise negative except as noted.  [ ] Due to altered mental status/intubation, subjective information were not able to be obtained from the patient. History was obtained, to the extent possible, from review of the chart and collateral sources of information.      NEURO  Exam: awake, alert, oriented  Meds: acetaminophen   Tablet .. 975 milliGRAM(s) Oral every 6 hours PRN Mild Pain (1 - 3)  HYDROmorphone  Injectable 0.5 milliGRAM(s) IV Push once  methadone    Tablet 17.5 milliGRAM(s) Oral daily    [x] Adequacy of sedation and pain control has been assessed and adjusted      RESPIRATORY  RR: 16 (01-14-20 @ 04:00) (11 - 38)  SpO2: 99% (01-14-20 @ 04:00) (72% - 100%)  Wt(kg): --  Exam: unlabored, clear to auscultation bilaterally  Mechanical Ventilation:     [N/A] Extubation Readiness Assessed  Meds: albuterol/ipratropium for Nebulization 3 milliLiter(s) Nebulizer every 4 hours  buDESOnide    Inhalation Suspension 0.5 milliGRAM(s) Inhalation every 12 hours    CARDIOVASCULAR  HR: 84 (01-14-20 @ 04:00) (76 - 104)  BP: 91/55 (01-14-20 @ 04:00) (60/45 - 149/97)  BP(mean): 68 (01-14-20 @ 04:00) (48 - 105)  ABP: --  ABP(mean): --  Wt(kg): --  CVP(cm H2O): --  VBG - ( 14 Jan 2020 00:52 )  pH: 7.30  /  pCO2: 42    /  pO2: 41    / HCO3: 20    / Base Excess: -5.6  /  SaO2: 72     Lactate: 2.1        Exam: regular rate and rhythm  Cardiac Rhythm: sinus  Perfusion     [x]Adequate   [ ]Inadequate  Mentation   [x]Normal       [ ]Reduced  Extremities  [x]Warm         [ ]Cool  Volume Status [ ]Hypervolemic [x]Euvolemic [ ]Hypovolemic  Meds: doxazosin 2 milliGRAM(s) Oral <User Schedule>  midodrine 10 milliGRAM(s) Oral <User Schedule>  norepinephrine Infusion 0.05 MICROgram(s)/kG/Min IV Continuous <Continuous>      GI/NUTRITION  Exam: soft, nontender, nondistended, incision C/D/I  Diet: regular  Meds: aluminum hydroxide/magnesium hydroxide/simethicone Suspension 30 milliLiter(s) Oral every 4 hours PRN Dyspepsia  pantoprazole    Tablet 40 milliGRAM(s) Oral <User Schedule>  polyethylene glycol 3350 17 Gram(s) Oral <User Schedule>      GENITOURINARY  I&O's Detail    01-12 @ 07:01  -  01-13 @ 07:00  --------------------------------------------------------  IN:    Albumin 5%  - 500 mL: 500 mL    norepinephrine Infusion: 398.5 mL    Oral Fluid: 750 mL    sodium chloride 0.9%.: 2850 mL    Solution: 617 mL    Solution: 150 mL    Solution: 150 mL    vasopressin Infusion: 43.2 mL  Total IN: 5458.7 mL    OUT:    Colostomy: 50 mL    Drain: 10 mL    VAC (Vacuum Assisted Closure) System: 75 mL    Voided: 420 mL  Total OUT: 555 mL    Total NET: 4903.7 mL      01-13 @ 07:01 - 01-14 @ 04:37  --------------------------------------------------------  IN:    Albumin 5%  - 500 mL: 250 mL    norepinephrine Infusion: 210.2 mL    Oral Fluid: 400 mL    sodium chloride 0.9%.: 3300 mL    Solution: 300 mL    Solution: 100 mL    Solution: 50 mL    vasopressin Infusion: 39.6 mL  Total IN: 4649.8 mL    OUT:    Voided: 300 mL  Total OUT: 300 mL    Total NET: 4349.8 mL          01-14    132<L>  |  102  |  8   ----------------------------<  118<H>  3.7   |  17<L>  |  0.65    Ca    7.8<L>      14 Jan 2020 00:21  Phos  3.2     01-14  Mg     1.8     01-14    TPro  5.2<L>  /  Alb  2.3<L>  /  TBili  0.6  /  DBili  0.3<H>  /  AST  43<H>  /  ALT  19  /  AlkPhos  144<H>  01-13    [ ] Ross catheter, indication: N/A  Meds: potassium chloride    Tablet ER 20 milliEquivalent(s) Oral every 2 hours  sodium chloride 0.9%. 1000 milliLiter(s) IV Continuous <Continuous>        HEMATOLOGIC  Meds: aspirin  chewable 81 milliGRAM(s) Oral <User Schedule>  enoxaparin Injectable 100 milliGRAM(s) SubCutaneous <User Schedule>    [x] VTE Prophylaxis                        7.8    11.95 )-----------( 218      ( 14 Jan 2020 00:21 )             25.7       Transfusion     [ ] PRBC   [ ] Platelets   [ ] FFP   [ ] Cryoprecipitate      INFECTIOUS DISEASES  WBC Count: 11.95 K/uL (01-14 @ 00:21)  WBC Count: 11.69 K/uL (01-13 @ 14:46)    RECENT CULTURES:  Specimen Source: .Blood Blood-Venous  Date/Time: 01-12 @ 16:53  Culture Results:   No growth to date.  Gram Stain: --  Organism: --  Specimen Source: .Sputum Sputum  Date/Time: 01-11 @ 16:36  Culture Results:   Moderate Stenotrophomonas maltophilia  Normal Respiratory Elaine absent  Gram Stain:   Few polymorphonuclear leukocytes per low power field  Few Squamous epithelial cells per low power field  Moderate Gram Negative Rods per oil power field  Organism: Stenotrophomonas maltophilia  Specimen Source: .Blood Blood  Date/Time: 01-10 @ 16:40  Culture Results:   No growth to date.  Gram Stain: --  Organism: --  Specimen Source: .Blood Blood  Date/Time: 01-09 @ 15:16  Culture Results:   Growth in anaerobic bottle: Coag Negative Staphylococcus  Single set isolate, possible contaminant. Contact  Microbiology if susceptibility testing clinically  indicated.  Gram Stain:   Growth in anaerobic bottle: Gram Positive Cocci in Clusters  Organism: --    Meds: erythromycin     base Tablet 250 milliGRAM(s) Oral <User Schedule>  levoFLOXacin IVPB 500 milliGRAM(s) IV Intermittent every 24 hours  meropenem  IVPB 1000 milliGRAM(s) IV Intermittent every 8 hours  nystatin    Suspension 529681 Unit(s) Oral two times a day        ENDOCRINE  CAPILLARY BLOOD GLUCOSE        Meds: atorvastatin 40 milliGRAM(s) Oral <User Schedule>  vasopressin Infusion 0.03 Unit(s)/Min IV Continuous <Continuous>        ACCESS DEVICES:  [x] Peripheral IV  [ ] Central Venous Line	[ ] R	[ ] L	[ ] IJ	[ ] Fem	[ ] SC	Placed:   [ ] Arterial Line		[ ] R	[ ] L	[ ] Fem	[ ] Rad	[ ] Ax	Placed:   [ ] PICC:					[ ] Mediport  [ ] Urinary Catheter, Date Placed:   [x] Necessity of urinary, arterial, and venous catheters discussed    OTHER MEDICATIONS:  artificial  tears Solution 1 Drop(s) Both EYES two times a day  chlorhexidine 2% Cloths 1 Application(s) Topical <User Schedule>  nystatin Powder 1 Application(s) Topical two times a day      CODE STATUS: full

## 2020-01-14 NOTE — PROGRESS NOTE ADULT - ASSESSMENT
64F with pmhx morbid obesity, acid reflux, HTN, COPD and PSH D&C presented to the ED on 11/26 c/o abdominal pain and bloating. Reported pain worst in the LLQ that started a few days ago and has been worsening in the last day, also has noted significant abdominal distention in the last day.  In ED, CT demonstrated perforated sigmoid diverticulitis with intraperitoneal free air. She was taken to the operating room on 11/25 overnight and underwent an exploratory laparotomy with extended left hemicolectomy and was left in discontinuity. An Abthera VAC was placed. She was kept intubated post op and brought to the ICU for hemodynamic and respiratory management. She went back to the OR on 11/29 for transverse end colostomy and fascial closure with wound vac.  Pt transferred out of SICU and recovering on floor with post-op course including induration of wound and colostomy necrosis requiring bedside debridement and wound vac placement.  Pt had CT on 1/6 for leukocytosis and to evaluate wound, which showed loculated fluid collections in the left anterior abdomen, very near loops of bowel.  After CT, pt reported feeling "worse" and was hypotensive overnight to SBP 70s requiring 1L fluid bolus.  Pt was bolused again this for hypotension and blood cultures were sent for worsening leukocytosis, and BULL.  SICU consulted for closer monitoring.  UA positive and patient was started on Ceftriaxone for presumed UTI.     Neuro: pain control, h/o chronic pain requiring methadone   - Tylenol prn  - Continue home dose Methadone     Resp: h/o COPD  - Albuterol prn  - Continue Symbicort    CV: Hypotension likely secondary to hypovolemia; septic shock ; h/o HTN; New wall motion abnormalities on echocardiogram   - Hemodynamic monitoring  - on Levo, 0.08 most recent  - Trend lactate 2.1 most recent  - albumin x 2 given  - Hold home anti-hypertensives  - Continue statin     GI: Perforated sigmoid diverticulitis s/p extended left hemicolectomy (11/25)  - Continue Regular diet  - Monitor wound vac output   - Protonix     /Renal: Hypovolemic hyponatremia, BULL  - Hyponatremia improving  - Trend BUN/Cr   - Replete electrolytes as needed  - Monitor UOP, remains low     ID: UTI, leukocytosis   - Continue meropenem and Levofloxacin   - Vanc trough 24.8, dose held yesterday PM  - f/u bcx, no growth to date    Heme: VTE prophylaxis  - Continue lovenox  - ASA    Endo:   - Monitor serum glucose     Dispo: SICU care. Full code.

## 2020-01-14 NOTE — PROGRESS NOTE ADULT - SUBJECTIVE AND OBJECTIVE BOX
CARDIOLOGY     PROGRESS  NOTE   ________________________________________________    CHIEF COMPLAINT:Patient is a 64y old  Female who presents with a chief complaint of perforated bowel (08 Dec 2019 09:22)  ciomfortable  	  REVIEW OF SYSTEMS:  CONSTITUTIONAL: No fever, weight loss, or fatigue  EYES: No eye pain, visual disturbances, or discharge  ENT:  No difficulty hearing, tinnitus, vertigo; No sinus or throat pain  NECK: No pain or stiffness  RESPIRATORY: No cough, wheezing, chills or hemoptysis; No Shortness of Breath  CARDIOVASCULAR: No chest pain, palpitations, passing out, dizziness, or leg swelling  GASTROINTESTINAL: No abdominal or epigastric pain. No nausea, vomiting, or hematemesis; No diarrhea or constipation. No melena or hematochezia.  GENITOURINARY: No dysuria, frequency, hematuria, or incontinence  NEUROLOGICAL: No headaches, memory loss, loss of strength, numbness, or tremors  SKIN: No itching, burning, rashes, or lesions   LYMPH Nodes: No enlarged glands  ENDOCRINE: No heat or cold intolerance; No hair loss  MUSCULOSKELETAL: No joint pain or swelling; No muscle, back, or extremity pain  PSYCHIATRIC: No depression, anxiety, mood swings, or difficulty sleeping  HEME/LYMPH: No easy bruising, or bleeding gums  ALLERGY AND IMMUNOLOGIC: No hives or eczema	    [ ] All others negative	  [ ] Unable to obtain    PHYSICAL EXAM:  T(C): 36 (01-14-20 @ 07:00), Max: 36.6 (01-13-20 @ 15:00)  HR: 85 (01-14-20 @ 09:30) (76 - 107)  BP: 122/54 (01-14-20 @ 09:30) (69/43 - 149/97)  RR: 20 (01-14-20 @ 09:30) (11 - 29)  SpO2: 100% (01-14-20 @ 09:30) (65% - 100%)  Wt(kg): --  I&O's Summary    13 Jan 2020 07:01  -  14 Jan 2020 07:00  --------------------------------------------------------  IN: 5428.6 mL / OUT: 410 mL / NET: 5018.6 mL    14 Jan 2020 07:01  -  14 Jan 2020 09:47  --------------------------------------------------------  IN: 333.6 mL / OUT: 0 mL / NET: 333.6 mL        Appearance: Normal	  HEENT:   Normal oral mucosa, PERRL, EOMI	  Lymphatic: No lymphadenopathy  Cardiovascular: Normal S1 S2, No JVD, + murmurs, + lymphedema  Respiratory: Lungs clear to auscultation	  Psychiatry: A & O x 3, Mood & affect appropriate  Gastrointestinal:  Soft, Non-tender, + BS	  Skin: No rashes, No ecchymoses, No cyanosis	  Neurologic: Non-focal  Extremities: Normal range of motion, No clubbing, cyanosis or edema  Vascular: Peripheral pulses palpable 2+ bilaterally    MEDICATIONS  (STANDING):  albuterol/ipratropium for Nebulization 3 milliLiter(s) Nebulizer every 4 hours  artificial  tears Solution 1 Drop(s) Both EYES two times a day  aspirin  chewable 81 milliGRAM(s) Oral <User Schedule>  atorvastatin 40 milliGRAM(s) Oral <User Schedule>  buDESOnide    Inhalation Suspension 0.5 milliGRAM(s) Inhalation every 12 hours  chlorhexidine 2% Cloths 1 Application(s) Topical <User Schedule>  doxazosin 2 milliGRAM(s) Oral <User Schedule>  enoxaparin Injectable 100 milliGRAM(s) SubCutaneous <User Schedule>  erythromycin     base Tablet 250 milliGRAM(s) Oral <User Schedule>  levoFLOXacin IVPB 500 milliGRAM(s) IV Intermittent every 24 hours  meropenem  IVPB 1000 milliGRAM(s) IV Intermittent every 8 hours  methadone    Tablet 17.5 milliGRAM(s) Oral daily  midodrine 10 milliGRAM(s) Oral <User Schedule>  norepinephrine Infusion 0.05 MICROgram(s)/kG/Min (7.889 mL/Hr) IV Continuous <Continuous>  nystatin    Suspension 563481 Unit(s) Oral two times a day  nystatin Powder 1 Application(s) Topical two times a day  pantoprazole    Tablet 40 milliGRAM(s) Oral <User Schedule>  polyethylene glycol 3350 17 Gram(s) Oral <User Schedule>  sodium chloride 0.9%. 1000 milliLiter(s) (150 mL/Hr) IV Continuous <Continuous>  vasopressin Infusion 0.03 Unit(s)/Min (1.8 mL/Hr) IV Continuous <Continuous>      TELEMETRY: 	    ECG:  	  RADIOLOGY:  OTHER: 	  	  LABS:	 	    CARDIAC MARKERS:  CARDIAC MARKERS ( 13 Jan 2020 02:03 )  x     / x     / 45 U/L / x     / 15.4 ng/mL                                7.8    11.95 )-----------( 218      ( 14 Jan 2020 00:21 )             25.7     01-14    132<L>  |  102  |  8   ----------------------------<  118<H>  3.7   |  17<L>  |  0.65    Ca    7.8<L>      14 Jan 2020 00:21  Phos  3.2     01-14  Mg     1.8     01-14    TPro  5.2<L>  /  Alb  2.3<L>  /  TBili  0.6  /  DBili  0.3<H>  /  AST  43<H>  /  ALT  19  /  AlkPhos  144<H>  01-13    proBNP:   Lipid Profile: Cholesterol 98  LDL 52  HDL 28  TG 91    HgA1c:   TSH:   1) CoNS bacteremia--? CLABSI  - Vancomycin 1g q 12 (repeat trough before next dose--last level was borderline)  - F/U pending BCXs  2) Loculated fluid collections  - Infected collections?  - Meropenem 1g q 8  - Consideration for IR aspiration  3) Leukocytosis  - Trend to normal, monitor for further signs infection  4) Positive culture finding  - Steno from sputum, unclear significance  - Continue Levaquin for now  - Follow up culture for S pattern      Assessment and plan  ---------------------------  increase trop and wall motion abnormality most probably is sec to sepsis.  continue iv abx  agree with infectious disease may consider other sources ie ir aspiration  agree to dc amio  try to decreae vaso as tolerated  dvt prophylaxis  will consider DONNA if pt bc remains POS/ remain, Id will hold otn DONNA now with neg cultures repeat  continue supportive measures/ prerssors  fu I/)

## 2020-01-14 NOTE — PROGRESS NOTE ADULT - ATTENDING COMMENTS
Patient seen and examined on 1/14 on AM rounds.   Awake and alert comfortable  Hypotension continues likely due ot combination of sepsis as well as hypovolemia - will continue levophed and vasopressin and wean as tolerated to MAP goal > 65 mmHg  -received albumin overnight   -lactate 2.1  Mentation intact    CXR reviewed - clear  SpO2 98% on room air   Stenotrophomonas on sputum cultures -continue levaquin at this time  Tolerating PO, no abd pain, stoma is functioning  COPD - continue current medications with goal SpO 88-92%  Dermatology also contacted for persistent rash and new desquamation  - likely due to medication allergy    Patient remains critically ill   CC time: 32 minutes

## 2020-01-14 NOTE — PROGRESS NOTE ADULT - SUBJECTIVE AND OBJECTIVE BOX
Morning Surgical Progress Note  Patient is a 64y old  Female who presents with a chief complaint of perforated bowel (08 Dec 2019 09:22)      SUBJECTIVE: Patient seen and examined at bedside with surgical team, there were no acute events overnight.     Vital Signs Last 24 Hrs  T(C): 36.3 (14 Jan 2020 03:00), Max: 36.6 (13 Jan 2020 15:00)  T(F): 97.3 (14 Jan 2020 03:00), Max: 97.9 (13 Jan 2020 15:00)  HR: 84 (14 Jan 2020 04:00) (76 - 104)  BP: 91/55 (14 Jan 2020 04:00) (60/45 - 149/97)  BP(mean): 68 (14 Jan 2020 04:00) (48 - 105)  RR: 16 (14 Jan 2020 04:00) (11 - 38)  SpO2: 99% (14 Jan 2020 04:00) (72% - 100%)I&O's Detail    12 Jan 2020 07:01  -  13 Jan 2020 07:00  --------------------------------------------------------  IN:    Albumin 5%  - 500 mL: 500 mL    norepinephrine Infusion: 398.5 mL    Oral Fluid: 750 mL    sodium chloride 0.9%.: 2850 mL    Solution: 617 mL    Solution: 150 mL    Solution: 150 mL    vasopressin Infusion: 43.2 mL  Total IN: 5458.7 mL    OUT:    Colostomy: 50 mL    Drain: 10 mL    VAC (Vacuum Assisted Closure) System: 75 mL    Voided: 420 mL  Total OUT: 555 mL    Total NET: 4903.7 mL      13 Jan 2020 07:01  -  14 Jan 2020 05:11  --------------------------------------------------------  IN:    Albumin 5%  - 500 mL: 250 mL    norepinephrine Infusion: 210.2 mL    Oral Fluid: 400 mL    sodium chloride 0.9%.: 3300 mL    Solution: 300 mL    Solution: 100 mL    Solution: 50 mL    vasopressin Infusion: 39.6 mL  Total IN: 4649.8 mL    OUT:    Voided: 300 mL  Total OUT: 300 mL    Total NET: 4349.8 mL      MEDICATIONS  (STANDING):  albumin human  5% IVPB 250 milliLiter(s) IV Intermittent once  albuterol/ipratropium for Nebulization 3 milliLiter(s) Nebulizer every 4 hours  artificial  tears Solution 1 Drop(s) Both EYES two times a day  aspirin  chewable 81 milliGRAM(s) Oral <User Schedule>  atorvastatin 40 milliGRAM(s) Oral <User Schedule>  buDESOnide    Inhalation Suspension 0.5 milliGRAM(s) Inhalation every 12 hours  chlorhexidine 2% Cloths 1 Application(s) Topical <User Schedule>  doxazosin 2 milliGRAM(s) Oral <User Schedule>  enoxaparin Injectable 100 milliGRAM(s) SubCutaneous <User Schedule>  erythromycin     base Tablet 250 milliGRAM(s) Oral <User Schedule>  HYDROmorphone  Injectable 0.5 milliGRAM(s) IV Push once  levoFLOXacin IVPB 500 milliGRAM(s) IV Intermittent every 24 hours  meropenem  IVPB 1000 milliGRAM(s) IV Intermittent every 8 hours  methadone    Tablet 17.5 milliGRAM(s) Oral daily  midodrine 10 milliGRAM(s) Oral <User Schedule>  norepinephrine Infusion 0.05 MICROgram(s)/kG/Min (7.889 mL/Hr) IV Continuous <Continuous>  nystatin    Suspension 300713 Unit(s) Oral two times a day  nystatin Powder 1 Application(s) Topical two times a day  pantoprazole    Tablet 40 milliGRAM(s) Oral <User Schedule>  polyethylene glycol 3350 17 Gram(s) Oral <User Schedule>  potassium chloride    Tablet ER 20 milliEquivalent(s) Oral every 2 hours  sodium chloride 0.9%. 1000 milliLiter(s) (150 mL/Hr) IV Continuous <Continuous>  vasopressin Infusion 0.03 Unit(s)/Min (1.8 mL/Hr) IV Continuous <Continuous>    MEDICATIONS  (PRN):  acetaminophen   Tablet .. 975 milliGRAM(s) Oral every 6 hours PRN Mild Pain (1 - 3)  aluminum hydroxide/magnesium hydroxide/simethicone Suspension 30 milliLiter(s) Oral every 4 hours PRN Dyspepsia      Physical Exam  Constitutional: A&Ox3, NAD  Respiratory: CTA b/l  Cardiac: RRR, S1 and S2  Gastrointestinal: Soft, ND, NT. No rebound or guarding. Vac in place holding suction.   Ext: WWP. Significant lymphedema of lower extremities.     LABS:                        7.8    11.95 )-----------( 218      ( 14 Jan 2020 00:21 )             25.7     01-14    132<L>  |  102  |  8   ----------------------------<  118<H>  3.7   |  17<L>  |  0.65    Ca    7.8<L>      14 Jan 2020 00:21  Phos  3.2     01-14  Mg     1.8     01-14    TPro  5.2<L>  /  Alb  2.3<L>  /  TBili  0.6  /  DBili  0.3<H>  /  AST  43<H>  /  ALT  19  /  AlkPhos  144<H>  01-13      LIVER FUNCTIONS - ( 13 Jan 2020 14:45 )  Alb: 2.3 g/dL / Pro: 5.2 g/dL / ALK PHOS: 144 U/L / ALT: 19 U/L / AST: 43 U/L / GGT: x

## 2020-01-14 NOTE — PROGRESS NOTE ADULT - ASSESSMENT
64 yr old female with PMHx morbid obesity, GERD, HTN, COPD, and pshx D&C now s/p ex laparotomy with extended left hemicolectomy 11/26 for perforated and necrotic sigmoid colon with gross abdominal contamination. RTOR 11/29 for abd closure, RUQ end transverse colostomy creation. On 12/19 patient found to have induration of wound and keri-stomal fat necrosis, s/p bedside debridement w/ wound VAC in placement on midline wound. Began showing signs of sepsis and was transferred to SICU 1/7 for further management. Now w/ decreasing pressor support and downtrending leukocytosis.     PLAN:  - f/u pressor requirements  - C/w IV abx: vanc, meropenem, levaquin  - trend lactate/WBC/ SBP  - Appreciate excellent SICU care    [facility]  [id number]

## 2020-01-14 NOTE — PROGRESS NOTE ADULT - SUBJECTIVE AND OBJECTIVE BOX
CC: F/U for PNA?    Saw/spoke to patient. No fevers, no chills. No new complaints. Unchanged.    Allergies  IV Contrast (Rash; Pruritus; Hypotension)  sulfa drugs (Unknown)    ANTIMICROBIALS:  erythromycin     base Tablet 250 <User Schedule>  fluconAZOLE   40 mG/mL Suspension 200 daily  levoFLOXacin IVPB 500 every 24 hours  meropenem  IVPB 1000 every 8 hours    PE:    Vital Signs Last 24 Hrs  T(C): 36.1 (14 Jan 2020 11:00), Max: 36.6 (13 Jan 2020 15:00)  T(F): 97 (14 Jan 2020 11:00), Max: 97.9 (13 Jan 2020 15:00)  HR: 89 (14 Jan 2020 12:45) (76 - 107)  BP: 103/58 (14 Jan 2020 12:45) (69/43 - 145/51)  BP(mean): 72 (14 Jan 2020 12:45) (52 - 960)  RR: 26 (14 Jan 2020 12:45) (11 - 29)  SpO2: 94% (14 Jan 2020 12:45) (65% - 100%)    Gen: AOx3, NAD, non-toxic, pleasant  CV: S1+S2 normal, nontachycardic  Resp: Clear bilat, no resp distress, no crackles/wheezes  Abd: Soft, nontender, +BS, ostomy, wound vac  Ext: No LE edema, no wounds    LABS:                        7.8    11.95 )-----------( 218      ( 14 Jan 2020 00:21 )             25.7     01-14    132<L>  |  102  |  8   ----------------------------<  118<H>  3.7   |  17<L>  |  0.65    Ca    7.8<L>      14 Jan 2020 00:21  Phos  3.2     01-14  Mg     1.8     01-14    TPro  5.2<L>  /  Alb  2.3<L>  /  TBili  0.6  /  DBili  0.3<H>  /  AST  43<H>  /  ALT  19  /  AlkPhos  144<H>  01-13    MICROBIOLOGY:  Vancomycin Level, Trough: 28.5 ug/mL (01-14-20 @ 00:57)  Vancomycin Level, Trough: 24.8 ug/mL (01-13-20 @ 17:43)    .Blood Blood-Venous  01-12-20   No growth to date.     .Sputum Sputum  01-11-20   Moderate Stenotrophomonas maltophilia  Normal Respiratory Elaine absent  --  Stenotrophomonas maltophilia    .Blood Blood  01-10-20   No growth to date.    .Blood Blood  01-09-20   Growth in anaerobic bottle: Coag Negative Staphylococcus  Single set isolate, possible contaminant. Contact  Microbiology if susceptibility testing clinically  indicated.  --    Growth in anaerobic bottle: Gram Positive Cocci in Clusters    (otherwise reviewed)    RADIOLOGY:    1/14 CXR:    FINDINGS:    LINES/TUBES: Right IJ approach central venous catheter with tip in the SVC.    LUNGS: The right lung is clear.    PLEURA: Persistent small left pleural effusion/atelectasis, unchanged from prior study. No pneumothorax.    HEART AND MEDIASTINUM: Heart size is not well evaluated in this projection.    IMPRESSION:     Persistent left pleural effusion/atelectasis, unchanged.

## 2020-01-14 NOTE — PROGRESS NOTE ADULT - ASSESSMENT
64 F PMHx of HTN, COPD, and morbid obesity who was admitted on 11/26 for perforated sigmoid diverticulitis.  No fever, has leukocytosis  Prolonged hospital stay  Perforated diverticulum, culture with E coli, s/p OR into washout and ostomy  Had PICC line placed during hospital stay  BCX now positive CoNS, repeat now clear  PICC has been DCed  CT with multiloculated collections in abd  CXR clear  Overall,  1) CoNS bacteremia--? CLABSI  - Monitor vanco levels, when <15 resume at 750mg q 12  - F/U pending BCXs  2) Loculated fluid collections  - Infected collections?  - Meropenem 1g q 8  3) Leukocytosis  - Trend to normal, monitor for further signs infection  4) Positive culture finding  - Steno from sputum, unclear significance  - Continue Levaquin, plan for 7 day course    Discussed with SICU team    Sen Johnson MD  Pager [phone]  After 5pm and on weekends call [phone]

## 2020-01-15 LAB
ANION GAP SERPL CALC-SCNC: 9 MMOL/L — SIGNIFICANT CHANGE UP (ref 5–17)
BLD GP AB SCN SERPL QL: NEGATIVE — SIGNIFICANT CHANGE UP
BUN SERPL-MCNC: 8 MG/DL — SIGNIFICANT CHANGE UP (ref 7–23)
CALCIUM SERPL-MCNC: 7.7 MG/DL — LOW (ref 8.4–10.5)
CHLORIDE SERPL-SCNC: 107 MMOL/L — SIGNIFICANT CHANGE UP (ref 96–108)
CO2 SERPL-SCNC: 18 MMOL/L — LOW (ref 22–31)
CREAT SERPL-MCNC: 0.62 MG/DL — SIGNIFICANT CHANGE UP (ref 0.5–1.3)
CULTURE RESULTS: SIGNIFICANT CHANGE UP
CULTURE RESULTS: SIGNIFICANT CHANGE UP
GAS PNL BLDV: SIGNIFICANT CHANGE UP
GLUCOSE SERPL-MCNC: 100 MG/DL — HIGH (ref 70–99)
HCT VFR BLD CALC: 24.2 % — LOW (ref 34.5–45)
HCT VFR BLD CALC: 29.8 % — LOW (ref 34.5–45)
HGB BLD-MCNC: 7.4 G/DL — LOW (ref 11.5–15.5)
HGB BLD-MCNC: 9.1 G/DL — LOW (ref 11.5–15.5)
MAGNESIUM SERPL-MCNC: 1.9 MG/DL — SIGNIFICANT CHANGE UP (ref 1.6–2.6)
MCHC RBC-ENTMCNC: 27.8 PG — SIGNIFICANT CHANGE UP (ref 27–34)
MCHC RBC-ENTMCNC: 27.9 PG — SIGNIFICANT CHANGE UP (ref 27–34)
MCHC RBC-ENTMCNC: 30.5 GM/DL — LOW (ref 32–36)
MCHC RBC-ENTMCNC: 30.6 GM/DL — LOW (ref 32–36)
MCV RBC AUTO: 91 FL — SIGNIFICANT CHANGE UP (ref 80–100)
MCV RBC AUTO: 91.4 FL — SIGNIFICANT CHANGE UP (ref 80–100)
NRBC # BLD: 0 /100 WBCS — SIGNIFICANT CHANGE UP (ref 0–0)
NRBC # BLD: 0 /100 WBCS — SIGNIFICANT CHANGE UP (ref 0–0)
PHOSPHATE SERPL-MCNC: 2.3 MG/DL — LOW (ref 2.5–4.5)
PLATELET # BLD AUTO: 207 K/UL — SIGNIFICANT CHANGE UP (ref 150–400)
PLATELET # BLD AUTO: 220 K/UL — SIGNIFICANT CHANGE UP (ref 150–400)
POTASSIUM SERPL-MCNC: 3.9 MMOL/L — SIGNIFICANT CHANGE UP (ref 3.5–5.3)
POTASSIUM SERPL-SCNC: 3.9 MMOL/L — SIGNIFICANT CHANGE UP (ref 3.5–5.3)
RBC # BLD: 2.66 M/UL — LOW (ref 3.8–5.2)
RBC # BLD: 3.26 M/UL — LOW (ref 3.8–5.2)
RBC # FLD: 18 % — HIGH (ref 10.3–14.5)
RBC # FLD: 18.1 % — HIGH (ref 10.3–14.5)
RH IG SCN BLD-IMP: POSITIVE — SIGNIFICANT CHANGE UP
SODIUM SERPL-SCNC: 134 MMOL/L — LOW (ref 135–145)
SPECIMEN SOURCE: SIGNIFICANT CHANGE UP
SPECIMEN SOURCE: SIGNIFICANT CHANGE UP
VANCOMYCIN FLD-MCNC: 13.6 UG/ML — SIGNIFICANT CHANGE UP
WBC # BLD: 10 K/UL — SIGNIFICANT CHANGE UP (ref 3.8–10.5)
WBC # BLD: 15.13 K/UL — HIGH (ref 3.8–10.5)
WBC # FLD AUTO: 10 K/UL — SIGNIFICANT CHANGE UP (ref 3.8–10.5)
WBC # FLD AUTO: 15.13 K/UL — HIGH (ref 3.8–10.5)

## 2020-01-15 PROCEDURE — 71045 X-RAY EXAM CHEST 1 VIEW: CPT | Mod: 26

## 2020-01-15 PROCEDURE — 74176 CT ABD & PELVIS W/O CONTRAST: CPT | Mod: 26

## 2020-01-15 PROCEDURE — 99291 CRITICAL CARE FIRST HOUR: CPT

## 2020-01-15 PROCEDURE — 71250 CT THORAX DX C-: CPT | Mod: 26

## 2020-01-15 RX ORDER — HYDROMORPHONE HYDROCHLORIDE 2 MG/ML
0.5 INJECTION INTRAMUSCULAR; INTRAVENOUS; SUBCUTANEOUS ONCE
Refills: 0 | Status: DISCONTINUED | OUTPATIENT
Start: 2020-01-15 | End: 2020-01-15

## 2020-01-15 RX ORDER — MAGNESIUM SULFATE 500 MG/ML
2 VIAL (ML) INJECTION ONCE
Refills: 0 | Status: COMPLETED | OUTPATIENT
Start: 2020-01-15 | End: 2020-01-15

## 2020-01-15 RX ORDER — POTASSIUM PHOSPHATE, MONOBASIC POTASSIUM PHOSPHATE, DIBASIC 236; 224 MG/ML; MG/ML
30 INJECTION, SOLUTION INTRAVENOUS ONCE
Refills: 0 | Status: COMPLETED | OUTPATIENT
Start: 2020-01-15 | End: 2020-01-15

## 2020-01-15 RX ORDER — VASOPRESSIN 20 [USP'U]/ML
0.03 INJECTION INTRAVENOUS
Qty: 50 | Refills: 0 | Status: DISCONTINUED | OUTPATIENT
Start: 2020-01-15 | End: 2020-01-20

## 2020-01-15 RX ORDER — PETROLATUM,WHITE
1 JELLY (GRAM) TOPICAL THREE TIMES A DAY
Refills: 0 | Status: DISCONTINUED | OUTPATIENT
Start: 2020-01-15 | End: 2020-02-05

## 2020-01-15 RX ORDER — HYDROMORPHONE HYDROCHLORIDE 2 MG/ML
1 INJECTION INTRAMUSCULAR; INTRAVENOUS; SUBCUTANEOUS ONCE
Refills: 0 | Status: DISCONTINUED | OUTPATIENT
Start: 2020-01-15 | End: 2020-01-15

## 2020-01-15 RX ORDER — ACETAMINOPHEN 500 MG
1000 TABLET ORAL ONCE
Refills: 0 | Status: COMPLETED | OUTPATIENT
Start: 2020-01-15 | End: 2020-01-15

## 2020-01-15 RX ORDER — VANCOMYCIN HCL 1 G
1000 VIAL (EA) INTRAVENOUS EVERY 12 HOURS
Refills: 0 | Status: DISCONTINUED | OUTPATIENT
Start: 2020-01-15 | End: 2020-01-19

## 2020-01-15 RX ORDER — MEROPENEM 1 G/30ML
1000 INJECTION INTRAVENOUS EVERY 8 HOURS
Refills: 0 | Status: COMPLETED | OUTPATIENT
Start: 2020-01-15 | End: 2020-01-22

## 2020-01-15 RX ORDER — METHADONE HYDROCHLORIDE 40 MG/1
17.5 TABLET ORAL
Refills: 0 | Status: DISCONTINUED | OUTPATIENT
Start: 2020-01-15 | End: 2020-01-19

## 2020-01-15 RX ADMIN — Medication 2 MILLIGRAM(S): at 06:12

## 2020-01-15 RX ADMIN — VASOPRESSIN 1.8 UNIT(S)/MIN: 20 INJECTION INTRAVENOUS at 22:16

## 2020-01-15 RX ADMIN — HYDROMORPHONE HYDROCHLORIDE 0.5 MILLIGRAM(S): 2 INJECTION INTRAMUSCULAR; INTRAVENOUS; SUBCUTANEOUS at 13:39

## 2020-01-15 RX ADMIN — Medication 81 MILLIGRAM(S): at 22:15

## 2020-01-15 RX ADMIN — Medication 250 MILLIGRAM(S): at 18:37

## 2020-01-15 RX ADMIN — Medication 400 MILLIGRAM(S): at 04:27

## 2020-01-15 RX ADMIN — Medication 250 MILLIGRAM(S): at 17:46

## 2020-01-15 RX ADMIN — HYDROMORPHONE HYDROCHLORIDE 0.5 MILLIGRAM(S): 2 INJECTION INTRAMUSCULAR; INTRAVENOUS; SUBCUTANEOUS at 13:37

## 2020-01-15 RX ADMIN — HYDROMORPHONE HYDROCHLORIDE 0.5 MILLIGRAM(S): 2 INJECTION INTRAMUSCULAR; INTRAVENOUS; SUBCUTANEOUS at 10:38

## 2020-01-15 RX ADMIN — MIDODRINE HYDROCHLORIDE 20 MILLIGRAM(S): 2.5 TABLET ORAL at 16:28

## 2020-01-15 RX ADMIN — Medication 3 MILLILITER(S): at 05:56

## 2020-01-15 RX ADMIN — POTASSIUM PHOSPHATE, MONOBASIC POTASSIUM PHOSPHATE, DIBASIC 83.33 MILLIMOLE(S): 236; 224 INJECTION, SOLUTION INTRAVENOUS at 06:12

## 2020-01-15 RX ADMIN — Medication 7.89 MICROGRAM(S)/KG/MIN: at 22:15

## 2020-01-15 RX ADMIN — HYDROMORPHONE HYDROCHLORIDE 1 MILLIGRAM(S): 2 INJECTION INTRAMUSCULAR; INTRAVENOUS; SUBCUTANEOUS at 03:40

## 2020-01-15 RX ADMIN — Medication 1 APPLICATION(S): at 22:17

## 2020-01-15 RX ADMIN — ATORVASTATIN CALCIUM 40 MILLIGRAM(S): 80 TABLET, FILM COATED ORAL at 22:14

## 2020-01-15 RX ADMIN — MEROPENEM 100 MILLIGRAM(S): 1 INJECTION INTRAVENOUS at 22:15

## 2020-01-15 RX ADMIN — HYDROMORPHONE HYDROCHLORIDE 1 MILLIGRAM(S): 2 INJECTION INTRAMUSCULAR; INTRAVENOUS; SUBCUTANEOUS at 03:20

## 2020-01-15 RX ADMIN — PANTOPRAZOLE SODIUM 40 MILLIGRAM(S): 20 TABLET, DELAYED RELEASE ORAL at 06:12

## 2020-01-15 RX ADMIN — Medication 250 MILLIGRAM(S): at 06:12

## 2020-01-15 RX ADMIN — NYSTATIN CREAM 1 APPLICATION(S): 100000 CREAM TOPICAL at 17:38

## 2020-01-15 RX ADMIN — MEROPENEM 100 MILLIGRAM(S): 1 INJECTION INTRAVENOUS at 16:14

## 2020-01-15 RX ADMIN — Medication 3 MILLILITER(S): at 23:37

## 2020-01-15 RX ADMIN — Medication 250 MILLIGRAM(S): at 09:42

## 2020-01-15 RX ADMIN — CHLORHEXIDINE GLUCONATE 1 APPLICATION(S): 213 SOLUTION TOPICAL at 06:12

## 2020-01-15 RX ADMIN — ENOXAPARIN SODIUM 100 MILLIGRAM(S): 100 INJECTION SUBCUTANEOUS at 12:16

## 2020-01-15 RX ADMIN — METHADONE HYDROCHLORIDE 17.5 MILLIGRAM(S): 40 TABLET ORAL at 11:32

## 2020-01-15 RX ADMIN — HYDROMORPHONE HYDROCHLORIDE 0.5 MILLIGRAM(S): 2 INJECTION INTRAMUSCULAR; INTRAVENOUS; SUBCUTANEOUS at 10:00

## 2020-01-15 RX ADMIN — SODIUM CHLORIDE 150 MILLILITER(S): 9 INJECTION INTRAMUSCULAR; INTRAVENOUS; SUBCUTANEOUS at 22:15

## 2020-01-15 RX ADMIN — Medication 1000 MILLIGRAM(S): at 04:45

## 2020-01-15 RX ADMIN — Medication 50 GRAM(S): at 03:21

## 2020-01-15 RX ADMIN — MIDODRINE HYDROCHLORIDE 20 MILLIGRAM(S): 2.5 TABLET ORAL at 06:12

## 2020-01-15 RX ADMIN — Medication 1 APPLICATION(S): at 16:13

## 2020-01-15 RX ADMIN — MIDODRINE HYDROCHLORIDE 20 MILLIGRAM(S): 2.5 TABLET ORAL at 22:14

## 2020-01-15 RX ADMIN — Medication 0.5 MILLIGRAM(S): at 05:57

## 2020-01-15 NOTE — CHART NOTE - NSCHARTNOTEFT_GEN_A_CORE
Nutrition Follow Up Note  Patient seen for: SICU readm    Chart reviewed, events noted.     Source: chart, pt    Diet :  regular, Raimundo 3 times/day    Patient reports: fair appetite, doesn't like Raimundo, not willing to try other supplements or mighty shakes, has had them previously. Discussed importance of nutrition and wound healing, protein sources. Has been drinking a lot of water, requesting bottled water, encouraged pt to continue to try Raimundo in place of 1 bottle of  water between meals, receptive to trying.     GI: denies N/V, colostomy output 1/15 175cc      Daily Weight in k.3 (01-15), Weight in k.4 (), Weight in k.3 (), Weight in k.1 (01-10), Weight in k.3 ()  dosing wt  371 lb wt range 362-377 lb since adm, has 2+ generalized, 3+ B/L leg edema      Pertinent Medications: MEDICATIONS  (STANDING):  albuterol/ipratropium for Nebulization 3 milliLiter(s) Nebulizer every 4 hours  AQUAPHOR (petrolatum Ointment) 1 Application(s) Topical three times a day  artificial  tears Solution 1 Drop(s) Both EYES two times a day  aspirin  chewable 81 milliGRAM(s) Oral <User Schedule>  atorvastatin 40 milliGRAM(s) Oral <User Schedule>  buDESOnide    Inhalation Suspension 0.5 milliGRAM(s) Inhalation every 12 hours  chlorhexidine 2% Cloths 1 Application(s) Topical <User Schedule>  doxazosin 2 milliGRAM(s) Oral <User Schedule>  enoxaparin Injectable 100 milliGRAM(s) SubCutaneous <User Schedule>  erythromycin     base Tablet 250 milliGRAM(s) Oral <User Schedule>  levoFLOXacin IVPB 500 milliGRAM(s) IV Intermittent every 24 hours  methadone    Tablet 17.5 milliGRAM(s) Oral daily  midodrine 20 milliGRAM(s) Oral every 8 hours  norepinephrine Infusion 0.05 MICROgram(s)/kG/Min (7.889 mL/Hr) IV Continuous <Continuous>  nystatin Powder 1 Application(s) Topical two times a day  pantoprazole    Tablet 40 milliGRAM(s) Oral <User Schedule>  polyethylene glycol 3350 17 Gram(s) Oral <User Schedule>  sodium chloride 0.9%. 1000 milliLiter(s) (150 mL/Hr) IV Continuous <Continuous>  vancomycin  IVPB 1000 milliGRAM(s) IV Intermittent every 12 hours  vasopressin Infusion 0.03 Unit(s)/Min (1.8 mL/Hr) IV Continuous <Continuous>    MEDICATIONS  (PRN):  acetaminophen   Tablet .. 975 milliGRAM(s) Oral every 6 hours PRN Mild Pain (1 - 3)  aluminum hydroxide/magnesium hydroxide/simethicone Suspension 30 milliLiter(s) Oral every 4 hours PRN Dyspepsia    01-15 @ 00:46: Na 134<L>, BUN 8, Cr 0.62, <H>, K+ 3.9, Phos 2.3<L>, Mg 1.9, Alk Phos --, ALT/SGPT --, AST/SGOT --, HbA1c --    Finger Sticks:      Skin per nursing documentation: no pressure injuries documented, has midline wound vac      Estimated Needs:   [x ] no change since previous assessment  [ ] recalculated:     Previous Nutrition Diagnosis: moderate malnutrition  Nutrition Diagnosis is: care plan ongoing, addressed w/ regular diet, preference, supplement    New Nutrition Diagnosis: none  Related to:    As evidenced by:      Interventions:     Recommend  1) Continue regular diet  2) continue Raimundo at this time    Monitoring and Evaluation:     Continue to monitor Nutritional intake, Tolerance to diet prescription, weights, labs, skin integrity    RD remains available upon request and will follow up per protocol

## 2020-01-15 NOTE — PROGRESS NOTE ADULT - ASSESSMENT
64F with pmhx morbid obesity, acid reflux, HTN, COPD and PSH D&C presented to the ED on 11/26 c/o abdominal pain and bloating. Reported pain worst in the LLQ that started a few days ago and has been worsening in the last day, also has noted significant abdominal distention in the last day.  In ED, CT demonstrated perforated sigmoid diverticulitis with intraperitoneal free air. She was taken to the operating room on 11/25 overnight and underwent an exploratory laparotomy with extended left hemicolectomy and was left in discontinuity. An Abthera VAC was placed. She was kept intubated post op and brought to the ICU for hemodynamic and respiratory management. She went back to the OR on 11/29 for transverse end colostomy and fascial closure with wound vac.  Pt transferred out of SICU and recovering on floor with post-op course including induration of wound and colostomy necrosis requiring bedside debridement and wound vac placement.  Pt had CT on 1/6 for leukocytosis and to evaluate wound, which showed loculated fluid collections in the left anterior abdomen, very near loops of bowel.  After CT, pt reported feeling "worse" and was hypotensive overnight to SBP 70s requiring 1L fluid bolus.  Pt was bolused again this for hypotension and blood cultures were sent for worsening leukocytosis, and BULL.  SICU consulted for closer monitoring.  UA positive and patient was started on Ceftriaxone for presumed UTI.     Neuro: pain control, h/o chronic pain requiring methadone   - Tylenol prn  - Continue home dose Methadone     Resp: h/o COPD  - Albuterol prn  - Continue Symbicort    CV: Hypotension likely secondary to hypovolemia; septic shock ; h/o HTN; New wall motion abnormalities on echocardiogram   - on Levo, 0.02 most recent  - Lactate normalized.   - Holding home anti-hypertensives  - Continue statin     GI: Perforated sigmoid diverticulitis s/p extended left hemicolectomy (11/25)  - Continue Regular diet  - Monitor wound vac output   - Protonix     /Renal: Hypovolemic hyponatremia, BULL  - On NS@150cc/hr.   - Hyponatremia improving  - Trend BUN/Cr   - Replete electrolytes as needed  - Monitor UOP, remains low     ID: UTI, leukocytosis   - Blood cx: 1/8: Staph epidermidis: On vancomycin. Bcx from 1/9, 1/10, 1/12 NGTD.   - Sputum 1/11: Stenotrophomonas: on levofloxacin.   - Ucx:   - Continue meropenem and Levofloxacin   - Vanc dosed 1g q12 for trough 13.   - f/u 1/12 bcx, no growth to date    Heme: VTE prophylaxis  - Continue lovenox  - ASA    Endo:   - Monitor serum glucose     Dispo: SICU care. Full code. 64F with pmhx morbid obesity, acid reflux, HTN, COPD and PSH D&C presented to the ED on 11/26 c/o abdominal pain and bloating. Reported pain worst in the LLQ that started a few days ago and has been worsening in the last day, also has noted significant abdominal distention in the last day.  In ED, CT demonstrated perforated sigmoid diverticulitis with intraperitoneal free air. She was taken to the operating room on 11/25 overnight and underwent an exploratory laparotomy with extended left hemicolectomy and was left in discontinuity. An Abthera VAC was placed. She was kept intubated post op and brought to the ICU for hemodynamic and respiratory management. She went back to the OR on 11/29 for transverse end colostomy and fascial closure with wound vac.  Pt transferred out of SICU and recovering on floor with post-op course including induration of wound and colostomy necrosis requiring bedside debridement and wound vac placement.  Pt had CT on 1/6 for leukocytosis and to evaluate wound, which showed loculated fluid collections in the left anterior abdomen, very near loops of bowel.  After CT, pt reported feeling "worse" and was hypotensive overnight to SBP 70s requiring 1L fluid bolus.  Pt was bolused again this for hypotension and blood cultures were sent for worsening leukocytosis, and BULL.  SICU consulted for closer monitoring.  UA positive and patient was started on Ceftriaxone for presumed UTI.     Neuro: pain control, h/o chronic pain requiring methadone   - Tylenol prn  - Continue home dose Methadone     Resp: h/o COPD  - Albuterol prn  - Continue Symbicort    CV: Hypotension likely secondary to hypovolemia; septic shock ; h/o HTN; New wall motion abnormalities on echocardiogram   - on Levo, 0.02 most recent  - Lactate normalized.   - Holding home anti-hypertensives  - Continue statin     GI: Perforated sigmoid diverticulitis s/p extended left hemicolectomy (11/25)  - Continue Regular diet  - Monitor wound vac output   - Protonix     /Renal: Hypovolemic hyponatremia, BULL  - On NS@150cc/hr.   - Hyponatremia improving  - Trend BUN/Cr   - Replete electrolytes as needed  - Monitor UOP, remains low     ID: UTI, leukocytosis   - Blood cx: 1/8: Staph epidermidis: On vancomycin. Bcx from 1/9, 1/10, 1/12 NGTD.   - Sputum 1/11: Stenotrophomonas: on levofloxacin.   - Ucx: 1/7: Klebsiella, EColi.   - Continue meropenem and Levofloxacin   - Vanc dosed 1g q12 for random level 13.     Heme: VTE prophylaxis  - Continue lovenox  - ASA    Endo:   - Monitor serum glucose     Dispo: SICU care. Full code.

## 2020-01-15 NOTE — PROGRESS NOTE ADULT - ATTENDING COMMENTS
Patient seen and examined on 1/15 on AM rounds.   Awake and alert comfortable  Hypotension continues likely due ot combination of sepsis as well as hypovolemia - will continue levophed and vasopressin and wean as tolerated to MAP goal > 65 mmHg  -will transfuse 1 unit PRBC for chronic anemia   Mentation intact    CXR reviewed - clear  SpO2 98% on room air   Stenotrophomonas on sputum cultures -continue levaquin at this time  Improving leukocytosis     Tolerating PO, no abd pain, stoma is functioning  COPD - continue current medications with goal SpO 88-92%    Hyponatremia- will monitor    Hypophosphatemia/hypomagnesemia- repleted and will recheck    Patient remains critically ill   CC time: 31 minutes

## 2020-01-15 NOTE — PROGRESS NOTE ADULT - SUBJECTIVE AND OBJECTIVE BOX
CARDIOLOGY     PROGRESS  NOTE   ________________________________________________    CHIEF COMPLAINT:Patient is a 64y old  Female who presents with a chief complaint of perforated bowel (08 Dec 2019 09:22)  no complain.  	  REVIEW OF SYSTEMS:  CONSTITUTIONAL: No fever, weight loss, or fatigue  EYES: No eye pain, visual disturbances, or discharge  ENT:  No difficulty hearing, tinnitus, vertigo; No sinus or throat pain  NECK: No pain or stiffness  RESPIRATORY: No cough, wheezing, chills or hemoptysis; No Shortness of Breath  CARDIOVASCULAR: No chest pain, palpitations, passing out, dizziness, or leg swelling  GASTROINTESTINAL: No abdominal or epigastric pain. No nausea, vomiting, or hematemesis; No diarrhea or constipation. No melena or hematochezia.  GENITOURINARY: No dysuria, frequency, hematuria, or incontinence  NEUROLOGICAL: No headaches, memory loss, loss of strength, numbness, or tremors  SKIN: No itching, burning, rashes, or lesions   LYMPH Nodes: No enlarged glands  ENDOCRINE: No heat or cold intolerance; No hair loss  MUSCULOSKELETAL: No joint pain or swelling; No muscle, back, or extremity pain  PSYCHIATRIC: No depression, anxiety, mood swings, or difficulty sleeping  HEME/LYMPH: No easy bruising, or bleeding gums  ALLERGY AND IMMUNOLOGIC: No hives or eczema	    [ ] All others negative	  [ ] Unable to obtain    PHYSICAL EXAM:  T(C): 37 (01-15-20 @ 07:15), Max: 37 (01-15-20 @ 07:15)  HR: 87 (01-15-20 @ 09:00) (76 - 104)  BP: 81/48 (01-15-20 @ 05:00) (80/50 - 145/51)  RR: 18 (01-15-20 @ 09:00) (12 - 39)  SpO2: 97% (01-15-20 @ 09:00) (78% - 100%)  Wt(kg): --  I&O's Summary    14 Jan 2020 07:01  -  15 Gary 2020 07:00  --------------------------------------------------------  IN: 4626.4 mL / OUT: 1435 mL / NET: 3191.4 mL    15 Gary 2020 07:01  -  15 Gary 2020 09:32  --------------------------------------------------------  IN: 607.6 mL / OUT: 0 mL / NET: 607.6 mL        Appearance: Normal	  HEENT:   Normal oral mucosa, PERRL, EOMI	  Lymphatic: No lymphadenopathy  Cardiovascular: Normal S1 S2, No JVD, + murmurs, No edema  Respiratory: rhonchi  Psychiatry: A & O x 3, Mood & affect appropriate  Gastrointestinal:  Soft, Non-tender, + BS	/ + vac  Skin: No rashes, No ecchymoses, No cyanosis	  Neurologic: Non-focal  Extremities: Normal range of motion, No clubbing, cyanosis or edema  Vascular: Peripheral pulses palpable 2+ bilaterally    MEDICATIONS  (STANDING):  albuterol/ipratropium for Nebulization 3 milliLiter(s) Nebulizer every 4 hours  AQUAPHOR (petrolatum Ointment) 1 Application(s) Topical three times a day  artificial  tears Solution 1 Drop(s) Both EYES two times a day  aspirin  chewable 81 milliGRAM(s) Oral <User Schedule>  atorvastatin 40 milliGRAM(s) Oral <User Schedule>  buDESOnide    Inhalation Suspension 0.5 milliGRAM(s) Inhalation every 12 hours  chlorhexidine 2% Cloths 1 Application(s) Topical <User Schedule>  doxazosin 2 milliGRAM(s) Oral <User Schedule>  enoxaparin Injectable 100 milliGRAM(s) SubCutaneous <User Schedule>  erythromycin     base Tablet 250 milliGRAM(s) Oral <User Schedule>  levoFLOXacin IVPB 500 milliGRAM(s) IV Intermittent every 24 hours  methadone    Tablet 17.5 milliGRAM(s) Oral daily  midodrine 20 milliGRAM(s) Oral every 8 hours  norepinephrine Infusion 0.05 MICROgram(s)/kG/Min (7.889 mL/Hr) IV Continuous <Continuous>  nystatin Powder 1 Application(s) Topical two times a day  pantoprazole    Tablet 40 milliGRAM(s) Oral <User Schedule>  polyethylene glycol 3350 17 Gram(s) Oral <User Schedule>  sodium chloride 0.9%. 1000 milliLiter(s) (150 mL/Hr) IV Continuous <Continuous>  vancomycin  IVPB 1000 milliGRAM(s) IV Intermittent every 12 hours  vasopressin Infusion 0.03 Unit(s)/Min (1.8 mL/Hr) IV Continuous <Continuous>      TELEMETRY: 	    ECG:  	  RADIOLOGY:  OTHER: 	  	  LABS:	 	    CARDIAC MARKERS:                                7.4    10.00 )-----------( 207      ( 15 Gayr 2020 00:46 )             24.2     01-15    134<L>  |  107  |  8   ----------------------------<  100<H>  3.9   |  18<L>  |  0.62    Ca    7.7<L>      15 Gary 2020 00:46  Phos  2.3     01-15  Mg     1.9     01-15    TPro  5.2<L>  /  Alb  2.3<L>  /  TBili  0.6  /  DBili  0.3<H>  /  AST  43<H>  /  ALT  19  /  AlkPhos  144<H>  01-13    proBNP:   Lipid Profile: Cholesterol 98  LDL 52  HDL 28  TG 91    HgA1c:   TSH:         Assessment and plan  ---------------------------  septic/ hypovolemic shock still hypotensive  continue pressors/ fluid  abx as per ID  may hold cholesterol meds  WMA on echo ? sec to sepsis  dvt prophylaxis  ?transfuse keep mhgb>8

## 2020-01-15 NOTE — PROGRESS NOTE ADULT - ATTENDING COMMENTS
Patient seen/examined.  Agree w above note and plan and have discussed plan w house staff.  Still requiring pressors, CT    Lucas Greer MD

## 2020-01-15 NOTE — PROGRESS NOTE ADULT - SUBJECTIVE AND OBJECTIVE BOX
Morning Surgical Progress Note  Patient is a 64y old  Female who presents with a chief complaint of perforated bowel (08 Dec 2019 09:22)      SUBJECTIVE: Patient seen and examined at bedside with surgical team, there were no acute events overnight. Patient's Levophed was reduced and patient is still on vasopressin.     Vital Signs Last 24 Hrs  T(C): 36.3 (14 Jan 2020 23:00), Max: 36.4 (14 Jan 2020 19:00)  T(F): 97.3 (14 Jan 2020 23:00), Max: 97.5 (14 Jan 2020 19:00)  HR: 92 (15 Gary 2020 02:15) (77 - 107)  BP: 89/50 (15 Gary 2020 02:15) (81/52 - 145/51)  BP(mean): 65 (15 Gary 2020 02:15) (60 - 960)  RR: 15 (15 Gary 2020 02:15) (12 - 34)  SpO2: 100% (15 Gary 2020 02:15) (65% - 100%)I&O's Detail    13 Jan 2020 07:01  -  14 Jan 2020 07:00  --------------------------------------------------------  IN:    Albumin 5%  - 500 mL: 500 mL    norepinephrine Infusion: 235.4 mL    Oral Fluid: 400 mL    sodium chloride 0.9%.: 3450 mL    Solution: 200 mL    Solution: 100 mL    Solution: 500 mL    vasopressin Infusion: 43.2 mL  Total IN: 5428.6 mL    OUT:    Drain: 10 mL    Voided: 400 mL  Total OUT: 410 mL    Total NET: 5018.6 mL      14 Jan 2020 07:01  -  15 Gary 2020 04:46  --------------------------------------------------------  IN:    norepinephrine Infusion: 193.8 mL    sodium chloride 0.9%.: 2850 mL    Solution: 100 mL    Solution: 100 mL    vasopressin Infusion: 34.2 mL  Total IN: 3278 mL    OUT:    Colostomy: 125 mL    Drain: 50 mL    Voided: 450 mL  Total OUT: 625 mL    Total NET: 2653 mL      MEDICATIONS  (STANDING):  albuterol/ipratropium for Nebulization 3 milliLiter(s) Nebulizer every 4 hours  artificial  tears Solution 1 Drop(s) Both EYES two times a day  aspirin  chewable 81 milliGRAM(s) Oral <User Schedule>  atorvastatin 40 milliGRAM(s) Oral <User Schedule>  buDESOnide    Inhalation Suspension 0.5 milliGRAM(s) Inhalation every 12 hours  chlorhexidine 2% Cloths 1 Application(s) Topical <User Schedule>  doxazosin 2 milliGRAM(s) Oral <User Schedule>  enoxaparin Injectable 100 milliGRAM(s) SubCutaneous <User Schedule>  erythromycin     base Tablet 250 milliGRAM(s) Oral <User Schedule>  levoFLOXacin IVPB 500 milliGRAM(s) IV Intermittent every 24 hours  methadone    Tablet 17.5 milliGRAM(s) Oral daily  midodrine 20 milliGRAM(s) Oral every 8 hours  norepinephrine Infusion 0.05 MICROgram(s)/kG/Min (7.889 mL/Hr) IV Continuous <Continuous>  nystatin Powder 1 Application(s) Topical two times a day  pantoprazole    Tablet 40 milliGRAM(s) Oral <User Schedule>  polyethylene glycol 3350 17 Gram(s) Oral <User Schedule>  potassium phosphate IVPB 30 milliMole(s) IV Intermittent once  sodium chloride 0.9%. 1000 milliLiter(s) (150 mL/Hr) IV Continuous <Continuous>  vancomycin  IVPB 1000 milliGRAM(s) IV Intermittent every 12 hours  vasopressin Infusion 0.03 Unit(s)/Min (1.8 mL/Hr) IV Continuous <Continuous>    MEDICATIONS  (PRN):  acetaminophen   Tablet .. 975 milliGRAM(s) Oral every 6 hours PRN Mild Pain (1 - 3)  aluminum hydroxide/magnesium hydroxide/simethicone Suspension 30 milliLiter(s) Oral every 4 hours PRN Dyspepsia      Physical Exam  General: A&Ox3, NAD  Respiratory: CTA b/l  Cardiac: RRR, S1 and S2  Gastrointestinal: Soft, ND, NT. No rebound or guarding. Vac in place holding suction.     LABS:                        7.4    10.00 )-----------( 207      ( 15 Gary 2020 00:46 )             24.2     01-15    134<L>  |  107  |  8   ----------------------------<  100<H>  3.9   |  18<L>  |  0.62    Ca    7.7<L>      15 Gary 2020 00:46  Phos  2.3     01-15  Mg     1.9     01-15    TPro  5.2<L>  /  Alb  2.3<L>  /  TBili  0.6  /  DBili  0.3<H>  /  AST  43<H>  /  ALT  19  /  AlkPhos  144<H>  01-13      LIVER FUNCTIONS - ( 13 Jan 2020 14:45 )  Alb: 2.3 g/dL / Pro: 5.2 g/dL / ALK PHOS: 144 U/L / ALT: 19 U/L / AST: 43 U/L / GGT: x

## 2020-01-15 NOTE — PROGRESS NOTE ADULT - ASSESSMENT
65yo F with PMHx morbid obesity, GERD, HTN, COPD, and pshx D&C now s/p ex laparotomy with extended left hemicolectomy 11/26 for perforated and necrotic sigmoid colon with gross abdominal contamination. RTOR 11/29 for abd closure, RUQ end transverse colostomy creation. On 12/19 patient found to have induration of wound and keri-stomal fat necrosis, s/p bedside debridement w/ wound VAC in placement on midline wound. Began showing signs of sepsis and was transferred to SICU 1/7 for further management. Now w/ decreasing pressor support and downtrending leukocytosis.    Plan:  CT scan of abdomen and pelvis later today  Wean pressors as tolerated  Continue IV vanc, meropenem, levaquin  Trend labs/ WBC/ SBP  Appreciate excellent SICU care    Green  [phone]

## 2020-01-15 NOTE — PROGRESS NOTE ADULT - SUBJECTIVE AND OBJECTIVE BOX
SURGICAL INTENSIVE CARE UNIT DAILY PROGRESS NOTE    24 HOUR EVENTS:   - Midodrine increased to 20 TID.   - Restarted Vancomycin after trough 13.   - Levophed requirements decreasing.     HPI:    64y Female  pmhx morbid obesity, acid reflux, HTN, COPD and PSH D&C presented to the ED on 11/26 c/o abdominal pain and bloating. Patient reports having constipation for 2 weeks and has been taking enemas, miralax and senna and passing small hard balls for the last weeks. Reported pain worst in the LLQ that started a few days ago and has been worsening in the last day, also has noted significant abdominal distention in the last day. Reported she had difficulty walking short distances because she becomes SOB. In ED, CT demonstrated perforated sigmoid diverticulitis with intraperitoneal free air. She was taken to the operating room on 11/25 overnight and underwent an exploratory laparotomy with extended left hemicolectomy and was left in discontinuity. An Abthera VAC was placed. She was kept intubated post op and brought to the ICU for hemodynamic and respiratory management. She went back to the OR on 11/29 for transverse end colostomy and fascial closure with wound vac.  Pt transferred out of SICU and recovering on floor with post-op course including induration of wound and colostomy necrosis requiring bedside debridement and wound vac placement.  Pt had CT on 1/6 for leukocytosis and to evaluate wound, which showed loculated fluid collections in the left anterior abdomen, very near loops of bowel.  After CT, pt reported feeling "worse" and was hypotensive overnight to SBP 70s requiring 1L fluid bolus.  Pt was bolused again this am for hypotension and blood cultures were sent for worsening leukocytosis, and BULL. SICU consulted for closer monitoring.  UA positive and patient was started on Ceftriaxone for presumed UTI.     NEURO: AOx3.     RESPIRATORY  RR: 15 (01-15-20 @ 02:15) (12 - 34)  SpO2: 100% (01-15-20 @ 02:15) (65% - 100%)    CARDIOVASCULAR  HR: 92 (01-15-20 @ 02:15) (77 - 107)  BP: 89/50 (01-15-20 @ 02:15) (81/52 - 145/51)  BP(mean): 65 (01-15-20 @ 02:15) (60 - 960)  VBG - ( 15 Gary 2020 00:35 )  pH: 7.33  /  pCO2: 40    /  pO2: 39    / HCO3: 20    / Base Excess: -4.6  /  SaO2: 72     Lactate: 1.9      GI/NUTRITION:   Soft, NT, ND, vac in place.     GENITOURINARY  I&O's Detail    01-13 @ 07:01 - 01-14 @ 07:00  --------------------------------------------------------  IN:    Albumin 5%  - 500 mL: 500 mL    norepinephrine Infusion: 235.4 mL    Oral Fluid: 400 mL    sodium chloride 0.9%.: 3450 mL    Solution: 200 mL    Solution: 100 mL    Solution: 500 mL    vasopressin Infusion: 43.2 mL  Total IN: 5428.6 mL    OUT:    Drain: 10 mL    Voided: 400 mL  Total OUT: 410 mL    Total NET: 5018.6 mL      01-14 @ 07:01  -  01-15 @ 03:32  --------------------------------------------------------  IN:    norepinephrine Infusion: 193.8 mL    sodium chloride 0.9%.: 2850 mL    Solution: 100 mL    Solution: 100 mL    vasopressin Infusion: 34.2 mL  Total IN: 3278 mL    OUT:    Colostomy: 125 mL    Drain: 50 mL    Voided: 450 mL  Total OUT: 625 mL    Total NET: 2653 mL          01-15    134<L>  |  107  |  8   ----------------------------<  100<H>  3.9   |  18<L>  |  0.62    Ca    7.7<L>      15 Gary 2020 00:46  Phos  2.3     01-15  Mg     1.9     01-15    TPro  5.2<L>  /  Alb  2.3<L>  /  TBili  0.6  /  DBili  0.3<H>  /  AST  43<H>  /  ALT  19  /  AlkPhos  144<H>  01-13      HEMATOLOGIC                        7.4    10.00 )-----------( 207      ( 15 Gary 2020 00:46 )             24.2         INFECTIOUS DISEASES  RECENT CULTURES:  Specimen Source: .Blood Blood-Venous  Date/Time: 01-12 @ 16:53  Culture Results:   No growth to date.  Gram Stain: --  Organism: --  Specimen Source: .Sputum Sputum  Date/Time: 01-11 @ 16:36  Culture Results:   Moderate Stenotrophomonas maltophilia  Normal Respiratory Elaine absent  Gram Stain:   Few polymorphonuclear leukocytes per low power field  Few Squamous epithelial cells per low power field  Moderate Gram Negative Rods per oil power field  Organism: Stenotrophomonas maltophilia  Specimen Source: .Blood Blood  Date/Time: 01-10 @ 16:40  Culture Results:   No growth to date.  Gram Stain: --  Organism: --

## 2020-01-16 LAB
ANION GAP SERPL CALC-SCNC: 9 MMOL/L — SIGNIFICANT CHANGE UP (ref 5–17)
BUN SERPL-MCNC: 10 MG/DL — SIGNIFICANT CHANGE UP (ref 7–23)
CALCIUM SERPL-MCNC: 7.5 MG/DL — LOW (ref 8.4–10.5)
CHLORIDE SERPL-SCNC: 108 MMOL/L — SIGNIFICANT CHANGE UP (ref 96–108)
CO2 SERPL-SCNC: 18 MMOL/L — LOW (ref 22–31)
CREAT SERPL-MCNC: 0.58 MG/DL — SIGNIFICANT CHANGE UP (ref 0.5–1.3)
GAS PNL BLDV: SIGNIFICANT CHANGE UP
GLUCOSE SERPL-MCNC: 122 MG/DL — HIGH (ref 70–99)
HCT VFR BLD CALC: 28.8 % — LOW (ref 34.5–45)
HGB BLD-MCNC: 8.8 G/DL — LOW (ref 11.5–15.5)
MAGNESIUM SERPL-MCNC: 1.9 MG/DL — SIGNIFICANT CHANGE UP (ref 1.6–2.6)
MCHC RBC-ENTMCNC: 27.9 PG — SIGNIFICANT CHANGE UP (ref 27–34)
MCHC RBC-ENTMCNC: 30.6 GM/DL — LOW (ref 32–36)
MCV RBC AUTO: 91.4 FL — SIGNIFICANT CHANGE UP (ref 80–100)
NRBC # BLD: 0 /100 WBCS — SIGNIFICANT CHANGE UP (ref 0–0)
PHOSPHATE SERPL-MCNC: 2.6 MG/DL — SIGNIFICANT CHANGE UP (ref 2.5–4.5)
PLATELET # BLD AUTO: 214 K/UL — SIGNIFICANT CHANGE UP (ref 150–400)
POTASSIUM SERPL-MCNC: 4.1 MMOL/L — SIGNIFICANT CHANGE UP (ref 3.5–5.3)
POTASSIUM SERPL-SCNC: 4.1 MMOL/L — SIGNIFICANT CHANGE UP (ref 3.5–5.3)
RBC # BLD: 3.15 M/UL — LOW (ref 3.8–5.2)
RBC # FLD: 18.3 % — HIGH (ref 10.3–14.5)
SODIUM SERPL-SCNC: 135 MMOL/L — SIGNIFICANT CHANGE UP (ref 135–145)
VANCOMYCIN TROUGH SERPL-MCNC: 14.3 UG/ML — SIGNIFICANT CHANGE UP (ref 10–20)
WBC # BLD: 14.81 K/UL — HIGH (ref 3.8–10.5)
WBC # FLD AUTO: 14.81 K/UL — HIGH (ref 3.8–10.5)

## 2020-01-16 PROCEDURE — 99232 SBSQ HOSP IP/OBS MODERATE 35: CPT

## 2020-01-16 PROCEDURE — 71045 X-RAY EXAM CHEST 1 VIEW: CPT | Mod: 26

## 2020-01-16 PROCEDURE — 99291 CRITICAL CARE FIRST HOUR: CPT

## 2020-01-16 RX ORDER — IPRATROPIUM/ALBUTEROL SULFATE 18-103MCG
3 AEROSOL WITH ADAPTER (GRAM) INHALATION EVERY 6 HOURS
Refills: 0 | Status: DISCONTINUED | OUTPATIENT
Start: 2020-01-16 | End: 2020-01-26

## 2020-01-16 RX ORDER — ACETAMINOPHEN 500 MG
1000 TABLET ORAL ONCE
Refills: 0 | Status: COMPLETED | OUTPATIENT
Start: 2020-01-16 | End: 2020-01-16

## 2020-01-16 RX ORDER — MIDODRINE HYDROCHLORIDE 2.5 MG/1
20 TABLET ORAL
Refills: 0 | Status: DISCONTINUED | OUTPATIENT
Start: 2020-01-16 | End: 2020-01-20

## 2020-01-16 RX ORDER — OXYCODONE HYDROCHLORIDE 5 MG/1
5 TABLET ORAL EVERY 4 HOURS
Refills: 0 | Status: DISCONTINUED | OUTPATIENT
Start: 2020-01-16 | End: 2020-01-19

## 2020-01-16 RX ORDER — HYDROMORPHONE HYDROCHLORIDE 2 MG/ML
0.5 INJECTION INTRAMUSCULAR; INTRAVENOUS; SUBCUTANEOUS EVERY 4 HOURS
Refills: 0 | Status: DISCONTINUED | OUTPATIENT
Start: 2020-01-16 | End: 2020-01-18

## 2020-01-16 RX ORDER — MAGNESIUM SULFATE 500 MG/ML
2 VIAL (ML) INJECTION ONCE
Refills: 0 | Status: COMPLETED | OUTPATIENT
Start: 2020-01-16 | End: 2020-01-16

## 2020-01-16 RX ADMIN — OXYCODONE HYDROCHLORIDE 5 MILLIGRAM(S): 5 TABLET ORAL at 15:27

## 2020-01-16 RX ADMIN — NYSTATIN CREAM 1 APPLICATION(S): 100000 CREAM TOPICAL at 17:34

## 2020-01-16 RX ADMIN — Medication 1 APPLICATION(S): at 05:01

## 2020-01-16 RX ADMIN — MEROPENEM 100 MILLIGRAM(S): 1 INJECTION INTRAVENOUS at 05:04

## 2020-01-16 RX ADMIN — Medication 400 MILLIGRAM(S): at 14:27

## 2020-01-16 RX ADMIN — Medication 250 MILLIGRAM(S): at 17:32

## 2020-01-16 RX ADMIN — Medication 2 MILLIGRAM(S): at 05:02

## 2020-01-16 RX ADMIN — MEROPENEM 100 MILLIGRAM(S): 1 INJECTION INTRAVENOUS at 21:38

## 2020-01-16 RX ADMIN — Medication 1 APPLICATION(S): at 21:39

## 2020-01-16 RX ADMIN — Medication 250 MILLIGRAM(S): at 17:33

## 2020-01-16 RX ADMIN — Medication 1 DROP(S): at 17:33

## 2020-01-16 RX ADMIN — OXYCODONE HYDROCHLORIDE 5 MILLIGRAM(S): 5 TABLET ORAL at 14:57

## 2020-01-16 RX ADMIN — SODIUM CHLORIDE 150 MILLILITER(S): 9 INJECTION INTRAMUSCULAR; INTRAVENOUS; SUBCUTANEOUS at 05:00

## 2020-01-16 RX ADMIN — Medication 3 MILLILITER(S): at 17:00

## 2020-01-16 RX ADMIN — ATORVASTATIN CALCIUM 40 MILLIGRAM(S): 80 TABLET, FILM COATED ORAL at 21:37

## 2020-01-16 RX ADMIN — PANTOPRAZOLE SODIUM 40 MILLIGRAM(S): 20 TABLET, DELAYED RELEASE ORAL at 05:01

## 2020-01-16 RX ADMIN — POLYETHYLENE GLYCOL 3350 17 GRAM(S): 17 POWDER, FOR SOLUTION ORAL at 17:33

## 2020-01-16 RX ADMIN — Medication 7.89 MICROGRAM(S)/KG/MIN: at 05:00

## 2020-01-16 RX ADMIN — MEROPENEM 100 MILLIGRAM(S): 1 INJECTION INTRAVENOUS at 13:17

## 2020-01-16 RX ADMIN — Medication 1 DROP(S): at 05:03

## 2020-01-16 RX ADMIN — MIDODRINE HYDROCHLORIDE 20 MILLIGRAM(S): 2.5 TABLET ORAL at 21:38

## 2020-01-16 RX ADMIN — POLYETHYLENE GLYCOL 3350 17 GRAM(S): 17 POWDER, FOR SOLUTION ORAL at 12:36

## 2020-01-16 RX ADMIN — HYDROMORPHONE HYDROCHLORIDE 0.5 MILLIGRAM(S): 2 INJECTION INTRAMUSCULAR; INTRAVENOUS; SUBCUTANEOUS at 13:31

## 2020-01-16 RX ADMIN — METHADONE HYDROCHLORIDE 17.5 MILLIGRAM(S): 40 TABLET ORAL at 09:45

## 2020-01-16 RX ADMIN — ENOXAPARIN SODIUM 100 MILLIGRAM(S): 100 INJECTION SUBCUTANEOUS at 11:18

## 2020-01-16 RX ADMIN — MIDODRINE HYDROCHLORIDE 20 MILLIGRAM(S): 2.5 TABLET ORAL at 05:01

## 2020-01-16 RX ADMIN — Medication 0.5 MILLIGRAM(S): at 17:00

## 2020-01-16 RX ADMIN — Medication 50 GRAM(S): at 03:14

## 2020-01-16 RX ADMIN — Medication 975 MILLIGRAM(S): at 04:52

## 2020-01-16 RX ADMIN — Medication 250 MILLIGRAM(S): at 05:00

## 2020-01-16 RX ADMIN — Medication 81 MILLIGRAM(S): at 21:37

## 2020-01-16 RX ADMIN — Medication 975 MILLIGRAM(S): at 04:22

## 2020-01-16 RX ADMIN — Medication 3 MILLILITER(S): at 05:50

## 2020-01-16 RX ADMIN — MIDODRINE HYDROCHLORIDE 20 MILLIGRAM(S): 2.5 TABLET ORAL at 13:17

## 2020-01-16 RX ADMIN — NYSTATIN CREAM 1 APPLICATION(S): 100000 CREAM TOPICAL at 05:02

## 2020-01-16 RX ADMIN — VASOPRESSIN 1.8 UNIT(S)/MIN: 20 INJECTION INTRAVENOUS at 05:01

## 2020-01-16 RX ADMIN — POLYETHYLENE GLYCOL 3350 17 GRAM(S): 17 POWDER, FOR SOLUTION ORAL at 05:05

## 2020-01-16 RX ADMIN — Medication 250 MILLIGRAM(S): at 11:19

## 2020-01-16 RX ADMIN — HYDROMORPHONE HYDROCHLORIDE 0.5 MILLIGRAM(S): 2 INJECTION INTRAMUSCULAR; INTRAVENOUS; SUBCUTANEOUS at 13:16

## 2020-01-16 RX ADMIN — Medication 1000 MILLIGRAM(S): at 14:57

## 2020-01-16 RX ADMIN — Medication 1 APPLICATION(S): at 13:21

## 2020-01-16 RX ADMIN — Medication 0.5 MILLIGRAM(S): at 05:50

## 2020-01-16 RX ADMIN — Medication 62.5 MILLIMOLE(S): at 06:47

## 2020-01-16 NOTE — PROGRESS NOTE ADULT - ATTENDING COMMENTS
Patient seen and examined and agree with above.   Awake and alert comfortable  Hypotension continues likely due ot combination of sepsis as well as hypovolemia - will continue 0.07 levophed and vasopressin and wean as tolerated to MAP goal > 65 mmHg  Mentation intact   Patient likely euvolemic at this time   -will send out labs to rule out adrenal insufficiency. Suspicion remains to be sepsis but will rule out other causes. \    Respiratory state is stable on room air   CXR - left sided atelectasis, unchanged from yesterday     Stenotrophomonas on sputum cultures -continue levaquin at this time  E coli and klebsiella UTi - continue meropenem     Tolerating PO, no abd pain, stoma is functioning with minimal output   Leukocytosis increasing    COPD - continue current medications with goal SpO 88-92%    Patient remains critically ill  CC time: 35minutes .

## 2020-01-16 NOTE — PROGRESS NOTE ADULT - SUBJECTIVE AND OBJECTIVE BOX
SICU Progress Note  =====================================================  Interval/Overnight Events:     HPI:  ((one liner from  excel sheet. Write on first day or insert from previous note))    PMH HISTORY:  ((list out relevant PMH/PSH from excel sheet first day or insert from prior day))    Allergies:  IV Contrast (Rash; Pruritus; Hypotension)  sulfa drugs (Unknown)      MEDICATIONS:   --------------------------------------------------------------------------------------  Neurologic Medications  acetaminophen   Tablet .. 975 milliGRAM(s) Oral every 6 hours PRN Mild Pain (1 - 3)  methadone    Tablet 17.5 milliGRAM(s) Oral <User Schedule>    Respiratory Medications  albuterol/ipratropium for Nebulization 3 milliLiter(s) Nebulizer every 4 hours  buDESOnide    Inhalation Suspension 0.5 milliGRAM(s) Inhalation every 12 hours    Cardiovascular Medications  doxazosin 2 milliGRAM(s) Oral <User Schedule>  midodrine 20 milliGRAM(s) Oral every 8 hours  norepinephrine Infusion 0.05 MICROgram(s)/kG/Min IV Continuous <Continuous>    Gastrointestinal Medications  aluminum hydroxide/magnesium hydroxide/simethicone Suspension 30 milliLiter(s) Oral every 4 hours PRN Dyspepsia  pantoprazole    Tablet 40 milliGRAM(s) Oral <User Schedule>  polyethylene glycol 3350 17 Gram(s) Oral <User Schedule>  sodium chloride 0.9%. 1000 milliLiter(s) IV Continuous <Continuous>    Genitourinary Medications    Hematologic/Oncologic Medications  aspirin  chewable 81 milliGRAM(s) Oral <User Schedule>  enoxaparin Injectable 100 milliGRAM(s) SubCutaneous <User Schedule>    Antimicrobial/Immunologic Medications  erythromycin     base Tablet 250 milliGRAM(s) Oral <User Schedule>  levoFLOXacin IVPB 500 milliGRAM(s) IV Intermittent every 24 hours  meropenem  IVPB 1000 milliGRAM(s) IV Intermittent every 8 hours  vancomycin  IVPB 1000 milliGRAM(s) IV Intermittent every 12 hours    Endocrine/Metabolic Medications  atorvastatin 40 milliGRAM(s) Oral <User Schedule>  vasopressin Infusion 0.03 Unit(s)/Min IV Continuous <Continuous>    Topical/Other Medications  AQUAPHOR (petrolatum Ointment) 1 Application(s) Topical three times a day  artificial  tears Solution 1 Drop(s) Both EYES two times a day  chlorhexidine 2% Cloths 1 Application(s) Topical <User Schedule>  nystatin Powder 1 Application(s) Topical two times a day    --------------------------------------------------------------------------------------    VITAL SIGNS, INS/OUTS (last 24 hours):  --------------------------------------------------------------------------------------  ((Insert "SICU Vitals/Is+Os" here))  --------------------------------------------------------------------------------------    METABOLIC/FLUIDS/ELECTROLYTES:   sodium chloride 0.9%. 1000 milliLiter(s) IV Continuous <Continuous>      EXAM:     NEUROLOGY:  RASS:   GCS:   Exam: Normal, alert, NAD, no focal deficits    HEENT:  Exam: Normocephalic, atraumatic, EOMI, PERRLA    Neck:  Exam: Trachea midline, no lymphadenopathy    RESPIRATORY:  Exam: Lungs clear to auscultation, Normal expansion/effort.     CARDIOVASCULAR:  Exam: S1, S2.  Regular rate and rhythm.  No peripheral edema  Cardiac Rhythm: Normal Sinus Rhythm    GI/NUTRITION:  Exam: Abdomen soft, non-tender, non-distended.    Current Diet:     VASCULAR:  Exam: Extremities warm, pink, well-perfused.    MUSCULOSKELETAL:   Exam: All extremities moving spontaneously without limitations    SKIN:   Exam: Good skin turgor, no skin breakdown.     Tubes/Lines/Drains:  [x] Peripheral IV  [] Central Venous Line		[] R	[] L	[] IJ	[] Fem	[] SC	Date Placed:   [] Arterial Line		[] R	[] L	[] Fem	[] Rad	[] Ax	Date Placed:   [] PICC:			[] Midline		[] Mediport  [] Urinary Catheter	Date Placed:   [x] Necessity of urinary, arterial, and venous catheters discussed    LABS:   --------------------------------------------------------------------------------------  ((Insert SICU Labs here))  --------------------------------------------------------------------------------------    OTHER LABORATORY:     IMAGING STUDIES:   CXR: pending    ASSESSMENT:  64y Female  ((insert from previous note))    Critical Care Diagnoses: (insert from previous note)    PLAN:   Neurologic:   Respiratory:   Cardiovascular:   Gastrointestinal/Nutrition:   Renal/Genitourinary:   Hematologic:   Infectious Disease:   Tubes/Lines/Drains:   Endocrine:   Disposition:     -------------------------------------------------------------------------------------- SICU Progress Note  =====================================================  Interval/Overnight Events:   - Recrudescence in pressor requirements, currently on low dose Levo (0.07) and Vasopressin (0.03). Despite increased PO Midodrine  - S/p CT C/A/P with no new concerning findings likely to mediate septic picture  - S/p 1 unit pRBC   - Minimal colostomy output. No obstructive pathology seen on CT scan.     HPI: 64y Female  pmhx morbid obesity, acid reflux, HTN, COPD and PSH D&C presented to the ED on 11/26 c/o abdominal pain and bloating. Patient reports having constipation for 2 weeks and has been taking enemas, miralax and senna and passing small hard balls for the last weeks. Reported pain worst in the LLQ that started a few days ago and has been worsening in the last day, also has noted significant abdominal distention in the last day. Reported she had difficulty walking short distances because she becomes SOB. In ED, CT demonstrated perforated sigmoid diverticulitis with intraperitoneal free air. She was taken to the operating room on 11/25 overnight and underwent an exploratory laparotomy with extended left hemicolectomy and was left in discontinuity. An Abthera VAC was placed. She was kept intubated post op and brought to the ICU for hemodynamic and respiratory management. She went back to the OR on 11/29 for transverse end colostomy and fascial closure with wound vac.  Pt transferred out of SICU and recovering on floor with post-op course including induration of wound and colostomy necrosis requiring bedside debridement and wound vac placement.  Pt had CT on 1/6 for leukocytosis and to evaluate wound, which showed loculated fluid collections in the left anterior abdomen, very near loops of bowel.  After CT, pt reported feeling "worse" and was hypotensive overnight to SBP 70s requiring 1L fluid bolus.  Pt was bolused again this am for hypotension and blood cultures were sent for worsening leukocytosis, and BULL. SICU consulted for closer monitoring.  UA positive and patient was started on Ceftriaxone for presumed UTI.     Allergies:  IV Contrast (Rash; Pruritus; Hypotension)  sulfa drugs (Unknown)      MEDICATIONS:   --------------------------------------------------------------------------------------  Neurologic Medications  acetaminophen   Tablet .. 975 milliGRAM(s) Oral every 6 hours PRN Mild Pain (1 - 3)  methadone    Tablet 17.5 milliGRAM(s) Oral <User Schedule>    Respiratory Medications  albuterol/ipratropium for Nebulization 3 milliLiter(s) Nebulizer every 4 hours  buDESOnide    Inhalation Suspension 0.5 milliGRAM(s) Inhalation every 12 hours    Cardiovascular Medications  doxazosin 2 milliGRAM(s) Oral <User Schedule>  midodrine 20 milliGRAM(s) Oral every 8 hours  norepinephrine Infusion 0.05 MICROgram(s)/kG/Min IV Continuous <Continuous>    Gastrointestinal Medications  aluminum hydroxide/magnesium hydroxide/simethicone Suspension 30 milliLiter(s) Oral every 4 hours PRN Dyspepsia  pantoprazole    Tablet 40 milliGRAM(s) Oral <User Schedule>  polyethylene glycol 3350 17 Gram(s) Oral <User Schedule>  sodium chloride 0.9%. 1000 milliLiter(s) IV Continuous <Continuous>    Genitourinary Medications    Hematologic/Oncologic Medications  aspirin  chewable 81 milliGRAM(s) Oral <User Schedule>  enoxaparin Injectable 100 milliGRAM(s) SubCutaneous <User Schedule>    Antimicrobial/Immunologic Medications  erythromycin     base Tablet 250 milliGRAM(s) Oral <User Schedule>  levoFLOXacin IVPB 500 milliGRAM(s) IV Intermittent every 24 hours  meropenem  IVPB 1000 milliGRAM(s) IV Intermittent every 8 hours  vancomycin  IVPB 1000 milliGRAM(s) IV Intermittent every 12 hours    Endocrine/Metabolic Medications  atorvastatin 40 milliGRAM(s) Oral <User Schedule>  vasopressin Infusion 0.03 Unit(s)/Min IV Continuous <Continuous>    Topical/Other Medications  AQUAPHOR (petrolatum Ointment) 1 Application(s) Topical three times a day  artificial  tears Solution 1 Drop(s) Both EYES two times a day  chlorhexidine 2% Cloths 1 Application(s) Topical <User Schedule>  nystatin Powder 1 Application(s) Topical two times a day    --------------------------------------------------------------------------------------    VITAL SIGNS, INS/OUTS (last 24 hours):  --------------------------------------------------------------------------------------  T(C): 37.2 (01-16-20 @ 03:00), Max: 37.5 (01-15-20 @ 18:00)  HR: 83 (01-16-20 @ 03:15) (73 - 123)  BP: 92/54 (01-16-20 @ 03:15) (80/49 - 117/53)  ABP: 89/49 (01-15-20 @ 21:30) (0/0 - 128/61)  ABP(mean): 63 (01-15-20 @ 21:30) (0 - 101)  RR: 15 (01-16-20 @ 03:15) (12 - 39)  SpO2: 96% (01-16-20 @ 03:15) (78% - 100%)  Wt(kg): --  CVP(mm Hg): 6 (01-16-20 @ 03:15) (1 - 171)      01-14 @ 07:01  -  01-15 @ 07:00  --------------------------------------------------------  IN:    norepinephrine Infusion: 216.6 mL    sodium chloride 0.9%.: 3600 mL    Solution: 366.6 mL    Solution: 300 mL    Solution: 100 mL    vasopressin Infusion: 43.2 mL  Total IN: 4626.4 mL    OUT:    Colostomy: 175 mL    Drain: 110 mL    VAC (Vacuum Assisted Closure) System: 200 mL    Voided: 950 mL  Total OUT: 1435 mL    Total NET: 3191.4 mL      01-15 @ 07:01  -  01-16 @ 03:30  --------------------------------------------------------  IN:    norepinephrine Infusion: 194 mL    Oral Fluid: 360 mL    Packed Red Blood Cells: 350 mL    sodium chloride 0.9%.: 3000 mL    Solution: 252 mL    Solution: 200 mL    Solution: 50 mL    vasopressin Infusion: 3.6 mL    vasopressin Infusion: 32.4 mL  Total IN: 4442 mL    OUT:    Voided: 970 mL  Total OUT: 970 mL    Total NET: 3472 mL        --------------------------------------------------------------------------------------    METABOLIC/FLUIDS/ELECTROLYTES:   sodium chloride 0.9%. 1000 milliLiter(s) IV Continuous <Continuous>    LABS:   --------------------------------------------------------------------------------------  CBC (01-16 @ 00:42)                              8.8<L>                         14.81<H>  )----------------(  214        --    % Neutrophils, --    % Lymphocytes, ANC: --                                  28.8<L>              CBC (01-15 @ 18:05)                              9.1<L>                         15.13<H>  )----------------(  220        --    % Neutrophils, --    % Lymphocytes, ANC: --                                  29.8<L>                BMP (01-16 @ 00:42)             135     |  108     |  10    		Ca++ --      Ca 7.5<L>             ---------------------------------( 122<H>		Mg 1.9                4.1     |  18<L>   |  0.58  			Ph 2.6     BMP (01-15 @ 00:46)             134<L>  |  107     |  8     		Ca++ --      Ca 7.7<L>             ---------------------------------( 100<H>		Mg 1.9                3.9     |  18<L>   |  0.62  			Ph 2.3<L>        ABG (01-16 @ 00:38)      /  /  /  /  / %     Lactate:   2.1<H>  ABG (01-15 @ 00:35)      /  /  /  /  / %     Lactate:   1.9    VBG (01-16 @ 00:38)     7.32<L> / 41 / 44 / 20<L> / -4.9<L> / 76%  VBG (01-15 @ 00:35)     7.33<L> / 40 / 39 / 20<L> / -4.6<L> / 72%    -> .Blood Blood-Venous Culture (01-12 @ 16:53)     NG    NG    No growth to date.    -> .Sputum Sputum Culture (01-11 @ 16:36)       Few polymorphonuclear leukocytes per low power field  Few Squamous epithelial cells per low power field  Moderate Gram Negative Rods per oil power field    Stenotrophomonas maltophilia    Moderate Stenotrophomonas maltophilia  Normal Respiratory Elaine absent    -> .Blood Blood Culture (01-10 @ 16:40)     NG    NG    No growth at 5 days.    -> .Blood Blood Culture (01-09 @ 15:16)       Growth in anaerobic bottle: Gram Positive Cocci in Clusters    NG    Growth in anaerobic bottle: Coag Negative Staphylococcus  Single set isolate, possible contaminant. Contact  Microbiology if susceptibility testing clinically  indicated.    -> .Blood Blood Culture (01-08 @ 12:35)       Growth in aerobic bottle: Gram Positive Cocci in Clusters  Growth in anaerobic bottle: Gram Positive Cocci in Clusters    Staphylococcus epidermidis    Growth in aerobic and anaerobic bottles: Staphylococcus epidermidis    -> .Blood Blood-Peripheral Culture (01-07 @ 16:31)       Growth in aerobic bottle:  Gram Positive Cocci in Clusters    NG    Growth in aerobic bottle: Staphylococcus epidermidis    -> .Urine Clean Catch (Midstream) Culture (01-07 @ 15:18)     NG    Klebsiella pneumoniae  Escherichia coli    >100,000 CFU/ml Klebsiella pneumoniae  >100,000 CFU/ml Escherichia coli  --------------------------------------------------------------------------------------

## 2020-01-16 NOTE — PROGRESS NOTE ADULT - SUBJECTIVE AND OBJECTIVE BOX
Surgery Progress Note    S: Patient seen and examined. No acute events overnight.  Denies fever, chills, SOB, chest pain.     O:  Physical Exam  General: A&Ox3, NAD  Respiratory: CTA b/l  Cardiac: RRR, S1 and S2  Gastrointestinal: Soft, ND, NT. No rebound or guarding. Vac in place holding suction.     Vital Signs Last 24 Hrs  T(C): 37.2 (16 Jan 2020 03:00), Max: 37.5 (15 Gary 2020 18:00)  T(F): 99 (16 Jan 2020 03:00), Max: 99.5 (15 Gary 2020 18:00)  HR: 75 (16 Jan 2020 03:30) (73 - 123)  BP: 89/53 (16 Jan 2020 03:30) (80/49 - 117/53)  BP(mean): 67 (16 Jan 2020 03:30) (56 - 105)  RR: 14 (16 Jan 2020 03:30) (12 - 39)  SpO2: 96% (16 Jan 2020 03:30) (78% - 100%)    I&O's Detail    14 Jan 2020 07:01  -  15 Gary 2020 07:00  --------------------------------------------------------  IN:    norepinephrine Infusion: 216.6 mL    sodium chloride 0.9%.: 3600 mL    Solution: 366.6 mL    Solution: 300 mL    Solution: 100 mL    vasopressin Infusion: 43.2 mL  Total IN: 4626.4 mL    OUT:    Colostomy: 175 mL    Drain: 110 mL    VAC (Vacuum Assisted Closure) System: 200 mL    Voided: 950 mL  Total OUT: 1435 mL    Total NET: 3191.4 mL      15 Gary 2020 07:01  -  16 Jan 2020 03:56  --------------------------------------------------------  IN:    norepinephrine Infusion: 194 mL    Oral Fluid: 360 mL    Packed Red Blood Cells: 350 mL    sodium chloride 0.9%.: 3000 mL    Solution: 252 mL    Solution: 200 mL    Solution: 50 mL    vasopressin Infusion: 3.6 mL    vasopressin Infusion: 32.4 mL  Total IN: 4442 mL    OUT:    Voided: 970 mL  Total OUT: 970 mL    Total NET: 3472 mL                                8.8    14.81 )-----------( 214      ( 16 Jan 2020 00:42 )             28.8       01-16    135  |  108  |  10  ----------------------------<  122<H>  4.1   |  18<L>  |  0.58    Ca    7.5<L>      16 Jan 2020 00:42  Phos  2.6     01-16  Mg     1.9     01-16

## 2020-01-16 NOTE — PROGRESS NOTE ADULT - ASSESSMENT
64F with pmhx morbid obesity, acid reflux, HTN, COPD and PSH D&C presented to the ED on 11/26 c/o abdominal pain and bloating. Reported pain worst in the LLQ that started a few days ago and has been worsening in the last day, also has noted significant abdominal distention in the last day.  In ED, CT demonstrated perforated sigmoid diverticulitis with intraperitoneal free air. She was taken to the operating room on 11/25 overnight and underwent an exploratory laparotomy with extended left hemicolectomy and was left in discontinuity. An Abthera VAC was placed. She was kept intubated post op and brought to the ICU for hemodynamic and respiratory management. She went back to the OR on 11/29 for transverse end colostomy and fascial closure with wound vac.  Pt transferred out of SICU and recovering on floor with post-op course including induration of wound and colostomy necrosis requiring bedside debridement and wound vac placement.  Pt had CT on 1/6 for leukocytosis and to evaluate wound, which showed loculated fluid collections in the left anterior abdomen, very near loops of bowel.  After CT, pt reported feeling "worse" and was hypotensive overnight to SBP 70s requiring 1 L fluid bolus.  Pt was bolused again this for hypotension and blood cultures were sent for worsening leukocytosis, and BULL.  SICU consulted for closer monitoring.  UA positive and patient was started on Ceftriaxone for presumed UTI.     Neuro: pain control, h/o chronic pain requiring methadone   - Tylenol prn  - Continue home dose Methadone     Resp: h/o COPD  - Albuterol prn  - Continue Symbicort    CV: Hypotension likely secondary to hypovolemia vs septic shock ; h/o HTN; New wall motion abnormalities on echocardiogram   - on Levo, 0.07 and Vaso 0.03   - Lactate normalized.   - Holding home anti-hypertensives in the setting of continuous pressor requirements  - Continue statin     GI: Perforated sigmoid diverticulitis s/p extended left hemicolectomy (11/25)  - Continue Regular diet  - On Erythromycin 250 mg PO BID for GI dysmotility.  - Monitor wound vac output   - Protonix     /Renal: Hypovolemic hyponatremia, BULL  - On NS@150cc/hr.   - Hyponatremia improving  - Trend BUN/Cr   - Replete electrolytes as needed  - Monitor UOP, remains low     ID: UTI, leukocytosis   - Blood cx: 1/8: Staph epidermidis: On vancomycin. Bcx from 1/9, 1/10, 1/12 NGTD.   - Sputum 1/11: Stenotrophomonas: on levofloxacin.   - Ucx: 1/7: Klebsiella, EColi.   - Continue meropenem and Levofloxacin   - Vanc dosed 1g q12 for random level 13.     Heme: VTE prophylaxis  - Continue Lovenox 100 mg (weight-adjusted)  - ASA 81 mg     Endo:   - Monitor serum glucose     Dispo: SICU care. Full code.

## 2020-01-16 NOTE — PROVIDER CONTACT NOTE (OTHER) - ACTION/TREATMENT ORDERED:
SICU team made aware. AM CARE not completed. Possible palliative discussion with patient this AM during rounds. Patient educated on importance of hygiene and turning and repositioning w/understanding.

## 2020-01-16 NOTE — PROGRESS NOTE ADULT - SUBJECTIVE AND OBJECTIVE BOX
CARDIOLOGY     PROGRESS  NOTE   ________________________________________________    CHIEF COMPLAINT:Patient is a 64y old  Female who presents with a chief complaint of perforated bowel (08 Dec 2019 09:22)  no complain.  	  REVIEW OF SYSTEMS:  CONSTITUTIONAL: No fever, weight loss, or fatigue  EYES: No eye pain, visual disturbances, or discharge  ENT:  No difficulty hearing, tinnitus, vertigo; No sinus or throat pain  NECK: No pain or stiffness  RESPIRATORY: No cough, wheezing, chills or hemoptysis; No Shortness of Breath  CARDIOVASCULAR: No chest pain, palpitations, passing out, dizziness, or leg swelling  GASTROINTESTINAL: No abdominal or epigastric pain. No nausea, vomiting, or hematemesis; No diarrhea or constipation. No melena or hematochezia.  GENITOURINARY: No dysuria, frequency, hematuria, or incontinence  NEUROLOGICAL: No headaches, memory loss, loss of strength, numbness, or tremors  SKIN: No itching, burning, rashes, or lesions   LYMPH Nodes: No enlarged glands  ENDOCRINE: No heat or cold intolerance; No hair loss  MUSCULOSKELETAL: No joint pain or swelling; No muscle, back, or extremity pain  PSYCHIATRIC: No depression, anxiety, mood swings, or difficulty sleeping  HEME/LYMPH: No easy bruising, or bleeding gums  ALLERGY AND IMMUNOLOGIC: No hives or eczema	    [ ] All others negative	  [ ] Unable to obtain    PHYSICAL EXAM:  T(C): 36.4 (01-16-20 @ 07:00), Max: 37.5 (01-15-20 @ 18:00)  HR: 77 (01-16-20 @ 09:00) (71 - 123)  BP: 107/55 (01-16-20 @ 09:00) (74/37 - 136/86)  RR: 16 (01-16-20 @ 09:00) (12 - 50)  SpO2: 98% (01-16-20 @ 09:00) (83% - 100%)  Wt(kg): --  I&O's Summary    15 Gary 2020 07:01  -  16 Jan 2020 07:00  --------------------------------------------------------  IN: 5562.2 mL / OUT: 1420 mL / NET: 4142.2 mL    16 Jan 2020 07:01  -  16 Jan 2020 09:13  --------------------------------------------------------  IN: 450.6 mL / OUT: 0 mL / NET: 450.6 mL        Appearance: Normal	  HEENT:   Normal oral mucosa, PERRL, EOMI	  Lymphatic: No lymphadenopathy  Cardiovascular: Normal S1 S2, No JVD, No murmurs, + lymhedema  Respiratory: Lungs clear to auscultation	  Psychiatry: A & O x 3, Mood & affect appropriate  Gastrointestinal:  Soft, Non-tender, + BS	  Skin: No rashes, No ecchymoses, No cyanosis	  Neurologic: Non-focal  Extremities: Normal range of motion, No clubbing, cyanosis.  Vascular: Peripheral pulses palpable 2+ bilaterally    MEDICATIONS  (STANDING):  albuterol/ipratropium for Nebulization 3 milliLiter(s) Nebulizer every 4 hours  AQUAPHOR (petrolatum Ointment) 1 Application(s) Topical three times a day  artificial  tears Solution 1 Drop(s) Both EYES two times a day  aspirin  chewable 81 milliGRAM(s) Oral <User Schedule>  atorvastatin 40 milliGRAM(s) Oral <User Schedule>  buDESOnide    Inhalation Suspension 0.5 milliGRAM(s) Inhalation every 12 hours  chlorhexidine 2% Cloths 1 Application(s) Topical <User Schedule>  doxazosin 2 milliGRAM(s) Oral <User Schedule>  enoxaparin Injectable 100 milliGRAM(s) SubCutaneous <User Schedule>  erythromycin     base Tablet 250 milliGRAM(s) Oral <User Schedule>  levoFLOXacin IVPB 500 milliGRAM(s) IV Intermittent every 24 hours  meropenem  IVPB 1000 milliGRAM(s) IV Intermittent every 8 hours  methadone    Tablet 17.5 milliGRAM(s) Oral <User Schedule>  midodrine 20 milliGRAM(s) Oral every 8 hours  norepinephrine Infusion 0.05 MICROgram(s)/kG/Min (7.889 mL/Hr) IV Continuous <Continuous>  nystatin Powder 1 Application(s) Topical two times a day  pantoprazole    Tablet 40 milliGRAM(s) Oral <User Schedule>  polyethylene glycol 3350 17 Gram(s) Oral <User Schedule>  sodium chloride 0.9%. 1000 milliLiter(s) (150 mL/Hr) IV Continuous <Continuous>  vancomycin  IVPB 1000 milliGRAM(s) IV Intermittent every 12 hours  vasopressin Infusion 0.03 Unit(s)/Min (1.8 mL/Hr) IV Continuous <Continuous>      TELEMETRY: 	    ECG:  	  RADIOLOGY:  OTHER: 	  	  LABS:	 	    CARDIAC MARKERS:                                8.8    14.81 )-----------( 214      ( 16 Jan 2020 00:42 )             28.8     01-16    135  |  108  |  10  ----------------------------<  122<H>  4.1   |  18<L>  |  0.58    Ca    7.5<L>      16 Jan 2020 00:42  Phos  2.6     01-16  Mg     1.9     01-16      proBNP:   Lipid Profile: Cholesterol 98  LDL 52  HDL 28  TG 91    HgA1c:   TSH:   1) CoNS bacteremia--? CLABSI  - Monitor vanco levels, when <15 resume at 750mg q 12  - F/U pending BCXs  2) Loculated fluid collections  - Infected collections?  - Meropenem 1g q 8  3) Leukocytosis  - Trend to normal, monitor for further signs infection  4) Positive culture finding  - Steno from sputum, unclear significance  - Continue Levaquin, plan for 7 day course      Assessment and plan  ---------------------------  septic/ hypovolemic shock still hypotensive  continue pressors/ fluid  abx as per ID  may hold cholesterol meds  WMA on echo ? sec to sepsis  dvt prophylaxis  s/p blood transfusion

## 2020-01-16 NOTE — PROGRESS NOTE ADULT - ATTENDING COMMENTS
Patient seen/examined.  Agree w above note and plan and have discussed plan w house staff.  Got 1 unit PRBC    Lucas Greer MD Patient seen/examined.  Agree w above note and plan and have discussed plan w house staff.  Got 1 unit PRBC.  CT w RUL ground glass opacities, decrease in size abdominal wall collection    Lucas Greer MD

## 2020-01-16 NOTE — PROGRESS NOTE ADULT - ASSESSMENT
63yo F with PMHx morbid obesity, GERD, HTN, COPD, and pshx D&C now s/p ex laparotomy with extended left hemicolectomy 11/26 for perforated and necrotic sigmoid colon with gross abdominal contamination. RTOR 11/29 for abd closure, RUQ end transverse colostomy creation. On 12/19 patient found to have induration of wound and keri-stomal fat necrosis, s/p bedside debridement w/ wound VAC in placement on midline wound. Began showing signs of sepsis and was transferred to SICU 1/7 for further management. Now w/ decreasing pressor support and downtrending leukocytosis.    Plan:  Wean pressors as tolerated  Continue IV vanc, meropenem, levaquin  Trend labs/ WBC/ SBP  Appreciate excellent SICU care    Green  [phone]

## 2020-01-16 NOTE — PROGRESS NOTE ADULT - SUBJECTIVE AND OBJECTIVE BOX
CC: F/U for Bacteremia    Saw/spoke to patient. No fevers, no chills. No new complaints. Unchanged.    Allergies  IV Contrast (Rash; Pruritus; Hypotension)  sulfa drugs (Unknown)    ANTIMICROBIALS:  erythromycin     base Tablet 250 <User Schedule>  levoFLOXacin IVPB 500 every 24 hours  meropenem  IVPB 1000 every 8 hours  vancomycin  IVPB 1000 every 12 hours    PE:    Vital Signs Last 24 Hrs  T(C): 36.7 (16 Jan 2020 11:00), Max: 37.5 (15 Gary 2020 18:00)  T(F): 98.1 (16 Jan 2020 11:00), Max: 99.5 (15 Gary 2020 18:00)  HR: 73 (16 Jan 2020 11:45) (71 - 123)  BP: 99/46 (16 Jan 2020 11:45) (74/37 - 136/86)  BP(mean): 67 (16 Jan 2020 11:45) (49 - 105)  RR: 31 (16 Jan 2020 10:30) (12 - 50)  SpO2: 97% (16 Jan 2020 11:45) (83% - 100%)    Gen: AOx3, NAD, non-toxic, pleasant  CV: S1+S2 normal, nontachycardic  Resp: Clear bilat, no resp distress, no crackles/wheezes  Abd: Soft, nontender, +BS  Ext: No LE edema, no wounds    LABS:                        8.8    14.81 )-----------( 214      ( 16 Jan 2020 00:42 )             28.8     01-16    135  |  108  |  10  ----------------------------<  122<H>  4.1   |  18<L>  |  0.58    Ca    7.5<L>      16 Jan 2020 00:42  Phos  2.6     01-16  Mg     1.9     01-16    MICROBIOLOGY:    .Blood Blood-Venous  01-12-20   No growth to date.     .Sputum Sputum  01-11-20   Moderate Stenotrophomonas maltophilia  Normal Respiratory Elaine absent  --  Stenotrophomonas maltophilia    .Blood Blood  01-10-20   No growth at 5 days.     .Blood Blood  01-09-20   Growth in anaerobic bottle: Coag Negative Staphylococcus  Single set isolate, possible contaminant. Contact  Microbiology if susceptibility testing clinically  indicated.  --    Growth in anaerobic bottle: Gram Positive Cocci in Clusters    .Blood Blood  01-08-20   Growth in aerobic and anaerobic bottles: Staphylococcus epidermidis  --  Staphylococcus epidermidis    (otherwise reviewed)    RADIOLOGY:    1/15 CT:    IMPRESSION:     Small bilateral pleural effusions, new on the right and increased on the left since prior study.    Several scattered right upper lobe groundglass opacities.    Redemonstrated open midline anterior abdominal wall wound with an overlying wound VAC. Interval decrease in size of the underlying left abdominal multiloculated fluid collection, which is poorly defined on current study.    Trace ascites.

## 2020-01-16 NOTE — PROGRESS NOTE ADULT - ASSESSMENT
64 F PMHx of HTN, COPD, and morbid obesity who was admitted on 11/26 for perforated sigmoid diverticulitis.  No fever, has leukocytosis  Prolonged hospital stay  Perforated diverticulum, culture with E coli, s/p OR into washout and ostomy  Had PICC line placed during hospital stay  BCX now positive CoNS, repeat now clear  PICC has been DCed  New CTs with improvement in intraabd collections  CXR clear  Overall,  1) CoNS bacteremia--? CLABSI  - Vanco 750mg q 12--monitor levels; plan to continue through 1/19/20 then DC  - F/U pending BCXs  2) Loculated fluid collections  - Infected collections?  - Meropenem 1g q 8, tentative 10-14 day course  3) Leukocytosis  - Trend to normal, monitor for further signs infection  4) Positive culture finding  - Steno from sputum, unclear significance  - Continue Levaquin, 7 days then DC    Sen Johnson MD  Pager [phone]  After 5pm and on weekends call [phone]

## 2020-01-17 LAB
ALBUMIN SERPL ELPH-MCNC: 1.8 G/DL — LOW (ref 3.3–5)
ALP SERPL-CCNC: 413 U/L — HIGH (ref 40–120)
ALT FLD-CCNC: 27 U/L — SIGNIFICANT CHANGE UP (ref 10–45)
ANION GAP SERPL CALC-SCNC: 10 MMOL/L — SIGNIFICANT CHANGE UP (ref 5–17)
AST SERPL-CCNC: 67 U/L — HIGH (ref 10–40)
BILIRUB DIRECT SERPL-MCNC: 0.4 MG/DL — HIGH (ref 0–0.2)
BILIRUB INDIRECT FLD-MCNC: 0.3 MG/DL — SIGNIFICANT CHANGE UP (ref 0.2–1)
BILIRUB SERPL-MCNC: 0.7 MG/DL — SIGNIFICANT CHANGE UP (ref 0.2–1.2)
BUN SERPL-MCNC: 12 MG/DL — SIGNIFICANT CHANGE UP (ref 7–23)
CALCIUM SERPL-MCNC: 7.6 MG/DL — LOW (ref 8.4–10.5)
CHLORIDE SERPL-SCNC: 107 MMOL/L — SIGNIFICANT CHANGE UP (ref 96–108)
CO2 SERPL-SCNC: 18 MMOL/L — LOW (ref 22–31)
CREAT SERPL-MCNC: 0.67 MG/DL — SIGNIFICANT CHANGE UP (ref 0.5–1.3)
CULTURE RESULTS: SIGNIFICANT CHANGE UP
CULTURE RESULTS: SIGNIFICANT CHANGE UP
GAS PNL BLDV: SIGNIFICANT CHANGE UP
GLUCOSE SERPL-MCNC: 117 MG/DL — HIGH (ref 70–99)
HCT VFR BLD CALC: 29.7 % — LOW (ref 34.5–45)
HGB BLD-MCNC: 9 G/DL — LOW (ref 11.5–15.5)
MAGNESIUM SERPL-MCNC: 1.9 MG/DL — SIGNIFICANT CHANGE UP (ref 1.6–2.6)
MCHC RBC-ENTMCNC: 28 PG — SIGNIFICANT CHANGE UP (ref 27–34)
MCHC RBC-ENTMCNC: 30.3 GM/DL — LOW (ref 32–36)
MCV RBC AUTO: 92.5 FL — SIGNIFICANT CHANGE UP (ref 80–100)
NRBC # BLD: 0 /100 WBCS — SIGNIFICANT CHANGE UP (ref 0–0)
PHOSPHATE SERPL-MCNC: 2.7 MG/DL — SIGNIFICANT CHANGE UP (ref 2.5–4.5)
PLATELET # BLD AUTO: 201 K/UL — SIGNIFICANT CHANGE UP (ref 150–400)
POTASSIUM SERPL-MCNC: 4 MMOL/L — SIGNIFICANT CHANGE UP (ref 3.5–5.3)
POTASSIUM SERPL-SCNC: 4 MMOL/L — SIGNIFICANT CHANGE UP (ref 3.5–5.3)
PROT SERPL-MCNC: 4.7 G/DL — LOW (ref 6–8.3)
RBC # BLD: 3.21 M/UL — LOW (ref 3.8–5.2)
RBC # FLD: 18.6 % — HIGH (ref 10.3–14.5)
SODIUM SERPL-SCNC: 135 MMOL/L — SIGNIFICANT CHANGE UP (ref 135–145)
SPECIMEN SOURCE: SIGNIFICANT CHANGE UP
SPECIMEN SOURCE: SIGNIFICANT CHANGE UP
T3 SERPL-MCNC: 74 NG/DL — LOW (ref 80–200)
T4 AB SER-ACNC: 3.3 UG/DL — LOW (ref 4.6–12)
TSH SERPL-MCNC: 4.81 UIU/ML — HIGH (ref 0.27–4.2)
WBC # BLD: 12.22 K/UL — HIGH (ref 3.8–10.5)
WBC # FLD AUTO: 12.22 K/UL — HIGH (ref 3.8–10.5)

## 2020-01-17 PROCEDURE — 99291 CRITICAL CARE FIRST HOUR: CPT

## 2020-01-17 PROCEDURE — 99232 SBSQ HOSP IP/OBS MODERATE 35: CPT

## 2020-01-17 PROCEDURE — 71045 X-RAY EXAM CHEST 1 VIEW: CPT | Mod: 26

## 2020-01-17 RX ORDER — ACETAMINOPHEN 500 MG
1000 TABLET ORAL ONCE
Refills: 0 | Status: COMPLETED | OUTPATIENT
Start: 2020-01-17 | End: 2020-01-17

## 2020-01-17 RX ORDER — MAGNESIUM SULFATE 500 MG/ML
2 VIAL (ML) INJECTION ONCE
Refills: 0 | Status: COMPLETED | OUTPATIENT
Start: 2020-01-17 | End: 2020-01-17

## 2020-01-17 RX ADMIN — POLYETHYLENE GLYCOL 3350 17 GRAM(S): 17 POWDER, FOR SOLUTION ORAL at 06:11

## 2020-01-17 RX ADMIN — NYSTATIN CREAM 1 APPLICATION(S): 100000 CREAM TOPICAL at 18:17

## 2020-01-17 RX ADMIN — METHADONE HYDROCHLORIDE 17.5 MILLIGRAM(S): 40 TABLET ORAL at 10:20

## 2020-01-17 RX ADMIN — POLYETHYLENE GLYCOL 3350 17 GRAM(S): 17 POWDER, FOR SOLUTION ORAL at 18:17

## 2020-01-17 RX ADMIN — CHLORHEXIDINE GLUCONATE 1 APPLICATION(S): 213 SOLUTION TOPICAL at 06:12

## 2020-01-17 RX ADMIN — ATORVASTATIN CALCIUM 40 MILLIGRAM(S): 80 TABLET, FILM COATED ORAL at 21:21

## 2020-01-17 RX ADMIN — Medication 1 APPLICATION(S): at 15:51

## 2020-01-17 RX ADMIN — HYDROMORPHONE HYDROCHLORIDE 0.5 MILLIGRAM(S): 2 INJECTION INTRAMUSCULAR; INTRAVENOUS; SUBCUTANEOUS at 05:43

## 2020-01-17 RX ADMIN — Medication 50 GRAM(S): at 02:47

## 2020-01-17 RX ADMIN — Medication 975 MILLIGRAM(S): at 11:13

## 2020-01-17 RX ADMIN — Medication 250 MILLIGRAM(S): at 06:11

## 2020-01-17 RX ADMIN — OXYCODONE HYDROCHLORIDE 5 MILLIGRAM(S): 5 TABLET ORAL at 11:58

## 2020-01-17 RX ADMIN — Medication 2 MILLIGRAM(S): at 06:10

## 2020-01-17 RX ADMIN — Medication 81 MILLIGRAM(S): at 21:21

## 2020-01-17 RX ADMIN — Medication 0.5 MILLIGRAM(S): at 18:04

## 2020-01-17 RX ADMIN — Medication 1 DROP(S): at 18:17

## 2020-01-17 RX ADMIN — MIDODRINE HYDROCHLORIDE 20 MILLIGRAM(S): 2.5 TABLET ORAL at 15:51

## 2020-01-17 RX ADMIN — HYDROMORPHONE HYDROCHLORIDE 0.5 MILLIGRAM(S): 2 INJECTION INTRAMUSCULAR; INTRAVENOUS; SUBCUTANEOUS at 11:12

## 2020-01-17 RX ADMIN — MEROPENEM 100 MILLIGRAM(S): 1 INJECTION INTRAVENOUS at 15:50

## 2020-01-17 RX ADMIN — MEROPENEM 100 MILLIGRAM(S): 1 INJECTION INTRAVENOUS at 05:22

## 2020-01-17 RX ADMIN — Medication 1000 MILLIGRAM(S): at 07:30

## 2020-01-17 RX ADMIN — Medication 7.89 MICROGRAM(S)/KG/MIN: at 00:15

## 2020-01-17 RX ADMIN — MIDODRINE HYDROCHLORIDE 20 MILLIGRAM(S): 2.5 TABLET ORAL at 03:13

## 2020-01-17 RX ADMIN — Medication 250 MILLIGRAM(S): at 10:20

## 2020-01-17 RX ADMIN — OXYCODONE HYDROCHLORIDE 5 MILLIGRAM(S): 5 TABLET ORAL at 18:01

## 2020-01-17 RX ADMIN — VASOPRESSIN 1.8 UNIT(S)/MIN: 20 INJECTION INTRAVENOUS at 00:15

## 2020-01-17 RX ADMIN — MIDODRINE HYDROCHLORIDE 20 MILLIGRAM(S): 2.5 TABLET ORAL at 21:22

## 2020-01-17 RX ADMIN — MIDODRINE HYDROCHLORIDE 20 MILLIGRAM(S): 2.5 TABLET ORAL at 10:20

## 2020-01-17 RX ADMIN — Medication 250 MILLIGRAM(S): at 18:18

## 2020-01-17 RX ADMIN — NYSTATIN CREAM 1 APPLICATION(S): 100000 CREAM TOPICAL at 05:22

## 2020-01-17 RX ADMIN — MEROPENEM 100 MILLIGRAM(S): 1 INJECTION INTRAVENOUS at 21:21

## 2020-01-17 RX ADMIN — Medication 1 APPLICATION(S): at 05:23

## 2020-01-17 RX ADMIN — HYDROMORPHONE HYDROCHLORIDE 0.5 MILLIGRAM(S): 2 INJECTION INTRAMUSCULAR; INTRAVENOUS; SUBCUTANEOUS at 06:00

## 2020-01-17 RX ADMIN — OXYCODONE HYDROCHLORIDE 5 MILLIGRAM(S): 5 TABLET ORAL at 06:07

## 2020-01-17 RX ADMIN — Medication 1 DROP(S): at 06:10

## 2020-01-17 RX ADMIN — HYDROMORPHONE HYDROCHLORIDE 0.5 MILLIGRAM(S): 2 INJECTION INTRAMUSCULAR; INTRAVENOUS; SUBCUTANEOUS at 11:55

## 2020-01-17 RX ADMIN — Medication 975 MILLIGRAM(S): at 11:55

## 2020-01-17 RX ADMIN — Medication 1 APPLICATION(S): at 21:22

## 2020-01-17 RX ADMIN — Medication 400 MILLIGRAM(S): at 06:53

## 2020-01-17 RX ADMIN — Medication 62.5 MILLIMOLE(S): at 06:53

## 2020-01-17 RX ADMIN — ENOXAPARIN SODIUM 100 MILLIGRAM(S): 100 INJECTION SUBCUTANEOUS at 15:51

## 2020-01-17 RX ADMIN — OXYCODONE HYDROCHLORIDE 5 MILLIGRAM(S): 5 TABLET ORAL at 06:54

## 2020-01-17 RX ADMIN — PANTOPRAZOLE SODIUM 40 MILLIGRAM(S): 20 TABLET, DELAYED RELEASE ORAL at 06:10

## 2020-01-17 RX ADMIN — Medication 250 MILLIGRAM(S): at 18:17

## 2020-01-17 NOTE — PROGRESS NOTE ADULT - SUBJECTIVE AND OBJECTIVE BOX
CARDIOLOGY     PROGRESS  NOTE   ________________________________________________    CHIEF COMPLAINT:Patient is a 64y old  Female who presents with a chief complaint of perforated bowel (08 Dec 2019 09:22)  no complain.  	  REVIEW OF SYSTEMS:  CONSTITUTIONAL: No fever, weight loss, or fatigue  EYES: No eye pain, visual disturbances, or discharge  ENT:  No difficulty hearing, tinnitus, vertigo; No sinus or throat pain  NECK: No pain or stiffness  RESPIRATORY: No cough, wheezing, chills or hemoptysis; No Shortness of Breath  CARDIOVASCULAR: No chest pain, palpitations, passing out, dizziness, or leg swelling  GASTROINTESTINAL: No abdominal or epigastric pain. No nausea, vomiting, or hematemesis; No diarrhea or constipation. No melena or hematochezia.  GENITOURINARY: No dysuria, frequency, hematuria, or incontinence  NEUROLOGICAL: No headaches, memory loss, loss of strength, numbness, or tremors  SKIN: No itching, burning, rashes, or lesions   LYMPH Nodes: No enlarged glands  ENDOCRINE: No heat or cold intolerance; No hair loss  MUSCULOSKELETAL: No joint pain or swelling; No muscle, back, or extremity pain  PSYCHIATRIC: No depression, anxiety, mood swings, or difficulty sleeping  HEME/LYMPH: No easy bruising, or bleeding gums  ALLERGY AND IMMUNOLOGIC: No hives or eczema	    [ ] All others negative	  [ ] Unable to obtain    PHYSICAL EXAM:  T(C): 36.7 (01-17-20 @ 03:00), Max: 37 (01-16-20 @ 19:15)  HR: 111 (01-17-20 @ 09:00) (72 - 158)  BP: 93/58 (01-17-20 @ 09:00) (81/43 - 179/69)  RR: 20 (01-17-20 @ 09:00) (13 - 41)  SpO2: 97% (01-17-20 @ 09:00) (74% - 100%)  Wt(kg): --  I&O's Summary    16 Jan 2020 07:01  -  17 Jan 2020 07:00  --------------------------------------------------------  IN: 2928.5 mL / OUT: 1280 mL / NET: 1648.5 mL        Appearance: Normal	  HEENT:   Normal oral mucosa, PERRL, EOMI	  Lymphatic: No lymphadenopathy  Cardiovascular: Normal S1 S2, No JVD, + murmurs,+ lymph edema  Respiratory:rhonchi  Psychiatry: A & O x 3, Mood & affect appropriate  Gastrointestinal:  Soft, Non-tender, + BS	  Skin: No rashes, No ecchymoses, No cyanosis	  Neurologic: Non-focal  Extremities: Normal range of motion, No clubbing, cyanosis.  Vascular: Peripheral pulses palpable 2+ bilaterally    MEDICATIONS  (STANDING):  AQUAPHOR (petrolatum Ointment) 1 Application(s) Topical three times a day  artificial  tears Solution 1 Drop(s) Both EYES two times a day  aspirin  chewable 81 milliGRAM(s) Oral <User Schedule>  atorvastatin 40 milliGRAM(s) Oral <User Schedule>  buDESOnide    Inhalation Suspension 0.5 milliGRAM(s) Inhalation every 12 hours  chlorhexidine 2% Cloths 1 Application(s) Topical <User Schedule>  doxazosin 2 milliGRAM(s) Oral <User Schedule>  enoxaparin Injectable 100 milliGRAM(s) SubCutaneous <User Schedule>  erythromycin     base Tablet 250 milliGRAM(s) Oral <User Schedule>  levoFLOXacin IVPB 500 milliGRAM(s) IV Intermittent every 24 hours  meropenem  IVPB 1000 milliGRAM(s) IV Intermittent every 8 hours  methadone    Tablet 17.5 milliGRAM(s) Oral <User Schedule>  midodrine 20 milliGRAM(s) Oral <User Schedule>  norepinephrine Infusion 0.05 MICROgram(s)/kG/Min (7.889 mL/Hr) IV Continuous <Continuous>  nystatin Powder 1 Application(s) Topical two times a day  pantoprazole    Tablet 40 milliGRAM(s) Oral <User Schedule>  polyethylene glycol 3350 17 Gram(s) Oral <User Schedule>  vancomycin  IVPB 1000 milliGRAM(s) IV Intermittent every 12 hours  vasopressin Infusion 0.03 Unit(s)/Min (1.8 mL/Hr) IV Continuous <Continuous>      TELEMETRY: 	    ECG:  	  RADIOLOGY:  OTHER: 	  	  LABS:	 	    CARDIAC MARKERS:                                9.0    12.22 )-----------( 201      ( 17 Jan 2020 00:52 )             29.7     01-17    135  |  107  |  12  ----------------------------<  117<H>  4.0   |  18<L>  |  0.67    Ca    7.6<L>      17 Jan 2020 00:52  Phos  2.7     01-17  Mg     1.9     01-17    TPro  4.7<L>  /  Alb  1.8<L>  /  TBili  0.7  /  DBili  0.4<H>  /  AST  67<H>  /  ALT  27  /  AlkPhos  413<H>  01-17    proBNP:   Lipid Profile: Cholesterol 98  LDL 52  HDL 28  TG 91    HgA1c:   TSH: Thyroid Stimulating Hormone, Serum: 4.81 uIU/mL (01-17 @ 03:52)    1) CoNS bacteremia--? CLABSI  - Vanco 750mg q 12--monitor levels; plan to continue through 1/19/20 then DC  - F/U pending BCXs  2) Loculated fluid collections  - Infected collections?  - Meropenem 1g q 8, tentative 10-14 day course  3) Leukocytosis  - Trend to normal, monitor for further signs infection  4) Positive culture finding  - Steno from sputum, unclear significance  - Continue Levaquin, 7 days then DC      Assessment and plan  ---------------------------  septic/ hypovolemic shock still hypotensive i am some how surprised pt still hypotensive on presssors ?bp monitoring not accurate sec to subclavian stenosis or other source of infection  pt is refusing A line  continue pressors/ fluid  abx as per ID  may hold cholesterol meds  WMA on echo ? sec to sepsis  dvt prophylaxis  s/p blood transfusion

## 2020-01-17 NOTE — PROGRESS NOTE ADULT - ASSESSMENT
64F with pmhx morbid obesity, acid reflux, HTN, COPD and PSH D&C presented to the ED on 11/26 c/o abdominal pain and bloating. Reported pain worst in the LLQ that started a few days ago and has been worsening in the last day, also has noted significant abdominal distention in the last day.  In ED, CT demonstrated perforated sigmoid diverticulitis with intraperitoneal free air. She was taken to the operating room on 11/25 overnight and underwent an exploratory laparotomy with extended left hemicolectomy and was left in discontinuity. An Abthera VAC was placed. She was kept intubated post op and brought to the ICU for hemodynamic and respiratory management. She went back to the OR on 11/29 for transverse end colostomy and fascial closure with wound vac.  Pt transferred out of SICU and recovering on floor with post-op course including induration of wound and colostomy necrosis requiring bedside debridement and wound vac placement.  Pt had CT on 1/6 for leukocytosis and to evaluate wound, which showed loculated fluid collections in the left anterior abdomen, very near loops of bowel.  After CT, pt reported feeling "worse" and was hypotensive overnight to SBP 70s requiring 1 L fluid bolus.  Pt was bolused again this for hypotension and blood cultures were sent for worsening leukocytosis, and BULL.  SICU consulted for closer monitoring.  UA positive and patient was started on Ceftriaxone for presumed UTI.     Neuro: pain control, h/o chronic pain requiring methadone   - Tylenol prn  - Continue home dose Methadone     Resp: h/o COPD  - Albuterol prn  - Continue Symbicort    CV: Hypotension likely secondary to hypovolemia vs septic shock ; h/o HTN; New wall motion abnormalities on echocardiogram   - on Levo, 0.07   - Lactate normalized.   - Holding home anti-hypertensives in the setting of continuous pressor requirements  - Continue statin   - Continue midodrine     GI: Perforated sigmoid diverticulitis s/p extended left hemicolectomy (11/25)  - Continue Regular diet  - On Erythromycin 250 mg PO BID for GI dysmotility.  - Monitor wound vac output   - Protonix     /Renal: Hypovolemic hyponatremia, BULL  - IV locked    - Hyponatremia improving  - Trend BUN/Cr   - Replete electrolytes as needed  - Monitor UOP, remains low     ID: UTI, leukocytosis   - Blood cx: 1/8: Staph epidermidis: On vancomycin. Bcx from 1/9, 1/10, 1/12 NGTD.   - Sputum 1/11: Stenotrophomonas: on levofloxacin.   - Ucx: 1/7: Klebsiella, EColi.   - Continue meropenem   - Continue vancomycin     Heme: VTE prophylaxis  - Continue Lovenox 100 mg (weight-adjusted)  - ASA 81 mg     Endo:   - Monitor serum glucose     Dispo: SICU care. Full code.

## 2020-01-17 NOTE — PROGRESS NOTE ADULT - ASSESSMENT
64 F PMHx of HTN, COPD, and morbid obesity who was admitted on 11/26 for perforated sigmoid diverticulitis.  No fever, has leukocytosis  Prolonged hospital stay  Perforated diverticulum, culture with E coli, s/p OR into washout and ostomy  Had PICC line placed during hospital stay  BCX now positive CoNS, repeat now clear  PICC has been DCed  New CTs with improvement in intraabd collections  CXR clear  Overall,  1) CoNS bacteremia--? CLABSI  - Vanco 750mg q 12--monitor levels; plan to continue through 1/19/20 then DC  - F/U pending BCXs  2) Loculated fluid collections  - Infected collections?  - Meropenem 1g q 8, tentative 10-14 day course depending on clinical improvement  3) Leukocytosis  - Trend to normal, monitor for further signs infection  4) Positive culture finding  - Steno from sputum, unclear significance  - Continue Levaquin, 7 days then DC    Sen Johnson MD  Pager [phone]  After 5pm and on weekends call [phone]

## 2020-01-17 NOTE — PROGRESS NOTE ADULT - ATTENDING COMMENTS
Patient seen and examined and agree with above.   Awake and alert comfortable  Unclear reason on why the patient continues to be hypotensive. Levophed requirements are trending down. Suggestion of an arterial line but patient refusing. Will continue to wean as tolerated.   Will continue midodrine   Mentation intact   Patient likely euvolemic at this time   -TFTs sent and endocrine consult appreciated with suggestion of not to start synthroid    Respiratory state is stable on room air     Improving leukocytosis   Stenotrophomonas on sputum cultures -continue levaquin at this time  E coli and klebsiella UTi - continue meropenem     Tolerating PO, no abd pain, stoma is functioning     COPD - continue current medications with goal SpO 88-92%    Skin- epidermal layer is shedding and moisturizer to be placed by nursing staff if patient allows.     Patient continues to refuse to get out of bed and care by nurses. I have explained to her the benefits of mobilization on her pulmonary status and overall clinical state and she expresses understanding but continues to not cooperate with mobilization.   Patient remains critically ill  CC time: 32 minutes .

## 2020-01-17 NOTE — PROGRESS NOTE ADULT - SUBJECTIVE AND OBJECTIVE BOX
Morning Surgical Progress Note  Patient is a 64y old  Female who presents with a chief complaint of perforated bowel (08 Dec 2019 09:22)      SUBJECTIVE: Patient seen and examined at bedside with surgical team, there were no acute events overnight. Patient's Hb has been stable since PRBC transfusion. Patient still requires norepinephrine and vasopressin.     Vital Signs Last 24 Hrs  T(C): 36.7 (17 Jan 2020 03:00), Max: 37 (16 Jan 2020 19:15)  T(F): 98.1 (17 Jan 2020 03:00), Max: 98.6 (16 Jan 2020 19:15)  HR: 75 (17 Jan 2020 04:15) (71 - 158)  BP: 97/50 (17 Jan 2020 04:15) (74/37 - 140/86)  BP(mean): 72 (17 Jan 2020 04:15) (49 - 105)  RR: 16 (17 Jan 2020 04:15) (13 - 50)  SpO2: 98% (17 Jan 2020 04:15) (94% - 100%)I&O's Detail    15 Gary 2020 07:01  -  16 Jan 2020 07:00  --------------------------------------------------------  IN:    norepinephrine Infusion: 232 mL    Oral Fluid: 360 mL    Packed Red Blood Cells: 350 mL    sodium chloride 0.9%: 3600 mL    Solution: 300 mL    Solution: 377 mL    Solution: 300 mL    vasopressin Infusion: 39.6 mL    vasopressin Infusion: 3.6 mL  Total IN: 5562.2 mL    OUT:    Drain: 50 mL    VAC (Vacuum Assisted Closure) System: 200 mL    Voided: 1170 mL  Total OUT: 1420 mL    Total NET: 4142.2 mL      16 Jan 2020 07:01  -  17 Jan 2020 04:58  --------------------------------------------------------  IN:    norepinephrine Infusion: 196.5 mL    Oral Fluid: 720 mL    sodium chloride 0.9%: 600 mL    Solution: 50 mL    Solution: 187.5 mL    Solution: 500 mL    vasopressin Infusion: 36 mL  Total IN: 2290 mL    OUT:    Colostomy: 200 mL    Drain: 50 mL    VAC (Vacuum Assisted Closure) System: 50 mL    Voided: 550 mL  Total OUT: 850 mL    Total NET: 1440 mL      MEDICATIONS  (STANDING):  AQUAPHOR (petrolatum Ointment) 1 Application(s) Topical three times a day  artificial  tears Solution 1 Drop(s) Both EYES two times a day  aspirin  chewable 81 milliGRAM(s) Oral <User Schedule>  atorvastatin 40 milliGRAM(s) Oral <User Schedule>  buDESOnide    Inhalation Suspension 0.5 milliGRAM(s) Inhalation every 12 hours  chlorhexidine 2% Cloths 1 Application(s) Topical <User Schedule>  doxazosin 2 milliGRAM(s) Oral <User Schedule>  enoxaparin Injectable 100 milliGRAM(s) SubCutaneous <User Schedule>  erythromycin     base Tablet 250 milliGRAM(s) Oral <User Schedule>  levoFLOXacin IVPB 500 milliGRAM(s) IV Intermittent every 24 hours  meropenem  IVPB 1000 milliGRAM(s) IV Intermittent every 8 hours  methadone    Tablet 17.5 milliGRAM(s) Oral <User Schedule>  midodrine 20 milliGRAM(s) Oral <User Schedule>  norepinephrine Infusion 0.05 MICROgram(s)/kG/Min (7.889 mL/Hr) IV Continuous <Continuous>  nystatin Powder 1 Application(s) Topical two times a day  pantoprazole    Tablet 40 milliGRAM(s) Oral <User Schedule>  polyethylene glycol 3350 17 Gram(s) Oral <User Schedule>  vancomycin  IVPB 1000 milliGRAM(s) IV Intermittent every 12 hours  vasopressin Infusion 0.03 Unit(s)/Min (1.8 mL/Hr) IV Continuous <Continuous>    MEDICATIONS  (PRN):  acetaminophen   Tablet .. 975 milliGRAM(s) Oral every 6 hours PRN Mild Pain (1 - 3)  albuterol/ipratropium for Nebulization 3 milliLiter(s) Nebulizer every 6 hours PRN Shortness of Breath and/or Wheezing  aluminum hydroxide/magnesium hydroxide/simethicone Suspension 30 milliLiter(s) Oral every 4 hours PRN Dyspepsia  HYDROmorphone  Injectable 0.5 milliGRAM(s) IV Push every 4 hours PRN for turning  oxyCODONE    IR 5 milliGRAM(s) Oral every 4 hours PRN Moderate Pain (4 - 6)      Physical Exam  Constitutional: A&Ox3, NAD  Respiratory: CTA b/l  Cardiac: RRR, S1 and S2  Gastrointestinal: Soft, ND, NT. No rebound or guarding. Vac in place holding suction.   Skin: mildly erythematous with skin sloughing    LABS:                        9.0    12.22 )-----------( 201      ( 17 Jan 2020 00:52 )             29.7     01-17    135  |  107  |  12  ----------------------------<  117<H>  4.0   |  18<L>  |  0.67    Ca    7.6<L>      17 Jan 2020 00:52  Phos  2.7     01-17  Mg     1.9     01-17

## 2020-01-17 NOTE — PROGRESS NOTE ADULT - ATTENDING COMMENTS
Patient seen/examined.  Agree w above note and plan and have discussed plan w house staff   C/o back pain, tolerating diet.  Still pressor dependent.  Now w generalized desquamation of skin, derm thinks drug reaction  Lucas Greer MD

## 2020-01-17 NOTE — PROGRESS NOTE ADULT - ASSESSMENT
63yo F with PMHx morbid obesity, GERD, HTN, COPD, and pshx D&C now s/p ex laparotomy with extended left hemicolectomy 11/26 for perforated and necrotic sigmoid colon with gross abdominal contamination. RTOR 11/29 for abd closure, RUQ end transverse colostomy creation. On 12/19 patient found to have induration of wound and keri-stomal fat necrosis, s/p bedside debridement w/ wound VAC in placement on midline wound. Began showing signs of sepsis and was transferred to SICU 1/7 for further management. Now w/ decreasing pressor support and downtrending leukocytosis.    Plan:  Follow up Cortisol study  Wean pressors as tolerated  Continue IV vanc, meropenem, levaquin  Trend labs/ WBC/ SBP  Appreciate excellent SICU care    Green  [phone]

## 2020-01-17 NOTE — PROGRESS NOTE ADULT - SUBJECTIVE AND OBJECTIVE BOX
HISTORY  64y Female pmhx morbid obesity, acid reflux, HTN, COPD and PSH D&C presented to the ED on 11/26 c/o abdominal pain and bloating. Patient reports having constipation for 2 weeks and has been taking enemas, miralax and senna and passing small hard balls for the last weeks. Reported pain worst in the LLQ that started a few days ago and has been worsening in the last day, also has noted significant abdominal distention in the last day. Reported she had difficulty walking short distances because she becomes SOB. In ED, CT demonstrated perforated sigmoid diverticulitis with intraperitoneal free air. She was taken to the operating room on 11/25 overnight and underwent an exploratory laparotomy with extended left hemicolectomy and was left in discontinuity. An Abthera VAC was placed. She was kept intubated post op and brought to the ICU for hemodynamic and respiratory management. She went back to the OR on 11/29 for transverse end colostomy and fascial closure with wound vac.  Pt transferred out of SICU and recovering on floor with post-op course including induration of wound and colostomy necrosis requiring bedside debridement and wound vac placement.  Pt had CT on 1/6 for leukocytosis and to evaluate wound, which showed loculated fluid collections in the left anterior abdomen, very near loops of bowel.  After CT, pt reported feeling "worse" and was hypotensive overnight to SBP 70s requiring 1L fluid bolus.  Pt was bolused again for hypotension and blood cultures were sent for worsening leukocytosis, and BULL. SICU consulted for closer monitoring.  UA positive and patient was started on Ceftriaxone for presumed UTI.       24 HOUR EVENTS:  - Remains on low dose Levophed for hemodynamic support  - Midodrine increased to 20mg. q6 hrs  - IV locked     SUBJECTIVE/ROS:  [ ] A ten-point review of systems was otherwise negative except as noted.  [ ] Due to altered mental status/intubation, subjective information were not able to be obtained from the patient. History was obtained, to the extent possible, from review of the chart and collateral sources of information.      NEURO  Exam: awake, alert, oriented  Meds: acetaminophen   Tablet .. 975 milliGRAM(s) Oral every 6 hours PRN Mild Pain (1 - 3)  HYDROmorphone  Injectable 0.5 milliGRAM(s) IV Push every 4 hours PRN for turning  methadone    Tablet 17.5 milliGRAM(s) Oral <User Schedule>  oxyCODONE    IR 5 milliGRAM(s) Oral every 4 hours PRN Moderate Pain (4 - 6)    [x] Adequacy of sedation and pain control has been assessed and adjusted      RESPIRATORY  RR: 18 (01-17-20 @ 00:30) (12 - 50)  SpO2: 97% (01-17-20 @ 00:30) (93% - 100%)  Exam: unlabored, clear to auscultation bilaterally  Mechanical Ventilation: none  [N/A] Extubation Readiness Assessed  Meds: albuterol/ipratropium for Nebulization 3 milliLiter(s) Nebulizer every 6 hours PRN Shortness of Breath and/or Wheezing  buDESOnide    Inhalation Suspension 0.5 milliGRAM(s) Inhalation every 12 hours        CARDIOVASCULAR  HR: 76 (01-17-20 @ 00:30) (71 - 158)  BP: 99/50 (01-17-20 @ 00:30) (74/37 - 140/86)  BP(mean): 69 (01-17-20 @ 00:30) (49 - 105)  VBG - ( 17 Jan 2020 00:36 )  pH: 7.32  /  pCO2: 41    /  pO2: 42    / HCO3: 21    / Base Excess: -4.6  /  SaO2: 72     Lactate: 2.4    Exam: regular rate and rhythm  Cardiac Rhythm: sinus  Perfusion     [x]Adequate   [ ]Inadequate  Mentation   [x]Normal       [ ]Reduced  Extremities  [x]Warm         [ ]Cool  Volume Status [ ]Hypervolemic [x]Euvolemic [ ]Hypovolemic  Meds: doxazosin 2 milliGRAM(s) Oral <User Schedule>  midodrine 20 milliGRAM(s) Oral <User Schedule>  norepinephrine Infusion 0.05 MICROgram(s)/kG/Min IV Continuous <Continuous>        GI/NUTRITION  Exam: soft, nontender, nondistended  Diet: regular diet   Meds: aluminum hydroxide/magnesium hydroxide/simethicone Suspension 30 milliLiter(s) Oral every 4 hours PRN Dyspepsia  pantoprazole    Tablet 40 milliGRAM(s) Oral <User Schedule>  polyethylene glycol 3350 17 Gram(s) Oral <User Schedule>      GENITOURINARY  I&O's Detail    01-15 @ 07:01  -  01-16 @ 07:00  --------------------------------------------------------  IN:    norepinephrine Infusion: 232 mL    Oral Fluid: 360 mL    Packed Red Blood Cells: 350 mL    sodium chloride 0.9%: 3600 mL    Solution: 300 mL    Solution: 377 mL    Solution: 300 mL    vasopressin Infusion: 39.6 mL    vasopressin Infusion: 3.6 mL  Total IN: 5562.2 mL    OUT:    Drain: 50 mL    VAC (Vacuum Assisted Closure) System: 200 mL    Voided: 1170 mL  Total OUT: 1420 mL    Total NET: 4142.2 mL      01-16 @ 07:01  -  01-17 @ 00:42  --------------------------------------------------------  IN:    norepinephrine Infusion: 172.8 mL    Oral Fluid: 720 mL    sodium chloride 0.9%: 600 mL    Solution: 50 mL    Solution: 450 mL    Solution: 187.5 mL    vasopressin Infusion: 30.6 mL  Total IN: 2210.9 mL    OUT:    Colostomy: 200 mL    Drain: 50 mL    VAC (Vacuum Assisted Closure) System: 50 mL  Total OUT: 300 mL    Total NET: 1910.9 mL          01-16    135  |  108  |  10  ----------------------------<  122<H>  4.1   |  18<L>  |  0.58    Ca    7.5<L>      16 Jan 2020 00:42  Phos  2.6     01-16  Mg     1.9     01-16      [ ] Ross catheter, indication: N/A  Meds: none      HEMATOLOGIC  Meds: aspirin  chewable 81 milliGRAM(s) Oral <User Schedule>  enoxaparin Injectable 100 milliGRAM(s) SubCutaneous <User Schedule>    [x] VTE Prophylaxis                        8.8    14.81 )-----------( 214      ( 16 Jan 2020 00:42 )             28.8       Transfusion     [ ] PRBC   [ ] Platelets   [ ] FFP   [ ] Cryoprecipitate      INFECTIOUS DISEASES    RECENT CULTURES:  Specimen Source: .Blood Blood-Venous  Date/Time: 01-12 @ 16:53  Culture Results:   No growth to date.  Gram Stain: --  Organism: --    Meds: erythromycin     base Tablet 250 milliGRAM(s) Oral <User Schedule>  levoFLOXacin IVPB 500 milliGRAM(s) IV Intermittent every 24 hours  meropenem  IVPB 1000 milliGRAM(s) IV Intermittent every 8 hours  vancomycin  IVPB 1000 milliGRAM(s) IV Intermittent every 12 hours        ENDOCRINE  CAPILLARY BLOOD GLUCOSE        Meds: atorvastatin 40 milliGRAM(s) Oral <User Schedule>  vasopressin Infusion 0.03 Unit(s)/Min IV Continuous <Continuous>        ACCESS DEVICES:  [x] Peripheral IV  [ ] Central Venous Line	[ ] R	[ ] L	[ ] IJ	[ ] Fem	[ ] SC	Placed:   [ ] Arterial Line		[ ] R	[ ] L	[ ] Fem	[ ] Rad	[ ] Ax	Placed:   [ ] PICC:					[ ] Mediport  [ ] Urinary Catheter, Date Placed:   [x] Necessity of urinary, arterial, and venous catheters discussed    OTHER MEDICATIONS:  AQUAPHOR (petrolatum Ointment) 1 Application(s) Topical three times a day  artificial  tears Solution 1 Drop(s) Both EYES two times a day  chlorhexidine 2% Cloths 1 Application(s) Topical <User Schedule>  nystatin Powder 1 Application(s) Topical two times a day      CODE STATUS: full code       IMAGING: < from: CT Abdomen and Pelvis w/ Oral Cont (01.15.20 @ 14:12) >  IMPRESSION:     Small bilateral pleural effusions, new on the right and increased on the left since prior study.    Several scattered right upper lobe groundglass opacities.    Redemonstrated open midline anterior abdominal wall wound with an overlying wound VAC. Interval decrease in size of the underlying left abdominal multiloculated fluid collection, which is poorly defined on current study.    Trace ascites.      < end of copied text >

## 2020-01-17 NOTE — PROGRESS NOTE ADULT - SUBJECTIVE AND OBJECTIVE BOX
CC: F/U for Bacteremia    Saw/spoke to patient. No fevers, no chills. No new complaints. Unchanged.    Allergies  IV Contrast (Rash; Pruritus; Hypotension)  sulfa drugs (Unknown)    ANTIMICROBIALS:  erythromycin     base Tablet 250 <User Schedule>  levoFLOXacin IVPB 500 every 24 hours  meropenem  IVPB 1000 every 8 hours  vancomycin  IVPB 1000 every 12 hours    PE:    Vital Signs Last 24 Hrs  T(C): 36.7 (17 Jan 2020 03:00), Max: 37 (16 Jan 2020 19:15)  T(F): 98.1 (17 Jan 2020 03:00), Max: 98.6 (16 Jan 2020 19:15)  HR: 85 (17 Jan 2020 11:00) (72 - 185)  BP: 95/48 (17 Jan 2020 11:00) (81/43 - 179/69)  BP(mean): 69 (17 Jan 2020 11:00) (55 - 105)  RR: 16 (17 Jan 2020 11:00) (13 - 41)  SpO2: 99% (17 Jan 2020 11:00) (74% - 100%)    Gen: AOx3, NAD, non-toxic, pleasant  CV: S1+S2 normal, nontachycardic  Resp: Clear bilat, no resp distress, no crackles/wheezes  Abd: Soft, nontender, +BS  Ext: No LE edema, no wounds    LABS:                        9.0    12.22 )-----------( 201      ( 17 Jan 2020 00:52 )             29.7     01-17    135  |  107  |  12  ----------------------------<  117<H>  4.0   |  18<L>  |  0.67    Ca    7.6<L>      17 Jan 2020 00:52  Phos  2.7     01-17  Mg     1.9     01-17    TPro  4.7<L>  /  Alb  1.8<L>  /  TBili  0.7  /  DBili  0.4<H>  /  AST  67<H>  /  ALT  27  /  AlkPhos  413<H>  01-17    MICROBIOLOGY:  Vancomycin Level, Trough: 14.3 ug/mL (01-16-20 @ 18:23)    .Blood Blood-Venous  01-12-20   No growth to date.     .Sputum Sputum  01-11-20   Moderate Stenotrophomonas maltophilia  Normal Respiratory Elaine absent  --  Stenotrophomonas maltophilia    .Blood Blood  01-09-20   Growth in anaerobic bottle: Coag Negative Staphylococcus  Single set isolate, possible contaminant. Contact  Microbiology if susceptibility testing clinically  indicated.  --    Growth in anaerobic bottle: Gram Positive Cocci in Clusters    .Blood Blood  01-08-20   Growth in aerobic and anaerobic bottles: Staphylococcus epidermidis  --  Staphylococcus epidermidis    (otherwise reviewed)    RADIOLOGY:    1/17 CXR:    FINDINGS/  IMPRESSION:   Right IJ venous catheter tip overlies SVC.  The heart is top normal in size.   No central congestive changes noted.  Persistent small left pleural effusion with associated left basilar atelectasis.  Right lung is clear.  No pneumothorax.  No acute bony findings.

## 2020-01-18 LAB
ALBUMIN SERPL ELPH-MCNC: 1.4 G/DL — LOW (ref 3.3–5)
ALP SERPL-CCNC: 333 U/L — HIGH (ref 40–120)
ALT FLD-CCNC: 22 U/L — SIGNIFICANT CHANGE UP (ref 10–45)
ANION GAP SERPL CALC-SCNC: 10 MMOL/L — SIGNIFICANT CHANGE UP (ref 5–17)
AST SERPL-CCNC: 39 U/L — SIGNIFICANT CHANGE UP (ref 10–40)
BILIRUB DIRECT SERPL-MCNC: 0.3 MG/DL — HIGH (ref 0–0.2)
BILIRUB INDIRECT FLD-MCNC: 0.4 MG/DL — SIGNIFICANT CHANGE UP (ref 0.2–1)
BILIRUB SERPL-MCNC: 0.7 MG/DL — SIGNIFICANT CHANGE UP (ref 0.2–1.2)
BUN SERPL-MCNC: 13 MG/DL — SIGNIFICANT CHANGE UP (ref 7–23)
CALCIUM SERPL-MCNC: 6.6 MG/DL — LOW (ref 8.4–10.5)
CHLORIDE SERPL-SCNC: 112 MMOL/L — HIGH (ref 96–108)
CK MB CFR SERPL CALC: 2 NG/ML — SIGNIFICANT CHANGE UP (ref 0–3.8)
CK SERPL-CCNC: 17 U/L — LOW (ref 25–170)
CO2 SERPL-SCNC: 14 MMOL/L — LOW (ref 22–31)
CREAT SERPL-MCNC: 0.56 MG/DL — SIGNIFICANT CHANGE UP (ref 0.5–1.3)
GAS PNL BLDV: SIGNIFICANT CHANGE UP
GAS PNL BLDV: SIGNIFICANT CHANGE UP
GLUCOSE SERPL-MCNC: 80 MG/DL — SIGNIFICANT CHANGE UP (ref 70–99)
HCT VFR BLD CALC: 30.6 % — LOW (ref 34.5–45)
HGB BLD-MCNC: 9.3 G/DL — LOW (ref 11.5–15.5)
MAGNESIUM SERPL-MCNC: 1.5 MG/DL — LOW (ref 1.6–2.6)
MCHC RBC-ENTMCNC: 28.1 PG — SIGNIFICANT CHANGE UP (ref 27–34)
MCHC RBC-ENTMCNC: 30.4 GM/DL — LOW (ref 32–36)
MCV RBC AUTO: 92.4 FL — SIGNIFICANT CHANGE UP (ref 80–100)
NRBC # BLD: 0 /100 WBCS — SIGNIFICANT CHANGE UP (ref 0–0)
PHOSPHATE SERPL-MCNC: 2.3 MG/DL — LOW (ref 2.5–4.5)
PLATELET # BLD AUTO: 167 K/UL — SIGNIFICANT CHANGE UP (ref 150–400)
POTASSIUM SERPL-MCNC: 3.7 MMOL/L — SIGNIFICANT CHANGE UP (ref 3.5–5.3)
POTASSIUM SERPL-SCNC: 3.7 MMOL/L — SIGNIFICANT CHANGE UP (ref 3.5–5.3)
PROT SERPL-MCNC: 4 G/DL — LOW (ref 6–8.3)
RBC # BLD: 3.31 M/UL — LOW (ref 3.8–5.2)
RBC # FLD: 18.8 % — HIGH (ref 10.3–14.5)
SODIUM SERPL-SCNC: 136 MMOL/L — SIGNIFICANT CHANGE UP (ref 135–145)
TROPONIN T, HIGH SENSITIVITY RESULT: 293 NG/L — HIGH (ref 0–51)
WBC # BLD: 17.88 K/UL — HIGH (ref 3.8–10.5)
WBC # FLD AUTO: 17.88 K/UL — HIGH (ref 3.8–10.5)

## 2020-01-18 PROCEDURE — 99291 CRITICAL CARE FIRST HOUR: CPT

## 2020-01-18 PROCEDURE — 71045 X-RAY EXAM CHEST 1 VIEW: CPT | Mod: 26

## 2020-01-18 RX ORDER — SODIUM,POTASSIUM PHOSPHATES 278-250MG
2 POWDER IN PACKET (EA) ORAL ONCE
Refills: 0 | Status: DISCONTINUED | OUTPATIENT
Start: 2020-01-18 | End: 2020-01-18

## 2020-01-18 RX ORDER — ACETAMINOPHEN 500 MG
1000 TABLET ORAL ONCE
Refills: 0 | Status: COMPLETED | OUTPATIENT
Start: 2020-01-18 | End: 2020-01-18

## 2020-01-18 RX ORDER — ALBUMIN HUMAN 25 %
500 VIAL (ML) INTRAVENOUS ONCE
Refills: 0 | Status: COMPLETED | OUTPATIENT
Start: 2020-01-18 | End: 2020-01-18

## 2020-01-18 RX ORDER — POTASSIUM CHLORIDE 20 MEQ
20 PACKET (EA) ORAL ONCE
Refills: 0 | Status: COMPLETED | OUTPATIENT
Start: 2020-01-18 | End: 2020-01-18

## 2020-01-18 RX ORDER — LIDOCAINE 4 G/100G
1 CREAM TOPICAL DAILY
Refills: 0 | Status: DISCONTINUED | OUTPATIENT
Start: 2020-01-18 | End: 2020-01-18

## 2020-01-18 RX ORDER — POTASSIUM PHOSPHATE, MONOBASIC POTASSIUM PHOSPHATE, DIBASIC 236; 224 MG/ML; MG/ML
30 INJECTION, SOLUTION INTRAVENOUS ONCE
Refills: 0 | Status: COMPLETED | OUTPATIENT
Start: 2020-01-18 | End: 2020-01-18

## 2020-01-18 RX ORDER — MAGNESIUM SULFATE 500 MG/ML
2 VIAL (ML) INJECTION ONCE
Refills: 0 | Status: DISCONTINUED | OUTPATIENT
Start: 2020-01-18 | End: 2020-01-18

## 2020-01-18 RX ORDER — MAGNESIUM SULFATE 500 MG/ML
2 VIAL (ML) INJECTION
Refills: 0 | Status: COMPLETED | OUTPATIENT
Start: 2020-01-18 | End: 2020-01-18

## 2020-01-18 RX ORDER — LIDOCAINE 4 G/100G
1 CREAM TOPICAL DAILY
Refills: 0 | Status: DISCONTINUED | OUTPATIENT
Start: 2020-01-19 | End: 2020-01-31

## 2020-01-18 RX ORDER — HYDROMORPHONE HYDROCHLORIDE 2 MG/ML
1 INJECTION INTRAMUSCULAR; INTRAVENOUS; SUBCUTANEOUS EVERY 4 HOURS
Refills: 0 | Status: DISCONTINUED | OUTPATIENT
Start: 2020-01-18 | End: 2020-01-24

## 2020-01-18 RX ADMIN — Medication 250 MILLIGRAM(S): at 17:39

## 2020-01-18 RX ADMIN — MIDODRINE HYDROCHLORIDE 20 MILLIGRAM(S): 2.5 TABLET ORAL at 21:11

## 2020-01-18 RX ADMIN — Medication 20 MILLIEQUIVALENT(S): at 03:45

## 2020-01-18 RX ADMIN — VASOPRESSIN 1.8 UNIT(S)/MIN: 20 INJECTION INTRAVENOUS at 03:45

## 2020-01-18 RX ADMIN — HYDROMORPHONE HYDROCHLORIDE 1 MILLIGRAM(S): 2 INJECTION INTRAMUSCULAR; INTRAVENOUS; SUBCUTANEOUS at 16:23

## 2020-01-18 RX ADMIN — Medication 0.5 MILLIGRAM(S): at 18:39

## 2020-01-18 RX ADMIN — Medication 400 MILLIGRAM(S): at 16:23

## 2020-01-18 RX ADMIN — MIDODRINE HYDROCHLORIDE 20 MILLIGRAM(S): 2.5 TABLET ORAL at 03:46

## 2020-01-18 RX ADMIN — PANTOPRAZOLE SODIUM 40 MILLIGRAM(S): 20 TABLET, DELAYED RELEASE ORAL at 05:03

## 2020-01-18 RX ADMIN — POLYETHYLENE GLYCOL 3350 17 GRAM(S): 17 POWDER, FOR SOLUTION ORAL at 17:35

## 2020-01-18 RX ADMIN — Medication 1 DROP(S): at 17:40

## 2020-01-18 RX ADMIN — ENOXAPARIN SODIUM 100 MILLIGRAM(S): 100 INJECTION SUBCUTANEOUS at 15:23

## 2020-01-18 RX ADMIN — NYSTATIN CREAM 1 APPLICATION(S): 100000 CREAM TOPICAL at 17:35

## 2020-01-18 RX ADMIN — ATORVASTATIN CALCIUM 40 MILLIGRAM(S): 80 TABLET, FILM COATED ORAL at 19:28

## 2020-01-18 RX ADMIN — Medication 50 GRAM(S): at 03:46

## 2020-01-18 RX ADMIN — OXYCODONE HYDROCHLORIDE 5 MILLIGRAM(S): 5 TABLET ORAL at 04:29

## 2020-01-18 RX ADMIN — Medication 2 MILLIGRAM(S): at 06:00

## 2020-01-18 RX ADMIN — POTASSIUM PHOSPHATE, MONOBASIC POTASSIUM PHOSPHATE, DIBASIC 83.33 MILLIMOLE(S): 236; 224 INJECTION, SOLUTION INTRAVENOUS at 03:46

## 2020-01-18 RX ADMIN — METHADONE HYDROCHLORIDE 17.5 MILLIGRAM(S): 40 TABLET ORAL at 10:23

## 2020-01-18 RX ADMIN — OXYCODONE HYDROCHLORIDE 5 MILLIGRAM(S): 5 TABLET ORAL at 17:34

## 2020-01-18 RX ADMIN — MEROPENEM 100 MILLIGRAM(S): 1 INJECTION INTRAVENOUS at 15:23

## 2020-01-18 RX ADMIN — Medication 7.89 MICROGRAM(S)/KG/MIN: at 03:45

## 2020-01-18 RX ADMIN — MIDODRINE HYDROCHLORIDE 20 MILLIGRAM(S): 2.5 TABLET ORAL at 17:34

## 2020-01-18 RX ADMIN — Medication 50 GRAM(S): at 05:02

## 2020-01-18 RX ADMIN — Medication 250 MILLIGRAM(S): at 06:01

## 2020-01-18 RX ADMIN — Medication 1500 MILLILITER(S): at 16:25

## 2020-01-18 RX ADMIN — Medication 1 APPLICATION(S): at 15:23

## 2020-01-18 RX ADMIN — POLYETHYLENE GLYCOL 3350 17 GRAM(S): 17 POWDER, FOR SOLUTION ORAL at 06:00

## 2020-01-18 RX ADMIN — VASOPRESSIN 1.8 UNIT(S)/MIN: 20 INJECTION INTRAVENOUS at 19:28

## 2020-01-18 RX ADMIN — OXYCODONE HYDROCHLORIDE 5 MILLIGRAM(S): 5 TABLET ORAL at 19:01

## 2020-01-18 RX ADMIN — Medication 81 MILLIGRAM(S): at 19:28

## 2020-01-18 RX ADMIN — Medication 0.5 MILLIGRAM(S): at 05:32

## 2020-01-18 RX ADMIN — CHLORHEXIDINE GLUCONATE 1 APPLICATION(S): 213 SOLUTION TOPICAL at 05:03

## 2020-01-18 RX ADMIN — NYSTATIN CREAM 1 APPLICATION(S): 100000 CREAM TOPICAL at 06:00

## 2020-01-18 RX ADMIN — HYDROMORPHONE HYDROCHLORIDE 1 MILLIGRAM(S): 2 INJECTION INTRAMUSCULAR; INTRAVENOUS; SUBCUTANEOUS at 17:22

## 2020-01-18 RX ADMIN — MIDODRINE HYDROCHLORIDE 20 MILLIGRAM(S): 2.5 TABLET ORAL at 10:23

## 2020-01-18 RX ADMIN — MEROPENEM 100 MILLIGRAM(S): 1 INJECTION INTRAVENOUS at 21:11

## 2020-01-18 RX ADMIN — HYDROMORPHONE HYDROCHLORIDE 0.5 MILLIGRAM(S): 2 INJECTION INTRAMUSCULAR; INTRAVENOUS; SUBCUTANEOUS at 04:45

## 2020-01-18 RX ADMIN — HYDROMORPHONE HYDROCHLORIDE 0.5 MILLIGRAM(S): 2 INJECTION INTRAMUSCULAR; INTRAVENOUS; SUBCUTANEOUS at 04:29

## 2020-01-18 RX ADMIN — Medication 1 APPLICATION(S): at 21:11

## 2020-01-18 RX ADMIN — Medication 1 APPLICATION(S): at 05:02

## 2020-01-18 RX ADMIN — Medication 250 MILLIGRAM(S): at 10:23

## 2020-01-18 RX ADMIN — LIDOCAINE 1 PATCH: 4 CREAM TOPICAL at 17:34

## 2020-01-18 RX ADMIN — OXYCODONE HYDROCHLORIDE 5 MILLIGRAM(S): 5 TABLET ORAL at 03:46

## 2020-01-18 RX ADMIN — Medication 1000 MILLIGRAM(S): at 17:22

## 2020-01-18 RX ADMIN — LIDOCAINE 1 PATCH: 4 CREAM TOPICAL at 20:21

## 2020-01-18 RX ADMIN — MEROPENEM 100 MILLIGRAM(S): 1 INJECTION INTRAVENOUS at 06:00

## 2020-01-18 RX ADMIN — Medication 1500 MILLILITER(S): at 18:36

## 2020-01-18 RX ADMIN — Medication 250 MILLIGRAM(S): at 17:35

## 2020-01-18 NOTE — PROGRESS NOTE ADULT - ATTENDING COMMENTS
Patient seen and examined and agree with above.   Awake and alert comfortable  Patient continues to be hypotensive on levophed and vasopressin. Given albumin and levophed discontinued. Will give another dose of albumin.   Mentation intact  Lactate improved and will continue to follow     Respiratory state is stable on room air     Increased leukocytosis - continue current antibiotics   Stenotrophomonas on sputum cultures -continue levaquin at this time  E coli and klebsiella UTi - continue meropenem     Tolerating PO, no abd pain, stoma is functioning     COPD - continue current medications with goal SpO 88-92%    Skin- epidermal layer is shedding and moisturizer to be placed by nursing staff if patient allows.     Hypophosphatemia and hypomag- repleted and will recheck   Refusing to get out of bed  Patient remains critically ill  CC time: 34 minutes .

## 2020-01-18 NOTE — PROGRESS NOTE ADULT - SUBJECTIVE AND OBJECTIVE BOX
CARDIOLOGY     PROGRESS  NOTE   ________________________________________________    CHIEF COMPLAINT:Patient is a 64y old  Female who presents with a chief complaint of perforated bowel (08 Dec 2019 09:22)    	  REVIEW OF SYSTEMS:  CONSTITUTIONAL: No fever, weight loss, or fatigue  EYES: No eye pain, visual disturbances, or discharge  ENT:  No difficulty hearing, tinnitus, vertigo; No sinus or throat pain  NECK: No pain or stiffness  RESPIRATORY: No cough, wheezing, chills or hemoptysis; No Shortness of Breath  CARDIOVASCULAR: No chest pain, palpitations, passing out, dizziness, or leg swelling  GASTROINTESTINAL: No abdominal or epigastric pain. No nausea, vomiting, or hematemesis; No diarrhea or constipation. No melena or hematochezia.  GENITOURINARY: No dysuria, frequency, hematuria, or incontinence  NEUROLOGICAL: No headaches, memory loss, loss of strength, numbness, or tremors  SKIN: No itching, burning, rashes, or lesions   LYMPH Nodes: No enlarged glands  ENDOCRINE: No heat or cold intolerance; No hair loss  MUSCULOSKELETAL: No joint pain or swelling; No muscle, back, or extremity pain  PSYCHIATRIC: No depression, anxiety, mood swings, or difficulty sleeping  HEME/LYMPH: No easy bruising, or bleeding gums  ALLERGY AND IMMUNOLOGIC: No hives or eczema	    [ ] All others negative	  [ ] Unable to obtain    PHYSICAL EXAM:  T(C): 36.8 (01-18-20 @ 03:00), Max: 36.8 (01-18-20 @ 03:00)  HR: 81 (01-18-20 @ 07:00) (73 - 185)  BP: 101/50 (01-18-20 @ 07:00) (76/50 - 139/53)  RR: 19 (01-18-20 @ 07:00) (12 - 40)  SpO2: 96% (01-18-20 @ 07:00) (62% - 100%)  Wt(kg): --  I&O's Summary    17 Jan 2020 07:01  -  18 Jan 2020 07:00  --------------------------------------------------------  IN: 1774.9 mL / OUT: 830 mL / NET: 944.9 mL    18 Jan 2020 07:01  -  18 Jan 2020 10:19  --------------------------------------------------------  IN: 83.3 mL / OUT: 0 mL / NET: 83.3 mL        Appearance: Normal	  HEENT:   Normal oral mucosa, PERRL, EOMI	  Lymphatic: No lymphadenopathy  Cardiovascular: Normal S1 S2, No JVD, + murmurs, No edema  Respiratory: Lungs clear to auscultation	  Psychiatry: A & O x 3, Mood & affect appropriate  Gastrointestinal:  Soft, Non-tender, + BS	, + vacSkin: No rashes, No ecchymoses, No cyanosis	  Neurologic: Non-focal  Extremities: Normal range of motion, No clubbing, cyanosis or edema  Vascular: Peripheral pulses palpable 2+ bilaterally    MEDICATIONS  (STANDING):  albumin human  5% IVPB 500 milliLiter(s) IV Intermittent once  AQUAPHOR (petrolatum Ointment) 1 Application(s) Topical three times a day  artificial  tears Solution 1 Drop(s) Both EYES two times a day  aspirin  chewable 81 milliGRAM(s) Oral <User Schedule>  atorvastatin 40 milliGRAM(s) Oral <User Schedule>  buDESOnide    Inhalation Suspension 0.5 milliGRAM(s) Inhalation every 12 hours  chlorhexidine 2% Cloths 1 Application(s) Topical <User Schedule>  doxazosin 2 milliGRAM(s) Oral <User Schedule>  enoxaparin Injectable 100 milliGRAM(s) SubCutaneous <User Schedule>  erythromycin     base Tablet 250 milliGRAM(s) Oral <User Schedule>  levoFLOXacin IVPB 500 milliGRAM(s) IV Intermittent every 24 hours  meropenem  IVPB 1000 milliGRAM(s) IV Intermittent every 8 hours  methadone    Tablet 17.5 milliGRAM(s) Oral <User Schedule>  midodrine 20 milliGRAM(s) Oral <User Schedule>  norepinephrine Infusion 0.05 MICROgram(s)/kG/Min (7.889 mL/Hr) IV Continuous <Continuous>  nystatin Powder 1 Application(s) Topical two times a day  pantoprazole    Tablet 40 milliGRAM(s) Oral <User Schedule>  polyethylene glycol 3350 17 Gram(s) Oral <User Schedule>  vancomycin  IVPB 1000 milliGRAM(s) IV Intermittent every 12 hours  vasopressin Infusion 0.03 Unit(s)/Min (1.8 mL/Hr) IV Continuous <Continuous>      TELEMETRY: 	    ECG:  	  RADIOLOGY:  OTHER: 	  	  LABS:	 	    CARDIAC MARKERS:                                9.3    17.88 )-----------( 167      ( 18 Jan 2020 00:43 )             30.6     01-18    136  |  112<H>  |  13  ----------------------------<  80  3.7   |  14<L>  |  0.56    Ca    6.6<L>      18 Jan 2020 00:43  Phos  2.3     01-18  Mg     1.5     01-18    TPro  4.0<L>  /  Alb  1.4<L>  /  TBili  0.7  /  DBili  0.3<H>  /  AST  39  /  ALT  22  /  AlkPhos  333<H>  01-18    proBNP:   Lipid Profile: Cholesterol 98  LDL 52  HDL 28  TG 91    HgA1c:   TSH: Thyroid Stimulating Hormone, Serum: 4.81 uIU/mL (01-17 @ 03:52)          Assessment and plan  ---------------------------  septic/ hypovolemic shock still hypotensive i am some how surprised pt still hypotensive on presssors ?bp monitoring not accurate sec to subclavian stenosis or other source of infection  pt is refusing A line  continue pressors/ fluid  abx as per ID  may hold cholesterol meds  WMA on echo ? sec to sepsis  dvt prophylaxis  s/p blood transfusion   continue tx as per ICU

## 2020-01-18 NOTE — PROGRESS NOTE ADULT - SUBJECTIVE AND OBJECTIVE BOX
SURGICAL INTENSIVE CARE UNIT DAILY PROGRESS NOTE    24 HOUR EVENTS:   - Remains on low dose 0.01 levophed.   - WBC increased from 12 to 17.   - No acute events overnight.       HPI:  64y Female pmhx morbid obesity, acid reflux, HTN, COPD and PSH D&C presented to the ED on 11/26 c/o abdominal pain and bloating. Patient reports having constipation for 2 weeks and has been taking enemas, miralax and senna and passing small hard balls for the last weeks. Reported pain worst in the LLQ that started a few days ago and has been worsening in the last day, also has noted significant abdominal distention in the last day. Reported she had difficulty walking short distances because she becomes SOB. In ED, CT demonstrated perforated sigmoid diverticulitis with intraperitoneal free air. She was taken to the operating room on 11/25 overnight and underwent an exploratory laparotomy with extended left hemicolectomy and was left in discontinuity. An Abthera VAC was placed. She was kept intubated post op and brought to the ICU for hemodynamic and respiratory management. She went back to the OR on 11/29 for transverse end colostomy and fascial closure with wound vac.  Pt transferred out of SICU and recovering on floor with post-op course including induration of wound and colostomy necrosis requiring bedside debridement and wound vac placement.  Pt had CT on 1/6 for leukocytosis and to evaluate wound, which showed loculated fluid collections in the left anterior abdomen, very near loops of bowel.  After CT, pt reported feeling "worse" and was hypotensive overnight to SBP 70s requiring 1L fluid bolus.  Pt was bolused again for hypotension and blood cultures were sent for worsening leukocytosis, and BULL. SICU consulted for closer monitoring.  UA positive and patient was started on Ceftriaxone for presumed UTI.       PAST MEDICAL & SURGICAL HISTORY:  Morbid Obesity  Post Menopausal Bleeding  Polyp, Vagina  Acid Reflux  HTN (Hypertension)  S/P D&C: 2009, w vaginal polypectomy      FAMILY HISTORY:      SOCIAL HISTORY:  - Active smoker 1pack day for many years  - Lives in private residence with domestic partner    MEDICATIONS  (STANDING):  lactated ringers Bolus 1000 milliLiter(s) IV Bolus once  morphine  - Injectable 4 milliGRAM(s) IV Push once  ondansetron Injectable 4 milliGRAM(s) IV Push once  piperacillin/tazobactam IVPB. 3.375 Gram(s) IV Intermittent Once    MEDICATIONS  (PRN):    Allergies    sulfa drugs (Unknown)    Intolerances      NEURO: AOx3.     RESPIRATORY  RR: 20 (01-18-20 @ 02:00) (12 - 40)  SpO2: 90% (01-18-20 @ 02:00) (62% - 100%)    CARDIOVASCULAR  HR: 93 (01-18-20 @ 02:00) (72 - 185)  BP: 84/51 (01-18-20 @ 02:00) (76/50 - 179/69)  BP(mean): 63 (01-18-20 @ 02:00) (55 - 101)  VBG - ( 18 Jan 2020 00:32 )  pH: 7.37  /  pCO2: 34    /  pO2: 35    / HCO3: 19    / Base Excess: -4.6  /  SaO2: 63     Lactate: 3.6      GENITOURINARY  I&O's Detail    01-16 @ 07:01  -  01-17 @ 07:00  --------------------------------------------------------  IN:    norepinephrine Infusion: 225.3 mL    Oral Fluid: 960 mL    sodium chloride 0.9%: 600 mL    Solution: 562.5 mL    Solution: 100 mL    Solution: 437.5 mL    vasopressin Infusion: 43.2 mL  Total IN: 2928.5 mL    OUT:    Colostomy: 500 mL    Drain: 80 mL    VAC (Vacuum Assisted Closure) System: 100 mL    Voided: 600 mL  Total OUT: 1280 mL    Total NET: 1648.5 mL      01-17 @ 07:01 - 01-18 @ 02:31  --------------------------------------------------------  IN:    norepinephrine Infusion: 81.7 mL    Oral Fluid: 420 mL    Solution: 50 mL    Solution: 100 mL    vasopressin Infusion: 34.2 mL  Total IN: 685.9 mL    OUT:    Colostomy: 150 mL    Voided: 400 mL  Total OUT: 550 mL    Total NET: 135.9 mL          01-18    136  |  112<H>  |  13  ----------------------------<  80  3.7   |  14<L>  |  0.56    Ca    6.6<L>      18 Jan 2020 00:43  Phos  2.3     01-18  Mg     1.5     01-18    TPro  4.0<L>  /  Alb  1.4<L>  /  TBili  0.7  /  DBili  0.3<H>  /  AST  39  /  ALT  22  /  AlkPhos  333<H>  01-18      HEMATOLOGIC                        9.3    17.88 )-----------( 167      ( 18 Jan 2020 00:43 )             30.6

## 2020-01-18 NOTE — CHART NOTE - NSCHARTNOTEFT_GEN_A_CORE
Endocrine consulted for abnormal TFTs. TSH elevated with low T4 and T3 likely consistent with sick euthyroid or recovery phase of sick euthyroid. No further inpatient evaluation is needed at this time and repeat TFTs are recommended as an outpatient in 4-6 weeks once the patient is clinically improved. Its expected that the TFTs will improve. If any additional abnormalities she can follow-up with endocrinology at [address] [place] [address]. [phone]. Feel free to contact us with any additional questions or concerns.     Sergio Mancini D.O  [phone]

## 2020-01-18 NOTE — PROGRESS NOTE ADULT - SUBJECTIVE AND OBJECTIVE BOX
Morning Surgical Progress Note  Patient is a 64y old  Female who presents with a chief complaint of perforated bowel (08 Dec 2019 09:22)      SUBJECTIVE: Patient seen and examined at bedside with surgical team, there were no acute events overnight. Patient's Hb has been stable since PRBC transfusion. Patient still requires norepinephrine and vasopressin.     Physical Exam  Constitutional: A&Ox3, NAD  Respiratory: CTA b/l  Cardiac: RRR, S1 and S2  Gastrointestinal: Soft, ND, NT. No rebound or guarding. Vac in place holding suction.   Skin: mildly erythematous with skin sloughing    ** VITAL SIGNS / I&O's **  Vital Signs Last 24 Hrs  T(C): 36.5 (17 Jan 2020 23:00), Max: 36.7 (17 Jan 2020 03:00)  T(F): 97.7 (17 Jan 2020 23:00), Max: 98.1 (17 Jan 2020 03:00)  HR: 100 (18 Jan 2020 00:30) (72 - 185)  BP: 98/63 (18 Jan 2020 00:30) (76/50 - 179/69)  BP(mean): 74 (18 Jan 2020 00:30) (55 - 101)  RR: 25 (18 Jan 2020 00:30) (12 - 40)  SpO2: 78% (18 Jan 2020 00:30) (62% - 100%)      16 Jan 2020 07:01  -  17 Jan 2020 07:00  --------------------------------------------------------  IN:    norepinephrine Infusion: 225.3 mL    Oral Fluid: 960 mL    sodium chloride 0.9%: 600 mL    Solution: 562.5 mL    Solution: 100 mL    Solution: 437.5 mL    vasopressin Infusion: 43.2 mL  Total IN: 2928.5 mL    OUT:    Colostomy: 500 mL    Drain: 80 mL    VAC (Vacuum Assisted Closure) System: 100 mL    Voided: 600 mL  Total OUT: 1280 mL    Total NET: 1648.5 mL      17 Jan 2020 07:01  -  18 Jan 2020 00:49  --------------------------------------------------------  IN:    norepinephrine Infusion: 72.5 mL    Solution: 50 mL    Solution: 100 mL    vasopressin Infusion: 30.6 mL  Total IN: 253.1 mL    OUT:    Colostomy: 150 mL    Voided: 400 mL  Total OUT: 550 mL    Total NET: -296.9 mL          ** LABS **                        9.0    12.22 )-----------( 201      ( 17 Jan 2020 00:52 )             29.7     17 Jan 2020 00:52    135    |  107    |  12     ----------------------------<  117    4.0     |  18     |  0.67     Ca    7.6        17 Jan 2020 00:52  Phos  2.7       17 Jan 2020 00:52  Mg     1.9       17 Jan 2020 00:52    TPro  4.7    /  Alb  1.8    /  TBili  0.7    /  DBili  0.4    /  AST  67     /  ALT  27     /  AlkPhos  413    17 Jan 2020 00:52      CAPILLARY BLOOD GLUCOSE            LIVER FUNCTIONS - ( 17 Jan 2020 00:52 )  Alb: 1.8 g/dL / Pro: 4.7 g/dL / ALK PHOS: 413 U/L / ALT: 27 U/L / AST: 67 U/L / GGT: x                 MEDICATIONS  (STANDING):  AQUAPHOR (petrolatum Ointment) 1 Application(s) Topical three times a day  artificial  tears Solution 1 Drop(s) Both EYES two times a day  aspirin  chewable 81 milliGRAM(s) Oral <User Schedule>  atorvastatin 40 milliGRAM(s) Oral <User Schedule>  buDESOnide    Inhalation Suspension 0.5 milliGRAM(s) Inhalation every 12 hours  chlorhexidine 2% Cloths 1 Application(s) Topical <User Schedule>  doxazosin 2 milliGRAM(s) Oral <User Schedule>  enoxaparin Injectable 100 milliGRAM(s) SubCutaneous <User Schedule>  erythromycin     base Tablet 250 milliGRAM(s) Oral <User Schedule>  levoFLOXacin IVPB 500 milliGRAM(s) IV Intermittent every 24 hours  meropenem  IVPB 1000 milliGRAM(s) IV Intermittent every 8 hours  methadone    Tablet 17.5 milliGRAM(s) Oral <User Schedule>  midodrine 20 milliGRAM(s) Oral <User Schedule>  norepinephrine Infusion 0.05 MICROgram(s)/kG/Min (7.889 mL/Hr) IV Continuous <Continuous>  nystatin Powder 1 Application(s) Topical two times a day  pantoprazole    Tablet 40 milliGRAM(s) Oral <User Schedule>  polyethylene glycol 3350 17 Gram(s) Oral <User Schedule>  vancomycin  IVPB 1000 milliGRAM(s) IV Intermittent every 12 hours  vasopressin Infusion 0.03 Unit(s)/Min (1.8 mL/Hr) IV Continuous <Continuous>    MEDICATIONS  (PRN):  acetaminophen   Tablet .. 975 milliGRAM(s) Oral every 6 hours PRN Mild Pain (1 - 3)  albuterol/ipratropium for Nebulization 3 milliLiter(s) Nebulizer every 6 hours PRN Shortness of Breath and/or Wheezing  aluminum hydroxide/magnesium hydroxide/simethicone Suspension 30 milliLiter(s) Oral every 4 hours PRN Dyspepsia  HYDROmorphone  Injectable 0.5 milliGRAM(s) IV Push every 4 hours PRN for turning  oxyCODONE    IR 5 milliGRAM(s) Oral every 4 hours PRN Moderate Pain (4 - 6)

## 2020-01-18 NOTE — PROGRESS NOTE ADULT - ASSESSMENT
64F with pmhx morbid obesity, acid reflux, HTN, COPD and PSH D&C presented to the ED on 11/26 c/o abdominal pain and bloating. Reported pain worst in the LLQ that started a few days ago and has been worsening in the last day, also has noted significant abdominal distention in the last day.  In ED, CT demonstrated perforated sigmoid diverticulitis with intraperitoneal free air. She was taken to the operating room on 11/25 overnight and underwent an exploratory laparotomy with extended left hemicolectomy and was left in discontinuity. An Abthera VAC was placed. She was kept intubated post op and brought to the ICU for hemodynamic and respiratory management. She went back to the OR on 11/29 for transverse end colostomy and fascial closure with wound vac.  Pt transferred out of SICU and recovering on floor with post-op course including induration of wound and colostomy necrosis requiring bedside debridement and wound vac placement.  Pt had CT on 1/6 for leukocytosis and to evaluate wound, which showed loculated fluid collections in the left anterior abdomen, very near loops of bowel.  After CT, pt reported feeling "worse" and was hypotensive overnight to SBP 70s requiring 1 L fluid bolus.  Pt was bolused again this for hypotension and blood cultures were sent for worsening leukocytosis, and BULL.  SICU consulted for closer monitoring.  UA positive and patient was started on Ceftriaxone for presumed UTI.     Neuro: pain control, h/o chronic pain requiring methadone   - Tylenol prn  - Continue home dose Methadone     Resp: h/o COPD  - Albuterol prn  - Continue Symbicort    CV: Hypotension likely secondary to hypovolemia vs septic shock ; h/o HTN; New wall motion abnormalities on echocardiogram performed 1/11.   - Will plan to repeat echo next week to re-eval prior wall motion abnormalities.   - on Levo, 0.01.   - Holding home anti-hypertensives in the setting of continuous pressor requirements  - Continue statin   - Continue midodrine 20mg q6h.     GI: Perforated sigmoid diverticulitis s/p extended left hemicolectomy (11/25)  - Continue Regular diet  - On Erythromycin 250 mg PO BID for GI dysmotility.  - Monitor wound vac output   - Protonix     /Renal: Hypovolemic hyponatremia, BULL  - IV locked    - Hyponatremia improving  - Trend BUN/Cr   - Replete electrolytes as needed  - Monitor UOP, remains low     ID: UTI, leukocytosis   - Blood cx: 1/8: Staph epidermidis: On vancomycin. Bcx from 1/9, 1/10, 1/12 NGTD.   - Sputum 1/11: Stenotrophomonas: on levofloxacin.   - Ucx: 1/7: Klebsiella, EColi.   - Continue meropenem   - Continue vancomycin, trough due today.      Heme: VTE prophylaxis  - Continue Lovenox 100 mg (weight-adjusted)  - ASA 81 mg     Endo:   - Monitor serum glucose     Dispo: SICU care. Full code.

## 2020-01-19 LAB
ALBUMIN SERPL ELPH-MCNC: 1.9 G/DL — LOW (ref 3.3–5)
ALBUMIN SERPL ELPH-MCNC: 2 G/DL — LOW (ref 3.3–5)
ALP SERPL-CCNC: 237 U/L — HIGH (ref 40–120)
ALP SERPL-CCNC: 266 U/L — HIGH (ref 40–120)
ALT FLD-CCNC: 14 U/L — SIGNIFICANT CHANGE UP (ref 10–45)
ALT FLD-CCNC: 19 U/L — SIGNIFICANT CHANGE UP (ref 10–45)
ANION GAP SERPL CALC-SCNC: 8 MMOL/L — SIGNIFICANT CHANGE UP (ref 5–17)
ANION GAP SERPL CALC-SCNC: 9 MMOL/L — SIGNIFICANT CHANGE UP (ref 5–17)
AST SERPL-CCNC: 20 U/L — SIGNIFICANT CHANGE UP (ref 10–40)
AST SERPL-CCNC: 27 U/L — SIGNIFICANT CHANGE UP (ref 10–40)
BILIRUB DIRECT SERPL-MCNC: 0.4 MG/DL — HIGH (ref 0–0.2)
BILIRUB DIRECT SERPL-MCNC: 0.4 MG/DL — HIGH (ref 0–0.2)
BILIRUB INDIRECT FLD-MCNC: 0.3 MG/DL — SIGNIFICANT CHANGE UP (ref 0.2–1)
BILIRUB INDIRECT FLD-MCNC: 0.4 MG/DL — SIGNIFICANT CHANGE UP (ref 0.2–1)
BILIRUB SERPL-MCNC: 0.7 MG/DL — SIGNIFICANT CHANGE UP (ref 0.2–1.2)
BILIRUB SERPL-MCNC: 0.8 MG/DL — SIGNIFICANT CHANGE UP (ref 0.2–1.2)
BUN SERPL-MCNC: 18 MG/DL — SIGNIFICANT CHANGE UP (ref 7–23)
BUN SERPL-MCNC: 19 MG/DL — SIGNIFICANT CHANGE UP (ref 7–23)
CALCIUM SERPL-MCNC: 6.9 MG/DL — LOW (ref 8.4–10.5)
CALCIUM SERPL-MCNC: 8.1 MG/DL — LOW (ref 8.4–10.5)
CHLORIDE SERPL-SCNC: 108 MMOL/L — SIGNIFICANT CHANGE UP (ref 96–108)
CHLORIDE SERPL-SCNC: 98 MMOL/L — SIGNIFICANT CHANGE UP (ref 96–108)
CO2 SERPL-SCNC: 15 MMOL/L — LOW (ref 22–31)
CO2 SERPL-SCNC: 19 MMOL/L — LOW (ref 22–31)
CREAT SERPL-MCNC: 0.71 MG/DL — SIGNIFICANT CHANGE UP (ref 0.5–1.3)
CREAT SERPL-MCNC: 0.81 MG/DL — SIGNIFICANT CHANGE UP (ref 0.5–1.3)
GAS PNL BLDV: SIGNIFICANT CHANGE UP
GLUCOSE SERPL-MCNC: 453 MG/DL — CRITICAL HIGH (ref 70–99)
GLUCOSE SERPL-MCNC: 98 MG/DL — SIGNIFICANT CHANGE UP (ref 70–99)
HCT VFR BLD CALC: 25 % — LOW (ref 34.5–45)
HCT VFR BLD CALC: 29.3 % — LOW (ref 34.5–45)
HGB BLD-MCNC: 7.9 G/DL — LOW (ref 11.5–15.5)
HGB BLD-MCNC: 9 G/DL — LOW (ref 11.5–15.5)
MAGNESIUM SERPL-MCNC: 2 MG/DL — SIGNIFICANT CHANGE UP (ref 1.6–2.6)
MAGNESIUM SERPL-MCNC: 2.3 MG/DL — SIGNIFICANT CHANGE UP (ref 1.6–2.6)
MCHC RBC-ENTMCNC: 28.2 PG — SIGNIFICANT CHANGE UP (ref 27–34)
MCHC RBC-ENTMCNC: 28.5 PG — SIGNIFICANT CHANGE UP (ref 27–34)
MCHC RBC-ENTMCNC: 30.7 GM/DL — LOW (ref 32–36)
MCHC RBC-ENTMCNC: 31.6 GM/DL — LOW (ref 32–36)
MCV RBC AUTO: 90.3 FL — SIGNIFICANT CHANGE UP (ref 80–100)
MCV RBC AUTO: 91.8 FL — SIGNIFICANT CHANGE UP (ref 80–100)
NRBC # BLD: 0 /100 WBCS — SIGNIFICANT CHANGE UP (ref 0–0)
NRBC # BLD: 0 /100 WBCS — SIGNIFICANT CHANGE UP (ref 0–0)
PHOSPHATE SERPL-MCNC: 3.4 MG/DL — SIGNIFICANT CHANGE UP (ref 2.5–4.5)
PHOSPHATE SERPL-MCNC: 3.6 MG/DL — SIGNIFICANT CHANGE UP (ref 2.5–4.5)
PLATELET # BLD AUTO: 140 K/UL — LOW (ref 150–400)
PLATELET # BLD AUTO: 150 K/UL — SIGNIFICANT CHANGE UP (ref 150–400)
POTASSIUM SERPL-MCNC: 4.1 MMOL/L — SIGNIFICANT CHANGE UP (ref 3.5–5.3)
POTASSIUM SERPL-MCNC: 4.6 MMOL/L — SIGNIFICANT CHANGE UP (ref 3.5–5.3)
POTASSIUM SERPL-SCNC: 4.1 MMOL/L — SIGNIFICANT CHANGE UP (ref 3.5–5.3)
POTASSIUM SERPL-SCNC: 4.6 MMOL/L — SIGNIFICANT CHANGE UP (ref 3.5–5.3)
PROT SERPL-MCNC: 4.1 G/DL — LOW (ref 6–8.3)
PROT SERPL-MCNC: 4.8 G/DL — LOW (ref 6–8.3)
RBC # BLD: 2.77 M/UL — LOW (ref 3.8–5.2)
RBC # BLD: 3.19 M/UL — LOW (ref 3.8–5.2)
RBC # FLD: 18.9 % — HIGH (ref 10.3–14.5)
RBC # FLD: 19 % — HIGH (ref 10.3–14.5)
SODIUM SERPL-SCNC: 122 MMOL/L — LOW (ref 135–145)
SODIUM SERPL-SCNC: 135 MMOL/L — SIGNIFICANT CHANGE UP (ref 135–145)
T4 FREE SERPL-MCNC: 0.8 NG/DL — LOW (ref 0.9–1.8)
VANCOMYCIN TROUGH SERPL-MCNC: 23.7 UG/ML — HIGH (ref 10–20)
WBC # BLD: 14.99 K/UL — HIGH (ref 3.8–10.5)
WBC # BLD: 15.97 K/UL — HIGH (ref 3.8–10.5)
WBC # FLD AUTO: 14.99 K/UL — HIGH (ref 3.8–10.5)
WBC # FLD AUTO: 15.97 K/UL — HIGH (ref 3.8–10.5)

## 2020-01-19 PROCEDURE — 99291 CRITICAL CARE FIRST HOUR: CPT

## 2020-01-19 RX ORDER — KETOROLAC TROMETHAMINE 30 MG/ML
15 SYRINGE (ML) INJECTION EVERY 8 HOURS
Refills: 0 | Status: DISCONTINUED | OUTPATIENT
Start: 2020-01-19 | End: 2020-01-19

## 2020-01-19 RX ORDER — METHADONE HYDROCHLORIDE 40 MG/1
17.5 TABLET ORAL
Refills: 0 | Status: DISCONTINUED | OUTPATIENT
Start: 2020-01-19 | End: 2020-01-24

## 2020-01-19 RX ORDER — KETOROLAC TROMETHAMINE 30 MG/ML
15 SYRINGE (ML) INJECTION EVERY 6 HOURS
Refills: 0 | Status: DISCONTINUED | OUTPATIENT
Start: 2020-01-19 | End: 2020-01-21

## 2020-01-19 RX ORDER — ACETAMINOPHEN 500 MG
1000 TABLET ORAL EVERY 6 HOURS
Refills: 0 | Status: COMPLETED | OUTPATIENT
Start: 2020-01-19 | End: 2020-01-23

## 2020-01-19 RX ORDER — HYDROMORPHONE HYDROCHLORIDE 2 MG/ML
1 INJECTION INTRAMUSCULAR; INTRAVENOUS; SUBCUTANEOUS ONCE
Refills: 0 | Status: DISCONTINUED | OUTPATIENT
Start: 2020-01-19 | End: 2020-01-19

## 2020-01-19 RX ORDER — ALBUMIN HUMAN 25 %
500 VIAL (ML) INTRAVENOUS ONCE
Refills: 0 | Status: COMPLETED | OUTPATIENT
Start: 2020-01-19 | End: 2020-01-19

## 2020-01-19 RX ORDER — OXYCODONE HYDROCHLORIDE 5 MG/1
5 TABLET ORAL EVERY 4 HOURS
Refills: 0 | Status: DISCONTINUED | OUTPATIENT
Start: 2020-01-19 | End: 2020-01-19

## 2020-01-19 RX ORDER — KETOROLAC TROMETHAMINE 30 MG/ML
15 SYRINGE (ML) INJECTION EVERY 6 HOURS
Refills: 0 | Status: DISCONTINUED | OUTPATIENT
Start: 2020-01-19 | End: 2020-01-19

## 2020-01-19 RX ORDER — DIPHENHYDRAMINE HCL 50 MG
25 CAPSULE ORAL EVERY 12 HOURS
Refills: 0 | Status: DISCONTINUED | OUTPATIENT
Start: 2020-01-19 | End: 2020-02-01

## 2020-01-19 RX ORDER — OXYCODONE HYDROCHLORIDE 5 MG/1
10 TABLET ORAL EVERY 4 HOURS
Refills: 0 | Status: DISCONTINUED | OUTPATIENT
Start: 2020-01-19 | End: 2020-01-24

## 2020-01-19 RX ADMIN — METHADONE HYDROCHLORIDE 17.5 MILLIGRAM(S): 40 TABLET ORAL at 09:06

## 2020-01-19 RX ADMIN — HYDROMORPHONE HYDROCHLORIDE 1 MILLIGRAM(S): 2 INJECTION INTRAMUSCULAR; INTRAVENOUS; SUBCUTANEOUS at 05:01

## 2020-01-19 RX ADMIN — Medication 1000 MILLILITER(S): at 23:16

## 2020-01-19 RX ADMIN — Medication 15 MILLIGRAM(S): at 12:01

## 2020-01-19 RX ADMIN — HYDROMORPHONE HYDROCHLORIDE 1 MILLIGRAM(S): 2 INJECTION INTRAMUSCULAR; INTRAVENOUS; SUBCUTANEOUS at 07:49

## 2020-01-19 RX ADMIN — HYDROMORPHONE HYDROCHLORIDE 1 MILLIGRAM(S): 2 INJECTION INTRAMUSCULAR; INTRAVENOUS; SUBCUTANEOUS at 12:01

## 2020-01-19 RX ADMIN — Medication 1 DROP(S): at 17:35

## 2020-01-19 RX ADMIN — Medication 250 MILLIGRAM(S): at 09:19

## 2020-01-19 RX ADMIN — Medication 1 APPLICATION(S): at 05:45

## 2020-01-19 RX ADMIN — Medication 250 MILLIGRAM(S): at 17:35

## 2020-01-19 RX ADMIN — MEROPENEM 100 MILLIGRAM(S): 1 INJECTION INTRAVENOUS at 05:44

## 2020-01-19 RX ADMIN — HYDROMORPHONE HYDROCHLORIDE 1 MILLIGRAM(S): 2 INJECTION INTRAMUSCULAR; INTRAVENOUS; SUBCUTANEOUS at 05:44

## 2020-01-19 RX ADMIN — HYDROMORPHONE HYDROCHLORIDE 1 MILLIGRAM(S): 2 INJECTION INTRAMUSCULAR; INTRAVENOUS; SUBCUTANEOUS at 05:59

## 2020-01-19 RX ADMIN — MIDODRINE HYDROCHLORIDE 20 MILLIGRAM(S): 2.5 TABLET ORAL at 23:17

## 2020-01-19 RX ADMIN — Medication 0.5 MILLIGRAM(S): at 06:50

## 2020-01-19 RX ADMIN — MIDODRINE HYDROCHLORIDE 20 MILLIGRAM(S): 2.5 TABLET ORAL at 15:26

## 2020-01-19 RX ADMIN — CHLORHEXIDINE GLUCONATE 1 APPLICATION(S): 213 SOLUTION TOPICAL at 05:43

## 2020-01-19 RX ADMIN — Medication 15 MILLIGRAM(S): at 12:16

## 2020-01-19 RX ADMIN — NYSTATIN CREAM 1 APPLICATION(S): 100000 CREAM TOPICAL at 05:44

## 2020-01-19 RX ADMIN — LIDOCAINE 1 PATCH: 4 CREAM TOPICAL at 05:34

## 2020-01-19 RX ADMIN — Medication 81 MILLIGRAM(S): at 20:42

## 2020-01-19 RX ADMIN — Medication 1 DROP(S): at 05:45

## 2020-01-19 RX ADMIN — MIDODRINE HYDROCHLORIDE 20 MILLIGRAM(S): 2.5 TABLET ORAL at 09:06

## 2020-01-19 RX ADMIN — HYDROMORPHONE HYDROCHLORIDE 1 MILLIGRAM(S): 2 INJECTION INTRAMUSCULAR; INTRAVENOUS; SUBCUTANEOUS at 12:16

## 2020-01-19 RX ADMIN — HYDROMORPHONE HYDROCHLORIDE 1 MILLIGRAM(S): 2 INJECTION INTRAMUSCULAR; INTRAVENOUS; SUBCUTANEOUS at 08:05

## 2020-01-19 RX ADMIN — OXYCODONE HYDROCHLORIDE 5 MILLIGRAM(S): 5 TABLET ORAL at 04:12

## 2020-01-19 RX ADMIN — MEROPENEM 100 MILLIGRAM(S): 1 INJECTION INTRAVENOUS at 15:26

## 2020-01-19 RX ADMIN — Medication 1 APPLICATION(S): at 14:19

## 2020-01-19 RX ADMIN — PANTOPRAZOLE SODIUM 40 MILLIGRAM(S): 20 TABLET, DELAYED RELEASE ORAL at 05:45

## 2020-01-19 RX ADMIN — Medication 2 MILLIGRAM(S): at 05:44

## 2020-01-19 RX ADMIN — Medication 1 APPLICATION(S): at 22:00

## 2020-01-19 RX ADMIN — OXYCODONE HYDROCHLORIDE 5 MILLIGRAM(S): 5 TABLET ORAL at 04:42

## 2020-01-19 RX ADMIN — ENOXAPARIN SODIUM 100 MILLIGRAM(S): 100 INJECTION SUBCUTANEOUS at 12:59

## 2020-01-19 RX ADMIN — MIDODRINE HYDROCHLORIDE 20 MILLIGRAM(S): 2.5 TABLET ORAL at 04:12

## 2020-01-19 RX ADMIN — MEROPENEM 100 MILLIGRAM(S): 1 INJECTION INTRAVENOUS at 23:17

## 2020-01-19 RX ADMIN — HYDROMORPHONE HYDROCHLORIDE 1 MILLIGRAM(S): 2 INJECTION INTRAMUSCULAR; INTRAVENOUS; SUBCUTANEOUS at 04:46

## 2020-01-19 RX ADMIN — Medication 25 MILLIGRAM(S): at 12:01

## 2020-01-19 RX ADMIN — ATORVASTATIN CALCIUM 40 MILLIGRAM(S): 80 TABLET, FILM COATED ORAL at 20:42

## 2020-01-19 NOTE — PROGRESS NOTE ADULT - SUBJECTIVE AND OBJECTIVE BOX
CARDIOLOGY     PROGRESS  NOTE   ________________________________________________    CHIEF COMPLAINT:Patient is a 64y old  Female who presents with a chief complaint of perforated bowel (08 Dec 2019 09:22)  still hypotensive  	  REVIEW OF SYSTEMS:  CONSTITUTIONAL: No fever, weight loss, or fatigue  EYES: No eye pain, visual disturbances, or discharge  ENT:  No difficulty hearing, tinnitus, vertigo; No sinus or throat pain  NECK: No pain or stiffness  RESPIRATORY: No cough, wheezing, chills or hemoptysis; No Shortness of Breath  CARDIOVASCULAR: No chest pain, palpitations, passing out, dizziness, or leg swelling  GASTROINTESTINAL: No abdominal or epigastric pain. No nausea, vomiting, or hematemesis; No diarrhea or constipation. No melena or hematochezia.  GENITOURINARY: No dysuria, frequency, hematuria, or incontinence  NEUROLOGICAL: No headaches, memory loss, loss of strength, numbness, or tremors  SKIN: No itching, burning, rashes, or lesions   LYMPH Nodes: No enlarged glands  ENDOCRINE: No heat or cold intolerance; No hair loss  MUSCULOSKELETAL: No joint pain or swelling; No muscle, back, or extremity pain  PSYCHIATRIC: No depression, anxiety, mood swings, or difficulty sleeping  HEME/LYMPH: No easy bruising, or bleeding gums  ALLERGY AND IMMUNOLOGIC: No hives or eczema	    [ ] All others negative	  [ ] Unable to obtain    PHYSICAL EXAM:  T(C): 36.3 (01-19-20 @ 07:00), Max: 36.6 (01-18-20 @ 15:00)  HR: 81 (01-19-20 @ 09:00) (72 - 138)  BP: 80/60 (01-19-20 @ 09:00) (78/50 - 124/59)  RR: 46 (01-19-20 @ 08:00) (11 - 46)  SpO2: 98% (01-19-20 @ 09:00) (93% - 100%)  Wt(kg): --  I&O's Summary    18 Jan 2020 07:01  -  19 Jan 2020 07:00  --------------------------------------------------------  IN: 697.5 mL / OUT: 615 mL / NET: 82.5 mL    19 Jan 2020 07:01  -  19 Jan 2020 09:57  --------------------------------------------------------  IN: 3.6 mL / OUT: 0 mL / NET: 3.6 mL        Appearance: Normal	  HEENT:   Normal oral mucosa, PERRL, EOMI	  Lymphatic: No lymphadenopathy  Cardiovascular: Normal S1 S2, No JVD, No murmurs, No edema  Respiratory: Lungs clear to auscultation	  Psychiatry: A & O x 3, Mood & affect appropriate  Gastrointestinal:  Soft, Non-tender, + BS	  Skin: No rashes, No ecchymoses, No cyanosis	  Neurologic: Non-focal  Extremities: Normal range of motion, No clubbing, cyanosis or edema  Vascular: Peripheral pulses palpable 2+ bilaterally  skin exfoliation is improving    MEDICATIONS  (STANDING):  AQUAPHOR (petrolatum Ointment) 1 Application(s) Topical three times a day  artificial  tears Solution 1 Drop(s) Both EYES two times a day  aspirin  chewable 81 milliGRAM(s) Oral <User Schedule>  atorvastatin 40 milliGRAM(s) Oral <User Schedule>  buDESOnide    Inhalation Suspension 0.5 milliGRAM(s) Inhalation every 12 hours  chlorhexidine 2% Cloths 1 Application(s) Topical <User Schedule>  doxazosin 2 milliGRAM(s) Oral <User Schedule>  enoxaparin Injectable 100 milliGRAM(s) SubCutaneous <User Schedule>  erythromycin     base Tablet 250 milliGRAM(s) Oral <User Schedule>  levoFLOXacin IVPB 500 milliGRAM(s) IV Intermittent every 24 hours  lidocaine   Patch 1 Patch Transdermal daily  meropenem  IVPB 1000 milliGRAM(s) IV Intermittent every 8 hours  methadone    Tablet 17.5 milliGRAM(s) Oral <User Schedule>  midodrine 20 milliGRAM(s) Oral <User Schedule>  nystatin Powder 1 Application(s) Topical two times a day  pantoprazole    Tablet 40 milliGRAM(s) Oral <User Schedule>  polyethylene glycol 3350 17 Gram(s) Oral <User Schedule>  vasopressin Infusion 0.03 Unit(s)/Min (1.8 mL/Hr) IV Continuous <Continuous>      TELEMETRY: 	    ECG:  	  RADIOLOGY:  OTHER: 	  	  LABS:	 	    CARDIAC MARKERS:  CARDIAC MARKERS ( 18 Jan 2020 00:43 )  x     / x     / 17 U/L / x     / 2.0 ng/mL                                9.0    14.99 )-----------( 150      ( 19 Jan 2020 05:50 )             29.3     01-19    135  |  108  |  19  ----------------------------<  98  4.6   |  19<L>  |  0.81    Ca    8.1<L>      19 Jan 2020 01:56  Phos  3.6     01-19  Mg     2.3     01-19    TPro  4.8<L>  /  Alb  2.0<L>  /  TBili  0.8  /  DBili  0.4<H>  /  AST  27  /  ALT  19  /  AlkPhos  266<H>  01-19    proBNP:   Lipid Profile: Cholesterol 98  LDL 52  HDL 28  TG 91    HgA1c:   TSH: Thyroid Stimulating Hormone, Serum: 4.81 uIU/mL (01-17 @ 03:52)          Assessment and plan  ---------------------------  septic/ hypovolemic shock still hypotensive i am some how surprised pt still hypotensive on presssors ?bp monitoring not accurate sec to subclavian stenosis or other source of infection  continue pressors/ fluid  abx as per ID  may hold cholesterol meds  WMA on echo ? sec to sepsis  dvt prophylaxis  s/p blood transfusion   continue tx as per ICU  decrease methadone dose

## 2020-01-19 NOTE — PROGRESS NOTE ADULT - SUBJECTIVE AND OBJECTIVE BOX
Morning Surgical Progress Note  Patient is a 64y old  Female who presents with a chief complaint of perforated bowel (08 Dec 2019 09:22)      SUBJECTIVE: Patient seen and examined at bedside with surgical team, patient complains of skin peeling/sensitivity. She is now on vasopressin only. Troponins are elevated; echo shows decreased ventricular wall motility- cardiology is following.     Vital Signs Last 24 Hrs  T(C): 36.1 (19 Jan 2020 03:00), Max: 36.6 (18 Jan 2020 15:00)  T(F): 97 (19 Jan 2020 03:00), Max: 97.9 (18 Jan 2020 15:00)  HR: 100 (19 Jan 2020 06:54) (72 - 138)  BP: 93/55 (19 Jan 2020 06:00) (78/50 - 124/59)  BP(mean): 64 (19 Jan 2020 06:00) (58 - 76)  RR: 19 (19 Jan 2020 06:00) (11 - 43)  SpO2: 100% (19 Jan 2020 06:54) (93% - 100%)I&O's Detail    18 Jan 2020 07:01  -  19 Jan 2020 07:00  --------------------------------------------------------  IN:    norepinephrine Infusion: 28.2 mL    Solution: 100 mL    Solution: 450 mL    Solution: 83.3 mL    vasopressin Infusion: 34.2 mL  Total IN: 695.7 mL    OUT:    Colostomy: 250 mL    Drain: 40 mL    VAC (Vacuum Assisted Closure) System: 25 mL    Voided: 300 mL  Total OUT: 615 mL    Total NET: 80.7 mL      MEDICATIONS  (STANDING):  AQUAPHOR (petrolatum Ointment) 1 Application(s) Topical three times a day  artificial  tears Solution 1 Drop(s) Both EYES two times a day  aspirin  chewable 81 milliGRAM(s) Oral <User Schedule>  atorvastatin 40 milliGRAM(s) Oral <User Schedule>  buDESOnide    Inhalation Suspension 0.5 milliGRAM(s) Inhalation every 12 hours  chlorhexidine 2% Cloths 1 Application(s) Topical <User Schedule>  doxazosin 2 milliGRAM(s) Oral <User Schedule>  enoxaparin Injectable 100 milliGRAM(s) SubCutaneous <User Schedule>  erythromycin     base Tablet 250 milliGRAM(s) Oral <User Schedule>  levoFLOXacin IVPB 500 milliGRAM(s) IV Intermittent every 24 hours  lidocaine   Patch 1 Patch Transdermal daily  meropenem  IVPB 1000 milliGRAM(s) IV Intermittent every 8 hours  methadone    Tablet 17.5 milliGRAM(s) Oral <User Schedule>  midodrine 20 milliGRAM(s) Oral <User Schedule>  nystatin Powder 1 Application(s) Topical two times a day  pantoprazole    Tablet 40 milliGRAM(s) Oral <User Schedule>  polyethylene glycol 3350 17 Gram(s) Oral <User Schedule>  vasopressin Infusion 0.03 Unit(s)/Min (1.8 mL/Hr) IV Continuous <Continuous>    MEDICATIONS  (PRN):  acetaminophen   Tablet .. 975 milliGRAM(s) Oral every 6 hours PRN Mild Pain (1 - 3)  albuterol/ipratropium for Nebulization 3 milliLiter(s) Nebulizer every 6 hours PRN Shortness of Breath and/or Wheezing  aluminum hydroxide/magnesium hydroxide/simethicone Suspension 30 milliLiter(s) Oral every 4 hours PRN Dyspepsia  HYDROmorphone  Injectable 1 milliGRAM(s) IV Push every 4 hours PRN Turning  oxyCODONE    IR 5 milliGRAM(s) Oral every 4 hours PRN Moderate Pain (4 - 6)      Physical Exam  Constitutional: A&Ox3, NAD  Respiratory: CTA b/l  Cardiac: RRR, S1 and S2  Gastrointestinal: abdomen soft, NT/ND, Vac in place holding suction. dressings c/d/i   Skin: mildly erythematous with skin sloughing    LABS:                        9.0    14.99 )-----------( 150      ( 19 Jan 2020 05:50 )             29.3     01-19    135  |  108  |  19  ----------------------------<  98  4.6   |  19<L>  |  0.81    Ca    8.1<L>      19 Jan 2020 01:56  Phos  3.6     01-19  Mg     2.3     01-19    TPro  4.8<L>  /  Alb  2.0<L>  /  TBili  0.8  /  DBili  0.4<H>  /  AST  27  /  ALT  19  /  AlkPhos  266<H>  01-19      LIVER FUNCTIONS - ( 19 Jan 2020 01:56 )  Alb: 2.0 g/dL / Pro: 4.8 g/dL / ALK PHOS: 266 U/L / ALT: 19 U/L / AST: 27 U/L / GGT: x

## 2020-01-19 NOTE — PROGRESS NOTE ADULT - ASSESSMENT
65yo F with PMHx morbid obesity, GERD, HTN, COPD, and pshx D&C now s/p ex laparotomy with extended left hemicolectomy 11/26 for perforated and necrotic sigmoid colon with gross abdominal contamination. RTOR 11/29 for abd closure, RUQ end transverse colostomy creation. On 12/19 patient found to have induration of wound and keri-stomal fat necrosis, s/p bedside debridement w/ wound VAC in placement on midline wound. Began showing signs of sepsis and was transferred to SICU 1/7 for further management. Now w/ decreasing pressor support and downtrending leukocytosis. Troponins have been elevated and echo shows decreased ventricular wall motility.      Plan:  Wean pressors as tolerated  Continue IV vanc, meropenem, levaquin  Trend labs/ WBC/ SBP  Appreciate Cards reccs  Appreciate excellent SICU care      Green  [phone]

## 2020-01-19 NOTE — PROGRESS NOTE ADULT - SUBJECTIVE AND OBJECTIVE BOX
HISTORY:  65 y/o female with a PMHx of morbid obesity, COPD, HTN, and GERD who presented to the ED on 11/26/2019 with abdominal pain and distension. Patient states that she had been having constipation for ~2 weeks with no improvement despite laxative and enema use. Imaging revealed perforated sigmoid diverticulitis with free air so she was taken to the OR for an exploratory laparotomy, extended left hemicolectomy, and left in discontinuity w/ Abthera VAC placement. She was left intubated post-operative so she was admitted to SICU for further management. She was successfully extubated on 11/27/2019 to BiPAP. She was taken back to the OR on 11/29/2019 for re-exploratory laparotomy, transverse end colostomy, and fascial closure with wound VAC placement. She returned intubated and was extubated on 11/30/2019. Patient was eventually transferred to the 12/4/2019. 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 She was started on promotility agents and diet was readvanced. She was also noted to be persistently hypotensive so bilateral UE Duplex was obtained on 12/11/2019, which revealed stenosis of the right brachiocephalic artery concerning for subclavian steal syndrome. Plastic surgery was consulted on 12/28/2019 for abdominal wall reconstruction and patient underwent a bedside debridement of her abdominal wound at that time. Patient became increasingly hypotensive and was readmitted to SICU on 1/7/2020 for presumed sepsis. Imaging revealed loculated fluid collections in the left anterior abdomen and a small abdominal wall collection, both of which were not amenable to drainage. Cultures were also positive for E. coli & Klebsiella in the urine, Stenotrophomonas in the sputum, and Staph epidermis in the blood. Patient had a LUE PICC that was subsequently discontinued. Despite resuscitation and antibiotics, a right IJ CVC was placed on 1/10/2020 and patient was started on vasopressor support. Patient refused arterial line placement. She was also complaining of chest wall tightness. hsT was elevated and TTE demonstrated new mild to moderate segmental LV systolic dysfunction w/ hypokinesis of the inferior and inferolateral walls. Cardiology determined the troponin leak to be stressed-induced in the setting of sepsis. Dermatology was consulted on 1/13/2020 for scattered erythematous patches on her trunk & extremities, superficial desquamation of the face, trunk, & extremities, and multiple erosions with surrounding erythema on left lower back, for which petroleum jelly was prescribed BID. Viral cultures of erosions were sent and were negative. Repeat imaging was obtained on 1/16/2020, which demonstrated decreasing fluid collections but no new acute findings. She remains hypotensive requiring vasopressor support.    24 HOUR EVENTS:  - Weaned off norepinephrine infusion at 1400 after a 1000 mL bolus of 5% albumin  - Lactate was elevated to 3.6, which trended down to 2.4 after the albumin, remained at 2.4 overnight  - Got out of bed to chair    SUBJECTIVE/ROS:  [x] A ten-point review of systems was otherwise negative except as noted.  [ ] Due to altered mental status/intubation, subjective information were not able to be obtained from the patient. History was obtained, to the extent possible, from review of the chart and collateral sources of information.    NEURO  Exam: awake, alert, oriented x4, no acute distress, no focal deficits  Meds:  - acetaminophen   Tablet .. 975 milliGRAM(s) Oral every 6 hours PRN Mild Pain (1 - 3)  - HYDROmorphone  Injectable 1 milliGRAM(s) IV Push every 4 hours PRN Turning  - lidocaine   Patch 1 Patch Transdermal daily  - methadone    Tablet 17.5 milliGRAM(s) Oral <User Schedule>  - oxyCODONE    IR 5 milliGRAM(s) Oral every 4 hours PRN Moderate Pain (4 - 6)  [x] Adequacy of sedation and pain control has been assessed and adjusted    RESPIRATORY  RR: 19 (01-19-20 @ 06:00) (11 - 43)  SpO2: 96% (01-19-20 @ 06:00) (93% - 100%)  Exam: decreased bibasilar breath sounds  Mechanical Ventilation: no  [N/A] Extubation Readiness Assessed  Meds:  - albuterol/ipratropium for Nebulization 3 milliLiter(s) Nebulizer every 6 hours PRN Shortness of Breath and/or Wheezing  - buDESOnide    Inhalation Suspension 0.5 milliGRAM(s) Inhalation every 12 hours    CARDIOVASCULAR  HR: 84 (01-19-20 @ 06:00) (72 - 138)  BP: 93/55 (01-19-20 @ 06:00) (78/50 - 124/59)  BP(mean): 64 (01-19-20 @ 06:00) (58 - 76)  VBG - ( 19 Jan 2020 00:45 )  pH: 7.32  /  pCO2: 41    /  pO2: 41    / HCO3: 21    / Base Excess: -4.6  /  SaO2: 67   /    Lactate: 2.4    Exam: regular rate and rhythm, S1S2  Cardiac Rhythm: sinus  Perfusion    [ ]Adequate     [x]Inadequate  Mentation   [x]Normal       [ ]Reduced  Extremities  [x]Warm         [ ]Cool  Volume Status [ ]Hypervolemic [x]Euvolemic [ ]Hypovolemic  Meds:  - midodrine 20 milliGRAM(s) Oral <User Schedule>  - vasopressin Infusion 0.03 Unit(s)/Min IV Continuous <Continuous>    GI/NUTRITION  Exam: soft, nontender, nondistended, wound VAC with good suction & minimal suction  Diet: regular  Meds:  - aluminum hydroxide/magnesium hydroxide/simethicone Suspension 30 milliLiter(s) Oral every 4 hours PRN Dyspepsia  - erythromycin     base Tablet 250 milliGRAM(s) Oral <User Schedule>  - pantoprazole    Tablet 40 milliGRAM(s) Oral <User Schedule>  - polyethylene glycol 3350 17 Gram(s) Oral <User Schedule>    GENITOURINARY  I&O's Detail  01-18 @ 07:01  -  01-19 @ 06:29  --------------------------------------------------------  IN:    norepinephrine Infusion: 28.2 mL    Solution: 83.3 mL    Solution: 100 mL    Solution: 450 mL    vasopressin Infusion: 34.2 mL  Total IN: 695.7 mL    OUT:    Colostomy: 250 mL    Drain: 40 mL    VAC (Vacuum Assisted Closure) System: 25 mL    Voided: 300 mL  Total OUT: 615 mL    Total NET: 80.7 mL    135  |  108  |  19  ----------------------------<  98  4.6   |  19<L>  |  0.81    Ca    8.1<L>      19 Jan 2020 01:56  Phos  3.6  Mg     2.3  TPro  4.8<L>  /  Alb  2.0<L>  /  TBili  0.8  /  DBili  0.4<H>  /  AST  27  /  ALT  19  /  AlkPhos  266<H>    [ ] Ross catheter, indication: N/A  Meds: doxazosin 2 milliGRAM(s) Oral <User Schedule>    HEMATOLOGIC  Meds:  - aspirin  chewable 81 milliGRAM(s) Oral <User Schedule>  - enoxaparin Injectable 100 milliGRAM(s) SubCutaneous <User Schedule>  [x] VTE Prophylaxis                        9.0    14.99 )-----------( 150      ( 19 Jan 2020 05:50 )             29.3     INFECTIOUS DISEASES  T(C): 36.1 (01-19-20 @ 03:00), Max: 36.6 (01-18-20 @ 15:00)  WBC Count:  - 14.99 K/uL (01-19 @ 05:50)  - 15.97 K/uL (01-19 @ 01:56)  Recent Cultures:  - Specimen Source: .Blood Blood-Venous, 01-12 @ 16:53  Results: No growth at 5 days.  Gram Stain: --  Organism: --  Meds:  - levoFLOXacin IVPB 500 milliGRAM(s) IV Intermittent every 24 hours  - meropenem  IVPB 1000 milliGRAM(s) IV Intermittent every 8 hours  - vancomycin  IVPB 1000 milliGRAM(s) IV Intermittent every 12 hours    ENDOCRINE  Capillary Blood Glucose: none  Meds: atorvastatin 40 milliGRAM(s) Oral <User Schedule>    ACCESS DEVICES:  [x] Peripheral IV  [x] Central Venous Line	[x] R	[ ] L	[x] IJ	[ ] Fem	[ ] SC	Placed: 1/10  [ ] Arterial Line		[ ] R	[ ] L	[ ] Fem	[ ] Rad	[ ] Ax	Placed:   [ ] PICC:					[ ] Mediport  [ ] Urinary Catheter, Date Placed:   [ ] Necessity of urinary, arterial, and venous catheters discussed    OTHER MEDICATIONS:  - AQUAPHOR (petrolatum Ointment) 1 Application(s) Topical three times a day  - artificial  tears Solution 1 Drop(s) Both EYES two times a day  - chlorhexidine 2% Cloths 1 Application(s) Topical <User Schedule>  - nystatin Powder 1 Application(s) Topical two times a day    CODE STATUS: Full code    IMAGING:

## 2020-01-19 NOTE — PROGRESS NOTE ADULT - ASSESSMENT
63 y/o female presenting with perforated sigmoid diverticulitis s/p exploratory laparotomy, extended left hemicolectomy, and left in discontinuity w/ Abthera VAC placement with return to OR for re-exploratory laparotomy, transverse end colostomy, and fascial closure with wound VAC placement. Hospital course complicated by gastroparesis, right subclavian steal syndrome, full body rash of unknown etiology, and refractory septic shock c/b stress-induced cardiomyopathy. Cultures were positive for E. coli & Klebsiella in the urine, Stenotrophomonas in the sputum, and Staph epidermis in the blood with subsequent removal of her LUE PICC.    PLAN:    Neuro: acute pain from her full body rash, opioid dependence  - Monitor mental status  - Pain control as needed with Lidoderm patch, acetaminophen, oxycodone, and Dilaudid  - Home methadone    Resp: COPD  - Monitor pulse oximeter  - Out of bed to chair and incentive spirometry to prevent atelectasis  - Pulmicort and Duoneb for COPD    CV: refractory septic shock c/b stress-induced cardiomyopathy  - Monitor vital signs  - Will wean off vasopressin infusion as tolerated with goal MAP greater than 65 mmHg, norepinephrine infusion off since yesterday  - Midodrine 20 mg q6hrs in the setting of persistent hypotension  - Repeat TTE when patient is stable off vasopressor support to look for improvement in LV function  - Home ASA    GI: perforated sigmoid diverticulitis s/p exploratory laparotomy, extended left hemicolectomy, and left in discontinuity w/ Abthera VAC placement with return to OR for re-exploratory laparotomy, transverse end colostomy, and fascial closure c/b gastroparesis; GERD  - Regular diet as tolerated  - Monitor ostomy  - Erythromycin for gastroparesis  - Protonix for GERD  - Maalox PRN indigestion    Renal: no acute issues  - Monitor I&Os  - Monitor electrolytes and replete as necessary  - Doxazosin for urinary retention    Heme: no acute issues  - Monitor CBC and coags  - Lovenox 100 mg daily for VTE prophylaxis, adjusted for BMI    ID: E. coli & Klebsiella UTI, Stenotrophomonas PNA, Staph epidermis bacteremia  - Monitor for clinical evidence of active infection  - Empiric antibiotics with vancomycin (ends 1/19 for 14 day course), meropenem (ends 1/22 for 14 day course), and levofloxacin (ends 1/19 for 7 day course)  - Blood cultures from 1/12 with no growth    Endo: HLD  - Monitor glucose on BMP  - Home Lipitor    Disposition:  - Full code  - Will remain in SICU    Ban Miller PA-C     [id number]

## 2020-01-19 NOTE — CONSULT NOTE ADULT - SUBJECTIVE AND OBJECTIVE BOX
Patient is a 64y old  Female who presents with a chief complaint of perforated bowel (08 Dec 2019 09:22)      HPI:  [facility] General Surgery H&P    Patient is a 64y old Female who presents with a chief complaint of abdominal pain    HPI:    64F with pmhx morbid obesity, acid reflux, HTN, COPD and pshx D&C presents to the ED c/o abdominal pain and bloating. Patient reports having constipation for 2 weeks and has been taking enemas, miralax and senna and passing small hard balls for the last weeks. Reports that she has not had a bowel movement unless she used an enema. Reports pain worst in the LLQ that started a few days ago and has been worsening in the last day, also has noted significant abdominal distention in the last day. Notes that she has not been able to urinate all day today because she feels dehydrated. Reports that she had 'low grade temperature of 99.7 which she took left over augmentin for.' Also reports nausea, denies any emesis, recorded fevers, urinary symptoms. Reports she has difficulty walking short distances because she becomes SOB. In ED, CT demonstrated Perforated sigmoid diverticulitis with intraperitoneal free air.        PAST MEDICAL & SURGICAL HISTORY:  Morbid Obesity  Post Menopausal Bleeding  Polyp, Vagina  Acid Reflux  HTN (Hypertension)  S/P D&C: 2009, w vaginal polypectomy      FAMILY HISTORY:      SOCIAL HISTORY:  - Active smoker 1pack day for many years  - Lives in private residence with domestic partner    MEDICATIONS  (STANDING):  lactated ringers Bolus 1000 milliLiter(s) IV Bolus once  morphine  - Injectable 4 milliGRAM(s) IV Push once  ondansetron Injectable 4 milliGRAM(s) IV Push once  piperacillin/tazobactam IVPB. 3.375 Gram(s) IV Intermittent Once    MEDICATIONS  (PRN):    Allergies    sulfa drugs (Unknown)    Intolerances        Vital Signs Last 24 Hrs  T(C): 37.1 (25 Nov 2019 22:50), Max: 37.1 (25 Nov 2019 17:53)  T(F): 98.7 (25 Nov 2019 22:50), Max: 98.8 (25 Nov 2019 17:53)  HR: 98 (25 Nov 2019 22:50) (62 - 98)  BP: 107/73 (25 Nov 2019 22:50) (107/73 - 110/70)  BP(mean): --  RR: 16 (25 Nov 2019 22:50) (16 - 16)  SpO2: 94% (25 Nov 2019 22:50) (94% - 100%)  Daily     Daily     General: NAD, well-nourished  HEENT: Atraumatic, EOMI  Resp: Breathing comfortably on RA  CV: Normal sinus rhythm  Abd: soft, distended, mildly tender in LLQ, no RT/guarding  Ext: ROMIx4, motor strength intact x 4                          16.9   11.20 )-----------( 333      ( 25 Nov 2019 20:43 )             50.6     11-25    131<L>  |  97  |  20  ----------------------------<  151<H>  6.1<H>   |  17<L>  |  0.78    Ca    10.1      25 Nov 2019 20:43  Phos  3.1     11-25  Mg     2.2     11-25    TPro  8.0  /  Alb  2.9<L>  /  TBili  1.3<H>  /  DBili  x   /  AST  36  /  ALT  33  /  AlkPhos  108  11-25    PT/INR - ( 25 Nov 2019 20:43 )   PT: 15.2 sec;   INR: 1.31 ratio         PTT - ( 25 Nov 2019 20:43 )  PTT:27.2 sec      Radiographic Findings:             EXAM:  CT ABDOMEN AND PELVIS IC                            PROCEDURE DATE:  11/25/2019            INTERPRETATION:  CLINICAL INFORMATION: Constipation for weeks.    COMPARISON: None.    PROCEDURE:   CT of the Abdomen and Pelvis was performed with intravenous contrast.   Intravenous contrast: 125 ml Omnipaque 350. 25 ml discarded.  Oral contrast: None.  Sagittal and coronal reformats were performed.    FINDINGS:    LOWER CHEST: Aortic valve and coronary artery calcifications. Calcified   granuloma in the right lower lobe.     LIVER: Small calcifications in the right lobe the liver.  BILE DUCTS: Normal caliber.  GALLBLADDER: Distended, up to 6.3 cm. No gallbladder wall thickening or   pericholecystic fluid.  SPLEEN: Within normal limits.  PANCREAS: Within normal limits.  ADRENALS: Within normal limits.  KIDNEYS/URETERS: 1.2 cm indeterminate right lower pole exophytic   hypodensity (5, 7). Additional subcentimeter hypodensities too small to   characterize within the right kidney. No hydronephrosis.    BLADDER: Underdistended, limiting evaluation.  REPRODUCTIVE ORGANS: The uterus is within normal limits. 2.4 cm left   adnexal cystic lesion (3:106).    BOWEL: The esophagus is slightly thickened. Colonic diverticulosis. Wall   thickening and inflammatory stranding centered around the sigmoid colon.   Small ascites and free air with suspected perforation from the proximal   sigmoid colon (3, 85). Areas of peripheral enhancement within the   peritoneal fluid without organized drainable collection identified at   this time. No bowel obstruction. Appendix is normal.  VESSELS: Atherosclerotic changes.  RETROPERITONEUM/LYMPH NODES: No lymphadenopathy.    ABDOMINAL WALL: Small umbilical hernia containing fat and free air.   Nonspecific lower anterior abdominal wall edema.  BONES: Degenerative changes. Grade 1 anterolisthesis at L4-L5 and grade 1   listhesis at L5-S1    IMPRESSION:     Perforated sigmoid diverticulitis with intraperitoneal free air and   peripherally enhancing free fluid. No discrete abscess identified at this   time.    Thickening of the esophagus. Correlation with endoscopy is recommended on   a nonemergent basis.    Indeterminate 1.2 cm right lower pole exophytic hypodensity. Further   evaluation with nonurgent ultrasound or MR is recommended.    Urgent findings were discussed with Dr. Garrett at 11/25/2019 10:59 PM by    with read back confirmation.                    SASCHA CAMACHO M.D., RADIOLOGY RESIDENT  This document has been electronically signed.  SHYAM GALLO M.D., ATTENDING RADIOLOGIST  This document has been electronically signed. Nov 25 2019 11:26PM (26 Nov 2019 00:21)      PAST MEDICAL & SURGICAL HISTORY:  Morbid Obesity  Post Menopausal Bleeding  Polyp, Vagina  Acid Reflux  HTN (Hypertension)  S/P D&C: 2009, w vaginal polypectomy    (   ) heart valve replacement  (   ) joint replacement  (   ) pregnancy    MEDICATIONS  (STANDING):  AQUAPHOR (petrolatum Ointment) 1 Application(s) Topical three times a day  artificial  tears Solution 1 Drop(s) Both EYES two times a day  aspirin  chewable 81 milliGRAM(s) Oral <User Schedule>  atorvastatin 40 milliGRAM(s) Oral <User Schedule>  buDESOnide    Inhalation Suspension 0.5 milliGRAM(s) Inhalation every 12 hours  chlorhexidine 2% Cloths 1 Application(s) Topical <User Schedule>  doxazosin 2 milliGRAM(s) Oral <User Schedule>  enoxaparin Injectable 100 milliGRAM(s) SubCutaneous <User Schedule>  erythromycin     base Tablet 250 milliGRAM(s) Oral <User Schedule>  levoFLOXacin IVPB 500 milliGRAM(s) IV Intermittent every 24 hours  lidocaine   Patch 1 Patch Transdermal daily  meropenem  IVPB 1000 milliGRAM(s) IV Intermittent every 8 hours  methadone    Tablet 17.5 milliGRAM(s) Oral <User Schedule>  midodrine 20 milliGRAM(s) Oral <User Schedule>  nystatin Powder 1 Application(s) Topical two times a day  pantoprazole    Tablet 40 milliGRAM(s) Oral <User Schedule>  polyethylene glycol 3350 17 Gram(s) Oral <User Schedule>  vasopressin Infusion 0.03 Unit(s)/Min (1.8 mL/Hr) IV Continuous <Continuous>    MEDICATIONS  (PRN):  acetaminophen   Tablet .. 975 milliGRAM(s) Oral every 6 hours PRN Mild Pain (1 - 3)  albuterol/ipratropium for Nebulization 3 milliLiter(s) Nebulizer every 6 hours PRN Shortness of Breath and/or Wheezing  aluminum hydroxide/magnesium hydroxide/simethicone Suspension 30 milliLiter(s) Oral every 4 hours PRN Dyspepsia  diphenhydrAMINE 25 milliGRAM(s) Oral every 12 hours PRN Rash and/or Itching  HYDROmorphone  Injectable 1 milliGRAM(s) IV Push every 4 hours PRN Turning  ketorolac   Injectable 15 milliGRAM(s) IV Push every 8 hours PRN Moderate Pain (4 - 6)  oxyCODONE    IR 5 milliGRAM(s) Oral every 4 hours PRN Moderate Pain (4 - 6)      Allergies    IV Contrast (Rash; Pruritus; Hypotension)  sulfa drugs (Unknown)    Intolerances        FAMILY HISTORY:      *SOCIAL HISTORY: (guardian or who pt came with), (smoking hx)    *Last Dental Visit:    Vital Signs Last 24 Hrs  T(C): 36.6 (19 Jan 2020 11:00), Max: 36.6 (19 Jan 2020 11:00)  T(F): 97.9 (19 Jan 2020 11:00), Max: 97.9 (19 Jan 2020 11:00)  HR: 81 (19 Jan 2020 15:00) (72 - 138)  BP: 88/50 (19 Jan 2020 15:00) (73/48 - 110/56)  BP(mean): 63 (19 Jan 2020 15:00) (56 - 72)  RR: 13 (19 Jan 2020 15:00) (11 - 46)  SpO2: 100% (19 Jan 2020 15:00) (88% - 100%)    EOE:  TMJ (   ) clicks                    (    ) pops                    (    ) crepitus             Mandible <<FROM>>             Facial bones and MOM <<grossly intact>>             (   ) trismus             (   ) LAD             (   ) swelling             (   ) asymmetry             (   ) palpation             (   ) SOB             (   ) dysphagia             (   ) LOC    IOE:  <<permanent/primary/mixed>> dentition: <<grossly intact>> OR <<multiple carious teeth>> OR <<multiple missing teeth>>           hard/soft palate:  (   ) palatal torus           tongue/FOM <<WNL>>           labial/buccal mucosa <<WNL>>           (   ) percussion           (   ) palpation           (   ) swelling     Dentition present: <<   >>  Mobility: <<  >>  Caries: <<   >>     Radiographs:    LABS:                        9.0    14.99 )-----------( 150      ( 19 Jan 2020 05:50 )             29.3     01-19    135  |  108  |  19  ----------------------------<  98  4.6   |  19<L>  |  0.81    Ca    8.1<L>      19 Jan 2020 01:56  Phos  3.6     01-19  Mg     2.3     01-19    TPro  4.8<L>  /  Alb  2.0<L>  /  TBili  0.8  /  DBili  0.4<H>  /  AST  27  /  ALT  19  /  AlkPhos  266<H>  01-19    WBC Count: 14.99 K/uL <H> [3.80 - 10.50] (01-19 @ 05:50)  WBC Count: 15.97 K/uL <H> [3.80 - 10.50] (01-19 @ 01:56)    Platelet Count - Automated: 150 K/uL [150 - 400] (01-19 @ 05:50)  Platelet Count - Automated: 140 K/uL <L> [150 - 400] (01-19 @ 01:56)  Platelet Count - Automated: 167 K/uL [150 - 400] (01-18 @ 00:43)              RADIOLOGY & ADDITIONAL STUDIES:    ASSESSMENT:    PROCEDURE:  Verbal and written consent given.     RECOMMENDATIONS:   1) <<   >>  2) Dental F/U with outpatient dentist for comprehensive dental care.   3) If any difficulty swallowing/breathing, fever occur, page dental.     Resident Name, pager #  Oral surgeon consulted: Patient is a 64y old  Female who presents with a chief complaint of perforated bowel (08 Dec 2019 09:22)      Patient is a 64y old Female who presents with a chief complaint of abdominal pain    HPI:    64F with pmhx morbid obesity, acid reflux, HTN, COPD and pshx D&C presents to the ED c/o abdominal pain and bloating. Patient reports having constipation for 2 weeks and has been taking enemas, miralax and senna and passing small hard balls for the last weeks. Reports that she has not had a bowel movement unless she used an enema. Reports pain worst in the LLQ that started a few days ago and has been worsening in the last day, also has noted significant abdominal distention in the last day. Notes that she has not been able to urinate all day today because she feels dehydrated. Reports that she had 'low grade temperature of 99.7 which she took left over augmentin for.' Also reports nausea, denies any emesis, recorded fevers, urinary symptoms. Reports she has difficulty walking short distances because she becomes SOB. In ED, CT demonstrated Perforated sigmoid diverticulitis with intraperitoneal free air.        PAST MEDICAL & SURGICAL HISTORY:  Morbid Obesity  Post Menopausal Bleeding  Polyp, Vagina  Acid Reflux  HTN (Hypertension)  S/P D&C: 2009, w vaginal polypectomy        SOCIAL HISTORY:  - Active smoker 1pack day for many years  - Lives in private residence with domestic partner    MEDICATIONS  (STANDING):  lactated ringers Bolus 1000 milliLiter(s) IV Bolus once  morphine  - Injectable 4 milliGRAM(s) IV Push once  ondansetron Injectable 4 milliGRAM(s) IV Push once  piperacillin/tazobactam IVPB. 3.375 Gram(s) IV Intermittent Once    MEDICATIONS  (PRN):    Allergies  sulfa drugs (Unknown)        Vital Signs Last 24 Hrs  T(C): 37.1 (25 Nov 2019 22:50), Max: 37.1 (25 Nov 2019 17:53)  T(F): 98.7 (25 Nov 2019 22:50), Max: 98.8 (25 Nov 2019 17:53)  HR: 98 (25 Nov 2019 22:50) (62 - 98)  BP: 107/73 (25 Nov 2019 22:50) (107/73 - 110/70)  BP(mean): --  RR: 16 (25 Nov 2019 22:50) (16 - 16)  SpO2: 94% (25 Nov 2019 22:50) (94% - 100%)  Daily     Daily     General: NAD, well-nourished  HEENT: Atraumatic, EOMI  Resp: Breathing comfortably on RA  CV: Normal sinus rhythm  Abd: soft, distended, mildly tender in LLQ, no RT/guarding  Ext: ROMIx4, motor strength intact x 4                          16.9   11.20 )-----------( 333      ( 25 Nov 2019 20:43 )             50.6     11-25    131<L>  |  97  |  20  ----------------------------<  151<H>  6.1<H>   |  17<L>  |  0.78    Ca    10.1      25 Nov 2019 20:43  Phos  3.1     11-25  Mg     2.2     11-25    TPro  8.0  /  Alb  2.9<L>  /  TBili  1.3<H>  /  DBili  x   /  AST  36  /  ALT  33  /  AlkPhos  108  11-25    PT/INR - ( 25 Nov 2019 20:43 )   PT: 15.2 sec;   INR: 1.31 ratio         PTT - ( 25 Nov 2019 20:43 )  PTT:27.2 sec      Radiographic Findings: No radiographs were able to be taken, patient not transportable.    EXAM:  CT ABDOMEN AND PELVIS IC                            PROCEDURE DATE:  11/25/2019        INTERPRETATION:  CLINICAL INFORMATION: Constipation for weeks.    COMPARISON: None.    PROCEDURE:   CT of the Abdomen and Pelvis was performed with intravenous contrast.   Intravenous contrast: 125 ml Omnipaque 350. 25 ml discarded.  Oral contrast: None.  Sagittal and coronal reformats were performed.    FINDINGS:    LOWER CHEST: Aortic valve and coronary artery calcifications. Calcified   granuloma in the right lower lobe.     LIVER: Small calcifications in the right lobe the liver.  BILE DUCTS: Normal caliber.  GALLBLADDER: Distended, up to 6.3 cm. No gallbladder wall thickening or   pericholecystic fluid.  SPLEEN: Within normal limits.  PANCREAS: Within normal limits.  ADRENALS: Within normal limits.  KIDNEYS/URETERS: 1.2 cm indeterminate right lower pole exophytic   hypodensity (5, 7). Additional subcentimeter hypodensities too small to   characterize within the right kidney. No hydronephrosis.    BLADDER: Underdistended, limiting evaluation.  REPRODUCTIVE ORGANS: The uterus is within normal limits. 2.4 cm left   adnexal cystic lesion (3:106).    BOWEL: The esophagus is slightly thickened. Colonic diverticulosis. Wall   thickening and inflammatory stranding centered around the sigmoid colon.   Small ascites and free air with suspected perforation from the proximal   sigmoid colon (3, 85). Areas of peripheral enhancement within the   peritoneal fluid without organized drainable collection identified at   this time. No bowel obstruction. Appendix is normal.  VESSELS: Atherosclerotic changes.  RETROPERITONEUM/LYMPH NODES: No lymphadenopathy.    ABDOMINAL WALL: Small umbilical hernia containing fat and free air.   Nonspecific lower anterior abdominal wall edema.  BONES: Degenerative changes. Grade 1 anterolisthesis at L4-L5 and grade 1   listhesis at L5-S1    IMPRESSION:     Perforated sigmoid diverticulitis with intraperitoneal free air and   peripherally enhancing free fluid. No discrete abscess identified at this   time.    Thickening of the esophagus. Correlation with endoscopy is recommended on   a nonemergent basis.    Indeterminate 1.2 cm right lower pole exophytic hypodensity. Further   evaluation with nonurgent ultrasound or MR is recommended.    Urgent findings were discussed with Dr. Garrett at 11/25/2019 10:59 PM by    with read back confirmation.    SASCHA CAMACHO M.D., RADIOLOGY RESIDENT  This document has been electronically signed.  SHYAM GALLO M.D., ATTENDING RADIOLOGIST  This document has been electronically signed. Nov 25 2019 11:26PM (26 Nov 2019 00:21)      PAST MEDICAL & SURGICAL HISTORY:  Morbid Obesity  Post Menopausal Bleeding  Polyp, Vagina  Acid Reflux  HTN (Hypertension)  S/P D&C: 2009, w vaginal polypectomy    ( -  ) heart valve replacement  ( -  ) joint replacement  ( -  ) pregnancy    MEDICATIONS  (STANDING):  AQUAPHOR (petrolatum Ointment) 1 Application(s) Topical three times a day  artificial  tears Solution 1 Drop(s) Both EYES two times a day  aspirin  chewable 81 milliGRAM(s) Oral <User Schedule>  atorvastatin 40 milliGRAM(s) Oral <User Schedule>  buDESOnide    Inhalation Suspension 0.5 milliGRAM(s) Inhalation every 12 hours  chlorhexidine 2% Cloths 1 Application(s) Topical <User Schedule>  doxazosin 2 milliGRAM(s) Oral <User Schedule>  enoxaparin Injectable 100 milliGRAM(s) SubCutaneous <User Schedule>  erythromycin     base Tablet 250 milliGRAM(s) Oral <User Schedule>  levoFLOXacin IVPB 500 milliGRAM(s) IV Intermittent every 24 hours  lidocaine   Patch 1 Patch Transdermal daily  meropenem  IVPB 1000 milliGRAM(s) IV Intermittent every 8 hours  methadone    Tablet 17.5 milliGRAM(s) Oral <User Schedule>  midodrine 20 milliGRAM(s) Oral <User Schedule>  nystatin Powder 1 Application(s) Topical two times a day  pantoprazole    Tablet 40 milliGRAM(s) Oral <User Schedule>  polyethylene glycol 3350 17 Gram(s) Oral <User Schedule>  vasopressin Infusion 0.03 Unit(s)/Min (1.8 mL/Hr) IV Continuous <Continuous>    MEDICATIONS  (PRN):  acetaminophen   Tablet .. 975 milliGRAM(s) Oral every 6 hours PRN Mild Pain (1 - 3)  albuterol/ipratropium for Nebulization 3 milliLiter(s) Nebulizer every 6 hours PRN Shortness of Breath and/or Wheezing  aluminum hydroxide/magnesium hydroxide/simethicone Suspension 30 milliLiter(s) Oral every 4 hours PRN Dyspepsia  diphenhydrAMINE 25 milliGRAM(s) Oral every 12 hours PRN Rash and/or Itching  HYDROmorphone  Injectable 1 milliGRAM(s) IV Push every 4 hours PRN Turning  ketorolac   Injectable 15 milliGRAM(s) IV Push every 8 hours PRN Moderate Pain (4 - 6)  oxyCODONE    IR 5 milliGRAM(s) Oral every 4 hours PRN Moderate Pain (4 - 6)      Allergies    IV Contrast (Rash; Pruritus; Hypotension)  sulfa drugs (Unknown)    Intolerances        FAMILY HISTORY:      SOCIAL HISTORY: Patient presents bedside with friend.    Last Dental Visit: Years ago. Patient was told she needed tooth #20 root tip extracted but at time they informed her it was not infected and could wait.    Vital Signs Last 24 Hrs  T(C): 36.6 (19 Jan 2020 11:00), Max: 36.6 (19 Jan 2020 11:00)  T(F): 97.9 (19 Jan 2020 11:00), Max: 97.9 (19 Jan 2020 11:00)  HR: 81 (19 Jan 2020 15:00) (72 - 138)  BP: 88/50 (19 Jan 2020 15:00) (73/48 - 110/56)  BP(mean): 63 (19 Jan 2020 15:00) (56 - 72)  RR: 13 (19 Jan 2020 15:00) (11 - 46)  SpO2: 100% (19 Jan 2020 15:00) (88% - 100%)    EOE:  TMJ ( -  ) clicks                    (  -  ) pops                    ( -   ) crepitus             Mandible FROM             Facial bones and MOM grossly intact             ( -  ) trismus             ( -  ) LAD             ( -  ) swelling             ( -  ) asymmetry             ( -  ) palpation    IOE:  permanent dentition: grossly intact, few missing teeth           hard/soft palate:  (  - ) palatal torus           tongue/FOM WNL           labial/buccal mucosa WNL           ( -  ) percussion           ( -  ) palpation           ( - ) swelling     Dentition present: 2-15, 19-28, 30.  Mobility: none  Caries: carious root tip #20    Radiographs: No radiographs were able to be obtained, patient not transportable.    LABS:                        9.0    14.99 )-----------( 150      ( 19 Jan 2020 05:50 )             29.3     01-19    135  |  108  |  19  ----------------------------<  98  4.6   |  19<L>  |  0.81    Ca    8.1<L>      19 Jan 2020 01:56  Phos  3.6     01-19  Mg     2.3     01-19    TPro  4.8<L>  /  Alb  2.0<L>  /  TBili  0.8  /  DBili  0.4<H>  /  AST  27  /  ALT  19  /  AlkPhos  266<H>  01-19    WBC Count: 14.99 K/uL <H> [3.80 - 10.50] (01-19 @ 05:50)  WBC Count: 15.97 K/uL <H> [3.80 - 10.50] (01-19 @ 01:56)    Platelet Count - Automated: 150 K/uL [150 - 400] (01-19 @ 05:50)  Platelet Count - Automated: 140 K/uL <L> [150 - 400] (01-19 @ 01:56)  Platelet Count - Automated: 167 K/uL [150 - 400] (01-18 @ 00:43)      RADIOLOGY & ADDITIONAL STUDIES:    ASSESSMENT: Tooth #20 carious root tip present. No intraoral vestibular swelling or purulence noted. No acute or chronic odontogenic infection appreciated clinically. Patient complains of pain lingually of her mandibular left ridge adjacent to teeth #20 and #19. Two 1mm circumferential white/tan ulcers present lingual to left mandibular ridge on mandibular gosia. Ulcers are tender to palpation. Patient was previously intubated. Explained to patient trauma may have occurred to the mucosa when intubating or extubating. Explained to patient that lesions will resolve on their own and try to avoid hard/crunchy foods that will traumatize the area further.    PROCEDURE: Limited oral exam (bedside, patient not transportable).  Verbal and written consent given.     RECOMMENDATIONS:   1) Palliative treatment - avoid crunchy food or anything that will traumatize the area. Warm salt water rinses.  2) Dental F/U with outpatient dentist for comprehensive dental care.   3) If any difficulty swallowing/breathing, fever occur, page dental.     Cathi Gonzales DDS, pager #[phone] Patient is a 64y old  Female who presents with a chief complaint of perforated bowel (08 Dec 2019 09:22)      Patient is a 64y old Female who presents with a chief complaint of abdominal pain    HPI:    64F with pmhx morbid obesity, acid reflux, HTN, COPD and pshx D&C presents to the ED c/o abdominal pain and bloating. Patient reports having constipation for 2 weeks and has been taking enemas, miralax and senna and passing small hard balls for the last weeks. Reports that she has not had a bowel movement unless she used an enema. Reports pain worst in the LLQ that started a few days ago and has been worsening in the last day, also has noted significant abdominal distention in the last day. Notes that she has not been able to urinate all day today because she feels dehydrated. Reports that she had 'low grade temperature of 99.7 which she took left over augmentin for.' Also reports nausea, denies any emesis, recorded fevers, urinary symptoms. Reports she has difficulty walking short distances because she becomes SOB. In ED, CT demonstrated Perforated sigmoid diverticulitis with intraperitoneal free air.        PAST MEDICAL & SURGICAL HISTORY:  Morbid Obesity  Post Menopausal Bleeding  Polyp, Vagina  Acid Reflux  HTN (Hypertension)  S/P D&C: 2009, w vaginal polypectomy        SOCIAL HISTORY:  - Active smoker 1pack day for many years  - Lives in private residence with domestic partner    MEDICATIONS  (STANDING):  lactated ringers Bolus 1000 milliLiter(s) IV Bolus once  morphine  - Injectable 4 milliGRAM(s) IV Push once  ondansetron Injectable 4 milliGRAM(s) IV Push once  piperacillin/tazobactam IVPB. 3.375 Gram(s) IV Intermittent Once    MEDICATIONS  (PRN):    Allergies  sulfa drugs (Unknown)        Vital Signs Last 24 Hrs  T(C): 37.1 (25 Nov 2019 22:50), Max: 37.1 (25 Nov 2019 17:53)  T(F): 98.7 (25 Nov 2019 22:50), Max: 98.8 (25 Nov 2019 17:53)  HR: 98 (25 Nov 2019 22:50) (62 - 98)  BP: 107/73 (25 Nov 2019 22:50) (107/73 - 110/70)  BP(mean): --  RR: 16 (25 Nov 2019 22:50) (16 - 16)  SpO2: 94% (25 Nov 2019 22:50) (94% - 100%)  Daily     Daily     General: NAD, well-nourished  HEENT: Atraumatic, EOMI  Resp: Breathing comfortably on RA  CV: Normal sinus rhythm  Abd: soft, distended, mildly tender in LLQ, no RT/guarding  Ext: ROMIx4, motor strength intact x 4                          16.9   11.20 )-----------( 333      ( 25 Nov 2019 20:43 )             50.6     11-25    131<L>  |  97  |  20  ----------------------------<  151<H>  6.1<H>   |  17<L>  |  0.78    Ca    10.1      25 Nov 2019 20:43  Phos  3.1     11-25  Mg     2.2     11-25    TPro  8.0  /  Alb  2.9<L>  /  TBili  1.3<H>  /  DBili  x   /  AST  36  /  ALT  33  /  AlkPhos  108  11-25    PT/INR - ( 25 Nov 2019 20:43 )   PT: 15.2 sec;   INR: 1.31 ratio         PTT - ( 25 Nov 2019 20:43 )  PTT:27.2 sec      Radiographic Findings: No radiographs were able to be taken, patient not transportable.    EXAM:  CT ABDOMEN AND PELVIS IC                            PROCEDURE DATE:  11/25/2019        INTERPRETATION:  CLINICAL INFORMATION: Constipation for weeks.    COMPARISON: None.    PROCEDURE:   CT of the Abdomen and Pelvis was performed with intravenous contrast.   Intravenous contrast: 125 ml Omnipaque 350. 25 ml discarded.  Oral contrast: None.  Sagittal and coronal reformats were performed.    FINDINGS:    LOWER CHEST: Aortic valve and coronary artery calcifications. Calcified   granuloma in the right lower lobe.     LIVER: Small calcifications in the right lobe the liver.  BILE DUCTS: Normal caliber.  GALLBLADDER: Distended, up to 6.3 cm. No gallbladder wall thickening or   pericholecystic fluid.  SPLEEN: Within normal limits.  PANCREAS: Within normal limits.  ADRENALS: Within normal limits.  KIDNEYS/URETERS: 1.2 cm indeterminate right lower pole exophytic   hypodensity (5, 7). Additional subcentimeter hypodensities too small to   characterize within the right kidney. No hydronephrosis.    BLADDER: Underdistended, limiting evaluation.  REPRODUCTIVE ORGANS: The uterus is within normal limits. 2.4 cm left   adnexal cystic lesion (3:106).    BOWEL: The esophagus is slightly thickened. Colonic diverticulosis. Wall   thickening and inflammatory stranding centered around the sigmoid colon.   Small ascites and free air with suspected perforation from the proximal   sigmoid colon (3, 85). Areas of peripheral enhancement within the   peritoneal fluid without organized drainable collection identified at   this time. No bowel obstruction. Appendix is normal.  VESSELS: Atherosclerotic changes.  RETROPERITONEUM/LYMPH NODES: No lymphadenopathy.    ABDOMINAL WALL: Small umbilical hernia containing fat and free air.   Nonspecific lower anterior abdominal wall edema.  BONES: Degenerative changes. Grade 1 anterolisthesis at L4-L5 and grade 1   listhesis at L5-S1    IMPRESSION:     Perforated sigmoid diverticulitis with intraperitoneal free air and   peripherally enhancing free fluid. No discrete abscess identified at this   time.    Thickening of the esophagus. Correlation with endoscopy is recommended on   a nonemergent basis.    Indeterminate 1.2 cm right lower pole exophytic hypodensity. Further   evaluation with nonurgent ultrasound or MR is recommended.    Urgent findings were discussed with Dr. Garrett at 11/25/2019 10:59 PM by    with read back confirmation.    SASCHA CAMACHO M.D., RADIOLOGY RESIDENT  This document has been electronically signed.  SHYAM GALLO M.D., ATTENDING RADIOLOGIST  This document has been electronically signed. Nov 25 2019 11:26PM (26 Nov 2019 00:21)      PAST MEDICAL & SURGICAL HISTORY:  Morbid Obesity  Post Menopausal Bleeding  Polyp, Vagina  Acid Reflux  HTN (Hypertension)  S/P D&C: 2009, w vaginal polypectomy    ( -  ) heart valve replacement  ( -  ) joint replacement  ( -  ) pregnancy    MEDICATIONS  (STANDING):  AQUAPHOR (petrolatum Ointment) 1 Application(s) Topical three times a day  artificial  tears Solution 1 Drop(s) Both EYES two times a day  aspirin  chewable 81 milliGRAM(s) Oral <User Schedule>  atorvastatin 40 milliGRAM(s) Oral <User Schedule>  buDESOnide    Inhalation Suspension 0.5 milliGRAM(s) Inhalation every 12 hours  chlorhexidine 2% Cloths 1 Application(s) Topical <User Schedule>  doxazosin 2 milliGRAM(s) Oral <User Schedule>  enoxaparin Injectable 100 milliGRAM(s) SubCutaneous <User Schedule>  erythromycin     base Tablet 250 milliGRAM(s) Oral <User Schedule>  levoFLOXacin IVPB 500 milliGRAM(s) IV Intermittent every 24 hours  lidocaine   Patch 1 Patch Transdermal daily  meropenem  IVPB 1000 milliGRAM(s) IV Intermittent every 8 hours  methadone    Tablet 17.5 milliGRAM(s) Oral <User Schedule>  midodrine 20 milliGRAM(s) Oral <User Schedule>  nystatin Powder 1 Application(s) Topical two times a day  pantoprazole    Tablet 40 milliGRAM(s) Oral <User Schedule>  polyethylene glycol 3350 17 Gram(s) Oral <User Schedule>  vasopressin Infusion 0.03 Unit(s)/Min (1.8 mL/Hr) IV Continuous <Continuous>    MEDICATIONS  (PRN):  acetaminophen   Tablet .. 975 milliGRAM(s) Oral every 6 hours PRN Mild Pain (1 - 3)  albuterol/ipratropium for Nebulization 3 milliLiter(s) Nebulizer every 6 hours PRN Shortness of Breath and/or Wheezing  aluminum hydroxide/magnesium hydroxide/simethicone Suspension 30 milliLiter(s) Oral every 4 hours PRN Dyspepsia  diphenhydrAMINE 25 milliGRAM(s) Oral every 12 hours PRN Rash and/or Itching  HYDROmorphone  Injectable 1 milliGRAM(s) IV Push every 4 hours PRN Turning  ketorolac   Injectable 15 milliGRAM(s) IV Push every 8 hours PRN Moderate Pain (4 - 6)  oxyCODONE    IR 5 milliGRAM(s) Oral every 4 hours PRN Moderate Pain (4 - 6)      Allergies    IV Contrast (Rash; Pruritus; Hypotension)  sulfa drugs (Unknown)    Intolerances      SOCIAL HISTORY: Patient presents bedside with friend.    Last Dental Visit: Years ago. Patient was told she needed tooth #20 root tip extracted but at time they informed her it was not infected and could wait.    Vital Signs Last 24 Hrs  T(C): 36.6 (19 Jan 2020 11:00), Max: 36.6 (19 Jan 2020 11:00)  T(F): 97.9 (19 Jan 2020 11:00), Max: 97.9 (19 Jan 2020 11:00)  HR: 81 (19 Jan 2020 15:00) (72 - 138)  BP: 88/50 (19 Jan 2020 15:00) (73/48 - 110/56)  BP(mean): 63 (19 Jan 2020 15:00) (56 - 72)  RR: 13 (19 Jan 2020 15:00) (11 - 46)  SpO2: 100% (19 Jan 2020 15:00) (88% - 100%)    EOE:  TMJ ( -  ) clicks                    (  -  ) pops                    ( -   ) crepitus             Mandible FROM             Facial bones and MOM grossly intact             ( -  ) trismus             ( -  ) LAD             ( -  ) swelling             ( -  ) asymmetry             ( -  ) palpation    IOE:  permanent dentition: grossly intact, few missing teeth           hard/soft palate:  (  - ) palatal torus  	Bilateral mandibular gosia           tongue/FOM WNL           labial/buccal mucosa WNL           ( -  ) percussion           ( -  ) palpation           ( - ) swelling     Dentition present: 2-15, 19-28, 30.  Mobility: none  Caries: carious root tip #20    Radiographs: No radiographs were able to be obtained, patient not transportable.    LABS:                        9.0    14.99 )-----------( 150      ( 19 Jan 2020 05:50 )             29.3     01-19    135  |  108  |  19  ----------------------------<  98  4.6   |  19<L>  |  0.81    Ca    8.1<L>      19 Jan 2020 01:56  Phos  3.6     01-19  Mg     2.3     01-19    TPro  4.8<L>  /  Alb  2.0<L>  /  TBili  0.8  /  DBili  0.4<H>  /  AST  27  /  ALT  19  /  AlkPhos  266<H>  01-19    WBC Count: 14.99 K/uL <H> [3.80 - 10.50] (01-19 @ 05:50)  WBC Count: 15.97 K/uL <H> [3.80 - 10.50] (01-19 @ 01:56)    Platelet Count - Automated: 150 K/uL [150 - 400] (01-19 @ 05:50)  Platelet Count - Automated: 140 K/uL <L> [150 - 400] (01-19 @ 01:56)  Platelet Count - Automated: 167 K/uL [150 - 400] (01-18 @ 00:43)      RADIOLOGY & ADDITIONAL STUDIES:    ASSESSMENT: Tooth #20 carious root tip present. No intraoral vestibular swelling or purulence noted. No acute or chronic odontogenic infection appreciated clinically. Patient complains of pain lingually of her mandibular left ridge adjacent to teeth #20 and #19. Two 1mm circumferential white/tan ulcers present lingual to left mandibular ridge on mandibular gosia. Ulcers are tender to palpation. Patient was previously intubated. Explained to patient trauma may have occurred to the mucosa when intubating or extubating. Explained to patient that lesions will resolve on their own and try to avoid hard/crunchy foods that will traumatize the area further.    PROCEDURE: Limited oral exam (bedside, patient not transportable).  Verbal and written consent given.     RECOMMENDATIONS:   1) Palliative treatment - avoid crunchy food or anything that will traumatize the area. Warm salt water rinses.  2) Dental F/U with outpatient dentist for comprehensive dental care.   3) If any difficulty swallowing/breathing, fever occur, page dental.     Cathi Gonzales DDS, pager #[phone]

## 2020-01-19 NOTE — PROGRESS NOTE ADULT - ATTENDING COMMENTS
Patient seen and examined and agree with above.   Awake and alert comfortable  Hypotension improved but continued on vasopressin   Mentation intact  Lactate overall improved and will continue to follow     Respiratory state is stable on room air     Improving leukocytosis - vanc trough elevated and given today is the last day, will be discontinued.   Stenotrophomonas on sputum cultures -continue levaquin at this time  E coli and klebsiella UTi - continue meropenem     Tolerating PO, no abd pain, stoma is functioning     COPD - continue current medications with goal SpO 88-92%    Skin- epidermal layer is shedding and moisturizer to be placed by nursing staff if patient allows.     Patient out of bed today!!  Patient remains critically ill  CC time: 34 minutes .

## 2020-01-20 LAB
ALBUMIN SERPL ELPH-MCNC: 2.4 G/DL — LOW (ref 3.3–5)
ALP SERPL-CCNC: 277 U/L — HIGH (ref 40–120)
ALT FLD-CCNC: 18 U/L — SIGNIFICANT CHANGE UP (ref 10–45)
ANION GAP SERPL CALC-SCNC: 12 MMOL/L — SIGNIFICANT CHANGE UP (ref 5–17)
AST SERPL-CCNC: 28 U/L — SIGNIFICANT CHANGE UP (ref 10–40)
BILIRUB DIRECT SERPL-MCNC: 0.4 MG/DL — HIGH (ref 0–0.2)
BILIRUB INDIRECT FLD-MCNC: 0.4 MG/DL — SIGNIFICANT CHANGE UP (ref 0.2–1)
BILIRUB SERPL-MCNC: 0.8 MG/DL — SIGNIFICANT CHANGE UP (ref 0.2–1.2)
BUN SERPL-MCNC: 25 MG/DL — HIGH (ref 7–23)
CALCIUM SERPL-MCNC: 8.4 MG/DL — SIGNIFICANT CHANGE UP (ref 8.4–10.5)
CHLORIDE SERPL-SCNC: 104 MMOL/L — SIGNIFICANT CHANGE UP (ref 96–108)
CO2 SERPL-SCNC: 17 MMOL/L — LOW (ref 22–31)
CREAT SERPL-MCNC: 1.12 MG/DL — SIGNIFICANT CHANGE UP (ref 0.5–1.3)
GAS PNL BLDV: SIGNIFICANT CHANGE UP
GLUCOSE SERPL-MCNC: 75 MG/DL — SIGNIFICANT CHANGE UP (ref 70–99)
HCT VFR BLD CALC: 27.8 % — LOW (ref 34.5–45)
HGB BLD-MCNC: 9 G/DL — LOW (ref 11.5–15.5)
MAGNESIUM SERPL-MCNC: 2.3 MG/DL — SIGNIFICANT CHANGE UP (ref 1.6–2.6)
MCHC RBC-ENTMCNC: 29.2 PG — SIGNIFICANT CHANGE UP (ref 27–34)
MCHC RBC-ENTMCNC: 32.4 GM/DL — SIGNIFICANT CHANGE UP (ref 32–36)
MCV RBC AUTO: 90.3 FL — SIGNIFICANT CHANGE UP (ref 80–100)
NRBC # BLD: 0 /100 WBCS — SIGNIFICANT CHANGE UP (ref 0–0)
PHOSPHATE SERPL-MCNC: 4.6 MG/DL — HIGH (ref 2.5–4.5)
PLATELET # BLD AUTO: 161 K/UL — SIGNIFICANT CHANGE UP (ref 150–400)
POTASSIUM SERPL-MCNC: 5.2 MMOL/L — SIGNIFICANT CHANGE UP (ref 3.5–5.3)
POTASSIUM SERPL-SCNC: 5.2 MMOL/L — SIGNIFICANT CHANGE UP (ref 3.5–5.3)
PROCALCITONIN SERPL-MCNC: 1.08 NG/ML — HIGH (ref 0.02–0.1)
PROT SERPL-MCNC: 5.4 G/DL — LOW (ref 6–8.3)
RBC # BLD: 3.08 M/UL — LOW (ref 3.8–5.2)
RBC # FLD: 19.3 % — HIGH (ref 10.3–14.5)
SODIUM SERPL-SCNC: 133 MMOL/L — LOW (ref 135–145)
WBC # BLD: 16.87 K/UL — HIGH (ref 3.8–10.5)
WBC # FLD AUTO: 16.87 K/UL — HIGH (ref 3.8–10.5)

## 2020-01-20 PROCEDURE — 99291 CRITICAL CARE FIRST HOUR: CPT

## 2020-01-20 RX ORDER — ZINC OXIDE 200 MG/G
1 OINTMENT TOPICAL THREE TIMES A DAY
Refills: 0 | Status: DISCONTINUED | OUTPATIENT
Start: 2020-01-20 | End: 2020-02-05

## 2020-01-20 RX ORDER — MIDODRINE HYDROCHLORIDE 2.5 MG/1
30 TABLET ORAL
Refills: 0 | Status: DISCONTINUED | OUTPATIENT
Start: 2020-01-20 | End: 2020-01-24

## 2020-01-20 RX ORDER — MIDODRINE HYDROCHLORIDE 2.5 MG/1
10 TABLET ORAL ONCE
Refills: 0 | Status: COMPLETED | OUTPATIENT
Start: 2020-01-20 | End: 2020-01-20

## 2020-01-20 RX ORDER — LEVOTHYROXINE SODIUM 125 MCG
25 TABLET ORAL DAILY
Refills: 0 | Status: DISCONTINUED | OUTPATIENT
Start: 2020-01-20 | End: 2020-01-21

## 2020-01-20 RX ADMIN — Medication 400 MILLIGRAM(S): at 07:12

## 2020-01-20 RX ADMIN — NYSTATIN CREAM 1 APPLICATION(S): 100000 CREAM TOPICAL at 17:46

## 2020-01-20 RX ADMIN — Medication 1 APPLICATION(S): at 06:30

## 2020-01-20 RX ADMIN — MEROPENEM 100 MILLIGRAM(S): 1 INJECTION INTRAVENOUS at 06:30

## 2020-01-20 RX ADMIN — VASOPRESSIN 1.8 UNIT(S)/MIN: 20 INJECTION INTRAVENOUS at 07:12

## 2020-01-20 RX ADMIN — METHADONE HYDROCHLORIDE 17.5 MILLIGRAM(S): 40 TABLET ORAL at 10:11

## 2020-01-20 RX ADMIN — HYDROMORPHONE HYDROCHLORIDE 1 MILLIGRAM(S): 2 INJECTION INTRAMUSCULAR; INTRAVENOUS; SUBCUTANEOUS at 17:00

## 2020-01-20 RX ADMIN — MEROPENEM 100 MILLIGRAM(S): 1 INJECTION INTRAVENOUS at 20:50

## 2020-01-20 RX ADMIN — OXYCODONE HYDROCHLORIDE 10 MILLIGRAM(S): 5 TABLET ORAL at 08:23

## 2020-01-20 RX ADMIN — Medication 15 MILLIGRAM(S): at 06:29

## 2020-01-20 RX ADMIN — Medication 1 APPLICATION(S): at 20:50

## 2020-01-20 RX ADMIN — Medication 1 APPLICATION(S): at 13:34

## 2020-01-20 RX ADMIN — Medication 250 MILLIGRAM(S): at 17:46

## 2020-01-20 RX ADMIN — Medication 2 MILLIGRAM(S): at 06:31

## 2020-01-20 RX ADMIN — Medication 81 MILLIGRAM(S): at 20:49

## 2020-01-20 RX ADMIN — OXYCODONE HYDROCHLORIDE 10 MILLIGRAM(S): 5 TABLET ORAL at 04:35

## 2020-01-20 RX ADMIN — NYSTATIN CREAM 1 APPLICATION(S): 100000 CREAM TOPICAL at 06:31

## 2020-01-20 RX ADMIN — MIDODRINE HYDROCHLORIDE 30 MILLIGRAM(S): 2.5 TABLET ORAL at 16:06

## 2020-01-20 RX ADMIN — POLYETHYLENE GLYCOL 3350 17 GRAM(S): 17 POWDER, FOR SOLUTION ORAL at 12:52

## 2020-01-20 RX ADMIN — POLYETHYLENE GLYCOL 3350 17 GRAM(S): 17 POWDER, FOR SOLUTION ORAL at 17:46

## 2020-01-20 RX ADMIN — ENOXAPARIN SODIUM 100 MILLIGRAM(S): 100 INJECTION SUBCUTANEOUS at 12:52

## 2020-01-20 RX ADMIN — MIDODRINE HYDROCHLORIDE 20 MILLIGRAM(S): 2.5 TABLET ORAL at 04:35

## 2020-01-20 RX ADMIN — Medication 15 MILLIGRAM(S): at 06:44

## 2020-01-20 RX ADMIN — PANTOPRAZOLE SODIUM 40 MILLIGRAM(S): 20 TABLET, DELAYED RELEASE ORAL at 06:30

## 2020-01-20 RX ADMIN — HYDROMORPHONE HYDROCHLORIDE 1 MILLIGRAM(S): 2 INJECTION INTRAMUSCULAR; INTRAVENOUS; SUBCUTANEOUS at 06:38

## 2020-01-20 RX ADMIN — OXYCODONE HYDROCHLORIDE 10 MILLIGRAM(S): 5 TABLET ORAL at 08:53

## 2020-01-20 RX ADMIN — MIDODRINE HYDROCHLORIDE 30 MILLIGRAM(S): 2.5 TABLET ORAL at 20:50

## 2020-01-20 RX ADMIN — OXYCODONE HYDROCHLORIDE 10 MILLIGRAM(S): 5 TABLET ORAL at 05:05

## 2020-01-20 RX ADMIN — MIDODRINE HYDROCHLORIDE 10 MILLIGRAM(S): 2.5 TABLET ORAL at 11:00

## 2020-01-20 RX ADMIN — Medication 1000 MILLIGRAM(S): at 07:27

## 2020-01-20 RX ADMIN — HYDROMORPHONE HYDROCHLORIDE 1 MILLIGRAM(S): 2 INJECTION INTRAMUSCULAR; INTRAVENOUS; SUBCUTANEOUS at 06:53

## 2020-01-20 RX ADMIN — MIDODRINE HYDROCHLORIDE 20 MILLIGRAM(S): 2.5 TABLET ORAL at 10:10

## 2020-01-20 RX ADMIN — ATORVASTATIN CALCIUM 40 MILLIGRAM(S): 80 TABLET, FILM COATED ORAL at 20:49

## 2020-01-20 RX ADMIN — CHLORHEXIDINE GLUCONATE 1 APPLICATION(S): 213 SOLUTION TOPICAL at 06:31

## 2020-01-20 RX ADMIN — HYDROMORPHONE HYDROCHLORIDE 1 MILLIGRAM(S): 2 INJECTION INTRAMUSCULAR; INTRAVENOUS; SUBCUTANEOUS at 17:15

## 2020-01-20 RX ADMIN — Medication 250 MILLIGRAM(S): at 10:11

## 2020-01-20 RX ADMIN — Medication 1 DROP(S): at 17:46

## 2020-01-20 RX ADMIN — MEROPENEM 100 MILLIGRAM(S): 1 INJECTION INTRAVENOUS at 13:35

## 2020-01-20 RX ADMIN — Medication 0.5 MILLIGRAM(S): at 18:02

## 2020-01-20 NOTE — PROGRESS NOTE ADULT - SUBJECTIVE AND OBJECTIVE BOX
HISTORY:  65 y/o female with a PMHx of morbid obesity, COPD, HTN, and GERD who presented to the ED on 11/26/2019 with abdominal pain and distension. Patient states that she had been having constipation for ~2 weeks with no improvement despite laxative and enema use. Imaging revealed perforated sigmoid diverticulitis with free air so she was taken to the OR for an exploratory laparotomy, extended left hemicolectomy, and left in discontinuity w/ Abthera VAC placement. She was left intubated post-operative so she was admitted to SICU for further management. She was successfully extubated on 11/27/2019 to BiPAP. She was taken back to the OR on 11/29/2019 for re-exploratory laparotomy, transverse end colostomy, and fascial closure with wound VAC placement. She returned intubated and was extubated on 11/30/2019. Patient was eventually transferred to the 12/4/2019. 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 She was started on promotility agents and diet was readvanced. She was also noted to be persistently hypotensive so bilateral UE Duplex was obtained on 12/11/2019, which revealed stenosis of the right brachiocephalic artery concerning for subclavian steal syndrome. Plastic surgery was consulted on 12/28/2019 for abdominal wall reconstruction and patient underwent a bedside debridement of her abdominal wound at that time. Patient became increasingly hypotensive and was readmitted to SICU on 1/7/2020 for presumed sepsis. Imaging revealed loculated fluid collections in the left anterior abdomen and a small abdominal wall collection, both of which were not amenable to drainage. Cultures were also positive for E. coli & Klebsiella in the urine, Stenotrophomonas in the sputum, and Staph epidermis in the blood. Patient had a LUE PICC that was subsequently discontinued. Despite resuscitation and antibiotics, a right IJ CVC was placed on 1/10/2020 and patient was started on vasopressor support. Patient refused arterial line placement. She was also complaining of chest wall tightness. hsT was elevated and TTE demonstrated new mild to moderate segmental LV systolic dysfunction w/ hypokinesis of the inferior and inferolateral walls. Cardiology determined the troponin leak to be stressed-induced in the setting of sepsis. Dermatology was consulted on 1/13/2020 for scattered erythematous patches on her trunk & extremities, superficial desquamation of the face, trunk, & extremities, and multiple erosions with surrounding erythema on left lower back, for which petroleum jelly was prescribed BID. Viral cultures of erosions were sent and were negative. Repeat imaging was obtained on 1/16/2020, which demonstrated decreasing fluid collections but no new acute findings. She remains hypotensive requiring vasopressor support.    24 HOUR EVENTS:  - Was out of bed to chair for 6 hours  - Hypotensive overnight with MAP in the high 50s so given 500 mL bolus of 5% albumin but BP did not respond so restart norepinephrine infusion  - Lactate remains elevated at HISTORY:  63 y/o female with a PMHx of morbid obesity, COPD, HTN, and GERD who presented to the ED on 11/26/2019 with abdominal pain and distension. Patient states that she had been having constipation for ~2 weeks with no improvement despite laxative and enema use. Imaging revealed perforated sigmoid diverticulitis with free air so she was taken to the OR for an exploratory laparotomy, extended left hemicolectomy, and left in discontinuity w/ Abthera VAC placement. She was left intubated post-operative so she was admitted to SICU for further management. She was successfully extubated on 11/27/2019 to BiPAP. She was taken back to the OR on 11/29/2019 for re-exploratory laparotomy, transverse end colostomy, and fascial closure with wound VAC placement. She returned intubated and was extubated on 11/30/2019. Patient was eventually transferred to the 12/4/2019. 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 She was started on promotility agents and diet was readvanced. She was also noted to be persistently hypotensive so bilateral UE Duplex was obtained on 12/11/2019, which revealed stenosis of the right brachiocephalic artery concerning for subclavian steal syndrome. Plastic surgery was consulted on 12/28/2019 for abdominal wall reconstruction and patient underwent a bedside debridement of her abdominal wound at that time. Patient became increasingly hypotensive and was readmitted to SICU on 1/7/2020 for presumed sepsis. Imaging revealed loculated fluid collections in the left anterior abdomen and a small abdominal wall collection, both of which were not amenable to drainage. Cultures were also positive for E. coli & Klebsiella in the urine, Stenotrophomonas in the sputum, and Staph epidermis in the blood. Patient had a LUE PICC that was subsequently discontinued. Despite resuscitation and antibiotics, a right IJ CVC was placed on 1/10/2020 and patient was started on vasopressor support. Patient refused arterial line placement. She was also complaining of chest wall tightness. hsT was elevated and TTE demonstrated new mild to moderate segmental LV systolic dysfunction w/ hypokinesis of the inferior and inferolateral walls. Cardiology determined the troponin leak to be stressed-induced in the setting of sepsis. Dermatology was consulted on 1/13/2020 for scattered erythematous patches on her trunk & extremities, superficial desquamation of the face, trunk, & extremities, and multiple erosions with surrounding erythema on left lower back, for which petroleum jelly was prescribed BID. Viral cultures of erosions were sent and were negative. Repeat imaging was obtained on 1/16/2020, which demonstrated decreasing fluid collections but no new acute findings. She remains hypotensive requiring vasopressor support.    24 HOUR EVENTS:  - Was out of bed to chair for 6 hours  - Hypotensive overnight with MAP in the high 50s so given 500 mL bolus of 5% albumin but BP did not respond so restart norepinephrine infusion  - Lactate downtrending from 2.4 to 2.1    SUBJECTIVE/ROS:  [x] A ten-point review of systems was otherwise negative except as noted.  [ ] Due to altered mental status/intubation, subjective information were not able to be obtained from the patient. History was obtained, to the extent possible, from review of the chart and collateral sources of information.    NEURO  Exam: awake, alert, oriented x4, no acute distress, no focal deficits  Meds:  - acetaminophen  IVPB .. 1000 milliGRAM(s) IV Intermittent every 6 hours PRN Mild Pain (1 - 3)  - HYDROmorphone  Injectable 1 milliGRAM(s) IV Push every 4 hours PRN Turning  - ketorolac   Injectable 15 milliGRAM(s) IV Push every 6 hours PRN Moderate Pain (4 - 6)  - lidocaine   Patch 1 Patch Transdermal daily  - methadone    Tablet 17.5 milliGRAM(s) Oral <User Schedule>  - oxyCODONE    IR 10 milliGRAM(s) Oral every 4 hours PRN Severe Pain (7 - 10)  [x] Adequacy of sedation and pain control has been assessed and adjusted    RESPIRATORY  RR: 11 (01-20-20 @ 03:00) (11 - 46)  SpO2: 99% (01-20-20 @ 03:00) (88% - 100%)  Exam: decreased bibasilar breath sounds  Mechanical Ventilation: no  [N/A] Extubation Readiness Assessed  Meds:  - albuterol/ipratropium for Nebulization 3 milliLiter(s) Nebulizer every 6 hours PRN Shortness of Breath and/or Wheezing  - buDESOnide    Inhalation Suspension 0.5 milliGRAM(s) Inhalation every 12 hours    CARDIOVASCULAR  HR: 79 (01-20-20 @ 03:00) (72 - 171)  BP: 88/41 (01-20-20 @ 03:00) (73/48 - 108/59)  BP(mean): 59 (01-20-20 @ 03:00) (53 - 73)  VBG - ( 20 Jan 2020 01:21 )  pH: 7.30  /  pCO2: 40    /  pO2: 41    / HCO3: 19    / Base Excess: -6.4  /  SaO2: 70     Lactate: 2.1    Cardiac Rhythm: sinus  Perfusion    [ ]Adequate     [x]Inadequate  Mentation   [x]Normal       [ ]Reduced  Extremities  [x]Warm         [ ]Cool  Volume Status [ ]Hypervolemic [x]Euvolemic [ ]Hypovolemic  Meds:  - midodrine 20 milliGRAM(s) Oral <User Schedule>  - vasopressin Infusion 0.03 Unit(s)/Min IV Continuous <Continuous>    GI/NUTRITION  Exam: soft, nontender, nondistended, wound VAC with good suction & minimal suction  Diet: regular  Meds:  - aluminum hydroxide/magnesium hydroxide/simethicone Suspension 30 milliLiter(s) Oral every 4 hours PRN Dyspepsia  - erythromycin     base Tablet 250 milliGRAM(s) Oral <User Schedule>  - pantoprazole    Tablet 40 milliGRAM(s) Oral <User Schedule>  - polyethylene glycol 3350 17 Gram(s) Oral <User Schedule>    GENITOURINARY  I&O's Detail  01-19 @ 07:01  -  01-20 @ 05:01  --------------------------------------------------------  IN:    Albumin 5%  - 500 mL: 500 mL    Oral Fluid: 120 mL    Solution: 50 mL    vasopressin Infusion: 36 mL  Total IN: 706 mL    OUT:    Colostomy: 15 mL    Drain: 5 mL    VAC (Vacuum Assisted Closure) System: 50 mL  Total OUT: 70 mL    Total NET: 636 mL    133<L>  |  104  |  25<H>  ----------------------------<  75  5.2   |  17<L>  |  1.12    Ca    8.4      20 Jan 2020 01:25  Phos  4.6  Mg     2.3  TPro  5.4<L>  /  Alb  2.4<L>  /  TBili  0.8  /  DBili  0.4<H>  /  AST  28  /  ALT  18  /  AlkPhos  277<H>    [ ] Ross catheter, indication: N/A  Meds: doxazosin 2 milliGRAM(s) Oral <User Schedule>    HEMATOLOGIC  Meds:  - aspirin  chewable 81 milliGRAM(s) Oral <User Schedule>  - enoxaparin Injectable 100 milliGRAM(s) SubCutaneous <User Schedule>  [x] VTE Prophylaxis               9.0    16.87 )-----------( 161      ( 20 Jan 2020 01:25 )             27.8     INFECTIOUS DISEASES  T(C): 36.4 (01-20-20 @ 03:00), Max: 36.8 (01-19-20 @ 15:00)  WBC Count:  - 16.87 K/uL (01-20 @ 01:25)  - 14.99 K/uL (01-19 @ 05:50)  Meds:  - levoFLOXacin IVPB 500 milliGRAM(s) IV Intermittent every 24 hours  - meropenem  IVPB 1000 milliGRAM(s) IV Intermittent every 8 hours  - vancomycin    ENDOCRINE  Capillary Blood Glucose: none  Meds: atorvastatin 40 milliGRAM(s) Oral <User Schedule>    ACCESS DEVICES:  [x] Peripheral IV  [x] Central Venous Line	[x] R	[ ] L	[x] IJ	[ ] Fem	[ ] SC	Placed: 1/10  [ ] Arterial Line		[ ] R	[ ] L	[ ] Fem	[ ] Rad	[ ] Ax	Placed:   [ ] PICC:					[ ] Mediport  [ ] Urinary Catheter, Date Placed:   [ ] Necessity of urinary, arterial, and venous catheters discussed    OTHER MEDICATIONS:  - AQUAPHOR (petrolatum Ointment) 1 Application(s) Topical three times a day  - artificial  tears Solution 1 Drop(s) Both EYES two times a day  - chlorhexidine 2% Cloths 1 Application(s) Topical <User Schedule>  - diphenhydrAMINE 25 milliGRAM(s) Oral every 12 hours PRN Rash and/or Itching  - nystatin Powder 1 Application(s) Topical two times a day    CODE STATUS: Full code    IMAGING: HISTORY:  63 y/o female with a PMHx of morbid obesity, COPD, HTN, and GERD who presented to the ED on 11/26/2019 with abdominal pain and distension. Patient states that she had been having constipation for ~2 weeks with no improvement despite laxative and enema use. Imaging revealed perforated sigmoid diverticulitis with free air so she was taken to the OR for an exploratory laparotomy, extended left hemicolectomy, and left in discontinuity w/ Abthera VAC placement. She was left intubated post-operative so she was admitted to SICU for further management. She was successfully extubated on 11/27/2019 to BiPAP. She was taken back to the OR on 11/29/2019 for re-exploratory laparotomy, transverse end colostomy, and fascial closure with wound VAC placement. She returned intubated and was extubated on 11/30/2019. Patient was eventually transferred to the 12/4/2019. 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 She was started on promotility agents and diet was readvanced. She was also noted to be persistently hypotensive so bilateral UE Duplex was obtained on 12/11/2019, which revealed stenosis of the right brachiocephalic artery concerning for subclavian steal syndrome. Plastic surgery was consulted on 12/28/2019 for abdominal wall reconstruction and patient underwent a bedside debridement of her abdominal wound at that time. Patient became increasingly hypotensive and was readmitted to SICU on 1/7/2020 for presumed sepsis. Imaging revealed loculated fluid collections in the left anterior abdomen and a small abdominal wall collection, both of which were not amenable to drainage. Cultures were also positive for E. coli & Klebsiella in the urine, Stenotrophomonas in the sputum, and Staph epidermis in the blood. Patient had a LUE PICC that was subsequently discontinued. Despite resuscitation and antibiotics, a right IJ CVC was placed on 1/10/2020 and patient was started on vasopressor support. Patient refused arterial line placement. She was also complaining of chest wall tightness. hsT was elevated and TTE demonstrated new mild to moderate segmental LV systolic dysfunction w/ hypokinesis of the inferior and inferolateral walls. Cardiology determined the troponin leak to be stressed-induced in the setting of sepsis. Dermatology was consulted on 1/13/2020 for scattered erythematous patches on her trunk & extremities, superficial desquamation of the face, trunk, & extremities, and multiple erosions with surrounding erythema on left lower back, for which petroleum jelly was prescribed BID. Viral cultures of erosions were sent and were negative. Repeat imaging was obtained on 1/16/2020, which demonstrated decreasing fluid collections but no new acute findings. She remains hypotensive requiring vasopressor support.    24 HOUR EVENTS:  - Was out of bed to chair for 6 hours  - Hypotensive overnight with MAP in the high 50s so given 500 mL bolus of 5% albumin but BP did not respond so restarted norepinephrine infusion for only 1-2 hours overnight  - Lactate downtrending from 2.4 to 2.1    SUBJECTIVE/ROS:  [x] A ten-point review of systems was otherwise negative except as noted.  [ ] Due to altered mental status/intubation, subjective information were not able to be obtained from the patient. History was obtained, to the extent possible, from review of the chart and collateral sources of information.    NEURO  Exam: awake, alert, oriented x4, no acute distress, no focal deficits  Meds:  - acetaminophen  IVPB .. 1000 milliGRAM(s) IV Intermittent every 6 hours PRN Mild Pain (1 - 3)  - HYDROmorphone  Injectable 1 milliGRAM(s) IV Push every 4 hours PRN Turning  - ketorolac   Injectable 15 milliGRAM(s) IV Push every 6 hours PRN Moderate Pain (4 - 6)  - lidocaine   Patch 1 Patch Transdermal daily  - methadone    Tablet 17.5 milliGRAM(s) Oral <User Schedule>  - oxyCODONE    IR 10 milliGRAM(s) Oral every 4 hours PRN Severe Pain (7 - 10)  [x] Adequacy of sedation and pain control has been assessed and adjusted    RESPIRATORY  RR: 11 (01-20-20 @ 03:00) (11 - 46)  SpO2: 99% (01-20-20 @ 03:00) (88% - 100%)  Exam: decreased bibasilar breath sounds  Mechanical Ventilation: no  [N/A] Extubation Readiness Assessed  Meds:  - albuterol/ipratropium for Nebulization 3 milliLiter(s) Nebulizer every 6 hours PRN Shortness of Breath and/or Wheezing  - buDESOnide    Inhalation Suspension 0.5 milliGRAM(s) Inhalation every 12 hours    CARDIOVASCULAR  HR: 79 (01-20-20 @ 03:00) (72 - 171)  BP: 88/41 (01-20-20 @ 03:00) (73/48 - 108/59)  BP(mean): 59 (01-20-20 @ 03:00) (53 - 73)  VBG - ( 20 Jan 2020 01:21 )  pH: 7.30  /  pCO2: 40    /  pO2: 41    / HCO3: 19    / Base Excess: -6.4  /  SaO2: 70     Lactate: 2.1    Cardiac Rhythm: sinus  Perfusion    [ ]Adequate     [x]Inadequate  Mentation   [x]Normal       [ ]Reduced  Extremities  [x]Warm         [ ]Cool  Volume Status [ ]Hypervolemic [x]Euvolemic [ ]Hypovolemic  Meds:  - midodrine 20 milliGRAM(s) Oral <User Schedule>  - vasopressin Infusion 0.03 Unit(s)/Min IV Continuous <Continuous>    GI/NUTRITION  Exam: soft, nontender, nondistended, wound VAC with good suction & minimal suction  Diet: regular  Meds:  - aluminum hydroxide/magnesium hydroxide/simethicone Suspension 30 milliLiter(s) Oral every 4 hours PRN Dyspepsia  - erythromycin     base Tablet 250 milliGRAM(s) Oral <User Schedule>  - pantoprazole    Tablet 40 milliGRAM(s) Oral <User Schedule>  - polyethylene glycol 3350 17 Gram(s) Oral <User Schedule>    GENITOURINARY  I&O's Detail  01-19 @ 07:01  -  01-20 @ 05:01  --------------------------------------------------------  IN:    Albumin 5%  - 500 mL: 500 mL    Oral Fluid: 120 mL    Solution: 50 mL    vasopressin Infusion: 36 mL  Total IN: 706 mL    OUT:    Colostomy: 15 mL    Drain: 5 mL    VAC (Vacuum Assisted Closure) System: 50 mL  Total OUT: 70 mL    Total NET: 636 mL    133<L>  |  104  |  25<H>  ----------------------------<  75  5.2   |  17<L>  |  1.12    Ca    8.4      20 Jan 2020 01:25  Phos  4.6  Mg     2.3  TPro  5.4<L>  /  Alb  2.4<L>  /  TBili  0.8  /  DBili  0.4<H>  /  AST  28  /  ALT  18  /  AlkPhos  277<H>    [ ] Ross catheter, indication: N/A  Meds: doxazosin 2 milliGRAM(s) Oral <User Schedule>    HEMATOLOGIC  Meds:  - aspirin  chewable 81 milliGRAM(s) Oral <User Schedule>  - enoxaparin Injectable 100 milliGRAM(s) SubCutaneous <User Schedule>  [x] VTE Prophylaxis               9.0    16.87 )-----------( 161      ( 20 Jan 2020 01:25 )             27.8     INFECTIOUS DISEASES  T(C): 36.4 (01-20-20 @ 03:00), Max: 36.8 (01-19-20 @ 15:00)  WBC Count:  - 16.87 K/uL (01-20 @ 01:25)  - 14.99 K/uL (01-19 @ 05:50)  Meds:  - levoFLOXacin IVPB 500 milliGRAM(s) IV Intermittent every 24 hours  - meropenem  IVPB 1000 milliGRAM(s) IV Intermittent every 8 hours  - vancomycin    ENDOCRINE  Capillary Blood Glucose: none  Meds: atorvastatin 40 milliGRAM(s) Oral <User Schedule>    ACCESS DEVICES:  [x] Peripheral IV  [x] Central Venous Line	[x] R	[ ] L	[x] IJ	[ ] Fem	[ ] SC	Placed: 1/10  [ ] Arterial Line		[ ] R	[ ] L	[ ] Fem	[ ] Rad	[ ] Ax	Placed:   [ ] PICC:					[ ] Mediport  [ ] Urinary Catheter, Date Placed:   [ ] Necessity of urinary, arterial, and venous catheters discussed    OTHER MEDICATIONS:  - AQUAPHOR (petrolatum Ointment) 1 Application(s) Topical three times a day  - artificial  tears Solution 1 Drop(s) Both EYES two times a day  - chlorhexidine 2% Cloths 1 Application(s) Topical <User Schedule>  - diphenhydrAMINE 25 milliGRAM(s) Oral every 12 hours PRN Rash and/or Itching  - nystatin Powder 1 Application(s) Topical two times a day    CODE STATUS: Full code    IMAGING: HISTORY:  63 y/o female with a PMHx of morbid obesity, COPD, HTN, and GERD who presented to the ED on 11/26/2019 with abdominal pain and distension. Patient states that she had been having constipation for ~2 weeks with no improvement despite laxative and enema use. Imaging revealed perforated sigmoid diverticulitis with free air so she was taken to the OR for an exploratory laparotomy, extended left hemicolectomy, and left in discontinuity w/ Abthera VAC placement. She was left intubated post-operative so she was admitted to SICU for further management. She was successfully extubated on 11/27/2019 to BiPAP. She was taken back to the OR on 11/29/2019 for re-exploratory laparotomy, transverse end colostomy, and fascial closure with wound VAC placement. She returned intubated and was extubated on 11/30/2019. Patient was eventually transferred to the 12/4/2019. 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 She was started on promotility agents and diet was readvanced. She was also noted to be persistently hypotensive so bilateral UE Duplex was obtained on 12/11/2019, which revealed stenosis of the right brachiocephalic artery concerning for subclavian steal syndrome. Plastic surgery was consulted on 12/28/2019 for abdominal wall reconstruction and patient underwent a bedside debridement of her abdominal wound at that time. Patient became increasingly hypotensive and was readmitted to SICU on 1/7/2020 for presumed sepsis. Imaging revealed loculated fluid collections in the left anterior abdomen and a small abdominal wall collection, both of which were not amenable to drainage. Cultures were also positive for E. coli & Klebsiella in the urine, Stenotrophomonas in the sputum, and Staph epidermis in the blood. Patient had a LUE PICC that was subsequently discontinued. Despite resuscitation and antibiotics, a right IJ CVC was placed on 1/10/2020 and patient was started on vasopressor support. Patient refused arterial line placement. She was also complaining of chest wall tightness. hsT was elevated and TTE demonstrated new mild to moderate segmental LV systolic dysfunction w/ hypokinesis of the inferior and inferolateral walls. Cardiology determined the troponin leak to be stressed-induced in the setting of sepsis. Dermatology was consulted on 1/13/2020 for scattered erythematous patches on her trunk & extremities, superficial desquamation of the face, trunk, & extremities, and multiple erosions with surrounding erythema on left lower back, for which petroleum jelly was prescribed BID. Viral cultures of erosions were sent and were negative. Repeat imaging was obtained on 1/16/2020, which demonstrated decreasing fluid collections but no new acute findings. She remains hypotensive requiring vasopressor support.    24 HOUR EVENTS:  - Was out of bed to chair for 6 hours  - Hypotensive overnight with MAP in the high 50s so given 500 mL bolus of 5% albumin but BP did not respond so restarted norepinephrine infusion for only 1-2 hours overnight  - Lactate downtrending from 2.4 to 2.1  - Dental consulted for tooth pain, found to canker sores    SUBJECTIVE/ROS:  [x] A ten-point review of systems was otherwise negative except as noted.  [ ] Due to altered mental status/intubation, subjective information were not able to be obtained from the patient. History was obtained, to the extent possible, from review of the chart and collateral sources of information.    NEURO  Exam: awake, alert, oriented x4, no acute distress, no focal deficits  Meds:  - acetaminophen  IVPB .. 1000 milliGRAM(s) IV Intermittent every 6 hours PRN Mild Pain (1 - 3)  - HYDROmorphone  Injectable 1 milliGRAM(s) IV Push every 4 hours PRN Turning  - ketorolac   Injectable 15 milliGRAM(s) IV Push every 6 hours PRN Moderate Pain (4 - 6)  - lidocaine   Patch 1 Patch Transdermal daily  - methadone    Tablet 17.5 milliGRAM(s) Oral <User Schedule>  - oxyCODONE    IR 10 milliGRAM(s) Oral every 4 hours PRN Severe Pain (7 - 10)  [x] Adequacy of sedation and pain control has been assessed and adjusted    RESPIRATORY  RR: 11 (01-20-20 @ 03:00) (11 - 46)  SpO2: 99% (01-20-20 @ 03:00) (88% - 100%)  Exam: decreased bibasilar breath sounds  Mechanical Ventilation: no  [N/A] Extubation Readiness Assessed  Meds:  - albuterol/ipratropium for Nebulization 3 milliLiter(s) Nebulizer every 6 hours PRN Shortness of Breath and/or Wheezing  - buDESOnide    Inhalation Suspension 0.5 milliGRAM(s) Inhalation every 12 hours    CARDIOVASCULAR  HR: 79 (01-20-20 @ 03:00) (72 - 171)  BP: 88/41 (01-20-20 @ 03:00) (73/48 - 108/59)  BP(mean): 59 (01-20-20 @ 03:00) (53 - 73)  VBG - ( 20 Jan 2020 01:21 )  pH: 7.30  /  pCO2: 40    /  pO2: 41    / HCO3: 19    / Base Excess: -6.4  /  SaO2: 70     Lactate: 2.1    Cardiac Rhythm: sinus  Perfusion    [ ]Adequate     [x]Inadequate  Mentation   [x]Normal       [ ]Reduced  Extremities  [x]Warm         [ ]Cool  Volume Status [ ]Hypervolemic [x]Euvolemic [ ]Hypovolemic  Meds:  - midodrine 20 milliGRAM(s) Oral <User Schedule>  - vasopressin Infusion 0.03 Unit(s)/Min IV Continuous <Continuous>    GI/NUTRITION  Exam: soft, nontender, nondistended, wound VAC with good suction & minimal suction  Diet: regular  Meds:  - aluminum hydroxide/magnesium hydroxide/simethicone Suspension 30 milliLiter(s) Oral every 4 hours PRN Dyspepsia  - erythromycin     base Tablet 250 milliGRAM(s) Oral <User Schedule>  - pantoprazole    Tablet 40 milliGRAM(s) Oral <User Schedule>  - polyethylene glycol 3350 17 Gram(s) Oral <User Schedule>    GENITOURINARY  I&O's Detail  01-19 @ 07:01  -  01-20 @ 05:01  --------------------------------------------------------  IN:    Albumin 5%  - 500 mL: 500 mL    Oral Fluid: 120 mL    Solution: 50 mL    vasopressin Infusion: 36 mL  Total IN: 706 mL    OUT:    Colostomy: 15 mL    Drain: 5 mL    VAC (Vacuum Assisted Closure) System: 50 mL  Total OUT: 70 mL    Total NET: 636 mL    133<L>  |  104  |  25<H>  ----------------------------<  75  5.2   |  17<L>  |  1.12    Ca    8.4      20 Jan 2020 01:25  Phos  4.6  Mg     2.3  TPro  5.4<L>  /  Alb  2.4<L>  /  TBili  0.8  /  DBili  0.4<H>  /  AST  28  /  ALT  18  /  AlkPhos  277<H>    [ ] Ross catheter, indication: N/A  Meds: doxazosin 2 milliGRAM(s) Oral <User Schedule>    HEMATOLOGIC  Meds:  - aspirin  chewable 81 milliGRAM(s) Oral <User Schedule>  - enoxaparin Injectable 100 milliGRAM(s) SubCutaneous <User Schedule>  [x] VTE Prophylaxis               9.0    16.87 )-----------( 161      ( 20 Jan 2020 01:25 )             27.8     INFECTIOUS DISEASES  T(C): 36.4 (01-20-20 @ 03:00), Max: 36.8 (01-19-20 @ 15:00)  WBC Count:  - 16.87 K/uL (01-20 @ 01:25)  - 14.99 K/uL (01-19 @ 05:50)  Meds:  - levoFLOXacin IVPB 500 milliGRAM(s) IV Intermittent every 24 hours  - meropenem  IVPB 1000 milliGRAM(s) IV Intermittent every 8 hours  - vancomycin    ENDOCRINE  Capillary Blood Glucose: none  Meds: atorvastatin 40 milliGRAM(s) Oral <User Schedule>    ACCESS DEVICES:  [x] Peripheral IV  [x] Central Venous Line	[x] R	[ ] L	[x] IJ	[ ] Fem	[ ] SC	Placed: 1/10  [ ] Arterial Line		[ ] R	[ ] L	[ ] Fem	[ ] Rad	[ ] Ax	Placed:   [ ] PICC:					[ ] Mediport  [ ] Urinary Catheter, Date Placed:   [ ] Necessity of urinary, arterial, and venous catheters discussed    OTHER MEDICATIONS:  - AQUAPHOR (petrolatum Ointment) 1 Application(s) Topical three times a day  - artificial  tears Solution 1 Drop(s) Both EYES two times a day  - chlorhexidine 2% Cloths 1 Application(s) Topical <User Schedule>  - diphenhydrAMINE 25 milliGRAM(s) Oral every 12 hours PRN Rash and/or Itching  - nystatin Powder 1 Application(s) Topical two times a day    CODE STATUS: Full code    IMAGING:

## 2020-01-20 NOTE — PROGRESS NOTE ADULT - ASSESSMENT
63yo F with PMHx morbid obesity, GERD, HTN, COPD, and pshx D&C now s/p ex laparotomy with extended left hemicolectomy 11/26 for perforated and necrotic sigmoid colon with gross abdominal contamination. RTOR 11/29 for abd closure, RUQ end transverse colostomy creation. On 12/19 patient found to have induration of wound and keri-stomal fat necrosis, s/p bedside debridement w/ wound VAC in placement on midline wound. Began showing signs of sepsis and was transferred to SICU 1/7 for further management. Now w/ decreasing pressor support and downtrending leukocytosis. Troponins have been elevated and echo shows decreased ventricular wall motility.      Plan:  Wean pressors as tolerated  Continue IV vanc, meropenem, levaquin  Trend labs/ WBC/ SBP  Appreciate Cards reccs  Appreciate excellent SICU care      Green  [phone] 65yo F with PMHx morbid obesity, GERD, HTN, COPD, and pshx D&C now s/p ex laparotomy with extended left hemicolectomy 11/26 for perforated and necrotic sigmoid colon with gross abdominal contamination. RTOR 11/29 for abd closure, RUQ end transverse colostomy creation. On 12/19 patient found to have induration of wound and keri-stomal fat necrosis, s/p bedside debridement w/ wound VAC in placement on midline wound. Began showing signs of sepsis and was transferred to SICU 1/7 for further management. Now w/ decreasing pressor support and downtrending leukocytosis. Troponins have been elevated and echo shows decreased ventricular wall motility.      Plan:  Wean pressors as tolerated  Continue IV meropenem  Trend labs/ WBC/ SBP  Appreciate Cards reccs  Appreciate excellent SICU care      Green  [phone] 65yo F with PMHx morbid obesity, GERD, HTN, COPD, and pshx D&C now s/p ex laparotomy with extended left hemicolectomy 11/26 for perforated and necrotic sigmoid colon with gross abdominal contamination. RTOR 11/29 for abd closure, RUQ end transverse colostomy creation. On 12/19 patient found to have induration of wound and keri-stomal fat necrosis, s/p bedside debridement w/ wound VAC in placement on midline wound. Began showing signs of sepsis and was transferred to SICU 1/7 for further management. Now w/ decreasing pressor support and downtrending leukocytosis. Troponins have been elevated and echo shows decreased ventricular wall motility.      Plan:  Continue IV meropenem  Trend labs/ WBC/ SBP  Appreciate Cards reccs  Appreciate excellent SICU care      Green  [phone]

## 2020-01-20 NOTE — PROGRESS NOTE ADULT - ASSESSMENT
65 y/o female presenting with perforated sigmoid diverticulitis s/p exploratory laparotomy, extended left hemicolectomy, and left in discontinuity w/ Abthera VAC placement with return to OR for re-exploratory laparotomy, transverse end colostomy, and fascial closure with wound VAC placement. Hospital course complicated by gastroparesis, right subclavian steal syndrome, full body rash of unknown etiology, and refractory septic shock c/b stress-induced cardiomyopathy. Cultures were positive for E. coli & Klebsiella in the urine, Stenotrophomonas in the sputum, and Staph epidermis in the blood with subsequent removal of her LUE PICC.    PLAN:    Neuro: acute pain from her full body rash, opioid dependence  - Monitor mental status  - Pain control as needed with Lidoderm patch, acetaminophen, oxycodone, and Dilaudid  - Home methadone    Resp: COPD  - Monitor pulse oximeter  - Out of bed to chair and incentive spirometry to prevent atelectasis  - Pulmicort and Duoneb for COPD    CV: refractory septic shock c/b stress-induced cardiomyopathy  - Monitor vital signs  - Will wean norepinephrine and vasopressin infusions as tolerated with goal MAP greater than 65 mmHg  - Midodrine 20 mg q6hrs in the setting of persistent hypotension  - Repeat TTE when patient is stable off vasopressor support to look for improvement in LV function  - Home ASA  - Trend lactate    GI: perforated sigmoid diverticulitis s/p exploratory laparotomy, extended left hemicolectomy, and left in discontinuity w/ Abthera VAC placement with return to OR for re-exploratory laparotomy, transverse end colostomy, and fascial closure c/b gastroparesis; GERD  - Regular diet as tolerated  - Monitor ostomy  - Erythromycin for gastroparesis  - Protonix for GERD  - Maalox PRN indigestion    Renal: no acute issues  - Monitor I&Os  - Monitor electrolytes and replete as necessary  - Doxazosin for urinary retention    Heme: no acute issues  - Monitor CBC and coags  - Lovenox 100 mg daily for VTE prophylaxis, adjusted for BMI    ID: E. coli & Klebsiella UTI, Stenotrophomonas PNA, Staph epidermis bacteremia  - Monitor for clinical evidence of active infection  - Empiric antibiotics with vancomycin (ends 1/19 for 14 day course), meropenem (ends 1/22 for 14 day course), and levofloxacin (ends 1/19 for 7 day course)  - Blood cultures from 1/12 with no growth    Endo: HLD  - Monitor glucose on BMP  - Home Lipitor    Disposition:  - Full code  - Will remain in [facility]    Ban Miller PA-C     [id number]

## 2020-01-20 NOTE — CHART NOTE - NSCHARTNOTEFT_GEN_A_CORE
Dermatology Chart Note    HPI:    63yo F with PMHx morbid obesity, GERD, HTN, COPD, and pshx D&C now s/p ex laparotomy with extended left hemicolectomy 11/26 for perforated and necrotic sigmoid colon with gross abdominal contamination. RTOR 11/29 for abd closure, RUQ end transverse colostomy creation. On 12/19 patient found to have induration of wound and keri-stomal fat necrosis, s/p bedside debridement w/ wound VAC in placement on midline wound. Began showing signs of sepsis and was transferred to SICU 1/7 for further management. On pressors for refractory septic shock.    Seen by inpatient dermatology 1/13/20 for scattered erythematous patches on trunk and extremities as well as superficial desquamation on face, trunk, and extremities x 1 week, erosions on L lower back x 3 days. At that time, thought to be likely resolving drug exanthema, unknown trigger (zosyn?), erosions likely secondary to edema and irritation. VZV/HSV swab was negative.    Dermatology is re-consulted for worsening of erythema all over body and erosions. Tender in some areas. Team is applying Vaseline.  No history of pre-existing psoriasis or atopic dermatitis.    PAST MEDICAL & SURGICAL HISTORY:  Morbid Obesity  Post Menopausal Bleeding  Polyp, Vagina  Acid Reflux  HTN (Hypertension)  S/P D&C: 2009, w vaginal polypectomy      FAMILY HISTORY:      SOCIAL HISTORY:  - Active smoker 1pack day for many years  - Lives in private residence with domestic partner    MEDICATIONS  (STANDING):  lactated ringers Bolus 1000 milliLiter(s) IV Bolus once  morphine  - Injectable 4 milliGRAM(s) IV Push once  ondansetron Injectable 4 milliGRAM(s) IV Push once  piperacillin/tazobactam IVPB. 3.375 Gram(s) IV Intermittent Once    MEDICATIONS  (PRN):    Allergies    sulfa drugs (Unknown)    Intolerances        Vital Signs Last 24 Hrs  T(C): 37.1 (25 Nov 2019 22:50), Max: 37.1 (25 Nov 2019 17:53)  T(F): 98.7 (25 Nov 2019 22:50), Max: 98.8 (25 Nov 2019 17:53)  HR: 98 (25 Nov 2019 22:50) (62 - 98)  BP: 107/73 (25 Nov 2019 22:50) (107/73 - 110/70)  BP(mean): --  RR: 16 (25 Nov 2019 22:50) (16 - 16)  SpO2: 94% (25 Nov 2019 22:50) (94% - 100%)  Daily     Daily     General: NAD, well-nourished  HEENT: Atraumatic, EOMI  Resp: Breathing comfortably on RA  CV: Normal sinus rhythm  Abd: soft, distended, mildly tender in LLQ, no RT/guarding  Ext: ROMIx4, motor strength intact x 4                          16.9   11.20 )-----------( 333      ( 25 Nov 2019 20:43 )             50.6     11-25    131<L>  |  97  |  20  ----------------------------<  151<H>  6.1<H>   |  17<L>  |  0.78    Ca    10.1      25 Nov 2019 20:43  Phos  3.1     11-25  Mg     2.2     11-25    TPro  8.0  /  Alb  2.9<L>  /  TBili  1.3<H>  /  DBili  x   /  AST  36  /  ALT  33  /  AlkPhos  108  11-25    PT/INR - ( 25 Nov 2019 20:43 )   PT: 15.2 sec;   INR: 1.31 ratio         PTT - ( 25 Nov 2019 20:43 )  PTT:27.2 sec      Radiographic Findings:             EXAM:  CT ABDOMEN AND PELVIS IC                            PROCEDURE DATE:  11/25/2019            INTERPRETATION:  CLINICAL INFORMATION: Constipation for weeks.    COMPARISON: None.    PROCEDURE:   CT of the Abdomen and Pelvis was performed with intravenous contrast.   Intravenous contrast: 125 ml Omnipaque 350. 25 ml discarded.  Oral contrast: None.  Sagittal and coronal reformats were performed.    FINDINGS:    LOWER CHEST: Aortic valve and coronary artery calcifications. Calcified   granuloma in the right lower lobe.     LIVER: Small calcifications in the right lobe the liver.  BILE DUCTS: Normal caliber.  GALLBLADDER: Distended, up to 6.3 cm. No gallbladder wall thickening or   pericholecystic fluid.  SPLEEN: Within normal limits.  PANCREAS: Within normal limits.  ADRENALS: Within normal limits.  KIDNEYS/URETERS: 1.2 cm indeterminate right lower pole exophytic   hypodensity (5, 7). Additional subcentimeter hypodensities too small to   characterize within the right kidney. No hydronephrosis.    BLADDER: Underdistended, limiting evaluation.  REPRODUCTIVE ORGANS: The uterus is within normal limits. 2.4 cm left   adnexal cystic lesion (3:106).    BOWEL: The esophagus is slightly thickened. Colonic diverticulosis. Wall   thickening and inflammatory stranding centered around the sigmoid colon.   Small ascites and free air with suspected perforation from the proximal   sigmoid colon (3, 85). Areas of peripheral enhancement within the   peritoneal fluid without organized drainable collection identified at   this time. No bowel obstruction. Appendix is normal.  VESSELS: Atherosclerotic changes.  RETROPERITONEUM/LYMPH NODES: No lymphadenopathy.    ABDOMINAL WALL: Small umbilical hernia containing fat and free air.   Nonspecific lower anterior abdominal wall edema.  BONES: Degenerative changes. Grade 1 anterolisthesis at L4-L5 and grade 1   listhesis at L5-S1    IMPRESSION:     Perforated sigmoid diverticulitis with intraperitoneal free air and   peripherally enhancing free fluid. No discrete abscess identified at this   time.    Thickening of the esophagus. Correlation with endoscopy is recommended on   a nonemergent basis.    Indeterminate 1.2 cm right lower pole exophytic hypodensity. Further   evaluation with nonurgent ultrasound or MR is recommended.    Urgent findings were discussed with Dr. Garrett at 11/25/2019 10:59 PM by    with read back confirmation.                    SASCHA CAMACHO M.D., RADIOLOGY RESIDENT  This document has been electronically signed.  SHYAM GALLO M.D., ATTENDING RADIOLOGIST  This document has been electronically signed. Nov 25 2019 11:26PM (26 Nov 2019 00:21)  ]]]]    PHYSICAL EXAM:  Vital Signs Last 24 Hrs  T(C): 36.1 (20 Jan 2020 17:00), Max: 36.8 (20 Jan 2020 07:00)  T(F): 97 (20 Jan 2020 17:00), Max: 98.2 (20 Jan 2020 07:00)  HR: 76 (20 Jan 2020 20:00) (72 - 171)  BP: 84/49 (20 Jan 2020 20:00) (69/43 - 120/55)  BP(mean): 62 (20 Jan 2020 20:00) (51 - 74)  RR: 10 (20 Jan 2020 20:00) (10 - 30)  SpO2: 99% (20 Jan 2020 20:00) (92% - 100%)    Skin exam notable for:  The patient was alert and oriented X 3, well nourished, and in no apparent distress. Oropharynx showed no ulcerations. There was no visible lymphadenopathy. Conjunctiva were non-injected. There was no clubbing or edema of extremities.    The scalp, hair, face, eyebrows, lips, oropharynx , neck, chest, back, buttocks, extremities X 4, hands, feet, nails were examined. There was no hyperhidrosis or bromhidrosis.     The following lesions are noted:     - erythema and scaling diffusely on trunk and extremities. Several large erosions on back and buttocks.    ASSESSMENT/PLAN:    1) Erythroderma, likely drug-induced  - Most likely 2/2 vancomycin, though cannot r/o meropenem-induced.  - Last dose of vancomycin was 1/19  - start Vaseline TID to affected areas on face, trunk and extremities.  - start zinc oxide ointment BID to TID to erosions on back and buttocks  - start clobetasol ointment BID to affected areas on trunk and extremities. Do not apply on face.  - Continue supportive measures for hypothermia (warming blanket, etc). Hypothermia may be 2/2 erythroderma.    Discussed with primary team.  Discussed with attending, Dr. Urbano. Formal rounds to be conducted on 1/22/20. Will update assessment and plan that time.    Connor Jiménez MD  PGY2, Dermatology

## 2020-01-20 NOTE — PROVIDER CONTACT NOTE (OTHER) - BACKGROUND
initial p/w perforated sigmoid diverticulitis  c/b abdominal fluid collections, hypotension, + BCs initially p/w perforated sigmoid diverticulitis  c/b abdominal fluid collections, hypotension, + BCs, whole body rash

## 2020-01-20 NOTE — PROGRESS NOTE ADULT - SUBJECTIVE AND OBJECTIVE BOX
CARDIOLOGY     PROGRESS  NOTE   ________________________________________________    CHIEF COMPLAINT:Patient is a 64y old  Female who presents with a chief complaint of Perforated bowel (19 Jan 2020 15:41)  pain all over.  	  REVIEW OF SYSTEMS:  CONSTITUTIONAL: No fever, weight loss, or fatigue  EYES: No eye pain, visual disturbances, or discharge  ENT:  No difficulty hearing, tinnitus, vertigo; No sinus or throat pain  NECK: No pain or stiffness  RESPIRATORY: No cough, wheezing, chills or hemoptysis; + Shortness of Breath  CARDIOVASCULAR: No chest pain, palpitations, passing out, dizziness, or leg swelling  GASTROINTESTINAL: No abdominal or epigastric pain. No nausea, vomiting, or hematemesis; No diarrhea or constipation. No melena or hematochezia.  GENITOURINARY: No dysuria, frequency, hematuria, or incontinence  NEUROLOGICAL: No headaches, memory loss, loss of strength, numbness, or tremors  SKIN: No itching, burning, rashes, or lesions   LYMPH Nodes: No enlarged glands  ENDOCRINE: No heat or cold intolerance; No hair loss  MUSCULOSKELETAL: No joint pain or swelling; No muscle, back, or extremity pain  PSYCHIATRIC: No depression, anxiety, mood swings, or difficulty sleeping  HEME/LYMPH: No easy bruising, or bleeding gums  ALLERGY AND IMMUNOLOGIC: No hives or eczema	    [ ] All others negative	  [ ] Unable to obtain    PHYSICAL EXAM:  T(C): 36.8 (01-20-20 @ 07:00), Max: 36.8 (01-19-20 @ 15:00)  HR: 76 (01-20-20 @ 08:00) (72 - 171)  BP: 97/52 (01-20-20 @ 08:00) (73/48 - 120/55)  RR: 19 (01-20-20 @ 08:00) (11 - 30)  SpO2: 94% (01-20-20 @ 08:00) (88% - 100%)  Wt(kg): --  I&O's Summary    19 Jan 2020 07:01  -  20 Jan 2020 07:00  --------------------------------------------------------  IN: 713.2 mL / OUT: 470 mL / NET: 243.2 mL        Appearance: Normal	  HEENT:   Normal oral mucosa, PERRL, EOMI	  Lymphatic: No lymphadenopathy  Cardiovascular: Normal S1 S2, No JVD, + murmurs, + lymph edema  Respiratory: Lungs clear to auscultation	  Psychiatry: A & O x 3, Mood & affect appropriate  Gastrointestinal:  Soft, Non-tender, + BS	  Skin: No rashes, No ecchymoses, No cyanosis	  Neurologic: Non-focal  Extremities: Normal range of motion, No clubbing, cyanosis .  Vascular: Peripheral pulses palpable 2+ bilaterally  skin exfoliation is improving    MEDICATIONS  (STANDING):  AQUAPHOR (petrolatum Ointment) 1 Application(s) Topical three times a day  artificial  tears Solution 1 Drop(s) Both EYES two times a day  aspirin  chewable 81 milliGRAM(s) Oral <User Schedule>  atorvastatin 40 milliGRAM(s) Oral <User Schedule>  buDESOnide    Inhalation Suspension 0.5 milliGRAM(s) Inhalation every 12 hours  chlorhexidine 2% Cloths 1 Application(s) Topical <User Schedule>  doxazosin 2 milliGRAM(s) Oral <User Schedule>  enoxaparin Injectable 100 milliGRAM(s) SubCutaneous <User Schedule>  erythromycin     base Tablet 250 milliGRAM(s) Oral <User Schedule>  lidocaine   Patch 1 Patch Transdermal daily  meropenem  IVPB 1000 milliGRAM(s) IV Intermittent every 8 hours  methadone    Tablet 17.5 milliGRAM(s) Oral <User Schedule>  midodrine 20 milliGRAM(s) Oral <User Schedule>  nystatin Powder 1 Application(s) Topical two times a day  pantoprazole    Tablet 40 milliGRAM(s) Oral <User Schedule>  polyethylene glycol 3350 17 Gram(s) Oral <User Schedule>  vasopressin Infusion 0.03 Unit(s)/Min (1.8 mL/Hr) IV Continuous <Continuous>      TELEMETRY: 	    ECG:  	  RADIOLOGY:  OTHER: 	  	  LABS:	 	    CARDIAC MARKERS:                                9.0    16.87 )-----------( 161      ( 20 Jan 2020 01:25 )             27.8     01-20    133<L>  |  104  |  25<H>  ----------------------------<  75  5.2   |  17<L>  |  1.12    Ca    8.4      20 Jan 2020 01:25  Phos  4.6     01-20  Mg     2.3     01-20    TPro  5.4<L>  /  Alb  2.4<L>  /  TBili  0.8  /  DBili  0.4<H>  /  AST  28  /  ALT  18  /  AlkPhos  277<H>  01-20    proBNP:   Lipid Profile: Cholesterol 98  LDL 52  HDL 28  TG 91    HgA1c:   TSH: Thyroid Stimulating Hormone, Serum: 4.81 uIU/mL (01-17 @ 03:52)  Wean pressors as tolerated  Continue IV meropenem  Trend labs/ WBC/ SBP  Appreciate Cards reccs        Assessment and plan  ---------------------------  septic/ hypovolemic shock still hypotensive, pt has a line, continue pressors/ fluid  abx as per ID  may hold cholesterol meds  WMA on echo ? sec to sepsis  dvt prophylaxis  s/p blood transfusion   continue tx as per ICU  decrease methadone dose  ace wrap to the both LE CARDIOLOGY     PROGRESS  NOTE   ________________________________________________    CHIEF COMPLAINT:Patient is a 64y old  Female who presents with a chief complaint of Perforated bowel (19 Jan 2020 15:41)  pain all over.  	  REVIEW OF SYSTEMS:  CONSTITUTIONAL: No fever, weight loss, or fatigue  EYES: No eye pain, visual disturbances, or discharge  ENT:  No difficulty hearing, tinnitus, vertigo; No sinus or throat pain  NECK: No pain or stiffness  RESPIRATORY: No cough, wheezing, chills or hemoptysis; + Shortness of Breath  CARDIOVASCULAR: No chest pain, palpitations, passing out, dizziness, or leg swelling  GASTROINTESTINAL: No abdominal or epigastric pain. No nausea, vomiting, or hematemesis; No diarrhea or constipation. No melena or hematochezia.  GENITOURINARY: No dysuria, frequency, hematuria, or incontinence  NEUROLOGICAL: No headaches, memory loss, loss of strength, numbness, or tremors  SKIN: No itching, burning, rashes, or lesions   LYMPH Nodes: No enlarged glands  ENDOCRINE: No heat or cold intolerance; No hair loss  MUSCULOSKELETAL: No joint pain or swelling; No muscle, back, or extremity pain  PSYCHIATRIC: No depression, anxiety, mood swings, or difficulty sleeping  HEME/LYMPH: No easy bruising, or bleeding gums  ALLERGY AND IMMUNOLOGIC: No hives or eczema	    [ ] All others negative	  [ ] Unable to obtain    PHYSICAL EXAM:  T(C): 36.8 (01-20-20 @ 07:00), Max: 36.8 (01-19-20 @ 15:00)  HR: 76 (01-20-20 @ 08:00) (72 - 171)  BP: 97/52 (01-20-20 @ 08:00) (73/48 - 120/55)  RR: 19 (01-20-20 @ 08:00) (11 - 30)  SpO2: 94% (01-20-20 @ 08:00) (88% - 100%)  Wt(kg): --  I&O's Summary    19 Jan 2020 07:01  -  20 Jan 2020 07:00  --------------------------------------------------------  IN: 713.2 mL / OUT: 470 mL / NET: 243.2 mL        Appearance: Normal	  HEENT:   Normal oral mucosa, PERRL, EOMI	  Lymphatic: No lymphadenopathy  Cardiovascular: Normal S1 S2, No JVD, + murmurs, + lymph edema  Respiratory: Lungs clear to auscultation	  Psychiatry: A & O x 3, Mood & affect appropriate  Gastrointestinal:  Soft, Non-tender, + BS	  Skin: No rashes, No ecchymoses, No cyanosis	  Neurologic: Non-focal  Extremities: Normal range of motion, No clubbing, cyanosis .  Vascular: Peripheral pulses palpable 2+ bilaterally  skin exfoliation is improving    MEDICATIONS  (STANDING):  AQUAPHOR (petrolatum Ointment) 1 Application(s) Topical three times a day  artificial  tears Solution 1 Drop(s) Both EYES two times a day  aspirin  chewable 81 milliGRAM(s) Oral <User Schedule>  atorvastatin 40 milliGRAM(s) Oral <User Schedule>  buDESOnide    Inhalation Suspension 0.5 milliGRAM(s) Inhalation every 12 hours  chlorhexidine 2% Cloths 1 Application(s) Topical <User Schedule>  doxazosin 2 milliGRAM(s) Oral <User Schedule>  enoxaparin Injectable 100 milliGRAM(s) SubCutaneous <User Schedule>  erythromycin     base Tablet 250 milliGRAM(s) Oral <User Schedule>  lidocaine   Patch 1 Patch Transdermal daily  meropenem  IVPB 1000 milliGRAM(s) IV Intermittent every 8 hours  methadone    Tablet 17.5 milliGRAM(s) Oral <User Schedule>  midodrine 20 milliGRAM(s) Oral <User Schedule>  nystatin Powder 1 Application(s) Topical two times a day  pantoprazole    Tablet 40 milliGRAM(s) Oral <User Schedule>  polyethylene glycol 3350 17 Gram(s) Oral <User Schedule>  vasopressin Infusion 0.03 Unit(s)/Min (1.8 mL/Hr) IV Continuous <Continuous>      TELEMETRY: 	    ECG:  	  RADIOLOGY:  OTHER: 	  	  LABS:	 	    CARDIAC MARKERS:                                9.0    16.87 )-----------( 161      ( 20 Jan 2020 01:25 )             27.8     01-20    133<L>  |  104  |  25<H>  ----------------------------<  75  5.2   |  17<L>  |  1.12    Ca    8.4      20 Jan 2020 01:25  Phos  4.6     01-20  Mg     2.3     01-20    TPro  5.4<L>  /  Alb  2.4<L>  /  TBili  0.8  /  DBili  0.4<H>  /  AST  28  /  ALT  18  /  AlkPhos  277<H>  01-20    proBNP:   Lipid Profile: Cholesterol 98  LDL 52  HDL 28  TG 91    HgA1c:   TSH: Thyroid Stimulating Hormone, Serum: 4.81 uIU/mL (01-17 @ 03:52)  Wean pressors as tolerated  Continue IV meropenem  Trend labs/ WBC/ SBP  Appreciate Cards reccs    < from: CT Abdomen and Pelvis w/ Oral Cont (01.15.20 @ 14:12) >  Small bilateral pleural effusions, new on the right and increased on the left since prior study.    Several scattered right upper lobe groundglass opacities.    Redemonstrated open midline anterior abdominal wall wound with an overlying wound VAC. Interval decrease in size of the underlying left abdominal multiloculated fluid collection, which is poorly defined on current study.    Trace ascites.      < end of copied text >      Assessment and plan  ---------------------------  septic/ hypovolemic shock still hypotensive, no A line was placedcontinue pressors/ fluid  abx as per ID repeat cultures  may hold cholesterol meds  WMA on echo ? sec to sepsis  dvt prophylaxis  s/p blood transfusion   continue tx as per ICU  decrease methadone dose  ace wrap to the both LE

## 2020-01-20 NOTE — PROGRESS NOTE ADULT - SUBJECTIVE AND OBJECTIVE BOX
Morning Surgical Progress Note  Patient is a 64y old  Female who presents with a chief complaint of Perforated bowel (19 Jan 2020 15:41)      SUBJECTIVE: Patient seen and examined at bedside with surgical team, patient complains of pain and skin peeling. Patient remains hypotensive on vasopressin. There were no acute events overnight.    Vital Signs Last 24 Hrs  T(C): 36.4 (20 Jan 2020 03:00), Max: 36.8 (19 Jan 2020 15:00)  T(F): 97.6 (20 Jan 2020 03:00), Max: 98.2 (19 Jan 2020 15:00)  HR: 79 (20 Jan 2020 03:00) (72 - 171)  BP: 88/41 (20 Jan 2020 03:00) (73/48 - 108/59)  BP(mean): 59 (20 Jan 2020 03:00) (53 - 73)  RR: 11 (20 Jan 2020 03:00) (11 - 46)  SpO2: 99% (20 Jan 2020 03:00) (88% - 100%)I&O's Detail    18 Jan 2020 07:01  -  19 Jan 2020 07:00  --------------------------------------------------------  IN:    norepinephrine Infusion: 28.2 mL    Solution: 100 mL    Solution: 450 mL    Solution: 83.3 mL    vasopressin Infusion: 36 mL  Total IN: 697.5 mL    OUT:    Colostomy: 250 mL    Drain: 40 mL    VAC (Vacuum Assisted Closure) System: 25 mL    Voided: 300 mL  Total OUT: 615 mL    Total NET: 82.5 mL      19 Jan 2020 07:01  -  20 Jan 2020 04:37  --------------------------------------------------------  IN:    Albumin 5%  - 500 mL: 500 mL    Oral Fluid: 120 mL    Solution: 50 mL    vasopressin Infusion: 36 mL  Total IN: 706 mL    OUT:    Colostomy: 15 mL    Drain: 5 mL    VAC (Vacuum Assisted Closure) System: 50 mL  Total OUT: 70 mL    Total NET: 636 mL      MEDICATIONS  (STANDING):  AQUAPHOR (petrolatum Ointment) 1 Application(s) Topical three times a day  artificial  tears Solution 1 Drop(s) Both EYES two times a day  aspirin  chewable 81 milliGRAM(s) Oral <User Schedule>  atorvastatin 40 milliGRAM(s) Oral <User Schedule>  buDESOnide    Inhalation Suspension 0.5 milliGRAM(s) Inhalation every 12 hours  chlorhexidine 2% Cloths 1 Application(s) Topical <User Schedule>  doxazosin 2 milliGRAM(s) Oral <User Schedule>  enoxaparin Injectable 100 milliGRAM(s) SubCutaneous <User Schedule>  erythromycin     base Tablet 250 milliGRAM(s) Oral <User Schedule>  lidocaine   Patch 1 Patch Transdermal daily  meropenem  IVPB 1000 milliGRAM(s) IV Intermittent every 8 hours  methadone    Tablet 17.5 milliGRAM(s) Oral <User Schedule>  midodrine 20 milliGRAM(s) Oral <User Schedule>  nystatin Powder 1 Application(s) Topical two times a day  pantoprazole    Tablet 40 milliGRAM(s) Oral <User Schedule>  polyethylene glycol 3350 17 Gram(s) Oral <User Schedule>  vasopressin Infusion 0.03 Unit(s)/Min (1.8 mL/Hr) IV Continuous <Continuous>    MEDICATIONS  (PRN):  acetaminophen  IVPB .. 1000 milliGRAM(s) IV Intermittent every 6 hours PRN Mild Pain (1 - 3)  albuterol/ipratropium for Nebulization 3 milliLiter(s) Nebulizer every 6 hours PRN Shortness of Breath and/or Wheezing  aluminum hydroxide/magnesium hydroxide/simethicone Suspension 30 milliLiter(s) Oral every 4 hours PRN Dyspepsia  diphenhydrAMINE 25 milliGRAM(s) Oral every 12 hours PRN Rash and/or Itching  HYDROmorphone  Injectable 1 milliGRAM(s) IV Push every 4 hours PRN Turning  ketorolac   Injectable 15 milliGRAM(s) IV Push every 6 hours PRN Moderate Pain (4 - 6)  oxyCODONE    IR 10 milliGRAM(s) Oral every 4 hours PRN Severe Pain (7 - 10)      Physical Exam  Constitutional: A&Ox3, NAD  Respiratory: CTA b/l  Cardiac: RRR, S1 and S2  Gastrointestinal: abdomen soft, NT/ND, vac in place, dressings c/d/i  skin: erythematous with skin sloughing  LE: 3+ pitting edema b/l, tender to pressing with doppler probe. AT signals b/l    LABS:                        9.0    16.87 )-----------( 161      ( 20 Jan 2020 01:25 )             27.8     01-20    133<L>  |  104  |  25<H>  ----------------------------<  75  5.2   |  17<L>  |  1.12    Ca    8.4      20 Jan 2020 01:25  Phos  4.6     01-20  Mg     2.3     01-20    TPro  5.4<L>  /  Alb  2.4<L>  /  TBili  0.8  /  DBili  0.4<H>  /  AST  28  /  ALT  18  /  AlkPhos  277<H>  01-20      LIVER FUNCTIONS - ( 20 Jan 2020 01:25 )  Alb: 2.4 g/dL / Pro: 5.4 g/dL / ALK PHOS: 277 U/L / ALT: 18 U/L / AST: 28 U/L / GGT: x Morning Surgical Progress Note  Patient is a 64y old  Female who presents with a chief complaint of Perforated bowel (19 Jan 2020 15:41)      SUBJECTIVE: Patient seen and examined at bedside with surgical team, patient complains of pain and skin peeling. There were no acute events overnight.    Vital Signs Last 24 Hrs  T(C): 36.4 (20 Jan 2020 03:00), Max: 36.8 (19 Jan 2020 15:00)  T(F): 97.6 (20 Jan 2020 03:00), Max: 98.2 (19 Jan 2020 15:00)  HR: 79 (20 Jan 2020 03:00) (72 - 171)  BP: 88/41 (20 Jan 2020 03:00) (73/48 - 108/59)  BP(mean): 59 (20 Jan 2020 03:00) (53 - 73)  RR: 11 (20 Jan 2020 03:00) (11 - 46)  SpO2: 99% (20 Jan 2020 03:00) (88% - 100%)I&O's Detail    18 Jan 2020 07:01  -  19 Jan 2020 07:00  --------------------------------------------------------  IN:    norepinephrine Infusion: 28.2 mL    Solution: 100 mL    Solution: 450 mL    Solution: 83.3 mL    vasopressin Infusion: 36 mL  Total IN: 697.5 mL    OUT:    Colostomy: 250 mL    Drain: 40 mL    VAC (Vacuum Assisted Closure) System: 25 mL    Voided: 300 mL  Total OUT: 615 mL    Total NET: 82.5 mL      19 Jan 2020 07:01  -  20 Jan 2020 04:37  --------------------------------------------------------  IN:    Albumin 5%  - 500 mL: 500 mL    Oral Fluid: 120 mL    Solution: 50 mL    vasopressin Infusion: 36 mL  Total IN: 706 mL    OUT:    Colostomy: 15 mL    Drain: 5 mL    VAC (Vacuum Assisted Closure) System: 50 mL  Total OUT: 70 mL    Total NET: 636 mL      MEDICATIONS  (STANDING):  AQUAPHOR (petrolatum Ointment) 1 Application(s) Topical three times a day  artificial  tears Solution 1 Drop(s) Both EYES two times a day  aspirin  chewable 81 milliGRAM(s) Oral <User Schedule>  atorvastatin 40 milliGRAM(s) Oral <User Schedule>  buDESOnide    Inhalation Suspension 0.5 milliGRAM(s) Inhalation every 12 hours  chlorhexidine 2% Cloths 1 Application(s) Topical <User Schedule>  doxazosin 2 milliGRAM(s) Oral <User Schedule>  enoxaparin Injectable 100 milliGRAM(s) SubCutaneous <User Schedule>  erythromycin     base Tablet 250 milliGRAM(s) Oral <User Schedule>  lidocaine   Patch 1 Patch Transdermal daily  meropenem  IVPB 1000 milliGRAM(s) IV Intermittent every 8 hours  methadone    Tablet 17.5 milliGRAM(s) Oral <User Schedule>  midodrine 20 milliGRAM(s) Oral <User Schedule>  nystatin Powder 1 Application(s) Topical two times a day  pantoprazole    Tablet 40 milliGRAM(s) Oral <User Schedule>  polyethylene glycol 3350 17 Gram(s) Oral <User Schedule>  vasopressin Infusion 0.03 Unit(s)/Min (1.8 mL/Hr) IV Continuous <Continuous>    MEDICATIONS  (PRN):  acetaminophen  IVPB .. 1000 milliGRAM(s) IV Intermittent every 6 hours PRN Mild Pain (1 - 3)  albuterol/ipratropium for Nebulization 3 milliLiter(s) Nebulizer every 6 hours PRN Shortness of Breath and/or Wheezing  aluminum hydroxide/magnesium hydroxide/simethicone Suspension 30 milliLiter(s) Oral every 4 hours PRN Dyspepsia  diphenhydrAMINE 25 milliGRAM(s) Oral every 12 hours PRN Rash and/or Itching  HYDROmorphone  Injectable 1 milliGRAM(s) IV Push every 4 hours PRN Turning  ketorolac   Injectable 15 milliGRAM(s) IV Push every 6 hours PRN Moderate Pain (4 - 6)  oxyCODONE    IR 10 milliGRAM(s) Oral every 4 hours PRN Severe Pain (7 - 10)      Physical Exam  Constitutional: A&Ox3, NAD  Respiratory: CTA b/l  Cardiac: RRR, S1 and S2  Gastrointestinal: abdomen soft, NT/ND, vac in place, dressings c/d/i  skin: erythematous with skin sloughing  LE: 3+ pitting edema b/l, tender to pressing with doppler probe. AT signals b/l    LABS:                        9.0    16.87 )-----------( 161      ( 20 Jan 2020 01:25 )             27.8     01-20    133<L>  |  104  |  25<H>  ----------------------------<  75  5.2   |  17<L>  |  1.12    Ca    8.4      20 Jan 2020 01:25  Phos  4.6     01-20  Mg     2.3     01-20    TPro  5.4<L>  /  Alb  2.4<L>  /  TBili  0.8  /  DBili  0.4<H>  /  AST  28  /  ALT  18  /  AlkPhos  277<H>  01-20      LIVER FUNCTIONS - ( 20 Jan 2020 01:25 )  Alb: 2.4 g/dL / Pro: 5.4 g/dL / ALK PHOS: 277 U/L / ALT: 18 U/L / AST: 28 U/L / GGT: x

## 2020-01-20 NOTE — PROGRESS NOTE ADULT - ATTENDING COMMENTS
I saw and examined the pt and discussed the tx plan with the House Staff. I agree with the exam and plan as documented in the surgery resident's note from today which I edited as indicated.  OOB to chair for the 2nd day in a row.   Liset Vargas MD

## 2020-01-20 NOTE — PROVIDER CONTACT NOTE (OTHER) - ACTION/TREATMENT ORDERED:
PA aware and at bedside to evaluate. Warming blanket applied, vaso re-started, extra 10mg of midodrine given PA aware and at bedside to evaluate. Warming blanket applied, vaso re-started, extra 10mg of midodrine given, no levo or additional fluid at present moment, procal level sent.

## 2020-01-20 NOTE — PROGRESS NOTE ADULT - ATTENDING COMMENTS
Dr. Saleh (Attending Physician)  N - Diffuse pain from skin desquamation, LE swelling, and low back wound on methadone, tylenol, oxy, dilaudid  P - COPD  C - septic shock and stress induced cardiomyopathy, dc vasopressin, will increase midodrine if needed  GI - colostomy with   - unable to capture urine output  H - lovenox weight based for dvt ppx  ID - most recent blood cx negative on meropenem, to continue for 2 more days Dr. Saleh (Attending Physician)  N - Diffuse pain from skin desquamation, LE swelling, and low back wound on methadone, tylenol, oxy, dilaudid, wound care consult  P - COPD no oxygenation issues c/w regimen  C - septic shock and stress induced cardiomyopathy, dc vasopressin, will increase midodrine if needed  GI - colostomy with function, c/w diet   - unable to capture urine output  H - lovenox weight based for dvt ppx  ID - most recent blood cx negative on meropenem, to continue for 2 more days, vanc course completed yesterday  E - hypothermic today with recent elevated tsh and low t4 will start levothyroxine for possible hypothyroidism

## 2020-01-21 LAB
ALBUMIN SERPL ELPH-MCNC: 2.1 G/DL — LOW (ref 3.3–5)
ALP SERPL-CCNC: 294 U/L — HIGH (ref 40–120)
ALT FLD-CCNC: 19 U/L — SIGNIFICANT CHANGE UP (ref 10–45)
ANION GAP SERPL CALC-SCNC: 12 MMOL/L — SIGNIFICANT CHANGE UP (ref 5–17)
ANISOCYTOSIS BLD QL: SLIGHT — SIGNIFICANT CHANGE UP
AST SERPL-CCNC: 41 U/L — HIGH (ref 10–40)
BASOPHILS # BLD AUTO: 0.1 K/UL — SIGNIFICANT CHANGE UP (ref 0–0.2)
BASOPHILS NFR BLD AUTO: 0.9 % — SIGNIFICANT CHANGE UP (ref 0–2)
BILIRUB DIRECT SERPL-MCNC: 0.3 MG/DL — HIGH (ref 0–0.2)
BILIRUB INDIRECT FLD-MCNC: 0.4 MG/DL — SIGNIFICANT CHANGE UP (ref 0.2–1)
BILIRUB SERPL-MCNC: 0.7 MG/DL — SIGNIFICANT CHANGE UP (ref 0.2–1.2)
BUN SERPL-MCNC: 30 MG/DL — HIGH (ref 7–23)
CALCIUM SERPL-MCNC: 8.4 MG/DL — SIGNIFICANT CHANGE UP (ref 8.4–10.5)
CHLORIDE SERPL-SCNC: 106 MMOL/L — SIGNIFICANT CHANGE UP (ref 96–108)
CO2 SERPL-SCNC: 16 MMOL/L — LOW (ref 22–31)
CREAT SERPL-MCNC: 1.61 MG/DL — HIGH (ref 0.5–1.3)
DACRYOCYTES BLD QL SMEAR: SLIGHT — SIGNIFICANT CHANGE UP
ELLIPTOCYTES BLD QL SMEAR: SLIGHT — SIGNIFICANT CHANGE UP
EOSINOPHIL # BLD AUTO: 0.28 K/UL — SIGNIFICANT CHANGE UP (ref 0–0.5)
EOSINOPHIL NFR BLD AUTO: 2.6 % — SIGNIFICANT CHANGE UP (ref 0–6)
GAS PNL BLDV: SIGNIFICANT CHANGE UP
GIANT PLATELETS BLD QL SMEAR: PRESENT — SIGNIFICANT CHANGE UP
GLUCOSE BLDC GLUCOMTR-MCNC: 102 MG/DL — HIGH (ref 70–99)
GLUCOSE BLDC GLUCOMTR-MCNC: 185 MG/DL — HIGH (ref 70–99)
GLUCOSE BLDC GLUCOMTR-MCNC: 54 MG/DL — LOW (ref 70–99)
GLUCOSE BLDC GLUCOMTR-MCNC: 61 MG/DL — LOW (ref 70–99)
GLUCOSE SERPL-MCNC: 54 MG/DL — LOW (ref 70–99)
HCT VFR BLD CALC: 27.1 % — LOW (ref 34.5–45)
HCT VFR BLD CALC: 29.7 % — LOW (ref 34.5–45)
HGB BLD-MCNC: 8.5 G/DL — LOW (ref 11.5–15.5)
HGB BLD-MCNC: 9.2 G/DL — LOW (ref 11.5–15.5)
HYPOCHROMIA BLD QL: SLIGHT — SIGNIFICANT CHANGE UP
LYMPHOCYTES # BLD AUTO: 1.11 K/UL — SIGNIFICANT CHANGE UP (ref 1–3.3)
LYMPHOCYTES # BLD AUTO: 10.4 % — LOW (ref 13–44)
MACROCYTES BLD QL: SLIGHT — SIGNIFICANT CHANGE UP
MAGNESIUM SERPL-MCNC: 2.4 MG/DL — SIGNIFICANT CHANGE UP (ref 1.6–2.6)
MANUAL SMEAR VERIFICATION: SIGNIFICANT CHANGE UP
MCHC RBC-ENTMCNC: 28.3 PG — SIGNIFICANT CHANGE UP (ref 27–34)
MCHC RBC-ENTMCNC: 28.5 PG — SIGNIFICANT CHANGE UP (ref 27–34)
MCHC RBC-ENTMCNC: 31 GM/DL — LOW (ref 32–36)
MCHC RBC-ENTMCNC: 31.4 GM/DL — LOW (ref 32–36)
MCV RBC AUTO: 90.3 FL — SIGNIFICANT CHANGE UP (ref 80–100)
MCV RBC AUTO: 92 FL — SIGNIFICANT CHANGE UP (ref 80–100)
MICROCYTES BLD QL: SLIGHT — SIGNIFICANT CHANGE UP
MONOCYTES # BLD AUTO: 0.18 K/UL — SIGNIFICANT CHANGE UP (ref 0–0.9)
MONOCYTES NFR BLD AUTO: 1.7 % — LOW (ref 2–14)
NEUTROPHILS # BLD AUTO: 8.99 K/UL — HIGH (ref 1.8–7.4)
NEUTROPHILS NFR BLD AUTO: 84.4 % — HIGH (ref 43–77)
NRBC # BLD: 0 /100 WBCS — SIGNIFICANT CHANGE UP (ref 0–0)
PHOSPHATE SERPL-MCNC: 5 MG/DL — HIGH (ref 2.5–4.5)
PLAT MORPH BLD: NORMAL — SIGNIFICANT CHANGE UP
PLATELET # BLD AUTO: 147 K/UL — LOW (ref 150–400)
PLATELET # BLD AUTO: 147 K/UL — LOW (ref 150–400)
POIKILOCYTOSIS BLD QL AUTO: SLIGHT — SIGNIFICANT CHANGE UP
POLYCHROMASIA BLD QL SMEAR: SLIGHT — SIGNIFICANT CHANGE UP
POTASSIUM SERPL-MCNC: 5.3 MMOL/L — SIGNIFICANT CHANGE UP (ref 3.5–5.3)
POTASSIUM SERPL-SCNC: 5.3 MMOL/L — SIGNIFICANT CHANGE UP (ref 3.5–5.3)
PROT SERPL-MCNC: 5.3 G/DL — LOW (ref 6–8.3)
RBC # BLD: 3 M/UL — LOW (ref 3.8–5.2)
RBC # BLD: 3.23 M/UL — LOW (ref 3.8–5.2)
RBC # FLD: 19.4 % — HIGH (ref 10.3–14.5)
RBC # FLD: 19.5 % — HIGH (ref 10.3–14.5)
RBC BLD AUTO: ABNORMAL
SMUDGE CELLS # BLD: PRESENT — SIGNIFICANT CHANGE UP
SODIUM SERPL-SCNC: 134 MMOL/L — LOW (ref 135–145)
WBC # BLD: 10.65 K/UL — HIGH (ref 3.8–10.5)
WBC # BLD: 13.1 K/UL — HIGH (ref 3.8–10.5)
WBC # FLD AUTO: 10.65 K/UL — HIGH (ref 3.8–10.5)
WBC # FLD AUTO: 13.1 K/UL — HIGH (ref 3.8–10.5)

## 2020-01-21 PROCEDURE — 71045 X-RAY EXAM CHEST 1 VIEW: CPT | Mod: 26

## 2020-01-21 PROCEDURE — 99232 SBSQ HOSP IP/OBS MODERATE 35: CPT

## 2020-01-21 PROCEDURE — 99291 CRITICAL CARE FIRST HOUR: CPT

## 2020-01-21 RX ORDER — HYDROCORTISONE 20 MG
100 TABLET ORAL EVERY 8 HOURS
Refills: 0 | Status: DISCONTINUED | OUTPATIENT
Start: 2020-01-21 | End: 2020-01-26

## 2020-01-21 RX ORDER — LEVOTHYROXINE SODIUM 125 MCG
50 TABLET ORAL DAILY
Refills: 0 | Status: DISCONTINUED | OUTPATIENT
Start: 2020-01-21 | End: 2020-01-23

## 2020-01-21 RX ORDER — DEXTROSE 50 % IN WATER 50 %
25 SYRINGE (ML) INTRAVENOUS ONCE
Refills: 0 | Status: COMPLETED | OUTPATIENT
Start: 2020-01-21 | End: 2020-01-21

## 2020-01-21 RX ORDER — DIPHENHYDRAMINE HYDROCHLORIDE AND LIDOCAINE HYDROCHLORIDE AND ALUMINUM HYDROXIDE AND MAGNESIUM HYDRO
5 KIT
Refills: 0 | Status: DISCONTINUED | OUTPATIENT
Start: 2020-01-21 | End: 2020-01-23

## 2020-01-21 RX ORDER — LIDOCAINE HCL 20 MG/ML
50 VIAL (ML) INJECTION ONCE
Refills: 0 | Status: COMPLETED | OUTPATIENT
Start: 2020-01-21 | End: 2020-01-22

## 2020-01-21 RX ORDER — ALBUMIN HUMAN 25 %
500 VIAL (ML) INTRAVENOUS ONCE
Refills: 0 | Status: COMPLETED | OUTPATIENT
Start: 2020-01-21 | End: 2020-01-21

## 2020-01-21 RX ADMIN — Medication 250 MILLIGRAM(S): at 09:59

## 2020-01-21 RX ADMIN — Medication 0.5 MILLIGRAM(S): at 18:42

## 2020-01-21 RX ADMIN — Medication 1 DROP(S): at 17:17

## 2020-01-21 RX ADMIN — DIPHENHYDRAMINE HYDROCHLORIDE AND LIDOCAINE HYDROCHLORIDE AND ALUMINUM HYDROXIDE AND MAGNESIUM HYDRO 5 MILLILITER(S): KIT at 14:23

## 2020-01-21 RX ADMIN — OXYCODONE HYDROCHLORIDE 10 MILLIGRAM(S): 5 TABLET ORAL at 02:00

## 2020-01-21 RX ADMIN — Medication 1 APPLICATION(S): at 17:13

## 2020-01-21 RX ADMIN — Medication 1 APPLICATION(S): at 04:35

## 2020-01-21 RX ADMIN — OXYCODONE HYDROCHLORIDE 10 MILLIGRAM(S): 5 TABLET ORAL at 01:50

## 2020-01-21 RX ADMIN — CHLORHEXIDINE GLUCONATE 1 APPLICATION(S): 213 SOLUTION TOPICAL at 04:35

## 2020-01-21 RX ADMIN — Medication 1000 MILLIGRAM(S): at 05:30

## 2020-01-21 RX ADMIN — Medication 25 MICROGRAM(S): at 04:34

## 2020-01-21 RX ADMIN — POLYETHYLENE GLYCOL 3350 17 GRAM(S): 17 POWDER, FOR SOLUTION ORAL at 18:56

## 2020-01-21 RX ADMIN — METHADONE HYDROCHLORIDE 17.5 MILLIGRAM(S): 40 TABLET ORAL at 08:42

## 2020-01-21 RX ADMIN — LIDOCAINE 1 PATCH: 4 CREAM TOPICAL at 20:08

## 2020-01-21 RX ADMIN — Medication 25 MILLILITER(S): at 01:53

## 2020-01-21 RX ADMIN — Medication 1 DROP(S): at 04:42

## 2020-01-21 RX ADMIN — Medication 250 MILLIGRAM(S): at 17:16

## 2020-01-21 RX ADMIN — PANTOPRAZOLE SODIUM 40 MILLIGRAM(S): 20 TABLET, DELAYED RELEASE ORAL at 04:42

## 2020-01-21 RX ADMIN — MIDODRINE HYDROCHLORIDE 30 MILLIGRAM(S): 2.5 TABLET ORAL at 04:34

## 2020-01-21 RX ADMIN — DIPHENHYDRAMINE HYDROCHLORIDE AND LIDOCAINE HYDROCHLORIDE AND ALUMINUM HYDROXIDE AND MAGNESIUM HYDRO 5 MILLILITER(S): KIT at 08:51

## 2020-01-21 RX ADMIN — MIDODRINE HYDROCHLORIDE 30 MILLIGRAM(S): 2.5 TABLET ORAL at 16:46

## 2020-01-21 RX ADMIN — HYDROMORPHONE HYDROCHLORIDE 1 MILLIGRAM(S): 2 INJECTION INTRAMUSCULAR; INTRAVENOUS; SUBCUTANEOUS at 14:23

## 2020-01-21 RX ADMIN — Medication 1 APPLICATION(S): at 21:13

## 2020-01-21 RX ADMIN — LIDOCAINE 1 PATCH: 4 CREAM TOPICAL at 12:10

## 2020-01-21 RX ADMIN — HYDROMORPHONE HYDROCHLORIDE 1 MILLIGRAM(S): 2 INJECTION INTRAMUSCULAR; INTRAVENOUS; SUBCUTANEOUS at 09:03

## 2020-01-21 RX ADMIN — NYSTATIN CREAM 1 APPLICATION(S): 100000 CREAM TOPICAL at 17:17

## 2020-01-21 RX ADMIN — MEROPENEM 100 MILLIGRAM(S): 1 INJECTION INTRAVENOUS at 21:13

## 2020-01-21 RX ADMIN — OXYCODONE HYDROCHLORIDE 10 MILLIGRAM(S): 5 TABLET ORAL at 15:28

## 2020-01-21 RX ADMIN — Medication 3 MILLILITER(S): at 15:31

## 2020-01-21 RX ADMIN — POLYETHYLENE GLYCOL 3350 17 GRAM(S): 17 POWDER, FOR SOLUTION ORAL at 04:41

## 2020-01-21 RX ADMIN — Medication 1000 MILLILITER(S): at 05:47

## 2020-01-21 RX ADMIN — POLYETHYLENE GLYCOL 3350 17 GRAM(S): 17 POWDER, FOR SOLUTION ORAL at 12:09

## 2020-01-21 RX ADMIN — Medication 2 MILLIGRAM(S): at 04:35

## 2020-01-21 RX ADMIN — MIDODRINE HYDROCHLORIDE 30 MILLIGRAM(S): 2.5 TABLET ORAL at 21:13

## 2020-01-21 RX ADMIN — Medication 1 APPLICATION(S): at 04:42

## 2020-01-21 RX ADMIN — ENOXAPARIN SODIUM 100 MILLIGRAM(S): 100 INJECTION SUBCUTANEOUS at 12:09

## 2020-01-21 RX ADMIN — HYDROMORPHONE HYDROCHLORIDE 1 MILLIGRAM(S): 2 INJECTION INTRAMUSCULAR; INTRAVENOUS; SUBCUTANEOUS at 14:38

## 2020-01-21 RX ADMIN — MEROPENEM 100 MILLIGRAM(S): 1 INJECTION INTRAVENOUS at 04:34

## 2020-01-21 RX ADMIN — Medication 1 APPLICATION(S): at 13:38

## 2020-01-21 RX ADMIN — Medication 400 MILLIGRAM(S): at 04:34

## 2020-01-21 RX ADMIN — Medication 3 MILLILITER(S): at 23:16

## 2020-01-21 RX ADMIN — NYSTATIN CREAM 1 APPLICATION(S): 100000 CREAM TOPICAL at 04:35

## 2020-01-21 RX ADMIN — MIDODRINE HYDROCHLORIDE 30 MILLIGRAM(S): 2.5 TABLET ORAL at 09:59

## 2020-01-21 RX ADMIN — Medication 3 MILLILITER(S): at 08:23

## 2020-01-21 RX ADMIN — OXYCODONE HYDROCHLORIDE 10 MILLIGRAM(S): 5 TABLET ORAL at 15:58

## 2020-01-21 RX ADMIN — HYDROMORPHONE HYDROCHLORIDE 1 MILLIGRAM(S): 2 INJECTION INTRAMUSCULAR; INTRAVENOUS; SUBCUTANEOUS at 09:18

## 2020-01-21 RX ADMIN — Medication 100 MILLIGRAM(S): at 18:55

## 2020-01-21 RX ADMIN — MEROPENEM 100 MILLIGRAM(S): 1 INJECTION INTRAVENOUS at 13:37

## 2020-01-21 NOTE — CHART NOTE - NSCHARTNOTEFT_GEN_A_CORE
Dermatology Chart Note    HPI:    Erythroderma f/u    - No longer hypothermic  - Patient feels about the same  - Patient has received one application of clobetasol ointment so far.    PAST MEDICAL & SURGICAL HISTORY:  Morbid Obesity  Post Menopausal Bleeding  Polyp, Vagina  Acid Reflux  HTN (Hypertension)  S/P D&C: 2009, w vaginal polypectomy      FAMILY HISTORY:      SOCIAL HISTORY:  - Active smoker 1pack day for many years  - Lives in private residence with domestic partner    MEDICATIONS  (STANDING):  lactated ringers Bolus 1000 milliLiter(s) IV Bolus once  morphine  - Injectable 4 milliGRAM(s) IV Push once  ondansetron Injectable 4 milliGRAM(s) IV Push once  piperacillin/tazobactam IVPB. 3.375 Gram(s) IV Intermittent Once    MEDICATIONS  (PRN):    Allergies    sulfa drugs (Unknown)    Intolerances        Vital Signs Last 24 Hrs  T(C): 37.1 (25 Nov 2019 22:50), Max: 37.1 (25 Nov 2019 17:53)  T(F): 98.7 (25 Nov 2019 22:50), Max: 98.8 (25 Nov 2019 17:53)  HR: 98 (25 Nov 2019 22:50) (62 - 98)  BP: 107/73 (25 Nov 2019 22:50) (107/73 - 110/70)  BP(mean): --  RR: 16 (25 Nov 2019 22:50) (16 - 16)  SpO2: 94% (25 Nov 2019 22:50) (94% - 100%)  Daily     Daily     General: NAD, well-nourished  HEENT: Atraumatic, EOMI  Resp: Breathing comfortably on RA  CV: Normal sinus rhythm  Abd: soft, distended, mildly tender in LLQ, no RT/guarding  Ext: ROMIx4, motor strength intact x 4                          16.9   11.20 )-----------( 333      ( 25 Nov 2019 20:43 )             50.6     11-25    131<L>  |  97  |  20  ----------------------------<  151<H>  6.1<H>   |  17<L>  |  0.78    Ca    10.1      25 Nov 2019 20:43  Phos  3.1     11-25  Mg     2.2     11-25    TPro  8.0  /  Alb  2.9<L>  /  TBili  1.3<H>  /  DBili  x   /  AST  36  /  ALT  33  /  AlkPhos  108  11-25    PT/INR - ( 25 Nov 2019 20:43 )   PT: 15.2 sec;   INR: 1.31 ratio         PTT - ( 25 Nov 2019 20:43 )  PTT:27.2 sec      Radiographic Findings:             EXAM:  CT ABDOMEN AND PELVIS IC                            PROCEDURE DATE:  11/25/2019            INTERPRETATION:  CLINICAL INFORMATION: Constipation for weeks.    COMPARISON: None.    PROCEDURE:   CT of the Abdomen and Pelvis was performed with intravenous contrast.   Intravenous contrast: 125 ml Omnipaque 350. 25 ml discarded.  Oral contrast: None.  Sagittal and coronal reformats were performed.    FINDINGS:    LOWER CHEST: Aortic valve and coronary artery calcifications. Calcified   granuloma in the right lower lobe.     LIVER: Small calcifications in the right lobe the liver.  BILE DUCTS: Normal caliber.  GALLBLADDER: Distended, up to 6.3 cm. No gallbladder wall thickening or   pericholecystic fluid.  SPLEEN: Within normal limits.  PANCREAS: Within normal limits.  ADRENALS: Within normal limits.  KIDNEYS/URETERS: 1.2 cm indeterminate right lower pole exophytic   hypodensity (5, 7). Additional subcentimeter hypodensities too small to   characterize within the right kidney. No hydronephrosis.    BLADDER: Underdistended, limiting evaluation.  REPRODUCTIVE ORGANS: The uterus is within normal limits. 2.4 cm left   adnexal cystic lesion (3:106).    BOWEL: The esophagus is slightly thickened. Colonic diverticulosis. Wall   thickening and inflammatory stranding centered around the sigmoid colon.   Small ascites and free air with suspected perforation from the proximal   sigmoid colon (3, 85). Areas of peripheral enhancement within the   peritoneal fluid without organized drainable collection identified at   this time. No bowel obstruction. Appendix is normal.  VESSELS: Atherosclerotic changes.  RETROPERITONEUM/LYMPH NODES: No lymphadenopathy.    ABDOMINAL WALL: Small umbilical hernia containing fat and free air.   Nonspecific lower anterior abdominal wall edema.  BONES: Degenerative changes. Grade 1 anterolisthesis at L4-L5 and grade 1   listhesis at L5-S1    IMPRESSION:     Perforated sigmoid diverticulitis with intraperitoneal free air and   peripherally enhancing free fluid. No discrete abscess identified at this   time.    Thickening of the esophagus. Correlation with endoscopy is recommended on   a nonemergent basis.    Indeterminate 1.2 cm right lower pole exophytic hypodensity. Further   evaluation with nonurgent ultrasound or MR is recommended.    Urgent findings were discussed with Dr. Garrett at 11/25/2019 10:59 PM by    with read back confirmation.                    SASCHA CAMACHO M.D., RADIOLOGY RESIDENT  This document has been electronically signed.  SHYAM GALLO M.D., ATTENDING RADIOLOGIST  This document has been electronically signed. Nov 25 2019 11:26PM (26 Nov 2019 00:21)  ]]]]    PHYSICAL EXAM:  Vital Signs Last 24 Hrs  T(C): 36.1 (21 Jan 2020 11:00), Max: 36.3 (21 Jan 2020 07:00)  T(F): 97 (21 Jan 2020 11:00), Max: 97.3 (21 Jan 2020 07:00)  HR: 82 (21 Jan 2020 13:00) (75 - 197)  BP: 95/52 (21 Jan 2020 13:00) (67/47 - 103/47)  BP(mean): 70 (21 Jan 2020 13:00) (53 - 72)  RR: 20 (21 Jan 2020 13:00) (10 - 40)  SpO2: 100% (21 Jan 2020 13:00) (89% - 100%)    Skin exam notable for:  The patient was alert and oriented X 3, well nourished, and in no apparent distress. Oropharynx showed no ulcerations. There was no visible lymphadenopathy. Conjunctiva were non-injected. There was no clubbing or edema of extremities.    The scalp, hair, face, eyebrows, lips, oropharynx , neck, chest, back, buttocks, extremities X 4, hands, feet, nails were examined. There was no hyperhidrosis or bromhidrosis.     The following lesions are noted:     - Generalized erythema and scaling on neck, trunk and extremities  - Several large erosions on back and buttocks.  - No mucosal involvement of eyes or mouth.    ASSESSMENT/PLAN:    1) Erythroderma, likely drug-induced  - Most likely 2/2 vancomycin, though cannot r/o meropenem-induced.  - Last dose of vancomycin was 1/19  - Continue Vaseline TID to affected areas on face, trunk and extremities.  - Continue  zinc oxide ointment BID to TID to erosions on back and buttocks  - Continue clobetasol ointment BID to affected areas on trunk and extremities. Do not apply on face.  - Hypothermia resolved; treat w/ supportive measures if recurs.    Discussed with primary team.  Discussed with attending, Dr. Urbano.    Connor Jiménez MD  PGY2, Dermatology

## 2020-01-21 NOTE — PROGRESS NOTE ADULT - ASSESSMENT
64 F PMHx of HTN, COPD, and morbid obesity who was admitted on 11/26 for perforated sigmoid diverticulitis.  No fever, has leukocytosis  Prolonged hospital stay  Perforated diverticulum, culture with E coli, s/p OR into washout and ostomy  S/p treatment courses for PICC CoNS CLABSI and Steno in sputum  CTs with improvement in intraabd collections  CXR clear  Overall,  1) CoNS bacteremia--? CLABSI  - S/p course treatment  - Repeat BCXs, 1/26/20 surveillance  2) Loculated fluid collections  - Infected collections?  - Meropenem 1g q 8, continue for now  3) Leukocytosis  - Trend to normal, monitor for further signs infection  4) Positive culture finding  - Steno from sputum  - S/p treatment course  - Monitor for further signs pna    Sen Johnson MD  Pager [phone]  After 5pm and on weekends call [phone]

## 2020-01-21 NOTE — PROGRESS NOTE ADULT - ATTENDING COMMENTS
Tollie Cabot  : 1951 [id number] [facility] [address]  [address] [place] [address].  [phone]   [phone]        OUTPATIENT PHYSICAL THERAPY:Daily Note 2017      ICD-10: Treatment Diagnosis: Cervicalgia (M54.2)  Low back pain (M54.5)  Difficulty in walking, not elsewhere classified (R26.2)  Precautions/Allergies:   Latex; Sulfa (sulfonamide antibiotics); Macrobid [nitrofurantoin monohyd/m-cryst]; Other plant, animal, environmental; Oxycodone; and Tape [adhesive]   Fall Risk Score: 1 (? 5 = High Risk)  MD Orders: Evaluate and Treat Cervical ponyand LBP MEDICAL/REFERRING DIAGNOSIS:  Cervical spondylosis [M47.812]  DDD (degenerative disc disease), cervical [M50.30]  DDD (degenerative disc disease), lumbar [M51.36]  Radiculopathy, unspecified spinal region [M54.10]   DATE OF ONSET: chronic  REFERRING PHYSICIAN: Malena Marley MD  RETURN PHYSICIAN APPOINTMENT: 17     INITIAL ASSESSMENT:  Ms. Bhanu Liao presents with chronic LBP and cervical pain. Pt has DDD, stenosis, cervical spondylosis, and radiculopathy into UEs worse with R. Pt exhibits decreased cervical ROM, R shoulder ROM, decreased knee AROM, and decreased hip flexibility. Pt has poor posture and will benefit from PT for core and extremity strengthening to progress towards functional goals below. Pt has decreased tolerance for walking, transfers, standing, reaching, and lifting. Pt will benefit from aquatic PT to decrease load bearing on joints. PROBLEM LIST (Impacting functional limitations):  1. Decreased Strength  2. Decreased ADL/Functional Activities  3. Decreased Transfer Abilities  4. Decreased Ambulation Ability/Technique  5. Decreased Balance  6. Increased Pain  7. Decreased Activity Tolerance  8. Decreased Flexibility/Joint Mobility  9. Decreased Ramsey with Home Exercise Program INTERVENTIONS PLANNED:  1. Balance Exercise  2. Cold  3. Electrical Stimulation  4.  Gait Training  5. Heat  6. Home Exercise Program (HEP)  7. Manual Therapy  8. Neuromuscular Re-education/Strengthening  9. Range of Motion (ROM)  10. Therapeutic Activites  11. Therapeutic Exercise/Strengthening  12. Transcutaneous Electrical Nerve Stimulation (TENS)  13. Ultrasound (US)  14. aquatics   TREATMENT PLAN:  Effective Dates: 8/15/17 TO 11/7/17. Frequency/Duration: 2 times a week for 12 weeks  GOALS: (Goals have been discussed and agreed upon with patient.)  Short-Term Functional Goals: Time Frame: 4 weeks  1. Pt will be independent with HEP. 2. Pt will decrease LBP and cervical symptoms with ADLs by at least 25% with ADLs. 3. Pt will increase cervical AROM to R 5-10 ° to improve rotation with driving. 4. Pt will increase shoulder AROM flexion by at least 10 ° for reaching overhead. Discharge Goals: Time Frame: 8-12 weeks  1. Pt will be able to increase B UE strength to 4+/5 for lifting groceries. 2. Pt will be able to improve balance and have decreased fear of falling by being able to tandem stance at least 15 seconds. 3. Pt will be able to sit to stand at least 5x without use of UEs noting increasing functional strength. 4. Pt will decrease Oswestry score 15/50 or less noting improving functional status. 5. Pt will be able to report cervical symptoms less than 3/10 at all times with ADLs without c/o dropping objects with UEs due to symptoms. Rehabilitation Potential For Stated Goals: Good                       HISTORY:   History of Present Injury/Illness (Reason for Referral): Pt reports to PT due to cervical and lumbar DDD with cervical spondylosis/stenosis. Pt reports fear of falling due to decreased balance. Pt reports LBP and bilateral knee stiffness. Pt had recent L TKA and March 2017 and had to stop recent PT here due to having low hemoglobin and then she also was having back/neck pain and was referred to Dr. Ashanti Landaverde.  Pt reports Dr. Sheridan Clement did not want to do surgery and referred her to pain management. She received FERNIE in her cervical spine last week 8/10/17 which lessened some of the pain in her R cervical/chest area. Pt reports she has a 2nd injection scheduled next week. Pt has history of cervical fusion in 1997 and had 3 lumbar surgeries in the early 1980s. Pt also has increased cervical pain with intermittent numbness/tingling in fingers and increased difficulty reaching with R UE. Pt has had limited R shoulder ROM but pt notes it has worsened as she knows her posture has declined. Pt would like to decrease pain and be able to walk better, transfer more easily, as well as use her R UE better for reaching. Pt reports she has dropped light bjects due to UE symptoms. Past Medical History/Comorbidities:   Ms. Shadia Marques  has a past medical history of Adverse effect of anesthesia; Anemia; Arthritis; Chronic kidney disease; Chronic pain; Follow-up visit for aortic valve replacement with bioprosthetic valve (7/25/2016); GERD (gastroesophageal reflux disease); Hypertension; Kidney stones; Morbid obesity ([address] Murmur, cardiac; Nausea & vomiting; Psychiatric disorder; PUD (peptic ulcer disease) (06/2016); S/P aortic valve replacement with bioprosthetic valve (4/28/2017); and Valvular heart disease (2016). Ms. Shadia Marques  has a past surgical history that includes urological (Multiple dates); shoulder arthroscopy (Right); lap cholecystectomy; gastric bypass; gi; cervical fusion; hysterectomy; bilateral salpingo-oophorectomy; heart catheterization; aortic valve replacement (6/9/2016); colonoscopy (N/A, 6/15/2016); knee replacement (Right, 10/10/2016); and heart valve surgery.  L TKA (3/21/17) cervical fusion was in 1997, 3x lumbar surgeries in the early 1980s   Social History/Living Environment:    lives in one story home, her grown son lives in the home with her  Prior Level of Function/Work/Activity:  Independent with all housework, cooking, etc.   Current Medications:    Current Outpatient Prescriptions:     furosemide (LASIX) 20 mg tablet, Take 1 Tab by mouth daily. Indications: Edema, Disp: 90 Tab, Rfl: 0    zolpidem (AMBIEN) 5 mg tablet, Take 1 Tab by mouth nightly as needed for Sleep. Max Daily Amount: 5 mg., Disp: 90 Tab, Rfl: 1    HYDROcodone-acetaminophen (NORCO) 7.5-325 mg per tablet, Take 1 Tab by mouth every six (6) hours as needed for Pain. Max Daily Amount: 4 Tabs., Disp: 120 Tab, Rfl: 0    [START ON 8/25/2017] HYDROcodone-acetaminophen (NORCO) 7.5-325 mg per tablet, Take 1 Tab by mouth every six (6) hours as needed for Pain. Max Daily Amount: 4 Tabs., Disp: 60 Tab, Rfl: 0    [START ON 9/24/2017] HYDROcodone-acetaminophen (NORCO) 7.5-325 mg per tablet, Take 1 Tab by mouth every six (6) hours as needed for Pain. Max Daily Amount: 4 Tabs., Disp: 60 Tab, Rfl: 0    doxycycline (MONODOX) 100 mg capsule, Take 1 Cap by mouth two (2) times a day., Disp: 20 Cap, Rfl: 0    DOCUSATE CALCIUM (STOOL SOFTENER PO), Take  by mouth daily. , Disp: , Rfl:     amLODIPine (NORVASC) 5 mg tablet, Take 1 Tab by mouth daily. Indications: hypertension, Disp: 30 Tab, Rfl: 6    esomeprazole (NEXIUM) 40 mg capsule, Take 1 Cap by mouth daily. (Patient taking differently: Take 40 mg by mouth two (2) times a day.), Disp: 90 Cap, Rfl: 1    BENICAR 40 mg tablet, TAKE 1 TABLET BY MOUTH DAILY, Disp: 90 Tab, Rfl: 5    aspirin 81 mg chewable tablet, Take 1 Tab by mouth two (2) times a day. (Patient taking differently: Take 81 mg by mouth daily.), Disp: 60 Tab, Rfl: 0    carvedilol (COREG) 25 mg tablet, Take 1 Tab by mouth two (2) times daily (with meals). , Disp: 180 Tab, Rfl: 1    febuxostat (ULORIC) 40 mg tab tablet, Take 1 Tab by mouth every morning. Indications: GOUT PREVENTION, Disp: 90 Tab, Rfl: 3    calcium-vitamin D 600 mg(1,500mg) -200 unit tab, Take 1 Tab by mouth daily. , Disp: , Rfl:     polyethylene glycol (MIRALAX) 17 gram/dose powder, Take 17 g by mouth daily as needed. , Disp: , Rfl:     ferrous sulfate 325 mg (65 mg iron) tablet, Take 1 Tab by mouth daily (with breakfast). , Disp: 30 Tab, Rfl: 1   Potassium   Gabapentin   Takes Norco only half pill every 4-5days  Date Last Reviewed:  8/21/2017   EXAMINATION:   Observation/Orthostatic Postural Assessment:          Significant forward head posture with increased kyphosis; in standing weight shifted more to RLE   Palpation:          Very tight upper traps bilaterally with trigger points throughout  ROM:    Cervical flexion WNL  extension limited to 40 °  Cervical lateral flexion to R increases pain and rotation to R increases pain  AROM cervical Rotation R 65 °  L 80 °    Lumbar flexion WFL  Lumbar extension limited to ~20 °  sidebending WFL    LEs hip flexion and ER WFL but limited IR to 10 ° bilaterally    AROM knee l -5-110 °  R  0-115 °    SLR 68 ° bilaterally    Note L dorsiflexion limited secondary to drop foot but also decreased flexibility    Shoulder AROM R 90 °  L 150 °     Abduction R 85 °      L 160 °     ER           R: 5 °        L: WFL    Elbow and wrist WFL        Strength:     Date:  8/15/17 Date:   Date:     LE MMT Left Right Left Right Left Right   Hip Flex (L1/L2) 4-/5 4/5       Hip Abd (glut med) 4-/5 4-/5       Quad    ( L3/4) 4-/5 4+/5       Hamstring 4/5 4+/5       Anterior Tib (L4/L5) 3-/5 4+/5       Gastroc (S1/2) 3/5 3-/5       EHL (L5) Not tested                 UE grossly  4-/5 4-/5         Special Tests: Spurlings (+ R)  SLR (--), Hip Scour (---)  Neurological Screen: intact to light touch but reports intermittent numbness and tingling fingers; relieves R finger tingling by cervical sidebending to the L  Functional Mobility:         Gait/Ambulation:  Slow antalgic gait with decreased terminal knee extension LLE and decreased stance time LLE;  R foot excessive supination (pt reports due to numerous foot fractures) and L foot mild foot drop        Transfers:   Mod I sit to stand but uses hands         Bed Mobility:  Independent but has to avoid supine as reports old injury and surgeries causing dizziness        Stairs:  Step-to pattern   Balance:          Decreased dynamic; Romberg can stand at least 30 seconds; unable to single leg stance or tandem   Body Structures Involved:  1. Nerves  2. Bones  3. Joints  4. Muscles Body Functions Affected:  1. Sensory/Pain  2. Neuromusculoskeletal  3. Movement Related Activities and Participation Affected:  1. General Tasks and Demands  2. Mobility  3. Self Care  4. Domestic Life  5. Community, Social and Civic Life   CLINICAL PRESENTATION:   CLINICAL DECISION MAKING:   Outcome Measure: Tool Used: Modified Oswestry Low Back Pain Questionnaire  Score:  Initial: 23/50  Most Recent: X/50 (Date: -- )   Interpretation of Score: Each section is scored on a 0-5 scale, 5 representing the greatest disability. The scores of each section are added together for a total score of 50. Score 0 1-10 11-20 21-30 31-40 41-49 50   Modifier CH CI CJ CK CL CM CN     ? Mobility - Walking and Moving Around:     - CURRENT STATUS: CK - 40%-59% impaired, limited or restricted    - GOAL STATUS: CJ - 20%-39% impaired, limited or restricted    - D/C STATUS:  ---------------To be determined---------------      Medical Necessity:   · Patient is expected to demonstrate progress in strength, range of motion, balance and functional technique to decrease pain and improve tolerance with daily activities. Reason for Services/Other Comments:  · Patient continues to require modification of therapeutic interventions to increase complexity of exercises. TREATMENT:   (In addition to Assessment/Re-Assessment sessions the following treatments were rendered)  Therapeutic Exercise: (see flow sheet below for minutes):  Exercises per grid below to improve mobility and strength. Required minimal verbal and tactile cues to promote proper body alignment and promote proper body mechanics.      Aquatic Therapy (see flow sheet below for minutes): Aquatic treatment performed per flow grid for Decreased muscle strength, Decreased range of motion, Decompression and Low impact and reduced weight bearing activity. Verbal and visual cues provided for technique and core stabilization. Assistance by therapist provided for progression of exercises. Date: 8/15/17 8/21/17      Modalities:                                Manual Therapy:                                Aquatic Exercise: next 1.5# 45 min      Gait F/B/S/M  x4L      Heel/Toe walk        4-way        PF/DF        Hamstring Curls  x4L      Hip Flexion with knee ext. (rockette)        Squats          SLR march  x4L      Lunges        Hip circles        Hip horizontal abduction/adduction        Core:          Core:        Deep well bike  2 min      Deep well jumping don  1 min      Deep well scissors  1 min      Deep well:  traction                Hamstring Stretch        Step Lunge        Piriformis stretch        PF Stretch        Balance: Single leg Stance        Balance: Tandem                          Therapeutic Exercise: 15 minutes       Shoulder AAROM t-bar supine external rotation at 20 ° abduction x15       Supine shoulder flexion t-bar AAROM x10       scapt-retract x12       Upper trap stretch R only so sidebending to L  2 H 20       Hamstring stretch with strap 3 H 30       Single knee to chest 2 H 20               F=forward  B=Backward  S=sidesteps  M=marches    H=hold    Manual: (see flow sheet above for minutes) ----  Modalities: (see flow sheet above for minutes) ----    HEP: Pt instructed in above exercises at evaluation and issued handout. Treatment/Session Assessment: [employer] with pt tolerating well, demonstrates decreased core strength/stability. Pt fatigues easily due to decreased strength and endurance. Plan to progress as tolerated. **pt requested shorter session today due to inclement weather          · Pain/ Symptoms: Initial:  0/10.   Post Session: 0/10 ·   Compliance with Program/Exercises: Will assess as treatment progresses. · Recommendations/Intent for next treatment session: \"Next visit will focus on more challenging activities\". Continue aquatics.   Total Treatment Duration:  PT Patient Time In/Time Out  Time In: 1100  Time Out: Shannon 15, PTA Dr. Saleh (Attending Physician)  Septic Shock - Off vasopressin with increased midodrine  COPD on given duonebs today  Erythroderma started topical steroid tx  BULL likely secondary to fluid losses from desquamating skin, dc toradol  Lovenox for dvt ppx weight based  Meropenem 7 day course to complete tomorrow  Hypothyroid, likely SICU but with hypothyroid yesterday decided to start will increase to 50 mcg    Will have wound care team assess today for bilateral LEs and back

## 2020-01-21 NOTE — PROGRESS NOTE ADULT - SUBJECTIVE AND OBJECTIVE BOX
CARDIOLOGY     PROGRESS  NOTE   ________________________________________________    CHIEF COMPLAINT:Patient is a 64y old  Female who presents with a chief complaint of Perforated bowel (19 Jan 2020 15:41)  c/o pain.  	  REVIEW OF SYSTEMS:  CONSTITUTIONAL: No fever, weight loss, or fatigue  EYES: No eye pain, visual disturbances, or discharge  ENT:  No difficulty hearing, tinnitus, vertigo; No sinus or throat pain  NECK: No pain or stiffness  RESPIRATORY: No cough, wheezing, chills or hemoptysis; No Shortness of Breath  CARDIOVASCULAR: No chest pain, palpitations, passing out, dizziness, or leg swelling  GASTROINTESTINAL: No abdominal or epigastric pain. No nausea, vomiting, or hematemesis; No diarrhea or constipation. No melena or hematochezia.  GENITOURINARY: No dysuria, frequency, hematuria, or incontinence  NEUROLOGICAL: No headaches, memory loss, loss of strength, numbness, or tremors  SKIN: No itching, burning, rashes, or lesions   LYMPH Nodes: No enlarged glands  ENDOCRINE: No heat or cold intolerance; No hair loss  MUSCULOSKELETAL: No joint pain or swelling; No muscle, back, or extremity pain  PSYCHIATRIC: No depression, anxiety, mood swings, or difficulty sleeping  HEME/LYMPH: No easy bruising, or bleeding gums  ALLERGY AND IMMUNOLOGIC: No hives or eczema	    [ ] All others negative	  [ ] Unable to obtain    PHYSICAL EXAM:  T(C): 36.3 (01-21-20 @ 07:00), Max: 36.3 (01-21-20 @ 07:00)  HR: 79 (01-21-20 @ 07:00) (72 - 197)  BP: 86/49 (01-21-20 @ 07:00) (67/47 - 103/47)  RR: 28 (01-21-20 @ 07:00) (10 - 40)  SpO2: 94% (01-21-20 @ 07:00) (92% - 100%)  Wt(kg): --  I&O's Summary    20 Jan 2020 07:01  -  21 Jan 2020 07:00  --------------------------------------------------------  IN: 2077.2 mL / OUT: 16 mL / NET: 2061.2 mL        Appearance: Normal	  HEENT:   Normal oral mucosa, PERRL, EOMI	  Lymphatic: No lymphadenopathy  Cardiovascular: Normal S1 S2, No JVD, + murmurs, + lymphedema  Respiratoryrhonchi	  Gastrointestinal:  Soft, Non-tender, + BS, wound vac  Skin: No rashes, No ecchymoses, No cyanosis	  Neurologic: Non-focal  Extremities: Normal range of motion, No clubbing, cyanosis .  Vascular: Peripheral pulses palpable 2+ bilaterally    MEDICATIONS  (STANDING):  AQUAPHOR (petrolatum Ointment) 1 Application(s) Topical three times a day  artificial tears (preservative free) Ophthalmic Solution 1 Drop(s) Both EYES two times a day  aspirin  chewable 81 milliGRAM(s) Oral <User Schedule>  atorvastatin 40 milliGRAM(s) Oral <User Schedule>  buDESOnide    Inhalation Suspension 0.5 milliGRAM(s) Inhalation every 12 hours  chlorhexidine 2% Cloths 1 Application(s) Topical <User Schedule>  clobetasol 0.05% Ointment 1 Application(s) Topical two times a day  doxazosin 2 milliGRAM(s) Oral <User Schedule>  enoxaparin Injectable 100 milliGRAM(s) SubCutaneous <User Schedule>  erythromycin     base Tablet 250 milliGRAM(s) Oral <User Schedule>  levothyroxine 25 MICROGram(s) Oral daily  lidocaine   Patch 1 Patch Transdermal daily  meropenem  IVPB 1000 milliGRAM(s) IV Intermittent every 8 hours  methadone    Tablet 17.5 milliGRAM(s) Oral <User Schedule>  midodrine 30 milliGRAM(s) Oral <User Schedule>  nystatin Powder 1 Application(s) Topical two times a day  pantoprazole    Tablet 40 milliGRAM(s) Oral <User Schedule>  polyethylene glycol 3350 17 Gram(s) Oral <User Schedule>      TELEMETRY: 	    ECG:  	  RADIOLOGY:  OTHER: 	  	  LABS:	 	    CARDIAC MARKERS:                                9.2    13.10 )-----------( 147      ( 21 Jan 2020 01:31 )             29.7     01-21    134<L>  |  106  |  30<H>  ----------------------------<  54<L>  5.3   |  16<L>  |  1.61<H>    Ca    8.4      21 Jan 2020 01:31  Phos  5.0     01-21  Mg     2.4     01-21    TPro  5.3<L>  /  Alb  2.1<L>  /  TBili  0.7  /  DBili  0.3<H>  /  AST  41<H>  /  ALT  19  /  AlkPhos  294<H>  01-21    proBNP:   Lipid Profile: Cholesterol 98  LDL 52  HDL 28  TG 91    HgA1c:   TSH: Thyroid Stimulating Hormone, Serum: 4.81 uIU/mL (01-17 @ 03:52)          Assessment and plan  ---------------------------  septic/ hypovolemic shock still hypotensive, no A line was placedcontinue pressors/ fluid  abx as per ID repeat cultures  may hold cholesterol meds  WMA on echo ? sec to sepsis  dvt prophylaxis  s/p blood transfusion   continue tx as per ICU  decrease methadone dose  ace wrap to the both LE

## 2020-01-21 NOTE — PROGRESS NOTE ADULT - SUBJECTIVE AND OBJECTIVE BOX
SURGICAL INTENSIVE CARE UNIT DAILY PROGRESS NOTE    24 HOUR EVENTS:   - Midodrine increased to 30q6h, MAP goals decreased to 60, remained off IV pressors.   - Hypothermic to 35, started on 25 PO levothyroxine.   - OOB to chair.   - Started clobetasol, zinc oxide per derm recs.       HPI:  65 y/o female with a PMHx of morbid obesity, COPD, HTN, and GERD who presented to the ED on 11/26/2019 with abdominal pain and distension. Patient states that she had been having constipation for ~2 weeks with no improvement despite laxative and enema use. Imaging revealed perforated sigmoid diverticulitis with free air so she was taken to the OR for an exploratory laparotomy, extended left hemicolectomy, and left in discontinuity w/ Abthera VAC placement. She was left intubated post-operative so she was admitted to SICU for further management. She was successfully extubated on 11/27/2019 to BiPAP. She was taken back to the OR on 11/29/2019 for re-exploratory laparotomy, transverse end colostomy, and fascial closure with wound VAC placement. She returned intubated and was extubated on 11/30/2019. Patient was eventually transferred to the 12/4/2019. 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 She was started on promotility agents and diet was readvanced. She was also noted to be persistently hypotensive so bilateral UE Duplex was obtained on 12/11/2019, which revealed stenosis of the right brachiocephalic artery concerning for subclavian steal syndrome. Plastic surgery was consulted on 12/28/2019 for abdominal wall reconstruction and patient underwent a bedside debridement of her abdominal wound at that time. Patient became increasingly hypotensive and was readmitted to SICU on 1/7/2020 for presumed sepsis. Imaging revealed loculated fluid collections in the left anterior abdomen and a small abdominal wall collection, both of which were not amenable to drainage. Cultures were also positive for E. coli & Klebsiella in the urine, Stenotrophomonas in the sputum, and Staph epidermis in the blood. Patient had a LUE PICC that was subsequently discontinued. Despite resuscitation and antibiotics, a right IJ CVC was placed on 1/10/2020 and patient was started on vasopressor support. Patient refused arterial line placement. She was also complaining of chest wall tightness. hsT was elevated and TTE demonstrated new mild to moderate segmental LV systolic dysfunction w/ hypokinesis of the inferior and inferolateral walls. Cardiology determined the troponin leak to be stressed-induced in the setting of sepsis. Dermatology was consulted on 1/13/2020 for scattered erythematous patches on her trunk & extremities, superficial desquamation of the face, trunk, & extremities, and multiple erosions with surrounding erythema on left lower back, for which petroleum jelly was prescribed BID. Viral cultures of erosions were sent and were negative. Repeat imaging was obtained on 1/16/2020, which demonstrated decreasing fluid collections but no new acute findings. She remains hypotensive requiring vasopressor support.      NEURO: AOX3.     RESPIRATORY  RR: 11 (01-20-20 @ 23:00) (10 - 30)  SpO2: 98% (01-20-20 @ 23:00) (92% - 100%)      CARDIOVASCULAR  HR: 79 (01-20-20 @ 23:00) (72 - 139)  BP: 79/50 (01-20-20 @ 23:00) (69/43 - 120/55)  BP(mean): 61 (01-20-20 @ 23:00) (51 - 74)  VBG - ( 20 Jan 2020 01:21 )  pH: 7.30  /  pCO2: 40    /  pO2: 41    / HCO3: 19    / Base Excess: -6.4  /  SaO2: 70     Lactate: 2.1      GI/NUTRITION  Large, soft, nontender. Ostomy viable.     GENITOURINARY  I&O's Detail    01-19 @ 07:01  -  01-20 @ 07:00  --------------------------------------------------------  IN:    Albumin 5%  - 500 mL: 500 mL    Oral Fluid: 120 mL    Solution: 50 mL    vasopressin Infusion: 43.2 mL  Total IN: 713.2 mL    OUT:    Colostomy: 65 mL    Drain: 55 mL    VAC (Vacuum Assisted Closure) System: 50 mL    Voided: 300 mL  Total OUT: 470 mL    Total NET: 243.2 mL      01-20 @ 07:01  -  01-21 @ 00:38  --------------------------------------------------------  IN:    Oral Fluid: 1220 mL    Solution: 100 mL    Solution: 100 mL    vasopressin Infusion: 7.2 mL  Total IN: 1427.2 mL    OUT:    Colostomy: 1 mL    Drain: 15 mL  Total OUT: 16 mL    Total NET: 1411.2 mL          01-20    133<L>  |  104  |  25<H>  ----------------------------<  75  5.2   |  17<L>  |  1.12    Ca    8.4      20 Jan 2020 01:25  Phos  4.6     01-20  Mg     2.3     01-20    TPro  5.4<L>  /  Alb  2.4<L>  /  TBili  0.8  /  DBili  0.4<H>  /  AST  28  /  ALT  18  /  AlkPhos  277<H>  01-20      HEMATOLOGIC                        9.0    16.87 )-----------( 161      ( 20 Jan 2020 01:25 )             27.8 SURGICAL INTENSIVE CARE UNIT DAILY PROGRESS NOTE    24 HOUR EVENTS:   - Midodrine increased to 30q6h, MAP goals decreased to 60, remained off IV pressors.   - Hypothermic to 35, started on 25 PO levothyroxine.   - OOB to chair.   - Started clobetasol, zinc oxide per derm recs.     HPI:  65 y/o female with a PMHx of morbid obesity, COPD, HTN, and GERD who presented to the ED on 11/26/2019 with abdominal pain and distension. Patient states that she had been having constipation for ~2 weeks with no improvement despite laxative and enema use. Imaging revealed perforated sigmoid diverticulitis with free air. Hospital course is as follows:  11/26 - s/p exploratory laparotomy, extended left hemicolectomy, and left in discontinuity w/ Abthera VAC placement. She was left intubated post-operative so she was admitted to SICU for further management. She 11/27 - extubated to BiPAP  11/29 - re-exploratory laparotomy, transverse end colostomy, and fascial closure with wound VAC placement, left intubated at end of case  11/30 - extubated   12/04 - transferred to the floors  12/06 - acute inability to tolerate PO, complaining of nausea & vomiting  12/09 - esophagram demonstrated a stricture in the distal esophagus  12/10 - EGD demonstrated diverticula, a medium-sized hiatal hernia, esophagitis, and gastritis, started on promotility agents and diet was restarted  12/11 - noted to have different BP when BP cuff placed on different arms, bilateral UE Duplex demonstrated stenosis of the right brachiocephalic artery concerning for subclavian steal syndrome  12/28 - plastic surgery consulted for abdominal wall reconstruction, underwent bedside debridement of abdominal wound at that time.  01/07 - worsening hypotension from presumed sepsis requiring readmission to SICU, imaging revealed loculated fluid collections in the left anterior abdomen & a small abdominal wall collection, both of which were not amenable to drainage, cultures were also positive for E. coli & Klebsiella in the urine, Stenotrophomonas in the sputum, & Staph epidermis in the blood, LUE PICC discontinued  01/10 - right IJ CVC was placed and patient started on vasopressor support but patient refused arterial line placement, patient complaining of chest wall tightness, hsT was elevated and TTE demonstrated new mild to moderate segmental LV systolic dysfunction w/ hypokinesis of the inferior and inferolateral walls but cardiology determined the troponin leak to be stressed-induced in the setting of sepsis  01/13 - dermatology consulted scattered erythematous patches on her trunk & extremities, superficial desquamation of the face, trunk, & extremities, & multiple erosions with surrounding erythema on left lower back, for which petroleum jelly was prescribed BID, viral cultures of erosions were sent and were negative  01/16 - repeat CT scan demonstrated decreasing fluid collections but no new acute findings  01/20 - weaned off vasopressor support, started levothyroxine for elevated TSH and decreased T3 & T4      NEURO: AOX3.     RESPIRATORY  RR: 11 (01-20-20 @ 23:00) (10 - 30)  SpO2: 98% (01-20-20 @ 23:00) (92% - 100%)      CARDIOVASCULAR  HR: 79 (01-20-20 @ 23:00) (72 - 139)  BP: 79/50 (01-20-20 @ 23:00) (69/43 - 120/55)  BP(mean): 61 (01-20-20 @ 23:00) (51 - 74)  VBG - ( 20 Jan 2020 01:21 )  pH: 7.30  /  pCO2: 40    /  pO2: 41    / HCO3: 19    / Base Excess: -6.4  /  SaO2: 70     Lactate: 2.1      GI/NUTRITION  Large, soft, nontender. Ostomy viable.     GENITOURINARY  I&O's Detail    01-19 @ 07:01 - 01-20 @ 07:00  --------------------------------------------------------  IN:    Albumin 5%  - 500 mL: 500 mL    Oral Fluid: 120 mL    Solution: 50 mL    vasopressin Infusion: 43.2 mL  Total IN: 713.2 mL    OUT:    Colostomy: 65 mL    Drain: 55 mL    VAC (Vacuum Assisted Closure) System: 50 mL    Voided: 300 mL  Total OUT: 470 mL    Total NET: 243.2 mL      01-20 @ 07:01  -  01-21 @ 00:38  --------------------------------------------------------  IN:    Oral Fluid: 1220 mL    Solution: 100 mL    Solution: 100 mL    vasopressin Infusion: 7.2 mL  Total IN: 1427.2 mL    OUT:    Colostomy: 1 mL    Drain: 15 mL  Total OUT: 16 mL    Total NET: 1411.2 mL          01-20    133<L>  |  104  |  25<H>  ----------------------------<  75  5.2   |  17<L>  |  1.12    Ca    8.4      20 Jan 2020 01:25  Phos  4.6     01-20  Mg     2.3     01-20    TPro  5.4<L>  /  Alb  2.4<L>  /  TBili  0.8  /  DBili  0.4<H>  /  AST  28  /  ALT  18  /  AlkPhos  277<H>  01-20      HEMATOLOGIC                        9.0    16.87 )-----------( 161      ( 20 Jan 2020 01:25 )             27.8 SURGICAL INTENSIVE CARE UNIT DAILY PROGRESS NOTE    24 HOUR EVENTS:   - Midodrine increased to 30q6h, MAP goals decreased to 60, remained off IV pressors.   - Hypothermic to 35, started on 25 PO levothyroxine.   - OOB to chair.   - Started clobetasol, zinc oxide per derm recs.     HPI:  63 y/o female with a PMHx of morbid obesity, COPD, HTN, and GERD who presented to the ED on 11/26/2019 with abdominal pain and distension. Patient states that she had been having constipation for ~2 weeks with no improvement despite laxative and enema use. Imaging revealed perforated sigmoid diverticulitis with free air. Hospital course is as follows:    11/26 - s/p exploratory laparotomy, extended left hemicolectomy, and left in discontinuity w/ Abthera VAC placement, left intubated post-operatively so she was admitted to SICU for further management  11/27 - extubated to BiPAP  11/29 - re-exploratory laparotomy, transverse end colostomy, and fascial closure with wound VAC placement, left intubated at end of case  11/30 - extubated   12/04 - transferred to the floors  12/06 - acute inability to tolerate PO, complaining of nausea & vomiting  12/09 - esophagram demonstrated a stricture in the distal esophagus  12/10 - EGD demonstrated diverticula, a medium-sized hiatal hernia, esophagitis, and gastritis, started on promotility agents and diet was restarted  12/11 - noted to have different BP when BP cuff placed on different arms, bilateral UE Duplex demonstrated stenosis of the right brachiocephalic artery concerning for subclavian steal syndrome  12/28 - plastic surgery consulted for abdominal wall reconstruction, underwent bedside debridement of abdominal wound at that time.  01/07 - worsening hypotension from presumed sepsis requiring readmission to SICU, imaging revealed loculated fluid collections in the left anterior abdomen & a small abdominal wall collection, both of which were not amenable to drainage, cultures were also positive for E. coli & Klebsiella in the urine, Stenotrophomonas in the sputum, & Staph epidermis in the blood, LUE PICC discontinued  01/10 - right IJ CVC was placed and patient started on vasopressor support but patient refused arterial line placement, patient complaining of chest wall tightness, hsT was elevated and TTE demonstrated new mild to moderate segmental LV systolic dysfunction w/ hypokinesis of the inferior and inferolateral walls but cardiology determined the troponin leak to be stressed-induced in the setting of sepsis  01/13 - dermatology consulted scattered erythematous patches on her trunk & extremities, superficial desquamation of the face, trunk, & extremities, & multiple erosions with surrounding erythema on left lower back, for which petroleum jelly was prescribed BID, viral cultures of erosions were sent and were negative  01/16 - repeat CT scan demonstrated decreasing fluid collections but no new acute findings  01/20 - weaned off vasopressor support, started levothyroxine for elevated TSH and decreased T3 & T4    NEURO: AOX3.     RESPIRATORY  RR: 11 (01-20-20 @ 23:00) (10 - 30)  SpO2: 98% (01-20-20 @ 23:00) (92% - 100%)      CARDIOVASCULAR  HR: 79 (01-20-20 @ 23:00) (72 - 139)  BP: 79/50 (01-20-20 @ 23:00) (69/43 - 120/55)  BP(mean): 61 (01-20-20 @ 23:00) (51 - 74)  VBG - ( 20 Jan 2020 01:21 )  pH: 7.30  /  pCO2: 40    /  pO2: 41    / HCO3: 19    / Base Excess: -6.4  /  SaO2: 70     Lactate: 2.1      GI/NUTRITION  Large, soft, nontender. Ostomy viable.     GENITOURINARY  I&O's Detail    01-19 @ 07:01  -  01-20 @ 07:00  --------------------------------------------------------  IN:    Albumin 5%  - 500 mL: 500 mL    Oral Fluid: 120 mL    Solution: 50 mL    vasopressin Infusion: 43.2 mL  Total IN: 713.2 mL    OUT:    Colostomy: 65 mL    Drain: 55 mL    VAC (Vacuum Assisted Closure) System: 50 mL    Voided: 300 mL  Total OUT: 470 mL    Total NET: 243.2 mL      01-20 @ 07:01  -  01-21 @ 00:38  --------------------------------------------------------  IN:    Oral Fluid: 1220 mL    Solution: 100 mL    Solution: 100 mL    vasopressin Infusion: 7.2 mL  Total IN: 1427.2 mL    OUT:    Colostomy: 1 mL    Drain: 15 mL  Total OUT: 16 mL    Total NET: 1411.2 mL          01-20    133<L>  |  104  |  25<H>  ----------------------------<  75  5.2   |  17<L>  |  1.12    Ca    8.4      20 Jan 2020 01:25  Phos  4.6     01-20  Mg     2.3     01-20    TPro  5.4<L>  /  Alb  2.4<L>  /  TBili  0.8  /  DBili  0.4<H>  /  AST  28  /  ALT  18  /  AlkPhos  277<H>  01-20      HEMATOLOGIC                        9.0    16.87 )-----------( 161      ( 20 Jan 2020 01:25 )             27.8 SURGICAL INTENSIVE CARE UNIT DAILY PROGRESS NOTE    24 HOUR EVENTS:   - Midodrine increased to 30q6h, MAP goals decreased to 60, remained off IV pressors.   - Hypothermic to 35, started on 25 PO levothyroxine.   - OOB to chair.   - Started clobetasol, zinc oxide per derm recs.   - Overnight with ostomy stool leakage noted into midline wound vac foam, vac taken down and subcutaneous tunnel noted between ostomy and midline wound. Ostomy and midline cleansed, saline gauze wet to dry placed around stoma and into midline wound, and ostomy appliance reapplied over stoma as temporizing measure.     HPI:  65 y/o female with a PMHx of morbid obesity, COPD, HTN, and GERD who presented to the ED on 11/26/2019 with abdominal pain and distension. Patient states that she had been having constipation for ~2 weeks with no improvement despite laxative and enema use. Imaging revealed perforated sigmoid diverticulitis with free air. Hospital course is as follows:    11/26 - s/p exploratory laparotomy, extended left hemicolectomy, and left in discontinuity w/ Abthera VAC placement, left intubated post-operatively so she was admitted to SICU for further management  11/27 - extubated to BiPAP  11/29 - re-exploratory laparotomy, transverse end colostomy, and fascial closure with wound VAC placement, left intubated at end of case  11/30 - extubated   12/04 - transferred to the floors  12/06 - acute inability to tolerate PO, complaining of nausea & vomiting  12/09 - esophagram demonstrated a stricture in the distal esophagus  12/10 - EGD demonstrated diverticula, a medium-sized hiatal hernia, esophagitis, and gastritis, started on promotility agents and diet was restarted  12/11 - noted to have different BP when BP cuff placed on different arms, bilateral UE Duplex demonstrated stenosis of the right brachiocephalic artery concerning for subclavian steal syndrome  12/28 - plastic surgery consulted for abdominal wall reconstruction, underwent bedside debridement of abdominal wound at that time.  01/07 - worsening hypotension from presumed sepsis requiring readmission to SICU, imaging revealed loculated fluid collections in the left anterior abdomen & a small abdominal wall collection, both of which were not amenable to drainage, cultures were also positive for E. coli & Klebsiella in the urine, Stenotrophomonas in the sputum, & Staph epidermis in the blood, LUE PICC discontinued  01/10 - right IJ CVC was placed and patient started on vasopressor support but patient refused arterial line placement, patient complaining of chest wall tightness, hsT was elevated and TTE demonstrated new mild to moderate segmental LV systolic dysfunction w/ hypokinesis of the inferior and inferolateral walls but cardiology determined the troponin leak to be stressed-induced in the setting of sepsis  01/13 - dermatology consulted scattered erythematous patches on her trunk & extremities, superficial desquamation of the face, trunk, & extremities, & multiple erosions with surrounding erythema on left lower back, for which petroleum jelly was prescribed BID, viral cultures of erosions were sent and were negative  01/16 - repeat CT scan demonstrated decreasing fluid collections but no new acute findings  01/20 - weaned off vasopressor support, started levothyroxine for elevated TSH and decreased T3 & T4    NEURO: AOX3.     RESPIRATORY  RR: 11 (01-20-20 @ 23:00) (10 - 30)  SpO2: 98% (01-20-20 @ 23:00) (92% - 100%)      CARDIOVASCULAR  HR: 79 (01-20-20 @ 23:00) (72 - 139)  BP: 79/50 (01-20-20 @ 23:00) (69/43 - 120/55)  BP(mean): 61 (01-20-20 @ 23:00) (51 - 74)  VBG - ( 20 Jan 2020 01:21 )  pH: 7.30  /  pCO2: 40    /  pO2: 41    / HCO3: 19    / Base Excess: -6.4  /  SaO2: 70     Lactate: 2.1      GI/NUTRITION  Large, soft, nontender. Ostomy viable.     GENITOURINARY  I&O's Detail    01-19 @ 07:01  -  01-20 @ 07:00  --------------------------------------------------------  IN:    Albumin 5%  - 500 mL: 500 mL    Oral Fluid: 120 mL    Solution: 50 mL    vasopressin Infusion: 43.2 mL  Total IN: 713.2 mL    OUT:    Colostomy: 65 mL    Drain: 55 mL    VAC (Vacuum Assisted Closure) System: 50 mL    Voided: 300 mL  Total OUT: 470 mL    Total NET: 243.2 mL      01-20 @ 07:01  -  01-21 @ 00:38  --------------------------------------------------------  IN:    Oral Fluid: 1220 mL    Solution: 100 mL    Solution: 100 mL    vasopressin Infusion: 7.2 mL  Total IN: 1427.2 mL    OUT:    Colostomy: 1 mL    Drain: 15 mL  Total OUT: 16 mL    Total NET: 1411.2 mL          01-20    133<L>  |  104  |  25<H>  ----------------------------<  75  5.2   |  17<L>  |  1.12    Ca    8.4      20 Jan 2020 01:25  Phos  4.6     01-20  Mg     2.3     01-20    TPro  5.4<L>  /  Alb  2.4<L>  /  TBili  0.8  /  DBili  0.4<H>  /  AST  28  /  ALT  18  /  AlkPhos  277<H>  01-20      HEMATOLOGIC                        9.0    16.87 )-----------( 161      ( 20 Jan 2020 01:25 )             27.8 SURGICAL INTENSIVE CARE UNIT DAILY PROGRESS NOTE    24 HOUR EVENTS:   - Midodrine increased to 30q6h, MAP goals decreased to 60, remained off IV pressors.   - Hypothermic to 35, started on 25 PO levothyroxine.   - OOB to chair.   - Started clobetasol, zinc oxide per derm recs.   - Overnight with ostomy stool leakage noted into midline wound vac foam, vac taken down and subcutaneous tunnel noted between ostomy and midline wound. Ostomy and midline cleansed, saline gauze wet to dry placed around stoma and into midline wound, and ostomy appliance reapplied over stoma as temporizing measure.   - Dosed with 500 5% albumin for soft BPs, BULL to 1.61, persistent Lactate of 2.5.     HPI:  63 y/o female with a PMHx of morbid obesity, COPD, HTN, and GERD who presented to the ED on 11/26/2019 with abdominal pain and distension. Patient states that she had been having constipation for ~2 weeks with no improvement despite laxative and enema use. Imaging revealed perforated sigmoid diverticulitis with free air. Hospital course is as follows:    11/26 - s/p exploratory laparotomy, extended left hemicolectomy, and left in discontinuity w/ Abthera VAC placement, left intubated post-operatively so she was admitted to SICU for further management  11/27 - extubated to BiPAP  11/29 - re-exploratory laparotomy, transverse end colostomy, and fascial closure with wound VAC placement, left intubated at end of case  11/30 - extubated   12/04 - transferred to the floors  12/06 - acute inability to tolerate PO, complaining of nausea & vomiting  12/09 - esophagram demonstrated a stricture in the distal esophagus  12/10 - EGD demonstrated diverticula, a medium-sized hiatal hernia, esophagitis, and gastritis, started on promotility agents and diet was restarted  12/11 - noted to have different BP when BP cuff placed on different arms, bilateral UE Duplex demonstrated stenosis of the right brachiocephalic artery concerning for subclavian steal syndrome  12/28 - plastic surgery consulted for abdominal wall reconstruction, underwent bedside debridement of abdominal wound at that time.  01/07 - worsening hypotension from presumed sepsis requiring readmission to SICU, imaging revealed loculated fluid collections in the left anterior abdomen & a small abdominal wall collection, both of which were not amenable to drainage, cultures were also positive for E. coli & Klebsiella in the urine, Stenotrophomonas in the sputum, & Staph epidermis in the blood, LUE PICC discontinued  01/10 - right IJ CVC was placed and patient started on vasopressor support but patient refused arterial line placement, patient complaining of chest wall tightness, hsT was elevated and TTE demonstrated new mild to moderate segmental LV systolic dysfunction w/ hypokinesis of the inferior and inferolateral walls but cardiology determined the troponin leak to be stressed-induced in the setting of sepsis  01/13 - dermatology consulted scattered erythematous patches on her trunk & extremities, superficial desquamation of the face, trunk, & extremities, & multiple erosions with surrounding erythema on left lower back, for which petroleum jelly was prescribed BID, viral cultures of erosions were sent and were negative  01/16 - repeat CT scan demonstrated decreasing fluid collections but no new acute findings  01/20 - weaned off vasopressor support, started levothyroxine for elevated TSH and decreased T3 & T4    NEURO: AOX3.     RESPIRATORY  RR: 11 (01-20-20 @ 23:00) (10 - 30)  SpO2: 98% (01-20-20 @ 23:00) (92% - 100%)      CARDIOVASCULAR  HR: 79 (01-20-20 @ 23:00) (72 - 139)  BP: 79/50 (01-20-20 @ 23:00) (69/43 - 120/55)  BP(mean): 61 (01-20-20 @ 23:00) (51 - 74)  VBG - ( 20 Jan 2020 01:21 )  pH: 7.30  /  pCO2: 40    /  pO2: 41    / HCO3: 19    / Base Excess: -6.4  /  SaO2: 70     Lactate: 2.1      GI/NUTRITION  Large, soft, nontender. Ostomy viable.     GENITOURINARY  I&O's Detail    01-19 @ 07:01  -  01-20 @ 07:00  --------------------------------------------------------  IN:    Albumin 5%  - 500 mL: 500 mL    Oral Fluid: 120 mL    Solution: 50 mL    vasopressin Infusion: 43.2 mL  Total IN: 713.2 mL    OUT:    Colostomy: 65 mL    Drain: 55 mL    VAC (Vacuum Assisted Closure) System: 50 mL    Voided: 300 mL  Total OUT: 470 mL    Total NET: 243.2 mL      01-20 @ 07:01  -  01-21 @ 00:38  --------------------------------------------------------  IN:    Oral Fluid: 1220 mL    Solution: 100 mL    Solution: 100 mL    vasopressin Infusion: 7.2 mL  Total IN: 1427.2 mL    OUT:    Colostomy: 1 mL    Drain: 15 mL  Total OUT: 16 mL    Total NET: 1411.2 mL          01-20    133<L>  |  104  |  25<H>  ----------------------------<  75  5.2   |  17<L>  |  1.12    Ca    8.4      20 Jan 2020 01:25  Phos  4.6     01-20  Mg     2.3     01-20    TPro  5.4<L>  /  Alb  2.4<L>  /  TBili  0.8  /  DBili  0.4<H>  /  AST  28  /  ALT  18  /  AlkPhos  277<H>  01-20      HEMATOLOGIC                        9.0    16.87 )-----------( 161      ( 20 Jan 2020 01:25 )             27.8

## 2020-01-21 NOTE — PROGRESS NOTE ADULT - ASSESSMENT
63yo F with PMHx morbid obesity, GERD, HTN, COPD, and PSHx D&C now s/p ex laparotomy with extended left hemicolectomy 11/26 for perforated and necrotic sigmoid colon with gross abdominal contamination. RTOR 11/29 for abd closure, RUQ end transverse colostomy creation. On 12/19 patient found to have induration of wound and keri-stomal fat necrosis, s/p bedside debridement w/ wound VAC in placement on midline wound. Began showing signs of sepsis and was transferred to SICU 1/7 for further management. Now w/ decreasing pressor support and downtrending leukocytosis. Troponins have been elevated and echo shows decreased ventricular wall motility.      Plan:  Wean off pressors as tolerated  Continue IV meropenem  Trend labs/ WBC/ SBP  Appreciate Cards reccs  Appreciate excellent SICU care      Green  [phone]

## 2020-01-21 NOTE — PROGRESS NOTE ADULT - SUBJECTIVE AND OBJECTIVE BOX
Morning Surgical Progress Note  Patient is a 64y old  Female who presents with a chief complaint of Perforated bowel (19 Jan 2020 15:41)      SUBJECTIVE: Patient seen and examined at bedside with surgical team, patient and partner complaining of lymphedema and pain in legs. Patient was reevaluated by Dermatology yesterday. There was also an unsuccessful attempt to wean patient off of vasopressin (SBP dropped to 69mmHg). Patient was put back on vasopressin.    Vital Signs Last 24 Hrs  T(C): 36.2 (20 Jan 2020 23:00), Max: 36.8 (20 Jan 2020 07:00)  T(F): 97.2 (20 Jan 2020 23:00), Max: 98.2 (20 Jan 2020 07:00)  HR: 79 (20 Jan 2020 23:00) (72 - 139)  BP: 79/50 (20 Jan 2020 23:00) (69/43 - 120/55)  BP(mean): 61 (20 Jan 2020 23:00) (51 - 74)  RR: 11 (20 Jan 2020 23:00) (10 - 30)  SpO2: 98% (20 Jan 2020 23:00) (92% - 100%)I&O's Detail    19 Jan 2020 07:01  -  20 Jan 2020 07:00  --------------------------------------------------------  IN:    Albumin 5%  - 500 mL: 500 mL    Oral Fluid: 120 mL    Solution: 50 mL    vasopressin Infusion: 43.2 mL  Total IN: 713.2 mL    OUT:    Colostomy: 65 mL    Drain: 55 mL    VAC (Vacuum Assisted Closure) System: 50 mL    Voided: 300 mL  Total OUT: 470 mL    Total NET: 243.2 mL      20 Jan 2020 07:01  -  21 Jan 2020 00:07  --------------------------------------------------------  IN:    Oral Fluid: 1220 mL    Solution: 100 mL    Solution: 100 mL    vasopressin Infusion: 7.2 mL  Total IN: 1427.2 mL    OUT:    Colostomy: 1 mL    Drain: 15 mL  Total OUT: 16 mL    Total NET: 1411.2 mL      MEDICATIONS  (STANDING):  AQUAPHOR (petrolatum Ointment) 1 Application(s) Topical three times a day  artificial tears (preservative free) Ophthalmic Solution 1 Drop(s) Both EYES two times a day  aspirin  chewable 81 milliGRAM(s) Oral <User Schedule>  atorvastatin 40 milliGRAM(s) Oral <User Schedule>  buDESOnide    Inhalation Suspension 0.5 milliGRAM(s) Inhalation every 12 hours  chlorhexidine 2% Cloths 1 Application(s) Topical <User Schedule>  clobetasol 0.05% Ointment 1 Application(s) Topical two times a day  doxazosin 2 milliGRAM(s) Oral <User Schedule>  enoxaparin Injectable 100 milliGRAM(s) SubCutaneous <User Schedule>  erythromycin     base Tablet 250 milliGRAM(s) Oral <User Schedule>  levothyroxine 25 MICROGram(s) Oral daily  lidocaine   Patch 1 Patch Transdermal daily  meropenem  IVPB 1000 milliGRAM(s) IV Intermittent every 8 hours  methadone    Tablet 17.5 milliGRAM(s) Oral <User Schedule>  midodrine 30 milliGRAM(s) Oral <User Schedule>  nystatin Powder 1 Application(s) Topical two times a day  pantoprazole    Tablet 40 milliGRAM(s) Oral <User Schedule>  polyethylene glycol 3350 17 Gram(s) Oral <User Schedule>    MEDICATIONS  (PRN):  acetaminophen  IVPB .. 1000 milliGRAM(s) IV Intermittent every 6 hours PRN Mild Pain (1 - 3)  albuterol/ipratropium for Nebulization 3 milliLiter(s) Nebulizer every 6 hours PRN Shortness of Breath and/or Wheezing  aluminum hydroxide/magnesium hydroxide/simethicone Suspension 30 milliLiter(s) Oral every 4 hours PRN Dyspepsia  diphenhydrAMINE 25 milliGRAM(s) Oral every 12 hours PRN Rash and/or Itching  HYDROmorphone  Injectable 1 milliGRAM(s) IV Push every 4 hours PRN Turning  ketorolac   Injectable 15 milliGRAM(s) IV Push every 6 hours PRN Moderate Pain (4 - 6)  oxyCODONE    IR 10 milliGRAM(s) Oral every 4 hours PRN Severe Pain (7 - 10)  zinc oxide 20% Ointment 1 Application(s) Topical three times a day PRN Rash      Physical Exam  Constitutional: A&Ox3, NAD  Respiratory: CTA b/l  Cardiac: RRR, S1 and S2  Gastrointestinal: abdomen soft, NT/ND, vac in place, dressings c/d/i  skin: erythematous with skin sloughing  LE: 3+ pitting edema b/l, tender to pressing with doppler probe. AT signals b/l    LABS:                        9.0    16.87 )-----------( 161      ( 20 Jan 2020 01:25 )             27.8     01-20    133<L>  |  104  |  25<H>  ----------------------------<  75  5.2   |  17<L>  |  1.12    Ca    8.4      20 Jan 2020 01:25  Phos  4.6     01-20  Mg     2.3     01-20    TPro  5.4<L>  /  Alb  2.4<L>  /  TBili  0.8  /  DBili  0.4<H>  /  AST  28  /  ALT  18  /  AlkPhos  277<H>  01-20      LIVER FUNCTIONS - ( 20 Jan 2020 01:25 )  Alb: 2.4 g/dL / Pro: 5.4 g/dL / ALK PHOS: 277 U/L / ALT: 18 U/L / AST: 28 U/L / GGT: x

## 2020-01-21 NOTE — PROGRESS NOTE ADULT - SUBJECTIVE AND OBJECTIVE BOX
CC: F/U for Abd Collections    Saw/spoke to patient. No fevers, no chills. No new complaints. Unchanged.    Allergies  IV Contrast (Rash; Pruritus; Hypotension)  sulfa drugs (Unknown)    ANTIMICROBIALS:  erythromycin     base Tablet 250 <User Schedule>  meropenem  IVPB 1000 every 8 hours    PE:    Vital Signs Last 24 Hrs  T(C): 36.1 (21 Jan 2020 11:00), Max: 36.3 (21 Jan 2020 07:00)  T(F): 97 (21 Jan 2020 11:00), Max: 97.3 (21 Jan 2020 07:00)  HR: 82 (21 Jan 2020 13:00) (75 - 197)  BP: 95/52 (21 Jan 2020 13:00) (67/47 - 103/47)  BP(mean): 70 (21 Jan 2020 13:00) (53 - 74)  RR: 20 (21 Jan 2020 13:00) (10 - 40)  SpO2: 100% (21 Jan 2020 13:00) (89% - 100%)    Gen: AOx3, NAD, fatigued  CV: S1+S2 normal, nontachycardic  Resp: Clear bilat, no resp distress, no crackles/wheezes  Abd: Soft, nontender, +BS, wound vac, ostomy  Ext: No LE edema, no wounds    LABS:                        8.5    10.65 )-----------( 147      ( 21 Jan 2020 10:16 )             27.1     01-21    134<L>  |  106  |  30<H>  ----------------------------<  54<L>  5.3   |  16<L>  |  1.61<H>    Ca    8.4      21 Jan 2020 01:31  Phos  5.0     01-21  Mg     2.4     01-21    TPro  5.3<L>  /  Alb  2.1<L>  /  TBili  0.7  /  DBili  0.3<H>  /  AST  41<H>  /  ALT  19  /  AlkPhos  294<H>  01-21    MICROBIOLOGY:    .Blood Blood-Venous  01-12-20   No growth at 5 days.    .Sputum Sputum  01-11-20   Moderate Stenotrophomonas maltophilia  Normal Respiratory Elaine absent  --  Stenotrophomonas maltophilia    .Blood Blood  01-09-20   Growth in anaerobic bottle: Coag Negative Staphylococcus  Single set isolate, possible contaminant. Contact  Microbiology if susceptibility testing clinically  indicated.  --    Growth in anaerobic bottle: Gram Positive Cocci in Clusters    (otherwise reviewed)    RADIOLOGY:    1/21 CXR:    FINDINGS:    LINES/TUBES: Right IJ approach central venous catheter tip is in the SVC.    LUNGS: The lungs are clear.    PLEURA: Small left pleural effusion, unchanged from prior study. No pneumothorax.    HEART AND MEDIASTINUM: Heart size is not well evaluated in this AP projection..    SKELETON: Unremarkable skeletal structures.      IMPRESSION:     Unchanged small left pleural effusion.

## 2020-01-21 NOTE — PROGRESS NOTE ADULT - ASSESSMENT
63 y/o female presenting with perforated sigmoid diverticulitis s/p exploratory laparotomy, extended left hemicolectomy, and left in discontinuity w/ Abthera VAC placement with return to OR for re-exploratory laparotomy, transverse end colostomy, and fascial closure with wound VAC placement. Hospital course complicated by gastroparesis, right subclavian steal syndrome, full body rash of unknown etiology, and refractory septic shock c/b stress-induced cardiomyopathy. Cultures were positive for E. coli & Klebsiella in the urine, Stenotrophomonas in the sputum, and Staph epidermis in the blood with subsequent removal of her LUE PICC.    PLAN:    Neuro: acute pain from her full body rash, opioid dependence  - Monitor mental status  - Pain control as needed with Lidoderm patch, acetaminophen, oxycodone, and Dilaudid  - Home methadone    Resp: COPD  - Monitor pulse oximeter  - Out of bed to chair and incentive spirometry to prevent atelectasis  - Pulmicort and Duoneb for COPD    CV: refractory septic shock c/b stress-induced cardiomyopathy  - Monitor vital signs  - Midodrine 30 mg q6hrs in the setting of persistent hypotension  - Repeat TTE when patient is stable off vasopressor support to look for improvement in LV function  - Home ASA  - Trend lactate, remains elevated at 2.6.     GI: perforated sigmoid diverticulitis s/p exploratory laparotomy, extended left hemicolectomy, and left in discontinuity w/ Abthera VAC placement with return to OR for re-exploratory laparotomy, transverse end colostomy, and fascial closure c/b gastroparesis; GERD  - Regular diet as tolerated  - Monitor ostomy  - Erythromycin for gastroparesis  - Protonix for GERD  - Maalox PRN indigestion    Renal: no acute issues  - Monitor I&Os  - Monitor electrolytes and replete as necessary  - Doxazosin for urinary retention    Heme: no acute issues  - Monitor CBC and coags  - Lovenox 100 mg daily for VTE prophylaxis, adjusted for BMI    ID: E. coli & Klebsiella UTI, Stenotrophomonas PNA, Staph epidermis bacteremia  - Monitor for clinical evidence of active infection  - Empiric antibiotics with vancomycin (ends 1/19 for 14 day course), meropenem (ends 1/22 for 14 day course), and levofloxacin (ends 1/19 for 7 day course)  - Blood cultures from 1/12 with no growth    Endo: HLD  - Started 25mg PO levo for hypothermia, hypothyroidism.   - Monitor glucose on BMP  - Home Lipitor    Integumentary:   - Appreciate Dermatology recs: vaseline, zinc oxide, clobetasol cream.     Disposition:  - Full code  - Will remain in SICU 65 y/o female presenting with perforated sigmoid diverticulitis s/p exploratory laparotomy, extended left hemicolectomy, and left in discontinuity w/ Abthera VAC placement with return to OR for re-exploratory laparotomy, transverse end colostomy, and fascial closure with wound VAC placement. Hospital course complicated by gastroparesis, right subclavian steal syndrome, full body rash of unknown etiology, and refractory septic shock c/b stress-induced cardiomyopathy. Cultures were positive for E. coli & Klebsiella in the urine, Stenotrophomonas in the sputum, and Staph epidermis in the blood with subsequent removal of her LUE PICC.    PLAN:    Neuro: acute pain from her full body rash, opioid dependence  - Monitor mental status  - Pain control as needed with Lidoderm patch, acetaminophen, oxycodone, and Dilaudid  - Home methadone    Resp: COPD  - Monitor pulse oximeter  - Out of bed to chair and incentive spirometry to prevent atelectasis  - Pulmicort and Duoneb for COPD    CV: refractory septic shock c/b stress-induced cardiomyopathy  - Monitor vital signs  - Midodrine 30 mg q6hrs in the setting of persistent hypotension  - Repeat TTE when patient is stable off vasopressor support to look for improvement in LV function  - Home ASA  - Trend lactate, remains elevated at 2.1.     GI: perforated sigmoid diverticulitis s/p exploratory laparotomy, extended left hemicolectomy, and left in discontinuity w/ Abthera VAC placement with return to OR for re-exploratory laparotomy, transverse end colostomy, and fascial closure c/b gastroparesis; GERD  - Regular diet as tolerated  - Monitor ostomy  - Erythromycin for gastroparesis  - Protonix for GERD  - Maalox PRN indigestion    Renal: no acute issues  - Monitor I&Os  - Monitor electrolytes and replete as necessary  - Doxazosin for urinary retention    Heme: no acute issues  - Monitor CBC and coags  - Lovenox 100 mg daily for VTE prophylaxis, adjusted for BMI    ID: E. coli & Klebsiella UTI, Stenotrophomonas PNA, Staph epidermis bacteremia  - Monitor for clinical evidence of active infection  - Empiric antibiotics with vancomycin (ends 1/19 for 14 day course), meropenem (ends 1/22 for 14 day course), and levofloxacin (ends 1/19 for 7 day course)  - Blood cultures from 1/12 with no growth    Endo: HLD  - Started 25mg PO levo for hypothermia, hypothyroidism.   - Monitor glucose on BMP  - Home Lipitor    Integumentary:   - Appreciate Dermatology recs: vaseline, zinc oxide, clobetasol cream.     Disposition:  - Full code  - Will remain in SICU

## 2020-01-22 LAB
ALBUMIN SERPL ELPH-MCNC: 2.2 G/DL — LOW (ref 3.3–5)
ALP SERPL-CCNC: 267 U/L — HIGH (ref 40–120)
ALT FLD-CCNC: 16 U/L — SIGNIFICANT CHANGE UP (ref 10–45)
ANION GAP SERPL CALC-SCNC: 10 MMOL/L — SIGNIFICANT CHANGE UP (ref 5–17)
ANION GAP SERPL CALC-SCNC: 11 MMOL/L — SIGNIFICANT CHANGE UP (ref 5–17)
ANION GAP SERPL CALC-SCNC: 12 MMOL/L — SIGNIFICANT CHANGE UP (ref 5–17)
ANION GAP SERPL CALC-SCNC: 12 MMOL/L — SIGNIFICANT CHANGE UP (ref 5–17)
ANION GAP SERPL CALC-SCNC: 13 MMOL/L — SIGNIFICANT CHANGE UP (ref 5–17)
ANION GAP SERPL CALC-SCNC: 14 MMOL/L — SIGNIFICANT CHANGE UP (ref 5–17)
APPEARANCE UR: ABNORMAL
AST SERPL-CCNC: 34 U/L — SIGNIFICANT CHANGE UP (ref 10–40)
BACTERIA # UR AUTO: NEGATIVE — SIGNIFICANT CHANGE UP
BASE EXCESS BLDV CALC-SCNC: -5.4 MMOL/L — LOW (ref -2–2)
BASE EXCESS BLDV CALC-SCNC: -8.3 MMOL/L — LOW (ref -2–2)
BASE EXCESS BLDV CALC-SCNC: -8.6 MMOL/L — LOW (ref -2–2)
BASE EXCESS BLDV CALC-SCNC: -9 MMOL/L — LOW (ref -2–2)
BASOPHILS # BLD AUTO: 0.02 K/UL — SIGNIFICANT CHANGE UP (ref 0–0.2)
BASOPHILS NFR BLD AUTO: 0.2 % — SIGNIFICANT CHANGE UP (ref 0–2)
BILIRUB DIRECT SERPL-MCNC: 0.3 MG/DL — HIGH (ref 0–0.2)
BILIRUB INDIRECT FLD-MCNC: 0.3 MG/DL — SIGNIFICANT CHANGE UP (ref 0.2–1)
BILIRUB SERPL-MCNC: 0.6 MG/DL — SIGNIFICANT CHANGE UP (ref 0.2–1.2)
BILIRUB UR-MCNC: NEGATIVE — SIGNIFICANT CHANGE UP
BUN SERPL-MCNC: 37 MG/DL — HIGH (ref 7–23)
BUN SERPL-MCNC: 37 MG/DL — HIGH (ref 7–23)
BUN SERPL-MCNC: 38 MG/DL — HIGH (ref 7–23)
BUN SERPL-MCNC: 40 MG/DL — HIGH (ref 7–23)
BUN SERPL-MCNC: 40 MG/DL — HIGH (ref 7–23)
BUN SERPL-MCNC: 42 MG/DL — HIGH (ref 7–23)
CA-I SERPL-SCNC: 1.12 MMOL/L — SIGNIFICANT CHANGE UP (ref 1.12–1.3)
CA-I SERPL-SCNC: 1.16 MMOL/L — SIGNIFICANT CHANGE UP (ref 1.12–1.3)
CA-I SERPL-SCNC: 1.2 MMOL/L — SIGNIFICANT CHANGE UP (ref 1.12–1.3)
CA-I SERPL-SCNC: 1.21 MMOL/L — SIGNIFICANT CHANGE UP (ref 1.12–1.3)
CALCIUM SERPL-MCNC: 7.6 MG/DL — LOW (ref 8.4–10.5)
CALCIUM SERPL-MCNC: 7.6 MG/DL — LOW (ref 8.4–10.5)
CALCIUM SERPL-MCNC: 7.9 MG/DL — LOW (ref 8.4–10.5)
CALCIUM SERPL-MCNC: 8 MG/DL — LOW (ref 8.4–10.5)
CHLORIDE BLDV-SCNC: 108 MMOL/L — SIGNIFICANT CHANGE UP (ref 96–108)
CHLORIDE BLDV-SCNC: 108 MMOL/L — SIGNIFICANT CHANGE UP (ref 96–108)
CHLORIDE BLDV-SCNC: 109 MMOL/L — HIGH (ref 96–108)
CHLORIDE BLDV-SCNC: 111 MMOL/L — HIGH (ref 96–108)
CHLORIDE SERPL-SCNC: 104 MMOL/L — SIGNIFICANT CHANGE UP (ref 96–108)
CHLORIDE SERPL-SCNC: 104 MMOL/L — SIGNIFICANT CHANGE UP (ref 96–108)
CHLORIDE SERPL-SCNC: 106 MMOL/L — SIGNIFICANT CHANGE UP (ref 96–108)
CHLORIDE SERPL-SCNC: 108 MMOL/L — SIGNIFICANT CHANGE UP (ref 96–108)
CHLORIDE SERPL-SCNC: 108 MMOL/L — SIGNIFICANT CHANGE UP (ref 96–108)
CHLORIDE SERPL-SCNC: 109 MMOL/L — HIGH (ref 96–108)
CO2 BLDV-SCNC: 18 MMOL/L — LOW (ref 22–30)
CO2 BLDV-SCNC: 21 MMOL/L — LOW (ref 22–30)
CO2 SERPL-SCNC: 15 MMOL/L — LOW (ref 22–31)
CO2 SERPL-SCNC: 16 MMOL/L — LOW (ref 22–31)
CO2 SERPL-SCNC: 16 MMOL/L — LOW (ref 22–31)
CO2 SERPL-SCNC: 17 MMOL/L — LOW (ref 22–31)
CO2 SERPL-SCNC: 18 MMOL/L — LOW (ref 22–31)
CO2 SERPL-SCNC: 19 MMOL/L — LOW (ref 22–31)
COLOR SPEC: YELLOW — SIGNIFICANT CHANGE UP
CREAT ?TM UR-MCNC: 146 MG/DL — SIGNIFICANT CHANGE UP
CREAT SERPL-MCNC: 1.88 MG/DL — HIGH (ref 0.5–1.3)
CREAT SERPL-MCNC: 1.95 MG/DL — HIGH (ref 0.5–1.3)
CREAT SERPL-MCNC: 1.96 MG/DL — HIGH (ref 0.5–1.3)
CREAT SERPL-MCNC: 2 MG/DL — HIGH (ref 0.5–1.3)
CREAT SERPL-MCNC: 2.01 MG/DL — HIGH (ref 0.5–1.3)
CREAT SERPL-MCNC: 2.02 MG/DL — HIGH (ref 0.5–1.3)
DIFF PNL FLD: ABNORMAL
EOSINOPHIL # BLD AUTO: 0.02 K/UL — SIGNIFICANT CHANGE UP (ref 0–0.5)
EOSINOPHIL NFR BLD AUTO: 0.2 % — SIGNIFICANT CHANGE UP (ref 0–6)
EPI CELLS # UR: 12 /HPF — HIGH
GAS PNL BLDV: 132 MMOL/L — LOW (ref 135–145)
GAS PNL BLDV: 133 MMOL/L — LOW (ref 135–145)
GAS PNL BLDV: 134 MMOL/L — LOW (ref 135–145)
GAS PNL BLDV: 135 MMOL/L — SIGNIFICANT CHANGE UP (ref 135–145)
GAS PNL BLDV: SIGNIFICANT CHANGE UP
GLUCOSE BLDC GLUCOMTR-MCNC: 78 MG/DL — SIGNIFICANT CHANGE UP (ref 70–99)
GLUCOSE BLDC GLUCOMTR-MCNC: 83 MG/DL — SIGNIFICANT CHANGE UP (ref 70–99)
GLUCOSE BLDV-MCNC: 76 MG/DL — SIGNIFICANT CHANGE UP (ref 70–99)
GLUCOSE BLDV-MCNC: 84 MG/DL — SIGNIFICANT CHANGE UP (ref 70–99)
GLUCOSE BLDV-MCNC: 85 MG/DL — SIGNIFICANT CHANGE UP (ref 70–99)
GLUCOSE BLDV-MCNC: 95 MG/DL — SIGNIFICANT CHANGE UP (ref 70–99)
GLUCOSE SERPL-MCNC: 74 MG/DL — SIGNIFICANT CHANGE UP (ref 70–99)
GLUCOSE SERPL-MCNC: 81 MG/DL — SIGNIFICANT CHANGE UP (ref 70–99)
GLUCOSE SERPL-MCNC: 83 MG/DL — SIGNIFICANT CHANGE UP (ref 70–99)
GLUCOSE SERPL-MCNC: 88 MG/DL — SIGNIFICANT CHANGE UP (ref 70–99)
GLUCOSE SERPL-MCNC: 91 MG/DL — SIGNIFICANT CHANGE UP (ref 70–99)
GLUCOSE SERPL-MCNC: 97 MG/DL — SIGNIFICANT CHANGE UP (ref 70–99)
GLUCOSE UR QL: NEGATIVE — SIGNIFICANT CHANGE UP
HCO3 BLDV-SCNC: 16 MMOL/L — LOW (ref 21–29)
HCO3 BLDV-SCNC: 16 MMOL/L — LOW (ref 21–29)
HCO3 BLDV-SCNC: 17 MMOL/L — LOW (ref 21–29)
HCO3 BLDV-SCNC: 19 MMOL/L — LOW (ref 21–29)
HCT VFR BLD CALC: 28.9 % — LOW (ref 34.5–45)
HCT VFR BLDA CALC: 26 % — LOW (ref 39–50)
HCT VFR BLDA CALC: 26 % — LOW (ref 39–50)
HCT VFR BLDA CALC: 27 % — LOW (ref 39–50)
HCT VFR BLDA CALC: 28 % — LOW (ref 39–50)
HGB BLD CALC-MCNC: 8.4 G/DL — LOW (ref 11.5–15.5)
HGB BLD CALC-MCNC: 8.5 G/DL — LOW (ref 11.5–15.5)
HGB BLD CALC-MCNC: 8.7 G/DL — LOW (ref 11.5–15.5)
HGB BLD CALC-MCNC: 8.9 G/DL — LOW (ref 11.5–15.5)
HGB BLD-MCNC: 8.9 G/DL — LOW (ref 11.5–15.5)
HOROWITZ INDEX BLDV+IHG-RTO: 21 — SIGNIFICANT CHANGE UP
HYALINE CASTS # UR AUTO: 149 /LPF — HIGH (ref 0–2)
IMM GRANULOCYTES NFR BLD AUTO: 0.5 % — SIGNIFICANT CHANGE UP (ref 0–1.5)
KETONES UR-MCNC: SIGNIFICANT CHANGE UP
LACTATE BLDV-MCNC: 3.1 MMOL/L — HIGH (ref 0.7–2)
LACTATE BLDV-MCNC: 4.7 MMOL/L — CRITICAL HIGH (ref 0.7–2)
LACTATE BLDV-MCNC: 5.3 MMOL/L — CRITICAL HIGH (ref 0.7–2)
LACTATE BLDV-MCNC: 5.3 MMOL/L — CRITICAL HIGH (ref 0.7–2)
LEUKOCYTE ESTERASE UR-ACNC: NEGATIVE — SIGNIFICANT CHANGE UP
LYMPHOCYTES # BLD AUTO: 1.07 K/UL — SIGNIFICANT CHANGE UP (ref 1–3.3)
LYMPHOCYTES # BLD AUTO: 10.7 % — LOW (ref 13–44)
MAGNESIUM SERPL-MCNC: 2.3 MG/DL — SIGNIFICANT CHANGE UP (ref 1.6–2.6)
MAGNESIUM SERPL-MCNC: 2.4 MG/DL — SIGNIFICANT CHANGE UP (ref 1.6–2.6)
MCHC RBC-ENTMCNC: 28.3 PG — SIGNIFICANT CHANGE UP (ref 27–34)
MCHC RBC-ENTMCNC: 30.8 GM/DL — LOW (ref 32–36)
MCV RBC AUTO: 92 FL — SIGNIFICANT CHANGE UP (ref 80–100)
MONOCYTES # BLD AUTO: 0.39 K/UL — SIGNIFICANT CHANGE UP (ref 0–0.9)
MONOCYTES NFR BLD AUTO: 3.9 % — SIGNIFICANT CHANGE UP (ref 2–14)
NEUTROPHILS # BLD AUTO: 8.45 K/UL — HIGH (ref 1.8–7.4)
NEUTROPHILS NFR BLD AUTO: 84.5 % — HIGH (ref 43–77)
NITRITE UR-MCNC: NEGATIVE — SIGNIFICANT CHANGE UP
NRBC # BLD: 0 /100 WBCS — SIGNIFICANT CHANGE UP (ref 0–0)
OSMOLALITY UR: 317 MOS/KG — SIGNIFICANT CHANGE UP (ref 300–900)
OTHER CELLS CSF MANUAL: 10 ML/DL — LOW (ref 18–22)
OTHER CELLS CSF MANUAL: 8 ML/DL — LOW (ref 18–22)
OTHER CELLS CSF MANUAL: 9 ML/DL — LOW (ref 18–22)
OTHER CELLS CSF MANUAL: 9 ML/DL — LOW (ref 18–22)
PCO2 BLDV: 34 MMHG — LOW (ref 35–50)
PCO2 BLDV: 36 MMHG — SIGNIFICANT CHANGE UP (ref 35–50)
PCO2 BLDV: 37 MMHG — SIGNIFICANT CHANGE UP (ref 35–50)
PCO2 BLDV: 38 MMHG — SIGNIFICANT CHANGE UP (ref 35–50)
PH BLDV: 7.28 — LOW (ref 7.35–7.45)
PH BLDV: 7.29 — LOW (ref 7.35–7.45)
PH BLDV: 7.3 — LOW (ref 7.35–7.45)
PH BLDV: 7.33 — LOW (ref 7.35–7.45)
PH UR: 5.5 — SIGNIFICANT CHANGE UP (ref 5–8)
PHOSPHATE SERPL-MCNC: 5.3 MG/DL — HIGH (ref 2.5–4.5)
PHOSPHATE SERPL-MCNC: 5.3 MG/DL — HIGH (ref 2.5–4.5)
PHOSPHATE SERPL-MCNC: 5.4 MG/DL — HIGH (ref 2.5–4.5)
PHOSPHATE SERPL-MCNC: 5.4 MG/DL — HIGH (ref 2.5–4.5)
PHOSPHATE SERPL-MCNC: 5.7 MG/DL — HIGH (ref 2.5–4.5)
PHOSPHATE SERPL-MCNC: 5.7 MG/DL — HIGH (ref 2.5–4.5)
PLATELET # BLD AUTO: 148 K/UL — LOW (ref 150–400)
PO2 BLDV: 45 MMHG — SIGNIFICANT CHANGE UP (ref 25–45)
PO2 BLDV: 46 MMHG — HIGH (ref 25–45)
PO2 BLDV: 47 MMHG — HIGH (ref 25–45)
PO2 BLDV: 54 MMHG — HIGH (ref 25–45)
POTASSIUM BLDV-SCNC: 5 MMOL/L — SIGNIFICANT CHANGE UP (ref 3.5–5.3)
POTASSIUM BLDV-SCNC: 5.1 MMOL/L — SIGNIFICANT CHANGE UP (ref 3.5–5.3)
POTASSIUM BLDV-SCNC: 5.1 MMOL/L — SIGNIFICANT CHANGE UP (ref 3.5–5.3)
POTASSIUM BLDV-SCNC: 5.2 MMOL/L — SIGNIFICANT CHANGE UP (ref 3.5–5.3)
POTASSIUM SERPL-MCNC: 5.3 MMOL/L — SIGNIFICANT CHANGE UP (ref 3.5–5.3)
POTASSIUM SERPL-MCNC: 5.3 MMOL/L — SIGNIFICANT CHANGE UP (ref 3.5–5.3)
POTASSIUM SERPL-MCNC: 5.5 MMOL/L — HIGH (ref 3.5–5.3)
POTASSIUM SERPL-MCNC: 5.5 MMOL/L — HIGH (ref 3.5–5.3)
POTASSIUM SERPL-MCNC: 5.7 MMOL/L — HIGH (ref 3.5–5.3)
POTASSIUM SERPL-MCNC: 5.8 MMOL/L — HIGH (ref 3.5–5.3)
POTASSIUM SERPL-SCNC: 5.3 MMOL/L — SIGNIFICANT CHANGE UP (ref 3.5–5.3)
POTASSIUM SERPL-SCNC: 5.3 MMOL/L — SIGNIFICANT CHANGE UP (ref 3.5–5.3)
POTASSIUM SERPL-SCNC: 5.5 MMOL/L — HIGH (ref 3.5–5.3)
POTASSIUM SERPL-SCNC: 5.5 MMOL/L — HIGH (ref 3.5–5.3)
POTASSIUM SERPL-SCNC: 5.7 MMOL/L — HIGH (ref 3.5–5.3)
POTASSIUM SERPL-SCNC: 5.8 MMOL/L — HIGH (ref 3.5–5.3)
PROCALCITONIN SERPL-MCNC: 0.78 NG/ML — HIGH (ref 0.02–0.1)
PROT SERPL-MCNC: 5.2 G/DL — LOW (ref 6–8.3)
PROT UR-MCNC: ABNORMAL
RBC # BLD: 3.14 M/UL — LOW (ref 3.8–5.2)
RBC # FLD: 19.8 % — HIGH (ref 10.3–14.5)
RBC CASTS # UR COMP ASSIST: 1 /HPF — SIGNIFICANT CHANGE UP (ref 0–4)
SAO2 % BLDV: 72 % — SIGNIFICANT CHANGE UP (ref 67–88)
SAO2 % BLDV: 73 % — SIGNIFICANT CHANGE UP (ref 67–88)
SAO2 % BLDV: 76 % — SIGNIFICANT CHANGE UP (ref 67–88)
SAO2 % BLDV: 83 % — SIGNIFICANT CHANGE UP (ref 67–88)
SODIUM SERPL-SCNC: 133 MMOL/L — LOW (ref 135–145)
SODIUM SERPL-SCNC: 133 MMOL/L — LOW (ref 135–145)
SODIUM SERPL-SCNC: 135 MMOL/L — SIGNIFICANT CHANGE UP (ref 135–145)
SODIUM SERPL-SCNC: 136 MMOL/L — SIGNIFICANT CHANGE UP (ref 135–145)
SODIUM SERPL-SCNC: 137 MMOL/L — SIGNIFICANT CHANGE UP (ref 135–145)
SODIUM SERPL-SCNC: 138 MMOL/L — SIGNIFICANT CHANGE UP (ref 135–145)
SODIUM UR-SCNC: <35 MMOL/L — SIGNIFICANT CHANGE UP
SP GR SPEC: 1.03 — HIGH (ref 1.01–1.02)
UROBILINOGEN FLD QL: ABNORMAL
WBC # BLD: 10 K/UL — SIGNIFICANT CHANGE UP (ref 3.8–10.5)
WBC # FLD AUTO: 10 K/UL — SIGNIFICANT CHANGE UP (ref 3.8–10.5)
WBC UR QL: 6 /HPF — HIGH (ref 0–5)

## 2020-01-22 PROCEDURE — 99291 CRITICAL CARE FIRST HOUR: CPT

## 2020-01-22 PROCEDURE — 71045 X-RAY EXAM CHEST 1 VIEW: CPT | Mod: 26

## 2020-01-22 PROCEDURE — 99223 1ST HOSP IP/OBS HIGH 75: CPT | Mod: GC

## 2020-01-22 PROCEDURE — 71045 X-RAY EXAM CHEST 1 VIEW: CPT | Mod: 26,77

## 2020-01-22 PROCEDURE — 99232 SBSQ HOSP IP/OBS MODERATE 35: CPT

## 2020-01-22 PROCEDURE — 93010 ELECTROCARDIOGRAM REPORT: CPT

## 2020-01-22 RX ORDER — CALCIUM GLUCONATE 100 MG/ML
2 VIAL (ML) INTRAVENOUS ONCE
Refills: 0 | Status: COMPLETED | OUTPATIENT
Start: 2020-01-22 | End: 2020-01-22

## 2020-01-22 RX ORDER — LIDOCAINE 4 G/100G
10 CREAM TOPICAL ONCE
Refills: 0 | Status: DISCONTINUED | OUTPATIENT
Start: 2020-01-22 | End: 2020-01-27

## 2020-01-22 RX ORDER — ALBUMIN HUMAN 25 %
500 VIAL (ML) INTRAVENOUS ONCE
Refills: 0 | Status: COMPLETED | OUTPATIENT
Start: 2020-01-22 | End: 2020-01-22

## 2020-01-22 RX ORDER — INSULIN HUMAN 100 [IU]/ML
10 INJECTION, SOLUTION SUBCUTANEOUS ONCE
Refills: 0 | Status: COMPLETED | OUTPATIENT
Start: 2020-01-22 | End: 2020-01-22

## 2020-01-22 RX ORDER — SODIUM CHLORIDE 9 MG/ML
1000 INJECTION, SOLUTION INTRAVENOUS ONCE
Refills: 0 | Status: COMPLETED | OUTPATIENT
Start: 2020-01-22 | End: 2020-01-22

## 2020-01-22 RX ORDER — SODIUM BICARBONATE 1 MEQ/ML
0.07 SYRINGE (ML) INTRAVENOUS
Qty: 75 | Refills: 0 | Status: DISCONTINUED | OUTPATIENT
Start: 2020-01-22 | End: 2020-01-23

## 2020-01-22 RX ORDER — DEXTROSE 50 % IN WATER 50 %
50 SYRINGE (ML) INTRAVENOUS ONCE
Refills: 0 | Status: COMPLETED | OUTPATIENT
Start: 2020-01-22 | End: 2020-01-22

## 2020-01-22 RX ORDER — SODIUM BICARBONATE 1 MEQ/ML
50 SYRINGE (ML) INTRAVENOUS ONCE
Refills: 0 | Status: COMPLETED | OUTPATIENT
Start: 2020-01-22 | End: 2020-01-22

## 2020-01-22 RX ORDER — LIDOCAINE HCL 20 MG/ML
5 VIAL (ML) INJECTION ONCE
Refills: 0 | Status: COMPLETED | OUTPATIENT
Start: 2020-01-22 | End: 2020-01-22

## 2020-01-22 RX ORDER — ALBUMIN HUMAN 25 %
50 VIAL (ML) INTRAVENOUS EVERY 6 HOURS
Refills: 0 | Status: COMPLETED | OUTPATIENT
Start: 2020-01-22 | End: 2020-01-25

## 2020-01-22 RX ORDER — CALCIUM GLUCONATE 100 MG/ML
1 VIAL (ML) INTRAVENOUS ONCE
Refills: 0 | Status: COMPLETED | OUTPATIENT
Start: 2020-01-22 | End: 2020-01-22

## 2020-01-22 RX ORDER — SODIUM CHLORIDE 9 MG/ML
1000 INJECTION, SOLUTION INTRAVENOUS
Refills: 0 | Status: DISCONTINUED | OUTPATIENT
Start: 2020-01-22 | End: 2020-01-22

## 2020-01-22 RX ORDER — ALBUTEROL 90 UG/1
10 AEROSOL, METERED ORAL ONCE
Refills: 0 | Status: COMPLETED | OUTPATIENT
Start: 2020-01-22 | End: 2020-01-22

## 2020-01-22 RX ADMIN — POLYETHYLENE GLYCOL 3350 17 GRAM(S): 17 POWDER, FOR SOLUTION ORAL at 17:53

## 2020-01-22 RX ADMIN — Medication 100 MILLIGRAM(S): at 10:20

## 2020-01-22 RX ADMIN — NYSTATIN CREAM 1 APPLICATION(S): 100000 CREAM TOPICAL at 05:29

## 2020-01-22 RX ADMIN — ENOXAPARIN SODIUM 100 MILLIGRAM(S): 100 INJECTION SUBCUTANEOUS at 11:11

## 2020-01-22 RX ADMIN — OXYCODONE HYDROCHLORIDE 10 MILLIGRAM(S): 5 TABLET ORAL at 04:30

## 2020-01-22 RX ADMIN — METHADONE HYDROCHLORIDE 17.5 MILLIGRAM(S): 40 TABLET ORAL at 08:50

## 2020-01-22 RX ADMIN — Medication 50 MILLILITER(S): at 05:36

## 2020-01-22 RX ADMIN — OXYCODONE HYDROCHLORIDE 10 MILLIGRAM(S): 5 TABLET ORAL at 22:00

## 2020-01-22 RX ADMIN — Medication 81 MILLIGRAM(S): at 21:02

## 2020-01-22 RX ADMIN — MIDODRINE HYDROCHLORIDE 30 MILLIGRAM(S): 2.5 TABLET ORAL at 17:01

## 2020-01-22 RX ADMIN — POLYETHYLENE GLYCOL 3350 17 GRAM(S): 17 POWDER, FOR SOLUTION ORAL at 05:37

## 2020-01-22 RX ADMIN — Medication 3 MILLILITER(S): at 17:09

## 2020-01-22 RX ADMIN — Medication 50 MILLIEQUIVALENT(S): at 10:06

## 2020-01-22 RX ADMIN — OXYCODONE HYDROCHLORIDE 10 MILLIGRAM(S): 5 TABLET ORAL at 21:03

## 2020-01-22 RX ADMIN — Medication 1 APPLICATION(S): at 17:56

## 2020-01-22 RX ADMIN — SODIUM CHLORIDE 4000 MILLILITER(S): 9 INJECTION, SOLUTION INTRAVENOUS at 10:06

## 2020-01-22 RX ADMIN — HYDROMORPHONE HYDROCHLORIDE 1 MILLIGRAM(S): 2 INJECTION INTRAMUSCULAR; INTRAVENOUS; SUBCUTANEOUS at 13:03

## 2020-01-22 RX ADMIN — Medication 50 MICROGRAM(S): at 05:29

## 2020-01-22 RX ADMIN — Medication 1 DROP(S): at 17:54

## 2020-01-22 RX ADMIN — NYSTATIN CREAM 1 APPLICATION(S): 100000 CREAM TOPICAL at 17:53

## 2020-01-22 RX ADMIN — Medication 200 GRAM(S): at 22:31

## 2020-01-22 RX ADMIN — ATORVASTATIN CALCIUM 40 MILLIGRAM(S): 80 TABLET, FILM COATED ORAL at 21:02

## 2020-01-22 RX ADMIN — Medication 100 MILLIGRAM(S): at 01:32

## 2020-01-22 RX ADMIN — Medication 0.5 MILLIGRAM(S): at 05:10

## 2020-01-22 RX ADMIN — Medication 250 MILLIGRAM(S): at 10:21

## 2020-01-22 RX ADMIN — MEROPENEM 100 MILLIGRAM(S): 1 INJECTION INTRAVENOUS at 05:31

## 2020-01-22 RX ADMIN — Medication 250 MILLIGRAM(S): at 21:02

## 2020-01-22 RX ADMIN — PANTOPRAZOLE SODIUM 40 MILLIGRAM(S): 20 TABLET, DELAYED RELEASE ORAL at 05:29

## 2020-01-22 RX ADMIN — Medication 1 APPLICATION(S): at 13:33

## 2020-01-22 RX ADMIN — Medication 150 MEQ/KG/HR: at 17:55

## 2020-01-22 RX ADMIN — Medication 1 DROP(S): at 05:30

## 2020-01-22 RX ADMIN — Medication 50 MILLILITER(S): at 17:53

## 2020-01-22 RX ADMIN — HYDROMORPHONE HYDROCHLORIDE 1 MILLIGRAM(S): 2 INJECTION INTRAMUSCULAR; INTRAVENOUS; SUBCUTANEOUS at 13:18

## 2020-01-22 RX ADMIN — MIDODRINE HYDROCHLORIDE 30 MILLIGRAM(S): 2.5 TABLET ORAL at 21:02

## 2020-01-22 RX ADMIN — Medication 100 MILLIGRAM(S): at 17:55

## 2020-01-22 RX ADMIN — Medication 5 MILLILITER(S): at 17:12

## 2020-01-22 RX ADMIN — Medication 3 MILLILITER(S): at 05:10

## 2020-01-22 RX ADMIN — CHLORHEXIDINE GLUCONATE 1 APPLICATION(S): 213 SOLUTION TOPICAL at 05:30

## 2020-01-22 RX ADMIN — Medication 2 MILLIGRAM(S): at 05:31

## 2020-01-22 RX ADMIN — LIDOCAINE 1 PATCH: 4 CREAM TOPICAL at 00:46

## 2020-01-22 RX ADMIN — ALBUTEROL 10 MILLIGRAM(S): 90 AEROSOL, METERED ORAL at 09:45

## 2020-01-22 RX ADMIN — Medication 100 GRAM(S): at 10:24

## 2020-01-22 RX ADMIN — Medication 0.5 MILLIGRAM(S): at 17:09

## 2020-01-22 RX ADMIN — Medication 1 APPLICATION(S): at 21:04

## 2020-01-22 RX ADMIN — OXYCODONE HYDROCHLORIDE 10 MILLIGRAM(S): 5 TABLET ORAL at 03:58

## 2020-01-22 RX ADMIN — Medication 250 MILLILITER(S): at 14:56

## 2020-01-22 RX ADMIN — Medication 1 APPLICATION(S): at 05:31

## 2020-01-22 RX ADMIN — DIPHENHYDRAMINE HYDROCHLORIDE AND LIDOCAINE HYDROCHLORIDE AND ALUMINUM HYDROXIDE AND MAGNESIUM HYDRO 5 MILLILITER(S): KIT at 08:50

## 2020-01-22 RX ADMIN — INSULIN HUMAN 10 UNIT(S): 100 INJECTION, SOLUTION SUBCUTANEOUS at 09:23

## 2020-01-22 RX ADMIN — MIDODRINE HYDROCHLORIDE 30 MILLIGRAM(S): 2.5 TABLET ORAL at 10:21

## 2020-01-22 RX ADMIN — Medication 1 APPLICATION(S): at 05:30

## 2020-01-22 RX ADMIN — INSULIN HUMAN 10 UNIT(S): 100 INJECTION, SOLUTION SUBCUTANEOUS at 05:36

## 2020-01-22 RX ADMIN — Medication 50 MILLILITER(S): at 09:20

## 2020-01-22 RX ADMIN — MIDODRINE HYDROCHLORIDE 30 MILLIGRAM(S): 2.5 TABLET ORAL at 05:29

## 2020-01-22 NOTE — CONSULT NOTE ADULT - ASSESSMENT
64y Female pmhx morbid obesity, acid reflux, HTN, COPD and PSH D&C presented to the ED on 11/26 c/o abdominal pain and bloating Had a complicated hospital course, initially presented with constipation for 2 weeks at that time reported pain worst in the LLQ in ED, CT demonstrated perforated sigmoid diverticulitis with intraperitoneal free air, on 11/25  underwent an exploratory laparotomy with extended left hemicolectomy and was left in discontinuity. An Abthera VAC, now with long complicated course, with sepsis, requiring pressors, demand ischemia, and new oliguric BULL associated with hyperkalemia and acidosis.     #BULL  Pt with BULL in the setting of septic shock, hypotension and possible drug reaction. On review of labs pt had normal scr, noted to rise since 01/20/20 to 1.2 from 0.8 now 2 mg/dl, marquez place today with urine output of 10 cc/hr last 3 hours and rising K with worsening acidosis. UA showing abundant hyaline cast, some wbc, epithelial cells, 1 rbc, lytes consistent with pre renal FENA 0.2%. OBTAIAN  spot urine TP/CR. Given urinary findings BULL most likely multifactorial pre renal state, however is edematous, which means probably ineffective intravascular volume, possible poor CO, recommend to c/w Marquez catheter monitor UOP every hour, pt with low albumin level, suggest to give albumin 25 % TID with lasix 40 mg BID to keep pt non oliguric, however would suggest to optimize hemodynamic status, may need to restart pressors/inotripic support, consider repeating TTE,   given low BP. Will need to consider HD if renal failure continues to worsen. Monitor labs and urine output. Avoid NSAIDs, ACEI/ARBS, RCA and nephrotoxins. Dose medications as per eGFR.    #Acidosis  In the setting of bull and lactic acidosis. Agree to continue with bicarb drip for now. May increase the rate if needed. Monitor Bicarb and pH.     #Hyperkalemia  In the setting of oliguric BULL,  and acidosis. Suggest to continue medical management with bicarb, d50, insulin and lasix prn, if poor response, and K >6 would need to consider HD.

## 2020-01-22 NOTE — PROGRESS NOTE ADULT - ATTENDING COMMENTS
Patient seen/examined.  Agree w above note and plan and have discussed plan w house staff.  Now hyperkalemic, creatinine rising.  Ross catheter    Lucas Greer MD

## 2020-01-22 NOTE — PROGRESS NOTE ADULT - SUBJECTIVE AND OBJECTIVE BOX
CC: F/U for Abscess    Saw/spoke to patient. No fevers, no chills. Fatigued today, episodes of hypothermia.    Allergies  IV Contrast (Rash; Pruritus; Hypotension)  sulfa drugs (Unknown)    ANTIMICROBIALS:  erythromycin     base Tablet 250 <User Schedule>    PE:    Vital Signs Last 24 Hrs  T(C): 37.1 (2020 11:00), Max: 37.6 (2020 07:00)  T(F): 98.8 (2020 11:00), Max: 99.7 (2020 07:00)  HR: 121 (2020 13:00) (75 - 157)  BP: 89/55 (2020 13:00) (75/52 - 113/58)  BP(mean): 67 (2020 13:00) (59 - 76)  RR: 37 (2020 13:00) (13 - 37)  SpO2: 99% (2020 13:00) (94% - 100%)    Gen: AOx3, fatigued  CV: S1+S2 normal, nontachycardic  Resp: Clear bilat, no resp distress, no crackles/wheezes  Abd: Soft, nontender, +BS  Ext: No LE edema, no wounds    LABS:                        8.9    10.00 )-----------( 148      ( 2020 00:19 )             28.9         138  |  109<H>  |  38<H>  ----------------------------<  81  5.5<H>   |  17<L>  |  1.96<H>    Ca    7.9<L>      2020 12:17  Phos  5.3       Mg     2.4         TPro  5.2<L>  /  Alb  2.2<L>  /  TBili  0.6  /  DBili  0.3<H>  /  AST  34  /  ALT  16  /  AlkPhos  267<H>      Urinalysis Basic - ( 2020 11:09 )    Color: Yellow / Appearance: Slightly Turbid / S.028 / pH: x  Gluc: x / Ketone: Trace  / Bili: Negative / Urobili: 4 mg/dL   Blood: x / Protein: 30 mg/dL / Nitrite: Negative   Leuk Esterase: Negative / RBC: 1 /hpf / WBC 6 /HPF   Sq Epi: x / Non Sq Epi: 12 /hpf / Bacteria: Negative    MICROBIOLOGY:    .Blood Blood-Venous  20   No growth at 5 days.    .Sputum Sputum  20   Moderate Stenotrophomonas maltophilia  Normal Respiratory Elaine absent  --  Stenotrophomonas maltophilia    .Blood Blood  20   Growth in anaerobic bottle: Coag Negative Staphylococcus  Single set isolate, possible contaminant. Contact  Microbiology if susceptibility testing clinically  indicated.  --    Growth in anaerobic bottle: Gram Positive Cocci in Clusters    .Blood Blood  20   Growth in aerobic and anaerobic bottles: Staphylococcus epidermidis  --  Staphylococcus epidermidis    .Blood Blood-Peripheral  20   Growth in aerobic bottle: Staphylococcus epidermidis  --    Growth in aerobic bottle:  Gram Positive Cocci in Clusters    .Urine Clean Catch (Midstream)  20   >100,000 CFU/ml Klebsiella pneumoniae  >100,000 CFU/ml Escherichia coli  --  Klebsiella pneumoniae  Escherichia coli    .Blood Blood-Venous  20   Growth in aerobic and anaerobic bottles: Coag Negative Staphylococcus  Coag Negative Staphylococcus    (otherwise reviewed)    RADIOLOGY:     CXR:    Impression:    The heart is slightly enlarged. Small left pleural effusion. Left lower lobe pneumonia and/or atelectasis. The right lung appears to be clear. A central line is seen on the right and the tip is in the superior vena cava. The overall appearance of the chest remain unchanged when compared to previous study done 2020 at 8:55 AM.

## 2020-01-22 NOTE — PROGRESS NOTE ADULT - SUBJECTIVE AND OBJECTIVE BOX
HISTORY  64y Female PMHx of morbid obesity, COPD, HTN, and GERD who presented to the ED on 11/26/2019 with abdominal pain and distension. Patient states that she had been having constipation for ~2 weeks with no improvement despite laxative and enema use. Imaging revealed perforated sigmoid diverticulitis with free air. Hospital course is as follows:    11/26 - s/p exploratory laparotomy, extended left hemicolectomy, and left in discontinuity w/ Abthera VAC placement, left intubated post-operatively so she was admitted to SICU for further management  11/27 - extubated to BiPAP  11/29 - re-exploratory laparotomy, transverse end colostomy, and fascial closure with wound VAC placement, left intubated at end of case  11/30 - extubated   12/04 - transferred to the floors  12/06 - acute inability to tolerate PO, complaining of nausea & vomiting  12/09 - esophagram demonstrated a stricture in the distal esophagus  12/10 - EGD demonstrated diverticula, a medium-sized hiatal hernia, esophagitis, and gastritis, started on promotility agents and diet was restarted  12/11 - noted to have different BP when BP cuff placed on different arms, bilateral UE Duplex demonstrated stenosis of the right brachiocephalic artery concerning for subclavian steal syndrome  12/28 - plastic surgery consulted for abdominal wall reconstruction, underwent bedside debridement of abdominal wound at that time.  01/07 - worsening hypotension from presumed sepsis requiring readmission to SICU, imaging revealed loculated fluid collections in the left anterior abdomen & a small abdominal wall collection, both of which were not amenable to drainage, cultures were also positive for E. coli & Klebsiella in the urine, Stenotrophomonas in the sputum, & Staph epidermis in the blood, LUE PICC discontinued  01/10 - right IJ CVC was placed and patient started on vasopressor support but patient refused arterial line placement, patient complaining of chest wall tightness, hsT was elevated and TTE demonstrated new mild to moderate segmental LV systolic dysfunction w/ hypokinesis of the inferior and inferolateral walls but cardiology determined the troponin leak to be stressed-induced in the setting of sepsis  01/13 - dermatology consulted scattered erythematous patches on her trunk & extremities, superficial desquamation of the face, trunk, & extremities, & multiple erosions with surrounding erythema on left lower back, for which petroleum jelly was prescribed BID, viral cultures of erosions were sent and were negative  01/16 - repeat CT scan demonstrated decreasing fluid collections but no new acute findings  01/20 - weaned off vasopressor support, started levothyroxine for elevated TSH and decreased T3 & T4      24 HOUR EVENTS:  - Midodrine increased to 30 q6 hrs   - Tress dose steroids started for adrenal insufficiency  - Synthroid dose increased t 50mcg daily      SUBJECTIVE/ROS:  [ ] A ten-point review of systems was otherwise negative except as noted.  [ ] Due to altered mental status/intubation, subjective information were not able to be obtained from the patient. History was obtained, to the extent possible, from review of the chart and collateral sources of information.      NEURO  Exam: awake, alert, oriented  Meds: acetaminophen  IVPB .. 1000 milliGRAM(s) IV Intermittent every 6 hours PRN Mild Pain (1 - 3)  diphenhydrAMINE 25 milliGRAM(s) Oral every 12 hours PRN Rash and/or Itching  HYDROmorphone  Injectable 1 milliGRAM(s) IV Push every 4 hours PRN Turning  methadone    Tablet 17.5 milliGRAM(s) Oral <User Schedule>  oxyCODONE    IR 10 milliGRAM(s) Oral every 4 hours PRN Severe Pain (7 - 10)    [x] Adequacy of sedation and pain control has been assessed and adjusted      RESPIRATORY  RR: 16 (01-22-20 @ 03:00) (13 - 40)  SpO2: 99% (01-22-20 @ 03:00) (89% - 100%)  Exam: unlabored, clear to auscultation bilaterally  Mechanical Ventilation: none  [N/A] Extubation Readiness Assessed  Meds: albuterol/ipratropium for Nebulization 3 milliLiter(s) Nebulizer every 6 hours PRN Shortness of Breath and/or Wheezing  buDESOnide    Inhalation Suspension 0.5 milliGRAM(s) Inhalation every 12 hours        CARDIOVASCULAR  HR: 75 (01-22-20 @ 03:00) (75 - 197)  BP: 82/47 (01-22-20 @ 03:00) (67/47 - 102/45)  BP(mean): 60 (01-22-20 @ 03:00) (53 - 72)  VBG - ( 22 Jan 2020 00:15 )  pH: 7.28  /  pCO2: 40    /  pO2: 43    / HCO3: 18    / Base Excess: -7.9  /  SaO2: 72     Lactate: 2.5    Exam: regular rate and rhythm  Cardiac Rhythm: sinus  Perfusion     [x]Adequate   [ ]Inadequate  Mentation   [x]Normal       [ ]Reduced  Extremities  [x]Warm         [ ]Cool  Volume Status [ ]Hypervolemic [x]Euvolemic [ ]Hypovolemic  Meds: doxazosin 2 milliGRAM(s) Oral <User Schedule>  midodrine 30 milliGRAM(s) Oral <User Schedule>        GI/NUTRITION  Exam: soft, nontender, nondistended  Diet: Regular diet   Meds: aluminum hydroxide/magnesium hydroxide/simethicone Suspension 30 milliLiter(s) Oral every 4 hours PRN Dyspepsia  pantoprazole    Tablet 40 milliGRAM(s) Oral <User Schedule>  polyethylene glycol 3350 17 Gram(s) Oral <User Schedule>      GENITOURINARY  I&O's Detail    01-20 @ 07:01  -  01-21 @ 07:00  --------------------------------------------------------  IN:    Oral Fluid: 1220 mL    Solution: 100 mL    Solution: 150 mL    Solution: 600 mL    vasopressin Infusion: 7.2 mL  Total IN: 2077.2 mL    OUT:    Colostomy: 1 mL    Drain: 15 mL  Total OUT: 16 mL    Total NET: 2061.2 mL      01-21 @ 07:01 - 01-22 @ 03:42  --------------------------------------------------------  IN:    Oral Fluid: 740 mL    Solution: 100 mL  Total IN: 840 mL    OUT:  Total OUT: 0 mL    Total NET: 840 mL          01-22    135  |  106  |  37<H>  ----------------------------<  74  5.7<H>   |  16<L>  |  1.88<H>    Ca    8.0<L>      22 Jan 2020 00:19  Phos  5.7     01-22  Mg     2.4     01-22    TPro  5.2<L>  /  Alb  2.2<L>  /  TBili  0.6  /  DBili  0.3<H>  /  AST  34  /  ALT  16  /  AlkPhos  267<H>  01-22    [ ] Ross catheter, indication: N/A  Meds: none      HEMATOLOGIC  Meds: aspirin  chewable 81 milliGRAM(s) Oral <User Schedule>  enoxaparin Injectable 100 milliGRAM(s) SubCutaneous <User Schedule>    [x] VTE Prophylaxis                        8.9    10.00 )-----------( 148      ( 22 Jan 2020 00:19 )             28.9       Transfusion     [ ] PRBC   [ ] Platelets   [ ] FFP   [ ] Cryoprecipitate      INFECTIOUS DISEASES  WBC Count: 10.00 K/uL (01-22 @ 00:19)  WBC Count: 10.65 K/uL (01-21 @ 10:16)    RECENT CULTURES:    Meds: erythromycin     base Tablet 250 milliGRAM(s) Oral <User Schedule>  meropenem  IVPB 1000 milliGRAM(s) IV Intermittent every 8 hours        ENDOCRINE  CAPILLARY BLOOD GLUCOSE      POCT Blood Glucose.: 185 mg/dL (21 Jan 2020 11:19)  POCT Blood Glucose.: 54 mg/dL (21 Jan 2020 11:14)    Meds: atorvastatin 40 milliGRAM(s) Oral <User Schedule>  hydrocortisone sodium succinate Injectable 100 milliGRAM(s) IV Push every 8 hours  levothyroxine 50 MICROGram(s) Oral daily        ACCESS DEVICES:  [x] Peripheral IV  [ ] Central Venous Line	[ ] R	[ ] L	[ ] IJ	[ ] Fem	[ ] SC	Placed:   [ ] Arterial Line		[ ] R	[ ] L	[ ] Fem	[ ] Rad	[ ] Ax	Placed:   [ ] PICC:					[ ] Mediport  [ ] Urinary Catheter, Date Placed:   [x] Necessity of urinary, arterial, and venous catheters discussed    OTHER MEDICATIONS:  AQUAPHOR (petrolatum Ointment) 1 Application(s) Topical three times a day  artificial tears (preservative free) Ophthalmic Solution 1 Drop(s) Both EYES two times a day  chlorhexidine 2% Cloths 1 Application(s) Topical <User Schedule>  clobetasol 0.05% Ointment 1 Application(s) Topical two times a day  FIRST- Mouthwash  BLM 5 milliLiter(s) Swish and Spit four times a day PRN  lidocaine   Patch 1 Patch Transdermal daily  lidocaine 1% Injectable 50 milliLiter(s) Local Injection once  nystatin Powder 1 Application(s) Topical two times a day  zinc oxide 20% Ointment 1 Application(s) Topical three times a day PRN      CODE STATUS: full code       IMAGING: < from: CT Abdomen and Pelvis w/ Oral Cont (01.15.20 @ 14:12) >  IMPRESSION:     Small bilateral pleural effusions, new on the right and increased on the left since prior study.    Several scattered right upper lobe groundglass opacities.    Redemonstrated open midline anterior abdominal wall wound with an overlying wound VAC. Interval decrease in size of the underlying left abdominal multiloculated fluid collection, which is poorly defined on current study.    Trace ascites.      < end of copied text >

## 2020-01-22 NOTE — CHART NOTE - NSCHARTNOTEFT_GEN_A_CORE
Dermatology Chart Note    f/u erythroderma    -Patient feels cold.  -Still euthermic  -Now off all abx (meropenem ends today)  -Patient has only received clobetasol 1-2 times on back, has not received on whole trunk and extremities.    PAST MEDICAL & SURGICAL HISTORY:  Morbid Obesity  Post Menopausal Bleeding  Polyp, Vagina  Acid Reflux  HTN (Hypertension)  S/P D&C: 2009, w vaginal polypectomy      FAMILY HISTORY:      SOCIAL HISTORY:  - Active smoker 1pack day for many years  - Lives in private residence with domestic partner    MEDICATIONS  (STANDING):  lactated ringers Bolus 1000 milliLiter(s) IV Bolus once  morphine  - Injectable 4 milliGRAM(s) IV Push once  ondansetron Injectable 4 milliGRAM(s) IV Push once  piperacillin/tazobactam IVPB. 3.375 Gram(s) IV Intermittent Once    MEDICATIONS  (PRN):    Allergies    sulfa drugs (Unknown)    Intolerances        Vital Signs Last 24 Hrs  T(C): 37.1 (25 Nov 2019 22:50), Max: 37.1 (25 Nov 2019 17:53)  T(F): 98.7 (25 Nov 2019 22:50), Max: 98.8 (25 Nov 2019 17:53)  HR: 98 (25 Nov 2019 22:50) (62 - 98)  BP: 107/73 (25 Nov 2019 22:50) (107/73 - 110/70)  BP(mean): --  RR: 16 (25 Nov 2019 22:50) (16 - 16)  SpO2: 94% (25 Nov 2019 22:50) (94% - 100%)  Daily     Daily     General: NAD, well-nourished  HEENT: Atraumatic, EOMI  Resp: Breathing comfortably on RA  CV: Normal sinus rhythm  Abd: soft, distended, mildly tender in LLQ, no RT/guarding  Ext: ROMIx4, motor strength intact x 4                          16.9   11.20 )-----------( 333      ( 25 Nov 2019 20:43 )             50.6     11-25    131<L>  |  97  |  20  ----------------------------<  151<H>  6.1<H>   |  17<L>  |  0.78    Ca    10.1      25 Nov 2019 20:43  Phos  3.1     11-25  Mg     2.2     11-25    TPro  8.0  /  Alb  2.9<L>  /  TBili  1.3<H>  /  DBili  x   /  AST  36  /  ALT  33  /  AlkPhos  108  11-25    PT/INR - ( 25 Nov 2019 20:43 )   PT: 15.2 sec;   INR: 1.31 ratio         PTT - ( 25 Nov 2019 20:43 )  PTT:27.2 sec      Radiographic Findings:             EXAM:  CT ABDOMEN AND PELVIS IC                            PROCEDURE DATE:  11/25/2019            INTERPRETATION:  CLINICAL INFORMATION: Constipation for weeks.    COMPARISON: None.    PROCEDURE:   CT of the Abdomen and Pelvis was performed with intravenous contrast.   Intravenous contrast: 125 ml Omnipaque 350. 25 ml discarded.  Oral contrast: None.  Sagittal and coronal reformats were performed.    FINDINGS:    LOWER CHEST: Aortic valve and coronary artery calcifications. Calcified   granuloma in the right lower lobe.     LIVER: Small calcifications in the right lobe the liver.  BILE DUCTS: Normal caliber.  GALLBLADDER: Distended, up to 6.3 cm. No gallbladder wall thickening or   pericholecystic fluid.  SPLEEN: Within normal limits.  PANCREAS: Within normal limits.  ADRENALS: Within normal limits.  KIDNEYS/URETERS: 1.2 cm indeterminate right lower pole exophytic   hypodensity (5, 7). Additional subcentimeter hypodensities too small to   characterize within the right kidney. No hydronephrosis.    BLADDER: Underdistended, limiting evaluation.  REPRODUCTIVE ORGANS: The uterus is within normal limits. 2.4 cm left   adnexal cystic lesion (3:106).    BOWEL: The esophagus is slightly thickened. Colonic diverticulosis. Wall   thickening and inflammatory stranding centered around the sigmoid colon.   Small ascites and free air with suspected perforation from the proximal   sigmoid colon (3, 85). Areas of peripheral enhancement within the   peritoneal fluid without organized drainable collection identified at   this time. No bowel obstruction. Appendix is normal.  VESSELS: Atherosclerotic changes.  RETROPERITONEUM/LYMPH NODES: No lymphadenopathy.    ABDOMINAL WALL: Small umbilical hernia containing fat and free air.   Nonspecific lower anterior abdominal wall edema.  BONES: Degenerative changes. Grade 1 anterolisthesis at L4-L5 and grade 1   listhesis at L5-S1    IMPRESSION:     Perforated sigmoid diverticulitis with intraperitoneal free air and   peripherally enhancing free fluid. No discrete abscess identified at this   time.    Thickening of the esophagus. Correlation with endoscopy is recommended on   a nonemergent basis.    Indeterminate 1.2 cm right lower pole exophytic hypodensity. Further   evaluation with nonurgent ultrasound or MR is recommended.    Urgent findings were discussed with Dr. Garrett at 11/25/2019 10:59 PM by    with read back confirmation.                    SASCHA CAMACHO M.D., RADIOLOGY RESIDENT  This document has been electronically signed.  SHYAM GALLO M.D., ATTENDING RADIOLOGIST  This document has been electronically signed. Nov 25 2019 11:26PM (26 Nov 2019 00:21)  ]]]]    PHYSICAL EXAM:  Vital Signs Last 24 Hrs  T(C): 37.1 (22 Jan 2020 15:00), Max: 37.6 (22 Jan 2020 07:00)  T(F): 98.8 (22 Jan 2020 15:00), Max: 99.7 (22 Jan 2020 07:00)  HR: 79 (22 Jan 2020 16:00) (75 - 157)  BP: 82/46 (22 Jan 2020 16:00) (75/52 - 113/58)  BP(mean): 59 (22 Jan 2020 16:00) (59 - 76)  RR: 19 (22 Jan 2020 16:00) (13 - 37)  SpO2: 98% (22 Jan 2020 16:00) (94% - 100%)    Skin exam notable for:  The patient was alert and oriented X 3, well nourished, and in no apparent distress. Oropharynx showed no ulcerations. There was no visible lymphadenopathy. Conjunctiva were non-injected. There was no clubbing or edema of extremities.    The scalp, hair, face, eyebrows, lips, oropharynx , neck, chest, back, buttocks, extremities X 4, hands, feet, nails were examined. There was no hyperhidrosis or bromhidrosis.     The following lesions are noted:     - Generalized erythema and scaling on neck, trunk and extremities  - Several large erosions on back and buttocks.  - No mucosal involvement of eyes or mouth.    ASSESSMENT/PLAN:    1) Erythroderma, likely drug-induced  - Most likely 2/2 vancomycin, though cannot r/o meropenem-induced.  - Last dose of vancomycin was 1/19, last dose of meropenem 1/22  - Continue Vaseline TID to affected areas on face, trunk and extremities.  - Continue zinc oxide ointment BID to TID to erosions on back and buttocks  - Continue clobetasol ointment BID to affected areas (ESSENTIALLY ENTIRE BODY FROM NECK DOWN) on trunk and extremities. Do not apply on face.  - Hypothermia resolved; treat w/ supportive measures if recurs.    Discussed with primary team.  Discussed with attending, Dr. Urbano.    Connor Jiménez MD  PGY2, Dermatology

## 2020-01-22 NOTE — CONSULT NOTE ADULT - ATTENDING COMMENTS
Alert, conversant but distracted  1.  ARF--likely hemodynamic but meds (ie prior vanco/zosyn, other), 3rd spacing (hypoalbuminemia, rash).  Volume, hemodynamic optimization.  No immediate RRT requirement but may be needed in near future  2.  Hyperkalemia-- med rx, lasix+NaHCO3 if >6.  Renal replacement therapy may be required if significantly worsens  3.  Acidosis--NaHCO3 but agree that albumin and addressing 5 needed to deal with worsening volume overload  4.  Volume overload--multifactorial.  Judicious diuresis with albumin and attention to 5  5.  Protein calorie malnutrition, severe--reviewed with dietary and increased protein with commensurate calories can benefit a host of clinical issues  Champ Medina MD  cell [phone]

## 2020-01-22 NOTE — CONSULT NOTE ADULT - SUBJECTIVE AND OBJECTIVE BOX
[facility] DIVISION OF KIDNEY DISEASES AND HYPERTENSION -- INITIAL CONSULT NOTE  --------------------------------------------------------------------------------  HPI:    63yo F with PMHx morbid obesity, GERD, HTN, COPD, and PSHx D&C now s/p ex laparotomy with extended left hemicolectomy 11/26 for perforated and necrotic sigmoid colon with gross abdominal contamination. RTOR 11/29 for abd closure, RUQ end transverse colostomy creation. On 12/19 patient found to have induration of wound and keri-stomal fat necrosis, s/p bedside debridement w/ wound VAC in placement on midline wound. Began showing signs of sepsis and was transferred to SICU 1/7 for further management. Now w/ decreasing pressor support and downtrending leukocytosis. Troponins have been elevated and echo shows decreased ventricular wall motility.          PAST HISTORY  --------------------------------------------------------------------------------  PAST MEDICAL & SURGICAL HISTORY:  Morbid Obesity  Post Menopausal Bleeding  Polyp, Vagina  Acid Reflux  HTN (Hypertension)  S/P D&C: 2009, w vaginal polypectomy    FAMILY HISTORY:    PAST SOCIAL HISTORY:    ALLERGIES & MEDICATIONS  --------------------------------------------------------------------------------  Allergies    IV Contrast (Rash; Pruritus; Hypotension)  sulfa drugs (Unknown)    Intolerances    vancomycin (Rash (Severe))    Standing Inpatient Medications  AQUAPHOR (petrolatum Ointment) 1 Application(s) Topical three times a day  artificial tears (preservative free) Ophthalmic Solution 1 Drop(s) Both EYES two times a day  aspirin  chewable 81 milliGRAM(s) Oral <User Schedule>  atorvastatin 40 milliGRAM(s) Oral <User Schedule>  buDESOnide    Inhalation Suspension 0.5 milliGRAM(s) Inhalation every 12 hours  chlorhexidine 2% Cloths 1 Application(s) Topical <User Schedule>  clobetasol 0.05% Ointment 1 Application(s) Topical two times a day  doxazosin 2 milliGRAM(s) Oral <User Schedule>  enoxaparin Injectable 100 milliGRAM(s) SubCutaneous <User Schedule>  erythromycin     base Tablet 250 milliGRAM(s) Oral <User Schedule>  hydrocortisone sodium succinate Injectable 100 milliGRAM(s) IV Push every 8 hours  levothyroxine 50 MICROGram(s) Oral daily  lidocaine   Patch 1 Patch Transdermal daily  lidocaine 1% Injectable 50 milliLiter(s) Local Injection once  methadone    Tablet 17.5 milliGRAM(s) Oral <User Schedule>  midodrine 30 milliGRAM(s) Oral <User Schedule>  nystatin Powder 1 Application(s) Topical two times a day  pantoprazole    Tablet 40 milliGRAM(s) Oral <User Schedule>  polyethylene glycol 3350 17 Gram(s) Oral <User Schedule>  sodium bicarbonate  Infusion 0.067 mEq/kG/Hr IV Continuous <Continuous>    PRN Inpatient Medications  acetaminophen  IVPB .. 1000 milliGRAM(s) IV Intermittent every 6 hours PRN  albuterol/ipratropium for Nebulization 3 milliLiter(s) Nebulizer every 6 hours PRN  aluminum hydroxide/magnesium hydroxide/simethicone Suspension 30 milliLiter(s) Oral every 4 hours PRN  diphenhydrAMINE 25 milliGRAM(s) Oral every 12 hours PRN  FIRST- Mouthwash  BLM 5 milliLiter(s) Swish and Spit four times a day PRN  HYDROmorphone  Injectable 1 milliGRAM(s) IV Push every 4 hours PRN  oxyCODONE    IR 10 milliGRAM(s) Oral every 4 hours PRN  zinc oxide 20% Ointment 1 Application(s) Topical three times a day PRN      REVIEW OF SYSTEMS  --------------------------------------------------------------------------------  Gen: No weight changes, fatigue, fevers/chills, weakness  Skin: No rashes  Head/Eyes/Ears/Mouth: No headache; Normal hearing; Normal vision w/o blurriness; No sinus pain/discomfort, sore throat  Respiratory: No dyspnea, cough, wheezing, hemoptysis  CV: No chest pain, PND, orthopnea  GI: No abdominal pain, diarrhea, constipation, nausea, vomiting, melena, hematochezia  : No increased frequency, dysuria, hematuria, nocturia  MSK: No joint pain/swelling; no back pain; no edema  Neuro: No dizziness/lightheadedness, weakness, seizures, numbness, tingling  Heme: No easy bruising or bleeding  Endo: No heat/cold intolerance  Psych: No significant nervousness, anxiety, stress, depression    All other systems were reviewed and are negative, except as noted.    VITALS/PHYSICAL EXAM  --------------------------------------------------------------------------------  T(C): 37.1 (01-22-20 @ 11:00), Max: 37.6 (01-22-20 @ 07:00)  HR: 80 (01-22-20 @ 11:00) (75 - 157)  BP: 87/46 (01-22-20 @ 11:00) (75/52 - 113/58)  RR: 21 (01-22-20 @ 11:00) (13 - 32)  SpO2: 100% (01-22-20 @ 11:00) (94% - 100%)  Wt(kg): --        01-21-20 @ 07:01  -  01-22-20 @ 07:00  --------------------------------------------------------  IN: 990 mL / OUT: 0 mL / NET: 990 mL    01-22-20 @ 07:01  -  01-22-20 @ 12:31  --------------------------------------------------------  IN: 1420 mL / OUT: 450 mL / NET: 970 mL      Physical Exam:  	Gen: NAD, well-appearing  	HEENT: PERRL, supple neck, clear oropharynx  	Pulm: CTA B/L  	CV: RRR, S1S2; no rub  	Back: No spinal or CVA tenderness; no sacral edema  	Abd: +BS, soft, nontender/nondistended  	: No suprapubic tenderness  	UE: Warm, FROM, no clubbing, intact strength; no edema; no asterixis  	LE: Warm, FROM, no clubbing, intact strength; no edema  	Neuro: No focal deficits, intact gait  	Psych: Normal affect and mood  	Skin: Warm, without rashes  	Vascular access:    LABS/STUDIES  --------------------------------------------------------------------------------              8.9    10.00 >-----------<  148      [01-22-20 @ 00:19]              28.9     136  |  108  |  37  ----------------------------<  88      [01-22-20 @ 08:38]  5.8   |  16  |  2.02        Ca     8.0     [01-22-20 @ 08:38]      Mg     2.4     [01-22-20 @ 08:38]      Phos  5.7     [01-22-20 @ 08:38]    TPro  5.2  /  Alb  2.2  /  TBili  0.6  /  DBili  0.3  /  AST  34  /  ALT  16  /  AlkPhos  267  [01-22-20 @ 00:19]          Creatinine Trend:  SCr 2.02 [01-22 @ 08:38]  SCr 1.88 [01-22 @ 00:19]  SCr 1.61 [01-21 @ 01:31]  SCr 1.12 [01-20 @ 01:25]  SCr 0.81 [01-19 @ 01:56]    Urinalysis - [01-22-20 @ 11:09]      Color Yellow / Appearance Slightly Turbid / SG 1.028 / pH 5.5      Gluc Negative / Ketone Trace  / Bili Negative / Urobili 4 mg/dL       Blood Small / Protein 30 mg/dL / Leuk Est Negative / Nitrite Negative      RBC 1 / WBC 6 / Hyaline 149 / Gran  / Sq Epi  / Non Sq Epi 12 / Bacteria Negative    Urine Creatinine 146      [01-22-20 @ 10:10]  Urine Sodium <35      [01-22-20 @ 10:10]  Urine Osmolality 317      [01-22-20 @ 10:10]    TSH 4.81      [01-17-20 @ 03:52]  Lipid: chol 98, TG 91, HDL 28, LDL 52      [12-30-19 @ 08:46]    HBsAb Nonreactive      [08-13-15 @ 10:20]  HBcAb Nonreactive      [08-13-15 @ 10:20] [facility] DIVISION OF KIDNEY DISEASES AND HYPERTENSION -- INITIAL CONSULT NOTE  --------------------------------------------------------------------------------  HPI:    64y Female pmhx morbid obesity, acid reflux, HTN, COPD and PSH D&C presented to the ED on 11/26 c/o abdominal pain and bloating. Patient reports having constipation for 2 weeks and has been taking enemas, miralax and senna and passing small hard balls for the last weeks. Reported pain worst in the LLQ that started a few days ago and has been worsening in the last day, also has noted significant abdominal distention in the last day. Reported she had difficulty walking short distances because she becomes SOB. In ED, CT demonstrated perforated sigmoid diverticulitis with intraperitoneal free air. She was taken to the operating room on 11/25 overnight and underwent an exploratory laparotomy with extended left hemicolectomy and was left in discontinuity. An Abthera VAC was placed. She was kept intubated post op and brought to the ICU for hemodynamic and respiratory management. She went back to the OR on 11/29 for transverse end colostomy and fascial closure with wound vac.  Pt transferred out of SICU and recovering on floor with post-op course including induration of wound and colostomy necrosis requiring bedside debridement and wound vac placement.  Pt had CT on 1/6 for leukocytosis and to evaluate wound, which showed loculated fluid collections in the left anterior abdomen, very near loops of bowel.  After CT, pt reported feeling "worsey.          PAST HISTORY  --------------------------------------------------------------------------------  PAST MEDICAL & SURGICAL HISTORY:  Morbid Obesity  Post Menopausal Bleeding  Polyp, Vagina  Acid Reflux  HTN (Hypertension)  S/P D&C: 2009, w vaginal polypectomy    FAMILY HISTORY:    PAST SOCIAL HISTORY:    ALLERGIES & MEDICATIONS  --------------------------------------------------------------------------------  Allergies    IV Contrast (Rash; Pruritus; Hypotension)  sulfa drugs (Unknown)    Intolerances    vancomycin (Rash (Severe))    Standing Inpatient Medications  AQUAPHOR (petrolatum Ointment) 1 Application(s) Topical three times a day  artificial tears (preservative free) Ophthalmic Solution 1 Drop(s) Both EYES two times a day  aspirin  chewable 81 milliGRAM(s) Oral <User Schedule>  atorvastatin 40 milliGRAM(s) Oral <User Schedule>  buDESOnide    Inhalation Suspension 0.5 milliGRAM(s) Inhalation every 12 hours  chlorhexidine 2% Cloths 1 Application(s) Topical <User Schedule>  clobetasol 0.05% Ointment 1 Application(s) Topical two times a day  doxazosin 2 milliGRAM(s) Oral <User Schedule>  enoxaparin Injectable 100 milliGRAM(s) SubCutaneous <User Schedule>  erythromycin     base Tablet 250 milliGRAM(s) Oral <User Schedule>  hydrocortisone sodium succinate Injectable 100 milliGRAM(s) IV Push every 8 hours  levothyroxine 50 MICROGram(s) Oral daily  lidocaine   Patch 1 Patch Transdermal daily  lidocaine 1% Injectable 50 milliLiter(s) Local Injection once  methadone    Tablet 17.5 milliGRAM(s) Oral <User Schedule>  midodrine 30 milliGRAM(s) Oral <User Schedule>  nystatin Powder 1 Application(s) Topical two times a day  pantoprazole    Tablet 40 milliGRAM(s) Oral <User Schedule>  polyethylene glycol 3350 17 Gram(s) Oral <User Schedule>  sodium bicarbonate  Infusion 0.067 mEq/kG/Hr IV Continuous <Continuous>    PRN Inpatient Medications  acetaminophen  IVPB .. 1000 milliGRAM(s) IV Intermittent every 6 hours PRN  albuterol/ipratropium for Nebulization 3 milliLiter(s) Nebulizer every 6 hours PRN  aluminum hydroxide/magnesium hydroxide/simethicone Suspension 30 milliLiter(s) Oral every 4 hours PRN  diphenhydrAMINE 25 milliGRAM(s) Oral every 12 hours PRN  FIRST- Mouthwash  BLM 5 milliLiter(s) Swish and Spit four times a day PRN  HYDROmorphone  Injectable 1 milliGRAM(s) IV Push every 4 hours PRN  oxyCODONE    IR 10 milliGRAM(s) Oral every 4 hours PRN  zinc oxide 20% Ointment 1 Application(s) Topical three times a day PRN      REVIEW OF SYSTEMS  --------------------------------------------------------------------------------  Gen: No weight changes, fatigue, fevers/chills, weakness  Skin: No rashes  Head/Eyes/Ears/Mouth: No headache; Normal hearing; Normal vision w/o blurriness; No sinus pain/discomfort, sore throat  Respiratory: No dyspnea, cough, wheezing, hemoptysis  CV: No chest pain, PND, orthopnea  GI: No abdominal pain, diarrhea, constipation, nausea, vomiting, melena, hematochezia  : No increased frequency, dysuria, hematuria, nocturia  MSK: No joint pain/swelling; no back pain; no edema  Neuro: No dizziness/lightheadedness, weakness, seizures, numbness, tingling  Heme: No easy bruising or bleeding  Endo: No heat/cold intolerance  Psych: No significant nervousness, anxiety, stress, depression    All other systems were reviewed and are negative, except as noted.    VITALS/PHYSICAL EXAM  --------------------------------------------------------------------------------  T(C): 37.1 (01-22-20 @ 11:00), Max: 37.6 (01-22-20 @ 07:00)  HR: 80 (01-22-20 @ 11:00) (75 - 157)  BP: 87/46 (01-22-20 @ 11:00) (75/52 - 113/58)  RR: 21 (01-22-20 @ 11:00) (13 - 32)  SpO2: 100% (01-22-20 @ 11:00) (94% - 100%)  Wt(kg): --        01-21-20 @ 07:01  -  01-22-20 @ 07:00  --------------------------------------------------------  IN: 990 mL / OUT: 0 mL / NET: 990 mL    01-22-20 @ 07:01  -  01-22-20 @ 12:31  --------------------------------------------------------  IN: 1420 mL / OUT: 450 mL / NET: 970 mL      Physical Exam:  	Gen: NAD, well-appearing  	HEENT: PERRL, supple neck, clear oropharynx  	Pulm: CTA B/L  	CV: RRR, S1S2; no rub  	Back: No spinal or CVA tenderness; no sacral edema  	Abd: +BS, soft, nontender/nondistended  	: No suprapubic tenderness  	UE: Warm, FROM, no clubbing, intact strength; no edema; no asterixis  	LE: Warm, FROM, no clubbing, intact strength; no edema  	Neuro: No focal deficits, intact gait  	Psych: Normal affect and mood  	Skin: Warm, without rashes  	Vascular access:    LABS/STUDIES  --------------------------------------------------------------------------------              8.9    10.00 >-----------<  148      [01-22-20 @ 00:19]              28.9     136  |  108  |  37  ----------------------------<  88      [01-22-20 @ 08:38]  5.8   |  16  |  2.02        Ca     8.0     [01-22-20 @ 08:38]      Mg     2.4     [01-22-20 @ 08:38]      Phos  5.7     [01-22-20 @ 08:38]    TPro  5.2  /  Alb  2.2  /  TBili  0.6  /  DBili  0.3  /  AST  34  /  ALT  16  /  AlkPhos  267  [01-22-20 @ 00:19]          Creatinine Trend:  SCr 2.02 [01-22 @ 08:38]  SCr 1.88 [01-22 @ 00:19]  SCr 1.61 [01-21 @ 01:31]  SCr 1.12 [01-20 @ 01:25]  SCr 0.81 [01-19 @ 01:56]    Urinalysis - [01-22-20 @ 11:09]      Color Yellow / Appearance Slightly Turbid / SG 1.028 / pH 5.5      Gluc Negative / Ketone Trace  / Bili Negative / Urobili 4 mg/dL       Blood Small / Protein 30 mg/dL / Leuk Est Negative / Nitrite Negative      RBC 1 / WBC 6 / Hyaline 149 / Gran  / Sq Epi  / Non Sq Epi 12 / Bacteria Negative    Urine Creatinine 146      [01-22-20 @ 10:10]  Urine Sodium <35      [01-22-20 @ 10:10]  Urine Osmolality 317      [01-22-20 @ 10:10]    TSH 4.81      [01-17-20 @ 03:52]  Lipid: chol 98, TG 91, HDL 28, LDL 52      [12-30-19 @ 08:46]    HBsAb Nonreactive      [08-13-15 @ 10:20]  HBcAb Nonreactive      [08-13-15 @ 10:20] [facility] DIVISION OF KIDNEY DISEASES AND HYPERTENSION -- INITIAL CONSULT NOTE  --------------------------------------------------------------------------------  HPI:    64y Female pmhx morbid obesity, acid reflux, HTN, COPD and PSH D&C presented to the ED on 11/26 c/o abdominal pain and bloating Had a complicated hospital course, initially presented with constipation for 2 weeks at that time reported pain worst in the LLQ in ED, CT demonstrated perforated sigmoid diverticulitis with intraperitoneal free air, on 11/25  underwent an exploratory laparotomy with extended left hemicolectomy and was left in discontinuity. An Abthera VAC was placed. She went back to the OR on 11/29 for transverse end colostomy and fascial closure with wound vac.  Pt transferred out of SICU and recovering on floor with post-op course including induration of wound and colostomy necrosis requiring bedside debridement and wound vac placement, on 01/07/20 had worsening hypotension from presumed sepsis requiring readmission to SICU, imaging revealed loculated fluid collections in the left anterior abdomen & a small abdominal wall collection, both of which were not amenable to drainage, cultures were also positive for E. coli & Klebsiella in the urine, Stenotrophomonas in the sputum, & Staph epidermis in the blood, LUE PICC discontinue, on 01/10/20 patient started on vasopressor, developed some ekg changed, elevated troponin and new TTE findings  mild to moderate segmental LV systolic dysfunction w/ hypokinesis of the inferior and inferolateral walls but cardiology determined the troponin leak to be stressed-induced in the setting of sepsis, on 01/13/20 developed diffuse erythematous rash on  her trunk & extremities, superficial desquamation of the face, trunk, & extremities, & multiple erosions, dermatology consulted, suggest drug reaction from abx, for which zosyn and vancomycin was discontinued, since then has been on pressors until 01/20/20 were able to wean her off, now having worsening scr and oliguria hence nephrology consulted. On review of labs pt had normal scr, noted to rise since 01/20/20 to 1.2 from 0.8 now 2 mg/dl, marquez place today with urine output of 10 cc/hr last 3 hours and rising K with worsening acidosis.     During examination pt was awake, oriented x 3, seem fatigued, having pain over arm and legs, denied cp/sob/n/v. Complaining from the  rash itchy and tender.           PAST HISTORY  --------------------------------------------------------------------------------  PAST MEDICAL & SURGICAL HISTORY:  Morbid Obesity  Post Menopausal Bleeding  Polyp, Vagina  Acid Reflux  HTN (Hypertension)  S/P D&C: 2009, w vaginal polypectomy    FAMILY HISTORY:    PAST SOCIAL HISTORY:    ALLERGIES & MEDICATIONS  --------------------------------------------------------------------------------  Allergies    IV Contrast (Rash; Pruritus; Hypotension)  sulfa drugs (Unknown)    Intolerances    vancomycin (Rash (Severe))    Standing Inpatient Medications  AQUAPHOR (petrolatum Ointment) 1 Application(s) Topical three times a day  artificial tears (preservative free) Ophthalmic Solution 1 Drop(s) Both EYES two times a day  aspirin  chewable 81 milliGRAM(s) Oral <User Schedule>  atorvastatin 40 milliGRAM(s) Oral <User Schedule>  buDESOnide    Inhalation Suspension 0.5 milliGRAM(s) Inhalation every 12 hours  chlorhexidine 2% Cloths 1 Application(s) Topical <User Schedule>  clobetasol 0.05% Ointment 1 Application(s) Topical two times a day  doxazosin 2 milliGRAM(s) Oral <User Schedule>  enoxaparin Injectable 100 milliGRAM(s) SubCutaneous <User Schedule>  erythromycin     base Tablet 250 milliGRAM(s) Oral <User Schedule>  hydrocortisone sodium succinate Injectable 100 milliGRAM(s) IV Push every 8 hours  levothyroxine 50 MICROGram(s) Oral daily  lidocaine   Patch 1 Patch Transdermal daily  lidocaine 1% Injectable 50 milliLiter(s) Local Injection once  methadone    Tablet 17.5 milliGRAM(s) Oral <User Schedule>  midodrine 30 milliGRAM(s) Oral <User Schedule>  nystatin Powder 1 Application(s) Topical two times a day  pantoprazole    Tablet 40 milliGRAM(s) Oral <User Schedule>  polyethylene glycol 3350 17 Gram(s) Oral <User Schedule>  sodium bicarbonate  Infusion 0.067 mEq/kG/Hr IV Continuous <Continuous>    PRN Inpatient Medications  acetaminophen  IVPB .. 1000 milliGRAM(s) IV Intermittent every 6 hours PRN  albuterol/ipratropium for Nebulization 3 milliLiter(s) Nebulizer every 6 hours PRN  aluminum hydroxide/magnesium hydroxide/simethicone Suspension 30 milliLiter(s) Oral every 4 hours PRN  diphenhydrAMINE 25 milliGRAM(s) Oral every 12 hours PRN  FIRST- Mouthwash  BLM 5 milliLiter(s) Swish and Spit four times a day PRN  HYDROmorphone  Injectable 1 milliGRAM(s) IV Push every 4 hours PRN  oxyCODONE    IR 10 milliGRAM(s) Oral every 4 hours PRN  zinc oxide 20% Ointment 1 Application(s) Topical three times a day PRN      REVIEW OF SYSTEMS  --------------------------------------------------------------------------------  Gen: no lethargy, + fatigue  Respiratory: No dyspnea  CV: No chest pain  GI: No abdominal pain  MSK: +++LE edema  Neuro: No dizziness  Heme: No bleeding  Skin: Desquamative rash.     All other systems were reviewed and are negative, except as noted.      VITALS/PHYSICAL EXAM  --------------------------------------------------------------------------------  T(C): 37.1 (01-22-20 @ 11:00), Max: 37.6 (01-22-20 @ 07:00)  HR: 80 (01-22-20 @ 11:00) (75 - 157)  BP: 87/46 (01-22-20 @ 11:00) (75/52 - 113/58)  RR: 21 (01-22-20 @ 11:00) (13 - 32)  SpO2: 100% (01-22-20 @ 11:00) (94% - 100%)  Wt(kg): --        01-21-20 @ 07:01  -  01-22-20 @ 07:00  --------------------------------------------------------  IN: 990 mL / OUT: 0 mL / NET: 990 mL    01-22-20 @ 07:01  -  01-22-20 @ 12:31  --------------------------------------------------------  IN: 1420 mL / OUT: 450 mL / NET: 970 mL      Physical Exam:  	Gen: mild distress  	HEENT:  supple neck, clear oropharynx  	Pulm: CTA B/L  	CV: RRR, S1S2; no rub  	Abd: +BS, wound vac noted.   	: No suprapubic tenderness. has marquez dark colored urine.   	UE: no edema;   	LE: no edema  	Neuro: No focal deficits  	Skin: Erythematous rash desquamative   	Vascular access: rij IJ central line.     LABS/STUDIES  --------------------------------------------------------------------------------              8.9    10.00 >-----------<  148      [01-22-20 @ 00:19]              28.9     136  |  108  |  37  ----------------------------<  88      [01-22-20 @ 08:38]  5.8   |  16  |  2.02        Ca     8.0     [01-22-20 @ 08:38]      Mg     2.4     [01-22-20 @ 08:38]      Phos  5.7     [01-22-20 @ 08:38]    TPro  5.2  /  Alb  2.2  /  TBili  0.6  /  DBili  0.3  /  AST  34  /  ALT  16  /  AlkPhos  267  [01-22-20 @ 00:19]          Creatinine Trend:  SCr 2.02 [01-22 @ 08:38]  SCr 1.88 [01-22 @ 00:19]  SCr 1.61 [01-21 @ 01:31]  SCr 1.12 [01-20 @ 01:25]  SCr 0.81 [01-19 @ 01:56]    Urinalysis - [01-22-20 @ 11:09]      Color Yellow / Appearance Slightly Turbid / SG 1.028 / pH 5.5      Gluc Negative / Ketone Trace  / Bili Negative / Urobili 4 mg/dL       Blood Small / Protein 30 mg/dL / Leuk Est Negative / Nitrite Negative      RBC 1 / WBC 6 / Hyaline 149 / Gran  / Sq Epi  / Non Sq Epi 12 / Bacteria Negative    Urine Creatinine 146      [01-22-20 @ 10:10]  Urine Sodium <35      [01-22-20 @ 10:10]  Urine Osmolality 317      [01-22-20 @ 10:10]    TSH 4.81      [01-17-20 @ 03:52]  Lipid: chol 98, TG 91, HDL 28, LDL 52      [12-30-19 @ 08:46]    HBsAb Nonreactive      [08-13-15 @ 10:20]  HBcAb Nonreactive      [08-13-15 @ 10:20]

## 2020-01-22 NOTE — PROGRESS NOTE ADULT - ASSESSMENT
64 F PMHx of HTN, COPD, and morbid obesity who was admitted on 11/26 for perforated sigmoid diverticulitis.  No fever, has leukocytosis  Prolonged hospital stay  Perforated diverticulum, culture with E coli, s/p OR into washout and ostomy  S/p treatment courses for PICC CoNS CLABSI and Steno in sputum  CTs with improvement in intraabd collections  CXR clear  Overall,  1) CoNS bacteremia--? CLABSI  - S/p course treatment  - Repeat BCXs, 1/26/20 surveillance  2) Loculated fluid collections  - Infected collections, most recent CT reassuring  - Meropenem stopped today (s/p 14 day course)  - Consider repeat CT to further evaluate collections  3) Leukocytosis  - Trend to normal, monitor for further signs infection  4) Positive culture finding  - Steno from sputum  - S/p treatment course  - Monitor for further signs pna    Discussed with SICU team    Sen Johnson MD  Pager [phone]  After 5pm and on weekends call [phone]

## 2020-01-22 NOTE — PROGRESS NOTE ADULT - ASSESSMENT
65 y/o female presenting with perforated sigmoid diverticulitis s/p exploratory laparotomy, extended left hemicolectomy, and left in discontinuity w/ Abthera VAC placement with return to OR for re-exploratory laparotomy, transverse end colostomy, and fascial closure with wound VAC placement. Hospital course complicated by gastroparesis, right subclavian steal syndrome, full body rash of unknown etiology, and refractory septic shock c/b stress-induced cardiomyopathy. Cultures were positive for E. coli & Klebsiella in the urine, Stenotrophomonas in the sputum, and Staph epidermis in the blood with subsequent removal of her LUE PICC.    PLAN:    Neuro: acute pain from her full body rash, opioid dependence  - Monitor mental status  - Pain control as needed with Lidoderm patch, acetaminophen, oxycodone, and Dilaudid  - Home methadone    Resp: COPD  - Monitor pulse oximeter  - Out of bed to chair and incentive spirometry to prevent atelectasis  - Pulmicort and Duoneb for COPD    CV: refractory septic shock c/b stress-induced cardiomyopathy  - Monitor vital signs  - Midodrine 30 mg q6hrs in the setting of persistent hypotension  - Repeat TTE when patient is stable off vasopressor support to look for improvement in LV function  - Home ASA  - Trend lactate, remains elevated at 2.1.     GI: perforated sigmoid diverticulitis s/p exploratory laparotomy, extended left hemicolectomy, and left in discontinuity w/ Abthera VAC placement with return to OR for re-exploratory laparotomy, transverse end colostomy, and fascial closure c/b gastroparesis; GERD  - Regular diet as tolerated  - Monitor ostomy  - Erythromycin for gastroparesis  - Protonix for GERD  - Maalox PRN indigestion    Renal: no acute issues  - Monitor I&Os  - Monitor electrolytes and replete as necessary  - Doxazosin for urinary retention    Heme: no acute issues  - Monitor CBC and coags  - Lovenox 100 mg daily for VTE prophylaxis, adjusted for BMI    ID: E. coli & Klebsiella UTI, Stenotrophomonas PNA, Staph epidermis bacteremia  - Monitor for clinical evidence of active infection  - Empiric antibiotics with vancomycin (ends 1/19 for 14 day course), meropenem (ends 1/22 for 14 day course), and levofloxacin (ends 1/19 for 7 day course)  - Blood cultures from 1/12 with no growth    Endo: HLD  - Started 50mg PO levothyroxine for hypothermia, hypothyroidism.   - Monitor glucose on BMP  - Home Lipitor  - Continue stress dose steroids     Integumentary:   - Appreciate Dermatology recs: vaseline, zinc oxide, clobetasol cream.     Disposition:  - Full code  - Will remain in SICU    - Jey Garcia PA-C

## 2020-01-22 NOTE — CHART NOTE - NSCHARTNOTEFT_GEN_A_CORE
Attempted to obtain consent for RRT in case urgent HD is needed, pt refused, SICU team at bed side, would suggest continue medical management with fluids and albumin as needed. Monitor uop. Will continue to follow.

## 2020-01-22 NOTE — PROGRESS NOTE ADULT - SUBJECTIVE AND OBJECTIVE BOX
Surgery Progress Note    S: Patient seen and examined. Per SICU yesterday Midodrine increased to 30 q6 hrs, Stress dose steroids started for adrenal insufficiency, Synthroid dose increased t 50mcg daily. No acute events overnight.     O:  Physical Exam  Constitutional: A&Ox3, NAD  Respiratory: CTA b/l  Cardiac: RRR, S1 and S2  Gastrointestinal: abdomen soft, NT/ND, vac in place, dressings c/d/i  skin: erythematous with skin sloughing  LE: 3+ pitting edema b/l, tender to pressing with doppler probe. AT signals b/l    Vital Signs Last 24 Hrs  T(C): 35.9 (21 Jan 2020 23:00), Max: 36.3 (21 Jan 2020 07:00)  T(F): 96.6 (21 Jan 2020 23:00), Max: 97.3 (21 Jan 2020 07:00)  HR: 75 (22 Jan 2020 03:00) (75 - 157)  BP: 82/47 (22 Jan 2020 03:00) (79/50 - 95/52)  BP(mean): 60 (22 Jan 2020 03:00) (59 - 72)  RR: 16 (22 Jan 2020 03:00) (13 - 40)  SpO2: 99% (22 Jan 2020 03:00) (89% - 100%)    I&O's Detail    20 Jan 2020 07:01  -  21 Jan 2020 07:00  --------------------------------------------------------  IN:    Oral Fluid: 1220 mL    Solution: 100 mL    Solution: 150 mL    Solution: 600 mL    vasopressin Infusion: 7.2 mL  Total IN: 2077.2 mL    OUT:    Colostomy: 1 mL    Drain: 15 mL  Total OUT: 16 mL    Total NET: 2061.2 mL      21 Jan 2020 07:01  -  22 Jan 2020 04:50  --------------------------------------------------------  IN:    Oral Fluid: 740 mL    Solution: 100 mL  Total IN: 840 mL    OUT:  Total OUT: 0 mL    Total NET: 840 mL                                8.9    10.00 )-----------( 148      ( 22 Jan 2020 00:19 )             28.9       01-22    135  |  106  |  37<H>  ----------------------------<  74  5.7<H>   |  16<L>  |  1.88<H>    Ca    8.0<L>      22 Jan 2020 00:19  Phos  5.7     01-22  Mg     2.4     01-22    TPro  5.2<L>  /  Alb  2.2<L>  /  TBili  0.6  /  DBili  0.3<H>  /  AST  34  /  ALT  16  /  AlkPhos  267<H>  01-22

## 2020-01-22 NOTE — PROGRESS NOTE ADULT - ATTENDING COMMENTS
Dr. Saleh (Attending Physician)  BULL with hyperkalemia not responding to fluids, unable to bladder scan will place marquez catheter. Will give IV fluid bolus. Start D51/2 NS with bicarb, send urine electrolytes, consult nephrology, shifting for hyperkalemia with insulin, d50, albuterol, and bicarb push.

## 2020-01-22 NOTE — PROGRESS NOTE ADULT - SUBJECTIVE AND OBJECTIVE BOX
CARDIOLOGY     PROGRESS  NOTE   ________________________________________________    CHIEF COMPLAINT:Patient is a 64y old  Female who presents with a chief complaint of Perforated bowel (19 Jan 2020 15:41)  no new complain.  	  REVIEW OF SYSTEMS:  CONSTITUTIONAL: No fever, weight loss, or fatigue  EYES: No eye pain, visual disturbances, or discharge  ENT:  No difficulty hearing, tinnitus, vertigo; No sinus or throat pain  NECK: No pain or stiffness  RESPIRATORY: No cough, wheezing, chills or hemoptysis; No Shortness of Breath  CARDIOVASCULAR: No chest pain, palpitations, passing out, dizziness, or leg swelling  GASTROINTESTINAL: No abdominal or epigastric pain. No nausea, vomiting, or hematemesis; No diarrhea or constipation. No melena or hematochezia.  GENITOURINARY: No dysuria, frequency, hematuria, or incontinence  NEUROLOGICAL: No headaches, memory loss, loss of strength, numbness, or tremors  SKIN: No itching, burning, rashes, or lesions   LYMPH Nodes: No enlarged glands  ENDOCRINE: No heat or cold intolerance; No hair loss  MUSCULOSKELETAL: No joint pain or swelling; No muscle, back, or extremity pain  PSYCHIATRIC: No depression, anxiety, mood swings, or difficulty sleeping  HEME/LYMPH: No easy bruising, or bleeding gums  ALLERGY AND IMMUNOLOGIC: No hives or eczema	    [ ] All others negative	  [ ] Unable to obtain    PHYSICAL EXAM:  T(C): 35.9 (01-21-20 @ 23:00), Max: 36.3 (01-21-20 @ 07:00)  HR: 79 (01-22-20 @ 05:11) (75 - 157)  BP: 94/60 (01-22-20 @ 05:00) (79/50 - 95/52)  RR: 24 (01-22-20 @ 05:00) (13 - 40)  SpO2: 98% (01-22-20 @ 05:11) (89% - 100%)  Wt(kg): --  I&O's Summary    20 Jan 2020 07:01  -  21 Jan 2020 07:00  --------------------------------------------------------  IN: 2077.2 mL / OUT: 16 mL / NET: 2061.2 mL    21 Jan 2020 07:01  -  22 Jan 2020 05:46  --------------------------------------------------------  IN: 840 mL / OUT: 0 mL / NET: 840 mL        Appearance: Normal	  HEENT:   Normal oral mucosa, PERRL, EOMI	  Lymphatic: No lymphadenopathy  Cardiovascular: Normal S1 S2, No JVD, +murmurs, + lymph edema  Respiratory: trhonchi  Psychiatry: A & O x 3, Mood & affect appropriate  Gastrointestinal:  Soft, +tender, + BS, vac  Skin: No rashes, No ecchymoses, No cyanosis	  Neurologic: Non-focal  Extremities: Normal range of motion, No clubbing, cyanosis .  Vascular: Peripheral pulses palpable 2+ bilaterally    MEDICATIONS  (STANDING):  AQUAPHOR (petrolatum Ointment) 1 Application(s) Topical three times a day  artificial tears (preservative free) Ophthalmic Solution 1 Drop(s) Both EYES two times a day  aspirin  chewable 81 milliGRAM(s) Oral <User Schedule>  atorvastatin 40 milliGRAM(s) Oral <User Schedule>  buDESOnide    Inhalation Suspension 0.5 milliGRAM(s) Inhalation every 12 hours  chlorhexidine 2% Cloths 1 Application(s) Topical <User Schedule>  clobetasol 0.05% Ointment 1 Application(s) Topical two times a day  doxazosin 2 milliGRAM(s) Oral <User Schedule>  enoxaparin Injectable 100 milliGRAM(s) SubCutaneous <User Schedule>  erythromycin     base Tablet 250 milliGRAM(s) Oral <User Schedule>  hydrocortisone sodium succinate Injectable 100 milliGRAM(s) IV Push every 8 hours  levothyroxine 50 MICROGram(s) Oral daily  lidocaine   Patch 1 Patch Transdermal daily  lidocaine 1% Injectable 50 milliLiter(s) Local Injection once  methadone    Tablet 17.5 milliGRAM(s) Oral <User Schedule>  midodrine 30 milliGRAM(s) Oral <User Schedule>  nystatin Powder 1 Application(s) Topical two times a day  pantoprazole    Tablet 40 milliGRAM(s) Oral <User Schedule>  polyethylene glycol 3350 17 Gram(s) Oral <User Schedule>      TELEMETRY: 	    ECG:  	  RADIOLOGY:  OTHER: 	  	  LABS:	 	    CARDIAC MARKERS:                                8.9    10.00 )-----------( 148      ( 22 Jan 2020 00:19 )             28.9     01-22    135  |  106  |  37<H>  ----------------------------<  74  5.7<H>   |  16<L>  |  1.88<H>    Ca    8.0<L>      22 Jan 2020 00:19  Phos  5.7     01-22  Mg     2.4     01-22    TPro  5.2<L>  /  Alb  2.2<L>  /  TBili  0.6  /  DBili  0.3<H>  /  AST  34  /  ALT  16  /  AlkPhos  267<H>  01-22    proBNP:   Lipid Profile: Cholesterol 98  LDL 52  HDL 28  TG 91    HgA1c:   TSH: Thyroid Stimulating Hormone, Serum: 4.81 uIU/mL (01-17 @ 03:52)  Wean off pressors as tolerated  Continue IV meropenem  Trend labs/ WBC/ SBP        Assessment and plan  ---------------------------  septic/ hypovolemic shock still hypotensive but off pressors  abx as per ID repeat cultures  may hold cholesterol meds  WMA on echo ? sec to sepsis  dvt prophylaxis  s/p blood transfusion   continue tx as per ICU  decrease methadone dose  ace wrap to the both LE  increase renal function ? sec to ATN sec to hypotension fu renal function closely  SICU note read

## 2020-01-23 LAB
A BAUMANNII DNA SPEC QL NAA+PROBE: SIGNIFICANT CHANGE UP
ALBUMIN SERPL ELPH-MCNC: 2.4 G/DL — LOW (ref 3.3–5)
ALP SERPL-CCNC: 218 U/L — HIGH (ref 40–120)
ALT FLD-CCNC: 10 U/L — SIGNIFICANT CHANGE UP (ref 10–45)
ANION GAP SERPL CALC-SCNC: 11 MMOL/L — SIGNIFICANT CHANGE UP (ref 5–17)
ANION GAP SERPL CALC-SCNC: 12 MMOL/L — SIGNIFICANT CHANGE UP (ref 5–17)
ANION GAP SERPL CALC-SCNC: 12 MMOL/L — SIGNIFICANT CHANGE UP (ref 5–17)
ANION GAP SERPL CALC-SCNC: 14 MMOL/L — SIGNIFICANT CHANGE UP (ref 5–17)
ANION GAP SERPL CALC-SCNC: 14 MMOL/L — SIGNIFICANT CHANGE UP (ref 5–17)
AST SERPL-CCNC: 28 U/L — SIGNIFICANT CHANGE UP (ref 10–40)
BASOPHILS # BLD AUTO: 0.01 K/UL — SIGNIFICANT CHANGE UP (ref 0–0.2)
BASOPHILS NFR BLD AUTO: 0.1 % — SIGNIFICANT CHANGE UP (ref 0–2)
BILIRUB DIRECT SERPL-MCNC: 0.3 MG/DL — HIGH (ref 0–0.2)
BILIRUB INDIRECT FLD-MCNC: 0.3 MG/DL — SIGNIFICANT CHANGE UP (ref 0.2–1)
BILIRUB SERPL-MCNC: 0.6 MG/DL — SIGNIFICANT CHANGE UP (ref 0.2–1.2)
BUN SERPL-MCNC: 42 MG/DL — HIGH (ref 7–23)
BUN SERPL-MCNC: 44 MG/DL — HIGH (ref 7–23)
CALCIUM SERPL-MCNC: 7.3 MG/DL — LOW (ref 8.4–10.5)
CALCIUM SERPL-MCNC: 7.4 MG/DL — LOW (ref 8.4–10.5)
CALCIUM SERPL-MCNC: 7.4 MG/DL — LOW (ref 8.4–10.5)
CALCIUM SERPL-MCNC: 7.5 MG/DL — LOW (ref 8.4–10.5)
CALCIUM SERPL-MCNC: 7.7 MG/DL — LOW (ref 8.4–10.5)
CHLORIDE SERPL-SCNC: 102 MMOL/L — SIGNIFICANT CHANGE UP (ref 96–108)
CHLORIDE SERPL-SCNC: 105 MMOL/L — SIGNIFICANT CHANGE UP (ref 96–108)
CHLORIDE SERPL-SCNC: 106 MMOL/L — SIGNIFICANT CHANGE UP (ref 96–108)
CO2 SERPL-SCNC: 19 MMOL/L — LOW (ref 22–31)
CO2 SERPL-SCNC: 20 MMOL/L — LOW (ref 22–31)
CO2 SERPL-SCNC: 20 MMOL/L — LOW (ref 22–31)
CREAT SERPL-MCNC: 1.78 MG/DL — HIGH (ref 0.5–1.3)
CREAT SERPL-MCNC: 1.85 MG/DL — HIGH (ref 0.5–1.3)
CREAT SERPL-MCNC: 1.97 MG/DL — HIGH (ref 0.5–1.3)
CREAT SERPL-MCNC: 1.97 MG/DL — HIGH (ref 0.5–1.3)
CREAT SERPL-MCNC: 2.01 MG/DL — HIGH (ref 0.5–1.3)
ENTEROCOC DNA BLD POS QL NAA+NON-PROBE: SIGNIFICANT CHANGE UP
EOSINOPHIL # BLD AUTO: 0.24 K/UL — SIGNIFICANT CHANGE UP (ref 0–0.5)
EOSINOPHIL NFR BLD AUTO: 2.9 % — SIGNIFICANT CHANGE UP (ref 0–6)
GAS PNL BLDV: SIGNIFICANT CHANGE UP
GLUCOSE SERPL-MCNC: 126 MG/DL — HIGH (ref 70–99)
GLUCOSE SERPL-MCNC: 137 MG/DL — HIGH (ref 70–99)
GLUCOSE SERPL-MCNC: 164 MG/DL — HIGH (ref 70–99)
GLUCOSE SERPL-MCNC: 75 MG/DL — SIGNIFICANT CHANGE UP (ref 70–99)
GLUCOSE SERPL-MCNC: 76 MG/DL — SIGNIFICANT CHANGE UP (ref 70–99)
GRAM STN FLD: SIGNIFICANT CHANGE UP
HCT VFR BLD CALC: 23.4 % — LOW (ref 34.5–45)
HGB BLD-MCNC: 7.6 G/DL — LOW (ref 11.5–15.5)
IMM GRANULOCYTES NFR BLD AUTO: 0.7 % — SIGNIFICANT CHANGE UP (ref 0–1.5)
LYMPHOCYTES # BLD AUTO: 1.32 K/UL — SIGNIFICANT CHANGE UP (ref 1–3.3)
LYMPHOCYTES # BLD AUTO: 16.1 % — SIGNIFICANT CHANGE UP (ref 13–44)
MAGNESIUM SERPL-MCNC: 2.2 MG/DL — SIGNIFICANT CHANGE UP (ref 1.6–2.6)
MAGNESIUM SERPL-MCNC: 2.3 MG/DL — SIGNIFICANT CHANGE UP (ref 1.6–2.6)
MAGNESIUM SERPL-MCNC: 2.4 MG/DL — SIGNIFICANT CHANGE UP (ref 1.6–2.6)
MCHC RBC-ENTMCNC: 28.7 PG — SIGNIFICANT CHANGE UP (ref 27–34)
MCHC RBC-ENTMCNC: 32.5 GM/DL — SIGNIFICANT CHANGE UP (ref 32–36)
MCV RBC AUTO: 88.3 FL — SIGNIFICANT CHANGE UP (ref 80–100)
METHOD TYPE: SIGNIFICANT CHANGE UP
MONOCYTES # BLD AUTO: 0.44 K/UL — SIGNIFICANT CHANGE UP (ref 0–0.9)
MONOCYTES NFR BLD AUTO: 5.4 % — SIGNIFICANT CHANGE UP (ref 2–14)
NEUTROPHILS # BLD AUTO: 6.15 K/UL — SIGNIFICANT CHANGE UP (ref 1.8–7.4)
NEUTROPHILS NFR BLD AUTO: 74.8 % — SIGNIFICANT CHANGE UP (ref 43–77)
NRBC # BLD: 0 /100 WBCS — SIGNIFICANT CHANGE UP (ref 0–0)
PHOSPHATE SERPL-MCNC: 4.6 MG/DL — HIGH (ref 2.5–4.5)
PHOSPHATE SERPL-MCNC: 4.7 MG/DL — HIGH (ref 2.5–4.5)
PHOSPHATE SERPL-MCNC: 4.8 MG/DL — HIGH (ref 2.5–4.5)
PHOSPHATE SERPL-MCNC: 5.1 MG/DL — HIGH (ref 2.5–4.5)
PHOSPHATE SERPL-MCNC: 5.2 MG/DL — HIGH (ref 2.5–4.5)
PLATELET # BLD AUTO: 153 K/UL — SIGNIFICANT CHANGE UP (ref 150–400)
POTASSIUM SERPL-MCNC: 4.8 MMOL/L — SIGNIFICANT CHANGE UP (ref 3.5–5.3)
POTASSIUM SERPL-MCNC: 5.2 MMOL/L — SIGNIFICANT CHANGE UP (ref 3.5–5.3)
POTASSIUM SERPL-MCNC: 5.3 MMOL/L — SIGNIFICANT CHANGE UP (ref 3.5–5.3)
POTASSIUM SERPL-MCNC: 5.4 MMOL/L — HIGH (ref 3.5–5.3)
POTASSIUM SERPL-MCNC: 5.4 MMOL/L — HIGH (ref 3.5–5.3)
POTASSIUM SERPL-SCNC: 4.8 MMOL/L — SIGNIFICANT CHANGE UP (ref 3.5–5.3)
POTASSIUM SERPL-SCNC: 5.2 MMOL/L — SIGNIFICANT CHANGE UP (ref 3.5–5.3)
POTASSIUM SERPL-SCNC: 5.3 MMOL/L — SIGNIFICANT CHANGE UP (ref 3.5–5.3)
POTASSIUM SERPL-SCNC: 5.4 MMOL/L — HIGH (ref 3.5–5.3)
POTASSIUM SERPL-SCNC: 5.4 MMOL/L — HIGH (ref 3.5–5.3)
PROT SERPL-MCNC: 4.9 G/DL — LOW (ref 6–8.3)
RBC # BLD: 2.65 M/UL — LOW (ref 3.8–5.2)
RBC # FLD: 19.8 % — HIGH (ref 10.3–14.5)
SODIUM SERPL-SCNC: 135 MMOL/L — SIGNIFICANT CHANGE UP (ref 135–145)
SODIUM SERPL-SCNC: 135 MMOL/L — SIGNIFICANT CHANGE UP (ref 135–145)
SODIUM SERPL-SCNC: 136 MMOL/L — SIGNIFICANT CHANGE UP (ref 135–145)
SODIUM SERPL-SCNC: 137 MMOL/L — SIGNIFICANT CHANGE UP (ref 135–145)
SODIUM SERPL-SCNC: 140 MMOL/L — SIGNIFICANT CHANGE UP (ref 135–145)
SPECIMEN SOURCE: SIGNIFICANT CHANGE UP
SPECIMEN SOURCE: SIGNIFICANT CHANGE UP
WBC # BLD: 8.22 K/UL — SIGNIFICANT CHANGE UP (ref 3.8–10.5)
WBC # FLD AUTO: 8.22 K/UL — SIGNIFICANT CHANGE UP (ref 3.8–10.5)

## 2020-01-23 PROCEDURE — 36556 INSERT NON-TUNNEL CV CATH: CPT | Mod: GC,79

## 2020-01-23 PROCEDURE — 76937 US GUIDE VASCULAR ACCESS: CPT | Mod: 26

## 2020-01-23 PROCEDURE — 99233 SBSQ HOSP IP/OBS HIGH 50: CPT

## 2020-01-23 PROCEDURE — 36556 INSERT NON-TUNNEL CV CATH: CPT

## 2020-01-23 PROCEDURE — 99233 SBSQ HOSP IP/OBS HIGH 50: CPT | Mod: GC

## 2020-01-23 PROCEDURE — 71045 X-RAY EXAM CHEST 1 VIEW: CPT | Mod: 26,76

## 2020-01-23 PROCEDURE — 99291 CRITICAL CARE FIRST HOUR: CPT | Mod: 25

## 2020-01-23 PROCEDURE — 71250 CT THORAX DX C-: CPT | Mod: 26

## 2020-01-23 PROCEDURE — 74176 CT ABD & PELVIS W/O CONTRAST: CPT | Mod: 26

## 2020-01-23 RX ORDER — SODIUM CHLORIDE 9 MG/ML
1000 INJECTION, SOLUTION INTRAVENOUS
Refills: 0 | Status: DISCONTINUED | OUTPATIENT
Start: 2020-01-23 | End: 2020-01-26

## 2020-01-23 RX ORDER — LINEZOLID 600 MG/300ML
600 INJECTION, SOLUTION INTRAVENOUS EVERY 12 HOURS
Refills: 0 | Status: DISCONTINUED | OUTPATIENT
Start: 2020-01-23 | End: 2020-01-25

## 2020-01-23 RX ORDER — LINEZOLID 600 MG/300ML
600 INJECTION, SOLUTION INTRAVENOUS ONCE
Refills: 0 | Status: COMPLETED | OUTPATIENT
Start: 2020-01-23 | End: 2020-01-23

## 2020-01-23 RX ORDER — POLYMYXIN B SULFATE 500000 [USP'U]/1
1000000 INJECTION, POWDER, LYOPHILIZED, FOR SOLUTION INTRAMUSCULAR; INTRATHECAL; INTRAVENOUS; OPHTHALMIC EVERY 12 HOURS
Refills: 0 | Status: DISCONTINUED | OUTPATIENT
Start: 2020-01-23 | End: 2020-01-31

## 2020-01-23 RX ORDER — DIPHENHYDRAMINE HYDROCHLORIDE AND LIDOCAINE HYDROCHLORIDE AND ALUMINUM HYDROXIDE AND MAGNESIUM HYDRO
5 KIT
Refills: 0 | Status: DISCONTINUED | OUTPATIENT
Start: 2020-01-23 | End: 2020-02-05

## 2020-01-23 RX ORDER — LINEZOLID 600 MG/300ML
INJECTION, SOLUTION INTRAVENOUS
Refills: 0 | Status: DISCONTINUED | OUTPATIENT
Start: 2020-01-23 | End: 2020-01-25

## 2020-01-23 RX ORDER — MEROPENEM 1 G/30ML
1000 INJECTION INTRAVENOUS EVERY 8 HOURS
Refills: 0 | Status: DISCONTINUED | OUTPATIENT
Start: 2020-01-23 | End: 2020-01-31

## 2020-01-23 RX ORDER — CALCIUM GLUCONATE 100 MG/ML
2 VIAL (ML) INTRAVENOUS ONCE
Refills: 0 | Status: COMPLETED | OUTPATIENT
Start: 2020-01-23 | End: 2020-01-23

## 2020-01-23 RX ORDER — LEVOTHYROXINE SODIUM 125 MCG
25 TABLET ORAL AT BEDTIME
Refills: 0 | Status: DISCONTINUED | OUTPATIENT
Start: 2020-01-23 | End: 2020-02-05

## 2020-01-23 RX ORDER — DEXTROSE 50 % IN WATER 50 %
50 SYRINGE (ML) INTRAVENOUS ONCE
Refills: 0 | Status: COMPLETED | OUTPATIENT
Start: 2020-01-23 | End: 2020-01-23

## 2020-01-23 RX ADMIN — OXYCODONE HYDROCHLORIDE 10 MILLIGRAM(S): 5 TABLET ORAL at 05:57

## 2020-01-23 RX ADMIN — HYDROMORPHONE HYDROCHLORIDE 1 MILLIGRAM(S): 2 INJECTION INTRAMUSCULAR; INTRAVENOUS; SUBCUTANEOUS at 22:00

## 2020-01-23 RX ADMIN — Medication 2 MILLIGRAM(S): at 05:05

## 2020-01-23 RX ADMIN — DIPHENHYDRAMINE HYDROCHLORIDE AND LIDOCAINE HYDROCHLORIDE AND ALUMINUM HYDROXIDE AND MAGNESIUM HYDRO 5 MILLILITER(S): KIT at 18:38

## 2020-01-23 RX ADMIN — Medication 50 MILLILITER(S): at 18:36

## 2020-01-23 RX ADMIN — HYDROMORPHONE HYDROCHLORIDE 1 MILLIGRAM(S): 2 INJECTION INTRAMUSCULAR; INTRAVENOUS; SUBCUTANEOUS at 13:50

## 2020-01-23 RX ADMIN — Medication 100 MILLIGRAM(S): at 02:11

## 2020-01-23 RX ADMIN — Medication 1000 MILLIGRAM(S): at 07:00

## 2020-01-23 RX ADMIN — HYDROMORPHONE HYDROCHLORIDE 1 MILLIGRAM(S): 2 INJECTION INTRAMUSCULAR; INTRAVENOUS; SUBCUTANEOUS at 09:02

## 2020-01-23 RX ADMIN — Medication 1 APPLICATION(S): at 05:04

## 2020-01-23 RX ADMIN — MIDODRINE HYDROCHLORIDE 30 MILLIGRAM(S): 2.5 TABLET ORAL at 15:43

## 2020-01-23 RX ADMIN — Medication 100 MILLIGRAM(S): at 09:12

## 2020-01-23 RX ADMIN — Medication 50 MICROGRAM(S): at 05:04

## 2020-01-23 RX ADMIN — Medication 50 MILLILITER(S): at 00:47

## 2020-01-23 RX ADMIN — HYDROMORPHONE HYDROCHLORIDE 1 MILLIGRAM(S): 2 INJECTION INTRAMUSCULAR; INTRAVENOUS; SUBCUTANEOUS at 14:05

## 2020-01-23 RX ADMIN — LINEZOLID 300 MILLIGRAM(S): 600 INJECTION, SOLUTION INTRAVENOUS at 08:50

## 2020-01-23 RX ADMIN — Medication 150 MEQ/KG/HR: at 02:11

## 2020-01-23 RX ADMIN — MIDODRINE HYDROCHLORIDE 30 MILLIGRAM(S): 2.5 TABLET ORAL at 05:56

## 2020-01-23 RX ADMIN — METHADONE HYDROCHLORIDE 17.5 MILLIGRAM(S): 40 TABLET ORAL at 08:29

## 2020-01-23 RX ADMIN — OXYCODONE HYDROCHLORIDE 10 MILLIGRAM(S): 5 TABLET ORAL at 13:00

## 2020-01-23 RX ADMIN — DIPHENHYDRAMINE HYDROCHLORIDE AND LIDOCAINE HYDROCHLORIDE AND ALUMINUM HYDROXIDE AND MAGNESIUM HYDRO 5 MILLILITER(S): KIT at 12:02

## 2020-01-23 RX ADMIN — ENOXAPARIN SODIUM 100 MILLIGRAM(S): 100 INJECTION SUBCUTANEOUS at 12:01

## 2020-01-23 RX ADMIN — Medication 25 MICROGRAM(S): at 21:45

## 2020-01-23 RX ADMIN — Medication 400 MILLIGRAM(S): at 00:49

## 2020-01-23 RX ADMIN — Medication 200 GRAM(S): at 12:24

## 2020-01-23 RX ADMIN — OXYCODONE HYDROCHLORIDE 10 MILLIGRAM(S): 5 TABLET ORAL at 06:40

## 2020-01-23 RX ADMIN — OXYCODONE HYDROCHLORIDE 10 MILLIGRAM(S): 5 TABLET ORAL at 02:00

## 2020-01-23 RX ADMIN — MEROPENEM 100 MILLIGRAM(S): 1 INJECTION INTRAVENOUS at 21:26

## 2020-01-23 RX ADMIN — OXYCODONE HYDROCHLORIDE 10 MILLIGRAM(S): 5 TABLET ORAL at 01:36

## 2020-01-23 RX ADMIN — Medication 1000 MILLIGRAM(S): at 01:30

## 2020-01-23 RX ADMIN — POLYETHYLENE GLYCOL 3350 17 GRAM(S): 17 POWDER, FOR SOLUTION ORAL at 18:39

## 2020-01-23 RX ADMIN — NYSTATIN CREAM 1 APPLICATION(S): 100000 CREAM TOPICAL at 05:03

## 2020-01-23 RX ADMIN — POLYMYXIN B SULFATE 500 UNIT(S): 500000 INJECTION, POWDER, LYOPHILIZED, FOR SOLUTION INTRAMUSCULAR; INTRATHECAL; INTRAVENOUS; OPHTHALMIC at 14:24

## 2020-01-23 RX ADMIN — SODIUM CHLORIDE 150 MILLILITER(S): 9 INJECTION, SOLUTION INTRAVENOUS at 19:40

## 2020-01-23 RX ADMIN — HYDROMORPHONE HYDROCHLORIDE 1 MILLIGRAM(S): 2 INJECTION INTRAMUSCULAR; INTRAVENOUS; SUBCUTANEOUS at 09:17

## 2020-01-23 RX ADMIN — Medication 50 MILLILITER(S): at 08:28

## 2020-01-23 RX ADMIN — LIDOCAINE 1 PATCH: 4 CREAM TOPICAL at 12:24

## 2020-01-23 RX ADMIN — Medication 1 DROP(S): at 05:05

## 2020-01-23 RX ADMIN — Medication 50 MILLILITER(S): at 12:25

## 2020-01-23 RX ADMIN — HYDROMORPHONE HYDROCHLORIDE 1 MILLIGRAM(S): 2 INJECTION INTRAMUSCULAR; INTRAVENOUS; SUBCUTANEOUS at 21:45

## 2020-01-23 RX ADMIN — NYSTATIN CREAM 1 APPLICATION(S): 100000 CREAM TOPICAL at 18:39

## 2020-01-23 RX ADMIN — SODIUM CHLORIDE 150 MILLILITER(S): 9 INJECTION, SOLUTION INTRAVENOUS at 09:01

## 2020-01-23 RX ADMIN — Medication 250 MILLIGRAM(S): at 18:38

## 2020-01-23 RX ADMIN — Medication 50 MILLILITER(S): at 05:57

## 2020-01-23 RX ADMIN — LIDOCAINE 1 PATCH: 4 CREAM TOPICAL at 19:15

## 2020-01-23 RX ADMIN — OXYCODONE HYDROCHLORIDE 10 MILLIGRAM(S): 5 TABLET ORAL at 12:00

## 2020-01-23 RX ADMIN — MIDODRINE HYDROCHLORIDE 30 MILLIGRAM(S): 2.5 TABLET ORAL at 09:12

## 2020-01-23 RX ADMIN — DIPHENHYDRAMINE HYDROCHLORIDE AND LIDOCAINE HYDROCHLORIDE AND ALUMINUM HYDROXIDE AND MAGNESIUM HYDRO 5 MILLILITER(S): KIT at 23:55

## 2020-01-23 RX ADMIN — Medication 100 MILLIGRAM(S): at 18:37

## 2020-01-23 RX ADMIN — Medication 400 MILLIGRAM(S): at 06:37

## 2020-01-23 RX ADMIN — MEROPENEM 100 MILLIGRAM(S): 1 INJECTION INTRAVENOUS at 05:02

## 2020-01-23 RX ADMIN — Medication 1 APPLICATION(S): at 21:45

## 2020-01-23 RX ADMIN — Medication 50 MILLILITER(S): at 23:56

## 2020-01-23 RX ADMIN — Medication 1 APPLICATION(S): at 18:39

## 2020-01-23 RX ADMIN — PANTOPRAZOLE SODIUM 40 MILLIGRAM(S): 20 TABLET, DELAYED RELEASE ORAL at 05:04

## 2020-01-23 RX ADMIN — MIDODRINE HYDROCHLORIDE 30 MILLIGRAM(S): 2.5 TABLET ORAL at 21:26

## 2020-01-23 RX ADMIN — ATORVASTATIN CALCIUM 40 MILLIGRAM(S): 80 TABLET, FILM COATED ORAL at 19:40

## 2020-01-23 RX ADMIN — Medication 250 MILLIGRAM(S): at 09:12

## 2020-01-23 RX ADMIN — Medication 1 APPLICATION(S): at 14:15

## 2020-01-23 RX ADMIN — Medication 1 DROP(S): at 18:37

## 2020-01-23 RX ADMIN — POLYETHYLENE GLYCOL 3350 17 GRAM(S): 17 POWDER, FOR SOLUTION ORAL at 12:03

## 2020-01-23 RX ADMIN — Medication 81 MILLIGRAM(S): at 19:40

## 2020-01-23 RX ADMIN — MEROPENEM 100 MILLIGRAM(S): 1 INJECTION INTRAVENOUS at 14:12

## 2020-01-23 RX ADMIN — LINEZOLID 300 MILLIGRAM(S): 600 INJECTION, SOLUTION INTRAVENOUS at 21:26

## 2020-01-23 NOTE — PROGRESS NOTE ADULT - ASSESSMENT
64 F PMHx of HTN, COPD, and morbid obesity who was admitted on 11/26 for perforated sigmoid diverticulitis.  No fever, has leukocytosis  Prolonged hospital stay  Perforated diverticulum, culture with E coli, s/p OR into washout and ostomy  S/p treatment courses for PICC CoNS CLABSI and Steno in sputum  CTs with improvement in intraabd collections  CXR clear  BCX with VRE and Acinetobacter--concern that acinetobacter grew through meropenem, add Poly  Critically ill, bacteria in blood, concern for multidrug resistant bacteria/life threatening  Overall,  1) CoNS bacteremia--? CLABSI  - S/p course treatment  - Repeat BCXs, 1/26/20 surveillance  2) Loculated fluid collections  - Infected collections, most recent CT reassuring  - Repeat CT A/P when feasible  3) Leukocytosis  - Trend to normal, monitor for further signs infection  4) New Polymicrobial Bacteremia, ? abd source  - Zyvox 600mg q 12  - Silvia 1g q 8 (CrCl 72)  - Start Poly B 1,000,000 q 12 (monitor electrolytes, Cr, any signs neuropathy/paresthesias)  - F/U pending BCX for final S    Discussed with SICU team  Guarded    Sen Johnson MD  Pager [phone]  After 5pm and on weekends call [phone]

## 2020-01-23 NOTE — PROGRESS NOTE ADULT - ATTENDING COMMENTS
Conversant but seems distracted  1.  ARF--multifactorial including now present bacteremia.  Borderline oliguric but not interested in discussion for future RRT at this time.  NO emergent indications at present  2.  Hyperkalemia--diuresis if tolerated with albumin, NaHCO3 or oral bicitra to improve distal K secretion  3.  Acidosis--multifactorial and rx HCO3 or HCO3 precursor

## 2020-01-23 NOTE — PROGRESS NOTE ADULT - SUBJECTIVE AND OBJECTIVE BOX
CC: F/U for Bacteremia    Saw/spoke to patient. Patient ill appearing. Remains in SICU. Found to have new bacteremia.    Allergies  IV Contrast (Rash; Pruritus; Hypotension)  sulfa drugs (Unknown)    ANTIMICROBIALS:  erythromycin     base Tablet 250 <User Schedule>  linezolid  IVPB    linezolid  IVPB 600 every 12 hours  meropenem  IVPB 1000 every 8 hours  polymyxin B IVPB [id number] every 12 hours    PE:    Vital Signs Last 24 Hrs  T(C): 37.1 (2020 08:00), Max: 37.2 (2020 03:00)  T(F): 98.8 (2020 08:00), Max: 99 (2020 03:00)  HR: 80 (2020 11:00) (72 - 121)  BP: 87/44 (2020 11:00) (80/47 - 120/52)  BP(mean): 63 (2020 11:00) (59 - 80)  RR: 33 (2020 11:00) (14 - 37)  SpO2: 96% (2020 11:00) (93% - 99%)    Gen: AOx3, NAD, non-toxic  CV: S1+S2 normal, nontachycardic  Resp: Clear bilat, no resp distress, no crackles/wheezes  Abd: Soft, nontender, +BS, wound vac, ostomy  Ext: No LE edema, no wounds    LABS:                        7.6    8.22  )-----------( 153      ( 2020 06:25 )             23.4     01-23    136  |  105  |  42<H>  ----------------------------<  76  5.4<H>   |  19<L>  |  2.01<H>    Ca    7.3<L>      2020 06:25  Phos  5.1     -  Mg     2.3         TPro  4.9<L>  /  Alb  2.4<L>  /  TBili  0.6  /  DBili  0.3<H>  /  AST  28  /  ALT  10  /  AlkPhos  218<H>  23    Urinalysis Basic - ( 2020 11:09 )    Color: Yellow / Appearance: Slightly Turbid / S.028 / pH: x  Gluc: x / Ketone: Trace  / Bili: Negative / Urobili: 4 mg/dL   Blood: x / Protein: 30 mg/dL / Nitrite: Negative   Leuk Esterase: Negative / RBC: 1 /hpf / WBC 6 /HPF   Sq Epi: x / Non Sq Epi: 12 /hpf / Bacteria: Negative    MICROBIOLOGY:    .Blood Blood-Peripheral  20   Growth in aerobic bottle: Gram Negative Rods    Growth in aerobic bottle: Gram Negative Rods    .Blood Blood-Venous  20   Growth in aerobic bottle: Gram Negative Rods and Gram Positive Cocci in  Pairs and Chains  VRE, Acinetobacter    .Sputum Sputum  20   Moderate Stenotrophomonas maltophilia  Normal Respiratory Elaine absent  --  Stenotrophomonas maltophilia    .Blood Blood  20   Growth in anaerobic bottle: Coag Negative Staphylococcus  Single set isolate, possible contaminant. Contact  Microbiology if susceptibility testing clinically  indicated.  --    Growth in anaerobic bottle: Gram Positive Cocci in Clusters    (otherwise reviewed)    RADIOLOGY:     CXR:    IMPRESSION:     Enteric tube terminates in the distal stomach.  New right upper lung field patchy opacity may reflect atelectasis versus developing infection.

## 2020-01-23 NOTE — PROGRESS NOTE ADULT - SUBJECTIVE AND OBJECTIVE BOX
[facility] DIVISION OF KIDNEY DISEASES AND HYPERTENSION -- FOLLOW UP NOTE  --------------------------------------------------------------------------------    24 hour events/subjective:  pt examined at bed side, awake, seems fatigued, partner at bed side, overnight received fluids and albumin, non oliguric, uop ~700cc, but last 3 hours 10-20cc/hr. Don't wanted to discuss about dialysis in case of emergency. Started on ABX Bcx 01/22 growing gram Acinetobacter and VRE.      PAST HISTORY  --------------------------------------------------------------------------------  No significant changes to PMH, PSH, FHx, SHx, unless otherwise noted    ALLERGIES & MEDICATIONS  --------------------------------------------------------------------------------  Allergies    IV Contrast (Rash; Pruritus; Hypotension)  sulfa drugs (Unknown)    Intolerances    vancomycin (Rash (Severe))    Standing Inpatient Medications  albumin human 25% IVPB 50 milliLiter(s) IV Intermittent every 6 hours  AQUAPHOR (petrolatum Ointment) 1 Application(s) Topical three times a day  artificial tears (preservative free) Ophthalmic Solution 1 Drop(s) Both EYES two times a day  aspirin  chewable 81 milliGRAM(s) Oral <User Schedule>  atorvastatin 40 milliGRAM(s) Oral <User Schedule>  buDESOnide    Inhalation Suspension 0.5 milliGRAM(s) Inhalation every 12 hours  chlorhexidine 2% Cloths 1 Application(s) Topical <User Schedule>  clobetasol 0.05% Ointment 1 Application(s) Topical two times a day  dextrose 5% + sodium chloride 0.45% 1000 milliLiter(s) IV Continuous <Continuous>  doxazosin 2 milliGRAM(s) Oral <User Schedule>  enoxaparin Injectable 100 milliGRAM(s) SubCutaneous <User Schedule>  erythromycin     base Tablet 250 milliGRAM(s) Oral <User Schedule>  FIRST- Mouthwash  BLM 5 milliLiter(s) Swish and Spit four times a day  hydrocortisone sodium succinate Injectable 100 milliGRAM(s) IV Push every 8 hours  levothyroxine Injectable 25 MICROGram(s) IV Push at bedtime  lidocaine   Patch 1 Patch Transdermal daily  lidocaine 2% Viscous 10 milliLiter(s) Swish and Spit once  linezolid  IVPB      linezolid  IVPB 600 milliGRAM(s) IV Intermittent every 12 hours  meropenem  IVPB 1000 milliGRAM(s) IV Intermittent every 8 hours  methadone    Tablet 17.5 milliGRAM(s) Oral <User Schedule>  midodrine 30 milliGRAM(s) Oral <User Schedule>  nystatin Powder 1 Application(s) Topical two times a day  pantoprazole    Tablet 40 milliGRAM(s) Oral <User Schedule>  polyethylene glycol 3350 17 Gram(s) Oral <User Schedule>    PRN Inpatient Medications  albuterol/ipratropium for Nebulization 3 milliLiter(s) Nebulizer every 6 hours PRN  aluminum hydroxide/magnesium hydroxide/simethicone Suspension 30 milliLiter(s) Oral every 4 hours PRN  diphenhydrAMINE 25 milliGRAM(s) Oral every 12 hours PRN  HYDROmorphone  Injectable 1 milliGRAM(s) IV Push every 4 hours PRN  oxyCODONE    IR 10 milliGRAM(s) Oral every 4 hours PRN  zinc oxide 20% Ointment 1 Application(s) Topical three times a day PRN      REVIEW OF SYSTEMS  --------------------------------------------------------------------------------  Gen: no lethargy, + fatigue  Respiratory: No dyspnea  CV: No chest pain  GI: No abdominal pain  MSK: +LE edema  Neuro: No dizziness  Heme: No bleeding  Skin: Desquamative rash.     All other systems were reviewed and are negative, except as noted    VITALS/PHYSICAL EXAM  --------------------------------------------------------------------------------  T(C): 37.1 (01-23-20 @ 08:00), Max: 37.2 (01-23-20 @ 03:00)  HR: 85 (01-23-20 @ 08:00) (72 - 121)  BP: 86/51 (01-23-20 @ 08:00) (80/47 - 120/52)  RR: 15 (01-23-20 @ 08:00) (14 - 37)  SpO2: 95% (01-23-20 @ 08:00) (93% - 100%)  Wt(kg): --        01-22-20 @ 07:01  -  01-23-20 @ 07:00  --------------------------------------------------------  IN: 5370 mL / OUT: 865 mL / NET: 4505 mL    01-23-20 @ 07:01  -  01-23-20 @ 09:13  --------------------------------------------------------  IN: 0 mL / OUT: 30 mL / NET: -30 mL      Physical Exam:  	Gen: mild distress  	HEENT:  supple neck, clear oropharynx  	Pulm: CTA B/L  	CV: RRR, S1S2; no rub  	Abd: +BS, wound vac noted.   	: No suprapubic tenderness. has marquez dark colored urine.   	UE: no edema;   	LE: no edema  	Neuro: No focal deficits  	Skin: Erythematous rash desquamative   	Vascular access: rij IJ central line.     LABS/STUDIES  --------------------------------------------------------------------------------              7.6    8.22  >-----------<  153      [01-23-20 @ 06:25]              23.4     136  |  105  |  42  ----------------------------<  76      [01-23-20 @ 06:25]  5.4   |  19  |  2.01        Ca     7.3     [01-23-20 @ 06:25]      Mg     2.3     [01-23-20 @ 06:25]      Phos  5.1     [01-23-20 @ 06:25]    TPro  4.9  /  Alb  2.4  /  TBili  0.6  /  DBili  0.3  /  AST  28  /  ALT  10  /  AlkPhos  218  [01-23-20 @ 06:25]      Creatinine Trend:  SCr 2.01 [01-23 @ 06:25]  SCr 1.97 [01-23 @ 02:43]  SCr 2.00 [01-22 @ 22:25]  SCr 1.95 [01-22 @ 18:28]  SCr 2.01 [01-22 @ 14:01]    Urinalysis - [01-22-20 @ 11:09]      Color Yellow / Appearance Slightly Turbid / SG 1.028 / pH 5.5      Gluc Negative / Ketone Trace  / Bili Negative / Urobili 4 mg/dL       Blood Small / Protein 30 mg/dL / Leuk Est Negative / Nitrite Negative      RBC 1 / WBC 6 / Hyaline 149 / Gran  / Sq Epi  / Non Sq Epi 12 / Bacteria Negative    Urine Creatinine 146      [01-22-20 @ 10:10]  Urine Sodium <35      [01-22-20 @ 10:10]  Urine Osmolality 317      [01-22-20 @ 10:10]    TSH 4.81      [01-17-20 @ 03:52]  Lipid: chol 98, TG 91, HDL 28, LDL 52      [12-30-19 @ 08:46]

## 2020-01-23 NOTE — PROGRESS NOTE ADULT - ATTENDING COMMENTS
Dr. Saleh (Attending Physician)  N - Delirium yesterday likely secondary to sepsis  P - Pneumonia, copd on pulmicort, duonebs  C - Septic shock on midodrine 30 q67 hours  GI - will CTAP, to eval for intraabd. abscess as source of infection, Start nepro tf's and titrate up to goal, very poor po intake, will discuss with team whether they want CT scan   - UO low 25-30 mL/hr, BULL likely hypovolemic from insensible losses and sepsis, potassium improved  H - dvt ppx  ID - Acinetobacter grew while on meropenem will give polymyxin, VRE started linezolid, will discuss CT scan to eval for intraabdominal source  E - hydrocortisone for septic shock, possible adrenal insufficiency and rash

## 2020-01-23 NOTE — PROGRESS NOTE ADULT - SUBJECTIVE AND OBJECTIVE BOX
HISTORY  64y Female PMHx of morbid obesity, COPD, HTN, and GERD who presented to the ED on 11/26/2019 with abdominal pain and distension. Patient states that she had been having constipation for ~2 weeks with no improvement despite laxative and enema use. Imaging revealed perforated sigmoid diverticulitis with free air. Hospital course is as follows:    11/26 - s/p exploratory laparotomy, extended left hemicolectomy, and left in discontinuity w/ Abthera VAC placement, left intubated post-operatively so she was admitted to SICU for further management  11/27 - extubated to BiPAP  11/29 - re-exploratory laparotomy, transverse end colostomy, and fascial closure with wound VAC placement, left intubated at end of case  11/30 - extubated   12/04 - transferred to the floors  12/06 - acute inability to tolerate PO, complaining of nausea & vomiting  12/09 - esophagram demonstrated a stricture in the distal esophagus  12/10 - EGD demonstrated diverticula, a medium-sized hiatal hernia, esophagitis, and gastritis, started on promotility agents and diet was restarted  12/11 - noted to have different BP when BP cuff placed on different arms, bilateral UE Duplex demonstrated stenosis of the right brachiocephalic artery concerning for subclavian steal syndrome  12/28 - plastic surgery consulted for abdominal wall reconstruction, underwent bedside debridement of abdominal wound at that time.  01/07 - worsening hypotension from presumed sepsis requiring readmission to SICU, imaging revealed loculated fluid collections in the left anterior abdomen & a small abdominal wall collection, both of which were not amenable to drainage, cultures were also positive for E. coli & Klebsiella in the urine, Stenotrophomonas in the sputum, & Staph epidermis in the blood, LUE PICC discontinued  01/10 - right IJ CVC was placed and patient started on vasopressor support but patient refused arterial line placement, patient complaining of chest wall tightness, hsT was elevated and TTE demonstrated new mild to moderate segmental LV systolic dysfunction w/ hypokinesis of the inferior and inferolateral walls but cardiology determined the troponin leak to be stressed-induced in the setting of sepsis  01/13 - dermatology consulted scattered erythematous patches on her trunk & extremities, superficial desquamation of the face, trunk, & extremities, & multiple erosions with surrounding erythema on left lower back, for which petroleum jelly was prescribed BID, viral cultures of erosions were sent and were negative  01/16 - repeat CT scan demonstrated decreasing fluid collections but no new acute findings  01/20 - weaned off vasopressor support, started levothyroxine for elevated TSH and decreased T3 & T4      24 HOUR EVENTS:  - BULL continues to worsen; Nephrology consulted; Half strength bicarb gtt started. Potassium elevated.   - 1 liter bolus of fluid administered for oliguria and pre-renal BULL; 500ml of 5% albumin also administered Standing 25% albumin started x24 hrs.   - Bcx 01/22 growing gram Acinetobacter and VRE. Restarted meropenem     SUBJECTIVE/ROS:  [ ] A ten-point review of systems was otherwise negative except as noted.  [ ] Due to altered mental status/intubation, subjective information were not able to be obtained from the patient. History was obtained, to the extent possible, from review of the chart and collateral sources of information.      NEURO  Exam: awake, alert, oriented  Meds: acetaminophen  IVPB .. 1000 milliGRAM(s) IV Intermittent every 6 hours PRN Mild Pain (1 - 3)  diphenhydrAMINE 25 milliGRAM(s) Oral every 12 hours PRN Rash and/or Itching  HYDROmorphone  Injectable 1 milliGRAM(s) IV Push every 4 hours PRN Turning  methadone    Tablet 17.5 milliGRAM(s) Oral <User Schedule>  oxyCODONE    IR 10 milliGRAM(s) Oral every 4 hours PRN Severe Pain (7 - 10)    [x] Adequacy of sedation and pain control has been assessed and adjusted      RESPIRATORY  RR: 20 (01-23-20 @ 03:00) (13 - 37)  SpO2: 97% (01-23-20 @ 03:00) (94% - 100%)  Exam: unlabored, clear to auscultation bilaterally  Mechanical Ventilation: none  [N/A] Extubation Readiness Assessed  Meds: albuterol/ipratropium for Nebulization 3 milliLiter(s) Nebulizer every 6 hours PRN Shortness of Breath and/or Wheezing  buDESOnide    Inhalation Suspension 0.5 milliGRAM(s) Inhalation every 12 hours        CARDIOVASCULAR  HR: 75 (01-23-20 @ 03:00) (72 - 121)  BP: 81/47 (01-23-20 @ 02:00) (75/52 - 120/52)  BP(mean): 60 (01-23-20 @ 02:00) (59 - 80)  VBG - ( 23 Jan 2020 02:41 )  pH: 7.34  /  pCO2: 39    /  pO2: 45    / HCO3: 21    / Base Excess: -4.1  /  SaO2: 76     Lactate: 1.9    Exam: regular rate and rhythm  Cardiac Rhythm: sinus  Perfusion     [x]Adequate   [ ]Inadequate  Mentation   [x]Normal       [ ]Reduced  Extremities  [x]Warm         [ ]Cool  Volume Status [ ]Hypervolemic [x]Euvolemic [ ]Hypovolemic  Meds: doxazosin 2 milliGRAM(s) Oral <User Schedule>  midodrine 30 milliGRAM(s) Oral <User Schedule>        GI/NUTRITION  Exam: soft, nontender, nondistended  Diet: Regular diet   Meds: aluminum hydroxide/magnesium hydroxide/simethicone Suspension 30 milliLiter(s) Oral every 4 hours PRN Dyspepsia  pantoprazole    Tablet 40 milliGRAM(s) Oral <User Schedule>  polyethylene glycol 3350 17 Gram(s) Oral <User Schedule>      GENITOURINARY  I&O's Detail    01-21 @ 07:01 - 01-22 @ 07:00  --------------------------------------------------------  IN:    dextrose 5% + sodium chloride 0.9%: 150 mL    Oral Fluid: 740 mL    Solution: 100 mL  Total IN: 990 mL    OUT:  Total OUT: 0 mL    Total NET: 990 mL      01-22 @ 07:01 - 01-23 @ 03:03  --------------------------------------------------------  IN:    Albumin 5%  - 500 mL: 500 mL    dextrose 5% + sodium chloride 0.9%: 150 mL    Lactated Ringers IV Bolus: 1000 mL    Oral Fluid: 120 mL    sodium bicarbonate  Infusion: 2550 mL    Solution: 100 mL    Solution: 50 mL  Total IN: 4470 mL    OUT:    Indwelling Catheter - Urethral: 665 mL    VAC (Vacuum Assisted Closure) System: 5 mL  Total OUT: 670 mL    Total NET: 3800 mL          01-22    133<L>  |  104  |  42<H>  ----------------------------<  83  5.3   |  19<L>  |  2.00<H>    Ca    7.6<L>      22 Jan 2020 22:25  Phos  5.3     01-22  Mg     2.3     01-22    TPro  5.2<L>  /  Alb  2.2<L>  /  TBili  0.6  /  DBili  0.3<H>  /  AST  34  /  ALT  16  /  AlkPhos  267<H>  01-22    [ ] Ross catheter, indication: N/A  Meds: albumin human 25% IVPB 50 milliLiter(s) IV Intermittent every 6 hours  sodium bicarbonate  Infusion 0.067 mEq/kG/Hr IV Continuous <Continuous>        HEMATOLOGIC  Meds: aspirin  chewable 81 milliGRAM(s) Oral <User Schedule>  enoxaparin Injectable 100 milliGRAM(s) SubCutaneous <User Schedule>    [x] VTE Prophylaxis                        8.9    10.00 )-----------( 148      ( 22 Jan 2020 00:19 )             28.9       Transfusion     [ ] PRBC   [ ] Platelets   [ ] FFP   [ ] Cryoprecipitate      INFECTIOUS DISEASES    RECENT CULTURES:  Specimen Source: .Blood Blood-Venous  Date/Time: 01-22 @ 15:13  Culture Results:   Growth in aerobic bottle: Gram Negative Rods and Gram Positive Cocci in  Pairs and Chains  ***Blood Panel PCR results on this specimen are available  approximately 3 hours after the Gram stain result.***  Gram stain, PCR, and/or culture results maynot always  correspond due to difference in methodologies.  ************************************************************  This PCR assay was performed using [employer].  The following targets are tested for: Enterococcus,  vancomycin resistantenterococci, Listeria monocytogenes,  coagulase negative staphylococci, S. aureus,  methicillin resistant S. aureus, Streptococcus agalactiae  (Group B), S. pneumoniae, S. pyogenes (Group A),  Acinetobacter baumannii, Enterobacter cloacae, E. coli,  Klebsiella oxytoca, K. pneumoniae, Proteus sp.,  Serratia marcescens, Haemophilus influenzae,  Neisseria meningitidis, Pseudomonas aeruginosa, Candida  albicans, C. glabrata, C krusei, C parapsilosis,  C. tropicalis and the KPC resistance gene.  Gram Stain:   Growth in aerobic bottle: Gram Negative Rods and Gram Positive Cocci in  Pairs and Chains  Organism: Blood Culture PCR    Meds: erythromycin     base Tablet 250 milliGRAM(s) Oral <User Schedule>  meropenem  IVPB 1000 milliGRAM(s) IV Intermittent every 8 hours        ENDOCRINE  CAPILLARY BLOOD GLUCOSE      POCT Blood Glucose.: 83 mg/dL (22 Jan 2020 06:21)  POCT Blood Glucose.: 78 mg/dL (22 Jan 2020 05:35)    Meds: atorvastatin 40 milliGRAM(s) Oral <User Schedule>  hydrocortisone sodium succinate Injectable 100 milliGRAM(s) IV Push every 8 hours  levothyroxine 50 MICROGram(s) Oral daily        ACCESS DEVICES:  [x] Peripheral IV  [ ] Central Venous Line	[ ] R	[ ] L	[ ] IJ	[ ] Fem	[ ] SC	Placed:   [ ] Arterial Line		[ ] R	[ ] L	[ ] Fem	[ ] Rad	[ ] Ax	Placed:   [ ] PICC:					[ ] Mediport  [ ] Urinary Catheter, Date Placed:   [x] Necessity of urinary, arterial, and venous catheters discussed    OTHER MEDICATIONS:  AQUAPHOR (petrolatum Ointment) 1 Application(s) Topical three times a day  artificial tears (preservative free) Ophthalmic Solution 1 Drop(s) Both EYES two times a day  chlorhexidine 2% Cloths 1 Application(s) Topical <User Schedule>  clobetasol 0.05% Ointment 1 Application(s) Topical two times a day  FIRST- Mouthwash  BLM 5 milliLiter(s) Swish and Spit four times a day PRN  lidocaine   Patch 1 Patch Transdermal daily  lidocaine 2% Viscous 10 milliLiter(s) Swish and Spit once  nystatin Powder 1 Application(s) Topical two times a day  zinc oxide 20% Ointment 1 Application(s) Topical three times a day PRN      CODE STATUS: full code       IMAGING: < from: CT Abdomen and Pelvis w/ Oral Cont (01.15.20 @ 14:12) >  IMPRESSION:     Small bilateral pleural effusions, new on the right and increased on the left since prior study.    Several scattered right upper lobe groundglass opacities.    Redemonstrated open midline anterior abdominal wall wound with an overlying wound VAC. Interval decrease in size of the underlying left abdominal multiloculated fluid collection, which is poorly defined on current study.    Trace ascites.    < end of copied text >

## 2020-01-23 NOTE — PROGRESS NOTE ADULT - ATTENDING COMMENTS
Patient seen/examined.  Agree w above note and plan and have discussed plan w house staff.  Now growing VRE, renal to see for BULL.  Palliative care consult    Lucas Greer MD

## 2020-01-23 NOTE — PROGRESS NOTE ADULT - ASSESSMENT
65yo F with PMHx morbid obesity, GERD, HTN, COPD, and PSHx D&C now s/p ex laparotomy with extended left hemicolectomy 11/26 for perforated and necrotic sigmoid colon with gross abdominal contamination. RTOR 11/29 for abd closure, RUQ end transverse colostomy creation. On 12/19 patient found to have induration of wound and keri-stomal fat necrosis, s/p bedside debridement w/ wound VAC in placement on midline wound. Began showing signs of sepsis and was transferred to SICU 1/7 for further management. Now w/ decreasing pressor support and downtrending leukocytosis. Troponins have been elevated and echo shows decreased ventricular wall motility.      Plan:  Wound debridement scheduled for tomorrow with Plastics  Wean off pressors as tolerated  Continue IV meropenem  Trend labs/ WBC/ SBP  Appreciate Cards reccs  Appreciate excellent SICU care      Green  [phone]

## 2020-01-23 NOTE — PROCEDURE NOTE - ADDITIONAL PROCEDURE DETAILS
Post-procedure CXR demonstrated tip of catheter in right brachiocephalic vein. Area re-prepped and Glidewire threaded utlilizing sterile technique; line removed and new triple lumen catheter advanced over wire, repeat CXR demonstrated tip of catheter in SVC.

## 2020-01-23 NOTE — PROVIDER CONTACT NOTE (OTHER) - ACTION/TREATMENT ORDERED:
RN educated patient and partner on risks and consequences of refusing care, especially turning Q2H. JAK Garcia aware. SICU team aware. DAISY Hawk aware.

## 2020-01-23 NOTE — PROCEDURE NOTE - NSPOSTPRCRAD_GEN_A_CORE
central line located in the superior vena cava/post-procedure radiography performed
post-procedure radiography performed/line adjusted to depth of insertion
no pneumothorax/post-procedure radiography performed/central line located in the/depth of insertion/line adjusted to depth of insertion/central line located in the superior vena cava

## 2020-01-23 NOTE — CHART NOTE - NSCHARTNOTEFT_GEN_A_CORE
Dermatology Chart Note    Follow up of 63 yo F with erythroderma thought 2/2 drug reaction from vancomycin vs meropenem  - Now with polymicrobial bacteremia GPCs and GNRs including VRE; source likely abdominal infection but cannot exclude skin  - On meropenem, linezolid and polymyxin B  - Patient obtunded, will not open eyes or respond to questions  - Remains euthermic but having frequent hypotension  - Now diffusely edematous    Vital Signs (24 Hrs):  T(C): 36.6 (20 @ 15:00), Max: 37.2 (20 @ 03:00)  HR: 84 (20 @ 16:00) (72 - 87)  BP: 96/70 (20 @ 15:00) (80/47 - 120/52)  RR: 22 (20 @ 16:00) (14 - 43)  SpO2: 99% (20 @ 16:00) (92% - 99%)  Daily Weight in k.1 (2020 06:00)    Of note on skin exam:  - Generalized erythema and scaling on neck, trunk and extremities with diffuse edema leading to fissuring of skin with drainage  - Several large erosions on back and buttocks.  - No obvious mucosal involvement of eyes or mouth although mouth exam limited due to patient inability to cooperate with exam    ASSESSMENT/PLAN:    1) Erythroderma, likely drug-induced  - Most likely 2/2 vancomycin, though cannot r/o meropenem-induced.  - Last dose of vancomycin was , still on meropenem due to new BCs with GNRs- Also on polymyxin B and linezolid in setting of VRE bacteremia  Unsure of source of bacteremia; although abdomen likely source; cannot rule out skin as source  - Continue Vaseline TID to affected areas on face, trunk and extremities.  - Continue zinc oxide ointment BID to TID to erosions on back and buttocks  - Continue clobetasol ointment BID to affected areas (ESSENTIALLY ENTIRE BODY FROM NECK DOWN) on trunk and extremities. Do not apply on face.  - Hypothermia resolved; treat w/ supportive measures if recurs.  - Unlikely SJS with no mucosal involvement, negative Nikolsky. No clinical evidence of DRESS given normal eos, no increase in Cr/LFTs. Continue to monitor daily CBC with diff and CMP.   - May consider stopping meropenem if no improvement in rash given vanc stopped . Would speak to ID regarding possible alternative to meropenem.     If worsening rash, dermatology will consider biopsy to further evaluate although drug induced is most likely     Discussed with primary team.  Discussed with attending, Dr. Urbano.    Paul Haines MD  PGY 2 Resident  Department of Dermatology  [phone]    Preliminary plan provided above. Seen by dermatology resident only. Staffed remotely with attending, Dr. Urbano. To be seen and formally staffed by dermatology attending on 20

## 2020-01-23 NOTE — PROGRESS NOTE ADULT - SUBJECTIVE AND OBJECTIVE BOX
Morning Surgical Progress Note  Patient is a 64y old  Female who presents with a chief complaint of Perforated bowel (2020 15:41)      SUBJECTIVE: Patient seen and examined at bedside with surgical team, patient complains of pain and lymphedema. Patient scheduled to have wound debridement with Plastics tomorrow.    Vital Signs Last 24 Hrs  T(C): 37.2 (2020 03:00), Max: 37.6 (2020 07:00)  T(F): 99 (2020 03:00), Max: 99.7 (2020 07:00)  HR: 73 (2020 05:00) (72 - 121)  BP: 82/46 (2020 04:00) (75/52 - 120/52)  BP(mean): 60 (2020 04:00) (59 - 80)  RR: 14 (2020 05:00) (13 - 37)  SpO2: 96% (2020 05:00) (94% - 100%)I&O's Detail    2020 07:01  -  2020 07:00  --------------------------------------------------------  IN:    dextrose 5% + sodium chloride 0.9%: 150 mL    Oral Fluid: 740 mL    Solution: 100 mL  Total IN: 990 mL    OUT:  Total OUT: 0 mL    Total NET: 990 mL      2020 07:01  -  2020 05:13  --------------------------------------------------------  IN:    Albumin 5%  - 500 mL: 500 mL    dextrose 5% + sodium chloride 0.9%: 150 mL    Lactated Ringers IV Bolus: 1000 mL    Oral Fluid: 120 mL    sodium bicarbonate  Infusion: 3000 mL    Solution: 250 mL    Solution: 50 mL  Total IN: 5070 mL    OUT:    Indwelling Catheter - Urethral: 720 mL    VAC (Vacuum Assisted Closure) System: 105 mL  Total OUT: 825 mL    Total NET: 4245 mL      MEDICATIONS  (STANDING):  albumin human 25% IVPB 50 milliLiter(s) IV Intermittent every 6 hours  AQUAPHOR (petrolatum Ointment) 1 Application(s) Topical three times a day  artificial tears (preservative free) Ophthalmic Solution 1 Drop(s) Both EYES two times a day  aspirin  chewable 81 milliGRAM(s) Oral <User Schedule>  atorvastatin 40 milliGRAM(s) Oral <User Schedule>  buDESOnide    Inhalation Suspension 0.5 milliGRAM(s) Inhalation every 12 hours  chlorhexidine 2% Cloths 1 Application(s) Topical <User Schedule>  clobetasol 0.05% Ointment 1 Application(s) Topical two times a day  doxazosin 2 milliGRAM(s) Oral <User Schedule>  enoxaparin Injectable 100 milliGRAM(s) SubCutaneous <User Schedule>  erythromycin     base Tablet 250 milliGRAM(s) Oral <User Schedule>  hydrocortisone sodium succinate Injectable 100 milliGRAM(s) IV Push every 8 hours  levothyroxine 50 MICROGram(s) Oral daily  lidocaine   Patch 1 Patch Transdermal daily  lidocaine 2% Viscous 10 milliLiter(s) Swish and Spit once  meropenem  IVPB 1000 milliGRAM(s) IV Intermittent every 8 hours  methadone    Tablet 17.5 milliGRAM(s) Oral <User Schedule>  midodrine 30 milliGRAM(s) Oral <User Schedule>  nystatin Powder 1 Application(s) Topical two times a day  pantoprazole    Tablet 40 milliGRAM(s) Oral <User Schedule>  polyethylene glycol 3350 17 Gram(s) Oral <User Schedule>  sodium bicarbonate  Infusion 0.067 mEq/kG/Hr (150 mL/Hr) IV Continuous <Continuous>    MEDICATIONS  (PRN):  acetaminophen  IVPB .. 1000 milliGRAM(s) IV Intermittent every 6 hours PRN Mild Pain (1 - 3)  albuterol/ipratropium for Nebulization 3 milliLiter(s) Nebulizer every 6 hours PRN Shortness of Breath and/or Wheezing  aluminum hydroxide/magnesium hydroxide/simethicone Suspension 30 milliLiter(s) Oral every 4 hours PRN Dyspepsia  diphenhydrAMINE 25 milliGRAM(s) Oral every 12 hours PRN Rash and/or Itching  FIRST- Mouthwash  BLM 5 milliLiter(s) Swish and Spit four times a day PRN Mouth Care  HYDROmorphone  Injectable 1 milliGRAM(s) IV Push every 4 hours PRN Turning  oxyCODONE    IR 10 milliGRAM(s) Oral every 4 hours PRN Severe Pain (7 - 10)  zinc oxide 20% Ointment 1 Application(s) Topical three times a day PRN Rash      Physical Exam  Constitutional: A&Ox3, NAD  Respiratory: CTA b/l  Cardiac: RRR , S1 and S2, no m.r.g  Gastrointestinal: abdomen soft, NT/ND, dressings c/d/i  Skin: erythematous with skin sloughing    LABS:                        8.9    10.00 )-----------( 148      ( 2020 00:19 )             28.9         135  |  105  |  42<H>  ----------------------------<  75  5.4<H>   |  19<L>  |  1.97<H>    Ca    7.7<L>      2020 02:43  Phos  5.2       Mg     2.3         TPro  5.2<L>  /  Alb  2.2<L>  /  TBili  0.6  /  DBili  0.3<H>  /  AST  34  /  ALT  16  /  AlkPhos  267<H>        LIVER FUNCTIONS - ( 2020 00:19 )  Alb: 2.2 g/dL / Pro: 5.2 g/dL / ALK PHOS: 267 U/L / ALT: 16 U/L / AST: 34 U/L / GGT: x           Urinalysis Basic - ( 2020 11:09 )    Color: Yellow / Appearance: Slightly Turbid / S.028 / pH: x  Gluc: x / Ketone: Trace  / Bili: Negative / Urobili: 4 mg/dL   Blood: x / Protein: 30 mg/dL / Nitrite: Negative   Leuk Esterase: Negative / RBC: 1 /hpf / WBC 6 /HPF   Sq Epi: x / Non Sq Epi: 12 /hpf / Bacteria: Negative

## 2020-01-23 NOTE — PROCEDURE NOTE - NSINDICATIONS_GEN_A_CORE
venous access
devitalized or nonviable tissue at the level of:
critical illness/emergency venous access
critical illness/venous access

## 2020-01-23 NOTE — PROGRESS NOTE ADULT - ASSESSMENT
64y Female pmhx morbid obesity, acid reflux, HTN, COPD and PSH D&C presented to the ED on 11/26 c/o abdominal pain and bloating Had a complicated hospital course, initially presented with constipation for 2 weeks at that time reported pain worst in the LLQ in ED, CT demonstrated perforated sigmoid diverticulitis with intraperitoneal free air, on 11/25  underwent an exploratory laparotomy with extended left hemicolectomy and was left in discontinuity. An Abthera VAC, now with long complicated course, with sepsis, requiring pressors, demand ischemia, and new oliguric BULL associated with hyperkalemia and acidosis.     #BULL  Pt with BULL in the setting of septic shock, hypotension and possible drug reaction. On review of labs pt had normal scr, noted to rise since 01/20/20 to 1.2 from 0.8 now 2 mg/dl, marquez place today with urine output of 10 cc/hr last 3 hours and rising K with worsening acidosis. UA showing abundant hyaline cast, some wbc, epithelial cells, 1 rbc, lytes consistent with pre renal FENA 0.2%. Obtain spot urine TP/CR.   -Given urinary findings BULL most likely multifactorial pre renal state, recommend to c/w Marquez catheter monitor UOP every hour,   -pt with low albumin level, suggest to c/w albumin 25 % TID and fluids may give lasix prn to keep pt non oliguric, however would suggest to optimize hemodynamic status, may need to restart pressors/inotripic support for BP if needed. Will need to consider HD if renal failure continues to worsen, however pt is refusing at this time.   Monitor labs and urine output. Avoid NSAIDs, ACEI/ARBS, RCA and nephrotoxins. Dose medications as per eGFR.    #Acidosis  In the setting of bull and lactic acidosis, suggest IV bicarbonate, if PO tolerated bicitra 15 mgTID.     #Hyperkalemia  In the setting of oliguric BULL,  and acidosis. Suggest to continue medical management with bicarb, d50, insulin and lasix prn, if poor response, and K >6 would need to consider HD, pt refusing HD.     POOR prognosis, suggest palliative care to establish GOC and Advance care directives.

## 2020-01-23 NOTE — PROGRESS NOTE ADULT - ASSESSMENT
63 y/o female presenting with perforated sigmoid diverticulitis s/p exploratory laparotomy, extended left hemicolectomy, and left in discontinuity w/ Abthera VAC placement with return to OR for re-exploratory laparotomy, transverse end colostomy, and fascial closure with wound VAC placement. Hospital course complicated by gastroparesis, right subclavian steal syndrome, full body rash of unknown etiology, and refractory septic shock c/b stress-induced cardiomyopathy. Cultures were positive for E. coli & Klebsiella in the urine, Stenotrophomonas in the sputum, and Staph epidermis in the blood with subsequent removal of her LUE PICC.    PLAN:    Neuro: acute pain from her full body rash, opioid dependence  - Monitor mental status  - Pain control as needed with Lidoderm patch, acetaminophen, oxycodone, and Dilaudid  - Home methadone    Resp: COPD  - Monitor pulse oximeter  - Out of bed to chair and incentive spirometry to prevent atelectasis  - Pulmicort and Duoneb for COPD    CV: refractory septic shock c/b stress-induced cardiomyopathy  - Monitor vital signs  - Midodrine 30 mg q6hrs in the setting of persistent hypotension  - Repeat TTE when patient is stable off vasopressor support to look for improvement in LV function  - Home ASA      GI: perforated sigmoid diverticulitis s/p exploratory laparotomy, extended left hemicolectomy, and left in discontinuity w/ Abthera VAC placement with return to OR for re-exploratory laparotomy, transverse end colostomy, and fascial closure c/b gastroparesis; GERD  - Regular diet as tolerated  - Monitor ostomy  - Erythromycin for gastroparesis  - Protonix for GERD  - Maalox PRN indigestion    Renal: no acute issues  - Monitor I&Os  - Monitor electrolytes and replete as necessary  - Doxazosin for urinary retention    Heme: no acute issues  - Monitor CBC and coags  - Lovenox 100 mg daily for VTE prophylaxis, adjusted for BMI    ID: E. coli & Klebsiella UTI, Stenotrophomonas PNA, Staph epidermis bacteremia; Now Acinetobacter and VRE bacteremia 01/22  - COnitnue meropneme and likely zyvox for bacteremia; will touch base with ID today   - Monitor for clinical evidence of active infection    Endo: HLD  - Started 50mg PO levothyroxine for hypothermia, hypothyroidism.   - Monitor glucose on BMP  - Home Lipitor  - Continue stress dose steroids     Integumentary:   - Appreciate Dermatology recs: vaseline, zinc oxide, clobetasol cream.     Disposition:  - Full code  - Will remain in SICU    - Jey Garcia PA-C

## 2020-01-23 NOTE — CHART NOTE - NSCHARTNOTEFT_GEN_A_CORE
Nutrition Follow Up Note    Patient seen for: Malnutrition Follow Up in 8ICU    Interim events noted, chart reviewed. Pt c perforated sigmoid diverticulitis, S/P colostomy, pt c erythroderma, BULL, possible need for urgent HD, however pt refused. Pt now c NGT in place, as per team, awaiting CT scan prior to starting feeds.     Source: RN, and team; pt lethargic, team speaking c family at bedside, interview inappropriate at this time     Diet : Nepro c a goal rate of 50ml/hr x24 hours (1200ml total volume, 2160cal, 97 Gm Prot; 36.5cal/kg and 1.6 Gm Prot/kg based on IBW of 59.1kg).     Colostomy output: -: no output as per flow sheets    Noted per flow sheets, pt had been eating some of her meals in house prior to today.     Daily Weight in k.1 (), Weight in k.2 (), Weight in k.2 (), Weight in k.8 (), Weight in kG: 160.6 (); noted pt c +4 generalized edema as well, would continue to monitor     Drug Dosing Weight  Weight (kg): 168.3 (2019 12:53)  BMI (kg/m2): 59.9 (2019 12:53)      Pertinent Medications: MEDICATIONS  (STANDING):  albumin human 25% IVPB 50 milliLiter(s) IV Intermittent every 6 hours  AQUAPHOR (petrolatum Ointment) 1 Application(s) Topical three times a day  artificial tears (preservative free) Ophthalmic Solution 1 Drop(s) Both EYES two times a day  aspirin  chewable 81 milliGRAM(s) Oral <User Schedule>  atorvastatin 40 milliGRAM(s) Oral <User Schedule>  buDESOnide    Inhalation Suspension 0.5 milliGRAM(s) Inhalation every 12 hours  chlorhexidine 2% Cloths 1 Application(s) Topical <User Schedule>  clobetasol 0.05% Ointment 1 Application(s) Topical two times a day  dextrose 5% + sodium chloride 0.45% 1000 milliLiter(s) (150 mL/Hr) IV Continuous <Continuous>  doxazosin 2 milliGRAM(s) Oral <User Schedule>  enoxaparin Injectable 100 milliGRAM(s) SubCutaneous <User Schedule>  erythromycin     base Tablet 250 milliGRAM(s) Oral <User Schedule>  FIRST- Mouthwash  BLM 5 milliLiter(s) Swish and Spit four times a day  hydrocortisone sodium succinate Injectable 100 milliGRAM(s) IV Push every 8 hours  levothyroxine Injectable 25 MICROGram(s) IV Push at bedtime  lidocaine   Patch 1 Patch Transdermal daily  lidocaine 2% Viscous 10 milliLiter(s) Swish and Spit once  linezolid  IVPB      linezolid  IVPB 600 milliGRAM(s) IV Intermittent every 12 hours  meropenem  IVPB 1000 milliGRAM(s) IV Intermittent every 8 hours  methadone    Tablet 17.5 milliGRAM(s) Oral <User Schedule>  midodrine 30 milliGRAM(s) Oral <User Schedule>  nystatin Powder 1 Application(s) Topical two times a day  pantoprazole    Tablet 40 milliGRAM(s) Oral <User Schedule>  polyethylene glycol 3350 17 Gram(s) Oral <User Schedule>  polymyxin B IVPB [id number] Unit(s) IV Intermittent every 12 hours    MEDICATIONS  (PRN):  albuterol/ipratropium for Nebulization 3 milliLiter(s) Nebulizer every 6 hours PRN Shortness of Breath and/or Wheezing  aluminum hydroxide/magnesium hydroxide/simethicone Suspension 30 milliLiter(s) Oral every 4 hours PRN Dyspepsia  diphenhydrAMINE 25 milliGRAM(s) Oral every 12 hours PRN Rash and/or Itching  HYDROmorphone  Injectable 1 milliGRAM(s) IV Push every 4 hours PRN Turning  oxyCODONE    IR 10 milliGRAM(s) Oral every 4 hours PRN Severe Pain (7 - 10)  zinc oxide 20% Ointment 1 Application(s) Topical three times a day PRN Rash      LABS:    @ 15:01: Sodium 137, Potassium 4.8, Chloride 105, Calcium 7.4<L>, Magnesium 2.2, Phosphorus 4.6<H>, BUN 42<H>, Creatinine 1.85<H>, <H>, Alk Phos --, ALT/SGPT --, AST/SGOT --, HbA1c --, Total Protein --, Albumin --, Prealbumin --, Total Bilirubin --, Direct Bilirubin --, Hemoglobin --, Hematocrit --   @ 11:12: Sodium 140, Potassium 5.3, Chloride 106, Calcium 7.4<L>, Magnesium 2.3, Phosphorus 4.8<H>, BUN 44<H>, Creatinine 1.97<H>, <H>, Alk Phos --, ALT/SGPT --, AST/SGOT --, HbA1c --, Total Protein --, Albumin --, Prealbumin --, Total Bilirubin --, Direct Bilirubin --, Hemoglobin --, Hematocrit --   @ 06:25: Sodium 136, Potassium 5.4<H>, Chloride 105, Calcium 7.3<L>, Magnesium 2.3, Phosphorus 5.1<H>, BUN 42<H>, Creatinine 2.01<H>, BG 76, Alk Phos 218<H>, ALT/SGPT 10, AST/SGOT 28, HbA1c --, Total Protein 4.9<L>, Albumin 2.4<L>, Prealbumin --, Total Bilirubin 0.6, Direct Bilirubin 0.3<H>, Hemoglobin 7.6<L>, Hematocrit 23.4<L>   @ 02:43: Sodium 135, Potassium 5.4<H>, Chloride 105, Calcium 7.7<L>, Magnesium 2.3, Phosphorus 5.2<H>, BUN 42<H>, Creatinine 1.97<H>, BG 75, Alk Phos --, ALT/SGPT --, AST/SGOT --, HbA1c --, Total Protein --, Albumin --, Prealbumin --, Total Bilirubin --, Direct Bilirubin --, Hemoglobin --, Hematocrit --   @ 22:25: Sodium 133<L>, Potassium 5.3, Chloride 104, Calcium 7.6<L>, Magnesium 2.3, Phosphorus 5.3<H>, BUN 42<H>, Creatinine 2.00<H>, BG 83, Alk Phos --, ALT/SGPT --, AST/SGOT --, HbA1c --, Total Protein --, Albumin --, Prealbumin --, Total Bilirubin --, Direct Bilirubin --, Hemoglobin --, Hematocrit --   @ 18:28: Sodium 133<L>, Potassium 5.3, Chloride 104, Calcium 7.6<L>, Magnesium 2.3, Phosphorus 5.4<H>, BUN 40<H>, Creatinine 1.95<H>, BG 91, Alk Phos --, ALT/SGPT --, AST/SGOT --, HbA1c --, Total Protein --, Albumin --, Prealbumin --, Total Bilirubin --, Direct Bilirubin --, Hemoglobin --, Hematocrit --      Skin per nursing documentation: no pressure injuries   Edema: +4 generalized edema     Estimated Needs:   [x] no change since previous assessment      Previous Nutrition Diagnosis: moderate malnutrition   Nutrition Diagnosis continues at this time, to be addressed c tube feeds once started and able to achieve goal     New Nutrition Diagnosis: none at this time       Recommend  As medically feasible, would recommend to change goal rate of Nepro to 45ml/hr x18 hours to provide appropriate nutritional needs (1080ml total volume, 1944cal, 87 Gm Prot; 33cal/kg and 1.5 Gm Prot/kg based on IBW of 59.1kg). Monitor tolerance to tube feeds and ostomy output.     Monitoring and Evaluation:     Continue to monitor nutritional intake, tolerance to diet prescription, weights, labs, skin integrity    RD remains available.   Mally Bell, MS RD [facility],  #[phone] Nutrition Follow Up Note    Patient seen for: Malnutrition Follow Up in 8ICU    Interim events noted, chart reviewed. Pt c perforated sigmoid diverticulitis, S/P colostomy, pt c erythroderma, BULL, possible need for urgent HD, however pt refused. Pt now c NGT in place, as per team, awaiting CT scan prior to starting feeds.     Source: RN, and team; pt lethargic, team speaking c family at bedside, interview inappropriate at this time     Diet : Nepro c a goal rate of 50ml/hr x24 hours (1200ml total volume, 2160cal, 97 Gm Prot; 36.5cal/kg and 1.6 Gm Prot/kg based on IBW of 59.1kg).     Colostomy output: -: no output as per flow sheets    Noted per flow sheets, pt had been eating some of her meals in house prior to today.     Daily Weight in k.1 (), Weight in k.2 (), Weight in k.2 (), Weight in k.8 (), Weight in kG: 160.6 (); noted pt c +4 generalized edema as well, would continue to monitor     Drug Dosing Weight  Weight (kg): 168.3 (2019 12:53)  BMI (kg/m2): 59.9 (2019 12:53)      Pertinent Medications: MEDICATIONS  (STANDING):  albumin human 25% IVPB 50 milliLiter(s) IV Intermittent every 6 hours  AQUAPHOR (petrolatum Ointment) 1 Application(s) Topical three times a day  artificial tears (preservative free) Ophthalmic Solution 1 Drop(s) Both EYES two times a day  aspirin  chewable 81 milliGRAM(s) Oral <User Schedule>  atorvastatin 40 milliGRAM(s) Oral <User Schedule>  buDESOnide    Inhalation Suspension 0.5 milliGRAM(s) Inhalation every 12 hours  chlorhexidine 2% Cloths 1 Application(s) Topical <User Schedule>  clobetasol 0.05% Ointment 1 Application(s) Topical two times a day  dextrose 5% + sodium chloride 0.45% 1000 milliLiter(s) (150 mL/Hr) IV Continuous <Continuous>  doxazosin 2 milliGRAM(s) Oral <User Schedule>  enoxaparin Injectable 100 milliGRAM(s) SubCutaneous <User Schedule>  erythromycin     base Tablet 250 milliGRAM(s) Oral <User Schedule>  FIRST- Mouthwash  BLM 5 milliLiter(s) Swish and Spit four times a day  hydrocortisone sodium succinate Injectable 100 milliGRAM(s) IV Push every 8 hours  levothyroxine Injectable 25 MICROGram(s) IV Push at bedtime  lidocaine   Patch 1 Patch Transdermal daily  lidocaine 2% Viscous 10 milliLiter(s) Swish and Spit once  linezolid  IVPB      linezolid  IVPB 600 milliGRAM(s) IV Intermittent every 12 hours  meropenem  IVPB 1000 milliGRAM(s) IV Intermittent every 8 hours  methadone    Tablet 17.5 milliGRAM(s) Oral <User Schedule>  midodrine 30 milliGRAM(s) Oral <User Schedule>  nystatin Powder 1 Application(s) Topical two times a day  pantoprazole    Tablet 40 milliGRAM(s) Oral <User Schedule>  polyethylene glycol 3350 17 Gram(s) Oral <User Schedule>  polymyxin B IVPB [id number] Unit(s) IV Intermittent every 12 hours    MEDICATIONS  (PRN):  albuterol/ipratropium for Nebulization 3 milliLiter(s) Nebulizer every 6 hours PRN Shortness of Breath and/or Wheezing  aluminum hydroxide/magnesium hydroxide/simethicone Suspension 30 milliLiter(s) Oral every 4 hours PRN Dyspepsia  diphenhydrAMINE 25 milliGRAM(s) Oral every 12 hours PRN Rash and/or Itching  HYDROmorphone  Injectable 1 milliGRAM(s) IV Push every 4 hours PRN Turning  oxyCODONE    IR 10 milliGRAM(s) Oral every 4 hours PRN Severe Pain (7 - 10)  zinc oxide 20% Ointment 1 Application(s) Topical three times a day PRN Rash      LABS:    @ 15:01: Sodium 137, Potassium 4.8, Chloride 105, Calcium 7.4<L>, Magnesium 2.2, Phosphorus 4.6<H>, BUN 42<H>, Creatinine 1.85<H>, <H>, Alk Phos --, ALT/SGPT --, AST/SGOT --, HbA1c --, Total Protein --, Albumin --, Prealbumin --, Total Bilirubin --, Direct Bilirubin --, Hemoglobin --, Hematocrit --   @ 11:12: Sodium 140, Potassium 5.3, Chloride 106, Calcium 7.4<L>, Magnesium 2.3, Phosphorus 4.8<H>, BUN 44<H>, Creatinine 1.97<H>, <H>, Alk Phos --, ALT/SGPT --, AST/SGOT --, HbA1c --, Total Protein --, Albumin --, Prealbumin --, Total Bilirubin --, Direct Bilirubin --, Hemoglobin --, Hematocrit --   @ 06:25: Sodium 136, Potassium 5.4<H>, Chloride 105, Calcium 7.3<L>, Magnesium 2.3, Phosphorus 5.1<H>, BUN 42<H>, Creatinine 2.01<H>, BG 76, Alk Phos 218<H>, ALT/SGPT 10, AST/SGOT 28, HbA1c --, Total Protein 4.9<L>, Albumin 2.4<L>, Prealbumin --, Total Bilirubin 0.6, Direct Bilirubin 0.3<H>, Hemoglobin 7.6<L>, Hematocrit 23.4<L>   @ 02:43: Sodium 135, Potassium 5.4<H>, Chloride 105, Calcium 7.7<L>, Magnesium 2.3, Phosphorus 5.2<H>, BUN 42<H>, Creatinine 1.97<H>, BG 75, Alk Phos --, ALT/SGPT --, AST/SGOT --, HbA1c --, Total Protein --, Albumin --, Prealbumin --, Total Bilirubin --, Direct Bilirubin --, Hemoglobin --, Hematocrit --   @ 22:25: Sodium 133<L>, Potassium 5.3, Chloride 104, Calcium 7.6<L>, Magnesium 2.3, Phosphorus 5.3<H>, BUN 42<H>, Creatinine 2.00<H>, BG 83, Alk Phos --, ALT/SGPT --, AST/SGOT --, HbA1c --, Total Protein --, Albumin --, Prealbumin --, Total Bilirubin --, Direct Bilirubin --, Hemoglobin --, Hematocrit --   @ 18:28: Sodium 133<L>, Potassium 5.3, Chloride 104, Calcium 7.6<L>, Magnesium 2.3, Phosphorus 5.4<H>, BUN 40<H>, Creatinine 1.95<H>, BG 91, Alk Phos --, ALT/SGPT --, AST/SGOT --, HbA1c --, Total Protein --, Albumin --, Prealbumin --, Total Bilirubin --, Direct Bilirubin --, Hemoglobin --, Hematocrit --      Skin per nursing documentation: no pressure injuries   Edema: +4 generalized edema     Estimated Needs:   [x] no change since previous assessment: based on IBW of 59.1 Kg   (30-35 eloy/kg): 1773-2069cal  (1.2-1.4gm/kg): 71-83 gram/Kg      Previous Nutrition Diagnosis: moderate malnutrition   Nutrition Diagnosis continues at this time, to be addressed c tube feeds once started and able to achieve goal     New Nutrition Diagnosis: none at this time       Recommend  As medically feasible, would recommend to change goal rate of Nepro to 45ml/hr x18 hours to provide appropriate nutritional needs (1080ml total volume, 1944cal, 87 Gm Prot; 33cal/kg and 1.5 Gm Prot/kg based on IBW of 59.1kg). Monitor tolerance to tube feeds and ostomy output.     Monitoring and Evaluation:     Continue to monitor nutritional intake, tolerance to diet prescription, weights, labs, skin integrity    RD remains available.   Mally Bell, MS RD CDN [facility],  #[phone]

## 2020-01-23 NOTE — PROCEDURE NOTE - NSINFORMCONSENT_GEN_A_CORE
Benefits, risks, and possible complications of procedure explained to patient/caregiver who verbalized understanding and gave verbal consent.
Domestic partner/Benefits, risks, and possible complications of procedure explained to patient/caregiver who verbalized understanding and gave written consent.
This was an emergent procedure.
Benefits, risks, and possible complications of procedure explained to patient/caregiver who verbalized understanding and gave written consent.

## 2020-01-24 DIAGNOSIS — A41.9 SEPSIS, UNSPECIFIED ORGANISM: ICD-10-CM

## 2020-01-24 DIAGNOSIS — N17.9 ACUTE KIDNEY FAILURE, UNSPECIFIED: ICD-10-CM

## 2020-01-24 DIAGNOSIS — T50.905A ADVERSE EFFECT OF UNSPECIFIED DRUGS, MEDICAMENTS AND BIOLOGICAL SUBSTANCES, INITIAL ENCOUNTER: ICD-10-CM

## 2020-01-24 DIAGNOSIS — R53.2 FUNCTIONAL QUADRIPLEGIA: ICD-10-CM

## 2020-01-24 DIAGNOSIS — K63.1 PERFORATION OF INTESTINE (NONTRAUMATIC): ICD-10-CM

## 2020-01-24 DIAGNOSIS — R52 PAIN, UNSPECIFIED: ICD-10-CM

## 2020-01-24 DIAGNOSIS — Z51.5 ENCOUNTER FOR PALLIATIVE CARE: ICD-10-CM

## 2020-01-24 DIAGNOSIS — R23.4 CHANGES IN SKIN TEXTURE: ICD-10-CM

## 2020-01-24 LAB
ALBUMIN SERPL ELPH-MCNC: 2.6 G/DL — LOW (ref 3.3–5)
ALP SERPL-CCNC: 209 U/L — HIGH (ref 40–120)
ALT FLD-CCNC: 9 U/L — LOW (ref 10–45)
ANION GAP SERPL CALC-SCNC: 14 MMOL/L — SIGNIFICANT CHANGE UP (ref 5–17)
AST SERPL-CCNC: 31 U/L — SIGNIFICANT CHANGE UP (ref 10–40)
BASOPHILS # BLD AUTO: 0 K/UL — SIGNIFICANT CHANGE UP (ref 0–0.2)
BASOPHILS NFR BLD AUTO: 0 % — SIGNIFICANT CHANGE UP (ref 0–2)
BILIRUB DIRECT SERPL-MCNC: 0.3 MG/DL — HIGH (ref 0–0.2)
BILIRUB INDIRECT FLD-MCNC: 0.3 MG/DL — SIGNIFICANT CHANGE UP (ref 0.2–1)
BILIRUB SERPL-MCNC: 0.6 MG/DL — SIGNIFICANT CHANGE UP (ref 0.2–1.2)
BUN SERPL-MCNC: 42 MG/DL — HIGH (ref 7–23)
CALCIUM SERPL-MCNC: 7.3 MG/DL — LOW (ref 8.4–10.5)
CHLORIDE SERPL-SCNC: 102 MMOL/L — SIGNIFICANT CHANGE UP (ref 96–108)
CO2 SERPL-SCNC: 19 MMOL/L — LOW (ref 22–31)
CREAT SERPL-MCNC: 1.77 MG/DL — HIGH (ref 0.5–1.3)
EOSINOPHIL # BLD AUTO: 0 K/UL — SIGNIFICANT CHANGE UP (ref 0–0.5)
EOSINOPHIL NFR BLD AUTO: 0 % — SIGNIFICANT CHANGE UP (ref 0–6)
GAS PNL BLDV: SIGNIFICANT CHANGE UP
GAS PNL BLDV: SIGNIFICANT CHANGE UP
GLUCOSE SERPL-MCNC: 167 MG/DL — HIGH (ref 70–99)
GRAM STN FLD: SIGNIFICANT CHANGE UP
HCT VFR BLD CALC: 24.9 % — LOW (ref 34.5–45)
HGB BLD-MCNC: 7.6 G/DL — LOW (ref 11.5–15.5)
IMM GRANULOCYTES NFR BLD AUTO: 0.9 % — SIGNIFICANT CHANGE UP (ref 0–1.5)
LYMPHOCYTES # BLD AUTO: 1.14 K/UL — SIGNIFICANT CHANGE UP (ref 1–3.3)
LYMPHOCYTES # BLD AUTO: 20.3 % — SIGNIFICANT CHANGE UP (ref 13–44)
MAGNESIUM SERPL-MCNC: 2.3 MG/DL — SIGNIFICANT CHANGE UP (ref 1.6–2.6)
MCHC RBC-ENTMCNC: 27.9 PG — SIGNIFICANT CHANGE UP (ref 27–34)
MCHC RBC-ENTMCNC: 30.5 GM/DL — LOW (ref 32–36)
MCV RBC AUTO: 91.5 FL — SIGNIFICANT CHANGE UP (ref 80–100)
MONOCYTES # BLD AUTO: 0.46 K/UL — SIGNIFICANT CHANGE UP (ref 0–0.9)
MONOCYTES NFR BLD AUTO: 8.2 % — SIGNIFICANT CHANGE UP (ref 2–14)
NEUTROPHILS # BLD AUTO: 3.96 K/UL — SIGNIFICANT CHANGE UP (ref 1.8–7.4)
NEUTROPHILS NFR BLD AUTO: 70.6 % — SIGNIFICANT CHANGE UP (ref 43–77)
NRBC # BLD: 0 /100 WBCS — SIGNIFICANT CHANGE UP (ref 0–0)
PHOSPHATE SERPL-MCNC: 4.6 MG/DL — HIGH (ref 2.5–4.5)
PLATELET # BLD AUTO: 154 K/UL — SIGNIFICANT CHANGE UP (ref 150–400)
POTASSIUM SERPL-MCNC: 4.8 MMOL/L — SIGNIFICANT CHANGE UP (ref 3.5–5.3)
POTASSIUM SERPL-SCNC: 4.8 MMOL/L — SIGNIFICANT CHANGE UP (ref 3.5–5.3)
PROT SERPL-MCNC: 5.4 G/DL — LOW (ref 6–8.3)
RBC # BLD: 2.72 M/UL — LOW (ref 3.8–5.2)
RBC # FLD: 20 % — HIGH (ref 10.3–14.5)
SODIUM SERPL-SCNC: 135 MMOL/L — SIGNIFICANT CHANGE UP (ref 135–145)
SPECIMEN SOURCE: SIGNIFICANT CHANGE UP
WBC # BLD: 5.61 K/UL — SIGNIFICANT CHANGE UP (ref 3.8–10.5)
WBC # FLD AUTO: 5.61 K/UL — SIGNIFICANT CHANGE UP (ref 3.8–10.5)

## 2020-01-24 PROCEDURE — 99291 CRITICAL CARE FIRST HOUR: CPT

## 2020-01-24 PROCEDURE — 99223 1ST HOSP IP/OBS HIGH 75: CPT

## 2020-01-24 PROCEDURE — 71045 X-RAY EXAM CHEST 1 VIEW: CPT | Mod: 26

## 2020-01-24 PROCEDURE — 99233 SBSQ HOSP IP/OBS HIGH 50: CPT

## 2020-01-24 PROCEDURE — 99233 SBSQ HOSP IP/OBS HIGH 50: CPT | Mod: GC

## 2020-01-24 RX ORDER — HYDROMORPHONE HYDROCHLORIDE 2 MG/ML
1.5 INJECTION INTRAMUSCULAR; INTRAVENOUS; SUBCUTANEOUS EVERY 6 HOURS
Refills: 0 | Status: DISCONTINUED | OUTPATIENT
Start: 2020-01-24 | End: 2020-01-27

## 2020-01-24 RX ORDER — THIAMINE MONONITRATE (VIT B1) 100 MG
200 TABLET ORAL
Refills: 0 | Status: DISCONTINUED | OUTPATIENT
Start: 2020-01-24 | End: 2020-01-27

## 2020-01-24 RX ORDER — HYDROMORPHONE HYDROCHLORIDE 2 MG/ML
1.5 INJECTION INTRAMUSCULAR; INTRAVENOUS; SUBCUTANEOUS EVERY 12 HOURS
Refills: 0 | Status: DISCONTINUED | OUTPATIENT
Start: 2020-01-24 | End: 2020-01-24

## 2020-01-24 RX ORDER — METHADONE HYDROCHLORIDE 40 MG/1
10 TABLET ORAL
Refills: 0 | Status: DISCONTINUED | OUTPATIENT
Start: 2020-01-24 | End: 2020-01-30

## 2020-01-24 RX ORDER — HYDROMORPHONE HYDROCHLORIDE 2 MG/ML
1 INJECTION INTRAMUSCULAR; INTRAVENOUS; SUBCUTANEOUS ONCE
Refills: 0 | Status: DISCONTINUED | OUTPATIENT
Start: 2020-01-24 | End: 2020-01-24

## 2020-01-24 RX ORDER — HYDROMORPHONE HYDROCHLORIDE 2 MG/ML
1 INJECTION INTRAMUSCULAR; INTRAVENOUS; SUBCUTANEOUS EVERY 4 HOURS
Refills: 0 | Status: DISCONTINUED | OUTPATIENT
Start: 2020-01-24 | End: 2020-01-24

## 2020-01-24 RX ORDER — METHADONE HYDROCHLORIDE 40 MG/1
17.5 TABLET ORAL
Refills: 0 | Status: DISCONTINUED | OUTPATIENT
Start: 2020-01-24 | End: 2020-01-24

## 2020-01-24 RX ORDER — THIAMINE MONONITRATE (VIT B1) 100 MG
200 TABLET ORAL ONCE
Refills: 0 | Status: COMPLETED | OUTPATIENT
Start: 2020-01-24 | End: 2020-01-24

## 2020-01-24 RX ORDER — ALBUMIN HUMAN 25 %
500 VIAL (ML) INTRAVENOUS ONCE
Refills: 0 | Status: COMPLETED | OUTPATIENT
Start: 2020-01-24 | End: 2020-01-24

## 2020-01-24 RX ORDER — NOREPINEPHRINE BITARTRATE/D5W 8 MG/250ML
0.02 PLASTIC BAG, INJECTION (ML) INTRAVENOUS
Qty: 8 | Refills: 0 | Status: DISCONTINUED | OUTPATIENT
Start: 2020-01-24 | End: 2020-01-25

## 2020-01-24 RX ORDER — OXYCODONE HYDROCHLORIDE 5 MG/1
10 TABLET ORAL EVERY 4 HOURS
Refills: 0 | Status: DISCONTINUED | OUTPATIENT
Start: 2020-01-24 | End: 2020-01-27

## 2020-01-24 RX ORDER — LIDOCAINE HCL 20 MG/ML
30 VIAL (ML) INJECTION ONCE
Refills: 0 | Status: COMPLETED | OUTPATIENT
Start: 2020-01-24 | End: 2020-01-24

## 2020-01-24 RX ORDER — CALCIUM GLUCONATE 100 MG/ML
2 VIAL (ML) INTRAVENOUS ONCE
Refills: 0 | Status: COMPLETED | OUTPATIENT
Start: 2020-01-24 | End: 2020-01-24

## 2020-01-24 RX ADMIN — METHADONE HYDROCHLORIDE 10 MILLIGRAM(S): 40 TABLET ORAL at 20:13

## 2020-01-24 RX ADMIN — Medication 81 MILLIGRAM(S): at 19:29

## 2020-01-24 RX ADMIN — Medication 100 MILLIGRAM(S): at 17:14

## 2020-01-24 RX ADMIN — HYDROMORPHONE HYDROCHLORIDE 1 MILLIGRAM(S): 2 INJECTION INTRAMUSCULAR; INTRAVENOUS; SUBCUTANEOUS at 05:17

## 2020-01-24 RX ADMIN — HYDROMORPHONE HYDROCHLORIDE 1 MILLIGRAM(S): 2 INJECTION INTRAMUSCULAR; INTRAVENOUS; SUBCUTANEOUS at 14:55

## 2020-01-24 RX ADMIN — LINEZOLID 300 MILLIGRAM(S): 600 INJECTION, SOLUTION INTRAVENOUS at 20:13

## 2020-01-24 RX ADMIN — ATORVASTATIN CALCIUM 40 MILLIGRAM(S): 80 TABLET, FILM COATED ORAL at 19:29

## 2020-01-24 RX ADMIN — MIDODRINE HYDROCHLORIDE 30 MILLIGRAM(S): 2.5 TABLET ORAL at 05:02

## 2020-01-24 RX ADMIN — POLYMYXIN B SULFATE 600 UNIT(S): 500000 INJECTION, POWDER, LYOPHILIZED, FOR SOLUTION INTRAMUSCULAR; INTRATHECAL; INTRAVENOUS; OPHTHALMIC at 01:01

## 2020-01-24 RX ADMIN — LINEZOLID 300 MILLIGRAM(S): 600 INJECTION, SOLUTION INTRAVENOUS at 09:00

## 2020-01-24 RX ADMIN — Medication 102 MILLIGRAM(S): at 10:54

## 2020-01-24 RX ADMIN — POLYMYXIN B SULFATE 600 UNIT(S): 500000 INJECTION, POWDER, LYOPHILIZED, FOR SOLUTION INTRAMUSCULAR; INTRATHECAL; INTRAVENOUS; OPHTHALMIC at 23:59

## 2020-01-24 RX ADMIN — HYDROMORPHONE HYDROCHLORIDE 1 MILLIGRAM(S): 2 INJECTION INTRAMUSCULAR; INTRAVENOUS; SUBCUTANEOUS at 06:22

## 2020-01-24 RX ADMIN — Medication 1000 MILLILITER(S): at 11:03

## 2020-01-24 RX ADMIN — Medication 2 MILLIGRAM(S): at 06:08

## 2020-01-24 RX ADMIN — DIPHENHYDRAMINE HYDROCHLORIDE AND LIDOCAINE HYDROCHLORIDE AND ALUMINUM HYDROXIDE AND MAGNESIUM HYDRO 5 MILLILITER(S): KIT at 12:41

## 2020-01-24 RX ADMIN — LIDOCAINE 1 PATCH: 4 CREAM TOPICAL at 12:40

## 2020-01-24 RX ADMIN — Medication 1 APPLICATION(S): at 22:32

## 2020-01-24 RX ADMIN — POLYETHYLENE GLYCOL 3350 17 GRAM(S): 17 POWDER, FOR SOLUTION ORAL at 17:16

## 2020-01-24 RX ADMIN — Medication 100 MILLIGRAM(S): at 10:54

## 2020-01-24 RX ADMIN — METHADONE HYDROCHLORIDE 17.5 MILLIGRAM(S): 40 TABLET ORAL at 09:00

## 2020-01-24 RX ADMIN — Medication 200 GRAM(S): at 07:30

## 2020-01-24 RX ADMIN — Medication 1 DROP(S): at 06:09

## 2020-01-24 RX ADMIN — ENOXAPARIN SODIUM 100 MILLIGRAM(S): 100 INJECTION SUBCUTANEOUS at 12:38

## 2020-01-24 RX ADMIN — SODIUM CHLORIDE 150 MILLILITER(S): 9 INJECTION, SOLUTION INTRAVENOUS at 17:11

## 2020-01-24 RX ADMIN — Medication 0.5 MILLIGRAM(S): at 17:40

## 2020-01-24 RX ADMIN — Medication 1 APPLICATION(S): at 17:13

## 2020-01-24 RX ADMIN — NYSTATIN CREAM 1 APPLICATION(S): 100000 CREAM TOPICAL at 06:06

## 2020-01-24 RX ADMIN — Medication 102 MILLIGRAM(S): at 17:15

## 2020-01-24 RX ADMIN — MEROPENEM 100 MILLIGRAM(S): 1 INJECTION INTRAVENOUS at 14:01

## 2020-01-24 RX ADMIN — DIPHENHYDRAMINE HYDROCHLORIDE AND LIDOCAINE HYDROCHLORIDE AND ALUMINUM HYDROXIDE AND MAGNESIUM HYDRO 5 MILLILITER(S): KIT at 06:12

## 2020-01-24 RX ADMIN — PANTOPRAZOLE SODIUM 40 MILLIGRAM(S): 20 TABLET, DELAYED RELEASE ORAL at 06:07

## 2020-01-24 RX ADMIN — Medication 25 MICROGRAM(S): at 22:33

## 2020-01-24 RX ADMIN — MEROPENEM 100 MILLIGRAM(S): 1 INJECTION INTRAVENOUS at 22:33

## 2020-01-24 RX ADMIN — Medication 250 MILLIGRAM(S): at 17:14

## 2020-01-24 RX ADMIN — Medication 50 MILLILITER(S): at 17:12

## 2020-01-24 RX ADMIN — SODIUM CHLORIDE 150 MILLILITER(S): 9 INJECTION, SOLUTION INTRAVENOUS at 08:59

## 2020-01-24 RX ADMIN — HYDROMORPHONE HYDROCHLORIDE 1 MILLIGRAM(S): 2 INJECTION INTRAMUSCULAR; INTRAVENOUS; SUBCUTANEOUS at 05:02

## 2020-01-24 RX ADMIN — DIPHENHYDRAMINE HYDROCHLORIDE AND LIDOCAINE HYDROCHLORIDE AND ALUMINUM HYDROXIDE AND MAGNESIUM HYDRO 5 MILLILITER(S): KIT at 17:15

## 2020-01-24 RX ADMIN — Medication 1 APPLICATION(S): at 14:01

## 2020-01-24 RX ADMIN — Medication 100 MILLIGRAM(S): at 02:20

## 2020-01-24 RX ADMIN — NYSTATIN CREAM 1 APPLICATION(S): 100000 CREAM TOPICAL at 17:15

## 2020-01-24 RX ADMIN — MEROPENEM 100 MILLIGRAM(S): 1 INJECTION INTRAVENOUS at 06:08

## 2020-01-24 RX ADMIN — HYDROMORPHONE HYDROCHLORIDE 1 MILLIGRAM(S): 2 INJECTION INTRAMUSCULAR; INTRAVENOUS; SUBCUTANEOUS at 06:07

## 2020-01-24 RX ADMIN — Medication 30 MILLILITER(S): at 14:30

## 2020-01-24 RX ADMIN — Medication 50 MILLILITER(S): at 12:39

## 2020-01-24 RX ADMIN — Medication 1 DROP(S): at 17:12

## 2020-01-24 RX ADMIN — HYDROMORPHONE HYDROCHLORIDE 1 MILLIGRAM(S): 2 INJECTION INTRAMUSCULAR; INTRAVENOUS; SUBCUTANEOUS at 14:30

## 2020-01-24 RX ADMIN — Medication 50 MILLILITER(S): at 23:59

## 2020-01-24 RX ADMIN — POLYETHYLENE GLYCOL 3350 17 GRAM(S): 17 POWDER, FOR SOLUTION ORAL at 12:41

## 2020-01-24 RX ADMIN — POLYMYXIN B SULFATE 600 UNIT(S): 500000 INJECTION, POWDER, LYOPHILIZED, FOR SOLUTION INTRAMUSCULAR; INTRATHECAL; INTRAVENOUS; OPHTHALMIC at 12:40

## 2020-01-24 RX ADMIN — Medication 250 MILLIGRAM(S): at 10:54

## 2020-01-24 RX ADMIN — Medication 6.31 MICROGRAM(S)/KG/MIN: at 10:00

## 2020-01-24 RX ADMIN — LIDOCAINE 1 PATCH: 4 CREAM TOPICAL at 19:11

## 2020-01-24 RX ADMIN — Medication 50 MILLILITER(S): at 06:07

## 2020-01-24 NOTE — CONSULT NOTE ADULT - ASSESSMENT
** INCOMPLETE- PLEASE DISREGARD UNTIL FINAL AFTER ATTENDING ROUNDS**    #Erythroderma, likely drug-induced  - Most likely 2/2 vancomycin, though cannot r/o meropenem-induced.  - Last dose of vancomycin was 1/19, still on meropenem due to new BCs with GNRs- Also on polymyxin B and linezolid in setting of VRE and acinetobacter baumanni bacteremia; Unsure of source of bacteremia; although abdomen likely source; cannot rule out skin as source  - Continue Vaseline TID to affected areas on face, trunk and extremities.  - Continue zinc oxide ointment BID to TID to erosions on back and buttocks  - Continue clobetasol ointment BID to affected areas (ESSENTIALLY ENTIRE BODY FROM NECK DOWN) on trunk and extremities. Do not apply on face.  - Hypothermia is a common complication with this condition; please treat w/ supportive measures if recurs.  - Unlikely SJS with no mucosal involvement, negative Nikolsky. No clinical evidence of DRESS given normal eos, no increase in Cr/LFTs. Continue to monitor daily CBC with diff and CMP.   - May consider stopping meropenem if no improvement in rash given vanc stopped 1/19. Would speak to ID regarding possible alternative to meropenem.     If worsening rash, dermatology will consider biopsy to further evaluate    Paul Haines MD  PGY 2 Resident  Department of Dermatology  [phone] #Toxic shock syndrome with possible erythroderma, likely drug-induced  - In the setting of florid sepsis likely worsened by serious drug eruption. Most likely 2/2 zosyn or vancomycin, although hard to differentiate given course and number of antibiotics givem though cannot r/o meropenem-induced; given time course it is less likely due to meropenem.  - Diffuse skin erosions on back and buttocks are in dependent areas, likely in the setting of marked edema leading to skin break down from friction of being bed bound. Even with strong external manipulation, unable to elucidate nikolsky.   - Last dose of vancomycin was 1/19, still on meropenem due to new BCs with GNRs- Also on polymyxin B and linezolid in setting of VRE and acinetobacter baumanni bacteremia; Unsure of source of bacteremia; although abdomen likely source; cannot rule out skin as source  - Continue Vaseline TID to affected areas on face, trunk and extremities.  - Continue zinc oxide ointment BID to TID to erosions on back and buttocks  - Continue clobetasol ointment BID to affected areas (ESSENTIALLY ENTIRE BODY FROM NECK DOWN) on trunk and extremities. Do not apply on face.  - Hypothermia is a common complication with this condition; please treat w/ supportive measures if recurs.  - Unlikely SJS with no mucosal involvement, negative Nikolsky. Lesions in mouth are consistent with bite trauma and not consistent with the oral manifestations of SJS/TEN. No clinical evidence of DRESS given normal eos, no increase in Cr/LFTs. Continue to monitor daily CBC with diff and CMP.     No indication for biopsy at the current time    Dermatology will continue to monitor closely     Paul Haines MD  PGY 2 Resident  Department of Dermatology  [phone] #Erythroderma  DDx toxic shock syndrome v. drug-induced hypersensitivity reaction  - In the setting of florid sepsis likely worsened by serious drug eruption. Most likely 2/2 zosyn or vancomycin, although hard to differentiate given course and number of antibiotics given though cannot r/o meropenem-induced; given time course it is less likely due to meropenem.  - Diffuse skin erosions on back and buttocks are in dependent areas, likely in the setting of marked edema leading to skin break down from friction of being bed bound. Even with strong external manipulation, unable to induce + nikolsky.   - Last dose of vancomycin was 1/19, still on meropenem due to new BCs with GNRs- Also on polymyxin B and linezolid in setting of VRE and acinetobacter baumanni bacteremia; Unsure of source of bacteremia; although abdomen likely source; cannot rule out skin as source  - Continue Vaseline TID to affected areas on face, trunk and extremities.  - Continue zinc oxide ointment BID to TID to erosions on back and buttocks  - Continue clobetasol ointment BID to affected areas (ESSENTIALLY ENTIRE BODY FROM NECK DOWN) on trunk and extremities. Do not apply on face.  - Unlikely SJS given lack of mucosal involvement, negative Nikolsky. Lesions in mouth are consistent with bite trauma and not consistent with the oral manifestations of SJS/TEN. No clinical evidence of DRESS given normal eos, no increase in Cr/LFTs. Continue to monitor daily CBC with diff and CMP.     No indication for biopsy at the current time    Dermatology will continue to monitor closely     Paul Haines MD  PGY 2 Resident  Department of Dermatology  [phone]

## 2020-01-24 NOTE — PROGRESS NOTE ADULT - SUBJECTIVE AND OBJECTIVE BOX
SURGICAL INTENSIVE CARE UNIT DAILY PROGRESS NOTE    24 HOUR EVENTS:   -- Polymixin and Linezolid added to antibiotic regimen in the setting of Acetinobacter and VRE bacteremia.  -- Interval displacement of wound-VAC with mobilizing patient. Wound-VAC removed, wound cleaned and packed with wet gauze.   -- Repeat blood cx on 1/23 preliminarily positive for aerobic GNR (likely same Acinetobacter spp.)      HPI:  64y Female PMHx of morbid obesity, COPD, HTN, and GERD who presented to the ED on 11/26/2019 with abdominal pain and distension. Patient states that she had been having constipation for ~2 weeks with no improvement despite laxative and enema use. Imaging revealed perforated sigmoid diverticulitis with free air. Hospital course is as follows:    11/26 - s/p exploratory laparotomy, extended left hemicolectomy, and left in discontinuity w/ Abthera VAC placement, left intubated post-operatively so she was admitted to SICU for further management  11/27 - extubated to BiPAP  11/29 - re-exploratory laparotomy, transverse end colostomy, and fascial closure with wound VAC placement, left intubated at end of case  11/30 - extubated   12/04 - transferred to the floors  12/06 - acute inability to tolerate PO, complaining of nausea & vomiting  12/09 - esophagram demonstrated a stricture in the distal esophagus  12/10 - EGD demonstrated diverticula, a medium-sized hiatal hernia, esophagitis, and gastritis, started on promotility agents and diet was restarted  12/11 - noted to have different BP when BP cuff placed on different arms, bilateral UE Duplex demonstrated stenosis of the right brachiocephalic artery concerning for subclavian steal syndrome  12/28 - plastic surgery consulted for abdominal wall reconstruction, underwent bedside debridement of abdominal wound at that time.  01/07 - worsening hypotension from presumed sepsis requiring readmission to SICU, imaging revealed loculated fluid collections in the left anterior abdomen & a small abdominal wall collection, both of which were not amenable to drainage, cultures were also positive for E. coli & Klebsiella in the urine, Stenotrophomonas in the sputum, & Staph epidermis in the blood, LUE PICC discontinued  01/10 - right IJ CVC was placed and patient started on vasopressor support but patient refused arterial line placement, patient complaining of chest wall tightness, hsT was elevated and TTE demonstrated new mild to moderate segmental LV systolic dysfunction w/ hypokinesis of the inferior and inferolateral walls but cardiology determined the troponin leak to be stressed-induced in the setting of sepsis  01/13 - dermatology consulted scattered erythematous patches on her trunk & extremities, superficial desquamation of the face, trunk, & extremities, & multiple erosions with surrounding erythema on left lower back, for which petroleum jelly was prescribed BID, viral cultures of erosions were sent and were negative  01/16 - repeat CT scan demonstrated decreasing fluid collections but no new acute findings  01/20 - weaned off vasopressor support, started levothyroxine for elevated TSH and decreased T3 & T4      PAST MEDICAL & SURGICAL HISTORY:  Morbid Obesity  Post Menopausal Bleeding  Polyp, Vagina  Acid Reflux  HTN (Hypertension)  S/P D&C: 2009, w vaginal polypectomy    SOCIAL HISTORY:  - Active smoker 1pack day for many years  - Lives in private residence with domestic partner    MEDICATIONS  (STANDING):  lactated ringers Bolus 1000 milliLiter(s) IV Bolus once  morphine  - Injectable 4 milliGRAM(s) IV Push once  ondansetron Injectable 4 milliGRAM(s) IV Push once  piperacillin/tazobactam IVPB. 3.375 Gram(s) IV Intermittent Once    Allergies  sulfa drugs (Unknown)    Intolerances    Vital Signs Last 24 Hrs  T(C): 37.1 (25 Nov 2019 22:50), Max: 37.1 (25 Nov 2019 17:53)  T(F): 98.7 (25 Nov 2019 22:50), Max: 98.8 (25 Nov 2019 17:53)  HR: 98 (25 Nov 2019 22:50) (62 - 98)  BP: 107/73 (25 Nov 2019 22:50) (107/73 - 110/70)  BP(mean): --  RR: 16 (25 Nov 2019 22:50) (16 - 16)  SpO2: 94% (25 Nov 2019 22:50) (94% - 100%)                          16.9   11.20 )-----------( 333      ( 25 Nov 2019 20:43 )             50.6     11-25    131<L>  |  97  |  20  ----------------------------<  151<H>  6.1<H>   |  17<L>  |  0.78    Ca    10.1      25 Nov 2019 20:43  Phos  3.1     11-25  Mg     2.2     11-25    TPro  8.0  /  Alb  2.9<L>  /  TBili  1.3<H>  /  DBili  x   /  AST  36  /  ALT  33  /  AlkPhos  108  11-25  PT/INR - ( 25 Nov 2019 20:43 )   PT: 15.2 sec;   INR: 1.31 ratio    PTT - ( 25 Nov 2019 20:43 )  PTT:27.2 sec        NEURO  Exam: awake, alert, oriented  Meds: acetaminophen  IVPB .. 1000 milliGRAM(s) IV Intermittent every 6 hours PRN Mild Pain (1 - 3)  diphenhydrAMINE 25 milliGRAM(s) Oral every 12 hours PRN Rash and/or Itching  HYDROmorphone  Injectable 1 milliGRAM(s) IV Push every 4 hours PRN Turning  methadone    Tablet 17.5 milliGRAM(s) Oral <User Schedule>  oxyCODONE    IR 10 milliGRAM(s) Oral every 4 hours PRN Severe Pain (7 - 10)      RESPIRATORY  RR: 11 (01-24-20 @ 02:00) (11 - 43)  SpO2: 98% (01-24-20 @ 02:00) (92% - 100%)  Wt(kg): --  Mechanical Ventilation:       CARDIOVASCULAR  HR: 71 (01-24-20 @ 02:00) (70 - 86)  BP: 92/50 (01-24-20 @ 02:00) (82/45 - 150/99)  BP(mean): 67 (01-24-20 @ 02:00) (59 - 108)  ABP: --  ABP(mean): --  Wt(kg): --  CVP(cm H2O): --  VBG - ( 23 Jan 2020 11:08 )  pH: 7.35  /  pCO2: 39    /  pO2: 42    / HCO3: 21    / Base Excess: -3.9  /  SaO2: 72     Lactate: 2.4      GI/NUTRITION  Exam: soft, nontender, nondistended. Large midline wound with superificial sinus communicating with right-sided ostomy. Ostomy functional with gas and stool. Device in place.  Diet: Regular diet   Meds: aluminum hydroxide/magnesium hydroxide/simethicone Suspension 30 milliLiter(s) Oral every 4 hours PRN Dyspepsia  pantoprazole    Tablet 40 milliGRAM(s) Oral <User Schedule>  polyethylene glycol 3350 17 Gram(s) Oral <User Schedule>    GENITOURINARY  I&O's Detail    01-22 @ 07:01 - 01-23 @ 07:00  --------------------------------------------------------  IN:    Albumin 5%  - 500 mL: 500 mL    dextrose 5% + sodium chloride 0.9%: 150 mL    Lactated Ringers IV Bolus: 1000 mL    Oral Fluid: 120 mL    sodium bicarbonate  Infusion: 3150 mL    Solution: 400 mL    Solution: 50 mL  Total IN: 5370 mL    OUT:    Indwelling Catheter - Urethral: 760 mL    VAC (Vacuum Assisted Closure) System: 105 mL  Total OUT: 865 mL    Total NET: 4505 mL      01-23 @ 07:01 - 01-24 @ 05:37  --------------------------------------------------------  IN:    Albumin 5%  - 500 mL: 50 mL    dextrose 5% + sodium chloride 0.45%: 2850 mL    Enteral Tube Flush: 60 mL    Nepro: 80 mL    Solution: 600 mL    Solution: 600 mL    Solution: 50 mL  Total IN: 4290 mL    OUT:    Indwelling Catheter - Urethral: 575 mL  Total OUT: 575 mL    Total NET: 3715 mL          01-24    135  |  102  |  42<H>  ----------------------------<  167<H>  4.8   |  19<L>  |  1.77<H>    Ca    7.3<L>      24 Jan 2020 00:28  Phos  4.6     01-24  Mg     2.3     01-24    TPro  5.4<L>  /  Alb  2.6<L>  /  TBili  0.6  /  DBili  0.3<H>  /  AST  31  /  ALT  9<L>  /  AlkPhos  209<H>  01-24      HEMATOLOGIC                        7.6    5.61  )-----------( 154      ( 24 Jan 2020 00:28 )             24.9         INFECTIOUS DISEASES  RECENT CULTURES:  Specimen Source: .Blood Blood-Venous  Date/Time: 01-23 @ 19:31  Culture Results:   Growth in aerobic bottle: Gram Negative Rods  Gram Stain:   Growth in aerobic bottle: Gram Negative Rods  Organism: --  Specimen Source: .Blood Blood-Peripheral  Date/Time: 01-22 @ 17:15  Culture Results:   Growth in aerobic bottle: Gram Negative Rods  Gram Stain:   Growth in aerobic bottle: Gram Negative Rods  Organism: --  Specimen Source: .Blood Blood-Venous  Date/Time: 01-22 @ 15:13  Culture Results:   Growth in aerobic bottle: Acinetobacter baumannii nosocomialis group  Growth in aerobic bottle: Gram Positive Cocci in Pairs and Chains  ***Blood Panel PCR results on this specimen are available  approximately 3 hours after the Gram stain result.***  Gram stain, PCR, and/or culture results may not always  correspond due to difference in methodologies.  ************************************************************  This PCR assay was performed using [employer].  The following targets are tested for: Enterococcus,  vancomycin resistant enterococci, Listeria monocytogenes,  coagulase negative staphylococci, S. aureus,  methicillin resistant S. aureus, Streptococcus agalactiae  (Group B), S. pneumoniae, S. pyogenes (Group A),  Acinetobacter baumannii, Enterobacter cloacae, E. coli,  Klebsiella oxytoca, K. pneumoniae, Proteus sp.,  Serratia marcescens, Haemophilus influenzae,  Neisseria meningitidis, Pseudomonas aeruginosa, Candida  albicans, C. glabrata, C krusei, C parapsilosis,  C. tropicalis and the KPC resistance gene.  Gram Stain:   Growth in aerobic bottle: Gram Negative Rods and Gram Positive Cocci in  Pairs and Chains  Organism: Blood Culture PCR      < from: CT Abdomen and Pelvis w/ Oral Cont (01.23.20 @ 18:16) >  INTERPRETATION:  unchanged pleural effusions  bilateral upper and lower lobe groundglass opacities are significantly increased from prior study, possibly pulmonary edema.  unchanged appearance of the abdomen from prior  < end of copied text >

## 2020-01-24 NOTE — PROGRESS NOTE ADULT - SUBJECTIVE AND OBJECTIVE BOX
CC: F/U for Bacteremia    Saw/spoke to patient. Patient ill appearing. Poor mental status. Agonal breathing.    Allergies  IV Contrast (Rash; Pruritus; Hypotension)  penicillin (Rash (Severe))  sulfa drugs (Unknown)  vancomycin (Rash (Severe))    ANTIMICROBIALS:  erythromycin     base Tablet 250 <User Schedule>  linezolid  IVPB    linezolid  IVPB 600 every 12 hours  meropenem  IVPB 1000 every 8 hours  polymyxin B IVPB [id number] every 12 hours    PE:    Vital Signs Last 24 Hrs  T(C): 36.8 (24 Jan 2020 11:00), Max: 36.8 (24 Jan 2020 11:00)  T(F): 98.2 (24 Jan 2020 11:00), Max: 98.2 (24 Jan 2020 11:00)  HR: 71 (24 Jan 2020 11:00) (67 - 85)  BP: 96/51 (24 Jan 2020 11:00) (82/45 - 150/99)  BP(mean): 71 (24 Jan 2020 11:00) (59 - 108)  RR: 23 (24 Jan 2020 11:00) (11 - 43)  SpO2: 94% (24 Jan 2020 11:00) (92% - 100%)    Gen: AOx0, poor mental status  CV: S1+S2 normal, nontachycardic  Resp: Clear bilat, tachypneic  Abd: Soft, nontender, +BS  Ext: No LE edema, no wounds    LABS:                        7.6    5.61  )-----------( 154      ( 24 Jan 2020 00:28 )             24.9     01-24    135  |  102  |  42<H>  ----------------------------<  167<H>  4.8   |  19<L>  |  1.77<H>    Ca    7.3<L>      24 Jan 2020 00:28  Phos  4.6     01-24  Mg     2.3     01-24    TPro  5.4<L>  /  Alb  2.6<L>  /  TBili  0.6  /  DBili  0.3<H>  /  AST  31  /  ALT  9<L>  /  AlkPhos  209<H>  01-24    MICROBIOLOGY:    .Blood Blood-Venous  01-23-20   Growth in aerobic bottle: Gram Negative Rods   Growth in aerobic bottle: Gram Negative Rods    .Blood Blood-Peripheral  01-22-20   Growth in aerobic bottle: Acinetobacter baumannii NOSOCOMIALIS GROUP  See previous culture [id number]  --    Growth in aerobic bottle: Gram Negative Rods    .Blood Blood-Venous  01-22-20   Growth in aerobic bottle: Acinetobacter baumannii nosocomialis group  Growth in aerobic bottle: Gram Positive Cocci in Pairs and Chains    .Sputum Sputum  01-11-20   Moderate Stenotrophomonas maltophilia  Normal Respiratory Elaine absent  --  Stenotrophomonas maltophilia    .Blood Blood  01-09-20   Growth in anaerobic bottle: Coag Negative Staphylococcus  Single set isolate, possible contaminant. Contact  Microbiology if susceptibility testing clinically  indicated.  --    Growth in anaerobic bottle: Gram Positive Cocci in Clusters    (otherwise reviewed)    RADIOLOGY:    1/23 CXR:    IMPRESSION:     Multifocal airspace opacities favoring multifocal pneumonia..    No acute pathology in the abdomen/pelvis.

## 2020-01-24 NOTE — PROGRESS NOTE ADULT - ATTENDING COMMENTS
Bacteremic, now on polymixin  1.  ARF--potential worsening with addition of nephrotoxic antibiotic.  NO absolute RRT indication and declining as potential rx at present  2.  HyperK--med rx, loop diuretic + HCO3 to facilitate distal tubule trapping if >5.5

## 2020-01-24 NOTE — CONSULT NOTE ADULT - PROBLEM SELECTOR RECOMMENDATION 3
s/p ex-lap with complicated post-op course  -care as per SICU .  s/p ex-lap with complicated post-op course  -care as per SICU

## 2020-01-24 NOTE — CONSULT NOTE ADULT - SUBJECTIVE AND OBJECTIVE BOX
HPI:  64F with pmhx morbid obesity, acid reflux, HTN, COPD and pshx D&C presents to the ED c/o abdominal pain and bloating. Patient reports having constipation for 2 weeks and has been taking enemas, miralax and senna and passing small hard balls for the last weeks. Reports that she has not had a bowel movement unless she used an enema. Reports pain worst in the LLQ that started a few days ago and has been worsening in the last day, also has noted significant abdominal distention in the last day. Notes that she has not been able to urinate all day today because she feels dehydrated. Reports that she had 'low grade temperature of 99.7 which she took left over augmentin for.' Also reports nausea, denies any emesis, recorded fevers, urinary symptoms. Reports she has difficulty walking short distances because she becomes SOB. In ED, CT demonstrated Perforated sigmoid diverticulitis with intraperitoneal free air.    PERTINENT PM/SXH:   Morbid Obesity  Post Menopausal Bleeding  Polyp, Vagina  Acute Bronchitis  Acid Reflux  HTN (Hypertension)    S/P D&C  no surgical hx    FAMILY HISTORY:  Non-contributory    ITEMS NOT CHECKED ARE NOT PRESENT  SOCIAL HISTORY:   Significant other/partner:  [x] Domestic Partner Children:  []  Jew/Spirituality:  Substance hx:  [x] h/o Substance Abuse, on maintenance Methadone   Tobacco hx:  [x]   Alcohol hx: []   Home Opioid hx:  [] I-Stop Reference No:  Living Situation: []Home  []Long term care  []Rehab []Other    ADVANCE DIRECTIVES:    DNR   []MOLST  []Living Will  DECISION MAKER(s):  [] Health Care Proxy(s)  [x] Surrogate(s)  [] Guardian           Name(s): Darcy            Phone Number(s): [phone]    BASELINE (I)ADL(s) (prior to admission):  [place]: [x]Total  [] Moderate []Dependent    Allergies  IV Contrast (Rash; Pruritus; Hypotension)  penicillin (Rash (Severe))  sulfa drugs (Unknown)  vancomycin (Rash (Severe))    MEDICATIONS  (STANDING):  albumin human 25% IVPB 50 milliLiter(s) IV Intermittent every 6 hours  AQUAPHOR (petrolatum Ointment) 1 Application(s) Topical three times a day  artificial tears (preservative free) Ophthalmic Solution 1 Drop(s) Both EYES two times a day  aspirin  chewable 81 milliGRAM(s) Oral <User Schedule>  atorvastatin 40 milliGRAM(s) Oral <User Schedule>  buDESOnide    Inhalation Suspension 0.5 milliGRAM(s) Inhalation every 12 hours  chlorhexidine 2% Cloths 1 Application(s) Topical <User Schedule>  clobetasol 0.05% Ointment 1 Application(s) Topical two times a day  dextrose 5% + sodium chloride 0.45% 1000 milliLiter(s) (150 mL/Hr) IV Continuous <Continuous>  enoxaparin Injectable 100 milliGRAM(s) SubCutaneous <User Schedule>  erythromycin     base Tablet 250 milliGRAM(s) Oral <User Schedule>  FIRST- Mouthwash  BLM 5 milliLiter(s) Swish and Spit four times a day  hydrocortisone sodium succinate Injectable 100 milliGRAM(s) IV Push every 8 hours  levothyroxine Injectable 25 MICROGram(s) IV Push at bedtime  lidocaine   Patch 1 Patch Transdermal daily  lidocaine 2% Viscous 10 milliLiter(s) Swish and Spit once    linezolid  IVPB 600 milliGRAM(s) IV Intermittent every 12 hours  meropenem  IVPB 1000 milliGRAM(s) IV Intermittent every 8 hours  methadone    Tablet 10 milliGRAM(s) Oral <User Schedule>  norepinephrine Infusion 0.02 MICROgram(s)/kG/Min (6.311 mL/Hr) IV Continuous <Continuous>  nystatin Powder 1 Application(s) Topical two times a day  pantoprazole    Tablet 40 milliGRAM(s) Oral <User Schedule>  polyethylene glycol 3350 17 Gram(s) Oral <User Schedule>  polymyxin B IVPB [id number] Unit(s) IV Intermittent every 12 hours  silver nitrate Applicator 1 Application(s) Topical once  thiamine IVPB 200 milliGRAM(s) IV Intermittent <User Schedule>    MEDICATIONS  (PRN):  albuterol/ipratropium for Nebulization 3 milliLiter(s) Nebulizer every 6 hours PRN Shortness of Breath and/or Wheezing  aluminum hydroxide/magnesium hydroxide/simethicone Suspension 30 milliLiter(s) Oral every 4 hours PRN Dyspepsia  diphenhydrAMINE 25 milliGRAM(s) Oral every 12 hours PRN Rash and/or Itching  HYDROmorphone  Injectable 1.5 milliGRAM(s) IV Push every 6 hours PRN Turning  oxyCODONE    IR 10 milliGRAM(s) Oral every 4 hours PRN Severe Pain (7 - 10)  zinc oxide 20% Ointment 1 Application(s) Topical three times a day PRN Rash    PRESENT SYMPTOMS: []Unable to obtain due to poor mentation   Source if other than patient:  []Family   []Team     Pain: [x] yes [ ] no  QOL impact - Debilitating  Location - Wound                   Aggravating factors - Turning/Patient Care  Quality - Sharp, Stabbing  Radiation - diffuse  Timing - constant  Severity (0-10 scale): 10  Minimal acceptable level (0-10 scale):    Dyspnea:                           []Mild  []Moderate []Severe  Anxiety:                             []Mild []Moderate []Severe  Fatigue:                             []Mild []Moderate []Severe  Nausea:                             []Mild []Moderate []Severe  Loss of appetite:              []Mild []Moderate []Severe  Constipation:                    []Mild []Moderate []Severe    Other Symptoms:  [x]All other review of systems negative     Karnofsky Performance Score/Palliative Performance Status Version 2:        30%    http://npcrc.org/files/news/palliative_performance_scale_ppsv2.pdf    PHYSICAL EXAM:  Vital Signs Last 24 Hrs  T(C): 36.3 (24 Jan 2020 16:00), Max: 36.8 (24 Jan 2020 11:00)  T(F): 97.3 (24 Jan 2020 16:00), Max: 98.2 (24 Jan 2020 11:00)  HR: 73 (24 Jan 2020 16:00) (67 - 89)  BP: 102/50 (24 Jan 2020 16:00) (82/45 - 156/101)  BP(mean): 70 (24 Jan 2020 16:00) (59 - 121)  RR: 12 (24 Jan 2020 16:00) (11 - 31)  SpO2: 93% (24 Jan 2020 16:00) (93% - 100%) I&O's Summary    23 Jan 2020 07:01  -  24 Jan 2020 07:00  --------------------------------------------------------  IN: 4990 mL / OUT: 1000 mL / NET: 3990 mL    24 Jan 2020 07:01  -  24 Jan 2020 17:07  --------------------------------------------------------  IN: 2838.3 mL / OUT: 255 mL / NET: 2583.3 mL    GENERAL:  [x]Alert  [x]Oriented x3   []Lethargic  []Cachexia  []Unarousable  [x]Verbal  []Non-Verbal  Behavioral:   [] Anxiety  [] Delirium [] Agitation [] Other  HEENT:  []Normal   [x]Dry mouth   []ET Tube/Trach  []Oral lesions  PULMONARY:   []Clear [x]Tachypnea  []Audible excessive secretions   []Rhonchi        []Right []Left []Bilateral  []Crackles        []Right []Left []Bilateral  []Wheezing     []Right []Left []Bilateral  CARDIOVASCULAR:    [x]Regular []Irregular []Tachy  []Memo []Murmur []Other  GASTROINTESTINAL:  []Soft  [x]Distended   [x]+BS  []Non tender [x]Tender  []PEG []OGT/ NGT  Last BM: +ostomy  GENITOURINARY:  []Normal [] Incontinent   []Oliguria/Anuria   [x]Ross  MUSCULOSKELETAL:   []Normal   []Weakness  [x]Bed/Wheelchair bound [x]Edema  NEUROLOGIC:   []No focal deficits  [] Cognitive impairment  [x] Dysphagia []Dysarthria [] Paresis []Other   SKIN:   []Normal   [x]Pressure ulcer(s)  [x]Diffuse Jc (See Nursing Documentation)    CRITICAL CARE:  [x] Shock Present  [x]Septic [ ]Cardiogenic [ ]Neurologic [ ]Hypovolemic  [x]  Vasopressors [ ]  Inotropes   [ ] Respiratory failure present [ ] Mechanical Ventilation [ ] Non-invasive ventilatory support [ ] High-Flow  [ ] Acute  [ ] Chronic [ ] Hypoxic  [ ] Hypercarbic [ ] Other  [ ] Other organ failure    LABS:                        7.6    5.61  )-----------( 154      ( 24 Jan 2020 00:28 )             24.9   01-24    135  |  102  |  42<H>  ----------------------------<  167<H>  4.8   |  19<L>  |  1.77<H>    Ca    7.3<L>      24 Jan 2020 00:28  Phos  4.6     01-24  Mg     2.3     01-24    TPro  5.4<L>  /  Alb  2.6<L>  /  TBili  0.6  /  DBili  0.3<H>  /  AST  31  /  ALT  9<L>  /  AlkPhos  209<H>  01-24    RADIOLOGY & ADDITIONAL STUDIES:  < from: CT Chest Abdomen and Pelvis w/ Oral Cont (01.23.20 @ 18:16) >  LUNGS AND LARGE AIRWAYS: Scattered bilateral patchy ground glass and consolidative opacities, new compared to 1/15/2020, favoring multifocal pneumonia over edema. Left lower lobe compressive atelectasis.  PLEURA: Small left and trace right pleural effusions..  VESSELS: Left internal jugular approach central venous catheter terminates in the SVC. Atherosclerotic changes of the aorta.  LIVER: Hepatic steatosis.  GALLBLADDER: Distended. No calcified stones or pericholecystic inflammation.  BLADDER: Ross catheter.  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 An enteric tube terminates in the duodenum. No bowel obstruction.   VESSELS: Atherosclerotic changes.  ABDOMINAL WALL: Anterior abdominal wall open wound, right lower quadrant colostomy and postoperative changes.   BONES: Degenerative changes.     IMPRESSION: Multifocal airspace opacities favoring multifocal pneumonia. No acute pathology in the abdomen/pelvis.        PROTEIN CALORIE MALNUTRITION PRESENT: [x]Yes []No  [x]PPSV2 < or = to 30% []significant weight loss  [x]poor nutritional intake []catabolic state []anasarca     Albumin, Serum: 2.6 g/dL (01-24-20 @ 00:28)  Artificial Nutrition [x]     REFERRALS:   []Chaplaincy  []Hospice  []Child Life  [x]Social Work  []Case management []Holistic Therapy

## 2020-01-24 NOTE — CONSULT NOTE ADULT - PROBLEM SELECTOR RECOMMENDATION 4
PPSV = 30%, requires total assistance for all ADLs  -c/w supportive care .  PPSV = 30%, requires total assistance for all ADLs  -c/w supportive care

## 2020-01-24 NOTE — PROGRESS NOTE ADULT - ASSESSMENT
65yo F with PMHx morbid obesity, GERD, HTN, COPD, and PSHx D&C now s/p ex laparotomy with extended left hemicolectomy 11/26 for perforated and necrotic sigmoid colon with gross abdominal contamination. RTOR 11/29 for abd closure, RUQ end transverse colostomy creation. On 12/19 patient found to have induration of wound and keri-stomal fat necrosis, s/p bedside debridement w/ wound VAC in placement on midline wound. Began showing signs of sepsis and was transferred to SICU 1/7 for further management. Now w/ decreasing pressor support and downtrending leukocytosis. Troponins have been elevated and echo shows decreased ventricular wall motility. Patient grew VRE, and linezolid was added to her antibiotic regimen.      Plan:  Wound debridement scheduled for later today with Plastics  Wean off pressors as tolerated  Continue IV meropenem and linezolid  Trend labs/ WBC/ SBP  Appreciate Cards reccs  Appreciate excellent SICU care      Green  [phone]

## 2020-01-24 NOTE — CONSULT NOTE ADULT - PROBLEM SELECTOR RECOMMENDATION 5
Complicated post-op course after presenting with bowel perforation. Patient appears to be in significant pain and had previously expressed a desire to die. Currently saying she wants to live but not in so much pain. If pain is adequately treated, would patient still express her previous desire to die? Will assist with pain regimen, complicated by chronic Methadone use. Will follow for on-going GOC discussion with patient and partner. .  Complicated post-op course after presenting with bowel perforation. Patient appears to be in significant pain and had previously expressed a desire to die. Currently saying she wants to live but not in so much pain. If pain is adequately treated, would patient still express her previous desire to die? Will assist with pain regimen, complicated by chronic Methadone use. Will follow for on-going GOC discussion with patient and partner. .  Complicated post-op course after presenting with bowel perforation. Patient appears to be in significant pain and had previously expressed a desire to die. Currently saying she wants to live but not in so much pain. If pain is adequately treated, would patient still express her previous desire to die? Will assist with pain regimen, complicated by chronic Methadone use. Will follow for ongoing GOC discussion with patient and partner.

## 2020-01-24 NOTE — CONSULT NOTE ADULT - ASSESSMENT
Full Consult Note to Follow.    -Add Methadone 10mg PO to current regimen -> 17.5mg PO in AM and 10mg PO in PM  -Change Turning PRN to Dilaudid 1.5mg q4h IV PRN  -Change Severe Pain PRN to Dilaudid 1mg q4h IV PRN  -Will continue to follow for GOC: currently patient stating she wants to live, surrogate decision maker expresses no desire to de-escalate care at this time Full Consult Note to Follow.    -Add Methadone 10mg PO to current regimen -> 17.5mg PO in AM and 10mg PO in PM  -Change Turning PRN to Dilaudid 1.5mg q6h IV PRN  -Change Severe Pain PRN to Dilaudid 1mg q4h IV PRN  -Will continue to follow for GOC: currently patient stating she wants to live, surrogate decision maker expresses no desire to de-escalate care at this time 63yo F with PMH of Morbid Obesity, HTN, COPD, and GERD p/w abdominal pain found to have bowel perforation s/p emergent repair with complicated post-op course. Palliative consulted for symptom management and GOC.    ·	Add Methadone 10mg PO to current regimen -> 17.5mg PO in AM and 10mg PO in PM  ·	Change Turning PRN to Dilaudid 1.5mg q4h IV PRN  ·	Change Severe Pain PRN to Dilaudid 1mg q4h IV PRN  ·	Will continue to follow for GOC: currently patient stating she wants to live, surrogate decision maker expresses no desire to de-escalate care at this time

## 2020-01-24 NOTE — PROVIDER CONTACT NOTE (OTHER) - ACTION/TREATMENT ORDERED:
JAK Garcia made aware. Pt after RN, DANKM spoke with pt and pt's partner agreed to have turn to remove lidocaine patch.

## 2020-01-24 NOTE — CONSULT NOTE ADULT - SUBJECTIVE AND OBJECTIVE BOX
HPI:  65yo F with PMHx morbid obesity, GERD, HTN, COPD, and pshx D&C now s/p ex laparotomy with extended left hemicolectomy 11/26 for perforated and necrotic sigmoid colon with gross abdominal contamination. RTOR 11/29 for abd closure, RUQ end transverse colostomy creation. On 12/19 patient found to have induration of wound and keri-stomal fat necrosis, s/p bedside debridement w/ wound VAC in placement on midline wound. Began showing signs of sepsis and was transferred to SICU 1/7 for further management. On pressors for refractory septic shock.    Seen by inpatient dermatology 1/13/20 for scattered erythematous patches on trunk and extremities as well as superficial desquamation on face, trunk, and extremities x 1 week, erosions on L lower back x 3 days. At that time, thought to be likely resolving drug exanthema, unknown trigger (zosyn?), erosions likely secondary to edema and irritation. VZV/HSV swab was negative.    Dermatology is re-consulted for worsening of erythema all over body and erosions. Tender in some areas. Team is applying Vaseline.  No history of pre-existing psoriasis or atopic dermatitis.    Now with polymicrobial bacteremia with VRE and acinetobacter baumanni on meropenem, polymyxin B and linezolid    Patient responses limited but denies throat pain, pain in mouth, trouble swallowing, vaginal pain or pain with urination. Does have skin pain.       PAST MEDICAL & SURGICAL HISTORY:  Morbid Obesity  Post Menopausal Bleeding  Polyp, Vagina  Acid Reflux  HTN (Hypertension)  S/P D&C: 2009, w vaginal polypectomy      REVIEW OF SYSTEMS: Limited ROS due to patient's condition but pertinent above in HPI      MEDICATIONS  (STANDING):  albumin human 25% IVPB 50 milliLiter(s) IV Intermittent every 6 hours  AQUAPHOR (petrolatum Ointment) 1 Application(s) Topical three times a day  artificial tears (preservative free) Ophthalmic Solution 1 Drop(s) Both EYES two times a day  aspirin  chewable 81 milliGRAM(s) Oral <User Schedule>  atorvastatin 40 milliGRAM(s) Oral <User Schedule>  buDESOnide    Inhalation Suspension 0.5 milliGRAM(s) Inhalation every 12 hours  chlorhexidine 2% Cloths 1 Application(s) Topical <User Schedule>  clobetasol 0.05% Ointment 1 Application(s) Topical two times a day  dextrose 5% + sodium chloride 0.45% 1000 milliLiter(s) (150 mL/Hr) IV Continuous <Continuous>  enoxaparin Injectable 100 milliGRAM(s) SubCutaneous <User Schedule>  erythromycin     base Tablet 250 milliGRAM(s) Oral <User Schedule>  FIRST- Mouthwash  BLM 5 milliLiter(s) Swish and Spit four times a day  hydrocortisone sodium succinate Injectable 100 milliGRAM(s) IV Push every 8 hours  levothyroxine Injectable 25 MICROGram(s) IV Push at bedtime  lidocaine   Patch 1 Patch Transdermal daily  lidocaine 2% Viscous 10 milliLiter(s) Swish and Spit once  linezolid  IVPB      linezolid  IVPB 600 milliGRAM(s) IV Intermittent every 12 hours  meropenem  IVPB 1000 milliGRAM(s) IV Intermittent every 8 hours  methadone    Tablet 17.5 milliGRAM(s) Oral <User Schedule>  norepinephrine Infusion 0.02 MICROgram(s)/kG/Min (6.311 mL/Hr) IV Continuous <Continuous>  nystatin Powder 1 Application(s) Topical two times a day  pantoprazole    Tablet 40 milliGRAM(s) Oral <User Schedule>  polyethylene glycol 3350 17 Gram(s) Oral <User Schedule>  polymyxin B IVPB [id number] Unit(s) IV Intermittent every 12 hours  thiamine IVPB 200 milliGRAM(s) IV Intermittent <User Schedule>    MEDICATIONS  (PRN):  albuterol/ipratropium for Nebulization 3 milliLiter(s) Nebulizer every 6 hours PRN Shortness of Breath and/or Wheezing  aluminum hydroxide/magnesium hydroxide/simethicone Suspension 30 milliLiter(s) Oral every 4 hours PRN Dyspepsia  diphenhydrAMINE 25 milliGRAM(s) Oral every 12 hours PRN Rash and/or Itching  HYDROmorphone  Injectable 1 milliGRAM(s) IV Push every 4 hours PRN Turning  oxyCODONE    IR 10 milliGRAM(s) Oral every 4 hours PRN Severe Pain (7 - 10)  zinc oxide 20% Ointment 1 Application(s) Topical three times a day PRN Rash      Allergies    IV Contrast (Rash; Pruritus; Hypotension)  penicillin (Rash (Severe))  sulfa drugs (Unknown)  vancomycin (Rash (Severe))    Intolerances        SOCIAL HISTORY:    FAMILY HISTORY:      Vital Signs Last 24 Hrs  T(C): 36.8 (24 Jan 2020 11:00), Max: 36.8 (24 Jan 2020 11:00)  T(F): 98.2 (24 Jan 2020 11:00), Max: 98.2 (24 Jan 2020 11:00)  HR: 89 (24 Jan 2020 12:30) (67 - 89)  BP: 123/58 (24 Jan 2020 12:30) (82/45 - 150/99)  BP(mean): 90 (24 Jan 2020 12:30) (59 - 108)  RR: 15 (24 Jan 2020 12:30) (11 - 43)  SpO2: 93% (24 Jan 2020 12:30) (92% - 100%)    PHYSICAL EXAM:     The patient was alert to name and place, sick appearing and appears to be in pain  There was no visible lymphadenopathy.  Conjunctiva were non injected  Diffuse edema of extremities and trunk  The scalp, hair, face, eyebrows, lips, OP, neck, chest, back,   extremities X 4, nails were examined.  There was no hyperhidrosis or bromhidrosis.    Of note on skin exam:   - Generalized erythema and scaling on neck, trunk and extremities with diffuse edema leading to fissuring of skin with drainage  - Superficial desquamation throughout trunk and extremities   - Multiple large erosions on back and buttocks with bleeding  - No ocular involvement; some erosions and crusting of lips; mouth exam limited due to patient inability to cooperate with exam; may be some lesions in mouth      LABS:                        7.6    5.61  )-----------( 154      ( 24 Jan 2020 00:28 )             24.9     01-24    135  |  102  |  42<H>  ----------------------------<  167<H>  4.8   |  19<L>  |  1.77<H>    Ca    7.3<L>      24 Jan 2020 00:28  Phos  4.6     01-24  Mg     2.3     01-24    TPro  5.4<L>  /  Alb  2.6<L>  /  TBili  0.6  /  DBili  0.3<H>  /  AST  31  /  ALT  9<L>  /  AlkPhos  209<H>  01-24          RADIOLOGY & ADDITIONAL STUDIES: HPI:  63yo F with PMHx morbid obesity, GERD, HTN, COPD, and pshx D&C now s/p ex laparotomy with extended left hemicolectomy 11/26 for perforated and necrotic sigmoid colon with gross abdominal contamination. RTOR 11/29 for abd closure, RUQ end transverse colostomy creation. On 12/19 patient found to have induration of wound and keri-stomal fat necrosis, s/p bedside debridement w/ wound VAC in placement on midline wound. Began showing signs of sepsis and was transferred to SICU 1/7 for further management. On pressors for refractory septic shock.    Seen by inpatient dermatology 1/13/20 for scattered erythematous patches on trunk and extremities as well as superficial desquamation on face, trunk, and extremities x 1 week, erosions on L lower back x 3 days. At that time, thought to be likely resolving drug exanthema, unknown trigger (zosyn?), erosions likely secondary to edema and irritation. VZV/HSV swab was negative.    Dermatology is re-consulted for worsening of erythema all over body and erosions. Tender in some areas. Team is applying Vaseline.  No history of pre-existing psoriasis or atopic dermatitis.    Now with polymicrobial bacteremia with VRE and acinetobacter baumanni on meropenem, polymyxin B and linezolid    Patient responses limited but denies throat pain, pain in mouth, trouble swallowing, vaginal pain or pain with urination. Does have skin pain.       PAST MEDICAL & SURGICAL HISTORY:  Morbid Obesity  Post Menopausal Bleeding  Polyp, Vagina  Acid Reflux  HTN (Hypertension)  S/P D&C: 2009, w vaginal polypectomy      REVIEW OF SYSTEMS: Limited ROS due to patient's condition but pertinent above in HPI      MEDICATIONS  (STANDING):  albumin human 25% IVPB 50 milliLiter(s) IV Intermittent every 6 hours  AQUAPHOR (petrolatum Ointment) 1 Application(s) Topical three times a day  artificial tears (preservative free) Ophthalmic Solution 1 Drop(s) Both EYES two times a day  aspirin  chewable 81 milliGRAM(s) Oral <User Schedule>  atorvastatin 40 milliGRAM(s) Oral <User Schedule>  buDESOnide    Inhalation Suspension 0.5 milliGRAM(s) Inhalation every 12 hours  chlorhexidine 2% Cloths 1 Application(s) Topical <User Schedule>  clobetasol 0.05% Ointment 1 Application(s) Topical two times a day  dextrose 5% + sodium chloride 0.45% 1000 milliLiter(s) (150 mL/Hr) IV Continuous <Continuous>  enoxaparin Injectable 100 milliGRAM(s) SubCutaneous <User Schedule>  erythromycin     base Tablet 250 milliGRAM(s) Oral <User Schedule>  FIRST- Mouthwash  BLM 5 milliLiter(s) Swish and Spit four times a day  hydrocortisone sodium succinate Injectable 100 milliGRAM(s) IV Push every 8 hours  levothyroxine Injectable 25 MICROGram(s) IV Push at bedtime  lidocaine   Patch 1 Patch Transdermal daily  lidocaine 2% Viscous 10 milliLiter(s) Swish and Spit once  linezolid  IVPB      linezolid  IVPB 600 milliGRAM(s) IV Intermittent every 12 hours  meropenem  IVPB 1000 milliGRAM(s) IV Intermittent every 8 hours  methadone    Tablet 17.5 milliGRAM(s) Oral <User Schedule>  norepinephrine Infusion 0.02 MICROgram(s)/kG/Min (6.311 mL/Hr) IV Continuous <Continuous>  nystatin Powder 1 Application(s) Topical two times a day  pantoprazole    Tablet 40 milliGRAM(s) Oral <User Schedule>  polyethylene glycol 3350 17 Gram(s) Oral <User Schedule>  polymyxin B IVPB [id number] Unit(s) IV Intermittent every 12 hours  thiamine IVPB 200 milliGRAM(s) IV Intermittent <User Schedule>    MEDICATIONS  (PRN):  albuterol/ipratropium for Nebulization 3 milliLiter(s) Nebulizer every 6 hours PRN Shortness of Breath and/or Wheezing  aluminum hydroxide/magnesium hydroxide/simethicone Suspension 30 milliLiter(s) Oral every 4 hours PRN Dyspepsia  diphenhydrAMINE 25 milliGRAM(s) Oral every 12 hours PRN Rash and/or Itching  HYDROmorphone  Injectable 1 milliGRAM(s) IV Push every 4 hours PRN Turning  oxyCODONE    IR 10 milliGRAM(s) Oral every 4 hours PRN Severe Pain (7 - 10)  zinc oxide 20% Ointment 1 Application(s) Topical three times a day PRN Rash      Allergies    IV Contrast (Rash; Pruritus; Hypotension)  penicillin (Rash (Severe))  sulfa drugs (Unknown)  vancomycin (Rash (Severe))    Intolerances        SOCIAL HISTORY:    FAMILY HISTORY:      Vital Signs Last 24 Hrs  T(C): 36.8 (24 Jan 2020 11:00), Max: 36.8 (24 Jan 2020 11:00)  T(F): 98.2 (24 Jan 2020 11:00), Max: 98.2 (24 Jan 2020 11:00)  HR: 89 (24 Jan 2020 12:30) (67 - 89)  BP: 123/58 (24 Jan 2020 12:30) (82/45 - 150/99)  BP(mean): 90 (24 Jan 2020 12:30) (59 - 108)  RR: 15 (24 Jan 2020 12:30) (11 - 43)  SpO2: 93% (24 Jan 2020 12:30) (92% - 100%)    PHYSICAL EXAM:     The patient was alert to name and place, sick appearing and appears to be in pain  There was no visible lymphadenopathy.  Conjunctiva were non injected  Diffuse edema of extremities and trunk  The scalp, hair, face, eyebrows, lips, OP, neck, chest, back,   extremities X 4, nails were examined.  There was no hyperhidrosis or bromhidrosis.    Of note on skin exam:   - Generalized erythema and scaling on neck, trunk and extremities with diffuse edema leading to fissuring of skin with drainage  - Superficial desquamation throughout trunk and extremities   - Multiple large erosions on back and buttocks with bleeding  - No ocular involvement; some erosions and crusting of lips; no oral involvement      LABS:                        7.6    5.61  )-----------( 154      ( 24 Jan 2020 00:28 )             24.9     01-24    135  |  102  |  42<H>  ----------------------------<  167<H>  4.8   |  19<L>  |  1.77<H>    Ca    7.3<L>      24 Jan 2020 00:28  Phos  4.6     01-24  Mg     2.3     01-24    TPro  5.4<L>  /  Alb  2.6<L>  /  TBili  0.6  /  DBili  0.3<H>  /  AST  31  /  ALT  9<L>  /  AlkPhos  209<H>  01-24          RADIOLOGY & ADDITIONAL STUDIES: HPI:  65yo F with PMHx morbid obesity, GERD, HTN, COPD, and pshx D&C now s/p ex laparotomy with extended left hemicolectomy 11/26 for perforated and necrotic sigmoid colon with gross abdominal contamination. RTOR 11/29 for abd closure, RUQ end transverse colostomy creation. On 12/19 patient found to have induration of wound and keri-stomal fat necrosis, s/p bedside debridement w/ wound VAC in placement on midline wound. Began showing signs of sepsis and was transferred to SICU 1/7 for further management. On pressors for refractory septic shock.    Seen by inpatient dermatology 1/13/20 for scattered erythematous patches on trunk and extremities as well as superficial desquamation on face, trunk, and extremities x 1 week, erosions on L lower back x 3 days. At that time, thought to be likely resolving drug exanthema, unknown trigger (zosyn?), erosions likely secondary to edema and irritation. VZV/HSV swab was negative.    Dermatology is re-consulted for worsening of erythema all over body and erosions. Tender in some areas. Team is applying Vaseline.  No history of pre-existing psoriasis or atopic dermatitis.    Now with polymicrobial bacteremia with VRE and acinetobacter baumanni on meropenem, polymyxin B and linezolid    Patient responses limited but denies throat pain, pain in mouth, trouble swallowing, vaginal pain or pain with urination. Does have skin pain.       PAST MEDICAL & SURGICAL HISTORY:  Morbid Obesity  Post Menopausal Bleeding  Polyp, Vagina  Acid Reflux  HTN (Hypertension)  S/P D&C: 2009, w vaginal polypectomy      REVIEW OF SYSTEMS: Limited ROS due to patient's condition but pertinent above in HPI      MEDICATIONS  (STANDING):  albumin human 25% IVPB 50 milliLiter(s) IV Intermittent every 6 hours  AQUAPHOR (petrolatum Ointment) 1 Application(s) Topical three times a day  artificial tears (preservative free) Ophthalmic Solution 1 Drop(s) Both EYES two times a day  aspirin  chewable 81 milliGRAM(s) Oral <User Schedule>  atorvastatin 40 milliGRAM(s) Oral <User Schedule>  buDESOnide    Inhalation Suspension 0.5 milliGRAM(s) Inhalation every 12 hours  chlorhexidine 2% Cloths 1 Application(s) Topical <User Schedule>  clobetasol 0.05% Ointment 1 Application(s) Topical two times a day  dextrose 5% + sodium chloride 0.45% 1000 milliLiter(s) (150 mL/Hr) IV Continuous <Continuous>  enoxaparin Injectable 100 milliGRAM(s) SubCutaneous <User Schedule>  erythromycin     base Tablet 250 milliGRAM(s) Oral <User Schedule>  FIRST- Mouthwash  BLM 5 milliLiter(s) Swish and Spit four times a day  hydrocortisone sodium succinate Injectable 100 milliGRAM(s) IV Push every 8 hours  levothyroxine Injectable 25 MICROGram(s) IV Push at bedtime  lidocaine   Patch 1 Patch Transdermal daily  lidocaine 2% Viscous 10 milliLiter(s) Swish and Spit once  linezolid  IVPB      linezolid  IVPB 600 milliGRAM(s) IV Intermittent every 12 hours  meropenem  IVPB 1000 milliGRAM(s) IV Intermittent every 8 hours  methadone    Tablet 17.5 milliGRAM(s) Oral <User Schedule>  norepinephrine Infusion 0.02 MICROgram(s)/kG/Min (6.311 mL/Hr) IV Continuous <Continuous>  nystatin Powder 1 Application(s) Topical two times a day  pantoprazole    Tablet 40 milliGRAM(s) Oral <User Schedule>  polyethylene glycol 3350 17 Gram(s) Oral <User Schedule>  polymyxin B IVPB [id number] Unit(s) IV Intermittent every 12 hours  thiamine IVPB 200 milliGRAM(s) IV Intermittent <User Schedule>    MEDICATIONS  (PRN):  albuterol/ipratropium for Nebulization 3 milliLiter(s) Nebulizer every 6 hours PRN Shortness of Breath and/or Wheezing  aluminum hydroxide/magnesium hydroxide/simethicone Suspension 30 milliLiter(s) Oral every 4 hours PRN Dyspepsia  diphenhydrAMINE 25 milliGRAM(s) Oral every 12 hours PRN Rash and/or Itching  HYDROmorphone  Injectable 1 milliGRAM(s) IV Push every 4 hours PRN Turning  oxyCODONE    IR 10 milliGRAM(s) Oral every 4 hours PRN Severe Pain (7 - 10)  zinc oxide 20% Ointment 1 Application(s) Topical three times a day PRN Rash      Allergies    IV Contrast (Rash; Pruritus; Hypotension)  penicillin (Rash (Severe))  sulfa drugs (Unknown)  vancomycin (Rash (Severe))    Intolerances        SOCIAL HISTORY:    FAMILY HISTORY:      Vital Signs Last 24 Hrs  T(C): 36.8 (24 Jan 2020 11:00), Max: 36.8 (24 Jan 2020 11:00)  T(F): 98.2 (24 Jan 2020 11:00), Max: 98.2 (24 Jan 2020 11:00)  HR: 89 (24 Jan 2020 12:30) (67 - 89)  BP: 123/58 (24 Jan 2020 12:30) (82/45 - 150/99)  BP(mean): 90 (24 Jan 2020 12:30) (59 - 108)  RR: 15 (24 Jan 2020 12:30) (11 - 43)  SpO2: 93% (24 Jan 2020 12:30) (92% - 100%)    PHYSICAL EXAM:     The patient was alert to name and place, sick appearing and appears to be in pain  There was no visible lymphadenopathy.  Conjunctiva were non injected  Diffuse edema of extremities and trunk  The scalp, hair, face, eyebrows, lips, OP, neck, chest, back,   extremities X 4, nails were examined.  There was no hyperhidrosis or bromhidrosis.    Of note on skin exam:   - Generalized erythema and scaling on neck, trunk and extremities with diffuse edema leading to fissuring of skin with drainage  - Superficial desquamation throughout trunk and extremities   - Multiple large erosions on back and buttocks with bleeding  - No ocular involvement; crusting of lips; no oral involvement      LABS:                        7.6    5.61  )-----------( 154      ( 24 Jan 2020 00:28 )             24.9     01-24    135  |  102  |  42<H>  ----------------------------<  167<H>  4.8   |  19<L>  |  1.77<H>    Ca    7.3<L>      24 Jan 2020 00:28  Phos  4.6     01-24  Mg     2.3     01-24    TPro  5.4<L>  /  Alb  2.6<L>  /  TBili  0.6  /  DBili  0.3<H>  /  AST  31  /  ALT  9<L>  /  AlkPhos  209<H>  01-24          RADIOLOGY & ADDITIONAL STUDIES:

## 2020-01-24 NOTE — PROVIDER CONTACT NOTE (MEDICATION) - ACTION/TREATMENT ORDERED:
md aware will address
Md made aware and Will assess during am rounds.
MD aware. Pt resting comfortably in bed. safety maintained Call bell within reach will cont to monitor
Wound vac dressing removed by Garcia PA and cleaned and packed with brandon and ABD pads. Will continue to monitor.

## 2020-01-24 NOTE — PROGRESS NOTE ADULT - ATTENDING COMMENTS
Patient seen/examined.  Agree w above note and plan and have discussed plan w house staff.For wound debridement.  Worsening renal failure, prognosis poor    Lucas Greer MD

## 2020-01-24 NOTE — PROGRESS NOTE ADULT - SUBJECTIVE AND OBJECTIVE BOX
[facility] DIVISION OF KIDNEY DISEASES AND HYPERTENSION -- FOLLOW UP NOTE  --------------------------------------------------------------------------------  24 hour events/subjective:  pt examined at bed side, lethargic, wound vac removed, polymixin and Linezolid added to antibiotic regimen in the setting of Acetinobacter and VRE bacteremia, non oliguric, has alma.         PAST HISTORY  --------------------------------------------------------------------------------  No significant changes to PMH, PSH, FHx, SHx, unless otherwise noted    ALLERGIES & MEDICATIONS  --------------------------------------------------------------------------------  Allergies    IV Contrast (Rash; Pruritus; Hypotension)  sulfa drugs (Unknown)    Intolerances    vancomycin (Rash (Severe))    Standing Inpatient Medications  albumin human 25% IVPB 50 milliLiter(s) IV Intermittent every 6 hours  AQUAPHOR (petrolatum Ointment) 1 Application(s) Topical three times a day  artificial tears (preservative free) Ophthalmic Solution 1 Drop(s) Both EYES two times a day  aspirin  chewable 81 milliGRAM(s) Oral <User Schedule>  atorvastatin 40 milliGRAM(s) Oral <User Schedule>  buDESOnide    Inhalation Suspension 0.5 milliGRAM(s) Inhalation every 12 hours  chlorhexidine 2% Cloths 1 Application(s) Topical <User Schedule>  clobetasol 0.05% Ointment 1 Application(s) Topical two times a day  dextrose 5% + sodium chloride 0.45% 1000 milliLiter(s) IV Continuous <Continuous>  doxazosin 2 milliGRAM(s) Oral <User Schedule>  enoxaparin Injectable 100 milliGRAM(s) SubCutaneous <User Schedule>  erythromycin     base Tablet 250 milliGRAM(s) Oral <User Schedule>  FIRST- Mouthwash  BLM 5 milliLiter(s) Swish and Spit four times a day  hydrocortisone sodium succinate Injectable 100 milliGRAM(s) IV Push every 8 hours  levothyroxine Injectable 25 MICROGram(s) IV Push at bedtime  lidocaine   Patch 1 Patch Transdermal daily  lidocaine 2% Viscous 10 milliLiter(s) Swish and Spit once  linezolid  IVPB      linezolid  IVPB 600 milliGRAM(s) IV Intermittent every 12 hours  meropenem  IVPB 1000 milliGRAM(s) IV Intermittent every 8 hours  methadone    Tablet 17.5 milliGRAM(s) Oral <User Schedule>  midodrine 30 milliGRAM(s) Oral <User Schedule>  nystatin Powder 1 Application(s) Topical two times a day  pantoprazole    Tablet 40 milliGRAM(s) Oral <User Schedule>  polyethylene glycol 3350 17 Gram(s) Oral <User Schedule>  polymyxin B IVPB [id number] Unit(s) IV Intermittent every 12 hours    PRN Inpatient Medications  albuterol/ipratropium for Nebulization 3 milliLiter(s) Nebulizer every 6 hours PRN  aluminum hydroxide/magnesium hydroxide/simethicone Suspension 30 milliLiter(s) Oral every 4 hours PRN  diphenhydrAMINE 25 milliGRAM(s) Oral every 12 hours PRN  HYDROmorphone  Injectable 1 milliGRAM(s) IV Push every 4 hours PRN  oxyCODONE    IR 10 milliGRAM(s) Oral every 4 hours PRN  zinc oxide 20% Ointment 1 Application(s) Topical three times a day PRN      REVIEW OF SYSTEMS  --------------------------------------------------------------------------------  not able to obtain.     VITALS/PHYSICAL EXAM  --------------------------------------------------------------------------------  T(C): 2.3 (01-24-20 @ 07:15), Max: 36.6 (01-23-20 @ 15:00)  HR: 71 (01-24-20 @ 09:00) (67 - 85)  BP: 91/55 (01-24-20 @ 09:00) (82/45 - 150/99)  RR: 17 (01-24-20 @ 09:00) (11 - 43)  SpO2: 97% (01-24-20 @ 09:00) (92% - 100%)  Wt(kg): --        01-23-20 @ 07:01  -  01-24-20 @ 07:00  --------------------------------------------------------  IN: 4990 mL / OUT: 1000 mL / NET: 3990 mL    01-24-20 @ 07:01  -  01-24-20 @ 09:13  --------------------------------------------------------  IN: 700 mL / OUT: 60 mL / NET: 640 mL      Physical Exam:  	Gen: mild distress  	HEENT:  supple neck, clear oropharynx  	Pulm: CTA B/L  	CV: RRR, S1S2; no rub  	Abd: +BS, wound vac noted.   	: No suprapubic tenderness. has marquez dark colored urine.   	UE: no edema;   	LE: no edema  	Neuro: No focal deficits  	Skin: Erythematous rash desquamative   	Vascular access: rij IJ central line.     LABS/STUDIES  --------------------------------------------------------------------------------              7.6    5.61  >-----------<  154      [01-24-20 @ 00:28]              24.9     135  |  102  |  42  ----------------------------<  167      [01-24-20 @ 00:28]  4.8   |  19  |  1.77        Ca     7.3     [01-24-20 @ 00:28]      Mg     2.3     [01-24-20 @ 00:28]      Phos  4.6     [01-24-20 @ 00:28]    TPro  5.4  /  Alb  2.6  /  TBili  0.6  /  DBili  0.3  /  AST  31  /  ALT  9   /  AlkPhos  209  [01-24-20 @ 00:28]          Creatinine Trend:  SCr 1.77 [01-24 @ 00:28]  SCr 1.78 [01-23 @ 18:44]  SCr 1.85 [01-23 @ 15:01]  SCr 1.97 [01-23 @ 11:12]  SCr 2.01 [01-23 @ 06:25]    Urinalysis - [01-22-20 @ 11:09]      Color Yellow / Appearance Slightly Turbid / SG 1.028 / pH 5.5      Gluc Negative / Ketone Trace  / Bili Negative / Urobili 4 mg/dL       Blood Small / Protein 30 mg/dL / Leuk Est Negative / Nitrite Negative      RBC 1 / WBC 6 / Hyaline 149 / Gran  / Sq Epi  / Non Sq Epi 12 / Bacteria Negative    Urine Creatinine 146      [01-22-20 @ 10:10]  Urine Sodium <35      [01-22-20 @ 10:10]  Urine Osmolality 317      [01-22-20 @ 10:10]    TSH 4.81      [01-17-20 @ 03:52]  Lipid: chol 98, TG 91, HDL 28, LDL 52      [12-30-19 @ 08:46]

## 2020-01-24 NOTE — CONSULT NOTE ADULT - SUBJECTIVE AND OBJECTIVE BOX
Patient is a 64y old  Female who presents with a chief complaint of Perforated bowel (19 Jan 2020 15:41)    HPI:  [facility] General Surgery H&P    Patient is a 64y old Female who presents with a chief complaint of abdominal pain    HPI:    64F with pmhx morbid obesity, acid reflux, HTN, COPD and pshx D&C presents to the ED c/o abdominal pain and bloating. Patient reports having constipation for 2 weeks and has been taking enemas, miralax and senna and passing small hard balls for the last weeks. Reports that she has not had a bowel movement unless she used an enema. Reports pain worst in the LLQ that started a few days ago and has been worsening in the last day, also has noted significant abdominal distention in the last day. Notes that she has not been able to urinate all day today because she feels dehydrated. Reports that she had 'low grade temperature of 99.7 which she took left over augmentin for.' Also reports nausea, denies any emesis, recorded fevers, urinary symptoms. Reports she has difficulty walking short distances because she becomes SOB. In ED, CT demonstrated Perforated sigmoid diverticulitis with intraperitoneal free air.        PAST MEDICAL & SURGICAL HISTORY:  Morbid Obesity  Post Menopausal Bleeding  Polyp, Vagina  Acid Reflux  HTN (Hypertension)  S/P D&C: 2009, w vaginal polypectomy      FAMILY HISTORY:      SOCIAL HISTORY:  - Active smoker 1pack day for many years  - Lives in private residence with domestic partner    MEDICATIONS  (STANDING):  lactated ringers Bolus 1000 milliLiter(s) IV Bolus once  morphine  - Injectable 4 milliGRAM(s) IV Push once  ondansetron Injectable 4 milliGRAM(s) IV Push once  piperacillin/tazobactam IVPB. 3.375 Gram(s) IV Intermittent Once    MEDICATIONS  (PRN):    Allergies    sulfa drugs (Unknown)    Intolerances        Vital Signs Last 24 Hrs  T(C): 37.1 (25 Nov 2019 22:50), Max: 37.1 (25 Nov 2019 17:53)  T(F): 98.7 (25 Nov 2019 22:50), Max: 98.8 (25 Nov 2019 17:53)  HR: 98 (25 Nov 2019 22:50) (62 - 98)  BP: 107/73 (25 Nov 2019 22:50) (107/73 - 110/70)  BP(mean): --  RR: 16 (25 Nov 2019 22:50) (16 - 16)  SpO2: 94% (25 Nov 2019 22:50) (94% - 100%)  Daily     Daily     General: NAD, well-nourished  HEENT: Atraumatic, EOMI  Resp: Breathing comfortably on RA  CV: Normal sinus rhythm  Abd: soft, distended, mildly tender in LLQ, no RT/guarding  Ext: ROMIx4, motor strength intact x 4                          16.9   11.20 )-----------( 333      ( 25 Nov 2019 20:43 )             50.6     11-25    131<L>  |  97  |  20  ----------------------------<  151<H>  6.1<H>   |  17<L>  |  0.78    Ca    10.1      25 Nov 2019 20:43  Phos  3.1     11-25  Mg     2.2     11-25    TPro  8.0  /  Alb  2.9<L>  /  TBili  1.3<H>  /  DBili  x   /  AST  36  /  ALT  33  /  AlkPhos  108  11-25    PT/INR - ( 25 Nov 2019 20:43 )   PT: 15.2 sec;   INR: 1.31 ratio         PTT - ( 25 Nov 2019 20:43 )  PTT:27.2 sec      Radiographic Findings:             EXAM:  CT ABDOMEN AND PELVIS IC                            PROCEDURE DATE:  11/25/2019            INTERPRETATION:  CLINICAL INFORMATION: Constipation for weeks.    COMPARISON: None.    PROCEDURE:   CT of the Abdomen and Pelvis was performed with intravenous contrast.   Intravenous contrast: 125 ml Omnipaque 350. 25 ml discarded.  Oral contrast: None.  Sagittal and coronal reformats were performed.    FINDINGS:    LOWER CHEST: Aortic valve and coronary artery calcifications. Calcified   granuloma in the right lower lobe.     LIVER: Small calcifications in the right lobe the liver.  BILE DUCTS: Normal caliber.  GALLBLADDER: Distended, up to 6.3 cm. No gallbladder wall thickening or   pericholecystic fluid.  SPLEEN: Within normal limits.  PANCREAS: Within normal limits.  ADRENALS: Within normal limits.  KIDNEYS/URETERS: 1.2 cm indeterminate right lower pole exophytic   hypodensity (5, 7). Additional subcentimeter hypodensities too small to   characterize within the right kidney. No hydronephrosis.    BLADDER: Underdistended, limiting evaluation.  REPRODUCTIVE ORGANS: The uterus is within normal limits. 2.4 cm left   adnexal cystic lesion (3:106).    BOWEL: The esophagus is slightly thickened. Colonic diverticulosis. Wall   thickening and inflammatory stranding centered around the sigmoid colon.   Small ascites and free air with suspected perforation from the proximal   sigmoid colon (3, 85). Areas of peripheral enhancement within the   peritoneal fluid without organized drainable collection identified at   this time. No bowel obstruction. Appendix is normal.  VESSELS: Atherosclerotic changes.  RETROPERITONEUM/LYMPH NODES: No lymphadenopathy.    ABDOMINAL WALL: Small umbilical hernia containing fat and free air.   Nonspecific lower anterior abdominal wall edema.  BONES: Degenerative changes. Grade 1 anterolisthesis at L4-L5 and grade 1   listhesis at L5-S1    IMPRESSION:     Perforated sigmoid diverticulitis with intraperitoneal free air and   peripherally enhancing free fluid. No discrete abscess identified at this   time.    Thickening of the esophagus. Correlation with endoscopy is recommended on   a nonemergent basis.    Indeterminate 1.2 cm right lower pole exophytic hypodensity. Further   evaluation with nonurgent ultrasound or MR is recommended.    Urgent findings were discussed with Dr. Garrett at 11/25/2019 10:59 PM by    with read back confirmation.                    SASCHA CAMACHO M.D., RADIOLOGY RESIDENT  This document has been electronically signed.  SHYAM GALLO M.D., ATTENDING RADIOLOGIST  This document has been electronically signed. Nov 25 2019 11:26PM (26 Nov 2019 00:21)      Allergies    IV Contrast (Rash; Pruritus; Hypotension)  penicillin (Rash (Severe))  sulfa drugs (Unknown)  vancomycin (Rash (Severe))    Intolerances      MEDICATIONS  (STANDING):  albumin human 25% IVPB 50 milliLiter(s) IV Intermittent every 6 hours  AQUAPHOR (petrolatum Ointment) 1 Application(s) Topical three times a day  artificial tears (preservative free) Ophthalmic Solution 1 Drop(s) Both EYES two times a day  aspirin  chewable 81 milliGRAM(s) Oral <User Schedule>  atorvastatin 40 milliGRAM(s) Oral <User Schedule>  buDESOnide    Inhalation Suspension 0.5 milliGRAM(s) Inhalation every 12 hours  chlorhexidine 2% Cloths 1 Application(s) Topical <User Schedule>  clobetasol 0.05% Ointment 1 Application(s) Topical two times a day  dextrose 5% + sodium chloride 0.45% 1000 milliLiter(s) (150 mL/Hr) IV Continuous <Continuous>  enoxaparin Injectable 100 milliGRAM(s) SubCutaneous <User Schedule>  erythromycin     base Tablet 250 milliGRAM(s) Oral <User Schedule>  FIRST- Mouthwash  BLM 5 milliLiter(s) Swish and Spit four times a day  hydrocortisone sodium succinate Injectable 100 milliGRAM(s) IV Push every 8 hours  levothyroxine Injectable 25 MICROGram(s) IV Push at bedtime  lidocaine   Patch 1 Patch Transdermal daily  lidocaine 2% Viscous 10 milliLiter(s) Swish and Spit once  linezolid  IVPB      linezolid  IVPB 600 milliGRAM(s) IV Intermittent every 12 hours  meropenem  IVPB 1000 milliGRAM(s) IV Intermittent every 8 hours  methadone    Tablet 10 milliGRAM(s) Oral <User Schedule>  norepinephrine Infusion 0.02 MICROgram(s)/kG/Min (6.311 mL/Hr) IV Continuous <Continuous>  nystatin Powder 1 Application(s) Topical two times a day  pantoprazole    Tablet 40 milliGRAM(s) Oral <User Schedule>  polyethylene glycol 3350 17 Gram(s) Oral <User Schedule>  polymyxin B IVPB [id number] Unit(s) IV Intermittent every 12 hours  silver nitrate Applicator 1 Application(s) Topical once  thiamine IVPB 200 milliGRAM(s) IV Intermittent <User Schedule>    MEDICATIONS  (PRN):  albuterol/ipratropium for Nebulization 3 milliLiter(s) Nebulizer every 6 hours PRN Shortness of Breath and/or Wheezing  aluminum hydroxide/magnesium hydroxide/simethicone Suspension 30 milliLiter(s) Oral every 4 hours PRN Dyspepsia  diphenhydrAMINE 25 milliGRAM(s) Oral every 12 hours PRN Rash and/or Itching  HYDROmorphone  Injectable 1.5 milliGRAM(s) IV Push every 6 hours PRN Turning  oxyCODONE    IR 10 milliGRAM(s) Oral every 4 hours PRN Severe Pain (7 - 10)  zinc oxide 20% Ointment 1 Application(s) Topical three times a day PRN Rash      PAST MEDICAL & SURGICAL HISTORY:  Morbid Obesity  Post Menopausal Bleeding  Polyp, Vagina  Acid Reflux  HTN (Hypertension)  S/P D&C: 2009, w vaginal polypectomy      REVIEW OF SYSTEMS  All review of systems negative, except for those marked:  General:		  Eyes:			  ENT:			  Pulmonary:		  Cardiac:		  Gastrointestinal:	  Renal/Urologic:		  Musculoskeletal:	  Skin:		  Neurologic:		  Psychiatric:		  Endocrine:		  Hematologic:		  Allergy/Immune:	    SOCIAL/ENVIRONMENTAL HISTORY:  Family Lives:  Education Level:  Tobacco	[] No	[] Yes:  Alcohol		[] No	[] Yes:    FAMILY HISTORY:    Allergies		[] No	[] Yes:   Miscarriages		[] No	[] Yes:   Autoimmune		[] No	[] Yes:   Asthma			[] No	[] Yes:  Still Births		[] No	[] Yes:  Sinusitis		[] No	[] Yes:   Fetal Demise		[] No	[] Yes:   Immune Deficiency	[] No	[] Yes:   Other:			[] No	[] Yes:    Vital Signs Last 24 Hrs  T(C): 36.3 (24 Jan 2020 16:00), Max: 36.8 (24 Jan 2020 11:00)  T(F): 97.3 (24 Jan 2020 16:00), Max: 98.2 (24 Jan 2020 11:00)  HR: 73 (24 Jan 2020 16:00) (67 - 89)  BP: 102/50 (24 Jan 2020 16:00) (82/45 - 156/101)  BP(mean): 70 (24 Jan 2020 16:00) (59 - 121)  RR: 12 (24 Jan 2020 16:00) (11 - 31)  SpO2: 93% (24 Jan 2020 16:00) (93% - 100%)    PHYSICAL EXAM  All physical exam findings normal, except for those marked:  General:	alert, well developed/well nourished, no acute distress  Eyes      no conjunctival injection, no discharge, no photophobia, intact                 extraocular movements, sclera not icteric, no suborbital bogginess  ENT:    normal tympanic membranes; external ear normal, normal nasal mucosa  .		and turbinates without discharge, no pharyngeal erythema or exudates, no  .		cobblestoning, normal tongue and lips, normal tonsils   Neck    supple, full range of motion  Lymph Nodes	normal size and consistency, non-tender  Cardiovascular	regular rate and rhythm; Normal S1, S2; No murmur  Respiratory	good air movement bilaterally, no wheezing or crackles,  no retractions  Abdominal	soft; ND, NT, no hepatosplenomegaly, + bowel sounds  		normal external genitalia, no rash  Skin		skin intact and not indurated; no rash, no desquamation  Neurologic	alert, appropriate for age, cranial nerves II-XII grossly normal  Musculoskeletal	 no joint swelling, erythema, or tenderness; full range of motion  .			with no contractures; no muscle tenderness; no clubbing; no cyanosis;  .			no edema    Lab Results:                        7.6    5.61  )-----------( 154      ( 24 Jan 2020 00:28 )             24.9     01-24    135  |  102  |  42<H>  ----------------------------<  167<H>  4.8   |  19<L>  |  1.77<H>    Ca    7.3<L>      24 Jan 2020 00:28  Phos  4.6     01-24  Mg     2.3     01-24    TPro  5.4<L>  /  Alb  2.6<L>  /  TBili  0.6  /  DBili  0.3<H>  /  AST  31  /  ALT  9<L>  /  AlkPhos  209<H>  01-24    LIVER FUNCTIONS - ( 24 Jan 2020 00:28 )  Alb: 2.6 g/dL / Pro: 5.4 g/dL / ALK PHOS: 209 U/L / ALT: 9 U/L / AST: 31 U/L / GGT: x                 IMAGING STUDIES: Patient is a 64y old  Female who presents with a chief complaint of Perforated bowel (19 Jan 2020 15:41)    HPI:    64F with pmhx morbid obesity, HTN, COPD who was admitted with bowel perforation over one month ago and since has suffered numerous complications since ostomy. She has had recurrent polymicrobial bacteremia requiring many different antibiotics. Allergy was consulted because of severe rash and desquamation that recently developed which is concerning for Severe Cutaneous Adverse Reaction (SCAR) including Paul Darshan Syndrome.    A rash was first noticed on 1/7/20. At this time she was taking Zosyn and Vancomycin as antimicrobial therapy. 1/8/20 the Zosyn was discontinued and Meropenem was started. The rash continued to worsen during this time and become more generalized. Vancomycin was discontinued and she is currently taking Linezolid, polymixin B, and Meropenem for antimicrobial coverage. Blood cultures continue to be positive for Vancomycin resistant enterococcus, Coagulase negative Staph, and Acinetobacter baumanni. Her rash has continued to worsen and began desquamating with severe pain to skin diffusely.  Two days ago she also developed sores and fissuring to her lips and pain to the inside of her mouth which was concerning for the mucosal lesions of SJS. Per nursing report there was no genital lesions.     Dermatology was consulted and evaluated patient with thorough skin exam showing desquamation to entire body except face. Facial lesions did not seem consistent with SJS mucosal lesions and were beleived to be lesions from biting her buccal mucosa and  lip lesions from dryness. Her labs show no evidence of eosinophilia or transaminitis.    PAST MEDICAL & SURGICAL HISTORY:  Morbid Obesity  Post Menopausal Bleeding  Polyp, Vagina  Acid Reflux  HTN (Hypertension)  S/P D&C: 2009, w vaginal polypectomy      FA      Allergies    IV Contrast (Rash; Pruritus; Hypotension)  penicillin (Rash (Severe))  sulfa drugs (Unknown)  vancomycin (Rash (Severe))    Intolerances      MEDICATIONS  (STANDING):  albumin human 25% IVPB 50 milliLiter(s) IV Intermittent every 6 hours  AQUAPHOR (petrolatum Ointment) 1 Application(s) Topical three times a day  artificial tears (preservative free) Ophthalmic Solution 1 Drop(s) Both EYES two times a day  aspirin  chewable 81 milliGRAM(s) Oral <User Schedule>  atorvastatin 40 milliGRAM(s) Oral <User Schedule>  buDESOnide    Inhalation Suspension 0.5 milliGRAM(s) Inhalation every 12 hours  chlorhexidine 2% Cloths 1 Application(s) Topical <User Schedule>  clobetasol 0.05% Ointment 1 Application(s) Topical two times a day  dextrose 5% + sodium chloride 0.45% 1000 milliLiter(s) (150 mL/Hr) IV Continuous <Continuous>  enoxaparin Injectable 100 milliGRAM(s) SubCutaneous <User Schedule>  erythromycin     base Tablet 250 milliGRAM(s) Oral <User Schedule>  FIRST- Mouthwash  BLM 5 milliLiter(s) Swish and Spit four times a day  hydrocortisone sodium succinate Injectable 100 milliGRAM(s) IV Push every 8 hours  levothyroxine Injectable 25 MICROGram(s) IV Push at bedtime  lidocaine   Patch 1 Patch Transdermal daily  lidocaine 2% Viscous 10 milliLiter(s) Swish and Spit once  linezolid  IVPB      linezolid  IVPB 600 milliGRAM(s) IV Intermittent every 12 hours  meropenem  IVPB 1000 milliGRAM(s) IV Intermittent every 8 hours  methadone    Tablet 10 milliGRAM(s) Oral <User Schedule>  norepinephrine Infusion 0.02 MICROgram(s)/kG/Min (6.311 mL/Hr) IV Continuous <Continuous>  nystatin Powder 1 Application(s) Topical two times a day  pantoprazole    Tablet 40 milliGRAM(s) Oral <User Schedule>  polyethylene glycol 3350 17 Gram(s) Oral <User Schedule>  polymyxin B IVPB [id number] Unit(s) IV Intermittent every 12 hours  silver nitrate Applicator 1 Application(s) Topical once  thiamine IVPB 200 milliGRAM(s) IV Intermittent <User Schedule>    MEDICATIONS  (PRN):  albuterol/ipratropium for Nebulization 3 milliLiter(s) Nebulizer every 6 hours PRN Shortness of Breath and/or Wheezing  aluminum hydroxide/magnesium hydroxide/simethicone Suspension 30 milliLiter(s) Oral every 4 hours PRN Dyspepsia  diphenhydrAMINE 25 milliGRAM(s) Oral every 12 hours PRN Rash and/or Itching  HYDROmorphone  Injectable 1.5 milliGRAM(s) IV Push every 6 hours PRN Turning  oxyCODONE    IR 10 milliGRAM(s) Oral every 4 hours PRN Severe Pain (7 - 10)  zinc oxide 20% Ointment 1 Application(s) Topical three times a day PRN Rash      REVIEW OF SYSTEMS: limited as patient is not feeling up to answering questions   All review of systems negative, except for those marked:		  ENT:	sores and cracks to lips			  Gastrointestinal:	 bowel perforation with ostomy   Skin:	diffuse desquamation as above	  Neurologic:	confusion, severe pain 				  Allergy/Immune:	 as above    SOCIAL/ENVIRONMENTAL HISTORY:  Family Lives: lives with long term domestic partner  Tobacco	[] No	[x] Yes:      FAMILY HISTORY:  Unable to obtain from patient    Vital Signs Last 24 Hrs  T(C): 36.3 (24 Jan 2020 16:00), Max: 36.8 (24 Jan 2020 11:00)  T(F): 97.3 (24 Jan 2020 16:00), Max: 98.2 (24 Jan 2020 11:00)  HR: 73 (24 Jan 2020 16:00) (67 - 89)  BP: 102/50 (24 Jan 2020 16:00) (82/45 - 156/101)  BP(mean): 70 (24 Jan 2020 16:00) (59 - 121)  RR: 12 (24 Jan 2020 16:00) (11 - 31)  SpO2: 93% (24 Jan 2020 16:00) (93% - 100%)    PHYSICAL EXAM: Patient deferred physical exam as she was in significant pain after recent skin exam by dermatology.  All physical exam findings normal, except for those marked:  General:	moaning in pain, eyes partially opened. Lying in bed avoiding moving  Eyes      no conjunctival injection, no discharge, no photophobia, intact                 extraocular movements, sclera not icteric, no suborbital bogginess  ENT:    dry cracked lips, patient reports pain and soreness to mouth but unable to examine  Abdominal	ostomy bag in place    Skin		diffuse erythroderma and desquamation to entire body, per nursing report there was ulceration to back but patient refused exam, exquisitely tender to light touch of skin.  Neurologic	sleepy but oriented to person, place, and time, answers   Musculoskeletal	 severe edema to all extremities with weeping     Lab Results:                        7.6    5.61  )-----------( 154      ( 24 Jan 2020 00:28 )             24.9     01-24    135  |  102  |  42<H>  ----------------------------<  167<H>  4.8   |  19<L>  |  1.77<H>    Ca    7.3<L>      24 Jan 2020 00:28  Phos  4.6     01-24  Mg     2.3     01-24    TPro  5.4<L>  /  Alb  2.6<L>  /  TBili  0.6  /  DBili  0.3<H>  /  AST  31  /  ALT  9<L>  /  AlkPhos  209<H>  01-24    LIVER FUNCTIONS - ( 24 Jan 2020 00:28 )  Alb: 2.6 g/dL / Pro: 5.4 g/dL / ALK PHOS: 209 U/L / ALT: 9 U/L / AST: 31 U/L / GGT: x                 IMAGING STUDIES:

## 2020-01-24 NOTE — CONSULT NOTE ADULT - PROVIDER SPECIALTY LIST ADULT
Palliative Care
Dental
Gastroenterology
Nephrology
Dermatology
Dermatology
Infectious Disease
Plastic Surgery
SICU
Surgery
Cardiology
Pulmonology
Allergy/Immunology

## 2020-01-24 NOTE — CONSULT NOTE ADULT - PROBLEM SELECTOR RECOMMENDATION 2
- Skin care per dermatology, appreciate recs - Skin care per dermatology, appreciate recs  -pain control and rehydration

## 2020-01-24 NOTE — CONSULT NOTE ADULT - PROBLEM SELECTOR RECOMMENDATION 2
Renal considering HD but no absolute indication at this time. Patient is a poor candidate as per their assessment if no further surgical plan if bacteremia persists.  -patient currently stating she wants to live. will continue to follow ongoing discussion .  Renal considering HD but no absolute indication at this time. Patient is a poor candidate as per their assessment if no further surgical plan if bacteremia persists.  -patient currently stating she wants to live. will continue to follow ongoing discussion

## 2020-01-24 NOTE — CONSULT NOTE ADULT - ASSESSMENT
Jabier is a 64 year old with likely resistant sepsis in the setting of bowel perforation and resection with ostomy placement now with diffuse desquamating rash with severe pain consistent with SCAR. At this point, it is unclear exactly what type of SCAR, or what is the initial cause. Any medication can cause a SCAR, but antimicrobials are very commons. Given the timecourse of the symptoms and rash onset and the medications it is most likely caused by Zosyn or Vancomycin. Since rash started before Meropenem was started, this makes it much less likely, however, it cannot be completely excluded. SCARs are not IgE mediated reactions, but are instead cell mediated and delayed. She should avoid Zosyn and Vancomycin from now on because being a cell mediated process, it is impossible to desensitize, and if she is exposed to the medication again she will have the same or worse reaction because of memory in her cell response. Skin testing is also contraindicated with a desquamating rash because even a small skin test can trigger the reaction. While penicillins such as zosyn can cross react with other medications, the risk of cross reaction with cephalosporins is low <2%. Meropenem only cross reacts with Ceftazidime and not other B lactams based on the side chain R groups.    Based on dermatology consult, it seems that oral lesions are not consistent with SJS/TEN, and lack of eosinophilia and transaminitis make DRESS unlikely. Regardless of the exact cause or specific type of SCAR, the treatment is supportive care. Steroids and IVIG have been discussed in the literature to treat SJS and other SCAR but studies are not consistent and this is controversial. Cyclosporine has also been used with positive results in some studies but is not a good option for this patient who remains bacteremic.

## 2020-01-24 NOTE — PROGRESS NOTE ADULT - SUBJECTIVE AND OBJECTIVE BOX
Morning Surgical Progress Note  Patient is a 64y old  Female who presents with a chief complaint of Perforated bowel (2020 15:41)      SUBJECTIVE: Patient seen and examined at bedside with surgical team, patient complains of generalized pain. She refused Palliative consult yesterday.    Vital Signs Last 24 Hrs  T(C): 36 (2020 00:00), Max: 37.1 (2020 08:00)  T(F): 96.8 (2020 00:00), Max: 98.8 (2020 08:00)  HR: 71 (2020 02:00) (70 - 86)  BP: 92/50 (2020 02:00) (80/47 - 150/99)  BP(mean): 67 (2020 02:00) (59 - 108)  RR: 11 (2020 02:00) (11 - 43)  SpO2: 98% (2020 02:00) (92% - 100%)I&O's Detail    2020 07:01  -  2020 07:00  --------------------------------------------------------  IN:    Albumin 5%  - 500 mL: 500 mL    dextrose 5% + sodium chloride 0.9%: 150 mL    Lactated Ringers IV Bolus: 1000 mL    Oral Fluid: 120 mL    sodium bicarbonate  Infusion: 3150 mL    Solution: 400 mL    Solution: 50 mL  Total IN: 5370 mL    OUT:    Indwelling Catheter - Urethral: 760 mL    VAC (Vacuum Assisted Closure) System: 105 mL  Total OUT: 865 mL    Total NET: 4505 mL      2020 07:01  -  2020 04:49  --------------------------------------------------------  IN:    Albumin 5%  - 500 mL: 50 mL    dextrose 5% + sodium chloride 0.45%: 2850 mL    Enteral Tube Flush: 60 mL    Nepro: 80 mL    Solution: 600 mL    Solution: 600 mL    Solution: 50 mL  Total IN: 4290 mL    OUT:    Indwelling Catheter - Urethral: 575 mL  Total OUT: 575 mL    Total NET: 3715 mL      MEDICATIONS  (STANDING):  albumin human 25% IVPB 50 milliLiter(s) IV Intermittent every 6 hours  AQUAPHOR (petrolatum Ointment) 1 Application(s) Topical three times a day  artificial tears (preservative free) Ophthalmic Solution 1 Drop(s) Both EYES two times a day  aspirin  chewable 81 milliGRAM(s) Oral <User Schedule>  atorvastatin 40 milliGRAM(s) Oral <User Schedule>  buDESOnide    Inhalation Suspension 0.5 milliGRAM(s) Inhalation every 12 hours  chlorhexidine 2% Cloths 1 Application(s) Topical <User Schedule>  clobetasol 0.05% Ointment 1 Application(s) Topical two times a day  dextrose 5% + sodium chloride 0.45% 1000 milliLiter(s) (150 mL/Hr) IV Continuous <Continuous>  doxazosin 2 milliGRAM(s) Oral <User Schedule>  enoxaparin Injectable 100 milliGRAM(s) SubCutaneous <User Schedule>  erythromycin     base Tablet 250 milliGRAM(s) Oral <User Schedule>  FIRST- Mouthwash  BLM 5 milliLiter(s) Swish and Spit four times a day  hydrocortisone sodium succinate Injectable 100 milliGRAM(s) IV Push every 8 hours  levothyroxine Injectable 25 MICROGram(s) IV Push at bedtime  lidocaine   Patch 1 Patch Transdermal daily  lidocaine 2% Viscous 10 milliLiter(s) Swish and Spit once  linezolid  IVPB      linezolid  IVPB 600 milliGRAM(s) IV Intermittent every 12 hours  meropenem  IVPB 1000 milliGRAM(s) IV Intermittent every 8 hours  methadone    Tablet 17.5 milliGRAM(s) Oral <User Schedule>  midodrine 30 milliGRAM(s) Oral <User Schedule>  nystatin Powder 1 Application(s) Topical two times a day  pantoprazole    Tablet 40 milliGRAM(s) Oral <User Schedule>  polyethylene glycol 3350 17 Gram(s) Oral <User Schedule>  polymyxin B IVPB 7970014 Unit(s) IV Intermittent every 12 hours    MEDICATIONS  (PRN):  albuterol/ipratropium for Nebulization 3 milliLiter(s) Nebulizer every 6 hours PRN Shortness of Breath and/or Wheezing  aluminum hydroxide/magnesium hydroxide/simethicone Suspension 30 milliLiter(s) Oral every 4 hours PRN Dyspepsia  diphenhydrAMINE 25 milliGRAM(s) Oral every 12 hours PRN Rash and/or Itching  HYDROmorphone  Injectable 1 milliGRAM(s) IV Push every 4 hours PRN Turning  oxyCODONE    IR 10 milliGRAM(s) Oral every 4 hours PRN Severe Pain (7 - 10)  zinc oxide 20% Ointment 1 Application(s) Topical three times a day PRN Rash      Physical Exam  Constitutional: A&Ox3, NAD  Respiratory: CTA b/l  Cardiac: RRR, S1 and S2, no m/r/g  Gastrointestinal: abdomen soft, NT/ND, dressings c/d/i  Skin: erythematous and skin sloughing    LABS:                        7.6    5.61  )-----------( 154      ( 2020 00:28 )             24.9     01-24    135  |  102  |  42<H>  ----------------------------<  167<H>  4.8   |  19<L>  |  1.77<H>    Ca    7.3<L>      2020 00:28  Phos  4.6       Mg     2.3         TPro  5.4<L>  /  Alb  2.6<L>  /  TBili  0.6  /  DBili  0.3<H>  /  AST  31  /  ALT  9<L>  /  AlkPhos  209<H>  24      LIVER FUNCTIONS - ( 2020 00:28 )  Alb: 2.6 g/dL / Pro: 5.4 g/dL / ALK PHOS: 209 U/L / ALT: 9 U/L / AST: 31 U/L / GGT: x           Urinalysis Basic - ( 2020 11:09 )    Color: Yellow / Appearance: Slightly Turbid / S.028 / pH: x  Gluc: x / Ketone: Trace  / Bili: Negative / Urobili: 4 mg/dL   Blood: x / Protein: 30 mg/dL / Nitrite: Negative   Leuk Esterase: Negative / RBC: 1 /hpf / WBC 6 /HPF   Sq Epi: x / Non Sq Epi: 12 /hpf / Bacteria: Negative

## 2020-01-24 NOTE — PROGRESS NOTE ADULT - ASSESSMENT
64 F PMHx of HTN, COPD, and morbid obesity who was admitted on 11/26 for perforated sigmoid diverticulitis  No fever, has leukocytosis  Prolonged hospital stay  Perforated diverticulum, culture with E coli, s/p OR into washout and ostomy  S/p treatment courses for PICC CoNS CLABSI and Steno in sputum  CTs with improvement in intraabd collections  CXR clear  BCX with VRE and Acinetobacter--concern that acinetobacter grew through meropenem, on empiric Poly  Critically ill, persistent bacteria in blood, concern for multidrug resistant bacteria/life threatening, poor prognosis  CT's with pna, abd unchanged  Central lines changed 1/23  Overall,  1) CoNS bacteremia--? CLABSI  - S/p course treatment  - Repeat BCXs, 1/26/20 surveillance  2) Loculated fluid collections  - Infected collections, most recent CT reassuring  - Repeat CT A/P when feasible  3) Leukocytosis  - Trend to normal, monitor for further signs infection  4) Persistent Bacteremia  - Zyvox 600mg q 12  - Silvia 1g q 8 (CrCl 72)  - Poly B 1,000,000 q 12 (monitor electrolytes, Cr, any signs neuropathy/paresthesias)  - F/U pending BCX for final S    Sen Johnson MD  Pager [phone]  After 5pm and on weekends call [phone]

## 2020-01-24 NOTE — PROGRESS NOTE ADULT - ASSESSMENT
64y Female pmhx morbid obesity, acid reflux, HTN, COPD and PSH D&C presented to the ED on 11/26 c/o abdominal pain and bloating Had a complicated hospital course, initially presented with constipation for 2 weeks at that time reported pain worst in the LLQ in ED, CT demonstrated perforated sigmoid diverticulitis with intraperitoneal free air, on 11/25  underwent an exploratory laparotomy with extended left hemicolectomy and was left in discontinuity. An Abthera VAC, now with long complicated course, with sepsis, requiring pressors, demand ischemia, and new oliguric BULL associated with hyperkalemia and acidosis.     #BULL  Pt with BULL in the setting of septic shock, hypotension and possible drug reaction. On review of labs pt had normal scr, noted to rise since 01/20/20 to 1.2 from 0.8 now 2 mg/dl, marquez place today with urine output of 10 cc/hr last 3 hours and rising K with worsening acidosis. UA showing abundant hyaline cast, some wbc, epithelial cells, 1 rbc, lytes consistent with pre renal FENA 0.2%. Obtain spot urine TP/CR.   -Given urinary findings BULL most likely multifactorial pre renal state, recommend to c/w Marquez catheter monitor UOP every hour,   -pt with low albumin level, suggest to c/w albumin, CXR showing increased interstitial marking, suggest to give with Lasix 40 mg BID, to keep pt non oliguric, support for BP if needed.   -Pt has overall poor prognosis, if no surgical solution, and bacteremia cannot be solved, pt would not be a good candidate for RRT.   -Suggest palliative consult.   -Monitor labs and urine output. Avoid NSAIDs, ACEI/ARBS, RCA and nephrotoxins. Dose medications as per eGFR.    #Acidosis  In the setting of bull and lactic acidosis, improved, monitor for now.     #Hyperkalemia  In the setting of oliguric BULL,  and acidosis. Suggest to continue medical management with bicarb, d50, insulin and lasix prn, if poor response, and K >6 would need to consider HD, pt  was refusing HD and not a good candidate.     POOR prognosis, suggest palliative care to establish GOC and Advance care directives.

## 2020-01-24 NOTE — PROGRESS NOTE ADULT - ATTENDING COMMENTS
Dr. Saleh (Attending Physician)  N - Delirium improved today, CAM-ICU negative  P - Pneumonia, COPD now requiring low dose oxygen, chest PT  C - Septic Shock on midodrine 30 mg Q6, will start NE gtt, dc midodrine, lactate increased  GI - Start tube feeds, on miralax for constipation   - BULL bun/cr slightly improved, hyperkalemia and phos improving, will c/w albumin and bolus albumin with increased lactate and weight remaining stable today  H - anemia stable, lovenox for dvt ppx, decreased dose with bull  ID - acinetobacter and vre  E - synthroid and hydrocortisone for septic shock, start thiamine for    Goals of care conversation's have been difficult. Patient is saying to kill me but at times is lacking in capacity to make medical decisions, wife would like to proceed with aggressive medical care. Will consult palliative care today for assistance. Dr. Saleh (Attending Physician)  N - Delirium improved today, CAM-ICU negative  P - Pneumonia, COPD now requiring low dose oxygen, chest PT  C - Septic Shock on midodrine 30 mg Q6, will start NE gtt, dc midodrine, lactate increased  GI - Start tube feeds, on miralax for constipation   - BULL bun/cr slightly improved, hyperkalemia and phos improving, will c/w albumin and bolus albumin with increased lactate and weight remaining stable today  H - anemia stable, lovenox for dvt ppx, decreased dose with bull  ID - acinetobacter and vre  E - synthroid and hydrocortisone for septic shock, start thiamine for  Derm - Diffuse desquamating rash today for the first time there is mucosal involvement of the mouth, 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 Patient is saying to kill me but at times is lacking in capacity to make medical decisions, wife would like to proceed with aggressive medical care. Will consult palliative care today for assistance.

## 2020-01-24 NOTE — PROGRESS NOTE ADULT - ASSESSMENT
65 y/o female presenting with perforated sigmoid diverticulitis s/p exploratory laparotomy, extended left hemicolectomy, and left in discontinuity w/ Abthera VAC placement with return to OR for re-exploratory laparotomy, transverse end colostomy, and fascial closure with wound VAC placement. Hospital course complicated by gastroparesis, right subclavian steal syndrome, full body rash of unknown etiology, and refractory septic shock c/b stress-induced cardiomyopathy. Cultures were positive for E. coli & Klebsiella in the urine, Stenotrophomonas in the sputum, and Staph epidermis in the blood with subsequent removal of her LUE PICC.    PLAN:    Neuro: acute pain from her full body rash, opioid dependence  - Monitor mental status  - Pain control as needed with Lidoderm patch, acetaminophen, oxycodone, and Dilaudid  - Home methadone    Resp: COPD  - Monitor pulse oximeter  - Out of bed to chair and incentive spirometry to prevent atelectasis  - Pulmicort and Duoneb for COPD    CV: refractory septic shock c/b stress-induced cardiomyopathy  - Monitor vital signs  - Midodrine 30 mg q6hrs in the setting of persistent hypotension  - Repeat TTE when patient is stable off vasopressor support to look for improvement in LV function  - Home ASA      GI: perforated sigmoid diverticulitis s/p exploratory laparotomy, extended left hemicolectomy, and left in discontinuity w/ Abthera VAC placement with return to OR for re-exploratory laparotomy, transverse end colostomy, and fascial closure c/b gastroparesis; GERD  - Regular diet as tolerated  - Monitor ostomy  - Erythromycin for gastroparesis  - Protonix for GERD  - Maalox PRN indigestion  -- Displaced wound-VAC device packed with saline infused gauze, re-consult wound care for vac replacement.    Renal: no acute issues  - Monitor I&Os  - Monitor electrolytes and replete as necessary  - Doxazosin for urinary retention    Heme: no acute issues  - Monitor CBC and coags  - Lovenox 100 mg daily for VTE prophylaxis, adjusted for BMI    ID: E. coli & Klebsiella UTI, Stenotrophomonas PNA, Staph epidermis bacteremia; Now Acinetobacter and VRE bacteremia 01/22  - Continue meropenem, Linezolid and Polymixin   - Monitor for clinical evidence of active infection    Endo: HLD  - Started 50mg PO levothyroxine for hypothermia, hypothyroidism.   - Monitor glucose on BMP  - Home Lipitor  - Continue stress dose steroids     Integumentary:   - Appreciate Dermatology recs: Vaseline, zinc oxide, clobetasol cream.     Disposition:  - Full code  - Will remain in SICU

## 2020-01-24 NOTE — PROVIDER CONTACT NOTE (MEDICATION) - SITUATION
Pt refusing medication, Pt states,"Not now, please"
pt stating she has pain of 9/10 r/t MAD under left breast and also states pain in midline abd. RN offered tylenol, pt refuses stating she wants something stronger
After turning pt, it was noted that the pt's wound vac incision dislodged and dirty with stool.
Pt refusing Lipitor 40mg PO

## 2020-01-24 NOTE — CONSULT NOTE ADULT - PROBLEM SELECTOR RECOMMENDATION 9
Intractable pain especially worsening with turning/patient care activities  -recommend starting Methadone 10mg PO in evening in addition to current maintenance dose: Methadone 17.5mg PO @9AM and Methadone 10mg PO @9PM  -recommend increasing dose of turning PRN to Dilaudid 1.5mg IV q4h PRN for Turning/Patient Care  -recommend switching Severe Pain PRN to Dilaudid 1mg IV q4h PRN, discontinue Oxy 10mg PO  -will monitor PRN use and adjust long-acting Methadone based on requirements -given chronic Methadone use, need to be cognizant of patient's increased tolerance to opiates  -current Methadone dose (17.5mg) is roughly equivalent to 3mg of IV Dilaudid but this acting slowly over the course of 8-12hrs, no abrupt peak of onset .  Intractable pain especially worsening with turning/patient care activities  -recommend starting Methadone 10mg PO in evening in addition to current maintenance dose: Methadone 17.5mg PO @9AM and Methadone 10mg PO @9PM  -recommend increasing dose of turning PRN to Dilaudid 1.5mg IV q4h PRN for Turning/Patient Care  -recommend switching Severe Pain PRN to Dilaudid 1mg IV q4h PRN, discontinue Oxy 10mg PO    -will monitor PRN use and adjust long-acting Methadone based on requirements -given chronic Methadone use, need to be cognizant of patient's increased tolerance to opiates  -current Methadone dose (17.5mg) is roughly equivalent to 3mg of IV Dilaudid but this acting slowly over the course of 8-12hrs, no abrupt peak of onset

## 2020-01-24 NOTE — PROVIDER CONTACT NOTE (MEDICATION) - ASSESSMENT
Pt AxOx4, hypotensive and tachycardic during the night, asymptomatic. Given 2 500ml LR bolus + started on maintenance fluids, colostomy +, voiding via primafit
Pt hemodynamically stable.

## 2020-01-25 LAB
-  AMPICILLIN: SIGNIFICANT CHANGE UP
-  DAPTOMYCIN: SIGNIFICANT CHANGE UP
-  GENTAMICIN SYNERGY: SIGNIFICANT CHANGE UP
-  LINEZOLID: SIGNIFICANT CHANGE UP
-  VANCOMYCIN: SIGNIFICANT CHANGE UP
ALBUMIN SERPL ELPH-MCNC: 3.2 G/DL — LOW (ref 3.3–5)
ALP SERPL-CCNC: 211 U/L — HIGH (ref 40–120)
ALT FLD-CCNC: 7 U/L — LOW (ref 10–45)
ANION GAP SERPL CALC-SCNC: 12 MMOL/L — SIGNIFICANT CHANGE UP (ref 5–17)
AST SERPL-CCNC: 45 U/L — HIGH (ref 10–40)
BILIRUB DIRECT SERPL-MCNC: 0.3 MG/DL — HIGH (ref 0–0.2)
BILIRUB INDIRECT FLD-MCNC: 0.2 MG/DL — SIGNIFICANT CHANGE UP (ref 0.2–1)
BILIRUB SERPL-MCNC: 0.5 MG/DL — SIGNIFICANT CHANGE UP (ref 0.2–1.2)
BUN SERPL-MCNC: 41 MG/DL — HIGH (ref 7–23)
CALCIUM SERPL-MCNC: 7.5 MG/DL — LOW (ref 8.4–10.5)
CHLORIDE SERPL-SCNC: 99 MMOL/L — SIGNIFICANT CHANGE UP (ref 96–108)
CO2 SERPL-SCNC: 22 MMOL/L — SIGNIFICANT CHANGE UP (ref 22–31)
CREAT SERPL-MCNC: 1.64 MG/DL — HIGH (ref 0.5–1.3)
CULTURE RESULTS: SIGNIFICANT CHANGE UP
GAS PNL BLDV: SIGNIFICANT CHANGE UP
GAS PNL BLDV: SIGNIFICANT CHANGE UP
GLUCOSE SERPL-MCNC: 152 MG/DL — HIGH (ref 70–99)
GRAM STN FLD: SIGNIFICANT CHANGE UP
GRAM STN FLD: SIGNIFICANT CHANGE UP
HCT VFR BLD CALC: 23 % — LOW (ref 34.5–45)
HGB BLD-MCNC: 7.2 G/DL — LOW (ref 11.5–15.5)
MAGNESIUM SERPL-MCNC: 2.2 MG/DL — SIGNIFICANT CHANGE UP (ref 1.6–2.6)
MCHC RBC-ENTMCNC: 28.6 PG — SIGNIFICANT CHANGE UP (ref 27–34)
MCHC RBC-ENTMCNC: 31.3 GM/DL — LOW (ref 32–36)
MCV RBC AUTO: 91.3 FL — SIGNIFICANT CHANGE UP (ref 80–100)
METHOD TYPE: SIGNIFICANT CHANGE UP
NRBC # BLD: 0 /100 WBCS — SIGNIFICANT CHANGE UP (ref 0–0)
PHOSPHATE SERPL-MCNC: 3.9 MG/DL — SIGNIFICANT CHANGE UP (ref 2.5–4.5)
PLATELET # BLD AUTO: 131 K/UL — LOW (ref 150–400)
POTASSIUM SERPL-MCNC: 4.7 MMOL/L — SIGNIFICANT CHANGE UP (ref 3.5–5.3)
POTASSIUM SERPL-SCNC: 4.7 MMOL/L — SIGNIFICANT CHANGE UP (ref 3.5–5.3)
PROT SERPL-MCNC: 5.8 G/DL — LOW (ref 6–8.3)
RBC # BLD: 2.52 M/UL — LOW (ref 3.8–5.2)
RBC # FLD: 19.9 % — HIGH (ref 10.3–14.5)
SODIUM SERPL-SCNC: 133 MMOL/L — LOW (ref 135–145)
SPECIMEN SOURCE: SIGNIFICANT CHANGE UP
WBC # BLD: 4.22 K/UL — SIGNIFICANT CHANGE UP (ref 3.8–10.5)
WBC # FLD AUTO: 4.22 K/UL — SIGNIFICANT CHANGE UP (ref 3.8–10.5)

## 2020-01-25 PROCEDURE — 99223 1ST HOSP IP/OBS HIGH 75: CPT

## 2020-01-25 PROCEDURE — 99291 CRITICAL CARE FIRST HOUR: CPT

## 2020-01-25 PROCEDURE — 99233 SBSQ HOSP IP/OBS HIGH 50: CPT

## 2020-01-25 PROCEDURE — 71045 X-RAY EXAM CHEST 1 VIEW: CPT | Mod: 26

## 2020-01-25 RX ORDER — LINEZOLID 600 MG/300ML
600 INJECTION, SOLUTION INTRAVENOUS EVERY 12 HOURS
Refills: 0 | Status: DISCONTINUED | OUTPATIENT
Start: 2020-01-26 | End: 2020-01-27

## 2020-01-25 RX ORDER — MECLIZINE HCL 12.5 MG
25 TABLET ORAL ONCE
Refills: 0 | Status: COMPLETED | OUTPATIENT
Start: 2020-01-25 | End: 2020-01-25

## 2020-01-25 RX ORDER — CALCIUM GLUCONATE 100 MG/ML
2 VIAL (ML) INTRAVENOUS ONCE
Refills: 0 | Status: COMPLETED | OUTPATIENT
Start: 2020-01-25 | End: 2020-01-25

## 2020-01-25 RX ORDER — HYDROMORPHONE HYDROCHLORIDE 2 MG/ML
2 INJECTION INTRAMUSCULAR; INTRAVENOUS; SUBCUTANEOUS ONCE
Refills: 0 | Status: DISCONTINUED | OUTPATIENT
Start: 2020-01-25 | End: 2020-01-25

## 2020-01-25 RX ORDER — HYDROMORPHONE HYDROCHLORIDE 2 MG/ML
1 INJECTION INTRAMUSCULAR; INTRAVENOUS; SUBCUTANEOUS ONCE
Refills: 0 | Status: DISCONTINUED | OUTPATIENT
Start: 2020-01-25 | End: 2020-01-25

## 2020-01-25 RX ADMIN — Medication 250 MILLIGRAM(S): at 17:21

## 2020-01-25 RX ADMIN — Medication 100 MILLIGRAM(S): at 17:21

## 2020-01-25 RX ADMIN — Medication 50 MILLILITER(S): at 06:03

## 2020-01-25 RX ADMIN — Medication 100 MILLIGRAM(S): at 09:36

## 2020-01-25 RX ADMIN — NYSTATIN CREAM 1 APPLICATION(S): 100000 CREAM TOPICAL at 17:21

## 2020-01-25 RX ADMIN — Medication 200 GRAM(S): at 04:15

## 2020-01-25 RX ADMIN — POLYETHYLENE GLYCOL 3350 17 GRAM(S): 17 POWDER, FOR SOLUTION ORAL at 06:03

## 2020-01-25 RX ADMIN — Medication 1 APPLICATION(S): at 21:27

## 2020-01-25 RX ADMIN — HYDROMORPHONE HYDROCHLORIDE 2 MILLIGRAM(S): 2 INJECTION INTRAMUSCULAR; INTRAVENOUS; SUBCUTANEOUS at 16:30

## 2020-01-25 RX ADMIN — ATORVASTATIN CALCIUM 40 MILLIGRAM(S): 80 TABLET, FILM COATED ORAL at 21:21

## 2020-01-25 RX ADMIN — HYDROMORPHONE HYDROCHLORIDE 1 MILLIGRAM(S): 2 INJECTION INTRAMUSCULAR; INTRAVENOUS; SUBCUTANEOUS at 08:50

## 2020-01-25 RX ADMIN — Medication 100 MILLIGRAM(S): at 03:08

## 2020-01-25 RX ADMIN — Medication 50 MILLILITER(S): at 12:42

## 2020-01-25 RX ADMIN — PANTOPRAZOLE SODIUM 40 MILLIGRAM(S): 20 TABLET, DELAYED RELEASE ORAL at 06:03

## 2020-01-25 RX ADMIN — OXYCODONE HYDROCHLORIDE 10 MILLIGRAM(S): 5 TABLET ORAL at 13:30

## 2020-01-25 RX ADMIN — METHADONE HYDROCHLORIDE 10 MILLIGRAM(S): 40 TABLET ORAL at 09:36

## 2020-01-25 RX ADMIN — Medication 0.5 MILLIGRAM(S): at 18:35

## 2020-01-25 RX ADMIN — Medication 1 APPLICATION(S): at 14:41

## 2020-01-25 RX ADMIN — Medication 3 MILLILITER(S): at 15:15

## 2020-01-25 RX ADMIN — LINEZOLID 300 MILLIGRAM(S): 600 INJECTION, SOLUTION INTRAVENOUS at 21:21

## 2020-01-25 RX ADMIN — POLYETHYLENE GLYCOL 3350 17 GRAM(S): 17 POWDER, FOR SOLUTION ORAL at 18:10

## 2020-01-25 RX ADMIN — Medication 1 APPLICATION(S): at 17:21

## 2020-01-25 RX ADMIN — DIPHENHYDRAMINE HYDROCHLORIDE AND LIDOCAINE HYDROCHLORIDE AND ALUMINUM HYDROXIDE AND MAGNESIUM HYDRO 5 MILLILITER(S): KIT at 00:00

## 2020-01-25 RX ADMIN — ENOXAPARIN SODIUM 100 MILLIGRAM(S): 100 INJECTION SUBCUTANEOUS at 12:50

## 2020-01-25 RX ADMIN — HYDROMORPHONE HYDROCHLORIDE 1.5 MILLIGRAM(S): 2 INJECTION INTRAMUSCULAR; INTRAVENOUS; SUBCUTANEOUS at 05:40

## 2020-01-25 RX ADMIN — MEROPENEM 100 MILLIGRAM(S): 1 INJECTION INTRAVENOUS at 21:22

## 2020-01-25 RX ADMIN — HYDROMORPHONE HYDROCHLORIDE 2 MILLIGRAM(S): 2 INJECTION INTRAMUSCULAR; INTRAVENOUS; SUBCUTANEOUS at 16:45

## 2020-01-25 RX ADMIN — POLYETHYLENE GLYCOL 3350 17 GRAM(S): 17 POWDER, FOR SOLUTION ORAL at 12:50

## 2020-01-25 RX ADMIN — Medication 81 MILLIGRAM(S): at 21:21

## 2020-01-25 RX ADMIN — OXYCODONE HYDROCHLORIDE 10 MILLIGRAM(S): 5 TABLET ORAL at 12:48

## 2020-01-25 RX ADMIN — MEROPENEM 100 MILLIGRAM(S): 1 INJECTION INTRAVENOUS at 14:40

## 2020-01-25 RX ADMIN — SODIUM CHLORIDE 150 MILLILITER(S): 9 INJECTION, SOLUTION INTRAVENOUS at 12:39

## 2020-01-25 RX ADMIN — HYDROMORPHONE HYDROCHLORIDE 1 MILLIGRAM(S): 2 INJECTION INTRAMUSCULAR; INTRAVENOUS; SUBCUTANEOUS at 09:05

## 2020-01-25 RX ADMIN — METHADONE HYDROCHLORIDE 10 MILLIGRAM(S): 40 TABLET ORAL at 21:21

## 2020-01-25 RX ADMIN — Medication 250 MILLIGRAM(S): at 09:36

## 2020-01-25 RX ADMIN — MEROPENEM 100 MILLIGRAM(S): 1 INJECTION INTRAVENOUS at 06:02

## 2020-01-25 RX ADMIN — HYDROMORPHONE HYDROCHLORIDE 1.5 MILLIGRAM(S): 2 INJECTION INTRAMUSCULAR; INTRAVENOUS; SUBCUTANEOUS at 21:22

## 2020-01-25 RX ADMIN — Medication 102 MILLIGRAM(S): at 05:24

## 2020-01-25 RX ADMIN — Medication 1 DROP(S): at 17:20

## 2020-01-25 RX ADMIN — Medication 102 MILLIGRAM(S): at 17:22

## 2020-01-25 RX ADMIN — DIPHENHYDRAMINE HYDROCHLORIDE AND LIDOCAINE HYDROCHLORIDE AND ALUMINUM HYDROXIDE AND MAGNESIUM HYDRO 5 MILLILITER(S): KIT at 12:50

## 2020-01-25 RX ADMIN — DIPHENHYDRAMINE HYDROCHLORIDE AND LIDOCAINE HYDROCHLORIDE AND ALUMINUM HYDROXIDE AND MAGNESIUM HYDRO 5 MILLILITER(S): KIT at 17:21

## 2020-01-25 RX ADMIN — HYDROMORPHONE HYDROCHLORIDE 1.5 MILLIGRAM(S): 2 INJECTION INTRAMUSCULAR; INTRAVENOUS; SUBCUTANEOUS at 05:25

## 2020-01-25 RX ADMIN — LINEZOLID 300 MILLIGRAM(S): 600 INJECTION, SOLUTION INTRAVENOUS at 09:35

## 2020-01-25 RX ADMIN — POLYMYXIN B SULFATE 600 UNIT(S): 500000 INJECTION, POWDER, LYOPHILIZED, FOR SOLUTION INTRAMUSCULAR; INTRATHECAL; INTRAVENOUS; OPHTHALMIC at 12:50

## 2020-01-25 RX ADMIN — Medication 1 DROP(S): at 05:47

## 2020-01-25 RX ADMIN — Medication 25 MICROGRAM(S): at 21:26

## 2020-01-25 NOTE — PROGRESS NOTE ADULT - ASSESSMENT
64 F PMHx of HTN, COPD, and morbid obesity who was admitted on 11/26 for perforated sigmoid diverticulitis  No fever, has leukocytosis  Prolonged hospital stay  Perforated diverticulum, culture with E coli, s/p OR into washout and ostomy  S/p treatment courses for PICC CoNS CLABSI and Steno in sputum  CTs with improvement in intraabd collections  CXR clear  BCX with VRE and Acinetobacter--concern that acinetobacter grew through meropenem, on empiric Poly  Critically ill, persistent bacteria in blood, concern for multidrug resistant bacteria/life threatening, poor prognosis  CT's with pna, abd unchanged  Central lines changed 1/23   recent polymicrobial sepsis with acinetobacter and vrec   1   - Zyvox 600mg q 12  - Silvia 1g q 8 (CrCl 72)  - Poly B 1,000,000 q 12 (monitor electrolytes, Cr, any signs neuropathy/paresthesias)  - F/U pending BCX for final S  discussed with surgery no interventions planned at present   new ab usually not useful for acinetobacter    prognosis poor 64 F PMHx of HTN, COPD, and morbid obesity who was admitted on 11/26 for perforated sigmoid diverticulitis  No fever, has leukocytosis  Prolonged hospital stay  Perforated diverticulum, culture with E coli, s/p OR into washout and ostomy  S/p treatment courses for PICC CoNS CLABSI and Steno in sputum  CTs with improvement in intraabd collections  CXR clear  BCX with VRE and Acinetobacter--concern that acinetobacter grew through meropenem, on empiric Poly  Critically ill, persistent bacteria in blood, concern for multidrug resistant bacteria/life threatening, poor prognosis  CT's with pna, abd unchanged  Central lines changed 1/23   recent polymicrobial sepsis with acinetobacter and vrec   1   - Zyvox 600mg q 12  - Silvia 1g q 8 (CrCl 72)  - Poly B 1,000,000 q 12 (monitor electrolytes, Cr, any signs neuropathy/paresthesias)  - F/U pending BCX for final S  discussed with surgery no interventions planned at present   new ab usually not useful for acinetobacter  we may consider unasyn or IV minocycline if MDR  prognosis poor

## 2020-01-25 NOTE — PROGRESS NOTE ADULT - SUBJECTIVE AND OBJECTIVE BOX
infectious diseases progress note:    Patient is a 64y old  Female who presents with a chief complaint of Sepsis and bowel perforation (24 Jan 2020 17:23)        Diverticulitis of intestine without perforation or abscess without bleeding        R   sulfa drugs (Unknown)  vancomycin (Rash (Severe))    Intolerances        ANTIBIOTICS/RELEVANT:  antimicrobials  erythromycin     base Tablet 250 milliGRAM(s) Oral <User Schedule>  linezolid  IVPB      linezolid  IVPB 600 milliGRAM(s) IV Intermittent every 12 hours  meropenem  IVPB 1000 milliGRAM(s) IV Intermittent every 8 hours  polymyxin B IVPB [id number] Unit(s) IV Intermittent every 12 hours    immunologic:    OTHER:  albumin human 25% IVPB 50 milliLiter(s) IV Intermittent every 6 hours  albuterol/ipratropium for Nebulization 3 milliLiter(s) Nebulizer every 6 hours PRN  aluminum hydroxide/magnesium hydroxide/simethicone Suspension 30 milliLiter(s) Oral every 4 hours PRN  AQUAPHOR (petrolatum Ointment) 1 Application(s) Topical three times a day  artificial tears (preservative free) Ophthalmic Solution 1 Drop(s) Both EYES two times a day  aspirin  chewable 81 milliGRAM(s) Oral <User Schedule>  atorvastatin 40 milliGRAM(s) Oral <User Schedule>  buDESOnide    Inhalation Suspension 0.5 milliGRAM(s) Inhalation every 12 hours  chlorhexidine 2% Cloths 1 Application(s) Topical <User Schedule>  clobetasol 0.05% Ointment 1 Application(s) Topical two times a day  dextrose 5% + sodium chloride 0.45% 1000 milliLiter(s) IV Continuous <Continuous>  diphenhydrAMINE 25 milliGRAM(s) Oral every 12 hours PRN  enoxaparin Injectable 100 milliGRAM(s) SubCutaneous <User Schedule>  FIRST- Mouthwash  BLM 5 milliLiter(s) Swish and Spit four times a day  hydrocortisone sodium succinate Injectable 100 milliGRAM(s) IV Push every 8 hours  HYDROmorphone  Injectable 1.5 milliGRAM(s) IV Push every 6 hours PRN  levothyroxine Injectable 25 MICROGram(s) IV Push at bedtime  lidocaine   Patch 1 Patch Transdermal daily  lidocaine 2% Viscous 10 milliLiter(s) Swish and Spit once  methadone    Tablet 10 milliGRAM(s) Oral <User Schedule>  norepinephrine Infusion 0.02 MICROgram(s)/kG/Min IV Continuous <Continuous>  nystatin Powder 1 Application(s) Topical two times a day  oxyCODONE    IR 10 milliGRAM(s) Oral every 4 hours PRN  pantoprazole    Tablet 40 milliGRAM(s) Oral <User Schedule>  polyethylene glycol 3350 17 Gram(s) Oral <User Schedule>  silver nitrate Applicator 1 Application(s) Topical once  thiamine IVPB 200 milliGRAM(s) IV Intermittent <User Schedule>  zinc oxide 20% Ointment 1 Application(s) Topical three times a day PRN      Objective:  Vital Signs Last 24 Hrs  T(C): 36.7 (25 Jan 2020 03:00), Max: 36.8 (24 Jan 2020 11:00)  T(F): 98.1 (25 Jan 2020 03:00), Max: 98.2 (24 Jan 2020 11:00)  HR: 90 (25 Jan 2020 06:45) (67 - 98)  BP: 97/54 (25 Jan 2020 06:45) (79/44 - 156/101)  BP(mean): 73 (25 Jan 2020 06:45) (57 - 121)  RR: 18 (25 Jan 2020 06:45) (8 - 32)  SpO2: 92% (25 Jan 2020 06:45) (91% - 98%)       Eyes:JUANITO, EOMI  Ear/Nose/Throat: no oral lesion, no sinus tenderness on percussion	  Neck:no JVD, no lymphadenopathy, supple  Respiratory: CTA hussain  Cardiovascular: S1S2 RRR, no murmurs  Gastrointestinal:soft, (+) BS, no HSM vac over wd   Extremities:no e/e/c        LABS:                        7.2    4.22  )-----------( 131      ( 25 Jan 2020 00:27 )             23.0     01-25    133<L>  |  99  |  41<H>  ----------------------------<  152<H>  4.7   |  22  |  1.64<H>    Ca    7.5<L>      25 Jan 2020 00:27  Phos  3.9     01-25  Mg     2.2     01-25    TPro  5.8<L>  /  Alb  3.2<L>  /  TBili  0.5  /  DBili  0.3<H>  /  AST  45<H>  /  ALT  7<L>  /  AlkPhos  211<H>  01-25            MICROBIOLOGY:    RECENT CULTURES:  01-23 @ 19:31 .Blood Blood-Venous       Growth in aerobic bottle: Gram Negative Rods           Growth in aerobic bottle: Gram Negative Rods    01-22 @ 17:15 .Blood Blood-Peripheral       Growth in aerobic bottle: Gram Negative Rods           Growth in aerobic bottle: Acinetobacter baumannii NOSOCOMIALIS GROUP  See previous culture [id number]    01-22 @ 15:13 .Blood Blood-Venous   PCR    Growth in aerobic bottle: Gram Negative Rods and Gram Positive Cocci in  Pairs and Chains    Blood Culture PCR  Blood Culture PCR     Growth in aerobic bottle: Acinetobacter baumannii nosocomialis group  Growth in aerobic bottle: Enterococcus faecium Susceptibility to follow.  ***Blood Panel PCR results on this specimen are available  approximately 3 hours after the Gram stain result.***  Gram stain, PCR, and/or culture results may not always  correspond due to difference in methodologies.  ************************************************************  This PCR assay was performed using [employer].  The following targets are tested for: Enterococcus,  vancomycin resistant enterococci, Listeria monocytogenes,  coagulase negative staphylococci, S. aureus,  methicillin resistant S. aureus, Streptococcus agalactiae  (Group B), S. pneumoniae, S. pyogenes (Group A),  Acinetobacter baumannii, Enterobacter cloacae, E. coli,  Klebsiella oxytoca, K. pneumoniae, Proteus sp.,  Serratia marcescens, Haemophilus influenzae,  Neisseria meningitidis, Pseudomonas aeruginosa, Candida  albicans, C. glabrata, C krusei, C parapsilosis,  C. tropicalis and the KPC resistance gene.          RESPIRATORY CULTURES:              RADIOLOGY & ADDITIONAL STUDIES:        Pager [phone]  After 5 pm/weekends or if no response :[phone]

## 2020-01-25 NOTE — PROGRESS NOTE ADULT - SUBJECTIVE AND OBJECTIVE BOX
Morning Surgical Progress Note  Patient is a 64y old  Female who presents with a chief complaint of Perforated bowel (19 Jan 2020 15:41)    SUBJECTIVE: Patient seen and examined at bedside with surgical team, patient complains of generalized pain.     Physical Exam  Constitutional: A&Ox3, NAD  Respiratory: CTA b/l  Cardiac: RRR, S1 and S2, no m/r/g  Gastrointestinal: abdomen soft, NT/ND, dressings c/d/i  Skin: erythematous and skin sloughing    Vital Signs Last 24 Hrs  T(C): 36.7 (25 Jan 2020 03:00), Max: 36.8 (24 Jan 2020 11:00)  T(F): 98.1 (25 Jan 2020 03:00), Max: 98.2 (24 Jan 2020 11:00)  HR: 91 (25 Jan 2020 04:45) (67 - 98)  BP: 100/55 (25 Jan 2020 04:00) (79/44 - 156/101)  BP(mean): 75 (25 Jan 2020 04:00) (57 - 121)  RR: 22 (25 Jan 2020 04:45) (8 - 32)  SpO2: 94% (25 Jan 2020 04:45) (91% - 98%)    ** I&O's **  23 Jan 2020 07:01  -  24 Jan 2020 07:00  --------------------------------------------------------  IN:    Albumin 5%  - 500 mL: 100 mL    dextrose 5% + sodium chloride 0.45%: 3450 mL    Enteral Tube Flush: 60 mL    Nepro: 80 mL    Solution: 600 mL    Solution: 100 mL    Solution: 600 mL  Total IN: 4990 mL    OUT:    Colostomy: 200 mL    Indwelling Catheter - Urethral: 650 mL    VAC (Vacuum Assisted Closure) System: 150 mL  Total OUT: 1000 mL    Total NET: 3990 mL      24 Jan 2020 07:01  -  25 Jan 2020 05:43  --------------------------------------------------------  IN:    Albumin 5%  - 500 mL: 600 mL    dextrose 5% + sodium chloride 0.45%: 3150 mL    Enteral Tube Flush: 300 mL    Nepro: 605 mL    norepinephrine Infusion: 63.5 mL    Solution: 700 mL    Solution: 1550 mL  Total IN: 6968.5 mL    OUT:    Indwelling Catheter - Urethral: 835 mL  Total OUT: 835 mL    Total NET: 6133.5 mL          ** LABS **                        7.2    4.22  )-----------( 131      ( 25 Jan 2020 00:27 )             23.0     25 Jan 2020 00:27    133    |  99     |  41     ----------------------------<  152    4.7     |  22     |  1.64     Ca    7.5        25 Jan 2020 00:27  Phos  3.9       25 Jan 2020 00:27  Mg     2.2       25 Jan 2020 00:27    TPro  5.8    /  Alb  3.2    /  TBili  0.5    /  DBili  0.3    /  AST  45     /  ALT  7      /  AlkPhos  211    25 Jan 2020 00:27      CAPILLARY BLOOD GLUCOSE            LIVER FUNCTIONS - ( 25 Jan 2020 00:27 )  Alb: 3.2 g/dL / Pro: 5.8 g/dL / ALK PHOS: 211 U/L / ALT: 7 U/L / AST: 45 U/L / GGT: x             Culture - Blood (collected 23 Jan 2020 19:31)  Source: .Blood Blood-Peripheral  Preliminary Report (24 Jan 2020 20:02):    No growth to date.    Culture - Blood (collected 23 Jan 2020 19:31)  Source: .Blood Blood-Venous  Gram Stain (24 Jan 2020 03:40):    Growth in aerobic bottle: Gram Negative Rods  Preliminary Report (24 Jan 2020 03:40):    Growth in aerobic bottle: Gram Negative Rods    Culture - Blood (collected 22 Jan 2020 17:15)  Source: .Blood Blood-Peripheral  Gram Stain (23 Jan 2020 04:51):    Growth in aerobic bottle: Gram Negative Rods  Preliminary Report (24 Jan 2020 12:16):    Growth in aerobic bottle: Acinetobacter baumannii NOSOCOMIALIS GROUP    See previous culture [id number]    Culture - Blood (collected 22 Jan 2020 15:13)  Source: .Blood Blood-Venous  Gram Stain (23 Jan 2020 02:24):    Growth in aerobic bottle: Gram Negative Rods and Gram Positive Cocci in    Pairs and Chains  Preliminary Report (24 Jan 2020 22:19):    Growth in aerobic bottle: Acinetobacter baumannii nosocomialis group    Growth in aerobic bottle: Enterococcus faecium Susceptibility to follow.    ***Blood Panel PCR results on this specimen are available    approximately 3 hours after the Gram stain result.***    Gram stain, PCR, and/or culture results may not always    correspond due to difference in methodologies.    ************************************************************    This PCR assay was performed using [employer].    The following targets are tested for: Enterococcus,    vancomycin resistant enterococci, Listeria monocytogenes,    coagulase negative staphylococci, S. aureus,    methicillin resistant S. aureus, Streptococcus agalactiae    (Group B), S. pneumoniae, S. pyogenes (Group A),    Acinetobacter baumannii, Enterobacter cloacae, E. coli,    Klebsiella oxytoca, K. pneumoniae, Proteus sp.,    Serratia marcescens, Haemophilus influenzae,    Neisseria meningitidis, Pseudomonas aeruginosa, Candida    albicans, C. glabrata, C krusei, C parapsilosis,    C. tropicalis and the KPC resistance gene.  Organism: Blood Culture PCR (23 Jan 2020 02:25)  Organism: Blood Culture PCR (23 Jan 2020 02:25)          MEDICATIONS  (STANDING):  albumin human 25% IVPB 50 milliLiter(s) IV Intermittent every 6 hours  AQUAPHOR (petrolatum Ointment) 1 Application(s) Topical three times a day  artificial tears (preservative free) Ophthalmic Solution 1 Drop(s) Both EYES two times a day  aspirin  chewable 81 milliGRAM(s) Oral <User Schedule>  atorvastatin 40 milliGRAM(s) Oral <User Schedule>  buDESOnide    Inhalation Suspension 0.5 milliGRAM(s) Inhalation every 12 hours  chlorhexidine 2% Cloths 1 Application(s) Topical <User Schedule>  clobetasol 0.05% Ointment 1 Application(s) Topical two times a day  dextrose 5% + sodium chloride 0.45% 1000 milliLiter(s) (150 mL/Hr) IV Continuous <Continuous>  enoxaparin Injectable 100 milliGRAM(s) SubCutaneous <User Schedule>  erythromycin     base Tablet 250 milliGRAM(s) Oral <User Schedule>  FIRST- Mouthwash  BLM 5 milliLiter(s) Swish and Spit four times a day  hydrocortisone sodium succinate Injectable 100 milliGRAM(s) IV Push every 8 hours  levothyroxine Injectable 25 MICROGram(s) IV Push at bedtime  lidocaine   Patch 1 Patch Transdermal daily  lidocaine 2% Viscous 10 milliLiter(s) Swish and Spit once  linezolid  IVPB      linezolid  IVPB 600 milliGRAM(s) IV Intermittent every 12 hours  meropenem  IVPB 1000 milliGRAM(s) IV Intermittent every 8 hours  methadone    Tablet 10 milliGRAM(s) Oral <User Schedule>  norepinephrine Infusion 0.02 MICROgram(s)/kG/Min (6.311 mL/Hr) IV Continuous <Continuous>  nystatin Powder 1 Application(s) Topical two times a day  pantoprazole    Tablet 40 milliGRAM(s) Oral <User Schedule>  polyethylene glycol 3350 17 Gram(s) Oral <User Schedule>  polymyxin B IVPB [id number] Unit(s) IV Intermittent every 12 hours  silver nitrate Applicator 1 Application(s) Topical once  thiamine IVPB 200 milliGRAM(s) IV Intermittent <User Schedule>    MEDICATIONS  (PRN):  albuterol/ipratropium for Nebulization 3 milliLiter(s) Nebulizer every 6 hours PRN Shortness of Breath and/or Wheezing  aluminum hydroxide/magnesium hydroxide/simethicone Suspension 30 milliLiter(s) Oral every 4 hours PRN Dyspepsia  diphenhydrAMINE 25 milliGRAM(s) Oral every 12 hours PRN Rash and/or Itching  HYDROmorphone  Injectable 1.5 milliGRAM(s) IV Push every 6 hours PRN Turning  oxyCODONE    IR 10 milliGRAM(s) Oral every 4 hours PRN Severe Pain (7 - 10)  zinc oxide 20% Ointment 1 Application(s) Topical three times a day PRN Rash

## 2020-01-25 NOTE — PROGRESS NOTE ADULT - ATTENDING COMMENTS
I have seen and evaluated patient and agree with resident assessment and plan.  Discussed with surgery and SICU teams

## 2020-01-25 NOTE — PROGRESS NOTE ADULT - ATTENDING COMMENTS
Dr. Saleh (Attending Physician)  N - Pain control adjusted yesterday, increased dilaudid to 1.5 mg, methadone to 10 mg bid, oxycodone  P - Pneumonia, RUL worsening recommended chest PT but patient refusing  C - Septic Shock NE gtt, lactate improved  GI - on Nepro at 45 mL/hr for malnourishment, ostomy output 200 mL   - BULL improving  H - anemia secondary to chronic critical illness  ID - meropenem/polymyxin for acinetobacter, linezolid for vre  E - on hydrocortisone for septic shock/allergic reaction    This patient was critically ill with one or more vital organ systems at a high probability of imminent or life threatening deterioration. Complex decision making was required to assess and treat single or multiple vital organ system failure and/or prevent further life threatening deterioration of the patient’s condition.  All recent labwork and imaging was reviewed.  Total Critical Care Time 50

## 2020-01-25 NOTE — PROGRESS NOTE ADULT - ASSESSMENT
63yo F with PMHx morbid obesity, GERD, HTN, COPD, and PSHx D&C now s/p ex laparotomy with extended left hemicolectomy 11/26 for perforated and necrotic sigmoid colon with gross abdominal contamination. RTOR 11/29 for abd closure, RUQ end transverse colostomy creation. On 12/19 patient found to have induration of wound and keri-stomal fat necrosis, s/p bedside debridement w/ wound VAC in placement on midline wound. Began showing signs of sepsis and was transferred to SICU 1/7 for further management. Now w/ decreasing pressor support and downtrending leukocytosis. Troponins have been elevated and echo shows decreased ventricular wall motility. Patient grew VRE, and linezolid was added to her antibiotic regimen.      Plan:  Wean off pressors as tolerated  Continue IV meropenem and linezolid  Trend labs/ WBC/ SBP  Appreciate Cards reccs  Appreciate excellent SICU care      Green  [phone]

## 2020-01-25 NOTE — CHART NOTE - NSCHARTNOTEFT_GEN_A_CORE
Pt bacteremic w/ acinetobacter since Jan 22. Attempted to redraw blood cultures today, but pt's partner (health care proxy) refuses stating pt is weak and exhausted, and just fell asleep.    Discussed w/ pt's partner the reason why blood cultures need to be drawn and she expresses understanding, but would still like to let pt sleep. Risks of refusing blood cultures have been discussed, and partner understands. However, given the setting of considering redirecting care for her comfort, partner wishes the team to return during the day when pt is awake.    Discussed w/ Dr. Kennedy Higuera PGY-2  SICU

## 2020-01-25 NOTE — PROGRESS NOTE ADULT - ASSESSMENT
63 y/o female presenting with perforated sigmoid diverticulitis s/p exploratory laparotomy, extended left hemicolectomy, and left in discontinuity w/ Abthera VAC placement with return to OR for re-exploratory laparotomy, transverse end colostomy, and fascial closure with wound VAC placement. Hospital course complicated by gastroparesis, right subclavian steal syndrome, full body rash of unknown etiology, and refractory septic shock c/b stress-induced cardiomyopathy. Cultures were positive for E. coli & Klebsiella in the urine, Stenotrophomonas in the sputum, and Staph epidermis in the blood with subsequent removal of her LUE PICC.    PLAN:    Neuro: acute pain from her full body rash, opioid dependence  - Monitor mental status  - Pain control as needed with methadone, PRN dilaudid, tylenol      Resp: COPD  - Monitor pulse oximeter  - Out of bed to chair and incentive spirometry to prevent atelectasis  - Pulmicort and Duoneb for COPD    CV: refractory septic shock c/b stress-induced cardiomyopathy  - Monitor vital signs  - Conitnue Levophed, wean as tolerated   - Repeat TTE when patient is stable off vasopressor support to look for improvement in LV function  - Home ASA      GI: perforated sigmoid diverticulitis s/p exploratory laparotomy, extended left hemicolectomy, and left in discontinuity w/ Abthera VAC placement with return to OR for re-exploratory laparotomy, transverse end colostomy, and fascial closure c/b gastroparesis; GERD  - Regular diet with Nepro @ 45ml/hr via KO feeding tube   - Monitor ostomy  - Erythromycin for gastroparesis  - Protonix for GERD  - Maalox PRN indigestion  - Continue wound VAC therapy     Renal: no acute issues  - Monitor I&Os  - Monitor electrolytes and replete as necessary  - Doxazosin for urinary retention    Heme: no acute issues  - Monitor CBC and coags  - Lovenox 100 mg daily for VTE prophylaxis, adjusted for BMI    ID: E. coli & Klebsiella UTI, Stenotrophomonas PNA, Staph epidermis bacteremia; Now Acinetobacter and VRE bacteremia 01/22  - Continue meropenem, Linezolid and Polymixin   - Monitor for clinical evidence of active infection    Endo: HLD  - Started 50mg PO levothyroxine for hypothermia, hypothyroidism.   - Monitor glucose on BMP  - Home Lipitor  - Continue stress dose steroids     Integumentary:   - Appreciate Dermatology recs: Vaseline, zinc oxide, clobetasol cream.     Disposition:  - Full code  - Will remain in SICU

## 2020-01-26 LAB
-  AMIKACIN: SIGNIFICANT CHANGE UP
-  AMPICILLIN/SULBACTAM: SIGNIFICANT CHANGE UP
-  CEFEPIME: SIGNIFICANT CHANGE UP
-  CEFTAZIDIME: SIGNIFICANT CHANGE UP
-  CIPROFLOXACIN: SIGNIFICANT CHANGE UP
-  COAGULASE NEGATIVE STAPHYLOCOCCUS: SIGNIFICANT CHANGE UP
-  GENTAMICIN: SIGNIFICANT CHANGE UP
-  IMIPENEM: SIGNIFICANT CHANGE UP
-  LEVOFLOXACIN: SIGNIFICANT CHANGE UP
-  MEROPENEM: SIGNIFICANT CHANGE UP
-  PIPERACILLIN/TAZOBACTAM: SIGNIFICANT CHANGE UP
-  TOBRAMYCIN: SIGNIFICANT CHANGE UP
-  TRIMETHOPRIM/SULFAMETHOXAZOLE: SIGNIFICANT CHANGE UP
ALBUMIN SERPL ELPH-MCNC: 3 G/DL — LOW (ref 3.3–5)
ALP SERPL-CCNC: 223 U/L — HIGH (ref 40–120)
ALT FLD-CCNC: 22 U/L — SIGNIFICANT CHANGE UP (ref 10–45)
ANION GAP SERPL CALC-SCNC: 11 MMOL/L — SIGNIFICANT CHANGE UP (ref 5–17)
AST SERPL-CCNC: 100 U/L — HIGH (ref 10–40)
BILIRUB DIRECT SERPL-MCNC: 0.2 MG/DL — SIGNIFICANT CHANGE UP (ref 0–0.2)
BILIRUB INDIRECT FLD-MCNC: 0.3 MG/DL — SIGNIFICANT CHANGE UP (ref 0.2–1)
BILIRUB SERPL-MCNC: 0.5 MG/DL — SIGNIFICANT CHANGE UP (ref 0.2–1.2)
BUN SERPL-MCNC: 37 MG/DL — HIGH (ref 7–23)
CALCIUM SERPL-MCNC: 7.8 MG/DL — LOW (ref 8.4–10.5)
CHLORIDE SERPL-SCNC: 99 MMOL/L — SIGNIFICANT CHANGE UP (ref 96–108)
CO2 SERPL-SCNC: 26 MMOL/L — SIGNIFICANT CHANGE UP (ref 22–31)
CREAT SERPL-MCNC: 1.3 MG/DL — SIGNIFICANT CHANGE UP (ref 0.5–1.3)
CULTURE RESULTS: SIGNIFICANT CHANGE UP
GAS PNL BLDV: SIGNIFICANT CHANGE UP
GLUCOSE SERPL-MCNC: 144 MG/DL — HIGH (ref 70–99)
GRAM STN FLD: SIGNIFICANT CHANGE UP
GRAM STN FLD: SIGNIFICANT CHANGE UP
HCT VFR BLD CALC: 22.5 % — LOW (ref 34.5–45)
HGB BLD-MCNC: 7 G/DL — CRITICAL LOW (ref 11.5–15.5)
MAGNESIUM SERPL-MCNC: 2 MG/DL — SIGNIFICANT CHANGE UP (ref 1.6–2.6)
MCHC RBC-ENTMCNC: 28.5 PG — SIGNIFICANT CHANGE UP (ref 27–34)
MCHC RBC-ENTMCNC: 31.1 GM/DL — LOW (ref 32–36)
MCV RBC AUTO: 91.5 FL — SIGNIFICANT CHANGE UP (ref 80–100)
METHOD TYPE: SIGNIFICANT CHANGE UP
NRBC # BLD: 0 /100 WBCS — SIGNIFICANT CHANGE UP (ref 0–0)
ORGANISM # SPEC MICROSCOPIC CNT: SIGNIFICANT CHANGE UP
PHOSPHATE SERPL-MCNC: 2.7 MG/DL — SIGNIFICANT CHANGE UP (ref 2.5–4.5)
PLATELET # BLD AUTO: 102 K/UL — LOW (ref 150–400)
POTASSIUM SERPL-MCNC: 4.4 MMOL/L — SIGNIFICANT CHANGE UP (ref 3.5–5.3)
POTASSIUM SERPL-SCNC: 4.4 MMOL/L — SIGNIFICANT CHANGE UP (ref 3.5–5.3)
PROT SERPL-MCNC: 5.7 G/DL — LOW (ref 6–8.3)
RBC # BLD: 2.46 M/UL — LOW (ref 3.8–5.2)
RBC # FLD: 19.7 % — HIGH (ref 10.3–14.5)
SODIUM SERPL-SCNC: 136 MMOL/L — SIGNIFICANT CHANGE UP (ref 135–145)
SPECIMEN SOURCE: SIGNIFICANT CHANGE UP
WBC # BLD: 5.53 K/UL — SIGNIFICANT CHANGE UP (ref 3.8–10.5)
WBC # FLD AUTO: 5.53 K/UL — SIGNIFICANT CHANGE UP (ref 3.8–10.5)

## 2020-01-26 PROCEDURE — 99291 CRITICAL CARE FIRST HOUR: CPT

## 2020-01-26 PROCEDURE — 71045 X-RAY EXAM CHEST 1 VIEW: CPT | Mod: 26

## 2020-01-26 RX ORDER — SODIUM CHLORIDE 9 MG/ML
1000 INJECTION, SOLUTION INTRAVENOUS
Refills: 0 | Status: DISCONTINUED | OUTPATIENT
Start: 2020-01-26 | End: 2020-01-26

## 2020-01-26 RX ORDER — HYDROCORTISONE 20 MG
100 TABLET ORAL EVERY 12 HOURS
Refills: 0 | Status: DISCONTINUED | OUTPATIENT
Start: 2020-01-26 | End: 2020-01-27

## 2020-01-26 RX ORDER — IPRATROPIUM/ALBUTEROL SULFATE 18-103MCG
3 AEROSOL WITH ADAPTER (GRAM) INHALATION EVERY 6 HOURS
Refills: 0 | Status: DISCONTINUED | OUTPATIENT
Start: 2020-01-26 | End: 2020-02-05

## 2020-01-26 RX ORDER — SODIUM CHLORIDE 9 MG/ML
1000 INJECTION, SOLUTION INTRAVENOUS
Refills: 0 | Status: DISCONTINUED | OUTPATIENT
Start: 2020-01-26 | End: 2020-01-30

## 2020-01-26 RX ORDER — MINOCYCLINE HYDROCHLORIDE 45 MG/1
TABLET, EXTENDED RELEASE ORAL
Refills: 0 | Status: DISCONTINUED | OUTPATIENT
Start: 2020-01-26 | End: 2020-02-05

## 2020-01-26 RX ORDER — MINOCYCLINE HYDROCHLORIDE 45 MG/1
100 TABLET, EXTENDED RELEASE ORAL EVERY 12 HOURS
Refills: 0 | Status: DISCONTINUED | OUTPATIENT
Start: 2020-01-27 | End: 2020-02-05

## 2020-01-26 RX ORDER — HYDROMORPHONE HYDROCHLORIDE 2 MG/ML
2 INJECTION INTRAMUSCULAR; INTRAVENOUS; SUBCUTANEOUS ONCE
Refills: 0 | Status: DISCONTINUED | OUTPATIENT
Start: 2020-01-26 | End: 2020-01-26

## 2020-01-26 RX ORDER — MINOCYCLINE HYDROCHLORIDE 45 MG/1
200 TABLET, EXTENDED RELEASE ORAL ONCE
Refills: 0 | Status: COMPLETED | OUTPATIENT
Start: 2020-01-26 | End: 2020-01-26

## 2020-01-26 RX ORDER — CALCIUM GLUCONATE 100 MG/ML
2 VIAL (ML) INTRAVENOUS ONCE
Refills: 0 | Status: COMPLETED | OUTPATIENT
Start: 2020-01-26 | End: 2020-01-26

## 2020-01-26 RX ADMIN — NYSTATIN CREAM 1 APPLICATION(S): 100000 CREAM TOPICAL at 05:03

## 2020-01-26 RX ADMIN — POLYMYXIN B SULFATE 600 UNIT(S): 500000 INJECTION, POWDER, LYOPHILIZED, FOR SOLUTION INTRAMUSCULAR; INTRATHECAL; INTRAVENOUS; OPHTHALMIC at 00:00

## 2020-01-26 RX ADMIN — LINEZOLID 300 MILLIGRAM(S): 600 INJECTION, SOLUTION INTRAVENOUS at 21:07

## 2020-01-26 RX ADMIN — METHADONE HYDROCHLORIDE 10 MILLIGRAM(S): 40 TABLET ORAL at 21:06

## 2020-01-26 RX ADMIN — POLYETHYLENE GLYCOL 3350 17 GRAM(S): 17 POWDER, FOR SOLUTION ORAL at 05:05

## 2020-01-26 RX ADMIN — Medication 81 MILLIGRAM(S): at 21:06

## 2020-01-26 RX ADMIN — Medication 100 MILLIGRAM(S): at 09:26

## 2020-01-26 RX ADMIN — SODIUM CHLORIDE 100 MILLILITER(S): 9 INJECTION, SOLUTION INTRAVENOUS at 09:45

## 2020-01-26 RX ADMIN — HYDROMORPHONE HYDROCHLORIDE 2 MILLIGRAM(S): 2 INJECTION INTRAMUSCULAR; INTRAVENOUS; SUBCUTANEOUS at 14:35

## 2020-01-26 RX ADMIN — Medication 1 APPLICATION(S): at 17:23

## 2020-01-26 RX ADMIN — Medication 1 APPLICATION(S): at 05:03

## 2020-01-26 RX ADMIN — DIPHENHYDRAMINE HYDROCHLORIDE AND LIDOCAINE HYDROCHLORIDE AND ALUMINUM HYDROXIDE AND MAGNESIUM HYDRO 5 MILLILITER(S): KIT at 17:24

## 2020-01-26 RX ADMIN — PANTOPRAZOLE SODIUM 40 MILLIGRAM(S): 20 TABLET, DELAYED RELEASE ORAL at 05:02

## 2020-01-26 RX ADMIN — POLYMYXIN B SULFATE 600 UNIT(S): 500000 INJECTION, POWDER, LYOPHILIZED, FOR SOLUTION INTRAMUSCULAR; INTRATHECAL; INTRAVENOUS; OPHTHALMIC at 11:08

## 2020-01-26 RX ADMIN — CHLORHEXIDINE GLUCONATE 1 APPLICATION(S): 213 SOLUTION TOPICAL at 05:04

## 2020-01-26 RX ADMIN — Medication 250 MILLIGRAM(S): at 09:28

## 2020-01-26 RX ADMIN — MEROPENEM 100 MILLIGRAM(S): 1 INJECTION INTRAVENOUS at 05:02

## 2020-01-26 RX ADMIN — Medication 3 MILLILITER(S): at 15:30

## 2020-01-26 RX ADMIN — SODIUM CHLORIDE 10 MILLILITER(S): 9 INJECTION, SOLUTION INTRAVENOUS at 19:57

## 2020-01-26 RX ADMIN — Medication 0.5 MILLIGRAM(S): at 19:00

## 2020-01-26 RX ADMIN — Medication 102 MILLIGRAM(S): at 17:31

## 2020-01-26 RX ADMIN — HYDROMORPHONE HYDROCHLORIDE 1.5 MILLIGRAM(S): 2 INJECTION INTRAMUSCULAR; INTRAVENOUS; SUBCUTANEOUS at 05:01

## 2020-01-26 RX ADMIN — Medication 0.5 MILLIGRAM(S): at 05:22

## 2020-01-26 RX ADMIN — METHADONE HYDROCHLORIDE 10 MILLIGRAM(S): 40 TABLET ORAL at 09:11

## 2020-01-26 RX ADMIN — Medication 3 MILLILITER(S): at 12:29

## 2020-01-26 RX ADMIN — Medication 1 APPLICATION(S): at 14:43

## 2020-01-26 RX ADMIN — Medication 100 GRAM(S): at 07:53

## 2020-01-26 RX ADMIN — ATORVASTATIN CALCIUM 40 MILLIGRAM(S): 80 TABLET, FILM COATED ORAL at 21:06

## 2020-01-26 RX ADMIN — MINOCYCLINE HYDROCHLORIDE 100 MILLIGRAM(S): 45 TABLET, EXTENDED RELEASE ORAL at 15:52

## 2020-01-26 RX ADMIN — MEROPENEM 100 MILLIGRAM(S): 1 INJECTION INTRAVENOUS at 21:07

## 2020-01-26 RX ADMIN — Medication 1 DROP(S): at 05:04

## 2020-01-26 RX ADMIN — Medication 250 MILLIGRAM(S): at 17:23

## 2020-01-26 RX ADMIN — NYSTATIN CREAM 1 APPLICATION(S): 100000 CREAM TOPICAL at 17:24

## 2020-01-26 RX ADMIN — Medication 100 MILLIGRAM(S): at 03:03

## 2020-01-26 RX ADMIN — ENOXAPARIN SODIUM 100 MILLIGRAM(S): 100 INJECTION SUBCUTANEOUS at 11:12

## 2020-01-26 RX ADMIN — OXYCODONE HYDROCHLORIDE 10 MILLIGRAM(S): 5 TABLET ORAL at 04:57

## 2020-01-26 RX ADMIN — DIPHENHYDRAMINE HYDROCHLORIDE AND LIDOCAINE HYDROCHLORIDE AND ALUMINUM HYDROXIDE AND MAGNESIUM HYDRO 5 MILLILITER(S): KIT at 05:03

## 2020-01-26 RX ADMIN — Medication 102 MILLIGRAM(S): at 05:05

## 2020-01-26 RX ADMIN — Medication 100 MILLIGRAM(S): at 21:06

## 2020-01-26 RX ADMIN — Medication 1 DROP(S): at 17:24

## 2020-01-26 RX ADMIN — Medication 3 MILLILITER(S): at 02:40

## 2020-01-26 RX ADMIN — DIPHENHYDRAMINE HYDROCHLORIDE AND LIDOCAINE HYDROCHLORIDE AND ALUMINUM HYDROXIDE AND MAGNESIUM HYDRO 5 MILLILITER(S): KIT at 11:12

## 2020-01-26 RX ADMIN — Medication 1 APPLICATION(S): at 21:07

## 2020-01-26 RX ADMIN — Medication 25 MICROGRAM(S): at 21:06

## 2020-01-26 RX ADMIN — LINEZOLID 300 MILLIGRAM(S): 600 INJECTION, SOLUTION INTRAVENOUS at 09:06

## 2020-01-26 RX ADMIN — MEROPENEM 100 MILLIGRAM(S): 1 INJECTION INTRAVENOUS at 14:40

## 2020-01-26 RX ADMIN — Medication 1 APPLICATION(S): at 05:04

## 2020-01-26 RX ADMIN — POLYETHYLENE GLYCOL 3350 17 GRAM(S): 17 POWDER, FOR SOLUTION ORAL at 12:27

## 2020-01-26 NOTE — PROGRESS NOTE ADULT - ATTENDING COMMENTS
I have seen and evaluated patient and agree with resident assessment and plan.  Discussed with SICU and surgery teams

## 2020-01-26 NOTE — PROGRESS NOTE ADULT - SUBJECTIVE AND OBJECTIVE BOX
Morning Surgical Progress Note  Patient is a 64y old  Female who presents with a chief complaint of sepsis (25 Jan 2020 07:32)      SUBJECTIVE: Patient seen and examined at bedside with surgical team, patient complains of generalized pain. X-ray yesterday showed enteric tube in stomach with unchanged patchy opacities and pleural effusions that suggest pneumonia. Patient is now off pressors and blood pressure is improved.    Vital Signs Last 24 Hrs  T(C): 36.6 (26 Jan 2020 03:30), Max: 37 (25 Jan 2020 23:00)  T(F): 97.9 (26 Jan 2020 03:30), Max: 98.6 (25 Jan 2020 23:00)  HR: 102 (26 Jan 2020 05:22) (81 - 105)  BP: 107/52 (26 Jan 2020 04:15) (80/42 - 146/79)  BP(mean): 75 (26 Jan 2020 04:15) (56 - 85)  RR: 33 (26 Jan 2020 04:30) (10 - 150)  SpO2: 94% (26 Jan 2020 05:22) (85% - 100%)I&O's Detail    24 Jan 2020 07:01  -  25 Jan 2020 07:00  --------------------------------------------------------  IN:    Albumin 5%  - 500 mL: 600 mL    dextrose 5% + sodium chloride 0.45%: 3600 mL    Enteral Tube Flush: 300 mL    Nepro: 740 mL    norepinephrine Infusion: 73.1 mL    Solution: 1750 mL    Solution: 750 mL    Solution: 50 mL  Total IN: 7863.1 mL    OUT:    Indwelling Catheter - Urethral: 940 mL  Total OUT: 940 mL    Total NET: 6923.1 mL      25 Jan 2020 07:01  -  26 Jan 2020 06:17  --------------------------------------------------------  IN:    dextrose 5% + sodium chloride 0.45%: 2400 mL    Enteral Tube Flush: 420 mL    Nepro: 720 mL    norepinephrine Infusion: 19.2 mL    Solution: 600 mL    Solution: 600 mL  Total IN: 4759.2 mL    OUT:    Colostomy: 200 mL    Indwelling Catheter - Urethral: 850 mL  Total OUT: 1050 mL    Total NET: 3709.2 mL      MEDICATIONS  (STANDING):  AQUAPHOR (petrolatum Ointment) 1 Application(s) Topical three times a day  artificial tears (preservative free) Ophthalmic Solution 1 Drop(s) Both EYES two times a day  aspirin  chewable 81 milliGRAM(s) Oral <User Schedule>  atorvastatin 40 milliGRAM(s) Oral <User Schedule>  buDESOnide    Inhalation Suspension 0.5 milliGRAM(s) Inhalation every 12 hours  chlorhexidine 2% Cloths 1 Application(s) Topical <User Schedule>  clobetasol 0.05% Ointment 1 Application(s) Topical two times a day  dextrose 5% + sodium chloride 0.45% 1000 milliLiter(s) (150 mL/Hr) IV Continuous <Continuous>  enoxaparin Injectable 100 milliGRAM(s) SubCutaneous <User Schedule>  erythromycin     base Tablet 250 milliGRAM(s) Oral <User Schedule>  FIRST- Mouthwash  BLM 5 milliLiter(s) Swish and Spit four times a day  hydrocortisone sodium succinate Injectable 100 milliGRAM(s) IV Push every 8 hours  levothyroxine Injectable 25 MICROGram(s) IV Push at bedtime  lidocaine   Patch 1 Patch Transdermal daily  lidocaine 2% Viscous 10 milliLiter(s) Swish and Spit once  linezolid  IVPB 600 milliGRAM(s) IV Intermittent every 12 hours  meropenem  IVPB 1000 milliGRAM(s) IV Intermittent every 8 hours  methadone    Tablet 10 milliGRAM(s) Oral <User Schedule>  nystatin Powder 1 Application(s) Topical two times a day  pantoprazole    Tablet 40 milliGRAM(s) Oral <User Schedule>  polyethylene glycol 3350 17 Gram(s) Oral <User Schedule>  polymyxin B IVPB [id number] Unit(s) IV Intermittent every 12 hours  thiamine IVPB 200 milliGRAM(s) IV Intermittent <User Schedule>    MEDICATIONS  (PRN):  albuterol/ipratropium for Nebulization 3 milliLiter(s) Nebulizer every 6 hours PRN Shortness of Breath and/or Wheezing  aluminum hydroxide/magnesium hydroxide/simethicone Suspension 30 milliLiter(s) Oral every 4 hours PRN Dyspepsia  diphenhydrAMINE 25 milliGRAM(s) Oral every 12 hours PRN Rash and/or Itching  HYDROmorphone  Injectable 1.5 milliGRAM(s) IV Push every 6 hours PRN Turning  oxyCODONE    IR 10 milliGRAM(s) Oral every 4 hours PRN Severe Pain (7 - 10)  zinc oxide 20% Ointment 1 Application(s) Topical three times a day PRN Rash      Physical Exam  Constitutional: A&Ox3, NAD  Respiratory: CTA b/l  Cardiac: RRR, S1 and S2, no m/r/g  Gastrointestinal: abdomen soft, obese, wound vac in place and functioning, dressings c/d/  Skin: erythematous with skin sloughing.    LABS:                        7.0    5.53  )-----------( 102      ( 26 Jan 2020 05:26 )             22.5     01-26    136  |  99  |  37<H>  ----------------------------<  144<H>  4.4   |  26  |  1.30    Ca    7.8<L>      26 Jan 2020 05:26  Phos  2.7     01-26  Mg     2.0     01-26    TPro  5.7<L>  /  Alb  3.0<L>  /  TBili  0.5  /  DBili  0.2  /  AST  100<H>  /  ALT  22  /  AlkPhos  223<H>  01-26      LIVER FUNCTIONS - ( 26 Jan 2020 05:26 )  Alb: 3.0 g/dL / Pro: 5.7 g/dL / ALK PHOS: 223 U/L / ALT: 22 U/L / AST: 100 U/L / GGT: x

## 2020-01-26 NOTE — PROGRESS NOTE ADULT - SUBJECTIVE AND OBJECTIVE BOX
CARDIOLOGY     PROGRESS  NOTE   ________________________________________________    CHIEF COMPLAINT:Patient is a 64y old  Female who presents with a chief complaint of sepsis (25 Jan 2020 07:32)    	  REVIEW OF SYSTEMS:  CONSTITUTIONAL: No fever, weight loss, or fatigue  EYES: No eye pain, visual disturbances, or discharge  ENT:  No difficulty hearing, tinnitus, vertigo; No sinus or throat pain  NECK: No pain or stiffness  RESPIRATORY: No cough, wheezing, chills or hemoptysis; No Shortness of Breath  CARDIOVASCULAR: No chest pain, palpitations, passing out, dizziness, or leg swelling  GASTROINTESTINAL: No abdominal or epigastric pain. No nausea, vomiting, or hematemesis; No diarrhea or constipation. No melena or hematochezia.  GENITOURINARY: No dysuria, frequency, hematuria, or incontinence  NEUROLOGICAL: No headaches, memory loss, loss of strength, numbness, or tremors  SKIN: No itching, burning, rashes, or lesions   LYMPH Nodes: No enlarged glands  ENDOCRINE: No heat or cold intolerance; No hair loss  MUSCULOSKELETAL: No joint pain or swelling; No muscle, back, or extremity pain  PSYCHIATRIC: No depression, anxiety, mood swings, or difficulty sleeping  HEME/LYMPH: No easy bruising, or bleeding gums  ALLERGY AND IMMUNOLOGIC: No hives or eczema	    [ ] All others negative	  [ ] Unable to obtain    PHYSICAL EXAM:  T(C): 36.2 (01-26-20 @ 19:00), Max: 37 (01-25-20 @ 23:00)  HR: 91 (01-26-20 @ 20:00) (81 - 110)  BP: 97/70 (01-26-20 @ 20:00) (83/53 - 130/57)  RR: 10 (01-26-20 @ 20:00) (10 - 150)  SpO2: 96% (01-26-20 @ 20:00) (85% - 100%)  Wt(kg): --  I&O's Summary    25 Jan 2020 07:01  -  26 Jan 2020 07:00  --------------------------------------------------------  IN: 7274.2 mL / OUT: 1800 mL / NET: 5474.2 mL    26 Jan 2020 07:01  -  26 Jan 2020 21:25  --------------------------------------------------------  IN: 2150 mL / OUT: 1015 mL / NET: 1135 mL        Appearance: Normal	  HEENT:   Normal oral mucosa, PERRL, EOMI	  Lymphatic: No lymphadenopathy  Cardiovascular: Normal S1 S2, No JVD, No murmurs, No edema  Respiratory: Lungs clear to auscultation	  Psychiatry: A & O x 3, Mood & affect appropriate  Gastrointestinal:  Soft, Non-tender, + BS	  Skin: No rashes, No ecchymoses, No cyanosis	  Neurologic: Non-focal  Extremities: Normal range of motion, No clubbing, cyanosis or edema  Vascular: Peripheral pulses palpable 2+ bilaterally    MEDICATIONS  (STANDING):  albuterol/ipratropium for Nebulization 3 milliLiter(s) Nebulizer every 6 hours  AQUAPHOR (petrolatum Ointment) 1 Application(s) Topical three times a day  artificial tears (preservative free) Ophthalmic Solution 1 Drop(s) Both EYES two times a day  aspirin  chewable 81 milliGRAM(s) Oral <User Schedule>  atorvastatin 40 milliGRAM(s) Oral <User Schedule>  buDESOnide    Inhalation Suspension 0.5 milliGRAM(s) Inhalation every 12 hours  chlorhexidine 2% Cloths 1 Application(s) Topical <User Schedule>  clobetasol 0.05% Ointment 1 Application(s) Topical two times a day  enoxaparin Injectable 100 milliGRAM(s) SubCutaneous <User Schedule>  erythromycin     base Tablet 250 milliGRAM(s) Oral <User Schedule>  FIRST- Mouthwash  BLM 5 milliLiter(s) Swish and Spit four times a day  hydrocortisone sodium succinate Injectable 100 milliGRAM(s) IV Push every 12 hours  lactated ringers. 1000 milliLiter(s) (10 mL/Hr) IV Continuous <Continuous>  levothyroxine Injectable 25 MICROGram(s) IV Push at bedtime  lidocaine   Patch 1 Patch Transdermal daily  lidocaine 2% Viscous 10 milliLiter(s) Swish and Spit once  linezolid  IVPB 600 milliGRAM(s) IV Intermittent every 12 hours  meropenem  IVPB 1000 milliGRAM(s) IV Intermittent every 8 hours  methadone    Tablet 10 milliGRAM(s) Oral <User Schedule>  minocycline IVPB      nystatin Powder 1 Application(s) Topical two times a day  pantoprazole    Tablet 40 milliGRAM(s) Oral <User Schedule>  polyethylene glycol 3350 17 Gram(s) Oral <User Schedule>  polymyxin B IVPB [id number] Unit(s) IV Intermittent every 12 hours  thiamine IVPB 200 milliGRAM(s) IV Intermittent <User Schedule>      TELEMETRY: 	    ECG:  	  RADIOLOGY:  OTHER: 	  	  LABS:	 	    CARDIAC MARKERS:                                7.0    5.53  )-----------( 102      ( 26 Jan 2020 05:26 )             22.5     01-26    136  |  99  |  37<H>  ----------------------------<  144<H>  4.4   |  26  |  1.30    Ca    7.8<L>      26 Jan 2020 05:26  Phos  2.7     01-26  Mg     2.0     01-26    TPro  5.7<L>  /  Alb  3.0<L>  /  TBili  0.5  /  DBili  0.2  /  AST  100<H>  /  ALT  22  /  AlkPhos  223<H>  01-26    proBNP:   Lipid Profile: Cholesterol 98  LDL 52  HDL 28  TG 91    HgA1c:   TSH: Thyroid Stimulating Hormone, Serum: 4.81 uIU/mL (01-17 @ 03:52)          Assessment and plan  --------------------------- CARDIOLOGY     PROGRESS  NOTE   ________________________________________________    CHIEF COMPLAINT:Patient is a 64y old  Female who presents with a chief complaint of sepsis (25 Jan 2020 07:32)    	  REVIEW OF SYSTEMS:  CONSTITUTIONAL: No fever, weight loss, or fatigue, +ngt  EYES: No eye pain, visual disturbances, or discharge  ENT:  No difficulty hearing, tinnitus, vertigo; No sinus or throat pain  NECK: No pain or stiffness  RESPIRATORY: No cough, wheezing, chills or hemoptysis; + Shortness of Breath  CARDIOVASCULAR: No chest pain, palpitations, passing out, dizziness, or leg swelling  GASTROINTESTINAL: No abdominal or epigastric pain. No nausea, vomiting, or hematemesis; No diarrhea or constipation. No melena or hematochezia.  GENITOURINARY: No dysuria, frequency, hematuria, or incontinence  NEUROLOGICAL: No headaches, memory loss, loss of strength, numbness, or tremors  SKIN: No itching, burning, rashes, or lesions   LYMPH Nodes: No enlarged glands  ENDOCRINE: No heat or cold intolerance; No hair loss  MUSCULOSKELETAL: No joint pain or swelling; No muscle, back, or extremity pain  PSYCHIATRIC: No depression, anxiety, mood swings, or difficulty sleeping  HEME/LYMPH: No easy bruising, or bleeding gums  ALLERGY AND IMMUNOLOGIC: No hives or eczema	    [ ] All others negative	  [ ] Unable to obtain    PHYSICAL EXAM:  T(C): 36.2 (01-26-20 @ 19:00), Max: 37 (01-25-20 @ 23:00)  HR: 91 (01-26-20 @ 20:00) (81 - 110)  BP: 97/70 (01-26-20 @ 20:00) (83/53 - 130/57)  RR: 10 (01-26-20 @ 20:00) (10 - 150)  SpO2: 96% (01-26-20 @ 20:00) (85% - 100%)  Wt(kg): --  I&O's Summary    25 Jan 2020 07:01  -  26 Jan 2020 07:00  --------------------------------------------------------  IN: 7274.2 mL / OUT: 1800 mL / NET: 5474.2 mL    26 Jan 2020 07:01  -  26 Jan 2020 21:25  --------------------------------------------------------  IN: 2150 mL / OUT: 1015 mL / NET: 1135 mL        Appearance: Normal	  HEENT:   Normal oral mucosa, PERRL, EOMI	  Lymphatic: No lymphadenopathy  Cardiovascular: Normal S1 S2, No JVD, + murmurs, N+ lymph edema  Respiratory: rhonchi  Psychiatry: A & O x 3, Mood & affect appropriate  Gastrointestinal:  Soft, Non-tender, + BS	  Skin: No rashes, No ecchymoses, No cyanosis	  Neurologic: Non-focal  Extremities: Normal range of motion, No clubbing, cyanosis.   Vascular: Peripheral pulses palpable 2+ bilaterally    MEDICATIONS  (STANDING):  albuterol/ipratropium for Nebulization 3 milliLiter(s) Nebulizer every 6 hours  AQUAPHOR (petrolatum Ointment) 1 Application(s) Topical three times a day  artificial tears (preservative free) Ophthalmic Solution 1 Drop(s) Both EYES two times a day  aspirin  chewable 81 milliGRAM(s) Oral <User Schedule>  atorvastatin 40 milliGRAM(s) Oral <User Schedule>  buDESOnide    Inhalation Suspension 0.5 milliGRAM(s) Inhalation every 12 hours  chlorhexidine 2% Cloths 1 Application(s) Topical <User Schedule>  clobetasol 0.05% Ointment 1 Application(s) Topical two times a day  enoxaparin Injectable 100 milliGRAM(s) SubCutaneous <User Schedule>  erythromycin     base Tablet 250 milliGRAM(s) Oral <User Schedule>  FIRST- Mouthwash  BLM 5 milliLiter(s) Swish and Spit four times a day  hydrocortisone sodium succinate Injectable 100 milliGRAM(s) IV Push every 12 hours  lactated ringers. 1000 milliLiter(s) (10 mL/Hr) IV Continuous <Continuous>  levothyroxine Injectable 25 MICROGram(s) IV Push at bedtime  lidocaine   Patch 1 Patch Transdermal daily  lidocaine 2% Viscous 10 milliLiter(s) Swish and Spit once  linezolid  IVPB 600 milliGRAM(s) IV Intermittent every 12 hours  meropenem  IVPB 1000 milliGRAM(s) IV Intermittent every 8 hours  methadone    Tablet 10 milliGRAM(s) Oral <User Schedule>  minocycline IVPB      nystatin Powder 1 Application(s) Topical two times a day  pantoprazole    Tablet 40 milliGRAM(s) Oral <User Schedule>  polyethylene glycol 3350 17 Gram(s) Oral <User Schedule>  polymyxin B IVPB [id number] Unit(s) IV Intermittent every 12 hours  thiamine IVPB 200 milliGRAM(s) IV Intermittent <User Schedule>      TELEMETRY: 	    ECG:  	  RADIOLOGY:  OTHER: 	  	  LABS:	 	    CARDIAC MARKERS:                                7.0    5.53  )-----------( 102      ( 26 Jan 2020 05:26 )             22.5     01-26    136  |  99  |  37<H>  ----------------------------<  144<H>  4.4   |  26  |  1.30    Ca    7.8<L>      26 Jan 2020 05:26  Phos  2.7     01-26  Mg     2.0     01-26    TPro  5.7<L>  /  Alb  3.0<L>  /  TBili  0.5  /  DBili  0.2  /  AST  100<H>  /  ALT  22  /  AlkPhos  223<H>  01-26    proBNP:   Lipid Profile: Cholesterol 98  LDL 52  HDL 28  TG 91    HgA1c:   TSH: Thyroid Stimulating Hormone, Serum: 4.81 uIU/mL (01-17 @ 03:52)    < from: 12 Lead ECG (01.22.20 @ 09:34) >  Diagnosis Line NORMAL SINUS RHYTHM  LOW VOLTAGE QRS  BORDERLINE ECG  < from: Xray Chest 1 View- PORTABLE-Urgent (01.26.20 @ 03:48) >  Enteric tube crosses the diaphragm, the tip is not imaged. Left IJ central line tip is in the SVC.  Unchanged patchy right lung opacities. No pneumothorax.  Heart size cannot be accurately assessed on this projection.  The visualized osseous structures are unremarkable.     IMPRESSION:  Unchanged patchy right lung opacities.    < end of copied text >            Assessment and plan  ---------------------------  septic/ hypovolemic shock still hypotensive but off pressors  abx as per ID repeat cultures  may hold cholesterol meds  WMA on echo ? sec to sepsis  dvt prophylaxis  s/p blood transfusion   continue tx as per ICU  decrease methadone dose  ace wrap to the both LE  increase renal function ? sec to ATN sec to hypotension fu renal function closely/ improving  may dc lipitor  continue NGT feeding  OOB to CHAIR

## 2020-01-26 NOTE — PROGRESS NOTE ADULT - ASSESSMENT
63 y/o female presenting with perforated sigmoid diverticulitis s/p exploratory laparotomy, extended left hemicolectomy, and left in discontinuity w/ Abthera VAC placement with return to OR for re-exploratory laparotomy, transverse end colostomy, and fascial closure with wound VAC placement. Hospital course complicated by gastroparesis, right subclavian steal syndrome, full body rash of unknown etiology, and refractory septic shock c/b stress-induced cardiomyopathy. Cultures were positive for E. coli & Klebsiella in the urine, Stenotrophomonas in the sputum, and Staph epidermis in the blood with subsequent removal of her LUE PICC.    PLAN:    Neuro: acute pain from her full body rash, opioid dependence  - Monitor mental status  - Pain control as needed with methadone, PRN dilaudid, tylenol  - 2mg IV dilaudid to be given for the "big turn" of the day    Resp: COPD  - Monitor pulse oximeter  - Out of bed to chair and incentive spirometry to prevent atelectasis  - Pulmicort and Duoneb for COPD    CV: refractory septic shock c/b stress-induced cardiomyopathy  - Monitor vital signs, continued hypotension, on and off of Levo gtt  - Home ASA    GI: perforated sigmoid diverticulitis s/p exploratory laparotomy, extended left hemicolectomy, and left in discontinuity w/ Abthera VAC placement with return to OR for re-exploratory laparotomy, transverse end colostomy, and fascial closure c/b gastroparesis; GERD  - Regular diet with Nepro @ 45ml/hr via KO feeding tube   - Monitor ostomy output  - Erythromycin for gastroparesis  - Protonix for GERD  - Maalox PRN indigestion  - Continue wound VAC therapy     Renal: no acute issues  - Monitor I&Os  - Monitor electrolytes and replete as necessary  - Doxazosin for urinary retention    Heme: no acute issues  - Monitor CBC and coags  - Lovenox 100 mg daily for VTE prophylaxis, adjusted for BMI    ID: E. coli & Klebsiella UTI, Stenotrophomonas PNA, Staph epidermis bacteremia; Now Acinetobacter and VRE bacteremia 01/22  - Continue meropenem, Linezolid and Polymixin   - Monitor for clinical evidence of active infection    Endo: HLD  - 50mg PO levothyroxine for hypothermia, hypothyroidism.   - Monitor glucose on BMP  - Home Lipitor  - Continue stress dose steroids     Integumentary:   - Appreciate Dermatology recs: Vaseline, zinc oxide, clobetasol cream.     Disposition:  - Full code  - Will remain in SICU

## 2020-01-26 NOTE — PROGRESS NOTE ADULT - ASSESSMENT
65yo F with PMHx morbid obesity, GERD, HTN, COPD, and PSHx D&C now s/p ex laparotomy with extended left hemicolectomy 11/26 for perforated and necrotic sigmoid colon with gross abdominal contamination. RTOR 11/29 for abd closure, RUQ end transverse colostomy creation. On 12/19 patient found to have induration of wound and keri-stomal fat necrosis, s/p bedside debridement w/ wound VAC in placement on midline wound. Began showing signs of sepsis and was transferred to SICU 1/7 for further management. Now w/ decreasing pressor support and downtrending leukocytosis. Troponins have been elevated and echo shows decreased ventricular wall motility. Patient grew VRE, and linezolid was added to her antibiotic regimen. Patient is now off pressors and blood pressure is improved.      Plan:  Continue IV meropenem and linezolid  Trend labs/ WBC/ SBP  Monitor Vitals  Appreciate Cards reccs  Appreciate excellent SICU care    [facility]  [phone]

## 2020-01-26 NOTE — PROGRESS NOTE ADULT - ATTENDING COMMENTS
Dr. Saleh (Attending Physician)  N - Mental status improved, increased dilaudid for turns with improvement, methadone and oxycodone for pain  P - RUL improved, will attempt more aggressive chest PT today  C - septic shock now off vasopressors  GI - On nepro for tube feeds   - BULL improving, Cr now 1.3, decrease bicarb gtt to 100 mL/hr  H - anemia secondary to blood loss acute will tx for hb less than 7  ID - Carbipenem resistant Acinetobacter started on Polymyxin  E - Stress dose steroids, will start weaning steroids, synthroid for hypothyroid  Derm - c/w BID aggressive skin care      This patient was critically ill with one or more vital organ systems at a high probability of imminent or life threatening deterioration. Complex decision making was required to assess and treat single or multiple vital organ system failure and/or prevent further life threatening deterioration of the patient’s condition.  All recent labwork and imaging was reviewed.  Total Critical Care Time 45

## 2020-01-26 NOTE — PROGRESS NOTE ADULT - SUBJECTIVE AND OBJECTIVE BOX
SICU DAILY PROGRESS NOTE    64y Female PMHx of morbid obesity, COPD, HTN, and GERD who presented to the ED on 11/26/2019 with abdominal pain and distension. Patient states that she had been having constipation for ~2 weeks with no improvement despite laxative and enema use. Imaging revealed perforated sigmoid diverticulitis with free air. Hospital course is as follows:    11/26 - s/p exploratory laparotomy, extended left hemicolectomy, and left in discontinuity w/ Abthera VAC placement, left intubated post-operatively so she was admitted to SICU for further management  11/27 - extubated to BiPAP  11/29 - re-exploratory laparotomy, transverse end colostomy, and fascial closure with wound VAC placement, left intubated at end of case  11/30 - extubated   12/04 - transferred to the floors  12/06 - acute inability to tolerate PO, complaining of nausea & vomiting  12/09 - esophagram demonstrated a stricture in the distal esophagus  12/10 - EGD demonstrated diverticula, a medium-sized hiatal hernia, esophagitis, and gastritis, started on promotility agents and diet was restarted  12/11 - noted to have different BP when BP cuff placed on different arms, bilateral UE Duplex demonstrated stenosis of the right brachiocephalic artery concerning for subclavian steal syndrome  12/28 - plastic surgery consulted for abdominal wall reconstruction, underwent bedside debridement of abdominal wound at that time.  01/07 - worsening hypotension from presumed sepsis requiring readmission to SICU, imaging revealed loculated fluid collections in the left anterior abdomen & a small abdominal wall collection, both of which were not amenable to drainage, cultures were also positive for E. coli & Klebsiella in the urine, Stenotrophomonas in the sputum, & Staph epidermis in the blood, LUE PICC discontinued  01/10 - right IJ CVC was placed and patient started on vasopressor support but patient refused arterial line placement, patient complaining of chest wall tightness, hsT was elevated and TTE demonstrated new mild to moderate segmental LV systolic dysfunction w/ hypokinesis of the inferior and inferolateral walls but cardiology determined the troponin leak to be stressed-induced in the setting of sepsis  01/13 - dermatology consulted scattered erythematous patches on her trunk & extremities, superficial desquamation of the face, trunk, & extremities, & multiple erosions with surrounding erythema on left lower back, for which petroleum jelly was prescribed BID, viral cultures of erosions were sent and were negative  01/16 - repeat CT scan demonstrated decreasing fluid collections but no new acute findings  01/20 - weaned off vasopressor support, started levothyroxine for elevated TSH and decreased T3 & T4      24 HOUR EVENTS:  - increased dilaudid dose to 2mg for the daily "big turn"  - bcx (1/24) positive for GNR  - goals of care discussion held w/ pt and pt's partner. Further discussion pending  - Repeat bcx for 1/25 unable to be obtained due to pt's partner refusal  - weaned off of Levo gtt overnight    SUBJECTIVE/ROS:  [x] A ten-point review of systems was otherwise negative except as noted.  [ ] Due to altered mental status/intubation, subjective information were not able to be obtained from the patient. History was obtained, to the extent possible, from review of the chart and collateral sources of information.      NEURO  Exam: awake, alert, oriented  Meds: diphenhydrAMINE 25 milliGRAM(s) Oral every 12 hours PRN Rash and/or Itching  HYDROmorphone  Injectable 1.5 milliGRAM(s) IV Push every 6 hours PRN Turning  methadone    Tablet 10 milliGRAM(s) Oral <User Schedule>  oxyCODONE    IR 10 milliGRAM(s) Oral every 4 hours PRN Severe Pain (7 - 10)    [x] Adequacy of sedation and pain control has been assessed and adjusted      RESPIRATORY  RR: 15 (01-25-20 @ 23:00) (10 - 43)  SpO2: 93% (01-25-20 @ 23:00) (90% - 100%)  Wt(kg): --  Exam: unlabored, clear to auscultation bilaterally  Mechanical Ventilation:     [N/A] Extubation Readiness Assessed  Meds: albuterol/ipratropium for Nebulization 3 milliLiter(s) Nebulizer every 6 hours PRN Shortness of Breath and/or Wheezing  buDESOnide    Inhalation Suspension 0.5 milliGRAM(s) Inhalation every 12 hours        CARDIOVASCULAR  HR: 83 (01-25-20 @ 23:00) (77 - 103)  BP: 101/59 (01-25-20 @ 23:00) (80/42 - 146/79)  BP(mean): 77 (01-25-20 @ 23:00) (56 - 84)  ABP: --  ABP(mean): --  Wt(kg): --  CVP(cm H2O): --  VBG - ( 25 Jan 2020 13:11 )  pH: 7.32  /  pCO2: 54    /  pO2: 38    / HCO3: 27    / Base Excess: 1.4   /  SaO2: 65     Lactate: 2.9          Exam: regular rate and rhythm  Cardiac Rhythm: sinus  Perfusion     [x]Adequate   [ ]Inadequate  Mentation   [x]Normal       [ ]Reduced  Extremities  [x]Warm         [ ]Cool  Volume Status [ ]Hypervolemic [x]Euvolemic [ ]Hypovolemic  Meds:       GI/NUTRITION  Exam: soft, nontender, nondistended, incision C/D/I  Diet: regular w/ nasogastric tube feed  Meds: aluminum hydroxide/magnesium hydroxide/simethicone Suspension 30 milliLiter(s) Oral every 4 hours PRN Dyspepsia  pantoprazole    Tablet 40 milliGRAM(s) Oral <User Schedule>  polyethylene glycol 3350 17 Gram(s) Oral <User Schedule>      GENITOURINARY  I&O's Detail    01-24 @ 07:01 - 01-25 @ 07:00  --------------------------------------------------------  IN:    Albumin 5%  - 500 mL: 600 mL    dextrose 5% + sodium chloride 0.45%: 3600 mL    Enteral Tube Flush: 300 mL    Nepro: 740 mL    norepinephrine Infusion: 73.1 mL    Solution: 1750 mL    Solution: 750 mL    Solution: 50 mL  Total IN: 7863.1 mL    OUT:    Indwelling Catheter - Urethral: 940 mL  Total OUT: 940 mL    Total NET: 6923.1 mL      01-25 @ 07:01 - 01-26 @ 00:48  --------------------------------------------------------  IN:    dextrose 5% + sodium chloride 0.45%: 2400 mL    Enteral Tube Flush: 420 mL    Nepro: 720 mL    norepinephrine Infusion: 19.2 mL    Solution: 600 mL    Solution: 600 mL  Total IN: 4759.2 mL    OUT:    Colostomy: 200 mL    Indwelling Catheter - Urethral: 850 mL  Total OUT: 1050 mL    Total NET: 3709.2 mL          01-25    133<L>  |  99  |  41<H>  ----------------------------<  152<H>  4.7   |  22  |  1.64<H>    Ca    7.5<L>      25 Jan 2020 00:27  Phos  3.9     01-25  Mg     2.2     01-25    TPro  5.8<L>  /  Alb  3.2<L>  /  TBili  0.5  /  DBili  0.3<H>  /  AST  45<H>  /  ALT  7<L>  /  AlkPhos  211<H>  01-25    [ ] Ross catheter, indication: N/A  Meds: dextrose 5% + sodium chloride 0.45% 1000 milliLiter(s) IV Continuous <Continuous>  thiamine IVPB 200 milliGRAM(s) IV Intermittent <User Schedule>        HEMATOLOGIC  Meds: aspirin  chewable 81 milliGRAM(s) Oral <User Schedule>  enoxaparin Injectable 100 milliGRAM(s) SubCutaneous <User Schedule>    [x] VTE Prophylaxis                        7.2    4.22  )-----------( 131      ( 25 Jan 2020 00:27 )             23.0       Transfusion     [ ] PRBC   [ ] Platelets   [ ] FFP   [ ] Cryoprecipitate      INFECTIOUS DISEASES    RECENT CULTURES:  Specimen Source: .Blood Blood  Date/Time: 01-24 @ 18:59  Culture Results:   Growth in aerobic bottle: Gram Negative Rods  Gram Stain:   Growth in aerobic bottle: Gram Negative Rods  Organism: --  Specimen Source: .Blood Blood-Venous  Date/Time: 01-23 @ 19:31  Culture Results:   Growth in aerobic bottle: Acinetobacter baumannii Nosocomialis group  See previous culture [id number]  Gram Stain:   Growth in aerobic bottle: Gram Negative Rods  Organism: --  Specimen Source: .Blood Blood-Peripheral  Date/Time: 01-22 @ 17:15  Culture Results:   Growth in aerobic bottle: Acinetobacter baumannii NOSOCOMIALIS GROUP  See previous culture [id number]  Gram Stain:   Growth in aerobic bottle: Gram Negative Rods  Organism: --  Specimen Source: .Blood Blood-Venous  Date/Time: 01-22 @ 15:13  Culture Results:   Growth in aerobic bottle: Acinetobacter baumannii nosocomialis group  Growth in aerobic bottle: Enterococcus faecium (vancomycin resistant)  ***Blood Panel PCR results on this specimen are available  approximately 3 hours after the Gram stain result.***  Gram stain, PCR, and/or culture results may not always  correspond due to difference in methodologies.  ************************************************************  This PCR assay was performed using [employer].  The following targets are tested for: Enterococcus,  vancomycin resistant enterococci, Listeria monocytogenes,  coagulase negative staphylococci, S. aureus,  methicillin resistant S. aureus, Streptococcus agalactiae  (Group B), S. pneumoniae, S. pyogenes (Group A),  Acinetobacter baumannii, Enterobacter cloacae, E. coli,  Klebsiella oxytoca, K. pneumoniae, Proteus sp.,  Serratia marcescens, Haemophilus influenzae,  Neisseria meningitidis, Pseudomonas aeruginosa, Candida  albicans, C. glabrata, C krusei, C parapsilosis,  C. tropicalis and the KPC resistance gene.  Gram Stain:   Growth in aerobic bottle: Gram Negative Rods and Gram Positive Cocci in  Pairs and Chains  Organism: Blood Culture PCR  Enterococcus faecium (vancomycin resistant)    Meds: erythromycin     base Tablet 250 milliGRAM(s) Oral <User Schedule>  linezolid  IVPB 600 milliGRAM(s) IV Intermittent every 12 hours  meropenem  IVPB 1000 milliGRAM(s) IV Intermittent every 8 hours  polymyxin B IVPB [id number] Unit(s) IV Intermittent every 12 hours        ENDOCRINE  CAPILLARY BLOOD GLUCOSE        Meds: atorvastatin 40 milliGRAM(s) Oral <User Schedule>  hydrocortisone sodium succinate Injectable 100 milliGRAM(s) IV Push every 8 hours  levothyroxine Injectable 25 MICROGram(s) IV Push at bedtime        ACCESS DEVICES:  [x] Peripheral IV  [ ] Central Venous Line	[ ] R	[ ] L	[ ] IJ	[ ] Fem	[ ] SC	Placed:   [ ] Arterial Line		[ ] R	[ ] L	[ ] Fem	[ ] Rad	[ ] Ax	Placed:   [ ] PICC:					[ ] Mediport  [ ] Urinary Catheter, Date Placed:   [x] Necessity of urinary, arterial, and venous catheters discussed    OTHER MEDICATIONS:  AQUAPHOR (petrolatum Ointment) 1 Application(s) Topical three times a day  artificial tears (preservative free) Ophthalmic Solution 1 Drop(s) Both EYES two times a day  chlorhexidine 2% Cloths 1 Application(s) Topical <User Schedule>  clobetasol 0.05% Ointment 1 Application(s) Topical two times a day  FIRST- Mouthwash  BLM 5 milliLiter(s) Swish and Spit four times a day  lidocaine   Patch 1 Patch Transdermal daily  lidocaine 2% Viscous 10 milliLiter(s) Swish and Spit once  nystatin Powder 1 Application(s) Topical two times a day  zinc oxide 20% Ointment 1 Application(s) Topical three times a day PRN      CODE STATUS: full code

## 2020-01-27 DIAGNOSIS — L90.9 ATROPHIC DISORDER OF SKIN, UNSPECIFIED: ICD-10-CM

## 2020-01-27 LAB
-  MINOCYCLINE: SIGNIFICANT CHANGE UP
ALBUMIN SERPL ELPH-MCNC: 2.7 G/DL — LOW (ref 3.3–5)
ALP SERPL-CCNC: 254 U/L — HIGH (ref 40–120)
ALT FLD-CCNC: 42 U/L — SIGNIFICANT CHANGE UP (ref 10–45)
ANION GAP SERPL CALC-SCNC: 10 MMOL/L — SIGNIFICANT CHANGE UP (ref 5–17)
AST SERPL-CCNC: 143 U/L — HIGH (ref 10–40)
BILIRUB DIRECT SERPL-MCNC: 0.3 MG/DL — HIGH (ref 0–0.2)
BILIRUB INDIRECT FLD-MCNC: 0.2 MG/DL — SIGNIFICANT CHANGE UP (ref 0.2–1)
BILIRUB SERPL-MCNC: 0.5 MG/DL — SIGNIFICANT CHANGE UP (ref 0.2–1.2)
BLD GP AB SCN SERPL QL: NEGATIVE — SIGNIFICANT CHANGE UP
BUN SERPL-MCNC: 36 MG/DL — HIGH (ref 7–23)
CALCIUM SERPL-MCNC: 7.9 MG/DL — LOW (ref 8.4–10.5)
CHLORIDE SERPL-SCNC: 99 MMOL/L — SIGNIFICANT CHANGE UP (ref 96–108)
CO2 SERPL-SCNC: 27 MMOL/L — SIGNIFICANT CHANGE UP (ref 22–31)
CORTICOSTEROID BINDING GLOBULIN RESULT: 1 MG/DL — LOW
CORTIS F/TOTAL MFR SERPL: 46 % — SIGNIFICANT CHANGE UP
CORTIS SERPL-MCNC: 12 UG/DL — SIGNIFICANT CHANGE UP
CORTISOL, FREE RESULT: 5.5 UG/DL — HIGH
CREAT SERPL-MCNC: 1.05 MG/DL — SIGNIFICANT CHANGE UP (ref 0.5–1.3)
CULTURE RESULTS: SIGNIFICANT CHANGE UP
CULTURE RESULTS: SIGNIFICANT CHANGE UP
GAS PNL BLDV: SIGNIFICANT CHANGE UP
GLUCOSE SERPL-MCNC: 122 MG/DL — HIGH (ref 70–99)
HCT VFR BLD CALC: 23.1 % — LOW (ref 34.5–45)
HGB BLD-MCNC: 7.4 G/DL — LOW (ref 11.5–15.5)
MAGNESIUM SERPL-MCNC: 2 MG/DL — SIGNIFICANT CHANGE UP (ref 1.6–2.6)
MCHC RBC-ENTMCNC: 29.1 PG — SIGNIFICANT CHANGE UP (ref 27–34)
MCHC RBC-ENTMCNC: 32 GM/DL — SIGNIFICANT CHANGE UP (ref 32–36)
MCV RBC AUTO: 90.9 FL — SIGNIFICANT CHANGE UP (ref 80–100)
NRBC # BLD: 0 /100 WBCS — SIGNIFICANT CHANGE UP (ref 0–0)
ORGANISM # SPEC MICROSCOPIC CNT: SIGNIFICANT CHANGE UP
ORGANISM # SPEC MICROSCOPIC CNT: SIGNIFICANT CHANGE UP
PHOSPHATE SERPL-MCNC: 2.6 MG/DL — SIGNIFICANT CHANGE UP (ref 2.5–4.5)
PLATELET # BLD AUTO: 100 K/UL — LOW (ref 150–400)
POTASSIUM SERPL-MCNC: 4.3 MMOL/L — SIGNIFICANT CHANGE UP (ref 3.5–5.3)
POTASSIUM SERPL-SCNC: 4.3 MMOL/L — SIGNIFICANT CHANGE UP (ref 3.5–5.3)
PROT SERPL-MCNC: 5.3 G/DL — LOW (ref 6–8.3)
RBC # BLD: 2.54 M/UL — LOW (ref 3.8–5.2)
RBC # FLD: 19.8 % — HIGH (ref 10.3–14.5)
RH IG SCN BLD-IMP: POSITIVE — SIGNIFICANT CHANGE UP
SODIUM SERPL-SCNC: 136 MMOL/L — SIGNIFICANT CHANGE UP (ref 135–145)
SPECIMEN SOURCE: SIGNIFICANT CHANGE UP
SPECIMEN SOURCE: SIGNIFICANT CHANGE UP
WBC # BLD: 5.56 K/UL — SIGNIFICANT CHANGE UP (ref 3.8–10.5)
WBC # FLD AUTO: 5.56 K/UL — SIGNIFICANT CHANGE UP (ref 3.8–10.5)

## 2020-01-27 PROCEDURE — 99233 SBSQ HOSP IP/OBS HIGH 50: CPT

## 2020-01-27 PROCEDURE — 71045 X-RAY EXAM CHEST 1 VIEW: CPT | Mod: 26

## 2020-01-27 PROCEDURE — 99233 SBSQ HOSP IP/OBS HIGH 50: CPT | Mod: GC

## 2020-01-27 PROCEDURE — 99234 HOSP IP/OBS SM DT SF/LOW 45: CPT

## 2020-01-27 RX ORDER — DAPTOMYCIN 500 MG/10ML
INJECTION, POWDER, LYOPHILIZED, FOR SOLUTION INTRAVENOUS
Refills: 0 | Status: DISCONTINUED | OUTPATIENT
Start: 2020-01-27 | End: 2020-01-28

## 2020-01-27 RX ORDER — OXYCODONE HYDROCHLORIDE 5 MG/1
15 TABLET ORAL EVERY 4 HOURS
Refills: 0 | Status: DISCONTINUED | OUTPATIENT
Start: 2020-01-27 | End: 2020-01-28

## 2020-01-27 RX ORDER — HYDROMORPHONE HYDROCHLORIDE 2 MG/ML
2 INJECTION INTRAMUSCULAR; INTRAVENOUS; SUBCUTANEOUS ONCE
Refills: 0 | Status: DISCONTINUED | OUTPATIENT
Start: 2020-01-27 | End: 2020-01-27

## 2020-01-27 RX ORDER — HYDROMORPHONE HYDROCHLORIDE 2 MG/ML
2 INJECTION INTRAMUSCULAR; INTRAVENOUS; SUBCUTANEOUS EVERY 6 HOURS
Refills: 0 | Status: DISCONTINUED | OUTPATIENT
Start: 2020-01-27 | End: 2020-01-28

## 2020-01-27 RX ORDER — LANOLIN ALCOHOL/MO/W.PET/CERES
3 CREAM (GRAM) TOPICAL AT BEDTIME
Refills: 0 | Status: DISCONTINUED | OUTPATIENT
Start: 2020-01-27 | End: 2020-02-05

## 2020-01-27 RX ORDER — DAPTOMYCIN 500 MG/10ML
900 INJECTION, POWDER, LYOPHILIZED, FOR SOLUTION INTRAVENOUS EVERY 24 HOURS
Refills: 0 | Status: DISCONTINUED | OUTPATIENT
Start: 2020-01-28 | End: 2020-01-28

## 2020-01-27 RX ORDER — DAPTOMYCIN 500 MG/10ML
900 INJECTION, POWDER, LYOPHILIZED, FOR SOLUTION INTRAVENOUS ONCE
Refills: 0 | Status: COMPLETED | OUTPATIENT
Start: 2020-01-27 | End: 2020-01-27

## 2020-01-27 RX ORDER — MIRTAZAPINE 45 MG/1
15 TABLET, ORALLY DISINTEGRATING ORAL AT BEDTIME
Refills: 0 | Status: DISCONTINUED | OUTPATIENT
Start: 2020-01-27 | End: 2020-01-27

## 2020-01-27 RX ORDER — HYDROCORTISONE 20 MG
100 TABLET ORAL EVERY 12 HOURS
Refills: 0 | Status: COMPLETED | OUTPATIENT
Start: 2020-01-27 | End: 2020-01-27

## 2020-01-27 RX ORDER — THIAMINE MONONITRATE (VIT B1) 100 MG
100 TABLET ORAL DAILY
Refills: 0 | Status: DISCONTINUED | OUTPATIENT
Start: 2020-01-28 | End: 2020-02-05

## 2020-01-27 RX ORDER — HYDROCORTISONE 20 MG
50 TABLET ORAL EVERY 12 HOURS
Refills: 0 | Status: DISCONTINUED | OUTPATIENT
Start: 2020-01-28 | End: 2020-01-28

## 2020-01-27 RX ADMIN — Medication 250 MILLIGRAM(S): at 17:03

## 2020-01-27 RX ADMIN — SODIUM CHLORIDE 10 MILLILITER(S): 9 INJECTION, SOLUTION INTRAVENOUS at 20:19

## 2020-01-27 RX ADMIN — Medication 102 MILLIGRAM(S): at 05:42

## 2020-01-27 RX ADMIN — Medication 3 MILLILITER(S): at 02:00

## 2020-01-27 RX ADMIN — HYDROMORPHONE HYDROCHLORIDE 2 MILLIGRAM(S): 2 INJECTION INTRAMUSCULAR; INTRAVENOUS; SUBCUTANEOUS at 16:15

## 2020-01-27 RX ADMIN — Medication 1 DROP(S): at 05:13

## 2020-01-27 RX ADMIN — Medication 62.5 MILLIMOLE(S): at 06:54

## 2020-01-27 RX ADMIN — Medication 0.5 MILLIGRAM(S): at 05:22

## 2020-01-27 RX ADMIN — Medication 100 MILLIGRAM(S): at 17:04

## 2020-01-27 RX ADMIN — MEROPENEM 100 MILLIGRAM(S): 1 INJECTION INTRAVENOUS at 05:11

## 2020-01-27 RX ADMIN — MINOCYCLINE HYDROCHLORIDE 100 MILLIGRAM(S): 45 TABLET, EXTENDED RELEASE ORAL at 18:10

## 2020-01-27 RX ADMIN — OXYCODONE HYDROCHLORIDE 15 MILLIGRAM(S): 5 TABLET ORAL at 17:03

## 2020-01-27 RX ADMIN — POLYMYXIN B SULFATE 600 UNIT(S): 500000 INJECTION, POWDER, LYOPHILIZED, FOR SOLUTION INTRAMUSCULAR; INTRATHECAL; INTRAVENOUS; OPHTHALMIC at 00:30

## 2020-01-27 RX ADMIN — Medication 1 APPLICATION(S): at 16:52

## 2020-01-27 RX ADMIN — Medication 250 MILLIGRAM(S): at 09:56

## 2020-01-27 RX ADMIN — MINOCYCLINE HYDROCHLORIDE 100 MILLIGRAM(S): 45 TABLET, EXTENDED RELEASE ORAL at 05:42

## 2020-01-27 RX ADMIN — OXYCODONE HYDROCHLORIDE 15 MILLIGRAM(S): 5 TABLET ORAL at 22:23

## 2020-01-27 RX ADMIN — Medication 25 MICROGRAM(S): at 21:52

## 2020-01-27 RX ADMIN — DIPHENHYDRAMINE HYDROCHLORIDE AND LIDOCAINE HYDROCHLORIDE AND ALUMINUM HYDROXIDE AND MAGNESIUM HYDRO 5 MILLILITER(S): KIT at 11:03

## 2020-01-27 RX ADMIN — POLYMYXIN B SULFATE 600 UNIT(S): 500000 INJECTION, POWDER, LYOPHILIZED, FOR SOLUTION INTRAMUSCULAR; INTRATHECAL; INTRAVENOUS; OPHTHALMIC at 11:02

## 2020-01-27 RX ADMIN — Medication 3 MILLILITER(S): at 12:22

## 2020-01-27 RX ADMIN — DIPHENHYDRAMINE HYDROCHLORIDE AND LIDOCAINE HYDROCHLORIDE AND ALUMINUM HYDROXIDE AND MAGNESIUM HYDRO 5 MILLILITER(S): KIT at 23:38

## 2020-01-27 RX ADMIN — MEROPENEM 100 MILLIGRAM(S): 1 INJECTION INTRAVENOUS at 21:54

## 2020-01-27 RX ADMIN — POLYETHYLENE GLYCOL 3350 17 GRAM(S): 17 POWDER, FOR SOLUTION ORAL at 05:15

## 2020-01-27 RX ADMIN — POLYMYXIN B SULFATE 600 UNIT(S): 500000 INJECTION, POWDER, LYOPHILIZED, FOR SOLUTION INTRAMUSCULAR; INTRATHECAL; INTRAVENOUS; OPHTHALMIC at 23:33

## 2020-01-27 RX ADMIN — OXYCODONE HYDROCHLORIDE 15 MILLIGRAM(S): 5 TABLET ORAL at 17:05

## 2020-01-27 RX ADMIN — Medication 1 APPLICATION(S): at 21:53

## 2020-01-27 RX ADMIN — Medication 1 APPLICATION(S): at 05:13

## 2020-01-27 RX ADMIN — HYDROMORPHONE HYDROCHLORIDE 1.5 MILLIGRAM(S): 2 INJECTION INTRAMUSCULAR; INTRAVENOUS; SUBCUTANEOUS at 11:04

## 2020-01-27 RX ADMIN — Medication 1 DROP(S): at 17:03

## 2020-01-27 RX ADMIN — Medication 1 APPLICATION(S): at 05:12

## 2020-01-27 RX ADMIN — METHADONE HYDROCHLORIDE 10 MILLIGRAM(S): 40 TABLET ORAL at 20:18

## 2020-01-27 RX ADMIN — Medication 3 MILLILITER(S): at 23:29

## 2020-01-27 RX ADMIN — Medication 3 MILLILITER(S): at 17:22

## 2020-01-27 RX ADMIN — Medication 3 MILLIGRAM(S): at 21:53

## 2020-01-27 RX ADMIN — LINEZOLID 300 MILLIGRAM(S): 600 INJECTION, SOLUTION INTRAVENOUS at 09:57

## 2020-01-27 RX ADMIN — Medication 3 MILLILITER(S): at 05:22

## 2020-01-27 RX ADMIN — ATORVASTATIN CALCIUM 40 MILLIGRAM(S): 80 TABLET, FILM COATED ORAL at 20:18

## 2020-01-27 RX ADMIN — HYDROMORPHONE HYDROCHLORIDE 1.5 MILLIGRAM(S): 2 INJECTION INTRAMUSCULAR; INTRAVENOUS; SUBCUTANEOUS at 10:45

## 2020-01-27 RX ADMIN — METHADONE HYDROCHLORIDE 10 MILLIGRAM(S): 40 TABLET ORAL at 09:56

## 2020-01-27 RX ADMIN — NYSTATIN CREAM 1 APPLICATION(S): 100000 CREAM TOPICAL at 17:04

## 2020-01-27 RX ADMIN — ENOXAPARIN SODIUM 100 MILLIGRAM(S): 100 INJECTION SUBCUTANEOUS at 11:03

## 2020-01-27 RX ADMIN — POLYETHYLENE GLYCOL 3350 17 GRAM(S): 17 POWDER, FOR SOLUTION ORAL at 11:05

## 2020-01-27 RX ADMIN — HYDROMORPHONE HYDROCHLORIDE 2 MILLIGRAM(S): 2 INJECTION INTRAMUSCULAR; INTRAVENOUS; SUBCUTANEOUS at 04:00

## 2020-01-27 RX ADMIN — Medication 0.5 MILLIGRAM(S): at 17:22

## 2020-01-27 RX ADMIN — Medication 1 APPLICATION(S): at 13:10

## 2020-01-27 RX ADMIN — DIPHENHYDRAMINE HYDROCHLORIDE AND LIDOCAINE HYDROCHLORIDE AND ALUMINUM HYDROXIDE AND MAGNESIUM HYDRO 5 MILLILITER(S): KIT at 05:12

## 2020-01-27 RX ADMIN — PANTOPRAZOLE SODIUM 40 MILLIGRAM(S): 20 TABLET, DELAYED RELEASE ORAL at 05:12

## 2020-01-27 RX ADMIN — HYDROMORPHONE HYDROCHLORIDE 2 MILLIGRAM(S): 2 INJECTION INTRAMUSCULAR; INTRAVENOUS; SUBCUTANEOUS at 16:03

## 2020-01-27 RX ADMIN — NYSTATIN CREAM 1 APPLICATION(S): 100000 CREAM TOPICAL at 05:13

## 2020-01-27 RX ADMIN — MEROPENEM 100 MILLIGRAM(S): 1 INJECTION INTRAVENOUS at 13:10

## 2020-01-27 RX ADMIN — HYDROMORPHONE HYDROCHLORIDE 2 MILLIGRAM(S): 2 INJECTION INTRAMUSCULAR; INTRAVENOUS; SUBCUTANEOUS at 03:44

## 2020-01-27 RX ADMIN — Medication 81 MILLIGRAM(S): at 20:18

## 2020-01-27 RX ADMIN — DAPTOMYCIN 136 MILLIGRAM(S): 500 INJECTION, POWDER, LYOPHILIZED, FOR SOLUTION INTRAVENOUS at 20:18

## 2020-01-27 RX ADMIN — CHLORHEXIDINE GLUCONATE 1 APPLICATION(S): 213 SOLUTION TOPICAL at 05:13

## 2020-01-27 RX ADMIN — OXYCODONE HYDROCHLORIDE 15 MILLIGRAM(S): 5 TABLET ORAL at 21:53

## 2020-01-27 RX ADMIN — DIPHENHYDRAMINE HYDROCHLORIDE AND LIDOCAINE HYDROCHLORIDE AND ALUMINUM HYDROXIDE AND MAGNESIUM HYDRO 5 MILLILITER(S): KIT at 17:03

## 2020-01-27 RX ADMIN — SODIUM CHLORIDE 10 MILLILITER(S): 9 INJECTION, SOLUTION INTRAVENOUS at 07:31

## 2020-01-27 NOTE — PROGRESS NOTE ADULT - ATTENDING COMMENTS
Discharge Summary - Hand Therapy    Patient did not return to therapy.  We will assume that patient's goals were met.    D/C from hand therapy.       Patient seen/examined.  Agree w above note and plan and have discussed plan w house staff.  Agree w DNR    Lucas Greer MD

## 2020-01-27 NOTE — PROVIDER CONTACT NOTE (OTHER) - BACKGROUND
Presented with perforated sigmoid diverticulitis. Patient s/p ex lap, Sesay's procedure, and wound vac placed on left abdomen.

## 2020-01-27 NOTE — PROGRESS NOTE ADULT - ASSESSMENT
64 F PMHx of HTN, COPD, and morbid obesity who was admitted on 11/26 for perforated sigmoid diverticulitis  Prolonged hospital stay  Perforated diverticulum, culture with E coli, s/p OR into washout and ostomy  S/p treatment courses for PICC CoNS CLABSI and Steno in sputum  CTs with improvement in intraabd collections  CXR clear  BCX with VRE and Acinetobacter--acinetobacter persistent through current regimen (brittanie added over weekend)  Critically ill, persistent bacteria in blood, concern for multidrug resistant bacteria/life threatening, poor prognosis  CT's with pna, abd unchanged  Central lines changed 1/23  Overall,  1) CoNS bacteremia--? CLABSI  - S/p course treatment  2) Loculated fluid collections  - Infected collections, most recent CT reassuring  - Repeat CT A/P when feasible  3) Leukocytosis  - Trend to normal, monitor for further signs infection  4) Persistent Bacteremia  - GNR and Cons noted in blood culture  - Continue Minocycline 100mg q 12  - Zyvox 600mg q 12  - Silvia 1g q 8 (CrCl 72)  - Poly B 1,000,000 q 12 (monitor electrolytes, Cr, any signs neuropathy/paresthesias)  - F/U pending BCXs; repeat BCXs q 48 to clearance  - I spoke to lab to have them check S for Bessie Benitez, Elaina Johnson MD  Pager [phone]  After 5pm and on weekends call [phone]

## 2020-01-27 NOTE — PROGRESS NOTE ADULT - ASSESSMENT
63yo F with PMH of Morbid Obesity, HTN, COPD, and GERD p/w abdominal pain found to have bowel perforation s/p emergent repair with complicated post-op course. Palliative consulted for symptom management and GOC.    ·	Continue with Methadone 17.5mg PO in AM and 10mg PO in PM  ·	Change turning PRN to Dilaudid 2mg q4h IV PRN  ·	Change Severe Pain PRN to Oxycodone 15mg q4h PO PRN  ·	Will continue to follow for GOC: currently patient stating she wants to live, surrogate decision maker expresses no desire to de-escalate care at this time

## 2020-01-27 NOTE — PROGRESS NOTE ADULT - SUBJECTIVE AND OBJECTIVE BOX
CC: F/U for MDR Bacteremia    Saw/spoke to patient. Patient remains critically ill in SICU. Fatigued, ill appearing. No new complaints.    Allergies  IV Contrast (Rash; Pruritus; Hypotension)  penicillin (Rash (Severe))  sulfa drugs (Unknown)  vancomycin (Rash (Severe))    ANTIMICROBIALS:  erythromycin     base Tablet 250 <User Schedule>  linezolid  IVPB 600 every 12 hours  meropenem  IVPB 1000 every 8 hours  minocycline IVPB 100 every 12 hours  minocycline IVPB    polymyxin B IVPB [id number] every 12 hours    PE:    Vital Signs Last 24 Hrs  T(C): 36.8 (27 Jan 2020 11:00), Max: 36.9 (27 Jan 2020 03:00)  T(F): 98.2 (27 Jan 2020 11:00), Max: 98.4 (27 Jan 2020 03:00)  HR: 93 (27 Jan 2020 13:00) (85 - 105)  BP: 107/55 (27 Jan 2020 13:00) (92/61 - 116/63)  BP(mean): 72 (27 Jan 2020 13:00) (66 - 83)  RR: 29 (27 Jan 2020 13:00) (10 - 29)  SpO2: 96% (27 Jan 2020 13:00) (88% - 100%)    Gen: AOx1-2, fatigued, ill appearing  CV: S1+S2 normal, nontachycardic  Resp: Clear bilat, no resp distress, no crackles/wheezes  Abd: Soft, nontender, +BS, wound vac, ostomy  Ext: No LE edema, no wounds    LABS:                        7.4    5.56  )-----------( 100      ( 27 Jan 2020 00:36 )             23.1     01-27    136  |  99  |  36<H>  ----------------------------<  122<H>  4.3   |  27  |  1.05    Ca    7.9<L>      27 Jan 2020 00:36  Phos  2.6     01-27  Mg     2.0     01-27    TPro  5.3<L>  /  Alb  2.7<L>  /  TBili  0.5  /  DBili  0.3<H>  /  AST  143<H>  /  ALT  42  /  AlkPhos  254<H>  01-27    MICROBIOLOGY:    .Blood Blood  01-24-20   Growth in aerobic bottle: Acinetobacter baumannii nosocomialis group  See previous culture [id number]  Growth in anaerobic bottle: Gram Positive Cocci in Clusters  --  Blood Culture PCR    .Blood Blood-Venous  01-23-20   Growth in aerobic bottle: Acinetobacter baumannii Nosocomialis group  See previous culture [id number]  --    Growth in aerobic bottle: Gram Negative Rods    .Blood Blood-Peripheral  01-22-20   Growth in aerobic bottle: Acinetobacter baumannii NOSOCOMIALIS GROUP  See previous culture [id number]  --    Growth in aerobic bottle: Gram Negative Rods    .Blood Blood-Venous  01-22-20   Growth in aerobic bottle: Acinetobacter baumannii nosocomialis group  Growth in aerobic bottle: Enterococcus faecium (vancomycin resistant)    (otherwise reviewed)    RADIOLOGY:    1/27 CXR:    Impression:    The heart is enlarged. Improving opacification of the right lung. Left lower lobe pneumonia and/or atelectasis. NG tube is in the stomach. A central line is seen on the left and the tip is in the superior vena cava. No pneumothorax.

## 2020-01-27 NOTE — PROGRESS NOTE ADULT - PROBLEM SELECTOR PLAN 1
- pain management as above  - likely has high tolerance, has been on methadone for years  - can consider increase in methadone tomorrow, would check EKG

## 2020-01-27 NOTE — PROGRESS NOTE ADULT - SUBJECTIVE AND OBJECTIVE BOX
CARDIOLOGY     PROGRESS  NOTE   ________________________________________________    CHIEF COMPLAINT:Patient is a 64y old  Female who presents with a chief complaint of sepsis (25 Jan 2020 07:32)  no complain.  	  REVIEW OF SYSTEMS:  CONSTITUTIONAL: No fever, weight loss, or fatigue  EYES: No eye pain, visual disturbances, or discharge  ENT:  No difficulty hearing, tinnitus, vertigo; No sinus or throat pain  NECK: No pain or stiffness  RESPIRATORY: No cough, wheezing, chills or hemoptysis;  Shortness of Breath  CARDIOVASCULAR: No chest pain, palpitations, passing out, dizziness, or leg swelling  GASTROINTESTINAL: No abdominal or epigastric pain. No nausea, vomiting, or hematemesis; No diarrhea or constipation. No melena or hematochezia.  GENITOURINARY: No dysuria, frequency, hematuria, or incontinence  NEUROLOGICAL: No headaches, memory loss, loss of strength, numbness, or tremors  SKIN: No itching, burning, rashes, or lesions   LYMPH Nodes: No enlarged glands  ENDOCRINE: No heat or cold intolerance; No hair loss  MUSCULOSKELETAL: No joint pain or swelling; No muscle, back, or extremity pain  PSYCHIATRIC: No depression, anxiety, mood swings, or difficulty sleeping  HEME/LYMPH: No easy bruising, or bleeding gums  ALLERGY AND IMMUNOLOGIC: No hives or eczema	    [ ] All others negative	  [ ] Unable to obtain    PHYSICAL EXAM:  T(C): 36.7 (01-27-20 @ 07:00), Max: 36.9 (01-27-20 @ 03:00)  HR: 99 (01-27-20 @ 08:00) (85 - 104)  BP: 99/52 (01-27-20 @ 08:00) (92/61 - 116/63)  RR: 18 (01-27-20 @ 08:00) (10 - 31)  SpO2: 97% (01-27-20 @ 08:00) (91% - 100%)  Wt(kg): --  I&O's Summary    26 Jan 2020 07:01  -  27 Jan 2020 07:00  --------------------------------------------------------  IN: 4212.5 mL / OUT: 1835 mL / NET: 2377.5 mL    27 Jan 2020 07:01  -  27 Jan 2020 08:47  --------------------------------------------------------  IN: 117.5 mL / OUT: 45 mL / NET: 72.5 mL        Appearance: Normal	  HEENT:   Normal oral mucosa, PERRL, EOMI	  Lymphatic: No lymphadenopathy  Cardiovascular: Normal S1 S2, No JVD, + murmurs, No edema  Respiratory: rhonchi  Psychiatry: A & O x 3, Mood & affect appropriate  Gastrointestinal:  Soft, Non-tender, + BS	  Skin: No rashes, No ecchymoses, No cyanosis	  Neurologic: Non-focal  Extremities: Normal range of motion, No clubbing, cyanosis or edema  Vascular: Peripheral pulses palpable 2+ bilaterally    MEDICATIONS  (STANDING):  albuterol/ipratropium for Nebulization 3 milliLiter(s) Nebulizer every 6 hours  AQUAPHOR (petrolatum Ointment) 1 Application(s) Topical three times a day  artificial tears (preservative free) Ophthalmic Solution 1 Drop(s) Both EYES two times a day  aspirin  chewable 81 milliGRAM(s) Oral <User Schedule>  atorvastatin 40 milliGRAM(s) Oral <User Schedule>  buDESOnide    Inhalation Suspension 0.5 milliGRAM(s) Inhalation every 12 hours  chlorhexidine 2% Cloths 1 Application(s) Topical <User Schedule>  clobetasol 0.05% Ointment 1 Application(s) Topical two times a day  enoxaparin Injectable 100 milliGRAM(s) SubCutaneous <User Schedule>  erythromycin     base Tablet 250 milliGRAM(s) Oral <User Schedule>  FIRST- Mouthwash  BLM 5 milliLiter(s) Swish and Spit four times a day  hydrocortisone sodium succinate Injectable 100 milliGRAM(s) IV Push every 12 hours  lactated ringers. 1000 milliLiter(s) (10 mL/Hr) IV Continuous <Continuous>  levothyroxine Injectable 25 MICROGram(s) IV Push at bedtime  lidocaine   Patch 1 Patch Transdermal daily  lidocaine 2% Viscous 10 milliLiter(s) Swish and Spit once  linezolid  IVPB 600 milliGRAM(s) IV Intermittent every 12 hours  meropenem  IVPB 1000 milliGRAM(s) IV Intermittent every 8 hours  methadone    Tablet 10 milliGRAM(s) Oral <User Schedule>  minocycline IVPB 100 milliGRAM(s) IV Intermittent every 12 hours  minocycline IVPB      nystatin Powder 1 Application(s) Topical two times a day  pantoprazole    Tablet 40 milliGRAM(s) Oral <User Schedule>  polyethylene glycol 3350 17 Gram(s) Oral <User Schedule>  polymyxin B IVPB [id number] Unit(s) IV Intermittent every 12 hours  thiamine IVPB 200 milliGRAM(s) IV Intermittent <User Schedule>      TELEMETRY: 	    ECG:  	  RADIOLOGY:  OTHER: 	  	  LABS:	 	    CARDIAC MARKERS:                                7.4    5.56  )-----------( 100      ( 27 Jan 2020 00:36 )             23.1     01-27    136  |  99  |  36<H>  ----------------------------<  122<H>  4.3   |  27  |  1.05    Ca    7.9<L>      27 Jan 2020 00:36  Phos  2.6     01-27  Mg     2.0     01-27    TPro  5.3<L>  /  Alb  2.7<L>  /  TBili  0.5  /  DBili  0.3<H>  /  AST  143<H>  /  ALT  42  /  AlkPhos  254<H>  01-27    proBNP:   Lipid Profile: Cholesterol 98  LDL 52  HDL 28  TG 91    HgA1c:   TSH: Thyroid Stimulating Hormone, Serum: 4.81 uIU/mL (01-17 @ 03:52)    < from: TTE with Doppler (w/Cont) (01.11.20 @ 14:44) >  1. Mitral annular calcification, otherwise normal mitral  valve. Minimal mitral regurgitation.  2. Endocardium not well visualized; grossly mild to  moderate segmental left ventricular systolic dysfunction.  The inferior and inferolateral walls grossly appear  hypokinetic.  Endocardial visualization enhanced with  intravenous injection of Ultrasonic Enhancing Agent  (Definity).  3. Unable to accurately evaluate right ventricular size or  systolic function.    < end of copied text >        Assessment and plan  ---------------------------  septic/ hypovolemic shock still hypotensive but off pressors  abx as per ID repeat cultures  may hold cholesterol meds  WMA on echo ? sec to sepsis  dvt prophylaxis  s/p blood transfusion   continue tx as per ICU  decrease methadone dose  ace wrap to the both LE  increase renal function ? sec to ATN sec to hypotension fu renal function closely/ improving  may dc lipitor  continue NGT feeding  OOB to CHAIR

## 2020-01-27 NOTE — PROGRESS NOTE ADULT - SUBJECTIVE AND OBJECTIVE BOX
HPI:  64F with pmhx morbid obesity, acid reflux, HTN, COPD and pshx D&C presents to the ED c/o abdominal pain and bloating. Patient reports having constipation for 2 weeks and has been taking enemas, miralax and senna and passing small hard balls for the last weeks. Reports that she has not had a bowel movement unless she used an enema. Reports pain worst in the LLQ that started a few days ago and has been worsening in the last day, also has noted significant abdominal distention in the last day. Notes that she has not been able to urinate all day today because she feels dehydrated. Reports that she had 'low grade temperature of 99.7 which she took left over augmentin for.' Also reports nausea, denies any emesis, recorded fevers, urinary symptoms. Reports she has difficulty walking short distances because she becomes SOB. In ED, CT demonstrated Perforated sigmoid diverticulitis with intraperitoneal free air.    INTERVAL EVENTS:   1/27: states having constant pain, significant other attributes it to coming from her wounds; used one oxy and one dilaudid/24 hours, states it was not helpful, also doesn't feel methadone increased was helpful    ADVANCE DIRECTIVES:    DNR [X]  []MOLST  []Living Will  DECISION MAKER(s):  [] Health Care Proxy(s)  [x] Surrogate(s)  [] Guardian           Name(s): Darcy            Phone Number(s): [phone]    BASELINE (I)ADL(s) (prior to admission):  Ophiem: [x]Total  [] Moderate []Dependent    Allergies  IV Contrast (Rash; Pruritus; Hypotension)  penicillin (Rash (Severe))  sulfa drugs (Unknown)  vancomycin (Rash (Severe))    MEDICATIONS  (STANDING):  MEDICATIONS  (STANDING):  albuterol/ipratropium for Nebulization 3 milliLiter(s) Nebulizer every 6 hours  AQUAPHOR (petrolatum Ointment) 1 Application(s) Topical three times a day  artificial tears (preservative free) Ophthalmic Solution 1 Drop(s) Both EYES two times a day  aspirin  chewable 81 milliGRAM(s) Oral <User Schedule>  atorvastatin 40 milliGRAM(s) Oral <User Schedule>  buDESOnide    Inhalation Suspension 0.5 milliGRAM(s) Inhalation every 12 hours  chlorhexidine 2% Cloths 1 Application(s) Topical <User Schedule>  clobetasol 0.05% Ointment 1 Application(s) Topical two times a day  enoxaparin Injectable 100 milliGRAM(s) SubCutaneous <User Schedule>  erythromycin     base Tablet 250 milliGRAM(s) Oral <User Schedule>  FIRST- Mouthwash  BLM 5 milliLiter(s) Swish and Spit four times a day  hydrocortisone sodium succinate Injectable 100 milliGRAM(s) IV Push every 12 hours  lactated ringers. 1000 milliLiter(s) (10 mL/Hr) IV Continuous <Continuous>  levothyroxine Injectable 25 MICROGram(s) IV Push at bedtime  lidocaine   Patch 1 Patch Transdermal daily  linezolid  IVPB 600 milliGRAM(s) IV Intermittent every 12 hours  melatonin 3 milliGRAM(s) Oral at bedtime  meropenem  IVPB 1000 milliGRAM(s) IV Intermittent every 8 hours  methadone    Tablet 10 milliGRAM(s) Oral <User Schedule>  minocycline IVPB 100 milliGRAM(s) IV Intermittent every 12 hours  minocycline IVPB      nystatin Powder 1 Application(s) Topical two times a day  pantoprazole    Tablet 40 milliGRAM(s) Oral <User Schedule>  polyethylene glycol 3350 17 Gram(s) Oral <User Schedule>  polymyxin B IVPB [id number] Unit(s) IV Intermittent every 12 hours    MEDICATIONS  (PRN):  aluminum hydroxide/magnesium hydroxide/simethicone Suspension 30 milliLiter(s) Oral every 4 hours PRN Dyspepsia  diphenhydrAMINE 25 milliGRAM(s) Oral every 12 hours PRN Rash and/or Itching  HYDROmorphone  Injectable 1.5 milliGRAM(s) IV Push every 6 hours PRN Turning  oxyCODONE    IR 10 milliGRAM(s) Oral every 4 hours PRN Severe Pain (7 - 10)  zinc oxide 20% Ointment 1 Application(s) Topical three times a day PRN Rash    PRESENT SYMPTOMS: []Unable to obtain due to poor mentation   Source if other than patient:  []Family   []Team     Pain: [x] yes [ ] no  QOL impact - Debilitating  Location - Wound                   Aggravating factors - Turning/Patient Care  Quality - Sharp, Stabbing  Radiation - diffuse  Timing - constant  Severity (0-10 scale): 10  Minimal acceptable level (0-10 scale):    Dyspnea:                           []Mild  []Moderate []Severe  Anxiety:                             []Mild []Moderate []Severe  Fatigue:                             []Mild []Moderate []Severe  Nausea:                             []Mild []Moderate []Severe  Loss of appetite:              []Mild []Moderate []Severe  Constipation:                    []Mild []Moderate []Severe    Other Symptoms:  [x]All other review of systems negative     Karnofsky Performance Score/Palliative Performance Status Version 2:        30%    [email or web address]    PHYSICAL EXAM:  Vital Signs Last 24 Hrs  T(C): 36.8 (27 Jan 2020 11:00), Max: 36.9 (27 Jan 2020 03:00)  T(F): 98.2 (27 Jan 2020 11:00), Max: 98.4 (27 Jan 2020 03:00)  HR: 93 (27 Jan 2020 13:00) (85 - 105)  BP: 107/55 (27 Jan 2020 13:00) (92/61 - 116/63)  BP(mean): 72 (27 Jan 2020 13:00) (66 - 83)  RR: 29 (27 Jan 2020 13:00) (10 - 29)  SpO2: 96% (27 Jan 2020 13:00) (88% - 100%)    Limited exam for patient comfort  GENERAL:  [x]Alert  [x]Oriented x3   []Lethargic  []Cachexia  []Unarousable  [x]Verbal  []Non-Verbal  Behavioral:   [] Anxiety  [] Delirium [] Agitation [] Other  HEENT:  []Normal   [x]Dry mouth   []ET Tube/Trach  []Oral lesions  PULMONARY:   []Clear []Tachypnea  []Audible excessive secretions   []Rhonchi        []Right []Left []Bilateral  []Crackles        []Right []Left []Bilateral  []Wheezing     []Right []Left []Bilateral  CARDIOVASCULAR:    []Regular []Irregular []Tachy  []Memo []Murmur []Other  GASTROINTESTINAL:  []Soft  []Distended   []+BS  []Non tender []Tender  []PEG []OGT/ NGT  Last BM: +ostomy  GENITOURINARY:  []Normal [] Incontinent   []Oliguria/Anuria   [x]Ross  MUSCULOSKELETAL:   []Normal   []Weakness  [x]Bed/Wheelchair bound [x]Edema  NEUROLOGIC:   []No focal deficits  [] Cognitive impairment  [x] Dysphagia []Dysarthria [] Paresis []Other   SKIN:   []Normal   [x]Pressure ulcer(s)  [x]Diffuse Jc (See Nursing Documentation)    CRITICAL CARE:  [x] Shock Present  [x]Septic [ ]Cardiogenic [ ]Neurologic [ ]Hypovolemic  [x]  Vasopressors [ ]  Inotropes   [ ] Respiratory failure present [ ] Mechanical Ventilation [ ] Non-invasive ventilatory support [ ] High-Flow  [ ] Acute  [ ] Chronic [ ] Hypoxic  [ ] Hypercarbic [ ] Other  [ ] Other organ failure    LABS:                                7.4    5.56  )-----------( 100      ( 27 Jan 2020 00:36 )             23.1     01-27    136  |  99  |  36<H>  ----------------------------<  122<H>  4.3   |  27  |  1.05    Ca    7.9<L>      27 Jan 2020 00:36  Phos  2.6     01-27  Mg     2.0     01-27        RADIOLOGY & ADDITIONAL STUDIES:  < from: CT Chest Abdomen and Pelvis w/ Oral Cont (01.23.20 @ 18:16) >  LUNGS AND LARGE AIRWAYS: Scattered bilateral patchy ground glass and consolidative opacities, new compared to 1/15/2020, favoring multifocal pneumonia over edema. Left lower lobe compressive atelectasis.  PLEURA: Small left and trace right pleural effusions..  VESSELS: Left internal jugular approach central venous catheter terminates in the SVC. Atherosclerotic changes of the aorta.  LIVER: Hepatic steatosis.  GALLBLADDER: Distended. No calcified stones or pericholecystic inflammation.  BLADDER: Ross catheter.  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 An enteric tube terminates in the duodenum. No bowel obstruction.   VESSELS: Atherosclerotic changes.  ABDOMINAL WALL: Anterior abdominal wall open wound, right lower quadrant colostomy and postoperative changes.   BONES: Degenerative changes.     IMPRESSION: Multifocal airspace opacities favoring multifocal pneumonia. No acute pathology in the abdomen/pelvis.        PROTEIN CALORIE MALNUTRITION PRESENT: [x]Yes []No  [x]PPSV2 < or = to 30% []significant weight loss  [x]poor nutritional intake []catabolic state []anasarca     Albumin, Serum: 2.6 g/dL (01-24-20 @ 00:28)  Artificial Nutrition [x]     REFERRALS:   []Chaplaincy  []Hospice  []Child Life  [x]Social Work  []Case management []Holistic Therapy

## 2020-01-27 NOTE — CHART NOTE - NSCHARTNOTEFT_GEN_A_CORE
Nutrition Follow Up Note  Patient seen for: Malnutrition follow up    Per medical record pt is a 63 yo female with PMH: morbid obesity, COPD, HTN, and GERD, presented on 19 with abdominal pain and distension. Found with perforated sigmoid diverticulitis with free air. S/P ex-lap 19, extubated , readmitted to SICU with sepsis- cultures positive for E. coli & Klebsiella in the urine, Stenotrophomonas in the sputum, & Staph epidermis in the blood. Pt with BULL; Per nephrology pt "not good candidate for renal replacement therapy. Palliative following.     Source: Pt, family at bedside, RN, medical rounds, and medical record.    Diet: Regular with Nepro via NGT at goal rate of 45ml/hr x24 hours (provides 1080ml, 1944kcal, 87.5g Prot; 32.9kcal/kg and 1.48g/kg protein based on IBW of 59.1kg) plus Raimundo x 3/day     Pt lethargic resting in bed with family at bedside at time of visit. Observed tube feeding formula hung and running at goal rate. RN reports pt is tolerating EN at goal rate. States she did not receive packet of Raimundo today. Pt and family at bedside made aware RD remains available.    Enteral /Parenteral Nutrition:  Nepro at goal rate of 45ml/hr x24 hours  EN Provision per flow sheets:   540ml thus far   540ml (meets 50% of goal)   1080ml (meets 100% of goal)   430ml (meets 39.8% of goal) EN titrated up to goal rate   30ml EN initiated    Colostomy output per flow sheets:   150ml thus far   350ml   200ml   200ml   0ml    Weights: () 325.8 pounds () 342.1 pounds (1/15) 375.4 pounds () 373.2 pounds () 377.4 pounds () 364.6 pounds   % Weight Change: 38.8 pound (10.6%) weight loss x 2 months; ?accuracy on bed weights and pt noted with 4+ generalized edema. Will continue to monitor.    Pertinent Medications: Vitamin B1, Solucortef, Dilaudid, Methadone, Oxycodone, Synthroid, Miralax, Lipitor, Protonix, Maalox  Pertinent Labs: () AST/SGOT 143 <H>, BUN 36 <H>, Glucose 122 <H>, Calcium 7.9 <L>, eGFR 55 <L>    Skin per nursing documentation: no pressure injuries  Edema: 4+ generalized edema per flow sheets    Estimated Needs:   [x] no change since previous assessment for calories: based on IBW of 59.1k-35 kcal/kg = 1773-2069kcal/day  [x] recalculated protein: based on IBW of 59.1k.8-2.0g/kg = 106.4-118.2g/day    Previous Nutrition Diagnosis:  moderate malnutrition   Nutrition Diagnosis is: ongoing; being addressed with EN    New Nutrition Diagnosis: N/A    Interventions:     Recommend  1) Continue current diet: Regular with Nepro at goal rate of 45ml/hr x24 hours (provides 1080ml, 1944kcal, 87.5g Prot; 32.9kcal/kg and 1.48g/kg protein based on IBW of 59.1kg) plus Raimundo x 3/day   2) Recommend add No Carb Prosource x 2/day (provides 120 kcal and 30g protein) to aide in wound healing (In total with Nepro @ 45ml/hr x 24 hours provides 2064kcal and 117.5g/kg protein. Based on IBW 59.1kg provides 34.9kcal/kg and 1.99g/kg protein)  3) Continue to monitor PO intake, diet tolerance, EN tolerance, weight, labs, skin integrity, ostomy output, food preferences, and further educational needs.     RD remains available upon request and will follow up per protocol  Mary Jane Fuentes, Dietetic Intern (Pager #[phone]) Nutrition Follow Up Note  Patient seen for: Malnutrition follow up    Per medical record pt is a 65 yo female with PMH: morbid obesity, COPD, HTN, and GERD, presented on 19 with abdominal pain and distension. Found with perforated sigmoid diverticulitis with free air. S/P ex-lap 19, extubated , readmitted to SICU with sepsis- cultures positive for E. coli & Klebsiella in the urine, Stenotrophomonas in the sputum, & Staph epidermis in the blood. Pt with BULL; Per nephrology pt "not good candidate for renal replacement therapy. Palliative following.     Source: Pt, family at bedside, RN, medical rounds, and medical record.    Diet: Regular with Nepro via NGT at goal rate of 45ml/hr x24 hours (provides 1080ml, 1944kcal, 87.5g Prot; 32.9kcal/kg and 1.48g/kg protein based on IBW of 59.1kg) plus Raimundo x 3/day     Pt lethargic resting in bed with family at bedside at time of visit. Observed tube feeding formula hung and running at goal rate. RN reports pt is tolerating EN at goal rate. States she did not receive packet of Raimundo today. Pt and family at bedside made aware RD remains available.    Enteral /Parenteral Nutrition:  Nepro at goal rate of 45ml/hr x24 hours  EN Provision per flow sheets:   540ml thus far   540ml (meets 50% of goal)   1080ml (meets 100% of goal)   430ml (meets 39.8% of goal) EN titrated up to goal rate   30ml EN initiated    Colostomy output per flow sheets:   150ml thus far   350ml   200ml   200ml   0ml    Weights: () 325.8 pounds () 342.1 pounds (1/15) 375.4 pounds () 373.2 pounds () 377.4 pounds () 364.6 pounds   % Weight Change: 38.8 pound (10.6%) weight loss x 2 months; ?accuracy on bed weights and pt noted with 4+ generalized edema. Will continue to monitor.    Pertinent Medications: Vitamin B1, Solucortef, Dilaudid, Methadone, Oxycodone, Synthroid, Miralax, Lipitor, Protonix, Maalox  Pertinent Labs: () AST/SGOT 143 <H>, BUN 36 <H>, Glucose 122 <H>, Calcium 7.9 <L>, eGFR 55 <L>    Skin per nursing documentation: no pressure injuries  Edema: 4+ generalized edema per flow sheets    Estimated Needs:   [x] no change since previous assessment for calories: based on IBW of 59.1k-35 kcal/kg = 1773-2069kcal/day  [x] recalculated protein: based on IBW of 59.1k.8-2.0g/kg = 106.4-118.2g/day    Previous Nutrition Diagnosis:  moderate malnutrition   Nutrition Diagnosis is: ongoing; being addressed with EN    New Nutrition Diagnosis: N/A    Interventions:     Recommend  1) Continue current diet: Regular with Nepro at goal rate of 45ml/hr x24 hours (provides 1080ml, 1944kcal, 87.5g Prot; 32.9kcal/kg and 1.48g/kg protein based on IBW of 59.1kg) plus Raimundo x 3/day; Defer fluids to team   2) Recommend add No Carb Prosource x 2/day (provides 120 kcal and 30g protein) to aide in wound healing (In total with Nepro @ 45ml/hr x 24 hours provides 2064kcal and 117.5g/kg protein. Based on IBW 59.1kg provides 34.9kcal/kg and 1.99g/kg protein)  3) Continue to monitor PO intake, diet tolerance, EN tolerance, weight, labs, skin integrity, ostomy output, food preferences, and further educational needs.     RD remains available upon request and will follow up per protocol  Mary Jane Fuentes, Dietetic Intern (Pager #[phone])

## 2020-01-27 NOTE — PROGRESS NOTE ADULT - SUBJECTIVE AND OBJECTIVE BOX
Morning Surgical Progress Note  Patient is a 64y old  Female who presents with a chief complaint of sepsis (25 Jan 2020 07:32)      SUBJECTIVE: Patient seen and examined at bedside with surgical team, patient complains of pain. Patient and partner had a goals of care discussion yesterday and patient was made DNR. Her IVF and steroid dose were decreased.    Vital Signs Last 24 Hrs  T(C): 36.7 (26 Jan 2020 23:00), Max: 36.7 (26 Jan 2020 23:00)  T(F): 98.1 (26 Jan 2020 23:00), Max: 98.1 (26 Jan 2020 23:00)  HR: 94 (26 Jan 2020 23:00) (85 - 110)  BP: 94/50 (26 Jan 2020 23:00) (83/53 - 128/60)  BP(mean): 66 (26 Jan 2020 23:00) (60 - 85)  RR: 11 (26 Jan 2020 23:00) (10 - 150)  SpO2: 95% (26 Jan 2020 23:00) (85% - 100%)I&O's Detail    25 Jan 2020 07:01  -  26 Jan 2020 07:00  --------------------------------------------------------  IN:    dextrose 5% + sodium chloride 0.45%: 3600 mL    Enteral Tube Flush: 420 mL    Nepro: 1035 mL    norepinephrine Infusion: 19.2 mL    Solution: 1550 mL    Solution: 600 mL    Solution: 50 mL  Total IN: 7274.2 mL    OUT:    Colostomy: 400 mL    Indwelling Catheter - Urethral: 1400 mL  Total OUT: 1800 mL    Total NET: 5474.2 mL      26 Jan 2020 07:01  -  27 Jan 2020 00:32  --------------------------------------------------------  IN:    dextrose 5% + sodium chloride 0.45%: 900 mL    dextrose 5% + sodium chloride 0.45%: 300 mL    Enteral Tube Flush: 90 mL    lactated ringers.: 40 mL    Nepro: 225 mL    Solution: 1100 mL    Solution: 100 mL    Solution: 600 mL  Total IN: 3355 mL    OUT:    Colostomy: 150 mL    Indwelling Catheter - Urethral: 970 mL  Total OUT: 1120 mL    Total NET: 2235 mL      MEDICATIONS  (STANDING):  albuterol/ipratropium for Nebulization 3 milliLiter(s) Nebulizer every 6 hours  AQUAPHOR (petrolatum Ointment) 1 Application(s) Topical three times a day  artificial tears (preservative free) Ophthalmic Solution 1 Drop(s) Both EYES two times a day  aspirin  chewable 81 milliGRAM(s) Oral <User Schedule>  atorvastatin 40 milliGRAM(s) Oral <User Schedule>  buDESOnide    Inhalation Suspension 0.5 milliGRAM(s) Inhalation every 12 hours  chlorhexidine 2% Cloths 1 Application(s) Topical <User Schedule>  clobetasol 0.05% Ointment 1 Application(s) Topical two times a day  enoxaparin Injectable 100 milliGRAM(s) SubCutaneous <User Schedule>  erythromycin     base Tablet 250 milliGRAM(s) Oral <User Schedule>  FIRST- Mouthwash  BLM 5 milliLiter(s) Swish and Spit four times a day  hydrocortisone sodium succinate Injectable 100 milliGRAM(s) IV Push every 12 hours  lactated ringers. 1000 milliLiter(s) (10 mL/Hr) IV Continuous <Continuous>  levothyroxine Injectable 25 MICROGram(s) IV Push at bedtime  lidocaine   Patch 1 Patch Transdermal daily  lidocaine 2% Viscous 10 milliLiter(s) Swish and Spit once  linezolid  IVPB 600 milliGRAM(s) IV Intermittent every 12 hours  meropenem  IVPB 1000 milliGRAM(s) IV Intermittent every 8 hours  methadone    Tablet 10 milliGRAM(s) Oral <User Schedule>  minocycline IVPB 100 milliGRAM(s) IV Intermittent every 12 hours  minocycline IVPB      nystatin Powder 1 Application(s) Topical two times a day  pantoprazole    Tablet 40 milliGRAM(s) Oral <User Schedule>  polyethylene glycol 3350 17 Gram(s) Oral <User Schedule>  polymyxin B IVPB [id number] Unit(s) IV Intermittent every 12 hours  thiamine IVPB 200 milliGRAM(s) IV Intermittent <User Schedule>    MEDICATIONS  (PRN):  aluminum hydroxide/magnesium hydroxide/simethicone Suspension 30 milliLiter(s) Oral every 4 hours PRN Dyspepsia  diphenhydrAMINE 25 milliGRAM(s) Oral every 12 hours PRN Rash and/or Itching  HYDROmorphone  Injectable 1.5 milliGRAM(s) IV Push every 6 hours PRN Turning  oxyCODONE    IR 10 milliGRAM(s) Oral every 4 hours PRN Severe Pain (7 - 10)  zinc oxide 20% Ointment 1 Application(s) Topical three times a day PRN Rash      Physical Exam  Constitutional: A&Ox3, NAD  Respiratory: CTA b.l  Cardiac: RRR, S1 and S2, no m.r.g  Gastrointestinal: abdomen soft, NT/ND, obese, wound vac in place and functioning; dressings c/d/i  Skin: Erythematous with improved skin sloughing.  LE: lymphedema b/l    LABS:                        7.0    5.53  )-----------( 102      ( 26 Jan 2020 05:26 )             22.5     01-26    136  |  99  |  37<H>  ----------------------------<  144<H>  4.4   |  26  |  1.30    Ca    7.8<L>      26 Jan 2020 05:26  Phos  2.7     01-26  Mg     2.0     01-26    TPro  5.7<L>  /  Alb  3.0<L>  /  TBili  0.5  /  DBili  0.2  /  AST  100<H>  /  ALT  22  /  AlkPhos  223<H>  01-26      LIVER FUNCTIONS - ( 26 Jan 2020 05:26 )  Alb: 3.0 g/dL / Pro: 5.7 g/dL / ALK PHOS: 223 U/L / ALT: 22 U/L / AST: 100 U/L / GGT: x

## 2020-01-27 NOTE — PROGRESS NOTE ADULT - ATTENDING COMMENTS
Evaluated in round  More awake and alert, on Dilaudid for pain  Saturating well, on bronchodilators ICS  Off vasopressor support and Midodrine  NGT feed, can have PO  Abx Polymixin Minocycline Meropenem and Zyvox  Renal function improved  DVT prophylaxis with Lovenox, HCT platelets stable  Wound/skin care

## 2020-01-27 NOTE — PROGRESS NOTE ADULT - SUBJECTIVE AND OBJECTIVE BOX
[facility] DIVISION OF KIDNEY DISEASES AND HYPERTENSION -- FOLLOW UP NOTE  --------------------------------------------------------------------------------  Chief Complaint:    24 hour events/subjective:    64F with pmhx morbid obesity, acid reflux, HTN, COPD and pshx D&C, admitted for sigmoid diverticulitis with perforation s/p emergent repair with complicated post op sepsis. Nephrology consult requested due to BULL possibly due to sepsis and metabolic acidosis with compensated respiratory alkalosis. Patient started on polymixin and Linezolid in the setting of Acetinobacter and VRE bacteremia. BUN and SCr have been downtrending since treatment, currently SCr of 1.05. Indwelling cath draining 50ml/hr.       Patient evaluated at bedside. Currently complains of fatigue. Pt is non oliguric with marquez.         PAST HISTORY  --------------------------------------------------------------------------------  No significant changes to PMH, PSH, FHx, SHx, unless otherwise noted    ALLERGIES & MEDICATIONS  --------------------------------------------------------------------------------  Allergies    IV Contrast (Rash; Pruritus; Hypotension)  penicillin (Rash (Severe))  sulfa drugs (Unknown)  vancomycin (Rash (Severe))    Intolerances      Standing Inpatient Medications  albuterol/ipratropium for Nebulization 3 milliLiter(s) Nebulizer every 6 hours  AQUAPHOR (petrolatum Ointment) 1 Application(s) Topical three times a day  artificial tears (preservative free) Ophthalmic Solution 1 Drop(s) Both EYES two times a day  aspirin  chewable 81 milliGRAM(s) Oral <User Schedule>  atorvastatin 40 milliGRAM(s) Oral <User Schedule>  buDESOnide    Inhalation Suspension 0.5 milliGRAM(s) Inhalation every 12 hours  chlorhexidine 2% Cloths 1 Application(s) Topical <User Schedule>  clobetasol 0.05% Ointment 1 Application(s) Topical two times a day  enoxaparin Injectable 100 milliGRAM(s) SubCutaneous <User Schedule>  erythromycin     base Tablet 250 milliGRAM(s) Oral <User Schedule>  FIRST- Mouthwash  BLM 5 milliLiter(s) Swish and Spit four times a day  hydrocortisone sodium succinate Injectable 100 milliGRAM(s) IV Push every 12 hours  lactated ringers. 1000 milliLiter(s) IV Continuous <Continuous>  levothyroxine Injectable 25 MICROGram(s) IV Push at bedtime  lidocaine   Patch 1 Patch Transdermal daily  linezolid  IVPB 600 milliGRAM(s) IV Intermittent every 12 hours  melatonin 3 milliGRAM(s) Oral at bedtime  meropenem  IVPB 1000 milliGRAM(s) IV Intermittent every 8 hours  methadone    Tablet 10 milliGRAM(s) Oral <User Schedule>  minocycline IVPB 100 milliGRAM(s) IV Intermittent every 12 hours  minocycline IVPB      nystatin Powder 1 Application(s) Topical two times a day  oxyCODONE    IR 15 milliGRAM(s) Oral every 4 hours  pantoprazole    Tablet 40 milliGRAM(s) Oral <User Schedule>  polyethylene glycol 3350 17 Gram(s) Oral <User Schedule>  polymyxin B IVPB [id number] Unit(s) IV Intermittent every 12 hours    PRN Inpatient Medications  aluminum hydroxide/magnesium hydroxide/simethicone Suspension 30 milliLiter(s) Oral every 4 hours PRN  diphenhydrAMINE 25 milliGRAM(s) Oral every 12 hours PRN  HYDROmorphone  Injectable 2 milliGRAM(s) IV Push every 6 hours PRN  zinc oxide 20% Ointment 1 Application(s) Topical three times a day PRN      REVIEW OF SYSTEMS  --------------------------------------------------------------------------------  Gen: No weight changes, fatigue, fevers/chills, weakness  Skin: No rashes  Head/Eyes/Ears/Mouth: No headache; Normal hearing; Normal vision w/o blurriness; No sinus pain/discomfort, sore throat  Respiratory: No dyspnea, cough, wheezing, hemoptysis  CV: No chest pain, PND, orthopnea  GI: No abdominal pain, diarrhea, constipation, nausea, vomiting, melena, hematochezia  : No increased frequency, dysuria, hematuria, nocturia  MSK: No joint pain/swelling; no back pain; no edema  Neuro: No dizziness/lightheadedness, weakness, seizures, numbness, tingling  Heme: No easy bruising or bleeding  Endo: No heat/cold intolerance  Psych: No significant nervousness, anxiety, stress, depression    All other systems were reviewed and are negative, except as noted.    VITALS/PHYSICAL EXAM  --------------------------------------------------------------------------------  T(C): 36.8 (01-27-20 @ 15:00), Max: 36.9 (01-27-20 @ 03:00)  HR: 93 (01-27-20 @ 15:00) (85 - 105)  BP: 107/58 (01-27-20 @ 15:00) (92/61 - 114/56)  RR: 23 (01-27-20 @ 15:00) (10 - 29)  SpO2: 98% (01-27-20 @ 15:00) (88% - 100%)  Wt(kg): --        01-26-20 @ 07:01  -  01-27-20 @ 07:00  --------------------------------------------------------  IN: 4212.5 mL / OUT: 1835 mL / NET: 2377.5 mL    01-27-20 @ 07:01  -  01-27-20 @ 15:49  --------------------------------------------------------  IN: 1627.5 mL / OUT: 435 mL / NET: 1192.5 mL      Physical Exam:  	Gen: NAD, well-appearing  	HEENT: PERRL, supple neck, clear oropharynx  	Pulm: CTA B/L  	CV: RRR, S1S2; no rub  	Back: No spinal or CVA tenderness; no sacral edema  	Abd: +BS, soft, nontender/nondistended  	: No suprapubic tenderness  	UE: Warm, FROM, no clubbing, intact strength; no edema; no asterixis  	LE: Warm, FROM, no clubbing, intact strength; no edema  	Neuro: No focal deficits, intact gait  	Psych: Normal affect and mood  	Skin: Warm, without rashes  	Vascular access:    LABS/STUDIES  --------------------------------------------------------------------------------              7.4    5.56  >-----------<  100      [01-27-20 @ 00:36]              23.1     136  |  99  |  36  ----------------------------<  122      [01-27-20 @ 00:36]  4.3   |  27  |  1.05        Ca     7.9     [01-27-20 @ 00:36]      Mg     2.0     [01-27-20 @ 00:36]      Phos  2.6     [01-27-20 @ 00:36]    TPro  5.3  /  Alb  2.7  /  TBili  0.5  /  DBili  0.3  /  AST  143  /  ALT  42  /  AlkPhos  254  [01-27-20 @ 00:36]          Creatinine Trend:  SCr 1.05 [01-27 @ 00:36]  SCr 1.30 [01-26 @ 05:26]  SCr 1.64 [01-25 @ 00:27]  SCr 1.77 [01-24 @ 00:28]  SCr 1.78 [01-23 @ 18:44]    Urinalysis - [01-22-20 @ 11:09]      Color Yellow / Appearance Slightly Turbid / SG 1.028 / pH 5.5      Gluc Negative / Ketone Trace  / Bili Negative / Urobili 4 mg/dL       Blood Small / Protein 30 mg/dL / Leuk Est Negative / Nitrite Negative      RBC 1 / WBC 6 / Hyaline 149 / Gran  / Sq Epi  / Non Sq Epi 12 / Bacteria Negative    Urine Creatinine 146      [01-22-20 @ 10:10]  Urine Sodium <35      [01-22-20 @ 10:10]  Urine Osmolality 317      [01-22-20 @ 10:10]    TSH 4.81      [01-17-20 @ 03:52]  Lipid: chol 98, TG 91, HDL 28, LDL 52      [12-30-19 @ 08:46] [facility] DIVISION OF KIDNEY DISEASES AND HYPERTENSION -- FOLLOW UP NOTE  --------------------------------------------------------------------------------  Chief Complaint:    24 hour events/subjective:    64F with pmhx morbid obesity, acid reflux, HTN, COPD and pshx D&C, admitted for sigmoid diverticulitis with perforation s/p emergent repair with complicated post op sepsis. Nephrology consult requested due to BULL possibly due to sepsis and metabolic acidosis with compensated respiratory alkalosis. Patient started on polymixin and Linezolid in the setting of Acetinobacter and VRE bacteremia. BUN and SCr have been downtrending since treatment, currently SCr of 1.05. Indwelling cath draining 50ml/hr.       Patient evaluated at bedside. Currently complains of fatigue. Pt is non oliguric with marquez.         PAST HISTORY  --------------------------------------------------------------------------------  No significant changes to PMH, PSH, FHx, SHx, unless otherwise noted    ALLERGIES & MEDICATIONS  --------------------------------------------------------------------------------  Allergies    IV Contrast (Rash; Pruritus; Hypotension)  penicillin (Rash (Severe))  sulfa drugs (Unknown)  vancomycin (Rash (Severe))    Intolerances      Standing Inpatient Medications  albuterol/ipratropium for Nebulization 3 milliLiter(s) Nebulizer every 6 hours  AQUAPHOR (petrolatum Ointment) 1 Application(s) Topical three times a day  artificial tears (preservative free) Ophthalmic Solution 1 Drop(s) Both EYES two times a day  aspirin  chewable 81 milliGRAM(s) Oral <User Schedule>  atorvastatin 40 milliGRAM(s) Oral <User Schedule>  buDESOnide    Inhalation Suspension 0.5 milliGRAM(s) Inhalation every 12 hours  chlorhexidine 2% Cloths 1 Application(s) Topical <User Schedule>  clobetasol 0.05% Ointment 1 Application(s) Topical two times a day  enoxaparin Injectable 100 milliGRAM(s) SubCutaneous <User Schedule>  erythromycin     base Tablet 250 milliGRAM(s) Oral <User Schedule>  FIRST- Mouthwash  BLM 5 milliLiter(s) Swish and Spit four times a day  hydrocortisone sodium succinate Injectable 100 milliGRAM(s) IV Push every 12 hours  lactated ringers. 1000 milliLiter(s) IV Continuous <Continuous>  levothyroxine Injectable 25 MICROGram(s) IV Push at bedtime  lidocaine   Patch 1 Patch Transdermal daily  linezolid  IVPB 600 milliGRAM(s) IV Intermittent every 12 hours  melatonin 3 milliGRAM(s) Oral at bedtime  meropenem  IVPB 1000 milliGRAM(s) IV Intermittent every 8 hours  methadone    Tablet 10 milliGRAM(s) Oral <User Schedule>  minocycline IVPB 100 milliGRAM(s) IV Intermittent every 12 hours  minocycline IVPB      nystatin Powder 1 Application(s) Topical two times a day  oxyCODONE    IR 15 milliGRAM(s) Oral every 4 hours  pantoprazole    Tablet 40 milliGRAM(s) Oral <User Schedule>  polyethylene glycol 3350 17 Gram(s) Oral <User Schedule>  polymyxin B IVPB [id number] Unit(s) IV Intermittent every 12 hours    PRN Inpatient Medications  aluminum hydroxide/magnesium hydroxide/simethicone Suspension 30 milliLiter(s) Oral every 4 hours PRN  diphenhydrAMINE 25 milliGRAM(s) Oral every 12 hours PRN  HYDROmorphone  Injectable 2 milliGRAM(s) IV Push every 6 hours PRN  zinc oxide 20% Ointment 1 Application(s) Topical three times a day PRN      REVIEW OF SYSTEMS  --------------------------------------------------------------------------------  not able to obtain.     VITALS/PHYSICAL EXAM  --------------------------------------------------------------------------------  T(C): 36.8 (01-27-20 @ 15:00), Max: 36.9 (01-27-20 @ 03:00)  HR: 93 (01-27-20 @ 15:00) (85 - 105)  BP: 107/58 (01-27-20 @ 15:00) (92/61 - 114/56)  RR: 23 (01-27-20 @ 15:00) (10 - 29)  SpO2: 98% (01-27-20 @ 15:00) (88% - 100%)  Wt(kg): --        01-26-20 @ 07:01  -  01-27-20 @ 07:00  --------------------------------------------------------  IN: 4212.5 mL / OUT: 1835 mL / NET: 2377.5 mL    01-27-20 @ 07:01 - 01-27-20 @ 15:49  --------------------------------------------------------  IN: 1627.5 mL / OUT: 435 mL / NET: 1192.5 mL      Physical Exam:  	Gen: mild distress  	HEENT:  supple neck, clear oropharynx  	Pulm: CTA B/L  	CV: RRR, S1S2; no rub  	Abd: +BS, wound vac noted.   	: No suprapubic tenderness. has marquez dark colored urine.   	UE: no edema;   	LE: no edema  	Neuro: No focal deficits  	Skin: Erythematous rash desquamative   	Vascular access: rij IJ central line.     LABS/STUDIES  --------------------------------------------------------------------------------              7.4    5.56  >-----------<  100      [01-27-20 @ 00:36]              23.1     136  |  99  |  36  ----------------------------<  122      [01-27-20 @ 00:36]  4.3   |  27  |  1.05        Ca     7.9     [01-27-20 @ 00:36]      Mg     2.0     [01-27-20 @ 00:36]      Phos  2.6     [01-27-20 @ 00:36]    TPro  5.3  /  Alb  2.7  /  TBili  0.5  /  DBili  0.3  /  AST  143  /  ALT  42  /  AlkPhos  254  [01-27-20 @ 00:36]          Creatinine Trend:  SCr 1.05 [01-27 @ 00:36]  SCr 1.30 [01-26 @ 05:26]  SCr 1.64 [01-25 @ 00:27]  SCr 1.77 [01-24 @ 00:28]  SCr 1.78 [01-23 @ 18:44]    Urinalysis - [01-22-20 @ 11:09]      Color Yellow / Appearance Slightly Turbid / SG 1.028 / pH 5.5      Gluc Negative / Ketone Trace  / Bili Negative / Urobili 4 mg/dL       Blood Small / Protein 30 mg/dL / Leuk Est Negative / Nitrite Negative      RBC 1 / WBC 6 / Hyaline 149 / Gran  / Sq Epi  / Non Sq Epi 12 / Bacteria Negative    Urine Creatinine 146      [01-22-20 @ 10:10]  Urine Sodium <35      [01-22-20 @ 10:10]  Urine Osmolality 317      [01-22-20 @ 10:10]    TSH 4.81      [01-17-20 @ 03:52]  Lipid: chol 98, TG 91, HDL 28, LDL 52      [12-30-19 @ 08:46]

## 2020-01-27 NOTE — PROGRESS NOTE ADULT - ASSESSMENT
65yo F with PMHx morbid obesity, GERD, HTN, COPD, and PSHx D&C now s/p ex laparotomy with extended left hemicolectomy 11/26 for perforated and necrotic sigmoid colon with gross abdominal contamination. RTOR 11/29 for abd closure, RUQ end transverse colostomy creation. On 12/19 patient found to have induration of wound and keri-stomal fat necrosis, s/p bedside debridement w/ wound VAC in placement on midline wound. Began showing signs of sepsis and was transferred to SICU 1/7 for further management. Now w/ decreasing pressor support and downtrending leukocytosis. Troponins have been elevated and echo shows decreased ventricular wall motility. Patient grew VRE, and linezolid was added to her antibiotic regimen. Patient is now off pressors and blood pressure is improved. Patient was made DNR on 1/26 after goals of care discussion.      Plan:  Continue IV meropenem and linezolid  Trend labs/ WBC/ SBP  Monitor Vitals  Appreciate Cards reccs  Appreciate excellent SICU care    [facility]  [phone]

## 2020-01-27 NOTE — PROGRESS NOTE ADULT - ASSESSMENT
64y Female pmhx morbid obesity, acid reflux, HTN, COPD and PSH D&C presented to the ED on 11/26 c/o abdominal pain and bloating Had a complicated hospital course, initially presented with constipation for 2 weeks at that time reported pain worst in the LLQ in ED, CT demonstrated perforated sigmoid diverticulitis with intraperitoneal free air, on 11/25  underwent an exploratory laparotomy with extended left hemicolectomy and was left in discontinuity. An Abthera VAC, now with long complicated course, with sepsis, requiring pressors, demand ischemia, and new oliguric BULL associated with hyperkalemia and acidosis.     #BULL  Pt with BULL in the setting of septic shock, hypotension and possible drug reaction. On review of labs pt had normal scr, noted to rise since 01/20/20 to 1.2 from 0.8 now 2 mg/dl, marquez place today with urine output of 10 cc/hr last 3 hours and rising K with worsening acidosis. UA showing abundant hyaline cast, some wbc, epithelial cells, 1 rbc, lytes consistent with pre renal FENA 0.2%. Obtain spot urine TP/CR.   -Given urinary findings BULL most likely multifactorial pre renal state, scr improved  -Pt has overall poor prognosis, if no surgical solution, and bacteremia cannot be solved, pt would not be a good candidate for RRT.   -Suggest palliative consult.   -Monitor labs and urine output. Avoid NSAIDs, ACEI/ARBS, RCA and nephrotoxins. Dose medications as per eGFR.    #Acidosis  In the setting of bull and lactic acidosis, improved, monitor for now.     #Hyperkalemia  In the setting of oliguric BULL,  and acidosis. Suggest to continue medical management with bicarb, d50, insulin and lasix prn, if poor response, and K >6 would need to consider HD, pt  was refusing HD and not a good candidate.     POOR prognosis, suggest palliative care to establish GOC and Advance care directives.

## 2020-01-27 NOTE — PROGRESS NOTE ADULT - ASSESSMENT
65 y/o female presenting with perforated sigmoid diverticulitis s/p exploratory laparotomy, extended left hemicolectomy, and left in discontinuity w/ Abthera VAC placement with return to OR for re-exploratory laparotomy, transverse end colostomy, and fascial closure with wound VAC placement. Hospital course complicated by gastroparesis, right subclavian steal syndrome, full body rash of unknown etiology, and refractory septic shock c/b stress-induced cardiomyopathy. Cultures were positive for E. coli & Klebsiella in the urine, Stenotrophomonas in the sputum, and Staph epidermis in the blood with subsequent removal of her LUE PICC.    PLAN:    Neuro: acute pain from her full body rash, opioid dependence  - Monitor mental status  - Pain control as needed with methadone, PRN dilaudid, tylenol  - 2mg IV dilaudid to be given for the "big turn" of the day    Resp: COPD  - Monitor pulse oximeter  - Out of bed to chair and incentive spirometry to prevent atelectasis  - Pulmicort and Duoneb for COPD    CV: refractory septic shock c/b stress-induced cardiomyopathy  - Monitor vital signs, continued hypotension, remains off midodrine/Levophed at this time.   - Home ASA    GI: perforated sigmoid diverticulitis s/p exploratory laparotomy, extended left hemicolectomy, and left in discontinuity w/ Abthera VAC placement with return to OR for re-exploratory laparotomy, transverse end colostomy, and fascial closure c/b gastroparesis; GERD  - Regular diet with Nepro @ 45ml/hr via KO feeding tube   - Monitor ostomy output  - Erythromycin for gastroparesis  - Protonix for GERD  - Maalox PRN indigestion  - Continue wound VAC therapy     Renal: no acute issues  - Monitor I&Os.  - Off Bicarb guttae.   - Monitor electrolytes and replete as necessary  - Doxazosin for urinary retention    Heme: no acute issues  - Monitor CBC and coags  - Lovenox 100 mg daily for VTE prophylaxis, adjusted for BMI    ID: E. coli & Klebsiella UTI, Stenotrophomonas PNA, Staph epidermis bacteremia; Now Acinetobacter and VRE bacteremia 01/22  - Continue meropenem, Linezolid and Polymixin, Minocycline.   - Monitor for clinical evidence of active infection  - Blood cx remain positive on 1/24, results from 1/26 pending.     Endo: HLD  - 50mg PO levothyroxine for hypothermia, hypothyroidism.   - Monitor glucose on BMP  - Home Lipitor  - Solucortef decreased to 100mg IV BID starting 1/26, continue slow taper.      Integumentary:   - Appreciate Dermatology recs: Vaseline, zinc oxide, clobetasol cream.     Disposition:  - GNR.   - Will remain in SICU 65 y/o female presenting with perforated sigmoid diverticulitis s/p exploratory laparotomy, extended left hemicolectomy, and left in discontinuity w/ Abthera VAC placement with return to OR for re-exploratory laparotomy, transverse end colostomy, and fascial closure with wound VAC placement. Hospital course complicated by gastroparesis, right subclavian steal syndrome, full body rash of unknown etiology, and refractory septic shock c/b stress-induced cardiomyopathy. Cultures were positive for E. coli & Klebsiella in the urine, Stenotrophomonas in the sputum, and Staph epidermis in the blood with subsequent removal of her LUE PICC.    PLAN:    Neuro: acute pain from her full body rash, opioid dependence  - Monitor mental status  - Pain control as needed with methadone, PRN dilaudid, tylenol  - 2mg IV dilaudid to be given for the "big turn" of the day    Resp: COPD  - Monitor pulse oximeter  - Out of bed to chair and incentive spirometry to prevent atelectasis  - Pulmicort and Duoneb for COPD    CV: refractory septic shock c/b stress-induced cardiomyopathy  - Monitor vital signs, continued hypotension, remains off midodrine/Levophed at this time.   - Home ASA    GI: perforated sigmoid diverticulitis s/p exploratory laparotomy, extended left hemicolectomy, and left in discontinuity w/ Abthera VAC placement with return to OR for re-exploratory laparotomy, transverse end colostomy, and fascial closure c/b gastroparesis; GERD  - Regular diet with Nepro @ 45ml/hr via KO feeding tube   - Monitor ostomy output  - Erythromycin for gastroparesis  - Protonix for GERD  - Maalox PRN indigestion  - Continue wound VAC therapy     Renal: no acute issues  - Monitor I&Os.  - Off Bicarb guttae.   - Monitor electrolytes and replete as necessary  - Doxazosin for urinary retention    Heme: no acute issues  - Monitor CBC and coags  - Lovenox 100 mg daily for VTE prophylaxis, adjusted for BMI    ID: E. coli & Klebsiella UTI, Stenotrophomonas PNA, Staph epidermis bacteremia; Now Acinetobacter and VRE bacteremia 01/22  - Continue meropenem, Linezolid and Polymixin, Minocycline.   - Monitor for clinical evidence of active infection  - Blood cx remain positive on 1/24, results from 1/26 pending.     Endo: HLD  - 50mg PO levothyroxine for hypothermia, hypothyroidism.   - Monitor glucose on BMP  - Home Lipitor  - Solucortef decreased to 100mg IV BID starting 1/26, continue slow taper.      Integumentary:   - Appreciate Dermatology recs: Vaseline, zinc oxide, clobetasol cream.     Disposition:  - DNR.   - Will remain in SICU 65 y/o female presenting with perforated sigmoid diverticulitis s/p exploratory laparotomy, extended left hemicolectomy, and left in discontinuity w/ Abthera VAC placement with return to OR for re-exploratory laparotomy, transverse end colostomy, and fascial closure with wound VAC placement. Hospital course complicated by gastroparesis, right subclavian steal syndrome, full body rash of unknown etiology, and refractory septic shock c/b stress-induced cardiomyopathy. Cultures were positive for E. coli & Klebsiella in the urine, Stenotrophomonas in the sputum, and Staph epidermis in the blood with subsequent removal of her LUE PICC. Course further complicated by VRE and MDR Acinetobacter bacteremia.    PLAN:    Neuro: acute pain from her full body rash, opioid dependence  - Monitor mental status  - Pain control as needed with methadone 10mg BID, PRN Dilaudid for turns, oxycodone  - Start melatonin qhs for improved sleep wake cycle    Resp: COPD, Right upper lobe pneumonia  - Monitor pulse oximeter  - Out of bed to chair and incentive spirometry, chest PT to prevent atelectasis  - Pulmicort and Duoneb for COPD  - Antibiotic coverage for pneumonia    CV: refractory septic shock c/b stress-induced cardiomyopathy, resolved  - Monitor vital signs, remains off midodrine/Levophed at this time.   - Home ASA    GI: perforated sigmoid diverticulitis s/p exploratory laparotomy, extended left hemicolectomy, and left in discontinuity w/ Abthera VAC placement with return to OR for re-exploratory laparotomy, transverse end colostomy, and fascial closure c/b gastroparesis; GERD  - Regular diet with Nepro @ 45ml/hr via Carmen feeding tube   - Monitor ostomy output  - Erythromycin for gastroparesis  - Protonix for GERD  - Maalox PRN indigestion  - Continue wound VAC therapy     Renal: BULL, resolved  - Monitor I&Os.  - KVO fluids.   - Monitor electrolytes and replete as necessary    Heme: no acute issues  - Monitor CBC and coags  - Lovenox 100 mg daily for VTE prophylaxis, adjusted for BMI  - ASA 81mg qd    ID: E. coli & Klebsiella UTI, Stenotrophomonas PNA, Staph epidermis bacteremia; Now MDR Acinetobacter and VRE bacteremia 01/22  - Continue meropenem, Linezolid, Polymixin B, & Minocycline.   - Blood cx remain positive on 1/24, results from 1/26 pending.     Endo: HLD, hypothyroidism vs sick euthyroid syndrome  - 25mg IV levothyroxine for hypothyroidism.   - Monitor glucose on BMP  - Home Lipitor  - Solucortef decreased to 100mg IV q 12hrs starting 1/26    Integumentary:   - Appreciate Dermatology recs: Vaseline, zinc oxide, clobetasol cream.     Disposition:  DNR. Will remain in SICU.

## 2020-01-27 NOTE — PROGRESS NOTE ADULT - ASSESSMENT
1. Skin breakdown  Most severe on pressure dependent areas in the setting of the patient's limited mobility, edema, habitus, and underlying sepsis.  No current evidence of a drug reaction.  - continue Vaseline TID to affected areas on face, trunk and extremities  - continue zinc oxide ointment BID to TID to pressure dependent areas on the back and buttocks  - continue clobetasol ointment BID to affected areas on trunk and extremities (do not apply on face), use up to 2 weeks then stop   - dermatology to sign off    Patient seen and discussed at bedside with the dermatology attending Dr. Perry Boyd.  Recommendations were verbally communicated to the primary team.   Please page [phone] for any further related questions.    Scott Virgen MD  Resident Physician, PGY2   [facility]  Pager: [phone]   Office: [phone] 1. Erosions  Most severe on pressure dependent areas in the setting of the patient's limited mobility, edema, habitus, and underlying sepsis.  No current evidence of a drug reaction.    - continue Vaseline TID to affected areas on face, trunk and extremities  - continue zinc oxide ointment BID to TID to pressure dependent areas on the back and buttocks  - continue clobetasol ointment BID to affected areas on trunk and extremities (do not apply on face), use up to 2 weeks then stop   - dermatology to sign off    Patient seen and discussed at bedside with the dermatology attending Dr. Perry Boyd.  Recommendations were verbally communicated to the primary team.   Please page [phone] for any further related questions.    Scott Virgen MD  Resident Physician, PGY2   [facility]ermat[facility]  Pager: [phone]   Office: [phone]

## 2020-01-27 NOTE — PROGRESS NOTE ADULT - SUBJECTIVE AND OBJECTIVE BOX
INTERVAL HISTORY:  Primary team is applying Aquaphor in addition to clobetasol ointment without significant change in the patient's cutaneous symptoms. Patient on several antimicrobials: erythromycin, meropenem, Zosyn, minocycline, polymyxin B. Has also been on stress dosed IV hydrocortisone.    INITIAL HPI:  63yo F with PMHx morbid obesity, GERD, HTN, COPD, and pshx D&C now s/p ex laparotomy with extended left hemicolectomy 11/26 for perforated and necrotic sigmoid colon with gross abdominal contamination. RTOR 11/29 for abd closure, RUQ end transverse colostomy creation. On 12/19 patient found to have induration of wound and keri-stomal fat necrosis, s/p bedside debridement w/ wound VAC in placement on midline wound. Began showing signs of sepsis and was transferred to SICU 1/7 for further management. On pressors for refractory septic shock.    Seen by inpatient dermatology 1/13/20 for scattered erythematous patches on trunk and extremities as well as superficial desquamation on face, trunk, and extremities x 1 week, erosions on L lower back x 3 days. At that time, thought to be likely resolving drug exanthema, unknown trigger (zosyn?), erosions likely secondary to edema and irritation. VZV/HSV swab was negative.    Dermatology is re-consulted for worsening of erythema all over body and erosions. Tender in some areas. Team is applying Vaseline.  No history of pre-existing psoriasis or atopic dermatitis.    Now with polymicrobial bacteremia with VRE and acinetobacter baumanni on meropenem, polymyxin B and linezolid    Patient responses limited but denies throat pain, pain in mouth, trouble swallowing, vaginal pain or pain with urination. Does have skin pain.     MEDICATIONS  (STANDING):  albuterol/ipratropium for Nebulization 3 milliLiter(s) Nebulizer every 6 hours  AQUAPHOR (petrolatum Ointment) 1 Application(s) Topical three times a day  artificial tears (preservative free) Ophthalmic Solution 1 Drop(s) Both EYES two times a day  aspirin  chewable 81 milliGRAM(s) Oral <User Schedule>  atorvastatin 40 milliGRAM(s) Oral <User Schedule>  buDESOnide    Inhalation Suspension 0.5 milliGRAM(s) Inhalation every 12 hours  chlorhexidine 2% Cloths 1 Application(s) Topical <User Schedule>  clobetasol 0.05% Ointment 1 Application(s) Topical two times a day  enoxaparin Injectable 100 milliGRAM(s) SubCutaneous <User Schedule>  erythromycin     base Tablet 250 milliGRAM(s) Oral <User Schedule>  FIRST- Mouthwash  BLM 5 milliLiter(s) Swish and Spit four times a day  hydrocortisone sodium succinate Injectable 100 milliGRAM(s) IV Push every 12 hours  lactated ringers. 1000 milliLiter(s) (10 mL/Hr) IV Continuous <Continuous>  levothyroxine Injectable 25 MICROGram(s) IV Push at bedtime  lidocaine   Patch 1 Patch Transdermal daily  linezolid  IVPB 600 milliGRAM(s) IV Intermittent every 12 hours  melatonin 3 milliGRAM(s) Oral at bedtime  meropenem  IVPB 1000 milliGRAM(s) IV Intermittent every 8 hours  methadone    Tablet 10 milliGRAM(s) Oral <User Schedule>  minocycline IVPB 100 milliGRAM(s) IV Intermittent every 12 hours  minocycline IVPB      nystatin Powder 1 Application(s) Topical two times a day  oxyCODONE    IR 15 milliGRAM(s) Oral every 4 hours  pantoprazole    Tablet 40 milliGRAM(s) Oral <User Schedule>  polyethylene glycol 3350 17 Gram(s) Oral <User Schedule>  polymyxin B IVPB [id number] Unit(s) IV Intermittent every 12 hours    MEDICATIONS  (PRN):  aluminum hydroxide/magnesium hydroxide/simethicone Suspension 30 milliLiter(s) Oral every 4 hours PRN Dyspepsia  diphenhydrAMINE 25 milliGRAM(s) Oral every 12 hours PRN Rash and/or Itching  HYDROmorphone  Injectable 2 milliGRAM(s) IV Push every 6 hours PRN Turning  zinc oxide 20% Ointment 1 Application(s) Topical three times a day PRN Rash      Allergies    IV Contrast (Rash; Pruritus; Hypotension)  penicillin (Rash (Severe))  sulfa drugs (Unknown)  vancomycin (Rash (Severe))    Intolerances        REVIEW OF SYSTEMS      General: no fevers/chills, no NS	    Skin: see HPI  	  Ophthalmologic: no eye pain or change in vision    Genitourinary: no dysuria or hematuria    Musculoskeletal: no joint pains or weakness	    Neurological: no weakness or tingling          Vital Signs Last 24 Hrs  T(C): 36.8 (27 Jan 2020 15:00), Max: 36.9 (27 Jan 2020 03:00)  T(F): 98.2 (27 Jan 2020 15:00), Max: 98.4 (27 Jan 2020 03:00)  HR: 93 (27 Jan 2020 15:00) (85 - 105)  BP: 107/58 (27 Jan 2020 15:00) (92/61 - 114/56)  BP(mean): 80 (27 Jan 2020 15:00) (66 - 83)  RR: 23 (27 Jan 2020 15:00) (10 - 29)  SpO2: 98% (27 Jan 2020 15:00) (88% - 100%)    PHYSICAL EXAM:   The patient was alert and oriented X 3.  There was no visible lymphadenopathy.  Conjunctiva were non injected  There was no clubbing of extremities, notable edema present.    Of note on skin exam:   - generalized superficial desquamation of the neck, trunk and extremities with diffuse edema leading to fissuring of skin   - multiple erosions on pressure dependent areas on the back and buttocks with bleeding  - intact oral mucosa    LABS:                        7.4    5.56  )-----------( 100      ( 27 Jan 2020 00:36 )             23.1     01-27    136  |  99  |  36<H>  ----------------------------<  122<H>  4.3   |  27  |  1.05    Ca    7.9<L>      27 Jan 2020 00:36  Phos  2.6     01-27  Mg     2.0     01-27    TPro  5.3<L>  /  Alb  2.7<L>  /  TBili  0.5  /  DBili  0.3<H>  /  AST  143<H>  /  ALT  42  /  AlkPhos  254<H>  01-27

## 2020-01-27 NOTE — PROGRESS NOTE ADULT - PROBLEM SELECTOR PLAN 3
- abx per SICU  - no plans for further surgical intervention, aside from possible wound closure from plastics in future  - d/w SICU team

## 2020-01-27 NOTE — PROGRESS NOTE ADULT - SUBJECTIVE AND OBJECTIVE BOX
SURGICAL INTENSIVE CARE UNIT DAILY PROGRESS NOTE    24 HOUR EVENTS:   - Mental status somewhat improved.   - Discontinued bicarb guttae.   - Blood cx from 1/24 remain positive with acinetobacter, GPC.    - Addition of minocycline to antibiotics regimen.   - Started weaning steroids to BID.     HPI:    64y Female PMHx of morbid obesity, COPD, HTN, and GERD who presented to the ED on 11/26/2019 with abdominal pain and distension. Patient states that she had been having constipation for ~2 weeks with no improvement despite laxative and enema use. Imaging revealed perforated sigmoid diverticulitis with free air. Hospital course is as follows:    11/26 - s/p exploratory laparotomy, extended left hemicolectomy, and left in discontinuity w/ Abthera VAC placement, left intubated post-operatively so she was admitted to SICU for further management  11/27 - extubated to BiPAP  11/29 - re-exploratory laparotomy, transverse end colostomy, and fascial closure with wound VAC placement, left intubated at end of case  11/30 - extubated   12/04 - transferred to the floors  12/06 - acute inability to tolerate PO, complaining of nausea & vomiting  12/09 - esophagram demonstrated a stricture in the distal esophagus  12/10 - EGD demonstrated diverticula, a medium-sized hiatal hernia, esophagitis, and gastritis, started on promotility agents and diet was restarted  12/11 - noted to have different BP when BP cuff placed on different arms, bilateral UE Duplex demonstrated stenosis of the right brachiocephalic artery concerning for subclavian steal syndrome  12/28 - plastic surgery consulted for abdominal wall reconstruction, underwent bedside debridement of abdominal wound at that time.  01/07 - worsening hypotension from presumed sepsis requiring readmission to SICU, imaging revealed loculated fluid collections in the left anterior abdomen & a small abdominal wall collection, both of which were not amenable to drainage, cultures were also positive for E. coli & Klebsiella in the urine, Stenotrophomonas in the sputum, & Staph epidermis in the blood, LUE PICC discontinued  01/10 - right IJ CVC was placed and patient started on vasopressor support but patient refused arterial line placement, patient complaining of chest wall tightness, hsT was elevated and TTE demonstrated new mild to moderate segmental LV systolic dysfunction w/ hypokinesis of the inferior and inferolateral walls but cardiology determined the troponin leak to be stressed-induced in the setting of sepsis  01/13 - dermatology consulted scattered erythematous patches on her trunk & extremities, superficial desquamation of the face, trunk, & extremities, & multiple erosions with surrounding erythema on left lower back, for which petroleum jelly was prescribed BID, viral cultures of erosions were sent and were negative  01/16 - repeat CT scan demonstrated decreasing fluid collections but no new acute findings  01/20 - weaned off vasopressor support, started levothyroxine for elevated TSH and decreased T3 & T4.     NEURO: Awake, arousable.     RESPIRATORY  RR: 14 (01-27-20 @ 03:00) (10 - 40)  SpO2: 96% (01-27-20 @ 03:00) (88% - 100%)    CARDIOVASCULAR  HR: 99 (01-27-20 @ 03:00) (85 - 110)  BP: 98/57 (01-27-20 @ 03:00) (92/61 - 117/58)  BP(mean): 72 (01-27-20 @ 03:00) (66 - 83  VBG - ( 27 Jan 2020 00:30 )  pH: 7.37  /  pCO2: 55    /  pO2: 42    / HCO3: 31    / Base Excess: 5.8   /  SaO2: 75     Lactate: 2.4      GENITOURINARY  I&O's Detail    01-25 @ 07:01  -  01-26 @ 07:00  --------------------------------------------------------  IN:    dextrose 5% + sodium chloride 0.45%: 3600 mL    Enteral Tube Flush: 420 mL    Nepro: 1035 mL    norepinephrine Infusion: 19.2 mL    Solution: 1550 mL    Solution: 600 mL    Solution: 50 mL  Total IN: 7274.2 mL    OUT:    Colostomy: 400 mL    Indwelling Catheter - Urethral: 1400 mL  Total OUT: 1800 mL    Total NET: 5474.2 mL      01-26 @ 07:01 - 01-27 @ 04:14  --------------------------------------------------------  IN:    dextrose 5% + sodium chloride 0.45%: 900 mL    dextrose 5% + sodium chloride 0.45%: 300 mL    Enteral Tube Flush: 90 mL    lactated ringers.: 80 mL    Nepro: 405 mL    Solution: 1100 mL    Solution: 100 mL    Solution: 600 mL  Total IN: 3575 mL    OUT:    Colostomy: 150 mL    Indwelling Catheter - Urethral: 1145 mL  Total OUT: 1295 mL    Total NET: 2280 mL          01-27    136  |  99  |  36<H>  ----------------------------<  122<H>  4.3   |  27  |  1.05    Ca    7.9<L>      27 Jan 2020 00:36  Phos  2.6     01-27  Mg     2.0     01-27    TPro  5.3<L>  /  Alb  2.7<L>  /  TBili  0.5  /  DBili  0.3<H>  /  AST  143<H>  /  ALT  42  /  AlkPhos  254<H>  01-27      HEMATOLOGIC                        7.4    5.56  )-----------( 100      ( 27 Jan 2020 00:36 )             23.1         INFECTIOUS DISEASES  RECENT CULTURES:  Specimen Source: .Blood Blood  Date/Time: 01-24 @ 18:59  Culture Results:   Growth in aerobic bottle: Acinetobacter baumannii nosocomialis group  See previous culture [id number]  Growth in anaerobic bottle: Gram Positive Cocci in Clusters  Gram Stain:   Growth in aerobic bottle: Gram Negative Rods  Growth in anaerobic bottle: Gram Positive Cocci in Clusters  Organism: Blood Culture PCR  Specimen Source: .Blood Blood-Venous  Date/Time: 01-23 @ 19:31  Culture Results:   Growth in aerobic bottle: Acinetobacter baumannii Nosocomialis group  See previous culture [id number]  Gram Stain:   Growth in aerobic bottle: Gram Negative Rods  Organism: --  Specimen Source: .Blood Blood-Peripheral  Date/Time: 01-22 @ 17:15  Culture Results:   Growth in aerobic bottle: Acinetobacter baumannii NOSOCOMIALIS GROUP  See previous culture [id number]  Gram Stain:   Growth in aerobic bottle: Gram Negative Rods  Organism: --  Specimen Source: .Blood Blood-Venous  Date/Time: 01-22 @ 15:13  Culture Results:   Growth in aerobic bottle: Acinetobacter baumannii nosocomialis group  Growth in aerobic bottle: Enterococcus faecium (vancomycin resistant)  ***Blood Panel PCR results on this specimen are available  approximately 3 hours after the Gram stain result.***  Gram stain, PCR, and/or culture results may not always  correspond due to difference in methodologies.  ************************************************************  This PCR assay was performed using [employer].  The following targets are tested for: Enterococcus,  vancomycin resistant enterococci, Listeria monocytogenes,  coagulase negative staphylococci, S. aureus,  methicillin resistant S. aureus, Streptococcus agalactiae  (Group B), S. pneumoniae, S. pyogenes (Group A),  Acinetobacter baumannii, Enterobacter cloacae, E. coli,  Klebsiella oxytoca, K. pneumoniae, Proteus sp.,  Serratia marcescens, Haemophilus influenzae,  Neisseria meningitidis, Pseudomonas aeruginosa, Candida  albicans, C. glabrata, C krusei, C parapsilosis,  C. tropicalis and the KPC resistance gene.  Gram Stain:   Growth in aerobic bottle: Gram Negative Rods and Gram Positive Cocci in  Pairs and Chains  Organism: Blood Culture PCR  Acinetobacter baumannii  Enterococcus faecium (vancomycin resistant) SURGICAL INTENSIVE CARE UNIT DAILY PROGRESS NOTE    24 HOUR EVENTS:   - Mental status somewhat improved.   - Discontinued bicarb drip  - Blood cx from 1/24 remain positive with acinetobacter, GPC.    - Addition of minocycline to antibiotics regimen.   - Weaned steroids to BID.     HPI:    64y Female PMHx of morbid obesity, COPD, HTN, and GERD who presented to the ED on 11/26/2019 with abdominal pain and distension. Patient states that she had been having constipation for ~2 weeks with no improvement despite laxative and enema use. Imaging revealed perforated sigmoid diverticulitis with free air. Hospital course is as follows:    11/26 - s/p exploratory laparotomy, extended left hemicolectomy, and left in discontinuity w/ Abthera VAC placement, left intubated post-operatively so she was admitted to SICU for further management  11/27 - extubated to BiPAP  11/29 - re-exploratory laparotomy, transverse end colostomy, and fascial closure with wound VAC placement, left intubated at end of case  11/30 - extubated   12/04 - transferred to the floors  12/06 - acute inability to tolerate PO, complaining of nausea & vomiting  12/09 - esophagram demonstrated a stricture in the distal esophagus  12/10 - EGD demonstrated diverticula, a medium-sized hiatal hernia, esophagitis, and gastritis, started on promotility agents and diet was restarted  12/11 - noted to have different BP when BP cuff placed on different arms, bilateral UE Duplex demonstrated stenosis of the right brachiocephalic artery concerning for subclavian steal syndrome  12/28 - plastic surgery consulted for abdominal wall reconstruction, underwent bedside debridement of abdominal wound at that time.  01/07 - worsening hypotension from presumed sepsis requiring readmission to SICU, imaging revealed loculated fluid collections in the left anterior abdomen & a small abdominal wall collection, both of which were not amenable to drainage, cultures were also positive for E. coli & Klebsiella in the urine, Stenotrophomonas in the sputum, & Staph epidermis in the blood, LUE PICC discontinued  01/10 - right IJ CVC was placed and patient started on vasopressor support but patient refused arterial line placement, patient complaining of chest wall tightness, hsT was elevated and TTE demonstrated new mild to moderate segmental LV systolic dysfunction w/ hypokinesis of the inferior and inferolateral walls but cardiology determined the troponin leak to be stressed-induced in the setting of sepsis  01/13 - dermatology consulted scattered erythematous patches on her trunk & extremities, superficial desquamation of the face, trunk, & extremities, & multiple erosions with surrounding erythema on left lower back, for which petroleum jelly was prescribed BID, viral cultures of erosions were sent and were negative  01/16 - repeat CT scan demonstrated decreasing fluid collections but no new acute findings  01/20 - weaned off vasopressor support, started levothyroxine for elevated TSH and decreased T3 & T4.     NEURO: Awake, oriented x3    RESPIRATORY  RR: 14 (01-27-20 @ 03:00) (10 - 40)  SpO2: 96% (01-27-20 @ 03:00) (88% - 100%)    CARDIOVASCULAR  HR: 99 (01-27-20 @ 03:00) (85 - 110)  BP: 98/57 (01-27-20 @ 03:00) (92/61 - 117/58)  BP(mean): 72 (01-27-20 @ 03:00) (66 - 83  VBG - ( 27 Jan 2020 00:30 )  pH: 7.37  /  pCO2: 55    /  pO2: 42    / HCO3: 31    / Base Excess: 5.8   /  SaO2: 75     Lactate: 2.4      GENITOURINARY  I&O's Detail    01-25 @ 07:01  -  01-26 @ 07:00  --------------------------------------------------------  IN:    dextrose 5% + sodium chloride 0.45%: 3600 mL    Enteral Tube Flush: 420 mL    Nepro: 1035 mL    norepinephrine Infusion: 19.2 mL    Solution: 1550 mL    Solution: 600 mL    Solution: 50 mL  Total IN: 7274.2 mL    OUT:    Colostomy: 400 mL    Indwelling Catheter - Urethral: 1400 mL  Total OUT: 1800 mL    Total NET: 5474.2 mL      01-26 @ 07:01 - 01-27 @ 04:14  --------------------------------------------------------  IN:    dextrose 5% + sodium chloride 0.45%: 900 mL    dextrose 5% + sodium chloride 0.45%: 300 mL    Enteral Tube Flush: 90 mL    lactated ringers.: 80 mL    Nepro: 405 mL    Solution: 1100 mL    Solution: 100 mL    Solution: 600 mL  Total IN: 3575 mL    OUT:    Colostomy: 150 mL    Indwelling Catheter - Urethral: 1145 mL  Total OUT: 1295 mL    Total NET: 2280 mL          01-27    136  |  99  |  36<H>  ----------------------------<  122<H>  4.3   |  27  |  1.05    Ca    7.9<L>      27 Jan 2020 00:36  Phos  2.6     01-27  Mg     2.0     01-27    TPro  5.3<L>  /  Alb  2.7<L>  /  TBili  0.5  /  DBili  0.3<H>  /  AST  143<H>  /  ALT  42  /  AlkPhos  254<H>  01-27      HEMATOLOGIC                        7.4    5.56  )-----------( 100      ( 27 Jan 2020 00:36 )             23.1         INFECTIOUS DISEASES  RECENT CULTURES:  Specimen Source: .Blood Blood  Date/Time: 01-24 @ 18:59  Culture Results:   Growth in aerobic bottle: Acinetobacter baumannii nosocomialis group  See previous culture [id number]  Growth in anaerobic bottle: Gram Positive Cocci in Clusters  Gram Stain:   Growth in aerobic bottle: Gram Negative Rods  Growth in anaerobic bottle: Gram Positive Cocci in Clusters  Organism: Blood Culture PCR  Specimen Source: .Blood Blood-Venous  Date/Time: 01-23 @ 19:31  Culture Results:   Growth in aerobic bottle: Acinetobacter baumannii Nosocomialis group  See previous culture [id number]  Gram Stain:   Growth in aerobic bottle: Gram Negative Rods  Organism: --  Specimen Source: .Blood Blood-Peripheral  Date/Time: 01-22 @ 17:15  Culture Results:   Growth in aerobic bottle: Acinetobacter baumannii NOSOCOMIALIS GROUP  See previous culture [id number]  Gram Stain:   Growth in aerobic bottle: Gram Negative Rods  Organism: --  Specimen Source: .Blood Blood-Venous  Date/Time: 01-22 @ 15:13  Culture Results:   Growth in aerobic bottle: Acinetobacter baumannii nosocomialis group  Growth in aerobic bottle: Enterococcus faecium (vancomycin resistant)  ***Blood Panel PCR results on this specimen are available  approximately 3 hours after the Gram stain result.***  Gram stain, PCR, and/or culture results may not always  correspond due to difference in methodologies.  ************************************************************  This PCR assay was performed using [employer].  The following targets are tested for: Enterococcus,  vancomycin resistant enterococci, Listeria monocytogenes,  coagulase negative staphylococci, S. aureus,  methicillin resistant S. aureus, Streptococcus agalactiae  (Group B), S. pneumoniae, S. pyogenes (Group A),  Acinetobacter baumannii, Enterobacter cloacae, E. coli,  Klebsiella oxytoca, K. pneumoniae, Proteus sp.,  Serratia marcescens, Haemophilus influenzae,  Neisseria meningitidis, Pseudomonas aeruginosa, Candida  albicans, C. glabrata, C krusei, C parapsilosis,  C. tropicalis and the KPC resistance gene.  Gram Stain:   Growth in aerobic bottle: Gram Negative Rods and Gram Positive Cocci in  Pairs and Chains  Organism: Blood Culture PCR  Acinetobacter baumannii  Enterococcus faecium (vancomycin resistant)

## 2020-01-27 NOTE — PROGRESS NOTE ADULT - ATTENDING COMMENTS
Off pressors, improved mental status  1.  ARF--significant resolution DESPITE nephrotoxic polymixin.  Success attributed to improved infection control, volume optimization with albumin.  NON oliguric, no HD required  2.  Hyperkalemia--med rx, volume optimization--> increased distal Na delivery.  NaHCO3 either IV or enteral bicitra  3.  Acidosis--NaHCO3 or enteral bicitra 15ml TID

## 2020-01-28 LAB
-  AMOXICILLIN/CLAVULANIC ACID: SIGNIFICANT CHANGE UP
-  AMPICILLIN/SULBACTAM: SIGNIFICANT CHANGE UP
-  AMPICILLIN/SULBACTAM: SIGNIFICANT CHANGE UP
-  AMPICILLIN: SIGNIFICANT CHANGE UP
-  CEFAZOLIN: SIGNIFICANT CHANGE UP
-  CEFAZOLIN: SIGNIFICANT CHANGE UP
-  CEFTRIAXONE: SIGNIFICANT CHANGE UP
-  CIPROFLOXACIN: SIGNIFICANT CHANGE UP
-  CLINDAMYCIN: SIGNIFICANT CHANGE UP
-  CLINDAMYCIN: SIGNIFICANT CHANGE UP
-  DAPTOMYCIN: SIGNIFICANT CHANGE UP
-  ERYTHROMYCIN: SIGNIFICANT CHANGE UP
-  ERYTHROMYCIN: SIGNIFICANT CHANGE UP
-  GENTAMICIN: SIGNIFICANT CHANGE UP
-  GENTAMICIN: SIGNIFICANT CHANGE UP
-  LEVOFLOXACIN: SIGNIFICANT CHANGE UP
-  LINEZOLID: SIGNIFICANT CHANGE UP
-  MEROPENEM: SIGNIFICANT CHANGE UP
-  MOXIFLOXACIN(AEROBIC): SIGNIFICANT CHANGE UP
-  OXACILLIN: SIGNIFICANT CHANGE UP
-  OXACILLIN: SIGNIFICANT CHANGE UP
-  PENICILLIN: SIGNIFICANT CHANGE UP
-  PENICILLIN: SIGNIFICANT CHANGE UP
-  RIFAMPIN: SIGNIFICANT CHANGE UP
-  RIFAMPIN: SIGNIFICANT CHANGE UP
-  TETRACYCLINE: SIGNIFICANT CHANGE UP
-  TETRACYCLINE: SIGNIFICANT CHANGE UP
-  TRIMETHOPRIM/SULFAMETHOXAZOLE: SIGNIFICANT CHANGE UP
-  TRIMETHOPRIM/SULFAMETHOXAZOLE: SIGNIFICANT CHANGE UP
-  VANCOMYCIN: SIGNIFICANT CHANGE UP
-  VANCOMYCIN: SIGNIFICANT CHANGE UP
ALBUMIN SERPL ELPH-MCNC: 2.6 G/DL — LOW (ref 3.3–5)
ALP SERPL-CCNC: 256 U/L — HIGH (ref 40–120)
ALT FLD-CCNC: 58 U/L — HIGH (ref 10–45)
ANION GAP SERPL CALC-SCNC: 14 MMOL/L — SIGNIFICANT CHANGE UP (ref 5–17)
AST SERPL-CCNC: 153 U/L — HIGH (ref 10–40)
BILIRUB DIRECT SERPL-MCNC: 0.2 MG/DL — SIGNIFICANT CHANGE UP (ref 0–0.2)
BILIRUB INDIRECT FLD-MCNC: 0.4 MG/DL — SIGNIFICANT CHANGE UP (ref 0.2–1)
BILIRUB SERPL-MCNC: 0.6 MG/DL — SIGNIFICANT CHANGE UP (ref 0.2–1.2)
BUN SERPL-MCNC: 43 MG/DL — HIGH (ref 7–23)
CALCIUM SERPL-MCNC: 8.1 MG/DL — LOW (ref 8.4–10.5)
CHLORIDE SERPL-SCNC: 98 MMOL/L — SIGNIFICANT CHANGE UP (ref 96–108)
CO2 SERPL-SCNC: 25 MMOL/L — SIGNIFICANT CHANGE UP (ref 22–31)
CREAT SERPL-MCNC: 0.97 MG/DL — SIGNIFICANT CHANGE UP (ref 0.5–1.3)
CULTURE RESULTS: SIGNIFICANT CHANGE UP
CULTURE RESULTS: SIGNIFICANT CHANGE UP
GAS PNL BLDV: SIGNIFICANT CHANGE UP
GLUCOSE SERPL-MCNC: 178 MG/DL — HIGH (ref 70–99)
HCT VFR BLD CALC: 23 % — LOW (ref 34.5–45)
HGB BLD-MCNC: 7.2 G/DL — LOW (ref 11.5–15.5)
MAGNESIUM SERPL-MCNC: 2 MG/DL — SIGNIFICANT CHANGE UP (ref 1.6–2.6)
MCHC RBC-ENTMCNC: 28.3 PG — SIGNIFICANT CHANGE UP (ref 27–34)
MCHC RBC-ENTMCNC: 31.3 GM/DL — LOW (ref 32–36)
MCV RBC AUTO: 90.6 FL — SIGNIFICANT CHANGE UP (ref 80–100)
METHOD TYPE: SIGNIFICANT CHANGE UP
METHOD TYPE: SIGNIFICANT CHANGE UP
NRBC # BLD: 0 /100 WBCS — SIGNIFICANT CHANGE UP (ref 0–0)
ORGANISM # SPEC MICROSCOPIC CNT: SIGNIFICANT CHANGE UP
PHOSPHATE SERPL-MCNC: 2.6 MG/DL — SIGNIFICANT CHANGE UP (ref 2.5–4.5)
PLATELET # BLD AUTO: 93 K/UL — LOW (ref 150–400)
POTASSIUM SERPL-MCNC: 4.3 MMOL/L — SIGNIFICANT CHANGE UP (ref 3.5–5.3)
POTASSIUM SERPL-SCNC: 4.3 MMOL/L — SIGNIFICANT CHANGE UP (ref 3.5–5.3)
PROT SERPL-MCNC: 5.5 G/DL — LOW (ref 6–8.3)
RBC # BLD: 2.54 M/UL — LOW (ref 3.8–5.2)
RBC # FLD: 19.7 % — HIGH (ref 10.3–14.5)
SODIUM SERPL-SCNC: 137 MMOL/L — SIGNIFICANT CHANGE UP (ref 135–145)
SPECIMEN SOURCE: SIGNIFICANT CHANGE UP
SPECIMEN SOURCE: SIGNIFICANT CHANGE UP
WBC # BLD: 6.27 K/UL — SIGNIFICANT CHANGE UP (ref 3.8–10.5)
WBC # FLD AUTO: 6.27 K/UL — SIGNIFICANT CHANGE UP (ref 3.8–10.5)

## 2020-01-28 PROCEDURE — 71045 X-RAY EXAM CHEST 1 VIEW: CPT | Mod: 26

## 2020-01-28 PROCEDURE — 99233 SBSQ HOSP IP/OBS HIGH 50: CPT

## 2020-01-28 RX ORDER — HYDROMORPHONE HYDROCHLORIDE 2 MG/ML
2 INJECTION INTRAMUSCULAR; INTRAVENOUS; SUBCUTANEOUS EVERY 24 HOURS
Refills: 0 | Status: DISCONTINUED | OUTPATIENT
Start: 2020-01-28 | End: 2020-01-28

## 2020-01-28 RX ORDER — DAPTOMYCIN 500 MG/10ML
1200 INJECTION, POWDER, LYOPHILIZED, FOR SOLUTION INTRAVENOUS EVERY 24 HOURS
Refills: 0 | Status: DISCONTINUED | OUTPATIENT
Start: 2020-01-28 | End: 2020-01-28

## 2020-01-28 RX ORDER — OXYCODONE HYDROCHLORIDE 5 MG/1
10 TABLET ORAL EVERY 6 HOURS
Refills: 0 | Status: DISCONTINUED | OUTPATIENT
Start: 2020-01-28 | End: 2020-01-31

## 2020-01-28 RX ORDER — HYDROMORPHONE HYDROCHLORIDE 2 MG/ML
1 INJECTION INTRAMUSCULAR; INTRAVENOUS; SUBCUTANEOUS ONCE
Refills: 0 | Status: DISCONTINUED | OUTPATIENT
Start: 2020-01-28 | End: 2020-01-28

## 2020-01-28 RX ORDER — HYDROCORTISONE 20 MG
50 TABLET ORAL DAILY
Refills: 0 | Status: DISCONTINUED | OUTPATIENT
Start: 2020-01-29 | End: 2020-02-05

## 2020-01-28 RX ORDER — HYDROMORPHONE HYDROCHLORIDE 2 MG/ML
2 INJECTION INTRAMUSCULAR; INTRAVENOUS; SUBCUTANEOUS EVERY 24 HOURS
Refills: 0 | Status: DISCONTINUED | OUTPATIENT
Start: 2020-01-28 | End: 2020-02-03

## 2020-01-28 RX ORDER — ONDANSETRON 8 MG/1
4 TABLET, FILM COATED ORAL EVERY 6 HOURS
Refills: 0 | Status: DISCONTINUED | OUTPATIENT
Start: 2020-01-28 | End: 2020-02-05

## 2020-01-28 RX ORDER — HYDROCORTISONE 20 MG
50 TABLET ORAL EVERY 12 HOURS
Refills: 0 | Status: COMPLETED | OUTPATIENT
Start: 2020-01-28 | End: 2020-01-28

## 2020-01-28 RX ORDER — DAPTOMYCIN 500 MG/10ML
1000 INJECTION, POWDER, LYOPHILIZED, FOR SOLUTION INTRAVENOUS EVERY 24 HOURS
Refills: 0 | Status: DISCONTINUED | OUTPATIENT
Start: 2020-01-28 | End: 2020-01-31

## 2020-01-28 RX ORDER — HYDROMORPHONE HYDROCHLORIDE 2 MG/ML
1.5 INJECTION INTRAMUSCULAR; INTRAVENOUS; SUBCUTANEOUS EVERY 6 HOURS
Refills: 0 | Status: DISCONTINUED | OUTPATIENT
Start: 2020-01-28 | End: 2020-01-30

## 2020-01-28 RX ADMIN — SODIUM CHLORIDE 10 MILLILITER(S): 9 INJECTION, SOLUTION INTRAVENOUS at 20:19

## 2020-01-28 RX ADMIN — PANTOPRAZOLE SODIUM 40 MILLIGRAM(S): 20 TABLET, DELAYED RELEASE ORAL at 05:46

## 2020-01-28 RX ADMIN — HYDROMORPHONE HYDROCHLORIDE 1.5 MILLIGRAM(S): 2 INJECTION INTRAMUSCULAR; INTRAVENOUS; SUBCUTANEOUS at 09:53

## 2020-01-28 RX ADMIN — Medication 81 MILLIGRAM(S): at 20:18

## 2020-01-28 RX ADMIN — ENOXAPARIN SODIUM 100 MILLIGRAM(S): 100 INJECTION SUBCUTANEOUS at 11:04

## 2020-01-28 RX ADMIN — POLYETHYLENE GLYCOL 3350 17 GRAM(S): 17 POWDER, FOR SOLUTION ORAL at 13:43

## 2020-01-28 RX ADMIN — NYSTATIN CREAM 1 APPLICATION(S): 100000 CREAM TOPICAL at 05:47

## 2020-01-28 RX ADMIN — Medication 62.5 MILLIMOLE(S): at 02:02

## 2020-01-28 RX ADMIN — DAPTOMYCIN 140 MILLIGRAM(S): 500 INJECTION, POWDER, LYOPHILIZED, FOR SOLUTION INTRAVENOUS at 16:25

## 2020-01-28 RX ADMIN — ONDANSETRON 4 MILLIGRAM(S): 8 TABLET, FILM COATED ORAL at 20:31

## 2020-01-28 RX ADMIN — ATORVASTATIN CALCIUM 40 MILLIGRAM(S): 80 TABLET, FILM COATED ORAL at 20:18

## 2020-01-28 RX ADMIN — Medication 3 MILLILITER(S): at 05:54

## 2020-01-28 RX ADMIN — Medication 1 APPLICATION(S): at 05:48

## 2020-01-28 RX ADMIN — OXYCODONE HYDROCHLORIDE 10 MILLIGRAM(S): 5 TABLET ORAL at 18:35

## 2020-01-28 RX ADMIN — CHLORHEXIDINE GLUCONATE 1 APPLICATION(S): 213 SOLUTION TOPICAL at 05:49

## 2020-01-28 RX ADMIN — METHADONE HYDROCHLORIDE 10 MILLIGRAM(S): 40 TABLET ORAL at 08:34

## 2020-01-28 RX ADMIN — DIPHENHYDRAMINE HYDROCHLORIDE AND LIDOCAINE HYDROCHLORIDE AND ALUMINUM HYDROXIDE AND MAGNESIUM HYDRO 5 MILLILITER(S): KIT at 11:05

## 2020-01-28 RX ADMIN — OXYCODONE HYDROCHLORIDE 15 MILLIGRAM(S): 5 TABLET ORAL at 02:04

## 2020-01-28 RX ADMIN — Medication 25 MICROGRAM(S): at 21:32

## 2020-01-28 RX ADMIN — OXYCODONE HYDROCHLORIDE 10 MILLIGRAM(S): 5 TABLET ORAL at 23:58

## 2020-01-28 RX ADMIN — MEROPENEM 100 MILLIGRAM(S): 1 INJECTION INTRAVENOUS at 06:10

## 2020-01-28 RX ADMIN — MEROPENEM 100 MILLIGRAM(S): 1 INJECTION INTRAVENOUS at 13:43

## 2020-01-28 RX ADMIN — OXYCODONE HYDROCHLORIDE 15 MILLIGRAM(S): 5 TABLET ORAL at 06:10

## 2020-01-28 RX ADMIN — Medication 100 MILLIGRAM(S): at 11:05

## 2020-01-28 RX ADMIN — Medication 0.5 MILLIGRAM(S): at 17:42

## 2020-01-28 RX ADMIN — HYDROMORPHONE HYDROCHLORIDE 1 MILLIGRAM(S): 2 INJECTION INTRAMUSCULAR; INTRAVENOUS; SUBCUTANEOUS at 14:50

## 2020-01-28 RX ADMIN — NYSTATIN CREAM 1 APPLICATION(S): 100000 CREAM TOPICAL at 17:21

## 2020-01-28 RX ADMIN — Medication 1 DROP(S): at 17:22

## 2020-01-28 RX ADMIN — HYDROMORPHONE HYDROCHLORIDE 1 MILLIGRAM(S): 2 INJECTION INTRAMUSCULAR; INTRAVENOUS; SUBCUTANEOUS at 14:24

## 2020-01-28 RX ADMIN — Medication 3 MILLIGRAM(S): at 21:32

## 2020-01-28 RX ADMIN — Medication 50 MILLIGRAM(S): at 17:20

## 2020-01-28 RX ADMIN — METHADONE HYDROCHLORIDE 10 MILLIGRAM(S): 40 TABLET ORAL at 20:19

## 2020-01-28 RX ADMIN — Medication 3 MILLILITER(S): at 17:42

## 2020-01-28 RX ADMIN — Medication 50 MILLIGRAM(S): at 05:49

## 2020-01-28 RX ADMIN — POLYMYXIN B SULFATE 600 UNIT(S): 500000 INJECTION, POWDER, LYOPHILIZED, FOR SOLUTION INTRAMUSCULAR; INTRATHECAL; INTRAVENOUS; OPHTHALMIC at 11:04

## 2020-01-28 RX ADMIN — OXYCODONE HYDROCHLORIDE 10 MILLIGRAM(S): 5 TABLET ORAL at 18:04

## 2020-01-28 RX ADMIN — MINOCYCLINE HYDROCHLORIDE 100 MILLIGRAM(S): 45 TABLET, EXTENDED RELEASE ORAL at 06:59

## 2020-01-28 RX ADMIN — Medication 3 MILLILITER(S): at 11:28

## 2020-01-28 RX ADMIN — Medication 250 MILLIGRAM(S): at 09:54

## 2020-01-28 RX ADMIN — Medication 1 APPLICATION(S): at 13:43

## 2020-01-28 RX ADMIN — HYDROMORPHONE HYDROCHLORIDE 1.5 MILLIGRAM(S): 2 INJECTION INTRAMUSCULAR; INTRAVENOUS; SUBCUTANEOUS at 10:25

## 2020-01-28 RX ADMIN — MEROPENEM 100 MILLIGRAM(S): 1 INJECTION INTRAVENOUS at 21:32

## 2020-01-28 RX ADMIN — Medication 1 APPLICATION(S): at 21:32

## 2020-01-28 RX ADMIN — MINOCYCLINE HYDROCHLORIDE 100 MILLIGRAM(S): 45 TABLET, EXTENDED RELEASE ORAL at 17:19

## 2020-01-28 RX ADMIN — Medication 1 DROP(S): at 05:49

## 2020-01-28 RX ADMIN — Medication 250 MILLIGRAM(S): at 17:20

## 2020-01-28 RX ADMIN — HYDROMORPHONE HYDROCHLORIDE 2 MILLIGRAM(S): 2 INJECTION INTRAMUSCULAR; INTRAVENOUS; SUBCUTANEOUS at 04:03

## 2020-01-28 RX ADMIN — POLYETHYLENE GLYCOL 3350 17 GRAM(S): 17 POWDER, FOR SOLUTION ORAL at 05:48

## 2020-01-28 RX ADMIN — DIPHENHYDRAMINE HYDROCHLORIDE AND LIDOCAINE HYDROCHLORIDE AND ALUMINUM HYDROXIDE AND MAGNESIUM HYDRO 5 MILLILITER(S): KIT at 05:48

## 2020-01-28 RX ADMIN — DIPHENHYDRAMINE HYDROCHLORIDE AND LIDOCAINE HYDROCHLORIDE AND ALUMINUM HYDROXIDE AND MAGNESIUM HYDRO 5 MILLILITER(S): KIT at 23:57

## 2020-01-28 RX ADMIN — HYDROMORPHONE HYDROCHLORIDE 2 MILLIGRAM(S): 2 INJECTION INTRAMUSCULAR; INTRAVENOUS; SUBCUTANEOUS at 04:18

## 2020-01-28 RX ADMIN — Medication 0.5 MILLIGRAM(S): at 05:54

## 2020-01-28 NOTE — PROGRESS NOTE ADULT - SUBJECTIVE AND OBJECTIVE BOX
CC: F/U for Bacteremia    Saw/spoke to patient. No fevers, no chills. Ill appearing in ICU. Found to have staph aureus in BCX, antibiotic adjusted in the interim.    Allergies  IV Contrast (Rash; Pruritus; Hypotension)  penicillin (Rash (Severe))  sulfa drugs (Unknown)  vancomycin (Rash (Severe))    ANTIMICROBIALS:  DAPTOmycin IVPB 1000 every 24 hours  erythromycin     base Tablet 250 <User Schedule>  meropenem  IVPB 1000 every 8 hours  minocycline IVPB 100 every 12 hours  minocycline IVPB    polymyxin B IVPB [id number] every 12 hours    PE:    Vital Signs Last 24 Hrs  T(C): 37.1 (28 Jan 2020 07:00), Max: 37.1 (27 Jan 2020 23:00)  T(F): 98.8 (28 Jan 2020 07:00), Max: 98.8 (27 Jan 2020 23:00)  HR: 96 (28 Jan 2020 10:00) (84 - 106)  BP: 109/53 (28 Jan 2020 10:00) (81/53 - 138/61)  BP(mean): 74 (28 Jan 2020 09:00) (60 - 90)  RR: 39 (28 Jan 2020 10:00) (12 - 39)  SpO2: 99% (28 Jan 2020 10:00) (95% - 100%)    Gen: AOx0-1, NAD, ill appearing  CV: S1+S2 normal, nontachycardic  Resp: Clear bilat, no resp distress, no crackles/wheezes  Abd: Soft, nontender, +BS, ostomy  Ext: No LE edema, no wounds    LABS:                        7.2    6.27  )-----------( 93       ( 28 Jan 2020 01:12 )             23.0     01-28    137  |  98  |  43<H>  ----------------------------<  178<H>  4.3   |  25  |  0.97    Ca    8.1<L>      28 Jan 2020 01:12  Phos  2.6     01-28  Mg     2.0     01-28    TPro  5.5<L>  /  Alb  2.6<L>  /  TBili  0.6  /  DBili  0.2  /  AST  153<H>  /  ALT  58<H>  /  AlkPhos  256<H>  01-28    MICROBIOLOGY:    .Blood Blood-Venous  01-26-20   No growth to date.     .Blood Blood  01-24-20   Growth in aerobic bottle: Acinetobacter baumannii nosocomialis group  See previous culture [id number]  Growth in aerobic and anaerobic bottles: Staphylococcus aureus  Growth in anaerobic bottle: Staphylococcus epidermidis  --  Blood Culture PCR    .Blood Blood-Venous  01-23-20   Growth in aerobic bottle: Acinetobacter baumannii Nosocomialis group  See previous culture [id number]  --    Growth in aerobic bottle: Gram Negative Rods    .Blood Blood-Peripheral  01-22-20   Growth in aerobic bottle: Acinetobacter baumannii NOSOCOMIALIS GROUP  See previous culture [id number]  --    Growth in aerobic bottle: Gram Negative Rods    .Blood Blood-Venous  01-22-20   Growth in aerobic bottle: Acinetobacter baumannii nosocomialis group  Growth in aerobic bottle: Enterococcus faecium (vancomycin resistant)    (otherwise reviewed)    RADIOLOGY:    1/27 CXR:    Impression:    The heart is enlarged. Improving opacification of the right lung. Left lower lobe pneumonia and/or atelectasis. NG tube is in the stomach. A central line is seen on the left and the tip is in the superior vena cava. No pneumothorax.

## 2020-01-28 NOTE — PROGRESS NOTE ADULT - ATTENDING COMMENTS
Patient seen/examined.  Agree w above note and plan and have discussed plan w house staff.  Now growing VRE    Lucas Greer MD

## 2020-01-28 NOTE — PROGRESS NOTE ADULT - ATTENDING COMMENTS
Evaluated in round  Awake and alert, on Dilaudid for pain  Saturating well, on bronchodilators ICS  Remains off vasopressor support and Midodrine  NGT feed change to nocturnal, oral feed during day time  Abx Polymixin Minocycline Meropenem, Zyvox change to Dapto for BC growing staph Epi, awaiting ID input, repeat BC, increase Dapto dose to 8mg/kg  Renal function stable  DVT prophylaxis with Lovenox, HCT platelets stable  Wound/skin care  Overall poor prognosis

## 2020-01-28 NOTE — CHART NOTE - NSCHARTNOTEFT_GEN_A_CORE
64F with pmhx morbid obesity, acid reflux, HTN, COPD and pshx D&C, admitted for sigmoid diverticulitis with perforation s/p emergent repair with complicated post op sepsis. Nephrology consult requested due to BULL possibly due to sepsis and metabolic acidosis with compensated respiratory alkalosis. Patient started on polymixin and Linezolid in the setting of Acetinobacter and VRE bacteremia. BUN and SCr have been downtrending since treatment, currently SCr of 0.9 mg/dl.     K has remained mildly elevated in the setting of respiratory acidosis. Monitor closely, and treat medically.     Will sign off re consult if needed.

## 2020-01-28 NOTE — PROGRESS NOTE ADULT - ASSESSMENT
63yo F with PMHx morbid obesity, GERD, HTN, COPD, and PSHx D&C now s/p ex laparotomy with extended left hemicolectomy 11/26 for perforated and necrotic sigmoid colon with gross abdominal contamination. RTOR 11/29 for abd closure, RUQ end transverse colostomy creation. On 12/19 patient found to have induration of wound and keri-stomal fat necrosis, s/p bedside debridement w/ wound VAC in placement on midline wound. Began showing signs of sepsis and was transferred to SICU 1/7 for further management. Now w/ decreasing pressor support and downtrending leukocytosis. Troponins have been elevated and echo shows decreased ventricular wall motility. Patient grew VRE, and linezolid was added to her antibiotic regimen. Patient is now off pressors and blood pressure is improved. Patient was made DNR on 1/26 after goals of care discussion.      Plan:  Continue IV meropenem and linezolid  Trend labs/ WBC/ SBP  Monitor Vitals  Appreciate Cards reccs  Appreciate excellent SICU care    [facility]  [phone] 65yo F with PMHx morbid obesity, GERD, HTN, COPD, and PSHx D&C now s/p ex laparotomy with extended left hemicolectomy 11/26 for perforated and necrotic sigmoid colon with gross abdominal contamination. RTOR 11/29 for abd closure, RUQ end transverse colostomy creation. On 12/19 patient found to have induration of wound and keri-stomal fat necrosis, s/p bedside debridement w/ wound VAC in placement on midline wound. Began showing signs of sepsis and was transferred to SICU 1/7 for further management. Now w/ decreasing pressor support and downtrending leukocytosis. Troponins have been elevated and echo shows decreased ventricular wall motility. Patient grew VRE, and linezolid was added to her antibiotic regimen. Patient is now off pressors and blood pressure is improved. Patient was made DNR on 1/26 after goals of care discussion.      Plan:  Daptomycin, meropenem, linezolid.   Trend labs/ WBC/ SBP  Monitor Vitals  Appreciate Cards reccs  Appreciate excellent SICU care    [facility]  [phone]

## 2020-01-28 NOTE — PHARMACOTHERAPY INTERVENTION NOTE - COMMENTS
Patient was on linezolid, meropenem, minocycline, and polymyxin B. Blood culture grew VRE (linezolid ETHAN 1, daptomycin ETHAN 4), acinetobcter baumannii, and new culture grew Staph. epi. Linezolid was switched to daptomycin. Patient's actual body weight is 169kg (IBW 66kg, adjusted BW 107kg). Suggested to dose daptomycin 1000mg IV every 24 hours. Patient was on linezolid, meropenem, minocycline, and polymyxin B. Blood culture grew VRE (linezolid ETHAN 1, daptomycin ETHAN 4), acinetobcter baumannii, and new culture grew Staph. epi. Linezolid was switched to daptomycin. Patient's actual body weight is 168kg (IBW 66kg, adjusted BW 107kg). Suggested to dose daptomycin 1000mg IV every 24 hours.

## 2020-01-28 NOTE — PROGRESS NOTE ADULT - SUBJECTIVE AND OBJECTIVE BOX
Surgery Progress Note    S: Patient seen and examined. No acute events overnight. Reports tolerating diet without nausea, vomiting, passing flatus, having bowel movements, voiding without issues, have been ambulating and out of bed. Denies fever, chills, SOB, chest pain.     O:  Physical Exam  Constitutional: A&Ox3, NAD  Respiratory: CTA b.l  Cardiac: RRR, S1 and S2, no m.r.g  Gastrointestinal: abdomen soft, NT/ND, obese, wound vac in place and functioning; dressings c/d/i  Skin: Erythematous with improved skin sloughing.  LE: lymphedema b/l    Vital Signs Last 24 Hrs  T(C): 37.1 (27 Jan 2020 23:00), Max: 37.1 (27 Jan 2020 23:00)  T(F): 98.8 (27 Jan 2020 23:00), Max: 98.8 (27 Jan 2020 23:00)  HR: 90 (28 Jan 2020 03:00) (84 - 105)  BP: 81/53 (28 Jan 2020 03:00) (81/53 - 123/79)  BP(mean): 60 (28 Jan 2020 03:00) (60 - 90)  RR: 13 (28 Jan 2020 03:00) (12 - 29)  SpO2: 95% (28 Jan 2020 03:00) (88% - 100%)    I&O's Detail    26 Jan 2020 07:01  -  27 Jan 2020 07:00  --------------------------------------------------------  IN:    dextrose 5% + sodium chloride 0.45%: 300 mL    dextrose 5% + sodium chloride 0.45%: 900 mL    Enteral Tube Flush: 195 mL    lactated ringers.: 120 mL    Nepro: 585 mL    Solution: 1200 mL    Solution: 762.5 mL    Solution: 150 mL  Total IN: 4212.5 mL    OUT:    Colostomy: 300 mL    Indwelling Catheter - Urethral: 1335 mL    VAC (Vacuum Assisted Closure) System: 200 mL  Total OUT: 1835 mL    Total NET: 2377.5 mL      27 Jan 2020 07:01  -  28 Jan 2020 05:03  --------------------------------------------------------  IN:    Enteral Tube Flush: 210 mL    lactated ringers.: 210 mL    Nepro: 945 mL    Solution: 1087.5 mL    Solution: 887.5 mL  Total IN: 3340 mL    OUT:    Colostomy: 450 mL    Indwelling Catheter - Urethral: 930 mL    VAC (Vacuum Assisted Closure) System: 200 mL  Total OUT: 1580 mL    Total NET: 1760 mL                                7.2    6.27  )-----------( 93       ( 28 Jan 2020 01:12 )             23.0       01-28    137  |  98  |  43<H>  ----------------------------<  178<H>  4.3   |  25  |  0.97    Ca    8.1<L>      28 Jan 2020 01:12  Phos  2.6     01-28  Mg     2.0     01-28    TPro  5.5<L>  /  Alb  2.6<L>  /  TBili  0.6  /  DBili  0.2  /  AST  153<H>  /  ALT  58<H>  /  AlkPhos  256<H>  01-28 Surgery Progress Note    S: Patient seen and examined. No acute events overnight. Linezolid discontinued and Daptomycin initiated.       O:  Physical Exam  Constitutional: A&Ox3, NAD  Respiratory: CTA b.l  Cardiac: RRR, S1 and S2, no m.r.g  Gastrointestinal: abdomen soft, NT/ND, obese, wound vac in place and functioning; dressings c/d/i  Skin: Erythematous with improved skin sloughing.  LE: lymphedema b/l    Vital Signs Last 24 Hrs  T(C): 37.1 (27 Jan 2020 23:00), Max: 37.1 (27 Jan 2020 23:00)  T(F): 98.8 (27 Jan 2020 23:00), Max: 98.8 (27 Jan 2020 23:00)  HR: 90 (28 Jan 2020 03:00) (84 - 105)  BP: 81/53 (28 Jan 2020 03:00) (81/53 - 123/79)  BP(mean): 60 (28 Jan 2020 03:00) (60 - 90)  RR: 13 (28 Jan 2020 03:00) (12 - 29)  SpO2: 95% (28 Jan 2020 03:00) (88% - 100%)    I&O's Detail    26 Jan 2020 07:01  -  27 Jan 2020 07:00  --------------------------------------------------------  IN:    dextrose 5% + sodium chloride 0.45%: 300 mL    dextrose 5% + sodium chloride 0.45%: 900 mL    Enteral Tube Flush: 195 mL    lactated ringers.: 120 mL    Nepro: 585 mL    Solution: 1200 mL    Solution: 762.5 mL    Solution: 150 mL  Total IN: 4212.5 mL    OUT:    Colostomy: 300 mL    Indwelling Catheter - Urethral: 1335 mL    VAC (Vacuum Assisted Closure) System: 200 mL  Total OUT: 1835 mL    Total NET: 2377.5 mL      27 Jan 2020 07:01  -  28 Jan 2020 05:03  --------------------------------------------------------  IN:    Enteral Tube Flush: 210 mL    lactated ringers.: 210 mL    Nepro: 945 mL    Solution: 1087.5 mL    Solution: 887.5 mL  Total IN: 3340 mL    OUT:    Colostomy: 450 mL    Indwelling Catheter - Urethral: 930 mL    VAC (Vacuum Assisted Closure) System: 200 mL  Total OUT: 1580 mL    Total NET: 1760 mL                                7.2    6.27  )-----------( 93       ( 28 Jan 2020 01:12 )             23.0       01-28    137  |  98  |  43<H>  ----------------------------<  178<H>  4.3   |  25  |  0.97    Ca    8.1<L>      28 Jan 2020 01:12  Phos  2.6     01-28  Mg     2.0     01-28    TPro  5.5<L>  /  Alb  2.6<L>  /  TBili  0.6  /  DBili  0.2  /  AST  153<H>  /  ALT  58<H>  /  AlkPhos  256<H>  01-28

## 2020-01-28 NOTE — PROGRESS NOTE ADULT - SUBJECTIVE AND OBJECTIVE BOX
SURGICAL INTENSIVE CARE UNIT DAILY PROGRESS NOTE    24 HOUR EVENTS:   -- NAEO  -- No changes to current skin regimen per Dermatology.  -- Linezolid discontinued and Daptomycin initiated    HPI:    64y Female PMHx of morbid obesity, COPD, HTN, and GERD who presented to the ED on 11/26/2019 with abdominal pain and distension. Patient states that she had been having constipation for ~2 weeks with no improvement despite laxative and enema use. Imaging revealed perforated sigmoid diverticulitis with free air. Hospital course is as follows:    11/26 - s/p exploratory laparotomy, extended left hemicolectomy, and left in discontinuity w/ Abthera VAC placement, left intubated post-operatively so she was admitted to SICU for further management  11/27 - extubated to BiPAP  11/29 - re-exploratory laparotomy, transverse end colostomy, and fascial closure with wound VAC placement, left intubated at end of case  11/30 - extubated   12/04 - transferred to the floors  12/06 - acute inability to tolerate PO, complaining of nausea & vomiting  12/09 - esophagram demonstrated a stricture in the distal esophagus  12/10 - EGD demonstrated diverticula, a medium-sized hiatal hernia, esophagitis, and gastritis, started on promotility agents and diet was restarted  12/11 - noted to have different BP when BP cuff placed on different arms, bilateral UE Duplex demonstrated stenosis of the right brachiocephalic artery concerning for subclavian steal syndrome  12/28 - plastic surgery consulted for abdominal wall reconstruction, underwent bedside debridement of abdominal wound at that time.  01/07 - worsening hypotension from presumed sepsis requiring readmission to SICU, imaging revealed loculated fluid collections in the left anterior abdomen & a small abdominal wall collection, both of which were not amenable to drainage, cultures were also positive for E. coli & Klebsiella in the urine, Stenotrophomonas in the sputum, & Staph epidermis in the blood, LUE PICC discontinued  01/10 - right IJ CVC was placed and patient started on vasopressor support but patient refused arterial line placement, patient complaining of chest wall tightness, hsT was elevated and TTE demonstrated new mild to moderate segmental LV systolic dysfunction w/ hypokinesis of the inferior and inferolateral walls but cardiology determined the troponin leak to be stressed-induced in the setting of sepsis  01/13 - dermatology consulted scattered erythematous patches on her trunk & extremities, superficial desquamation of the face, trunk, & extremities, & multiple erosions with surrounding erythema on left lower back, for which petroleum jelly was prescribed BID, viral cultures of erosions were sent and were negative  01/16 - repeat CT scan demonstrated decreasing fluid collections but no new acute findings  01/20 - weaned off vasopressor support, started levothyroxine for elevated TSH and decreased T3 & T4.     NEURO  Exam:  Awake, oriented x3  Meds: diphenhydrAMINE 25 milliGRAM(s) Oral every 12 hours PRN Rash and/or Itching  HYDROmorphone  Injectable 2 milliGRAM(s) IV Push every 24 hours PRN Severe Pain (7 - 10)  melatonin 3 milliGRAM(s) Oral at bedtime  methadone    Tablet 10 milliGRAM(s) Oral <User Schedule>  oxyCODONE    IR 15 milliGRAM(s) Oral every 4 hours  [x] Adequacy of sedation and pain control has been assessed and adjusted      RESPIRATORY  RR: 24 (01-28-20 @ 05:00) (12 - 29)  SpO2: 97% (01-28-20 @ 05:00) (88% - 100%)  Wt(kg): --  Exam: unlabored, clear to auscultation bilaterally    Meds: albuterol/ipratropium for Nebulization 3 milliLiter(s) Nebulizer every 6 hours  buDESOnide    Inhalation Suspension 0.5 milliGRAM(s) Inhalation every 12 hours        CARDIOVASCULAR  HR: 86 (01-28-20 @ 05:00) (84 - 106)  BP: 107/58 (01-28-20 @ 05:00) (81/53 - 138/61)  BP(mean): 79 (01-28-20 @ 05:00) (60 - 90)  VBG - ( 28 Jan 2020 01:10 )  pH: 7.38  /  pCO2: 51    /  pO2: 46    / HCO3: 30    / Base Excess: 4.5   /  SaO2: 79     Lactate: 2.8 (from 2.4)      GI/NUTRITION  Exam: soft, nontender, nondistended, incision C/D/I  Diet: regular w/ nasogastric tube feed  Meds: aluminum hydroxide/magnesium hydroxide/simethicone Suspension 30 milliLiter(s) Oral every 4 hours PRN Dyspepsia  pantoprazole    Tablet 40 milliGRAM(s) Oral <User Schedule>  polyethylene glycol 3350 17 Gram(s) Oral <User Schedule>      GENITOURINARY  I&O's Detail    01-26 @ 07:01 - 01-27 @ 07:00  --------------------------------------------------------  IN:    dextrose 5% + sodium chloride 0.45%: 300 mL    dextrose 5% + sodium chloride 0.45%: 900 mL    Enteral Tube Flush: 195 mL    lactated ringers.: 120 mL    Nepro: 585 mL    Solution: 1200 mL    Solution: 762.5 mL    Solution: 150 mL  Total IN: 4212.5 mL    OUT:    Colostomy: 300 mL    Indwelling Catheter - Urethral: 1335 mL    VAC (Vacuum Assisted Closure) System: 200 mL  Total OUT: 1835 mL    Total NET: 2377.5 mL      01-27 @ 07:01 - 01-28 @ 05:45  --------------------------------------------------------  IN:    Enteral Tube Flush: 210 mL    lactated ringers.: 220 mL    Nepro: 990 mL    Solution: 1150 mL    Solution: 887.5 mL  Total IN: 3457.5 mL    OUT:    Colostomy: 450 mL    Indwelling Catheter - Urethral: 930 mL    VAC (Vacuum Assisted Closure) System: 200 mL  Total OUT: 1580 mL    Total NET: 1877.5 mL          01-28    137  |  98  |  43<H>  ----------------------------<  178<H>  4.3   |  25  |  0.97    Ca    8.1<L>      28 Jan 2020 01:12  Phos  2.6     01-28  Mg     2.0     01-28    TPro  5.5<L>  /  Alb  2.6<L>  /  TBili  0.6  /  DBili  0.2  /  AST  153<H>  /  ALT  58<H>  /  AlkPhos  256<H>  01-28    [ ] Ross catheter, indication: N/A  Meds: lactated ringers. 1000 milliLiter(s) IV Continuous <Continuous>  thiamine 100 milliGRAM(s) Oral daily        HEMATOLOGIC  Meds: aspirin  chewable 81 milliGRAM(s) Oral <User Schedule>  enoxaparin Injectable 100 milliGRAM(s) SubCutaneous <User Schedule>    [x] VTE Prophylaxis                        7.2    6.27  )-----------( 93       ( 28 Jan 2020 01:12 )             23.0       Transfusion     [ ] PRBC   [ ] Platelets   [ ] FFP   [ ] Cryoprecipitate      INFECTIOUS DISEASES  T(C): 36.5 (01-28-20 @ 03:00), Max: 37.1 (01-27-20 @ 23:00)  Wt(kg): --  WBC Count: 6.27 K/uL (01-28 @ 01:12)    Recent Cultures:  Specimen Source: .Blood Blood-Venous, 01-26 @ 18:14; Results   No growth to date.; Gram Stain: --; Organism: --  Specimen Source: .Blood Blood, 01-24 @ 18:59; Results   Growth in aerobic bottle: Acinetobacter baumannii nosocomialis group  See previous culture [id number]  Growth in aerobic and anaerobic bottles: Staphylococcus aureus  Growth in anaerobic bottle: Staphylococcus epidermidis; Gram Stain:   Growth in aerobic bottle: Gram Negative Rods  Growth in anaerobic bottle: Gram Positive Cocci in Clusters; Organism: Blood Culture PCR  Specimen Source: .Blood Blood-Venous, 01-23 @ 19:31; Results   Growth in aerobic bottle: Acinetobacter baumannii Nosocomialis group  See previous culture [id number]; Gram Stain:   Growth in aerobic bottle: Gram Negative Rods; Organism: --  Specimen Source: .Blood Blood-Peripheral, 01-22 @ 17:15; Results   Growth in aerobic bottle: Acinetobacter baumannii NOSOCOMIALIS GROUP  See previous culture [id number]; Gram Stain:   Growth in aerobic bottle: Gram Negative Rods; Organism: --  Specimen Source: .Blood Blood-Venous, 01-22 @ 15:13; Results   Growth in aerobic bottle: Acinetobacter baumannii nosocomialis group  Growth in aerobic bottle: Enterococcus faecium (vancomycin resistant)  ***Blood Panel PCR results on this specimen are available  approximately 3 hours after the Gram stain result.***  Gram stain, PCR, and/or culture results may not always  correspond due to difference in methodologies.  ************************************************************  This PCR assay was performed using [facility].  The following targets are tested for: Enterococcus,  vancomycin resistant enterococci, Listeria monocytogenes,  coagulase negative staphylococci, S. aureus,  methicillin resistant S. aureus, Streptococcus agalactiae  (Group B), S. pneumoniae, S. pyogenes (Group A),  Acinetobacter baumannii, Enterobacter cloacae, E. coli,  Klebsiella oxytoca, K. pneumoniae, Proteus sp.,  Serratia marcescens, Haemophilus influenzae,  Neisseria meningitidis, Pseudomonas aeruginosa, Candida  albicans, C. glabrata, C krusei, C parapsilosis,  C. tropicalis and the KPC resistance gene.; Gram Stain:   Growth in aerobic bottle: Gram Negative Rods and Gram Positive Cocci in  Pairs and Chains; Organism: Blood Culture PCR  Acinetobacter baumannii  Enterococcus faecium (vancomycin resistant)    Meds: DAPTOmycin IVPB      DAPTOmycin IVPB 900 milliGRAM(s) IV Intermittent every 24 hours  erythromycin     base Tablet 250 milliGRAM(s) Oral <User Schedule>  meropenem  IVPB 1000 milliGRAM(s) IV Intermittent every 8 hours  minocycline IVPB 100 milliGRAM(s) IV Intermittent every 12 hours  minocycline IVPB      polymyxin B IVPB [id number] Unit(s) IV Intermittent every 12 hours        ENDOCRINE  Capillary Blood Glucose    Meds: atorvastatin 40 milliGRAM(s) Oral <User Schedule>  hydrocortisone sodium succinate Injectable 50 milliGRAM(s) IV Push every 12 hours  levothyroxine Injectable 25 MICROGram(s) IV Push at bedtime      OTHER MEDICATIONS:  AQUAPHOR (petrolatum Ointment) 1 Application(s) Topical three times a day  artificial tears (preservative free) Ophthalmic Solution 1 Drop(s) Both EYES two times a day  chlorhexidine 2% Cloths 1 Application(s) Topical <User Schedule>  clobetasol 0.05% Ointment 1 Application(s) Topical two times a day  FIRST- Mouthwash  BLM 5 milliLiter(s) Swish and Spit four times a day  lidocaine   Patch 1 Patch Transdermal daily  nystatin Powder 1 Application(s) Topical two times a day  zinc oxide 20% Ointment 1 Application(s) Topical three times a day PRN      CODE STATUS: DNR with trial of intubation

## 2020-01-28 NOTE — PROGRESS NOTE ADULT - ASSESSMENT
64 F PMHx of HTN, COPD, and morbid obesity who was admitted on 11/26 for perforated sigmoid diverticulitis  Prolonged hospital stay  Perforated diverticulum, culture with E coli, s/p OR into washout and ostomy  S/p treatment courses for PICC CoNS CLABSI and Steno in sputum  CTs with improvement in intraabd collections  CXR clear  BCX with VRE and Acinetobacter--acinetobacter persistent through current regimen (emmanuel added over weekend)  Critically ill, persistent bacteria in blood, concern for multidrug resistant bacteria/life threatening, poor prognosis  CT's with pna, abd unchanged  Central lines changed 1/23  Now culture with staph aureus, linezolid changed to Dapto  Overall,  1) CoNS bacteremia--? CLABSI  - S/p course treatment  2) Loculated fluid collections  - Infected collections, most recent CT reassuring  - Repeat CT A/P when feasible  3) Leukocytosis  - Trend to normal, monitor for further signs infection  4) Persistent Bacteremia  - GNR and Cons noted in blood culture; new staph aureus in BCX  - Continue Minocycline 100mg q 12  - Dapto 1000mg q 24 (monitor weekly CK)  - Silvia 1g q 8 (CrCl 72)  - Poly B 1,000,000 q 12 (monitor electrolytes, Cr, any signs neuropathy/paresthesias)  - F/U pending BCXs; repeat BCXs q 48 to clearance  - I spoke to lab to have them check S for EmmanuelBessie moreira, Poly    Discussed [facility] SICU team    Sen Johnson MD  Pager [phone]  After 5pm and on weekends call [phone]

## 2020-01-28 NOTE — PROGRESS NOTE ADULT - SUBJECTIVE AND OBJECTIVE BOX
CARDIOLOGY     PROGRESS  NOTE   ________________________________________________    CHIEF COMPLAINT:Patient is a 64y old  Female who presents with a chief complaint of sepsis (27 Jan 2020 16:02)  no sig change.  	  REVIEW OF SYSTEMS:  CONSTITUTIONAL: No fever, weight loss, or fatigue  EYES: No eye pain, visual disturbances, or discharge  ENT:  No difficulty hearing, tinnitus, vertigo; No sinus or throat pain  NECK: No pain or stiffness  RESPIRATORY: No cough, wheezing, chills or hemoptysis; No Shortness of Breath  CARDIOVASCULAR: No chest pain, palpitations, passing out, dizziness, or leg swelling  GASTROINTESTINAL: No abdominal or epigastric pain. No nausea, vomiting, or hematemesis; No diarrhea or constipation. No melena or hematochezia.  GENITOURINARY: No dysuria, frequency, hematuria, or incontinence  NEUROLOGICAL: No headaches, memory loss, loss of strength, numbness, or tremors  SKIN: No itching, burning, rashes, or lesions   LYMPH Nodes: No enlarged glands  ENDOCRINE: No heat or cold intolerance; No hair loss  MUSCULOSKELETAL: No joint pain or swelling; No muscle, back, or extremity pain  PSYCHIATRIC: No depression, anxiety, mood swings, or difficulty sleeping  HEME/LYMPH: No easy bruising, or bleeding gums  ALLERGY AND IMMUNOLOGIC: No hives or eczema	    [ ] All others negative	  [ ] Unable to obtain    PHYSICAL EXAM:  T(C): 37.1 (01-28-20 @ 07:00), Max: 37.1 (01-27-20 @ 23:00)  HR: 93 (01-28-20 @ 08:00) (84 - 106)  BP: 99/57 (01-28-20 @ 08:00) (81/53 - 138/61)  RR: 31 (01-28-20 @ 08:00) (12 - 31)  SpO2: 99% (01-28-20 @ 08:00) (88% - 100%)  Wt(kg): --  I&O's Summary    27 Jan 2020 07:01  -  28 Jan 2020 07:00  --------------------------------------------------------  IN: 3717.5 mL / OUT: 1655 mL / NET: 2062.5 mL    28 Jan 2020 07:01  -  28 Jan 2020 08:53  --------------------------------------------------------  IN: 55 mL / OUT: 25 mL / NET: 30 mL        Appearance: Normal	  HEENT:   Normal oral mucosa, PERRL, EOMI	  Lymphatic: No lymphadenopathy  Cardiovascular: Normal S1 S2, No JVD, + murmurs, + lymph edema  Respiratory: decrease bs  Psychiatry: A & O x 3, Mood & affect appropriate  Gastrointestinal:  Soft, Non-tender, + BS	  Skin: No rashes, No ecchymoses, No cyanosis	  Neurologic: Non-focal  Extremities: Normal range of motion, No clubbing, cyanosis or edema  Vascular: Peripheral pulses palpable 2+ bilaterally    MEDICATIONS  (STANDING):  albuterol/ipratropium for Nebulization 3 milliLiter(s) Nebulizer every 6 hours  AQUAPHOR (petrolatum Ointment) 1 Application(s) Topical three times a day  artificial tears (preservative free) Ophthalmic Solution 1 Drop(s) Both EYES two times a day  aspirin  chewable 81 milliGRAM(s) Oral <User Schedule>  atorvastatin 40 milliGRAM(s) Oral <User Schedule>  buDESOnide    Inhalation Suspension 0.5 milliGRAM(s) Inhalation every 12 hours  chlorhexidine 2% Cloths 1 Application(s) Topical <User Schedule>  clobetasol 0.05% Ointment 1 Application(s) Topical two times a day  DAPTOmycin IVPB      DAPTOmycin IVPB 900 milliGRAM(s) IV Intermittent every 24 hours  enoxaparin Injectable 100 milliGRAM(s) SubCutaneous <User Schedule>  erythromycin     base Tablet 250 milliGRAM(s) Oral <User Schedule>  FIRST- Mouthwash  BLM 5 milliLiter(s) Swish and Spit four times a day  hydrocortisone sodium succinate Injectable 50 milliGRAM(s) IV Push every 12 hours  lactated ringers. 1000 milliLiter(s) (10 mL/Hr) IV Continuous <Continuous>  levothyroxine Injectable 25 MICROGram(s) IV Push at bedtime  lidocaine   Patch 1 Patch Transdermal daily  melatonin 3 milliGRAM(s) Oral at bedtime  meropenem  IVPB 1000 milliGRAM(s) IV Intermittent every 8 hours  methadone    Tablet 10 milliGRAM(s) Oral <User Schedule>  minocycline IVPB 100 milliGRAM(s) IV Intermittent every 12 hours  minocycline IVPB      nystatin Powder 1 Application(s) Topical two times a day  oxyCODONE    IR 15 milliGRAM(s) Oral every 4 hours  pantoprazole    Tablet 40 milliGRAM(s) Oral <User Schedule>  polyethylene glycol 3350 17 Gram(s) Oral <User Schedule>  polymyxin B IVPB [id number] Unit(s) IV Intermittent every 12 hours  thiamine 100 milliGRAM(s) Oral daily      TELEMETRY: 	    ECG:  	  RADIOLOGY:  OTHER: 	  	  LABS:	 	    CARDIAC MARKERS:                                7.2    6.27  )-----------( 93       ( 28 Jan 2020 01:12 )             23.0     01-28    137  |  98  |  43<H>  ----------------------------<  178<H>  4.3   |  25  |  0.97    Ca    8.1<L>      28 Jan 2020 01:12  Phos  2.6     01-28  Mg     2.0     01-28    TPro  5.5<L>  /  Alb  2.6<L>  /  TBili  0.6  /  DBili  0.2  /  AST  153<H>  /  ALT  58<H>  /  AlkPhos  256<H>  01-28    proBNP:   Lipid Profile: Cholesterol 98  LDL 52  HDL 28  TG 91    HgA1c:   TSH: Thyroid Stimulating Hormone, Serum: 4.81 uIU/mL (01-17 @ 03:52)    Culture - Blood (01.24.20 @ 18:59)    -  Coagulase negative Staphylococcus: Detec    Gram Stain:   Growth in aerobic bottle: Gram Negative Rods  Growth in anaerobic bottle: Gram Positive Cocci in Clusters    Specimen Source: .Blood Blood    Organism: Blood Culture PCR    Culture Results:   Growth in aerobic bottle: Acinetobacter baumannii nosocomialis group  See previous culture [id number]  Growth in anaerobic bottle: Coag Negative Staphylococcus  Coag Negative Staphylococcus  Single set isolate, possible contaminant. Contact  Microbiology if susceptibility testing clinically  indicated.    ***Blood Panel PCR results on this specimen are available  approximately 3 hours after the Gram stain result.***  Gram stain, PCR, and/or culture results may not always  correspond due todifference in methodologies.  ************************************************************  This PCR assay was performed using [employer].  The following targets are tested for: Enterococcus,  vancomycin resistant enterococci, Listeria monocytogenes,  coagulase negative staphylococci, S. aureus,  methicillin resistant S. aureus, Streptococcus agalactiae  (Group B), S. pneumoniae, S. pyogenes (Group A),  Acinetobacter baumannii, Enterobacter cloacae, E. coli,  Klebsiella oxytoca, K. pneumoniae, Proteus sp.,  Serratia marcescens, Haemophilus influenzae,  Neisseria meningitidis, Pseudomonas aeruginosa, Candida  albicans, C. glabrata, C krusei, C parapsilosis,  C. tropicalis and the KPC resistance gene.    Organism Identification: Blood Culture PCR    Method Type: PCR          Assessment and plan  ---------------------------  septic/ hypovolemic shock still hypotensive but off pressors  abx as per ID + blood cultures  may hold cholesterol meds  WMA on echo ? sec to sepsis  dvt prophylaxis  s/p blood transfusion   continue tx as per ICU  decrease methadone dose  ace wrap to the both LE  increase renal function ? sec to ATN sec to hypotension fu renal function closely/ improving  may dc lipitor  continue NGT feeding

## 2020-01-29 LAB
ALBUMIN SERPL ELPH-MCNC: 2.4 G/DL — LOW (ref 3.3–5)
ALP SERPL-CCNC: 257 U/L — HIGH (ref 40–120)
ALT FLD-CCNC: 70 U/L — HIGH (ref 10–45)
ANION GAP SERPL CALC-SCNC: 12 MMOL/L — SIGNIFICANT CHANGE UP (ref 5–17)
APTT BLD: 30.1 SEC — SIGNIFICANT CHANGE UP (ref 27.5–36.3)
AST SERPL-CCNC: 142 U/L — HIGH (ref 10–40)
BILIRUB DIRECT SERPL-MCNC: 0.3 MG/DL — HIGH (ref 0–0.2)
BILIRUB INDIRECT FLD-MCNC: 0.3 MG/DL — SIGNIFICANT CHANGE UP (ref 0.2–1)
BILIRUB SERPL-MCNC: 0.6 MG/DL — SIGNIFICANT CHANGE UP (ref 0.2–1.2)
BUN SERPL-MCNC: 57 MG/DL — HIGH (ref 7–23)
CALCIUM SERPL-MCNC: 8.2 MG/DL — LOW (ref 8.4–10.5)
CHLORIDE SERPL-SCNC: 101 MMOL/L — SIGNIFICANT CHANGE UP (ref 96–108)
CO2 SERPL-SCNC: 25 MMOL/L — SIGNIFICANT CHANGE UP (ref 22–31)
CREAT SERPL-MCNC: 1.09 MG/DL — SIGNIFICANT CHANGE UP (ref 0.5–1.3)
FIBRINOGEN PPP-MCNC: 444 MG/DL — SIGNIFICANT CHANGE UP (ref 350–510)
GAS PNL BLDV: SIGNIFICANT CHANGE UP
GLUCOSE SERPL-MCNC: 150 MG/DL — HIGH (ref 70–99)
GRAM STN FLD: SIGNIFICANT CHANGE UP
HCT VFR BLD CALC: 24.7 % — LOW (ref 34.5–45)
HGB BLD-MCNC: 7.6 G/DL — LOW (ref 11.5–15.5)
INR BLD: 1.11 RATIO — SIGNIFICANT CHANGE UP (ref 0.88–1.16)
MAGNESIUM SERPL-MCNC: 2 MG/DL — SIGNIFICANT CHANGE UP (ref 1.6–2.6)
MCHC RBC-ENTMCNC: 28.4 PG — SIGNIFICANT CHANGE UP (ref 27–34)
MCHC RBC-ENTMCNC: 30.8 GM/DL — LOW (ref 32–36)
MCV RBC AUTO: 92.2 FL — SIGNIFICANT CHANGE UP (ref 80–100)
NRBC # BLD: 0 /100 WBCS — SIGNIFICANT CHANGE UP (ref 0–0)
PHOSPHATE SERPL-MCNC: 3.6 MG/DL — SIGNIFICANT CHANGE UP (ref 2.5–4.5)
PLATELET # BLD AUTO: 82 K/UL — LOW (ref 150–400)
POTASSIUM SERPL-MCNC: 4.2 MMOL/L — SIGNIFICANT CHANGE UP (ref 3.5–5.3)
POTASSIUM SERPL-SCNC: 4.2 MMOL/L — SIGNIFICANT CHANGE UP (ref 3.5–5.3)
PROT SERPL-MCNC: 5.3 G/DL — LOW (ref 6–8.3)
PROTHROM AB SERPL-ACNC: 12.7 SEC — SIGNIFICANT CHANGE UP (ref 10–12.9)
RBC # BLD: 2.68 M/UL — LOW (ref 3.8–5.2)
RBC # FLD: 19.4 % — HIGH (ref 10.3–14.5)
SODIUM SERPL-SCNC: 138 MMOL/L — SIGNIFICANT CHANGE UP (ref 135–145)
SPECIMEN SOURCE: SIGNIFICANT CHANGE UP
WBC # BLD: 7.91 K/UL — SIGNIFICANT CHANGE UP (ref 3.8–10.5)
WBC # FLD AUTO: 7.91 K/UL — SIGNIFICANT CHANGE UP (ref 3.8–10.5)

## 2020-01-29 PROCEDURE — 99233 SBSQ HOSP IP/OBS HIGH 50: CPT

## 2020-01-29 PROCEDURE — 99232 SBSQ HOSP IP/OBS MODERATE 35: CPT

## 2020-01-29 RX ORDER — SODIUM CHLORIDE 9 MG/ML
500 INJECTION, SOLUTION INTRAVENOUS ONCE
Refills: 0 | Status: COMPLETED | OUTPATIENT
Start: 2020-01-29 | End: 2020-01-29

## 2020-01-29 RX ORDER — GABAPENTIN 400 MG/1
600 CAPSULE ORAL THREE TIMES A DAY
Refills: 0 | Status: DISCONTINUED | OUTPATIENT
Start: 2020-01-29 | End: 2020-02-02

## 2020-01-29 RX ADMIN — METHADONE HYDROCHLORIDE 10 MILLIGRAM(S): 40 TABLET ORAL at 09:53

## 2020-01-29 RX ADMIN — Medication 3 MILLILITER(S): at 17:54

## 2020-01-29 RX ADMIN — OXYCODONE HYDROCHLORIDE 10 MILLIGRAM(S): 5 TABLET ORAL at 06:15

## 2020-01-29 RX ADMIN — POLYMYXIN B SULFATE 600 UNIT(S): 500000 INJECTION, POWDER, LYOPHILIZED, FOR SOLUTION INTRAMUSCULAR; INTRATHECAL; INTRAVENOUS; OPHTHALMIC at 12:07

## 2020-01-29 RX ADMIN — Medication 25 MICROGRAM(S): at 21:16

## 2020-01-29 RX ADMIN — POLYMYXIN B SULFATE 600 UNIT(S): 500000 INJECTION, POWDER, LYOPHILIZED, FOR SOLUTION INTRAMUSCULAR; INTRATHECAL; INTRAVENOUS; OPHTHALMIC at 00:38

## 2020-01-29 RX ADMIN — DIPHENHYDRAMINE HYDROCHLORIDE AND LIDOCAINE HYDROCHLORIDE AND ALUMINUM HYDROXIDE AND MAGNESIUM HYDRO 5 MILLILITER(S): KIT at 12:08

## 2020-01-29 RX ADMIN — HYDROMORPHONE HYDROCHLORIDE 1.5 MILLIGRAM(S): 2 INJECTION INTRAMUSCULAR; INTRAVENOUS; SUBCUTANEOUS at 17:45

## 2020-01-29 RX ADMIN — Medication 3 MILLILITER(S): at 05:36

## 2020-01-29 RX ADMIN — SODIUM CHLORIDE 1000 MILLILITER(S): 9 INJECTION, SOLUTION INTRAVENOUS at 22:20

## 2020-01-29 RX ADMIN — Medication 250 MILLIGRAM(S): at 09:53

## 2020-01-29 RX ADMIN — Medication 1 DROP(S): at 05:08

## 2020-01-29 RX ADMIN — GABAPENTIN 600 MILLIGRAM(S): 400 CAPSULE ORAL at 21:16

## 2020-01-29 RX ADMIN — Medication 1 APPLICATION(S): at 05:07

## 2020-01-29 RX ADMIN — Medication 0.5 MILLIGRAM(S): at 05:37

## 2020-01-29 RX ADMIN — GABAPENTIN 600 MILLIGRAM(S): 400 CAPSULE ORAL at 13:50

## 2020-01-29 RX ADMIN — Medication 3 MILLILITER(S): at 12:02

## 2020-01-29 RX ADMIN — OXYCODONE HYDROCHLORIDE 10 MILLIGRAM(S): 5 TABLET ORAL at 00:28

## 2020-01-29 RX ADMIN — Medication 0.5 MILLIGRAM(S): at 17:54

## 2020-01-29 RX ADMIN — HYDROMORPHONE HYDROCHLORIDE 1.5 MILLIGRAM(S): 2 INJECTION INTRAMUSCULAR; INTRAVENOUS; SUBCUTANEOUS at 02:55

## 2020-01-29 RX ADMIN — OXYCODONE HYDROCHLORIDE 10 MILLIGRAM(S): 5 TABLET ORAL at 06:45

## 2020-01-29 RX ADMIN — Medication 1 APPLICATION(S): at 17:25

## 2020-01-29 RX ADMIN — Medication 1 DROP(S): at 17:26

## 2020-01-29 RX ADMIN — Medication 1 APPLICATION(S): at 21:17

## 2020-01-29 RX ADMIN — POLYETHYLENE GLYCOL 3350 17 GRAM(S): 17 POWDER, FOR SOLUTION ORAL at 19:11

## 2020-01-29 RX ADMIN — Medication 3 MILLIGRAM(S): at 21:16

## 2020-01-29 RX ADMIN — POLYETHYLENE GLYCOL 3350 17 GRAM(S): 17 POWDER, FOR SOLUTION ORAL at 12:08

## 2020-01-29 RX ADMIN — Medication 3 MILLILITER(S): at 00:32

## 2020-01-29 RX ADMIN — MEROPENEM 100 MILLIGRAM(S): 1 INJECTION INTRAVENOUS at 13:49

## 2020-01-29 RX ADMIN — Medication 100 MILLIGRAM(S): at 12:09

## 2020-01-29 RX ADMIN — HYDROMORPHONE HYDROCHLORIDE 1.5 MILLIGRAM(S): 2 INJECTION INTRAMUSCULAR; INTRAVENOUS; SUBCUTANEOUS at 03:10

## 2020-01-29 RX ADMIN — DIPHENHYDRAMINE HYDROCHLORIDE AND LIDOCAINE HYDROCHLORIDE AND ALUMINUM HYDROXIDE AND MAGNESIUM HYDRO 5 MILLILITER(S): KIT at 17:26

## 2020-01-29 RX ADMIN — Medication 250 MILLIGRAM(S): at 17:26

## 2020-01-29 RX ADMIN — MINOCYCLINE HYDROCHLORIDE 100 MILLIGRAM(S): 45 TABLET, EXTENDED RELEASE ORAL at 06:15

## 2020-01-29 RX ADMIN — NYSTATIN CREAM 1 APPLICATION(S): 100000 CREAM TOPICAL at 05:09

## 2020-01-29 RX ADMIN — Medication 50 MILLIGRAM(S): at 05:09

## 2020-01-29 RX ADMIN — METHADONE HYDROCHLORIDE 10 MILLIGRAM(S): 40 TABLET ORAL at 21:16

## 2020-01-29 RX ADMIN — DAPTOMYCIN 140 MILLIGRAM(S): 500 INJECTION, POWDER, LYOPHILIZED, FOR SOLUTION INTRAVENOUS at 17:25

## 2020-01-29 RX ADMIN — HYDROMORPHONE HYDROCHLORIDE 1.5 MILLIGRAM(S): 2 INJECTION INTRAMUSCULAR; INTRAVENOUS; SUBCUTANEOUS at 17:26

## 2020-01-29 RX ADMIN — SODIUM CHLORIDE 1000 MILLILITER(S): 9 INJECTION, SOLUTION INTRAVENOUS at 00:44

## 2020-01-29 RX ADMIN — MEROPENEM 100 MILLIGRAM(S): 1 INJECTION INTRAVENOUS at 21:16

## 2020-01-29 RX ADMIN — ENOXAPARIN SODIUM 100 MILLIGRAM(S): 100 INJECTION SUBCUTANEOUS at 12:08

## 2020-01-29 RX ADMIN — NYSTATIN CREAM 1 APPLICATION(S): 100000 CREAM TOPICAL at 17:25

## 2020-01-29 RX ADMIN — POLYETHYLENE GLYCOL 3350 17 GRAM(S): 17 POWDER, FOR SOLUTION ORAL at 06:15

## 2020-01-29 RX ADMIN — PANTOPRAZOLE SODIUM 40 MILLIGRAM(S): 20 TABLET, DELAYED RELEASE ORAL at 05:08

## 2020-01-29 RX ADMIN — MINOCYCLINE HYDROCHLORIDE 100 MILLIGRAM(S): 45 TABLET, EXTENDED RELEASE ORAL at 17:25

## 2020-01-29 RX ADMIN — ATORVASTATIN CALCIUM 40 MILLIGRAM(S): 80 TABLET, FILM COATED ORAL at 21:16

## 2020-01-29 RX ADMIN — POLYMYXIN B SULFATE 600 UNIT(S): 500000 INJECTION, POWDER, LYOPHILIZED, FOR SOLUTION INTRAMUSCULAR; INTRATHECAL; INTRAVENOUS; OPHTHALMIC at 23:06

## 2020-01-29 RX ADMIN — Medication 81 MILLIGRAM(S): at 21:16

## 2020-01-29 RX ADMIN — MEROPENEM 100 MILLIGRAM(S): 1 INJECTION INTRAVENOUS at 05:07

## 2020-01-29 RX ADMIN — DIPHENHYDRAMINE HYDROCHLORIDE AND LIDOCAINE HYDROCHLORIDE AND ALUMINUM HYDROXIDE AND MAGNESIUM HYDRO 5 MILLILITER(S): KIT at 05:07

## 2020-01-29 RX ADMIN — CHLORHEXIDINE GLUCONATE 1 APPLICATION(S): 213 SOLUTION TOPICAL at 05:08

## 2020-01-29 NOTE — PROGRESS NOTE ADULT - ASSESSMENT
65 y/o female presenting with perforated sigmoid diverticulitis s/p exploratory laparotomy, extended left hemicolectomy, and left in discontinuity w/ Abthera VAC placement with return to OR for re-exploratory laparotomy, transverse end colostomy, and fascial closure with wound VAC placement. Hospital course complicated by gastroparesis, right subclavian steal syndrome, full body rash of unknown etiology, and refractory septic shock c/b stress-induced cardiomyopathy. Cultures were positive for E. coli & Klebsiella in the urine, Stenotrophomonas in the sputum, and Staph epidermis in the blood with subsequent removal of her LUE PICC. Course further complicated by VRE and MDR Acinetobacter bacteremia.    PLAN:    Neuro: acute pain from her full body rash, opioid dependence  - Monitor mental status  - Pain control as needed with methadone 10mg BID, PRN Dilaudid 1.5mg q6h, PRN oxycodone 10mg q6h   - C/w melatonin at bedtime     Resp: COPD, Right upper lobe pneumonia  - Monitor pulse oximeter  - Out of bed to chair and incentive spirometry, chest PT to prevent atelectasis  - Pulmicort and Duoneb for COPD  - Antibiotic coverage for pneumonia    CV: refractory septic shock c/b stress-induced cardiomyopathy, resolved  - Monitor vital signs, remains off midodrine/Levophed at this time.   - hypotensive to 80~90's overnight, 500cc LR given w/o much response  - Home ASA    GI: perforated sigmoid diverticulitis s/p exploratory laparotomy, extended left hemicolectomy, and left in discontinuity w/ Abthera VAC placement with return to OR for re-exploratory laparotomy, transverse end colostomy, and fascial closure c/b gastroparesis; GERD  - Regular diet with nocturnal feeds (6pm-6am): Nepro @ 70ml/hr via Luis feeding tube   - Monitor ostomy output  - Erythromycin for gastroparesis  - Protonix for GERD  - Maalox PRN indigestion  - Continue wound VAC therapy     Renal: BULL, resolved  - Monitor I&Os.  - KVO fluids.   - Monitor electrolytes and replete as necessary    Heme: no acute issues  - Monitor CBC and coags  - Lovenox 100 mg daily for VTE prophylaxis, adjusted for BMI  - ASA 81mg qd    ID: E. coli & Klebsiella UTI, Stenotrophomonas PNA, Staph epidermis bacteremia; Now MDR Acinetobacter and VRE bacteremia 01/22  - Continue meropenem, Polymixin B, & Minocycline.   - Continue Daptomycin, will obtain weekly CK level  - Blood cx remain positive on 1/24, results from 1/26 showing no GTD.   - Repeat BCx (1/28) pending      Endo: HLD, hypothyroidism vs sick euthyroid syndrome  - 25mg IV levothyroxine for hypothyroidism.   - Monitor glucose on BMP  - Home Lipitor  - Solucortef 50mg IV daily    Integumentary:   - Appreciate Dermatology recs: Vaseline, zinc oxide, clobetasol cream.     Disposition:  DNR. Will remain in SICU. 65 y/o female presenting with perforated sigmoid diverticulitis s/p exploratory laparotomy, extended left hemicolectomy, and left in discontinuity w/ Abthera VAC placement with return to OR for re-exploratory laparotomy, transverse end colostomy, and fascial closure with wound VAC placement. Hospital course complicated by gastroparesis, right subclavian steal syndrome, full body rash of unknown etiology, and refractory septic shock c/b stress-induced cardiomyopathy. Cultures were positive for E. coli & Klebsiella in the urine, Stenotrophomonas in the sputum, and Staph epidermis in the blood with subsequent removal of her LUE PICC. Course further complicated by VRE and MDR Acinetobacter bacteremia.    PLAN:    Neuro: acute pain from her full body rash, opioid dependence  - Monitor mental status  - Pain control as needed with methadone 10mg BID, PRN Dilaudid 1.5mg q6h, PRN oxycodone 10mg q6h   - C/w melatonin at bedtime     Resp: COPD, Right upper lobe pneumonia  - Monitor pulse oximeter  - Out of bed to chair and incentive spirometry, chest PT to prevent atelectasis  - Pulmicort and Duoneb for COPD  - Antibiotic coverage for pneumonia    CV: refractory septic shock c/b stress-induced cardiomyopathy, resolved  - Monitor vital signs, remains off midodrine/Levophed at this time.   - hypotensive to 80~90's overnight, 500cc LR given w/o much response  - Home ASA    GI: perforated sigmoid diverticulitis s/p exploratory laparotomy, extended left hemicolectomy, and left in discontinuity w/ Abthera VAC placement with return to OR for re-exploratory laparotomy, transverse end colostomy, and fascial closure c/b gastroparesis; GERD  - Regular diet with nocturnal feeds (6pm-6am): Nepro @ 70ml/hr via Luis feeding tube   - Monitor ostomy output  - Erythromycin for gastroparesis  - Protonix for GERD  - Maalox PRN indigestion  - Continue wound VAC therapy     Renal: BULL, resolved  - Monitor I&Os.  - KVO fluids.   - Monitor electrolytes and replete as necessary    Heme: no acute issues  - Monitor CBC and coags  - Lovenox 100 mg daily for VTE prophylaxis, adjusted for BMI  - ASA 81mg qd    ID: E. coli & Klebsiella UTI, Stenotrophomonas PNA, Staph epidermis bacteremia; Now MDR Acinetobacter and VRE bacteremia 01/22  - Continue meropenem, Polymixin B, & Minocycline.   - Continue Daptomycin, will obtain weekly CK level  - Blood cx remain positive on 1/24, results from 1/26 showing no GTD.   - Repeat BCx (1/28), GNR      Endo: HLD, hypothyroidism vs sick euthyroid syndrome  - 25mg IV levothyroxine for hypothyroidism.   - Monitor glucose on BMP  - Home Lipitor  - Solucortef 50mg IV daily    Integumentary:   - Appreciate Dermatology recs: Vaseline, zinc oxide, clobetasol cream.     Disposition:  DNR. Will remain in SICU.

## 2020-01-29 NOTE — PROGRESS NOTE ADULT - ATTENDING COMMENTS
Evaluated in round  Awake and alert, pain not well controlled, will add high dose Gabapentin  Saturating well, on bronchodilators ICS  Remains off vasopressor support and Midodrine  Nocturnal NGT feed,  oral feed during day time  Abx Polymixin Minocycline Meropenem, Dapto, BC growing PCN resistant staph epi, repeat BC again grew GNR, suspect acenatobactor resistant to polymixin  Renal function stable  DVT prophylaxis with Lovenox, HCT platelets stable  Wound/skin care    Spoke to pt and significant other, made her DNI, was DNR, options of palliate and comfort measure only offered.

## 2020-01-29 NOTE — PROGRESS NOTE ADULT - SUBJECTIVE AND OBJECTIVE BOX
CC: F/U for Bacteremia    Saw/spoke to patient. No fevers, no chills. Patient mental status improved, but fatigued and will appearing.    Allergies  IV Contrast (Rash; Pruritus; Hypotension)  penicillin (Rash (Severe))  sulfa drugs (Unknown)  vancomycin (Rash (Severe))    ANTIMICROBIALS:  DAPTOmycin IVPB 1000 every 24 hours  erythromycin     base Tablet 250 <User Schedule>  meropenem  IVPB 1000 every 8 hours  minocycline IVPB 100 every 12 hours  minocycline IVPB    polymyxin B IVPB [id number] every 12 hours    PE:    Vital Signs Last 24 Hrs  T(C): 36.7 (29 Jan 2020 03:00), Max: 36.9 (28 Jan 2020 23:00)  T(F): 98.1 (29 Jan 2020 03:00), Max: 98.4 (28 Jan 2020 23:00)  HR: 85 (29 Jan 2020 10:00) (80 - 99)  BP: 92/55 (29 Jan 2020 10:00) (83/58 - 136/70)  BP(mean): 67 (29 Jan 2020 10:00) (60 - 88)  RR: 23 (29 Jan 2020 10:00) (12 - 29)  SpO2: 98% (29 Jan 2020 10:00) (94% - 100%)    Gen: AOx3, NAD, ill appearing, fatigued  CV: S1+S2 normal, nontachycardic  Resp: Clear bilat, no resp distress, no crackles/wheezes  Abd: Soft, nontender, +BS, wound vac, ostomy  Ext: No LE edema, no wounds    LABS:                        7.6    7.91  )-----------( 82       ( 29 Jan 2020 00:11 )             24.7     01-29    138  |  101  |  57<H>  ----------------------------<  150<H>  4.2   |  25  |  1.09    Ca    8.2<L>      29 Jan 2020 00:11  Phos  3.6     01-29  Mg     2.0     01-29    TPro  5.3<L>  /  Alb  2.4<L>  /  TBili  0.6  /  DBili  0.3<H>  /  AST  142<H>  /  ALT  70<H>  /  AlkPhos  257<H>  01-29    MICROBIOLOGY:    .Blood Blood  01-28-20   Growth in aerobic bottle:  Gram Negative Rods  --    Growth in aerobic bottle:  Gram Negative Rods    .Blood Blood-Venous  01-26-20   No growth to date.    .Blood Blood  01-24-20   Growth in aerobic bottle: Acinetobacter baumannii nosocomialis group  See previous culture [id number]  Growth in anaerobic bottle: Staphylococcus aureus  Growth in anaerobic bottle: Staphylococcus epidermidis  --  Staphylococcus aureus  Staphylococcus epidermidis    .Blood Blood-Venous  01-23-20   Growth in aerobic bottle: Acinetobacter baumannii Nosocomialis group  See previous culture [id number]  --    Growth in aerobic bottle: Gram Negative Rods    .Blood Blood-Peripheral  01-22-20   Growth in aerobic bottle: Acinetobacter baumannii NOSOCOMIALIS GROUP  See previous culture [id number]  --    Growth in aerobic bottle: Gram Negative Rods    .Blood Blood-Venous  01-22-20   Growth in aerobic bottle: Acinetobacter baumannii nosocomialis group  Growth in aerobic bottle: Enterococcus faecium (vancomycin resistant)    (otherwise reviewed)    RADIOLOGY:    1/28 CXR:    FINDINGS:    Lines and Tubes: Enteric tube with distal tip not imaged. Left central venous catheter with tip unchanged in position.    Lungs: Unchanged haziness of the right lung.    Pleura: Left pleural effusion.    Mediastinum: Heart size is normal.    Skeletal: Unremarkable.    IMPRESSION:    Unchanged haziness of the right lung.

## 2020-01-29 NOTE — PHARMACOTHERAPY INTERVENTION NOTE - COMMENTS
Patient currently on daptomycin for bacteremia with VRE, acinetobacter baumaunni, and staph epi. Ordered CK for 1/30 AM labs per pharmacy policy.     Ruba Rene, PharmD  PGY-1 Pharmacy Resident  [phone]

## 2020-01-29 NOTE — PROGRESS NOTE ADULT - SUBJECTIVE AND OBJECTIVE BOX
Morning Surgical Progress Note  Patient is a 64y old  Female who presents with a chief complaint of sepsis (27 Jan 2020 16:02)      SUBJECTIVE: Patient seen and examined at bedside with surgical team, patient complains of pain. There were no acute events overnight.    Vital Signs Last 24 Hrs  T(C): 36.7 (29 Jan 2020 03:00), Max: 37.1 (28 Jan 2020 07:00)  T(F): 98.1 (29 Jan 2020 03:00), Max: 98.8 (28 Jan 2020 07:00)  HR: 85 (29 Jan 2020 05:00) (80 - 99)  BP: 104/53 (29 Jan 2020 05:00) (83/58 - 136/70)  BP(mean): 76 (29 Jan 2020 05:00) (60 - 88)  RR: 23 (29 Jan 2020 05:00) (12 - 39)  SpO2: 99% (29 Jan 2020 05:00) (94% - 100%)I&O's Detail    27 Jan 2020 07:01  -  28 Jan 2020 07:00  --------------------------------------------------------  IN:    Enteral Tube Flush: 210 mL    lactated ringers.: 240 mL    Nepro: 1080 mL    Solution: 1150 mL    Solution: 1037.5 mL  Total IN: 3717.5 mL    OUT:    Colostomy: 450 mL    Indwelling Catheter - Urethral: 1005 mL    VAC (Vacuum Assisted Closure) System: 200 mL  Total OUT: 1655 mL    Total NET: 2062.5 mL      28 Jan 2020 07:01  -  29 Jan 2020 05:04  --------------------------------------------------------  IN:    Enteral Tube Flush: 40 mL    Lactated Ringers IV Bolus: 500 mL    lactated ringers.: 220 mL    Nepro: 930 mL    Solution: 1450 mL  Total IN: 3140 mL    OUT:    Colostomy: 710 mL    Indwelling Catheter - Urethral: 515 mL  Total OUT: 1225 mL    Total NET: 1915 mL      MEDICATIONS  (STANDING):  albuterol/ipratropium for Nebulization 3 milliLiter(s) Nebulizer every 6 hours  AQUAPHOR (petrolatum Ointment) 1 Application(s) Topical three times a day  artificial tears (preservative free) Ophthalmic Solution 1 Drop(s) Both EYES two times a day  aspirin  chewable 81 milliGRAM(s) Oral <User Schedule>  atorvastatin 40 milliGRAM(s) Oral <User Schedule>  buDESOnide    Inhalation Suspension 0.5 milliGRAM(s) Inhalation every 12 hours  chlorhexidine 2% Cloths 1 Application(s) Topical <User Schedule>  clobetasol 0.05% Ointment 1 Application(s) Topical two times a day  DAPTOmycin IVPB 1000 milliGRAM(s) IV Intermittent every 24 hours  enoxaparin Injectable 100 milliGRAM(s) SubCutaneous <User Schedule>  erythromycin     base Tablet 250 milliGRAM(s) Oral <User Schedule>  FIRST- Mouthwash  BLM 5 milliLiter(s) Swish and Spit four times a day  hydrocortisone sodium succinate Injectable 50 milliGRAM(s) IV Push daily  lactated ringers. 1000 milliLiter(s) (10 mL/Hr) IV Continuous <Continuous>  levothyroxine Injectable 25 MICROGram(s) IV Push at bedtime  lidocaine   Patch 1 Patch Transdermal daily  melatonin 3 milliGRAM(s) Oral at bedtime  meropenem  IVPB 1000 milliGRAM(s) IV Intermittent every 8 hours  methadone    Tablet 10 milliGRAM(s) Oral <User Schedule>  minocycline IVPB 100 milliGRAM(s) IV Intermittent every 12 hours  minocycline IVPB      nystatin Powder 1 Application(s) Topical two times a day  pantoprazole    Tablet 40 milliGRAM(s) Oral <User Schedule>  polyethylene glycol 3350 17 Gram(s) Oral <User Schedule>  polymyxin B IVPB [id number] Unit(s) IV Intermittent every 12 hours  thiamine 100 milliGRAM(s) Oral daily    MEDICATIONS  (PRN):  aluminum hydroxide/magnesium hydroxide/simethicone Suspension 30 milliLiter(s) Oral every 4 hours PRN Dyspepsia  diphenhydrAMINE 25 milliGRAM(s) Oral every 12 hours PRN Rash and/or Itching  HYDROmorphone  Injectable 1.5 milliGRAM(s) IV Push every 6 hours PRN Severe Pain (7 - 10)  HYDROmorphone  Injectable 2 milliGRAM(s) IV Push every 24 hours PRN breakthrough pain  ondansetron Injectable 4 milliGRAM(s) IV Push every 6 hours PRN Nausea and/or Vomiting  oxyCODONE    IR 10 milliGRAM(s) Oral every 6 hours PRN Moderate Pain (4 - 6)  zinc oxide 20% Ointment 1 Application(s) Topical three times a day PRN Rash      Physical Exam  Constitutional: A&Ox3, NAD  Respiratory: CTA b/l  Gastrointestinal: abdomen soft,, NT/ND, wound vac in place, dressings c/d/i  Skin: erythematous    LABS:                        7.6    7.91  )-----------( 82       ( 29 Jan 2020 00:11 )             24.7     01-29    138  |  101  |  57<H>  ----------------------------<  150<H>  4.2   |  25  |  1.09    Ca    8.2<L>      29 Jan 2020 00:11  Phos  3.6     01-29  Mg     2.0     01-29    TPro  5.3<L>  /  Alb  2.4<L>  /  TBili  0.6  /  DBili  0.3<H>  /  AST  142<H>  /  ALT  70<H>  /  AlkPhos  257<H>  01-29      LIVER FUNCTIONS - ( 29 Jan 2020 00:11 )  Alb: 2.4 g/dL / Pro: 5.3 g/dL / ALK PHOS: 257 U/L / ALT: 70 U/L / AST: 142 U/L / GGT: x

## 2020-01-29 NOTE — PROGRESS NOTE ADULT - SUBJECTIVE AND OBJECTIVE BOX
SICU DAILY PROGRESS NOTE    64y Female PMHx of morbid obesity, COPD, HTN, and GERD who presented to the ED on 11/26/2019 with abdominal pain and distension. Patient states that she had been having constipation for ~2 weeks with no improvement despite laxative and enema use. Imaging revealed perforated sigmoid diverticulitis with free air. Hospital course is as follows:      24 HOUR EVENTS:  - tube feeds switched to nocturnal 6pm-6am  - repeat bcx (1/28) sent  - 500cc LR bolus given for drop in SBP      SUBJECTIVE/ROS:  [x] A ten-point review of systems was otherwise negative except as noted.  [ ] Due to altered mental status/intubation, subjective information were not able to be obtained from the patient. History was obtained, to the extent possible, from review of the chart and collateral sources of information.      NEURO  Exam: awake, alert, oriented  Meds: diphenhydrAMINE 25 milliGRAM(s) Oral every 12 hours PRN Rash and/or Itching  HYDROmorphone  Injectable 1.5 milliGRAM(s) IV Push every 6 hours PRN Severe Pain (7 - 10)  HYDROmorphone  Injectable 2 milliGRAM(s) IV Push every 24 hours PRN breakthrough pain  melatonin 3 milliGRAM(s) Oral at bedtime  methadone    Tablet 10 milliGRAM(s) Oral <User Schedule>  ondansetron Injectable 4 milliGRAM(s) IV Push every 6 hours PRN Nausea and/or Vomiting  oxyCODONE    IR 10 milliGRAM(s) Oral every 6 hours PRN Moderate Pain (4 - 6)    [x] Adequacy of sedation and pain control has been assessed and adjusted      RESPIRATORY  RR: 22 (01-29-20 @ 02:00) (12 - 39)  SpO2: 96% (01-29-20 @ 02:00) (94% - 100%)  Wt(kg): --  Exam: unlabored, clear to auscultation bilaterally  Mechanical Ventilation:     [N/A] Extubation Readiness Assessed  Meds: albuterol/ipratropium for Nebulization 3 milliLiter(s) Nebulizer every 6 hours  buDESOnide    Inhalation Suspension 0.5 milliGRAM(s) Inhalation every 12 hours        CARDIOVASCULAR  HR: 90 (01-29-20 @ 02:00) (80 - 106)  BP: 90/58 (01-29-20 @ 02:00) (83/58 - 138/61)  BP(mean): 69 (01-29-20 @ 02:00) (60 - 88)  ABP: --  ABP(mean): --  Wt(kg): --  CVP(cm H2O): --  VBG - ( 28 Jan 2020 01:10 )  pH: 7.38  /  pCO2: 51    /  pO2: 46    / HCO3: 30    / Base Excess: 4.5   /  SaO2: 79     Lactate: 2.8                Exam: regular rate and rhythm  Cardiac Rhythm: sinus  Perfusion     [x]Adequate   [ ]Inadequate  Mentation   [x]Normal       [ ]Reduced  Extremities  [x]Warm         [ ]Cool  Volume Status [ ]Hypervolemic [x]Euvolemic [ ]Hypovolemic  Meds:       GI/NUTRITION  Exam: soft, nontender, nondistended, incision C/D/I  Diet: regular w/ tube feeds  Meds: aluminum hydroxide/magnesium hydroxide/simethicone Suspension 30 milliLiter(s) Oral every 4 hours PRN Dyspepsia  pantoprazole    Tablet 40 milliGRAM(s) Oral <User Schedule>  polyethylene glycol 3350 17 Gram(s) Oral <User Schedule>      GENITOURINARY  I&O's Detail    01-27 @ 07:01 - 01-28 @ 07:00  --------------------------------------------------------  IN:    Enteral Tube Flush: 210 mL    lactated ringers.: 240 mL    Nepro: 1080 mL    Solution: 1150 mL    Solution: 1037.5 mL  Total IN: 3717.5 mL    OUT:    Colostomy: 450 mL    Indwelling Catheter - Urethral: 1005 mL    VAC (Vacuum Assisted Closure) System: 200 mL  Total OUT: 1655 mL    Total NET: 2062.5 mL      01-28 @ 07:01 - 01-29 @ 03:16  --------------------------------------------------------  IN:    Enteral Tube Flush: 40 mL    Lactated Ringers IV Bolus: 500 mL    lactated ringers.: 190 mL    Nepro: 720 mL    Solution: 1450 mL  Total IN: 2900 mL    OUT:    Colostomy: 410 mL    Indwelling Catheter - Urethral: 470 mL  Total OUT: 880 mL    Total NET: 2020 mL          01-29    138  |  101  |  57<H>  ----------------------------<  150<H>  4.2   |  25  |  1.09    Ca    8.2<L>      29 Jan 2020 00:11  Phos  3.6     01-29  Mg     2.0     01-29    TPro  5.3<L>  /  Alb  2.4<L>  /  TBili  0.6  /  DBili  0.3<H>  /  AST  142<H>  /  ALT  70<H>  /  AlkPhos  257<H>  01-29    [ ] Ross catheter, indication: N/A  Meds: lactated ringers. 1000 milliLiter(s) IV Continuous <Continuous>  thiamine 100 milliGRAM(s) Oral daily        HEMATOLOGIC  Meds: aspirin  chewable 81 milliGRAM(s) Oral <User Schedule>  enoxaparin Injectable 100 milliGRAM(s) SubCutaneous <User Schedule>    [x] VTE Prophylaxis                        7.6    7.91  )-----------( 82       ( 29 Jan 2020 00:11 )             24.7       Transfusion     [ ] PRBC   [ ] Platelets   [ ] FFP   [ ] Cryoprecipitate      INFECTIOUS DISEASES  WBC Count: 7.91 K/uL (01-29 @ 00:11)    RECENT CULTURES:  Specimen Source: .Blood Blood-Venous  Date/Time: 01-26 @ 18:14  Culture Results:   No growth to date.  Gram Stain: --  Organism: --  Specimen Source: .Blood Blood  Date/Time: 01-24 @ 18:59  Culture Results:   Growth in aerobic bottle: Acinetobacter baumannii nosocomialis group  See previous culture [id number]  Growth in anaerobic bottle: Staphylococcus aureus  Growth in anaerobic bottle: Staphylococcus epidermidis  Gram Stain:   Growth in aerobic bottle: Gram Negative Rods  Growth in anaerobic bottle: Gram Positive Cocci in Clusters  Organism: Staphylococcus aureus  Staphylococcus epidermidis    Meds: DAPTOmycin IVPB 1000 milliGRAM(s) IV Intermittent every 24 hours  erythromycin     base Tablet 250 milliGRAM(s) Oral <User Schedule>  meropenem  IVPB 1000 milliGRAM(s) IV Intermittent every 8 hours  minocycline IVPB 100 milliGRAM(s) IV Intermittent every 12 hours  minocycline IVPB      polymyxin B IVPB [id number] Unit(s) IV Intermittent every 12 hours        ENDOCRINE  CAPILLARY BLOOD GLUCOSE        Meds: atorvastatin 40 milliGRAM(s) Oral <User Schedule>  hydrocortisone sodium succinate Injectable 50 milliGRAM(s) IV Push daily  levothyroxine Injectable 25 MICROGram(s) IV Push at bedtime        ACCESS DEVICES:  [x] Peripheral IV  [ ] Central Venous Line	[ ] R	[ ] L	[ ] IJ	[ ] Fem	[ ] SC	Placed:   [ ] Arterial Line		[ ] R	[ ] L	[ ] Fem	[ ] Rad	[ ] Ax	Placed:   [ ] PICC:					[ ] Mediport  [ ] Urinary Catheter, Date Placed:   [x] Necessity of urinary, arterial, and venous catheters discussed    OTHER MEDICATIONS:  AQUAPHOR (petrolatum Ointment) 1 Application(s) Topical three times a day  artificial tears (preservative free) Ophthalmic Solution 1 Drop(s) Both EYES two times a day  chlorhexidine 2% Cloths 1 Application(s) Topical <User Schedule>  clobetasol 0.05% Ointment 1 Application(s) Topical two times a day  FIRST- Mouthwash  BLM 5 milliLiter(s) Swish and Spit four times a day  lidocaine   Patch 1 Patch Transdermal daily  nystatin Powder 1 Application(s) Topical two times a day  zinc oxide 20% Ointment 1 Application(s) Topical three times a day PRN      CODE STATUS:  Yes SICU DAILY PROGRESS NOTE    64y Female PMHx of morbid obesity, COPD, HTN, and GERD who presented to the ED on 11/26/2019 with abdominal pain and distension. Patient states that she had been having constipation for ~2 weeks with no improvement despite laxative and enema use. Imaging revealed perforated sigmoid diverticulitis with free air. Hospital course is as follows:      24 HOUR EVENTS:  - tube feeds switched to nocturnal 6pm-6am  - repeat bcx (1/28) sent, growing GNR  - 500cc LR bolus given for drop in SBP      SUBJECTIVE/ROS:  [x] A ten-point review of systems was otherwise negative except as noted.  [ ] Due to altered mental status/intubation, subjective information were not able to be obtained from the patient. History was obtained, to the extent possible, from review of the chart and collateral sources of information.      NEURO  Exam: awake, alert, oriented  Meds: diphenhydrAMINE 25 milliGRAM(s) Oral every 12 hours PRN Rash and/or Itching  HYDROmorphone  Injectable 1.5 milliGRAM(s) IV Push every 6 hours PRN Severe Pain (7 - 10)  HYDROmorphone  Injectable 2 milliGRAM(s) IV Push every 24 hours PRN breakthrough pain  melatonin 3 milliGRAM(s) Oral at bedtime  methadone    Tablet 10 milliGRAM(s) Oral <User Schedule>  ondansetron Injectable 4 milliGRAM(s) IV Push every 6 hours PRN Nausea and/or Vomiting  oxyCODONE    IR 10 milliGRAM(s) Oral every 6 hours PRN Moderate Pain (4 - 6)    [x] Adequacy of sedation and pain control has been assessed and adjusted      RESPIRATORY  RR: 22 (01-29-20 @ 02:00) (12 - 39)  SpO2: 96% (01-29-20 @ 02:00) (94% - 100%)  Wt(kg): --  Exam: unlabored, clear to auscultation bilaterally  Mechanical Ventilation:     [N/A] Extubation Readiness Assessed  Meds: albuterol/ipratropium for Nebulization 3 milliLiter(s) Nebulizer every 6 hours  buDESOnide    Inhalation Suspension 0.5 milliGRAM(s) Inhalation every 12 hours        CARDIOVASCULAR  HR: 90 (01-29-20 @ 02:00) (80 - 106)  BP: 90/58 (01-29-20 @ 02:00) (83/58 - 138/61)  BP(mean): 69 (01-29-20 @ 02:00) (60 - 88)  ABP: --  ABP(mean): --  Wt(kg): --  CVP(cm H2O): --  VBG - ( 28 Jan 2020 01:10 )  pH: 7.38  /  pCO2: 51    /  pO2: 46    / HCO3: 30    / Base Excess: 4.5   /  SaO2: 79     Lactate: 2.8                Exam: regular rate and rhythm  Cardiac Rhythm: sinus  Perfusion     [x]Adequate   [ ]Inadequate  Mentation   [x]Normal       [ ]Reduced  Extremities  [x]Warm         [ ]Cool  Volume Status [ ]Hypervolemic [x]Euvolemic [ ]Hypovolemic  Meds:       GI/NUTRITION  Exam: soft, nontender, nondistended, incision C/D/I  Diet: regular w/ tube feeds  Meds: aluminum hydroxide/magnesium hydroxide/simethicone Suspension 30 milliLiter(s) Oral every 4 hours PRN Dyspepsia  pantoprazole    Tablet 40 milliGRAM(s) Oral <User Schedule>  polyethylene glycol 3350 17 Gram(s) Oral <User Schedule>      GENITOURINARY  I&O's Detail    01-27 @ 07:01 - 01-28 @ 07:00  --------------------------------------------------------  IN:    Enteral Tube Flush: 210 mL    lactated ringers.: 240 mL    Nepro: 1080 mL    Solution: 1150 mL    Solution: 1037.5 mL  Total IN: 3717.5 mL    OUT:    Colostomy: 450 mL    Indwelling Catheter - Urethral: 1005 mL    VAC (Vacuum Assisted Closure) System: 200 mL  Total OUT: 1655 mL    Total NET: 2062.5 mL      01-28 @ 07:01 - 01-29 @ 03:16  --------------------------------------------------------  IN:    Enteral Tube Flush: 40 mL    Lactated Ringers IV Bolus: 500 mL    lactated ringers.: 190 mL    Nepro: 720 mL    Solution: 1450 mL  Total IN: 2900 mL    OUT:    Colostomy: 410 mL    Indwelling Catheter - Urethral: 470 mL  Total OUT: 880 mL    Total NET: 2020 mL          01-29    138  |  101  |  57<H>  ----------------------------<  150<H>  4.2   |  25  |  1.09    Ca    8.2<L>      29 Jan 2020 00:11  Phos  3.6     01-29  Mg     2.0     01-29    TPro  5.3<L>  /  Alb  2.4<L>  /  TBili  0.6  /  DBili  0.3<H>  /  AST  142<H>  /  ALT  70<H>  /  AlkPhos  257<H>  01-29    [ ] Ross catheter, indication: N/A  Meds: lactated ringers. 1000 milliLiter(s) IV Continuous <Continuous>  thiamine 100 milliGRAM(s) Oral daily        HEMATOLOGIC  Meds: aspirin  chewable 81 milliGRAM(s) Oral <User Schedule>  enoxaparin Injectable 100 milliGRAM(s) SubCutaneous <User Schedule>    [x] VTE Prophylaxis                        7.6    7.91  )-----------( 82       ( 29 Jan 2020 00:11 )             24.7       Transfusion     [ ] PRBC   [ ] Platelets   [ ] FFP   [ ] Cryoprecipitate      INFECTIOUS DISEASES  WBC Count: 7.91 K/uL (01-29 @ 00:11)    RECENT CULTURES:  Specimen Source: .Blood Blood-Venous  Date/Time: 01-26 @ 18:14  Culture Results:   No growth to date.  Gram Stain: --  Organism: --  Specimen Source: .Blood Blood  Date/Time: 01-24 @ 18:59  Culture Results:   Growth in aerobic bottle: Acinetobacter baumannii nosocomialis group  See previous culture [id number]  Growth in anaerobic bottle: Staphylococcus aureus  Growth in anaerobic bottle: Staphylococcus epidermidis  Gram Stain:   Growth in aerobic bottle: Gram Negative Rods  Growth in anaerobic bottle: Gram Positive Cocci in Clusters  Organism: Staphylococcus aureus  Staphylococcus epidermidis    Meds: DAPTOmycin IVPB 1000 milliGRAM(s) IV Intermittent every 24 hours  erythromycin     base Tablet 250 milliGRAM(s) Oral <User Schedule>  meropenem  IVPB 1000 milliGRAM(s) IV Intermittent every 8 hours  minocycline IVPB 100 milliGRAM(s) IV Intermittent every 12 hours  minocycline IVPB      polymyxin B IVPB [id number] Unit(s) IV Intermittent every 12 hours        ENDOCRINE  CAPILLARY BLOOD GLUCOSE        Meds: atorvastatin 40 milliGRAM(s) Oral <User Schedule>  hydrocortisone sodium succinate Injectable 50 milliGRAM(s) IV Push daily  levothyroxine Injectable 25 MICROGram(s) IV Push at bedtime        ACCESS DEVICES:  [x] Peripheral IV  [ ] Central Venous Line	[ ] R	[ ] L	[ ] IJ	[ ] Fem	[ ] SC	Placed:   [ ] Arterial Line		[ ] R	[ ] L	[ ] Fem	[ ] Rad	[ ] Ax	Placed:   [ ] PICC:					[ ] Mediport  [ ] Urinary Catheter, Date Placed:   [x] Necessity of urinary, arterial, and venous catheters discussed    OTHER MEDICATIONS:  AQUAPHOR (petrolatum Ointment) 1 Application(s) Topical three times a day  artificial tears (preservative free) Ophthalmic Solution 1 Drop(s) Both EYES two times a day  chlorhexidine 2% Cloths 1 Application(s) Topical <User Schedule>  clobetasol 0.05% Ointment 1 Application(s) Topical two times a day  FIRST- Mouthwash  BLM 5 milliLiter(s) Swish and Spit four times a day  lidocaine   Patch 1 Patch Transdermal daily  nystatin Powder 1 Application(s) Topical two times a day  zinc oxide 20% Ointment 1 Application(s) Topical three times a day PRN      CODE STATUS:  Yes

## 2020-01-29 NOTE — PROGRESS NOTE ADULT - ASSESSMENT
63yo F with PMH of Morbid Obesity, HTN, COPD, and GERD p/w abdominal pain found to have bowel perforation s/p emergent repair with complicated post-op course. Palliative consulted for symptom management and GOC.

## 2020-01-29 NOTE — PROGRESS NOTE ADULT - PROBLEM SELECTOR PLAN 1
- pain management as above  - likely has high tolerance, has been on methadone for years  - can consider increase in methadone tomorrow, unclear why methadone downtitrated to 10mg BID, patient had higher tolerance and may require higher dose, will explore with patient tomrorow if she is more amenable to discussion  - c/w oxy 10mg, dilaudid PRN

## 2020-01-29 NOTE — PROGRESS NOTE ADULT - ASSESSMENT
64 F PMHx of HTN, COPD, and morbid obesity who was admitted on 11/26 for perforated sigmoid diverticulitis  Prolonged hospital stay  Perforated diverticulum, culture with E coli, s/p OR into washout and ostomy  S/p treatment courses for PICC CoNS CLABSI and Steno in sputum  CTs with improvement in intraabd collections  CXR clear  BCX with VRE and Acinetobacter--acinetobacter persistent through current regimen (brittanie added over weekend)  Critically ill, persistent bacteria in blood, concern for multidrug resistant bacteria/life threatening, poor prognosis  CT's with pna, abd unchanged  Central lines changed 1/23  Concern for MDR Acinetobacter--but S to Minocycline; Poly S pending  Repeat BCX GNR--ID pending  Overall,  1) CoNS bacteremia--? CLABSI  - S/p course treatment  2) Loculated fluid collections  - Infected collections, most recent CT reassuring  - Repeat CT A/P when feasible--has been too unstable to obtain  - Surgery F/U--any role for OR exlap and washout? (polymicrobial nature of bacteria implicates abd source)  3) Leukocytosis  - Trend to normal  4) Persistent Bacteremia  - GNR and Cons noted in blood culture; new staph aureus in BCX  - Continue Minocycline 100mg q 12  - Dapto 1000mg q 24 (monitor weekly CK)  - Silvia 1g q 8 (CrCl 72)  - Poly B 1,000,000 q 12 (monitor electrolytes, Cr, any signs neuropathy/paresthesias)  - F/U pending BCXs; repeat BCXs q 48 to clearance  - Discussed with pharmacy--availability of Cefiderocol? Will consider starting if current GNR in blood is Acinetobacter    Discussed with SICU team    Sen Johnson MD  Pager [phone]  After 5pm and on weekends call [phone]

## 2020-01-29 NOTE — PROGRESS NOTE ADULT - SUBJECTIVE AND OBJECTIVE BOX
CARDIOLOGY     PROGRESS  NOTE   ________________________________________________    CHIEF COMPLAINT:Patient is a 64y old  Female who presents with a chief complaint of sepsis (27 Jan 2020 16:02)  no complain./ mildly lethargic  	  REVIEW OF SYSTEMS:  CONSTITUTIONAL: No fever, weight loss, or fatigue  EYES: No eye pain, visual disturbances, or discharge  ENT:  No difficulty hearing, tinnitus, vertigo; No sinus or throat pain  NECK: No pain or stiffness  RESPIRATORY: No cough, wheezing, chills or hemoptysis; No Shortness of Breath  CARDIOVASCULAR: No chest pain, palpitations, passing out, dizziness, or leg swelling  GASTROINTESTINAL: No abdominal or epigastric pain. No nausea, vomiting, or hematemesis; No diarrhea or constipation. No melena or hematochezia.  GENITOURINARY: No dysuria, frequency, hematuria, or incontinence  NEUROLOGICAL: No headaches, memory loss, loss of strength, numbness, or tremors  SKIN: No itching, burning, rashes, or lesions   LYMPH Nodes: No enlarged glands  ENDOCRINE: No heat or cold intolerance; No hair loss  MUSCULOSKELETAL: No joint pain or swelling; No muscle, back, or extremity pain  PSYCHIATRIC: No depression, anxiety, mood swings, or difficulty sleeping  HEME/LYMPH: No easy bruising, or bleeding gums  ALLERGY AND IMMUNOLOGIC: No hives or eczema	    [ ] All others negative	  [x ] Unable to obtain    PHYSICAL EXAM:  T(C): 36.7 (01-29-20 @ 03:00), Max: 36.9 (01-28-20 @ 23:00)  HR: 85 (01-29-20 @ 10:00) (80 - 99)  BP: 92/55 (01-29-20 @ 10:00) (83/58 - 136/70)  RR: 23 (01-29-20 @ 10:00) (12 - 29)  SpO2: 98% (01-29-20 @ 10:00) (94% - 100%)  Wt(kg): --  I&O's Summary    28 Jan 2020 07:01  -  29 Jan 2020 07:00  --------------------------------------------------------  IN: 3440 mL / OUT: 1280 mL / NET: 2160 mL    29 Jan 2020 07:01  -  29 Jan 2020 10:28  --------------------------------------------------------  IN: 30 mL / OUT: 30 mL / NET: 0 mL        Appearance: Normal	  HEENT:   Normal oral mucosa, PERRL, EOMI	  Lymphatic: No lymphadenopathy  Cardiovascular: Normal S1 S2, No JVD, + murmurs,+ lymph edema  Respiratory: Lungs clear to auscultation	  Psychiatry: A & O x 3, Mood & affect appropriate  Gastrointestinal:  Soft, Non-tender, + BS	  Skin: No rashes, No ecchymoses, No cyanosis	  Neurologic: Non-focal  Extremities: Normal range of motion, No clubbing, cyanosis.  Vascular: Peripheral pulses palpable 2+ bilaterally    MEDICATIONS  (STANDING):  albuterol/ipratropium for Nebulization 3 milliLiter(s) Nebulizer every 6 hours  AQUAPHOR (petrolatum Ointment) 1 Application(s) Topical three times a day  artificial tears (preservative free) Ophthalmic Solution 1 Drop(s) Both EYES two times a day  aspirin  chewable 81 milliGRAM(s) Oral <User Schedule>  atorvastatin 40 milliGRAM(s) Oral <User Schedule>  buDESOnide    Inhalation Suspension 0.5 milliGRAM(s) Inhalation every 12 hours  chlorhexidine 2% Cloths 1 Application(s) Topical <User Schedule>  clobetasol 0.05% Ointment 1 Application(s) Topical two times a day  DAPTOmycin IVPB 1000 milliGRAM(s) IV Intermittent every 24 hours  enoxaparin Injectable 100 milliGRAM(s) SubCutaneous <User Schedule>  erythromycin     base Tablet 250 milliGRAM(s) Oral <User Schedule>  FIRST- Mouthwash  BLM 5 milliLiter(s) Swish and Spit four times a day  gabapentin 600 milliGRAM(s) Oral three times a day  hydrocortisone sodium succinate Injectable 50 milliGRAM(s) IV Push daily  lactated ringers. 1000 milliLiter(s) (10 mL/Hr) IV Continuous <Continuous>  levothyroxine Injectable 25 MICROGram(s) IV Push at bedtime  lidocaine   Patch 1 Patch Transdermal daily  melatonin 3 milliGRAM(s) Oral at bedtime  meropenem  IVPB 1000 milliGRAM(s) IV Intermittent every 8 hours  methadone    Tablet 10 milliGRAM(s) Oral <User Schedule>  minocycline IVPB 100 milliGRAM(s) IV Intermittent every 12 hours  minocycline IVPB      nystatin Powder 1 Application(s) Topical two times a day  pantoprazole    Tablet 40 milliGRAM(s) Oral <User Schedule>  polyethylene glycol 3350 17 Gram(s) Oral <User Schedule>  polymyxin B IVPB [id number] Unit(s) IV Intermittent every 12 hours  thiamine 100 milliGRAM(s) Oral daily      TELEMETRY: 	    ECG:  	  RADIOLOGY:  OTHER: 	  	  LABS:	 	    CARDIAC MARKERS:                                7.6    7.91  )-----------( 82       ( 29 Jan 2020 00:11 )             24.7     01-29    138  |  101  |  57<H>  ----------------------------<  150<H>  4.2   |  25  |  1.09    Ca    8.2<L>      29 Jan 2020 00:11  Phos  3.6     01-29  Mg     2.0     01-29    TPro  5.3<L>  /  Alb  2.4<L>  /  TBili  0.6  /  DBili  0.3<H>  /  AST  142<H>  /  ALT  70<H>  /  AlkPhos  257<H>  01-29    proBNP:   Lipid Profile:   HgA1c:   TSH: Thyroid Stimulating Hormone, Serum: 4.81 uIU/mL (01-17 @ 03:52)    PT/INR - ( 29 Jan 2020 09:43 )   PT: 12.7 sec;   INR: 1.11 ratio         PTT - ( 29 Jan 2020 09:43 )  PTT:30.1 sec  1) CoNS bacteremia--? CLABSI  - S/p course treatment  2) Loculated fluid collections  - Infected collections, most recent CT reassuring  - Repeat CT A/P when feasible  3) Leukocytosis  - Trend to normal, monitor for further signs infection  4) Persistent Bacteremia  - GNR and Cons noted in blood culture; new staph aureus in BCX  - Continue Minocycline 100mg q 12  - Dapto 1000mg q 24 (monitor weekly CK)  - Silvia 1g q 8 (CrCl 72)  - Poly B 1,000,000 q 12 (monitor electrolytes, Cr, any signs neuropathy/paresthesias)  - F/U pending BCXs; repeat BCXs q 48 to clearance    Assessment and plan  ---------------------------  septic/ hypovolemic shock still hypotensive but off pressors  abx as per ID + blood cultures  may hold cholesterol meds  WMA on echo ? sec to sepsis  dvt prophylaxis  s/p blood transfusion   continue tx as per ICU  decrease methadone dose  ace wrap to the both LE  increase renal function ? sec to ATN sec to hypotension fu renal function closely/ improving  may dc lipitor  continue NGT feeding  abx as per ID, ?DONNA

## 2020-01-29 NOTE — PROGRESS NOTE ADULT - ASSESSMENT
65yo F with PMHx morbid obesity, GERD, HTN, COPD, and PSHx D&C now s/p ex laparotomy with extended left hemicolectomy 11/26 for perforated and necrotic sigmoid colon with gross abdominal contamination. RTOR 11/29 for abd closure, RUQ end transverse colostomy creation. On 12/19 patient found to have induration of wound and keri-stomal fat necrosis, s/p bedside debridement w/ wound VAC in placement on midline wound. Began showing signs of sepsis and was transferred to SICU 1/7 for further management. Now w/ decreasing pressor support and downtrending leukocytosis. Troponins have been elevated and echo shows decreased ventricular wall motility. Patient grew VRE, and linezolid was added to her antibiotic regimen. Patient is now off pressors and blood pressure is improved. Patient was made DNR on 1/26 after goals of care discussion.      Plan:  Contineu IV Abx  Trend labs/ WBC/ SBP  Monitor Vitals  Appreciate Cards reccs  Appreciate ID reccs  Appreciate excellent SICU care    [facility]  [phone]

## 2020-01-29 NOTE — PROGRESS NOTE ADULT - SUBJECTIVE AND OBJECTIVE BOX
HPI:  64F with pmhx morbid obesity, acid reflux, HTN, COPD and pshx D&C presents to the ED c/o abdominal pain and bloating. Patient reports having constipation for 2 weeks and has been taking enemas, miralax and senna and passing small hard balls for the last weeks. Reports that she has not had a bowel movement unless she used an enema. Reports pain worst in the LLQ that started a few days ago and has been worsening in the last day, also has noted significant abdominal distention in the last day. Notes that she has not been able to urinate all day today because she feels dehydrated. Reports that she had 'low grade temperature of 99.7 which she took left over augmentin for.' Also reports nausea, denies any emesis, recorded fevers, urinary symptoms. Reports she has difficulty walking short distances because she becomes SOB. In ED, CT demonstrated Perforated sigmoid diverticulitis with intraperitoneal free air.    INTERVAL EVENTS:   1/27: states having constant pain, significant other attributes it to coming from her wounds; used one oxy and one dilaudid/24 hours, states it was not helpful, also doesn't feel methadone increased was helpful  1/29: is unable to fully state her pain history for past day, states having intermittent pain, but currently appears comfortable    ADVANCE DIRECTIVES:    DNR [X]  []MOLST  []Living Will  DECISION MAKER(s):  [] Health Care Proxy(s)  [x] Surrogate(s)  [] Guardian           Name(s): Darcy            Phone Number(s): [phone]    BASELINE (I)ADL(s) (prior to admission):  [place]: [x]Total  [] Moderate []Dependent    Allergies  IV Contrast (Rash; Pruritus; Hypotension)  penicillin (Rash (Severe))  sulfa drugs (Unknown)  vancomycin (Rash (Severe))    MEDICATIONS  (STANDING):  MEDICATIONS  (STANDING):  albuterol/ipratropium for Nebulization 3 milliLiter(s) Nebulizer every 6 hours  AQUAPHOR (petrolatum Ointment) 1 Application(s) Topical three times a day  artificial tears (preservative free) Ophthalmic Solution 1 Drop(s) Both EYES two times a day  aspirin  chewable 81 milliGRAM(s) Oral <User Schedule>  atorvastatin 40 milliGRAM(s) Oral <User Schedule>  buDESOnide    Inhalation Suspension 0.5 milliGRAM(s) Inhalation every 12 hours  chlorhexidine 2% Cloths 1 Application(s) Topical <User Schedule>  clobetasol 0.05% Ointment 1 Application(s) Topical two times a day  DAPTOmycin IVPB 1000 milliGRAM(s) IV Intermittent every 24 hours  enoxaparin Injectable 100 milliGRAM(s) SubCutaneous <User Schedule>  erythromycin     base Tablet 250 milliGRAM(s) Oral <User Schedule>  FIRST- Mouthwash  BLM 5 milliLiter(s) Swish and Spit four times a day  gabapentin 600 milliGRAM(s) Oral three times a day  hydrocortisone sodium succinate Injectable 50 milliGRAM(s) IV Push daily  lactated ringers. 1000 milliLiter(s) (10 mL/Hr) IV Continuous <Continuous>  levothyroxine Injectable 25 MICROGram(s) IV Push at bedtime  lidocaine   Patch 1 Patch Transdermal daily  melatonin 3 milliGRAM(s) Oral at bedtime  meropenem  IVPB 1000 milliGRAM(s) IV Intermittent every 8 hours  methadone    Tablet 10 milliGRAM(s) Oral <User Schedule>  minocycline IVPB 100 milliGRAM(s) IV Intermittent every 12 hours  minocycline IVPB      nystatin Powder 1 Application(s) Topical two times a day  pantoprazole    Tablet 40 milliGRAM(s) Oral <User Schedule>  polyethylene glycol 3350 17 Gram(s) Oral <User Schedule>  polymyxin B IVPB [id number] Unit(s) IV Intermittent every 12 hours  thiamine 100 milliGRAM(s) Oral daily    MEDICATIONS  (PRN):  aluminum hydroxide/magnesium hydroxide/simethicone Suspension 30 milliLiter(s) Oral every 4 hours PRN Dyspepsia  diphenhydrAMINE 25 milliGRAM(s) Oral every 12 hours PRN Rash and/or Itching  HYDROmorphone  Injectable 1.5 milliGRAM(s) IV Push every 6 hours PRN Severe Pain (7 - 10)  HYDROmorphone  Injectable 2 milliGRAM(s) IV Push every 24 hours PRN breakthrough pain  ondansetron Injectable 4 milliGRAM(s) IV Push every 6 hours PRN Nausea and/or Vomiting  oxyCODONE    IR 10 milliGRAM(s) Oral every 6 hours PRN Moderate Pain (4 - 6)  zinc oxide 20% Ointment 1 Application(s) Topical three times a day PRN Rash    PRESENT SYMPTOMS: []Unable to obtain due to poor mentation   Source if other than patient:  []Family   []Team     Pain: [x] yes [ ] no  QOL impact - Debilitating  Location - Wound                   Aggravating factors - Turning/Patient Care  Quality - Sharp, Stabbing  Radiation - diffuse  Timing - constant  Severity (0-10 scale): 10  Minimal acceptable level (0-10 scale):    Dyspnea:                           []Mild  []Moderate []Severe  Anxiety:                             []Mild []Moderate []Severe  Fatigue:                             []Mild []Moderate []Severe  Nausea:                             []Mild []Moderate []Severe  Loss of appetite:              []Mild []Moderate []Severe  Constipation:                    []Mild []Moderate []Severe    Other Symptoms:  [x]All other review of systems negative     Karnofsky Performance Score/Palliative Performance Status Version 2:        30%    http://npcrc.org/files/news/palliative_performance_scale_ppsv2.pdf    PHYSICAL EXAM:  Vital Signs Last 24 Hrs  T(C): 37.1 (29 Jan 2020 11:00), Max: 37.1 (29 Jan 2020 11:00)  T(F): 98.8 (29 Jan 2020 11:00), Max: 98.8 (29 Jan 2020 11:00)  HR: 81 (29 Jan 2020 15:00) (80 - 99)  BP: 84/53 (29 Jan 2020 15:00) (83/58 - 136/70)  BP(mean): 61 (29 Jan 2020 15:00) (60 - 88)  RR: 15 (29 Jan 2020 15:00) (12 - 36)  SpO2: 99% (29 Jan 2020 15:00) (94% - 100%)    Limited exam for patient comfort  GENERAL:  [x]Alert  [x]Oriented x3   []Lethargic  []Cachexia  []Unarousable  [x]Verbal  []Non-Verbal  Behavioral:   [] Anxiety  [] Delirium [] Agitation [] Other  HEENT:  []Normal   [x]Dry mouth   []ET Tube/Trach  []Oral lesions  PULMONARY:   []Clear []Tachypnea  []Audible excessive secretions   []Rhonchi        []Right []Left []Bilateral  []Crackles        []Right []Left []Bilateral  []Wheezing     []Right []Left []Bilateral  CARDIOVASCULAR:    []Regular []Irregular []Tachy  []Memo []Murmur []Other  GASTROINTESTINAL:  []Soft  []Distended   []+BS  []Non tender []Tender  []PEG []OGT/ NGT  Last BM: +ostomy  GENITOURINARY:  []Normal [] Incontinent   []Oliguria/Anuria   [x]Ross  MUSCULOSKELETAL:   []Normal   []Weakness  [x]Bed/Wheelchair bound [x]Edema  NEUROLOGIC:   []No focal deficits  [] Cognitive impairment  [x] Dysphagia []Dysarthria [] Paresis []Other   SKIN:   []Normal   [x]Pressure ulcer(s)  [x]Diffuse Jc (See Nursing Documentation)    CRITICAL CARE:  [x] Shock Present  [x]Septic [ ]Cardiogenic [ ]Neurologic [ ]Hypovolemic  [x]  Vasopressors [ ]  Inotropes   [ ] Respiratory failure present [ ] Mechanical Ventilation [ ] Non-invasive ventilatory support [ ] High-Flow  [ ] Acute  [ ] Chronic [ ] Hypoxic  [ ] Hypercarbic [ ] Other  [ ] Other organ failure    LABS:                                           7.6    7.91  )-----------( 82       ( 29 Jan 2020 00:11 )             24.7     01-29    138  |  101  |  57<H>  ----------------------------<  150<H>  4.2   |  25  |  1.09    Ca    8.2<L>      29 Jan 2020 00:11  Phos  3.6     01-29  Mg     2.0     01-29          RADIOLOGY & ADDITIONAL STUDIES:  < from: CT Chest Abdomen and Pelvis w/ Oral Cont (01.23.20 @ 18:16) >  LUNGS AND LARGE AIRWAYS: Scattered bilateral patchy ground glass and consolidative opacities, new compared to 1/15/2020, favoring multifocal pneumonia over edema. Left lower lobe compressive atelectasis.  PLEURA: Small left and trace right pleural effusions..  VESSELS: Left internal jugular approach central venous catheter terminates in the SVC. Atherosclerotic changes of the aorta.  LIVER: Hepatic steatosis.  GALLBLADDER: Distended. No calcified stones or pericholecystic inflammation.  BLADDER: Ross catheter.  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 An enteric tube terminates in the duodenum. No bowel obstruction.   VESSELS: Atherosclerotic changes.  ABDOMINAL WALL: Anterior abdominal wall open wound, right lower quadrant colostomy and postoperative changes.   BONES: Degenerative changes.     IMPRESSION: Multifocal airspace opacities favoring multifocal pneumonia. No acute pathology in the abdomen/pelvis.        PROTEIN CALORIE MALNUTRITION PRESENT: [x]Yes []No  [x]PPSV2 < or = to 30% []significant weight loss  [x]poor nutritional intake []catabolic state []anasarca     Albumin, Serum: 2.6 g/dL (01-24-20 @ 00:28)  Artificial Nutrition [x]     REFERRALS:   []Chaplaincy  []Hospice  []Child Life  [x]Social Work  []Case management []Holistic Therapy

## 2020-01-29 NOTE — PROGRESS NOTE ADULT - PROBLEM SELECTOR PLAN 5
- d/w team, unclear if patient is a PCU candidate; she may benefit from symptom management, but per last discussion with patient and partner, they wanted full care going forward, which may not be inline with PCU perspective of care  - will discuss with patient and ideally partner tomorrow

## 2020-01-30 DIAGNOSIS — Z71.89 OTHER SPECIFIED COUNSELING: ICD-10-CM

## 2020-01-30 LAB
ALBUMIN SERPL ELPH-MCNC: 2.1 G/DL — LOW (ref 3.3–5)
ALP SERPL-CCNC: 293 U/L — HIGH (ref 40–120)
ALT FLD-CCNC: 94 U/L — HIGH (ref 10–45)
ANION GAP SERPL CALC-SCNC: 12 MMOL/L — SIGNIFICANT CHANGE UP (ref 5–17)
APTT BLD: 31.7 SEC — SIGNIFICANT CHANGE UP (ref 27.5–36.3)
AST SERPL-CCNC: 220 U/L — HIGH (ref 10–40)
BILIRUB DIRECT SERPL-MCNC: 0.2 MG/DL — SIGNIFICANT CHANGE UP (ref 0–0.2)
BILIRUB INDIRECT FLD-MCNC: 0.3 MG/DL — SIGNIFICANT CHANGE UP (ref 0.2–1)
BILIRUB SERPL-MCNC: 0.5 MG/DL — SIGNIFICANT CHANGE UP (ref 0.2–1.2)
BLD GP AB SCN SERPL QL: NEGATIVE — SIGNIFICANT CHANGE UP
BUN SERPL-MCNC: 76 MG/DL — HIGH (ref 7–23)
CALCIUM SERPL-MCNC: 8 MG/DL — LOW (ref 8.4–10.5)
CHLORIDE SERPL-SCNC: 98 MMOL/L — SIGNIFICANT CHANGE UP (ref 96–108)
CK SERPL-CCNC: 21 U/L — LOW (ref 25–170)
CO2 SERPL-SCNC: 24 MMOL/L — SIGNIFICANT CHANGE UP (ref 22–31)
CREAT SERPL-MCNC: 1.4 MG/DL — HIGH (ref 0.5–1.3)
FIBRINOGEN PPP-MCNC: 463 MG/DL — SIGNIFICANT CHANGE UP (ref 350–510)
GAS PNL BLDV: SIGNIFICANT CHANGE UP
GLUCOSE SERPL-MCNC: 140 MG/DL — HIGH (ref 70–99)
HCT VFR BLD CALC: 21.3 % — LOW (ref 34.5–45)
HCT VFR BLD CALC: 24.5 % — LOW (ref 34.5–45)
HGB BLD-MCNC: 6.5 G/DL — CRITICAL LOW (ref 11.5–15.5)
HGB BLD-MCNC: 7.6 G/DL — LOW (ref 11.5–15.5)
INR BLD: 1.15 RATIO — SIGNIFICANT CHANGE UP (ref 0.88–1.16)
MAGNESIUM SERPL-MCNC: 2.1 MG/DL — SIGNIFICANT CHANGE UP (ref 1.6–2.6)
MCHC RBC-ENTMCNC: 28.1 PG — SIGNIFICANT CHANGE UP (ref 27–34)
MCHC RBC-ENTMCNC: 28.5 PG — SIGNIFICANT CHANGE UP (ref 27–34)
MCHC RBC-ENTMCNC: 30.5 GM/DL — LOW (ref 32–36)
MCHC RBC-ENTMCNC: 31 GM/DL — LOW (ref 32–36)
MCV RBC AUTO: 91.8 FL — SIGNIFICANT CHANGE UP (ref 80–100)
MCV RBC AUTO: 92.2 FL — SIGNIFICANT CHANGE UP (ref 80–100)
NRBC # BLD: 0 /100 WBCS — SIGNIFICANT CHANGE UP (ref 0–0)
NRBC # BLD: 0 /100 WBCS — SIGNIFICANT CHANGE UP (ref 0–0)
PHOSPHATE SERPL-MCNC: 3.7 MG/DL — SIGNIFICANT CHANGE UP (ref 2.5–4.5)
PLATELET # BLD AUTO: 65 K/UL — LOW (ref 150–400)
PLATELET # BLD AUTO: 66 K/UL — LOW (ref 150–400)
POTASSIUM SERPL-MCNC: 4.1 MMOL/L — SIGNIFICANT CHANGE UP (ref 3.5–5.3)
POTASSIUM SERPL-SCNC: 4.1 MMOL/L — SIGNIFICANT CHANGE UP (ref 3.5–5.3)
PROT SERPL-MCNC: 4.9 G/DL — LOW (ref 6–8.3)
PROTHROM AB SERPL-ACNC: 13.2 SEC — HIGH (ref 10–12.9)
RBC # BLD: 2.31 M/UL — LOW (ref 3.8–5.2)
RBC # BLD: 2.67 M/UL — LOW (ref 3.8–5.2)
RBC # FLD: 17.7 % — HIGH (ref 10.3–14.5)
RBC # FLD: 19 % — HIGH (ref 10.3–14.5)
RH IG SCN BLD-IMP: POSITIVE — SIGNIFICANT CHANGE UP
SODIUM SERPL-SCNC: 134 MMOL/L — LOW (ref 135–145)
WBC # BLD: 5.31 K/UL — SIGNIFICANT CHANGE UP (ref 3.8–10.5)
WBC # BLD: 5.77 K/UL — SIGNIFICANT CHANGE UP (ref 3.8–10.5)
WBC # FLD AUTO: 5.31 K/UL — SIGNIFICANT CHANGE UP (ref 3.8–10.5)
WBC # FLD AUTO: 5.77 K/UL — SIGNIFICANT CHANGE UP (ref 3.8–10.5)

## 2020-01-30 PROCEDURE — 99233 SBSQ HOSP IP/OBS HIGH 50: CPT

## 2020-01-30 PROCEDURE — 99497 ADVNCD CARE PLAN 30 MIN: CPT

## 2020-01-30 PROCEDURE — 99233 SBSQ HOSP IP/OBS HIGH 50: CPT | Mod: GC

## 2020-01-30 RX ORDER — METHADONE HYDROCHLORIDE 40 MG/1
20 TABLET ORAL ONCE
Refills: 0 | Status: DISCONTINUED | OUTPATIENT
Start: 2020-01-30 | End: 2020-01-30

## 2020-01-30 RX ORDER — SODIUM CHLORIDE 9 MG/ML
1000 INJECTION INTRAMUSCULAR; INTRAVENOUS; SUBCUTANEOUS
Refills: 0 | Status: DISCONTINUED | OUTPATIENT
Start: 2020-01-30 | End: 2020-02-05

## 2020-01-30 RX ORDER — METHADONE HYDROCHLORIDE 40 MG/1
20 TABLET ORAL
Refills: 0 | Status: DISCONTINUED | OUTPATIENT
Start: 2020-01-30 | End: 2020-02-03

## 2020-01-30 RX ORDER — HYDROMORPHONE HYDROCHLORIDE 2 MG/ML
1.5 INJECTION INTRAMUSCULAR; INTRAVENOUS; SUBCUTANEOUS
Refills: 0 | Status: DISCONTINUED | OUTPATIENT
Start: 2020-01-30 | End: 2020-01-31

## 2020-01-30 RX ORDER — SODIUM CHLORIDE 9 MG/ML
500 INJECTION, SOLUTION INTRAVENOUS ONCE
Refills: 0 | Status: COMPLETED | OUTPATIENT
Start: 2020-01-30 | End: 2020-01-30

## 2020-01-30 RX ADMIN — Medication 250 MILLIGRAM(S): at 10:14

## 2020-01-30 RX ADMIN — MEROPENEM 100 MILLIGRAM(S): 1 INJECTION INTRAVENOUS at 23:46

## 2020-01-30 RX ADMIN — POLYMYXIN B SULFATE 600 UNIT(S): 500000 INJECTION, POWDER, LYOPHILIZED, FOR SOLUTION INTRAMUSCULAR; INTRATHECAL; INTRAVENOUS; OPHTHALMIC at 23:48

## 2020-01-30 RX ADMIN — POLYETHYLENE GLYCOL 3350 17 GRAM(S): 17 POWDER, FOR SOLUTION ORAL at 11:25

## 2020-01-30 RX ADMIN — CHLORHEXIDINE GLUCONATE 1 APPLICATION(S): 213 SOLUTION TOPICAL at 05:02

## 2020-01-30 RX ADMIN — POLYETHYLENE GLYCOL 3350 17 GRAM(S): 17 POWDER, FOR SOLUTION ORAL at 05:11

## 2020-01-30 RX ADMIN — HYDROMORPHONE HYDROCHLORIDE 1.5 MILLIGRAM(S): 2 INJECTION INTRAMUSCULAR; INTRAVENOUS; SUBCUTANEOUS at 20:44

## 2020-01-30 RX ADMIN — NYSTATIN CREAM 1 APPLICATION(S): 100000 CREAM TOPICAL at 05:01

## 2020-01-30 RX ADMIN — SODIUM CHLORIDE 3000 MILLILITER(S): 9 INJECTION, SOLUTION INTRAVENOUS at 13:09

## 2020-01-30 RX ADMIN — NYSTATIN CREAM 1 APPLICATION(S): 100000 CREAM TOPICAL at 18:15

## 2020-01-30 RX ADMIN — Medication 25 MICROGRAM(S): at 23:36

## 2020-01-30 RX ADMIN — MINOCYCLINE HYDROCHLORIDE 100 MILLIGRAM(S): 45 TABLET, EXTENDED RELEASE ORAL at 18:13

## 2020-01-30 RX ADMIN — OXYCODONE HYDROCHLORIDE 10 MILLIGRAM(S): 5 TABLET ORAL at 12:15

## 2020-01-30 RX ADMIN — Medication 1 APPLICATION(S): at 05:01

## 2020-01-30 RX ADMIN — GABAPENTIN 600 MILLIGRAM(S): 400 CAPSULE ORAL at 23:36

## 2020-01-30 RX ADMIN — Medication 1 APPLICATION(S): at 23:37

## 2020-01-30 RX ADMIN — OXYCODONE HYDROCHLORIDE 10 MILLIGRAM(S): 5 TABLET ORAL at 18:55

## 2020-01-30 RX ADMIN — Medication 0.5 MILLIGRAM(S): at 06:22

## 2020-01-30 RX ADMIN — HYDROMORPHONE HYDROCHLORIDE 2 MILLIGRAM(S): 2 INJECTION INTRAMUSCULAR; INTRAVENOUS; SUBCUTANEOUS at 06:10

## 2020-01-30 RX ADMIN — Medication 1 APPLICATION(S): at 18:14

## 2020-01-30 RX ADMIN — ATORVASTATIN CALCIUM 40 MILLIGRAM(S): 80 TABLET, FILM COATED ORAL at 20:35

## 2020-01-30 RX ADMIN — DIPHENHYDRAMINE HYDROCHLORIDE AND LIDOCAINE HYDROCHLORIDE AND ALUMINUM HYDROXIDE AND MAGNESIUM HYDRO 5 MILLILITER(S): KIT at 18:13

## 2020-01-30 RX ADMIN — Medication 1 APPLICATION(S): at 13:09

## 2020-01-30 RX ADMIN — Medication 3 MILLILITER(S): at 06:22

## 2020-01-30 RX ADMIN — ENOXAPARIN SODIUM 100 MILLIGRAM(S): 100 INJECTION SUBCUTANEOUS at 11:26

## 2020-01-30 RX ADMIN — Medication 100 MILLIGRAM(S): at 11:26

## 2020-01-30 RX ADMIN — Medication 3 MILLILITER(S): at 23:36

## 2020-01-30 RX ADMIN — Medication 1 DROP(S): at 18:13

## 2020-01-30 RX ADMIN — OXYCODONE HYDROCHLORIDE 10 MILLIGRAM(S): 5 TABLET ORAL at 11:45

## 2020-01-30 RX ADMIN — Medication 81 MILLIGRAM(S): at 20:34

## 2020-01-30 RX ADMIN — METHADONE HYDROCHLORIDE 20 MILLIGRAM(S): 40 TABLET ORAL at 10:24

## 2020-01-30 RX ADMIN — GABAPENTIN 600 MILLIGRAM(S): 400 CAPSULE ORAL at 05:00

## 2020-01-30 RX ADMIN — Medication 250 MILLIGRAM(S): at 18:13

## 2020-01-30 RX ADMIN — Medication 1 DROP(S): at 05:01

## 2020-01-30 RX ADMIN — DAPTOMYCIN 140 MILLIGRAM(S): 500 INJECTION, POWDER, LYOPHILIZED, FOR SOLUTION INTRAVENOUS at 16:30

## 2020-01-30 RX ADMIN — SODIUM CHLORIDE 10 MILLILITER(S): 9 INJECTION INTRAMUSCULAR; INTRAVENOUS; SUBCUTANEOUS at 16:31

## 2020-01-30 RX ADMIN — MEROPENEM 100 MILLIGRAM(S): 1 INJECTION INTRAVENOUS at 05:00

## 2020-01-30 RX ADMIN — POLYMYXIN B SULFATE 600 UNIT(S): 500000 INJECTION, POWDER, LYOPHILIZED, FOR SOLUTION INTRAMUSCULAR; INTRATHECAL; INTRAVENOUS; OPHTHALMIC at 11:46

## 2020-01-30 RX ADMIN — POLYETHYLENE GLYCOL 3350 17 GRAM(S): 17 POWDER, FOR SOLUTION ORAL at 18:15

## 2020-01-30 RX ADMIN — DIPHENHYDRAMINE HYDROCHLORIDE AND LIDOCAINE HYDROCHLORIDE AND ALUMINUM HYDROXIDE AND MAGNESIUM HYDRO 5 MILLILITER(S): KIT at 05:00

## 2020-01-30 RX ADMIN — Medication 3 MILLILITER(S): at 18:28

## 2020-01-30 RX ADMIN — HYDROMORPHONE HYDROCHLORIDE 1.5 MILLIGRAM(S): 2 INJECTION INTRAMUSCULAR; INTRAVENOUS; SUBCUTANEOUS at 21:00

## 2020-01-30 RX ADMIN — ONDANSETRON 4 MILLIGRAM(S): 8 TABLET, FILM COATED ORAL at 20:44

## 2020-01-30 RX ADMIN — GABAPENTIN 600 MILLIGRAM(S): 400 CAPSULE ORAL at 13:10

## 2020-01-30 RX ADMIN — PANTOPRAZOLE SODIUM 40 MILLIGRAM(S): 20 TABLET, DELAYED RELEASE ORAL at 05:00

## 2020-01-30 RX ADMIN — Medication 50 MILLIGRAM(S): at 05:00

## 2020-01-30 RX ADMIN — HYDROMORPHONE HYDROCHLORIDE 2 MILLIGRAM(S): 2 INJECTION INTRAMUSCULAR; INTRAVENOUS; SUBCUTANEOUS at 05:51

## 2020-01-30 RX ADMIN — MINOCYCLINE HYDROCHLORIDE 100 MILLIGRAM(S): 45 TABLET, EXTENDED RELEASE ORAL at 05:00

## 2020-01-30 RX ADMIN — MEROPENEM 100 MILLIGRAM(S): 1 INJECTION INTRAVENOUS at 13:10

## 2020-01-30 RX ADMIN — Medication 3 MILLILITER(S): at 11:40

## 2020-01-30 RX ADMIN — OXYCODONE HYDROCHLORIDE 10 MILLIGRAM(S): 5 TABLET ORAL at 18:27

## 2020-01-30 RX ADMIN — DIPHENHYDRAMINE HYDROCHLORIDE AND LIDOCAINE HYDROCHLORIDE AND ALUMINUM HYDROXIDE AND MAGNESIUM HYDRO 5 MILLILITER(S): KIT at 11:25

## 2020-01-30 RX ADMIN — METHADONE HYDROCHLORIDE 20 MILLIGRAM(S): 40 TABLET ORAL at 20:35

## 2020-01-30 NOTE — PROGRESS NOTE ADULT - PROBLEM SELECTOR PLAN 5
- discussion held with patient, partner, Dr. Hernandez, and myself  - discussed goals of care, goals are for continued abx, but partner realizes that this is likely an end-stage process, and wants her to be comfortable; discussed blood draws and limiting them given difficulty obtaining them and causing more suffering; she will consider, will need further discussion  - patient is DNR/DNI, EVA in chart, patient made decision  - partner is considering transfer to [facility] if stable     > 16 mins spent on above

## 2020-01-30 NOTE — PROGRESS NOTE ADULT - ASSESSMENT
65 y/o female presenting with perforated sigmoid diverticulitis s/p exploratory laparotomy, extended left hemicolectomy, and left in discontinuity w/ Abthera VAC placement with return to OR for re-exploratory laparotomy, transverse end colostomy, and fascial closure with wound VAC placement. Hospital course complicated by gastroparesis, right subclavian steal syndrome, full body rash of unknown etiology, and refractory septic shock c/b stress-induced cardiomyopathy. Cultures were positive for E. coli & Klebsiella in the urine, Stenotrophomonas in the sputum, and Staph epidermis in the blood with subsequent removal of her LUE PICC. Course further complicated by VRE and MDR Acinetobacter bacteremia.    PLAN:    Neuro: acute pain from her full body rash, opioid dependence  - Monitor mental status  - Pain control as needed with methadone 10mg BID, gabapentin 600mg TID, PRN Dilaudid 1.5mg q6h, PRN oxycodone 10mg q6h   - palliative consult suggested increasing methadone dose to at-home levels  - C/w melatonin at bedtime     Resp: COPD, Right upper lobe pneumonia  - Monitor pulse oximeter  - Out of bed to chair and incentive spirometry, chest PT to prevent atelectasis  - Pulmicort and Duoneb for COPD  - Antibiotic coverage for pneumonia    CV: refractory septic shock c/b stress-induced cardiomyopathy, resolved  - Monitor vital signs, remains off midodrine/Levophed at this time.   - labile systolic BPs   - Home ASA    GI: perforated sigmoid diverticulitis s/p exploratory laparotomy, extended left hemicolectomy, and left in discontinuity w/ Abthera VAC placement with return to OR for re-exploratory laparotomy, transverse end colostomy, and fascial closure c/b gastroparesis; GERD  - Regular diet with nocturnal feeds (6pm-6am): Nepro @ 70ml/hr via Luis feeding tube   - Monitor ostomy output  - Erythromycin for gastroparesis  - Protonix for GERD  - Maalox PRN indigestion  - Continue wound VAC therapy     Renal: BULL, resolved  - Monitor I&Os.  - daily CMP  - KVO fluids.   - Monitor electrolytes and replete as necessary  - creatine kinase (CK) levels weekly due to daptomycin therapy     Heme: no acute issues  - Monitor CBC and coags  - Lovenox 100 mg daily for VTE prophylaxis, adjusted for BMI  - ASA 81mg qd    ID: E. coli & Klebsiella UTI, Stenotrophomonas PNA, Staph epidermis bacteremia; Now MDR Acinetobacter and VRE bacteremia 01/22  - Continue meropenem, Polymixin B, & Minocycline.   - Continue Daptomycin, will obtain weekly CK level  - Blood cx from 1/27 showing Gram Negative rods.   - repeat blood cx on 1/30    Endo: HLD, hypothyroidism vs sick euthyroid syndrome  - 25mg IV levothyroxine for hypothyroidism.   - Monitor glucose on BMP  - Home Lipitor  - Solucortef 50mg IV daily    Disposition:  DNR/DNI. Will remain in SICU. 65 y/o female presenting with perforated sigmoid diverticulitis s/p exploratory laparotomy, extended left hemicolectomy, and left in discontinuity w/ Abthera VAC placement with return to OR for re-exploratory laparotomy, transverse end colostomy, and fascial closure with wound VAC placement. Hospital course complicated by gastroparesis, right subclavian steal syndrome, full body rash of unknown etiology, and refractory septic shock c/b stress-induced cardiomyopathy. Cultures were positive for E. coli & Klebsiella in the urine, Stenotrophomonas in the sputum, and Staph epidermis in the blood with subsequent removal of her LUE PICC. Course further complicated by VRE and MDR Acinetobacter bacteremia.    PLAN:    Neuro: acute pain from her full body rash, opioid dependence  - Monitor mental status  - Pain control as needed with methadone 10mg BID, gabapentin 600mg TID, PRN Dilaudid 1.5mg q6h, PRN oxycodone 10mg q6h   - palliative consult suggested increasing methadone dose to at-home levels  - C/w melatonin at bedtime   -Methadone increased to 20mg q12h     Resp: COPD, Right upper lobe pneumonia  - Monitor pulse oximeter  - Out of bed to chair and incentive spirometry, chest PT to prevent atelectasis  - Pulmicort and Duoneb for COPD  - Antibiotic coverage for pneumonia    CV: refractory septic shock c/b stress-induced cardiomyopathy, resolved  - Monitor vital signs, remains off midodrine/Levophed at this time.   - labile systolic BPs   - Home ASA    GI: perforated sigmoid diverticulitis s/p exploratory laparotomy, extended left hemicolectomy, and left in discontinuity w/ Abthera VAC placement with return to OR for re-exploratory laparotomy, transverse end colostomy, and fascial closure c/b gastroparesis; GERD  - Monitor ostomy output  - Erythromycin for gastroparesis  - Protonix for GERD  - Maalox PRN indigestion  - Continue wound VAC therapy   -Changed diet to Jevity 1.5 at 90cc/hr for 12h via Luis tube      Renal: BULL, resolved  - Monitor I&Os.  - daily CMP  - KVO fluids.   - Monitor electrolytes and replete as necessary  - creatine kinase (CK) levels weekly due to daptomycin therapy, next draw for Friday     Heme: no acute issues  - Monitor CBC and coags  - Lovenox 100 mg daily for VTE prophylaxis, adjusted for BMI  - ASA 81mg qd    ID: E. coli & Klebsiella UTI, Stenotrophomonas PNA, Staph epidermis bacteremia; Now MDR Acinetobacter and VRE bacteremia 01/22  - Continue meropenem, Polymixin B, & Minocycline.   - Continue Daptomycin, will obtain weekly CK level  - Blood cx from 1/27 showing Gram Negative rods.   - repeat blood cx on 1/30    Endo: HLD, hypothyroidism vs sick euthyroid syndrome  - 25mg IV levothyroxine for hypothyroidism.   - Monitor glucose on BMP  - Home Lipitor  - Solucortef 50mg IV daily    Consults: Palliative consult     Disposition:  DNR/DNI. Will remain in SICU.

## 2020-01-30 NOTE — PROGRESS NOTE ADULT - SUBJECTIVE AND OBJECTIVE BOX
CC: F/U for Bacteremia    Saw/spoke to patient. No fevers, no chills. Critically ill. Fatigued.    Allergies  IV Contrast (Rash; Pruritus; Hypotension)  penicillin (Rash (Severe))  sulfa drugs (Unknown)  vancomycin (Rash (Severe))    ANTIMICROBIALS:  DAPTOmycin IVPB 1000 every 24 hours  erythromycin     base Tablet 250 <User Schedule>  meropenem  IVPB 1000 every 8 hours  minocycline IVPB 100 every 12 hours  minocycline IVPB    polymyxin B IVPB [id number] every 12 hours    PE:    Vital Signs Last 24 Hrs  T(C): 36.7 (30 Jan 2020 07:00), Max: 37.1 (29 Jan 2020 15:00)  T(F): 98.1 (30 Jan 2020 07:00), Max: 98.8 (29 Jan 2020 15:00)  HR: 80 (30 Jan 2020 11:43) (79 - 100)  BP: 83/50 (30 Jan 2020 10:00) (74/50 - 131/56)  BP(mean): 62 (30 Jan 2020 10:00) (57 - 81)  RR: 27 (30 Jan 2020 10:00) (15 - 48)  SpO2: 100% (30 Jan 2020 11:43) (89% - 100%)    Gen: AOx2-3, fatigued  CV: S1+S2 normal, nontachycardic  Resp: Clear bilat, no resp distress, no crackles/wheezes  Abd: Soft, nontender, +BS, ostomy  Ext: No LE edema, no wounds    LABS:                        7.6    5.31  )-----------( 65       ( 30 Jan 2020 08:10 )             24.5     01-30    134<L>  |  98  |  76<H>  ----------------------------<  140<H>  4.1   |  24  |  1.40<H>    Ca    8.0<L>      30 Jan 2020 04:41  Phos  3.7     01-30  Mg     2.1     01-30    TPro  4.9<L>  /  Alb  2.1<L>  /  TBili  0.5  /  DBili  0.2  /  AST  220<H>  /  ALT  94<H>  /  AlkPhos  293<H>  01-30    MICROBIOLOGY:    .Blood Blood-Peripheral  01-28-20   No growth to date.     .Blood Blood  01-28-20   Growth in aerobic bottle: Acinetobacter baumannii nosocomialis group  Susceptibility to follow.  --    Growth in aerobic bottle:  Gram Negative Rods    .Blood Blood-Venous  01-26-20   No growth to date.      .Blood Blood  01-24-20   Growth in aerobic bottle: Acinetobacter baumannii nosocomialis group  See previous culture [id number]  Growth in anaerobic bottle: Staphylococcus aureus  Growth in anaerobic bottle: Staphylococcus epidermidis  --  Staphylococcus aureus  Staphylococcus epidermidis    .Blood Blood-Venous  01-23-20   Growth in aerobic bottle: Acinetobacter baumannii Nosocomialis group  See previous culture [id number]  --    Growth in aerobic bottle: Gram Negative Rods    .Blood Blood-Peripheral  01-22-20   Growth in aerobic bottle: Acinetobacter baumannii NOSOCOMIALIS GROUP  See previous culture [id number]  --    Growth in aerobic bottle: Gram Negative Rods    .Blood Blood-Venous  01-22-20   Growth in aerobic bottle: Acinetobacter baumannii nosocomialis group  Growth in aerobic bottle: Enterococcus faecium (vancomycin resistant)    (otherwise reviewed)    RADIOLOGY:    1/28 CXR:    FINDINGS:    Lines and Tubes: Enteric tube with distal tip not imaged. Left central venous catheter with tip unchanged in position.    Lungs: Unchanged haziness of the right lung.    Pleura: Left pleural effusion.    Mediastinum: Heart size is normal.    Skeletal: Unremarkable.    IMPRESSION:    Unchanged haziness of the right lung.

## 2020-01-30 NOTE — PROGRESS NOTE ADULT - SUBJECTIVE AND OBJECTIVE BOX
[facility] DIVISION OF KIDNEY DISEASES AND HYPERTENSION -- FOLLOW UP NOTE  --------------------------------------------------------------------------------  24 hour events/subjective:  pt examined at bed side, lethargic, oliguric bp borderline low. remains on abx. due to resistant gram negative bryon        PAST HISTORY  --------------------------------------------------------------------------------  No significant changes to PMH, PSH, FHx, SHx, unless otherwise noted    ALLERGIES & MEDICATIONS  --------------------------------------------------------------------------------  Allergies    IV Contrast (Rash; Pruritus; Hypotension)  penicillin (Rash (Severe))  sulfa drugs (Unknown)  vancomycin (Rash (Severe))    Intolerances      Standing Inpatient Medications  albuterol/ipratropium for Nebulization 3 milliLiter(s) Nebulizer every 6 hours  AQUAPHOR (petrolatum Ointment) 1 Application(s) Topical three times a day  artificial tears (preservative free) Ophthalmic Solution 1 Drop(s) Both EYES two times a day  aspirin  chewable 81 milliGRAM(s) Oral <User Schedule>  atorvastatin 40 milliGRAM(s) Oral <User Schedule>  buDESOnide    Inhalation Suspension 0.5 milliGRAM(s) Inhalation every 12 hours  chlorhexidine 2% Cloths 1 Application(s) Topical <User Schedule>  clobetasol 0.05% Ointment 1 Application(s) Topical two times a day  DAPTOmycin IVPB 1000 milliGRAM(s) IV Intermittent every 24 hours  enoxaparin Injectable 100 milliGRAM(s) SubCutaneous <User Schedule>  erythromycin     base Tablet 250 milliGRAM(s) Oral <User Schedule>  FIRST- Mouthwash  BLM 5 milliLiter(s) Swish and Spit four times a day  gabapentin 600 milliGRAM(s) Oral three times a day  hydrocortisone sodium succinate Injectable 50 milliGRAM(s) IV Push daily  lactated ringers. 1000 milliLiter(s) IV Continuous <Continuous>  levothyroxine Injectable 25 MICROGram(s) IV Push at bedtime  lidocaine   Patch 1 Patch Transdermal daily  melatonin 3 milliGRAM(s) Oral at bedtime  meropenem  IVPB 1000 milliGRAM(s) IV Intermittent every 8 hours  methadone    Tablet 20 milliGRAM(s) Oral <User Schedule>  minocycline IVPB 100 milliGRAM(s) IV Intermittent every 12 hours  minocycline IVPB      nystatin Powder 1 Application(s) Topical two times a day  pantoprazole    Tablet 40 milliGRAM(s) Oral <User Schedule>  polyethylene glycol 3350 17 Gram(s) Oral <User Schedule>  polymyxin B IVPB [id number] Unit(s) IV Intermittent every 12 hours  thiamine 100 milliGRAM(s) Oral daily    PRN Inpatient Medications  aluminum hydroxide/magnesium hydroxide/simethicone Suspension 30 milliLiter(s) Oral every 4 hours PRN  diphenhydrAMINE 25 milliGRAM(s) Oral every 12 hours PRN  HYDROmorphone  Injectable 1.5 milliGRAM(s) IV Push every 6 hours PRN  HYDROmorphone  Injectable 2 milliGRAM(s) IV Push every 24 hours PRN  ondansetron Injectable 4 milliGRAM(s) IV Push every 6 hours PRN  oxyCODONE    IR 10 milliGRAM(s) Oral every 6 hours PRN  zinc oxide 20% Ointment 1 Application(s) Topical three times a day PRN      REVIEW OF SYSTEMS  --------------------------------------------------------------------------------  unable to obtain     VITALS/PHYSICAL EXAM  --------------------------------------------------------------------------------  T(C): 36.7 (01-30-20 @ 07:00), Max: 37.1 (01-29-20 @ 15:00)  HR: 86 (01-30-20 @ 10:00) (79 - 100)  BP: 83/50 (01-30-20 @ 10:00) (74/50 - 131/56)  RR: 27 (01-30-20 @ 10:00) (15 - 48)  SpO2: 100% (01-30-20 @ 10:00) (89% - 100%)  Wt(kg): --        01-29-20 @ 07:01  -  01-30-20 @ 07:00  --------------------------------------------------------  IN: 3960 mL / OUT: 1365 mL / NET: 2595 mL    01-30-20 @ 07:01  -  01-30-20 @ 11:15  --------------------------------------------------------  IN: 230 mL / OUT: 60 mL / NET: 170 mL      Physical Exam:  	Gen: mild distress  	HEENT:  supple neck, clear oropharynx  	Pulm: CTA B/L  	CV: RRR, S1S2; no rub  	Abd: +BS, wound vac noted.   	: No suprapubic tenderness. has marquez dark colored urine.   	UE: no edema;   	LE: no edema  	Neuro: No focal deficits  	Skin: Erythematous rash desquamative     LABS/STUDIES  --------------------------------------------------------------------------------              7.6    5.31  >-----------<  65       [01-30-20 @ 08:10]              24.5     134  |  98  |  76  ----------------------------<  140      [01-30-20 @ 04:41]  4.1   |  24  |  1.40        Ca     8.0     [01-30-20 @ 04:41]      Mg     2.1     [01-30-20 @ 04:41]      Phos  3.7     [01-30-20 @ 04:41]    TPro  4.9  /  Alb  2.1  /  TBili  0.5  /  DBili  0.2  /  AST  220  /  ALT  94  /  AlkPhos  293  [01-30-20 @ 04:41]    PT/INR: PT 13.2 , INR 1.15       [01-30-20 @ 04:41]  PTT: 31.7       [01-30-20 @ 04:41]    CK 21      [01-30-20 @ 04:41]    Creatinine Trend:  SCr 1.40 [01-30 @ 04:41]  SCr 1.09 [01-29 @ 00:11]  SCr 0.97 [01-28 @ 01:12]  SCr 1.05 [01-27 @ 00:36]  SCr 1.30 [01-26 @ 05:26]    Urinalysis - [01-22-20 @ 11:09]      Color Yellow / Appearance Slightly Turbid / SG 1.028 / pH 5.5      Gluc Negative / Ketone Trace  / Bili Negative / Urobili 4 mg/dL       Blood Small / Protein 30 mg/dL / Leuk Est Negative / Nitrite Negative      RBC 1 / WBC 6 / Hyaline 149 / Gran  / Sq Epi  / Non Sq Epi 12 / Bacteria Negative      TSH 4.81      [01-17-20 @ 03:52]  Lipid: chol 98, TG 91, HDL 28, LDL 52      [12-30-19 @ 08:46]

## 2020-01-30 NOTE — PROGRESS NOTE ADULT - SUBJECTIVE AND OBJECTIVE BOX
HPI:  64F with pmhx morbid obesity, acid reflux, HTN, COPD and pshx D&C presents to the ED c/o abdominal pain and bloating. Patient reports having constipation for 2 weeks and has been taking enemas, miralax and senna and passing small hard balls for the last weeks. Reports that she has not had a bowel movement unless she used an enema. Reports pain worst in the LLQ that started a few days ago and has been worsening in the last day, also has noted significant abdominal distention in the last day. Notes that she has not been able to urinate all day today because she feels dehydrated. Reports that she had 'low grade temperature of 99.7 which she took left over augmentin for.' Also reports nausea, denies any emesis, recorded fevers, urinary symptoms. Reports she has difficulty walking short distances because she becomes SOB. In ED, CT demonstrated Perforated sigmoid diverticulitis with intraperitoneal free air.    INTERVAL EVENTS:   1/27: states having constant pain, significant other attributes it to coming from her wounds; used one oxy and one dilaudid/24 hours, states it was not helpful, also doesn't feel methadone increased was helpful  1/29: is unable to fully state her pain history for past day, states having intermittent pain, but currently appears comfortable  1/30: no major complaints, no major overnight events    ADVANCE DIRECTIVES:    DNR [X]  []MOLST  []Living Will  DECISION MAKER(s):  [] Health Care Proxy(s)  [x] Surrogate(s)  [] Guardian           Name(s): Darcy            Phone Number(s): [phone]    BASELINE (I)ADL(s) (prior to admission):  [place]: [x]Total  [] Moderate []Dependent    Allergies  IV Contrast (Rash; Pruritus; Hypotension)  penicillin (Rash (Severe))  sulfa drugs (Unknown)  vancomycin (Rash (Severe))    MEDICATIONS  (STANDING):  MEDICATIONS  (STANDING):  albuterol/ipratropium for Nebulization 3 milliLiter(s) Nebulizer every 6 hours  AQUAPHOR (petrolatum Ointment) 1 Application(s) Topical three times a day  artificial tears (preservative free) Ophthalmic Solution 1 Drop(s) Both EYES two times a day  aspirin  chewable 81 milliGRAM(s) Oral <User Schedule>  atorvastatin 40 milliGRAM(s) Oral <User Schedule>  buDESOnide    Inhalation Suspension 0.5 milliGRAM(s) Inhalation every 12 hours  chlorhexidine 2% Cloths 1 Application(s) Topical <User Schedule>  clobetasol 0.05% Ointment 1 Application(s) Topical two times a day  DAPTOmycin IVPB 1000 milliGRAM(s) IV Intermittent every 24 hours  enoxaparin Injectable 100 milliGRAM(s) SubCutaneous <User Schedule>  erythromycin     base Tablet 250 milliGRAM(s) Oral <User Schedule>  FIRST- Mouthwash  BLM 5 milliLiter(s) Swish and Spit four times a day  gabapentin 600 milliGRAM(s) Oral three times a day  hydrocortisone sodium succinate Injectable 50 milliGRAM(s) IV Push daily  levothyroxine Injectable 25 MICROGram(s) IV Push at bedtime  lidocaine   Patch 1 Patch Transdermal daily  melatonin 3 milliGRAM(s) Oral at bedtime  meropenem  IVPB 1000 milliGRAM(s) IV Intermittent every 8 hours  methadone    Tablet 20 milliGRAM(s) Oral <User Schedule>  minocycline IVPB 100 milliGRAM(s) IV Intermittent every 12 hours  minocycline IVPB      nystatin Powder 1 Application(s) Topical two times a day  pantoprazole    Tablet 40 milliGRAM(s) Oral <User Schedule>  polyethylene glycol 3350 17 Gram(s) Oral <User Schedule>  polymyxin B IVPB [id number] Unit(s) IV Intermittent every 12 hours  sodium chloride 0.9%. 1000 milliLiter(s) (10 mL/Hr) IV Continuous <Continuous>  thiamine 100 milliGRAM(s) Oral daily    MEDICATIONS  (PRN):  aluminum hydroxide/magnesium hydroxide/simethicone Suspension 30 milliLiter(s) Oral every 4 hours PRN Dyspepsia  diphenhydrAMINE 25 milliGRAM(s) Oral every 12 hours PRN Rash and/or Itching  HYDROmorphone  Injectable 1.5 milliGRAM(s) IV Push every 6 hours PRN Severe Pain (7 - 10)  HYDROmorphone  Injectable 2 milliGRAM(s) IV Push every 24 hours PRN breakthrough pain  ondansetron Injectable 4 milliGRAM(s) IV Push every 6 hours PRN Nausea and/or Vomiting  oxyCODONE    IR 10 milliGRAM(s) Oral every 6 hours PRN Moderate Pain (4 - 6)  zinc oxide 20% Ointment 1 Application(s) Topical three times a day PRN Rash    PRESENT SYMPTOMS: []Unable to obtain due to poor mentation   Source if other than patient:  []Family   []Team     Pain: [x] yes [ ] no  QOL impact - Debilitating  Location - Wound                   Aggravating factors - Turning/Patient Care  Quality - Sharp, Stabbing  Radiation - diffuse  Timing - constant  Severity (0-10 scale): 10  Minimal acceptable level (0-10 scale):    Dyspnea:                           []Mild  []Moderate []Severe  Anxiety:                             []Mild []Moderate []Severe  Fatigue:                             []Mild []Moderate []Severe  Nausea:                             []Mild []Moderate []Severe  Loss of appetite:              []Mild []Moderate []Severe  Constipation:                    []Mild []Moderate []Severe    Other Symptoms:  [x]All other review of systems negative     Karnofsky Performance Score/Palliative Performance Status Version 2:        30%    http://npcrc.org/files/news/palliative_performance_scale_ppsv2.pdf    PHYSICAL EXAM:  Vital Signs Last 24 Hrs  T(C): 36.9 (30 Jan 2020 15:20), Max: 37.1 (30 Jan 2020 03:00)  T(F): 98.5 (30 Jan 2020 15:20), Max: 98.8 (30 Jan 2020 03:00)  HR: 80 (30 Jan 2020 15:20) (79 - 100)  BP: 95/65 (30 Jan 2020 15:20) (74/50 - 131/56)  BP(mean): 69 (30 Jan 2020 14:30) (55 - 81)  RR: 18 (30 Jan 2020 15:20) (8 - 48)  SpO2: 94% (30 Jan 2020 15:20) (89% - 100%)    Limited exam for patient comfort  GENERAL:  [x]Alert  [x]Oriented x3   []Lethargic  []Cachexia  []Unarousable  [x]Verbal  []Non-Verbal  Behavioral:   [] Anxiety  [] Delirium [] Agitation [] Other  HEENT:  []Normal   [x]Dry mouth   []ET Tube/Trach  []Oral lesions  PULMONARY:   []Clear []Tachypnea  []Audible excessive secretions   []Rhonchi        []Right []Left []Bilateral  []Crackles        []Right []Left []Bilateral  []Wheezing     []Right []Left []Bilateral  CARDIOVASCULAR:    []Regular []Irregular []Tachy  []Memo []Murmur []Other  GASTROINTESTINAL:  []Soft  []Distended   []+BS  []Non tender []Tender  []PEG [X]OGT/ NGT  Last BM: +ostomy  GENITOURINARY:  []Normal [] Incontinent   []Oliguria/Anuria   [x]Ross  MUSCULOSKELETAL:   []Normal   []Weakness  [x]Bed/Wheelchair bound [x]Edema  NEUROLOGIC:   []No focal deficits  [] Cognitive impairment  [x] Dysphagia []Dysarthria [] Paresis []Other   SKIN:   []Normal   [x]Pressure ulcer(s)  [x]Diffuse Jc (See Nursing Documentation)    CRITICAL CARE:  [x] Shock Present  [x]Septic [ ]Cardiogenic [ ]Neurologic [ ]Hypovolemic  [x]  Vasopressors [ ]  Inotropes   [ ] Respiratory failure present [ ] Mechanical Ventilation [ ] Non-invasive ventilatory support [ ] High-Flow  [ ] Acute  [ ] Chronic [ ] Hypoxic  [ ] Hypercarbic [ ] Other  [ ] Other organ failure    LABS:                                           7.6    5.31  )-----------( 65       ( 30 Jan 2020 08:10 )             24.5     01-30    134<L>  |  98  |  76<H>  ----------------------------<  140<H>  4.1   |  24  |  1.40<H>    Ca    8.0<L>      30 Jan 2020 04:41  Phos  3.7     01-30  Mg     2.1     01-30               RADIOLOGY & ADDITIONAL STUDIES:  < from: CT Chest Abdomen and Pelvis w/ Oral Cont (01.23.20 @ 18:16) >  LUNGS AND LARGE AIRWAYS: Scattered bilateral patchy ground glass and consolidative opacities, new compared to 1/15/2020, favoring multifocal pneumonia over edema. Left lower lobe compressive atelectasis.  PLEURA: Small left and trace right pleural effusions..  VESSELS: Left internal jugular approach central venous catheter terminates in the SVC. Atherosclerotic changes of the aorta.  LIVER: Hepatic steatosis.  GALLBLADDER: Distended. No calcified stones or pericholecystic inflammation.  BLADDER: Ross catheter.  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 An enteric tube terminates in the duodenum. No bowel obstruction.   VESSELS: Atherosclerotic changes.  ABDOMINAL WALL: Anterior abdominal wall open wound, right lower quadrant colostomy and postoperative changes.   BONES: Degenerative changes.     IMPRESSION: Multifocal airspace opacities favoring multifocal pneumonia. No acute pathology in the abdomen/pelvis.        PROTEIN CALORIE MALNUTRITION PRESENT: [x]Yes []No  [x]PPSV2 < or = to 30% []significant weight loss  [x]poor nutritional intake []catabolic state []anasarca     Albumin, Serum: 2.6 g/dL (01-24-20 @ 00:28)  Artificial Nutrition [x]     REFERRALS:   []Chaplaincy  []Hospice  []Child Life  [x]Social Work  []Case management []Holistic Therapy

## 2020-01-30 NOTE — PROGRESS NOTE ADULT - SUBJECTIVE AND OBJECTIVE BOX
Morning Surgical Progress Note  Patient is a 64y old  Female who presents with a chief complaint of perforated sigmoid diverticulitis with free air and persistent sepsis (30 Jan 2020 01:00)      SUBJECTIVE: Patient seen and examined at bedside with surgical team, patient still complains of having some pain. Patient was seen by palliative care yesterday, who adjusted pain reccs.    Vital Signs Last 24 Hrs  T(C): 37.1 (30 Jan 2020 03:00), Max: 37.1 (29 Jan 2020 11:00)  T(F): 98.8 (30 Jan 2020 03:00), Max: 98.8 (29 Jan 2020 11:00)  HR: 86 (30 Jan 2020 04:00) (79 - 99)  BP: 100/46 (30 Jan 2020 04:00) (74/50 - 131/56)  BP(mean): 66 (30 Jan 2020 04:00) (57 - 81)  RR: 31 (30 Jan 2020 04:00) (15 - 48)  SpO2: 99% (30 Jan 2020 04:00) (89% - 100%)I&O's Detail    28 Jan 2020 07:01  -  29 Jan 2020 07:00  --------------------------------------------------------  IN:    Enteral Tube Flush: 100 mL    Lactated Ringers IV Bolus: 500 mL    lactated ringers.: 240 mL    Nepro: 1000 mL    Solution: 1600 mL  Total IN: 3440 mL    OUT:    Colostomy: 710 mL    Indwelling Catheter - Urethral: 570 mL  Total OUT: 1280 mL    Total NET: 2160 mL      29 Jan 2020 07:01  -  30 Jan 2020 05:06  --------------------------------------------------------  IN:    Enteral Tube Flush: 60 mL    Lactated Ringers IV Bolus: 500 mL    lactated ringers.: 220 mL    Nepro: 700 mL    Oral Fluid: 500 mL    Solution: 1400 mL  Total IN: 3380 mL    OUT:    Colostomy: 550 mL    Indwelling Catheter - Urethral: 405 mL    VAC (Vacuum Assisted Closure) System: 200 mL  Total OUT: 1155 mL    Total NET: 2225 mL      MEDICATIONS  (STANDING):  albuterol/ipratropium for Nebulization 3 milliLiter(s) Nebulizer every 6 hours  AQUAPHOR (petrolatum Ointment) 1 Application(s) Topical three times a day  artificial tears (preservative free) Ophthalmic Solution 1 Drop(s) Both EYES two times a day  aspirin  chewable 81 milliGRAM(s) Oral <User Schedule>  atorvastatin 40 milliGRAM(s) Oral <User Schedule>  buDESOnide    Inhalation Suspension 0.5 milliGRAM(s) Inhalation every 12 hours  chlorhexidine 2% Cloths 1 Application(s) Topical <User Schedule>  clobetasol 0.05% Ointment 1 Application(s) Topical two times a day  DAPTOmycin IVPB 1000 milliGRAM(s) IV Intermittent every 24 hours  enoxaparin Injectable 100 milliGRAM(s) SubCutaneous <User Schedule>  erythromycin     base Tablet 250 milliGRAM(s) Oral <User Schedule>  FIRST- Mouthwash  BLM 5 milliLiter(s) Swish and Spit four times a day  gabapentin 600 milliGRAM(s) Oral three times a day  hydrocortisone sodium succinate Injectable 50 milliGRAM(s) IV Push daily  lactated ringers. 1000 milliLiter(s) (10 mL/Hr) IV Continuous <Continuous>  levothyroxine Injectable 25 MICROGram(s) IV Push at bedtime  lidocaine   Patch 1 Patch Transdermal daily  melatonin 3 milliGRAM(s) Oral at bedtime  meropenem  IVPB 1000 milliGRAM(s) IV Intermittent every 8 hours  methadone    Tablet 10 milliGRAM(s) Oral <User Schedule>  minocycline IVPB 100 milliGRAM(s) IV Intermittent every 12 hours  minocycline IVPB      nystatin Powder 1 Application(s) Topical two times a day  pantoprazole    Tablet 40 milliGRAM(s) Oral <User Schedule>  polyethylene glycol 3350 17 Gram(s) Oral <User Schedule>  polymyxin B IVPB [id number] Unit(s) IV Intermittent every 12 hours  thiamine 100 milliGRAM(s) Oral daily    MEDICATIONS  (PRN):  aluminum hydroxide/magnesium hydroxide/simethicone Suspension 30 milliLiter(s) Oral every 4 hours PRN Dyspepsia  diphenhydrAMINE 25 milliGRAM(s) Oral every 12 hours PRN Rash and/or Itching  HYDROmorphone  Injectable 1.5 milliGRAM(s) IV Push every 6 hours PRN Severe Pain (7 - 10)  HYDROmorphone  Injectable 2 milliGRAM(s) IV Push every 24 hours PRN breakthrough pain  ondansetron Injectable 4 milliGRAM(s) IV Push every 6 hours PRN Nausea and/or Vomiting  oxyCODONE    IR 10 milliGRAM(s) Oral every 6 hours PRN Moderate Pain (4 - 6)  zinc oxide 20% Ointment 1 Application(s) Topical three times a day PRN Rash      Physical Exam  Constitutional: A&Ox3, NAD  Respiratory: CTA b/l  Cardiac: RRR, S1 and S2  Gastrointestinal: abdomen soft, NT/ND, wound vac in place  Skin: erythematous  LE: lymphedema b/l    LABS:                        6.5    5.77  )-----------( 66       ( 30 Jan 2020 04:41 )             21.3     01-29    138  |  101  |  57<H>  ----------------------------<  150<H>  4.2   |  25  |  1.09    Ca    8.2<L>      29 Jan 2020 00:11  Phos  3.6     01-29  Mg     2.0     01-29    TPro  5.3<L>  /  Alb  2.4<L>  /  TBili  0.6  /  DBili  0.3<H>  /  AST  142<H>  /  ALT  70<H>  /  AlkPhos  257<H>  01-29    PT/INR - ( 30 Jan 2020 04:41 )   PT: 13.2 sec;   INR: 1.15 ratio         PTT - ( 30 Jan 2020 04:41 )  PTT:31.7 sec  LIVER FUNCTIONS - ( 29 Jan 2020 00:11 )  Alb: 2.4 g/dL / Pro: 5.3 g/dL / ALK PHOS: 257 U/L / ALT: 70 U/L / AST: 142 U/L / GGT: x

## 2020-01-30 NOTE — PROVIDER CONTACT NOTE (OTHER) - BACKGROUND
Pt w/ prolonged ICU course w/ persistent + blood cx despite IV antibiotics. Pt made DNR/DNI yesterday and has been talking w/ palliative care. rehabilitation facility

## 2020-01-30 NOTE — PROGRESS NOTE ADULT - NSHPATTENDINGPLANDISCUSS_GEN_ALL_CORE
SICU team, attending
patient and Dr. Hernandez
SICU team, attending
Dr. Greer

## 2020-01-30 NOTE — PROGRESS NOTE ADULT - ASSESSMENT
64 F PMHx of HTN, COPD, and morbid obesity who was admitted on 11/26 for perforated sigmoid diverticulitis  Prolonged hospital stay  Perforated diverticulum, culture with E coli, s/p OR into washout and ostomy  S/p treatment courses for PICC CoNS CLABSI and Steno in sputum  CTs with improvement in intraabd collections  CXR clear  BCX with VRE and Acinetobacter--acinetobacter persistent through current regimen (brittanie added over weekend)  Critically ill, persistent bacteria in blood, concern for multidrug resistant bacteria/life threatening, poor prognosis  CT's with pna, abd unchanged  Central lines changed 1/23  Concern for MDR Acinetobacter--but S to Minocycline; Poly S pending  BCX still with acinetobacter  Overall,  1) CoNS bacteremia--? CLABSI  - S/p course treatment  2) Loculated fluid collections  - Infected collections, most recent CT reassuring  - Repeat CT A/P when feasible--has been too unstable to obtain  - Surgery F/U--any role for OR exlap and washout? (polymicrobial nature of bacteria implicates abd source; uncertain will be successful in treating with abx alone)  3) Leukocytosis  - Trend to normal  4) Persistent Bacteremia  - GNR and Cons noted in blood culture; new staph aureus in BCX  - Continue Minocycline 100mg q 12  - Dapto 1000mg q 24 (monitor weekly CK)  - Silvia 1g q 8 (CrCl 72)  - Poly B 1,000,000 q 12 (monitor electrolytes, Cr, any signs neuropathy/paresthesias)  - F/U pending BCXs; repeat BCXs q 48 to clearance  - Discussed with pharmacy--reportedly cannot obtain Cefidericol at present time (will be available later in 2020)    Sen Johnson MD  Pager [phone]  After 5pm and on weekends call [phone]

## 2020-01-30 NOTE — CHART NOTE - NSCHARTNOTEFT_GEN_A_CORE
Nutrition Follow Up Note    Patient seen for: malnutrition follow up on 8ICU and change in EN regimen    Source: 8ICU team rounds, medical record    Chart reviewed, events noted. Extensive medical course. GI status:  "perforated sigmoid diverticulitis s/p exploratory laparotomy, extended left hemicolectomy, and left in discontinuity w/ Abthera VAC placement with return to OR for re-exploratory laparotomy, transverse end colostomy, and fascial closure c/b gastroparesis; GERD"    Nutrition Status: Moderate Malnutrition. Pt previously ordered for Nepro via Luis at 70ml/hr x 12 hours overnight, with 2 Prosource, to allow po diet during the day. Per MD request, EN changed to Jevity1.5 in setting of resolving BULL, renal electrolytes within acceptable ranges. Rise in LFTs and BUN/Cr noted. RN reports minimal po intake.    Diet : Regular    Modulars:   Prosouce bid changed to Prosource TF bid, to avoid clogging [place] tube  Raimundo 3 packets/daily; RN will provide via Luis tube, as pt refuses to take po supplement      Enteral Nutrition: Changed to Jevity1.5 via NGT @ 90ml/hr x 12 hours (6am-6pm) to provide 1080ml formula, 1620cal/day, 69Gm protein/day, 760ml free water; meets 27cal/Kg and 1.2Gm protein/Kg per IBW 59.1Kg, with consideration for BMI>50.   Together with 2 Prosource TF (80cal, 22Gm prot) provides 91Gm protein (1.5Gm/Kg).    24-hour EN Provision: meeting 100% of goal x 3 days    Colostomy output x 24 hous: 450ml    Daily Weight in k.7 (), Weight in k.7 (), Weight in k.8 (), Weight in k.6 ()    Drug Dosing Weight  Weight (kg): 168.3 (2019 12:53)  BMI (kg/m2): 59.9 (2019 12:53)    IBW 59.1Kg    Pertinent Medications: MEDICATIONS  (STANDING):  albuterol/ipratropium for Nebulization 3 milliLiter(s) Nebulizer every 6 hours  AQUAPHOR (petrolatum Ointment) 1 Application(s) Topical three times a day  artificial tears (preservative free) Ophthalmic Solution 1 Drop(s) Both EYES two times a day  aspirin  chewable 81 milliGRAM(s) Oral <User Schedule>  atorvastatin 40 milliGRAM(s) Oral <User Schedule>  buDESOnide    Inhalation Suspension 0.5 milliGRAM(s) Inhalation every 12 hours  chlorhexidine 2% Cloths 1 Application(s) Topical <User Schedule>  clobetasol 0.05% Ointment 1 Application(s) Topical two times a day  DAPTOmycin IVPB 1000 milliGRAM(s) IV Intermittent every 24 hours  enoxaparin Injectable 100 milliGRAM(s) SubCutaneous <User Schedule>  erythromycin     base Tablet 250 milliGRAM(s) Oral <User Schedule>  FIRST- Mouthwash  BLM 5 milliLiter(s) Swish and Spit four times a day  gabapentin 600 milliGRAM(s) Oral three times a day  hydrocortisone sodium succinate Injectable 50 milliGRAM(s) IV Push daily  lactated ringers. 1000 milliLiter(s) (10 mL/Hr) IV Continuous <Continuous>  levothyroxine Injectable 25 MICROGram(s) IV Push at bedtime  lidocaine   Patch 1 Patch Transdermal daily  melatonin 3 milliGRAM(s) Oral at bedtime  meropenem  IVPB 1000 milliGRAM(s) IV Intermittent every 8 hours  methadone    Tablet 20 milliGRAM(s) Oral <User Schedule>  minocycline IVPB 100 milliGRAM(s) IV Intermittent every 12 hours  minocycline IVPB      nystatin Powder 1 Application(s) Topical two times a day  pantoprazole    Tablet 40 milliGRAM(s) Oral <User Schedule>  polyethylene glycol 3350 17 Gram(s) Oral <User Schedule>  polymyxin B IVPB [id number] Unit(s) IV Intermittent every 12 hours  thiamine 100 milliGRAM(s) Oral daily    MEDICATIONS  (PRN):  aluminum hydroxide/magnesium hydroxide/simethicone Suspension 30 milliLiter(s) Oral every 4 hours PRN Dyspepsia  diphenhydrAMINE 25 milliGRAM(s) Oral every 12 hours PRN Rash and/or Itching  HYDROmorphone  Injectable 1.5 milliGRAM(s) IV Push every 6 hours PRN Severe Pain (7 - 10)  HYDROmorphone  Injectable 2 milliGRAM(s) IV Push every 24 hours PRN breakthrough pain  ondansetron Injectable 4 milliGRAM(s) IV Push every 6 hours PRN Nausea and/or Vomiting  oxyCODONE    IR 10 milliGRAM(s) Oral every 6 hours PRN Moderate Pain (4 - 6)  zinc oxide 20% Ointment 1 Application(s) Topical three times a day PRN Rash    LABS:    @ 08:10: Hemoglobin 7.6<L>, Hematocrit 24.5<L>   @ 04:41: Sodium 134<L>, Potassium 4.1, Chloride 98, Calcium 8.0<L>, Magnesium 2.1, Phosphorus 3.7, BUN 76<H>, Creatinine 1.40<H>, <H>, Alk Phos 293<H>, ALT/SGPT 94<H>, AST/SGOT 220<H>, Total Protein 4.9<L>, Albumin 2.1<L>, Total Bilirubin 0.5, Direct Bilirubin 0.2, Hemoglobin 6.5<LL>, Hematocrit 21.3<L>    Skin per nursing documentation: no pressure injuries documented  Edema: 4+ dependent, generalized    Estimated Needs: based on IBW 59.1Kg with consideration for BMI>50  6001-1359 eloy/day (25-30cal/Kg)  83-95Gm protein/day (1.4-1.6Gm/Kg)    Previous Nutrition Diagnosis: Moderate malnutrition  Nutrition Diagnosis is: ongoing, being addressed with EN, po diet, and modular supplements    New Nutrition Diagnosis: none     Interventions: EN/modular regimen adjusted for improving BULL status    Recommend  1) Jevity1.5 via NGT @ 90ml/hr x 12 hours (6am-6pm) to provide 1080ml formula, 1620cal/day, 69Gm protein/day, 760ml free water; meets 27cal/Kg and 1.2Gm protein/Kg per IBW 59.1Kg, with consideration for BMI>50.   2) Continue Prosource TF 2x/daily (80cal, 22Gm prot) via NGT to provide 91Gm protein (1.5Gm/Kg).  3) Continue Raimundo 3x/daily via NGT to provide additional amino acids to support collagen formation.  4) Continue Regular diet.    Monitoring and Evaluation:     Continue to monitor nutritional intake, tolerance to diet prescription, weights, labs, skin integrity.    RD remains available upon request and will follow up per protocol.    Mireya Koch, MS RD CDN [facility], Pager # [phone] Nutrition Follow Up Note    Patient seen for: malnutrition follow up on 8ICU and change in EN regimen    Source: 8ICU team rounds, medical record    Chart reviewed, events noted. Extensive medical course. GI status:  "perforated sigmoid diverticulitis s/p exploratory laparotomy, extended left hemicolectomy, and left in discontinuity w/ Abthera VAC placement with return to OR for re-exploratory laparotomy, transverse end colostomy, and fascial closure c/b gastroparesis; GERD"    Nutrition Status: Moderate Malnutrition. Pt previously ordered for Nepro via Luis at 70ml/hr x 12 hours overnight, with 2 Prosource, to allow po diet during the day. RN reports minimal po intake. Per MD request, EN changed to Jevity1.5 in setting of previously resolving BULL and renal electrolytes within acceptable ranges. Rise in LFTs and BUN/Cr noted today.     Diet : Regular    Modulars:   Prosouce bid changed to Prosource TF bid, to avoid clogging Luis tube  Raimundo 3 packets/daily; RN will provide via [place] tube, as pt refuses to take po supplement      Enteral Nutrition: Changed to Jevity1.5 via NGT @ 90ml/hr x 12 hours (6am-6pm) to provide 1080ml formula, 1620cal/day, 69Gm protein/day, 760ml free water; meets 27cal/Kg and 1.2Gm protein/Kg per IBW 59.1Kg, with consideration for BMI>50.   Together with 2 Prosource TF (80cal, 22Gm prot) provides 91Gm protein (1.5Gm/Kg).    24-hour EN Provision: meeting 100% of goal x 3 days    Colostomy output x 24 hous: 450ml    Daily Weight in k.7 (), Weight in k.7 (), Weight in k.8 (), Weight in k.6 ()    Drug Dosing Weight  Weight (kg): 168.3 (2019 12:53)  BMI (kg/m2): 59.9 (2019 12:53)    IBW 59.1Kg    Pertinent Medications: MEDICATIONS  (STANDING):  albuterol/ipratropium for Nebulization 3 milliLiter(s) Nebulizer every 6 hours  AQUAPHOR (petrolatum Ointment) 1 Application(s) Topical three times a day  artificial tears (preservative free) Ophthalmic Solution 1 Drop(s) Both EYES two times a day  aspirin  chewable 81 milliGRAM(s) Oral <User Schedule>  atorvastatin 40 milliGRAM(s) Oral <User Schedule>  buDESOnide    Inhalation Suspension 0.5 milliGRAM(s) Inhalation every 12 hours  chlorhexidine 2% Cloths 1 Application(s) Topical <User Schedule>  clobetasol 0.05% Ointment 1 Application(s) Topical two times a day  DAPTOmycin IVPB 1000 milliGRAM(s) IV Intermittent every 24 hours  enoxaparin Injectable 100 milliGRAM(s) SubCutaneous <User Schedule>  erythromycin     base Tablet 250 milliGRAM(s) Oral <User Schedule>  FIRST- Mouthwash  BLM 5 milliLiter(s) Swish and Spit four times a day  gabapentin 600 milliGRAM(s) Oral three times a day  hydrocortisone sodium succinate Injectable 50 milliGRAM(s) IV Push daily  lactated ringers. 1000 milliLiter(s) (10 mL/Hr) IV Continuous <Continuous>  levothyroxine Injectable 25 MICROGram(s) IV Push at bedtime  lidocaine   Patch 1 Patch Transdermal daily  melatonin 3 milliGRAM(s) Oral at bedtime  meropenem  IVPB 1000 milliGRAM(s) IV Intermittent every 8 hours  methadone    Tablet 20 milliGRAM(s) Oral <User Schedule>  minocycline IVPB 100 milliGRAM(s) IV Intermittent every 12 hours  minocycline IVPB      nystatin Powder 1 Application(s) Topical two times a day  pantoprazole    Tablet 40 milliGRAM(s) Oral <User Schedule>  polyethylene glycol 3350 17 Gram(s) Oral <User Schedule>  polymyxin B IVPB [id number] Unit(s) IV Intermittent every 12 hours  thiamine 100 milliGRAM(s) Oral daily    MEDICATIONS  (PRN):  aluminum hydroxide/magnesium hydroxide/simethicone Suspension 30 milliLiter(s) Oral every 4 hours PRN Dyspepsia  diphenhydrAMINE 25 milliGRAM(s) Oral every 12 hours PRN Rash and/or Itching  HYDROmorphone  Injectable 1.5 milliGRAM(s) IV Push every 6 hours PRN Severe Pain (7 - 10)  HYDROmorphone  Injectable 2 milliGRAM(s) IV Push every 24 hours PRN breakthrough pain  ondansetron Injectable 4 milliGRAM(s) IV Push every 6 hours PRN Nausea and/or Vomiting  oxyCODONE    IR 10 milliGRAM(s) Oral every 6 hours PRN Moderate Pain (4 - 6)  zinc oxide 20% Ointment 1 Application(s) Topical three times a day PRN Rash    LABS:    @ 08:10: Hemoglobin 7.6<L>, Hematocrit 24.5<L>   @ 04:41: Sodium 134<L>, Potassium 4.1, Chloride 98, Calcium 8.0<L>, Magnesium 2.1, Phosphorus 3.7, BUN 76<H>, Creatinine 1.40<H>, <H>, Alk Phos 293<H>, ALT/SGPT 94<H>, AST/SGOT 220<H>, Total Protein 4.9<L>, Albumin 2.1<L>, Total Bilirubin 0.5, Direct Bilirubin 0.2, Hemoglobin 6.5<LL>, Hematocrit 21.3<L>    Skin per nursing documentation: no pressure injuries documented  Edema: 4+ dependent, generalized    Estimated Needs: based on IBW 59.1Kg with consideration for BMI>50  4325-4148 eloy/day (25-30cal/Kg)  83-95Gm protein/day (1.4-1.6Gm/Kg)    Previous Nutrition Diagnosis: Moderate malnutrition  Nutrition Diagnosis is: ongoing, being addressed with EN, po diet, and modular supplements    New Nutrition Diagnosis: none     Interventions: EN/modular regimen adjusted for improving BULL status    Recommend  1) Jevity1.5 via NGT @ 90ml/hr x 12 hours (6am-6pm) to provide 1080ml formula, 1620cal/day, 69Gm protein/day, 760ml free water; meets 27cal/Kg and 1.2Gm protein/Kg per IBW 59.1Kg, with consideration for BMI>50.   2) Continue Prosource TF 2x/daily (80cal, 22Gm prot) via NGT to provide 91Gm protein (1.5Gm/Kg).  3) Continue Raimundo 3x/daily via NGT to provide additional amino acids to support collagen formation.  4) Continue Regular diet.  5) Monitor rising renal indices and LFTs.    Monitoring and Evaluation:     Continue to monitor nutritional intake, tolerance to diet prescription, weights, labs, skin integrity.    RD remains available upon request and will follow up per protocol.    Mireya Koch MS RD CDN [facility], Pager # [phone]

## 2020-01-30 NOTE — PROGRESS NOTE ADULT - ASSESSMENT
65yo F with PMHx morbid obesity, GERD, HTN, COPD, and PSHx D&C now s/p ex laparotomy with extended left hemicolectomy 11/26 for perforated and necrotic sigmoid colon with gross abdominal contamination. RTOR 11/29 for abd closure, RUQ end transverse colostomy creation. On 12/19 patient found to have induration of wound and keri-stomal fat necrosis, s/p bedside debridement w/ wound VAC in placement on midline wound. Began showing signs of sepsis and was transferred to SICU 1/7 for further management. Now w/ decreasing pressor support and downtrending leukocytosis. Troponins have been elevated and echo shows decreased ventricular wall motility. Patient grew VRE, and linezolid was added to her antibiotic regimen. Patient is now off pressors and blood pressure is improved. Patient was made DNR on 1/26 after goals of care discussion. Patient was seen by Palliative care yesterday, who gave pain medication reccs.      Plan:  Continue IV Abx- patient is on Contact precautions  Trend labs/ WBC/ SBP  Monitor Vitals  Appreciate Cards reccs  Appreciate ID reccs  Appreciate Palliative reccs  Discuss possible future surgical intervention to help with bacteremia  Appreciate excellent SICU care    [facility]  [phone]

## 2020-01-30 NOTE — PROGRESS NOTE ADULT - SUBJECTIVE AND OBJECTIVE BOX
CARDIOLOGY     PROGRESS  NOTE   ________________________________________________    CHIEF COMPLAINT:Patient is a 64y old  Female who presents with a chief complaint of perforated sigmoid diverticulitis with free air and persistent sepsis (30 Jan 2020 01:00)  lethargic  	  REVIEW OF SYSTEMS:  CONSTITUTIONAL: No fever, weight loss, or fatigue  EYES: No eye pain, visual disturbances, or discharge  ENT:  No difficulty hearing, tinnitus, vertigo; No sinus or throat pain  NECK: No pain or stiffness  RESPIRATORY: No cough, wheezing, chills or hemoptysis; No Shortness of Breath  CARDIOVASCULAR: No chest pain, palpitations, passing out, dizziness, or leg swelling  GASTROINTESTINAL: No abdominal or epigastric pain. No nausea, vomiting, or hematemesis; No diarrhea or constipation. No melena or hematochezia.  GENITOURINARY: No dysuria, frequency, hematuria, or incontinence  NEUROLOGICAL: No headaches, memory loss, loss of strength, numbness, or tremors  SKIN: No itching, burning, rashes, or lesions   LYMPH Nodes: No enlarged glands  ENDOCRINE: No heat or cold intolerance; No hair loss  MUSCULOSKELETAL: No joint pain or swelling; No muscle, back, or extremity pain  PSYCHIATRIC: No depression, anxiety, mood swings, or difficulty sleeping  HEME/LYMPH: No easy bruising, or bleeding gums  ALLERGY AND IMMUNOLOGIC: No hives or eczema	    [ ] All others negative	  [ x] Unable to obtain    PHYSICAL EXAM:  T(C): 36.7 (01-30-20 @ 07:00), Max: 37.1 (01-29-20 @ 11:00)  HR: 88 (01-30-20 @ 08:00) (79 - 100)  BP: 98/57 (01-30-20 @ 08:00) (74/50 - 131/56)  RR: 30 (01-30-20 @ 08:00) (15 - 48)  SpO2: 92% (01-30-20 @ 08:00) (89% - 100%)  Wt(kg): --  I&O's Summary    29 Jan 2020 07:01  -  30 Jan 2020 07:00  --------------------------------------------------------  IN: 3960 mL / OUT: 1365 mL / NET: 2595 mL    30 Jan 2020 07:01  -  30 Jan 2020 09:12  --------------------------------------------------------  IN: 10 mL / OUT: 30 mL / NET: -20 mL        Appearance: Normal	  HEENT:   Normal oral mucosa, PERRL, EOMI	  Lymphatic: No lymphadenopathy  Cardiovascular: Normal S1 S2, No JVD, +murmurs, No edema  Respiratory: Lungs clear to auscultation	  Psychiatry: A & O x 3, Mood & affect appropriate  Gastrointestinal:  Soft, Non-tender, + BS	  Skin: No rashes, No ecchymoses, No cyanosis	  Neurologic: Non-focal  Extremities: Normal range of motion, No clubbing, cyanosis or edema  Vascular: Peripheral pulses palpable 2+ bilaterally    MEDICATIONS  (STANDING):  albuterol/ipratropium for Nebulization 3 milliLiter(s) Nebulizer every 6 hours  AQUAPHOR (petrolatum Ointment) 1 Application(s) Topical three times a day  artificial tears (preservative free) Ophthalmic Solution 1 Drop(s) Both EYES two times a day  aspirin  chewable 81 milliGRAM(s) Oral <User Schedule>  atorvastatin 40 milliGRAM(s) Oral <User Schedule>  buDESOnide    Inhalation Suspension 0.5 milliGRAM(s) Inhalation every 12 hours  chlorhexidine 2% Cloths 1 Application(s) Topical <User Schedule>  clobetasol 0.05% Ointment 1 Application(s) Topical two times a day  DAPTOmycin IVPB 1000 milliGRAM(s) IV Intermittent every 24 hours  enoxaparin Injectable 100 milliGRAM(s) SubCutaneous <User Schedule>  erythromycin     base Tablet 250 milliGRAM(s) Oral <User Schedule>  FIRST- Mouthwash  BLM 5 milliLiter(s) Swish and Spit four times a day  gabapentin 600 milliGRAM(s) Oral three times a day  hydrocortisone sodium succinate Injectable 50 milliGRAM(s) IV Push daily  lactated ringers. 1000 milliLiter(s) (10 mL/Hr) IV Continuous <Continuous>  levothyroxine Injectable 25 MICROGram(s) IV Push at bedtime  lidocaine   Patch 1 Patch Transdermal daily  melatonin 3 milliGRAM(s) Oral at bedtime  meropenem  IVPB 1000 milliGRAM(s) IV Intermittent every 8 hours  methadone    Tablet 10 milliGRAM(s) Oral <User Schedule>  minocycline IVPB 100 milliGRAM(s) IV Intermittent every 12 hours  minocycline IVPB      nystatin Powder 1 Application(s) Topical two times a day  pantoprazole    Tablet 40 milliGRAM(s) Oral <User Schedule>  polyethylene glycol 3350 17 Gram(s) Oral <User Schedule>  polymyxin B IVPB [id number] Unit(s) IV Intermittent every 12 hours  thiamine 100 milliGRAM(s) Oral daily      TELEMETRY: 	    ECG:  	  RADIOLOGY:  OTHER: 	  	  LABS:	 	    CARDIAC MARKERS:  CARDIAC MARKERS ( 30 Jan 2020 04:41 )  x     / x     / 21 U/L / x     / x                                    7.6    5.31  )-----------( 65       ( 30 Jan 2020 08:10 )             24.5     01-30    134<L>  |  98  |  76<H>  ----------------------------<  140<H>  4.1   |  24  |  1.40<H>    Ca    8.0<L>      30 Jan 2020 04:41  Phos  3.7     01-30  Mg     2.1     01-30    TPro  4.9<L>  /  Alb  2.1<L>  /  TBili  0.5  /  DBili  0.2  /  AST  220<H>  /  ALT  94<H>  /  AlkPhos  293<H>  01-30    proBNP:   Lipid Profile:   HgA1c:   TSH: Thyroid Stimulating Hormone, Serum: 4.81 uIU/mL (01-17 @ 03:52)    PT/INR - ( 30 Jan 2020 04:41 )   PT: 13.2 sec;   INR: 1.15 ratio         PTT - ( 30 Jan 2020 04:41 )  PTT:31.7 sec      Assessment and plan  ---------------------------  septic/ hypovolemic shock still hypotensive but off pressors  abx as per ID + blood cultures  may hold cholesterol meds  WMA on echo ? sec to sepsis  dvt prophylaxis  s/p blood transfusion   continue tx as per ICU  decrease methadone dose  ace wrap to the both LE  increase renal function ? sec to ATN sec to hypotension fu renal function closely/ improving  may dc lipitor  abx as per ID, ?DONNA  palliative is been seeing pt

## 2020-01-30 NOTE — PROGRESS NOTE ADULT - ATTENDING COMMENTS
Bacteremic and now with ARF  1.  ARF--recurrent on nephrotoxic polymixin.  Volume optimize (responded to albumin recently) Limit polymixin as much as feasible.    2.  Acidosis--bicitra enteral supplies HCO3 precursor in concentrated form.  Aim to keep pH>7.2  3.  Hyperkalemia--resolved.  If recurrent may require HD but consideration should include GOC given progression of condoition approaching futility

## 2020-01-30 NOTE — PROGRESS NOTE ADULT - PROBLEM SELECTOR PLAN 1
- on methadone 20mg BID, uptitrated today by SICU team  - likely has high tolerance, has been on methadone for years for addiction  - would change to dilaudid 1.5mg IV Q1 PRN as patient going to PCU

## 2020-01-30 NOTE — PROGRESS NOTE ADULT - ASSESSMENT
64y Female pmhx morbid obesity, acid reflux, HTN, COPD and PSH D&C presented to the ED on 11/26 c/o abdominal pain and bloating Had a complicated hospital course, initially presented with constipation for 2 weeks at that time reported pain worst in the LLQ in ED, CT demonstrated perforated sigmoid diverticulitis with intraperitoneal free air, on 11/25  underwent an exploratory laparotomy with extended left hemicolectomy and was left in discontinuity. An Abthera VAC, now with long complicated course, with sepsis, requiring pressors, demand ischemia, and oliguric BULL.    #BULL  Pt with  multifactorial BULL in the setting of septic shock, hypotension and possible drug reaction from polymixin.  On review of labs pt had normal scr, noted to rise since 01/20/20 to 1.2 from 0.8 peaked at  2 mg/dl, improved to 0.9 today 1.4. Rise in scr most likely from hypotension yesterday, now on LR. Suggest to continue with hemodynamic support.   -Pt has overall poor prognosis, if no surgical solution, and bacteremia cannot be solved, pt would not be a good candidate for RRT.   -Monitor labs and urine output. Avoid NSAIDs, ACEI/ARBS, RCA and nephrotoxins. Dose medications as per eGFR.    #Acidosis  In the setting of bull and lactic acidosis, improved, monitor for now.     #Hyperkalemia  In the setting of oliguric BULL,  and acidosis. Suggest to continue medical management with bicarb, d50, insulin and lasix prn, if poor response, and K >6 would need to consider HD, pt  was refusing HD and not a good candidate.     POOR prognosis, suggest palliative care to establish GOC and Advance care directives.

## 2020-01-30 NOTE — PROGRESS NOTE ADULT - ATTENDING COMMENTS
Patient seen/examined.  Agree w above note and plan and have discussed plan w house staff.  Patient still w bacteremia, unknown etiology.  Shoud get DONNA to r/o vegetations on valves    Lucas Greer MD

## 2020-01-30 NOTE — PROGRESS NOTE ADULT - ATTENDING COMMENTS
Evaluated in round  Awake and alert, increase Methadone dose for better pain control  Saturating well, on bronchodilators ICS  Remains off vasopressor support and Midodrine, receiving bolus for hypotension, likely intravascular depletion  Nocturnal NGT feed changed to Jevity,  oral feed during day time  Abx Polymixin Minocycline Meropenem, Dapto, BC growing PCN resistant staph epi, repeat BC again grew GNR, suspect acenatobactor resistant to polymixin  Worsening of renal function again, may need to Dc Polymixin  DVT prophylaxis with Lovenox, HCT platelets stable  Wound/skin care    Spoke to pt and significant other with palliative care attending, both agreed to be transferred to palliative care,

## 2020-01-30 NOTE — PROGRESS NOTE ADULT - SUBJECTIVE AND OBJECTIVE BOX
SURGICAL INTENSIVE CARE UNIT DAILY PROGRESS NOTE    HPI:  [facility] General Surgery H&P    Patient is a 64y old Female who presents with a chief complaint of abdominal pain    HPI:    64y Female PMHx of morbid obesity, COPD, HTN, and GERD who presented to the ED on 11/26/2019 with abdominal pain and distension. Patient states that she had been having constipation for ~2 weeks with no improvement despite laxative and enema use. Imaging revealed perforated sigmoid diverticulitis with free air. Hospital course is as follows:    11/26 - s/p exploratory laparotomy, extended left hemicolectomy, and left in discontinuity w/ Abthera VAC placement, left intubated post-operatively so she was admitted to SICU for further management  11/29 - re-exploratory laparotomy, transverse end colostomy, and fascial closure with wound VAC placement, left intubated at end of case  12/10 - EGD demonstrated diverticula, a medium-sized hiatal hernia, esophagitis, and gastritis,  12/11 - bilateral UE Duplex demonstrated stenosis of the right brachiocephalic artery concerning for subclavian steal syndrome  12/28 - plastic surgery consulted for abdominal wall reconstruction, underwent bedside debridement of abdominal wound at that time.  01/07 - worsening hypotension from presumed sepsis requiring readmission to SICU, imaging revealed loculated fluid collections in the left anterior abdomen & a small abdominal wall collection, both of which were not amenable to drainage, cultures were also positive for E. coli & Klebsiella in the urine, Stenotrophomonas in the sputum, & Staph epidermis in the blood, LUE PICC discontinued  01/10 - right IJ CVC was placed and patient started on vasopressor support but patient refused arterial line placement, patient complaining of chest wall tightness, hsT was elevated and TTE demonstrated new mild to moderate segmental LV systolic dysfunction w/ hypokinesis of the inferior and inferolateral walls but cardiology determined the troponin leak to be stressed-induced in the setting of sepsis    24 HOUR EVENTS:  - 500cc LR bolus given for drop in SBP  - Gabapentin 600mg TID started for pain management  - worsening thrombocytopenia     ICU Vital Signs Last 24 Hrs  T(C): 36.8 (29 Jan 2020 23:00), Max: 37.1 (29 Jan 2020 11:00)  T(F): 98.2 (29 Jan 2020 23:00), Max: 98.8 (29 Jan 2020 11:00)  HR: 99 (30 Jan 2020 00:00) (79 - 99)  BP: 98/55 (30 Jan 2020 00:00) (74/50 - 136/70)  BP(mean): 71 (30 Jan 2020 00:00) (57 - 88)  ABP: --  ABP(mean): --  RR: 21 (30 Jan 2020 00:00) (15 - 48)  SpO2: 100% (30 Jan 2020 00:00) (89% - 100%)    General: Uncomfortable and appearing in pain.   HEENT: Atraumatic, EOMI  Resp: Labored breathing, crackles apparent on both sides  CV: Normal sinus rhythm  Abd: soft, distended, exquisitely tender in RLQ and overlying woundvac/ostomy site  Ext: ROMIx4, motor strength intact x 4                          7.6    7.91  )-----------( 82       ( 29 Jan 2020 00:11 )             24.7       138  |  101  |  57<H>  ----------------------------<  150<H>  4.2   |  25  |  1.09    Ca    8.2<L>      29 Jan 2020 00:11  Phos  3.6     01-29  Mg     2.0     01-29    TPro  5.3<L>  /  Alb  2.4<L>  /  TBili  0.6  /  DBili  0.3<H>  /  AST  142<H>  /  ALT  70<H>  /  AlkPhos  257<H>  01-29    Prothrombin Time and INR, Plasma in AM (01.29.20 @ 09:43)    Prothrombin Time, Plasma: 12.7    INR: 1.11  Activated Partial Thromboplastin Time in AM (01.29.20 @ 09:43)    Activated Partial Thromboplastin Time: 30.1      Culture - Blood (collected 28 Jan 2020 17:12)  Source: .Blood Blood-Peripheral  Preliminary Report (29 Jan 2020 18:01):    No growth to date.    Culture - Blood (collected 28 Jan 2020 15:01)  Source: .Blood Blood  Gram Stain (29 Jan 2020 04:33):    Growth in aerobic bottle:    Gram Negative Rods  Preliminary Report (29 Jan 2020 04:33):    Growth in aerobic bottle:    Gram Negative Rods      NEURO  Exam: awake, alert, oriented  Meds: diphenhydrAMINE 25 milliGRAM(s) Oral every 12 hours PRN Rash and/or Itching  HYDROmorphone  Injectable 1.5 milliGRAM(s) IV Push every 6 hours PRN Severe Pain (7 - 10)  HYDROmorphone  Injectable 2 milliGRAM(s) IV Push every 24 hours PRN breakthrough pain  melatonin 3 milliGRAM(s) Oral at bedtime  methadone    Tablet 10 milliGRAM(s) Oral <User Schedule>  ondansetron Injectable 4 milliGRAM(s) IV Push every 6 hours PRN Nausea and/or Vomiting  oxyCODONE    IR 10 milliGRAM(s) Oral every 6 hours PRN Moderate Pain (4 - 6)  gabapentin  600mg TID    [x] Adequacy of sedation and pain control has been assessed and adjusted      RESPIRATORY  RR: 21 (01-30-20 @ 00:00) (15 - 48)  SpO2: 100% (01-30-20 @ 00:00) (89% - 100%)  Wt(kg): --  Exam: labored, crackles present bilaterally  Mechanical Ventilation:     [N/A] Extubation Readiness Assessed  Meds: albuterol/ipratropium for Nebulization 3 milliLiter(s) Nebulizer every 6 hours  buDESOnide    Inhalation Suspension 0.5 milliGRAM(s) Inhalation every 12 hours    CARDIOVASCULAR  HR: 99 (01-30-20 @ 00:00) (79 - 99)  BP: 98/55 (01-30-20 @ 00:00) (74/50 - 136/70)  BP(mean): 71 (01-30-20 @ 00:00) (57 - 88)  ABP: --  ABP(mean): --  Wt(kg): --  CVP(cm H2O): --  VBG - ( 29 Jan 2020 08:11 )  pH: 7.35  /  pCO2: 56    /  pO2: 42    / HCO3: 30    / Base Excess: 4.8   /  SaO2: 70     Lactate: 2.2      Exam: regular rate and rhythm  Cardiac Rhythm: sinus  Perfusion     [x]Adequate   [ ]Inadequate  Mentation   [x]Normal       [ ]Reduced  Extremities  [x]Warm         [ ]Cool  Volume Status [ ]Hypervolemic [x]Euvolemic [ ]Hypovolemic  Meds:       GI/NUTRITION  Exam: soft, tender to RLQ overlying woundvac/ostomy site, distended  Diet: regular w/ nocturnal tube feeds  Meds: aluminum hydroxide/magnesium hydroxide/simethicone Suspension 30 milliLiter(s) Oral every 4 hours PRN Dyspepsia  pantoprazole    Tablet 40 milliGRAM(s) Oral <User Schedule>  polyethylene glycol 3350 17 Gram(s) Oral <User Schedule>    GENITOURINARY  I&O's Detail    01-28 @ 07:01 - 01-29 @ 07:00  --------------------------------------------------------  IN:    Enteral Tube Flush: 100 mL    Lactated Ringers IV Bolus: 500 mL    lactated ringers.: 240 mL    Nepro: 1000 mL    Solution: 1600 mL  Total IN: 3440 mL    OUT:    Colostomy: 710 mL    Indwelling Catheter - Urethral: 570 mL  Total OUT: 1280 mL    Total NET: 2160 mL      01-29 @ 07:01 - 01-30 @ 01:01  --------------------------------------------------------  IN:    Enteral Tube Flush: 60 mL    Lactated Ringers IV Bolus: 500 mL    lactated ringers.: 170 mL    Nepro: 350 mL    Oral Fluid: 500 mL    Solution: 1400 mL  Total IN: 2980 mL    OUT:    Colostomy: 400 mL    Indwelling Catheter - Urethral: 360 mL    VAC (Vacuum Assisted Closure) System: 200 mL  Total OUT: 960 mL    Total NET: 2020 mL 01-29    138  |  101  |  57<H>  ----------------------------<  150<H>  4.2   |  25  |  1.09    Ca    8.2<L>      29 Jan 2020 00:11  Phos  3.6     01-29  Mg     2.0     01-29    TPro  5.3<L>  /  Alb  2.4<L>  /  TBili  0.6  /  DBili  0.3<H>  /  AST  142<H>  /  ALT  70<H>  /  AlkPhos  257<H>  01-29    [ ] Ross catheter, indication: N/A  Meds: lactated ringers. 1000 milliLiter(s) IV Continuous <Continuous>  thiamine 100 milliGRAM(s) Oral daily    HEMATOLOGIC  Meds: aspirin  chewable 81 milliGRAM(s) Oral <User Schedule>  enoxaparin Injectable 100 milliGRAM(s) SubCutaneous <User Schedule>    [x] VTE Prophylaxis                        7.6    7.91  )-----------( 82       ( 29 Jan 2020 00:11 )             24.7     PT/INR - ( 29 Jan 2020 09:43 )   PT: 12.7 sec;   INR: 1.11 ratio      PTT - ( 29 Jan 2020 09:43 )  PTT:30.1 sec    Transfusion     [ ] PRBC   [ ] Platelets   [ ] FFP   [ ] Cryoprecipitate    INFECTIOUS DISEASES  WBC Count: 7.91 K/uL (01.29.20 @ 00:11)    RECENT CULTURES:  Specimen Source: .Blood Blood-Peripheral  Date/Time: 01-28 @ 17:12  Culture Results:   No growth to date.  Gram Stain: --  Organism: --  Specimen Source: .Blood Blood  Date/Time: 01-28 @ 15:01  Culture Results:   Growth in aerobic bottle:  Gram Negative Rods  Gram Stain:   Growth in aerobic bottle:  Gram Negative Rods  Organism: --  Specimen Source: .Blood Blood-Venous  Date/Time: 01-26 @ 18:14  Culture Results:   No growth to date.  Gram Stain: --  Organism: --    Meds: DAPTOmycin IVPB 1000 milliGRAM(s) IV Intermittent every 24 hours  erythromycin     base Tablet 250 milliGRAM(s) Oral <User Schedule>  meropenem  IVPB 1000 milliGRAM(s) IV Intermittent every 8 hours  minocycline IVPB 100 milliGRAM(s) IV Intermittent every 12 hours  minocycline IVPB      polymyxin B IVPB 8503901 Unit(s) IV Intermittent every 12 hours    ENDOCRINE  CAPILLARY BLOOD GLUCOSE    Meds: atorvastatin 40 milliGRAM(s) Oral <User Schedule>  hydrocortisone sodium succinate Injectable 50 milliGRAM(s) IV Push daily  levothyroxine Injectable 25 MICROGram(s) IV Push at bedtime

## 2020-01-31 LAB
-  AMIKACIN: SIGNIFICANT CHANGE UP
-  AMPICILLIN/SULBACTAM: SIGNIFICANT CHANGE UP
-  CEFEPIME: SIGNIFICANT CHANGE UP
-  CEFTAZIDIME: SIGNIFICANT CHANGE UP
-  CIPROFLOXACIN: SIGNIFICANT CHANGE UP
-  GENTAMICIN: SIGNIFICANT CHANGE UP
-  IMIPENEM: SIGNIFICANT CHANGE UP
-  LEVOFLOXACIN: SIGNIFICANT CHANGE UP
-  MEROPENEM: SIGNIFICANT CHANGE UP
-  PIPERACILLIN/TAZOBACTAM: SIGNIFICANT CHANGE UP
-  TOBRAMYCIN: SIGNIFICANT CHANGE UP
-  TRIMETHOPRIM/SULFAMETHOXAZOLE: SIGNIFICANT CHANGE UP
CULTURE RESULTS: SIGNIFICANT CHANGE UP
METHOD TYPE: SIGNIFICANT CHANGE UP
METHOD TYPE: SIGNIFICANT CHANGE UP
ORGANISM # SPEC MICROSCOPIC CNT: SIGNIFICANT CHANGE UP
SPECIMEN SOURCE: SIGNIFICANT CHANGE UP

## 2020-01-31 PROCEDURE — 99233 SBSQ HOSP IP/OBS HIGH 50: CPT

## 2020-01-31 PROCEDURE — 99233 SBSQ HOSP IP/OBS HIGH 50: CPT | Mod: GC

## 2020-01-31 RX ORDER — HYDROMORPHONE HYDROCHLORIDE 2 MG/ML
0.2 INJECTION INTRAMUSCULAR; INTRAVENOUS; SUBCUTANEOUS
Qty: 100 | Refills: 0 | Status: DISCONTINUED | OUTPATIENT
Start: 2020-01-31 | End: 2020-02-05

## 2020-01-31 RX ORDER — CEFAZOLIN SODIUM 1 G
2000 VIAL (EA) INJECTION EVERY 8 HOURS
Refills: 0 | Status: DISCONTINUED | OUTPATIENT
Start: 2020-01-31 | End: 2020-02-05

## 2020-01-31 RX ORDER — HYDROMORPHONE HYDROCHLORIDE 2 MG/ML
1.5 INJECTION INTRAMUSCULAR; INTRAVENOUS; SUBCUTANEOUS
Refills: 0 | Status: DISCONTINUED | OUTPATIENT
Start: 2020-01-31 | End: 2020-02-05

## 2020-01-31 RX ORDER — DIPHENHYDRAMINE HCL 50 MG
25 CAPSULE ORAL ONCE
Refills: 0 | Status: COMPLETED | OUTPATIENT
Start: 2020-01-31 | End: 2020-01-31

## 2020-01-31 RX ADMIN — Medication 25 MICROGRAM(S): at 21:54

## 2020-01-31 RX ADMIN — HYDROMORPHONE HYDROCHLORIDE 0.2 MG/HR: 2 INJECTION INTRAMUSCULAR; INTRAVENOUS; SUBCUTANEOUS at 20:39

## 2020-01-31 RX ADMIN — Medication 250 MILLIGRAM(S): at 10:24

## 2020-01-31 RX ADMIN — Medication 1 APPLICATION(S): at 21:59

## 2020-01-31 RX ADMIN — Medication 100 MILLIGRAM(S): at 17:26

## 2020-01-31 RX ADMIN — Medication 25 MILLIGRAM(S): at 21:54

## 2020-01-31 RX ADMIN — Medication 1 DROP(S): at 17:48

## 2020-01-31 RX ADMIN — NYSTATIN CREAM 1 APPLICATION(S): 100000 CREAM TOPICAL at 06:22

## 2020-01-31 RX ADMIN — Medication 1 APPLICATION(S): at 17:45

## 2020-01-31 RX ADMIN — Medication 100 MILLIGRAM(S): at 21:55

## 2020-01-31 RX ADMIN — MINOCYCLINE HYDROCHLORIDE 100 MILLIGRAM(S): 45 TABLET, EXTENDED RELEASE ORAL at 06:08

## 2020-01-31 RX ADMIN — Medication 3 MILLILITER(S): at 06:08

## 2020-01-31 RX ADMIN — Medication 3 MILLILITER(S): at 23:04

## 2020-01-31 RX ADMIN — HYDROMORPHONE HYDROCHLORIDE 1.5 MILLIGRAM(S): 2 INJECTION INTRAMUSCULAR; INTRAVENOUS; SUBCUTANEOUS at 03:35

## 2020-01-31 RX ADMIN — Medication 1 APPLICATION(S): at 06:07

## 2020-01-31 RX ADMIN — CHLORHEXIDINE GLUCONATE 1 APPLICATION(S): 213 SOLUTION TOPICAL at 06:14

## 2020-01-31 RX ADMIN — Medication 81 MILLIGRAM(S): at 20:39

## 2020-01-31 RX ADMIN — HYDROMORPHONE HYDROCHLORIDE 0.2 MG/HR: 2 INJECTION INTRAMUSCULAR; INTRAVENOUS; SUBCUTANEOUS at 10:30

## 2020-01-31 RX ADMIN — MINOCYCLINE HYDROCHLORIDE 100 MILLIGRAM(S): 45 TABLET, EXTENDED RELEASE ORAL at 17:46

## 2020-01-31 RX ADMIN — Medication 0.5 MILLIGRAM(S): at 06:08

## 2020-01-31 RX ADMIN — METHADONE HYDROCHLORIDE 20 MILLIGRAM(S): 40 TABLET ORAL at 10:22

## 2020-01-31 RX ADMIN — Medication 1 APPLICATION(S): at 13:05

## 2020-01-31 RX ADMIN — Medication 1 APPLICATION(S): at 06:09

## 2020-01-31 RX ADMIN — ATORVASTATIN CALCIUM 40 MILLIGRAM(S): 80 TABLET, FILM COATED ORAL at 20:39

## 2020-01-31 RX ADMIN — HYDROMORPHONE HYDROCHLORIDE 2 MILLIGRAM(S): 2 INJECTION INTRAMUSCULAR; INTRAVENOUS; SUBCUTANEOUS at 22:58

## 2020-01-31 RX ADMIN — Medication 100 MILLIGRAM(S): at 13:05

## 2020-01-31 RX ADMIN — OXYCODONE HYDROCHLORIDE 10 MILLIGRAM(S): 5 TABLET ORAL at 02:18

## 2020-01-31 RX ADMIN — Medication 250 MILLIGRAM(S): at 17:45

## 2020-01-31 RX ADMIN — METHADONE HYDROCHLORIDE 20 MILLIGRAM(S): 40 TABLET ORAL at 20:39

## 2020-01-31 RX ADMIN — GABAPENTIN 600 MILLIGRAM(S): 400 CAPSULE ORAL at 22:13

## 2020-01-31 RX ADMIN — HYDROMORPHONE HYDROCHLORIDE 1.5 MILLIGRAM(S): 2 INJECTION INTRAMUSCULAR; INTRAVENOUS; SUBCUTANEOUS at 03:50

## 2020-01-31 RX ADMIN — POLYETHYLENE GLYCOL 3350 17 GRAM(S): 17 POWDER, FOR SOLUTION ORAL at 13:05

## 2020-01-31 RX ADMIN — NYSTATIN CREAM 1 APPLICATION(S): 100000 CREAM TOPICAL at 17:49

## 2020-01-31 RX ADMIN — MEROPENEM 100 MILLIGRAM(S): 1 INJECTION INTRAVENOUS at 06:08

## 2020-01-31 RX ADMIN — GABAPENTIN 600 MILLIGRAM(S): 400 CAPSULE ORAL at 06:12

## 2020-01-31 RX ADMIN — HYDROMORPHONE HYDROCHLORIDE 2 MILLIGRAM(S): 2 INJECTION INTRAMUSCULAR; INTRAVENOUS; SUBCUTANEOUS at 23:13

## 2020-01-31 RX ADMIN — POLYETHYLENE GLYCOL 3350 17 GRAM(S): 17 POWDER, FOR SOLUTION ORAL at 17:47

## 2020-01-31 RX ADMIN — DIPHENHYDRAMINE HYDROCHLORIDE AND LIDOCAINE HYDROCHLORIDE AND ALUMINUM HYDROXIDE AND MAGNESIUM HYDRO 5 MILLILITER(S): KIT at 13:05

## 2020-01-31 RX ADMIN — Medication 50 MILLIGRAM(S): at 06:12

## 2020-01-31 RX ADMIN — DIPHENHYDRAMINE HYDROCHLORIDE AND LIDOCAINE HYDROCHLORIDE AND ALUMINUM HYDROXIDE AND MAGNESIUM HYDRO 5 MILLILITER(S): KIT at 17:45

## 2020-01-31 RX ADMIN — OXYCODONE HYDROCHLORIDE 10 MILLIGRAM(S): 5 TABLET ORAL at 03:00

## 2020-01-31 RX ADMIN — GABAPENTIN 600 MILLIGRAM(S): 400 CAPSULE ORAL at 13:05

## 2020-01-31 NOTE — PROGRESS NOTE ADULT - ATTENDING COMMENTS
I have personally seen and examined this patient and agree with the above assessment and plan, which I have reviewed and edited where appropriate.     GOC: Continued antibiotics, enteral nutrition In the setting of perforated colon with complications - ongoing bacteremia, non-healing wounds and desquamation of skin  Symptoms: Delirium, pain  Disposition: [place] to evaluate.    Partner accepting of plan.

## 2020-01-31 NOTE — PROGRESS NOTE ADULT - ASSESSMENT
64 F PMHx of HTN, COPD, and morbid obesity who was admitted on 11/26 for perforated sigmoid diverticulitis  Perforated diverticulum, culture with E coli, s/p OR into washout and ostomy  S/p treatment courses for PICC CoNS CLABSI and Steno in sputum  CTs with improvement in intraabd collections  BCX with VRE and Acinetobacter--acinetobacter persistent through current regimen (brittanie added over weekend)  Critically ill, persistent bacteria in blood, concern for multidrug resistant bacteria/life threatening, poor prognosis  CT's with pna, abd unchanged  Central lines changed 1/23  Concern for MDR Acinetobacter--but S to Minocycline; Poly S pending  BCX still with acinetobacter  Transferred to PCU, approaching conservative care  Overall,  1) CoNS bacteremia--? CLABSI  - S/p course treatment  2) Loculated fluid collections  - Optimally, would pursue CT scan and pursue surgical intervention as needed for source control; with patient's poor condition, family approaching conservative care  3) Leukocytosis  - Trend to normal  4) Persistent Bacteremia  - MSSA and Acinetobacter are remaining concerns (VRE s/p adequate treatment course; CoNS likely contam)  - Note that antibiotic therapy on acinetobacter has been futile despite multiple antibiotic attempts; for now treat with Minocycline alone as isolate is S  - Minocycline 100mg q 12  - Cefazolin 2g q 8--monitor on first dose, if not able to tolerate (would need to consider stopping or restarting Dapto base on GOC at that time)  - If family wants to continue abx, would give 2 week treatment course (but this duration dependent on ability to clear acinetobacter)  - BCX q 48 to clearance depending on GOC    Sen Johnson MD  Pager [phone]  After 5pm and on weekends call [phone]

## 2020-01-31 NOTE — PROGRESS NOTE ADULT - PROBLEM SELECTOR PLAN 1
Most likely 2/2 abdominal wound, LE swelling/skin weeping/third spacing, and skin compromise.    - on methadone 20mg BID, uptitrated yesterday by SICU team  - likely has high tolerance, has been on methadone for years for addiction  - started on Dilaudid 0.2 mg/hr ggt  - c/w dilaudid 1.5mg IV Q1 PRN pain  - will monitor and adjust dose based on daily requirement and symptom burden  - bowel regimen to prevent opioid induced constipation    *patient's partner is very hesitant to adjust medications for fear that it will result in decreased wakefulness

## 2020-01-31 NOTE — PROGRESS NOTE ADULT - PROBLEM SELECTOR PLAN 2
- unclear utility of further blood draws, given goals; see below Patient has resistant organisms, persistent bacteremia. Ostomy in place with dark liquid stool, open abdominal wound with wound vac in place (changed today, will changed twice weekly)  - antibiotics as per ID, de-escalated today  -_______________________________________________  - no plans for further surgical intervention Patient has resistant organisms, persistent bacteremia with acinetobacter, MSSA.  Antibiotic therapy as per ID.  Partner inclined to continue.  - antibiotics as per ID, de-escalated today

## 2020-01-31 NOTE — PROGRESS NOTE ADULT - PROBLEM SELECTOR PLAN 7
Patient in PCU for on-going GOC discussion, symptom management (pain and wound care) and possible transfer to [facility]. Patient's partner voiced unhappiness of current room that patient is in, stating it is much smaller than previous room. She is hesitant to increase pain medication, but is agreeable for Dilaudid gtt overnight. Patient's partner conveyed that she wants her comfortable, however does not want to take away patient's "hope". Emotional support provided.

## 2020-01-31 NOTE — PROGRESS NOTE ADULT - SUBJECTIVE AND OBJECTIVE BOX
GAP TEAM PALLIATIVE CARE UNIT PROGRESS NOTE:      [  ] Patient on hospice program.    INDICATION FOR PALLIATIVE CARE UNIT SERVICES: pain management in the setting of bowel perforation c/b resistant bacteremia and pain due to surgical wounds and skin compromise (skin sloughing)    INTERVAL HPI/OVERNIGHT EVENTS: Patient was transferred to PCU yesterday late afternoon. No acute events overnight. Patient appears obtunded, periodic moments of wakefulness, responsive to verbal and tactile stimuli. Patient remains on methadone 20 mg BID, received Dilaudid 1.5 mg IVP x2 BTD and Oxycodone 10 mg po x2 BTD in 24 hours. MME 90 mg (for BTDs). Patient remains on IV antibiotics, Jevity tube feeds for 12 hours/day, and requires frequent wound care and wound vac changes 3x/week. Patient is morbidly obese with compromised skin integrity/weeping wounds which causes patient severe pain when touched or moved. Patient's partner (Paige Stuart) remains bedside.     DNR on chart: Yes  Yes    Allergies    IV Contrast (Rash; Pruritus; Hypotension)  penicillin (Rash (Severe))  sulfa drugs (Unknown)  vancomycin (Rash (Severe))    Intolerances    MEDICATIONS  (STANDING):  albuterol/ipratropium for Nebulization 3 milliLiter(s) Nebulizer every 6 hours  AQUAPHOR (petrolatum Ointment) 1 Application(s) Topical three times a day  artificial tears (preservative free) Ophthalmic Solution 1 Drop(s) Both EYES two times a day  aspirin  chewable 81 milliGRAM(s) Oral <User Schedule>  atorvastatin 40 milliGRAM(s) Oral <User Schedule>  buDESOnide    Inhalation Suspension 0.5 milliGRAM(s) Inhalation every 12 hours  ceFAZolin   IVPB 2000 milliGRAM(s) IV Intermittent every 8 hours  chlorhexidine 2% Cloths 1 Application(s) Topical <User Schedule>  clobetasol 0.05% Ointment 1 Application(s) Topical two times a day  enoxaparin Injectable 100 milliGRAM(s) SubCutaneous <User Schedule>  erythromycin     base Tablet 250 milliGRAM(s) Oral <User Schedule>  FIRST- Mouthwash  BLM 5 milliLiter(s) Swish and Spit four times a day  gabapentin 600 milliGRAM(s) Oral three times a day  hydrocortisone sodium succinate Injectable 50 milliGRAM(s) IV Push daily  HYDROmorphone Infusion 0.2 mG/Hr (0.2 mL/Hr) IV Continuous <Continuous>  levothyroxine Injectable 25 MICROGram(s) IV Push at bedtime  lidocaine   Patch 1 Patch Transdermal daily  melatonin 3 milliGRAM(s) Oral at bedtime  methadone    Tablet 20 milliGRAM(s) Oral <User Schedule>  minocycline IVPB 100 milliGRAM(s) IV Intermittent every 12 hours  minocycline IVPB      nystatin Powder 1 Application(s) Topical two times a day  pantoprazole    Tablet 40 milliGRAM(s) Oral <User Schedule>  polyethylene glycol 3350 17 Gram(s) Oral <User Schedule>  sodium chloride 0.9%. 1000 milliLiter(s) (10 mL/Hr) IV Continuous <Continuous>  thiamine 100 milliGRAM(s) Oral daily    MEDICATIONS  (PRN):  aluminum hydroxide/magnesium hydroxide/simethicone Suspension 30 milliLiter(s) Oral every 4 hours PRN Dyspepsia  diphenhydrAMINE 25 milliGRAM(s) Oral every 12 hours PRN Rash and/or Itching  HYDROmorphone  Injectable 2 milliGRAM(s) IV Push every 24 hours PRN breakthrough pain  HYDROmorphone  Injectable 1.5 milliGRAM(s) IV Push every 1 hour PRN pain  HYDROmorphone  Injectable 1.5 milliGRAM(s) IV Push every 1 hour PRN dyspnea  ondansetron Injectable 4 milliGRAM(s) IV Push every 6 hours PRN Nausea and/or Vomiting  zinc oxide 20% Ointment 1 Application(s) Topical three times a day PRN Rash    ITEMS UNCHECKED ARE NOT PRESENT    PRESENT SYMPTOMS: [x]Unable to obtain verbally due to poor mentation   Source if other than patient:  [x]Partner   [x]Team     Pain: [x] yes [ ] no  QOL impact - Severe, patient is unable to move without experiencing pain  Location - abdominal wound, breaks in skin/sloughing (mainly neck, check, lower extremities) and LE lymph edema (weeping wounds)                   Aggravating factors - touch, movement  Quality - N/A unable to verbalize at present time  Radiation - N/A  Timing- constant  Severity (0-10 scale): unable to verbalize at present time (see pain ad score below)  Minimal acceptable level (0-10 scale): 2/10    Dyspnea:                           [x]Mild [ ]Moderate [ ]Severe  Anxiety:                             [x]Mild [ ]Moderate [ ]Severe  Fatigue:                             [ ]Mild [ ]Moderate [x]Severe  Nausea:                             [ ]Mild [ ]Moderate [ ]Severe  Loss of appetite:              [ ]Mild [x]Moderate [ ]Severe  Constipation:                    [ ]Mild [ ]Moderate [ ]Severe    PAIN AD Score:     [email or web address] (Ctrl +  left click to view)  		  Other Symptoms:  [ ]All other review of systems negative     Karnofsky Performance Score/Palliative Performance Status Version 2:         %        http://npcrc.org/files/news/palliative_performance_scale_[email or web address].pdf  PHYSICAL EXAM:  Vital Signs Last 24 Hrs  T(C): 36.8 (31 Jan 2020 10:05), Max: 36.9 (30 Jan 2020 15:20)  T(F): 98.2 (31 Jan 2020 10:05), Max: 98.5 (30 Jan 2020 15:20)  HR: 90 (31 Jan 2020 10:05) (80 - 94)  BP: 83/62 (31 Jan 2020 10:05) (83/62 - 95/65)  BP(mean): 69 (30 Jan 2020 14:30) (69 - 69)  RR: 22 (31 Jan 2020 10:05) (18 - 28)  SpO2: 96% (31 Jan 2020 10:05) (94% - 98%) I&O's Summary    30 Jan 2020 07:01  -  31 Jan 2020 07:00  --------------------------------------------------------  IN: 3160 mL / OUT: 840 mL / NET: 2320 mL    GENERAL:  [ ]Alert  [ ]Oriented x   [ ]Lethargic  [ ]Cachexia  [ ]Unarousable  [ ]Verbal  [ ]Non-Verbal  Behavioral:   [ ] Anxiety  [ ] Delirium [ ] Agitation [ ] Other  HEENT:  [ ]Normal   [ ]Dry mouth   [ ]ET Tube/Trach  [ ]Oral lesions  PULMONARY:   [ ]Clear [ ]Tachypnea  [ ]Audible excessive secretions   [ ]Rhonchi        [ ]Right [ ]Left [ ]Bilateral  [ ]Crackles        [ ]Right [ ]Left [ ]Bilateral  [ ]Wheezing     [ ]Right [ ]Left [ ]Bilateral  CARDIOVASCULAR:    [ ]Regular [ ]Irregular [ ]Tachy  [ ]Memo [ ]Murmur [ ]Other  GASTROINTESTINAL:  [ ]Soft  [ ]Distended   [ ]+BS  [ ]Non tender [ ]Tender  [ ]PEG [ ]OGT/ NGT   Last BM:     01-24-20 @ 07:01  -  01-25-20 @ 07:00  --------------------------------------------------------  OUT: 0 mL    01-25-20 @ 07:01  -  01-26-20 @ 07:00  --------------------------------------------------------  OUT: 400 mL    01-26-20 @ 07:01  -  01-27-20 @ 07:00  --------------------------------------------------------  OUT: 300 mL    01-27-20 @ 07:01  -  01-28-20 @ 07:00  --------------------------------------------------------  OUT: 450 mL    01-28-20 @ 07:01  -  01-29-20 @ 07:00  --------------------------------------------------------  OUT: 710 mL    01-29-20 @ 07:01  -  01-30-20 @ 07:00  --------------------------------------------------------  OUT: 650 mL    01-30-20 @ 07:01  -  01-31-20 @ 07:00  --------------------------------------------------------  OUT: 250 mL    GENITOURINARY:  [ ]Normal [ ] Incontinent   [ ]Oliguria/Anuria   [ ]Ross  MUSCULOSKELETAL:   [ ]Normal   [ ]Weakness  [ ]Bed/Wheelchair bound [ ]Edema  NEUROLOGIC:   [ ]No focal deficits  [ ] Cognitive impairment  [ ] Dysphagia [ ]Dysarthria [ ] Paresis [ ]Other   SKIN:   [ ]Normal   [ ]Pressure ulcer(s)  [ ]Rash    CRITICAL CARE:  [ ] Shock Present  [ ]Septic [ ]Cardiogenic [ ]Neurologic [ ]Hypovolemic  [ ]  Vasopressors [ ]  Inotropes   [ ] Respiratory failure present [ ] Mechanical Ventilation [ ] Non-invasive ventilatory support [ ] High-Flow  [ ] Acute  [ ] Chronic [ ] Hypoxic  [ ] Hypercarbic [ ] Other  [ ] Other organ failure     LABS:                        7.6    5.31  )-----------( 65       ( 30 Jan 2020 08:10 )             24.5   01-30    134<L>  |  98  |  76<H>  ----------------------------<  140<H>  4.1   |  24  |  1.40<H>    Ca    8.0<L>      30 Jan 2020 04:41  Phos  3.7     01-30  Mg     2.1     01-30    TPro  4.9<L>  /  Alb  2.1<L>  /  TBili  0.5  /  DBili  0.2  /  AST  220<H>  /  ALT  94<H>  /  AlkPhos  293<H>  01-30  PT/INR - ( 30 Jan 2020 04:41 )   PT: 13.2 sec;   INR: 1.15 ratio         PTT - ( 30 Jan 2020 04:41 )  PTT:31.7 sec      RADIOLOGY & ADDITIONAL STUDIES:    PROTEIN CALORIE MALNUTRITION:   [ ] PPSV2 < or = 30% [ ] significant weight loss [ ] poor nutritional intake [ ] anasarca [ ] catabolic state   Albumin, Serum: 2.1 g/dL (01-30-20 @ 04:41)   Artificial Nutrition [ ]     REFERRALS:   [ ]Chaplaincy  [ ] Hospice  [ ]Child Life  [ ]Social Work  [ ]Case management [ ]Holistic Therapy [ ] Physical Therapy [ ] Dietary   Goals of Care Document:   Progress Notes - Care Coordination [C. Provider] (01-31-20 @ 12:14) GAP TEAM PALLIATIVE CARE UNIT PROGRESS NOTE:      [  ] Patient on hospice program.    INDICATION FOR PALLIATIVE CARE UNIT SERVICES: pain management in the setting of bowel perforation c/b resistant bacteremia and pain due to surgical wounds and skin compromise (skin sloughing)    INTERVAL HPI/OVERNIGHT EVENTS: Patient was transferred to PCU yesterday late afternoon. No acute events overnight. Patient appears obtunded, periodic moments of wakefulness, responsive to verbal and tactile stimuli. Patient remains on methadone 20 mg BID, received Dilaudid 1.5 mg IVP x2 BTD and Oxycodone 10 mg po x2 BTD in 24 hours. MME 90 mg (for BTDs). Patient remains on IV antibiotics, Jevity tube feeds for 12 hours/day, and requires frequent wound care and wound vac changes 3x/week. Patient is morbidly obese with compromised skin integrity/weeping wounds which causes patient severe pain when touched or moved. Patient's partner (Paige Stuart) remains bedside.     DNR on chart: Yes  Yes    Allergies    IV Contrast (Rash; Pruritus; Hypotension)  penicillin (Rash (Severe))  sulfa drugs (Unknown)  vancomycin (Rash (Severe))    Intolerances    MEDICATIONS  (STANDING):  albuterol/ipratropium for Nebulization 3 milliLiter(s) Nebulizer every 6 hours  AQUAPHOR (petrolatum Ointment) 1 Application(s) Topical three times a day  artificial tears (preservative free) Ophthalmic Solution 1 Drop(s) Both EYES two times a day  aspirin  chewable 81 milliGRAM(s) Oral <User Schedule>  atorvastatin 40 milliGRAM(s) Oral <User Schedule>  buDESOnide    Inhalation Suspension 0.5 milliGRAM(s) Inhalation every 12 hours  ceFAZolin   IVPB 2000 milliGRAM(s) IV Intermittent every 8 hours  chlorhexidine 2% Cloths 1 Application(s) Topical <User Schedule>  clobetasol 0.05% Ointment 1 Application(s) Topical two times a day  enoxaparin Injectable 100 milliGRAM(s) SubCutaneous <User Schedule>  erythromycin     base Tablet 250 milliGRAM(s) Oral <User Schedule>  FIRST- Mouthwash  BLM 5 milliLiter(s) Swish and Spit four times a day  gabapentin 600 milliGRAM(s) Oral three times a day  hydrocortisone sodium succinate Injectable 50 milliGRAM(s) IV Push daily  HYDROmorphone Infusion 0.2 mG/Hr (0.2 mL/Hr) IV Continuous <Continuous>  levothyroxine Injectable 25 MICROGram(s) IV Push at bedtime  lidocaine   Patch 1 Patch Transdermal daily  melatonin 3 milliGRAM(s) Oral at bedtime  methadone    Tablet 20 milliGRAM(s) Oral <User Schedule>  minocycline IVPB 100 milliGRAM(s) IV Intermittent every 12 hours  minocycline IVPB      nystatin Powder 1 Application(s) Topical two times a day  pantoprazole    Tablet 40 milliGRAM(s) Oral <User Schedule>  polyethylene glycol 3350 17 Gram(s) Oral <User Schedule>  sodium chloride 0.9%. 1000 milliLiter(s) (10 mL/Hr) IV Continuous <Continuous>  thiamine 100 milliGRAM(s) Oral daily    MEDICATIONS  (PRN):  aluminum hydroxide/magnesium hydroxide/simethicone Suspension 30 milliLiter(s) Oral every 4 hours PRN Dyspepsia  diphenhydrAMINE 25 milliGRAM(s) Oral every 12 hours PRN Rash and/or Itching  HYDROmorphone  Injectable 2 milliGRAM(s) IV Push every 24 hours PRN breakthrough pain  HYDROmorphone  Injectable 1.5 milliGRAM(s) IV Push every 1 hour PRN pain  HYDROmorphone  Injectable 1.5 milliGRAM(s) IV Push every 1 hour PRN dyspnea  ondansetron Injectable 4 milliGRAM(s) IV Push every 6 hours PRN Nausea and/or Vomiting  zinc oxide 20% Ointment 1 Application(s) Topical three times a day PRN Rash    ITEMS UNCHECKED ARE NOT PRESENT    PRESENT SYMPTOMS: [x]Unable to obtain verbally due to poor mentation   Source if other than patient:  [x]Partner   [x]Team     Pain: [x] yes [ ] no  QOL impact - Severe, patient is unable to move without experiencing pain  Location - abdominal wound, breaks in skin/sloughing (mainly neck, check, lower extremities) and LE lymph edema (weeping wounds)                   Aggravating factors - touch, movement  Quality - N/A unable to verbalize at present time  Radiation - N/A  Timing- constant  Severity (0-10 scale): unable to verbalize at present time (see pain ad score below)  Minimal acceptable level (0-10 scale): 2/10    Dyspnea:                           [x]Mild [ ]Moderate [ ]Severe  Anxiety:                             [x]Mild [ ]Moderate [ ]Severe  Fatigue:                             [ ]Mild [ ]Moderate [x]Severe  Nausea:                             [ ]Mild [ ]Moderate [ ]Severe  Loss of appetite:              [ ]Mild [x]Moderate [ ]Severe  Constipation:                    [ ]Mild [ ]Moderate [ ]Severe    PAIN AD Score: 4    [email or web address] (Ctrl +  left click to view)  		  Other Symptoms:  [ ]All other review of systems negative     Karnofsky Performance Score/Palliative Performance Status Version 2:  20-30%        http://npcrc.org/files/news/palliative_performance_scale_ppsv2.pdf  PHYSICAL EXAM:  Vital Signs Last 24 Hrs  T(C): 36.8 (31 Jan 2020 10:05), Max: 36.9 (30 Jan 2020 15:20)  T(F): 98.2 (31 Jan 2020 10:05), Max: 98.5 (30 Jan 2020 15:20)  HR: 90 (31 Jan 2020 10:05) (80 - 94)  BP: 83/62 (31 Jan 2020 10:05) (83/62 - 95/65)  BP(mean): 69 (30 Jan 2020 14:30) (69 - 69)  RR: 22 (31 Jan 2020 10:05) (18 - 28)  SpO2: 96% (31 Jan 2020 10:05) (94% - 98%) I&O's Summary    30 Jan 2020 07:01  -  31 Jan 2020 07:00  --------------------------------------------------------  IN: 3160 mL / OUT: 840 mL / NET: 2320 mL    GENERAL: morbidly obese female  [ ]Alert  [ ]Oriented x   [x]Lethargic  [ ]Cachexia  [x]Unarousable  [ ]Verbal  [x]Non-Verbal  Behavioral: Calm, currently appears in no acute distress  [ ] Anxiety  [ ] Delirium [ ] Agitation [ ] Other  HEENT: NGT in place  [ ]Normal   [x]Dry mouth   [ ]ET Tube/Trach  [ ]Oral lesions  PULMONARY: difficult to auscultate breath sound due to body habitus, decrease breath sounds b/l   [ ]Clear [x]Tachypnea  [ ]Audible excessive secretions   [ ]Rhonchi        [ ]Right [ ]Left [x]Bilateral  [ ]Crackles        [ ]Right [ ]Left [ ]Bilateral  [ ]Wheezing     [ ]Right [ ]Left [ ]Bilateral  CARDIOVASCULAR:    [x]Regular [ ]Irregular []Tachy  [ ]Memo [ ]Murmur [ ]Other  GASTROINTESTINAL:  [x]Soft  [ ]Distended   [x]+BS  [ ]Non tender [ ]Tender  [ ]PEG [ ]OGT/ NGT   Last BM:     GENITOURINARY:  [ ]Normal [x] Incontinent   [ ]Oliguria/Anuria   [x]Ross (draining clear urine, for patient's comfort)  MUSCULOSKELETAL:   [ ]Normal   [ ]Weakness  [x]Bed/Wheelchair bound [x]Edema (chronic l/e lymph edema and generalized edema due to third spacing)  NEUROLOGIC:   [ ]No focal deficits  [x] Cognitive impairment  [ ] Dysphagia [ ]Dysarthria [ ] Paresis [ ]Other   SKIN:  abdominal wound (w/ wound vac), ostomy (clean, dry, brown liquid stool material), chronic LE edema with superimposed skin breaks and weeping (absorbant dressing in place), sacral pressure wound (please refer to RN notes for further documentation).   [ ]Normal   [x]Pressure ulcer(s)  [x]Rash    CRITICAL CARE:  [ ] Shock Present  [x]Septic [ ]Cardiogenic [ ]Neurologic [ ]Hypovolemic  [ ]  Vasopressors [ ]  Inotropes   [ ] Respiratory failure present [ ] Mechanical Ventilation [ ] Non-invasive ventilatory support [ ] High-Flow  [ ] Acute  [ ] Chronic [ ] Hypoxic  [ ] Hypercarbic [ ] Other  [ ] Other organ failure; Skin    LABS:                        7.6    5.31  )-----------( 65       ( 30 Jan 2020 08:10 )             24.5   01-30    134<L>  |  98  |  76<H>  ----------------------------<  140<H>  4.1   |  24  |  1.40<H>    Ca    8.0<L>      30 Jan 2020 04:41  Phos  3.7     01-30  Mg     2.1     01-30    TPro  4.9<L>  /  Alb  2.1<L>  /  TBili  0.5  /  DBili  0.2  /  AST  220<H>  /  ALT  94<H>  /  AlkPhos  293<H>  01-30  PT/INR - ( 30 Jan 2020 04:41 )   PT: 13.2 sec;   INR: 1.15 ratio         PTT - ( 30 Jan 2020 04:41 )  PTT:31.7 sec      RADIOLOGY & ADDITIONAL STUDIES:    PROTEIN CALORIE MALNUTRITION:   [ ] PPSV2 < or = 30% [ ] significant weight loss [ ] poor nutritional intake [ ] anasarca [ ] catabolic state   Albumin, Serum: 2.1 g/dL (01-30-20 @ 04:41)   Artificial Nutrition [ ]     REFERRALS:   [ ]Chaplaincy  [ ] Hospice  [ ]Child Life  [ ]Social Work  [ ]Case management [ ]Holistic Therapy [ ] Physical Therapy [ ] Dietary   Goals of Care Document:   Progress Notes - Care Coordination [C. Provider] (01-31-20 @ 12:14) GAP TEAM PALLIATIVE CARE UNIT PROGRESS NOTE:      [  ] Patient on hospice program.    INDICATION FOR PALLIATIVE CARE UNIT SERVICES: pain management in the setting of bowel perforation c/b resistant bacteremia and pain due to surgical wounds and skin compromise (skin sloughing)    INTERVAL HPI/OVERNIGHT EVENTS: Patient was transferred to PCU yesterday late afternoon. No acute events overnight. Patient appears obtunded, periodic moments of wakefulness, responsive to verbal and tactile stimuli. Patient remains on methadone 20 mg BID, received Dilaudid 1.5 mg IVP x2 BTD and Oxycodone 10 mg po x2 BTD in 24 hours. MME 90 mg (for BTDs). Patient remains on IV antibiotics, Jevity tube feeds for 12 hours/day, and requires frequent wound care and wound vac changes 3x/week. Patient is morbidly obese with compromised skin integrity/weeping wounds which causes patient severe pain when touched or moved. Patient's partner (Paige Stuart) remains bedside.     DNR on chart: Yes  Yes    Allergies    IV Contrast (Rash; Pruritus; Hypotension)  penicillin (Rash (Severe))  sulfa drugs (Unknown)  vancomycin (Rash (Severe))    Intolerances    MEDICATIONS  (STANDING):  albuterol/ipratropium for Nebulization 3 milliLiter(s) Nebulizer every 6 hours  AQUAPHOR (petrolatum Ointment) 1 Application(s) Topical three times a day  artificial tears (preservative free) Ophthalmic Solution 1 Drop(s) Both EYES two times a day  aspirin  chewable 81 milliGRAM(s) Oral <User Schedule>  atorvastatin 40 milliGRAM(s) Oral <User Schedule>  buDESOnide    Inhalation Suspension 0.5 milliGRAM(s) Inhalation every 12 hours  ceFAZolin   IVPB 2000 milliGRAM(s) IV Intermittent every 8 hours  chlorhexidine 2% Cloths 1 Application(s) Topical <User Schedule>  clobetasol 0.05% Ointment 1 Application(s) Topical two times a day  enoxaparin Injectable 100 milliGRAM(s) SubCutaneous <User Schedule>  erythromycin     base Tablet 250 milliGRAM(s) Oral <User Schedule>  FIRST- Mouthwash  BLM 5 milliLiter(s) Swish and Spit four times a day  gabapentin 600 milliGRAM(s) Oral three times a day  hydrocortisone sodium succinate Injectable 50 milliGRAM(s) IV Push daily  HYDROmorphone Infusion 0.2 mG/Hr (0.2 mL/Hr) IV Continuous <Continuous>  levothyroxine Injectable 25 MICROGram(s) IV Push at bedtime  lidocaine   Patch 1 Patch Transdermal daily  melatonin 3 milliGRAM(s) Oral at bedtime  methadone    Tablet 20 milliGRAM(s) Oral <User Schedule>  minocycline IVPB 100 milliGRAM(s) IV Intermittent every 12 hours  minocycline IVPB      nystatin Powder 1 Application(s) Topical two times a day  pantoprazole    Tablet 40 milliGRAM(s) Oral <User Schedule>  polyethylene glycol 3350 17 Gram(s) Oral <User Schedule>  sodium chloride 0.9%. 1000 milliLiter(s) (10 mL/Hr) IV Continuous <Continuous>  thiamine 100 milliGRAM(s) Oral daily    MEDICATIONS  (PRN):  aluminum hydroxide/magnesium hydroxide/simethicone Suspension 30 milliLiter(s) Oral every 4 hours PRN Dyspepsia  diphenhydrAMINE 25 milliGRAM(s) Oral every 12 hours PRN Rash and/or Itching  HYDROmorphone  Injectable 2 milliGRAM(s) IV Push every 24 hours PRN breakthrough pain  HYDROmorphone  Injectable 1.5 milliGRAM(s) IV Push every 1 hour PRN pain  HYDROmorphone  Injectable 1.5 milliGRAM(s) IV Push every 1 hour PRN dyspnea  ondansetron Injectable 4 milliGRAM(s) IV Push every 6 hours PRN Nausea and/or Vomiting  zinc oxide 20% Ointment 1 Application(s) Topical three times a day PRN Rash    ITEMS UNCHECKED ARE NOT PRESENT    PRESENT SYMPTOMS: [x]Unable to obtain verbally due to poor mentation   Source if other than patient:  [x]Partner   [x]Team     Pain: [x] yes [ ] no  QOL impact - Severe, patient is unable to move without experiencing pain  Location - abdominal wound, breaks in skin/sloughing (mainly neck, check, lower extremities) and LE lymph edema (weeping wounds)                   Aggravating factors - touch, movement  Quality - N/A unable to verbalize at present time  Radiation - N/A  Timing- constant  Severity (0-10 scale): unable to verbalize at present time (see pain ad score below)  Minimal acceptable level (0-10 scale): 2/10    Dyspnea:                           [x]Mild [ ]Moderate [ ]Severe  Anxiety:                             [x]Mild [ ]Moderate [ ]Severe  Fatigue:                             [ ]Mild [ ]Moderate [x]Severe  Nausea:                             [ ]Mild [ ]Moderate [ ]Severe  Loss of appetite:              [ ]Mild [x]Moderate [ ]Severe  Constipation:                    [ ]Mild [ ]Moderate [ ]Severe    PAIN AD Score: 4    [email or web address] (Ctrl +  left click to view)  		  Other Symptoms:  [ ]All other review of systems negative     Karnofsky Performance Score/Palliative Performance Status Version 2:  20-30%        http://npcrc.org/files/news/palliative_performance_scale_ppsv2.pdf  PHYSICAL EXAM:  Vital Signs Last 24 Hrs  T(C): 36.8 (31 Jan 2020 10:05), Max: 36.9 (30 Jan 2020 15:20)  T(F): 98.2 (31 Jan 2020 10:05), Max: 98.5 (30 Jan 2020 15:20)  HR: 90 (31 Jan 2020 10:05) (80 - 94)  BP: 83/62 (31 Jan 2020 10:05) (83/62 - 95/65)  BP(mean): 69 (30 Jan 2020 14:30) (69 - 69)  RR: 22 (31 Jan 2020 10:05) (18 - 28)  SpO2: 96% (31 Jan 2020 10:05) (94% - 98%) I&O's Summary    30 Jan 2020 07:01  -  31 Jan 2020 07:00  --------------------------------------------------------  IN: 3160 mL / OUT: 840 mL / NET: 2320 mL    GENERAL: morbidly obese female  [ ]Alert  [ ]Oriented x   [x]Lethargic  [ ]Cachexia  [x]Unarousable  [ ]Verbal  [x]Non-Verbal  Behavioral: Calm, currently appears in no acute distress  [ ] Anxiety  [ ] Delirium [ ] Agitation [ ] Other  HEENT: NGT in place  [ ]Normal   [x]Dry mouth   [ ]ET Tube/Trach  [ ]Oral lesions  PULMONARY: difficult to auscultate breath sound due to body habitus, decrease breath sounds b/l   [ ]Clear [x]Tachypnea  [ ]Audible excessive secretions   [ ]Rhonchi        [ ]Right [ ]Left [x]Bilateral  [ ]Crackles        [ ]Right [ ]Left [ ]Bilateral  [ ]Wheezing     [ ]Right [ ]Left [ ]Bilateral  CARDIOVASCULAR:    [x]Regular [ ]Irregular []Tachy  [ ]Memo [ ]Murmur [ ]Other  GASTROINTESTINAL:  [x]Soft  [ ]Distended   [x]+BS  [ ]Non tender [ ]Tender  [ ]PEG [ ]OGT/ NGT   Last BM:     GENITOURINARY:  [ ]Normal [x] Incontinent   [ ]Oliguria/Anuria   [x]Ross (draining clear urine, for patient's comfort)  MUSCULOSKELETAL:   [ ]Normal   [ ]Weakness  [x]Bed/Wheelchair bound [x]Edema (chronic l/e lymph edema and generalized edema due to third spacing)  NEUROLOGIC:   [ ]No focal deficits  [x] Cognitive impairment  [ ] Dysphagia [ ]Dysarthria [ ] Paresis [ ]Other   SKIN:  abdominal wound (w/ wound vac), ostomy (clean, dry, brown liquid stool material), chronic LE edema with superimposed skin breaks and weeping (absorbant dressing in place), sacral pressure wound (please refer to RN notes for further documentation).   [ ]Normal   [x]Pressure ulcer(s)  [x]Rash    CRITICAL CARE:  [ ] Shock Present  [x]Septic [ ]Cardiogenic [ ]Neurologic [ ]Hypovolemic  [ ]  Vasopressors [ ]  Inotropes   [ ] Respiratory failure present [ ] Mechanical Ventilation [ ] Non-invasive ventilatory support [ ] High-Flow  [ ] Acute  [ ] Chronic [ ] Hypoxic  [ ] Hypercarbic [ ] Other  [ ] Other organ failure; Skin    LABS:                        7.6    5.31  )-----------( 65       ( 30 Jan 2020 08:10 )             24.5   01-30    134<L>  |  98  |  76<H>  ----------------------------<  140<H>  4.1   |  24  |  1.40<H>    Ca    8.0<L>      30 Jan 2020 04:41  Phos  3.7     01-30  Mg     2.1     01-30    TPro  4.9<L>  /  Alb  2.1<L>  /  TBili  0.5  /  DBili  0.2  /  AST  220<H>  /  ALT  94<H>  /  AlkPhos  293<H>  01-30  PT/INR - ( 30 Jan 2020 04:41 )   PT: 13.2 sec;   INR: 1.15 ratio         PTT - ( 30 Jan 2020 04:41 )  PTT:31.7 sec      RADIOLOGY & ADDITIONAL STUDIES: reviewed all pertinent studies  Reviewed all pertinent microbiology and other values/    PROTEIN CALORIE MALNUTRITION:   [x ] PPSV2 < or = 30% [ ] significant weight loss [ ] poor nutritional intake [ ] anasarca [ ] catabolic state   Albumin, Serum: 2.1 g/dL (01-30-20 @ 04:41)   Artificial Nutrition [ ]     REFERRALS:   [ x]Chaplaincy  [ ] Hospice  [ ]Child Life  x[ ]Social Work  [ ]Case management [ ]Holistic Therapy [ ] Physical Therapy [ ] Dietary   Goals of Care Document:   Progress Notes - Care Coordination [C. Provider] (01-31-20 @ 12:14) GAP TEAM PALLIATIVE CARE UNIT PROGRESS NOTE:      [  ] Patient on hospice program.    INDICATION FOR PALLIATIVE CARE UNIT SERVICES: pain management in the setting of bowel perforation c/b resistant bacteremia and pain due to surgical wounds and skin compromise (skin sloughing)    INTERVAL HPI/OVERNIGHT EVENTS: Patient was transferred to PCU yesterday late afternoon. No acute events overnight. Patient appears obtunded, periodic moments of wakefulness, responsive to verbal and tactile stimuli. Patient remains on methadone 20 mg BID, received Dilaudid 1.5 mg IVP x2 BTD and Oxycodone 10 mg po x2 BTD in 24 hours. MME 90 mg (for BTDs). Patient remains on IV antibiotics, Jevity tube feeds for 12 hours/day, and requires frequent wound care and wound vac changes 3x/week. Patient is morbidly obese with compromised skin integrity/weeping wounds which causes patient severe pain when touched or moved. Patient's partner (Paige Stuart) remains bedside.     DNR on chart: Yes  Yes    Allergies    IV Contrast (Rash; Pruritus; Hypotension)  penicillin (Rash (Severe))  sulfa drugs (Unknown)  vancomycin (Rash (Severe))    Intolerances    MEDICATIONS  (STANDING):  albuterol/ipratropium for Nebulization 3 milliLiter(s) Nebulizer every 6 hours  AQUAPHOR (petrolatum Ointment) 1 Application(s) Topical three times a day  artificial tears (preservative free) Ophthalmic Solution 1 Drop(s) Both EYES two times a day  aspirin  chewable 81 milliGRAM(s) Oral <User Schedule>  atorvastatin 40 milliGRAM(s) Oral <User Schedule>  buDESOnide    Inhalation Suspension 0.5 milliGRAM(s) Inhalation every 12 hours  ceFAZolin   IVPB 2000 milliGRAM(s) IV Intermittent every 8 hours  chlorhexidine 2% Cloths 1 Application(s) Topical <User Schedule>  clobetasol 0.05% Ointment 1 Application(s) Topical two times a day  enoxaparin Injectable 100 milliGRAM(s) SubCutaneous <User Schedule>  erythromycin     base Tablet 250 milliGRAM(s) Oral <User Schedule>  FIRST- Mouthwash  BLM 5 milliLiter(s) Swish and Spit four times a day  gabapentin 600 milliGRAM(s) Oral three times a day  hydrocortisone sodium succinate Injectable 50 milliGRAM(s) IV Push daily  HYDROmorphone Infusion 0.2 mG/Hr (0.2 mL/Hr) IV Continuous <Continuous>  levothyroxine Injectable 25 MICROGram(s) IV Push at bedtime  lidocaine   Patch 1 Patch Transdermal daily  melatonin 3 milliGRAM(s) Oral at bedtime  methadone    Tablet 20 milliGRAM(s) Oral <User Schedule>  minocycline IVPB 100 milliGRAM(s) IV Intermittent every 12 hours  minocycline IVPB      nystatin Powder 1 Application(s) Topical two times a day  pantoprazole    Tablet 40 milliGRAM(s) Oral <User Schedule>  polyethylene glycol 3350 17 Gram(s) Oral <User Schedule>  sodium chloride 0.9%. 1000 milliLiter(s) (10 mL/Hr) IV Continuous <Continuous>  thiamine 100 milliGRAM(s) Oral daily    MEDICATIONS  (PRN):  aluminum hydroxide/magnesium hydroxide/simethicone Suspension 30 milliLiter(s) Oral every 4 hours PRN Dyspepsia  diphenhydrAMINE 25 milliGRAM(s) Oral every 12 hours PRN Rash and/or Itching  HYDROmorphone  Injectable 2 milliGRAM(s) IV Push every 24 hours PRN breakthrough pain  HYDROmorphone  Injectable 1.5 milliGRAM(s) IV Push every 1 hour PRN pain  HYDROmorphone  Injectable 1.5 milliGRAM(s) IV Push every 1 hour PRN dyspnea  ondansetron Injectable 4 milliGRAM(s) IV Push every 6 hours PRN Nausea and/or Vomiting  zinc oxide 20% Ointment 1 Application(s) Topical three times a day PRN Rash    ITEMS UNCHECKED ARE NOT PRESENT    PRESENT SYMPTOMS: [x]Unable to obtain verbally due to poor mentation   Source if other than patient:  [x]Partner   [x]Team     Pain: [x] yes [ ] no  QOL impact - Severe, patient is unable to move without experiencing pain  Location - abdominal wound, breaks in skin/sloughing (mainly neck, check, lower extremities) and LE lymph edema (weeping wounds)                   Aggravating factors - touch, movement  Quality - N/A unable to verbalize at present time  Radiation - N/A  Timing- constant  Severity (0-10 scale): unable to verbalize at present time (see pain ad score below)  Minimal acceptable level (0-10 scale): 2/10    Dyspnea:                           [x]Mild [ ]Moderate [ ]Severe  Anxiety:                             [x]Mild [ ]Moderate [ ]Severe  Fatigue:                             [ ]Mild [ ]Moderate [x]Severe  Nausea:                             [ ]Mild [ ]Moderate [ ]Severe  Loss of appetite:              [ ]Mild [x]Moderate [ ]Severe  Constipation:                    [ ]Mild [ ]Moderate [ ]Severe    PAIN AD Score: 4    [email or web address].[email or web address] (Ctrl +  left click to view)  		  Other Symptoms:  [x ]All other review of systems negative     Karnofsky Performance Score/Palliative Performance Status Version 2:  20-30%        http://npcrc.org/files/news/palliative_performance_scale_ppsv2.pdf  PHYSICAL EXAM:  Vital Signs Last 24 Hrs  T(C): 36.8 (31 Jan 2020 10:05), Max: 36.9 (30 Jan 2020 15:20)  T(F): 98.2 (31 Jan 2020 10:05), Max: 98.5 (30 Jan 2020 15:20)  HR: 90 (31 Jan 2020 10:05) (80 - 94)  BP: 83/62 (31 Jan 2020 10:05) (83/62 - 95/65)  BP(mean): 69 (30 Jan 2020 14:30) (69 - 69)  RR: 22 (31 Jan 2020 10:05) (18 - 28)  SpO2: 96% (31 Jan 2020 10:05) (94% - 98%) I&O's Summary    30 Jan 2020 07:01  -  31 Jan 2020 07:00  --------------------------------------------------------  IN: 3160 mL / OUT: 840 mL / NET: 2320 mL    GENERAL: morbidly obese female  [ ]Alert  [ ]Oriented x   [x]Lethargic  [ ]Cachexia  [x]Unarousable  [ ]Verbal  [x]Non-Verbal  Behavioral: Calm, currently appears in no acute distress  [ ] Anxiety  [ ] Delirium [ ] Agitation [ ] Other  HEENT: NGT in place  [ ]Normal   [x]Dry mouth   [ ]ET Tube/Trach  [ ]Oral lesions  PULMONARY: difficult to auscultate breath sound due to body habitus, decrease breath sounds b/l   [ ]Clear [x]Tachypnea  [ ]Audible excessive secretions   [ ]Rhonchi        [ ]Right [ ]Left [x]Bilateral  [ ]Crackles        [ ]Right [ ]Left [ ]Bilateral  [ ]Wheezing     [ ]Right [ ]Left [ ]Bilateral  CARDIOVASCULAR:    [x]Regular [ ]Irregular []Tachy  [ ]Memo [ ]Murmur [ ]Other  GASTROINTESTINAL:  [x]Soft  [ ]Distended   [x]+BS  [ ]Non tender [ ]Tender  [ ]PEG [ ]OGT/ NGT   Last BM:     GENITOURINARY:  [ ]Normal [x] Incontinent   [ ]Oliguria/Anuria   [x]Ross (draining clear urine, for patient's comfort)  MUSCULOSKELETAL:   [ ]Normal   [ ]Weakness  [x]Bed/Wheelchair bound [x]Edema (chronic l/e lymph edema and generalized edema due to third spacing)  NEUROLOGIC:   [ ]No focal deficits  [x] Cognitive impairment  [ ] Dysphagia [ ]Dysarthria [ ] Paresis [ ]Other   SKIN:  abdominal wound (w/ wound vac), ostomy (clean, dry, brown liquid stool material), chronic LE edema with superimposed skin breaks and weeping (absorbant dressing in place), sacral pressure wound (please refer to RN notes for further documentation).   [ ]Normal   [x]Pressure ulcer(s)  [x]Rash    CRITICAL CARE:  [ ] Shock Present  [x]Septic [ ]Cardiogenic [ ]Neurologic [ ]Hypovolemic  [ ]  Vasopressors [ ]  Inotropes   [ ] Respiratory failure present [ ] Mechanical Ventilation [ ] Non-invasive ventilatory support [ ] High-Flow  [ ] Acute  [ ] Chronic [ ] Hypoxic  [ ] Hypercarbic [ ] Other  [ ] Other organ failure; Skin    LABS:                        7.6    5.31  )-----------( 65       ( 30 Jan 2020 08:10 )             24.5   01-30    134<L>  |  98  |  76<H>  ----------------------------<  140<H>  4.1   |  24  |  1.40<H>    Ca    8.0<L>      30 Jan 2020 04:41  Phos  3.7     01-30  Mg     2.1     01-30    TPro  4.9<L>  /  Alb  2.1<L>  /  TBili  0.5  /  DBili  0.2  /  AST  220<H>  /  ALT  94<H>  /  AlkPhos  293<H>  01-30  PT/INR - ( 30 Jan 2020 04:41 )   PT: 13.2 sec;   INR: 1.15 ratio         PTT - ( 30 Jan 2020 04:41 )  PTT:31.7 sec      RADIOLOGY & ADDITIONAL STUDIES: reviewed all pertinent studies  Reviewed all pertinent microbiology and other values/    PROTEIN CALORIE MALNUTRITION:   [x ] PPSV2 < or = 30% [ ] significant weight loss [ ] poor nutritional intake [ ] anasarca [ ] catabolic state   Albumin, Serum: 2.1 g/dL (01-30-20 @ 04:41)   Artificial Nutrition [ ]     REFERRALS:   [ x]Chaplaincy  [ ] Hospice  [ ]Child Life  x[ ]Social Work  [ ]Case management [ ]Holistic Therapy [ ] Physical Therapy [ ] Dietary   Goals of Care Document:   Progress Notes - Care Coordination [C. Provider] (01-31-20 @ 12:14)

## 2020-01-31 NOTE — PROGRESS NOTE ADULT - PROBLEM SELECTOR PLAN 3
Patient has resistant organisms, persistent bacteremia. Ostomy in place with dark liquid stool, open abdominal wound with wound vac in place (changed today, will changed twice weekly)  - antibiotics as per ID, de-escalated today  -_______________________________________________  - no plans for further surgical intervention - needs full assist currently with all ADLs Ostomy in place with dark liquid stool, open abdominal wound with wound vac in place (changed today, will changed twice weekly)  - no plans for further surgical intervention

## 2020-01-31 NOTE — PROGRESS NOTE ADULT - SUBJECTIVE AND OBJECTIVE BOX
CC: F/U for Bacteremia    Saw/spoke to patient. No fevers, no chills. Patient transferred to PCU. Approaching conservative care. Ill appearing.    Allergies  IV Contrast (Rash; Pruritus; Hypotension)  penicillin (Rash (Severe))  sulfa drugs (Unknown)  vancomycin (Rash (Severe))    ANTIMICROBIALS:  DAPTOmycin IVPB 1000 every 24 hours  erythromycin     base Tablet 250 <User Schedule>  meropenem  IVPB 1000 every 8 hours  minocycline IVPB 100 every 12 hours  minocycline IVPB    polymyxin B IVPB [id number] every 12 hours    PE:    Vital Signs Last 24 Hrs  T(C): 36.8 (31 Jan 2020 10:05), Max: 36.9 (30 Jan 2020 15:20)  T(F): 98.2 (31 Jan 2020 10:05), Max: 98.5 (30 Jan 2020 15:20)  HR: 90 (31 Jan 2020 10:05) (80 - 94)  BP: 83/62 (31 Jan 2020 10:05) (81/58 - 95/65)  BP(mean): 69 (30 Jan 2020 14:30) (66 - 69)  RR: 22 (31 Jan 2020 10:05) (18 - 28)  SpO2: 96% (31 Jan 2020 10:05) (94% - 98%)    Gen: AOx3, NAD, non-toxic, ill appearing  CV: S1+S2 normal, nontachycardic  Resp: Clear bilat, no resp distress, no crackles/wheezes  Abd: Soft, nontender, +BS, ostomy, wound vac  Ext: No LE edema, no wounds    LABS:                        7.6    5.31  )-----------( 65       ( 30 Jan 2020 08:10 )             24.5     01-30    134<L>  |  98  |  76<H>  ----------------------------<  140<H>  4.1   |  24  |  1.40<H>    Ca    8.0<L>      30 Jan 2020 04:41  Phos  3.7     01-30  Mg     2.1     01-30    TPro  4.9<L>  /  Alb  2.1<L>  /  TBili  0.5  /  DBili  0.2  /  AST  220<H>  /  ALT  94<H>  /  AlkPhos  293<H>  01-30    MICROBIOLOGY:    .Blood Blood-Peripheral  01-28-20   No growth to date.     .Blood Blood  01-28-20   Growth in aerobic bottle: Acinetobacter baumannii nosocomialis group  --  Acinetobacter baumannii    .Blood Blood-Venous  01-26-20   No growth to date.    .Blood Blood  01-24-20   Growth in aerobic bottle: Acinetobacter baumannii nosocomialis group  See previous culture [id number]  Growth in anaerobic bottle: Staphylococcus aureus  Growth in anaerobic bottle: Staphylococcus epidermidis  --  Staphylococcus aureus  Staphylococcus epidermidis    .Blood Blood-Venous  01-23-20   Growth in aerobic bottle: Acinetobacter baumannii Nosocomialis group  See previous culture [id number]  --    Growth in aerobic bottle: Gram Negative Rods    .Blood Blood-Peripheral  01-22-20   Growth in aerobic bottle: Acinetobacter baumannii NOSOCOMIALIS GROUP  See previous culture [id number]  --    Growth in aerobic bottle: Gram Negative Rods    .Blood Blood-Venous  01-22-20   Growth in aerobic bottle: Acinetobacter baumannii nosocomialis group  Growth in aerobic bottle: Enterococcus faecium (vancomycin resistant)    (otherwise reviewed)    RADIOLOGY:    1/28 CXR:    IMPRESSION:    Unchanged haziness of the right lung.

## 2020-01-31 NOTE — PROGRESS NOTE ADULT - ASSESSMENT
65yo F with PMH of Morbid Obesity, h/o heroin abuse, methadone dependence, HCV, HTN, COPD, and GERD p/w abdominal pain found to have bowel perforation s/p emergent repair with complicated post-op course; bacteremia, pneumonia and open abdominal wound requiring wound vac. Palliative consulted for symptom management; mainly pain and on-going GOC.     Patient started on Dilaudid ggt, with available prn doses, continued on methadone, IV antibiotic and tube feeds. Patient's partner is bedside and hesitant to de-escalate care or increase pain medications. Awaiting [facility] evaluation.

## 2020-01-31 NOTE — PROGRESS NOTE ADULT - PROBLEM SELECTOR PLAN 6
Patient in PCU for on-going GOC discussion, symptom management (pain and wound care) and possible transfer to [facility]. Patient's partner voiced unhappiness of current room that patient is in, stating it is much smaller than previous room. She is hesitant to increase pain medication, but is agreeable for Dilaudid gtt overnight. Patient's partner conveyed that she wants her comfortable, however does not want to take away patient's "hope". Emotional support provided. - discussed goals of care, goals are for continued abx, but partner realizes that this is likely an end-stage process, and wants her to be comfortable; discussed blood draws and limiting them given difficulty obtaining them and causing more suffering;   - patient is DNR/DNI, EVA in chart, patient made decision  - partner is open to transfer to [facility] if stable

## 2020-01-31 NOTE — PROGRESS NOTE ADULT - PROBLEM SELECTOR PLAN 4
- needs full assist currently with all ADLs Patient has sacral wound, open abdominal wound s/p surgery (with wound vac in place), sloughing and weeping of skin on chest, arms, and LE b/l. Multiple sources of infection.    -Wound vac to be changed twice weekly  -Wound care as per RN

## 2020-01-31 NOTE — PROGRESS NOTE ADULT - PROBLEM SELECTOR PLAN 5
- discussed goals of care, goals are for continued abx, but partner realizes that this is likely an end-stage process, and wants her to be comfortable; discussed blood draws and limiting them given difficulty obtaining them and causing more suffering;     - patient is DNR/DNI, EVA in chart, patient made decision  - partner is considering transfer to [facility] if stable - discussed goals of care, goals are for continued abx, but partner realizes that this is likely an end-stage process, and wants her to be comfortable; discussed blood draws and limiting them given difficulty obtaining them and causing more suffering;   - patient is DNR/DNI, EVA in chart, patient made decision  - partner is open to transfer to [facility] if stable Patient has sacral wound, open abdominal wound s/p surgery (with wound vac in place), sloughing and weeping of skin on chest, arms, and LE b/l. Multiple sources of infection.    -Wound vac to be changed twice weekly  -Wound care as per RN

## 2020-02-01 PROCEDURE — 99233 SBSQ HOSP IP/OBS HIGH 50: CPT

## 2020-02-01 RX ORDER — DIPHENHYDRAMINE HCL 50 MG
12.5 CAPSULE ORAL EVERY 6 HOURS
Refills: 0 | Status: DISCONTINUED | OUTPATIENT
Start: 2020-02-01 | End: 2020-02-05

## 2020-02-01 RX ORDER — DIPHENHYDRAMINE HCL 50 MG
12.5 CAPSULE ORAL EVERY 6 HOURS
Refills: 0 | Status: DISCONTINUED | OUTPATIENT
Start: 2020-02-01 | End: 2020-02-01

## 2020-02-01 RX ORDER — DIPHENHYDRAMINE HCL 50 MG
25 CAPSULE ORAL EVERY 6 HOURS
Refills: 0 | Status: DISCONTINUED | OUTPATIENT
Start: 2020-02-01 | End: 2020-02-01

## 2020-02-01 RX ADMIN — Medication 1 APPLICATION(S): at 18:22

## 2020-02-01 RX ADMIN — GABAPENTIN 600 MILLIGRAM(S): 400 CAPSULE ORAL at 14:21

## 2020-02-01 RX ADMIN — GABAPENTIN 600 MILLIGRAM(S): 400 CAPSULE ORAL at 06:12

## 2020-02-01 RX ADMIN — Medication 3 MILLIGRAM(S): at 22:08

## 2020-02-01 RX ADMIN — Medication 1 APPLICATION(S): at 15:31

## 2020-02-01 RX ADMIN — Medication 250 MILLIGRAM(S): at 18:23

## 2020-02-01 RX ADMIN — Medication 25 MICROGRAM(S): at 22:28

## 2020-02-01 RX ADMIN — Medication 100 MILLIGRAM(S): at 14:21

## 2020-02-01 RX ADMIN — METHADONE HYDROCHLORIDE 20 MILLIGRAM(S): 40 TABLET ORAL at 22:09

## 2020-02-01 RX ADMIN — HYDROMORPHONE HYDROCHLORIDE 1.5 MILLIGRAM(S): 2 INJECTION INTRAMUSCULAR; INTRAVENOUS; SUBCUTANEOUS at 06:35

## 2020-02-01 RX ADMIN — HYDROMORPHONE HYDROCHLORIDE 1.5 MILLIGRAM(S): 2 INJECTION INTRAMUSCULAR; INTRAVENOUS; SUBCUTANEOUS at 14:22

## 2020-02-01 RX ADMIN — GABAPENTIN 600 MILLIGRAM(S): 400 CAPSULE ORAL at 22:27

## 2020-02-01 RX ADMIN — Medication 12.5 MILLIGRAM(S): at 15:32

## 2020-02-01 RX ADMIN — HYDROMORPHONE HYDROCHLORIDE 1.5 MILLIGRAM(S): 2 INJECTION INTRAMUSCULAR; INTRAVENOUS; SUBCUTANEOUS at 06:20

## 2020-02-01 RX ADMIN — NYSTATIN CREAM 1 APPLICATION(S): 100000 CREAM TOPICAL at 06:33

## 2020-02-01 RX ADMIN — Medication 1 APPLICATION(S): at 06:13

## 2020-02-01 RX ADMIN — Medication 50 MILLIGRAM(S): at 06:12

## 2020-02-01 RX ADMIN — POLYETHYLENE GLYCOL 3350 17 GRAM(S): 17 POWDER, FOR SOLUTION ORAL at 11:19

## 2020-02-01 RX ADMIN — POLYETHYLENE GLYCOL 3350 17 GRAM(S): 17 POWDER, FOR SOLUTION ORAL at 06:20

## 2020-02-01 RX ADMIN — Medication 81 MILLIGRAM(S): at 22:09

## 2020-02-01 RX ADMIN — HYDROMORPHONE HYDROCHLORIDE 0.2 MG/HR: 2 INJECTION INTRAMUSCULAR; INTRAVENOUS; SUBCUTANEOUS at 22:07

## 2020-02-01 RX ADMIN — MINOCYCLINE HYDROCHLORIDE 100 MILLIGRAM(S): 45 TABLET, EXTENDED RELEASE ORAL at 18:22

## 2020-02-01 RX ADMIN — Medication 1 APPLICATION(S): at 06:12

## 2020-02-01 RX ADMIN — Medication 1 DROP(S): at 06:28

## 2020-02-01 RX ADMIN — Medication 250 MILLIGRAM(S): at 09:17

## 2020-02-01 RX ADMIN — HYDROMORPHONE HYDROCHLORIDE 0.2 MG/HR: 2 INJECTION INTRAMUSCULAR; INTRAVENOUS; SUBCUTANEOUS at 07:11

## 2020-02-01 RX ADMIN — Medication 1 APPLICATION(S): at 22:07

## 2020-02-01 RX ADMIN — ATORVASTATIN CALCIUM 40 MILLIGRAM(S): 80 TABLET, FILM COATED ORAL at 22:08

## 2020-02-01 RX ADMIN — Medication 100 MILLIGRAM(S): at 22:07

## 2020-02-01 RX ADMIN — Medication 100 MILLIGRAM(S): at 11:18

## 2020-02-01 RX ADMIN — NYSTATIN CREAM 1 APPLICATION(S): 100000 CREAM TOPICAL at 18:24

## 2020-02-01 RX ADMIN — Medication 100 MILLIGRAM(S): at 06:10

## 2020-02-01 RX ADMIN — Medication 1 DROP(S): at 18:21

## 2020-02-01 RX ADMIN — Medication 3 MILLILITER(S): at 06:12

## 2020-02-01 RX ADMIN — POLYETHYLENE GLYCOL 3350 17 GRAM(S): 17 POWDER, FOR SOLUTION ORAL at 18:22

## 2020-02-01 RX ADMIN — MINOCYCLINE HYDROCHLORIDE 100 MILLIGRAM(S): 45 TABLET, EXTENDED RELEASE ORAL at 06:07

## 2020-02-01 RX ADMIN — METHADONE HYDROCHLORIDE 20 MILLIGRAM(S): 40 TABLET ORAL at 09:17

## 2020-02-01 RX ADMIN — HYDROMORPHONE HYDROCHLORIDE 1.5 MILLIGRAM(S): 2 INJECTION INTRAMUSCULAR; INTRAVENOUS; SUBCUTANEOUS at 14:37

## 2020-02-01 RX ADMIN — HYDROMORPHONE HYDROCHLORIDE 0.2 MG/HR: 2 INJECTION INTRAMUSCULAR; INTRAVENOUS; SUBCUTANEOUS at 18:24

## 2020-02-01 RX ADMIN — Medication 0.5 MILLIGRAM(S): at 06:12

## 2020-02-01 NOTE — PROGRESS NOTE ADULT - SUBJECTIVE AND OBJECTIVE BOX
ID coverage    Patient is a 64y old  Female who presents with a chief complaint of Bowel perforation (31 Jan 2020 08:28)    Being followed by ID for bacteremia    Interval history:awake  responds to queries  some abd pain -at baseline   No acute events      ROS:  No cough,SOB,CP  No N/V/D  No other complaints      Antimicrobials:    ceFAZolin   IVPB 2000 milliGRAM(s) IV Intermittent every 8 hours  erythromycin     base Tablet 250 milliGRAM(s) Oral <User Schedule>  minocycline IVPB 100 milliGRAM(s) IV Intermittent every 12 hours  minocycline IVPB        Other medications reviewed    Vital Signs Last 24 Hrs  T(C): 36.8 (01-31-20 @ 10:05), Max: 36.8 (01-31-20 @ 10:05)  T(F): 98.2 (01-31-20 @ 10:05), Max: 98.2 (01-31-20 @ 10:05)  HR: 90 (01-31-20 @ 10:05) (90 - 90)  BP: 83/62 (01-31-20 @ 10:05) (83/62 - 83/62)  BP(mean): --  RR: 22 (01-31-20 @ 10:05) (22 - 22)  SpO2: 96% (01-31-20 @ 10:05) (96% - 96%)    Physical Exam:    LIJ TLC no erythema or tenderness  NGT     HEENT PERRLA EOMI    No oral exudate or erythema    Chest Good AE,bibasilar crackles     CVS RRR S1 S2 WNl ? ESM no rub or gallop    Abd Obese incision with VAC RLQ colostomy  + BS no tenderness but limited exam given habitus     IV site no erythema tenderness or discharge    CNS AAO X 2 no focal    Bilateral LE stasis with venous ulcers  some skin falking  also on Upper Ext     Lab Data:    WBC Count: 5.31 (01-30-20 @ 08:10)  WBC Count: 5.77 (01-30-20 @ 04:41)  WBC Count: 7.91 (01-29-20 @ 00:11)  WBC Count: 6.27 (01-28-20 @ 01:12)  WBC Count: 5.56 (01-27-20 @ 00:36)  WBC Count: 5.53 (01-26-20 @ 05:26)      Creatinine, Serum: 1.40 mg/dL (01-30-20 @ 04:41)  Creatinine, Serum: 1.09 mg/dL (01-29-20 @ 00:11)  Creatinine, Serum: 0.97 mg/dL (01-28-20 @ 01:12)                Culture - Blood (collected 28 Jan 2020 17:12)  Source: .Blood Blood-Peripheral  Preliminary Report (29 Jan 2020 18:01):    No growth to date.    Culture - Blood (collected 28 Jan 2020 15:01)  Source: .Blood Blood  Gram Stain (29 Jan 2020 04:33):    Growth in aerobic bottle:    Gram Negative Rods  Final Report (31 Jan 2020 09:31):    Growth in aerobic bottle: Acinetobacter baumannii nosocomialis group  Organism: Acinetobacter baumannii (31 Jan 2020 09:31)  Organism: Acinetobacter baumannii (31 Jan 2020 09:31)      -  Imipenem: R      -  Piperacillin/Tazobactam: R      Method Type: KB  Organism: Acinetobacter baumannii (31 Jan 2020 09:31)      -  Amikacin: R >32      -  Ampicillin/Sulbactam: I 16/8      -  Cefepime: R >16      -  Ceftazidime: R >16      -  Ciprofloxacin: R >2      -  Gentamicin: I 8      -  Levofloxacin: R >4      -  Meropenem: R >8      -  Tobramycin: S <=2      -  Trimethoprim/Sulfamethoxazole: R >2/38      Method Type: ETHAN          < from: Xray Chest 1 View- PORTABLE-Routine (01.28.20 @ 07:00) >    IMPRESSION:    Unchanged haziness of the right lung.            < end of copied text >      Imaging reviewed,finding as above       Culture - Blood (01.24.20 @ 18:59)    -  Coagulase negative Staphylococcus: Detec    Gram Stain:   Growth in aerobic bottle: Gram Negative Rods  Growth in anaerobic bottle: Gram Positive Cocci in Clusters    Specimen Source: .Blood Blood    Organism: Blood Culture PCR    Culture Results:   Growth in aerobic bottle: Acinetobacter baumannii nosocomialis group  See previous culture [id number]  Growth in anaerobic bottle: Coag Negative Staphylococcus  Coag Negative Staphylococcus  Single set isolate, possible contaminant. Contact  Microbiology if susceptibility testing clinically  indicated.    ***Blood Panel PCR results on this specimen are available  approximately 3 hours after the Gram stain result.***  Gram stain, PCR, and/or culture results may not always  correspond due todifference in methodologies.  ************************************************************  This PCR assay was performed using [employer].  The following targets are tested for: Enterococcus,  vancomycin resistant enterococci, Listeria monocytogenes,  coagulase negative staphylococci, S. aureus,  methicillin resistant S. aureus, Streptococcus agalactiae  (Group B), S. pneumoniae, S. pyogenes (Group A),  Acinetobacter baumannii, Enterobacter cloacae, E. coli,  Klebsiella oxytoca, K. pneumoniae, Proteus sp.,  Serratia marcescens, Haemophilus influenzae,  Neisseria meningitidis, Pseudomonas aeruginosa, Candida  albicans, C. glabrata, C krusei, C parapsilosis,  C. tropicalis and the KPC resistance gene.    Organism Identification: Blood Culture PCR    Method Type: PCR

## 2020-02-01 NOTE — PROGRESS NOTE ADULT - PROBLEM SELECTOR PLAN 4
Patient has sacral wound, open abdominal wound s/p surgery (with wound vac in place), sloughing and weeping of skin on chest, arms, and LE b/l. Multiple sources of infection.    -Wound vac to be changed twice weekly  -Wound care as per RN Patient has sacral wound, open abdominal wound s/p surgery (with wound vac in place), sloughing and weeping of skin on chest, arms, and LE b/l. Multiple sources of infection.  -Wound vac to be changed twice weekly  -Wound care

## 2020-02-01 NOTE — PROGRESS NOTE ADULT - PROBLEM SELECTOR PLAN 1
Most likely 2/2 abdominal wound, LE swelling/skin weeping/third spacing, and skin compromise.  - on methadone 20mg BID, uptitrated yesterday by SICU team  - likely has high tolerance, has been on methadone for years for addiction  - C/w Dilaudid 0.2 mg/hr ggt  - c/w dilaudid 1.5mg IV Q1 PRN pain  - will monitor and adjust dose based on daily requirement and symptom burden  - bowel regimen to prevent opioid induced constipation    *patient's partner is very hesitant to adjust medications for fear that it will result in decreased wakefulness

## 2020-02-01 NOTE — PROGRESS NOTE ADULT - PROBLEM SELECTOR PLAN 5
It was previously discussed goals of care, goals are for continued abx, but partner realizes that this is likely an end-stage process, and wants her to be comfortable; discussed blood draws and limiting them given difficulty obtaining them and causing more suffering;   - patient is DNR/DNI, EVA in chart, patient made decision  - partner is open to transfer to [facility] if stable

## 2020-02-01 NOTE — PROGRESS NOTE ADULT - ASSESSMENT
64 F PMHx of HTN, COPD, and morbid obesity who was admitted on 11/26 for perforated sigmoid diverticulitis  Perforated diverticulum, culture with E coli, s/p OR into washout and ostomy  S/p treatment courses for PICC CoNS CLABSI and Steno in sputum  CTs with improvement in intraabd collections  BCX with VRE and Acinetobacter--acinetobacter persistent through current regimen (brittanie added over weekend)  CT's with pna, abd unchanged  Central lines changed 1/23  Concern for MDR Acinetobacter--but S to Minocycline; Poly S pending-however also with ARF   BCX still with acinetobacter  Transferred to PCU, approaching conservative care      Overall,      A) Persistent MDR acinetobacterBacteremia  Also MSSA   Source / abdomen  ? Line(changed 1/23)    - antibiotic therapy on acinetobacter has been futile despite multiple antibiotic attempts; for now treat with Minocycline alone as isolate is S  options limited  patient also with ARF and aminoglycosides may be poor option   - continue Minocycline 100mg q 12  -If c/w GOC would remove TLC and establish alternate access  - continue Cefazolin 2g q 8  - If family wants to continue abx, would give 2 week treatment course (but this duration dependent on ability to clear acinetobacter)  - BCX q 48 to clearance depending on GOC  Overall prognosis is poor and we may not be able to eradicate bacteremia  Would recommend establish final GOC and tailor plan per it      B) CoNS bacteremia--? CLABSI  - S/p course treatment  Repeat Cx no CNS    C) Loculated abdominal  fluid collections  - Optimally, would pursue CT scan and pursue surgical intervention as needed for source control; with patient's poor condition, family approaching conservative care  would recommend decide final GOC and if palliative would not pursue above       D) Leukocytosis  Patient not getting any labs last 2 days  Decide if wants per GOC       Will tailor plan for ID issues  per course,results.Will defer to primary team on management of other issues.  Assessment, plan and recommendations as detailed above were discussed with the palliative care   team.    prognosis poor  Infectious Diseases Service will cover over weekend.  Please call [phone] if issues

## 2020-02-01 NOTE — PROGRESS NOTE ADULT - PROBLEM SELECTOR PLAN 6
Patient in PCU for on-going GOC discussion, symptom management (pain and wound care) and possible transfer to [facility]. Patient's partner voiced unhappiness of current room that patient is in, stating it is much smaller than previous room. She is hesitant to increase pain medication, but is agreeable for Dilaudid gtt overnight. Patient's partner conveyed that she wants her comfortable, however does not want to take away patient's "hope". Emotional support provided. Patient in PCU for on-going GOC discussion, symptom management (pain and wound care) and possible transfer to [facility]. Patient's partner voiced unhappiness of current room that patient is in, stating it is much smaller than previous room. She is hesitant to increase pain medication, but is agreeable for Dilaudid gtt. Patient's partner conveyed that she wants her comfortable, however does not want to take away patient's "hope". Emotional support provided.

## 2020-02-01 NOTE — PROGRESS NOTE ADULT - SUBJECTIVE AND OBJECTIVE BOX
GAP TEAM PALLIATIVE CARE UNIT PROGRESS NOTE:      [  ] Patient on hospice program.    INDICATION FOR PALLIATIVE CARE UNIT SERVICES:    INTERVAL HPI/OVERNIGHT EVENTS:    DNR on chart: Yes  Yes    Allergies    IV Contrast (Rash; Pruritus; Hypotension)  penicillin (Rash (Severe))  sulfa drugs (Unknown)  vancomycin (Rash (Severe))    Intolerances    MEDICATIONS  (STANDING):  albuterol/ipratropium for Nebulization 3 milliLiter(s) Nebulizer every 6 hours  AQUAPHOR (petrolatum Ointment) 1 Application(s) Topical three times a day  artificial tears (preservative free) Ophthalmic Solution 1 Drop(s) Both EYES two times a day  aspirin  chewable 81 milliGRAM(s) Oral <User Schedule>  atorvastatin 40 milliGRAM(s) Oral <User Schedule>  buDESOnide    Inhalation Suspension 0.5 milliGRAM(s) Inhalation every 12 hours  ceFAZolin   IVPB 2000 milliGRAM(s) IV Intermittent every 8 hours  chlorhexidine 2% Cloths 1 Application(s) Topical <User Schedule>  clobetasol 0.05% Ointment 1 Application(s) Topical two times a day  enoxaparin Injectable 100 milliGRAM(s) SubCutaneous <User Schedule>  erythromycin     base Tablet 250 milliGRAM(s) Oral <User Schedule>  FIRST- Mouthwash  BLM 5 milliLiter(s) Swish and Spit four times a day  gabapentin 600 milliGRAM(s) Oral three times a day  hydrocortisone sodium succinate Injectable 50 milliGRAM(s) IV Push daily  HYDROmorphone Infusion 0.2 mG/Hr (0.2 mL/Hr) IV Continuous <Continuous>  levothyroxine Injectable 25 MICROGram(s) IV Push at bedtime  melatonin 3 milliGRAM(s) Oral at bedtime  methadone    Tablet 20 milliGRAM(s) Oral <User Schedule>  minocycline IVPB 100 milliGRAM(s) IV Intermittent every 12 hours  minocycline IVPB      nystatin Powder 1 Application(s) Topical two times a day  pantoprazole    Tablet 40 milliGRAM(s) Oral <User Schedule>  polyethylene glycol 3350 17 Gram(s) Oral <User Schedule>  sodium chloride 0.9%. 1000 milliLiter(s) (10 mL/Hr) IV Continuous <Continuous>  thiamine 100 milliGRAM(s) Oral daily    MEDICATIONS  (PRN):  aluminum hydroxide/magnesium hydroxide/simethicone Suspension 30 milliLiter(s) Oral every 4 hours PRN Dyspepsia  diphenhydrAMINE 12.5 milliGRAM(s) Oral every 6 hours PRN Rash and/or Itching  HYDROmorphone  Injectable 2 milliGRAM(s) IV Push every 24 hours PRN breakthrough pain  HYDROmorphone  Injectable 1.5 milliGRAM(s) IV Push every 1 hour PRN pain  HYDROmorphone  Injectable 1.5 milliGRAM(s) IV Push every 1 hour PRN dyspnea  ondansetron Injectable 4 milliGRAM(s) IV Push every 6 hours PRN Nausea and/or Vomiting  zinc oxide 20% Ointment 1 Application(s) Topical three times a day PRN Rash      ITEMS UNCHECKED ARE NOT PRESENT    PRESENT SYMPTOMS:[ ]Unable to obtain verbally due to poor mentation   Source if other than patient:  [x]Partner   [x]Team     Pain: [x] yes [ ] no  QOL impact - Severe, patient is unable to move without experiencing pain  Location - abdominal wound, breaks in skin/sloughing (mainly neck, check, lower extremities) and LE lymph edema (weeping wounds)                   Aggravating factors - touch, movement  Quality - N/A unable to verbalize at present time  Radiation - N/A  Timing- constant  Severity (0-10 scale): unable to verbalize at present time (see pain ad score below)  Minimal acceptable level (0-10 scale): 2/10    Dyspnea:                           [x]Mild [ ]Moderate [ ]Severe  Anxiety:                             [x]Mild [ ]Moderate [ ]Severe  Fatigue:                             [ ]Mild [ ]Moderate [x]Severe  Nausea:                             [ ]Mild [ ]Moderate [ ]Severe  Loss of appetite:              [ ]Mild [x]Moderate [ ]Severe  Constipation:                    [ ]Mild [ ]Moderate [ ]Severe    PAIN AD Score: 4    [email or web address] (Ctrl +  left click to view)  		  Other Symptoms:  [ ]All other review of systems negative     Karnofsky Performance Score/Palliative Performance Status Version 2:  20-30%     http://npcrc.org/files/news/palliative_performance_scale_ppsv2.pdf    PHYSICAL EXAM:  Vital Signs Last 24 Hrs  T(C): 36.8 (01 Feb 2020 10:00), Max: 36.8 (01 Feb 2020 10:00)  T(F): 98.3 (01 Feb 2020 10:00), Max: 98.3 (01 Feb 2020 10:00)  HR: 82 (01 Feb 2020 10:00) (82 - 82)  BP: 116/70 (01 Feb 2020 10:00) (116/70 - 116/70)  BP(mean): --  RR: 16 (01 Feb 2020 10:00) (16 - 16)  SpO2: 93% (01 Feb 2020 10:00) (93% - 93%) I&O's Summary    31 Jan 2020 07:01  -  01 Feb 2020 07:00  --------------------------------------------------------  IN: 1157.4 mL / OUT: 150 mL / NET: 1007.4 mL    GENERAL: morbidly obese female  [ ]Alert  [ ]Oriented x   [ x ]Lethargic  [ ]Cachexia  [x]Unarousable  [ x ]Verbal  [ ]Non-Verbal  Behavioral: Calm, currently appears in no acute distress  [ ] Anxiety  [ ] Delirium [ ] Agitation [ ] Other  HEENT: NGT in place  [ ]Normal   [x  ]Dry mouth   [ ]ET Tube/Trach  [ ]Oral lesions  PULMONARY: difficult to auscultate breath sound due to body habitus, decrease breath sounds b/l   [ ]Clear [ x ]Tachypnea  [ ]Audible excessive secretions   [ ]Rhonchi        [ ]Right [ ]Left [x]Bilateral  [ ]Crackles        [ ]Right [ ]Left [ ]Bilateral  [ ]Wheezing     [ ]Right [ ]Left [ ]Bilateral  CARDIOVASCULAR:    [ x ]Regular [ ]Irregular []Tachy  [ ]Memo [ ]Murmur [ ]Other  GASTROINTESTINAL:  [ x ]Soft  [ ]Distended   [ x ]+BS  [ ]Non tender [ ]Tender  [ ]PEG [ ]OGT/ NGT   Last BM:   1/31 (Colostomy bag)  GENITOURINARY:  [ ]Normal [ x ] Incontinent   [ ]Oliguria/Anuria   [ x ]Ross (draining clear urine, for patient's comfort)  MUSCULOSKELETAL:   [ ]Normal   [ ]Weakness  [ x ]Bed/Wheelchair bound [ x ]Edema (chronic l/e lymph edema and generalized edema due to third spacing)  NEUROLOGIC:   [ ]No focal deficits  [ x ] Cognitive impairment  [ ] Dysphagia [ ]Dysarthria [ ] Paresis [ ]Other   SKIN:  abdominal wound (w/ wound vac), ostomy (clean, dry, brown liquid stool material), chronic LE edema with superimposed skin breaks and weeping (absorbant dressing in place), sacral pressure wound (please refer to RN notes for further documentation).   [ ]Normal   [ x ]Pressure ulcer(s)  [ x ]Rash    CRITICAL CARE:  [ ] Shock Present  [ x ]Septic [ ]Cardiogenic [ ]Neurologic [ ]Hypovolemic  [ ]  Vasopressors [ ]  Inotropes   [ ] Respiratory failure present [ ] Mechanical Ventilation [ ] Non-invasive ventilatory support [ ] High-Flow  [ ] Acute  [ ] Chronic [ ] Hypoxic  [ ] Hypercarbic [ ] Other  [ ] Other organ failure; Skin    LABS:  None new    RADIOLOGY & ADDITIONAL STUDIES:  None new    PROTEIN CALORIE MALNUTRITION:   [ x ] PPSV2 < or = 30% [ ] significant weight loss [ ] poor nutritional intake [ ] anasarca [ ] catabolic state   Albumin, Serum: 2.1 g/dL (01-30-20 @ 04:41)   Artificial Nutrition [ ]     REFERRALS:   [ ]Chaplaincy  [ x ] Hospice  [ ]Child Life  [ x ]Social Work  [ ]Case management [ ]Holistic Therapy [ ] Physical Therapy [ ] Dietary     Goals of Care Document:   Progress Notes - Care Coordination [C. Provider] (01-31-20 @ 12:14) HPI: 63yo F with PMH of Morbid Obesity, h/o heroin abuse, methadone dependence, HCV, HTN, COPD, and GERD p/w abdominal pain found to have bowel perforation s/p emergent repair with complicated post-op course; bacteremia, pneumonia and open abdominal wound requiring wound vac. Palliative consulted for symptom management; mainly pain and on-going GOC. Patient started on Dilaudid ggt, with available prn doses, continued on methadone, IV antibiotic and tube feeds. Patient's partner is bedside and hesitant to de-escalate care or increase pain medications. Awaiting [place] evaluation.    GAP TEAM PALLIATIVE CARE UNIT PROGRESS NOTE:      [  ] Patient on hospice program.    INDICATION FOR PALLIATIVE CARE UNIT SERVICES: Intractable pain and other symptoms management. Nursing care    INTERVAL HPI/OVERNIGHT EVENTS: No new events.     DNR on chart: Yes  Yes    Allergies    IV Contrast (Rash; Pruritus; Hypotension)  penicillin (Rash (Severe))  sulfa drugs (Unknown)  vancomycin (Rash (Severe))    Intolerances    MEDICATIONS  (STANDING):  albuterol/ipratropium for Nebulization 3 milliLiter(s) Nebulizer every 6 hours  AQUAPHOR (petrolatum Ointment) 1 Application(s) Topical three times a day  artificial tears (preservative free) Ophthalmic Solution 1 Drop(s) Both EYES two times a day  aspirin  chewable 81 milliGRAM(s) Oral <User Schedule>  atorvastatin 40 milliGRAM(s) Oral <User Schedule>  buDESOnide    Inhalation Suspension 0.5 milliGRAM(s) Inhalation every 12 hours  ceFAZolin   IVPB 2000 milliGRAM(s) IV Intermittent every 8 hours  chlorhexidine 2% Cloths 1 Application(s) Topical <User Schedule>  clobetasol 0.05% Ointment 1 Application(s) Topical two times a day  enoxaparin Injectable 100 milliGRAM(s) SubCutaneous <User Schedule>  erythromycin     base Tablet 250 milliGRAM(s) Oral <User Schedule>  FIRST- Mouthwash  BLM 5 milliLiter(s) Swish and Spit four times a day  gabapentin 600 milliGRAM(s) Oral three times a day  hydrocortisone sodium succinate Injectable 50 milliGRAM(s) IV Push daily  HYDROmorphone Infusion 0.2 mG/Hr (0.2 mL/Hr) IV Continuous <Continuous>  levothyroxine Injectable 25 MICROGram(s) IV Push at bedtime  melatonin 3 milliGRAM(s) Oral at bedtime  methadone    Tablet 20 milliGRAM(s) Oral <User Schedule>  minocycline IVPB 100 milliGRAM(s) IV Intermittent every 12 hours  minocycline IVPB      nystatin Powder 1 Application(s) Topical two times a day  pantoprazole    Tablet 40 milliGRAM(s) Oral <User Schedule>  polyethylene glycol 3350 17 Gram(s) Oral <User Schedule>  sodium chloride 0.9%. 1000 milliLiter(s) (10 mL/Hr) IV Continuous <Continuous>  thiamine 100 milliGRAM(s) Oral daily    MEDICATIONS  (PRN):  aluminum hydroxide/magnesium hydroxide/simethicone Suspension 30 milliLiter(s) Oral every 4 hours PRN Dyspepsia  diphenhydrAMINE 12.5 milliGRAM(s) Oral every 6 hours PRN Rash and/or Itching  HYDROmorphone  Injectable 2 milliGRAM(s) IV Push every 24 hours PRN breakthrough pain  HYDROmorphone  Injectable 1.5 milliGRAM(s) IV Push every 1 hour PRN pain  HYDROmorphone  Injectable 1.5 milliGRAM(s) IV Push every 1 hour PRN dyspnea  ondansetron Injectable 4 milliGRAM(s) IV Push every 6 hours PRN Nausea and/or Vomiting  zinc oxide 20% Ointment 1 Application(s) Topical three times a day PRN Rash      ITEMS UNCHECKED ARE NOT PRESENT    PRESENT SYMPTOMS:[ ]Unable to obtain verbally due to poor mentation   Source if other than patient:  [x]Partner   [x]Team     Pain: [x] yes [ ] no  QOL impact - Severe, patient is unable to move without experiencing pain  Location - abdominal wound, breaks in skin/sloughing (mainly neck, check, lower extremities) and LE lymph edema (weeping wounds)                   Aggravating factors - touch, movement  Quality - N/A unable to verbalize at present time  Radiation - N/A  Timing- constant  Severity (0-10 scale): unable to verbalize at present time (see pain ad score below)  Minimal acceptable level (0-10 scale): 2/10    Dyspnea:                           [x]Mild [ ]Moderate [ ]Severe  Anxiety:                             [x]Mild [ ]Moderate [ ]Severe  Fatigue:                             [ ]Mild [ ]Moderate [x]Severe  Nausea:                             [ ]Mild [ ]Moderate [ ]Severe  Loss of appetite:              [ ]Mild [x]Moderate [ ]Severe  Constipation:                    [ ]Mild [ ]Moderate [ ]Severe    PAIN AD Score: 4    [email or web address] (Ctrl +  left click to view)  		  Other Symptoms:  [x ]All other review of systems negative     Karnofsky Performance Score/Palliative Performance Status Version 2:  20-30%     [email or web address]/files/news/palliative_performance_scale_[email or web address].pdf    PHYSICAL EXAM:  Vital Signs Last 24 Hrs  T(C): 36.8 (01 Feb 2020 10:00), Max: 36.8 (01 Feb 2020 10:00)  T(F): 98.3 (01 Feb 2020 10:00), Max: 98.3 (01 Feb 2020 10:00)  HR: 82 (01 Feb 2020 10:00) (82 - 82)  BP: 116/70 (01 Feb 2020 10:00) (116/70 - 116/70)  BP(mean): --  RR: 16 (01 Feb 2020 10:00) (16 - 16)  SpO2: 93% (01 Feb 2020 10:00) (93% - 93%) I&O's Summary    31 Jan 2020 07:01  -  01 Feb 2020 07:00  --------------------------------------------------------  IN: 1157.4 mL / OUT: 150 mL / NET: 1007.4 mL    GENERAL: morbidly obese female  [x ]Alert  [x ]Oriented x 2  [  ]Lethargic  [ ]Cachexia  [ ]Unarousable  [ x ]Verbal  [ ]Non-Verbal  Behavioral: Calm, currently appears in no acute distress  [ ] Anxiety  [ ] Delirium [ ] Agitation [ ] Other  HEENT: NGT in place  [ ]Normal   [x  ]Dry mouth   [ ]ET Tube/Trach  [ ]Oral lesions  PULMONARY: difficult to auscultate breath sound due to body habitus, decrease breath sounds b/l   [ ]Clear [ x ]Tachypnea  [ ]Audible excessive secretions   [ ]Rhonchi        [ ]Right [ ]Left [x]Bilateral  [ ]Crackles        [ ]Right [ ]Left [ ]Bilateral  [ ]Wheezing     [ ]Right [ ]Left [ ]Bilateral  CARDIOVASCULAR:    [ x ]Regular [ ]Irregular []Tachy  [ ]Memo [ ]Murmur [ ]Other  GASTROINTESTINAL:  [ x ]Soft  [ ]Distended   [ x ]+BS  [ ]Non tender [ ]Tender  [ ]PEG [ ]OGT/ NGT   Last BM:   1/31 (Colostomy bag)  GENITOURINARY:  [ ]Normal [ x ] Incontinent   [ ]Oliguria/Anuria   [ x ]Ross (draining clear urine, for patient's comfort)  MUSCULOSKELETAL:   [ ]Normal   [ ]Weakness  [ x ]Bed/Wheelchair bound [ x ]Edema (chronic l/e lymph edema and generalized edema due to third spacing)  NEUROLOGIC:   [ ]No focal deficits  [ x ] Cognitive impairment  [ ] Dysphagia [ ]Dysarthria [ ] Paresis [ ]Other   SKIN:  abdominal wound (w/ wound vac), ostomy (clean, dry, brown liquid stool material), chronic LE edema with superimposed skin breaks and weeping (absorbant dressing in place), sacral pressure wound (please refer to RN notes for further documentation).   [ ]Normal   [ x ]Pressure ulcer(s)  [ x ]Rash    CRITICAL CARE:  [ ] Shock Present  [ x ]Septic [ ]Cardiogenic [ ]Neurologic [ ]Hypovolemic  [ ]  Vasopressors [ ]  Inotropes   [ ] Respiratory failure present [ ] Mechanical Ventilation [ ] Non-invasive ventilatory support [ ] High-Flow  [ ] Acute  [ ] Chronic [ ] Hypoxic  [ ] Hypercarbic [ ] Other  [ ] Other organ failure; Skin    LABS:  None new    RADIOLOGY & ADDITIONAL STUDIES:  None new    PROTEIN CALORIE MALNUTRITION:   [ x ] PPSV2 < or = 30% [ ] significant weight loss [ ] poor nutritional intake [ ] anasarca [ ] catabolic state   Albumin, Serum: 2.1 g/dL (01-30-20 @ 04:41)   Artificial Nutrition [ ]     REFERRALS:   [ ]Chaplaincy  [ x ] Hospice  [ ]Child Life  [ x ]Social Work  [ ]Case management [ ]Holistic Therapy [ ] Physical Therapy [ ] Dietary     Goals of Care Document:   Progress Notes - Care Coordination [C. Provider] (01-31-20 @ 12:14)

## 2020-02-01 NOTE — PROGRESS NOTE ADULT - ASSESSMENT
65yo F with PMH of Morbid Obesity, h/o heroin abuse, methadone dependence, HCV, HTN, COPD, and GERD p/w abdominal pain found to have bowel perforation s/p emergent repair with complicated post-op course; bacteremia, pneumonia and open abdominal wound requiring wound vac. Palliative consulted for symptom management; mainly pain and on-going GOC. Patient started on Dilaudid ggt, with available prn doses, continued on methadone, IV antibiotic and tube feeds. Patient's partner is bedside and hesitant to de-escalate care or increase pain medications. Awaiting [place] evaluation.

## 2020-02-02 DIAGNOSIS — F41.9 ANXIETY DISORDER, UNSPECIFIED: ICD-10-CM

## 2020-02-02 LAB
CULTURE RESULTS: SIGNIFICANT CHANGE UP
SPECIMEN SOURCE: SIGNIFICANT CHANGE UP

## 2020-02-02 PROCEDURE — 99233 SBSQ HOSP IP/OBS HIGH 50: CPT

## 2020-02-02 RX ORDER — MEN-PHOR .5; .5 G/G; G/G
1 LOTION TOPICAL
Refills: 0 | Status: DISCONTINUED | OUTPATIENT
Start: 2020-02-02 | End: 2020-02-05

## 2020-02-02 RX ORDER — GABAPENTIN 400 MG/1
600 CAPSULE ORAL THREE TIMES A DAY
Refills: 0 | Status: DISCONTINUED | OUTPATIENT
Start: 2020-02-02 | End: 2020-02-05

## 2020-02-02 RX ORDER — LANOLIN ALCOHOL/MO/W.PET/CERES
3 CREAM (GRAM) TOPICAL ONCE
Refills: 0 | Status: DISCONTINUED | OUTPATIENT
Start: 2020-02-02 | End: 2020-02-02

## 2020-02-02 RX ADMIN — Medication 81 MILLIGRAM(S): at 20:24

## 2020-02-02 RX ADMIN — SODIUM CHLORIDE 10 MILLILITER(S): 9 INJECTION INTRAMUSCULAR; INTRAVENOUS; SUBCUTANEOUS at 20:05

## 2020-02-02 RX ADMIN — Medication 25 MICROGRAM(S): at 22:46

## 2020-02-02 RX ADMIN — Medication 3 MILLILITER(S): at 17:20

## 2020-02-02 RX ADMIN — PANTOPRAZOLE SODIUM 40 MILLIGRAM(S): 20 TABLET, DELAYED RELEASE ORAL at 05:42

## 2020-02-02 RX ADMIN — POLYETHYLENE GLYCOL 3350 17 GRAM(S): 17 POWDER, FOR SOLUTION ORAL at 13:32

## 2020-02-02 RX ADMIN — MINOCYCLINE HYDROCHLORIDE 100 MILLIGRAM(S): 45 TABLET, EXTENDED RELEASE ORAL at 05:39

## 2020-02-02 RX ADMIN — GABAPENTIN 600 MILLIGRAM(S): 400 CAPSULE ORAL at 05:42

## 2020-02-02 RX ADMIN — MEN-PHOR 1 APPLICATION(S): .5; .5 LOTION TOPICAL at 17:15

## 2020-02-02 RX ADMIN — Medication 1 APPLICATION(S): at 05:38

## 2020-02-02 RX ADMIN — HYDROMORPHONE HYDROCHLORIDE 0.2 MG/HR: 2 INJECTION INTRAMUSCULAR; INTRAVENOUS; SUBCUTANEOUS at 20:04

## 2020-02-02 RX ADMIN — Medication 12.5 MILLIGRAM(S): at 05:37

## 2020-02-02 RX ADMIN — Medication 50 MILLIGRAM(S): at 05:40

## 2020-02-02 RX ADMIN — Medication 100 MILLIGRAM(S): at 22:45

## 2020-02-02 RX ADMIN — GABAPENTIN 600 MILLIGRAM(S): 400 CAPSULE ORAL at 22:46

## 2020-02-02 RX ADMIN — Medication 3 MILLILITER(S): at 05:39

## 2020-02-02 RX ADMIN — DIPHENHYDRAMINE HYDROCHLORIDE AND LIDOCAINE HYDROCHLORIDE AND ALUMINUM HYDROXIDE AND MAGNESIUM HYDRO 5 MILLILITER(S): KIT at 05:41

## 2020-02-02 RX ADMIN — Medication 1 APPLICATION(S): at 22:48

## 2020-02-02 RX ADMIN — ENOXAPARIN SODIUM 100 MILLIGRAM(S): 100 INJECTION SUBCUTANEOUS at 13:44

## 2020-02-02 RX ADMIN — NYSTATIN CREAM 1 APPLICATION(S): 100000 CREAM TOPICAL at 05:47

## 2020-02-02 RX ADMIN — CHLORHEXIDINE GLUCONATE 1 APPLICATION(S): 213 SOLUTION TOPICAL at 05:39

## 2020-02-02 RX ADMIN — Medication 1 APPLICATION(S): at 13:08

## 2020-02-02 RX ADMIN — Medication 1 APPLICATION(S): at 05:46

## 2020-02-02 RX ADMIN — HYDROMORPHONE HYDROCHLORIDE 1.5 MILLIGRAM(S): 2 INJECTION INTRAMUSCULAR; INTRAVENOUS; SUBCUTANEOUS at 02:13

## 2020-02-02 RX ADMIN — Medication 1 DROP(S): at 05:40

## 2020-02-02 RX ADMIN — POLYETHYLENE GLYCOL 3350 17 GRAM(S): 17 POWDER, FOR SOLUTION ORAL at 17:39

## 2020-02-02 RX ADMIN — Medication 0.5 MILLIGRAM(S): at 05:39

## 2020-02-02 RX ADMIN — NYSTATIN CREAM 1 APPLICATION(S): 100000 CREAM TOPICAL at 20:26

## 2020-02-02 RX ADMIN — Medication 3 MILLILITER(S): at 12:55

## 2020-02-02 RX ADMIN — Medication 250 MILLIGRAM(S): at 17:39

## 2020-02-02 RX ADMIN — Medication 100 MILLIGRAM(S): at 15:04

## 2020-02-02 RX ADMIN — POLYETHYLENE GLYCOL 3350 17 GRAM(S): 17 POWDER, FOR SOLUTION ORAL at 05:37

## 2020-02-02 RX ADMIN — Medication 1 DROP(S): at 17:35

## 2020-02-02 RX ADMIN — METHADONE HYDROCHLORIDE 20 MILLIGRAM(S): 40 TABLET ORAL at 10:37

## 2020-02-02 RX ADMIN — Medication 1 APPLICATION(S): at 17:17

## 2020-02-02 RX ADMIN — DIPHENHYDRAMINE HYDROCHLORIDE AND LIDOCAINE HYDROCHLORIDE AND ALUMINUM HYDROXIDE AND MAGNESIUM HYDRO 5 MILLILITER(S): KIT at 13:32

## 2020-02-02 RX ADMIN — Medication 3 MILLILITER(S): at 00:33

## 2020-02-02 RX ADMIN — Medication 100 MILLIGRAM(S): at 13:32

## 2020-02-02 RX ADMIN — HYDROMORPHONE HYDROCHLORIDE 2 MILLIGRAM(S): 2 INJECTION INTRAMUSCULAR; INTRAVENOUS; SUBCUTANEOUS at 01:07

## 2020-02-02 RX ADMIN — Medication 0.5 MILLIGRAM(S): at 09:59

## 2020-02-02 RX ADMIN — Medication 100 MILLIGRAM(S): at 05:39

## 2020-02-02 RX ADMIN — MINOCYCLINE HYDROCHLORIDE 100 MILLIGRAM(S): 45 TABLET, EXTENDED RELEASE ORAL at 17:35

## 2020-02-02 RX ADMIN — Medication 1 MILLIGRAM(S): at 17:14

## 2020-02-02 RX ADMIN — Medication 250 MILLIGRAM(S): at 10:38

## 2020-02-02 RX ADMIN — ATORVASTATIN CALCIUM 40 MILLIGRAM(S): 80 TABLET, FILM COATED ORAL at 20:22

## 2020-02-02 RX ADMIN — METHADONE HYDROCHLORIDE 20 MILLIGRAM(S): 40 TABLET ORAL at 20:23

## 2020-02-02 RX ADMIN — HYDROMORPHONE HYDROCHLORIDE 2 MILLIGRAM(S): 2 INJECTION INTRAMUSCULAR; INTRAVENOUS; SUBCUTANEOUS at 01:22

## 2020-02-02 RX ADMIN — DIPHENHYDRAMINE HYDROCHLORIDE AND LIDOCAINE HYDROCHLORIDE AND ALUMINUM HYDROXIDE AND MAGNESIUM HYDRO 5 MILLILITER(S): KIT at 00:34

## 2020-02-02 RX ADMIN — HYDROMORPHONE HYDROCHLORIDE 1.5 MILLIGRAM(S): 2 INJECTION INTRAMUSCULAR; INTRAVENOUS; SUBCUTANEOUS at 02:28

## 2020-02-02 RX ADMIN — Medication 0.5 MILLIGRAM(S): at 17:35

## 2020-02-02 RX ADMIN — GABAPENTIN 600 MILLIGRAM(S): 400 CAPSULE ORAL at 15:04

## 2020-02-02 NOTE — PROGRESS NOTE ADULT - PROBLEM SELECTOR PLAN 7
Patient in PCU for symptom management (pain and wound care) and possible transfer to [facility].   Patient's partner voiced unhappiness of current room that patient is in, stating it is much smaller than previous room.   She is hesitant to increase pain medication, but is agreeable for Dilaudid gtt. reassured pain medication is not making the patient lack of sleep.   Patient's partner conveyed that she wants her comfortable, however does not want to take away patient's "hope". Emotional support provided.  She also indicated she wanted to see if it was possible to  her partner in the hospital. Tomorrow, will inform SW and chaplaincy about it.

## 2020-02-02 NOTE — PROGRESS NOTE ADULT - PROBLEM SELECTOR PLAN 2
Patient has resistant organisms, persistent bacteremia. Ostomy in place with dark liquid stool, open abdominal wound with wound vac in place (will changed twice weekly)  - antibiotics as per ID, de-escalated today (On Erythromycin, Cefazolin, and minocycline)   - no plans for further surgical intervention

## 2020-02-02 NOTE — PROGRESS NOTE ADULT - ASSESSMENT
63yo F with PMH of Morbid Obesity, h/o heroin abuse, methadone dependence, HCV, HTN, COPD, and GERD p/w abdominal pain found to have bowel perforation s/p emergent repair with complicated post-op course; bacteremia, pneumonia and open abdominal wound requiring wound vac. Palliative consulted for symptom management; mainly pain and on-going GOC. Patient started on Dilaudid ggt, with available prn doses, continued on methadone, IV antibiotic and tube feeds. Patient's partner is bedside and hesitant to de-escalate care or increase pain medications. Awaiting [place] evaluation.

## 2020-02-02 NOTE — PROGRESS NOTE ADULT - PROBLEM SELECTOR PLAN 6
Patient in PCU for on-going GOC discussion, symptom management (pain and wound care) and possible transfer to [facility]. Patient's partner voiced unhappiness of current room that patient is in, stating it is much smaller than previous room. She is hesitant to increase pain medication, but is agreeable for Dilaudid gtt. Patient's partner conveyed that she wants her comfortable, however does not want to take away patient's "hope". Emotional support provided. Patient in PCU for symptom management (pain and wound care) and possible transfer to [facility].   Patient's partner voiced unhappiness of current room that patient is in, stating it is much smaller than previous room.   She is hesitant to increase pain medication, but is agreeable for Dilaudid gtt. reassured pain medication is not making the patient lack of sleep.   Patient's partner conveyed that she wants her comfortable, however does not want to take away patient's "hope". Emotional support provided. It was previously discussed goals of care, goals are for continued abx, but partner realizes that this is likely an end-stage process, and wants her to be comfortable; discussed blood draws and limiting them given difficulty obtaining them and causing more suffering;   - patient is DNR/DNI, EVA in chart, patient made decision  - partner is open to transfer to [facility] if stable

## 2020-02-02 NOTE — PROGRESS NOTE ADULT - SUBJECTIVE AND OBJECTIVE BOX
GAP TEAM PALLIATIVE CARE UNIT PROGRESS NOTE:      [  ] Patient on hospice program.    INDICATION FOR PALLIATIVE CARE UNIT SERVICES:  Intractable pain and other symptoms management. Nursing care    INTERVAL HPI/OVERNIGHT EVENTS:  Patient used 3 PRN of IV Dilaudid in the last 24 hrs. Adequate relief of pain.   Had decreased sleep overnight.   Partner at bedside    DNR on chart: Yes  Yes    Allergies    IV Contrast (Rash; Pruritus; Hypotension)  penicillin (Rash (Severe))  sulfa drugs (Unknown)  vancomycin (Rash (Severe))    Intolerances    MEDICATIONS  (STANDING):  albuterol/ipratropium for Nebulization 3 milliLiter(s) Nebulizer every 6 hours  AQUAPHOR (petrolatum Ointment) 1 Application(s) Topical three times a day  artificial tears (preservative free) Ophthalmic Solution 1 Drop(s) Both EYES two times a day  aspirin  chewable 81 milliGRAM(s) Oral <User Schedule>  atorvastatin 40 milliGRAM(s) Oral <User Schedule>  buDESOnide    Inhalation Suspension 0.5 milliGRAM(s) Inhalation every 12 hours  ceFAZolin   IVPB 2000 milliGRAM(s) IV Intermittent every 8 hours  chlorhexidine 2% Cloths 1 Application(s) Topical <User Schedule>  clobetasol 0.05% Ointment 1 Application(s) Topical two times a day  enoxaparin Injectable 100 milliGRAM(s) SubCutaneous <User Schedule>  erythromycin     base Tablet 250 milliGRAM(s) Oral <User Schedule>  FIRST- Mouthwash  BLM 5 milliLiter(s) Swish and Spit four times a day  gabapentin 600 milliGRAM(s) Oral three times a day  hydrocortisone sodium succinate Injectable 50 milliGRAM(s) IV Push daily  HYDROmorphone Infusion 0.2 mG/Hr (0.2 mL/Hr) IV Continuous <Continuous>  levothyroxine Injectable 25 MICROGram(s) IV Push at bedtime  melatonin 3 milliGRAM(s) Oral at bedtime  methadone    Tablet 20 milliGRAM(s) Oral <User Schedule>  minocycline IVPB 100 milliGRAM(s) IV Intermittent every 12 hours  minocycline IVPB      nystatin Powder 1 Application(s) Topical two times a day  pantoprazole    Tablet 40 milliGRAM(s) Oral <User Schedule>  polyethylene glycol 3350 17 Gram(s) Oral <User Schedule>  sodium chloride 0.9%. 1000 milliLiter(s) (10 mL/Hr) IV Continuous <Continuous>  thiamine 100 milliGRAM(s) Oral daily    MEDICATIONS  (PRN):  aluminum hydroxide/magnesium hydroxide/simethicone Suspension 30 milliLiter(s) Oral every 4 hours PRN Dyspepsia  diphenhydrAMINE   Injectable 12.5 milliGRAM(s) IV Push every 6 hours PRN Rash and/or Itching  HYDROmorphone  Injectable 2 milliGRAM(s) IV Push every 24 hours PRN breakthrough pain  HYDROmorphone  Injectable 1.5 milliGRAM(s) IV Push every 1 hour PRN pain  HYDROmorphone  Injectable 1.5 milliGRAM(s) IV Push every 1 hour PRN dyspnea  ondansetron Injectable 4 milliGRAM(s) IV Push every 6 hours PRN Nausea and/or Vomiting  zinc oxide 20% Ointment 1 Application(s) Topical three times a day PRN Rash      ITEMS UNCHECKED ARE NOT PRESENT    PRESENT SYMPTOMS: [ ]Unable to obtain verbally due to poor mentation   Source if other than patient:  [x]Partner   [x]Team     Pain: [x] yes [ ] no  QOL impact - Severe, patient is unable to move without experiencing pain  Location - abdominal wound, breaks in skin/sloughing (mainly neck, check, lower extremities) and LE lymph edema (weeping wounds)                   Aggravating factors - touch, movement  Quality - N/A unable to verbalize at present time  Radiation - N/A  Timing- constant  Severity (0-10 scale): unable to verbalize at present time (see pain ad score below)  Minimal acceptable level (0-10 scale): 2/10    Dyspnea:                           [x]Mild [ ]Moderate [ ]Severe  Anxiety:                             [x]Mild [ ]Moderate [ ]Severe  Fatigue:                             [ ]Mild [ ]Moderate [x]Severe  Nausea:                             [ ]Mild [ ]Moderate [ ]Severe  Loss of appetite:              [ ]Mild [x]Moderate [ ]Severe  Constipation:                    [ ]Mild [ ]Moderate [ ]Severe    PAIN AD Score: 4    http://geriatrictoolkit.[employer].edu/cog/painad.pdf (Ctrl +  left click to view)  		  Other Symptoms:  [x ]All other review of systems negative     Karnofsky Performance Score/Palliative Performance Status Version 2:  20-30%         http://npcrc.org/files/news/palliative_performance_scale_ppsv2.pdf    PHYSICAL EXAM:  Vital Signs Last 24 Hrs  T(C): --  T(F): --  HR: --  BP: --  BP(mean): --  RR: --  SpO2: -- I&O's Summary    01 Feb 2020 07:01  -  02 Feb 2020 07:00  --------------------------------------------------------  IN: 0 mL / OUT: 500 mL / NET: -500 mL    GENERAL: morbidly obese female  [x ]Alert  [x ]Oriented x 2  [  ]Lethargic  [ ]Cachexia  [ ]Unarousable  [ x ]Verbal  [ ]Non-Verbal  Behavioral: Calm, currently appears in no acute distress  [ ] Anxiety  [ ] Delirium [ ] Agitation [ ] Other  HEENT: NGT in place  [ ]Normal   [x  ]Dry mouth   [ ]ET Tube/Trach  [ ]Oral lesions  PULMONARY: difficult to auscultate breath sound due to body habitus, decrease breath sounds b/l   [ ]Clear [ x ]Tachypnea  [ ]Audible excessive secretions   [ ]Rhonchi        [ ]Right [ ]Left [x]Bilateral  [ ]Crackles        [ ]Right [ ]Left [ ]Bilateral  [ ]Wheezing     [ ]Right [ ]Left [ ]Bilateral  CARDIOVASCULAR:    [ x ]Regular [ ]Irregular []Tachy  [ ]Memo [ ]Murmur [ ]Other  GASTROINTESTINAL:  [ x ]Soft  [ ]Distended   [ x ]+BS  [ ]Non tender [ ]Tender  [ ]PEG [ ]OGT/ NGT   Last BM:   1/31 (Colostomy bag)  GENITOURINARY:  [ ]Normal [ x ] Incontinent   [ ]Oliguria/Anuria   [ x ]Ross (draining clear urine, for patient's comfort)  MUSCULOSKELETAL:   [ ]Normal   [ ]Weakness  [ x ]Bed/Wheelchair bound [ x ]Edema (chronic l/e lymph edema and generalized edema due to third spacing)  NEUROLOGIC:   [ ]No focal deficits  [ x ] Cognitive impairment  [ ] Dysphagia [ ]Dysarthria [ ] Paresis [ ]Other   SKIN:  abdominal wound (w/ wound vac), ostomy (clean, dry, brown liquid stool material), chronic LE edema with superimposed skin breaks and weeping (absorbant dressing in place), sacral pressure wound (please refer to RN notes for further documentation).   [ ]Normal   [ x ]Pressure ulcer(s)  [ x ]Rash    CRITICAL CARE:  [ ] Shock Present  [ x ]Septic [ ]Cardiogenic [ ]Neurologic [ ]Hypovolemic  [ ]  Vasopressors [ ]  Inotropes   [ ] Respiratory failure present [ ] Mechanical Ventilation [ ] Non-invasive ventilatory support [ ] High-Flow  [ ] Acute  [ ] Chronic [ ] Hypoxic  [ ] Hypercarbic [ ] Other  [ ] Other organ failure; Skin    LABS:  None new    RADIOLOGY & ADDITIONAL STUDIES:  None new    PROTEIN CALORIE MALNUTRITION:   [ x ] PPSV2 < or = 30% [ ] significant weight loss [ ] poor nutritional intake [ ] anasarca [ ] catabolic state   Albumin, Serum: 2.1 g/dL (01-30-20 @ 04:41)   Artificial Nutrition [ ]     REFERRALS:   [ ]Chaplaincy  [ x ] Hospice  [ ]Child Life  [ x ]Social Work  [ ]Case management [ ]Holistic Therapy [ ] Physical Therapy [ ] Dietary     Goals of Care Document:   Progress Notes - Care Coordination [C. Provider] (01-31-20 @ 12:14)

## 2020-02-02 NOTE — PROGRESS NOTE ADULT - PROBLEM SELECTOR PLAN 1
Most likely 2/2 abdominal wound, LE swelling/skin weeping/third spacing, and skin compromise.  - on methadone 20mg BID.  - likely has high tolerance, has been on methadone for years for addiction  - C/w Dilaudid 0.2 mg/hr ggt  - c/w dilaudid 1.5mg IV Q1 PRN pain  - will monitor and adjust dose based on daily requirement and symptom burden  - bowel regimen to prevent opioid induced constipation    *patient's partner is very hesitant to adjust medications for fear that it will result in decreased wakefulness Most likely 2/2 abdominal wound, LE swelling/skin weeping/third spacing, and skin compromise.  - on methadone 20mg BID.  - likely has high tolerance, has been on methadone for years for addiction  - C/w Dilaudid 0.2 mg/hr ggt  - c/w dilaudid 1.5mg IV Q1 PRN pain  - Attempted to change Gabapentin to Lyrica but there is not Lyrica solution. Will continue Gabapentin 600 mg PO q 8 hours.   - Will Start Sarna Cream.   - will monitor and adjust dose based on daily requirement and symptom burden  - bowel regimen to prevent opioid induced constipation    *patient's partner is very hesitant to adjust medications for fear that it will result in decreased wakefulness

## 2020-02-02 NOTE — PROGRESS NOTE ADULT - PROBLEM SELECTOR PLAN 5
It was previously discussed goals of care, goals are for continued abx, but partner realizes that this is likely an end-stage process, and wants her to be comfortable; discussed blood draws and limiting them given difficulty obtaining them and causing more suffering;   - patient is DNR/DNI, EVA in chart, patient made decision  - partner is open to transfer to [facility] if stable d/w partner that agree on increasing Ativan to 1 mg q 1 PRN.

## 2020-02-02 NOTE — PROGRESS NOTE ADULT - PROBLEM SELECTOR PLAN 4
Patient has sacral wound, open abdominal wound s/p surgery (with wound vac in place), sloughing and weeping of skin on chest, arms, and LE b/l. Multiple sources of infection.  -Wound vac to be changed twice weekly  -Wound care Patient has sacral wound, open abdominal wound s/p surgery (with wound vac in place), sloughing and weeping of skin on chest, arms, and LE b/l. Multiple sources of infection.  -Wound vac to be changed twice weekly  -Wound care  -Added Camphor cream  -Offered dermatology consult, partner declined. Patient has sacral wound, open abdominal wound s/p surgery (with wound vac in place), sloughing and weeping of skin on chest, arms, and LE b/l. Multiple sources of infection.  -Wound vac to be changed twice weekly  -Wound care  -Added Camphor and Menthol cream  -Offered dermatology consult, partner declined.

## 2020-02-03 VITALS
TEMPERATURE: 98 F | RESPIRATION RATE: 18 BRPM | OXYGEN SATURATION: 98 % | HEART RATE: 88 BPM | DIASTOLIC BLOOD PRESSURE: 58 MMHG | SYSTOLIC BLOOD PRESSURE: 92 MMHG

## 2020-02-03 DIAGNOSIS — R78.81 BACTEREMIA: ICD-10-CM

## 2020-02-03 DIAGNOSIS — E44.0 MODERATE PROTEIN-CALORIE MALNUTRITION: ICD-10-CM

## 2020-02-03 LAB — MISCELLANEOUS TEST NAME: SIGNIFICANT CHANGE UP

## 2020-02-03 PROCEDURE — 99232 SBSQ HOSP IP/OBS MODERATE 35: CPT

## 2020-02-03 PROCEDURE — 99232 SBSQ HOSP IP/OBS MODERATE 35: CPT | Mod: GC

## 2020-02-03 PROCEDURE — 71045 X-RAY EXAM CHEST 1 VIEW: CPT | Mod: 26

## 2020-02-03 RX ORDER — HYDROMORPHONE HYDROCHLORIDE 2 MG/ML
2 INJECTION INTRAMUSCULAR; INTRAVENOUS; SUBCUTANEOUS EVERY 24 HOURS
Refills: 0 | Status: DISCONTINUED | OUTPATIENT
Start: 2020-02-03 | End: 2020-02-05

## 2020-02-03 RX ORDER — METHADONE HYDROCHLORIDE 40 MG/1
20 TABLET ORAL
Refills: 0 | Status: DISCONTINUED | OUTPATIENT
Start: 2020-02-03 | End: 2020-02-05

## 2020-02-03 RX ORDER — PANTOPRAZOLE SODIUM 20 MG/1
40 TABLET, DELAYED RELEASE ORAL DAILY
Refills: 0 | Status: DISCONTINUED | OUTPATIENT
Start: 2020-02-03 | End: 2020-02-05

## 2020-02-03 RX ADMIN — Medication 1 APPLICATION(S): at 06:09

## 2020-02-03 RX ADMIN — MINOCYCLINE HYDROCHLORIDE 100 MILLIGRAM(S): 45 TABLET, EXTENDED RELEASE ORAL at 19:17

## 2020-02-03 RX ADMIN — Medication 100 MILLIGRAM(S): at 19:18

## 2020-02-03 RX ADMIN — Medication 250 MILLIGRAM(S): at 19:17

## 2020-02-03 RX ADMIN — HYDROMORPHONE HYDROCHLORIDE 0.2 MG/HR: 2 INJECTION INTRAMUSCULAR; INTRAVENOUS; SUBCUTANEOUS at 19:03

## 2020-02-03 RX ADMIN — HYDROMORPHONE HYDROCHLORIDE 2 MILLIGRAM(S): 2 INJECTION INTRAMUSCULAR; INTRAVENOUS; SUBCUTANEOUS at 12:52

## 2020-02-03 RX ADMIN — DIPHENHYDRAMINE HYDROCHLORIDE AND LIDOCAINE HYDROCHLORIDE AND ALUMINUM HYDROXIDE AND MAGNESIUM HYDRO 5 MILLILITER(S): KIT at 23:21

## 2020-02-03 RX ADMIN — Medication 81 MILLIGRAM(S): at 21:46

## 2020-02-03 RX ADMIN — GABAPENTIN 600 MILLIGRAM(S): 400 CAPSULE ORAL at 16:44

## 2020-02-03 RX ADMIN — Medication 100 MILLIGRAM(S): at 14:48

## 2020-02-03 RX ADMIN — Medication 1 APPLICATION(S): at 06:11

## 2020-02-03 RX ADMIN — HYDROMORPHONE HYDROCHLORIDE 1.5 MILLIGRAM(S): 2 INJECTION INTRAMUSCULAR; INTRAVENOUS; SUBCUTANEOUS at 12:00

## 2020-02-03 RX ADMIN — Medication 0.5 MILLIGRAM(S): at 16:45

## 2020-02-03 RX ADMIN — HYDROMORPHONE HYDROCHLORIDE 0.2 MG/HR: 2 INJECTION INTRAMUSCULAR; INTRAVENOUS; SUBCUTANEOUS at 08:00

## 2020-02-03 RX ADMIN — Medication 25 MICROGRAM(S): at 21:47

## 2020-02-03 RX ADMIN — Medication 3 MILLILITER(S): at 23:21

## 2020-02-03 RX ADMIN — ENOXAPARIN SODIUM 100 MILLIGRAM(S): 100 INJECTION SUBCUTANEOUS at 12:52

## 2020-02-03 RX ADMIN — MEN-PHOR 1 APPLICATION(S): .5; .5 LOTION TOPICAL at 06:09

## 2020-02-03 RX ADMIN — Medication 1 APPLICATION(S): at 21:46

## 2020-02-03 RX ADMIN — Medication 100 MILLIGRAM(S): at 06:08

## 2020-02-03 RX ADMIN — Medication 1 DROP(S): at 19:16

## 2020-02-03 RX ADMIN — CHLORHEXIDINE GLUCONATE 1 APPLICATION(S): 213 SOLUTION TOPICAL at 12:51

## 2020-02-03 RX ADMIN — DIPHENHYDRAMINE HYDROCHLORIDE AND LIDOCAINE HYDROCHLORIDE AND ALUMINUM HYDROXIDE AND MAGNESIUM HYDRO 5 MILLILITER(S): KIT at 02:08

## 2020-02-03 RX ADMIN — METHADONE HYDROCHLORIDE 20 MILLIGRAM(S): 40 TABLET ORAL at 21:46

## 2020-02-03 RX ADMIN — POLYETHYLENE GLYCOL 3350 17 GRAM(S): 17 POWDER, FOR SOLUTION ORAL at 06:08

## 2020-02-03 RX ADMIN — DIPHENHYDRAMINE HYDROCHLORIDE AND LIDOCAINE HYDROCHLORIDE AND ALUMINUM HYDROXIDE AND MAGNESIUM HYDRO 5 MILLILITER(S): KIT at 19:17

## 2020-02-03 RX ADMIN — Medication 250 MILLIGRAM(S): at 10:00

## 2020-02-03 RX ADMIN — NYSTATIN CREAM 1 APPLICATION(S): 100000 CREAM TOPICAL at 06:28

## 2020-02-03 RX ADMIN — MINOCYCLINE HYDROCHLORIDE 100 MILLIGRAM(S): 45 TABLET, EXTENDED RELEASE ORAL at 06:08

## 2020-02-03 RX ADMIN — Medication 3 MILLILITER(S): at 00:08

## 2020-02-03 RX ADMIN — PANTOPRAZOLE SODIUM 40 MILLIGRAM(S): 20 TABLET, DELAYED RELEASE ORAL at 06:09

## 2020-02-03 RX ADMIN — Medication 3 MILLILITER(S): at 06:08

## 2020-02-03 RX ADMIN — Medication 3 MILLIGRAM(S): at 21:46

## 2020-02-03 RX ADMIN — DIPHENHYDRAMINE HYDROCHLORIDE AND LIDOCAINE HYDROCHLORIDE AND ALUMINUM HYDROXIDE AND MAGNESIUM HYDRO 5 MILLILITER(S): KIT at 12:53

## 2020-02-03 RX ADMIN — Medication 1 APPLICATION(S): at 14:49

## 2020-02-03 RX ADMIN — METHADONE HYDROCHLORIDE 20 MILLIGRAM(S): 40 TABLET ORAL at 09:07

## 2020-02-03 RX ADMIN — Medication 1 DROP(S): at 06:27

## 2020-02-03 RX ADMIN — MEN-PHOR 1 APPLICATION(S): .5; .5 LOTION TOPICAL at 19:16

## 2020-02-03 RX ADMIN — Medication 1 APPLICATION(S): at 19:16

## 2020-02-03 RX ADMIN — DIPHENHYDRAMINE HYDROCHLORIDE AND LIDOCAINE HYDROCHLORIDE AND ALUMINUM HYDROXIDE AND MAGNESIUM HYDRO 5 MILLILITER(S): KIT at 06:08

## 2020-02-03 RX ADMIN — Medication 100 MILLIGRAM(S): at 21:45

## 2020-02-03 RX ADMIN — Medication 0.5 MILLIGRAM(S): at 06:08

## 2020-02-03 RX ADMIN — GABAPENTIN 600 MILLIGRAM(S): 400 CAPSULE ORAL at 21:46

## 2020-02-03 RX ADMIN — Medication 3 MILLILITER(S): at 16:45

## 2020-02-03 RX ADMIN — HYDROMORPHONE HYDROCHLORIDE 1.5 MILLIGRAM(S): 2 INJECTION INTRAMUSCULAR; INTRAVENOUS; SUBCUTANEOUS at 11:06

## 2020-02-03 RX ADMIN — Medication 50 MILLIGRAM(S): at 06:07

## 2020-02-03 RX ADMIN — NYSTATIN CREAM 1 APPLICATION(S): 100000 CREAM TOPICAL at 19:17

## 2020-02-03 RX ADMIN — PANTOPRAZOLE SODIUM 40 MILLIGRAM(S): 20 TABLET, DELAYED RELEASE ORAL at 14:49

## 2020-02-03 RX ADMIN — HYDROMORPHONE HYDROCHLORIDE 2 MILLIGRAM(S): 2 INJECTION INTRAMUSCULAR; INTRAVENOUS; SUBCUTANEOUS at 13:10

## 2020-02-03 RX ADMIN — GABAPENTIN 600 MILLIGRAM(S): 400 CAPSULE ORAL at 06:08

## 2020-02-03 RX ADMIN — POLYETHYLENE GLYCOL 3350 17 GRAM(S): 17 POWDER, FOR SOLUTION ORAL at 19:18

## 2020-02-03 RX ADMIN — ATORVASTATIN CALCIUM 40 MILLIGRAM(S): 80 TABLET, FILM COATED ORAL at 21:46

## 2020-02-03 NOTE — PROGRESS NOTE ADULT - ASSESSMENT
64 F PMHx of HTN, COPD, and morbid obesity who was admitted on 11/26 for perforated sigmoid diverticulitis  Perforated diverticulum, culture with E coli, s/p OR into washout and ostomy  S/p treatment courses for PICC CoNS CLABSI and Steno in sputum  CTs with improvement in intraabd collections  BCX with VRE and Acinetobacter--acinetobacter persistent through current regimen (brittanie added over weekend)  CT's with pna, abd unchanged  Central lines changed 1/23  Concern for MDR Acinetobacter--but S to Minocycline; Poly S pending-however also with ARF   BCX still with acinetobacter  Transferred to PCU, approaching conservative care  pt/partner does not want any further blood draws though continue abx      Overall,      A) Persistent MDR acinetobacterBacteremia  Also MSSA   Source / abdomen  ? Line(changed 1/23)    - antibiotic therapy on acinetobacter has been futile despite multiple antibiotic attempts; for now treat with Minocycline alone as isolate is S  options limited  patient also with ARF and aminoglycosides may be poor option   - continue Minocycline 100mg q 12  -No further labs per pt/partner  will defer to pallaitive on plan       B) CoNS bacteremia--? CLABSI  - S/p course treatment  Repeat Cx no CNS    C) Loculated abdominal  fluid collections  - Optimally, would pursue CT scan and pursue surgical intervention as needed for source control; with patient's poor condition, family approaching conservative care        D) Leukocytosis  Patient not getting any labs last 4 days        Will tailor plan for ID issues  per course,results.Will defer to primary team on management of other issues.  Assessment, plan and recommendations as detailed above were discussed with the palliative care   team.    ID will follow as needed,please call [phone] if any questions or issues.      prognosis poor

## 2020-02-03 NOTE — PROGRESS NOTE ADULT - PROBLEM SELECTOR PLAN 7
Patient in PCU for symptom management (pain and wound care) and possible transfer to [facility].   Patient's partner voiced unhappiness of current room that patient is in, stating it is much smaller than previous room.   She is hesitant to increase pain medication, but is agreeable for Dilaudid gtt. reassured pain medication is not making the patient lack of sleep.   Patient's partner conveyed that she wants her comfortable, however does not want to take away patient's "hope". Emotional support provided.  She also indicated she wanted to see if it was possible to  her partner in the hospital. Tomorrow, will inform SW and chaplaincy about it. 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 Awaiting evaluation for [facility]. Emotional support provided. Patient and partner wish to continue NGT feeding for supplemental nutrition. Discussed risk vs benefits of prolonged NGT feeding. Both convey understanding that NGT feeding is not a permanent solution. Dietician on board. On-going conversation.

## 2020-02-03 NOTE — PROVIDER CONTACT NOTE (CRITICAL VALUE NOTIFICATION) - ACTION/TREATMENT ORDERED:
Md came to bedside to assess patient, currently sleeping.
PA aware. Pt pending transfer to SICU. Will continue to monitor.
Repeat CBC
awaiting orders

## 2020-02-03 NOTE — PROGRESS NOTE ADULT - SUBJECTIVE AND OBJECTIVE BOX
GAP TEAM PALLIATIVE CARE UNIT PROGRESS NOTE:      [  ] Patient on hospice program.    INDICATION FOR PALLIATIVE CARE UNIT SERVICES:  Intractable pain and other symptoms management. Nursing care    INTERVAL HPI/OVERNIGHT EVENTS: No acute events overnight. Patient was seen and examined at bedside. Patient continued on Dilaudid 0.2 mg/hr ggt and required    DNR on chart: Yes  Yes    Allergies    IV Contrast (Rash; Pruritus; Hypotension)  penicillin (Rash (Severe))  sulfa drugs (Unknown)  vancomycin (Rash (Severe))    Intolerances    MEDICATIONS  (STANDING):  albuterol/ipratropium for Nebulization 3 milliLiter(s) Nebulizer every 6 hours  AQUAPHOR (petrolatum Ointment) 1 Application(s) Topical three times a day  artificial tears (preservative free) Ophthalmic Solution 1 Drop(s) Both EYES two times a day  aspirin  chewable 81 milliGRAM(s) Oral <User Schedule>  atorvastatin 40 milliGRAM(s) Oral <User Schedule>  buDESOnide    Inhalation Suspension 0.5 milliGRAM(s) Inhalation every 12 hours  ceFAZolin   IVPB 2000 milliGRAM(s) IV Intermittent every 8 hours  chlorhexidine 2% Cloths 1 Application(s) Topical <User Schedule>  clobetasol 0.05% Ointment 1 Application(s) Topical two times a day  enoxaparin Injectable 100 milliGRAM(s) SubCutaneous <User Schedule>  erythromycin     base Tablet 250 milliGRAM(s) Oral <User Schedule>  FIRST- Mouthwash  BLM 5 milliLiter(s) Swish and Spit four times a day  gabapentin 600 milliGRAM(s) Oral three times a day  hydrocortisone sodium succinate Injectable 50 milliGRAM(s) IV Push daily  HYDROmorphone Infusion 0.2 mG/Hr (0.2 mL/Hr) IV Continuous <Continuous>  levothyroxine Injectable 25 MICROGram(s) IV Push at bedtime  melatonin 3 milliGRAM(s) Oral at bedtime  methadone    Tablet 20 milliGRAM(s) Oral <User Schedule>  minocycline IVPB 100 milliGRAM(s) IV Intermittent every 12 hours  minocycline IVPB      nystatin Powder 1 Application(s) Topical two times a day  pantoprazole    Tablet 40 milliGRAM(s) Oral <User Schedule>  polyethylene glycol 3350 17 Gram(s) Oral <User Schedule>  sodium chloride 0.9%. 1000 milliLiter(s) (10 mL/Hr) IV Continuous <Continuous>  thiamine 100 milliGRAM(s) Oral daily    MEDICATIONS  (PRN):  aluminum hydroxide/magnesium hydroxide/simethicone Suspension 30 milliLiter(s) Oral every 4 hours PRN Dyspepsia  diphenhydrAMINE   Injectable 12.5 milliGRAM(s) IV Push every 6 hours PRN Rash and/or Itching  HYDROmorphone  Injectable 2 milliGRAM(s) IV Push every 24 hours PRN breakthrough pain  HYDROmorphone  Injectable 1.5 milliGRAM(s) IV Push every 1 hour PRN pain  HYDROmorphone  Injectable 1.5 milliGRAM(s) IV Push every 1 hour PRN dyspnea  ondansetron Injectable 4 milliGRAM(s) IV Push every 6 hours PRN Nausea and/or Vomiting  zinc oxide 20% Ointment 1 Application(s) Topical three times a day PRN Rash      ITEMS UNCHECKED ARE NOT PRESENT    PRESENT SYMPTOMS: [ ]Unable to obtain verbally due to poor mentation   Source if other than patient:  [x]Partner   [x]Team     Pain: [x] yes [ ] no  QOL impact - Severe, patient is unable to move without experiencing pain  Location - abdominal wound, breaks in skin/sloughing (mainly neck, check, lower extremities) and LE lymph edema (weeping wounds)                   Aggravating factors - touch, movement  Quality - N/A unable to verbalize at present time  Radiation - N/A  Timing- constant  Severity (0-10 scale): unable to verbalize at present time (see pain ad score below)  Minimal acceptable level (0-10 scale): 2/10    Dyspnea:                           [x]Mild [ ]Moderate [ ]Severe  Anxiety:                             [x]Mild [ ]Moderate [ ]Severe  Fatigue:                             [ ]Mild [ ]Moderate [x]Severe  Nausea:                             [ ]Mild [ ]Moderate [ ]Severe  Loss of appetite:              [ ]Mild [x]Moderate [ ]Severe  Constipation:                    [ ]Mild [ ]Moderate [ ]Severe    PAIN AD Score: 4    http://geriatrictoolkit.[employer].edu/cog/painad.pdf (Ctrl +  left click to view)  		  Other Symptoms:  [x ]All other review of systems negative     Karnofsky Performance Score/Palliative Performance Status Version 2:  20-30%         http://npcrc.org/files/news/palliative_performance_scale_ppsv2.pdf    PHYSICAL EXAM:  Vital Signs Last 24 Hrs  T(C): --  T(F): --  HR: --  BP: --  BP(mean): --  RR: --  SpO2: -- I&O's Summary    01 Feb 2020 07:01  -  02 Feb 2020 07:00  --------------------------------------------------------  IN: 0 mL / OUT: 500 mL / NET: -500 mL    GENERAL: morbidly obese female  [x ]Alert  [x ]Oriented x 2  [  ]Lethargic  [ ]Cachexia  [ ]Unarousable  [ x ]Verbal  [ ]Non-Verbal  Behavioral: Calm, currently appears in no acute distress  [ ] Anxiety  [ ] Delirium [ ] Agitation [ ] Other  HEENT: NGT in place  [ ]Normal   [x  ]Dry mouth   [ ]ET Tube/Trach  [ ]Oral lesions  PULMONARY: difficult to auscultate breath sound due to body habitus, decrease breath sounds b/l   [ ]Clear [ x ]Tachypnea  [ ]Audible excessive secretions   [ ]Rhonchi        [ ]Right [ ]Left [x]Bilateral  [ ]Crackles        [ ]Right [ ]Left [ ]Bilateral  [ ]Wheezing     [ ]Right [ ]Left [ ]Bilateral  CARDIOVASCULAR:    [ x ]Regular [ ]Irregular []Tachy  [ ]Memo [ ]Murmur [ ]Other  GASTROINTESTINAL:  [ x ]Soft  [ ]Distended   [ x ]+BS  [ ]Non tender [ ]Tender  [ ]PEG [ ]OGT/ NGT   Last BM:   1/31 (Colostomy bag)  GENITOURINARY:  [ ]Normal [ x ] Incontinent   [ ]Oliguria/Anuria   [ x ]Ross (draining clear urine, for patient's comfort)  MUSCULOSKELETAL:   [ ]Normal   [ ]Weakness  [ x ]Bed/Wheelchair bound [ x ]Edema (chronic l/e lymph edema and generalized edema due to third spacing)  NEUROLOGIC:   [ ]No focal deficits  [ x ] Cognitive impairment  [ ] Dysphagia [ ]Dysarthria [ ] Paresis [ ]Other   SKIN:  abdominal wound (w/ wound vac), ostomy (clean, dry, brown liquid stool material), chronic LE edema with superimposed skin breaks and weeping (absorbant dressing in place), sacral pressure wound (please refer to RN notes for further documentation).   [ ]Normal   [ x ]Pressure ulcer(s)  [ x ]Rash    CRITICAL CARE:  [ ] Shock Present  [ x ]Septic [ ]Cardiogenic [ ]Neurologic [ ]Hypovolemic  [ ]  Vasopressors [ ]  Inotropes   [ ] Respiratory failure present [ ] Mechanical Ventilation [ ] Non-invasive ventilatory support [ ] High-Flow  [ ] Acute  [ ] Chronic [ ] Hypoxic  [ ] Hypercarbic [ ] Other  [ ] Other organ failure; Skin    LABS:  None new    RADIOLOGY & ADDITIONAL STUDIES:  None new    PROTEIN CALORIE MALNUTRITION:   [ x ] PPSV2 < or = 30% [ ] significant weight loss [ ] poor nutritional intake [ ] anasarca [ ] catabolic state   Albumin, Serum: 2.1 g/dL (01-30-20 @ 04:41)   Artificial Nutrition [ ]     REFERRALS:   [ ]Chaplaincy  [ x ] Hospice  [ ]Child Life  [ x ]Social Work  [ ]Case management [ ]Holistic Therapy [ ] Physical Therapy [ ] Dietary     Goals of Care Document:   Progress Notes - Care Coordination [C. Provider] (01-31-20 @ 12:14) GAP TEAM PALLIATIVE CARE UNIT PROGRESS NOTE:      [  ] Patient on hospice program.    INDICATION FOR PALLIATIVE CARE UNIT SERVICES: Pain management in the setting of bowel perforation and skin desquamation     INTERVAL HPI/OVERNIGHT EVENTS:    DNR on chart: Yes  Yes    Allergies    IV Contrast (Rash; Pruritus; Hypotension)  penicillin (Rash (Severe))  sulfa drugs (Unknown)  vancomycin (Rash (Severe))    Intolerances    MEDICATIONS  (STANDING):  albuterol/ipratropium for Nebulization 3 milliLiter(s) Nebulizer every 6 hours  AQUAPHOR (petrolatum Ointment) 1 Application(s) Topical three times a day  artificial tears (preservative free) Ophthalmic Solution 1 Drop(s) Both EYES two times a day  aspirin  chewable 81 milliGRAM(s) Oral <User Schedule>  atorvastatin 40 milliGRAM(s) Oral <User Schedule>  buDESOnide    Inhalation Suspension 0.5 milliGRAM(s) Inhalation every 12 hours  camphor 0.5%/menthol 0.5% Topical Lotion 1 Application(s) Topical two times a day  ceFAZolin   IVPB 2000 milliGRAM(s) IV Intermittent every 8 hours  chlorhexidine 2% Cloths 1 Application(s) Topical <User Schedule>  clobetasol 0.05% Ointment 1 Application(s) Topical two times a day  enoxaparin Injectable 100 milliGRAM(s) SubCutaneous <User Schedule>  erythromycin     base Tablet 250 milliGRAM(s) Oral <User Schedule>  FIRST- Mouthwash  BLM 5 milliLiter(s) Swish and Spit four times a day  gabapentin 600 milliGRAM(s) Oral three times a day  hydrocortisone sodium succinate Injectable 50 milliGRAM(s) IV Push daily  HYDROmorphone Infusion 0.2 mG/Hr (0.2 mL/Hr) IV Continuous <Continuous>  levothyroxine Injectable 25 MICROGram(s) IV Push at bedtime  melatonin 3 milliGRAM(s) Oral at bedtime  methadone    Tablet 20 milliGRAM(s) Oral <User Schedule>  minocycline IVPB 100 milliGRAM(s) IV Intermittent every 12 hours  minocycline IVPB      nystatin Powder 1 Application(s) Topical two times a day  pantoprazole  Injectable 40 milliGRAM(s) IV Push daily  polyethylene glycol 3350 17 Gram(s) Oral <User Schedule>  sodium chloride 0.9%. 1000 milliLiter(s) (10 mL/Hr) IV Continuous <Continuous>  thiamine 100 milliGRAM(s) Oral daily    MEDICATIONS  (PRN):  aluminum hydroxide/magnesium hydroxide/simethicone Suspension 30 milliLiter(s) Oral every 4 hours PRN Dyspepsia  diphenhydrAMINE   Injectable 12.5 milliGRAM(s) IV Push every 6 hours PRN Rash and/or Itching  HYDROmorphone  Injectable 1.5 milliGRAM(s) IV Push every 1 hour PRN pain  HYDROmorphone  Injectable 1.5 milliGRAM(s) IV Push every 1 hour PRN dyspnea  HYDROmorphone  Injectable 2 milliGRAM(s) IV Push every 24 hours PRN breakthrough pain  LORazepam   Injectable 1 milliGRAM(s) IV Push every 1 hour PRN Anxiety/Restlessness  ondansetron Injectable 4 milliGRAM(s) IV Push every 6 hours PRN Nausea and/or Vomiting  zinc oxide 20% Ointment 1 Application(s) Topical three times a day PRN Rash    ITEMS UNCHECKED ARE NOT PRESENT    PRESENT SYMPTOMS: [ ]Unable to obtain due to poor mentation   Source if other than patient:  [ ]Family   [ ]Team     Pain: [ ] yes [ ] no  QOL impact -   Location -                    Aggravating factors -  Quality -  Radiation -  Timing-  Severity (0-10 scale):  Minimal acceptable level (0-10 scale):     Dyspnea:                           [ ]Mild [ ]Moderate [ ]Severe  Anxiety:                             [ ]Mild [ ]Moderate [ ]Severe  Fatigue:                             [ ]Mild [ ]Moderate [ ]Severe  Nausea:                             [ ]Mild [ ]Moderate [ ]Severe  Loss of appetite:              [ ]Mild [ ]Moderate [ ]Severe  Constipation:                    [ ]Mild [ ]Moderate [ ]Severe    PAINAD Score:    [email or web address]/[email or web address] (Ctrl +  left click to view)  		  Other Symptoms:  [ ]All other review of systems negative     Karnofsky Performance Score/Palliative Performance Status Version 2:         %        http://npcrc.org/files/news/palliative_performance_scale_ppsv2.pdf  PHYSICAL EXAM:  Vital Signs Last 24 Hrs  T(C): 36.6 (03 Feb 2020 09:07), Max: 36.6 (03 Feb 2020 09:07)  T(F): 97.8 (03 Feb 2020 09:07), Max: 97.8 (03 Feb 2020 09:07)  HR: 88 (03 Feb 2020 09:07) (88 - 88)  BP: 92/58 (03 Feb 2020 09:07) (92/58 - 92/58)  BP(mean): --  RR: 18 (03 Feb 2020 09:07) (18 - 18)  SpO2: 98% (03 Feb 2020 09:07) (98% - 98%) I&O's Summary    02 Feb 2020 07:01  -  03 Feb 2020 07:00  --------------------------------------------------------  IN: 611.6 mL / OUT: 2900 mL / NET: -2288.4 mL    GENERAL:  [ ]Alert  [ ]Oriented x   [ ]Lethargic  [ ]Cachexia  [ ]Unarousable  [ ]Verbal  [ ]Non-Verbal  Behavioral:   [ ] Anxiety  [ ] Delirium [ ] Agitation [ ] Other  HEENT:  [ ]Normal   [ ]Dry mouth   [ ]ET Tube/Trach  [ ]Oral lesions  PULMONARY:   [ ]Clear [ ]Tachypnea  [ ]Audible excessive secretions   [ ]Rhonchi        [ ]Right [ ]Left [ ]Bilateral  [ ]Crackles        [ ]Right [ ]Left [ ]Bilateral  [ ]Wheezing     [ ]Right [ ]Left [ ]Bilateral  CARDIOVASCULAR:    [ ]Regular [ ]Irregular [ ]Tachy  [ ]Memo [ ]Murmur [ ]Other  GASTROINTESTINAL:  [ ]Soft  [ ]Distended   [ ]+BS  [ ]Non tender [ ]Tender  [ ]PEG [ ]OGT/ NGT   Last BM:     01-27-20 @ 07:01  -  01-28-20 @ 07:00  --------------------------------------------------------  OUT: 450 mL    01-28-20 @ 07:01  -  01-29-20 @ 07:00  --------------------------------------------------------  OUT: 710 mL    01-29-20 @ 07:01  -  01-30-20 @ 07:00  --------------------------------------------------------  OUT: 650 mL    01-30-20 @ 07:01  -  01-31-20 @ 07:00  --------------------------------------------------------  OUT: 250 mL    02-01-20 @ 07:01  -  02-02-20 @ 07:00  --------------------------------------------------------  OUT: 350 mL    GENITOURINARY:  [ ]Normal [ ] Incontinent   [ ]Oliguria/Anuria   [ ]Ross  MUSCULOSKELETAL:   [ ]Normal   [ ]Weakness  [ ]Bed/Wheelchair bound [ ]Edema  NEUROLOGIC:   [ ]No focal deficits  [ ] Cognitive impairment  [ ] Dysphagia [ ]Dysarthria [ ] Paresis [ ]Other   SKIN:   [ ]Normal   [ ]Pressure ulcer(s)  [ ]Rash    CRITICAL CARE:  [ ] Shock Present  [ ]Septic [ ]Cardiogenic [ ]Neurologic [ ]Hypovolemic  [ ]  Vasopressors [ ]  Inotropes   [ ] Respiratory failure present [ ] Mechanical Ventilation [ ] Non-invasive ventilatory support [ ] High-Flow  [ ] Acute  [ ] Chronic [ ] Hypoxic  [ ] Hypercarbic [ ] Other  [ ] Other organ failure     LABS:            RADIOLOGY & ADDITIONAL STUDIES:    PROTEIN CALORIE MALNUTRITION:   [ ] PPSV2 < or = 30% [ ] significant weight loss [ ] poor nutritional intake [ ] anasarca [ ] catabolic state   Albumin, Serum: 2.1 g/dL (01-30-20 @ 04:41)   Artificial Nutrition [ ]     REFERRALS:   [ ]Chaplaincy  [ ] Hospice  [ ]Child Life  [ ]Social Work  [ ]Case management [ ]Holistic Therapy [ ] Physical Therapy [ ] Dietary   Goals of Care Document:   Progress Notes - Care Coordination [C. Provider] (01-31-20 @ 12:14) GAP TEAM PALLIATIVE CARE UNIT PROGRESS NOTE:      [  ] Patient on hospice program.    INDICATION FOR PALLIATIVE CARE UNIT SERVICES: Pain management in the setting of bowel perforation and skin desquamation    INTERVAL HPI/OVERNIGHT EVENTS: No acute events overnight. Patient remains on Dilaudid 0.2 mg/hr ggt, required Ativan 1 mg IV x1 prn for agitation/anxiety. Patient remains on NGT feeds and IV antibiotics. Patient reports 10/10 pain when changing and odynophagia with solid foods. Patient reports thirst.     DNR on chart: Yes  Yes    Allergies    IV Contrast (Rash; Pruritus; Hypotension)  penicillin (Rash (Severe))  sulfa drugs (Unknown)  vancomycin (Rash (Severe))    Intolerances    MEDICATIONS  (STANDING):  albuterol/ipratropium for Nebulization 3 milliLiter(s) Nebulizer every 6 hours  AQUAPHOR (petrolatum Ointment) 1 Application(s) Topical three times a day  artificial tears (preservative free) Ophthalmic Solution 1 Drop(s) Both EYES two times a day  aspirin  chewable 81 milliGRAM(s) Oral <User Schedule>  atorvastatin 40 milliGRAM(s) Oral <User Schedule>  buDESOnide    Inhalation Suspension 0.5 milliGRAM(s) Inhalation every 12 hours  camphor 0.5%/menthol 0.5% Topical Lotion 1 Application(s) Topical two times a day  ceFAZolin   IVPB 2000 milliGRAM(s) IV Intermittent every 8 hours  chlorhexidine 2% Cloths 1 Application(s) Topical <User Schedule>  clobetasol 0.05% Ointment 1 Application(s) Topical two times a day  enoxaparin Injectable 100 milliGRAM(s) SubCutaneous <User Schedule>  erythromycin     base Tablet 250 milliGRAM(s) Oral <User Schedule>  FIRST- Mouthwash  BLM 5 milliLiter(s) Swish and Spit four times a day  gabapentin 600 milliGRAM(s) Oral three times a day  hydrocortisone sodium succinate Injectable 50 milliGRAM(s) IV Push daily  HYDROmorphone Infusion 0.2 mG/Hr (0.2 mL/Hr) IV Continuous <Continuous>  levothyroxine Injectable 25 MICROGram(s) IV Push at bedtime  melatonin 3 milliGRAM(s) Oral at bedtime  methadone    Tablet 20 milliGRAM(s) Oral <User Schedule>  minocycline IVPB 100 milliGRAM(s) IV Intermittent every 12 hours  minocycline IVPB      nystatin Powder 1 Application(s) Topical two times a day  pantoprazole  Injectable 40 milliGRAM(s) IV Push daily  polyethylene glycol 3350 17 Gram(s) Oral <User Schedule>  sodium chloride 0.9%. 1000 milliLiter(s) (10 mL/Hr) IV Continuous <Continuous>  thiamine 100 milliGRAM(s) Oral daily    MEDICATIONS  (PRN):  aluminum hydroxide/magnesium hydroxide/simethicone Suspension 30 milliLiter(s) Oral every 4 hours PRN Dyspepsia  diphenhydrAMINE   Injectable 12.5 milliGRAM(s) IV Push every 6 hours PRN Rash and/or Itching  HYDROmorphone  Injectable 1.5 milliGRAM(s) IV Push every 1 hour PRN pain  HYDROmorphone  Injectable 1.5 milliGRAM(s) IV Push every 1 hour PRN dyspnea  HYDROmorphone  Injectable 2 milliGRAM(s) IV Push every 24 hours PRN breakthrough pain  LORazepam   Injectable 1 milliGRAM(s) IV Push every 1 hour PRN Anxiety/Restlessness  ondansetron Injectable 4 milliGRAM(s) IV Push every 6 hours PRN Nausea and/or Vomiting  zinc oxide 20% Ointment 1 Application(s) Topical three times a day PRN Rash    ITEMS UNCHECKED ARE NOT PRESENT    PRESENT SYMPTOMS: [ ]Unable to obtain due to poor mentation   Source if other than patient:  [ ]Family   [ ]Team     Pain: [x] yes [ ] no  QOL impact - severe, unable to perform ADLs   Location - diffuse pain "all over body"                    Aggravating factors - movement, changing  Quality - aching, burning  Radiation - n/a  Timing- constant, exacerbated during changing and moving position  Severity (0-10 scale): 10/10  Minimal acceptable level (0-10 scale): 3/10    Dyspnea:                           [ ]Mild [ ]Moderate [ ]Severe  Anxiety:                             [ ]Mild [x]Moderate [ ]Severe  Fatigue:                             [ ]Mild [x]Moderate [ ]Severe  Nausea:                             [ ]Mild [ ]Moderate [ ]Severe  Loss of appetite:              [x]Mild [ ]Moderate [ ]Severe  Constipation:                    [ ]Mild [ ]Moderate [ ]Severe    PAINAD Score:    [email or web address] (Ctrl +  left click to view)  		  Other Symptoms: odynophagia  [ ]All other review of systems negative     Karnofsky Performance Score/Palliative Performance Status Version 2: 30%        http://npcrc.org/files/news/palliative_performance_scale_ppsv2.pdf  PHYSICAL EXAM:  Vital Signs Last 24 Hrs  T(C): 36.6 (03 Feb 2020 09:07), Max: 36.6 (03 Feb 2020 09:07)  T(F): 97.8 (03 Feb 2020 09:07), Max: 97.8 (03 Feb 2020 09:07)  HR: 88 (03 Feb 2020 09:07) (88 - 88)  BP: 92/58 (03 Feb 2020 09:07) (92/58 - 92/58)  BP(mean): --  RR: 18 (03 Feb 2020 09:07) (18 - 18)  SpO2: 98% (03 Feb 2020 09:07) (98% - 98%) I&O's Summary    GENERAL: morbidly obese female  [x ]Alert  [x ]Oriented x 2  [  ]Lethargic  [ ]Cachexia  [ ]Unarousable  [ x ]Verbal  [ ]Non-Verbal  Behavioral: Calm, currently appears in no acute distress  [ ] Anxiety  [ ] Delirium [ ] Agitation [ ] Other  HEENT: NGT in place  [ ]Normal   [x  ]Dry mouth   [ ]ET Tube/Trach  [ ]Oral lesions  PULMONARY: difficult to auscultate breath sound due to body habitus, decrease breath sounds b/l   [ ]Clear [ x ]Tachypnea  [ ]Audible excessive secretions   [ ]Rhonchi        [ ]Right [ ]Left [x]Bilateral  [ ]Crackles        [ ]Right [ ]Left [ ]Bilateral  [ ]Wheezing     [ ]Right [ ]Left [ ]Bilateral  CARDIOVASCULAR:    [ x ]Regular [ ]Irregular []Tachy  [ ]Memo [ ]Murmur [ ]Other  GASTROINTESTINAL:  [ x ]Soft  [ ]Distended   [ x ]+BS  [ ]Non tender [ ]Tender  [ ]PEG [ ]OGT/ NGT   Last BM:   2/3 (Colostomy bag)  GENITOURINARY:  [ ]Normal [ x ] Incontinent   [ ]Oliguria/Anuria   [ x ]Ross (draining clear urine, for patient's comfort)  MUSCULOSKELETAL:   [ ]Normal   [ ]Weakness  [ x ]Bed/Wheelchair bound [ x ]Edema (chronic l/e lymph edema and generalized edema due to third spacing)  NEUROLOGIC:   [ ]No focal deficits  [ x ] Cognitive impairment  [ ] Dysphagia [ ]Dysarthria [ ] Paresis [ ]Other   SKIN:  abdominal wound (w/ wound vac), ostomy (clean, dry, brown liquid stool material), chronic LE edema with superimposed skin breaks and weeping (absorbant dressing in place), sacral pressure wound (please refer to RN notes for further documentation).   [ ]Normal   [ x ]Pressure ulcer(s)  [ x ]Rash      CRITICAL CARE:  [ ] Shock Present  [ ]Septic [ ]Cardiogenic [ ]Neurologic [ ]Hypovolemic  [ ]  Vasopressors [ ]  Inotropes   [ ] Respiratory failure present [ ] Mechanical Ventilation [ ] Non-invasive ventilatory support [ ] High-Flow  [ ] Acute  [ ] Chronic [ ] Hypoxic  [ ] Hypercarbic [ ] Other  [ ] Other organ failure     LABS:            RADIOLOGY & ADDITIONAL STUDIES:    PROTEIN CALORIE MALNUTRITION:   [ ] PPSV2 < or = 30% [ ] significant weight loss [ ] poor nutritional intake [ ] anasarca [ ] catabolic state   Albumin, Serum: 2.1 g/dL (01-30-20 @ 04:41)   Artificial Nutrition [ ]     REFERRALS:   [ ]Chaplaincy  [ ] Hospice  [ ]Child Life  [ ]Social Work  [ ]Case management [ ]Holistic Therapy [ ] Physical Therapy [ ] Dietary   Goals of Care Document:   Progress Notes - Care Coordination [C. Provider] (01-31-20 @ 12:14) GAP TEAM PALLIATIVE CARE UNIT PROGRESS NOTE:      [  ] Patient on hospice program.    INDICATION FOR PALLIATIVE CARE UNIT SERVICES: Pain management in the setting of bowel perforation and skin desquamation    INTERVAL HPI/OVERNIGHT EVENTS: No acute events overnight. Patient remains on Dilaudid 0.2 mg/hr ggt, required Ativan 1 mg IV x1 prn for agitation/anxiety. Patient remains on NGT feeds and IV antibiotics. Patient reports 10/10 pain when changing and odynophagia with solid foods. Patient reports thirst. Denies nausea/vomiting    DNR on chart: Yes  Yes    Allergies    IV Contrast (Rash; Pruritus; Hypotension)  penicillin (Rash (Severe))  sulfa drugs (Unknown)  vancomycin (Rash (Severe))    Intolerances    MEDICATIONS  (STANDING):  albuterol/ipratropium for Nebulization 3 milliLiter(s) Nebulizer every 6 hours  AQUAPHOR (petrolatum Ointment) 1 Application(s) Topical three times a day  artificial tears (preservative free) Ophthalmic Solution 1 Drop(s) Both EYES two times a day  aspirin  chewable 81 milliGRAM(s) Oral <User Schedule>  atorvastatin 40 milliGRAM(s) Oral <User Schedule>  buDESOnide    Inhalation Suspension 0.5 milliGRAM(s) Inhalation every 12 hours  camphor 0.5%/menthol 0.5% Topical Lotion 1 Application(s) Topical two times a day  ceFAZolin   IVPB 2000 milliGRAM(s) IV Intermittent every 8 hours  chlorhexidine 2% Cloths 1 Application(s) Topical <User Schedule>  clobetasol 0.05% Ointment 1 Application(s) Topical two times a day  enoxaparin Injectable 100 milliGRAM(s) SubCutaneous <User Schedule>  erythromycin     base Tablet 250 milliGRAM(s) Oral <User Schedule>  FIRST- Mouthwash  BLM 5 milliLiter(s) Swish and Spit four times a day  gabapentin 600 milliGRAM(s) Oral three times a day  hydrocortisone sodium succinate Injectable 50 milliGRAM(s) IV Push daily  HYDROmorphone Infusion 0.2 mG/Hr (0.2 mL/Hr) IV Continuous <Continuous>  levothyroxine Injectable 25 MICROGram(s) IV Push at bedtime  melatonin 3 milliGRAM(s) Oral at bedtime  methadone    Tablet 20 milliGRAM(s) Oral <User Schedule>  minocycline IVPB 100 milliGRAM(s) IV Intermittent every 12 hours  minocycline IVPB      nystatin Powder 1 Application(s) Topical two times a day  pantoprazole  Injectable 40 milliGRAM(s) IV Push daily  polyethylene glycol 3350 17 Gram(s) Oral <User Schedule>  sodium chloride 0.9%. 1000 milliLiter(s) (10 mL/Hr) IV Continuous <Continuous>  thiamine 100 milliGRAM(s) Oral daily    MEDICATIONS  (PRN):  aluminum hydroxide/magnesium hydroxide/simethicone Suspension 30 milliLiter(s) Oral every 4 hours PRN Dyspepsia  diphenhydrAMINE   Injectable 12.5 milliGRAM(s) IV Push every 6 hours PRN Rash and/or Itching  HYDROmorphone  Injectable 1.5 milliGRAM(s) IV Push every 1 hour PRN pain  HYDROmorphone  Injectable 1.5 milliGRAM(s) IV Push every 1 hour PRN dyspnea  HYDROmorphone  Injectable 2 milliGRAM(s) IV Push every 24 hours PRN breakthrough pain  LORazepam   Injectable 1 milliGRAM(s) IV Push every 1 hour PRN Anxiety/Restlessness  ondansetron Injectable 4 milliGRAM(s) IV Push every 6 hours PRN Nausea and/or Vomiting  zinc oxide 20% Ointment 1 Application(s) Topical three times a day PRN Rash    ITEMS UNCHECKED ARE NOT PRESENT    PRESENT SYMPTOMS: [ ]Unable to obtain due to poor mentation   Source if other than patient:  [ ]Family   [ ]Team     Pain: [x] yes [ ] no  QOL impact - severe, unable to perform ADLs   Location - diffuse pain "all over body"                    Aggravating factors - movement, changing  Quality - aching, burning  Radiation - n/a  Timing- constant, exacerbated during changing and moving position  Severity (0-10 scale): 10/10  Minimal acceptable level (0-10 scale): 3/10    Dyspnea:                           [ ]Mild [ ]Moderate [ ]Severe  Anxiety:                             [ ]Mild [x]Moderate [ ]Severe  Fatigue:                             [ ]Mild [x]Moderate [ ]Severe  Nausea:                             [ ]Mild [ ]Moderate [ ]Severe  Loss of appetite:              [x]Mild [ ]Moderate [ ]Severe  Constipation:                    [ ]Mild [ ]Moderate [ ]Severe    PAINAD Score:    [email or web address] (Ctrl +  left click to view)  		  Other Symptoms: odynophagia  [ ]All other review of systems negative     Karnofsky Performance Score/Palliative Performance Status Version 2: 30%        http://npcrc.org/files/news/palliative_performance_scale_[email or web address].pdf  PHYSICAL EXAM:  Vital Signs Last 24 Hrs  T(C): 36.6 (03 Feb 2020 09:07), Max: 36.6 (03 Feb 2020 09:07)  T(F): 97.8 (03 Feb 2020 09:07), Max: 97.8 (03 Feb 2020 09:07)  HR: 88 (03 Feb 2020 09:07) (88 - 88)  BP: 92/58 (03 Feb 2020 09:07) (92/58 - 92/58)  BP(mean): --  RR: 18 (03 Feb 2020 09:07) (18 - 18)  SpO2: 98% (03 Feb 2020 09:07) (98% - 98%) I&O's Summary    GENERAL: morbidly obese female  [x ]Alert  [x ]Oriented x 3  [  ]Lethargic  [ ]Cachexia  [ ]Unarousable  [ x ]Verbal  [ ]Non-Verbal  Behavioral: Calm, currently appears in no acute distress  [ ] Anxiety  [ ] Delirium [ ] Agitation [ ] Other  HEENT: NGT in place  [ ]Normal   [x]Dry mouth, no thrush   [ ]ET Tube/Trach  [ ]Oral lesions  PULMONARY: difficult to auscultate breath sound due to body habitus, decrease breath sounds b/l   [ ]Clear [ x ]Tachypnea  [ ]Audible excessive secretions   [ ]Rhonchi        [ ]Right [ ]Left [ ]Bilateral  [ ]Crackles        [ ]Right [ ]Left [ ]Bilateral  [ ]Wheezing     [ ]Right [ ]Left [ ]Bilateral  CARDIOVASCULAR:    [ x ]Regular [ ]Irregular []Tachy  [ ]Memo [ ]Murmur [ ]Other  GASTROINTESTINAL:  [ x ]Soft  [ ]Distended   [ x ]+BS  [ ]Non tender [ ]Tender  [ ]PEG [ ]OGT/ NGT   Last BM:   2/3 (Colostomy bag)  GENITOURINARY:  [ ]Normal [ x ] Incontinent   [ ]Oliguria/Anuria   [ x ]Ross (draining clear urine, for patient's comfort)  MUSCULOSKELETAL:   [ ]Normal   [ ]Weakness  [x]Bed/Wheelchair bound [ x ]Edema (chronic l/e lymph edema and generalized edema due to third spacing)  NEUROLOGIC:   [ ]No focal deficits  [x] Cognitive impairment  [ ] Dysphagia [ ]Dysarthria [ ] Paresis [ ]Other   SKIN:  abdominal wound (w/ wound vac), ostomy (clean, dry, brown liquid stool material), chronic LE edema with superimposed skin breaks and weeping (absorbant dressing in place), sacral pressure wound (please refer to RN notes for further documentation).   [ ]Normal   [x]Pressure ulcer(s)  [x]Rash      CRITICAL CARE:  [ ] Shock Present  [x]Septic [ ]Cardiogenic [ ]Neurologic [ ]Hypovolemic  [ ]  Vasopressors [ ]  Inotropes   [ ] Respiratory failure present [ ] Mechanical Ventilation [ ] Non-invasive ventilatory support [ ] High-Flow  [ ] Acute  [ ] Chronic [ ] Hypoxic  [ ] Hypercarbic [ ] Other  [x] Other organ failure; skin     LABS: reviewed, none new    RADIOLOGY & ADDITIONAL STUDIES: reviewed, none new    PROTEIN CALORIE MALNUTRITION:   [x] PPSV2 < or = 30% [ ] significant weight loss [x] poor nutritional intake [ ] anasarca [x] catabolic state   Albumin, Serum: 2.1 g/dL (01-30-20 @ 04:41)   Artificial Nutrition [x]     REFERRALS:   [ ]Chaplaincy  [ ] Hospice  [ ]Child Life  [x]Social Work  [ ]Case management [ ]Holistic Therapy [ ] Physical Therapy [ ] Dietary GAP TEAM PALLIATIVE CARE UNIT PROGRESS NOTE:      [  ] Patient on hospice program.    INDICATION FOR PALLIATIVE CARE UNIT SERVICES: Pain management in the setting of bowel perforation, colostomy, s/p ex lap, bacteremia and skin desquamation    INTERVAL HPI/OVERNIGHT EVENTS: No acute events overnight. Patient remains on Dilaudid 0.2 mg/hr ggt, required Ativan 1 mg IV x1 prn for agitation/anxiety. Patient remains on NGT feeds and IV antibiotics. Patient reports 10/10 pain when changing and odynophagia with solid foods. Patient reports thirst. Denies nausea/vomiting. Patient's mental status continues to wax and wane.    DNR on chart: Yes  Yes    Allergies    IV Contrast (Rash; Pruritus; Hypotension)  penicillin (Rash (Severe))  sulfa drugs (Unknown)  vancomycin (Rash (Severe))    Intolerances    MEDICATIONS  (STANDING):  albuterol/ipratropium for Nebulization 3 milliLiter(s) Nebulizer every 6 hours  AQUAPHOR (petrolatum Ointment) 1 Application(s) Topical three times a day  artificial tears (preservative free) Ophthalmic Solution 1 Drop(s) Both EYES two times a day  aspirin  chewable 81 milliGRAM(s) Oral <User Schedule>  atorvastatin 40 milliGRAM(s) Oral <User Schedule>  buDESOnide    Inhalation Suspension 0.5 milliGRAM(s) Inhalation every 12 hours  camphor 0.5%/menthol 0.5% Topical Lotion 1 Application(s) Topical two times a day  ceFAZolin   IVPB 2000 milliGRAM(s) IV Intermittent every 8 hours  chlorhexidine 2% Cloths 1 Application(s) Topical <User Schedule>  clobetasol 0.05% Ointment 1 Application(s) Topical two times a day  enoxaparin Injectable 100 milliGRAM(s) SubCutaneous <User Schedule>  erythromycin     base Tablet 250 milliGRAM(s) Oral <User Schedule>  FIRST- Mouthwash  BLM 5 milliLiter(s) Swish and Spit four times a day  gabapentin 600 milliGRAM(s) Oral three times a day  hydrocortisone sodium succinate Injectable 50 milliGRAM(s) IV Push daily  HYDROmorphone Infusion 0.2 mG/Hr (0.2 mL/Hr) IV Continuous <Continuous>  levothyroxine Injectable 25 MICROGram(s) IV Push at bedtime  melatonin 3 milliGRAM(s) Oral at bedtime  methadone    Tablet 20 milliGRAM(s) Oral <User Schedule>  minocycline IVPB 100 milliGRAM(s) IV Intermittent every 12 hours  minocycline IVPB      nystatin Powder 1 Application(s) Topical two times a day  pantoprazole  Injectable 40 milliGRAM(s) IV Push daily  polyethylene glycol 3350 17 Gram(s) Oral <User Schedule>  sodium chloride 0.9%. 1000 milliLiter(s) (10 mL/Hr) IV Continuous <Continuous>  thiamine 100 milliGRAM(s) Oral daily    MEDICATIONS  (PRN):  aluminum hydroxide/magnesium hydroxide/simethicone Suspension 30 milliLiter(s) Oral every 4 hours PRN Dyspepsia  diphenhydrAMINE   Injectable 12.5 milliGRAM(s) IV Push every 6 hours PRN Rash and/or Itching  HYDROmorphone  Injectable 1.5 milliGRAM(s) IV Push every 1 hour PRN pain  HYDROmorphone  Injectable 1.5 milliGRAM(s) IV Push every 1 hour PRN dyspnea  HYDROmorphone  Injectable 2 milliGRAM(s) IV Push every 24 hours PRN breakthrough pain  LORazepam   Injectable 1 milliGRAM(s) IV Push every 1 hour PRN Anxiety/Restlessness  ondansetron Injectable 4 milliGRAM(s) IV Push every 6 hours PRN Nausea and/or Vomiting  zinc oxide 20% Ointment 1 Application(s) Topical three times a day PRN Rash    ITEMS UNCHECKED ARE NOT PRESENT    PRESENT SYMPTOMS: [ ]Unable to obtain due to poor mentation   Source if other than patient:  [ ]Family   [ ]Team     Pain: [x] yes [ ] no  QOL impact - severe, unable to perform ADLs   Location - diffuse pain "all over body"                    Aggravating factors - movement, changing  Quality - aching, burning  Radiation - n/a  Timing- constant, exacerbated during changing and moving position  Severity (0-10 scale): 10/10  Minimal acceptable level (0-10 scale): 3/10    Dyspnea:                           [ ]Mild [ ]Moderate [ ]Severe  Anxiety:                             [ ]Mild [x]Moderate [ ]Severe  Fatigue:                             [ ]Mild [x]Moderate [ ]Severe  Nausea:                             [ ]Mild [ ]Moderate [ ]Severe  Loss of appetite:              [x]Mild [ ]Moderate [ ]Severe  Constipation:                    [ ]Mild [ ]Moderate [ ]Severe    PAINAD Score:    [email or web address] (Ctrl +  left click to view)  		  Other Symptoms: odynophagia  [ ]All other review of systems negative     Karnofsky Performance Score/Palliative Performance Status Version 2: 30%        [email or web address]_[email or web address]  PHYSICAL EXAM:  Vital Signs Last 24 Hrs  T(C): 36.6 (03 Feb 2020 09:07), Max: 36.6 (03 Feb 2020 09:07)  T(F): 97.8 (03 Feb 2020 09:07), Max: 97.8 (03 Feb 2020 09:07)  HR: 88 (03 Feb 2020 09:07) (88 - 88)  BP: 92/58 (03 Feb 2020 09:07) (92/58 - 92/58)  BP(mean): --  RR: 18 (03 Feb 2020 09:07) (18 - 18)  SpO2: 98% (03 Feb 2020 09:07) (98% - 98%) I&O's Summary    GENERAL: morbidly obese female  [x ]Alert  [x ]Oriented x 3  [  ]Lethargic  [ ]Cachexia  [ ]Unarousable  [ x ]Verbal  [ ]Non-Verbal  Behavioral: Calm, currently appears in no acute distress  [ ] Anxiety  [ ] Delirium [ ] Agitation [ ] Other  HEENT: NGT in place  [ ]Normal   [x]Dry mouth, no thrush   [ ]ET Tube/Trach  [ ]Oral lesions  PULMONARY: difficult to auscultate breath sound due to body habitus, decrease breath sounds b/l   [ ]Clear [ x ]Tachypnea  [ ]Audible excessive secretions   [ ]Rhonchi        [ ]Right [ ]Left [ ]Bilateral  [ ]Crackles        [ ]Right [ ]Left [ ]Bilateral  [ ]Wheezing     [ ]Right [ ]Left [ ]Bilateral  CARDIOVASCULAR:    [ x ]Regular [ ]Irregular []Tachy  [ ]Memo [ ]Murmur [ ]Other  GASTROINTESTINAL:  [ x ]Soft  [ ]Distended   [ x ]+BS  [ ]Non tender [ ]Tender  [ ]PEG [ ]OGT/ NGT   Last BM:   2/3 (Colostomy bag)  GENITOURINARY:  [ ]Normal [ x ] Incontinent   [ ]Oliguria/Anuria   [ x ]Ross (draining clear urine, for patient's comfort)  MUSCULOSKELETAL:   [ ]Normal   [ ]Weakness  [x]Bed/Wheelchair bound [ x ]Edema (chronic l/e lymph edema and generalized edema due to third spacing)  NEUROLOGIC:   [ ]No focal deficits  [x] Cognitive impairment  [ ] Dysphagia [ ]Dysarthria [ ] Paresis [ ]Other   SKIN:  abdominal wound (w/ wound vac), ostomy (clean, dry, brown liquid stool material), chronic LE edema with superimposed skin breaks and weeping (absorbant dressing in place), sacral pressure wound (please refer to RN notes for further documentation).   [ ]Normal   [x]Pressure ulcer(s)  [x]Rash      CRITICAL CARE:  [ ] Shock Present  [x]Septic [ ]Cardiogenic [ ]Neurologic [ ]Hypovolemic  [ ]  Vasopressors [ ]  Inotropes   [ ] Respiratory failure present [ ] Mechanical Ventilation [ ] Non-invasive ventilatory support [ ] High-Flow  [ ] Acute  [ ] Chronic [ ] Hypoxic  [ ] Hypercarbic [ ] Other  [x] Other organ failure; skin     LABS: reviewed, none new    RADIOLOGY & ADDITIONAL STUDIES: reviewed, none new    PROTEIN CALORIE MALNUTRITION:   [x] PPSV2 < or = 30% [ ] significant weight loss [x] poor nutritional intake [ ] anasarca [x] catabolic state   Albumin, Serum: 2.1 g/dL (01-30-20 @ 04:41)   Artificial Nutrition [x]     REFERRALS:   [ ]Chaplaincy  [ ] Hospice  [ ]Child Life  [x]Social Work  [ ]Case management [ ]Holistic Therapy [ ] Physical Therapy [ ] Dietary GAP TEAM PALLIATIVE CARE UNIT PROGRESS NOTE:      [  ] Patient on hospice program.    INDICATION FOR PALLIATIVE CARE UNIT SERVICES: Pain management in the setting of bowel perforation, colostomy, s/p ex lap, bacteremia and skin desquamation    INTERVAL HPI/OVERNIGHT EVENTS: No acute events overnight. Patient remains on Dilaudid 0.2 mg/hr ggt, required Ativan 1 mg IV x1 prn for agitation/anxiety. Patient remains on NGT feeds and IV antibiotics. Patient reports 10/10 pain when changing and odynophagia with solid foods. Patient reports thirst. Denies nausea/vomiting. Patient's mental status continues to wax and wane.    DNR on chart: Yes  Yes    Allergies    IV Contrast (Rash; Pruritus; Hypotension)  penicillin (Rash (Severe))  sulfa drugs (Unknown)  vancomycin (Rash (Severe))    Intolerances    MEDICATIONS  (STANDING):  albuterol/ipratropium for Nebulization 3 milliLiter(s) Nebulizer every 6 hours  AQUAPHOR (petrolatum Ointment) 1 Application(s) Topical three times a day  artificial tears (preservative free) Ophthalmic Solution 1 Drop(s) Both EYES two times a day  aspirin  chewable 81 milliGRAM(s) Oral <User Schedule>  atorvastatin 40 milliGRAM(s) Oral <User Schedule>  buDESOnide    Inhalation Suspension 0.5 milliGRAM(s) Inhalation every 12 hours  camphor 0.5%/menthol 0.5% Topical Lotion 1 Application(s) Topical two times a day  ceFAZolin   IVPB 2000 milliGRAM(s) IV Intermittent every 8 hours  chlorhexidine 2% Cloths 1 Application(s) Topical <User Schedule>  clobetasol 0.05% Ointment 1 Application(s) Topical two times a day  enoxaparin Injectable 100 milliGRAM(s) SubCutaneous <User Schedule>  erythromycin     base Tablet 250 milliGRAM(s) Oral <User Schedule>  FIRST- Mouthwash  BLM 5 milliLiter(s) Swish and Spit four times a day  gabapentin 600 milliGRAM(s) Oral three times a day  hydrocortisone sodium succinate Injectable 50 milliGRAM(s) IV Push daily  HYDROmorphone Infusion 0.2 mG/Hr (0.2 mL/Hr) IV Continuous <Continuous>  levothyroxine Injectable 25 MICROGram(s) IV Push at bedtime  melatonin 3 milliGRAM(s) Oral at bedtime  methadone    Tablet 20 milliGRAM(s) Oral <User Schedule>  minocycline IVPB 100 milliGRAM(s) IV Intermittent every 12 hours  minocycline IVPB      nystatin Powder 1 Application(s) Topical two times a day  pantoprazole  Injectable 40 milliGRAM(s) IV Push daily  polyethylene glycol 3350 17 Gram(s) Oral <User Schedule>  sodium chloride 0.9%. 1000 milliLiter(s) (10 mL/Hr) IV Continuous <Continuous>  thiamine 100 milliGRAM(s) Oral daily    MEDICATIONS  (PRN):  aluminum hydroxide/magnesium hydroxide/simethicone Suspension 30 milliLiter(s) Oral every 4 hours PRN Dyspepsia  diphenhydrAMINE   Injectable 12.5 milliGRAM(s) IV Push every 6 hours PRN Rash and/or Itching  HYDROmorphone  Injectable 1.5 milliGRAM(s) IV Push every 1 hour PRN pain  HYDROmorphone  Injectable 1.5 milliGRAM(s) IV Push every 1 hour PRN dyspnea  HYDROmorphone  Injectable 2 milliGRAM(s) IV Push every 24 hours PRN breakthrough pain  LORazepam   Injectable 1 milliGRAM(s) IV Push every 1 hour PRN Anxiety/Restlessness  ondansetron Injectable 4 milliGRAM(s) IV Push every 6 hours PRN Nausea and/or Vomiting  zinc oxide 20% Ointment 1 Application(s) Topical three times a day PRN Rash    ITEMS UNCHECKED ARE NOT PRESENT    PRESENT SYMPTOMS: [ ]Unable to obtain due to poor mentation   Source if other than patient:  [ ]Family   [ ]Team     Pain: [x] yes [ ] no  QOL impact - severe, unable to perform ADLs   Location - diffuse pain "all over body"                    Aggravating factors - movement, changing  Quality - aching, burning  Radiation - n/a  Timing- constant, exacerbated during changing and moving position  Severity (0-10 scale): 10/10  Minimal acceptable level (0-10 scale): 3/10    Dyspnea:                           [ ]Mild [ ]Moderate [ ]Severe  Anxiety:                             [ ]Mild [x]Moderate [ ]Severe  Fatigue:                             [ ]Mild [x]Moderate [ ]Severe  Nausea:                             [ ]Mild [ ]Moderate [ ]Severe  Loss of appetite:              [x]Mild [ ]Moderate [ ]Severe  Constipation:                    [ ]Mild [ ]Moderate [ ]Severe    PAINAD Score:    [email or web address] (Ctrl +  left click to view)  		  Other Symptoms: odynophagia  [ ]All other review of systems negative     Karnofsky Performance Score/Palliative Performance Status Version 2: 30%        http://[email or web address]/files/news/palliative_performance_scale_[email or web address].pdf  PHYSICAL EXAM:  Vital Signs Last 24 Hrs  T(C): 36.6 (03 Feb 2020 09:07), Max: 36.6 (03 Feb 2020 09:07)  T(F): 97.8 (03 Feb 2020 09:07), Max: 97.8 (03 Feb 2020 09:07)  HR: 88 (03 Feb 2020 09:07) (88 - 88)  BP: 92/58 (03 Feb 2020 09:07) (92/58 - 92/58)  BP(mean): --  RR: 18 (03 Feb 2020 09:07) (18 - 18)  SpO2: 98% (03 Feb 2020 09:07) (98% - 98%) I&O's Summary    GENERAL: morbidly obese female  [x ]Alert  [x ]Oriented x 3  [  ]Lethargic  [ ]Cachexia  [ ]Unarousable  [ x ]Verbal  [ ]Non-Verbal  Behavioral: Calm, currently appears in no acute distress  [ ] Anxiety  [ ] Delirium [ ] Agitation [ ] Other  HEENT: NGT in place  [ ]Normal   [x]Dry mouth, no thrush   [ ]ET Tube/Trach  [ ]Oral lesions  PULMONARY: difficult to auscultate breath sound due to body habitus, decrease breath sounds b/l   [ ]Clear [ x ]Tachypnea  [ ]Audible excessive secretions   [ ]Rhonchi        [ ]Right [ ]Left [ ]Bilateral  [ ]Crackles        [ ]Right [ ]Left [ ]Bilateral  [ ]Wheezing     [ ]Right [ ]Left [ ]Bilateral  CARDIOVASCULAR:    [ x ]Regular [ ]Irregular []Tachy  [ ]Memo [ ]Murmur [ ]Other  GASTROINTESTINAL:  [ x ]Soft  [ ]Distended   [ x ]+BS  [ ]Non tender [ ]Tender  [ ]PEG [ ]OGT/ NGT   Last BM:   2/3 (Colostomy bag)  wound vac to lower abdomen.  GENITOURINARY:  [ ]Normal [ x ] Incontinent   [ ]Oliguria/Anuria   [ x ]Ross (draining clear urine, for patient's comfort)  MUSCULOSKELETAL:   [ ]Normal   [ ]Weakness  [x]Bed/Wheelchair bound [ x ]Edema (chronic l/e lymph edema and generalized edema due to third spacing)  NEUROLOGIC:   [ ]No focal deficits  [x] Cognitive impairment  [ ] Dysphagia [ ]Dysarthria [ ] Paresis [ ]Other   SKIN:  abdominal wound (w/ wound vac), ostomy (clean, dry, brown liquid stool material), chronic LE edema with superimposed skin breaks and weeping (absorbant dressing in place), sacral pressure wound (please refer to RN notes for further documentation).   [ ]Normal   [x]Pressure ulcer(s)  [x]Rash      CRITICAL CARE:  [ ] Shock Present  [x]Septic [ ]Cardiogenic [ ]Neurologic [ ]Hypovolemic  [ ]  Vasopressors [ ]  Inotropes   [ ] Respiratory failure present [ ] Mechanical Ventilation [ ] Non-invasive ventilatory support [ ] High-Flow  [ ] Acute  [ ] Chronic [ ] Hypoxic  [ ] Hypercarbic [ ] Other  [x] Other organ failure; skin     LABS: reviewed, none new    RADIOLOGY & ADDITIONAL STUDIES: reviewed, none new    PROTEIN CALORIE MALNUTRITION:   [x] PPSV2 < or = 30% [ ] significant weight loss [x] poor nutritional intake [ ] anasarca [x] catabolic state   Albumin, Serum: 2.1 g/dL (01-30-20 @ 04:41)   Artificial Nutrition [x]     REFERRALS:   [ ]Chaplaincy  [ ] Hospice  [ ]Child Life  [x]Social Work  [ ]Case management [ ]Holistic Therapy [ ] Physical Therapy [ ] Dietary

## 2020-02-03 NOTE — CHART NOTE - NSCHARTNOTEFT_GEN_A_CORE
Called by RN for blood cx from 1/28 positive for gram negative rods - Acinetobacter baumanii  ID following pt and treating with Minocycline.   Continue as per ID.     Jen Santiago ANP-BC  [id number]

## 2020-02-03 NOTE — PROGRESS NOTE ADULT - PROBLEM SELECTOR PLAN 3
PPSV 20-30%  Requires total RN care for all ADLs Ostomy in place with dark liquid stool, open abdominal wound with wound vac in place (will changed twice weekly)  - no plans for further surgical intervention

## 2020-02-03 NOTE — PROGRESS NOTE ADULT - ASSESSMENT
63yo F with PMH of Morbid Obesity, h/o heroin abuse, methadone dependence, HCV, HTN, COPD, and GERD p/w abdominal pain found to have bowel perforation s/p emergent repair with complicated post-op course; bacteremia, pneumonia and open abdominal wound requiring wound vac. Palliative consulted for symptom management; mainly pain and on-going GOC. Patient started on Dilaudid ggt, with available prn doses, continued on methadone, IV antibiotic and tube feeds. Patient's partner is bedside and hesitant to de-escalate care or increase pain medications. Awaiting [place] evaluation. 65yo F with PMH of Morbid Obesity, h/o heroin abuse, methadone dependence, HCV, HTN, COPD, and GERD p/w abdominal pain found to have bowel perforation s/p emergent repair with complicated post-op course; bacteremia, pneumonia and open abdominal wound requiring wound vac. Palliative consulted for symptom management; mainly pain and on-going GOC. Patient started on Dilaudid ggt, with available prn doses, continued on methadone, IV antibiotic and tube feeds. Patient's partner is bedside and hesitant to de-escalate care. Awaiting [place] evaluation. 65yo F with PMH of Morbid Obesity, h/o heroin abuse, methadone dependence, HCV, HTN, COPD, and GERD p/w abdominal pain found to have bowel perforation s/p emergent repair with complicated post-op course; bacteremia, pneumonia and open abdominal wound requiring wound vac. Palliative consulted for symptom management; mainly pain and on-going GOC. Patient continued on Dilaudid ggt, with available prn doses, continued on methadone, IV antibiotic and tube feeds. Patient's partner is bedside and hesitant to de-escalate care. Awaiting [place] evaluation. Patient's mental status continues to wax and wane.

## 2020-02-03 NOTE — PROGRESS NOTE ADULT - PROBLEM SELECTOR PLAN 5
d/w partner that agree on increasing Ativan to 1 mg q 1 PRN. Partner reported improvement of anxiety with increased Ativan dose.    -c/w Ativan to 1 mg q1hr prn anxiety Patient has sacral wound, open abdominal wound s/p surgery (with wound vac in place), sloughing and weeping of skin on chest, arms, and LE b/l. Multiple sources of infection.  -Wound vac to be changed twice weekly  -Wound care  -c/w Sarna cream and Menthol cream  -Offered dermatology consult, partner declined.

## 2020-02-03 NOTE — PROGRESS NOTE ADULT - PROBLEM SELECTOR PLAN 2
Patient has resistant organisms, persistent bacteremia. Ostomy in place with dark liquid stool, open abdominal wound with wound vac in place (will changed twice weekly)  - antibiotics as per ID, de-escalated today (On Erythromycin, Cefazolin, and minocycline)   - no plans for further surgical intervention Due to resistance acinetobacter, also MSSA.  To complete two week course of antibiotics.  Pt/partner refusing further blood cultures.  - antibiotics as per ID, de-escalated today (On Erythromycin, Cefazolin, and minocycline)

## 2020-02-03 NOTE — PROGRESS NOTE ADULT - PROBLEM SELECTOR PLAN 4
Patient has sacral wound, open abdominal wound s/p surgery (with wound vac in place), sloughing and weeping of skin on chest, arms, and LE b/l. Multiple sources of infection.  -Wound vac to be changed twice weekly  -Wound care  -Added Camphor and Menthol cream  -Offered dermatology consult, partner declined. Patient has sacral wound, open abdominal wound s/p surgery (with wound vac in place), sloughing and weeping of skin on chest, arms, and LE b/l. Multiple sources of infection.  -Wound vac to be changed twice weekly  -Wound care  -c/w Sarna cream and Menthol cream  -Offered dermatology consult, partner declined. PPSV 20-30%  Requires total RN care for all ADLs

## 2020-02-03 NOTE — PROGRESS NOTE ADULT - ATTENDING COMMENTS
I have personally seen and examined this patient and agree with the above assessment and plan, which I have reviewed and edited where appropriate.     GOC: Continue antibiotics (two week course), tube feeds, pain management  Symptoms: pain, weeping skin, non-healing wounds, delirium  Disposition: [place] evaluation    Met with pt/partner - understand and agree with plan.

## 2020-02-03 NOTE — PROGRESS NOTE ADULT - PROBLEM SELECTOR PLAN 1
Most likely 2/2 abdominal wound, LE swelling/skin weeping/third spacing, and skin compromise.  - on methadone 20mg BID.  - likely has high tolerance, has been on methadone for years for addiction  - C/w Dilaudid 0.2 mg/hr ggt  - c/w dilaudid 1.5mg IV Q1 PRN pain  - Attempted to change Gabapentin to Lyrica but there is not Lyrica solution. Will continue Gabapentin 600 mg PO q 8 hours.   - Will Start Sarna Cream.   - will monitor and adjust dose based on daily requirement and symptom burden  - bowel regimen to prevent opioid induced constipation    *patient's partner is very hesitant to adjust medications for fear that it will result in decreased wakefulness Most likely 2/2 abdominal wound, LE swelling/skin weeping/third spacing, and skin compromise.    -c/w methadone 20mg BID  - likely has high tolerance, has been on methadone for years for addiction  - c/w Dilaudid 0.2 mg/hr ggt  - c/w Dilaudid 1.5mg IV Q1 PRN pain  - c/w Gabapentin 600 mg PO q8hr   - c/w  Sarna Cream for anti-itch  - will monitor and adjust dose based on daily requirement and symptom burden  - bowel regimen to prevent opioid induced constipation    *patient's partner is very hesitant to adjust medications for fear that it will result in decreased wakefulness Most likely 2/2 abdominal wound, LE swelling/skin weeping/third spacing, and skin compromise  -c/w methadone 20mg BID  - likely has high tolerance, has been on methadone for years for addiction  - c/w Dilaudid 0.2 mg/hr ggt  - c/w Dilaudid 1.5mg IV Q1 PRN pain  - c/w Gabapentin 600 mg PO q8hr   - c/w  Sarna Cream for anti-itch  - will monitor and adjust dose based on daily requirement and symptom burden  - bowel regimen to prevent opioid induced constipation  *patient's partner is very hesitant to adjust medications for fear that it will result in decreased wakefulness

## 2020-02-03 NOTE — PROGRESS NOTE ADULT - PROBLEM SELECTOR PLAN 6
It was previously discussed goals of care, goals are for continued abx, but partner realizes that this is likely an end-stage process, and wants her to be comfortable; discussed blood draws and limiting them given difficulty obtaining them and causing more suffering;   - patient is DNR/DNI, EVA in chart, patient made decision  - partner is open to transfer to [facility] if stable Patient and partner wish to continue NGT feeding for supplemental nutrition. Discussed risk vs benefits of prolonged NGT feeding. Both convey understanding that NGT feeding is not a permanent solution. Dietician recommendations noted and Harjinder barnett added. Patient and partner wish to continue NGT feeding for supplemental nutrition. Discussed risk vs benefits of prolonged NGT feeding. Both convey understanding that NGT feeding is not a permanent solution. Dietician on board. On-going conversation. Partner reported improvement of anxiety with increased Ativan dose.    -c/w Ativan to 1 mg q1hr prn anxiety

## 2020-02-03 NOTE — CHART NOTE - NSCHARTNOTEFT_GEN_A_CORE
Nutrition Follow Up Note  Patient seen for: Malnutrition Follow Up     Interim events noted, chart reviewed. As per RN, awaiting confirmation of NGT placement as it did slip out a little earlier today, tube feeds off at this time. Team to continue GOC c significant other to discussed removal of NGT.     Source: RN, significant other at bedside, pt confused at this time     Diet : regular, Jevity 1.2- 45ml/hr x12 hours (540ml formula, 648cal, 30 Gm Prot; 11cal/kg and 0.5 Gm Prot/kg based on IBW of 59.1kg).     Significant other reports pt has been taking mostly juices at this time, RN confirms same- reports pt has been taking some fruit, gelatin and juices. Significant other reports pt has tried supplements in the past and would like pt to receive something at this time since NGT may be coming out. Agreeable to having Ensure Clear ordered-made MD aware of request.       Daily Weight in k.7 (), Weight in k.7 (), would continue to monitor       Pertinent Medications: MEDICATIONS  (STANDING):  albuterol/ipratropium for Nebulization 3 milliLiter(s) Nebulizer every 6 hours  AQUAPHOR (petrolatum Ointment) 1 Application(s) Topical three times a day  artificial tears (preservative free) Ophthalmic Solution 1 Drop(s) Both EYES two times a day  aspirin  chewable 81 milliGRAM(s) Oral <User Schedule>  atorvastatin 40 milliGRAM(s) Oral <User Schedule>  buDESOnide    Inhalation Suspension 0.5 milliGRAM(s) Inhalation every 12 hours  camphor 0.5%/menthol 0.5% Topical Lotion 1 Application(s) Topical two times a day  ceFAZolin   IVPB 2000 milliGRAM(s) IV Intermittent every 8 hours  chlorhexidine 2% Cloths 1 Application(s) Topical <User Schedule>  clobetasol 0.05% Ointment 1 Application(s) Topical two times a day  enoxaparin Injectable 100 milliGRAM(s) SubCutaneous <User Schedule>  erythromycin     base Tablet 250 milliGRAM(s) Oral <User Schedule>  FIRST- Mouthwash  BLM 5 milliLiter(s) Swish and Spit four times a day  gabapentin 600 milliGRAM(s) Oral three times a day  hydrocortisone sodium succinate Injectable 50 milliGRAM(s) IV Push daily  HYDROmorphone Infusion 0.2 mG/Hr (0.2 mL/Hr) IV Continuous <Continuous>  levothyroxine Injectable 25 MICROGram(s) IV Push at bedtime  melatonin 3 milliGRAM(s) Oral at bedtime  methadone    Tablet 20 milliGRAM(s) Oral <User Schedule>  minocycline IVPB 100 milliGRAM(s) IV Intermittent every 12 hours  minocycline IVPB      nystatin Powder 1 Application(s) Topical two times a day  pantoprazole  Injectable 40 milliGRAM(s) IV Push daily  polyethylene glycol 3350 17 Gram(s) Oral <User Schedule>  sodium chloride 0.9%. 1000 milliLiter(s) (10 mL/Hr) IV Continuous <Continuous>  thiamine 100 milliGRAM(s) Oral daily    MEDICATIONS  (PRN):  aluminum hydroxide/magnesium hydroxide/simethicone Suspension 30 milliLiter(s) Oral every 4 hours PRN Dyspepsia  diphenhydrAMINE   Injectable 12.5 milliGRAM(s) IV Push every 6 hours PRN Rash and/or Itching  HYDROmorphone  Injectable 1.5 milliGRAM(s) IV Push every 1 hour PRN pain  HYDROmorphone  Injectable 1.5 milliGRAM(s) IV Push every 1 hour PRN dyspnea  HYDROmorphone  Injectable 2 milliGRAM(s) IV Push every 24 hours PRN breakthrough pain  LORazepam   Injectable 1 milliGRAM(s) IV Push every 1 hour PRN Anxiety/Restlessness  ondansetron Injectable 4 milliGRAM(s) IV Push every 6 hours PRN Nausea and/or Vomiting  zinc oxide 20% Ointment 1 Application(s) Topical three times a day PRN Rash    Pertinent Labs: None pertinent to address at this time.   Finger Sticks: None pertinent to address at this time.       Skin per nursing documentation: no pressure injuries   Edema: +4 hussain. leg     Estimated Needs:   [x] no change since previous assessment      Previous Nutrition Diagnosis: moderate malnutrition  Nutrition Diagnosis continues at this time, care plan achieved given current status and [facility]     New Nutrition Diagnosis: none at this time       Recommend  1) Continue c regular diet via PO as tolerated.   2) If plans to continue to NGT, continue c current regimen, if plans to increase tube feeds consider reconsulting RD.  3) Recommend Ensure Clear x 2 daily.   4) RD to provide food preferences.     Monitoring and Evaluation:     Continue to monitor Nutritional intake, Tolerance to diet prescription, weights, labs, skin integrity    RD remains available upon request and will follow up per protocol  Mally Bell MS RD CDN [facility],  #[phone]

## 2020-02-04 LAB
ANION GAP SERPL CALC-SCNC: 12 MMOL/L — SIGNIFICANT CHANGE UP (ref 5–17)
APTT BLD: 31.8 SEC — SIGNIFICANT CHANGE UP (ref 27.5–36.3)
BUN SERPL-MCNC: 87 MG/DL — HIGH (ref 7–23)
CALCIUM SERPL-MCNC: 8.4 MG/DL — SIGNIFICANT CHANGE UP (ref 8.4–10.5)
CHLORIDE SERPL-SCNC: 104 MMOL/L — SIGNIFICANT CHANGE UP (ref 96–108)
CO2 SERPL-SCNC: 27 MMOL/L — SIGNIFICANT CHANGE UP (ref 22–31)
CREAT SERPL-MCNC: 1.61 MG/DL — HIGH (ref 0.5–1.3)
GLUCOSE SERPL-MCNC: 154 MG/DL — HIGH (ref 70–99)
HCT VFR BLD CALC: 25.1 % — LOW (ref 34.5–45)
HGB BLD-MCNC: 7.6 G/DL — LOW (ref 11.5–15.5)
INR BLD: 1.29 RATIO — HIGH (ref 0.88–1.16)
MCHC RBC-ENTMCNC: 28.3 PG — SIGNIFICANT CHANGE UP (ref 27–34)
MCHC RBC-ENTMCNC: 30.3 GM/DL — LOW (ref 32–36)
MCV RBC AUTO: 93.3 FL — SIGNIFICANT CHANGE UP (ref 80–100)
NRBC # BLD: 0 /100 WBCS — SIGNIFICANT CHANGE UP (ref 0–0)
PLATELET # BLD AUTO: 144 K/UL — LOW (ref 150–400)
POTASSIUM SERPL-MCNC: 5.3 MMOL/L — SIGNIFICANT CHANGE UP (ref 3.5–5.3)
POTASSIUM SERPL-SCNC: 5.3 MMOL/L — SIGNIFICANT CHANGE UP (ref 3.5–5.3)
PROTHROM AB SERPL-ACNC: 15 SEC — HIGH (ref 10–13.1)
RBC # BLD: 2.69 M/UL — LOW (ref 3.8–5.2)
RBC # FLD: 18.3 % — HIGH (ref 10.3–14.5)
SODIUM SERPL-SCNC: 143 MMOL/L — SIGNIFICANT CHANGE UP (ref 135–145)
WBC # BLD: 8.99 K/UL — SIGNIFICANT CHANGE UP (ref 3.8–10.5)
WBC # FLD AUTO: 8.99 K/UL — SIGNIFICANT CHANGE UP (ref 3.8–10.5)

## 2020-02-04 PROCEDURE — 99233 SBSQ HOSP IP/OBS HIGH 50: CPT | Mod: GC

## 2020-02-04 RX ORDER — ROBINUL 0.2 MG/ML
0.4 INJECTION INTRAMUSCULAR; INTRAVENOUS EVERY 6 HOURS
Refills: 0 | Status: DISCONTINUED | OUTPATIENT
Start: 2020-02-04 | End: 2020-02-05

## 2020-02-04 RX ORDER — HYDROMORPHONE HYDROCHLORIDE 2 MG/ML
2 INJECTION INTRAMUSCULAR; INTRAVENOUS; SUBCUTANEOUS ONCE
Refills: 0 | Status: DISCONTINUED | OUTPATIENT
Start: 2020-02-04 | End: 2020-02-04

## 2020-02-04 RX ADMIN — Medication 1 APPLICATION(S): at 18:20

## 2020-02-04 RX ADMIN — MINOCYCLINE HYDROCHLORIDE 100 MILLIGRAM(S): 45 TABLET, EXTENDED RELEASE ORAL at 05:55

## 2020-02-04 RX ADMIN — HYDROMORPHONE HYDROCHLORIDE 2 MILLIGRAM(S): 2 INJECTION INTRAMUSCULAR; INTRAVENOUS; SUBCUTANEOUS at 10:30

## 2020-02-04 RX ADMIN — MINOCYCLINE HYDROCHLORIDE 100 MILLIGRAM(S): 45 TABLET, EXTENDED RELEASE ORAL at 18:21

## 2020-02-04 RX ADMIN — METHADONE HYDROCHLORIDE 20 MILLIGRAM(S): 40 TABLET ORAL at 08:31

## 2020-02-04 RX ADMIN — NYSTATIN CREAM 1 APPLICATION(S): 100000 CREAM TOPICAL at 18:20

## 2020-02-04 RX ADMIN — Medication 100 MILLIGRAM(S): at 23:53

## 2020-02-04 RX ADMIN — DIPHENHYDRAMINE HYDROCHLORIDE AND LIDOCAINE HYDROCHLORIDE AND ALUMINUM HYDROXIDE AND MAGNESIUM HYDRO 5 MILLILITER(S): KIT at 05:57

## 2020-02-04 RX ADMIN — DIPHENHYDRAMINE HYDROCHLORIDE AND LIDOCAINE HYDROCHLORIDE AND ALUMINUM HYDROXIDE AND MAGNESIUM HYDRO 5 MILLILITER(S): KIT at 18:19

## 2020-02-04 RX ADMIN — ROBINUL 0.4 MILLIGRAM(S): 0.2 INJECTION INTRAMUSCULAR; INTRAVENOUS at 15:14

## 2020-02-04 RX ADMIN — Medication 100 MILLIGRAM(S): at 15:17

## 2020-02-04 RX ADMIN — HYDROMORPHONE HYDROCHLORIDE 1.5 MILLIGRAM(S): 2 INJECTION INTRAMUSCULAR; INTRAVENOUS; SUBCUTANEOUS at 08:55

## 2020-02-04 RX ADMIN — Medication 1 APPLICATION(S): at 22:01

## 2020-02-04 RX ADMIN — ENOXAPARIN SODIUM 100 MILLIGRAM(S): 100 INJECTION SUBCUTANEOUS at 15:09

## 2020-02-04 RX ADMIN — NYSTATIN CREAM 1 APPLICATION(S): 100000 CREAM TOPICAL at 05:58

## 2020-02-04 RX ADMIN — Medication 100 MILLIGRAM(S): at 05:56

## 2020-02-04 RX ADMIN — DIPHENHYDRAMINE HYDROCHLORIDE AND LIDOCAINE HYDROCHLORIDE AND ALUMINUM HYDROXIDE AND MAGNESIUM HYDRO 5 MILLILITER(S): KIT at 13:29

## 2020-02-04 RX ADMIN — GABAPENTIN 600 MILLIGRAM(S): 400 CAPSULE ORAL at 05:57

## 2020-02-04 RX ADMIN — HYDROMORPHONE HYDROCHLORIDE 1.5 MILLIGRAM(S): 2 INJECTION INTRAMUSCULAR; INTRAVENOUS; SUBCUTANEOUS at 16:22

## 2020-02-04 RX ADMIN — HYDROMORPHONE HYDROCHLORIDE 0.2 MG/HR: 2 INJECTION INTRAMUSCULAR; INTRAVENOUS; SUBCUTANEOUS at 07:55

## 2020-02-04 RX ADMIN — CHLORHEXIDINE GLUCONATE 1 APPLICATION(S): 213 SOLUTION TOPICAL at 15:08

## 2020-02-04 RX ADMIN — Medication 25 MICROGRAM(S): at 23:54

## 2020-02-04 RX ADMIN — POLYETHYLENE GLYCOL 3350 17 GRAM(S): 17 POWDER, FOR SOLUTION ORAL at 05:56

## 2020-02-04 RX ADMIN — HYDROMORPHONE HYDROCHLORIDE 0.2 MG/HR: 2 INJECTION INTRAMUSCULAR; INTRAVENOUS; SUBCUTANEOUS at 19:01

## 2020-02-04 RX ADMIN — Medication 1 APPLICATION(S): at 05:57

## 2020-02-04 RX ADMIN — HYDROMORPHONE HYDROCHLORIDE 0.2 MG/HR: 2 INJECTION INTRAMUSCULAR; INTRAVENOUS; SUBCUTANEOUS at 15:39

## 2020-02-04 RX ADMIN — Medication 3 MILLILITER(S): at 18:17

## 2020-02-04 RX ADMIN — HYDROMORPHONE HYDROCHLORIDE 2 MILLIGRAM(S): 2 INJECTION INTRAMUSCULAR; INTRAVENOUS; SUBCUTANEOUS at 10:13

## 2020-02-04 RX ADMIN — MEN-PHOR 1 APPLICATION(S): .5; .5 LOTION TOPICAL at 05:57

## 2020-02-04 RX ADMIN — Medication 250 MILLIGRAM(S): at 08:31

## 2020-02-04 RX ADMIN — Medication 1 DROP(S): at 18:23

## 2020-02-04 RX ADMIN — Medication 1 DROP(S): at 05:57

## 2020-02-04 RX ADMIN — Medication 1 APPLICATION(S): at 05:58

## 2020-02-04 RX ADMIN — PANTOPRAZOLE SODIUM 40 MILLIGRAM(S): 20 TABLET, DELAYED RELEASE ORAL at 15:16

## 2020-02-04 RX ADMIN — Medication 3 MILLILITER(S): at 05:56

## 2020-02-04 RX ADMIN — DIPHENHYDRAMINE HYDROCHLORIDE AND LIDOCAINE HYDROCHLORIDE AND ALUMINUM HYDROXIDE AND MAGNESIUM HYDRO 5 MILLILITER(S): KIT at 23:53

## 2020-02-04 RX ADMIN — Medication 50 MILLIGRAM(S): at 05:56

## 2020-02-04 RX ADMIN — Medication 3 MILLILITER(S): at 13:32

## 2020-02-04 RX ADMIN — HYDROMORPHONE HYDROCHLORIDE 0.2 MG/HR: 2 INJECTION INTRAMUSCULAR; INTRAVENOUS; SUBCUTANEOUS at 15:23

## 2020-02-04 RX ADMIN — SODIUM CHLORIDE 10 MILLILITER(S): 9 INJECTION INTRAMUSCULAR; INTRAVENOUS; SUBCUTANEOUS at 05:58

## 2020-02-04 RX ADMIN — MEN-PHOR 1 APPLICATION(S): .5; .5 LOTION TOPICAL at 18:18

## 2020-02-04 RX ADMIN — HYDROMORPHONE HYDROCHLORIDE 1.5 MILLIGRAM(S): 2 INJECTION INTRAMUSCULAR; INTRAVENOUS; SUBCUTANEOUS at 08:37

## 2020-02-04 RX ADMIN — Medication 0.5 MILLIGRAM(S): at 05:56

## 2020-02-04 RX ADMIN — Medication 1 APPLICATION(S): at 15:07

## 2020-02-04 RX ADMIN — Medication 0.5 MILLIGRAM(S): at 18:23

## 2020-02-04 RX ADMIN — Medication 3 MILLILITER(S): at 23:52

## 2020-02-04 RX ADMIN — SODIUM CHLORIDE 10 MILLILITER(S): 9 INJECTION INTRAMUSCULAR; INTRAVENOUS; SUBCUTANEOUS at 07:56

## 2020-02-04 RX ADMIN — Medication 100 MILLIGRAM(S): at 15:16

## 2020-02-04 NOTE — PROGRESS NOTE ADULT - PROBLEM SELECTOR PLAN 6
Partner reported improvement of anxiety with increased Ativan dose.    -c/w Ativan to 1 mg q1hr prn anxiety

## 2020-02-04 NOTE — PROGRESS NOTE ADULT - PROBLEM SELECTOR PLAN 5
Patient has sacral wound, open abdominal wound s/p surgery (with wound vac in place), sloughing and weeping of skin on chest, arms, and LE b/l. Multiple sources of infection.  -Wound vac to be changed twice weekly  -Wound care  -c/w Sarna cream and Menthol cream  -Offered dermatology consult, partner declined.

## 2020-02-04 NOTE — PROGRESS NOTE ADULT - ATTENDING COMMENTS
I have personally seen and examined this patient and agree with the above assessment and plan, which I have reviewed and edited where appropriate.     GOC: Continue antibiotics, pain management and wound care.  Patient removed feeding tube, not replaced  Symptoms: pain, delirium  Disposition: Patient accepted to [facility].  Partner struggling with travel to the [place].  Remains with acute medical need for hospitalization.      Poor prognosis.

## 2020-02-04 NOTE — PROGRESS NOTE ADULT - SUBJECTIVE AND OBJECTIVE BOX
GAP TEAM PALLIATIVE CARE UNIT PROGRESS NOTE:      [  ] Patient on hospice program.    INDICATION FOR PALLIATIVE CARE UNIT SERVICES: Pain management in the setting of bowel perforation, colostomy, s/p ex lap, bacteremia and skin desquamation    INTERVAL HPI/OVERNIGHT EVENTS: No acute events overnight. Patient was seen and examined at bedside. Patient's is lethargic, minimally responsive to verbal stimuli, able to intermittently answer yes and no questions. Patient's breathing is labored and secretions are audible periodically. Patient's NGT removed (uncertain if by patient removed herself during AM). Patient received Dilaudid 1.5 mg IV x1 PRN and Dilaudid 2 mg IV x1 in 24 hours and remains on Dilaudid 0.2 mg/hr ggt. Patient's partner remains bedside, states she is overwhelmed and does not believe patient is "stable" to move to [place].    DNR on chart: Yes  Yes    Allergies    IV Contrast (Rash; Pruritus; Hypotension)  penicillin (Rash (Severe))  sulfa drugs (Unknown)  vancomycin (Rash (Severe))    Intolerances    MEDICATIONS  (STANDING):  albuterol/ipratropium for Nebulization 3 milliLiter(s) Nebulizer every 6 hours  AQUAPHOR (petrolatum Ointment) 1 Application(s) Topical three times a day  artificial tears (preservative free) Ophthalmic Solution 1 Drop(s) Both EYES two times a day  aspirin  chewable 81 milliGRAM(s) Oral <User Schedule>  atorvastatin 40 milliGRAM(s) Oral <User Schedule>  buDESOnide    Inhalation Suspension 0.5 milliGRAM(s) Inhalation every 12 hours  camphor 0.5%/menthol 0.5% Topical Lotion 1 Application(s) Topical two times a day  ceFAZolin   IVPB 2000 milliGRAM(s) IV Intermittent every 8 hours  chlorhexidine 2% Cloths 1 Application(s) Topical <User Schedule>  clobetasol 0.05% Ointment 1 Application(s) Topical two times a day  enoxaparin Injectable 100 milliGRAM(s) SubCutaneous <User Schedule>  erythromycin     base Tablet 250 milliGRAM(s) Oral <User Schedule>  FIRST- Mouthwash  BLM 5 milliLiter(s) Swish and Spit four times a day  gabapentin 600 milliGRAM(s) Oral three times a day  hydrocortisone sodium succinate Injectable 50 milliGRAM(s) IV Push daily  HYDROmorphone Infusion 0.2 mG/Hr (0.2 mL/Hr) IV Continuous <Continuous>  levothyroxine Injectable 25 MICROGram(s) IV Push at bedtime  melatonin 3 milliGRAM(s) Oral at bedtime  methadone    Tablet 20 milliGRAM(s) Oral <User Schedule>  minocycline IVPB 100 milliGRAM(s) IV Intermittent every 12 hours  minocycline IVPB      nystatin Powder 1 Application(s) Topical two times a day  pantoprazole  Injectable 40 milliGRAM(s) IV Push daily  polyethylene glycol 3350 17 Gram(s) Oral <User Schedule>  sodium chloride 0.9%. 1000 milliLiter(s) (10 mL/Hr) IV Continuous <Continuous>  thiamine 100 milliGRAM(s) Oral daily    MEDICATIONS  (PRN):  aluminum hydroxide/magnesium hydroxide/simethicone Suspension 30 milliLiter(s) Oral every 4 hours PRN Dyspepsia  diphenhydrAMINE   Injectable 12.5 milliGRAM(s) IV Push every 6 hours PRN Rash and/or Itching  HYDROmorphone  Injectable 1.5 milliGRAM(s) IV Push every 1 hour PRN pain  HYDROmorphone  Injectable 1.5 milliGRAM(s) IV Push every 1 hour PRN dyspnea  HYDROmorphone  Injectable 2 milliGRAM(s) IV Push every 24 hours PRN breakthrough pain  LORazepam   Injectable 1 milliGRAM(s) IV Push every 1 hour PRN Anxiety/Restlessness  ondansetron Injectable 4 milliGRAM(s) IV Push every 6 hours PRN Nausea and/or Vomiting  zinc oxide 20% Ointment 1 Application(s) Topical three times a day PRN Rash    ITEMS UNCHECKED ARE NOT PRESENT    PRESENT SYMPTOMS: [x]Unable to obtain due to poor mentation   Source if other than patient:  [ ]Family   [ ]Team     Pain: [x] yes [ ] no  QOL impact - severe, unable to perform ADLs   Location - diffuse pain "all over body"                    Aggravating factors - movement, changing  Quality - aching, burning  Radiation - n/a  Timing- constant, exacerbated during changing and moving position  Severity (0-10 scale): 10/10  Minimal acceptable level (0-10 scale): 3/10    Dyspnea:                           [ ]Mild [x]Moderate [ ]Severe  Anxiety:                             [ ]Mild [x]Moderate [ ]Severe  Fatigue:                             [ ]Mild [x]Moderate [ ]Severe  Nausea:                             [ ]Mild [ ]Moderate [ ]Severe  Loss of appetite:              [x]Mild [ ]Moderate [ ]Severe  Constipation:                    [ ]Mild [ ]Moderate [ ]Severe    PAINAD Score: 2, grimacing    [email or web address] (Ctrl +  left click to view)  		  Other Symptoms: odynophagia  [ ]All other review of systems negative     Karnofsky Performance Score/Palliative Performance Status Version 2: 20-30%        http://npcrc.org/files/news/palliative_performance_scale_ppsv2.pdf  PHYSICAL EXAM:  Vital Signs Last 24 Hrs  T(C): --  T(F): --  HR: --  BP: --  BP(mean): --  RR: --  SpO2: --    GENERAL: morbidly obese female  [ ]Alert  [ ]Oriented  [x]Lethargic  [ ]Cachexia  []Unarousable  [x]Verbal, minimally [ ]Non-Verbal  Behavioral: Appears uncomfortable when moving  [ ] Anxiety  [ ] Delirium [ ] Agitation [ ] Other  HEENT: NGT in place  [ ]Normal   [x]Dry mouth, no thrush   [ ]ET Tube/Trach  [ ]Oral lesions  PULMONARY: difficult to auscultate breath sound due to body habitus, decrease breath sounds b/l,  [ ]Clear [x]Tachypnea  [x]Audible excessive secretions   [x]Rhonchi        [ ]Right [ ]Left [x]Bilateral (upper lobes, b/l)  [ ]Crackles        [ ]Right [ ]Left [ ]Bilateral  [ ]Wheezing     [ ]Right [ ]Left [ ]Bilateral  CARDIOVASCULAR:    [ ]Regular [ ]Irregular [x]Tachy  [ ]Memo [ ]Murmur [ ]Other  GASTROINTESTINAL:  [ x ]Soft  [ ]Distended   [ x ]+BS  [ ]Non tender [ ]Tender  [ ]PEG [ ]OGT/ NGT   Last BM:   2/4 (Colostomy bag)  wound vac to lower abdomen.  GENITOURINARY:  [ ]Normal [ x ] Incontinent   [ ]Oliguria/Anuria   [ x ]Ross (draining clear urine, for patient's comfort)  MUSCULOSKELETAL:   [ ]Normal   [ ]Weakness  [x]Bed/Wheelchair bound [ x ]Edema (chronic l/e lymph edema and generalized edema due to third spacing)  NEUROLOGIC:   [ ]No focal deficits  [x] Cognitive impairment  [ ] Dysphagia [ ]Dysarthria [ ] Paresis [ ]Other   SKIN:  abdominal wound (w/ wound vac), ostomy (clean, dry, brown liquid stool material), chronic LE edema with superimposed skin breaks and weeping (absorbant dressing in place), sacral pressure wound (please refer to RN notes for further documentation).   [ ]Normal   [x]Pressure ulcer(s)  [x]Rash      CRITICAL CARE:  [ ] Shock Present  [x]Septic [ ]Cardiogenic [ ]Neurologic [ ]Hypovolemic  [ ]  Vasopressors [ ]  Inotropes   [ ] Respiratory failure present [ ] Mechanical Ventilation [ ] Non-invasive ventilatory support [ ] High-Flow  [ ] Acute  [ ] Chronic [ ] Hypoxic  [ ] Hypercarbic [ ] Other  [x] Other organ failure; skin     LABS: reviewed, none new    RADIOLOGY & ADDITIONAL STUDIES: reviewed, none new    PROTEIN CALORIE MALNUTRITION:   [x] PPSV2 < or = 30% [ ] significant weight loss [x] poor nutritional intake [ ] anasarca [x] catabolic state   Albumin, Serum: 2.1 g/dL (01-30-20 @ 04:41)   Artificial Nutrition [x]     REFERRALS:   [ ]Chaplaincy  [x] Hospice  [ ]Child Life  [x]Social Work  [ ]Case management [ ]Holistic Therapy [ ] Physical Therapy [x] Dietary GAP TEAM PALLIATIVE CARE UNIT PROGRESS NOTE:      [  ] Patient on hospice program.    INDICATION FOR PALLIATIVE CARE UNIT SERVICES: Pain management in the setting of bowel perforation, colostomy, s/p ex lap, bacteremia and skin desquamation    INTERVAL HPI/OVERNIGHT EVENTS: No acute events overnight. Patient was seen and examined at bedside. Patient's is lethargic, minimally responsive to verbal stimuli, able to intermittently answer yes and no questions. Patient's breathing is labored and secretions are audible periodically. Patient's NGT removed (uncertain if by patient removed herself during AM). Patient received Dilaudid 1.5 mg IV x1 PRN and Dilaudid 2 mg IV x1 in 24 hours and remains on Dilaudid 0.2 mg/hr ggt. Patient's partner remains bedside, states she is overwhelmed and does not believe patient is "stable" to move to [place].    DNR on chart: Yes  Yes    Allergies    IV Contrast (Rash; Pruritus; Hypotension)  penicillin (Rash (Severe))  sulfa drugs (Unknown)  vancomycin (Rash (Severe))    Intolerances    MEDICATIONS  (STANDING):  albuterol/ipratropium for Nebulization 3 milliLiter(s) Nebulizer every 6 hours  AQUAPHOR (petrolatum Ointment) 1 Application(s) Topical three times a day  artificial tears (preservative free) Ophthalmic Solution 1 Drop(s) Both EYES two times a day  aspirin  chewable 81 milliGRAM(s) Oral <User Schedule>  atorvastatin 40 milliGRAM(s) Oral <User Schedule>  buDESOnide    Inhalation Suspension 0.5 milliGRAM(s) Inhalation every 12 hours  camphor 0.5%/menthol 0.5% Topical Lotion 1 Application(s) Topical two times a day  ceFAZolin   IVPB 2000 milliGRAM(s) IV Intermittent every 8 hours  chlorhexidine 2% Cloths 1 Application(s) Topical <User Schedule>  clobetasol 0.05% Ointment 1 Application(s) Topical two times a day  enoxaparin Injectable 100 milliGRAM(s) SubCutaneous <User Schedule>  erythromycin     base Tablet 250 milliGRAM(s) Oral <User Schedule>  FIRST- Mouthwash  BLM 5 milliLiter(s) Swish and Spit four times a day  gabapentin 600 milliGRAM(s) Oral three times a day  hydrocortisone sodium succinate Injectable 50 milliGRAM(s) IV Push daily  HYDROmorphone Infusion 0.2 mG/Hr (0.2 mL/Hr) IV Continuous <Continuous>  levothyroxine Injectable 25 MICROGram(s) IV Push at bedtime  melatonin 3 milliGRAM(s) Oral at bedtime  methadone    Tablet 20 milliGRAM(s) Oral <User Schedule>  minocycline IVPB 100 milliGRAM(s) IV Intermittent every 12 hours  minocycline IVPB      nystatin Powder 1 Application(s) Topical two times a day  pantoprazole  Injectable 40 milliGRAM(s) IV Push daily  polyethylene glycol 3350 17 Gram(s) Oral <User Schedule>  sodium chloride 0.9%. 1000 milliLiter(s) (10 mL/Hr) IV Continuous <Continuous>  thiamine 100 milliGRAM(s) Oral daily    MEDICATIONS  (PRN):  aluminum hydroxide/magnesium hydroxide/simethicone Suspension 30 milliLiter(s) Oral every 4 hours PRN Dyspepsia  diphenhydrAMINE   Injectable 12.5 milliGRAM(s) IV Push every 6 hours PRN Rash and/or Itching  HYDROmorphone  Injectable 1.5 milliGRAM(s) IV Push every 1 hour PRN pain  HYDROmorphone  Injectable 1.5 milliGRAM(s) IV Push every 1 hour PRN dyspnea  HYDROmorphone  Injectable 2 milliGRAM(s) IV Push every 24 hours PRN breakthrough pain  LORazepam   Injectable 1 milliGRAM(s) IV Push every 1 hour PRN Anxiety/Restlessness  ondansetron Injectable 4 milliGRAM(s) IV Push every 6 hours PRN Nausea and/or Vomiting  zinc oxide 20% Ointment 1 Application(s) Topical three times a day PRN Rash    ITEMS UNCHECKED ARE NOT PRESENT    PRESENT SYMPTOMS: [x]Unable to obtain due to poor mentation   Source if other than patient:  [ ]Family   [ ]Team     Pain: [x] yes [ ] no  QOL impact - severe, unable to perform ADLs   Location - diffuse pain "all over body"                    Aggravating factors - movement, changing  Quality - aching, burning  Radiation - n/a  Timing- constant, exacerbated during changing and moving position  Severity (0-10 scale): 10/10  Minimal acceptable level (0-10 scale): 3/10    Dyspnea:                           [ ]Mild [x]Moderate [ ]Severe  Anxiety:                             [ ]Mild [x]Moderate [ ]Severe  Fatigue:                             [ ]Mild [x]Moderate [ ]Severe  Nausea:                             [ ]Mild [ ]Moderate [ ]Severe  Loss of appetite:              [x]Mild [ ]Moderate [ ]Severe  Constipation:                    [ ]Mild [ ]Moderate [ ]Severe    PAINAD Score: 2, grimacing    [email or web address] (Ctrl +  left click to view)  		  Other Symptoms: odynophagia  [ ]All other review of systems negative     Karnofsky Performance Score/Palliative Performance Status Version 2: 20-30%        http://npcrc.org/files/news/palliative_performance_scale_ppsv2.pdf  PHYSICAL EXAM:  Vital Signs Last 24 Hrs  T(C): --  T(F): --  HR: --  BP: --  BP(mean): --  RR: --  SpO2: --    GENERAL: morbidly obese female  [ ]Alert  [ ]Oriented  [x]Lethargic  [ ]Cachexia  []Unarousable  [x]Verbal, minimally [ ]Non-Verbal  Behavioral: Appears uncomfortable when moving  [ ] Anxiety  [x ] Delirium [ x] Agitation [ ] Other  HEENT: NGT in place  [ ]Normal   [x]Dry mouth, no thrush   [ ]ET Tube/Trach  [ ]Oral lesions  PULMONARY: difficult to auscultate breath sound due to body habitus, decrease breath sounds b/l,  [ ]Clear [x]Tachypnea  [x]Audible excessive secretions   [x]Rhonchi        [ ]Right [ ]Left [x]Bilateral (upper lobes, b/l)  [ ]Crackles        [ ]Right [ ]Left [ ]Bilateral  [ ]Wheezing     [ ]Right [ ]Left [ ]Bilateral  CARDIOVASCULAR:    [ ]Regular [ ]Irregular [x]Tachy  [ ]Memo [ ]Murmur [ ]Other  GASTROINTESTINAL:  [ x ]Soft  [ ]Distended   [ x ]+BS  [ ]Non tender [ ]Tender  [ ]PEG [ ]OGT/ NGT   Last BM:   2/4 (Colostomy bag)  wound vac to lower abdomen.  GENITOURINARY:  [ ]Normal [ x ] Incontinent   [ ]Oliguria/Anuria   [ x ]Ross (draining clear urine, for patient's comfort)  MUSCULOSKELETAL:   [ ]Normal   [ ]Weakness  [x]Bed/Wheelchair bound [ x ]Edema (chronic l/e lymph edema and generalized edema due to third spacing)  NEUROLOGIC:   [ ]No focal deficits  [x] Cognitive impairment  [ ] Dysphagia [ ]Dysarthria [ ] Paresis [ ]Other   SKIN:  abdominal wound (w/ wound vac), ostomy (clean, dry, brown liquid stool material), chronic LE edema with superimposed skin breaks and weeping (absorbant dressing in place), sacral pressure wound (please refer to RN notes for further documentation).   [ ]Normal   [x]Pressure ulcer(s)  [x]Rash      CRITICAL CARE:  [ ] Shock Present  [x]Septic [ ]Cardiogenic [ ]Neurologic [ ]Hypovolemic  [ ]  Vasopressors [ ]  Inotropes   [ ] Respiratory failure present [ ] Mechanical Ventilation [ ] Non-invasive ventilatory support [ ] High-Flow  [ ] Acute  [ ] Chronic [ ] Hypoxic  [ ] Hypercarbic [ ] Other  [x] Other organ failure; skin     LABS: reviewed, none new    RADIOLOGY & ADDITIONAL STUDIES: reviewed, none new    PROTEIN CALORIE MALNUTRITION:   [x] PPSV2 < or = 30% [ ] significant weight loss [x] poor nutritional intake [ ] anasarca [x] catabolic state   Albumin, Serum: 2.1 g/dL (01-30-20 @ 04:41)   Artificial Nutrition [x]     REFERRALS:   [ ]Chaplaincy  [x] Hospice  [ ]Child Life  [x]Social Work  [ ]Case management [ ]Holistic Therapy [ ] Physical Therapy [x] Dietary

## 2020-02-04 NOTE — PROGRESS NOTE ADULT - PROBLEM SELECTOR PLAN 1
Most likely 2/2 abdominal wound, LE swelling/skin weeping/third spacing, and skin compromise    -c/w methadone 20mg BID  - likely has high tolerance, has been on methadone for years for addiction  - c/w Dilaudid 0.2 mg/hr ggt  - c/w Dilaudid 1.5mg IV Q1 PRN pain  - c/w Gabapentin 600 mg PO q8hr   - c/w  Sarna Cream for anti-itch  - will monitor and adjust dose based on daily requirement and symptom burden  - bowel regimen to prevent opioid induced constipation  *patient's partner is very hesitant to adjust medications for fear that it will result in decreased wakefulness

## 2020-02-04 NOTE — PROGRESS NOTE ADULT - REASON FOR ADMISSION
BULL
Bowel perforation
perforated bowel
sepsis
perforated sigmoid diverticulitis with free air and persistent sepsis
perforated sigmoid diverticulum s/p left hemicolectomy
sepsis
sepsis
Bowel Perforation

## 2020-02-04 NOTE — PROGRESS NOTE ADULT - PROBLEM SELECTOR PLAN 2
Due to resistance acinetobacter, also MSSA.  To complete two week course of antibiotics.  Pt/partner refusing further blood cultures.  - antibiotics as per ID, de-escalated today (On Erythromycin, Cefazolin, and minocycline). Left IJ need to be removed, poorly secured due to skin compromise.     -Awaiting left TLC jugular removal and PICC line evaluation with palliative antibiotics and pain medication administration.

## 2020-02-04 NOTE — PROGRESS NOTE ADULT - PROBLEM SELECTOR PLAN 7
Patient and partner wish to continue NGT feeding for supplemental nutrition. Discussed risk vs benefits of prolonged NGT feeding. Both convey understanding that NGT feeding is not a permanent solution. Dietician on board. On-going conversation. NGT removed this AM by patient (?). Not replaced at present time.

## 2020-02-04 NOTE — PROGRESS NOTE ADULT - PROBLEM SELECTOR PLAN 8
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 Awaiting PICC line placement, followed by transition to [place].
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 Awaiting evaluation for [facility]. Emotional support provided.

## 2020-02-04 NOTE — PROGRESS NOTE ADULT - PROVIDER SPECIALTY LIST ADULT
Cardiology
Critical Care
Critical Care
Dermatology
Gastroenterology
Gastroenterology
Infectious Disease
Nephrology
Palliative Care
Plastic Surgery
Pulmonology
SICU
Surgery
Infectious Disease
Palliative Care

## 2020-02-04 NOTE — PROGRESS NOTE ADULT - PROBLEM SELECTOR PLAN 3
Ostomy in place with dark liquid stool, open abdominal wound with wound vac in place (will changed twice weekly)  - no plans for further surgical intervention

## 2020-02-04 NOTE — PROGRESS NOTE ADULT - ASSESSMENT
65yo F with PMH of Morbid Obesity, h/o heroin abuse, methadone dependence, HCV, HTN, COPD, and GERD p/w abdominal pain found to have bowel perforation s/p emergent repair with complicated post-op course; bacteremia, pneumonia and open abdominal wound requiring wound vac. Palliative consulted for symptom management; mainly pain and on-going GOC. Patient continued on Dilaudid ggt, with available prn doses, continued on methadone, IV antibiotic and tube feeds. Patient's partner is bedside and hesitant to de-escalate care. Awaiting [place] evaluation. Patient's mental status continues to wax and wane.

## 2020-02-05 PROCEDURE — 94640 AIRWAY INHALATION TREATMENT: CPT

## 2020-02-05 PROCEDURE — 84436 ASSAY OF TOTAL THYROXINE: CPT

## 2020-02-05 PROCEDURE — 87040 BLOOD CULTURE FOR BACTERIA: CPT

## 2020-02-05 PROCEDURE — 85384 FIBRINOGEN ACTIVITY: CPT

## 2020-02-05 PROCEDURE — 71045 X-RAY EXAM CHEST 1 VIEW: CPT

## 2020-02-05 PROCEDURE — 87529 HSV DNA AMP PROBE: CPT

## 2020-02-05 PROCEDURE — 87086 URINE CULTURE/COLONY COUNT: CPT

## 2020-02-05 PROCEDURE — 82533 TOTAL CORTISOL: CPT

## 2020-02-05 PROCEDURE — 36569 INSJ PICC 5 YR+ W/O IMAGING: CPT

## 2020-02-05 PROCEDURE — 71250 CT THORAX DX C-: CPT

## 2020-02-05 PROCEDURE — 93308 TTE F-UP OR LMTD: CPT

## 2020-02-05 PROCEDURE — 84480 ASSAY TRIIODOTHYRONINE (T3): CPT

## 2020-02-05 PROCEDURE — 82435 ASSAY OF BLOOD CHLORIDE: CPT

## 2020-02-05 PROCEDURE — 84439 ASSAY OF FREE THYROXINE: CPT

## 2020-02-05 PROCEDURE — 97530 THERAPEUTIC ACTIVITIES: CPT

## 2020-02-05 PROCEDURE — 82962 GLUCOSE BLOOD TEST: CPT

## 2020-02-05 PROCEDURE — C1751: CPT

## 2020-02-05 PROCEDURE — 74176 CT ABD & PELVIS W/O CONTRAST: CPT

## 2020-02-05 PROCEDURE — 85610 PROTHROMBIN TIME: CPT

## 2020-02-05 PROCEDURE — 97163 PT EVAL HIGH COMPLEX 45 MIN: CPT

## 2020-02-05 PROCEDURE — 97110 THERAPEUTIC EXERCISES: CPT

## 2020-02-05 PROCEDURE — P9047: CPT

## 2020-02-05 PROCEDURE — 84100 ASSAY OF PHOSPHORUS: CPT

## 2020-02-05 PROCEDURE — 87070 CULTURE OTHR SPECIMN AEROBIC: CPT

## 2020-02-05 PROCEDURE — 80076 HEPATIC FUNCTION PANEL: CPT

## 2020-02-05 PROCEDURE — 82330 ASSAY OF CALCIUM: CPT

## 2020-02-05 PROCEDURE — 83605 ASSAY OF LACTIC ACID: CPT

## 2020-02-05 PROCEDURE — 99285 EMERGENCY DEPT VISIT HI MDM: CPT | Mod: 25

## 2020-02-05 PROCEDURE — 80053 COMPREHEN METABOLIC PANEL: CPT

## 2020-02-05 PROCEDURE — 86923 COMPATIBILITY TEST ELECTRIC: CPT

## 2020-02-05 PROCEDURE — 74018 RADEX ABDOMEN 1 VIEW: CPT

## 2020-02-05 PROCEDURE — 84300 ASSAY OF URINE SODIUM: CPT

## 2020-02-05 PROCEDURE — 81001 URINALYSIS AUTO W/SCOPE: CPT

## 2020-02-05 PROCEDURE — 82565 ASSAY OF CREATININE: CPT

## 2020-02-05 PROCEDURE — 82947 ASSAY GLUCOSE BLOOD QUANT: CPT

## 2020-02-05 PROCEDURE — 94660 CPAP INITIATION&MGMT: CPT

## 2020-02-05 PROCEDURE — 99238 HOSP IP/OBS DSCHRG MGMT 30/<: CPT

## 2020-02-05 PROCEDURE — 85027 COMPLETE CBC AUTOMATED: CPT

## 2020-02-05 PROCEDURE — 97112 NEUROMUSCULAR REEDUCATION: CPT

## 2020-02-05 PROCEDURE — 94770: CPT

## 2020-02-05 PROCEDURE — 82570 ASSAY OF URINE CREATININE: CPT

## 2020-02-05 PROCEDURE — 84295 ASSAY OF SERUM SODIUM: CPT

## 2020-02-05 PROCEDURE — 87186 SC STD MICRODIL/AGAR DIL: CPT

## 2020-02-05 PROCEDURE — 36600 WITHDRAWAL OF ARTERIAL BLOOD: CPT

## 2020-02-05 PROCEDURE — 86900 BLOOD TYPING SEROLOGIC ABO: CPT

## 2020-02-05 PROCEDURE — 94799 UNLISTED PULMONARY SVC/PX: CPT

## 2020-02-05 PROCEDURE — 97166 OT EVAL MOD COMPLEX 45 MIN: CPT

## 2020-02-05 PROCEDURE — 84145 PROCALCITONIN (PCT): CPT

## 2020-02-05 PROCEDURE — 74177 CT ABD & PELVIS W/CONTRAST: CPT

## 2020-02-05 PROCEDURE — 82803 BLOOD GASES ANY COMBINATION: CPT

## 2020-02-05 PROCEDURE — 84443 ASSAY THYROID STIM HORMONE: CPT

## 2020-02-05 PROCEDURE — 84134 ASSAY OF PREALBUMIN: CPT

## 2020-02-05 PROCEDURE — 88307 TISSUE EXAM BY PATHOLOGIST: CPT

## 2020-02-05 PROCEDURE — 93931 UPPER EXTREMITY STUDY: CPT

## 2020-02-05 PROCEDURE — 36430 TRANSFUSION BLD/BLD COMPNT: CPT

## 2020-02-05 PROCEDURE — 96374 THER/PROPH/DIAG INJ IV PUSH: CPT | Mod: XU

## 2020-02-05 PROCEDURE — C1889: CPT

## 2020-02-05 PROCEDURE — 74220 X-RAY XM ESOPHAGUS 1CNTRST: CPT

## 2020-02-05 PROCEDURE — 82553 CREATINE MB FRACTION: CPT

## 2020-02-05 PROCEDURE — 97116 GAIT TRAINING THERAPY: CPT

## 2020-02-05 PROCEDURE — 87150 DNA/RNA AMPLIFIED PROBE: CPT

## 2020-02-05 PROCEDURE — 71260 CT THORAX DX C+: CPT

## 2020-02-05 PROCEDURE — 82550 ASSAY OF CK (CPK): CPT

## 2020-02-05 PROCEDURE — 83935 ASSAY OF URINE OSMOLALITY: CPT

## 2020-02-05 PROCEDURE — 87184 SC STD DISK METHOD PER PLATE: CPT

## 2020-02-05 PROCEDURE — 87798 DETECT AGENT NOS DNA AMP: CPT

## 2020-02-05 PROCEDURE — 93321 DOPPLER ECHO F-UP/LMTD STD: CPT

## 2020-02-05 PROCEDURE — 84484 ASSAY OF TROPONIN QUANT: CPT

## 2020-02-05 PROCEDURE — 97168 OT RE-EVAL EST PLAN CARE: CPT

## 2020-02-05 PROCEDURE — 93970 EXTREMITY STUDY: CPT

## 2020-02-05 PROCEDURE — 97606 NEG PRS WND THER DME>50 SQCM: CPT

## 2020-02-05 PROCEDURE — 84132 ASSAY OF SERUM POTASSIUM: CPT

## 2020-02-05 PROCEDURE — 80048 BASIC METABOLIC PNL TOTAL CA: CPT

## 2020-02-05 PROCEDURE — 83735 ASSAY OF MAGNESIUM: CPT

## 2020-02-05 PROCEDURE — P9016: CPT

## 2020-02-05 PROCEDURE — 94003 VENT MGMT INPAT SUBQ DAY: CPT

## 2020-02-05 PROCEDURE — 93971 EXTREMITY STUDY: CPT

## 2020-02-05 PROCEDURE — 93306 TTE W/DOPPLER COMPLETE: CPT

## 2020-02-05 PROCEDURE — C8929: CPT

## 2020-02-05 PROCEDURE — 80202 ASSAY OF VANCOMYCIN: CPT

## 2020-02-05 PROCEDURE — 93005 ELECTROCARDIOGRAM TRACING: CPT

## 2020-02-05 PROCEDURE — 86901 BLOOD TYPING SEROLOGIC RH(D): CPT

## 2020-02-05 PROCEDURE — 85520 HEPARIN ASSAY: CPT

## 2020-02-05 PROCEDURE — 85014 HEMATOCRIT: CPT

## 2020-02-05 PROCEDURE — 86850 RBC ANTIBODY SCREEN: CPT

## 2020-02-05 PROCEDURE — 97602 WOUND(S) CARE NON-SELECTIVE: CPT

## 2020-02-05 PROCEDURE — 87449 NOS EACH ORGANISM AG IA: CPT

## 2020-02-05 PROCEDURE — 80061 LIPID PANEL: CPT

## 2020-02-05 PROCEDURE — P9045: CPT

## 2020-02-05 PROCEDURE — 85730 THROMBOPLASTIN TIME PARTIAL: CPT

## 2020-02-05 RX ADMIN — Medication 1 MILLIGRAM(S): at 00:05

## 2020-02-05 NOTE — CHART NOTE - NSCHARTNOTEFT_GEN_A_CORE
Called  by  RN  to  facilitate  with    death  certificate.    Pt  was  pronounced  by  Scott Omer  (  DAREK  )   at  0410  am.   Pt  significant  other  was  at bedside  at  the  time  pt was  pronounced. Pt  family  refused  autopsy  .   Pt  has  been  hospitalized  for  71  days,  no falls  or  trauma  noted,  Pt  does  not  meet  requirement  for  ME  case.

## 2020-02-05 NOTE — PROVIDER CONTACT NOTE (OTHER) - DATE AND TIME:
06-Jan-2020 23:15
04-Dec-2019 15:00
04-Dec-2019 17:05
05-Feb-2020 04:25
06-Dec-2019 13:53
06-Jan-2020 13:53
07-Dec-2019 01:45
07-Jan-2020 11:40
07-Jan-2020 19:30
07-Jan-2020 19:30
08-Jan-2020 23:00
11-Dec-2019 08:30
11-Dec-2019 14:30
11-Dec-2019 17:53
14-Dec-2019 22:00
16-Dec-2019 22:00
16-Jan-2020 04:30
16-Jan-2020 14:57
16-Jan-2020 18:30
17-Jan-2020 13:20
19-Dec-2019 22:00
19-Jan-2020 20:00
20-Jan-2020 11:30
22-Jan-2020 20:00
23-Dec-2019 16:39
24-Jan-2020 00:00
27-Jan-2020 09:30
27-Nov-2019 01:00
29-Dec-2019 20:30
29-Nov-2019 00:00
30-Jan-2020 09:30

## 2020-02-05 NOTE — PROVIDER CONTACT NOTE (OTHER) - RECOMMENDATIONS
Midline ordered
pt educated on importance of lipitor but still refuses. MD aware.MD made aware that pt already got 81mg of aspirin today at noon so as per MD, pt doesn't need dose ordered at 2000 tonight
MD dorman said that she will pass on to day team to explain to pt why she is on lipitor. As of now, MD dorman doesn't know why and doesn't see any lab work for pt that supports need for lipitor.
MD notified and aware. Albumin 25% administered without any change in BP. MD ordered for BP to be taken on L arm instead, same side as single lumen PICC.
MD notified, Bolus?
MD said he's not sure why pt is ordered lipitor. recent labwork done don't show any cholesterol results. MD said it ok for pt to refuse lipitor tonight and that the day team can explain it in AM
Assess central line
Assess pt
Assess pt at bedside; Discuss psych consultation and necessary means of ensuring pt's future safety
Continue to encourage patient to be turned and positioned. Will check in with patient about this q 2 hours.
Give 500 cc NS bolus.
Give 500cc NS bolus to help with blood pressure and low urine output.
IV Zofran , and abdominal X ray. Pt seen and assessed by PA at bedside
MD Hernandez aware. Palliative to be reconsulted to further address GOC. Pt offered pain control to assist w/ basic care and remains refusing at this time.
MD Walls made aware
MD pham aware. MD said he will pass on to day team to let pt know why she is ordered lipitor. MD said to reinforce teaching on importance of dressing change.
No intervention required as per MD
Notify team.
One time dose of Dilaudid? Assess patient at bedside.
PA to be aware.
Patient pronounced dead at 0410, significant other, Darcy, at bedside, refused autopsy. Awaiting for family member to come to the unit.
Pt seen and assessed by PA, As per PA no intervention required now as per PA
Re-start vaso and levo, IVF, lactate, warming blanket
assess pt
see if prima fit picks up next output

## 2020-02-05 NOTE — DISCHARGE NOTE FOR THE EXPIRED PATIENT - SECONDARY DIAGNOSIS.
Morbid obesity Bacteremia due to Gram-negative bacteria Functional quadriplegia Moderate protein-calorie malnutrition Skin desquamation

## 2020-02-05 NOTE — PROVIDER CONTACT NOTE (OTHER) - SITUATION
Pt BP remains low at 72/58 auscultated
Pt with low bp of 73/59 w/HR of 112
Gerd, HTN, COPD, obese
Patient PIV not working.  CT pending at 1730, PIV requred.  Pt is a very difficult stick
Patient blood pressure on the lower side.
Patient refusing AM CARE and turning and repositioning. Patient states "I will not let you turn me unless you give me Dilaudid" Patient offered Tylenol to manage pain throughout AM Care, and refused.
Patient refusing q two hour turning and positioning by staff.
Patient resting in bed and is refusing to be turned q2h in bed
Patient tf from SICU unable to get a BP both electronic & manual, several attempts made since 1430
Patient with low urine output
Patient without spontaneous respirations or pulse
Pt c/o itchiness and generally not feeling well
Pt expressing suicidal ideation
Pt has generalized red rash
Pt hypotensive BP manually 86/58.  RN attempted to get pts BP multiple times but no accurate results, and RUE can't be used since patient has a picc.
Pt left IJ triple lumen central line has difficult blood from white port and no blood return for blue and brown port.
Pt only urine output for shift was one incontinence count.
Pt refused nebulizer and Pulmicort
Pt refusing basic care, hygiene, and turning. Pt refusing CHG & washing. Pt does not want lidocaine patch. Pt educated and still refusing. Pain needs addressed. Partner at bedside and aware.
Pt refusing to be turned to have lidocaine patch removed. Pt and Pt's partner informed about side effects of lidocaine and proper medication administration. Pt refusing to be washed/dressing changes.
Pt vomited n250 cc of bilious emesis
Pt with left arm rash noted, red and raised. Denies itching
Pt's BP 82/49
Pt. hypothermic: 35 rectally  Pt. hypotensive: 69/43 (51)
RN attempted to reposition/turn patient, but patient and patients partner refused. Pt also refusing [place] care, dressing changes, medication administration, and all AM care (wash, linen change, etc.)
pt is refusing her 10pm lipitor and refusing for her abd midline wound dressing to be redone as ordered.
pt refused lipitor and pt's aspirin ordered for 2000 not done bc pt already got aspirin at noon today
pt refused lipitor since according to the patient, "no doctor has explained to her why she is ordered lipitor"
pt refuses lipitor. pt asks why she was ordered for this med and asks what her cholesterol levels are
pt still complaining of 7/10 generalized pain after receiving 0.5mg IVP dilaudid 1000mg IVPB tylenol after turning.

## 2020-02-05 NOTE — DISCHARGE NOTE FOR THE EXPIRED PATIENT - HOSPITAL COURSE
63yo F with PMH of Morbid Obesity, h/o heroin abuse, methadone dependence, HCV, HTN, COPD, and GERD p/w abdominal pain found to have bowel perforation s/p emergent repair with complicated post-op course; bacteremia, pneumonia and open abdominal wound requiring wound vac. Palliative consulted for symptom management; mainly pain and on-going GOC. Patient continued on Dilaudid ggt, with available prn doses, continued on methadone, IV antibiotic and tube feeds. Patient's partner is bedside and hesitant to de-escalate care. Awaiting [place] evaluation. Patient's mental status continues to wax and wane. 63yo F with PMH of Morbid Obesity, h/o heroin abuse, methadone dependence, HCV, HTN, COPD, and GERD p/w abdominal pain found to have bowel perforation s/p emergent repair with complicated post-op course; bacteremia, pneumonia and open abdominal wound requiring wound vac. Palliative consulted for symptom management; mainly pain and on-going GOC. Patient continued on Dilaudid ggt, with available prn doses, continued on methadone, IV antibiotic and tube feeds. Patient's partner is bedside and hesitant to de-escalate care. Awaiting [place] evaluation. Patient's mental status continues to wax and wane.  Despite ongoing antibiotics and wound care, patient  on 2020.  Partner informed.

## 2020-02-05 NOTE — PROVIDER CONTACT NOTE (OTHER) - NAME OF MD/NP/PA/DO NOTIFIED:
Sarabjit Carr
Chaitanya QUIÑONES, Paul BENEDICT
Dr Box
Garcia PA
JAK Coon
JAK Coon
JAK Dent
JAK HAGAN
JAK HAGAN
JAK Iglesias
JAK Irwin
JAK Miller
JAK Odell
JAK Pastrana
Jey Garcia
Lan Hernandez MD
MD Augustin
MD Augustin
MD Byers, JAK Hill
MD Escobedo; JAK Miller
MD Hernandez
MD Nguyen
MD Sinclair
MD Vishal Stewart
MD Walls
MD mayela dorman
PCU team
Shirley Ruiz
Verónica Banerjee
Walker Lambert MD

## 2020-12-23 RX ADMIN — Medication 0.5 MILLIGRAM(S): at 05:37

## 2022-01-01 NOTE — PROGRESS NOTE ADULT - ASSESSMENT
65 y/o female presenting with perforated sigmoid diverticulitis s/p exploratory laparotomy, extended left hemicolectomy, and left in discontinuity w/ Abthera VAC placement with return to OR for re-exploratory laparotomy, transverse end colostomy, and fascial closure with wound VAC placement. Hospital course complicated by gastroparesis, right subclavian steal syndrome, full body rash of unknown etiology, and refractory septic shock c/b stress-induced cardiomyopathy. Cultures were positive for E. coli & Klebsiella in the urine, Stenotrophomonas in the sputum, and Staph epidermis in the blood with subsequent removal of her LUE PICC. Course further complicated by VRE and MDR Acinetobacter bacteremia.    PLAN:    Neuro: acute pain from her full body rash, opioid dependence  - Monitor mental status  - Pain control as needed with methadone 10mg BID, PRN Dilaudid for turns, oxycodone  - Start melatonin qhs for improved sleep wake cycle    Resp: COPD, Right upper lobe pneumonia  - Monitor pulse oximeter  - Out of bed to chair and incentive spirometry, chest PT to prevent atelectasis  - Pulmicort and Duoneb for COPD  - Antibiotic coverage for pneumonia    CV: refractory septic shock c/b stress-induced cardiomyopathy, resolved  - Monitor vital signs, remains off midodrine/Levophed at this time.   - Home ASA    GI: perforated sigmoid diverticulitis s/p exploratory laparotomy, extended left hemicolectomy, and left in discontinuity w/ Abthera VAC placement with return to OR for re-exploratory laparotomy, transverse end colostomy, and fascial closure c/b gastroparesis; GERD  - Regular diet with Nepro @ 45ml/hr via [place] feeding tube   - Monitor ostomy output  - Erythromycin for gastroparesis  - Protonix for GERD  - Maalox PRN indigestion  - Continue wound VAC therapy     Renal: BULL, resolved  - Monitor I&Os.  - KVO fluids.   - Monitor electrolytes and replete as necessary    Heme: no acute issues  - Monitor CBC and coags  - Lovenox 100 mg daily for VTE prophylaxis, adjusted for BMI  - ASA 81mg qd    ID: E. coli & Klebsiella UTI, Stenotrophomonas PNA, Staph epidermis bacteremia; Now MDR Acinetobacter and VRE bacteremia 01/22  - Continue meropenem, Polymixin B, & Minocycline.   - Linezolid discontinued and Daptomycin initiated  - Blood cx remain positive on 1/24, results from 1/26 pending.     Endo: HLD, hypothyroidism vs sick euthyroid syndrome  - 25mg IV levothyroxine for hypothyroidism.   - Monitor glucose on BMP  - Home Lipitor  - Solucortef decreased to 100mg IV q 12hrs starting 1/26    Integumentary:   - Appreciate Dermatology recs: Vaseline, zinc oxide, clobetasol cream.     Disposition:  DNR. Will remain in SICU. 63 y/o female presenting with perforated sigmoid diverticulitis s/p exploratory laparotomy, extended left hemicolectomy, and left in discontinuity w/ Abthera VAC placement with return to OR for re-exploratory laparotomy, transverse end colostomy, and fascial closure with wound VAC placement. Hospital course complicated by gastroparesis, right subclavian steal syndrome, full body rash of unknown etiology, and refractory septic shock c/b stress-induced cardiomyopathy. Cultures were positive for E. coli & Klebsiella in the urine, Stenotrophomonas in the sputum, and Staph epidermis in the blood with subsequent removal of her LUE PICC. Course further complicated by VRE and MDR Acinetobacter bacteremia.    PLAN:    Neuro: acute pain from her full body rash, opioid dependence  - Monitor mental status  - Pain control as needed with methadone 10mg BID, PRN Dilaudid 1.5mg q6h, PRN oxycodone 10mg q6h   - C/w melatonin at bedtime     Resp: COPD, Right upper lobe pneumonia  - Monitor pulse oximeter  - Out of bed to chair and incentive spirometry, chest PT to prevent atelectasis  - Pulmicort and Duoneb for COPD  - Antibiotic coverage for pneumonia    CV: refractory septic shock c/b stress-induced cardiomyopathy, resolved  - Monitor vital signs, remains off midodrine/Levophed at this time.   - Home ASA    GI: perforated sigmoid diverticulitis s/p exploratory laparotomy, extended left hemicolectomy, and left in discontinuity w/ Abthera VAC placement with return to OR for re-exploratory laparotomy, transverse end colostomy, and fascial closure c/b gastroparesis; GERD  - Regular diet with nocturnal feeds (6pm-6am): Nepro @ 70ml/hr via [place] feeding tube   - Monitor ostomy output  - Erythromycin for gastroparesis  - Protonix for GERD  - Maalox PRN indigestion  - Continue wound VAC therapy     Renal: BULL, resolved  - Monitor I&Os.  - KVO fluids.   - Monitor electrolytes and replete as necessary    Heme: no acute issues  - Monitor CBC and coags  - Lovenox 100 mg daily for VTE prophylaxis, adjusted for BMI  - ASA 81mg qd    ID: E. coli & Klebsiella UTI, Stenotrophomonas PNA, Staph epidermis bacteremia; Now MDR Acinetobacter and VRE bacteremia 01/22  - Continue meropenem, Polymixin B, & Minocycline.   - Linezolid discontinued and Daptomycin initiated  - Blood cx remain positive on 1/24, results from 1/26 showing no GTD.   -Repeat BCx   -Daptomycin dose increased to 1000mg q24h (with weekly CK levels)     Endo: HLD, hypothyroidism vs sick euthyroid syndrome  - 25mg IV levothyroxine for hypothyroidism.   - Monitor glucose on BMP  - Home Lipitor  -Weaning Solucortef 50mg IV q 12hrs today (1/28) to daily tomorrow     Integumentary:   - Appreciate Dermatology recs: Vaseline, zinc oxide, clobetasol cream.     Disposition:  DNR. Will remain in SICU. Yes

## 2023-05-03 NOTE — PHYSICAL THERAPY INITIAL EVALUATION ADULT - GAIT TRAINING, PT EVAL
FAMILY HISTORY:  No pertinent family history in first degree relatives    
GOAL: Patient will ambulate 150 feet independently with cane in 2 weeks

## 2023-12-05 NOTE — DIETITIAN INITIAL EVALUATION ADULT. - EST PROTEIN NEEDS11
Pt. Received letter , with scheduling of transmissions on it. She does not have the Metronic. Please advise.
Returned call to patient and reminded her that she does have [employer] and the Ap is on her phone for those home checks. She voiced understanding.
94.56

## 2024-07-31 NOTE — PRE-ANESTHESIA EVALUATION ADULT - NSANTHOSAYNRD_GEN_A_CORE
Yes Body Location Override (Optional - Billing Will Still Be Based On Selected Body Map Location If Applicable): left forehead Detail Level: Simple Depth Of Biopsy: dermis Was A Bandage Applied: Yes Size Of Lesion In Cm: 0 Biopsy Type: H and E Biopsy Method: Acu-Razor Anesthesia Type: 0.5% lidocaine with 1:100,000 epinephrine and a 1:10 solution of 8.4% sodium bicarbonate Anesthesia Volume In Cc: 0.5 Hemostasis: Aluminum Chloride Wound Care: Antibiotic ointment Dressing: bandage Destruction After The Procedure: No Type Of Destruction Used: Curettage Curettage Text: The wound bed was treated with curettage after the biopsy was performed. Cryotherapy Text: The wound bed was treated with cryotherapy after the biopsy was performed. Electrodesiccation Text: The wound bed was treated with electrodesiccation after the biopsy was performed. Electrodesiccation And Curettage Text: The wound bed was treated with electrodesiccation and curettage after the biopsy was performed. Silver Nitrate Text: The wound bed was treated with silver nitrate after the biopsy was performed. Lab: [id number] Lab Facility: [id number] Notes Override (Will Replace All Of The Above Text): Multiple pieces in the jar Consent: Written consent was obtained and risks were reviewed including but not limited to scarring, infection, bleeding, scabbing, incomplete removal, nerve damage and allergy to anesthesia. Post-Care Instructions: I reviewed with the patient in detail post-care instructions. Patient is to keep the biopsy site dry overnight, and then apply bacitracin twice daily until healed. Patient may apply hydrogen peroxide soaks to remove any crusting. Notification Instructions: Patient will be notified of biopsy results. However, patient instructed to call the office if not contacted within 2 weeks. Billing Type: Third-Party Bill Information: Selecting Yes will display possible errors in your note based on the variables you have selected. This validation is only offered as a suggestion for you. PLEASE NOTE THAT THE VALIDATION TEXT WILL BE REMOVED WHEN YOU FINALIZE YOUR NOTE. IF YOU WANT TO FAX A PRELIMINARY NOTE YOU WILL NEED TO TOGGLE THIS TO 'NO' IF YOU DO NOT WANT IT IN YOUR FAXED NOTE.

## 2025-03-02 NOTE — PROGRESS NOTE ADULT - SUBJECTIVE AND OBJECTIVE BOX
[FreeTextEntry1] : Documented by Antoine Bai acting as a scribe for Dr. Maciel on 02/26/2025.   All medical record entries made by the Scribe were at my, Dr. Maciel, direction and personally dictated by me on 02/26/2025. I have reviewed the chart and agree that the record accurately reflects my personal performances of the history, physical exam, assessment and plan. I have also personally directed, reviewed, and agree with the instructions. Id Coverage    Patient is a 64y old  Female who presents with a chief complaint of bowel perforation (03 Feb 2020 08:56)    Being followed by ID for bacteremia     Interval history:comfortable  partner at bedside  leaning towards palliation  does not want blood draws  No acute events      ROS:pain controlled  No cough,SOB,CP  No N/V/D./abd pain  No other complaints      Antimicrobials:    ceFAZolin   IVPB 2000 milliGRAM(s) IV Intermittent every 8 hours  erythromycin     base Tablet 250 milliGRAM(s) Oral <User Schedule>  minocycline IVPB 100 milliGRAM(s) IV Intermittent every 12 hours  minocycline IVPB        Other medications reviewed    Vital Signs Last 24 Hrs  T(C): 36.6 (02-03-20 @ 09:07), Max: 36.6 (02-03-20 @ 09:07)  T(F): 97.8 (02-03-20 @ 09:07), Max: 97.8 (02-03-20 @ 09:07)  HR: 88 (02-03-20 @ 09:07) (88 - 88)  BP: 92/58 (02-03-20 @ 09:07) (92/58 - 92/58)  BP(mean): --  RR: 18 (02-03-20 @ 09:07) (18 - 18)  SpO2: 98% (02-03-20 @ 09:07) (98% - 98%)    Physical Exam:      LIJ TLC no erythema or tenderness  NGT     HEENT PERRLA EOMI    No oral exudate or erythema    Chest Good AE,bibasilar crackles     CVS RRR S1 S2 WNl ? ESM no rub or gallop    Abd Obese incision with VAC RLQ colostomy  + BS no tenderness but limited exam given habitus     IV site no erythema tenderness or discharge    CNS AAO X 2 no focal    Bilateral LE stasis with venous ulcers  some skin falking  also on Upper Ext     Lab Data:        now new labs    WBC Count: 5.31 (01-30-20 @ 08:10)  WBC Count: 5.77 (01-30-20 @ 04:41)  WBC Count: 7.91 (01-29-20 @ 00:11)  WBC Count: 6.27 (01-28-20 @ 01:12)      Creatinine, Serum: 1.40 mg/dL (01-30-20 @ 04:41)          Culture - Blood (01.28.20 @ 17:12)    Specimen Source: .Blood Blood-Peripheral    Culture Results:   No growth at 5 days.    Culture - Blood (01.28.20 @ 15:01)    Gram Stain:   Growth in aerobic bottle:  Gram Negative Rods    -  Ceftazidime: R >16    -  Ciprofloxacin: R >2    -  Cefepime: R >16    -  Imipenem: R    -  Levofloxacin: R >4    -  Gentamicin: I 8    -  Trimethoprim/Sulfamethoxazole: R >2/38    -  Tobramycin: S <=2    -  Piperacillin/Tazobactam: R    -  Meropenem: R >8    -  Amikacin: R >32    -  Ampicillin/Sulbactam: I 16/8    Specimen Source: .Blood Blood    Organism: Acinetobacter baumannii    Organism: Acinetobacter baumannii    Culture Results:   Growth in aerobic bottle: Acinetobacter baumannii nosocomialis group    Organism Identification: Acinetobacter baumannii    Method Type: ETHAN    Method Type: SUSI                < from: Xray Chest 1 View- PORTABLE-Routine (01.28.20 @ 07:00) >    IMPRESSION:    Unchanged haziness of the right lung.            < end of copied text >